# Patient Record
Sex: MALE | Race: WHITE | Employment: OTHER | URBAN - METROPOLITAN AREA
[De-identification: names, ages, dates, MRNs, and addresses within clinical notes are randomized per-mention and may not be internally consistent; named-entity substitution may affect disease eponyms.]

---

## 2017-01-23 ENCOUNTER — OFFICE VISIT (OUTPATIENT)
Dept: FAMILY MEDICINE CLINIC | Age: 47
End: 2017-01-23

## 2017-01-23 ENCOUNTER — HOSPITAL ENCOUNTER (OUTPATIENT)
Dept: LAB | Age: 47
Discharge: HOME | End: 2017-01-23

## 2017-01-23 VITALS
TEMPERATURE: 97.9 F | RESPIRATION RATE: 12 BRPM | HEIGHT: 69 IN | DIASTOLIC BLOOD PRESSURE: 81 MMHG | SYSTOLIC BLOOD PRESSURE: 147 MMHG | OXYGEN SATURATION: 95 % | BODY MASS INDEX: 23.55 KG/M2 | WEIGHT: 159 LBS | HEART RATE: 66 BPM

## 2017-01-23 DIAGNOSIS — Z76.89 ENCOUNTER TO ESTABLISH CARE: Primary | ICD-10-CM

## 2017-01-23 DIAGNOSIS — R06.02 SHORTNESS OF BREATH: ICD-10-CM

## 2017-01-23 DIAGNOSIS — J44.9 CHRONIC OBSTRUCTIVE PULMONARY DISEASE, UNSPECIFIED COPD TYPE (HCC): ICD-10-CM

## 2017-01-23 DIAGNOSIS — Z76.89 ENCOUNTER TO ESTABLISH CARE: ICD-10-CM

## 2017-01-23 DIAGNOSIS — Z71.6 TOBACCO ABUSE COUNSELING: ICD-10-CM

## 2017-01-23 LAB
ALBUMIN SERPL BCP-MCNC: 4 G/DL (ref 3.4–5)
ALBUMIN/GLOB SERPL: 1.5 {RATIO} (ref 0.8–1.7)
ALP SERPL-CCNC: 82 U/L (ref 45–117)
ALT SERPL-CCNC: 17 U/L (ref 16–61)
AMPHET UR QL SCN: NEGATIVE
ANION GAP BLD CALC-SCNC: 7 MMOL/L (ref 3–18)
AST SERPL W P-5'-P-CCNC: 10 U/L (ref 15–37)
BARBITURATES UR QL SCN: NEGATIVE
BASOPHILS # BLD AUTO: 0 K/UL (ref 0–0.1)
BASOPHILS # BLD: 0 % (ref 0–2)
BENZODIAZ UR QL: NEGATIVE
BILIRUB SERPL-MCNC: 0.9 MG/DL (ref 0.2–1)
BUN SERPL-MCNC: 11 MG/DL (ref 7–18)
BUN/CREAT SERPL: 15 (ref 12–20)
CALCIUM SERPL-MCNC: 8.6 MG/DL (ref 8.5–10.1)
CANNABINOIDS UR QL SCN: POSITIVE
CHLORIDE SERPL-SCNC: 108 MMOL/L (ref 100–108)
CHOLEST SERPL-MCNC: 144 MG/DL
CO2 SERPL-SCNC: 24 MMOL/L (ref 21–32)
COCAINE UR QL SCN: NEGATIVE
CREAT SERPL-MCNC: 0.75 MG/DL (ref 0.6–1.3)
DIFFERENTIAL METHOD BLD: ABNORMAL
EOSINOPHIL # BLD: 0.1 K/UL (ref 0–0.4)
EOSINOPHIL NFR BLD: 1 % (ref 0–5)
ERYTHROCYTE [DISTWIDTH] IN BLOOD BY AUTOMATED COUNT: 12.5 % (ref 11.6–14.5)
EST. AVERAGE GLUCOSE BLD GHB EST-MCNC: NORMAL MG/DL
GLOBULIN SER CALC-MCNC: 2.6 G/DL (ref 2–4)
GLUCOSE SERPL-MCNC: 86 MG/DL (ref 74–99)
HBA1C MFR BLD: 4.8 % (ref 4.2–5.6)
HCT VFR BLD AUTO: 44.3 % (ref 36–48)
HDLC SERPL-MCNC: 36 MG/DL (ref 40–60)
HDLC SERPL: 4 {RATIO} (ref 0–5)
HDSCOM,HDSCOM: ABNORMAL
HGB BLD-MCNC: 16.4 G/DL (ref 13–16)
LDLC SERPL CALC-MCNC: 85.6 MG/DL (ref 0–100)
LIPID PROFILE,FLP: ABNORMAL
LYMPHOCYTES # BLD AUTO: 17 % (ref 21–52)
LYMPHOCYTES # BLD: 1.4 K/UL (ref 0.9–3.6)
MCH RBC QN AUTO: 32.9 PG (ref 24–34)
MCHC RBC AUTO-ENTMCNC: 37 G/DL (ref 31–37)
MCV RBC AUTO: 88.8 FL (ref 74–97)
METHADONE UR QL: NEGATIVE
MONOCYTES # BLD: 0.5 K/UL (ref 0.05–1.2)
MONOCYTES NFR BLD AUTO: 6 % (ref 3–10)
NEUTS SEG # BLD: 6.2 K/UL (ref 1.8–8)
NEUTS SEG NFR BLD AUTO: 76 % (ref 40–73)
OPIATES UR QL: POSITIVE
PCP UR QL: NEGATIVE
PLATELET # BLD AUTO: 210 K/UL (ref 135–420)
PMV BLD AUTO: 11.5 FL (ref 9.2–11.8)
POTASSIUM SERPL-SCNC: 3.9 MMOL/L (ref 3.5–5.5)
PROT SERPL-MCNC: 6.6 G/DL (ref 6.4–8.2)
RBC # BLD AUTO: 4.99 M/UL (ref 4.7–5.5)
SODIUM SERPL-SCNC: 139 MMOL/L (ref 136–145)
TRIGL SERPL-MCNC: 112 MG/DL (ref ?–150)
VLDLC SERPL CALC-MCNC: 22.4 MG/DL
WBC # BLD AUTO: 8.2 K/UL (ref 4.6–13.2)

## 2017-01-23 PROCEDURE — 80053 COMPREHEN METABOLIC PANEL: CPT | Performed by: NURSE PRACTITIONER

## 2017-01-23 PROCEDURE — 80074 ACUTE HEPATITIS PANEL: CPT | Performed by: NURSE PRACTITIONER

## 2017-01-23 PROCEDURE — 86703 HIV-1/HIV-2 1 RESULT ANTBDY: CPT | Performed by: NURSE PRACTITIONER

## 2017-01-23 PROCEDURE — 85025 COMPLETE CBC W/AUTO DIFF WBC: CPT | Performed by: NURSE PRACTITIONER

## 2017-01-23 PROCEDURE — 83036 HEMOGLOBIN GLYCOSYLATED A1C: CPT | Performed by: NURSE PRACTITIONER

## 2017-01-23 PROCEDURE — 80307 DRUG TEST PRSMV CHEM ANLYZR: CPT | Performed by: NURSE PRACTITIONER

## 2017-01-23 PROCEDURE — 80061 LIPID PANEL: CPT | Performed by: NURSE PRACTITIONER

## 2017-01-23 RX ORDER — FLUTICASONE PROPIONATE AND SALMETEROL 250; 50 UG/1; UG/1
1 POWDER RESPIRATORY (INHALATION) EVERY 12 HOURS
Qty: 3 INHALER | Refills: 3 | Status: SHIPPED | COMMUNITY
Start: 2017-01-23 | End: 2017-07-24 | Stop reason: DRUGHIGH

## 2017-01-23 RX ORDER — FLUTICASONE PROPIONATE AND SALMETEROL 250; 50 UG/1; UG/1
1 POWDER RESPIRATORY (INHALATION) EVERY 12 HOURS
Qty: 3 INHALER | Refills: 0 | Status: SHIPPED | COMMUNITY
Start: 2017-01-23 | End: 2017-07-24 | Stop reason: DRUGHIGH

## 2017-01-23 RX ORDER — ALBUTEROL SULFATE 90 UG/1
2 AEROSOL, METERED RESPIRATORY (INHALATION)
Qty: 2 INHALER | Refills: 0 | Status: SHIPPED | COMMUNITY
Start: 2017-01-23 | End: 2017-08-28 | Stop reason: SDUPTHER

## 2017-01-23 RX ORDER — ALBUTEROL SULFATE 90 UG/1
2 AEROSOL, METERED RESPIRATORY (INHALATION)
Qty: 6 INHALER | Refills: 3 | Status: ON HOLD | COMMUNITY
Start: 2017-01-23 | End: 2017-12-23

## 2017-01-23 NOTE — PROGRESS NOTES
MARIA ANTONIA QUEZADA [facility]  [facility], [address] [facility]  [phone] office/[phone] fax      1/23/2017    Reason for visit:   Chief Complaint   Patient presents with   [address] COPD     need help with medications out for about 2 months. Decreased smoking from 2 ppd to 1 ppd. Been smoking for 30 years. Patient: Cee Cardenas Sr, 1970, [id number]       Primary MD: Capo Swan NP    Subjective:   Cee Cardenas Sr, a 51 y.o. male, with pmhx of COPD who is left handed presents for Establish Care and COPD (need help with medications out for about 2 months. Decreased smoking from 2 ppd to 1 ppd. Been smoking for 30 years. )    Was referred by  at [facility]. The patient reports that he was on albuterol, spiriva, and Dulera and was being followed by a pulmonologist with [facility] but he wasn't able to afford treatment and medications. HPI    ADV DIR:  None      HM: Last Physical ~2 years, Eye exam > 5 years    Work status: Fulltime,       Past Medical History   Diagnosis Date    Chronic obstructive pulmonary disease ([address]        No past surgical history on file. Social History     Social History    Marital status:      Spouse name: N/A    Number of children: N/A    Years of education: N/A     Occupational History    Not on file.      Social History Main Topics    Smoking status: Current Every Day Smoker     Packs/day: 0.50     Types: Cigarettes    Smokeless tobacco: Not on file    Alcohol use Yes      Comment: occassionally    Drug use: No    Sexual activity: Yes     Partners: Female     Other Topics Concern     Service No    Blood Transfusions No    Caffeine Concern Yes    Occupational Exposure No    Hobby Hazards No    Sleep Concern Yes     occas    Stress Concern No    Weight Concern No    Special Diet No    Back Care Yes    Exercise No    Bike Helmet Yes    Seat Belt No     occas     Social History Narrative       No Known Allergies    No current outpatient prescriptions on file prior to visit. No current facility-administered medications on file prior to visit. Review of Systems   Constitutional: Negative. HENT: Negative. Eyes: Negative. Respiratory: Positive for shortness of breath and wheezing. Negative for cough, hemoptysis and sputum production. History of COPD. Cardiovascular: Negative. Gastrointestinal: Negative. Genitourinary: Negative. Musculoskeletal: Negative. Neurological: Negative. Endo/Heme/Allergies: Negative. Psychiatric/Behavioral: Negative. Objective:   Visit Vitals    /81    Pulse 66    Temp 97.9 °F (36.6 °C)    Resp 12    Ht 5' 9\" (1.753 m)    Wt 159 lb (72.1 kg)    SpO2 95%    BMI 23.48 kg/m2      Wt Readings from Last 3 Encounters:   01/23/17 159 lb (72.1 kg)   11/16/13 150 lb (68 kg)   11/02/13 145 lb (65.8 kg)     Lab Results   Component Value Date/Time    Glucose 95 02/20/2010 09:06 AM    Glucose (POC) 99 06/28/2013 10:07 PM         Physical Exam   Constitutional: He is oriented to person, place, and time. He appears well-developed and well-nourished. Cigarette aroma   HENT:   Head: Normocephalic. Eyes: Pupils are equal, round, and reactive to light. Neck: Normal range of motion. Neck supple. No JVD present. Carotid bruit is not present. No thyromegaly present. Cardiovascular: Normal rate and regular rhythm. No murmur heard. Pulmonary/Chest: Effort normal. No respiratory distress. He has wheezes. Abdominal: Soft. Bowel sounds are normal. He exhibits no distension. There is no tenderness. Musculoskeletal: Normal range of motion. He exhibits no edema or deformity. Neurological: He is alert and oriented to person, place, and time. He has normal reflexes. Skin: Skin is warm and dry. Psychiatric: He has a normal mood and affect.  His behavior is normal. Judgment and thought content normal.   Vitals reviewed. Assessment:    Marilee Blackmon Sr who has risk factors including (see above previous medical hx) and:       ICD-10-CM ICD-9-CM    1. Encounter to establish care Z76.89 V65.8 HEMOGLOBIN A1C WITH EAG      DRUG SCREEN, URINE      LIPID PANEL      HIV 1/2 AB SCREEN W RFLX CONFIRM      HEPATITIS PANEL, ACUTE      CBC WITH AUTOMATED DIFF      METABOLIC PANEL, COMPREHENSIVE   2. Tobacco abuse counseling Z71.6 V65.42      305.1    3. Chronic obstructive pulmonary disease, unspecified COPD type (HCC) J44.9 496 albuterol (PROVENTIL HFA, VENTOLIN HFA, PROAIR HFA) 90 mcg/actuation inhaler      albuterol (PROVENTIL HFA, VENTOLIN HFA, PROAIR HFA) 90 mcg/actuation inhaler      tiotropium (SPIRIVA) 18 mcg inhalation capsule      fluticasone-salmeterol (ADVAIR) 250-50 mcg/dose diskus inhaler      fluticasone-salmeterol (ADVAIR) 250-50 mcg/dose diskus inhaler   4. Shortness of breath R06.02 786.05 tiotropium (SPIRIVA) 18 mcg inhalation capsule      fluticasone-salmeterol (ADVAIR) 250-50 mcg/dose diskus inhaler      fluticasone-salmeterol (ADVAIR) 250-50 mcg/dose diskus inhaler     1. Encounter to establish care  - HEMOGLOBIN A1C WITH EAG; Future  - DRUG SCREEN, URINE; Future  - LIPID PANEL; Future  - HIV 1/2 AB SCREEN W RFLX CONFIRM; Future  - HEPATITIS PANEL, ACUTE; Future  - CBC WITH AUTOMATED DIFF; Future  - METABOLIC PANEL, COMPREHENSIVE; Future    2. Tobacco abuse counseling  -Counseled patient on the dangers of tobacco use, and was advised to quit. Reviewed strategies to maximize success, including the use of Chantix. Discussed the risks of continued tobacco use such as elevated blood pressure, vascular irritation with increased incidence of CVD with stroke or MI and PVD causing claudication, lung damage that could lead to COPD, cancer and death.   Encouraged an approach to find a few healthy habits, write them down then plan to decrease their cigarette use by one each week till they are gone all together and to plan for stress that may cause them to want to restart and how to prevent it by having new coping mechanisms in place. 1-800-QUIT-NOW provided for counseling     3. Chronic obstructive pulmonary disease, unspecified COPD type ([facility] [address]  -Requested records from pulmonologists  -[facility] samples not available today and the patient is out so will trial on Advair.   -Prescribing new medication that is available via [facility]. The patient instructed to see designated pharmacy tech to complete applications and  samples before leaving office today. - albuterol (PROVENTIL HFA, VENTOLIN HFA, PROAIR HFA) 90 mcg/actuation inhaler; Take 2 Puffs by inhalation every four (4) hours as needed for Wheezing. Dispense: 6 Inhaler; Refill: 3  - albuterol (PROVENTIL HFA, VENTOLIN HFA, PROAIR HFA) 90 mcg/actuation inhaler; Take 2 Puffs by inhalation every six (6) hours as needed for Wheezing. Indications: Chronic Obstructive Pulmonary Disease  Dispense: 2 Inhaler; Refill: 0  - tiotropium (SPIRIVA) 18 mcg inhalation capsule; Take 1 Cap by inhalation daily. Indications: BRONCHOSPASM PREVENTION WITH COPD  Dispense: 30 Cap; Refill: 5  - fluticasone-salmeterol (ADVAIR) 250-50 mcg/dose diskus inhaler; Take 1 Puff by inhalation every twelve (12) hours. Dispense: 3 Inhaler; Refill: 0  - fluticasone-salmeterol (ADVAIR) 250-50 mcg/dose diskus inhaler; Take 1 Puff by inhalation every twelve (12) hours. Indications: BRONCHOSPASM PREVENTION WITH COPD  Dispense: 3 Inhaler; Refill: 3    4. Shortness of breath  -Smoking cessation  - albuterol (PROVENTIL HFA, VENTOLIN HFA, PROAIR HFA) 90 mcg/actuation inhaler; Take 2 Puffs by inhalation every four (4) hours as needed for Wheezing. Dispense: 6 Inhaler; Refill: 3  - tiotropium (SPIRIVA) 18 mcg inhalation capsule; Take 1 Cap by inhalation daily. Indications: BRONCHOSPASM PREVENTION WITH COPD  Dispense: 30 Cap; Refill: 5  - fluticasone-salmeterol (ADVAIR) 250-50 mcg/dose diskus inhaler;  Take 1 Puff by inhalation every twelve (12) hours. Dispense: 3 Inhaler; Refill: 0  - fluticasone-salmeterol (ADVAIR) 250-50 mcg/dose diskus inhaler; Take 1 Puff by inhalation every twelve (12) hours. Indications: BRONCHOSPASM PREVENTION WITH COPD  Dispense: 3 Inhaler; Refill: 3    Written instructions followed our verbal discussion of all information discussed above, pending tests ordered and future goals/plans. Patient expressed understanding of current diagnosis, planned testing, follow up and if needed to contact the office for any questions or concerns prior to the next visit. Plan:   Reviewed medication and completed the medication reconciliation with the patient. Reviewed side effects of medications with the patient. Questions were answered and patient verb understanding. Pt is a 52 yo  male. See Med  for details. Pt in the office today to establish care, medication reconciliation. Labs obtained to establish baseline, evaluate metabolic health, nutritional status, vitamin deficiencies and screening for at risk items based on the demographics of the patient, previous medical history and current social practices.  Will contact the patient in when all labs are resulted by phone to review and make lifestyle and medication recommendations. Follow up labs will be completed to monitor improvement prior to their next visit.       Orders Placed This Encounter    HEMOGLOBIN A1C WITH EAG     Standing Status:   Future     Standing Expiration Date:   1/24/2018    DRUG SCREEN, URINE     Standing Status:   Future     Standing Expiration Date:   7/25/2017    LIPID PANEL     Standing Status:   Future     Standing Expiration Date:   1/24/2018    HIV 1/2 AB SCREEN W RFLX CONFIRM     Standing Status:   Future     Standing Expiration Date:   7/23/2017    HEPATITIS PANEL, ACUTE     Standing Status:   Future     Standing Expiration Date:   7/25/2017    CBC WITH AUTOMATED DIFF     Standing Status:   Future     Standing Expiration Date:   0/00/4423    METABOLIC PANEL, COMPREHENSIVE     Standing Status:   Future     Standing Expiration Date:   7/25/2017    albuterol (PROVENTIL HFA, VENTOLIN HFA, PROAIR HFA) 90 mcg/actuation inhaler     Sig: Take 2 Puffs by inhalation every four (4) hours as needed for Wheezing. Dispense:  6 Inhaler     Refill:  3    albuterol (PROVENTIL HFA, VENTOLIN HFA, PROAIR HFA) 90 mcg/actuation inhaler     Sig: Take 2 Puffs by inhalation every six (6) hours as needed for Wheezing. Indications: Chronic Obstructive Pulmonary Disease     Dispense:  2 Inhaler     Refill:  0    tiotropium (SPIRIVA) 18 mcg inhalation capsule     Sig: Take 1 Cap by inhalation daily. Indications: BRONCHOSPASM PREVENTION WITH COPD     Dispense:  30 Cap     Refill:  5    fluticasone-salmeterol (ADVAIR) 250-50 mcg/dose diskus inhaler     Sig: Take 1 Puff by inhalation every twelve (12) hours. Dispense:  3 Inhaler     Refill:  0    fluticasone-salmeterol (ADVAIR) 250-50 mcg/dose diskus inhaler     Sig: Take 1 Puff by inhalation every twelve (12) hours. Indications: BRONCHOSPASM PREVENTION WITH COPD     Dispense:  3 Inhaler     Refill:  3         Follow-up Disposition:  Return in about 6 months (around 7/23/2017), or if symptoms worsen or fail to improve. See [facility] for financial assistance  Labs needed for follow-up appt     \"No Show policy was reviewed with the patient. The services affected are the nurse navigator and the provider. No show appointments include missing labs for a future scheduled appointment, Pap/pelvics, arriving to appointment more than 10 minutes late, and calling to cancel appointment less than 24 hours in advance. After the 6th No Show, the patient will be removed from the [facility] to include medications for 6 months. The patient will be referred to the [place] [id number] for their primary care needs. \"     Claudette Siren, MSN, RN, FNP-C     [facility]    I spent 35 minutes with the patient in face-to-face consultation, of which greater than 50% was spent in counseling and coordination of care as described above.

## 2017-01-23 NOTE — PATIENT INSTRUCTIONS
Learning About Benefits From Quitting Smoking  How does quitting smoking make you healthier? If you're thinking about quitting smoking, you may have a few reasons to be smoke-free. Your health may be one of them. · When you quit smoking, you lower your risks for cancer, lung disease, heart attack, stroke, blood vessel disease, and blindness from macular degeneration. · When you're smoke-free, you get sick less often, and you heal faster. You are less likely to get colds, flu, bronchitis, and pneumonia. · As a nonsmoker, you may find that your mood is better and you are less stressed. When and how will you feel healthier? Quitting has real health benefits that start from day 1 of being smoke-free. And the longer you stay smoke-free, the healthier you get and the better you feel. The first hours  · After just 20 minutes, your blood pressure and heart rate go down. That means there's less stress on your heart and blood vessels. · Within 12 hours, the level of carbon monoxide in your blood drops back to normal. That makes room for more oxygen. With more oxygen in your body, you may notice that you have more energy than when you smoked. After 2 weeks  · Your lungs start to work better. · Your risk of heart attack starts to drop. After 1 month  · When your lungs are clear, you cough less and breathe deeper, so it's easier to be active. · Your sense of taste and smell return. That means you can enjoy food more than you have since you started smoking. Over the years  · After 1 year, your risk of heart disease is half what it would be if you kept smoking. · After 5 years, your risk of stroke starts to shrink. Within a few years after that, it's about the same as if you'd never smoked. · After 10 years, your risk of dying from lung cancer is cut by about half. And your risk for many other types of cancer is lower too. How would quitting help others in your life?   When you quit smoking, you improve the health of everyone who now breathes in your smoke. · Their heart, lung, and cancer risks drop, much like yours. · They are sick less. For babies and small children, living smoke-free means they're less likely to have ear infections, pneumonia, and bronchitis. · If you're a woman who is or will be pregnant someday, quitting smoking means a healthier . · Children who are close to you are less likely to become adult smokers. Where can you learn more? Go to http://costa-jessica.info/. Enter [id number] [id number] in the search box to learn more about \"Learning About Benefits From Quitting Smoking. \"  Current as of: May 26, 2016  Content Version: 11.1  © 0206-1320 [employer]. Care instructions adapted under license by [employer] (which disclaims liability or warranty for this information). If you have questions about a medical condition or this instruction, always ask your healthcare professional. [place] [address] any warranty or liability for your use of this information. Chronic Obstructive Pulmonary Disease (COPD): Care Instructions  Your Care Instructions    Chronic obstructive pulmonary disease (COPD) is a general term for a group of lung diseases, including emphysema and chronic bronchitis. People with COPD have decreased airflow in and out of the lungs, which makes it hard to breathe. The airways also can get clogged with thick mucus. Cigarette smoking is a major cause of COPD. Although there is no cure for COPD, you can slow its progress. Following your treatment plan and taking care of yourself can help you feel better and live longer. Follow-up care is a key part of your treatment and safety. Be sure to make and go to all appointments, and call your doctor if you are having problems. It's also a good idea to know your test results and keep a list of the medicines you take. How can you care for yourself at home? Staying healthy  · Do not smoke. This is the most important step you can take to prevent more damage to your lungs. If you need help quitting, talk to your doctor about stop-smoking programs and medicines. These can increase your chances of quitting for good. · Avoid colds and flu. Get a pneumococcal vaccine shot. If you have had one before, ask your doctor whether you need a second dose. Get the flu vaccine every fall. If you must be around people with colds or the flu, wash your hands often. · Avoid secondhand smoke, air pollution, and high altitudes. Also avoid cold, dry air and hot, humid air. Stay at home with your windows closed when air pollution is bad. Medicines and oxygen therapy  · Take your medicines exactly as prescribed. Call your doctor if you think you are having a problem with your medicine. · You may be taking medicines such as:  ¨ Bronchodilators. These help open your airways and make breathing easier. Bronchodilators are either short-acting (work for 6 to 9 hours) or long-acting (work for 24 hours). You inhale most bronchodilators, so they start to act quickly. Always carry your quick-relief inhaler with you in case you need it while you are away from home. ¨ Corticosteroids (prednisone, budesonide). These reduce airway inflammation. They come in pill or inhaled form. You must take these medicines every day for them to work well. · A spacer may help you get more inhaled medicine to your lungs. Ask your doctor or pharmacist if a spacer is right for you. If it is, ask how to use it properly. · Do not take any vitamins, over-the-counter medicine, or herbal products without talking to your doctor first.  · If your doctor prescribed antibiotics, take them as directed. Do not stop taking them just because you feel better. You need to take the full course of antibiotics. · Oxygen therapy boosts the amount of oxygen in your blood and helps you breathe easier.  Use the flow rate your doctor has recommended, and do not change it without talking to your doctor first.  Activity  · Get regular exercise. Walking is an easy way to get exercise. Start out slowly, and walk a little more each day. · Pay attention to your breathing. You are exercising too hard if you cannot talk while you are exercising. · Take short rest breaks when doing household chores and other activities. · Learn breathing methods--such as breathing through pursed lips--to help you become less short of breath. · If your doctor has not set you up with a pulmonary rehabilitation program, talk to him or her about whether rehab is right for you. Rehab includes exercise programs, education about your disease and how to manage it, help with diet and other changes, and emotional support. Diet  · Eat regular, healthy meals. Use bronchodilators about 1 hour before you eat to make it easier to eat. Eat several small meals instead of three large ones. Drink beverages at the end of the meal. Avoid foods that are hard to chew. · Eat foods that contain protein so that you do not lose muscle mass. Mental health  · Talk to your family, friends, or a therapist about your feelings. It is normal to feel frightened, angry, hopeless, helpless, and even guilty. Talking openly about bad feelings can help you cope. If these feelings last, talk to your doctor. When should you call for help? Call 911 anytime you think you may need emergency care. For example, call if:  · You have severe trouble breathing. Call your doctor now or seek immediate medical care if:  · You have new or worse trouble breathing. · You cough up blood. · You have a fever. Watch closely for changes in your health, and be sure to contact your doctor if:  · You cough more deeply or more often, especially if you notice more mucus or a change in the color of your mucus. · You have new or worse swelling in your legs or belly. · You are not getting better as expected. Where can you learn more?   Go to http://costa-jessica.info/. Addy Gloria in the search box to learn more about \"Chronic Obstructive Pulmonary Disease (COPD): Care Instructions. \"  Current as of: May 23, 2016  Content Version: 11.1  © 3131-9712 [employer]. Care instructions adapted under license by [employer] (which disclaims liability or warranty for this information). If you have questions about a medical condition or this instruction, always ask your healthcare professional. [place] [address] any warranty or liability for your use of this information. The [facility] reminders! Foundation Operating Hours: These may change without notice. Mon- Wed 7am to 5pm. Closed for lunch 12-1pm  Thurs 7am to 12pm  Fridays closed     NO SHOW POLICY ~ If a patient has 3 no shows for an appointment with the Provider, Mental Health Provider, or the Nurse Navigator in 6 months, they will be discharged from the practice for 6 months. Medication ordering will also be suspended. If the patient is discharged from the [facility], they can go to the [place] [address] where they can be seen for the primary needs plus obtain the same types of medications as they receive at the [facility]. To avoid being discharged, the patient must call the office at [phone] 24 hours prior to their appointment if they need to cancel, arrive to their appointments on time and come to all scheduled appointments. If the patient is discharged from the practice, they can apply to be re-established after 12 months. Lab work:  Unless you are instructed differently, please return to the office between the hours of 7 am and 10:30 am Monday through Thursday to have your labs drawn one week before your next scheduled PROVIDER visit. If you do not have an appointment to follow up on these results, please make one or plan to call the office if you do not hear from us to get the results. No news does not mean good news. Medications: If your medications are new or have changed, and you get your medications from the clinic pharmacy ([facility]), you MUST talk to the pharmacy staff to sign the new prescription applications. If you don't sign the applications we cannot get the medications for you. It usually takes 6-8 weeks for your medications to arrive. The Pharmacy staff will call you when your medications are available. You will have 30 days to come in and  your medications. If you don't  your medicines within those 30 days, those medicines will be placed on the self as samples and you will have to start all over again by completing the applications and waiting the 6-8 weeks for your medicines to arrive. This is firm and there will be no exceptions! ! [facility] or [facility] will assist you with your medications if available but not all medications are available so some may be obtained through local pharmacies such as [facility] that has a large $4 list and [facility] that has many drugs for free or also for $4.  We will work hard to get the best medications for you that you can also afford. Feet Care: Local [facility] through [facility]  Every second Tuesday of the month (except for holidays and election days) from 9am to 1 pm. The services provided by these ministry volunteers are free of charge with the option to donate. They will inspect your feet thoroughly, soak them for 10 minutes, cut and file your nails. They care for diabetics as well. Keep in mind this service is free and will be on a first come first serve basis. Bad teeth? Ask about the Dental Bus to get you in front of a local dentist. The bus leaves every other Wednesday for those on the list. (Ask about availability as these appointments are limited)    Eye exams for Diabetics. Please let us know so we can add you to the list to see the eye doctor at [facility]. You will receive a free eye exam and free glasses if needed. Unfortunately, if you are not a diabetic, we do not have a free service for eye exams for you (yet!). We do have information on where to go to get a huge discount on eye exams and glasses. Sick visits: If you are sick and it is not an emergency call the office to see if you can schedule an appointment. Charges and cost items from the clinic:  Most of our orders are covered by [address] but there ARE SOME CHARGES for items such as radiology interpretations and anesthesiology during procedures and surgeries. Please make sure you have contacted the Advanced Patient Advocacy (APA) group to check on your payment options: [email or web address]. or come in and talk to them in person: On  Tuesdays, we have Advanced Patient Advocacy at the Clinic from [address] - 11:30pm and 1pm - 3pm. If you can't make it to the clinic on Tuesdays during their operating hours then APA is available Mon - Fri 8-4pm at [facility] on the first floor by the information desk. Their number is [phone]. It is important that you are screened in order to qualify for assistance and to avoid huge medical charges. The [facility] is not responsible for ANY charges you may accrue regardless of who ordered the medication, procedure, treatment or test. If you go to the Emergency Room, you WILL be charged! Behavior and emotional issues! It is stressful to be sick, have an illness, take medications, not have a job, not have medical insurance, have family issues or just getting older! Schedule an appointment with our mental health provider. She is in the office Mondays and Wednesdays from 8am to [address] [facility] can also contact the following:     The national suicide hotline (2-510-894-MSYR or 3-528.761.8250)    [place] [address] [facility], [address] [place] [address]  [phone]    [facility] [address] [address]  [phone]            Immunizations/Vaccination  Routine Immunizations are provided for infants, children, teens, and adults at the [facility]. Please bring all immunization records with you and present them at the time of registration. Phone [phone] extension [phone]  Hours (Walk in)  Monday, Tuesday, Wednesday and Friday  8:00 AM to 11:00 AM  12:30 PM to 3:00 PM      Drug and Alcohol Addiction Issues! It is hard to stop a poor habit but there is help out there. Please feel free to attend any other the following support groups to help you kick the habit or go to [facility] Department to be evaluated by the psychiatric team. Never give up!! AlAnon meetings: Alisa Siu [place]    [place] MONDAY 7:30 PM JUST FOR TODAY [employer] Al-Anon [facility] [address] [facility] WEDNESDAY 10:30 AM [employer] Al-Anon [place] [facility], ROOM [address] [place] [address] WEDNESDAY 8:00  [place] [address] 8:00 PM KEEP IT SIMPLE [employer] Al-Sonnyn [facility] [address] [place]     [facility] THURSDAY 8:00 PM LET IT BEGIN WITH ME AF[employer] Al-[facility]on [facility] 6;30 PM [place] PARENTS [employer] Parents [place] [address] [facility] [address] [facility]      [place] MONDAY 10:30 AM [employer] Al-Anon [facility] [address] [facility] SATURDAY 8:00 PM [facility] SATURDAY NIGHT AFG Al-Anon [facility] 96 [facility] MONDAY 7:00 PM MONDAY NIGHT AFG Al-Anon [place] [facility] [address]     [place] WEDNESDAY 8:00 PM SERENITY SEEKERS [employer] Al-Anon [facility] [address]  [address]

## 2017-01-23 NOTE — MR AVS SNAPSHOT
Visit Information Date & Time Provider Department Dept. Phone Encounter #  
 1/23/2017  1:30 PM Iza Chavez NP [address] [facility] [address] [id number] Follow-up Instructions Return in about 6 months (around 7/23/2017), or if symptoms worsen or fail to improve. Upcoming Health Maintenance Date Due Pneumococcal 19-64 Medium Risk (1 of 1 - PPSV23) 10/27/1988 DTaP/Tdap/Td series (1 - Tdap) 10/27/1990 INFLUENZA AGE 9 TO ADULT 8/1/2016 Allergies as of 1/23/2017  Review Complete On: 1/23/2017 By: Stacey Ceballos No Known Allergies Current Immunizations  Never Reviewed No immunizations on file. Not reviewed this visit You Were Diagnosed With   
  
 Codes Comments Encounter to establish care    -  Primary ICD-10-CM: Z76.89 
ICD-9-CM: V65.8 Tobacco abuse counseling     ICD-10-CM: Z71.6 ICD-9-CM: V65.42, 305.1 Chronic obstructive pulmonary disease, unspecified COPD type ([facility]     ICD-10-CM: J44.9 ICD-9-CM: 326 Shortness of breath     ICD-10-CM: R06.02 
ICD-9-CM: 786.05 Vitals BP Pulse Temp Resp Height(growth percentile) Weight(growth percentile) 147/81 66 97.9 °F (36.6 °C) 12 5' 9\" (1.753 m) 159 lb (72.1 kg) SpO2 BMI Smoking Status 95% 23.48 kg/m2 Current Every Day Smoker BMI and BSA Data Body Mass Index Body Surface Area  
 23.48 kg/m 2 1.87 m 2 Your Updated Medication List  
  
   
This list is accurate as of: 1/23/17  2:10 PM.  Always use your most recent med list.  
  
  
  
  
 * albuterol 90 mcg/actuation inhaler Commonly known as:  PROVENTIL HFA, VENTOLIN HFA, PROAIR HFA Take 2 Puffs by inhalation every four (4) hours as needed for Wheezing. * albuterol 90 mcg/actuation inhaler Commonly known as:  PROVENTIL HFA, VENTOLIN HFA, PROAIR HFA Take 2 Puffs by inhalation every six (6) hours as needed for Wheezing. Indications: Chronic Obstructive Pulmonary Disease * fluticasone-salmeterol 250-50 mcg/dose diskus inhaler Commonly known as:  ADVAIR Take 1 Puff by inhalation every twelve (12) hours. * fluticasone-salmeterol 250-50 mcg/dose diskus inhaler Commonly known as:  ADVAIR Take 1 Puff by inhalation every twelve (12) hours. Indications: BRONCHOSPASM PREVENTION WITH COPD  
  
 tiotropium 18 mcg inhalation capsule Commonly known as:  Samantha Roll Take 1 Cap by inhalation daily. Indications: BRONCHOSPASM PREVENTION WITH COPD * Notice: This list has 4 medication(s) that are the same as other medications prescribed for you. Read the directions carefully, and ask your doctor or other care provider to review them with you. Prescriptions Printed Refills  
 albuterol (PROVENTIL HFA, VENTOLIN HFA, PROAIR HFA) 90 mcg/actuation inhaler 3 Sig: Take 2 Puffs by inhalation every four (4) hours as needed for Wheezing. Class: Program  
 Route: Inhalation  
 tiotropium (SPIRIVA) 18 mcg inhalation capsule 5 Sig: Take 1 Cap by inhalation daily. Indications: BRONCHOSPASM PREVENTION WITH COPD Class: Program  
 Route: Inhalation  
 fluticasone-salmeterol (ADVAIR) 250-50 mcg/dose diskus inhaler 3 Sig: Take 1 Puff by inhalation every twelve (12) hours. Indications: BRONCHOSPASM PREVENTION WITH COPD Class: Program  
 Route: Inhalation Prescriptions Sent to Mail Order Refills  
 albuterol (PROVENTIL HFA, VENTOLIN HFA, PROAIR HFA) 90 mcg/actuation inhaler 3 Sig: Take 2 Puffs by inhalation every four (4) hours as needed for Wheezing. Class: Program  
 Route: Inhalation  
 tiotropium (SPIRIVA) 18 mcg inhalation capsule 5 Sig: Take 1 Cap by inhalation daily. Indications: BRONCHOSPASM PREVENTION WITH COPD  Class: Program  
 Route: Inhalation  
 fluticasone-salmeterol (ADVAIR) 250-50 mcg/dose diskus inhaler 3  
 Sig: Take 1 Puff by inhalation every twelve (12) hours. Indications: BRONCHOSPASM PREVENTION WITH COPD Class: Program  
 Route: Inhalation Prescriptions Sent to Pharmacy Refills  
 albuterol (PROVENTIL HFA, VENTOLIN HFA, PROAIR HFA) 90 mcg/actuation inhaler 3 Sig: Take 2 Puffs by inhalation every four (4) hours as needed for Wheezing. Class: Program  
 Route: Inhalation  
 tiotropium (SPIRIVA) 18 mcg inhalation capsule 5 Sig: Take 1 Cap by inhalation daily. Indications: BRONCHOSPASM PREVENTION WITH COPD Class: Program  
 Route: Inhalation  
 fluticasone-salmeterol (ADVAIR) 250-50 mcg/dose diskus inhaler 3 Sig: Take 1 Puff by inhalation every twelve (12) hours. Indications: BRONCHOSPASM PREVENTION WITH COPD Class: Program  
 Route: Inhalation Follow-up Instructions Return in about 6 months (around 7/23/2017), or if symptoms worsen or fail to improve. Patient Instructions Learning About Benefits From Quitting Smoking How does quitting smoking make you healthier? If you're thinking about quitting smoking, you may have a few reasons to be smoke-free. Your health may be one of them. · When you quit smoking, you lower your risks for cancer, lung disease, heart attack, stroke, blood vessel disease, and blindness from macular degeneration. · When you're smoke-free, you get sick less often, and you heal faster. You are less likely to get colds, flu, bronchitis, and pneumonia. · As a nonsmoker, you may find that your mood is better and you are less stressed. When and how will you feel healthier? Quitting has real health benefits that start from day 1 of being smoke-free. And the longer you stay smoke-free, the healthier you get and the better you feel. The first hours · After just 20 minutes, your blood pressure and heart rate go down. That means there's less stress on your heart and blood vessels. · Within 12 hours, the level of carbon monoxide in your blood drops back to normal. That makes room for more oxygen. With more oxygen in your body, you may notice that you have more energy than when you smoked. After 2 weeks · Your lungs start to work better. · Your risk of heart attack starts to drop. After 1 month · When your lungs are clear, you cough less and breathe deeper, so it's easier to be active. · Your sense of taste and smell return. That means you can enjoy food more than you have since you started smoking. Over the years · After 1 year, your risk of heart disease is half what it would be if you kept smoking. · After 5 years, your risk of stroke starts to shrink. Within a few years after that, it's about the same as if you'd never smoked. · After 10 years, your risk of dying from lung cancer is cut by about half. And your risk for many other types of cancer is lower too. How would quitting help others in your life? When you quit smoking, you improve the health of everyone who now breathes in your smoke. · Their heart, lung, and cancer risks drop, much like yours. · They are sick less. For babies and small children, living smoke-free means they're less likely to have ear infections, pneumonia, and bronchitis. · If you're a woman who is or will be pregnant someday, quitting smoking means a healthier . · Children who are close to you are less likely to become adult smokers. Where can you learn more? Go to http://costa-jessica.info/. Enter [id number] in the search box to learn more about \"Learning About Benefits From Quitting Smoking. \" Current as of: May 26, 2016 Content Version: 11.1 © 3137-4986 [employer]. Care instructions adapted under license by [employer] (which disclaims liability or warranty for this information).  If you have questions about a medical condition or this instruction, always ask your healthcare professional. [address] any warranty or liability for your use of this information. Chronic Obstructive Pulmonary Disease (COPD): Care Instructions Your Care Instructions Chronic obstructive pulmonary disease (COPD) is a general term for a group of lung diseases, including emphysema and chronic bronchitis. People with COPD have decreased airflow in and out of the lungs, which makes it hard to breathe. The airways also can get clogged with thick mucus. Cigarette smoking is a major cause of COPD. Although there is no cure for COPD, you can slow its progress. Following your treatment plan and taking care of yourself can help you feel better and live longer. Follow-up care is a key part of your treatment and safety. Be sure to make and go to all appointments, and call your doctor if you are having problems. It's also a good idea to know your test results and keep a list of the medicines you take. How can you care for yourself at home? Staying healthy · Do not smoke. This is the most important step you can take to prevent more damage to your lungs. If you need help quitting, talk to your doctor about stop-smoking programs and medicines. These can increase your chances of quitting for good. · Avoid colds and flu. Get a pneumococcal vaccine shot. If you have had one before, ask your doctor whether you need a second dose. Get the flu vaccine every fall. If you must be around people with colds or the flu, wash your hands often. · Avoid secondhand smoke, air pollution, and high altitudes. Also avoid cold, dry air and hot, humid air. Stay at home with your windows closed when air pollution is bad. Medicines and oxygen therapy · Take your medicines exactly as prescribed. Call your doctor if you think you are having a problem with your medicine. · You may be taking medicines such as: ¨ Bronchodilators. These help open your airways and make breathing easier. Bronchodilators are either short-acting (work for 6 to 9 hours) or long-acting (work for 24 hours). You inhale most bronchodilators, so they start to act quickly. Always carry your quick-relief inhaler with you in case you need it while you are away from home. ¨ Corticosteroids (prednisone, budesonide). These reduce airway inflammation. They come in pill or inhaled form. You must take these medicines every day for them to work well. · A spacer may help you get more inhaled medicine to your lungs. Ask your doctor or pharmacist if a spacer is right for you. If it is, ask how to use it properly. · Do not take any vitamins, over-the-counter medicine, or herbal products without talking to your doctor first. 
· If your doctor prescribed antibiotics, take them as directed. Do not stop taking them just because you feel better. You need to take the full course of antibiotics. · Oxygen therapy boosts the amount of oxygen in your blood and helps you breathe easier. Use the flow rate your doctor has recommended, and do not change it without talking to your doctor first. 
Activity · Get regular exercise. Walking is an easy way to get exercise. Start out slowly, and walk a little more each day. · Pay attention to your breathing. You are exercising too hard if you cannot talk while you are exercising. · Take short rest breaks when doing household chores and other activities. · Learn breathing methodssuch as breathing through pursed lipsto help you become less short of breath. · If your doctor has not set you up with a pulmonary rehabilitation program, talk to him or her about whether rehab is right for you. Rehab includes exercise programs, education about your disease and how to manage it, help with diet and other changes, and emotional support. Diet · Eat regular, healthy meals.  Use bronchodilators about 1 hour before you eat to make it easier to eat. Eat several small meals instead of three large ones. Drink beverages at the end of the meal. Avoid foods that are hard to chew. · Eat foods that contain protein so that you do not lose muscle mass. Mental health · Talk to your family, friends, or a therapist about your feelings. It is normal to feel frightened, angry, hopeless, helpless, and even guilty. Talking openly about bad feelings can help you cope. If these feelings last, talk to your doctor. When should you call for help? Call 911 anytime you think you may need emergency care. For example, call if: 
· You have severe trouble breathing. Call your doctor now or seek immediate medical care if: 
· You have new or worse trouble breathing. · You cough up blood. · You have a fever. Watch closely for changes in your health, and be sure to contact your doctor if: 
· You cough more deeply or more often, especially if you notice more mucus or a change in the color of your mucus. · You have new or worse swelling in your legs or belly. · You are not getting better as expected. Where can you learn more? Go to http://costa-jessica.info/. Cristian Gordillo in the search box to learn more about \"Chronic Obstructive Pulmonary Disease (COPD): Care Instructions. \" Current as of: May 23, 2016 Content Version: 11.1 © 9285-8220 [employer]. Care instructions adapted under license by [employer] (which disclaims liability or warranty for this information). If you have questions about a medical condition or this instruction, always ask your healthcare professional. [place] [address] any warranty or liability for your use of this information. [facility] reminders! Foundation Operating Hours: These may change without notice. Mon- Wed 7am to 5pm. Closed for lunch 12-1pm 
Thurs 7am to 12pm 
Fridays closed NO SHOW POLICY ~ If a patient has 3 no shows for an appointment with the Provider, Mental Health Provider, or the Nurse Navigator in 6 months, they will be discharged from the practice for 6 months. Medication ordering will also be suspended. If the patient is discharged from the [facility], they can go to the [place] [address] where they can be seen for the primary needs plus obtain the same types of medications as they receive at the [facility]. To avoid being discharged, the patient must call the office at [phone] 24 hours prior to their appointment if they need to cancel, arrive to their appointments on time and come to all scheduled appointments. If the patient is discharged from the [facility], they can apply to be re-established after 12 months. Lab work:  Unless you are instructed differently, please return to the office between the hours of 7 am and 10:30 am Monday through Thursday to have your labs drawn one week before your next scheduled PROVIDER visit. If you do not have an appointment to follow up on these results, please make one or plan to call the office if you do not hear from us to get the results. No news does not mean good news. Medications: If your medications are new or have changed, and you get your medications from the clinic pharmacy ([facility]), you MUST talk to the pharmacy staff to sign the new prescription applications. If you don't sign the applications we cannot get the medications for you. It usually takes 6-8 weeks for your medications to arrive. The Pharmacy staff will call you when your medications are available. You will have 30 days to come in and  your medications. If you don't  your medicines within those 30 days, those medicines will be placed on the self as samples and you will have to start all over again by completing the applications and waiting the 6-8 weeks for your medicines to arrive.  This is firm and there will be no exceptions!! 
 
 The [facility] or [facility] will assist you with your medications if available but not all medications are available so some may be obtained through local pharmacies such as [facility] that has a large $4 list and [facility] that has many drugs for free or also for $4.  We will work hard to get the best medications for you that you can also afford. Feet Care: Local [facility] through [facility] Every second Tuesday of the month (except for holidays and election days) from 9am to 1 pm. The services provided by these ministry volunteers are free of charge with the option to donate. They will inspect your feet thoroughly, soak them for 10 minutes, cut and file your nails. They care for diabetics as well. Keep in mind this service is free and will be on a first come first serve basis. Bad teeth? Ask about the Dental Bus to get you in front of a local dentist. The bus leaves every other Wednesday for those on the list. (Ask about availability as these appointments are limited) Eye exams for Diabetics. Please let us know so we can add you to the list to see the eye doctor at [facility]. You will receive a free eye exam and free glasses if needed. Unfortunately, if you are not a diabetic, we do not have a free service for eye exams for you (yet!). We do have information on where to go to get a huge discount on eye exams and glasses. Sick visits: If you are sick and it is not an emergency call the office to see if you can schedule an appointment. Charges and cost items from the clinic:  Most of our orders are covered by [address] but there ARE SOME CHARGES for items such as radiology interpretations and anesthesiology during procedures and surgeries. Please make sure you have contacted the Advanced Patient Advocacy (APA) group to check on your payment options: [email or web address]. or come in and talk to them in person:  On 
 Tuesdays, we have Advanced Patient Advocacy at the Clinic from [facility] - 11:30pm and 1pm - 3pm. If you can't make it to the clinic on Tuesdays during their operating hours then APA is available Mon - Fri 8-4pm at [facility] on the first floor by the information desk. Their number is [phone]. It is important that you are screened in order to qualify for assistance and to avoid huge medical charges. The [facility] is not responsible for ANY charges you may accrue regardless of who ordered the medication, procedure, treatment or test. If you go to the Emergency Room, you WILL be charged! Behavior and emotional issues! It is stressful to be sick, have an illness, take medications, not have a job, not have medical insurance, have family issues or just getting older! Schedule an appointment with our mental health provider. She is in the office Mondays and Wednesdays from 8am to [place] [address] can also contact the following: The national suicide hotline (1-139-350-OHGF or 8-310.861.2980) [address], [address] [place] [address] 
[phone] [facility] [address] [place] [address], [facility] Kumar Schultz 
[phone] Immunizations/Vaccination Routine Immunizations are provided for infants, children, teens, and adults at the [facility]. Please bring all immunization records with you and present them at the time of registration. Phone [phone] Hours (Walk in) Monday, Tuesday, Wednesday and Friday 8:00 AM to 11:00 AM 
12:30 PM to 3:00 PM 
 
 
Drug and Alcohol Addiction Issues! It is hard to stop a poor habit but there is help out there. Please feel free to attend any other the following support groups to help you kick the habit or go to [facility] Emergency Department to be evaluated by the psychiatric team. Never give up!! Jonny meetings: Alisa Siu [place] MONDAY 7:30 PM JUST FOR TODAY [employer] [facility] [address] [facility] [place] WEDNESDAY 10:30 AM [employer]GIN[employer] JADEN [employer] OF [facility], ROOM [address] [place] [address]  
 
[place] WEDNESDAY 8:00  Chadsinan Manjarrez [facility] [address] [place] THURSDAY 8:00 PM KEEP IT SIMPLE [employer] [facility] [address] 8:00 PM LET IT BEGIN WITH ME [employer] [facility] [facility] [address] [facility] FRIDAY 6;30 PM [place] PARENTS [employer] [facility] [address] [facility] [address] [place] MONDAY 10:30  [place] [address] [place] SATURDAY 8:00 PM [facility] SATURDAY NIGHT [employer] [place] [address] [place] MONDAY 7:00 PM MONDAY NIGHT [employer] AUSTYN Dawson [facility] [address]  
 
[place] WEDNESDAY 8:00 PM SERENITY SEEKERS [employer] [facility] [address] [facility] & HEALTH SERVICES! Maria Elena Funes introduces extraTKT patient portal. Now you can access parts of your medical record, email your doctor's office, and request medication refills online. 1. In your internet browser, go to [email or web address] [email or web address] 2. Click on the First Time User? Click Here link in the Sign In box. You will see the New Member Sign Up page. 3. Enter your extraTKT Access Code exactly as it appears below. You will not need to use this code after youve completed the sign-up process. If you do not sign up before the expiration date, you must request a new code. · extraTKT Access Code: [email or web address] Expires: 3/18/2017  8:40 AM 
 
4. Enter the last four digits of your Social Security Number (xxxx) and Date of Birth (mm/dd/yyyy) as indicated and click Submit. You will be taken to the next sign-up page. 5. Create a Eyenalyze ID. This will be your Eyenalyze login ID and cannot be changed, so think of one that is secure and easy to remember. 6. Create a Eyenalyze password. You can change your password at any time. 7. Enter your Password Reset Question and Answer. This can be used at a later time if you forget your password. 8. Enter your e-mail address. You will receive e-mail notification when new information is available in [address] 9. Click Sign Up. You can now view and download portions of your medical record. 10. Click the Download Summary menu link to download a portable copy of your medical information. If you have questions, please visit the Frequently Asked Questions section of the Eyenalyze website. Remember, Eyenalyze is NOT to be used for urgent needs. For medical emergencies, dial 911. Now available from your iPhone and Android! Please provide this summary of care documentation to your next provider. Your primary care clinician is listed as Gomez Moses. If you have any questions after today's visit, please call [phone].

## 2017-01-24 LAB
HAV IGM SERPL QL IA: NEGATIVE
HBV CORE IGM SER QL: NEGATIVE
HBV SURFACE AG SER QL: <0.1 INDEX
HBV SURFACE AG SER QL: NEGATIVE
HCV AB SER IA-ACNC: <0.02 INDEX
HCV AB SERPL QL IA: NEGATIVE
HCV COMMENT,HCGAC: NORMAL
SP1: NORMAL
SP2: NORMAL
SP3: NORMAL

## 2017-01-26 NOTE — PROGRESS NOTES
New patient Labs reviewed:  Mayelin Valle,   Please call this patient and review labs and the following    1) Hepatitis negative  2) HIV still pending results  3) UDS positive for marijuana and opiates  4) Cholesterol is good  5) All other labs OK

## 2017-01-27 LAB
HIV 1 & 2 AB SER-IMP: NONREACTIVE
HIV12 RESULT COMMENT, HHIVC: NORMAL

## 2017-01-30 NOTE — PROGRESS NOTES
Called Pt to give normal lab results except positive for opiates and marijuana.   Left phone message to call the office

## 2017-01-31 ENCOUNTER — DOCUMENTATION ONLY (OUTPATIENT)
Dept: FAMILY MEDICINE CLINIC | Age: 47
End: 2017-01-31

## 2017-01-31 ENCOUNTER — TELEPHONE (OUTPATIENT)
Dept: FAMILY MEDICINE CLINIC | Age: 47
End: 2017-01-31
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
## 2017-02-14 ENCOUNTER — TELEPHONE (OUTPATIENT)
Dept: FAMILY MEDICINE CLINIC | Age: 47
End: 2017-02-14

## 2017-02-15 NOTE — TELEPHONE ENCOUNTER
Medication: Proventil HFA, dose: 2 puffs, how often: every 4 hours as needed for wheezing, current number of medication days provided: 90, refill per application. Lot [id number], EXP.03/18. This medication was received and verified for the following 1. Correct Patient, 2. Correct Diagnosis, 3. Correct Drug, 4. Correct route, and no current allergy to medication. Medication: Advair 250-50 mcg, dose: 1 inhalation, how often: twice daily, current number of medication days provided: 90, refill per application. Lot #: [id number], EXP.05/18. This medication was received and verified for the following 1. Correct Patient, 2. Correct Diagnosis, 3. Correct Drug, 4. Correct route, and no current allergy to medication      Please contact patient to come  their medications.      Ace Nichols, MSN, RN, FNP-C     [facility]

## 2017-04-27 ENCOUNTER — TELEPHONE (OUTPATIENT)
Dept: FAMILY MEDICINE CLINIC | Age: 47
End: 2017-04-27

## 2017-04-27 NOTE — TELEPHONE ENCOUNTER
Medication: Proventil HFA, dose: 2 puffs, how often: every 4 hours as needed for wheezing, current number of medication days provided: 90, refill per application. Lot [id number], EXP.05/18. This medication was received and verified for the following 1. Correct Patient, 2. Correct Diagnosis, 3. Correct Drug, 4. Correct route, and no current allergy to medication. Medication: Advair 250-50 mcg, dose: 1 inhalation, how often: twice daily, current number of medication days provided: 90, refill per application. Lot #: [id number], EXP.06/18. This medication was received and verified for the following 1. Correct Patient, 2. Correct Diagnosis, 3. Correct Drug, 4. Correct route, and no current allergy to medication. Please contact patient to come  their medications.      Zenia Black, MSN, RN, [facility]

## 2017-07-17 ENCOUNTER — LAB ONLY (OUTPATIENT)
Dept: FAMILY MEDICINE CLINIC | Age: 47
End: 2017-07-17

## 2017-07-17 ENCOUNTER — HOSPITAL ENCOUNTER (OUTPATIENT)
Dept: LAB | Age: 47
Discharge: HOME | End: 2017-07-17

## 2017-07-17 DIAGNOSIS — Z00.00 ROUTINE HEALTH MAINTENANCE: ICD-10-CM

## 2017-07-17 DIAGNOSIS — Z00.00 ROUTINE HEALTH MAINTENANCE: Primary | ICD-10-CM

## 2017-07-17 LAB
ALBUMIN SERPL BCP-MCNC: 3.8 G/DL (ref 3.4–5)
ALBUMIN/GLOB SERPL: 1.3 {RATIO} (ref 0.8–1.7)
ALP SERPL-CCNC: 88 U/L (ref 45–117)
ALT SERPL-CCNC: 14 U/L (ref 16–61)
ANION GAP BLD CALC-SCNC: 6 MMOL/L (ref 3–18)
AST SERPL W P-5'-P-CCNC: 8 U/L (ref 15–37)
BILIRUB SERPL-MCNC: 0.7 MG/DL (ref 0.2–1)
BUN SERPL-MCNC: 7 MG/DL (ref 7–18)
BUN/CREAT SERPL: 9 (ref 12–20)
CALCIUM SERPL-MCNC: 8.8 MG/DL (ref 8.5–10.1)
CHLORIDE SERPL-SCNC: 107 MMOL/L (ref 100–108)
CO2 SERPL-SCNC: 26 MMOL/L (ref 21–32)
CREAT SERPL-MCNC: 0.81 MG/DL (ref 0.6–1.3)
GLOBULIN SER CALC-MCNC: 3 G/DL (ref 2–4)
GLUCOSE SERPL-MCNC: 113 MG/DL (ref 74–99)
POTASSIUM SERPL-SCNC: 4 MMOL/L (ref 3.5–5.5)
PROT SERPL-MCNC: 6.8 G/DL (ref 6.4–8.2)
SODIUM SERPL-SCNC: 139 MMOL/L (ref 136–145)

## 2017-07-17 PROCEDURE — 80053 COMPREHEN METABOLIC PANEL: CPT | Performed by: NURSE PRACTITIONER

## 2017-07-17 NOTE — PROGRESS NOTES
Venipuncture for labs performed using 23G butterfly Right AC using aseptic technique. Skin intact and dry. No active bleeding or complications noted. Bandaid dressing applied.

## 2017-07-24 ENCOUNTER — OFFICE VISIT (OUTPATIENT)
Dept: FAMILY MEDICINE CLINIC | Age: 47
End: 2017-07-24

## 2017-07-24 VITALS
DIASTOLIC BLOOD PRESSURE: 88 MMHG | WEIGHT: 161 LBS | RESPIRATION RATE: 16 BRPM | OXYGEN SATURATION: 96 % | HEIGHT: 69 IN | SYSTOLIC BLOOD PRESSURE: 140 MMHG | TEMPERATURE: 97.9 F | BODY MASS INDEX: 23.85 KG/M2 | HEART RATE: 74 BPM

## 2017-07-24 DIAGNOSIS — Z72.0 TOBACCO ABUSE: ICD-10-CM

## 2017-07-24 DIAGNOSIS — R40.0 SLEEPINESS: ICD-10-CM

## 2017-07-24 DIAGNOSIS — J44.9 CHRONIC OBSTRUCTIVE PULMONARY DISEASE, UNSPECIFIED COPD TYPE (HCC): Primary | ICD-10-CM

## 2017-07-24 DIAGNOSIS — R53.82 CHRONIC FATIGUE: ICD-10-CM

## 2017-07-24 RX ORDER — FLUTICASONE PROPIONATE AND SALMETEROL 500; 50 UG/1; UG/1
1 POWDER RESPIRATORY (INHALATION) 2 TIMES DAILY
Qty: 3 EACH | Refills: 3 | Status: SHIPPED | COMMUNITY
Start: 2017-07-24 | End: 2017-08-28 | Stop reason: SDUPTHER

## 2017-07-24 RX ORDER — IPRATROPIUM BROMIDE AND ALBUTEROL SULFATE 2.5; .5 MG/3ML; MG/3ML
3 SOLUTION RESPIRATORY (INHALATION)
Qty: 30 NEBULE | Refills: 0 | Status: SHIPPED | OUTPATIENT
Start: 2017-07-24 | End: 2017-10-23 | Stop reason: SDUPTHER

## 2017-07-24 RX ORDER — PREDNISONE 20 MG/1
40 TABLET ORAL DAILY
Qty: 10 TAB | Refills: 0 | Status: SHIPPED | OUTPATIENT
Start: 2017-07-24 | End: 2017-07-29

## 2017-07-24 RX ORDER — FLUTICASONE PROPIONATE AND SALMETEROL 500; 50 UG/1; UG/1
1 POWDER RESPIRATORY (INHALATION) 2 TIMES DAILY
Qty: 3 EACH | Refills: 0 | Status: ON HOLD | COMMUNITY
Start: 2017-07-24 | End: 2017-12-23

## 2017-07-24 RX ORDER — VARENICLINE TARTRATE 25 MG
KIT ORAL
Qty: 1 DOSE PACK | Refills: 0 | Status: SHIPPED | COMMUNITY
Start: 2017-07-24 | End: 2017-07-26 | Stop reason: SDUPTHER

## 2017-07-24 RX ORDER — VARENICLINE TARTRATE 1 MG/1
1 TABLET, FILM COATED ORAL DAILY
Qty: 30 TAB | Refills: 2 | Status: SHIPPED | COMMUNITY
Start: 2017-09-24 | End: 2017-07-26 | Stop reason: SDUPTHER

## 2017-07-24 NOTE — PATIENT INSTRUCTIONS
Varenicline (By mouth)   Varenicline (wli-SJ-v-kleen)  Helps you quit smoking, as part of a support program.   Brand Name(s): Chantix, Chantix Starting Month Pak   There may be other brand names for this medicine. When This Medicine Should Not Be Used: This medicine is not right for everyone. Do not use it if you had an allergic reaction to varenicline. How to Use This Medicine:   Tablet  · Take your medicine as directed. Your dose or schedule may need to be changed to find what works best for you. · Tell your doctor what date you have set to stop smoking. This medicine needs to be started 1 week before that date. · It is best to take this medicine with a full glass of water after you eat. · This medicine should come with a Medication Guide. Ask your pharmacist for a copy if you do not have one. · Missed dose: Take a dose as soon as you remember. If it is almost time for your next dose, wait until then and take a regular dose. Do not take extra medicine to make up for a missed dose. · Store the medicine in a closed container at room temperature, away from heat, moisture, and direct light. Drugs and Foods to Avoid:   Ask your doctor or pharmacist before using any other medicine, including over-the-counter medicines, vitamins, and herbal products. · Some medicines can affect how varenicline works. Tell your doctor if you are using any of the following:  ¨ Insulin  ¨ Theophylline  ¨ Blood thinner (including warfarin)  · This medicine can affect your ability to tolerate alcohol. Limit the amount of alcohol that you drink until you know how this medicine affects you. Warnings While Using This Medicine:   · Tell your doctor if you are pregnant or breastfeeding, or if you have kidney disease, heart or blood vessel problems, angina, or a history of heart attack, stroke, depression or mental health problems, or seizures. · For some people, this medicine may increase mental or emotional problems.  This may lead to thoughts of suicide and violence. Talk with your doctor right away if you have any thought or behavior changes that concern you. Tell your doctor if you or anyone in your family has a history of bipolar disorder or suicide attempts. · This medicine may cause the following problems:  ¨ Increased risk of heart attack or stroke  ¨ Serious skin reactions  ¨ Sleepwalking  · This medicine may cause you to become dizzy or drowsy, or have trouble concentrating. Do not drive or do anything else that could be dangerous until you know this medicine affects you. · Your doctor will check your progress and the effects of this medicine at regular visits. Keep all appointments. · Keep all medicine out of the reach of children. Never share your medicine with anyone. Possible Side Effects While Using This Medicine:   Call your doctor right away if you notice any of these side effects:  · Allergic reaction: Itching or hives, swelling in your face or hands, swelling or tingling in your mouth or throat, chest tightness, trouble breathing  · Blistering, peeling, red skin rash  · Chest pain, fast, pounding, or uneven heartbeat  · Feeling anxious, depressed, restless, or irritable  · Numbness or weakness on one side of your body, sudden or severe headache, problems with vision, speech, or walking  · Seeing or hearing things that are not really there  · Seizures  · Thoughts of hurting yourself or others, unusual moods or behaviors  If you notice these less serious side effects, talk with your doctor:   · Headache  · Nausea, gas  · Trouble sleeping, unusual dreams, sleepwalking  If you notice other side effects that you think are caused by this medicine, tell your doctor. Call your doctor for medical advice about side effects. You may report side effects to FDA at 8-702-FDA-7136  © 2017 [employer] [employer] Information is for End User's use only and may not be sold, redistributed or otherwise used for commercial purposes.   The above information is an  only. It is not intended as medical advice for individual conditions or treatments. Talk to your doctor, nurse or pharmacist before following any medical regimen to see if it is safe and effective for you. Learning About Benefits From Quitting Smoking  How does quitting smoking make you healthier? If you're thinking about quitting smoking, you may have a few reasons to be smoke-free. Your health may be one of them. · When you quit smoking, you lower your risks for cancer, lung disease, heart attack, stroke, blood vessel disease, and blindness from macular degeneration. · When you're smoke-free, you get sick less often, and you heal faster. You are less likely to get colds, flu, bronchitis, and pneumonia. · As a nonsmoker, you may find that your mood is better and you are less stressed. When and how will you feel healthier? Quitting has real health benefits that start from day 1 of being smoke-free. And the longer you stay smoke-free, the healthier you get and the better you feel. The first hours  · After just 20 minutes, your blood pressure and heart rate go down. That means there's less stress on your heart and blood vessels. · Within 12 hours, the level of carbon monoxide in your blood drops back to normal. That makes room for more oxygen. With more oxygen in your body, you may notice that you have more energy than when you smoked. After 2 weeks  · Your lungs start to work better. · Your risk of heart attack starts to drop. After 1 month  · When your lungs are clear, you cough less and breathe deeper, so it's easier to be active. · Your sense of taste and smell return. That means you can enjoy food more than you have since you started smoking. Over the years  · After 1 year, your risk of heart disease is half what it would be if you kept smoking. · After 5 years, your risk of stroke starts to shrink.  Within a few years after that, it's about the same as if you'd never smoked. · After 10 years, your risk of dying from lung cancer is cut by about half. And your risk for many other types of cancer is lower too. How would quitting help others in your life? When you quit smoking, you improve the health of everyone who now breathes in your smoke. · Their heart, lung, and cancer risks drop, much like yours. · They are sick less. For babies and small children, living smoke-free means they're less likely to have ear infections, pneumonia, and bronchitis. · If you're a woman who is or will be pregnant someday, quitting smoking means a healthier . · Children who are close to you are less likely to become adult smokers. Where can you learn more? Go to http://costa-jessica.info/. Enter 052 [id number] 72 11 in the search box to learn more about \"Learning About Benefits From Quitting Smoking. \"  Current as of: 2017  Content Version: 11.3  © 4109-2079 [employer]. Care instructions adapted under license by [employer] (which disclaims liability or warranty for this information). If you have questions about a medical condition or this instruction, always ask your healthcare professional. [place] [address] any warranty or liability for your use of this information. COPD Exacerbation Plan: Care Instructions  Your Care Instructions  If you have chronic obstructive pulmonary disease (COPD), your usual shortness of breath could suddenly get worse. You may start coughing more and have more mucus. This flare-up is called a COPD exacerbation (say \"us-YDU-hz-BAY-shun\"). A lung infection or air pollution could set off an exacerbation. Sometimes it can happen after a quick change in temperature or being around chemicals. Work with your doctor to make a plan for dealing with an exacerbation. You can better manage it if you plan ahead. Follow-up care is a key part of your treatment and safety.  Be sure to make and go to all appointments, and call your doctor if you are having problems. It's also a good idea to know your test results and keep a list of the medicines you take. How can you care for yourself at home? During an exacerbation  · Do not panic if you start to have one. Quick treatment at home may help you prevent serious breathing problems. If you have a COPD exacerbation plan that you developed with your doctor, follow it. · Take your medicines exactly as your doctor tells you. ¨ Use your inhaler as directed by your doctor. If your symptoms do not get better after you use your medicine, have someone take you to the emergency room. Call an ambulance if necessary. ¨ With inhaled medicines, a spacer or a nebulizer may help you get more medicine to your lungs. Ask your doctor or pharmacist how to use them properly. Practice using the spacer in front of a mirror before you have an exacerbation. This may help you get the medicine into your lungs quickly. ¨ If your doctor has given you steroid pills, take them as directed. ¨ Your doctor may have given you a prescription for antibiotics, which you can fill if you need it. ¨ Talk to your doctor if you have any problems with your medicine. And call your doctor if you have to use your antibiotic or steroid pills. Preventing an exacerbation  · Do not smoke. This is the most important step you can take to prevent more damage to your lungs and prevent problems. If you already smoke, it is never too late to stop. If you need help quitting, talk to your doctor about stop-smoking programs and medicines. These can increase your chances of quitting for good. · Take your daily medicines as prescribed. · Avoid colds and flu. ¨ Get a pneumococcal vaccine. ¨ Get a flu vaccine each year, as soon as it is available. Ask those you live or work with to do the same, so they will not get the flu and infect you. ¨ Try to stay away from people with colds or the flu.   ¨ Wash your hands often.  · Avoid secondhand smoke; air pollution; cold, dry air; hot, humid air; and high altitudes. Stay at home with your windows closed when air pollution is bad. · Learn breathing techniques for COPD, such as breathing through pursed lips. These techniques can help you breathe easier during an exacerbation. When should you call for help? Call 911 anytime you think you may need emergency care. For example, call if:  · You have severe trouble breathing. · You have severe chest pain. Call your doctor now or seek immediate medical care if:  · You have new or worse shortness of breath. · You develop new chest pain. · You are coughing more deeply or more often, especially if you notice more mucus or a change in the color of your mucus. · You cough up blood. · You have new or increased swelling in your legs or belly. · You have a fever. Watch closely for changes in your health, and be sure to contact your doctor if:  · You need to use your antibiotic or steroid pills. · Your symptoms are getting worse. Where can you learn more? Go to http://costa-jessica.info/. Enter V574 in the search box to learn more about \"COPD Exacerbation Plan: Care Instructions. \"  Current as of: March 25, 2017  Content Version: 11.3  © 9101-6441 [employer]. Care instructions adapted under license by [employer] (which disclaims liability or warranty for this information). If you have questions about a medical condition or this instruction, always ask your healthcare professional. [place] [address] any warranty or liability for your use of this information.

## 2017-07-24 NOTE — LETTER
July 24, 2017 Mr Jania Crabtree., 
 
Patient scheduled on August 17, 2017 at 315 PM.  
You are asked to arrive at 245 PM to fill out paperwork. [facility] Dr. Clemente Espinoza [address] [facility] [address] [place], [address] [phone]

## 2017-07-24 NOTE — MR AVS SNAPSHOT
Visit Information Date & Time Provider Department Dept. Phone Encounter #  
 7/24/2017  2:00 PM Cynthia Merida NP [address] [facility] [address] [id number] Follow-up Instructions Return in about 3 months (around 10/24/2017), or if symptoms worsen or fail to improve. Upcoming Health Maintenance Date Due Pneumococcal 19-64 Medium Risk (1 of 1 - PPSV23) 10/27/1988 DTaP/Tdap/Td series (1 - Tdap) 10/27/1990 INFLUENZA AGE 9 TO ADULT 8/1/2017 Allergies as of 7/24/2017  Review Complete On: 7/24/2017 By: Cynthia Merida NP No Known Allergies Current Immunizations  Never Reviewed No immunizations on file. Not reviewed this visit You Were Diagnosed With   
  
 Codes Comments Chronic obstructive pulmonary disease, unspecified [facility] type ([facility]    -  Primary ICD-10-CM: J44.9 ICD-9-CM: 803 Tobacco abuse     ICD-10-CM: Z72.0 ICD-9-CM: 305.1 Sleepiness     ICD-10-CM: R40.0 ICD-9-CM: 780.09 Chronic fatigue     ICD-10-CM: R53.82 
ICD-9-CM: 780.79 Vitals BP Pulse Temp Resp Height(growth percentile) Weight(growth percentile) 140/88 (BP 1 Location: Left arm, BP Patient Position: Sitting) 74 97.9 °F (36.6 °C) (Oral) 16 5' 9\" (1.753 m) 161 lb (73 kg) SpO2 BMI Smoking Status 96% 23.78 kg/m2 Current Every Day Smoker BMI and BSA Data Body Mass Index Body Surface Area 23.78 kg/m 2 1.89 m 2 Preferred Pharmacy Pharmacy Name Phone [id number] [address] [phone] Your Updated Medication List  
  
   
This list is accurate as of: 7/24/17  2:23 PM.  Always use your most recent med list.  
  
  
  
  
 * albuterol 90 mcg/actuation inhaler Commonly known as:  PROVENTIL HFA, VENTOLIN HFA, PROAIR HFA Take 2 Puffs by inhalation every four (4) hours as needed for Wheezing. * albuterol 90 mcg/actuation inhaler Commonly known as:  PROVENTIL HFA, VENTOLIN HFA, PROAIR HFA Take 2 Puffs by inhalation every six (6) hours as needed for Wheezing. Indications: Chronic Obstructive Pulmonary Disease * fluticasone-salmeterol 500-50 mcg/dose diskus inhaler Commonly known as:  ADVAIR Take 1 Puff by inhalation two (2) times a day. * fluticasone-salmeterol 500-50 mcg/dose diskus inhaler Commonly known as:  ADVAIR Take 1 Puff by inhalation two (2) times a day. predniSONE 20 mg tablet Commonly known as:  David Ferraris Take 2 Tabs by mouth daily for 5 days. With Breakfast  
  
 tiotropium 18 mcg inhalation capsule Commonly known as:  Chelsea Croft Take 1 Cap by inhalation daily. Indications: BRONCHOSPASM PREVENTION WITH COPD * varenicline 0.5 mg (11)- 1 mg (42) Dspk Commonly known as:  CHANTIX STARTER IAN Start 1 week before target quit date. Take one 0.5 mg tablet daily days 1-3. Take one 0.5 mg tablet twice daily days 4-7. Then start taking one 1 mg tablet daily for up to 11 weeks * varenicline 1 mg tablet Commonly known as:  Sonda Longs Take 1 Tab by mouth daily. Start after completion of starter pack Start taking on:  9/24/2017 * Notice: This list has 6 medication(s) that are the same as other medications prescribed for you. Read the directions carefully, and ask your doctor or other care provider to review them with you. Prescriptions Printed Refills  
 fluticasone-salmeterol (ADVAIR) 500-50 mcg/dose diskus inhaler 3 Sig: Take 1 Puff by inhalation two (2) times a day. Class: Program  
 Route: Inhalation  
 varenicline (CHANTIX STARTER IAN) 0.5 mg (11)- 1 mg (42) DsPk 0 Sig: Start 1 week before target quit date. Take one 0.5 mg tablet daily days 1-3. Take one 0.5 mg tablet twice daily days 4-7. Then start taking one 1 mg tablet daily for up to 11 weeks Class: Program  
 varenicline (CHANTIX) 1 mg tablet 2 Starting on: 9/24/2017 Sig: Take 1 Tab by mouth daily. Start after completion of starter pack Class: Program  
 Route: Oral  
  
Prescriptions Sent to Mail Order Refills  
 fluticasone-salmeterol (ADVAIR) 500-50 mcg/dose diskus inhaler 3 Sig: Take 1 Puff by inhalation two (2) times a day. Class: Program  
 Route: Inhalation  
 varenicline (CHANTIX STARTER IAN) 0.5 mg (11)- 1 mg (42) DsPk 0 Sig: Start 1 week before target quit date. Take one 0.5 mg tablet daily days 1-3. Take one 0.5 mg tablet twice daily days 4-7. Then start taking one 1 mg tablet daily for up to 11 weeks Class: Program  
 varenicline (CHANTIX) 1 mg tablet 2 Starting on: 9/24/2017 Sig: Take 1 Tab by mouth daily. Start after completion of starter pack Class: Program  
 Route: Oral  
  
Prescriptions Sent to Pharmacy Refills  
 fluticasone-salmeterol (ADVAIR) 500-50 mcg/dose diskus inhaler 3 Sig: Take 1 Puff by inhalation two (2) times a day. Class: Program  
 Route: Inhalation  
 varenicline (CHANTIX STARTER IAN) 0.5 mg (11)- 1 mg (42) DsPk 0 Sig: Start 1 week before target quit date. Take one 0.5 mg tablet daily days 1-3. Take one 0.5 mg tablet twice daily days 4-7. Then start taking one 1 mg tablet daily for up to 11 weeks Class: Program  
 varenicline (CHANTIX) 1 mg tablet 2 Starting on: 9/24/2017 Sig: Take 1 Tab by mouth daily. Start after completion of starter pack Class: Program  
 Route: Oral  
 predniSONE (DELTASONE) 20 mg tablet 0 Sig: Take 2 Tabs by mouth daily for 5 days. With Breakfast  
 Class: Normal  
 Pharmacy: [facility] [address]  #: [phone] Route: Oral  
  
We Performed the Following SLEEP MEDICINE REFERRAL [WGJ830 Custom] Comments:  
 Please evaluate patient for EPSS score 22. Follow-up Instructions Return in about 3 months (around 10/24/2017), or if symptoms worsen or fail to improve. To-Do List   
 07/24/2017 PFT: PULMONARY FUNCTION TEST   
  
 08/01/2017 1:00 PM  
  Appointment with [id number] [place] [address] at [address] [place] [address] ([phone]) 1. Do NOT use any inhaled medications 24 hours prior to your breathing test.   2. Do NOT eat a heavy meal or smoke any tobacco products two hours prior to the appointment time. 3. Dress in loose, comfortable clothing. 4. Bring your photo ID, insurance cards and, if provided, the written physician order. 5. Report to the Patient Registration department on the first floor 15-20 minutes prior to your appointment time to check in.   6. If you have questions or need to reschedule, please call [phone]. Referral Information Referral ID Referred By Referred To  
  
 [id number] Anastacia GARCIA Not Available Visits Status Start Date End Date 1 New Request 7/24/17 7/24/18 If your referral has a status of pending review or denied, additional information will be sent to support the outcome of this decision. Patient Instructions Varenicline (By mouth) Varenicline (Vijaya Igo) Helps you quit smoking, as part of a support program.  
Brand Name(s): Chantix, Chantix Starting Month Pak There may be other brand names for this medicine. When This Medicine Should Not Be Used: This medicine is not right for everyone. Do not use it if you had an allergic reaction to varenicline. How to Use This Medicine:  
Tablet · Take your medicine as directed. Your dose or schedule may need to be changed to find what works best for you. · Tell your doctor what date you have set to stop smoking. This medicine needs to be started 1 week before that date. · It is best to take this medicine with a full glass of water after you eat. · This medicine should come with a Medication Guide. Ask your pharmacist for a copy if you do not have one. · Missed dose: Take a dose as soon as you remember.  If it is almost time for your next dose, wait until then and take a regular dose. Do not take extra medicine to make up for a missed dose. · Store the medicine in a closed container at room temperature, away from heat, moisture, and direct light. Drugs and Foods to Avoid: Ask your doctor or pharmacist before using any other medicine, including over-the-counter medicines, vitamins, and herbal products. · Some medicines can affect how varenicline works. Tell your doctor if you are using any of the following: 
¨ Insulin ¨ Theophylline ¨ Blood thinner (including warfarin) · This medicine can affect your ability to tolerate alcohol. Limit the amount of alcohol that you drink until you know how this medicine affects you. Warnings While Using This Medicine: · Tell your doctor if you are pregnant or breastfeeding, or if you have kidney disease, heart or blood vessel problems, angina, or a history of heart attack, stroke, depression or mental health problems, or seizures. · For some people, this medicine may increase mental or emotional problems. This may lead to thoughts of suicide and violence. Talk with your doctor right away if you have any thought or behavior changes that concern you. Tell your doctor if you or anyone in your family has a history of bipolar disorder or suicide attempts. · This medicine may cause the following problems: 
¨ Increased risk of heart attack or stroke ¨ Serious skin reactions ¨ Sleepwalking · This medicine may cause you to become dizzy or drowsy, or have trouble concentrating. Do not drive or do anything else that could be dangerous until you know this medicine affects you. · Your doctor will check your progress and the effects of this medicine at regular visits. Keep all appointments. · Keep all medicine out of the reach of children. Never share your medicine with anyone. Possible Side Effects While Using This Medicine:  
Call your doctor right away if you notice any of these side effects: · Allergic reaction: Itching or hives, swelling in your face or hands, swelling or tingling in your mouth or throat, chest tightness, trouble breathing · Blistering, peeling, red skin rash · Chest pain, fast, pounding, or uneven heartbeat · Feeling anxious, depressed, restless, or irritable · Numbness or weakness on one side of your body, sudden or severe headache, problems with vision, speech, or walking · Seeing or hearing things that are not really there · Seizures · Thoughts of hurting yourself or others, unusual moods or behaviors If you notice these less serious side effects, talk with your doctor:  
· Headache · Nausea, gas · Trouble sleeping, unusual dreams, sleepwalking If you notice other side effects that you think are caused by this medicine, tell your doctor. Call your doctor for medical advice about side effects. You may report side effects to FDA at 4-950-FDA-9635 © 2017 [address]  Information is for End User's use only and may not be sold, redistributed or otherwise used for commercial purposes. The above information is an  only. It is not intended as medical advice for individual conditions or treatments. Talk to your doctor, nurse or pharmacist before following any medical regimen to see if it is safe and effective for you. Learning About Benefits From Quitting Smoking How does quitting smoking make you healthier? If you're thinking about quitting smoking, you may have a few reasons to be smoke-free. Your health may be one of them. · When you quit smoking, you lower your risks for cancer, lung disease, heart attack, stroke, blood vessel disease, and blindness from macular degeneration. · When you're smoke-free, you get sick less often, and you heal faster. You are less likely to get colds, flu, bronchitis, and pneumonia. · As a nonsmoker, you may find that your mood is better and you are less stressed. When and how will you feel healthier? Quitting has real health benefits that start from day 1 of being smoke-free. And the longer you stay smoke-free, the healthier you get and the better you feel. The first hours · After just 20 minutes, your blood pressure and heart rate go down. That means there's less stress on your heart and blood vessels. · Within 12 hours, the level of carbon monoxide in your blood drops back to normal. That makes room for more oxygen. With more oxygen in your body, you may notice that you have more energy than when you smoked. After 2 weeks · Your lungs start to work better. · Your risk of heart attack starts to drop. After 1 month · When your lungs are clear, you cough less and breathe deeper, so it's easier to be active. · Your sense of taste and smell return. That means you can enjoy food more than you have since you started smoking. Over the years · After 1 year, your risk of heart disease is half what it would be if you kept smoking. · After 5 years, your risk of stroke starts to shrink. Within a few years after that, it's about the same as if you'd never smoked. · After 10 years, your risk of dying from lung cancer is cut by about half. And your risk for many other types of cancer is lower too. How would quitting help others in your life? When you quit smoking, you improve the health of everyone who now breathes in your smoke. · Their heart, lung, and cancer risks drop, much like yours. · They are sick less. For babies and small children, living smoke-free means they're less likely to have ear infections, pneumonia, and bronchitis. · If you're a woman who is or will be pregnant someday, quitting smoking means a healthier . · Children who are close to you are less likely to become adult smokers. Where can you learn more? Go to http://costa-jessica.info/. Enter [id number] [id number] 11 in the search box to learn more about \"Learning About Benefits From Quitting Smoking. \" 
 Current as of: March 20, 2017 Content Version: 11.3 © 1460-4399 [employer]. Care instructions adapted under license by [employer] (which disclaims liability or warranty for this information). If you have questions about a medical condition or this instruction, always ask your healthcare professional. [address] any warranty or liability for your use of this information. COPD Exacerbation Plan: Care Instructions Your Care Instructions If you have chronic obstructive pulmonary disease (COPD), your usual shortness of breath could suddenly get worse. You may start coughing more and have more mucus. This flare-up is called a COPD exacerbation (say \"vb-VYG-om-BAY-shun\"). A lung infection or air pollution could set off an exacerbation. Sometimes it can happen after a quick change in temperature or being around chemicals. Work with your doctor to make a plan for dealing with an exacerbation. You can better manage it if you plan ahead. Follow-up care is a key part of your treatment and safety. Be sure to make and go to all appointments, and call your doctor if you are having problems. It's also a good idea to know your test results and keep a list of the medicines you take. How can you care for yourself at home? During an exacerbation · Do not panic if you start to have one. Quick treatment at home may help you prevent serious breathing problems. If you have a COPD exacerbation plan that you developed with your doctor, follow it. · Take your medicines exactly as your doctor tells you. ¨ Use your inhaler as directed by your doctor. If your symptoms do not get better after you use your medicine, have someone take you to the emergency room. Call an ambulance if necessary. ¨ With inhaled medicines, a spacer or a nebulizer may help you get more medicine to your lungs.  Ask your doctor or pharmacist how to use them properly. Practice using the spacer in front of a mirror before you have an exacerbation. This may help you get the medicine into your lungs quickly. ¨ If your doctor has given you steroid pills, take them as directed. ¨ Your doctor may have given you a prescription for antibiotics, which you can fill if you need it. ¨ Talk to your doctor if you have any problems with your medicine. And call your doctor if you have to use your antibiotic or steroid pills. Preventing an exacerbation · Do not smoke. This is the most important step you can take to prevent more damage to your lungs and prevent problems. If you already smoke, it is never too late to stop. If you need help quitting, talk to your doctor about stop-smoking programs and medicines. These can increase your chances of quitting for good. · Take your daily medicines as prescribed. · Avoid colds and flu. ¨ Get a pneumococcal vaccine. ¨ Get a flu vaccine each year, as soon as it is available. Ask those you live or work with to do the same, so they will not get the flu and infect you. ¨ Try to stay away from people with colds or the flu. ¨ Wash your hands often. · Avoid secondhand smoke; air pollution; cold, dry air; hot, humid air; and high altitudes. Stay at home with your windows closed when air pollution is bad. · Learn breathing techniques for COPD, such as breathing through pursed lips. These techniques can help you breathe easier during an exacerbation. When should you call for help? Call 911 anytime you think you may need emergency care. For example, call if: 
· You have severe trouble breathing. · You have severe chest pain. Call your doctor now or seek immediate medical care if: 
· You have new or worse shortness of breath. · You develop new chest pain. · You are coughing more deeply or more often, especially if you notice more mucus or a change in the color of your mucus. · You cough up blood. · You have new or increased swelling in your legs or belly. · You have a fever. Watch closely for changes in your health, and be sure to contact your doctor if: 
· You need to use your antibiotic or steroid pills. · Your symptoms are getting worse. Where can you learn more? Go to http://costa-jessica.info/. Enter N142 in the search box to learn more about \"COPD Exacerbation Plan: Care Instructions. \" Current as of: March 25, 2017 Content Version: 11.3 © 4824-9585 [employer] [place]. Care instructions adapted under license by [employer] (which disclaims liability or warranty for this information). If you have questions about a medical condition or this instruction, always ask your healthcare professional. [address] any warranty or liability for your use of this information. Introducing [facility]! Sha Joaquin introduces [employer] patient portal. Now you can access parts of your medical record, email your doctor's office, and request medication refills online. 1. In your internet browser, go to [email or web address] 2. Click on the First Time User? Click Here link in the Sign In box. You will see the New Member Sign Up page. 3. Enter your B[employer] Access Code exactly as it appears below. You will not need to use this code after youve completed the sign-up process. If you do not sign up before the expiration date, you must request a new code. · [employer] Access Code: [email or web address] Expires: 10/22/2017  1:42 PM 
 
4. Enter the last four digits of your Social Security Number (xxxx) and Date of Birth (mm/dd/yyyy) as indicated and click Submit. You will be taken to the next sign-up page. 5. Create a BizBrag ID. This will be your B[employer]Brag login ID and cannot be changed, so think of one that is secure and easy to remember. 6. Create a [email or web address] password. You can change your password at any time. 7. Enter your Password Reset Question and Answer. This can be used at a later time if you forget your password. 8. Enter your e-mail address. You will receive e-mail notification when new information is available in [address] 9. Click Sign Up. You can now view and download portions of your medical record. 10. Click the Download Summary menu link to download a portable copy of your medical information. If you have questions, please visit the Frequently Asked Questions section of the [employer] website. Remember, [employer] is NOT to be used for urgent needs. For medical emergencies, dial 911. Now available from your iPhone and Android! Please provide this summary of care documentation to your next provider. Your primary care clinician is listed as Emily Khan. If you have any questions after today's visit, please call [phone].

## 2017-07-24 NOTE — PROGRESS NOTES
MARIA ANTONIA GAMBOA [facility]  [address] [facility]  [phone] office/[phone] fax      7/24/2017    Reason for visit:   Chief Complaint   Patient presents with    Results       Patient: Armen Murillo Sr, 1970, [id number]       Primary MD: Ramírez Clayton NP    Subjective:   Armen Murillo Sr, a 51 y.o. male, who presents for Results      HPI Comments: Patient with 30 year history of smoking with h/o COPD, emphysema here with continue complaints of SOB and wheezing. Complains of nighttime wakenings and reports he is using his albuterol inhaler > 3 times daily \"50 times a day\" per patient. He is currently on Advair, Spiriva, and albuterol and is still symptomatic. Currently still smoking, states he has cut back but is having a hard time quitting. Reports he was on Chantix and nicotine patches in the past, but neither were beneficial. He is ready to quit and willing to try chantix again. Reports that he has a nebulizer machine at home and in the past he was given liquid nebs and that helped. No recent PFTs. Denies fevers, sputum production, body aches, chills. Complaints of fatigue and daytime sleepiness. Results   Associated symptoms include shortness of breath. Pertinent negatives include no chest pain, no abdominal pain and no headaches. Sleep Problem   The history is provided by the patient. This is a recurrent problem. The current episode started more than 1 week ago. The problem occurs daily. The problem has not changed since onset. Associated symptoms include shortness of breath. Pertinent negatives include no chest pain, no abdominal pain and no headaches. Wheezing    The history is provided by the patient. This is a recurrent problem. The current episode started more than 1 week ago. The problem occurs constantly. The problem has been gradually worsening. Associated symptoms include cough.  Pertinent negatives include no chest pain, no fever, no abdominal pain, no vomiting, no diarrhea, no dysuria, no coryza, no headaches, no rhinorrhea, no sore throat, no swollen glands, no neck pain, no hemoptysis, no sputum production and no rash. Associated symptoms comments: Bilateral Lower rib cage pain. The problem's precipitants include smoke (Heat). He has tried ipratropium inhalers and beta-agonist inhalers (Reports he is excessively using his albuterol inhaler daily and having night time symptoms) for the symptoms. The treatment provided no relief. He has had no prior hospitalizations. He has had no prior ED visits. He has had no prior ICU admissions. His past medical history is significant for COPD and chronic lung disease. His past medical history does not include asthma, bronchiolitis, heart failure, CAD, pneumonia, PE or DVT. Past Medical History:   Diagnosis Date    Chronic obstructive pulmonary disease ([facility] [address]     Positive urine drug screen 01/2016    opiates and THC       No past surgical history on file. Social History     Social History    Marital status:      Spouse name: N/A    Number of children: N/A    Years of education: N/A     Occupational History    Not on file.      Social History Main Topics    Smoking status: Current Every Day Smoker     Packs/day: 0.50     Types: Cigarettes    Smokeless tobacco: Not on file    Alcohol use Yes      Comment: occassionally    Drug use: No    Sexual activity: Yes     Partners: Female     Other Topics Concern     Service No    Blood Transfusions No    Caffeine Concern Yes    Occupational Exposure No    Hobby Hazards No    Sleep Concern Yes     occas    Stress Concern No    Weight Concern No    Special Diet No    Back Care Yes    Exercise No    Bike Helmet Yes    Seat Belt No     occas     Social History Narrative       No Known Allergies    Current Outpatient Prescriptions on File Prior to Visit   Medication Sig Dispense Refill    albuterol (PROVENTIL HFA, VENTOLIN HFA, PROAIR HFA) 90 mcg/actuation inhaler Take 2 Puffs by inhalation every four (4) hours as needed for Wheezing. 6 Inhaler 3    tiotropium (SPIRIVA) 18 mcg inhalation capsule Take 1 Cap by inhalation daily. Indications: BRONCHOSPASM PREVENTION WITH COPD 30 Cap 5    albuterol (PROVENTIL HFA, VENTOLIN HFA, PROAIR HFA) 90 mcg/actuation inhaler Take 2 Puffs by inhalation every six (6) hours as needed for Wheezing. Indications: Chronic Obstructive Pulmonary Disease 2 Inhaler 0     No current facility-administered medications on file prior to visit. Review of Systems   Constitutional: Negative. Negative for fever. HENT: Negative. Negative for rhinorrhea and sore throat. Eyes: Negative. Respiratory: Positive for cough, shortness of breath and wheezing. Negative for hemoptysis and sputum production. Night time wakenings from SOB    Cardiovascular: Negative. Negative for chest pain. Gastrointestinal: Negative. Negative for abdominal pain, diarrhea and vomiting. Genitourinary: Negative. Negative for dysuria. Musculoskeletal: Negative. Negative for neck pain. Skin: Negative. Negative for rash. Neurological: Negative for dizziness, tingling, tremors, sensory change, speech change, focal weakness, seizures, loss of consciousness and headaches. Reports excessive sleepiness   Endo/Heme/Allergies: Negative. Psychiatric/Behavioral: Negative for depression, hallucinations, memory loss, substance abuse and suicidal ideas. The patient has insomnia. The patient is not nervous/anxious.         Objective:   Visit Vitals    /88 (BP 1 Location: Left arm, BP Patient Position: Sitting)    Pulse 74    Temp 97.9 °F (36.6 °C) (Oral)    Resp 16    Ht 5' 9\" (1.753 m)    Wt 161 lb (73 kg)    SpO2 96%    BMI 23.78 kg/m2      Wt Readings from Last 3 Encounters:   07/24/17 161 lb (73 kg)   01/23/17 159 lb (72.1 kg)   11/16/13 150 lb (68 kg)     Lab Results   Component Value Date/Time    Glucose 113 07/17/2017 07:10 AM    Glucose, POC 99 06/28/2013 10:07 PM         Physical Exam   Constitutional: He is oriented to person, place, and time. He appears well-developed and well-nourished. HENT:   Head: Normocephalic. Eyes: Pupils are equal, round, and reactive to light. Neck: Normal range of motion. Neck supple. No JVD present. Carotid bruit is not present. No thyromegaly present. Cardiovascular: Normal rate and regular rhythm. No murmur heard. Pulmonary/Chest: Effort normal. No respiratory distress. He has wheezes (Throughout all fields. ). He has no rales. He exhibits tenderness. Abdominal: Soft. Bowel sounds are normal. He exhibits no distension. There is no tenderness. Musculoskeletal: Normal range of motion. He exhibits no edema or deformity. Neurological: He is alert and oriented to person, place, and time. He has normal reflexes. Skin: Skin is warm and dry. Psychiatric: He has a normal mood and affect. His behavior is normal. Judgment and thought content normal.   Vitals reviewed. Kathleen Scale 7/24/2017   Sitting and Reading 3   Watching TV 3   Sitting, inactive in a public place (e.g. a movie theater or meeting) 3   As a passenger in a car for an hour, without a break 3   Lying down to rest in the afternoon, when circumstances permit 2   Sitting and talking to someone 3   Sitting quietly after lunch without alcohol 3   In a car, while stopped for a few minutes in traffic 2   Kathleen Sleepiness Score 22     Kathleen Sleepiness Score: 22    Stop Bang 7/24/2017   Does the patient snore loudly (louder than talking or loud enough to be heard through closed doors)? 0   Does the patient often feel tired, fatigued, or sleepy during the daytime, even after a \"good\" night's sleep? 1   Has anyone ever observed the patient stop breathing during their sleep?  0   Does the patient have or are they being treated for high blood pressure?  0   Is the patient's BMI greater than 35? 0   Is your neck circumference greater than 17 inches (Male) or 16 inches (Female)? 0   Is the patient older than 48? 0   Is the patient male? 1   FABIÁN Score 2     Assessment:    Betina Malhotra Sr who has risk factors including (see above previous medical hx) and:       ICD-10-CM ICD-9-CM    1. Chronic obstructive pulmonary disease, unspecified COPD type ([facility]) J44.9 496 PULMONARY FUNCTION TEST      fluticasone-salmeterol (ADVAIR) 500-50 mcg/dose diskus inhaler      fluticasone-salmeterol (ADVAIR) 500-50 mcg/dose diskus inhaler      varenicline (CHANTIX STARTER IAN) 0.5 mg (11)- 1 mg (42) DsPk      varenicline (CHANTIX) 1 mg tablet      predniSONE (DELTASONE) 20 mg tablet      albuterol-ipratropium (DUO-NEB) 2.5 mg-0.5 mg/3 ml nebu   2. Tobacco abuse Z72.0 305.1 varenicline (CHANTIX STARTER IAN) 0.5 mg (11)- 1 mg (42) DsPk      varenicline (CHANTIX) 1 mg tablet   3. Sleepiness R40.0 780.09 SLEEP MEDICINE REFERRAL   4. Chronic fatigue R53.82 780.79 SLEEP MEDICINE REFERRAL     1. Chronic obstructive pulmonary disease, unspecified COPD type ([facility] [address]  -Step up dose of Advair, Add duonebs to use at home with his nebulizer machine as needed  -Discussed initiation of chantix along with counseling via 1-800-quit-now   - PULMONARY FUNCTION TEST; Future  - fluticasone-salmeterol (ADVAIR) 500-50 mcg/dose diskus inhaler; Take 1 Puff by inhalation two (2) times a day. Dispense: 3 Each; Refill: 0  - fluticasone-salmeterol (ADVAIR) 500-50 mcg/dose diskus inhaler; Take 1 Puff by inhalation two (2) times a day. Dispense: 3 Each; Refill: 3  - varenicline (CHANTIX STARTER IAN) 0.5 mg (11)- 1 mg (42) DsPk; Start 1 week before target quit date. Take one 0.5 mg tablet daily days 1-3. Take one 0.5 mg tablet twice daily days 4-7. Then start taking one 1 mg tablet daily for up to 11 weeks  Dispense: 1 Dose Pack; Refill: 0  - varenicline (CHANTIX) 1 mg tablet; Take 1 Tab by mouth daily. Start after completion of starter pack  Dispense: 30 Tab;  Refill: 2  - predniSONE (DELTASONE) 20 mg tablet; Take 2 Tabs by mouth daily for 5 days. With Breakfast  Dispense: 10 Tab; Refill: 0  - albuterol-ipratropium (DUO-NEB) 2.5 mg-0.5 mg/3 ml nebu; 3 mL by Nebulization route every six (6) hours as needed. Dispense: 30 Nebule; Refill: 0    2. Tobacco abuse  -Counseled patient on the dangers of tobacco use, and was advised to quit. Reviewed strategies to maximize success, including the use of Chantix. Discussed the risks of continued tobacco use such as elevated blood pressure, vascular irritation with increased incidence of CVD with stroke or MI and PVD causing claudication, lung damage that could lead to COPD, cancer and death. Encouraged an approach to find a few healthy habits, write them down then plan to decrease their cigarette use by one each week till they are gone all together and to plan for stress that may cause them to want to restart and how to prevent it by having new coping mechanisms in place. 1-800-QUIT-NOW provided for counseling   - varenicline (CHANTIX STARTER IAN) 0.5 mg (11)- 1 mg (42) DsPk; Start 1 week before target quit date. Take one 0.5 mg tablet daily days 1-3. Take one 0.5 mg tablet twice daily days 4-7. Then start taking one 1 mg tablet daily for up to 11 weeks  Dispense: 1 Dose Pack; Refill: 0  - varenicline (CHANTIX) 1 mg tablet; Take 1 Tab by mouth daily. Start after completion of starter pack  Dispense: 30 Tab; Refill: 2    3. Sleepiness  -[place] sleepiness score 22, refer to sleep medicine for further evaluation   - SLEEP MEDICINE REFERRAL    4. Chronic fatigue  -Fatigue of chronic uncontrolled COPD vs sleep condition  - SLEEP MEDICINE REFERRAL      Written instructions followed our verbal discussion of all information discussed above, pending tests ordered and future goals/plans.  Patient expressed understanding of current diagnosis, planned testing, follow up and if needed to contact the office for any questions or concerns prior to the next visit.     Plan:   Med reconciliation completed with patient. Reviewed side effects of medications with the patient. Questions were answered and patient verb understanding. Discussed lab results with patient  Displays most recent values of common labs: H&H, WBC, Platelets, ALT, AST, BUN, Creat, Na, K, TSH, HgbA1c, Lipids, INR and/or PSA. For additional labs, please use Results Review or Flowsheets. Lab Results   Component Value Date/Time    WBC 8.2 01/23/2017 01:11 PM    Hemoglobin, POC 14.3 06/28/2013 10:07 PM    HGB 16.4 01/23/2017 01:11 PM    Hematocrit, POC 42 06/28/2013 10:07 PM    HCT 44.3 01/23/2017 01:11 PM    PLATELET 701 73/90/2278 01:11 PM       Lab Results   Component Value Date/Time    ALT (SGPT) 14 07/17/2017 07:10 AM    AST (SGOT) 8 07/17/2017 07:10 AM    Creatinine, POC 0.9 06/28/2013 10:07 PM    Creatinine 0.81 07/17/2017 07:10 AM    BUN 7 07/17/2017 07:10 AM    BUN, POC 11 06/28/2013 10:07 PM    Sodium,  06/28/2013 10:07 PM    Sodium 139 07/17/2017 07:10 AM    Potassium 4.0 07/17/2017 07:10 AM    Potassium, POC 3.6 06/28/2013 10:07 PM       Lab Results   Component Value Date/Time    Cholesterol, total 144 01/23/2017 01:11 PM    HDL Cholesterol 36 01/23/2017 01:11 PM    LDL, calculated 85.6 01/23/2017 01:11 PM    Triglyceride 112 01/23/2017 01:11 PM    Hemoglobin A1c 4.8 01/23/2017 01:11 PM       No results found for: PSAPOC, PSA3, PSAFLT, PSALT, PSA, PSA2, PSAR1    Orders Placed This Encounter    SLEEP MEDICINE REFERRAL     Referral Priority:   Routine     Referral Type:   Consultation     Referral Reason:   Specialty Services Required     Requested Specialty:   Sleep Medicine    PULMONARY FUNCTION TEST     Standing Status:   Future     Standing Expiration Date:   1/24/2018    fluticasone-salmeterol (ADVAIR) 500-50 mcg/dose diskus inhaler     Sig: Take 1 Puff by inhalation two (2) times a day.      Dispense:  3 Each     Refill:  0    fluticasone-salmeterol (ADVAIR) 500-50 mcg/dose diskus inhaler     Sig: Take 1 Puff by inhalation two (2) times a day. Dispense:  3 Each     Refill:  3    varenicline (CHANTIX STARTER IAN) 0.5 mg (11)- 1 mg (42) DsPk     Sig: Start 1 week before target quit date. Take one 0.5 mg tablet daily days 1-3. Take one 0.5 mg tablet twice daily days 4-7. Then start taking one 1 mg tablet daily for up to 11 weeks     Dispense:  1 Dose Pack     Refill:  0    varenicline (CHANTIX) 1 mg tablet     Sig: Take 1 Tab by mouth daily. Start after completion of starter pack     Dispense:  30 Tab     Refill:  2    predniSONE (DELTASONE) 20 mg tablet     Sig: Take 2 Tabs by mouth daily for 5 days. With Breakfast     Dispense:  10 Tab     Refill:  0    albuterol-ipratropium (DUO-NEB) 2.5 mg-0.5 mg/3 ml nebu     Sig: 3 mL by Nebulization route every six (6) hours as needed. Dispense:  30 Nebule     Refill:  0     Current Outpatient Prescriptions   Medication Sig Dispense Refill    fluticasone-salmeterol (ADVAIR) 500-50 mcg/dose diskus inhaler Take 1 Puff by inhalation two (2) times a day. 3 Each 0    fluticasone-salmeterol (ADVAIR) 500-50 mcg/dose diskus inhaler Take 1 Puff by inhalation two (2) times a day. 3 Each 3    varenicline (CHANTIX STARTER IAN) 0.5 mg (11)- 1 mg (42) DsPk Start 1 week before target quit date. Take one 0.5 mg tablet daily days 1-3. Take one 0.5 mg tablet twice daily days 4-7. Then start taking one 1 mg tablet daily for up to 11 weeks 1 Dose Pack 0    [START ON 9/24/2017] varenicline (CHANTIX) 1 mg tablet Take 1 Tab by mouth daily. Start after completion of starter pack 30 Tab 2    predniSONE (DELTASONE) 20 mg tablet Take 2 Tabs by mouth daily for 5 days. With Breakfast 10 Tab 0    albuterol-ipratropium (DUO-NEB) 2.5 mg-0.5 mg/3 ml nebu 3 mL by Nebulization route every six (6) hours as needed.  30 Nebule 0    albuterol (PROVENTIL HFA, VENTOLIN HFA, PROAIR HFA) 90 mcg/actuation inhaler Take 2 Puffs by inhalation every four (4) hours as needed for Wheezing. 6 Inhaler 3    tiotropium (SPIRIVA) 18 mcg inhalation capsule Take 1 Cap by inhalation daily. Indications: BRONCHOSPASM PREVENTION WITH COPD 30 Cap 5    albuterol (PROVENTIL HFA, VENTOLIN HFA, PROAIR HFA) 90 mcg/actuation inhaler Take 2 Puffs by inhalation every six (6) hours as needed for Wheezing. Indications: Chronic Obstructive Pulmonary Disease 2 Inhaler 0     Medications Discontinued During This Encounter   Medication Reason    fluticasone-salmeterol (ADVAIR) 250-50 mcg/dose diskus inhaler Dose Adjustment    fluticasone-salmeterol (ADVAIR) 250-50 mcg/dose diskus inhaler Dose Adjustment       Follow-up Disposition:  Return in about 3 months (around 10/24/2017), or if symptoms worsen or fail to improve. Labs not needed for follow-up appt      Janeth Means [address], [address] I spent 35 minutes with the patient in face-to-face consultation, of which greater than 50% was spent in counseling and coordination of care as described above.

## 2017-07-25 ENCOUNTER — DOCUMENTATION ONLY (OUTPATIENT)
Dept: FAMILY MEDICINE CLINIC | Age: 47
End: 2017-07-25

## 2017-07-26 DIAGNOSIS — Z72.0 TOBACCO ABUSE: ICD-10-CM

## 2017-07-26 DIAGNOSIS — J44.9 CHRONIC OBSTRUCTIVE PULMONARY DISEASE, UNSPECIFIED COPD TYPE (HCC): ICD-10-CM

## 2017-07-26 RX ORDER — VARENICLINE TARTRATE 25 MG
KIT ORAL
Qty: 1 DOSE PACK | Refills: 0 | Status: SHIPPED | COMMUNITY
Start: 2017-07-26 | End: 2017-10-23 | Stop reason: ALTCHOICE

## 2017-07-26 RX ORDER — VARENICLINE TARTRATE 1 MG/1
1 TABLET, FILM COATED ORAL 2 TIMES DAILY
Qty: 30 TAB | Refills: 2 | Status: ON HOLD | COMMUNITY
Start: 2017-07-26 | End: 2017-12-23

## 2017-08-01 ENCOUNTER — HOSPITAL ENCOUNTER (OUTPATIENT)
Dept: RESPIRATORY THERAPY | Age: 47
Discharge: HOME | End: 2017-08-01
Attending: NURSE PRACTITIONER
Payer: SUBSIDIZED

## 2017-08-01 DIAGNOSIS — J44.9 CHRONIC OBSTRUCTIVE PULMONARY DISEASE, UNSPECIFIED COPD TYPE (HCC): ICD-10-CM

## 2017-08-01 PROCEDURE — 94729 DIFFUSING CAPACITY: CPT

## 2017-08-01 PROCEDURE — 94060 EVALUATION OF WHEEZING: CPT

## 2017-08-01 PROCEDURE — 94727 GAS DIL/WSHOT DETER LNG VOL: CPT

## 2017-08-03 ENCOUNTER — APPOINTMENT (OUTPATIENT)
Dept: GENERAL RADIOLOGY | Age: 47
End: 2017-08-03
Attending: EMERGENCY MEDICINE
Payer: SELF-PAY

## 2017-08-03 ENCOUNTER — HOSPITAL ENCOUNTER (EMERGENCY)
Age: 47
Discharge: HOME | End: 2017-08-03
Attending: EMERGENCY MEDICINE | Admitting: EMERGENCY MEDICINE
Payer: SELF-PAY

## 2017-08-03 VITALS
SYSTOLIC BLOOD PRESSURE: 138 MMHG | DIASTOLIC BLOOD PRESSURE: 81 MMHG | OXYGEN SATURATION: 93 % | HEART RATE: 91 BPM | RESPIRATION RATE: 18 BRPM | TEMPERATURE: 98.9 F

## 2017-08-03 DIAGNOSIS — R07.89 MUSCULOSKELETAL CHEST PAIN: ICD-10-CM

## 2017-08-03 DIAGNOSIS — J44.1 ACUTE EXACERBATION OF CHRONIC OBSTRUCTIVE PULMONARY DISEASE (COPD) (HCC): Primary | ICD-10-CM

## 2017-08-03 DIAGNOSIS — J44.9 COPD, SEVERE (HCC): ICD-10-CM

## 2017-08-03 DIAGNOSIS — J43.9 PULMONARY EMPHYSEMA, UNSPECIFIED EMPHYSEMA TYPE (HCC): Primary | ICD-10-CM

## 2017-08-03 LAB
ANION GAP BLD CALC-SCNC: 8 MMOL/L (ref 3–18)
BUN SERPL-MCNC: 13 MG/DL (ref 7–18)
BUN/CREAT SERPL: 14 (ref 12–20)
CALCIUM SERPL-MCNC: 8.7 MG/DL (ref 8.5–10.1)
CHLORIDE SERPL-SCNC: 105 MMOL/L (ref 100–108)
CK MB CFR SERPL CALC: NORMAL % (ref 0–4)
CK MB SERPL-MCNC: <1 NG/ML (ref 5–25)
CK SERPL-CCNC: 66 U/L (ref 39–308)
CO2 SERPL-SCNC: 26 MMOL/L (ref 21–32)
CREAT SERPL-MCNC: 0.93 MG/DL (ref 0.6–1.3)
ERYTHROCYTE [DISTWIDTH] IN BLOOD BY AUTOMATED COUNT: 12.8 % (ref 11.6–14.5)
GLUCOSE SERPL-MCNC: 100 MG/DL (ref 74–99)
HCT VFR BLD AUTO: 45 % (ref 36–48)
HGB BLD-MCNC: 15.4 G/DL (ref 13–16)
MCH RBC QN AUTO: 33.1 PG (ref 24–34)
MCHC RBC AUTO-ENTMCNC: 37 G/DL (ref 31–37)
MCV RBC AUTO: 89.5 FL (ref 74–97)
PLATELET # BLD AUTO: 153 K/UL (ref 135–420)
PMV BLD AUTO: 11.6 FL (ref 9.2–11.8)
POTASSIUM SERPL-SCNC: 3.6 MMOL/L (ref 3.5–5.5)
RBC # BLD AUTO: 4.65 M/UL (ref 4.7–5.5)
SODIUM SERPL-SCNC: 139 MMOL/L (ref 136–145)
TROPONIN I BLD-MCNC: <0.04 NG/ML (ref 0–0.08)
TROPONIN I SERPL-MCNC: <0.02 NG/ML (ref 0–0.04)
WBC # BLD AUTO: 6.8 K/UL (ref 4.6–13.2)

## 2017-08-03 PROCEDURE — 74011000250 HC RX REV CODE- 250: Performed by: EMERGENCY MEDICINE

## 2017-08-03 PROCEDURE — 84484 ASSAY OF TROPONIN QUANT: CPT | Performed by: EMERGENCY MEDICINE

## 2017-08-03 PROCEDURE — 80048 BASIC METABOLIC PNL TOTAL CA: CPT | Performed by: EMERGENCY MEDICINE

## 2017-08-03 PROCEDURE — 74011250637 HC RX REV CODE- 250/637: Performed by: EMERGENCY MEDICINE

## 2017-08-03 PROCEDURE — 74011000250 HC RX REV CODE- 250

## 2017-08-03 PROCEDURE — 96374 THER/PROPH/DIAG INJ IV PUSH: CPT

## 2017-08-03 PROCEDURE — 71020 XR CHEST PA LAT: CPT

## 2017-08-03 PROCEDURE — 77030029684 HC NEB SM VOL KT MONA -A

## 2017-08-03 PROCEDURE — 74011250636 HC RX REV CODE- 250/636: Performed by: EMERGENCY MEDICINE

## 2017-08-03 PROCEDURE — 94640 AIRWAY INHALATION TREATMENT: CPT

## 2017-08-03 PROCEDURE — 96375 TX/PRO/DX INJ NEW DRUG ADDON: CPT

## 2017-08-03 PROCEDURE — 82550 ASSAY OF CK (CPK): CPT | Performed by: EMERGENCY MEDICINE

## 2017-08-03 PROCEDURE — 99285 EMERGENCY DEPT VISIT HI MDM: CPT

## 2017-08-03 PROCEDURE — 93005 ELECTROCARDIOGRAM TRACING: CPT

## 2017-08-03 PROCEDURE — 85027 COMPLETE CBC AUTOMATED: CPT | Performed by: EMERGENCY MEDICINE

## 2017-08-03 RX ORDER — METHOCARBAMOL 500 MG/1
500 TABLET, FILM COATED ORAL
Qty: 12 TAB | Refills: 0 | Status: SHIPPED | OUTPATIENT
Start: 2017-08-03 | End: 2017-10-23

## 2017-08-03 RX ORDER — IPRATROPIUM BROMIDE AND ALBUTEROL SULFATE 2.5; .5 MG/3ML; MG/3ML
3 SOLUTION RESPIRATORY (INHALATION)
Status: COMPLETED | OUTPATIENT
Start: 2017-08-03 | End: 2017-08-03

## 2017-08-03 RX ORDER — IPRATROPIUM BROMIDE AND ALBUTEROL SULFATE 2.5; .5 MG/3ML; MG/3ML
SOLUTION RESPIRATORY (INHALATION)
Status: COMPLETED
Start: 2017-08-03 | End: 2017-08-03

## 2017-08-03 RX ORDER — ALBUTEROL SULFATE 0.83 MG/ML
SOLUTION RESPIRATORY (INHALATION)
Status: DISCONTINUED
Start: 2017-08-03 | End: 2017-08-04 | Stop reason: HOSPADM

## 2017-08-03 RX ORDER — BENZONATATE 100 MG/1
200 CAPSULE ORAL
Status: COMPLETED | OUTPATIENT
Start: 2017-08-03 | End: 2017-08-03

## 2017-08-03 RX ORDER — PREDNISONE 20 MG/1
40 TABLET ORAL
Qty: 6 TAB | Refills: 0 | Status: SHIPPED | OUTPATIENT
Start: 2017-08-03 | End: 2017-08-06

## 2017-08-03 RX ORDER — KETOROLAC TROMETHAMINE 30 MG/ML
30 INJECTION, SOLUTION INTRAMUSCULAR; INTRAVENOUS
Status: COMPLETED | OUTPATIENT
Start: 2017-08-03 | End: 2017-08-03

## 2017-08-03 RX ORDER — BENZONATATE 100 MG/1
200 CAPSULE ORAL
Qty: 30 CAP | Refills: 0 | Status: SHIPPED | OUTPATIENT
Start: 2017-08-03 | End: 2017-08-10

## 2017-08-03 RX ORDER — AZITHROMYCIN 250 MG/1
TABLET, FILM COATED ORAL
Qty: 6 TAB | Refills: 0 | Status: SHIPPED | OUTPATIENT
Start: 2017-08-03 | End: 2017-08-08

## 2017-08-03 RX ADMIN — METHYLPREDNISOLONE SODIUM SUCCINATE 125 MG: 125 INJECTION, POWDER, FOR SOLUTION INTRAMUSCULAR; INTRAVENOUS at 18:05

## 2017-08-03 RX ADMIN — BENZONATATE 200 MG: 100 CAPSULE ORAL at 20:23

## 2017-08-03 RX ADMIN — IPRATROPIUM BROMIDE AND ALBUTEROL SULFATE 3 ML: .5; 3 SOLUTION RESPIRATORY (INHALATION) at 18:05

## 2017-08-03 RX ADMIN — IPRATROPIUM BROMIDE AND ALBUTEROL SULFATE 3 ML: .5; 3 SOLUTION RESPIRATORY (INHALATION) at 20:28

## 2017-08-03 RX ADMIN — IPRATROPIUM BROMIDE AND ALBUTEROL SULFATE 3 ML: 2.5; .5 SOLUTION RESPIRATORY (INHALATION) at 20:28

## 2017-08-03 RX ADMIN — KETOROLAC TROMETHAMINE 30 MG: 30 INJECTION, SOLUTION INTRAMUSCULAR at 20:24

## 2017-08-03 NOTE — ED TRIAGE NOTES
Patient arrived from home c/o chest pain. Patient states his physician sent him to the ER. Patient rates chest pain 10/10 and describes pain as pressure and sharp pain under right rib area.

## 2017-08-03 NOTE — ED PROVIDER NOTES
HPI Comments: 5:45 PM    Awa Barnes Sr is a 51 y.o. male presents to ED c/o constant CP onset 2 days ago. Pt notes that his CP is located on whole chest. Pt states that on Tuesday, he had a pulmonary breathing test during which he was told to hold his breath, causing him to sweat and have CP ever since. Pt notes that he has been coughing and wheezing for 30 years due to smoking. He states that he has a scheduled appointment with heart and lung doctor on 7/17/17 and 7/28/17 respectively. PMHx include COPD. NKDA. Pt denies swelling, and any other sxs or complaints. The history is provided by the patient. No  was used. Past Medical History:   Diagnosis Date    Chronic obstructive pulmonary disease ([address] 08/03/2017    PFTS 8/3/17    COPD, severe ([address]     [facility]D [facility]     Positive urine drug screen 01/2016    opiates and THC       No past surgical history on file. Family History:   Problem Relation Age of Onset    Hypertension Mother     Heart Disease Mother     Diabetes Mother     Hypertension Father     Heart Disease Father     Cancer Father      lymphnodes    Diabetes Father        Social History     Social History    Marital status:      Spouse name: N/A    Number of children: N/A    Years of education: N/A     Occupational History    Not on file.      Social History Main Topics    Smoking status: Current Every Day Smoker     Packs/day: 0.50     Types: Cigarettes    Smokeless tobacco: Not on file    Alcohol use Yes      Comment: occassionally    Drug use: No    Sexual activity: Yes     Partners: Female     Other Topics Concern     Service No    Blood Transfusions No    Caffeine Concern Yes    Occupational Exposure No    Hobby Hazards No    Sleep Concern Yes     occas    Stress Concern No    Weight Concern No    Special Diet No    Back Care Yes    Exercise No    Bike Helmet Yes    Seat Belt No     occas     Social History Narrative         ALLERGIES: Review of patient's allergies indicates no known allergies. Review of Systems   Constitutional: Negative for fever. HENT: Negative for sore throat. Eyes: Negative for redness and visual disturbance. Respiratory: Negative for shortness of breath and wheezing. Cardiovascular: Positive for chest pain. Negative for leg swelling. Gastrointestinal: Negative for abdominal pain and nausea. Endocrine: Negative for polyuria. Genitourinary: Negative for dysuria. Musculoskeletal: Negative for arthralgias and neck stiffness. Skin: Negative for rash. Neurological: Negative for headaches. All other systems reviewed and are negative. Vitals:    08/03/17 2000 08/03/17 2015 08/03/17 2030 08/03/17 2045   BP: 124/84 (!) 142/97 137/84 138/81   Pulse: 92 94 87 91   Resp: 18      Temp:       SpO2: 92% 95% 98% 93%            Physical Exam   Constitutional: He is oriented to person, place, and time. He appears well-developed and well-nourished. He appears distressed. Mild respiratory distress   HENT:   Head: Normocephalic and atraumatic. Mouth/Throat: Oropharynx is clear and moist.   Eyes: Conjunctivae and EOM are normal. Pupils are equal, round, and reactive to light. No scleral icterus. Neck: Normal range of motion. Neck supple. Cardiovascular: Normal rate and intact distal pulses. Capillary refill < 3 seconds   Pulmonary/Chest: Effort normal and breath sounds normal. No respiratory distress. He has no wheezes. He has no rales. Anterior chest wall tenderness on both pectoralis muscles reproducible. Decreased lung sounds. Active coughing at bedside. Abdominal: Soft. Bowel sounds are normal. He exhibits no distension. There is no tenderness. Musculoskeletal: Normal range of motion. He exhibits no edema. Lymphadenopathy:     He has no cervical adenopathy. Neurological: He is alert and oriented to person, place, and time. No cranial nerve deficit.  He exhibits normal muscle tone. Coordination normal.   Skin: Skin is warm and dry. He is not diaphoretic. No erythema. Psychiatric: His behavior is normal.   Nursing note and vitals reviewed. MDM  Number of Diagnoses or Management Options  Acute exacerbation of chronic obstructive pulmonary disease (COPD) [facility]):   Musculoskeletal chest pain:   Diagnosis management comments: ddx copd, URI, PNA, cardiac, neoplasm, metabolic    Pt has hx severe copd    Labs, duonebs, steroids, tessalon, CxR, ekg    Labs reassuring    I have reassessed the patient. I have discussed the workup, results and plan with the patient and patient is in agreement. Patient is feeling better. Patient will be prescribed zpak, tessalon, prednisone, robaxin. Patient was discharge in stable condition. Patient was given outpatient follow up. Patient is to return to emergency department if any new or worsening condition.  9:53 PM August 03, 2017       Amount and/or Complexity of Data Reviewed  Clinical lab tests: ordered and reviewed  Tests in the radiology section of CPT®: ordered and reviewed  Tests in the medicine section of CPT®: ordered and reviewed  Review and summarize past medical records: yes  Independent visualization of images, tracings, or specimens: yes    Risk of Complications, Morbidity, and/or Mortality  Presenting problems: moderate  Diagnostic procedures: moderate  Management options: moderate    Patient Progress  Patient progress: improved    ED Course       Procedures      Vitals:  Patient Vitals for the past 12 hrs:   Temp Pulse Resp BP SpO2   08/03/17 2045 - 91 - 138/81 93 %   08/03/17 2030 - 87 - 137/84 98 %   08/03/17 2015 - 94 - (!) 142/97 95 %   08/03/17 2000 - 92 18 124/84 92 %   08/03/17 1945 - (!) 112 - 129/85 94 %   08/03/17 1930 - 92 23 121/75 92 %   08/03/17 1915 - 94 23 123/80 (!) 87 %   08/03/17 1815 - 94 27 140/82 92 %   08/03/17 1800 - 87 - - 93 %   08/03/17 1745 - (!) 111 20 (!) 125/97 98 %   08/03/17 1730 - 90 19 (!) 139/91 94 %   08/03/17 1727 - - - - 97 %   08/03/17 1722 98.9 °F (37.2 °C) 97 16 (!) 144/98 97 %   08/03/17 1715 - 93 18 (!) 144/98 97 %       Medications ordered:   Medications   albuterol (PROVENTIL VENTOLIN) 2.5 mg /3 mL (0.083 %) nebulizer solution (not administered)   albuterol-ipratropium (DUO-NEB) 2.5 MG-0.5 MG/3 ML (3 mL Nebulization Given 8/3/17 1805)   methylPREDNISolone (PF) (SOLU-MEDROL) injection 125 mg (125 mg IntraVENous Given 8/3/17 1805)   albuterol-ipratropium (DUO-NEB) 2.5 MG-0.5 MG/3 ML (3 mL Nebulization Given 8/3/17 2028)   ketorolac (TORADOL) injection 30 mg (30 mg IntraVENous Given 8/3/17 2024)   benzonatate (TESSALON) capsule 200 mg (200 mg Oral Given 8/3/17 2023)         Lab findings:  Recent Results (from the past 12 hour(s))   EKG, 12 LEAD, INITIAL    Collection Time: 08/03/17  5:08 PM   Result Value Ref Range    Ventricular Rate 89 BPM    Atrial Rate 89 BPM    P-R Interval 148 ms    QRS Duration 78 ms    Q-T Interval 356 ms    QTC Calculation (Bezet) 433 ms    Calculated P Axis 70 degrees    Calculated R Axis 72 degrees    Calculated T Axis 55 degrees    Diagnosis       Normal sinus rhythm  ST abnormality, possible digitalis effect  Abnormal ECG     CBC W/O DIFF    Collection Time: 08/03/17  5:19 PM   Result Value Ref Range    WBC 6.8 4.6 - 13.2 K/uL    RBC 4.65 (L) 4.70 - 5.50 M/uL    HGB 15.4 13.0 - 16.0 g/dL    HCT 45.0 36.0 - 48.0 %    MCV 89.5 74.0 - 97.0 FL    MCH 33.1 24.0 - 34.0 PG    MCHC 37.0 31.0 - 37.0 g/dL    RDW 12.8 11.6 - 14.5 %    PLATELET 121 910 - 722 K/uL    MPV 11.6 9.2 - 11.8 FL   CARDIAC PANEL,(CK, CKMB & TROPONIN)    Collection Time: 08/03/17  5:19 PM   Result Value Ref Range    CK 66 39 - 308 U/L    CK - MB <1.0 <3.6 ng/ml    CK-MB Index  0.0 - 4.0 %     CALCULATION NOT PERFORMED WHEN RESULT IS BELOW LINEAR LIMIT    Troponin-I, Qt. <0.02 0.0 - 8.991 NG/ML   METABOLIC PANEL, BASIC    Collection Time: 08/03/17  5:19 PM   Result Value Ref Range    Sodium 139 136 - 145 mmol/L    Potassium 3.6 3.5 - 5.5 mmol/L    Chloride 105 100 - 108 mmol/L    CO2 26 21 - 32 mmol/L    Anion gap 8 3.0 - 18 mmol/L    Glucose 100 (H) 74 - 99 mg/dL    BUN 13 7.0 - 18 MG/DL    Creatinine 0.93 0.6 - 1.3 MG/DL    BUN/Creatinine ratio 14 12 - 20      GFR est AA >60 >60 ml/min/1.73m2    GFR est non-AA >60 >60 ml/min/1.73m2    Calcium 8.7 8.5 - 10.1 MG/DL   POC TROPONIN-I    Collection Time: 08/03/17 10:03 PM   Result Value Ref Range    Troponin-I (POC) <0.04 0.00 - 0.08 ng/mL         X-Ray, CT or other radiology findings or impressions:  XR CHEST PA LAT   Final Result      8:12 PM  RADIOLOGY FINDINGS  CHEST PA LAT X-ray shows:  IMPRESSION:  Lucent lungs are suggestive of COPD. No rib abnormality identified. Interpreted by Radiologist.        Disposition:  Diagnosis:   1. Acute exacerbation of chronic obstructive pulmonary disease (COPD) ([address]    2. Musculoskeletal chest pain        Disposition: discharged    Follow-up Information     Follow up With Details Comments [address] [place] [address], NP Call in 2 days  [address] [facility] [address] [facility] [address]      Meng Catalan MD Schedule an appointment as soon as possible for a visit  [facility] [address] [facility] EMERGENCY DEPT  As needed, If symptoms worsen [address] [place] [id number]  [phone]           Patient's Medications   Start Taking    AZITHROMYCIN (ZITHROMAX Z-IAN) 250 MG TABLET    Take as written    BENZONATATE (TESSALON PERLES) 100 MG CAPSULE    Take 2 Caps by mouth three (3) times daily as needed for Cough for up to 7 days. Indications: COUGH    METHOCARBAMOL (ROBAXIN) 500 MG TABLET    Take 1 Tab by mouth three (3) times daily as needed. Indications: pain; muscle spasms    PREDNISONE (DELTASONE) 20 MG TABLET    Take 2 Tabs by mouth daily (with breakfast) for 3 days.  With Breakfast   Continue Taking    ALBUTEROL (PROVENTIL HFA, VENTOLIN HFA, PROAIR HFA) 90 MCG/ACTUATION INHALER    Take 2 Puffs by inhalation every four (4) hours as needed for Wheezing. ALBUTEROL (PROVENTIL HFA, VENTOLIN HFA, PROAIR HFA) 90 MCG/ACTUATION INHALER    Take 2 Puffs by inhalation every six (6) hours as needed for Wheezing. Indications: Chronic Obstructive Pulmonary Disease    ALBUTEROL-IPRATROPIUM (DUO-NEB) 2.5 MG-0.5 MG/3 ML NEBU    3 mL by Nebulization route every six (6) hours as needed. FLUTICASONE-SALMETEROL (ADVAIR) 500-50 MCG/DOSE DISKUS INHALER    Take 1 Puff by inhalation two (2) times a day. FLUTICASONE-SALMETEROL (ADVAIR) 500-50 MCG/DOSE DISKUS INHALER    Take 1 Puff by inhalation two (2) times a day. TIOTROPIUM (SPIRIVA) 18 MCG INHALATION CAPSULE    Take 1 Cap by inhalation daily. Indications: BRONCHOSPASM PREVENTION WITH COPD    VARENICLINE (CHANTIX STARTER IAN) 0.5 MG (11)- 1 MG (42) DSPK    Start 1 week before target quit date. Take one 0.5 mg tablet daily days 1-3. Take one 0.5 mg tablet twice daily days 4-7. Then start taking one 1 mg tablet twice daily for up to 11 weeks    VARENICLINE (CHANTIX) 1 MG TABLET    Take 1 Tab by mouth two (2) times a day. Start after completion of starter pack   These Medications have changed    No medications on file   Stop Taking    No medications on file       Scribe Attestation:   Chantel Chavira, acting as a scribe for and in the presence of Bisi Hernandez DO on August 03, 2017 at 5:45 PM     Signed by: Philippe Kinney, August 03, 2017 at 5:45 PM     Provider Attestation:   I personally performed the services described in the documentation, reviewed the documentation, as recorded by the scribe in my presence, and it accurately and completely records my words and actions.      Reviewed and signed by:  Bisi Hernandez DO

## 2017-08-03 NOTE — PROGRESS NOTES
Called patient and gave him results of PFT showing severe COPD. Informed him of scheduled Pulmonary appt with Dr Brittany Kunz at [facility] Aug 28, 2017 at 1030. He was advised to continue med reegimen and  stop smoking. He states that he is waiting for Chantix from medication program. Informed patient that Pulmonary office will mail forms to complete and take to with him to appt along with photo ID, med list.  Patient states that his wife has completed the financial application. Patient  voiced understanding of all instructions.

## 2017-08-03 NOTE — PROGRESS NOTES
Please review with patient that his PFTs showed Severe COPD, will continue current medication regimen and recommend smoking cessation. The patient is on chantix to help. Also would like to refer patient to Pulmonary for follow up.

## 2017-08-04 LAB
ATRIAL RATE: 89 BPM
CALCULATED P AXIS, ECG09: 70 DEGREES
CALCULATED R AXIS, ECG10: 72 DEGREES
CALCULATED T AXIS, ECG11: 55 DEGREES
DIAGNOSIS, 93000: NORMAL
P-R INTERVAL, ECG05: 148 MS
Q-T INTERVAL, ECG07: 356 MS
QRS DURATION, ECG06: 78 MS
QTC CALCULATION (BEZET), ECG08: 433 MS
VENTRICULAR RATE, ECG03: 89 BPM

## 2017-08-04 NOTE — ED NOTES
Patient continues to complain of having some discomfort to chest, Dr. Roseline Girard aware. Awaiting further orders from provider.

## 2017-08-04 NOTE — ED NOTES
I have reviewed discharge instructions with the patient. The patient verbalized understanding. Patient looks comfortable, left ED in stable condition. Patient provided with several prescriptions. Vital signs stable upon discharge. No acute distress noted. Ambulatory, steady gait noted.

## 2017-08-04 NOTE — ED NOTES
Patient A/O x 4, complaining of chest discomfort. Patient has a nonproductive cough. Will inform Dr. Taylor Adam of patient's pain. Vital signs stable at this time. Denies any new complaints.

## 2017-08-08 ENCOUNTER — HOSPITAL ENCOUNTER (EMERGENCY)
Age: 47
Discharge: HOME | End: 2017-08-08
Attending: EMERGENCY MEDICINE
Payer: SELF-PAY

## 2017-08-08 VITALS
DIASTOLIC BLOOD PRESSURE: 85 MMHG | HEART RATE: 80 BPM | BODY MASS INDEX: 23.7 KG/M2 | SYSTOLIC BLOOD PRESSURE: 155 MMHG | RESPIRATION RATE: 18 BRPM | OXYGEN SATURATION: 97 % | HEIGHT: 69 IN | WEIGHT: 160 LBS | TEMPERATURE: 97 F
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
## 2017-08-08 PROCEDURE — 99283 EMERGENCY DEPT VISIT LOW MDM: CPT

## 2017-08-08 RX ORDER — AMOXICILLIN AND CLAVULANATE POTASSIUM 875; 125 MG/1; MG/1
1 TABLET, FILM COATED ORAL 2 TIMES DAILY
Qty: 20 TAB | Refills: 0 | Status: SHIPPED | OUTPATIENT
Start: 2017-08-08 | End: 2017-08-18

## 2017-08-08 NOTE — ED PROVIDER NOTES
HPI Comments: 7:22 PM Germán Zhong Sr is a 51 y.o. male 2 PPD smoker who presents to the ED c/o a dog bite to the left suprapubic area that occurred just PTA. He reports that he was at a customer's house working on their bathroom when their dog got into the house and bit him. The dog is UTD on it's vaccinations and they called animal control to get the dog. He denies further trauma and any further complaints. The history is provided by the patient. Past Medical History:   Diagnosis Date    Chronic obstructive pulmonary disease ([facility] 08/03/2017    PFTS 8/3/17    COPD, severe ([facility]     Emphysema/COPD ([facility]     Positive urine drug screen 01/2016    opiates and THC       No past surgical history on file. Family History:   Problem Relation Age of Onset    Hypertension Mother     Heart Disease Mother     Diabetes Mother     Hypertension Father     Heart Disease Father     Cancer Father      lymphnodes    Diabetes Father        Social History     Social History    Marital status:      Spouse name: N/A    Number of children: N/A    Years of education: N/A     Occupational History    Not on file. Social History Main Topics    Smoking status: Current Every Day Smoker     Packs/day: 0.50     Types: Cigarettes    Smokeless tobacco: Not on file    Alcohol use Yes      Comment: occassionally    Drug use: No    Sexual activity: Yes     Partners: Female     Other Topics Concern     Service No    Blood Transfusions No    Caffeine Concern Yes    Occupational Exposure No    Hobby Hazards No    Sleep Concern Yes     occas    Stress Concern No    Weight Concern No    Special Diet No    Back Care Yes    Exercise No    Bike Helmet Yes    Seat Belt No     occas     Social History Narrative         ALLERGIES: Review of patient's allergies indicates no known allergies. Review of Systems   Constitutional: Negative for chills and fever.    HENT: Negative for congestion and sore throat. Respiratory: Negative for cough and shortness of breath. Cardiovascular: Negative for chest pain and leg swelling. Gastrointestinal: Negative for abdominal pain and nausea. Genitourinary: Negative for dysuria and hematuria. Musculoskeletal: Negative for back pain. Skin: Positive for wound (Dog bite). Negative for rash. Neurological: Negative for syncope, light-headedness and headaches. Psychiatric/Behavioral: Negative for behavioral problems. The patient is not nervous/anxious. All other systems reviewed and are negative. Vitals:    08/08/17 1852   BP: 155/85   Pulse: 80   Resp: 18   Temp: 97 °F (36.1 °C)   SpO2: 97%   Weight: 72.6 kg (160 lb)   Height: 5' 9\" (1.753 m)            Physical Exam   Constitutional: He is oriented to person, place, and time. He appears well-developed and well-nourished. Non-toxic appearance. He does not have a sickly appearance. He does not appear ill. No distress. HENT:   Head: Normocephalic and atraumatic. Mouth/Throat: Oropharynx is clear and moist. No oropharyngeal exudate. Eyes: Conjunctivae and EOM are normal. Pupils are equal, round, and reactive to light. No scleral icterus. Neck: Normal range of motion. Neck supple. No hepatojugular reflux and no JVD present. No tracheal deviation present. No thyromegaly present. Cardiovascular: Normal rate, regular rhythm, S1 normal, S2 normal, normal heart sounds, intact distal pulses and normal pulses. Exam reveals no gallop, no S3 and no S4. No murmur heard. Pulses:       Radial pulses are 2+ on the right side, and 2+ on the left side. Dorsalis pedis pulses are 2+ on the right side, and 2+ on the left side. Pulmonary/Chest: Effort normal and breath sounds normal. No respiratory distress. He has no decreased breath sounds. He has no wheezes. He has no rhonchi. He has no rales. Abdominal: Soft.  Normal appearance and bowel sounds are normal. He exhibits no distension and no mass. There is no hepatosplenomegaly. There is no tenderness. There is no rigidity, no rebound, no guarding, no CVA tenderness, no tenderness at McBurney's point and negative Pretty's sign. Musculoskeletal: Normal range of motion. He exhibits no edema or tenderness. Strength 5/5 throughout    Lymphadenopathy:        Head (right side): No submental, no submandibular, no preauricular and no occipital adenopathy present. Head (left side): No submental, no submandibular, no preauricular and no occipital adenopathy present. He has no cervical adenopathy. Right: No supraclavicular adenopathy present. Left: No supraclavicular adenopathy present. Neurological: He is alert and oriented to person, place, and time. He has normal strength and normal reflexes. He is not disoriented. No cranial nerve deficit or sensory deficit. Coordination and gait normal. GCS eye subscore is 4. GCS verbal subscore is 5. GCS motor subscore is 6. Grossly intact    Skin: Skin is warm, dry and intact. No rash noted. He is not diaphoretic. Psychiatric: He has a normal mood and affect. His speech is normal and behavior is normal. Judgment and thought content normal. Cognition and memory are normal.   Nursing note and vitals reviewed. MDM  Number of Diagnoses or Management Options  Dog bite, initial encounter:     ED Course       Procedures      Vitals:  Patient Vitals for the past 12 hrs:   Temp Pulse Resp BP SpO2   08/08/17 1852 97 °F (36.1 °C) 80 18 155/85 97 %     Pulse ox reviewed and WNL    Progress notes, Consult notes or additional Procedure notes:    Patient will be prescribed Augmentin. Patient was discharged in stable condition. Patient is to return to emergency department if any new or worsening condition. Disposition:  Diagnosis:   1.  Dog bite, initial encounter        Disposition: Discharge    SCRIBE ATTESTATION STATEMENT  Documented by: [facility] scribing for, and in the presence of,Sg Chaparro DO   7:25 PM     Signed by: Philippe Harman, 08/08/17 7:25 PM    PROVIDER ATTESTATION STATEMENT  I personally performed the services described in the documentation, reviewed the documentation, as recorded by the scribe in my presence, and it accurately and completely records my words and actions.   Cb Reyna DO

## 2017-08-08 NOTE — DISCHARGE INSTRUCTIONS
Animal Bites: Care Instructions  Your Care Instructions  After an animal bite, the biggest concern is infection. The chance of infection depends on the type of animal that bit you, where on your body you were bitten, and your general health. Many animal bites are not closed with stitches, because this can increase the chance of infection. Your bite may take as little as 7 days or as long as several months to heal, depending on how bad it is. Taking good care of your wound at home will help it heal and reduce your chance of infection. The doctor has checked you carefully, but problems can develop later. If you notice any problems or new symptoms, get medical treatment right away. Follow-up care is a key part of your treatment and safety. Be sure to make and go to all appointments, and call your doctor if you are having problems. It's also a good idea to know your test results and keep a list of the medicines you take. How can you care for yourself at home? · If your doctor told you how to care for your wound, follow your doctor's instructions. If you did not get instructions, follow this general advice:  ¨ After 24 to 48 hours, gently wash the wound with clean water 2 times a day. Do not scrub or soak the wound. Don't use hydrogen peroxide or alcohol, which can slow healing. ¨ You may cover the wound with a thin layer of petroleum jelly, such as Vaseline, and a nonstick bandage. ¨ Apply more petroleum jelly and replace the bandage as needed. · After you shower, gently dry the wound with a clean towel. · If your doctor has closed the wound, cover the bandage with a plastic bag before you take a shower. · A small amount of skin redness and swelling around the wound edges and the stitches or staples is normal. Your wound may itch or feel irritated. Do not scratch or rub the wound.   · Ask your doctor if you can take an over-the-counter pain medicine, such as acetaminophen (Tylenol), ibuprofen (Advil, Motrin), or naproxen (Aleve). Read and follow all instructions on the label. · Do not take two or more pain medicines at the same time unless the doctor told you to. Many pain medicines have acetaminophen, which is Tylenol. Too much acetaminophen (Tylenol) can be harmful. · If your bite puts you at risk for rabies, you will get a series of shots over the next few weeks to prevent rabies. Your doctor will tell you when to get the shots. It is very important that you get the full cycle of shots. Follow your doctor's instructions exactly. · You may need a tetanus shot if you have not received one in the last 5 years. · If your doctor prescribed antibiotics, take them as directed. Do not stop taking them just because you feel better. You need to take the full course of antibiotics. When should you call for help? Call your doctor now or seek immediate medical care if:  · The skin near the bite turns cold or pale or it changes color. · You lose feeling in the area near the bite, or it feels numb or tingly. · You have trouble moving a limb near the bite. · You have symptoms of infection, such as:  ¨ Increased pain, swelling, warmth, or redness near the wound. ¨ Red streaks leading from the wound. ¨ Pus draining from the wound. ¨ A fever. · Blood soaks through the bandage. Oozing small amounts of blood is normal.  · Your pain is getting worse. Watch closely for changes in your health, and be sure to contact your doctor if you are not getting better as expected. Where can you learn more? Go to http://costa-jessica.info/. Enter O273 in the search box to learn more about \"Animal Bites: Care Instructions. \"  Current as of: March 20, 2017  Content Version: 11.3  © 5693-4886 [employer]. Care instructions adapted under license by [employer] (which disclaims liability or warranty for this information).  If you have questions about a medical condition or this instruction, always ask your healthcare professional. [place] [address] any warranty or liability for your use of this information.

## 2017-08-17 ENCOUNTER — OFFICE VISIT (OUTPATIENT)
Dept: NEUROLOGY | Age: 47
End: 2017-08-17

## 2017-08-17 VITALS
RESPIRATION RATE: 12 BRPM | TEMPERATURE: 98.2 F | BODY MASS INDEX: 23.99 KG/M2 | HEIGHT: 69 IN | HEART RATE: 75 BPM | DIASTOLIC BLOOD PRESSURE: 88 MMHG | WEIGHT: 162 LBS | SYSTOLIC BLOOD PRESSURE: 142 MMHG | OXYGEN SATURATION: 95 %

## 2017-08-17 DIAGNOSIS — G25.81 RLS (RESTLESS LEGS SYNDROME): ICD-10-CM

## 2017-08-17 DIAGNOSIS — G47.30 HYPERSOMNIA WITH SLEEP APNEA: Primary | ICD-10-CM

## 2017-08-17 DIAGNOSIS — R06.83 SNORING: ICD-10-CM

## 2017-08-17 DIAGNOSIS — J44.9 COPD, SEVERE (HCC): ICD-10-CM

## 2017-08-17 DIAGNOSIS — G47.10 HYPERSOMNIA WITH SLEEP APNEA: Primary | ICD-10-CM

## 2017-08-17 RX ORDER — ZALEPLON 10 MG/1
10 CAPSULE ORAL
Qty: 3 CAP | Refills: 0 | Status: SHIPPED | OUTPATIENT
Start: 2017-08-17 | End: 2017-12-05
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
## 2017-08-17 NOTE — MR AVS SNAPSHOT
Visit Information Date & Time Provider Department Dept. Phone Encounter #  
 8/17/2017  3:15 PM Jose Argueta MD [address] [phone] [id number] Follow-up Instructions Return in about 1 month (around 9/17/2017). Follow-up and Disposition History Your Appointments 8/28/2017 10:45 AM  
XRAY with [facility] [address] ([address]) Appt Note: NP APPT /MARCELLF @11A - DX:COPD  
 235 [facility], [address] [address]  
[phone]  
  
   
 [address] [place] [address], [address] [place] [address],[address] [place] [address]  
  
    
 8/28/2017 11:00 AM  
New Patient with Pamela Quigley MD  
[address] ([address]) Appt Note: DIANE VIDALING-COPD-PFT 13[address] [email or web address]  
 [address] [place] [address], [address] [address]  
[phone]  
  
   
 [address] [place] [address], [address] [place] [address],[address] [place] [address]  
  
    
 10/17/2017  1:00 PM  
Follow Up with Jose Argueta MD  
[address] [place] [address]) Appt Note: 1mon f/u; sleep study & Labs [facility] [address] [facility] [id number] [phone]  
  
   
 [facility] [address]  
  
    
 10/23/2017  1:00 PM  
Follow Up with Cynthia Merida NP [address] [facility] ([facility]) Appt Note: 3 MTH FOLLOW UP/ NO LABS NEEDED  
 [address] [facility] [address]  
[address] [facility] [id number] Upcoming Health Maintenance Date Due Pneumococcal 19-64 Medium Risk (1 of 1 - PPSV23) 10/27/1988 DTaP/Tdap/Td series (1 - Tdap) 10/27/1990 INFLUENZA AGE 9 TO ADULT 8/1/2017 Allergies as of 8/17/2017  Review Complete On: 8/17/2017 By: Jose Argueta MD  
 No Known Allergies Current Immunizations  Never Reviewed No immunizations on file. Not reviewed this visit You Were Diagnosed With   
  
 Codes Comments Hypersomnia with sleep apnea    -  Primary ICD-10-CM: G47.10, G47.30 ICD-9-CM: 780.53   
 COPD, severe ([address]     ICD-10-CM: J44.9 ICD-9-CM: 043 Snoring     ICD-10-CM: R06.83 
ICD-9-CM: 786.09   
 RLS (restless legs syndrome)     ICD-10-CM: G25.81 ICD-9-CM: 333.94 Vitals BP Pulse Temp Resp Height(growth percentile) Weight(growth percentile) 142/88 (BP 1 Location: Left arm, BP Patient Position: Sitting) 75 98.2 °F (36.8 °C) (Oral) 12 5' 9\" (1.753 m) 162 lb (73.5 kg) SpO2 BMI Smoking Status 95% 23.92 kg/m2 Current Every Day Smoker Vitals History BMI and BSA Data Body Mass Index Body Surface Area  
 23.92 kg/m 2 1.89 m 2 Preferred Pharmacy Pharmacy Name Phone [facility] [address] [phone] Your Updated Medication List  
  
   
This list is accurate as of: 8/17/17  4:08 PM.  Always use your most recent med list.  
  
  
  
  
 * albuterol 90 mcg/actuation inhaler Commonly known as:  PROVENTIL HFA, VENTOLIN HFA, PROAIR HFA Take 2 Puffs by inhalation every four (4) hours as needed for Wheezing. * albuterol 90 mcg/actuation inhaler Commonly known as:  PROVENTIL HFA, VENTOLIN HFA, PROAIR HFA Take 2 Puffs by inhalation every six (6) hours as needed for Wheezing. Indications: Chronic Obstructive Pulmonary Disease  
  
 albuterol-ipratropium 2.5 mg-0.5 mg/3 ml Nebu Commonly known as:  DUO-NEB  
3 mL by Nebulization route every six (6) hours as needed. amoxicillin-clavulanate 875-125 mg per tablet Commonly known as:  AUGMENTIN Take 1 Tab by mouth two (2) times a day for 10 days. * fluticasone-salmeterol 500-50 mcg/dose diskus inhaler Commonly known as:  ADVAIR Take 1 Puff by inhalation two (2) times a day. * fluticasone-salmeterol 500-50 mcg/dose diskus inhaler Commonly known as:  ADVAIR  
 Take 1 Puff by inhalation two (2) times a day. methocarbamol 500 mg tablet Commonly known as:  ROBAXIN Take 1 Tab by mouth three (3) times daily as needed. Indications: pain; muscle spasms  
  
 tiotropium 18 mcg inhalation capsule Commonly known as:  Yanelis Doc Take 1 Cap by inhalation daily. Indications: BRONCHOSPASM PREVENTION WITH COPD * varenicline 0.5 mg (11)- 1 mg (42) Dspk Commonly known as:  CHANTIX STARTER IAN Start 1 week before target quit date. Take one 0.5 mg tablet daily days 1-3. Take one 0.5 mg tablet twice daily days 4-7. Then start taking one 1 mg tablet twice daily for up to 11 weeks * varenicline 1 mg tablet Commonly known as:  Soraya Ping Take 1 Tab by mouth two (2) times a day. Start after completion of starter pack  
  
 zaleplon 10 mg capsule Commonly known as:  SONATA Take 1 Cap by mouth nightly. Max Daily Amount: 10 mg.  
  
 * Notice: This list has 6 medication(s) that are the same as other medications prescribed for you. Read the directions carefully, and ask your doctor or other care provider to review them with you. Prescriptions Printed Refills  
 zaleplon (SONATA) 10 mg capsule 0 Sig: Take 1 Cap by mouth nightly. Max Daily Amount: 10 mg.  
 Class: Print Route: Oral  
  
Follow-up Instructions Return in about 1 month (around 9/17/2017). To-Do List   
 08/17/2017 Lab:  BUN   
  
 08/17/2017 Lab:  CREATININE   
  
 08/17/2017 Lab:  FERRITIN   
  
 08/17/2017 Lab:  IRON PROFILE   
  
 08/17/2017 Sleep Center:  SLEEP STUDY FULL   
  
 08/17/2017 Lab:  VITAMIN B12 Patient Instructions No driving drowsy Introducing hospitals & HEALTH SERVICES! Rakel Agudelo introduces [employer] patient portal. Now you can access parts of your medical record, email your doctor's office, and request medication refills online. 1. In your internet browser, go to [email or web address] 2. Click on the First Time User? Click Here link in the Sign In box. You will see the New Member Sign Up page. 3. Enter your [employer] Access Code exactly as it appears below. You will not need to use this code after youve completed the sign-up process. If you do not sign up before the expiration date, you must request a new code. · [employer] Access Code: [email or web address] Expires: 10/22/2017  1:42 PM 
 
4. Enter the last four digits of your Social Security Number (xxxx) and Date of Birth (mm/dd/yyyy) as indicated and click Submit. You will be taken to the next sign-up page. 5. Create a [employer] ID. This will be your [employer] login ID and cannot be changed, so think of one that is secure and easy to remember. 6. Create a [employer] password. You can change your password at any time. 7. Enter your Password Reset Question and Answer. This can be used at a later time if you forget your password. 8. Enter your e-mail address. You will receive e-mail notification when new information is available in [address] 9. Click Sign Up. You can now view and download portions of your medical record. 10. Click the Download Summary menu link to download a portable copy of your medical information. If you have questions, please visit the Frequently Asked Questions section of the [employer] website. Remember, [employer] is NOT to be used for urgent needs. For medical emergencies, dial 911. Now available from your iPhone and Android! Please provide this summary of care documentation to your next provider. Your primary care clinician [facility]. If you have any questions after today's visit, please call [phone].

## 2017-08-28 ENCOUNTER — OFFICE VISIT (OUTPATIENT)
Dept: PULMONOLOGY | Age: 47
End: 2017-08-28

## 2017-08-28 VITALS
DIASTOLIC BLOOD PRESSURE: 76 MMHG | TEMPERATURE: 98.3 F | BODY MASS INDEX: 23.7 KG/M2 | OXYGEN SATURATION: 97 % | SYSTOLIC BLOOD PRESSURE: 132 MMHG | WEIGHT: 160 LBS | RESPIRATION RATE: 20 BRPM | HEIGHT: 69 IN | HEART RATE: 71 BPM

## 2017-08-28 DIAGNOSIS — J44.9 COPD, SEVERE (HCC): Primary | ICD-10-CM

## 2017-08-28 RX ORDER — PREDNISONE 20 MG/1
40 TABLET ORAL
Qty: 14 TAB | Refills: 0 | Status: SHIPPED | OUTPATIENT
Start: 2017-08-28 | End: 2017-10-23 | Stop reason: ALTCHOICE

## 2017-08-28 NOTE — PATIENT INSTRUCTIONS
Do everything you can to stop smoking including substituting hard candy and sugarless gum when you want to smoke put the hard candy and sugarless gum in your pockets ashtrays automobile or any place you might consider smoking and of course give the Chantix a try    Continue Spiriva inhale 1 capsule daily and remember to exhale fully before you inhale    Continue Advair 1 inhalation twice daily and remember to exhale fully before inhaling and also to wash mouth with water and spit it out after inhaling    Albuterol 2 inhalations every 4 hours as needed but if you require albuterol too often to control respiratory symptoms call the office for severe symptoms go to the emergency room    Prednisone 2 tablets every morning for 7 days    Always call for symptoms such as increasing shortness of breath or a cough productive of discolored mucus green or yellow or red

## 2017-08-28 NOTE — PROGRESS NOTES
[facility]  Pulmonary, Critical Care, and Sleep Medicine  Dear MsAbhinav Laquita Ennis,      Chief complaint:  Shortness of breath COPD    HPI:  Sara Decker is 51years old and comes to the office today at your request concerning shortness of breath. He relates he has been short of breath for at least 2-3 years and states that even walking 1 block causes significant dyspnea. Walking up the stairs causes severe shortness of breath. Recently he had an episode of chest pain which was evaluated eventually in the emergency room and he was told he did not have heart disease. He does have a chronic cough productive of dark mucus but no yellow or green and states that on 2 occasions it was reddish but he could not clearly document hemoptysis. He denies leg pain or swelling. He takes albuterol frequently and also takes Spiriva and Advair but his technique was not optimal.  He continues to work regularly. He also says he has difficulty sleeping but this is been for at least 20 years and the problems include going to sleep waking up frequently and getting up. No Known Allergies  Current Outpatient Prescriptions   Medication Sig    methocarbamol (ROBAXIN) 500 mg tablet Take 1 Tab by mouth three (3) times daily as needed. Indications: pain; muscle spasms    fluticasone-salmeterol (ADVAIR) 500-50 mcg/dose diskus inhaler Take 1 Puff by inhalation two (2) times a day.  albuterol-ipratropium (DUO-NEB) 2.5 mg-0.5 mg/3 ml nebu 3 mL by Nebulization route every six (6) hours as needed.  albuterol (PROVENTIL HFA, VENTOLIN HFA, PROAIR HFA) 90 mcg/actuation inhaler Take 2 Puffs by inhalation every four (4) hours as needed for Wheezing.  tiotropium (SPIRIVA) 18 mcg inhalation capsule Take 1 Cap by inhalation daily. Indications: BRONCHOSPASM PREVENTION WITH COPD    zaleplon (SONATA) 10 mg capsule Take 1 Cap by mouth nightly.  Max Daily Amount: 10 mg.    varenicline (CHANTIX STARTER IAN) 0.5 mg (11)- 1 mg (42) DsPk Start 1 week before target quit date. Take one 0.5 mg tablet daily days 1-3. Take one 0.5 mg tablet twice daily days 4-7. Then start taking one 1 mg tablet twice daily for up to 11 weeks    varenicline (CHANTIX) 1 mg tablet Take 1 Tab by mouth two (2) times a day. Start after completion of starter pack     No current facility-administered medications for this visit. Past Medical History:   Diagnosis Date    Chronic obstructive pulmonary disease ([facility] 08/03/2017    PFTS 8/3/17    COPD, severe ([facility]     [facility]     Positive urine drug screen 01/2016    opiates and THC   He denies a history of heart disease hypertension diabetes kidney disease liver disease ulcers tuberculosis or cancer  No past surgical history on file.     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 he is worked on [employer] where he says he has come in contact with asbestos material in the past    Smoking history: Has smoked cigarettes for greater than 30 years smoking at least one pack a day    Family History   Problem Relation Age of Onset    Hypertension Mother     Heart Disease Mother     Diabetes Mother     Hypertension Father     Heart Disease Father     Cancer Father      lymphnodes    Diabetes Father        Review of systems:  He denies syncope focal muscle weakness or numbness trouble hearing trouble seeing except for reading trouble swallowing chronic abdominal pain melena blood in his stools dysuria hematuria rashes arthritis fever chills poor appetite or weight loss     Physical Exam:  Visit Vitals    /76 (BP 1 Location: Left arm, BP Patient Position: Sitting)    Pulse 71    Temp 98.3 °F (36.8 °C)    Resp 20    Ht 5' 9\" (1.753 m)    Wt 72.6 kg (160 lb)    SpO2 97%    BMI 23.63 kg/m2       Well-developed well-nourished  HEENT: WNL  Lymph node exam: Supraclavicular cervical lymph nodes negative  Chest: Equal symmetrical expansion no dullness no rales or rubs but there were scattered expiratory wheezes in both lung fields  Heart: Regular rhythm no gallop no murmur no JVD no peripheral edema no carotid bruits  Abdomen: Soft nontender no organomegaly mass  Skin: No rashes  Musculoskeletal: No acute joint inflammation or muscle wasting  Extremities: No cyanosis clubbing or calf tenderness  Neurological: Alert and oriented    LABS:  Spirometry: FEV1% 50 severe  obstructive lung defect  O2 sat 97% room air at rest  Chest x-ray 8/3/17: Findings for hyperinflation and COPD    Impression:   Severe COPD most likely will continue to worsen if he the patient continues to smoke cigarettes and he is willing to try to stop and will be starting Chantix soon  Patient will also benefit from taking bronchodilators and he was explained the proper technique  Mild exacerbation of COPD with wheezing    Plan:  Short course of prednisone  Continue Spiriva Advair as needed albuterol  The key to the patient's successful treatment and prevention of developing chronic respiratory failure is stopping smoking and we will assess his success on the next visit or he can call if he requires further assistance before then    Thanks for allowing me to share in this patient's evaluation    Sincerely,    Meng Catalan MD , [facility]    CC: Eri Valles NP     [address] [facility]. [address]  [facility], [address]     P: [phone]     F: [phone]

## 2017-08-28 NOTE — MR AVS SNAPSHOT
Visit Information Date & Time Provider Department Dept. Phone Encounter #  
 8/28/2017 11:00 AM Sky Lobo MD [facility] \[facility] [place] [facility]\"" [id number] Follow-up Instructions Return in about 3 months (around 11/28/2017). Follow-up and Disposition History Your Appointments 10/17/2017  1:00 PM  
Follow Up with Yuliet Davies MD  
[address] [address] [place] [address] Road) Appt Note: 1mon f/u; sleep study & Labs [address] [facility] [address]  
[phone]  
  
   
 [place] [address]  
  
    
 10/23/2017  1:00 PM  
Follow Up with Kelly Espitia NP 2698 [facility] ([address]) Appt Note: 3 MTH FOLLOW UP/ NO LABS NEEDED  
 3600 [facility] [address]  
1225 [facility] [address] Upcoming Health Maintenance Date Due Pneumococcal 19-64 Medium Risk (1 of 1 - PPSV23) 10/27/1988 DTaP/Tdap/Td series (1 - Tdap) 10/27/1990 INFLUENZA AGE 9 TO ADULT 8/1/2017 Allergies as of 8/28/2017  Review Complete On: 8/17/2017 By: Yuliet Davies MD  
 No Known Allergies Current Immunizations  Never Reviewed No immunizations on file. Not reviewed this visit You Were Diagnosed With   
  
 Codes Comments COPD, severe ([facility]    -  Primary ICD-10-CM: J44.9 ICD-9-CM: 517 Vitals BP Pulse Temp Resp Height(growth percentile) Weight(growth percentile) 132/76 (BP 1 Location: Left arm, BP Patient Position: Sitting) 71 98.3 °F (36.8 °C) 20 5' 9\" (1.753 m) 160 lb (72.6 kg) SpO2 BMI Smoking Status 97% 23.63 kg/m2 Current Every Day Smoker BMI and BSA Data Body Mass Index Body Surface Area  
 23.63 kg/m 2 1.88 m 2 Preferred Pharmacy Pharmacy Name Phone [facility] - [address] [phone] Your Updated Medication List  
  
   
This list is accurate as of: 8/28/17 12:09 PM.  Always use your most recent med list.  
  
  
  
  
 albuterol 90 mcg/actuation inhaler Commonly known as:  PROVENTIL HFA, VENTOLIN HFA, PROAIR HFA Take 2 Puffs by inhalation every four (4) hours as needed for Wheezing. albuterol-ipratropium 2.5 mg-0.5 mg/3 ml Nebu Commonly known as:  DUO-NEB  
3 mL by Nebulization route every six (6) hours as needed. fluticasone-salmeterol 500-50 mcg/dose diskus inhaler Commonly known as:  ADVAIR Take 1 Puff by inhalation two (2) times a day. methocarbamol 500 mg tablet Commonly known as:  ROBAXIN Take 1 Tab by mouth three (3) times daily as needed. Indications: pain; muscle spasms  
  
 predniSONE 20 mg tablet Commonly known as:  Laurina Sax Take 2 Tabs by mouth daily (with breakfast). tiotropium 18 mcg inhalation capsule Commonly known as:  Becca Herlinda Take 1 Cap by inhalation daily. Indications: BRONCHOSPASM PREVENTION WITH COPD * varenicline 0.5 mg (11)- 1 mg (42) Dspk Commonly known as:  CHANTIX STARTER IAN Start 1 week before target quit date. Take one 0.5 mg tablet daily days 1-3. Take one 0.5 mg tablet twice daily days 4-7. Then start taking one 1 mg tablet twice daily for up to 11 weeks * varenicline 1 mg tablet Commonly known as:  Suzzanna Mane Take 1 Tab by mouth two (2) times a day. Start after completion of starter pack  
  
 zaleplon 10 mg capsule Commonly known as:  SONATA Take 1 Cap by mouth nightly. Max Daily Amount: 10 mg.  
  
 * Notice: This list has 2 medication(s) that are the same as other medications prescribed for you. Read the directions carefully, and ask your doctor or other care provider to review them with you. Prescriptions Sent to Pharmacy Refills  
 predniSONE (DELTASONE) 20 mg tablet 0 Sig: Take 2 Tabs by mouth daily (with breakfast).   
 Class: Normal  
 Pharmacy: [facility] [address]  #: [phone] Route: Oral  
  
Follow-up Instructions Return in about 3 months (around 11/28/2017). Patient Instructions Do everything you can to stop smoking including substituting hard candy and sugarless gum when you want to smoke put the hard candy and sugarless gum in your pockets ashtrays automobile or any place you might consider smoking and of course give the Chantix a try Continue Spiriva inhale 1 capsule daily and remember to exhale fully before you inhale Continue Advair 1 inhalation twice daily and remember to exhale fully before inhaling and also to wash mouth with water and spit it out after inhaling Albuterol 2 inhalations every 4 hours as needed but if you require albuterol too often to control respiratory symptoms call the office for severe symptoms go to the emergency room Prednisone 2 tablets every morning for 7 days Always call for symptoms such as increasing shortness of breath or a cough productive of discolored mucus green or yellow or red Introducing [facility]! Savannah Rhoades introduces [employer] patient portal. Now you can access parts of your medical record, email your doctor's office, and request medication refills online. 1. In your internet browser, go to [email or web address] 2. Click on the First Time User? Click Here link in the Sign In box. You will see the New Member Sign Up page. 3. Enter your [employer] Access Code exactly as it appears below. You will not need to use this code after youve completed the sign-up process. If you do not sign up before the expiration date, you must request a new code. · [employer] Access Code: [email or web address] Expires: 10/22/2017  1:42 PM 
 
4. Enter the last four digits of your Social Security Number (xxxx) and Date of Birth (mm/dd/yyyy) as indicated and click Submit.  You will be taken to the next sign-up page. 5. Create a HouzeMe ID. This will be your [employer] login ID and cannot be changed, so think of one that is secure and easy to remember. 6. Create a H[employer]Me password. You can change your password at any time. 7. Enter your Password Reset Question and Answer. This can be used at a later time if you forget your password. 8. Enter your e-mail address. You will receive e-mail notification when new information is available in [address]. 9. Click Sign Up. You can now view and download portions of your medical record. 10. Click the Download Summary menu link to download a portable copy of your medical information. If you have questions, please visit the Frequently Asked Questions section of the [employer] website. Remember, [employer] is NOT to be used for urgent needs. For medical emergencies, dial 911. Now available from your iPhone and Android! Please provide this summary of care documentation to your next provider. Your primary care clinician is listed as Austin Mathews. If you have any questions after today's visit, please call [phone].

## 2017-08-28 NOTE — PROGRESS NOTES
The pt. Is seen by the [facility] and obtains all medication through them. Pt c/o having chest pain and extreme sweating during the PFT 8/3  The chest pain has persisted since. He also was sent to the ED 8/8 by his PCP secondary to severe wheezing, and SOB. He recently obtained a [place] Holley dog bite in the genital area.

## 2017-08-31 ENCOUNTER — TELEPHONE (OUTPATIENT)
Dept: FAMILY MEDICINE CLINIC | Age: 47
End: 2017-08-31

## 2017-08-31 NOTE — TELEPHONE ENCOUNTER
Medication: Chantix starter month pack 0.5 mg (11) 1 mg (42), dose: 1 tab, how often: bid , current number of medication days provided: 90, refill per application. Lot #: [id number], EXP.06/19. This medication was received and verified for the following 1. Correct Patient, 2. Correct Diagnosis, 3. Correct Drug, 4. Correct route, and no current allergy to medication. Medication: Chantix 1mg , dose: 1 tab, how often: bid , current number of medication days provided: 90, refill per application. Lot #: [id number], EXP.06/19  . This medication was received and verified for the following 1. Correct Patient, 2. Correct Diagnosis, 3. Correct Drug, 4. Correct route, and no current allergy to medication. Please contact patient to come  their medications.      Ani Winter, MSN, RN, P-C     [facility]

## 2017-09-05 ENCOUNTER — TELEPHONE (OUTPATIENT)
Dept: SLEEP MEDICINE | Age: 47
End: 2017-09-05

## 2017-09-05 NOTE — TELEPHONE ENCOUNTER
Medication: Proventil HFA, dose: 2 puffs, how often: every 4 hours as needed for wheezing, current number of medication days provided: 90, refill per application. Lot [id number], EXP.09/18. This medication was received and verified for the following 1. Correct Patient, 2. Correct Diagnosis, 3. Correct Drug, 4. Correct route, and no current allergy to medication. Please contact patient to come  their medications.      Doe Muñiz MSN, RN, FNP-C     [facility]

## 2017-09-27 ENCOUNTER — HOSPITAL ENCOUNTER (OUTPATIENT)
Dept: SLEEP MEDICINE | Age: 47
Discharge: HOME | End: 2017-09-27
Payer: SELF-PAY

## 2017-09-27 DIAGNOSIS — J44.9 COPD, SEVERE (HCC): ICD-10-CM

## 2017-09-27 DIAGNOSIS — G25.81 RLS (RESTLESS LEGS SYNDROME): ICD-10-CM

## 2017-09-27 DIAGNOSIS — G47.10 HYPERSOMNIA WITH SLEEP APNEA: ICD-10-CM

## 2017-09-27 DIAGNOSIS — G47.30 HYPERSOMNIA WITH SLEEP APNEA: ICD-10-CM

## 2017-09-27 DIAGNOSIS — R06.83 SNORING: ICD-10-CM

## 2017-09-27 PROCEDURE — 95810 POLYSOM 6/> YRS 4/> PARAM: CPT

## 2017-10-03 NOTE — PROCEDURES
[facility] -POLYSOMNOGRAM    Name:  Aishwarya Patel  MR#:  [id number]  :  1970  Account #:  [de-identified]  Date of Adm:  2017  Date of Service:  2017      REFERRING PHYSICIAN: Renard Perez NP    READING PHYSICIAN: Filipe Waite. Ever Hernandez MD    This is a 41-year-old who presents for a polysomnographic study due  to suspected sleep apnea. He reports an [place] Sleepiness Score of  18, STOP-bang score was 4 out 8. Blood pressure prior to study was  135/83, post study 152/96. He reports 6 hours of sleep the night prior  to study. He reports 12 ounces of caffeinated material ingestion at 4  p.m. He denied naps or alcohol. MEDICATIONS: Include  1. Deltasone. 2. Soma. 3. Chantix. 4. Advair. 5. DuoNeb. 6. Albuterol. 7. Spiriva. The study was obtained with standard parameter monitoring. No EKG  or EEG abnormalities were noted. Total recording time was 430.5  minutes. Total sleep time was 328.5 minutes for a decreased sleep  efficiency of 76%, in large part due to prolonged wake time after sleep  onset of 102.5. Sleep latency was also prolonged at 33 minutes, REM  latency was shortened at 42 minutes. Arousal index was elevated at  14, primarily due to snoring and then nonspecific arousals. On review  of sleep architecture, he did enter all stages of sleep. He had no  apneas or hypopneas. Mean oxygen saturation in non-REM was 92  and REM 91, minimum was 87 in non-REM, 86 in REM. He spent 4.2  minutes below 88% O2 saturation. Mean heart rate was 71 in non-  REM, 75 in REM. Minimum was 58 in non-REM, 60 in REM. Maximum  was 92 in non-REM and 89 in REM. Periodic movements of sleep were  noted with an index of 8. Periodic movements of sleep put the arousal  index as 4.6.     CONCLUSION: The patient's polysomnographic study is remarkable  for poor sleep efficiency, but no evidence of significant sleep apnea or  periodic movements of sleep accounting for poor sleep efficiency. The  patient should not drive if drowsy.         MD DAX Hogue / Catalino Wagoner  D:  10/03/2017   16:24  T:  10/03/2017   16:38  Job #:  [id number]

## 2017-10-17 ENCOUNTER — OFFICE VISIT (OUTPATIENT)
Dept: NEUROLOGY | Age: 47
End: 2017-10-17

## 2017-10-17 VITALS
HEIGHT: 69 IN | SYSTOLIC BLOOD PRESSURE: 170 MMHG | WEIGHT: 166.4 LBS | BODY MASS INDEX: 24.65 KG/M2 | TEMPERATURE: 98 F | HEART RATE: 87 BPM | OXYGEN SATURATION: 98 % | DIASTOLIC BLOOD PRESSURE: 90 MMHG | RESPIRATION RATE: 20 BRPM

## 2017-10-17 DIAGNOSIS — G47.34 NOCTURNAL HYPOXEMIA: ICD-10-CM

## 2017-10-17 DIAGNOSIS — J44.9 COPD, SEVERE (HCC): Primary | ICD-10-CM

## 2017-10-17 DIAGNOSIS — R06.83 SNORING: ICD-10-CM

## 2017-10-17 NOTE — PROGRESS NOTES
[facility]             [address] [facility], [address]      Jose CHILDERS [place] 89  male who presents in evaluation of sleep disorder. Patient describes mid adult years Onset was gradual over several years. Patient describes difficulty maintaining sleep daytime sleepiness witnessed apnea and loud snoring he also describes restless leg symptoms he drinks 2 coffees and 2 sodas he has work nights typically gets goes to bed at 10 falls asleep within 20 minutes but awakens twice for up to 2 hours at a time awakens once to urinate wakes up at 5 AM but does not feel rested he is unaware of bruxism but his wife has reported leg movements in sleep he also describes restless leg he has nodded off while driving reports sleep hallucinations has heartburn restless sleep he does smoke 2 packs per day+  Patient reports that he for the past several weeks he has had increased chest pain shortness of breath possibly hypertension he has been to the ER several times he has cut back on his smoking. He still complains of leg cramps at night but did not get the lab work. He did undergo sleep study which did not show evidence of severe of significant sleep apnea however he did have oxygen desaturations down to 86 and spent 4.2 minutes below 88% O2 saturation he may be a candidate for nocturnal oxygen when he stopped smoking    Current Outpatient Prescriptions:     varenicline (CHANTIX STARTER IAN) 0.5 mg (11)- 1 mg (42) DsPk, Start 1 week before target quit date. Take one 0.5 mg tablet daily days 1-3. Take one 0.5 mg tablet twice daily days 4-7. Then start taking one 1 mg tablet twice daily for up to 11 weeks, Disp: 1 Dose Pack, Rfl: 0    varenicline (CHANTIX) 1 mg tablet, Take 1 Tab by mouth two (2) times a day.  Start after completion of starter pack, Disp: 30 Tab, Rfl: 2    fluticasone-salmeterol (ADVAIR) 500-50 mcg/dose diskus inhaler, Take 1 Puff by inhalation two (2) times a day., Disp: 3 Each, Rfl: 0    albuterol-ipratropium (DUO-NEB) 2.5 mg-0.5 mg/3 ml nebu, 3 mL by Nebulization route every six (6) hours as needed. , Disp: 30 Nebule, Rfl: 0    albuterol (PROVENTIL HFA, VENTOLIN HFA, PROAIR HFA) 90 mcg/actuation inhaler, Take 2 Puffs by inhalation every four (4) hours as needed for Wheezing., Disp: 6 Inhaler, Rfl: 3    tiotropium (SPIRIVA) 18 mcg inhalation capsule, Take 1 Cap by inhalation daily. Indications: BRONCHOSPASM PREVENTION WITH COPD, Disp: 30 Cap, Rfl: 5    predniSONE (DELTASONE) 20 mg tablet, Take 2 Tabs by mouth daily (with breakfast). , Disp: 14 Tab, Rfl: 0    zaleplon (SONATA) 10 mg capsule, Take 1 Cap by mouth nightly. Max Daily Amount: 10 mg., Disp: 3 Cap, Rfl: 0    methocarbamol (ROBAXIN) 500 mg tablet, Take 1 Tab by mouth three (3) times daily as needed. Indications: pain; muscle spasms, Disp: 12 Tab, Rfl: 0  Past Medical History:   Diagnosis Date    Chronic obstructive pulmonary disease ([facility] 08/03/2017    PFTS 8/3/17    COPD, severe ([facility]     Emphysema/COPD ([facility]     Positive urine drug screen 01/2016    opiates and THC     Social History     Social History    Marital status:      Spouse name: N/A    Number of children: N/A    Years of education: N/A     Occupational History    Not on file.      Social History Main Topics    Smoking status: Current Every Day Smoker     Packs/day: 2.00     Years: 25.00     Types: Cigarettes     Start date: 5/28/1984    Smokeless tobacco: Never Used    Alcohol use Yes      Comment: occassionally    Drug use: No    Sexual activity: Yes     Partners: Female     Other Topics Concern     Service No    Blood Transfusions No    Caffeine Concern Yes    Occupational Exposure No    Hobby Hazards No    Sleep Concern Yes     occas    Stress Concern No    Weight Concern No    Special Diet No    Back Care Yes    Exercise No    Bike Helmet Yes    Seat Belt No     occas Social History Narrative       Review of Systems:   Constitutional:  there was no change in patient's weight. Eyes:  Negative blurred vision  CVS:  Positive chest pain  Pulm:  Positive shortness of breath  GI:  Negative nausea or vomiting  :  Positive nocturia  Musculoskeletal:  Positive significant joint pain at night  Skin:  Negative rashes  Neuro:  Negative dizziness   Psych:  Positive significant mood issues    Sleep Review of Systems: notable for Negative difficulty falling asleep; Positive awakenings at night; Negative percieved regular dreaming; Negative nightmares; Positive early morning headaches; many prolonged afternoon naps per week; Negative memory problems; Positive concentration issues; Positive history of any automobile or occupational accidents due to daytime drowsiness. Physical Exam    Visit Vitals    /90    Pulse 87    Temp 98 °F (36.7 °C) (Oral)    Resp 20    Ht 5' 9\" (1.753 m)    Wt 75.5 kg (166 lb 6.4 oz)    SpO2 98%    BMI 24.57 kg/m2       Neck Circumference: *41 cm      General:  Well defined, nourished, and groomed individual in no acute distress. HEENT: head :at/nc Throat:oropharnynx  Crowding noted  Neck: Supple, nontender, thyroid within normal limits, no JVD, no bruits, no pain   with resistance to active range of motion. Heart: Regular rate and rhythm, no murmurs, rub, or gallop. Normal S1S2. Lungs:  Clear to auscultation   Musculoskeletal:  Extremities revealed no edema, clubbing or cyanosis. Psych:  Good mood and normal affect    Neurologic Exam    Mental Status: The patient is awake, alert, and oriented x 3. Speech is fluent and memory appears to be intact, both long and short term. No aphasias or apraxias. Cranial Nerves: II - Visual fields are full to confrontation. Funduscopic examination reveals flat disks bilaterally. Pupils are both equal and reactive to light and accommodation.    III, IV, VI - Extraocular movements are intact and there is no nystagmus. V - Facial sensation is intact to pinprick and light touch. VII - Face is symmetrical.   VIII - Hearing is present. Motor: Tone is normal and symmetric. There is no pronator drift present. The strength is intact for all muscle groups tested in all four extremities. Coordination:  Gait testing is antalgic    psg reviewed lab not done  Assessment and Plan  Anisa Forte is a 51 y.o.male whose history and physical are consistent with FABIÁN Abe Isabel  who has risk factors including snoring witnessed apnea,morning headaches,rls    Diagnoses and all orders for this visit:    1. COPD, severe ([address]    2. Snoring    3. Nocturnal hypoxemia      Follow-up Disposition:  Return if symptoms worsen or fail to improve. Reviewed work up need for follow for chest pain dyspnea/hypertension,nocturnal oxygen when patient stops smoking  No driving drowsy  I spent 30minutes with the patient in face-to-face consultation, of which greater than 50% was spent in counseling and coordination of care as described above.         Electronically Signed by:  Shyanne Tracy MD

## 2017-10-17 NOTE — MR AVS SNAPSHOT
Visit Information Date & Time Provider Department Dept. Phone Encounter #  
 10/17/2017  1:00 PM Giulia Mojica MD [address] [phone] [id number] Follow-up Instructions Return if symptoms worsen or fail to improve. Follow-up and Disposition History Your Appointments 10/23/2017  1:00 PM  
Follow Up with Carla Mo NP 2698 [facility] ([facility]) Appt Note: 3 MTH FOLLOW UP/ NO LABS NEEDED  
 3600 [facility] [address] 35[address] [facility] [address] Upcoming Health Maintenance Date Due Pneumococcal 19-64 Medium Risk (1 of 1 - PPSV23) 10/27/1988 DTaP/Tdap/Td series (1 - Tdap) 10/27/1990 INFLUENZA AGE 9 TO ADULT 8/1/2017 Allergies as of 10/17/2017  Review Complete On: 10/17/2017 By: Jermaine Wolff LPN No Known Allergies Current Immunizations  Never Reviewed No immunizations on file. Not reviewed this visit You Were Diagnosed With   
  
 Codes Comments COPD, severe ([facility]    -  Primary ICD-10-CM: J44.9 ICD-9-CM: 733 Snoring     ICD-10-CM: R06.83 
ICD-9-CM: 786.09 Nocturnal hypoxemia     ICD-10-CM: G47.34 
ICD-9-CM: 327.24 Vitals BP Pulse Temp Resp Height(growth percentile) Weight(growth percentile) 170/90 87 98 °F (36.7 °C) (Oral) 20 5' 9\" (1.753 m) 166 lb 6.4 oz (75.5 kg) SpO2 BMI Smoking Status 98% 24.57 kg/m2 Current Every Day Smoker BMI and BSA Data Body Mass Index Body Surface Area 24.57 kg/m 2 1.92 m 2 Preferred Pharmacy Pharmacy Name Phone [facility] [address] [phone] Your Updated Medication List  
  
   
This list is accurate as of: 10/17/17  1:55 PM.  Always use your most recent med list.  
  
  
  
  
 albuterol 90 mcg/actuation inhaler Commonly known as:  PROVENTIL HFA, VENTOLIN HFA, PROAIR HFA Take 2 Puffs by inhalation every four (4) hours as needed for Wheezing. albuterol-ipratropium 2.5 mg-0.5 mg/3 ml Nebu Commonly known as:  DUO-NEB  
3 mL by Nebulization route every six (6) hours as needed. fluticasone-salmeterol 500-50 mcg/dose diskus inhaler Commonly known as:  ADVAIR Take 1 Puff by inhalation two (2) times a day. methocarbamol 500 mg tablet Commonly known as:  ROBAXIN Take 1 Tab by mouth three (3) times daily as needed. Indications: pain; muscle spasms  
  
 predniSONE 20 mg tablet Commonly known as:  Mary Pound Take 2 Tabs by mouth daily (with breakfast). tiotropium 18 mcg inhalation capsule Commonly known as:  Marcheta Jennifer Take 1 Cap by inhalation daily. Indications: BRONCHOSPASM PREVENTION WITH COPD * varenicline 0.5 mg (11)- 1 mg (42) Dspk Commonly known as:  CHANTIX STARTER IAN Start 1 week before target quit date. Take one 0.5 mg tablet daily days 1-3. Take one 0.5 mg tablet twice daily days 4-7. Then start taking one 1 mg tablet twice daily for up to 11 weeks * varenicline 1 mg tablet Commonly known as:  Patrice Horde Take 1 Tab by mouth two (2) times a day. Start after completion of starter pack  
  
 zaleplon 10 mg capsule Commonly known as:  SONATA Take 1 Cap by mouth nightly. Max Daily Amount: 10 mg.  
  
 * Notice: This list has 2 medication(s) that are the same as other medications prescribed for you. Read the directions carefully, and ask your doctor or other care provider to review them with you. Follow-up Instructions Return if symptoms worsen or fail to improve. Patient Instructions Patient instructed to go to er for increased dyspnea/chest pain/hypertension Introducing [facility]!    
 [address] [facility] introduces [employer] patient portal. Now you can access parts of your medical record, email your doctor's office, and request medication refills online. 1. In your internet browser, go to [email or web address] 2. Click on the First Time User? Click Here link in the Sign In box. You will see the New Member Sign Up page. 3. Enter your [employer] Access Code exactly as it appears below. You will not need to use this code after youve completed the sign-up process. If you do not sign up before the expiration date, you must request a new code. · [employer] Access Code: [email or web address] Expires: 10/22/2017  1:42 PM 
 
4. Enter the last four digits of your Social Security Number (xxxx) and Date of Birth (mm/dd/yyyy) as indicated and click Submit. You will be taken to the next sign-up page. 5. Create a [employer] ID. This will be your [employer] login ID and cannot be changed, so think of one that is secure and easy to remember. 6. Create a [employer] password. You can change your password at any time. 7. Enter your Password Reset Question and Answer. This can be used at a later time if you forget your password. 8. Enter your e-mail address. You will receive e-mail notification when new information is available in [address] 9. Click Sign Up. You can now view and download portions of your medical record. 10. Click the Download Summary menu link to download a portable copy of your medical information. If you have questions, please visit the Frequently Asked Questions section of the [employer] website. Remember, [employer] is NOT to be used for urgent needs. For medical emergencies, dial 911. Now available from your iPhone and Android! Please provide this summary of care documentation to your next provider. Your primary care clinician is listed as Iza Chavez. If you have any questions after today's visit, please call [phone].

## 2017-10-23 ENCOUNTER — OFFICE VISIT (OUTPATIENT)
Dept: FAMILY MEDICINE CLINIC | Age: 47
End: 2017-10-23

## 2017-10-23 VITALS
HEART RATE: 78 BPM | BODY MASS INDEX: 24.14 KG/M2 | SYSTOLIC BLOOD PRESSURE: 165 MMHG | WEIGHT: 163 LBS | TEMPERATURE: 98.3 F | DIASTOLIC BLOOD PRESSURE: 98 MMHG | RESPIRATION RATE: 16 BRPM | OXYGEN SATURATION: 96 % | HEIGHT: 69 IN

## 2017-10-23 DIAGNOSIS — R03.0 ELEVATED BLOOD PRESSURE READING: ICD-10-CM

## 2017-10-23 DIAGNOSIS — J44.9 COPD, SEVERE (HCC): Primary | ICD-10-CM

## 2017-10-23 DIAGNOSIS — Z23 ENCOUNTER FOR IMMUNIZATION: ICD-10-CM

## 2017-10-23 DIAGNOSIS — J43.9 PULMONARY EMPHYSEMA, UNSPECIFIED EMPHYSEMA TYPE (HCC): ICD-10-CM

## 2017-10-23 DIAGNOSIS — F17.200 CURRENT SMOKER: ICD-10-CM

## 2017-10-23 RX ORDER — IPRATROPIUM BROMIDE AND ALBUTEROL SULFATE 2.5; .5 MG/3ML; MG/3ML
3 SOLUTION RESPIRATORY (INHALATION)
Qty: 30 NEBULE | Refills: 0 | Status: ON HOLD | OUTPATIENT
Start: 2017-10-23 | End: 2017-12-23

## 2017-10-23 RX ORDER — PREDNISONE 20 MG/1
40 TABLET ORAL DAILY
Qty: 10 TAB | Refills: 0 | Status: SHIPPED | OUTPATIENT
Start: 2017-10-23 | End: 2017-10-28

## 2017-10-23 NOTE — MR AVS SNAPSHOT
Visit Information Date & Time Provider Department Dept. Phone Encounter #  
 10/23/2017  1:00 PM Gomez Moses NP [address] [id number] Follow-up Instructions Return in about 1 week (around 10/30/2017), or if symptoms worsen or fail to improve. Your Appointments 10/30/2017  1:00 PM  
CONSULT with [address] [facility] ([address]) Appt Note: bp check [address] [facility] [address]  
[address] [facility] [address]  
  
    
 11/21/2017  3:30 PM  
Follow Up with Brian Howard MD  
[address] ([address]) Appt Note: Fu  from 8/28/17  
 [address] [place] [address], [address] [id number] [phone]  
  
   
 [address] [place] [address], [address] [place] [address]  
  
    
 3/19/2018  1:00 PM  
Follow Up with Gomez Moses NP [address] [facility] ([address]) Appt Note: 5 mth follow up  
 [facility] [address]  
[address] [facility] [address] Upcoming Health Maintenance Date Due Pneumococcal 19-64 Medium Risk (1 of 1 - PPSV23) 10/27/1988 DTaP/Tdap/Td series (1 - Tdap) 10/27/1990 INFLUENZA AGE 9 TO ADULT 8/1/2017 Allergies as of 10/23/2017  Review Complete On: 10/23/2017 By: Gomez Moses NP No Known Allergies Current Immunizations  Never Reviewed Name Date Influenza Vaccine (Quad) PF 10/23/2017  1:04 PM  
  
 Not reviewed this visit You Were Diagnosed With   
  
 Codes Comments COPD, severe ([facility] [address]    -  Primary ICD-10-CM: J44.9 ICD-9-CM: 536 Pulmonary emphysema, unspecified emphysema type ([facility] [address]     ICD-10-CM: J43.9 ICD-9-CM: 492.8 Current smoker     ICD-10-CM: F17.200 ICD-9-CM: 305.1 Encounter for immunization     ICD-10-CM: C78 ICD-9-CM: V03.89 Elevated blood pressure reading     ICD-10-CM: R03.0 ICD-9-CM: 796.2 Vitals BP Pulse Temp Resp Height(growth percentile) Weight(growth percentile) (!) 165/98 (BP 1 Location: Left arm, BP Patient Position: Sitting) 78 98.3 °F (36.8 °C) (Oral) 16 5' 9\" (1.753 m) 163 lb (73.9 kg) SpO2 BMI Smoking Status 96% 24.07 kg/m2 Current Every Day Smoker BMI and BSA Data Body Mass Index Body Surface Area 24.07 kg/m 2 1.9 m 2 Preferred Pharmacy Pharmacy Name Phone [facility] [address] [phone] Your Updated Medication List  
  
   
This list is accurate as of: 10/23/17  1:40 PM.  Always use your most recent med list.  
  
  
  
  
 albuterol 90 mcg/actuation inhaler Commonly known as:  PROVENTIL HFA, VENTOLIN HFA, PROAIR HFA Take 2 Puffs by inhalation every four (4) hours as needed for Wheezing. albuterol-ipratropium 2.5 mg-0.5 mg/3 ml Nebu Commonly known as:  DUO-NEB  
3 mL by Nebulization route every six (6) hours as needed. fluticasone-salmeterol 500-50 mcg/dose diskus inhaler Commonly known as:  ADVAIR Take 1 Puff by inhalation two (2) times a day. predniSONE 20 mg tablet Commonly known as:  Jerri Lucy Take 2 Tabs by mouth daily for 5 days. With Breakfast  
  
 tiotropium 18 mcg inhalation capsule Commonly known as:  Samantha Roll Take 1 Cap by inhalation daily. Indications: BRONCHOSPASM PREVENTION WITH COPD  
  
 varenicline 1 mg tablet Commonly known as:  Theotis Grim Take 1 Tab by mouth two (2) times a day. Start after completion of starter pack  
  
 zaleplon 10 mg capsule Commonly known as:  SONATA Take 1 Cap by mouth nightly. Max Daily Amount: 10 mg.  
  
  
  
  
Prescriptions Sent to Pharmacy  Refills  
 albuterol-ipratropium (DUO-NEB) 2.5 mg-0.5 mg/3 ml nebu 0  
 Sig: 3 mL by Nebulization route every six (6) hours as needed. Class: Normal  
 Pharmacy: [facility] [address] [place] [address], [address] [place] [address] Ph #: [phone] Route: Nebulization  
 predniSONE (DELTASONE) 20 mg tablet 0 Sig: Take 2 Tabs by mouth daily for 5 days. With Breakfast  
 Class: Normal  
 Pharmacy: [address] Ph #: [phone] Route: Oral  
  
We Performed the Following INFLUENZA VIRUS VAC QUAD,SPLIT,PRESV FREE SYRINGE IM [id number] CPT(R)] Follow-up Instructions Return in about 1 week (around 10/30/2017), or if symptoms worsen or fail to improve. Patient Instructions Vaccine Information Statement Influenza (Flu) Vaccine (Inactivated or Recombinant): What you need to know Many Vaccine Information Statements are available in Uzbek and other languages. See www.immunize.org/vis Hojas de Información Sobre Vacunas están disponibles en Español y en muchos otros idiomas. Visite www.immunize.org/vis 1. Why get vaccinated? Influenza (flu) is a contagious disease that spreads around the [place] every year, usually between October and May. Flu is caused by influenza viruses, and is spread mainly by coughing, sneezing, and close contact. Anyone can get flu. Flu strikes suddenly and can last several days. Symptoms vary by age, but can include: 
 fever/chills  sore throat  muscle aches  fatigue  cough  headache  runny or stuffy nose Flu can also lead to pneumonia and blood infections, and cause diarrhea and seizures in children. If you have a medical condition, such as heart or lung disease, flu can make it worse. Flu is more dangerous for some people. Infants and young children, people 72years of age and older, pregnant women, and people with certain health conditions or a weakened immune system are at greatest risk. Each year thousands of people in the [facility] die from flu, and many more are hospitalized. Flu vaccine can: 
 keep you from getting flu, 
 make flu less severe if you do get it, and 
 keep you from spreading flu to your family and other people. 2. Inactivated and recombinant flu vaccines A dose of flu vaccine is recommended every flu season. Children 6 months through 6years of age may need two doses during the same flu season. Everyone else needs only one dose each flu season. Some inactivated flu vaccines contain a very small amount of a mercury-based preservative called thimerosal. Studies have not shown thimerosal in vaccines to be harmful, but flu vaccines that do not contain thimerosal are available. There is no live flu virus in flu shots. They cannot cause the flu. There are many flu viruses, and they are always changing. Each year a new flu vaccine is made to protect against three or four viruses that are likely to cause disease in the upcoming flu season. But even when the vaccine doesnt exactly match these viruses, it may still provide some protection Flu vaccine cannot prevent: 
 flu that is caused by a virus not covered by the vaccine, or 
 illnesses that look like flu but are not. It takes about 2 weeks for protection to develop after vaccination, and protection lasts through the flu season. 3. Some people should not get this vaccine Tell the person who is giving you the vaccine:  If you have any severe, life-threatening allergies. If you ever had a life-threatening allergic reaction after a dose of flu vaccine, or have a severe allergy to any part of this vaccine, you may be advised not to get vaccinated. Most, but not all, types of flu vaccine contain a small amount of egg protein.  If you ever had Guillain-Barré Syndrome (also called GBS). Some people with a history of GBS should not get this vaccine.  This should be discussed with your doctor.  If you are not feeling well. It is usually okay to get flu vaccine when you have a mild illness, but you might be asked to come back when you feel better. 4. Risks of a vaccine reaction With any medicine, including vaccines, there is a chance of reactions. These are usually mild and go away on their own, but serious reactions are also possible. Most people who get a flu shot do not have any problems with it. Minor problems following a flu shot include:  
 soreness, redness, or swelling where the shot was given  hoarseness  sore, red or itchy eyes  cough  fever  aches  headache  itching  fatigue If these problems occur, they usually begin soon after the shot and last 1 or 2 days. More serious problems following a flu shot can include the following:  There may be a small increased risk of Guillain-Barré Syndrome (GBS) after inactivated flu vaccine. This risk has been estimated at 1 or 2 additional cases per million people vaccinated. This is much lower than the risk of severe complications from flu, which can be prevented by flu vaccine.  Young children who get the flu shot along with pneumococcal vaccine (PCV13) and/or DTaP vaccine at the same time might be slightly more likely to have a seizure caused by fever. Ask your doctor for more information. Tell your doctor if a child who is getting flu vaccine has ever had a seizure. Problems that could happen after any injected vaccine:  People sometimes faint after a medical procedure, including vaccination. Sitting or lying down for about 15 minutes can help prevent fainting, and injuries caused by a fall. Tell your doctor if you feel dizzy, or have vision changes or ringing in the ears.  Some people get severe pain in the shoulder and have difficulty moving the arm where a shot was given. This happens very rarely.  Any medication can cause a severe allergic reaction. Such reactions from a vaccine are very rare, estimated at about 1 in a million doses, and would happen within a few minutes to a few hours after the vaccination. As with any medicine, there is a very remote chance of a vaccine causing a serious injury or death. The safety of vaccines is always being monitored. For more information, visit: www.cdc.gov/vaccinesafety/ 
 
5. What if there is a serious reaction? What should I look for?  Look for anything that concerns you, such as signs of a severe allergic reaction, very high fever, or unusual behavior. Signs of a severe allergic reaction can include hives, swelling of the face and throat, difficulty breathing, a fast heartbeat, dizziness, and weakness  usually within a few minutes to a few hours after the vaccination. What should I do?  If you think it is a severe allergic reaction or other emergency that cant wait, call 9-1-1 and get the person to the nearest hospital. Otherwise, call your doctor.  Reactions should be reported to the Vaccine Adverse Event Reporting System (VAERS). Your doctor should file this report, or you can do it yourself through  the VAERS web site at www.vaers. [facility].gov, or by calling 0-425.373.4556. VAERS does not give medical advice. 6. The National Vaccine Injury Compensation Program 
 
The [facility] Vaccine Injury Compensation Program (VICP) is a federal program that was created to compensate people who may have been injured by certain vaccines. Persons who believe they may have been injured by a vaccine can learn about the program and about filing a claim by calling 8-659.891.7871 or visiting the [employer] website at www.[facility].gov/vaccinecompensation. There is a time limit to file a claim for compensation. 7. How can I learn more?  Ask your healthcare provider. He or she can give you the vaccine package insert or suggest other sources of information.  Call your local or state health department.  Contact the Centers for Disease Control and Prevention (CDC): 
- Call 5-699.880.3544 (1-800-CDC-INFO) or 
- Visit CDCs website at www.cdc.gov/flu Vaccine Information Statement Inactivated Influenza Vaccine 8/7/2015 
[address] [id number] Department of [facility] [place] Centers for Disease Control and Prevention Office Use Only Chronic Obstructive Pulmonary Disease (COPD): Care Instructions Your Care Instructions Chronic obstructive pulmonary disease (COPD) is a general term for a group of lung diseases, including emphysema and chronic bronchitis. People with COPD have decreased airflow in and out of the lungs, which makes it hard to breathe. The airways also can get clogged with thick mucus. Cigarette smoking is a major cause of COPD. Although there is no cure for COPD, you can slow its progress. Following your treatment plan and taking care of yourself can help you feel better and live longer. Follow-up care is a key part of your treatment and safety. Be sure to make and go to all appointments, and call your doctor if you are having problems. It's also a good idea to know your test results and keep a list of the medicines you take. How can you care for yourself at home? Staying healthy · Do not smoke. This is the most important step you can take to prevent more damage to your lungs. If you need help quitting, talk to your doctor about stop-smoking programs and medicines. These can increase your chances of quitting for good. · Avoid colds and flu. Get a pneumococcal vaccine shot. If you have had one before, ask your doctor whether you need a second dose. Get the flu vaccine every fall. If you must be around people with colds or the flu, wash your hands often. · Avoid secondhand smoke, air pollution, and high altitudes. Also avoid cold, dry air and hot, humid air. Stay at home with your windows closed when air pollution is bad. Medicines and oxygen therapy · Take your medicines exactly as prescribed. Call your doctor if you think you are having a problem with your medicine. · You may be taking medicines such as: ¨ Bronchodilators. These help open your airways and make breathing easier. Bronchodilators are either short-acting (work for 6 to 9 hours) or long-acting (work for 24 hours). You inhale most bronchodilators, so they start to act quickly. Always carry your quick-relief inhaler with you in case you need it while you are away from home. ¨ Corticosteroids (prednisone, budesonide). These reduce airway inflammation. They come in pill or inhaled form. You must take these medicines every day for them to work well. · A spacer may help you get more inhaled medicine to your lungs. Ask your doctor or pharmacist if a spacer is right for you. If it is, ask how to use it properly. · Do not take any vitamins, over-the-counter medicine, or herbal products without talking to your doctor first. 
· If your doctor prescribed antibiotics, take them as directed. Do not stop taking them just because you feel better. You need to take the full course of antibiotics. · Oxygen therapy boosts the amount of oxygen in your blood and helps you breathe easier. Use the flow rate your doctor has recommended, and do not change it without talking to your doctor first. 
Activity · Get regular exercise. Walking is an easy way to get exercise. Start out slowly, and walk a little more each day. · Pay attention to your breathing. You are exercising too hard if you cannot talk while you are exercising. · Take short rest breaks when doing household chores and other activities. · Learn breathing methodssuch as breathing through pursed lipsto help you become less short of breath. · If your doctor has not set you up with a pulmonary rehabilitation program, talk to him or her about whether rehab is right for you.  Rehab includes exercise programs, education about your disease and how to manage it, help with diet and other changes, and emotional support. Diet · Eat regular, healthy meals. Use bronchodilators about 1 hour before you eat to make it easier to eat. Eat several small meals instead of three large ones. Drink beverages at the end of the meal. Avoid foods that are hard to chew. · Eat foods that contain protein so that you do not lose muscle mass. · Talk with your doctor if you gain too much weight or if you lose weight without trying. Mental health · Talk to your family, friends, or a therapist about your feelings. It is normal to feel frightened, angry, hopeless, helpless, and even guilty. Talking openly about bad feelings can help you cope. If these feelings last, talk to your doctor. When should you call for help? Call 911 anytime you think you may need emergency care. For example, call if: 
· You have severe trouble breathing. Call your doctor now or seek immediate medical care if: 
· You have new or worse trouble breathing. · You cough up blood. · You have a fever. Watch closely for changes in your health, and be sure to contact your doctor if: 
· You cough more deeply or more often, especially if you notice more mucus or a change in the color of your mucus. · You have new or worse swelling in your legs or belly. · You are not getting better as expected. Where can you learn more? Go to http://costa-jessica.info/. Yannick Pak in the search box to learn more about \"Chronic Obstructive Pulmonary Disease (COPD): Care Instructions. \" Current as of: March 25, 2017 Content Version: 11.3 © 7534-5513 [employer]. Care instructions adapted under license by [employer] (which disclaims liability or warranty for this information).  If you have questions about a medical condition or this instruction, always ask your healthcare professional. [employer] disclaims any warranty or liability for your use of this information. Introducing \[facility]\"" & HEALTH SERVICES! [facility] introduces [employer] patient portal. Now you can access parts of your medical record, email your doctor's office, and request medication refills online. 1. In your internet browser, go to [email or web address] 2. Click on the First Time User? Click Here link in the Sign In box. You will see the New Member Sign Up page. 3. Enter your [employer] Access Code exactly as it appears below. You will not need to use this code after youve completed the sign-up process. If you do not sign up before the expiration date, you must request a new code. · Code[employer] Access Code: [email or web address] Expires: 1/21/2018  1:20 PM 
 
4. Enter the last four digits of your Social Security Number (xxxx) and Date of Birth (mm/dd/yyyy) as indicated and click Submit. You will be taken to the next sign-up page. 5. Create a CodeBaby ID. This will be your [employer] login ID and cannot be changed, so think of one that is secure and easy to remember. 6. Create a [employer] password. You can change your password at any time. 7. Enter your Password Reset Question and Answer. This can be used at a later time if you forget your password. 8. Enter your e-mail address. You will receive e-mail notification when new information is available in [address] 9. Click Sign Up. You can now view and download portions of your medical record. 10. Click the Download Summary menu link to download a portable copy of your medical information. If you have questions, please visit the Frequently Asked Questions section of the [employer] website. Remember, [employer] is NOT to be used for urgent needs. For medical emergencies, dial 911. Now available from your iPhone and Android! Please provide this summary of care documentation to your next provider. Your primary care clinician is listed as Baby Bridgette. If you have any questions after today's visit, please call [phone].

## 2017-10-23 NOTE — PATIENT INSTRUCTIONS
Vaccine Information Statement    Influenza (Flu) Vaccine (Inactivated or Recombinant): What you need to know    Many Vaccine Information Statements are available in Latvian and other languages. See www.immunize.org/vis  Hojas de Información Sobre Vacunas están disponibles en Español y en muchos otros idiomas. Visite www.immunize.org/vis    1. Why get vaccinated? Influenza (flu) is a contagious disease that spreads around the [place] every year, usually between October and May. Flu is caused by influenza viruses, and is spread mainly by coughing, sneezing, and close contact. Anyone can get flu. Flu strikes suddenly and can last several days. Symptoms vary by age, but can include:   fever/chills   sore throat   muscle aches   fatigue   cough   headache    runny or stuffy nose    Flu can also lead to pneumonia and blood infections, and cause diarrhea and seizures in children. If you have a medical condition, such as heart or lung disease, flu can make it worse. Flu is more dangerous for some people. Infants and young children, people 72years of age and older, pregnant women, and people with certain health conditions or a weakened immune system are at greatest risk. Each year thousands of people in the [facility] die from flu, and many more are hospitalized. Flu vaccine can:   keep you from getting flu,   make flu less severe if you do get it, and   keep you from spreading flu to your family and other people. 2. Inactivated and recombinant flu vaccines    A dose of flu vaccine is recommended every flu season. Children 6 months through 6years of age may need two doses during the same flu season. Everyone else needs only one dose each flu season.        Some inactivated flu vaccines contain a very small amount of a mercury-based preservative called thimerosal. Studies have not shown thimerosal in vaccines to be harmful, but flu vaccines that do not contain thimerosal are available. There is no live flu virus in flu shots. They cannot cause the flu. There are many flu viruses, and they are always changing. Each year a new flu vaccine is made to protect against three or four viruses that are likely to cause disease in the upcoming flu season. But even when the vaccine doesnt exactly match these viruses, it may still provide some protection    Flu vaccine cannot prevent:   flu that is caused by a virus not covered by the vaccine, or   illnesses that look like flu but are not. It takes about 2 weeks for protection to develop after vaccination, and protection lasts through the flu season. 3. Some people should not get this vaccine    Tell the person who is giving you the vaccine:     If you have any severe, life-threatening allergies. If you ever had a life-threatening allergic reaction after a dose of flu vaccine, or have a severe allergy to any part of this vaccine, you may be advised not to get vaccinated. Most, but not all, types of flu vaccine contain a small amount of egg protein.  If you ever had Guillain-Barré Syndrome (also called GBS). Some people with a history of GBS should not get this vaccine. This should be discussed with your doctor.  If you are not feeling well. It is usually okay to get flu vaccine when you have a mild illness, but you might be asked to come back when you feel better. 4. Risks of a vaccine reaction    With any medicine, including vaccines, there is a chance of reactions. These are usually mild and go away on their own, but serious reactions are also possible. Most people who get a flu shot do not have any problems with it.      Minor problems following a flu shot include:    soreness, redness, or swelling where the shot was given     hoarseness   sore, red or itchy eyes   cough   fever   aches   headache   itching   fatigue  If these problems occur, they usually begin soon after the shot and last 1 or 2 days. More serious problems following a flu shot can include the following:     There may be a small increased risk of Guillain-Barré Syndrome (GBS) after inactivated flu vaccine. This risk has been estimated at 1 or 2 additional cases per million people vaccinated. This is much lower than the risk of severe complications from flu, which can be prevented by flu vaccine.  Young children who get the flu shot along with pneumococcal vaccine (PCV13) and/or DTaP vaccine at the same time might be slightly more likely to have a seizure caused by fever. Ask your doctor for more information. Tell your doctor if a child who is getting flu vaccine has ever had a seizure. Problems that could happen after any injected vaccine:      People sometimes faint after a medical procedure, including vaccination. Sitting or lying down for about 15 minutes can help prevent fainting, and injuries caused by a fall. Tell your doctor if you feel dizzy, or have vision changes or ringing in the ears.  Some people get severe pain in the shoulder and have difficulty moving the arm where a shot was given. This happens very rarely.  Any medication can cause a severe allergic reaction. Such reactions from a vaccine are very rare, estimated at about 1 in a million doses, and would happen within a few minutes to a few hours after the vaccination. As with any medicine, there is a very remote chance of a vaccine causing a serious injury or death. The safety of vaccines is always being monitored. For more information, visit: www.cdc.gov/vaccinesafety/    5. What if there is a serious reaction? What should I look for?  Look for anything that concerns you, such as signs of a severe allergic reaction, very high fever, or unusual behavior.     Signs of a severe allergic reaction can include hives, swelling of the face and throat, difficulty breathing, a fast heartbeat, dizziness, and weakness  usually within a few minutes to a few hours after the vaccination. What should I do?  If you think it is a severe allergic reaction or other emergency that cant wait, call 9-1-1 and get the person to the nearest hospital. Otherwise, call your doctor.  Reactions should be reported to the Vaccine Adverse Event Reporting System (VAERS). Your doctor should file this report, or you can do it yourself through  the VAERS web site at www.vaers. [facility].gov, or by calling 8-447.241.5715. VAERS does not give medical advice. 6. The National Vaccine Injury Compensation Program    The [facility] Vaccine Injury Compensation Program (VICP) is a federal program that was created to compensate people who may have been injured by certain vaccines. Persons who believe they may have been injured by a vaccine can learn about the program and about filing a claim by calling 2-823.479.2110 or visiting the [employer] website at www.[facility].gov/vaccinecompensation. There is a time limit to file a claim for compensation. 7. How can I learn more?  Ask your healthcare provider. He or she can give you the vaccine package insert or suggest other sources of information.  Call your local or state health department.  Contact the Centers for Disease Control and Prevention (CDC):  - Call 8-553.980.1004 (1-800-CDC-INFO) or  - Visit CDCs website at www.cdc.gov/flu    Vaccine Information Statement   Inactivated Influenza Vaccine   8/7/2015  41 UAbhinav [address]ain High [id number]    Department of Health and Human Services  Centers for Disease Control and Prevention    Office Use Only       Chronic Obstructive Pulmonary Disease (COPD): Care Instructions  Your Care Instructions    Chronic obstructive pulmonary disease (COPD) is a general term for a group of lung diseases, including emphysema and chronic bronchitis. People with COPD have decreased airflow in and out of the lungs, which makes it hard to breathe. The airways also can get clogged with thick mucus.  Cigarette smoking is a major cause of COPD. Although there is no cure for COPD, you can slow its progress. Following your treatment plan and taking care of yourself can help you feel better and live longer. Follow-up care is a key part of your treatment and safety. Be sure to make and go to all appointments, and call your doctor if you are having problems. It's also a good idea to know your test results and keep a list of the medicines you take. How can you care for yourself at home? Staying healthy  · Do not smoke. This is the most important step you can take to prevent more damage to your lungs. If you need help quitting, talk to your doctor about stop-smoking programs and medicines. These can increase your chances of quitting for good. · Avoid colds and flu. Get a pneumococcal vaccine shot. If you have had one before, ask your doctor whether you need a second dose. Get the flu vaccine every fall. If you must be around people with colds or the flu, wash your hands often. · Avoid secondhand smoke, air pollution, and high altitudes. Also avoid cold, dry air and hot, humid air. Stay at home with your windows closed when air pollution is bad. Medicines and oxygen therapy  · Take your medicines exactly as prescribed. Call your doctor if you think you are having a problem with your medicine. · You may be taking medicines such as:  ¨ Bronchodilators. These help open your airways and make breathing easier. Bronchodilators are either short-acting (work for 6 to 9 hours) or long-acting (work for 24 hours). You inhale most bronchodilators, so they start to act quickly. Always carry your quick-relief inhaler with you in case you need it while you are away from home. ¨ Corticosteroids (prednisone, budesonide). These reduce airway inflammation. They come in pill or inhaled form. You must take these medicines every day for them to work well. · A spacer may help you get more inhaled medicine to your lungs.  Ask your doctor or pharmacist if a spacer is right for you. If it is, ask how to use it properly. · Do not take any vitamins, over-the-counter medicine, or herbal products without talking to your doctor first.  · If your doctor prescribed antibiotics, take them as directed. Do not stop taking them just because you feel better. You need to take the full course of antibiotics. · Oxygen therapy boosts the amount of oxygen in your blood and helps you breathe easier. Use the flow rate your doctor has recommended, and do not change it without talking to your doctor first.  Activity  · Get regular exercise. Walking is an easy way to get exercise. Start out slowly, and walk a little more each day. · Pay attention to your breathing. You are exercising too hard if you cannot talk while you are exercising. · Take short rest breaks when doing household chores and other activities. · Learn breathing methodssuch as breathing through pursed lipsto help you become less short of breath. · If your doctor has not set you up with a pulmonary rehabilitation program, talk to him or her about whether rehab is right for you. Rehab includes exercise programs, education about your disease and how to manage it, help with diet and other changes, and emotional support. Diet  · Eat regular, healthy meals. Use bronchodilators about 1 hour before you eat to make it easier to eat. Eat several small meals instead of three large ones. Drink beverages at the end of the meal. Avoid foods that are hard to chew. · Eat foods that contain protein so that you do not lose muscle mass. · Talk with your doctor if you gain too much weight or if you lose weight without trying. Mental health  · Talk to your family, friends, or a therapist about your feelings. It is normal to feel frightened, angry, hopeless, helpless, and even guilty. Talking openly about bad feelings can help you cope. If these feelings last, talk to your doctor. When should you call for help?   Call 911 anytime you think you may need emergency care. For example, call if:  · You have severe trouble breathing. Call your doctor now or seek immediate medical care if:  · You have new or worse trouble breathing. · You cough up blood. · You have a fever. Watch closely for changes in your health, and be sure to contact your doctor if:  · You cough more deeply or more often, especially if you notice more mucus or a change in the color of your mucus. · You have new or worse swelling in your legs or belly. · You are not getting better as expected. Where can you learn more? Go to http://costa-jessica.info/. Sara Torres in the search box to learn more about \"Chronic Obstructive Pulmonary Disease (COPD): Care Instructions. \"  Current as of: March 25, 2017  Content Version: 11.3  © 9839-0870 [employer]. Care instructions adapted under license by [employer] (which disclaims liability or warranty for this information). If you have questions about a medical condition or this instruction, always ask your healthcare professional. [address] any warranty or liability for your use of this information.

## 2017-10-23 NOTE — PROGRESS NOTES
Chief Complaint   Patient presents with    Follow Up Chronic Condition    Immunization/Injection     flu shot     HISTORY OF PRESENT ILLNESS  Amandeep Johns is a 51 y.o. male. HPI  COPD Review:  The patient is being seen for follow up of COPD and tobacco abuse. History of 50 pack year smoking cigarettes  Oxygen: He currently is not on home oxygen therapy. However last sleep study showed nocturnal hypoxia, and per neurology the patient would benefit from nocturnal oxygen once he stopped smoking. Symptoms: chronic dyspnea: severity = fairly severe: course of sx: symptoms have progressed to a point and plateaued. .   Patient uses 3 pillows at night. Patient does smoke cigarettes. States that albuterol barely helps his dyspnea. He states duonebs work better   The patient still working full-time in construction as well a smoking. He states that his dyspnea is affecting his work and clients are noticing he has breathing issues. He reports he cannot tolerate a mask at work. The patient is on Chantix and other than some vivid innocent dreams the patient denies SE of HI/SI. He states he's down to 3 cigarettes from 2 ppd. He states stress causes him to smoke sometimes more. Patient Active Problem List    Diagnosis Date Noted    Emphyse[facility]/COPD ([facility]     COPD, severe ([facility] [facility]      Current Outpatient Prescriptions   Medication Sig Dispense Refill    albuterol-ipratropium (DUO-NEB) 2.5 mg-0.5 mg/3 ml nebu 3 mL by Nebulization route every six (6) hours as needed. 30 Nebule 0    predniSONE (DELTASONE) 20 mg tablet Take 2 Tabs by mouth daily for 5 days. With Breakfast 10 Tab 0    varenicline (CHANTIX) 1 mg tablet Take 1 Tab by mouth two (2) times a day. Start after completion of starter pack 30 Tab 2    fluticasone-salmeterol (ADVAIR) 500-50 mcg/dose diskus inhaler Take 1 Puff by inhalation two (2) times a day.  3 Each 0    albuterol (PROVENTIL HFA, VENTOLIN HFA, PROAIR HFA) 90 mcg/actuation inhaler Take 2 Puffs by inhalation every four (4) hours as needed for Wheezing. 6 Inhaler 3    tiotropium (SPIRIVA) 18 mcg inhalation capsule Take 1 Cap by inhalation daily. Indications: BRONCHOSPASM PREVENTION WITH COPD 30 Cap 5    zaleplon (SONATA) 10 mg capsule Take 1 Cap by mouth nightly. Max Daily Amount: 10 mg. 3 Cap 0     No Known Allergies  Past Medical History:   Diagnosis Date    Chronic obstructive pulmonary disease ([facility] 08/03/2017    PFTS 8/3/17    [facility], [facility]     [facility]COPD ([facility]     Positive urine drug screen 01/2016    opiates and THC     No past surgical history on file.   Family History   Problem Relation Age of Onset    Hypertension Mother     Heart Disease Mother     Diabetes Mother     Hypertension Father     Heart Disease Father     Cancer Father      lymphnodes    Diabetes Father      Social History   Substance Use Topics    Smoking status: Current Every Day Smoker     Packs/day: 2.00     Years: 25.00     Types: Cigarettes     Start date: 5/28/1984    Smokeless tobacco: Never Used    Alcohol use Yes      Comment: occassionally      Lab Results  Component Value Date/Time   WBC 6.8 08/03/2017 05:19 PM   HGB 15.4 08/03/2017 05:19 PM   Hemoglobin, POC 14.3 06/28/2013 10:07 PM   HCT 45.0 08/03/2017 05:19 PM   Hematocrit, POC 42 06/28/2013 10:07 PM   PLATELET 800 21/73/6672 05:19 PM   MCV 89.5 08/03/2017 05:19 PM     Lab Results  Component Value Date/Time   Cholesterol, total 144 01/23/2017 01:11 PM   HDL Cholesterol 36 01/23/2017 01:11 PM   LDL, calculated 85.6 01/23/2017 01:11 PM   Triglyceride 112 01/23/2017 01:11 PM   CHOL/HDL Ratio 4.0 01/23/2017 01:11 PM     Lab Results   Component Value Date/Time    Sodium 139 08/03/2017 05:19 PM    Potassium 3.6 08/03/2017 05:19 PM    Chloride 105 08/03/2017 05:19 PM    CO2 26 08/03/2017 05:19 PM    Anion gap 8 08/03/2017 05:19 PM    Glucose 100 08/03/2017 05:19 PM    BUN 13 08/03/2017 05:19 PM    Creatinine 0.93 08/03/2017 05:19 PM BUN/Creatinine ratio 14 08/03/2017 05:19 PM    GFR est AA >60 08/03/2017 05:19 PM    GFR est non-AA >60 08/03/2017 05:19 PM    Calcium 8.7 08/03/2017 05:19 PM    Bilirubin, total 0.7 07/17/2017 07:10 AM    ALT (SGPT) 14 07/17/2017 07:10 AM    AST (SGOT) 8 07/17/2017 07:10 AM    Alk. phosphatase 88 07/17/2017 07:10 AM    Protein, total 6.8 07/17/2017 07:10 AM    Albumin 3.8 07/17/2017 07:10 AM    Globulin 3.0 07/17/2017 07:10 AM    A-G Ratio 1.3 07/17/2017 07:10 AM             Review of Systems   Constitutional: Negative. Negative for chills and fever. HENT: Negative. Eyes: Negative. Respiratory: Positive for cough and shortness of breath. Reports increased SOB    Cardiovascular: Positive for chest pain. Gastrointestinal: Negative. Genitourinary: Negative. Musculoskeletal: Negative. Skin: Negative. Neurological: Negative. Endo/Heme/Allergies: Negative. Psychiatric/Behavioral: Negative. Physical Exam   Constitutional: He is oriented to person, place, and time. He appears well-developed and well-nourished. No distress. Eyes: Pupils are equal, round, and reactive to light. Neck: Normal range of motion. Cardiovascular: Normal rate and regular rhythm. Pulmonary/Chest: Effort normal. No respiratory distress (Distress on exertion ). He has decreased breath sounds. Abdominal: Soft. Bowel sounds are normal.   Musculoskeletal: Normal range of motion. Neurological: He is alert and oriented to person, place, and time. Skin: Skin is warm and dry. Nails show clubbing. Vitals reviewed. ASSESSMENT and PLAN    ICD-10-CM ICD-9-CM    1. COPD, severe ([address] J44.9 496 albuterol-ipratropium (DUO-NEB) 2.5 mg-0.5 mg/3 ml nebu      predniSONE (DELTASONE) 20 mg tablet   2. Pulmonary emphysema, unspecified emphysema type ([address] J43.9 492.8    3. Current smoker F17.200 305.1    4. Encounter for immunization Z23 V03.89 INFLUENZA VIRUS VAC QUAD,SPLIT,PRESV FREE SYRINGE IM   5. Elevated blood pressure reading R03.0 796.2      Diagnoses and all orders for this visit:    1. COPD, severe ([facility] [address] [facility]  -     albuterol-ipratropium (DUO-NEB) 2.5 mg-0.5 mg/3 ml nebu; 3 mL by Nebulization route every six (6) hours as needed. -     predniSONE (DELTASONE) 20 mg tablet; Take 2 Tabs by mouth daily for 5 days. With Breakfast    2. Pulmonary emphysema, unspecified emphysema type ([facility] [address]    3. Current smoker    4. Encounter for immunization  -     Influenza virus vaccine (QUADRIVALENT PRES FREE SYRINGE) IM ([id number])    5. Elevated blood pressure reading  -The patient with elevated blood pressure reading and no hx HTN. The patient in mild/mod distress and pain related to COPD. Ellie Charlton has been given the following recommendations today due to his elevated BP reading: rescreen BP within a minimum of 1 weeks and lifestyle modifications to include: weight reduction and dietary sodium restriction. Pt with severe COPD whom continues to smoke  and is continuously exposed to environmental factors at work as a . The patient states that he cannot tolerate wearing a mask at work to avoid irritants. Discussed with patient the diagnosis of COPD and how continuous exposures to the irritants will only progress disease. Discussed possibility of applying for disability or getting a less labor intensive job     Discussed with patient that if he brought in [facility] paperwork/from for handicap parking, I will complete. Follow-up Disposition:  Return in about 1 week (around 10/30/2017), or if symptoms worsen or fail to improve.   very strongly urged to quit smoking to reduce cardiovascular risk

## 2017-10-23 NOTE — PROGRESS NOTES
Karli Harding is a 51 y.o. male who presents for influenza immunizations. He denies any symptoms , reactions or allergies that would exclude them from being immunized today. Risks and adverse reactions were discussed and the VIS was given to them. All questions were addressed. He was observed for 20 min post injection. There were no reactions observed. Sallyann Apley Jeannette Oas, LPN

## 2017-10-30 ENCOUNTER — HOSPITAL ENCOUNTER (EMERGENCY)
Age: 47
Discharge: HOME | End: 2017-10-30
Attending: EMERGENCY MEDICINE | Admitting: EMERGENCY MEDICINE
Payer: SUBSIDIZED

## 2017-10-30 ENCOUNTER — OFFICE VISIT (OUTPATIENT)
Dept: FAMILY MEDICINE CLINIC | Age: 47
End: 2017-10-30

## 2017-10-30 VITALS
TEMPERATURE: 98.1 F | WEIGHT: 165 LBS | HEART RATE: 71 BPM | BODY MASS INDEX: 24.37 KG/M2 | SYSTOLIC BLOOD PRESSURE: 156 MMHG | RESPIRATION RATE: 18 BRPM | DIASTOLIC BLOOD PRESSURE: 92 MMHG | OXYGEN SATURATION: 94 %

## 2017-10-30 VITALS
HEART RATE: 85 BPM | OXYGEN SATURATION: 97 % | SYSTOLIC BLOOD PRESSURE: 157 MMHG | DIASTOLIC BLOOD PRESSURE: 96 MMHG | RESPIRATION RATE: 24 BRPM | TEMPERATURE: 99 F

## 2017-10-30 DIAGNOSIS — K92.2 UGIB (UPPER GASTROINTESTINAL BLEED): Primary | ICD-10-CM

## 2017-10-30 DIAGNOSIS — R06.02 SOB (SHORTNESS OF BREATH) ON EXERTION: Primary | ICD-10-CM

## 2017-10-30 DIAGNOSIS — K92.1 BLACK TARRY STOOLS: ICD-10-CM

## 2017-10-30 LAB
ABO + RH BLD: NORMAL
ALBUMIN SERPL-MCNC: 3.8 G/DL (ref 3.4–5)
ALBUMIN/GLOB SERPL: 1.1 {RATIO} (ref 0.8–1.7)
ALP SERPL-CCNC: 94 U/L (ref 45–117)
ALT SERPL-CCNC: 41 U/L (ref 16–61)
ANION GAP SERPL CALC-SCNC: 4 MMOL/L (ref 3–18)
AST SERPL-CCNC: 12 U/L (ref 15–37)
ATRIAL RATE: 74 BPM
BASOPHILS # BLD: 0 K/UL (ref 0–0.1)
BASOPHILS NFR BLD: 0 % (ref 0–2)
BILIRUB SERPL-MCNC: 1 MG/DL (ref 0.2–1)
BLOOD GROUP ANTIBODIES SERPL: NORMAL
BUN SERPL-MCNC: 14 MG/DL (ref 7–18)
BUN/CREAT SERPL: 19 (ref 12–20)
CALCIUM SERPL-MCNC: 8.9 MG/DL (ref 8.5–10.1)
CALCULATED P AXIS, ECG09: 20 DEGREES
CALCULATED R AXIS, ECG10: 64 DEGREES
CALCULATED T AXIS, ECG11: 31 DEGREES
CHLORIDE SERPL-SCNC: 104 MMOL/L (ref 100–108)
CO2 SERPL-SCNC: 31 MMOL/L (ref 21–32)
CREAT SERPL-MCNC: 0.74 MG/DL (ref 0.6–1.3)
DIAGNOSIS, 93000: NORMAL
DIFFERENTIAL METHOD BLD: ABNORMAL
EOSINOPHIL # BLD: 0.1 K/UL (ref 0–0.4)
EOSINOPHIL NFR BLD: 2 % (ref 0–5)
ERYTHROCYTE [DISTWIDTH] IN BLOOD BY AUTOMATED COUNT: 12.8 % (ref 11.6–14.5)
GLOBULIN SER CALC-MCNC: 3.6 G/DL (ref 2–4)
GLUCOSE SERPL-MCNC: 73 MG/DL (ref 74–99)
HCT VFR BLD AUTO: 44.6 % (ref 36–48)
HGB BLD-MCNC: 16.6 G/DL (ref 13–16)
INR PPP: 1 (ref 0.8–1.2)
LYMPHOCYTES # BLD: 2.1 K/UL (ref 0.9–3.6)
LYMPHOCYTES NFR BLD: 28 % (ref 21–52)
MCH RBC QN AUTO: 32.8 PG (ref 24–34)
MCHC RBC AUTO-ENTMCNC: 37.2 G/DL (ref 31–37)
MCV RBC AUTO: 88.1 FL (ref 74–97)
MONOCYTES # BLD: 0.5 K/UL (ref 0.05–1.2)
MONOCYTES NFR BLD: 7 % (ref 3–10)
NEUTS SEG # BLD: 4.8 K/UL (ref 1.8–8)
NEUTS SEG NFR BLD: 63 % (ref 40–73)
P-R INTERVAL, ECG05: 142 MS
PLATELET # BLD AUTO: 216 K/UL (ref 135–420)
PMV BLD AUTO: 10.4 FL (ref 9.2–11.8)
POTASSIUM SERPL-SCNC: 3.5 MMOL/L (ref 3.5–5.5)
PROT SERPL-MCNC: 7.4 G/DL (ref 6.4–8.2)
PROTHROMBIN TIME: 12.9 SEC (ref 11.5–15.2)
Q-T INTERVAL, ECG07: 378 MS
QRS DURATION, ECG06: 88 MS
QTC CALCULATION (BEZET), ECG08: 419 MS
RBC # BLD AUTO: 5.06 M/UL (ref 4.7–5.5)
SODIUM SERPL-SCNC: 139 MMOL/L (ref 136–145)
SPECIMEN EXP DATE BLD: NORMAL
VENTRICULAR RATE, ECG03: 74 BPM
WBC # BLD AUTO: 7.4 K/UL (ref 4.6–13.2)

## 2017-10-30 PROCEDURE — 86900 BLOOD TYPING SEROLOGIC ABO: CPT | Performed by: PHYSICIAN ASSISTANT

## 2017-10-30 PROCEDURE — 85025 COMPLETE CBC W/AUTO DIFF WBC: CPT | Performed by: PHYSICIAN ASSISTANT

## 2017-10-30 PROCEDURE — 93005 ELECTROCARDIOGRAM TRACING: CPT

## 2017-10-30 PROCEDURE — 99285 EMERGENCY DEPT VISIT HI MDM: CPT

## 2017-10-30 PROCEDURE — 80053 COMPREHEN METABOLIC PANEL: CPT | Performed by: PHYSICIAN ASSISTANT

## 2017-10-30 PROCEDURE — 85610 PROTHROMBIN TIME: CPT | Performed by: PHYSICIAN ASSISTANT

## 2017-10-30 RX ORDER — SUCRALFATE 1 G/10ML
1 SUSPENSION ORAL 4 TIMES DAILY
Qty: 414 ML | Refills: 0 | Status: SHIPPED | OUTPATIENT
Start: 2017-10-30 | End: 2017-12-05

## 2017-10-30 RX ORDER — PANTOPRAZOLE SODIUM 40 MG/1
40 TABLET, DELAYED RELEASE ORAL DAILY
Qty: 20 TAB | Refills: 0 | Status: SHIPPED | OUTPATIENT
Start: 2017-10-30 | End: 2017-11-19

## 2017-10-30 NOTE — ED PROVIDER NOTES
HPI Comments: 4:57 PM  Collette Phy Sr is a [address] y.o. male with a PMHx of COPD presents to the ED c/o tarry stools x 4 days. Explains his stools were \"watery, then tarry, then watery. \" Also reports severe bilat lower abd pain x 3 days and \"red\" vomit. States he tookTums and [place] lot of\" Motrin, which provided relief. Admits tobacco use, notes he decreased his use from 2 ppd normally to 2 cigarettes today. No other acute symptoms or complaints were noted. PCP: Edin Reveles NP      The history is provided by the patient. Past Medical History:   Diagnosis Date    Chronic obstructive pulmonary disease ([facility] 08/03/2017    PFTS 8/3/17    COPD, severe ([facility] [facility]     Emphysema/COPD ([facility] [address] [facility]     Positive urine drug screen 01/2016    opiates and THC       History reviewed. No pertinent surgical history. Family History:   Problem Relation Age of Onset    Hypertension Mother     Heart Disease Mother     Diabetes Mother     Hypertension Father     Heart Disease Father     Cancer Father      lymphnodes    Diabetes Father        Social History     Social History    Marital status:      Spouse name: N/A    Number of children: N/A    Years of education: N/A     Occupational History    Not on file.      Social History Main Topics    Smoking status: Current Every Day Smoker     Packs/day: 2.00     Years: 25.00     Types: Cigarettes     Start date: 5/28/1984    Smokeless tobacco: Never Used    Alcohol use Yes      Comment: occassionally    Drug use: No    Sexual activity: Yes     Partners: Female     Other Topics Concern     Service No    Blood Transfusions No    Caffeine Concern Yes    Occupational Exposure No    Hobby Hazards No    Sleep Concern Yes     occas    Stress Concern No    Weight Concern No    Special Diet No    Back Care Yes    Exercise No    Bike Helmet Yes    Seat Belt No     occas     Social History Narrative         ALLERGIES: Review of patient's allergies indicates no known allergies. Review of Systems   Constitutional: Negative for fever. Gastrointestinal: Positive for abdominal pain (B/L lower), blood in stool (tarry stool) and vomiting. All other systems reviewed and are negative. Vitals:    10/30/17 1404   BP: (!) 167/112   Pulse: 83   Resp: 20   Temp: 98.1 °F (36.7 °C)   SpO2: 95%   Weight: 74.8 kg (165 lb)            Physical Exam   Constitutional: He is oriented to person, place, and time. He appears well-developed. HENT:   Head: Normocephalic and atraumatic. Eyes: EOM are normal. Pupils are equal, round, and reactive to light. Neck: Normal range of motion. Neck supple. Cardiovascular: Normal rate, regular rhythm and normal heart sounds. Exam reveals no friction rub. No murmur heard. Pulmonary/Chest: Effort normal and breath sounds normal. No respiratory distress. He has no wheezes. Abdominal: Soft. He exhibits no distension. There is no tenderness. There is no rebound and no guarding. Musculoskeletal: Normal range of motion. Neurological: He is alert and oriented to person, place, and time. Skin: Skin is warm and dry. Psychiatric: He has a normal mood and affect. His behavior is normal. Thought content normal.        MDM  Number of Diagnoses or Management Options  UGIB (upper gastrointestinal bleed):   Diagnosis management comments:  EKG shows sinus at 74 with a normal axis normal intervals no ST elevation or depression or hypertrophy. 50-year-old male presents with sounds like hematemesis melena over the past few days. Has heavy use of NSAIDs and has recently been placed on steroids. 2 pack a day smoker down to 2 cigarettes a day    Hemoccult neg; Pt states hematemesis and melena started resterday. G/w GI for follow up. Advised pt to stop taking nsaids. Had some constant cp x months.  No workuop here       ED Course       Procedures    Scribe Attestation:     Hermelinda Dan acting as a scribe for and in the presence of Kathleen Jay MD     October 30, 2017 at 5:19 PM       Provider Attestation:     I personally performed the services described in the documentation, reviewed the documentation, as recorded by the scribe in my presence, and it accurately and completely records my words and actions.  October 30, 2017 at 5:19 PM - Kathleen Jay MD

## 2017-10-30 NOTE — DISCHARGE INSTRUCTIONS
Gastrointestinal Bleeding: Care Instructions  Your Care Instructions    The digestive or gastrointestinal tract goes from the mouth to the anus. It is often called the GI tract. Bleeding can happen anywhere in the GI tract. It may be caused by an ulcer, an infection, or cancer. It may also be caused by medicines such as aspirin or ibuprofen. Light bleeding may not cause any symptoms at first. But if you continue to bleed for a while, you may feel very weak or tired. Sudden, heavy bleeding means you need to see a doctor right away. This kind of bleeding can be very dangerous. But it can usually be cured or controlled. The doctor may do some tests to find the cause of your bleeding. Follow-up care is a key part of your treatment and safety. Be sure to make and go to all appointments, and call your doctor if you are having problems. It's also a good idea to know your test results and keep a list of the medicines you take. How can you care for yourself at home? · Be safe with medicines. Take your medicines exactly as prescribed. Call your doctor if you think you are having a problem with your medicine. You will get more details on the specific medicines your doctor prescribes. · Do not take aspirin or other anti-inflammatory medicines, such as naproxen (Aleve) or ibuprofen (Advil, Motrin), without talking to your doctor first. Ask your doctor if it is okay to use acetaminophen (Tylenol). · Do not drink alcohol. · The bleeding may make you lose iron. So it's important to eat foods that have a lot of iron. These include red meat, shellfish, poultry, and eggs. They also include beans, raisins, whole-grain breads, and leafy green vegetables. If you want help planning meals, you can make an appointment with a dietitian. When should you call for help? Call 911 anytime you think you may need emergency care. For example, call if:  ? · You have sudden, severe belly pain.    ? · You vomit blood or what looks like coffee grounds. ? · You passed out (lost consciousness). ? · Your stools are maroon or very bloody. ?Call your doctor now or seek immediate medical care if:  ? · You are dizzy or lightheaded, or you feel like you may faint. ? · Your stools are black and look like tar, or they have streaks of blood. ? · You have belly pain. ? · You vomit or have nausea. ? · You have trouble swallowing, or it hurts when you swallow. ? Watch closely for changes in your health, and be sure to contact your doctor if:  ? · You do not get better as expected. Where can you learn more? Go to http://costa-jessica.info/. Enter A071 in the search box to learn more about \"Gastrointestinal Bleeding: Care Instructions. \"  Current as of: March 20, 2017  Content Version: 11.4  © 0731-3419 [employer]. Care instructions adapted under license by [employer] (which disclaims liability or warranty for this information). If you have questions about a medical condition or this instruction, always ask your healthcare professional. [place] [address] any warranty or liability for your use of this information. Upper Gastrointestinal Bleeding: Care Instructions  Your Care Instructions    The digestive or gastrointestinal tract goes from the mouth to the anus. It is often called the GI tract. Bleeding in the upper GI tract can happen anywhere from the esophagus to the first part of the small intestine. Sometimes it's caused by an ulcer in your stomach. Or it may be caused by blood vessels in your esophagus. Your esophagus is the tube that carries food from your throat to your stomach. Light bleeding may not cause any symptoms at first. But if you continue to bleed for a while, you may feel very weak or tired. Sudden, heavy bleeding means you need to see a doctor right away. This kind of bleeding can be very dangerous. But it can usually be cured or controlled.  The doctor may do some tests to find the cause of your bleeding. Follow-up care is a key part of your treatment and safety. Be sure to make and go to all appointments, and call your doctor if you are having problems. It's also a good idea to know your test results and keep a list of the medicines you take. How can you care for yourself at home? · Be safe with medicines. Take your medicines exactly as prescribed. Call your doctor if you think you are having a problem with your medicine. You will get more details on the specific medicines your doctor prescribes. · Do not take aspirin or other anti-inflammatory medicines, such as naproxen (Aleve) or ibuprofen (Advil, Motrin), without talking to your doctor first. Ask your doctor if it is okay to use acetaminophen (Tylenol). · Do not drink alcohol. · The bleeding may make you lose iron. So it's important to eat foods that have a lot of iron. These include red meat, shellfish, poultry, and eggs. They also include beans, raisins, whole-grain breads, and leafy green vegetables. If you want help planning meals, you can meet with a dietitian. When should you call for help? Call 911 anytime you think you may need emergency care. For example, call if:  ? · You have sudden, severe belly pain. ? · You vomit blood or what looks like coffee grounds. ? · You passed out (lost consciousness). ? · Your stools are maroon or very bloody. ?Call your doctor now or seek immediate medical care if:  ? · You are dizzy or lightheaded, or you feel like you may faint. ? · Your stools are black and look like tar.   ? · You have belly pain. ? · You vomit or have nausea. ? · You have trouble swallowing, or it hurts when you swallow. ? Watch closely for changes in your health, and be sure to contact your doctor if you do not get better as expected. Where can you learn more? Go to http://costa-jessica.info/.   Enter G077 in the search box to learn more about \"Upper Gastrointestinal Bleeding: Care Instructions. \"  Current as of: March 20, 2017  Content Version: 11.4  © 1237-9701 [employer], [employer]. Care instructions adapted under license by [employer] (which disclaims liability or warranty for this information). If you have questions about a medical condition or this instruction, always ask your healthcare professional. [place] [address] any warranty or liability for your use of this information.

## 2017-10-30 NOTE — PROGRESS NOTES
Patient arrived here today for BP check. Upon walking to room he c/o \"feeling terrible\". States he was here last Monday and given Prednisone for COPD and a flu shot. He states on Friday he became \"real sick with vomiting, heartburn and black stools-real black. Now I am bloated and in terrible pain. \" Patient c/o low right and lower quadrant pain he rates a 9. His last black stool was yesterday as well as vomiting episode. His is KIM on exertion and exp wheezing in all lung fields. He has a neb machine at home but has not had a HHN since last Wednesday due to being \"too sick\". He did not finish his last day of Prednisone. Symptoms reported to Ms. Cruz who requests patient be evaluated in ED. Taken to ED via w/c with report to triage nurse.

## 2017-10-30 NOTE — ED NOTES
I performed a brief evaluation, including history and physical, of the patient here in triage and I have determined that pt will need further treatment and evaluation from the main side ER physician. I have placed initial orders to help in expediting patients care. patient with black tarry stools over the weekend, feels weak and like he is going to pass out. No hx of colonoscopy and past sx like this.   October 30, 2017 at 2:06 PM - BUSTER Zimmerman        Visit Vitals    BP (!) 167/112 (BP 1 Location: Left arm, BP Patient Position: At rest)    Pulse 83    Temp 98.1 °F (36.7 °C)    Resp 20    Wt 74.8 kg (165 lb)    SpO2 95%    BMI 24.37 kg/m2

## 2017-10-30 NOTE — MED STUDENT NOTES
Lino Null  Emergency Medicine  Date of service: 10/30/2017    Cc: abdomen swelling and pain, chest pain    HPI:  This is a 49year old male with past medical history of end stage COPD and tobacco use disorder presents to the ED with abdomen swelling and pain x 4 days. He states that last Monday he was seen by his PCP for a checkup and was subsequently given steroids to assist his breathing and was told to return in a week due to elevated BP. Then last Thursday, he noticed that his abdomen started to become distended and he was having diffuse abdomen pain that was constant and had no temporal relationship with meals. He took ibuprofen and TUMS  And achieved minimal temporary relief. Then over the weekend, pt reports that he started vomiting \"red stuff\" and had black stool. Pt reports that this has since subsided and has not noted any more episodes. Along with this, pt endorses chest pain which has been an ongoing concern for him. He notes that the pain is located under both pectoralis muscle and is non-radiating. ROS: no subjective fever, chills    PMH  Past Medical History:   Diagnosis Date    Chronic obstructive pulmonary disease ([address] 08/03/2017    PFTS 8/3/17    COPD, severe ([address]     Emphysema/COPD ([address]     Positive urine drug screen 01/2016    opiates and THC       PSH  History reviewed. No pertinent surgical history. Medications  Prior to Admission Medications   Prescriptions Last Dose Informant Patient Reported? Taking? albuterol (PROVENTIL HFA, VENTOLIN HFA, PROAIR HFA) 90 mcg/actuation inhaler   No No   Sig: Take 2 Puffs by inhalation every four (4) hours as needed for Wheezing. albuterol-ipratropium (DUO-NEB) 2.5 mg-0.5 mg/3 ml nebu   No No   Sig: 3 mL by Nebulization route every six (6) hours as needed. fluticasone-salmeterol (ADVAIR) 500-50 mcg/dose diskus inhaler   No No   Sig: Take 1 Puff by inhalation two (2) times a day.    tiotropium (SPIRIVA) 18 mcg inhalation capsule   No No   Sig: Take 1 Cap by inhalation daily. Indications: BRONCHOSPASM PREVENTION WITH COPD   varenicline (CHANTIX) 1 mg tablet   No No   Sig: Take 1 Tab by mouth two (2) times a day. Start after completion of starter pack   zaleplon (SONATA) 10 mg capsule   No No   Sig: Take 1 Cap by mouth nightly. Max Daily Amount: 10 mg. Facility-Administered Medications: None     Allergies: NKDA    FH: significant for lung cancer- father. Also hx of DM    SH: Pt works as a  in construction. He has a hx of 2 PPD tobacco use but has been on Chantix. He has dropped to 2 cigarettes/day over the past month. Pt is a former EtOH user but quit 20 years ago. He has remote hx of marijuana use. VS  T 98.1  HR 83  /112  RR  20  SaO2 95%    Objective: Pt appears as stated age, sitting up in bed, in mild distress, disheveled appearance  HEENT: nonicteric sclerae, oral cavity well perfused and hydrated  Heart: S1 and S2 present, no/m/r/c/g  Lungs: Scattered wheeze  Abdomen: Distended, tender to palpation in lower quadrant, BS x 4, no hsm, - Pretty sign, no abdominal bruits  Extremities: no edema in LE, clubbing of digits in hands  Neuro: sensation intact    Assessment  1. abdomen pain- pt has hx of HTN and tobacco use disorder, concerning for aortic aneurysm. However, there were no pulsatile masses or abdominal bruits on physical exam.  Other differential diagnosis to consider are   - Perforated gastric ulcer- pt has been medicating with many doses of ibuprofen and reports blood per os and blood per rectum. - PUD   - gastritis   - portal hypertension   - GERD  2. COPD  3. tobacco use disorder - on chantix    Plan  - will order CBC with diff, CMP, lipase, lactate  - ECG  - KUB  - FOBT  - Type and screen  - GI ppx with pantoprazole      Romayne Dings, OMS4                                    *ATTENTION:  This note has been created by a medical student for educational purposes only.   Please do not refer to the content of this note for clinical decision-making, billing, or other purposes. Please see attending physicians note to obtain clinical information on this patient. *

## 2017-10-31 NOTE — ED NOTES
Report received from [place], [facility] pt alert and oriented, stool guiac negative, pt will be discharged per Dr. Cat Caceres.

## 2017-11-02 ENCOUNTER — OFFICE VISIT (OUTPATIENT)
Dept: FAMILY MEDICINE CLINIC | Age: 47
End: 2017-11-02

## 2017-11-02 ENCOUNTER — HOSPITAL ENCOUNTER (OUTPATIENT)
Dept: PREADMISSION TESTING | Age: 47
Discharge: HOME | End: 2017-11-02
Payer: SUBSIDIZED

## 2017-11-02 VITALS
DIASTOLIC BLOOD PRESSURE: 95 MMHG | HEART RATE: 84 BPM | TEMPERATURE: 98.5 F | BODY MASS INDEX: 24.44 KG/M2 | RESPIRATION RATE: 20 BRPM | OXYGEN SATURATION: 97 % | WEIGHT: 165 LBS | SYSTOLIC BLOOD PRESSURE: 159 MMHG | HEIGHT: 69 IN

## 2017-11-02 DIAGNOSIS — K29.00 ACUTE GASTRITIS, PRESENCE OF BLEEDING UNSPECIFIED, UNSPECIFIED GASTRITIS TYPE: ICD-10-CM

## 2017-11-02 DIAGNOSIS — K92.2 UPPER GI BLEED: ICD-10-CM

## 2017-11-02 DIAGNOSIS — R10.9 ABDOMINAL PAIN, UNSPECIFIED ABDOMINAL LOCATION: ICD-10-CM

## 2017-11-02 DIAGNOSIS — Z09 HOSPITAL DISCHARGE FOLLOW-UP: Primary | ICD-10-CM

## 2017-11-02 LAB
ERYTHROCYTE [DISTWIDTH] IN BLOOD BY AUTOMATED COUNT: 13 % (ref 11.6–14.5)
HCT VFR BLD AUTO: 40.3 % (ref 36–48)
HGB BLD-MCNC: 14.7 G/DL (ref 13–16)
MCH RBC QN AUTO: 32.5 PG (ref 24–34)
MCHC RBC AUTO-ENTMCNC: 36.5 G/DL (ref 31–37)
MCV RBC AUTO: 89 FL (ref 74–97)
PLATELET # BLD AUTO: 204 K/UL (ref 135–420)
PMV BLD AUTO: 10 FL (ref 9.2–11.8)
RBC # BLD AUTO: 4.53 M/UL (ref 4.7–5.5)
WBC # BLD AUTO: 6.8 K/UL (ref 4.6–13.2)

## 2017-11-02 PROCEDURE — 86677 HELICOBACTER PYLORI ANTIBODY: CPT | Performed by: NURSE PRACTITIONER

## 2017-11-02 PROCEDURE — 85027 COMPLETE CBC AUTOMATED: CPT | Performed by: NURSE PRACTITIONER

## 2017-11-02 PROCEDURE — 36415 COLL VENOUS BLD VENIPUNCTURE: CPT | Performed by: NURSE PRACTITIONER

## 2017-11-02 RX ORDER — TRAMADOL HYDROCHLORIDE 50 MG/1
50 TABLET ORAL
Qty: 12 TAB | Refills: 0 | Status: ON HOLD | OUTPATIENT
Start: 2017-11-02 | End: 2017-12-23

## 2017-11-02 RX ORDER — ACETAMINOPHEN 500 MG
500 TABLET ORAL
COMMUNITY
End: 2018-01-02 | Stop reason: SDUPTHER

## 2017-11-02 NOTE — PROGRESS NOTES
MARIA ANTONIA QUEZADA [facility]  [address] [facility], [address]  [phone] office/[phone] fax      11/2/2017    Reason for visit:   Chief Complaint   Patient presents with   [facility] Follow-up     seen in ED on 10/30 with UGIB and rectal bleed. Could not get the carafate and is taking pepto bismol and acetaminophen for generalized pain 10/10       Patient: Corinna Adams, 1970, [id number]       Primary MD: Darren Painter NP    Subjective:   Corinna Adams, a 49 y.o. male, who presents for ED Follow-up. The patient presented to the ED 10/30/17 with c/o loose/watery tarry stools x 4 days. Associated symptoms also included abdominal pain and bloating, and hematemesis. The patient states that he had been taking \"a lot of\" motrin and tums to provide relief. He was negative for occult blood in stool in ED. CBC was stable. No imaging was done. The patient was sent home with protonix and carafate. The patient states the protonix helps some but he was unable to afford carafate so he has not purchased medication. Today he reports continued epigastric and LL abdominal pain and bloating that is disrupting his sleep. He denies any episodes of heatemesis since ED admission. Risk Factors; Pt continues to smoke cigarettes, Denies alcohol use, has ceased use of NSAIDS    He does not have a reported history of GERD, H pylori, gastritis, or ulcers. No history of an EGD or colonoscopy.      The patient reports he has an appt later today with gastroenterologists Dr. Janae Ruiz at 2 pm.       HPI    PHQ Screening  PHQ over the last two weeks 8/28/2017   Little interest or pleasure in doing things Not at all   Feeling down, depressed or hopeless Not at all   Total Score PHQ 2 0       Past Medical History:   Diagnosis Date    Chronic obstructive pulmonary disease ([address] 08/03/2017    PFTS 8/3/17    COPD, severe ([address]     Emphysema/COPD ([address]     Positive urine drug screen 01/2016    opiates and THC       No past surgical history on file. Social History     Social History    Marital status:      Spouse name: N/A    Number of children: N/A    Years of education: N/A     Occupational History    Not on file. Social History Main Topics    Smoking status: Current Every Day Smoker     Packs/day: 2.00     Years: 25.00     Types: Cigarettes     Start date: 5/28/1984    Smokeless tobacco: Never Used    Alcohol use Yes      Comment: occassionally    Drug use: No    Sexual activity: Yes     Partners: Female     Other Topics Concern     Service No    Blood Transfusions No    Caffeine Concern Yes    Occupational Exposure No    Hobby Hazards No    Sleep Concern Yes     occas    Stress Concern No    Weight Concern No    Special Diet No    Back Care Yes    Exercise No    Bike Helmet Yes    Seat Belt No     occas     Social History Narrative       No Known Allergies    Current Outpatient Prescriptions on File Prior to Visit   Medication Sig Dispense Refill    pantoprazole (PROTONIX) 40 mg tablet Take 1 Tab by mouth daily for 20 days. 20 Tab 0    albuterol-ipratropium (DUO-NEB) 2.5 mg-0.5 mg/3 ml nebu 3 mL by Nebulization route every six (6) hours as needed. 30 Nebule 0    varenicline (CHANTIX) 1 mg tablet Take 1 Tab by mouth two (2) times a day. Start after completion of starter pack 30 Tab 2    fluticasone-salmeterol (ADVAIR) 500-50 mcg/dose diskus inhaler Take 1 Puff by inhalation two (2) times a day. 3 Each 0    albuterol (PROVENTIL HFA, VENTOLIN HFA, PROAIR HFA) 90 mcg/actuation inhaler Take 2 Puffs by inhalation every four (4) hours as needed for Wheezing. 6 Inhaler 3    tiotropium (SPIRIVA) 18 mcg inhalation capsule Take 1 Cap by inhalation daily. Indications: BRONCHOSPASM PREVENTION WITH COPD 30 Cap 5    sucralfate (CARAFATE) 100 mg/mL suspension Take 10 mL by mouth four (4) times daily. 414 mL 0    zaleplon (SONATA) 10 mg capsule Take 1 Cap by mouth nightly. Max Daily Amount: 10 mg. 3 Cap 0     No current facility-administered medications on file prior to visit. Review of Systems   Constitutional: Negative. Negative for chills and fever. Respiratory: Positive for shortness of breath (at baseline with End Stage COPD). Cardiovascular: Negative for chest pain. Gastrointestinal: Positive for abdominal pain, heartburn and nausea. Negative for blood in stool and vomiting. Reports reflux symptoms       Objective  Visit Vitals    BP (!) 159/95 (BP 1 Location: Left arm, BP Patient Position: Sitting)    Pulse 84    Temp 98.5 °F (36.9 °C) (Oral)    Resp 20    Ht 5' 9\" (1.753 m)    Wt 165 lb (74.8 kg)    SpO2 97%    BMI 24.37 kg/m2      Wt Readings from Last 3 Encounters:   11/02/17 165 lb (74.8 kg)   10/30/17 165 lb (74.8 kg)   10/23/17 163 lb (73.9 kg)     Pain Scale: 10 - Worst pain ever/10    Physical Exam   Constitutional: He is oriented to person, place, and time. He appears well-developed and well-nourished. He appears distressed. Cardiovascular: Normal rate and regular rhythm. Pulmonary/Chest: He is in respiratory distress (With talking and exertion ). He has decreased breath sounds. Abdominal: He exhibits distension. There is tenderness in the left lower quadrant. Neurological: He is alert and oriented to person, place, and time. He has normal strength. Coordination and gait normal. GCS eye subscore is 4. GCS verbal subscore is 5. GCS motor subscore is 6. Skin: Skin is warm and dry. Labs  Displays most recent values of common labs: H&H, WBC, Platelets, ALT, AST, BUN, Creat, Na, K, TSH, HgbA1c, Lipids, INR and/or PSA. For additional labs, please use Results Review or Flowsheets.     Lab Results   Component Value Date/Time    WBC 7.4 10/30/2017 04:35 PM    Hemoglobin, POC 14.3 06/28/2013 10:07 PM    HGB 16.6 10/30/2017 04:35 PM    Hematocrit, POC 42 06/28/2013 10:07 PM    HCT 44.6 10/30/2017 04:35 PM    PLATELET 744 10/30/2017 04:35 PM       Lab Results   Component Value Date/Time    ALT (SGPT) 41 10/30/2017 04:35 PM    AST (SGOT) 12 10/30/2017 04:35 PM    Creatinine, POC 0.9 06/28/2013 10:07 PM    Creatinine 0.74 10/30/2017 04:35 PM    BUN 14 10/30/2017 04:35 PM    BUN, POC 11 06/28/2013 10:07 PM    Sodium,  06/28/2013 10:07 PM    Sodium 139 10/30/2017 04:35 PM    Potassium 3.5 10/30/2017 04:35 PM    Potassium, POC 3.6 06/28/2013 10:07 PM       Lab Results   Component Value Date/Time    Cholesterol, total 144 01/23/2017 01:11 PM    HDL Cholesterol 36 01/23/2017 01:11 PM    LDL, calculated 85.6 01/23/2017 01:11 PM    Triglyceride 112 01/23/2017 01:11 PM    Hemoglobin A1c 4.8 01/23/2017 01:11 PM    INR 1.0 10/30/2017 04:35 PM       No results found for: PSAPOC, PSA3, PSAFLT, PSALT, PSA, PSA2, PSAR1    Radiology Reports:    Assessment/Plan:    ICD-10-CM ICD-9-CM    1. Hospital discharge follow-up Z09 V67.59 REFERRAL TO GASTROENTEROLOGY   2. Upper GI bleed K92.2 578.9 REFERRAL TO GASTROENTEROLOGY   3. Acute gastritis, presence of bleeding unspecified, unspecified gastritis type K29.00 535.00 REFERRAL TO GASTROENTEROLOGY   4. Abdominal pain, unspecified abdominal location R10.9 789.00 traMADol (ULTRAM) 50 mg tablet     Diagnoses and all orders for this visit:    1. Hospital discharge follow-up  -     REFERRAL TO GASTROENTEROLOGY    2. Upper GI bleed  -     REFERRAL TO GASTROENTEROLOGY    3. Acute gastritis, presence of bleeding unspecified, unspecified gastritis type  -     REFERRAL TO GASTROENTEROLOGY    4. Abdominal pain, unspecified abdominal location  -     traMADol (ULTRAM) 50 mg tablet; Take 1 Tab by mouth every six (6) hours as needed for Pain. Max Daily Amount: 200 mg. Indications: Pain      Pt with continued abdominal pain believed to secondary to gastritis/ulcerative process. He is taking tylenol every 3 hours per pt for minimal relief.  The patient given carafate [facility] RX coupon to help cover costs of medication. Discussed with patient that since he will be following up with GI that he needs to clear RX of tramadol with him at visit for tramadol. If the pain continues or gets worse discussed with patient that he may need to go to the ED again to get Imaging to rule out an acute abdominal process. Continue PPI. Advised the patient to avoid smoking and foods that could potentially aggravate symptoms such as spicy foods and caffeine. Continue to avoid NSAIDS    Follow-up Disposition:  Return if symptoms worsen or fail to improve. There are no discontinued medications.

## 2017-11-02 NOTE — MR AVS SNAPSHOT
Visit Information Date & Time Provider Department Dept. Phone Encounter #  
 11/2/2017 11:00 AM Sonny Matamoros NP [address] [facility] [id number] Your Appointments 11/21/2017  3:30 PM  
Follow Up with Taisha Copeland MD  
[address] ([facility]) Appt Note: Fu  from 8/28/17  
 [address] [place] [address], [address] [place] [address]  
[phone]  
  
   
 [address] [place] [address], [address] [facility] [place] [address]  
  
    
 3/19/2018  1:00 PM  
Follow Up with Sonny Matamoros NP [address] [facility] ([facility]) Appt Note: 5 mth follow up  
 [address] [facility] [address]  
[address] [facility] [address] Upcoming Health Maintenance Date Due Pneumococcal 19-64 Medium Risk (1 of 1 - PPSV23) 10/27/1988 DTaP/Tdap/Td series (1 - Tdap) 10/27/1990 Allergies as of 11/2/2017  Review Complete On: 11/2/2017 By: Amy Maldonado LPN No Known Allergies Current Immunizations  Never Reviewed Name Date Influenza Vaccine (Quad) PF 10/23/2017  1:04 PM  
  
 Not reviewed this visit You Were Diagnosed With   
  
 Codes Comments Hospital discharge follow-up    -  Primary ICD-10-CM: [address] [facility] ICD-9-CM: V67.59 Upper GI bleed     ICD-10-CM: K92.2 ICD-9-CM: 578.9 Acute gastritis, presence of bleeding unspecified, unspecified gastritis type     ICD-10-CM: K29.00 ICD-9-CM: 535.00 Abdominal pain, unspecified abdominal location     ICD-10-CM: R10.9 ICD-9-CM: 789.00 Vitals BP Pulse Temp Resp Height(growth percentile) Weight(growth percentile) (!) 159/95 (BP 1 Location: Left arm, BP Patient Position: Sitting) 84 98.5 °F (36.9 °C) (Oral) 20 5' 9\" (1.753 m) 165 lb (74.8 kg) SpO2 BMI Smoking Status 97% 24.37 kg/m2 Current Every Day Smoker BMI and BSA Data Body Mass Index Body Surface Area  
 24.37 kg/m 2 1.91 m 2 Preferred Pharmacy Pharmacy Name Phone [facility] [address] [phone] Your Updated Medication List  
  
   
This list is accurate as of: 11/2/17 11:24 AM.  Always use your most recent med list.  
  
  
  
  
 acetaminophen 500 mg tablet Commonly known as:  TYLENOL Take  by mouth every six (6) hours as needed for Pain. albuterol 90 mcg/actuation inhaler Commonly known as:  PROVENTIL HFA, VENTOLIN HFA, PROAIR HFA Take 2 Puffs by inhalation every four (4) hours as needed for Wheezing. albuterol-ipratropium 2.5 mg-0.5 mg/3 ml Nebu Commonly known as:  DUO-NEB  
3 mL by Nebulization route every six (6) hours as needed. bismuth subsalicylate 512 UE/29 mL Susp Commonly known as:  PEPTO-BISMOL MAXIMUM STRENGTH Take 30 mL by mouth every six (6) hours as needed. fluticasone-salmeterol 500-50 mcg/dose diskus inhaler Commonly known as:  ADVAIR Take 1 Puff by inhalation two (2) times a day. pantoprazole 40 mg tablet Commonly known as:  PROTONIX Take 1 Tab by mouth daily for 20 days. sucralfate 100 mg/mL suspension Commonly known as:  Anamika Kirks Take 10 mL by mouth four (4) times daily. tiotropium 18 mcg inhalation capsule Commonly known as:  Kathlyne Amanda Take 1 Cap by inhalation daily. Indications: BRONCHOSPASM PREVENTION WITH COPD  
  
 traMADol 50 mg tablet Commonly known as:  ULTRAM  
Take 1 Tab by mouth every six (6) hours as needed for Pain. Max Daily Amount: 200 mg. Indications: Pain  
  
 varenicline 1 mg tablet Commonly known as:  Vikas Pride Take 1 Tab by mouth two (2) times a day. Start after completion of starter pack  
  
 zaleplon 10 mg capsule Commonly known as:  SONATA Take 1 Cap by mouth nightly. Max Daily Amount: 10 mg.  
  
  
  
  
Prescriptions Printed  Refills  
 traMADol (ULTRAM) 50 mg tablet 0  
 Sig: Take 1 Tab by mouth every six (6) hours as needed for Pain. Max Daily Amount: 200 mg. Indications: Pain Class: Print Route: Oral  
  
We Performed the Following REFERRAL TO GASTROENTEROLOGY [CUB38 Custom] Comments: The patient has an appt 11/2/17 at 1 pm  
  
Referral Information Referral ID Referred By Referred To  
  
 [id number] [address], [address] [place] [address] [address] [place] [address] [address] [place], [address] Phone: [phone] Fax: [phone] Visits Status Start Date End Date 1 New Request 11/2/17 11/2/18 If your referral has a status of pending review or denied, additional information will be sent to support the outcome of this decision. Patient Instructions Upper Gastrointestinal Bleeding: Care Instructions Your Care Instructions The digestive or gastrointestinal tract goes from the mouth to the anus. It is often called the GI tract. Bleeding in the upper GI tract can happen anywhere from the esophagus to the first part of the small intestine. Sometimes it's caused by an ulcer in your stomach. Or it may be caused by blood vessels in your esophagus. Your esophagus is the tube that carries food from your throat to your stomach. Light bleeding may not cause any symptoms at first. But if you continue to bleed for a while, you may feel very weak or tired. Sudden, heavy bleeding means you need to see a doctor right away. This kind of bleeding can be very dangerous. But it can usually be cured or controlled. The doctor may do some tests to find the cause of your bleeding. Follow-up care is a key part of your treatment and safety. Be sure to make and go to all appointments, and call your doctor if you are having problems. It's also a good idea to know your test results and keep a list of the medicines you take. How can you care for yourself at home? · Be safe with medicines. Take your medicines exactly as prescribed. Call your doctor if you think you are having a problem with your medicine. You will get more details on the specific medicines your doctor prescribes. · Do not take aspirin or other anti-inflammatory medicines, such as naproxen (Aleve) or ibuprofen (Advil, Motrin), without talking to your doctor first. Ask your doctor if it is okay to use acetaminophen (Tylenol). · Do not drink alcohol. · The bleeding may make you lose iron. So it's important to eat foods that have a lot of iron. These include red meat, shellfish, poultry, and eggs. They also include beans, raisins, whole-grain breads, and leafy green vegetables. If you want help planning meals, you can meet with a dietitian. When should you call for help? Call 911 anytime you think you may need emergency care. For example, call if: 
? · You have sudden, severe belly pain. ? · You vomit blood or what looks like coffee grounds. ? · You passed out (lost consciousness). ? · Your stools are maroon or very bloody. ?Call your doctor now or seek immediate medical care if: 
? · You are dizzy or lightheaded, or you feel like you may faint. ? · Your stools are black and look like tar.  
? · You have belly pain. ? · You vomit or have nausea. ? · You have trouble swallowing, or it hurts when you swallow. ? Watch closely for changes in your health, and be sure to contact your doctor if you do not get better as expected. Where can you learn more? Go to http://costa-jessica.info/. Enter U441 in the search box to learn more about \"Upper Gastrointestinal Bleeding: Care Instructions. \" Current as of: March 20, 2017 Content Version: 11.4 © 6707-9499 [employer]. Care instructions adapted under license by [employer] (which disclaims liability or warranty for this information).  If you have questions about a medical condition or this instruction, always ask your healthcare professional. [facility] any warranty or liability for your use of this information. Introducing [facility]! Evelyn Shelby introduces [facility] patient portal. Now you can access parts of your medical record, email your doctor's office, and request medication refills online. 1. In your internet browser, go to [email or web address] 2. Click on the First Time User? Click Here link in the Sign In box. You will see the New Member Sign Up page. 3. Enter your [facility] [facility] Access Code exactly as it appears below. You will not need to use this code after youve completed the sign-up process. If you do not sign up before the expiration date, you must request a new code. · [facility] [facility] Access Code: [email or web address] Expires: 1/21/2018  1:20 PM 
 
4. Enter the last four digits of your Social Security Number (xxxx) and Date of Birth (mm/dd/yyyy) as indicated and click Submit. You will be taken to the next sign-up page. 5. Create a [facility] [facility] ID. This will be your [facility] [facility] login ID and cannot be changed, so think of one that is secure and easy to remember. 6. Create a [facility] password. You can change your password at any time. 7. Enter your Password Reset Question and Answer. This can be used at a later time if you forget your password. 8. Enter your e-mail address. You will receive e-mail notification when new information is available in [address] 9. Click Sign Up. You can now view and download portions of your medical record. 10. Click the Download Summary menu link to download a portable copy of your medical information. If you have questions, please visit the Frequently Asked Questions section of the [employer] website. Remember, [facility] [facility] is NOT to be used for urgent needs. For medical emergencies, dial 911. Now available from your iPhone and Android! Please provide this summary of care documentation to your next provider. Your primary care clinician is listed as Jose Godfrey. If you have any questions after today's visit, please call [phone].

## 2017-11-02 NOTE — PATIENT INSTRUCTIONS
Upper Gastrointestinal Bleeding: Care Instructions  Your Care Instructions    The digestive or gastrointestinal tract goes from the mouth to the anus. It is often called the GI tract. Bleeding in the upper GI tract can happen anywhere from the esophagus to the first part of the small intestine. Sometimes it's caused by an ulcer in your stomach. Or it may be caused by blood vessels in your esophagus. Your esophagus is the tube that carries food from your throat to your stomach. Light bleeding may not cause any symptoms at first. But if you continue to bleed for a while, you may feel very weak or tired. Sudden, heavy bleeding means you need to see a doctor right away. This kind of bleeding can be very dangerous. But it can usually be cured or controlled. The doctor may do some tests to find the cause of your bleeding. Follow-up care is a key part of your treatment and safety. Be sure to make and go to all appointments, and call your doctor if you are having problems. It's also a good idea to know your test results and keep a list of the medicines you take. How can you care for yourself at home? · Be safe with medicines. Take your medicines exactly as prescribed. Call your doctor if you think you are having a problem with your medicine. You will get more details on the specific medicines your doctor prescribes. · Do not take aspirin or other anti-inflammatory medicines, such as naproxen (Aleve) or ibuprofen (Advil, Motrin), without talking to your doctor first. Ask your doctor if it is okay to use acetaminophen (Tylenol). · Do not drink alcohol. · The bleeding may make you lose iron. So it's important to eat foods that have a lot of iron. These include red meat, shellfish, poultry, and eggs. They also include beans, raisins, whole-grain breads, and leafy green vegetables. If you want help planning meals, you can meet with a dietitian. When should you call for help?   Call 911 anytime you think you may need emergency care. For example, call if:  ? · You have sudden, severe belly pain. ? · You vomit blood or what looks like coffee grounds. ? · You passed out (lost consciousness). ? · Your stools are maroon or very bloody. ?Call your doctor now or seek immediate medical care if:  ? · You are dizzy or lightheaded, or you feel like you may faint. ? · Your stools are black and look like tar.   ? · You have belly pain. ? · You vomit or have nausea. ? · You have trouble swallowing, or it hurts when you swallow. ? Watch closely for changes in your health, and be sure to contact your doctor if you do not get better as expected. Where can you learn more? Go to http://costa-jessica.info/. Enter H752 in the search box to learn more about \"Upper Gastrointestinal Bleeding: Care Instructions. \"  Current as of: March 20, 2017  Content Version: 11.4  © 6273-1898 [employer]. Care instructions adapted under license by [employer] (which disclaims liability or warranty for this information). If you have questions about a medical condition or this instruction, always ask your healthcare professional. [place] [address] any warranty or liability for your use of this information.

## 2017-11-03 LAB
H PYLORI IGG SER IA-ACNC: <0.9 U/ML (ref 0–0.8)
H PYLORI IGM SER-ACNC: <9 UNITS (ref 0–8.9)

## 2017-11-04 ENCOUNTER — APPOINTMENT (OUTPATIENT)
Dept: GENERAL RADIOLOGY | Age: 47
End: 2017-11-04
Attending: EMERGENCY MEDICINE
Payer: SUBSIDIZED

## 2017-11-04 ENCOUNTER — HOSPITAL ENCOUNTER (EMERGENCY)
Age: 47
Discharge: HOME | End: 2017-11-04
Attending: EMERGENCY MEDICINE
Payer: SUBSIDIZED

## 2017-11-04 VITALS
WEIGHT: 165 LBS | BODY MASS INDEX: 24.44 KG/M2 | HEIGHT: 69 IN | TEMPERATURE: 98.4 F | HEART RATE: 94 BPM | RESPIRATION RATE: 17 BRPM | OXYGEN SATURATION: 94 % | DIASTOLIC BLOOD PRESSURE: 91 MMHG | SYSTOLIC BLOOD PRESSURE: 134 MMHG

## 2017-11-04 DIAGNOSIS — R06.02 SOB (SHORTNESS OF BREATH): Primary | ICD-10-CM

## 2017-11-04 PROCEDURE — 71020 XR CHEST PA LAT: CPT

## 2017-11-04 PROCEDURE — 99284 EMERGENCY DEPT VISIT MOD MDM: CPT

## 2017-11-04 PROCEDURE — 93005 ELECTROCARDIOGRAM TRACING: CPT
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
## 2017-11-04 NOTE — DISCHARGE INSTRUCTIONS
Shortness of Breath: Care Instructions  Your Care Instructions  Shortness of breath has many causes. Sometimes conditions such as anxiety can lead to shortness of breath. Some people get mild shortness of breath when they exercise. Trouble breathing also can be a symptom of a serious problem, such as asthma, lung disease, emphysema, heart problems, and pneumonia. If your shortness of breath continues, you may need tests and treatment. Watch for any changes in your breathing and other symptoms. Follow-up care is a key part of your treatment and safety. Be sure to make and go to all appointments, and call your doctor if you are having problems. It's also a good idea to know your test results and keep a list of the medicines you take. How can you care for yourself at home? · Do not smoke or allow others to smoke around you. If you need help quitting, talk to your doctor about stop-smoking programs and medicines. These can increase your chances of quitting for good. · Get plenty of rest and sleep. · Take your medicines exactly as prescribed. Call your doctor if you think you are having a problem with your medicine. · Find healthy ways to deal with stress. ¨ Exercise daily. ¨ Get plenty of sleep. ¨ Eat regularly and well. When should you call for help? Call 911 anytime you think you may need emergency care. For example, call if:  ? · You have severe shortness of breath. ? · You have symptoms of a heart attack. These may include:  ¨ Chest pain or pressure, or a strange feeling in the chest.  ¨ Sweating. ¨ Shortness of breath. ¨ Nausea or vomiting. ¨ Pain, pressure, or a strange feeling in the back, neck, jaw, or upper belly or in one or both shoulders or arms. ¨ Lightheadedness or sudden weakness. ¨ A fast or irregular heartbeat. After you call 911, the  may tell you to chew 1 adult-strength or 2 to 4 low-dose aspirin. Wait for an ambulance. Do not try to drive yourself.    ?Call your doctor now or seek immediate medical care if:  ? · Your shortness of breath gets worse or you start to wheeze. Wheezing is a high-pitched sound when you breathe. ? · You wake up at night out of breath or have to prop your head up on several pillows to breathe. ? · You are short of breath after only light activity or while at rest.   ? Watch closely for changes in your health, and be sure to contact your doctor if:  ? · You do not get better over the next 1 to 2 days. Where can you learn more? Go to http://costa-jessica.info/. Enter S780 in the search box to learn more about \"Shortness of Breath: Care Instructions. \"  Current as of: May 12, 2017  Content Version: 11.4  © 3685-5864 [employer]. Care instructions adapted under license by [employer] (which disclaims liability or warranty for this information). If you have questions about a medical condition or this instruction, always ask your healthcare professional. [address] any warranty or liability for your use of this information.
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
## 2017-11-05 LAB
ATRIAL RATE: 95 BPM
CALCULATED P AXIS, ECG09: 67 DEGREES
CALCULATED R AXIS, ECG10: 61 DEGREES
CALCULATED T AXIS, ECG11: 43 DEGREES
DIAGNOSIS, 93000: NORMAL
P-R INTERVAL, ECG05: 156 MS
Q-T INTERVAL, ECG07: 350 MS
QRS DURATION, ECG06: 88 MS
QTC CALCULATION (BEZET), ECG08: 439 MS
VENTRICULAR RATE, ECG03: 95 BPM

## 2017-11-13 ENCOUNTER — TELEPHONE (OUTPATIENT)
Dept: FAMILY MEDICINE CLINIC | Age: 47
End: 2017-11-13

## 2017-11-13 NOTE — TELEPHONE ENCOUNTER
Medication: Ventolin HFA, dose: 2 puffs, how often: every 4 hours as needed, current number of medication days provided: 90, refill per application. Lot #: [id number], EXP.01/19. This medication was received and verified for the following 1. Correct Patient, 2. Correct Diagnosis, 3. Correct Drug, 4. Correct route, and no current allergy to medication. Medication: Chantix 1mg , dose: 1 tab, how often: bid , current number of medication days provided: 90, refill per application. Lot #: [id number], EXP.02/20  . This medication was received and verified for the following 1. Correct Patient, 2. Correct Diagnosis, 3. Correct Drug, 4. Correct route, and no current allergy to medication. Please contact patient to come  their medications.      Zayda Pate, MSN, RN, [facility]

## 2017-11-21 ENCOUNTER — OFFICE VISIT (OUTPATIENT)
Dept: PULMONOLOGY | Age: 47
End: 2017-11-21

## 2017-11-21 VITALS
WEIGHT: 168 LBS | HEIGHT: 69 IN | SYSTOLIC BLOOD PRESSURE: 170 MMHG | BODY MASS INDEX: 24.88 KG/M2 | DIASTOLIC BLOOD PRESSURE: 100 MMHG | RESPIRATION RATE: 16 BRPM | OXYGEN SATURATION: 95 % | TEMPERATURE: 98.7 F | HEART RATE: 91 BPM

## 2017-11-21 DIAGNOSIS — J44.9 COPD, SEVERE (HCC): Primary | ICD-10-CM

## 2017-11-21 NOTE — MR AVS SNAPSHOT
Visit Information Date & Time Provider Department Dept. Phone Encounter #  
 11/21/2017  3:30 PM Berkley Still MD [facility] [id number] Follow-up Instructions Return in about 8 weeks (around 1/16/2018). Your Appointments 3/19/2018  1:00 PM  
Follow Up with Cale Villafuerte NP [address] ([address]) Appt Note: 5 mth follow up  
 [address] [facility] [id number]  
[address] [facility] [id number] Upcoming Health Maintenance Date Due Pneumococcal 19-64 Medium Risk (1 of 1 - PPSV23) 10/27/1988 DTaP/Tdap/Td series (1 - Tdap) 10/27/1990 Allergies as of 11/21/2017  Review Complete On: 11/21/2017 By: Janette Regalado LPN No Known Allergies Current Immunizations  Never Reviewed Name Date Influenza Vaccine (Quad) PF 10/23/2017  1:04 PM  
  
 Not reviewed this visit You Were Diagnosed With   
  
 Codes Comments COPD, severe ([facility]    -  Primary ICD-10-CM: J44.9 ICD-9-CM: 926 Vitals BP Pulse Temp Resp Height(growth percentile) Weight(growth percentile) (!) 170/100 (BP 1 Location: Left arm, BP Patient Position: Sitting) 91 98.7 °F (37.1 °C) (Oral) 16 5' 9\" (1.753 m) 168 lb (76.2 kg) SpO2 BMI Smoking Status 95% 24.81 kg/m2 Current Every Day Smoker Vitals History BMI and BSA Data Body Mass Index Body Surface Area  
 24.81 kg/m 2 1.93 m 2 Preferred Pharmacy Pharmacy Name Phone [facility] [address] [phone] Your Updated Medication List  
  
   
This list is accurate as of: 11/21/17  4:38 PM.  Always use your most recent med list.  
  
  
  
  
 acetaminophen 500 mg tablet Commonly known as:  TYLENOL Take  by mouth every six (6) hours as needed for Pain. albuterol 90 mcg/actuation inhaler Commonly known as:  PROVENTIL HFA, VENTOLIN HFA, PROAIR HFA Take 2 Puffs by inhalation every four (4) hours as needed for Wheezing. albuterol-ipratropium 2.5 mg-0.5 mg/3 ml Nebu Commonly known as:  DUO-NEB  
3 mL by Nebulization route every six (6) hours as needed. bismuth subsalicylate 130 PV/71 mL Susp Commonly known as:  PEPTO-BISMOL MAXIMUM STRENGTH Take 30 mL by mouth every six (6) hours as needed. fluticasone-salmeterol 500-50 mcg/dose diskus inhaler Commonly known as:  ADVAIR Take 1 Puff by inhalation two (2) times a day. sucralfate 100 mg/mL suspension Commonly known as:  Adah Dory Take 10 mL by mouth four (4) times daily. tiotropium 18 mcg inhalation capsule Commonly known as:  Theotis Muss Take 1 Cap by inhalation daily. Indications: BRONCHOSPASM PREVENTION WITH COPD  
  
 traMADol 50 mg tablet Commonly known as:  ULTRAM  
Take 1 Tab by mouth every six (6) hours as needed for Pain. Max Daily Amount: 200 mg. Indications: Pain  
  
 varenicline 1 mg tablet Commonly known as:  Teretha Bacca Take 1 Tab by mouth two (2) times a day. Start after completion of starter pack  
  
 zaleplon 10 mg capsule Commonly known as:  SONATA Take 1 Cap by mouth nightly. Max Daily Amount: 10 mg. Follow-up Instructions Return in about 8 weeks (around 1/16/2018). Patient Instructions Continue Advair 1 inhalation twice daily and remember to exhale fully before inhaling and also remember to wash mouth with water and spit it out after inhaling Continue Spiriva 1 inhalation daily and remember to exhale fully before inhaling Albuterol 2 inhalations every 4 hours as needed or by nebulizer every 4 hours as needed and if you require albuterol too often to control respiratory symptoms call the office for severe symptoms go to the emergency room Do everything you can to stop smoking including substituting hard candy and sugarless gum when you want to smoke and put the hard candy and sugarless gum in your pockets ashtrays automobile or any place she might consider smoking Introducing [facility] [place] [facility]! Mitesh Rosario introduces [employer] patient portal. Now you can access parts of your medical record, email your doctor's office, and request medication refills online. 1. In your internet browser, go to [email or web address] 2. Click on the First Time User? Click Here link in the Sign In box. You will see the New Member Sign Up page. 3. Enter your [employer] Access Code exactly as it appears below. You will not need to use this code after youve completed the sign-up process. If you do not sign up before the expiration date, you must request a new code. · [employer] Access Code: [email or web address] Expires: 1/21/2018 12:20 PM 
 
4. Enter the last four digits of your Social Security Number (xxxx) and Date of Birth (mm/dd/yyyy) as indicated and click Submit. You will be taken to the next sign-up page. 5. Create a [employer] ID. This will be your [employer] login ID and cannot be changed, so think of one that is secure and easy to remember. 6. Create a [employer] password. You can change your password at any time. 7. Enter your Password Reset Question and Answer. This can be used at a later time if you forget your password. 8. Enter your e-mail address. You will receive e-mail notification when new information is available in [address] 9. Click Sign Up. You can now view and download portions of your medical record. 10. Click the Download Summary menu link to download a portable copy of your medical information. If you have questions, please visit the Frequently Asked Questions section of the [employer] website. Remember, [employer] is NOT to be used for urgent needs. For medical emergencies, dial 911. Now available from your iPhone and Android! Please provide this summary of care documentation to your next provider. Your primary care clinician is listed as Teena Adam. If you have any questions after today's visit, please call [phone].

## 2017-11-21 NOTE — PATIENT INSTRUCTIONS
Continue Advair 1 inhalation twice daily and remember to exhale fully before inhaling and also remember to wash mouth with water and spit it out after inhaling  Continue Spiriva 1 inhalation daily and remember to exhale fully before inhaling  Albuterol 2 inhalations every 4 hours as needed or by nebulizer every 4 hours as needed and if you require albuterol too often to control respiratory symptoms call the office for severe symptoms go to the emergency room    Do everything you can to stop smoking including substituting hard candy and sugarless gum when you want to smoke and put the hard candy and sugarless gum in your pockets ashtrays automobile or any place she might consider smoking

## 2017-11-21 NOTE — PROGRESS NOTES
Chief Complaint   Patient presents with    COPD       Patient here for routine follow up visit, he states he is still having chest pain of 10 on a scale 0-10 and that this has been going on for months with no relief.

## 2017-11-21 NOTE — PROGRESS NOTES
[facility]  Pulmonary, Critical Care, and Sleep Medicine      Chief complaint:  COPD nicotine dependence    HPI:    Golden Shin.    is 49years old returns the office today for follow-up concerning COPD and nicotine dependence and relates he took prednisone as directed but developed a severe GI bleed which was evaluated at the hospital and is scheduled for an endoscopy and colonoscopy in approximately 3 weeks. The patient relates he is no longer bleeding but has abdominal bloating and discomfort and chest discomfort around the anterior parts of his lower ribs. He continues to cough and bringing up mucus brown in color. He denies coughing up blood. He denies leg swelling but has significant shortness of breath and uses his albuterol frequently and continues to take Advair and Spiriva. He continues to smoke but much less cigarettes of before but is still smoking 5 cigarettes a day      No Known Allergies  Current Outpatient Prescriptions   Medication Sig    acetaminophen (TYLENOL) 500 mg tablet Take  by mouth every six (6) hours as needed for Pain.  sucralfate (CARAFATE) 100 mg/mL suspension Take 10 mL by mouth four (4) times daily.  albuterol-ipratropium (DUO-NEB) 2.5 mg-0.5 mg/3 ml nebu 3 mL by Nebulization route every six (6) hours as needed.  varenicline (CHANTIX) 1 mg tablet Take 1 Tab by mouth two (2) times a day. Start after completion of starter pack    fluticasone-salmeterol (ADVAIR) 500-50 mcg/dose diskus inhaler Take 1 Puff by inhalation two (2) times a day.  albuterol (PROVENTIL HFA, VENTOLIN HFA, PROAIR HFA) 90 mcg/actuation inhaler Take 2 Puffs by inhalation every four (4) hours as needed for Wheezing.  tiotropium (SPIRIVA) 18 mcg inhalation capsule Take 1 Cap by inhalation daily. Indications: BRONCHOSPASM PREVENTION WITH COPD    bismuth subsalicylate (PEPTO-BISMOL MAXIMUM STRENGTH) 525 mg/15 mL susp Take 30 mL by mouth every six (6) hours as needed.  traMADol (ULTRAM) 50 mg tablet Take 1 Tab by mouth every six (6) hours as needed for Pain. Max Daily Amount: 200 mg. Indications: Pain    zaleplon (SONATA) 10 mg capsule Take 1 Cap by mouth nightly. Max Daily Amount: 10 mg. No current facility-administered medications for this visit. Past Medical History:   Diagnosis Date    Chronic obstructive pulmonary disease ([facility] 08/03/2017    PFTS 8/3/17    COPD, severe ([facility]     Emphysema/COPD ([facility]     History of stomach ulcers     Positive urine drug screen 01/2016    opiates and THC    Upper GI bleed      History reviewed. No pertinent surgical history. Social History     Social History    Marital status:      Spouse name: N/A    Number of children: N/A    Years of education: N/A     Occupational History    Not on file.      Social History Main Topics    Smoking status: Current Every Day Smoker     Packs/day: 0.50     Years: 25.00     Types: Cigarettes     Start date: 5/28/1984    Smokeless tobacco: Never Used      Comment: PATIENT STATES HE IS DOWN TO 5 CIGS A DAY    Alcohol use No      Comment: occassionally    Drug use: No    Sexual activity: Yes     Partners: Female     Other Topics Concern     Service No    Blood Transfusions No    Caffeine Concern Yes    Occupational Exposure No    Hobby Hazards No    Sleep Concern Yes     occas    Stress Concern No    Weight Concern No    Special Diet No    Back Care Yes    Exercise No    Bike Helmet Yes    Seat Belt No     occas     Social History Narrative     Family History   Problem Relation Age of Onset    Hypertension Mother     Heart Disease Mother     Diabetes Mother     Hypertension Father     Heart Disease Father     Cancer Father      lymphnodes    Diabetes Father        Review of systems:  He denies fever chills and reports poor appetite and weight gain    Physical Exam:  Visit Vitals    BP (!) 170/100 (BP 1 Location: Left arm, BP Patient Position: Sitting)  Pulse 91    Temp 98.7 °F (37.1 °C) (Oral)    Resp 16    Ht 5' 9\" (1.753 m)    Wt 76.2 kg (168 lb)    SpO2 95%  Comment: [email or web address]    BMI 24.81 kg/m2       Well-developed well-nourished  HEENT: WNL  Lymph node exam: Supraclavicular cervical lymph nodes negative  Chest: Equal symmetrical expansion no dullness bilateral expiratory rhonchi and a few scattered wheezes no rales no rubs  Heart: Regular rhythm no gallop no murmur no JVD no peripheral edema  Extremities: No cyanosis clubbing or calf tenderness  Neurological: Alert and oriented    Labs:    O2 sat room air at rest 95%    Impression:     Exacerbation mild for COPD but unable to treat with steroids this time because of history of recent bleeding ulcer  Continued cigarette smoking which is continuing to perpetuate his exacerbation of COPD    Plan:     The patient again was advised on the importance of stopping smoking given a modest strategy and asked to continue his Advair Spiriva and albuterol as needed  Follow-up in 8 weeks or sooner if needed    Jasvir Orozco MD , [facility]    CC: Jose Roberto Krishnamurthy NP     [address] [facility]  [place], [address] [address]     P: [phone]     F: [phone]

## 2017-12-09 ENCOUNTER — ANESTHESIA EVENT (OUTPATIENT)
Dept: ENDOSCOPY | Age: 47
End: 2017-12-09
Payer: SUBSIDIZED

## 2017-12-11 ENCOUNTER — HOSPITAL ENCOUNTER (OUTPATIENT)
Age: 47
Setting detail: OUTPATIENT SURGERY
Discharge: HOME | End: 2017-12-11
Attending: INTERNAL MEDICINE | Admitting: INTERNAL MEDICINE
Payer: SUBSIDIZED

## 2017-12-11 ENCOUNTER — ANESTHESIA (OUTPATIENT)
Dept: ENDOSCOPY | Age: 47
End: 2017-12-11
Payer: SUBSIDIZED

## 2017-12-11 VITALS
SYSTOLIC BLOOD PRESSURE: 147 MMHG | BODY MASS INDEX: 25.33 KG/M2 | DIASTOLIC BLOOD PRESSURE: 91 MMHG | HEART RATE: 74 BPM | TEMPERATURE: 97.7 F | WEIGHT: 171 LBS | RESPIRATION RATE: 17 BRPM | HEIGHT: 69 IN | OXYGEN SATURATION: 99 %

## 2017-12-11 LAB
AMPHET UR QL SCN: NEGATIVE
BARBITURATES UR QL SCN: NEGATIVE
BENZODIAZ UR QL: NEGATIVE
CANNABINOIDS UR QL SCN: NEGATIVE
COCAINE UR QL SCN: NEGATIVE
HDSCOM,HDSCOM: NORMAL
METHADONE UR QL: NEGATIVE
OPIATES UR QL: NEGATIVE
PCP UR QL: NEGATIVE

## 2017-12-11 PROCEDURE — 74011250636 HC RX REV CODE- 250/636: Performed by: ANESTHESIOLOGY

## 2017-12-11 PROCEDURE — 80307 DRUG TEST PRSMV CHEM ANLYZR: CPT | Performed by: INTERNAL MEDICINE

## 2017-12-11 PROCEDURE — 76060000031 HC ANESTHESIA FIRST 0.5 HR: Performed by: INTERNAL MEDICINE

## 2017-12-11 PROCEDURE — 76040000019: Performed by: INTERNAL MEDICINE

## 2017-12-11 PROCEDURE — 74011250636 HC RX REV CODE- 250/636

## 2017-12-11 PROCEDURE — 77030008565 HC TBNG SUC IRR ERBE -B: Performed by: INTERNAL MEDICINE

## 2017-12-11 PROCEDURE — 74011250637 HC RX REV CODE- 250/637: Performed by: INTERNAL MEDICINE

## 2017-12-11 PROCEDURE — 77030018846 HC SOL IRR STRL H20 ICUM -A: Performed by: INTERNAL MEDICINE

## 2017-12-11 PROCEDURE — 74011000250 HC RX REV CODE- 250: Performed by: ANESTHESIOLOGY

## 2017-12-11 RX ORDER — SODIUM CHLORIDE, SODIUM LACTATE, POTASSIUM CHLORIDE, CALCIUM CHLORIDE 600; 310; 30; 20 MG/100ML; MG/100ML; MG/100ML; MG/100ML
75 INJECTION, SOLUTION INTRAVENOUS CONTINUOUS
Status: DISCONTINUED | OUTPATIENT
Start: 2017-12-11 | End: 2017-12-11 | Stop reason: HOSPADM

## 2017-12-11 RX ORDER — INSULIN LISPRO 100 [IU]/ML
INJECTION, SOLUTION INTRAVENOUS; SUBCUTANEOUS ONCE
Status: DISCONTINUED | OUTPATIENT
Start: 2017-12-11 | End: 2017-12-11 | Stop reason: HOSPADM

## 2017-12-11 RX ORDER — SODIUM CHLORIDE, SODIUM LACTATE, POTASSIUM CHLORIDE, CALCIUM CHLORIDE 600; 310; 30; 20 MG/100ML; MG/100ML; MG/100ML; MG/100ML
INJECTION, SOLUTION INTRAVENOUS
Status: DISCONTINUED | OUTPATIENT
Start: 2017-12-11 | End: 2017-12-11 | Stop reason: HOSPADM

## 2017-12-11 RX ORDER — FENTANYL CITRATE 50 UG/ML
INJECTION, SOLUTION INTRAMUSCULAR; INTRAVENOUS AS NEEDED
Status: DISCONTINUED | OUTPATIENT
Start: 2017-12-11 | End: 2017-12-11 | Stop reason: HOSPADM

## 2017-12-11 RX ORDER — FENTANYL CITRATE 50 UG/ML
50 INJECTION, SOLUTION INTRAMUSCULAR; INTRAVENOUS AS NEEDED
Status: DISCONTINUED | OUTPATIENT
Start: 2017-12-11 | End: 2017-12-11 | Stop reason: HOSPADM

## 2017-12-11 RX ORDER — NALBUPHINE HYDROCHLORIDE 10 MG/ML
5 INJECTION, SOLUTION INTRAMUSCULAR; INTRAVENOUS; SUBCUTANEOUS
Status: DISCONTINUED | OUTPATIENT
Start: 2017-12-11 | End: 2017-12-11 | Stop reason: HOSPADM

## 2017-12-11 RX ORDER — DEXTROMETHORPHAN/PSEUDOEPHED 2.5-7.5/.8
DROPS ORAL AS NEEDED
Status: DISCONTINUED | OUTPATIENT
Start: 2017-12-11 | End: 2017-12-11 | Stop reason: HOSPADM

## 2017-12-11 RX ORDER — DEXTROSE 50 % IN WATER (D50W) INTRAVENOUS SYRINGE
25-50 AS NEEDED
Status: DISCONTINUED | OUTPATIENT
Start: 2017-12-11 | End: 2017-12-11 | Stop reason: HOSPADM

## 2017-12-11 RX ORDER — MAGNESIUM SULFATE 100 %
4 CRYSTALS MISCELLANEOUS AS NEEDED
Status: DISCONTINUED | OUTPATIENT
Start: 2017-12-11 | End: 2017-12-11 | Stop reason: HOSPADM

## 2017-12-11 RX ORDER — SODIUM CHLORIDE, SODIUM LACTATE, POTASSIUM CHLORIDE, CALCIUM CHLORIDE 600; 310; 30; 20 MG/100ML; MG/100ML; MG/100ML; MG/100ML
100 INJECTION, SOLUTION INTRAVENOUS CONTINUOUS
Status: DISCONTINUED | OUTPATIENT
Start: 2017-12-11 | End: 2017-12-11 | Stop reason: HOSPADM

## 2017-12-11 RX ORDER — SODIUM CHLORIDE 0.9 % (FLUSH) 0.9 %
5-10 SYRINGE (ML) INJECTION AS NEEDED
Status: DISCONTINUED | OUTPATIENT
Start: 2017-12-11 | End: 2017-12-11 | Stop reason: HOSPADM

## 2017-12-11 RX ADMIN — FENTANYL CITRATE 50 MCG: 50 INJECTION, SOLUTION INTRAMUSCULAR; INTRAVENOUS at 10:15

## 2017-12-11 RX ADMIN — SODIUM CHLORIDE, SODIUM LACTATE, POTASSIUM CHLORIDE, AND CALCIUM CHLORIDE 75 ML/HR: 600; 310; 30; 20 INJECTION, SOLUTION INTRAVENOUS at 09:26

## 2017-12-11 RX ADMIN — SODIUM CHLORIDE, SODIUM LACTATE, POTASSIUM CHLORIDE, CALCIUM CHLORIDE: 600; 310; 30; 20 INJECTION, SOLUTION INTRAVENOUS at 09:53

## 2017-12-11 RX ADMIN — FAMOTIDINE 20 MG: 10 INJECTION, SOLUTION INTRAVENOUS at 09:26

## 2017-12-11 NOTE — IP AVS SNAPSHOT
Marv Ido 
 
 
 [address] [facility] [address] 
[phone] Patient: Leatha Cranker Sr. MRN: [id number] :1970 About your hospitalization You were admitted on:  2017 You last received care in the:  [facility] PACU You were discharged on:  2017 Why you were hospitalized Your primary diagnosis was:  Not on File Things You Need To Do (next 8 weeks) Follow up with Sachin Vera NP Phone:  [phone] Where:  [address] [address] Follow up with Salena Alford MD in 6 month(s) Phone:  [phone] Where:  [address], [address] [facility] [address], [facility] [address] Discharge Orders None A check randy indicates which time of day the medication should be taken. My Medications TAKE these medications as instructed Instructions Each Dose to Equal  
 Morning Noon Evening Bedtime  
 acetaminophen 500 mg tablet Commonly known as:  TYLENOL Your last dose was: Your next dose is: Take  by mouth every six (6) hours as needed for Pain. albuterol 90 mcg/actuation inhaler Commonly known as:  PROVENTIL HFA, VENTOLIN HFA, PROAIR HFA Your last dose was: Your next dose is: Take 2 Puffs by inhalation every four (4) hours as needed for Wheezing. 2 Puff  
    
   
   
   
  
 albuterol-ipratropium 2.5 mg-0.5 mg/3 ml Nebu Commonly known as:  [place] Your last dose was: Your next dose is:    
   
   
 3 mL by Nebulization route every six (6) hours as needed. 3 mL  
    
   
   
   
  
 fluticasone-salmeterol 500-50 mcg/dose diskus inhaler Commonly known as:  ADVAIR Your last dose was: Your next dose is: Take 1 Puff by inhalation two (2) times a day. 1 Puff tiotropium 18 mcg inhalation capsule Commonly known as:  Ruffus Cypher Your last dose was: Your next dose is: Take 1 Cap by inhalation daily. Indications: BRONCHOSPASM PREVENTION WITH COPD  
 1 Cap  
    
   
   
   
  
 traMADol 50 mg tablet Commonly known as:  ULTRAM  
   
Your last dose was: Your next dose is: Take 1 Tab by mouth every six (6) hours as needed for Pain. Max Daily Amount: 200 mg. Indications: Pain 50 mg  
    
   
   
   
  
 varenicline 1 mg tablet Commonly known as:  Lorry Balzarine Your last dose was: Your next dose is: Take 1 Tab by mouth two (2) times a day. Start after completion of starter pack 1 mg Discharge Instructions Upper GI Endoscopy: What to Expect at [facility] Your Recovery After you have an endoscopy, you will stay at the hospital or clinic for 1 to 2 hours. This will allow the medicine to wear off. You will be able to go home after your doctor or nurse checks to make sure you are not having any problems. You may have to stay overnight if you had treatment during the test. You may have a sore throat for a day or two after the test. 
This care sheet gives you a general idea about what to expect after the test. 
How can you care for yourself at home? Activity · Rest as much as you need to after you go home. · You should be able to go back to your usual activities the day after the test. 
Diet · Follow your doctor's directions for eating after the test. 
· Drink plenty of fluids (unless your doctor has told you not to). Medications · If you have a sore throat the day after the test, use an over-the-counter spray to numb your throat. Follow-up care is a key part of your treatment and safety. Be sure to make and go to all appointments, and call your doctor if you are having problems.  It's also a good idea to know your test results and keep a list of the medicines you take. When should you call for help? Call 911 anytime you think you may need emergency care. For example, call if: 
? · You passed out (loses consciousness). ? · You have trouble breathing. ? · You pass maroon or bloody stools. ?Call your doctor now or seek immediate medical care if: 
? · You have pain that does not get better after your take pain medicine. ? · You have new or worse belly pain. ? · You have blood in your stools. ? · You are sick to your stomach and cannot keep fluids down. ? · You have a fever. ? · You cannot pass stools or gas. ? Watch closely for changes in your health, and be sure to contact your doctor if: 
? · Your throat still hurts after a day or two. ? · You do not get better as expected. Where can you learn more? Go to http://costa-jessica.info/. Enter [phone] in the search box to learn more about \"Upper GI Endoscopy: What to Expect at Home. \" Current as of: May 12, 2017 Content Version: 11.4 © 9606-9682 [employer]. Care instructions adapted under license by [employer] (which disclaims liability or warranty for this information). If you have questions about a medical condition or this instruction, always ask your healthcare professional. [address] any warranty or liability for your use of this information. DISCHARGE SUMMARY from Nurse PATIENT INSTRUCTIONS: 
 
 
F-face looks uneven A-arms unable to move or move unevenly S-speech slurred or non-existent T-time-call 911 as soon as signs and symptoms begin-DO NOT go Back to bed or wait to see if you get better-TIME IS BRAIN. Warning Signs of HEART ATTACK Call 911 if you have these symptoms: 
? Chest discomfort.  Most heart attacks involve discomfort in the center of the chest that lasts more than a few minutes, or that goes away and comes back. It can feel like uncomfortable pressure, squeezing, fullness, or pain. ? Discomfort in other areas of the upper body. Symptoms can include pain or discomfort in one or both arms, the back, neck, jaw, or stomach. ? Shortness of breath with or without chest discomfort. ? Other signs may include breaking out in a cold sweat, nausea, or lightheadedness. Don't wait more than five minutes to call [address]! Fast action can save your life. Calling 911 is almost always the fastest way to get lifesaving treatment. Emergency Medical Services staff can begin treatment when they arrive  up to an hour sooner than if someone gets to the hospital by car. The discharge information has been reviewed with the patient and spouse. The patient and spouse verbalized understanding. Discharge medications reviewed with the patient and spouse and appropriate educational materials and side effects teaching were provided. ___________________________________________________________________________________________________________________________________ Introducing [facility]! Ruma Dang introduces [employer] patient portal. Now you can access parts of your medical record, email your doctor's office, and request medication refills online. 1. In your internet browser, go to [email or web address] 2. Click on the First Time User? Click Here link in the Sign In box. You will see the New Member Sign Up page. 3. Enter your [employer] Access Code exactly as it appears below. You will not need to use this code after youve completed the sign-up process. If you do not sign up before the expiration date, you must request a new code. · [employer] Access Code: [email or web address] Expires: 1/21/2018 12:20 PM 
 
4.  Enter the last four digits of your Social Security Number (xxxx) and Date of Birth (mm/dd/yyyy) as indicated and click Submit. You will be taken to the next sign-up page. 5. Create a Ziffi ID. This will be your [employer] login ID and cannot be changed, so think of one that is secure and easy to remember. 6. Create a [employer] password. You can change your password at any time. 7. Enter your Password Reset Question and Answer. This can be used at a later time if you forget your password. 8. Enter your e-mail address. You will receive e-mail notification when new information is available in [address] 9. Click Sign Up. You can now view and download portions of your medical record. 10. Click the Download Summary menu link to download a portable copy of your medical information. If you have questions, please visit the Frequently Asked Questions section of the [employer] website. Remember, [employer] is NOT to be used for urgent needs. For medical emergencies, dial 911. Now available from your iPhone and Android! Providers Seen During Your Hospitalization Provider Specialty Primary office phone Tavo Jimenez MD Gastroenterology [phone] Your Primary Care Physician (PCP) Primary Care Physician Office Phone Office Fax Frida Arroyo [phone] You are allergic to the following No active allergies Recent Documentation Height Weight BMI Smoking Status 1.753 m 77.6 kg 25.25 kg/m2 Current Every Day Smoker Emergency Contacts Name Discharge Info Relation Home Work Mobile Lazara Wakefield DISCHARGE CAREGIVER [3] Spouse [3] [phone] Patient Belongings The following personal items are in your possession at time of discharge: 
  Dental Appliances: None  Visual Aid: None Please provide this summary of care documentation to your next provider.  
  
  
 
  
Signatures-by signing, you are acknowledging that this After Visit Summary has been reviewed with you and you have received a copy. Patient Signature:  ____________________________________________________________ Date:  ____________________________________________________________  
  
Suzon Mems Provider Signature:  ____________________________________________________________ Date:  ____________________________________________________________

## 2017-12-11 NOTE — H&P
Chief Complaint: Hematemesis. History of present illness: Heartburn epigastric pain bloating. On PPI now. PMH:   Past Medical History:   Diagnosis Date    Chronic obstructive pulmonary disease ([facility] 08/03/2017    PFTS 8/3/17    COPD, severe ([facility]     Emphysema/COPD ([facility]     History of stomach ulcers     Positive urine drug screen 01/2016    opiates and THC    Upper GI bleed      Allergies: No Known Allergies  Medications:   Current Facility-Administered Medications:     lactated Ringers infusion, 75 mL/hr, IntraVENous, CONTINUOUS, Andriy Freedman MD, Last Rate: 75 mL/hr at 12/11/17 0926, 75 mL/hr at 12/11/17 1493  FH:   Family History   Problem Relation Age of Onset    Hypertension Mother     Heart Disease Mother     Diabetes Mother     Hypertension Father     Heart Disease Father     Cancer Father      lymphnodes    Diabetes Father      Social:   Social History     Social History    Marital status:      Spouse name: N/A    Number of children: N/A    Years of education: N/A     Social History Main Topics    Smoking status: Current Every Day Smoker     Packs/day: 0.50     Years: 25.00     Types: Cigarettes     Start date: 5/28/1984    Smokeless tobacco: Never Used    Alcohol use No    Drug use: Yes     Special: Marijuana      Comment: Hx of Marijuana use    Sexual activity: Yes     Partners: Female     Other Topics Concern     Service No    Blood Transfusions No    Caffeine Concern Yes    Occupational Exposure No    Hobby Hazards No    Sleep Concern Yes     occas    Stress Concern No    Weight Concern No    Special Diet No    Back Care Yes    Exercise No    Bike Helmet Yes    Seat Belt No     occas     Social History Narrative     Surgical H: History reviewed. No pertinent surgical history.     ROS: positive for reflux symptoms    Physical Exam:   Visit Vitals    BP (!) 150/91    Pulse 70    Temp 98.1 °F (36.7 °C)    Resp 18    Ht 5' 9\" (1.753 m)    Wt 77.6 kg (171 lb)    SpO2 96%    BMI 25.25 kg/m2     General appearance: alert, no distress  Eyes: pupils equal and reactive, extraocular eye movements intact  Nodes: No gross adenopathy in neck. Skin: no spider angiomata, jaundice, palmar erythema   Respiratory: clear to auscultation bilaterally  Cardiovascular: regular heart rate, no murmurs, no JVD, normal rate and regular rhythm  Abdomen: soft, non-tender, liver not enlarged, spleen not palpable, no obvious ascites  Extremities: no muscle wasting, no gross arthritic changes  Neurologic: alert and oriented, cranial nerves grossly intact, no asterixis    Labs:   Recent Results (from the past 24 hour(s))   DRUG SCREEN, URINE    Collection Time: 12/11/17  9:11 AM   Result Value Ref Range    BENZODIAZEPINES NEGATIVE  NEG      BARBITURATES NEGATIVE  NEG      THC (TH-CANNABINOL) NEGATIVE  NEG      OPIATES NEGATIVE  NEG      PCP(PHENCYCLIDINE) NEGATIVE  NEG      COCAINE NEGATIVE  NEG      AMPHETAMINES NEGATIVE  NEG      METHADONE NEGATIVE  NEG      HDSCOM (NOTE)          Imp/ Plan: Will proceed with egd as planned. Risk benefits alternative including but not limited to infection, bleeding, perforation of viscous, allergic reaction and resultant morbidity and mortality was discussed. Chance of missing a significant lesion due to various reasons were discussed.       Maria Luz Bower MD  [facility], [facility]

## 2017-12-11 NOTE — ANESTHESIA POSTPROCEDURE EVALUATION
Post-Anesthesia Evaluation and Assessment    Patient: Mainor Covington Sr. MRN: [id number]  SSN: [id number]    YOB: 1970  Age: 49 y.o. Sex: male      Data from PACU flowsheet    Cardiovascular Function/Vital Signs  Visit Vitals    BP (!) 148/97    Pulse 74    Temp 36.7 °C (98 °F)    Resp 20    Ht 5' 9\" (1.753 m)    Wt 77.6 kg (171 lb)    SpO2 99%    BMI 25.25 kg/m2       Patient is status post MAC anesthesia for Procedure(s):  ENDOSCOPY. Nausea/Vomiting: controlled    Postoperative hydration reviewed and adequate. Pain:  Pain Scale 1: Numeric (0 - 10) (12/11/17 1059)  Pain Intensity 1: 0 (12/11/17 1059)   Managed      Mental Status and Level of Consciousness: Alert and oriented     Pulmonary Status:   O2 Device: Room air (12/11/17 1049)   Adequate oxygenation and airway patent    Complications related to anesthesia: None    Post-anesthesia assessment completed.  No concerns    Signed By: Gavin Miller MD     December 11, 2017

## 2017-12-11 NOTE — PROCEDURES
Emigdio  [facility], [address]      Brief Procedure Note    Loulou Rodriguez Sr.  1970  [id number]    Date of Procedure: 12/11/2017    Preoperative diagnosis: Abdominal discomfort, epigastric [R10.13]  Abdominal bloating [R14.0]  Brash [R12]  Abdominal pain with vomiting [R10.9, R11.10]  Abnormal feces [R19.5]    Postoperative diagnosis:  Grade 1 espohagitis    Type of Anesthesia: MAC (monitered anesthesia care)    Description of Findings: same as post op dx    Procedure: Procedure(s):  ENDOSCOPY    :  Dr. George Schwab MD    Assistant(s): [unfilled]    Type of Anesthesia:MAC     EBL:None    Specimens: * No specimens in log *    Findings: See printed and scanned procedure note    Complications: None    Dr. George Schwab MD  12/11/2017  10:54 AM

## 2017-12-11 NOTE — DISCHARGE INSTRUCTIONS
Upper GI Endoscopy: What to Expect at [address] [place] [address]  After you have an endoscopy, you will stay at the hospital or clinic for 1 to 2 hours. This will allow the medicine to wear off. You will be able to go home after your doctor or nurse checks to make sure you are not having any problems. You may have to stay overnight if you had treatment during the test. You may have a sore throat for a day or two after the test.  This care sheet gives you a general idea about what to expect after the test.  How can you care for yourself at home? Activity  · Rest as much as you need to after you go home. · You should be able to go back to your usual activities the day after the test.  Diet  · Follow your doctor's directions for eating after the test.  · Drink plenty of fluids (unless your doctor has told you not to). Medications  · If you have a sore throat the day after the test, use an over-the-counter spray to numb your throat. Follow-up care is a key part of your treatment and safety. Be sure to make and go to all appointments, and call your doctor if you are having problems. It's also a good idea to know your test results and keep a list of the medicines you take. When should you call for help? Call 911 anytime you think you may need emergency care. For example, call if:  ? · You passed out (loses consciousness). ? · You have trouble breathing. ? · You pass maroon or bloody stools. ?Call your doctor now or seek immediate medical care if:  ? · You have pain that does not get better after your take pain medicine. ? · You have new or worse belly pain. ? · You have blood in your stools. ? · You are sick to your stomach and cannot keep fluids down. ? · You have a fever. ? · You cannot pass stools or gas. ? Watch closely for changes in your health, and be sure to contact your doctor if:  ? · Your throat still hurts after a day or two. ? · You do not get better as expected.    Where can you learn more?  Go to http://costa-jessica.info/. Enter [phone] in the search box to learn more about \"Upper GI Endoscopy: What to Expect at Home. \"  Current as of: May 12, 2017  Content Version: 11.4  © 7574-1472 [employer]. Care instructions adapted under license by [employer] (which disclaims liability or warranty for this information). If you have questions about a medical condition or this instruction, always ask your healthcare professional. [place] [address] any warranty or liability for your use of this information. DISCHARGE SUMMARY from Nurse    PATIENT INSTRUCTIONS:    After general anesthesia or intravenous sedation, for 24 hours or while taking prescription Narcotics:  · Limit your activities  · Do not drive and operate hazardous machinery  · Do not make important personal or business decisions  · Do  not drink alcoholic beverages  · If you have not urinated within 8 hours after discharge, please contact your surgeon on call. Report the following to your surgeon:  · Excessive pain, swelling, redness or odor of or around the surgical area  · Temperature over 100.5  · Nausea and vomiting lasting longer than 4 hours or if unable to take medications  · Any signs of decreased circulation or nerve impairment to extremity: change in color, persistent  numbness, tingling, coldness or increase pain  · Any questions    What to do at Home:  Recommended activity: Activity as tolerated and no driving for today. *  Please give a list of your current medications to your Primary Care Provider. *  Please update this list whenever your medications are discontinued, doses are      changed, or new medications (including over-the-counter products) are added. *  Please carry medication information at all times in case of emergency situations.     These are general instructions for a healthy lifestyle:    No smoking/ No tobacco products/ Avoid exposure to second hand smoke  Surgeon General's Warning:  Quitting smoking now greatly reduces serious risk to your health. Obesity, smoking, and sedentary lifestyle greatly increases your risk for illness    A healthy diet, regular physical exercise & weight monitoring are important for maintaining a healthy lifestyle    You may be retaining fluid if you have a history of heart failure or if you experience any of the following symptoms:  Weight gain of 3 pounds or more overnight or 5 pounds in a week, increased swelling in our hands or feet or shortness of breath while lying flat in bed. Please call your doctor as soon as you notice any of these symptoms; do not wait until your next office visit. Recognize signs and symptoms of STROKE:    F-face looks uneven    A-arms unable to move or move unevenly    S-speech slurred or non-existent    T-time-call 911 as soon as signs and symptoms begin-DO NOT go       Back to bed or wait to see if you get better-TIME IS BRAIN. Warning Signs of HEART ATTACK     Call 911 if you have these symptoms:   Chest discomfort. Most heart attacks involve discomfort in the center of the chest that lasts more than a few minutes, or that goes away and comes back. It can feel like uncomfortable pressure, squeezing, fullness, or pain.  Discomfort in other areas of the upper body. Symptoms can include pain or discomfort in one or both arms, the back, neck, jaw, or stomach.  Shortness of breath with or without chest discomfort.  Other signs may include breaking out in a cold sweat, nausea, or lightheadedness. Don't wait more than five minutes to call 911 - MINUTES MATTER! Fast action can save your life. Calling 911 is almost always the fastest way to get lifesaving treatment. Emergency Medical Services staff can begin treatment when they arrive -- up to an hour sooner than if someone gets to the hospital by car. The discharge information has been reviewed with the patient and spouse.   The patient and spouse verbalized understanding. Discharge medications reviewed with the patient and spouse and appropriate educational materials and side effects teaching were provided.   ___________________________________________________________________________________________________________________________________

## 2017-12-17 ENCOUNTER — APPOINTMENT (OUTPATIENT)
Dept: GENERAL RADIOLOGY | Age: 47
End: 2017-12-17
Attending: EMERGENCY MEDICINE
Payer: SUBSIDIZED

## 2017-12-17 ENCOUNTER — HOSPITAL ENCOUNTER (EMERGENCY)
Age: 47
Discharge: HOME | End: 2017-12-18
Attending: EMERGENCY MEDICINE | Admitting: EMERGENCY MEDICINE
Payer: SUBSIDIZED

## 2017-12-17 VITALS
HEART RATE: 87 BPM | OXYGEN SATURATION: 94 % | TEMPERATURE: 98 F | HEIGHT: 69 IN | WEIGHT: 170 LBS | BODY MASS INDEX: 25.18 KG/M2 | RESPIRATION RATE: 22 BRPM | DIASTOLIC BLOOD PRESSURE: 87 MMHG | SYSTOLIC BLOOD PRESSURE: 133 MMHG

## 2017-12-17 DIAGNOSIS — G89.29 CHRONIC CHEST PAIN: ICD-10-CM

## 2017-12-17 DIAGNOSIS — J44.1 ACUTE EXACERBATION OF CHRONIC OBSTRUCTIVE PULMONARY DISEASE (COPD) (HCC): Primary | ICD-10-CM

## 2017-12-17 DIAGNOSIS — R25.1 TREMULOUSNESS: ICD-10-CM

## 2017-12-17 DIAGNOSIS — T50.905A MEDICATION SIDE EFFECT, INITIAL ENCOUNTER: ICD-10-CM

## 2017-12-17 DIAGNOSIS — R07.9 CHRONIC CHEST PAIN: ICD-10-CM

## 2017-12-17 LAB
ALBUMIN SERPL-MCNC: 3.5 G/DL (ref 3.4–5)
ALBUMIN/GLOB SERPL: 1.1 {RATIO} (ref 0.8–1.7)
ALP SERPL-CCNC: 70 U/L (ref 45–117)
ALT SERPL-CCNC: 28 U/L (ref 16–61)
ANION GAP SERPL CALC-SCNC: 5 MMOL/L (ref 3–18)
AST SERPL-CCNC: 19 U/L (ref 15–37)
BASOPHILS # BLD: 0 K/UL (ref 0–0.1)
BASOPHILS NFR BLD: 0 % (ref 0–2)
BILIRUB SERPL-MCNC: 0.5 MG/DL (ref 0.2–1)
BNP SERPL-MCNC: 96 PG/ML (ref 0–450)
BUN SERPL-MCNC: 10 MG/DL (ref 7–18)
BUN/CREAT SERPL: 14 (ref 12–20)
CALCIUM SERPL-MCNC: 8.2 MG/DL (ref 8.5–10.1)
CHLORIDE SERPL-SCNC: 108 MMOL/L (ref 100–108)
CK MB CFR SERPL CALC: 1.5 % (ref 0–4)
CK MB SERPL-MCNC: 1.4 NG/ML (ref 5–25)
CK SERPL-CCNC: 95 U/L (ref 39–308)
CO2 SERPL-SCNC: 27 MMOL/L (ref 21–32)
CREAT SERPL-MCNC: 0.74 MG/DL (ref 0.6–1.3)
DIFFERENTIAL METHOD BLD: ABNORMAL
EOSINOPHIL # BLD: 0.1 K/UL (ref 0–0.4)
EOSINOPHIL NFR BLD: 1 % (ref 0–5)
ERYTHROCYTE [DISTWIDTH] IN BLOOD BY AUTOMATED COUNT: 12.9 % (ref 11.6–14.5)
FLUAV AG NPH QL IA: NEGATIVE
FLUBV AG NOSE QL IA: NEGATIVE
GLOBULIN SER CALC-MCNC: 3.1 G/DL (ref 2–4)
GLUCOSE SERPL-MCNC: 100 MG/DL (ref 74–99)
HCT VFR BLD AUTO: 44.1 % (ref 36–48)
HGB BLD-MCNC: 16.1 G/DL (ref 13–16)
LYMPHOCYTES # BLD: 1.7 K/UL (ref 0.9–3.6)
LYMPHOCYTES NFR BLD: 27 % (ref 21–52)
MAGNESIUM SERPL-MCNC: 2.1 MG/DL (ref 1.6–2.6)
MCH RBC QN AUTO: 32.9 PG (ref 24–34)
MCHC RBC AUTO-ENTMCNC: 36.5 G/DL (ref 31–37)
MCV RBC AUTO: 90 FL (ref 74–97)
MONOCYTES # BLD: 0.4 K/UL (ref 0.05–1.2)
MONOCYTES NFR BLD: 6 % (ref 3–10)
NEUTS SEG # BLD: 4.1 K/UL (ref 1.8–8)
NEUTS SEG NFR BLD: 66 % (ref 40–73)
PLATELET # BLD AUTO: 194 K/UL (ref 135–420)
PMV BLD AUTO: 10.8 FL (ref 9.2–11.8)
POTASSIUM SERPL-SCNC: 3.7 MMOL/L (ref 3.5–5.5)
PROT SERPL-MCNC: 6.6 G/DL (ref 6.4–8.2)
RBC # BLD AUTO: 4.9 M/UL (ref 4.7–5.5)
SODIUM SERPL-SCNC: 140 MMOL/L (ref 136–145)
TROPONIN I SERPL-MCNC: <0.02 NG/ML (ref 0–0.04)
TSH SERPL DL<=0.05 MIU/L-ACNC: 2.21 UIU/ML (ref 0.36–3.74)
WBC # BLD AUTO: 6.3 K/UL (ref 4.6–13.2)

## 2017-12-17 PROCEDURE — 74011250636 HC RX REV CODE- 250/636: Performed by: EMERGENCY MEDICINE

## 2017-12-17 PROCEDURE — 83880 ASSAY OF NATRIURETIC PEPTIDE: CPT | Performed by: EMERGENCY MEDICINE

## 2017-12-17 PROCEDURE — 83735 ASSAY OF MAGNESIUM: CPT | Performed by: EMERGENCY MEDICINE

## 2017-12-17 PROCEDURE — 84443 ASSAY THYROID STIM HORMONE: CPT | Performed by: EMERGENCY MEDICINE

## 2017-12-17 PROCEDURE — 99284 EMERGENCY DEPT VISIT MOD MDM: CPT

## 2017-12-17 PROCEDURE — 94640 AIRWAY INHALATION TREATMENT: CPT

## 2017-12-17 PROCEDURE — 85025 COMPLETE CBC W/AUTO DIFF WBC: CPT | Performed by: EMERGENCY MEDICINE

## 2017-12-17 PROCEDURE — 87804 INFLUENZA ASSAY W/OPTIC: CPT | Performed by: EMERGENCY MEDICINE

## 2017-12-17 PROCEDURE — 71010 XR CHEST PORT: CPT

## 2017-12-17 PROCEDURE — 80053 COMPREHEN METABOLIC PANEL: CPT | Performed by: EMERGENCY MEDICINE

## 2017-12-17 PROCEDURE — 96361 HYDRATE IV INFUSION ADD-ON: CPT

## 2017-12-17 PROCEDURE — 96365 THER/PROPH/DIAG IV INF INIT: CPT

## 2017-12-17 PROCEDURE — 77030029684 HC NEB SM VOL KT MONA -A

## 2017-12-17 PROCEDURE — 74011000250 HC RX REV CODE- 250: Performed by: EMERGENCY MEDICINE

## 2017-12-17 PROCEDURE — 82550 ASSAY OF CK (CPK): CPT | Performed by: EMERGENCY MEDICINE

## 2017-12-17 PROCEDURE — 93005 ELECTROCARDIOGRAM TRACING: CPT

## 2017-12-17 PROCEDURE — 96375 TX/PRO/DX INJ NEW DRUG ADDON: CPT

## 2017-12-17 RX ORDER — IPRATROPIUM BROMIDE AND ALBUTEROL SULFATE 2.5; .5 MG/3ML; MG/3ML
3 SOLUTION RESPIRATORY (INHALATION)
Status: COMPLETED | OUTPATIENT
Start: 2017-12-17 | End: 2017-12-17

## 2017-12-17 RX ORDER — MAGNESIUM SULFATE HEPTAHYDRATE 40 MG/ML
2 INJECTION, SOLUTION INTRAVENOUS
Status: COMPLETED | OUTPATIENT
Start: 2017-12-17 | End: 2017-12-17

## 2017-12-17 RX ORDER — LORAZEPAM 2 MG/ML
1 INJECTION INTRAMUSCULAR
Status: COMPLETED | OUTPATIENT
Start: 2017-12-17 | End: 2017-12-17

## 2017-12-17 RX ADMIN — IPRATROPIUM BROMIDE AND ALBUTEROL SULFATE 3 ML: .5; 3 SOLUTION RESPIRATORY (INHALATION) at 22:17

## 2017-12-17 RX ADMIN — SODIUM CHLORIDE 1000 ML: 900 INJECTION, SOLUTION INTRAVENOUS at 22:08

## 2017-12-17 RX ADMIN — MAGNESIUM SULFATE IN WATER 2 G: 40 INJECTION, SOLUTION INTRAVENOUS at 22:08

## 2017-12-17 RX ADMIN — LORAZEPAM 1 MG: 2 INJECTION, SOLUTION INTRAMUSCULAR; INTRAVENOUS at 22:17

## 2017-12-18 ENCOUNTER — HOSPITAL ENCOUNTER (INPATIENT)
Age: 47
LOS: 5 days | Discharge: HOME HEALTH | DRG: 189 | End: 2017-12-23
Attending: EMERGENCY MEDICINE | Admitting: INTERNAL MEDICINE
Payer: SUBSIDIZED

## 2017-12-18 ENCOUNTER — TELEPHONE (OUTPATIENT)
Dept: FAMILY MEDICINE CLINIC | Age: 47
End: 2017-12-18

## 2017-12-18 ENCOUNTER — APPOINTMENT (OUTPATIENT)
Dept: GENERAL RADIOLOGY | Age: 47
DRG: 189 | End: 2017-12-18
Attending: EMERGENCY MEDICINE
Payer: SUBSIDIZED

## 2017-12-18 ENCOUNTER — DOCUMENTATION ONLY (OUTPATIENT)
Dept: FAMILY MEDICINE CLINIC | Age: 47
End: 2017-12-18

## 2017-12-18 DIAGNOSIS — R06.03 ACUTE RESPIRATORY DISTRESS: ICD-10-CM

## 2017-12-18 DIAGNOSIS — R06.02 SHORTNESS OF BREATH: ICD-10-CM

## 2017-12-18 DIAGNOSIS — Z72.0 TOBACCO ABUSE: ICD-10-CM

## 2017-12-18 DIAGNOSIS — J44.9 COPD, SEVERE (HCC): ICD-10-CM

## 2017-12-18 DIAGNOSIS — R10.9 ABDOMINAL PAIN, UNSPECIFIED ABDOMINAL LOCATION: ICD-10-CM

## 2017-12-18 DIAGNOSIS — J44.9 CHRONIC OBSTRUCTIVE PULMONARY DISEASE, UNSPECIFIED COPD TYPE (HCC): ICD-10-CM

## 2017-12-18 DIAGNOSIS — F41.9 ANXIETY: ICD-10-CM

## 2017-12-18 DIAGNOSIS — J44.1 COPD WITH ACUTE EXACERBATION (HCC): Primary | ICD-10-CM

## 2017-12-18 PROBLEM — A41.9 SEPSIS (HCC): Status: ACTIVE | Noted: 2017-12-18

## 2017-12-18 LAB
ALBUMIN SERPL-MCNC: 4.1 G/DL (ref 3.4–5)
ALBUMIN/GLOB SERPL: 1.1 {RATIO} (ref 0.8–1.7)
ALP SERPL-CCNC: 78 U/L (ref 45–117)
ALT SERPL-CCNC: 30 U/L (ref 16–61)
ANION GAP SERPL CALC-SCNC: 9 MMOL/L (ref 3–18)
AST SERPL-CCNC: 14 U/L (ref 15–37)
ATRIAL RATE: 120 BPM
ATRIAL RATE: 82 BPM
BASOPHILS # BLD: 0 K/UL (ref 0–0.06)
BASOPHILS NFR BLD: 0 % (ref 0–3)
BILIRUB SERPL-MCNC: 0.8 MG/DL (ref 0.2–1)
BNP SERPL-MCNC: 119 PG/ML (ref 0–450)
BUN SERPL-MCNC: 11 MG/DL (ref 7–18)
BUN/CREAT SERPL: 11 (ref 12–20)
CALCIUM SERPL-MCNC: 8.9 MG/DL (ref 8.5–10.1)
CALCULATED P AXIS, ECG09: 56 DEGREES
CALCULATED P AXIS, ECG09: 63 DEGREES
CALCULATED R AXIS, ECG10: 52 DEGREES
CALCULATED R AXIS, ECG10: 67 DEGREES
CALCULATED T AXIS, ECG11: 31 DEGREES
CALCULATED T AXIS, ECG11: 41 DEGREES
CHLORIDE SERPL-SCNC: 105 MMOL/L (ref 100–108)
CO2 SERPL-SCNC: 25 MMOL/L (ref 21–32)
CREAT SERPL-MCNC: 1.02 MG/DL (ref 0.6–1.3)
D DIMER PPP FEU-MCNC: <0.27 UG/ML(FEU)
DIAGNOSIS, 93000: NORMAL
DIAGNOSIS, 93000: NORMAL
DIFFERENTIAL METHOD BLD: ABNORMAL
EOSINOPHIL # BLD: 0 K/UL (ref 0–0.4)
EOSINOPHIL NFR BLD: 0 % (ref 0–5)
ERYTHROCYTE [DISTWIDTH] IN BLOOD BY AUTOMATED COUNT: 13.3 % (ref 11.6–14.5)
GLOBULIN SER CALC-MCNC: 3.6 G/DL (ref 2–4)
GLUCOSE SERPL-MCNC: 153 MG/DL (ref 74–99)
HCT VFR BLD AUTO: 46.9 % (ref 36–48)
HGB BLD-MCNC: 16.8 G/DL (ref 13–16)
LACTATE BLD-SCNC: 4 MMOL/L (ref 0.4–2)
LYMPHOCYTES # BLD: 0.5 K/UL (ref 0.8–3.5)
LYMPHOCYTES NFR BLD: 4 % (ref 20–51)
MCH RBC QN AUTO: 32.9 PG (ref 24–34)
MCHC RBC AUTO-ENTMCNC: 35.8 G/DL (ref 31–37)
MCV RBC AUTO: 91.8 FL (ref 74–97)
MONOCYTES # BLD: 0 K/UL (ref 0–1)
MONOCYTES NFR BLD: 0 % (ref 2–9)
NEUTS SEG # BLD: 12.5 K/UL (ref 1.8–8)
NEUTS SEG NFR BLD: 96 % (ref 42–75)
P-R INTERVAL, ECG05: 144 MS
P-R INTERVAL, ECG05: 148 MS
PLATELET # BLD AUTO: 212 K/UL (ref 135–420)
PLATELET COMMENTS,PCOM: ABNORMAL
PMV BLD AUTO: 11.1 FL (ref 9.2–11.8)
POTASSIUM SERPL-SCNC: 3.9 MMOL/L (ref 3.5–5.5)
PROT SERPL-MCNC: 7.7 G/DL (ref 6.4–8.2)
Q-T INTERVAL, ECG07: 302 MS
Q-T INTERVAL, ECG07: 378 MS
QRS DURATION, ECG06: 68 MS
QRS DURATION, ECG06: 72 MS
QTC CALCULATION (BEZET), ECG08: 426 MS
QTC CALCULATION (BEZET), ECG08: 441 MS
RBC # BLD AUTO: 5.11 M/UL (ref 4.7–5.5)
RBC MORPH BLD: ABNORMAL
SODIUM SERPL-SCNC: 139 MMOL/L (ref 136–145)
TROPONIN I SERPL-MCNC: <0.02 NG/ML (ref 0–0.04)
VENTRICULAR RATE, ECG03: 120 BPM
VENTRICULAR RATE, ECG03: 82 BPM
WBC # BLD AUTO: 13 K/UL (ref 4.6–13.2)

## 2017-12-18 PROCEDURE — 93005 ELECTROCARDIOGRAM TRACING: CPT

## 2017-12-18 PROCEDURE — 77030029684 HC NEB SM VOL KT MONA -A

## 2017-12-18 PROCEDURE — 82803 BLOOD GASES ANY COMBINATION: CPT

## 2017-12-18 PROCEDURE — 74011000250 HC RX REV CODE- 250: Performed by: EMERGENCY MEDICINE

## 2017-12-18 PROCEDURE — 80053 COMPREHEN METABOLIC PANEL: CPT | Performed by: EMERGENCY MEDICINE

## 2017-12-18 PROCEDURE — 74011250636 HC RX REV CODE- 250/636: Performed by: EMERGENCY MEDICINE

## 2017-12-18 PROCEDURE — 83880 ASSAY OF NATRIURETIC PEPTIDE: CPT | Performed by: EMERGENCY MEDICINE

## 2017-12-18 PROCEDURE — 84484 ASSAY OF TROPONIN QUANT: CPT | Performed by: EMERGENCY MEDICINE

## 2017-12-18 PROCEDURE — 74011000250 HC RX REV CODE- 250: Performed by: INTERNAL MEDICINE

## 2017-12-18 PROCEDURE — 74011250637 HC RX REV CODE- 250/637: Performed by: HOSPITALIST

## 2017-12-18 PROCEDURE — 74011250636 HC RX REV CODE- 250/636: Performed by: INTERNAL MEDICINE

## 2017-12-18 PROCEDURE — 87040 BLOOD CULTURE FOR BACTERIA: CPT | Performed by: EMERGENCY MEDICINE

## 2017-12-18 PROCEDURE — 83605 ASSAY OF LACTIC ACID: CPT

## 2017-12-18 PROCEDURE — 96361 HYDRATE IV INFUSION ADD-ON: CPT

## 2017-12-18 PROCEDURE — 94640 AIRWAY INHALATION TREATMENT: CPT

## 2017-12-18 PROCEDURE — 74011250636 HC RX REV CODE- 250/636: Performed by: HOSPITALIST

## 2017-12-18 PROCEDURE — 96365 THER/PROPH/DIAG IV INF INIT: CPT

## 2017-12-18 PROCEDURE — 85379 FIBRIN DEGRADATION QUANT: CPT | Performed by: EMERGENCY MEDICINE

## 2017-12-18 PROCEDURE — 65270000029 HC RM PRIVATE

## 2017-12-18 PROCEDURE — 85025 COMPLETE CBC W/AUTO DIFF WBC: CPT | Performed by: EMERGENCY MEDICINE

## 2017-12-18 PROCEDURE — 99284 EMERGENCY DEPT VISIT MOD MDM: CPT

## 2017-12-18 PROCEDURE — 71010 XR CHEST SNGL V: CPT

## 2017-12-18 PROCEDURE — 36600 WITHDRAWAL OF ARTERIAL BLOOD: CPT

## 2017-12-18 PROCEDURE — 74011250637 HC RX REV CODE- 250/637: Performed by: INTERNAL MEDICINE

## 2017-12-18 RX ORDER — LEVOFLOXACIN 5 MG/ML
750 INJECTION, SOLUTION INTRAVENOUS EVERY 24 HOURS
Status: DISCONTINUED | OUTPATIENT
Start: 2017-12-18 | End: 2017-12-18 | Stop reason: SDUPTHER

## 2017-12-18 RX ORDER — IPRATROPIUM BROMIDE AND ALBUTEROL SULFATE 2.5; .5 MG/3ML; MG/3ML
3 SOLUTION RESPIRATORY (INHALATION)
Status: DISPENSED | OUTPATIENT
Start: 2017-12-18 | End: 2017-12-18

## 2017-12-18 RX ORDER — IBUPROFEN 200 MG
1 TABLET ORAL DAILY
Status: DISCONTINUED | OUTPATIENT
Start: 2017-12-19 | End: 2017-12-18

## 2017-12-18 RX ORDER — IPRATROPIUM BROMIDE AND ALBUTEROL SULFATE 2.5; .5 MG/3ML; MG/3ML
3 SOLUTION RESPIRATORY (INHALATION)
Status: DISCONTINUED | OUTPATIENT
Start: 2017-12-18 | End: 2017-12-19

## 2017-12-18 RX ORDER — SODIUM CHLORIDE 0.9 % (FLUSH) 0.9 %
5-10 SYRINGE (ML) INJECTION AS NEEDED
Status: DISCONTINUED | OUTPATIENT
Start: 2017-12-18 | End: 2017-12-23 | Stop reason: HOSPADM

## 2017-12-18 RX ORDER — HYDRALAZINE HYDROCHLORIDE 20 MG/ML
10 INJECTION INTRAMUSCULAR; INTRAVENOUS
Status: DISCONTINUED | OUTPATIENT
Start: 2017-12-18 | End: 2017-12-19

## 2017-12-18 RX ORDER — METRONIDAZOLE 500 MG/100ML
500 INJECTION, SOLUTION INTRAVENOUS EVERY 6 HOURS
Status: DISCONTINUED | OUTPATIENT
Start: 2017-12-18 | End: 2017-12-19

## 2017-12-18 RX ORDER — LORAZEPAM 2 MG/ML
1 INJECTION INTRAMUSCULAR ONCE
Status: COMPLETED | OUTPATIENT
Start: 2017-12-19 | End: 2017-12-18

## 2017-12-18 RX ORDER — LORAZEPAM 0.5 MG/1
0.5 TABLET ORAL
Status: DISCONTINUED | OUTPATIENT
Start: 2017-12-18 | End: 2017-12-23 | Stop reason: HOSPADM

## 2017-12-18 RX ORDER — SODIUM CHLORIDE 9 MG/ML
30 INJECTION, SOLUTION INTRAVENOUS CONTINUOUS
Status: DISCONTINUED | OUTPATIENT
Start: 2017-12-18 | End: 2017-12-19

## 2017-12-18 RX ORDER — MAGNESIUM SULFATE HEPTAHYDRATE 40 MG/ML
2 INJECTION, SOLUTION INTRAVENOUS ONCE
Status: COMPLETED | OUTPATIENT
Start: 2017-12-18 | End: 2017-12-18

## 2017-12-18 RX ORDER — ACETAMINOPHEN 325 MG/1
650 TABLET ORAL
Status: DISCONTINUED | OUTPATIENT
Start: 2017-12-18 | End: 2017-12-19

## 2017-12-18 RX ORDER — IBUPROFEN 200 MG
1 TABLET ORAL DAILY
Status: DISCONTINUED | OUTPATIENT
Start: 2017-12-19 | End: 2017-12-19

## 2017-12-18 RX ORDER — PREDNISONE 50 MG/1
50 TABLET ORAL DAILY
Qty: 5 TAB | Refills: 0 | Status: ON HOLD | OUTPATIENT
Start: 2017-12-18 | End: 2017-12-23

## 2017-12-18 RX ORDER — LEVOFLOXACIN 5 MG/ML
750 INJECTION, SOLUTION INTRAVENOUS EVERY 24 HOURS
Status: DISCONTINUED | OUTPATIENT
Start: 2017-12-18 | End: 2017-12-21 | Stop reason: CLARIF

## 2017-12-18 RX ADMIN — LEVOFLOXACIN 750 MG: 5 INJECTION, SOLUTION INTRAVENOUS at 19:16

## 2017-12-18 RX ADMIN — LORAZEPAM 0.5 MG: 0.5 TABLET ORAL at 22:37

## 2017-12-18 RX ADMIN — METHYLPREDNISOLONE SODIUM SUCCINATE 40 MG: 40 INJECTION, POWDER, FOR SOLUTION INTRAMUSCULAR; INTRAVENOUS at 23:02

## 2017-12-18 RX ADMIN — IPRATROPIUM BROMIDE AND ALBUTEROL SULFATE 3 ML: 2.5; .5 SOLUTION RESPIRATORY (INHALATION) at 22:59

## 2017-12-18 RX ADMIN — IPRATROPIUM BROMIDE AND ALBUTEROL SULFATE 3 ML: .5; 3 SOLUTION RESPIRATORY (INHALATION) at 18:01

## 2017-12-18 RX ADMIN — LORAZEPAM 1 MG: 2 INJECTION, SOLUTION INTRAMUSCULAR; INTRAVENOUS at 23:17

## 2017-12-18 RX ADMIN — SODIUM CHLORIDE 2313 ML: 900 INJECTION, SOLUTION INTRAVENOUS at 20:52

## 2017-12-18 RX ADMIN — MAGNESIUM SULFATE IN WATER 2 G: 40 INJECTION, SOLUTION INTRAVENOUS at 18:01

## 2017-12-18 RX ADMIN — WATER 2 G: 1 INJECTION INTRAMUSCULAR; INTRAVENOUS; SUBCUTANEOUS at 22:38

## 2017-12-18 NOTE — ED TRIAGE NOTES
Seen here last night for COPD exacerbation and [facility] today.  Was discharged home both visits, but became SOB when arrived home

## 2017-12-18 NOTE — IP AVS SNAPSHOT
[address] Patient: Ida Arana Sr. MRN: [id number] :1970 My Medications TAKE these medications as instructed Instructions Each Dose to Equal  
 Morning Noon Evening Bedtime  
 acetaminophen 500 mg tablet Commonly known as:  TYLENOL Your last dose was: Your next dose is: Take  by mouth every six (6) hours as needed for Pain. albuterol 90 mcg/actuation inhaler Commonly known as:  PROVENTIL HFA, VENTOLIN HFA, PROAIR HFA Your last dose was: Your next dose is: Take 2 Puffs by inhalation every four (4) hours as needed for Wheezing. 2 Puff  
    
   
   
   
  
 albuterol-ipratropium 2.5 mg-0.5 mg/3 ml Nebu Commonly known as:  Evelyne Millss Your last dose was: Your next dose is:    
   
   
 3 mL by Nebulization route every six (6) hours as needed. 3 mL  
    
   
   
   
  
 amLODIPine 2.5 mg tablet Commonly known as:  Dari Schmidt Start taking on:  2017 Your last dose was: Your next dose is: Take 1 Tab by mouth daily. 2.5 mg  
    
   
   
   
  
 fluticasone-salmeterol 500-50 mcg/dose diskus inhaler Commonly known as:  ADVAIR Your last dose was: Your next dose is: Take 1 Puff by inhalation two (2) times a day. 1 Puff  
    
   
   
   
  
 guaiFENesin-dextromethorphan -30 mg per tablet Commonly known as:  HUMIBID DM Your last dose was: Your next dose is: Take 1 Tab by mouth every twelve (12) hours for 5 days. 1 Tab LORazepam 0.5 mg tablet Commonly known as:  ATIVAN Your last dose was: Your next dose is: Take 1 Tab by mouth every six (6) hours as needed for Anxiety. Max Daily Amount: 2 mg. Indications: anxiety, anxiety  0.5 mg  
    
   
   
   
  
 mirtazapine 7.5 mg tablet Commonly known as:  Abbott Feeling Your last dose was: Your next dose is: Take 1 Tab by mouth nightly. 7.5 mg  
    
   
   
   
  
 pantoprazole 40 mg tablet Commonly known as:  PROTONIX Start taking on:  12/24/2017 Your last dose was: Your next dose is: Take 1 Tab by mouth Daily (before breakfast). 40 mg  
    
   
   
   
  
 predniSONE 10 mg tablet Commonly known as:  Arcelia Dumas Your last dose was: Your next dose is:    
   
   
 40mg X 7 days, 20 mg x 7 days, 10mg x 7 days, 5 mg x 7 days then stop  
     
   
   
   
  
 roflumilast Tab tablet Commonly known as:  Rakesh Braxtonalexandra Start taking on:  12/24/2017 Your last dose was: Your next dose is: Take 1 Tab by mouth daily. Indications: PREVENTION OF BRONCHOSPASM WITH CHRONIC BRONCHITIS  
 500 mcg  
    
   
   
   
  
 tiotropium 18 mcg inhalation capsule Commonly known as:  Awilda Perlita Your last dose was: Your next dose is: Take 1 Cap by inhalation daily. Indications: BRONCHOSPASM PREVENTION WITH COPD  
 1 Cap  
    
   
   
   
  
 traMADol 50 mg tablet Commonly known as:  ULTRAM  
   
Your last dose was: Your next dose is: Take 1 Tab by mouth every eight (8) hours as needed for Pain. Max Daily Amount: 150 mg. Indications: Pain 50 mg  
    
   
   
   
  
 varenicline 1 mg tablet Commonly known as:  Lm Parks Your last dose was: Your next dose is: Take 1 Tab by mouth daily. Start after completion of starter pack 1 mg Where to Get Your Medications Information on where to get these meds will be given to you by the nurse or doctor. ! Ask your nurse or doctor about these medications  
  albuterol 90 mcg/actuation inhaler  
 albuterol-ipratropium 2.5 mg-0.5 mg/3 ml Nebu  
 amLODIPine 2.5 mg tablet fluticasone-salmeterol 500-50 mcg/dose diskus inhaler  
 guaiFENesin-dextromethorphan -30 mg per tablet LORazepam 0.5 mg tablet  
 mirtazapine 7.5 mg tablet  
 pantoprazole 40 mg tablet  
 predniSONE 10 mg tablet  
 roflumilast Tab tablet  
 tiotropium 18 mcg inhalation capsule  
 traMADol 50 mg tablet  
 varenicline 1 mg tablet

## 2017-12-18 NOTE — IP AVS SNAPSHOT
Summary of Care Report The Summary of Care report has been created to help improve care coordination. Users with access to [employer] or [address] (Web-based application) may access additional patient information including the Discharge Summary. If you are not currently a [address] user and need more information, please call the number listed below in the [place] [address] section and ask to be connected with Medical Records. Facility Information Name Address Phone [address] [address] [phone] Patient Information Patient Name Sex DEB Shields ([id number]) Male 1970 Discharge Information Admitting Provider Service Area Unit Los Galindo MD / [address] / [phone] Discharge Provider Discharge Date/Time Discharge Disposition Destination (none) 2017 (Pending) [facility] (none) Patient Language Language ENGLISH [13] Hospital Problems as of 2017  Reviewed: 2017  9:11 PM by Los Galindo MD  
  
  
  
 Class Noted - Resolved Last Modified POA Active Problems COPD, severe ([address]  Unknown - Present 2017 by Los Galindo MD Yes Entered by Sarah Wong NP  
  * (Principal)COPD with acute exacerbation ([address]  2017 - Present 2017 by Los Galindo MD Yes Entered by Arnie Moya MD  
  Sepsis [facility])  2017 - Present 2017 by Los Galindo MD Yes Entered by Los Galindo MD  
  
Non-Hospital Problems as of 2017  Reviewed: 2017  9:11 PM by Los Galindo MD  
  
  
  
 Class Noted - Resolved Last Modified Active Problems   Emphysema/COPD [facility])  Unknown - Present 8/3/2017 by Sarah Wong NP  
 Entered by Stephanie Carrillo NP You are allergic to the following No active allergies Current Discharge Medication List  
  
START taking these medications Dose & Instructions Dispensing Information Comments  
 amLODIPine 2.5 mg tablet Commonly known as:  Marlin Fair Start taking on:  12/24/2017 Dose:  2.5 mg Take 1 Tab by mouth daily. Quantity:  30 Tab Refills:  2  
   
 guaiFENesin-dextromethorphan -30 mg per tablet Commonly known as:  HUMIBID DM Dose:  1 Tab Take 1 Tab by mouth every twelve (12) hours for 5 days. Quantity:  10 Tab Refills:  0 LORazepam 0.5 mg tablet Commonly known as:  ATIVAN Dose:  0.5 mg Take 1 Tab by mouth every six (6) hours as needed for Anxiety. Max Daily Amount: 2 mg. Indications: anxiety, anxiety Quantity:  20 Tab Refills:  0  
   
 mirtazapine 7.5 mg tablet Commonly known as:  Reid Anatoly Dose:  7.5 mg Take 1 Tab by mouth nightly. Quantity:  30 Tab Refills:  0  
   
 pantoprazole 40 mg tablet Commonly known as:  PROTONIX Start taking on:  12/24/2017 Dose:  40 mg Take 1 Tab by mouth Daily (before breakfast). Quantity:  30 Tab Refills:  0  
   
 predniSONE 10 mg tablet Commonly known as:  DELTASONE  
 40mg X 7 days, 20 mg x 7 days, 10mg x 7 days, 5 mg x 7 days then stop Quantity:  57 Tab Refills:  0  
   
 roflumilast Tab tablet Commonly known as:  Veronica Blaze Start taking on:  12/24/2017 Dose:  500 mcg Take 1 Tab by mouth daily. Indications: PREVENTION OF BRONCHOSPASM WITH CHRONIC BRONCHITIS Quantity:  30 Tab Refills:  2 CONTINUE these medications which have CHANGED Dose & Instructions Dispensing Information Comments  
 traMADol 50 mg tablet Commonly known as:  ULTRAM  
What changed:  when to take this Dose:  50 mg Take 1 Tab by mouth every eight (8) hours as needed for Pain. Max Daily Amount: 150 mg. Indications: Pain Quantity:  20 Tab Refills:  0  
   
 varenicline 1 mg tablet Commonly known as:  Randy Jefferson What changed:  when to take this Dose:  1 mg Take 1 Tab by mouth daily. Start after completion of starter pack Quantity:  30 Tab Refills:  0 CONTINUE these medications which have NOT CHANGED Dose & Instructions Dispensing Information Comments  
 acetaminophen 500 mg tablet Commonly known as:  TYLENOL Take  by mouth every six (6) hours as needed for Pain. Refills:  0  
   
 albuterol 90 mcg/actuation inhaler Commonly known as:  PROVENTIL HFA, VENTOLIN HFA, PROAIR HFA Dose:  2 Puff Take 2 Puffs by inhalation every four (4) hours as needed for Wheezing. Quantity:  1 Inhaler Refills:  3  
   
 albuterol-ipratropium 2.5 mg-0.5 mg/3 ml Nebu Commonly known as:  Sade Lucian Dose:  3 mL  
3 mL by Nebulization route every six (6) hours as needed. Quantity:  75 Nebule Refills:  5  
   
 fluticasone-salmeterol 500-50 mcg/dose diskus inhaler Commonly known as:  ADVAIR Dose:  1 Puff Take 1 Puff by inhalation two (2) times a day. Quantity:  1 Each Refills:  3  
   
 tiotropium 18 mcg inhalation capsule Commonly known as:  Missy Caruso Dose:  1 Cap Take 1 Cap by inhalation daily. Indications: BRONCHOSPASM PREVENTION WITH COPD Quantity:  30 Cap Refills:  5 Current Immunizations Name Date Influenza Vaccine (Quad) PF 10/23/2017 Follow-up Information Follow up With Details Comments Contact Info Geno Cintron NP   [address] [facility] [phone] Discharge Instructions None Chart Review Routing History No Routing History on File

## 2017-12-18 NOTE — ED PROVIDER NOTES
EMERGENCY DEPARTMENT HISTORY AND PHYSICAL EXAM    5:29 PM      Date: 12/18/2017  Patient Name: Levon Lorenzo.    History of Presenting Illness     Chief Complaint   Patient presents with    Respiratory Distress         History Provided By: Patient    Chief Complaint: SOB  Duration: 1 Hour  Timing:  Constant  Location:   Quality:   Severity: 8 out of 10  Modifying Factors: breathing treatments  Associated Symptoms: abdominal edema, cough with sputum, fever      Additional History (Context): Chao Martinez Sr. is a 49 y.o. male with COPD who presents to ED via EMT with c/o constant 8/10 SOB onset 1 hour ago after being discharged home from [facility]. Pt was seen for COPD exacerbation in ED last night and was discharged and seen at [facility]. When he was released from [facility] he became SOB when home. Associated sx of abdominal edema, cough with sputum, and fever. Pt reports having 2 breathing treatments at [facility], 1 treatment at home PTA, and 1 treatment en route with EMT. Pt reports receiving flu shot. 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     PCP: Temitope Pedro NP    Current Facility-Administered Medications   Medication Dose Route Frequency Provider Last Rate Last Dose    0.9% sodium chloride infusion  125 mL/hr IntraVENous CONTINUOUS Claudeen Six,  mL/hr at 12/19/17 2055 125 mL/hr at 12/19/17 2055    acetaminophen (TYLENOL) tablet 500 mg  500 mg Oral Q6H PRN Claudeen Six, MD        hydrALAZINE (APRESOLINE) tablet 25 mg  25 mg Oral Q8H PRN Claudeen Six, MD        ondansetron [facility]) injection 4 mg  4 mg IntraVENous Q8H PRN Claudeen Six, MD        guaiFENesin-dextromethorphan SR (HUMIBID DM) 600-30 mg tablet 1 Tab  1 Tab Oral Q12H Claudeen Six, MD   1 Tab at 12/19/17 2050    varenicline (CHANTIX) tablet 1 mg  1 mg Oral DAILY Claudeen Six, MD   1 mg at 12/19/17 1418    pantoprazole (PROTONIX) tablet 40 mg  40 mg Oral ACB Claudeen Six, MD   40 mg at 12/19/17 1420    ipratropium (ATROVENT) 0.02 % nebulizer solution 0.5 mg  0.5 mg Nebulization Q6H RT January-Jill [facility] YANCY, PA-C   0.5 mg at 12/19/17 2004    albuterol (ACCUNEB) nebulizer solution 1.25 mg  1.25 mg Nebulization Q6H RT January-Jill oy V, PA-C   1.25 mg at 12/19/17 2004    budesonide (PULMICORT) 500 mcg/2 ml nebulizer suspension  500 mcg Nebulization BID RT January-Jill [facility] YANCY, PA-C   500 mcg at 12/19/17 2004    arformoterol (BROVANA) neb solution 15 mcg  15 mcg Nebulization BID RT January-Jill oy V, PA-C   15 mcg at 12/19/17 2004    enoxaparin (LOVENOX) injection 40 mg  40 mg SubCUTAneous Q24H January-Jill oy MARINA HAINESC   40 mg at 12/19/17 1800    sodium chloride (NS) flush 5-10 mL  5-10 mL IntraVENous PRN Misa Gomez MD        levoFLOXacin (LEVAQUIN) 750 mg in D5W IVPB  750 mg IntraVENous Q24H Misa Gomez  mL/hr at 12/19/17 2050 750 mg at 12/19/17 2050    LORazepam (ATIVAN) tablet 0.5 mg  0.5 mg Oral Q4H PRN Ro Birmingham MD   0.5 mg at 12/19/17 1834    methylPREDNISolone (PF) (SOLU-MEDROL) injection 40 mg  40 mg IntraVENous Q6H Ro Birmingham MD   40 mg at 12/19/17 1722    cefepime (MAXIPIME) 2 g in sterile water (preservative free) 10 mL IV syringe  2 g IntraVENous Q8H Ro Birmingham MD   2 g at 12/19/17 1418       Past History     Past Medical History:  Past Medical History:   Diagnosis Date    Chronic obstructive pulmonary disease ([facility] 08/03/2017    PFTS 8/3/17    COPD, severe ([facility]     Emphysema/COPD ([facility]     Esophagitis 12/11/2017    Endoscopy    History of stomach ulcers     Positive urine drug screen 01/2016    opiates and THC    Upper GI bleed        Past Surgical History:  Past Surgical History:   Procedure Laterality Date    HX ENDOSCOPY  12/11/2017       Family History:  Family History   Problem Relation Age of Onset    Hypertension Mother     Heart Disease Mother     Diabetes Mother     Hypertension Father     Heart Disease Father     Cancer Father      lymphnodes    Diabetes Father        Social History:  Social History   Substance Use Topics    Smoking status: Current Every Day Smoker     Packs/day: 2.00     Years: 25.00     Types: Cigarettes     Start date: 5/28/1984    Smokeless tobacco: Never Used    Alcohol use No       Allergies:  No Known Allergies      Review of Systems       Review of Systems   Constitutional: Positive for fever. Respiratory: Positive for cough (productive with sputum) and shortness of breath (8/10). Gastrointestinal: Positive for abdominal distention. All other systems reviewed and are negative. Physical Exam     Visit Vitals    /77    Pulse (!) 117    Temp 98 °F (36.7 °C)    Resp 24    Wt 77.1 kg (170 lb)    SpO2 92%    BMI 25.1 kg/m2         Physical Exam   Constitutional: He is oriented to person, place, and time. He appears well-developed and well-nourished. No distress. HENT:   Head: Normocephalic and atraumatic. Right Ear: External ear normal.   Left Ear: External ear normal.   Nose: Nose normal.   Mouth/Throat: Oropharynx is clear and moist.   Eyes: Conjunctivae and EOM are normal. Pupils are equal, round, and reactive to light. No scleral icterus. Neck: Normal range of motion. Neck supple. No JVD present. No tracheal deviation present. No thyromegaly present. Cardiovascular: Regular rhythm, normal heart sounds and intact distal pulses. Exam reveals no gallop and no friction rub. No murmur heard. Tachy    Pulmonary/Chest: Effort normal. He has wheezes. He exhibits no tenderness. Abdominal: Soft. Bowel sounds are normal. He exhibits no distension. There is no tenderness. There is no rebound and no guarding. Musculoskeletal: Normal range of motion. He exhibits no edema or tenderness. No calf tenderness    Lymphadenopathy:     He has no cervical adenopathy. Neurological: He is alert and oriented to person, place, and time. No cranial nerve deficit. Coordination normal.   No sensory loss, Gait normal, Motor 5/5   Skin: Skin is warm and dry. Psychiatric: He has a normal mood and affect. His behavior is normal. Judgment and thought content normal.   Nursing note and vitals reviewed. Diagnostic Study Results     Labs -  No results found for this or any previous visit (from the past 12 hour(s)). Radiologic Studies -   XR CHEST SNGL V   Final Result   IMPRESSION:     Currently minimal to mild pulmonary vascular congestion. Minimal interstitial  pulmonary edema is not excluded at this time.     Mild pulmonary hypoventilation.     Minimal streaky left basilar atelectasis. Medical Decision Making   I am the first provider for this patient. I reviewed the vital signs, available nursing notes, past medical history, past surgical history, family history and social history. Vital Signs-Reviewed the patient's vital signs. Pulse Oximetry Analysis -  92% on room air (Interpretation) Normal    Records Reviewed: Nursing Notes and Old Medical Records (Time of Review: 5:35 PM)    ED Course: Progress Notes, Reevaluation, and Consults:    7:00 PM Consult: I discussed care with Dr. Rodger Newton (Hospitalist). It was a standard discussion including patient history, chief complaint, available diagnostic results, and predicted treatment course. Will admit pt. Provider Notes (Medical Decision Making): Pt is a 45yo male with a hx of asthma, smoking without a hx of admission or intubation returns with increasing dyspnea. Pt was seen yesterday and discharged home with steroids last night had acute dyspnea then went to [facility] with evaluation then discharge with doxy, steroids. Pt was home for a short period of time became acutely short of breath then called EMS. Pt is wheezing and has conversational dyspnea. Will start nebs, mag, repeat CXR, and plan for admission. Will also add a d-dimer for evaluation of PE.   Jessica Wills, DO 5:49 PM    For Hospitalized Patients:    1. Hospitalization Decision Time:  The decision to hospitalize the patient was made by Dr. Ammy Rosenthal at 7:00 PM on 12/18/2017        Diagnosis     Clinical Impression:   1. COPD with acute exacerbation ([facility]    2. Acute respiratory distress        Disposition: Admit    Follow-up Information     Follow up With Details Comments [address] [place] [address], [place] [address]  [phone]             Current Discharge Medication List      CONTINUE these medications which have NOT CHANGED    Details   predniSONE (DELTASONE) 50 mg tablet Take 1 Tab by mouth daily for 5 days. Qty: 5 Tab, Refills: 0      acetaminophen (TYLENOL) 500 mg tablet Take  by mouth every six (6) hours as needed for Pain.      traMADol (ULTRAM) 50 mg tablet Take 1 Tab by mouth every six (6) hours as needed for Pain. Max Daily Amount: 200 mg. Indications: Pain  Qty: 12 Tab, Refills: 0    Associated Diagnoses: Abdominal pain, unspecified abdominal location      albuterol-ipratropium (DUO-NEB) 2.5 mg-0.5 mg/3 ml nebu 3 mL by Nebulization route every six (6) hours as needed. Qty: 30 Nebule, Refills: 0    Associated Diagnoses: COPD, severe ([facility]      varenicline (CHANTIX) 1 mg tablet Take 1 Tab by mouth two (2) times a day. Start after completion of starter pack  Qty: 30 Tab, Refills: 2    Associated Diagnoses: Chronic obstructive pulmonary disease, unspecified COPD type ([facility] Tobacco abuse      fluticasone-salmeterol (ADVAIR) 500-50 mcg/dose diskus inhaler Take 1 Puff by inhalation two (2) times a day. Qty: 3 Each, Refills: 0    Associated Diagnoses: Chronic obstructive pulmonary disease, unspecified COPD type ([facility]      albuterol (PROVENTIL HFA, VENTOLIN HFA, PROAIR HFA) 90 mcg/actuation inhaler Take 2 Puffs by inhalation every four (4) hours as needed for Wheezing.   Qty: 6 Inhaler, Refills: 3    Associated Diagnoses: Chronic obstructive pulmonary disease, unspecified COPD type ([facility] Shortness of breath      tiotropium (SPIRIVA) 18 mcg inhalation capsule Take 1 Cap by inhalation daily. Indications: BRONCHOSPASM PREVENTION WITH COPD  Qty: 30 Cap, Refills: 5    Associated Diagnoses: Chronic obstructive pulmonary disease, unspecified COPD type ([address] Shortness of breath           _______________________________    Attestations:  Scribe Attestation     Deidra Parry acting as a scribe for and in the presence of Geetha Mirza MD      December 18, 2017 at 5:35 PM       Provider Attestation:      I personally performed the services described in the documentation, reviewed the documentation, as recorded by the scribe in my presence, and it accurately and completely records my words and actions.  December 18, 2017 at 5:35 PM - Geetha Mirza MD    _______________________________

## 2017-12-18 NOTE — IP AVS SNAPSHOT
[address] Patient: Gera Sanchez Sr. MRN: [id number] :1970 About your hospitalization You were admitted on:  2017 You last received care in the:  [facility] [id number] [place] [address] You were discharged on:  2017 Why you were hospitalized Your primary diagnosis was:  Copd With Acute Exacerbation (Hcc) Your diagnoses also included:  Sepsis (Hcc), Copd, Severe (Hcc) Things You Need To Do (next 8 weeks) Follow up with Reena Bustamante NP Phone:  [phone] Where:  [address] [address] [address] Dec 24, 2017 START OF CARE with Radha Vaca RN at 12:00 AM  
Where:  [facility] SCHEDULING/INTAKE ([facility]) Discharge Orders None A check randy indicates which time of day the medication should be taken. My Medications TAKE these medications as instructed Instructions Each Dose to Equal  
 Morning Noon Evening Bedtime  
 acetaminophen 500 mg tablet Commonly known as:  TYLENOL Your last dose was: Your next dose is: Take  by mouth every six (6) hours as needed for Pain. albuterol 90 mcg/actuation inhaler Commonly known as:  PROVENTIL HFA, VENTOLIN HFA, PROAIR HFA Your last dose was: Your next dose is: Take 2 Puffs by inhalation every four (4) hours as needed for Wheezing. 2 Puff  
    
   
   
   
  
 albuterol-ipratropium 2.5 mg-0.5 mg/3 ml Nebu Commonly known as:  Londell Sang Your last dose was: Your next dose is:    
   
   
 3 mL by Nebulization route every six (6) hours as needed. 3 mL  
    
   
   
   
  
 amLODIPine 2.5 mg tablet Commonly known as:  Barney Jo Start taking on:  2017 Your last dose was: Your next dose is: Take 1 Tab by mouth daily. 2.5 mg  
    
   
   
   
  
 fluticasone-salmeterol 500-50 mcg/dose diskus inhaler Commonly known as:  ADVAIR Your last dose was: Your next dose is: Take 1 Puff by inhalation two (2) times a day. 1 Puff  
    
   
   
   
  
 guaiFENesin-dextromethorphan -30 mg per tablet Commonly known as:  HUMIBID DM Your last dose was: Your next dose is: Take 1 Tab by mouth every twelve (12) hours for 5 days. 1 Tab LORazepam 0.5 mg tablet Commonly known as:  ATIVAN Your last dose was: Your next dose is: Take 1 Tab by mouth every six (6) hours as needed for Anxiety. Max Daily Amount: 2 mg. Indications: anxiety, anxiety 0.5 mg  
    
   
   
   
  
 mirtazapine 7.5 mg tablet Commonly known as:  Kane Gonzales Your last dose was: Your next dose is: Take 1 Tab by mouth nightly. 7.5 mg  
    
   
   
   
  
 pantoprazole 40 mg tablet Commonly known as:  PROTONIX Start taking on:  12/24/2017 Your last dose was: Your next dose is: Take 1 Tab by mouth Daily (before breakfast). 40 mg  
    
   
   
   
  
 predniSONE 10 mg tablet Commonly known as:  Yo Turner Your last dose was: Your next dose is:    
   
   
 40mg X 7 days, 20 mg x 7 days, 10mg x 7 days, 5 mg x 7 days then stop  
     
   
   
   
  
 roflumilast Tab tablet Commonly known as:  Alanda Landy Start taking on:  12/24/2017 Your last dose was: Your next dose is: Take 1 Tab by mouth daily. Indications: PREVENTION OF BRONCHOSPASM WITH CHRONIC BRONCHITIS  
 500 mcg  
    
   
   
   
  
 tiotropium 18 mcg inhalation capsule Commonly known as:  Isabelle Gem Your last dose was: Your next dose is: Take 1 Cap by inhalation daily. Indications: BRONCHOSPASM PREVENTION WITH COPD  
 1 Cap traMADol 50 mg tablet Commonly known as:  ULTRAM  
   
Your last dose was: Your next dose is: Take 1 Tab by mouth every eight (8) hours as needed for Pain. Max Daily Amount: 150 mg. Indications: Pain 50 mg  
    
   
   
   
  
 varenicline 1 mg tablet Commonly known as:  Tere Tay Your last dose was: Your next dose is: Take 1 Tab by mouth daily. Start after completion of starter pack 1 mg Where to Get Your Medications Information on where to get these meds will be given to you by the nurse or doctor. ! Ask your nurse or doctor about these medications  
  albuterol 90 mcg/actuation inhaler  
 albuterol-ipratropium 2.5 mg-0.5 mg/3 ml Nebu  
 amLODIPine 2.5 mg tablet  
 fluticasone-salmeterol 500-50 mcg/dose diskus inhaler  
 guaiFENesin-dextromethorphan -30 mg per tablet LORazepam 0.5 mg tablet  
 mirtazapine 7.5 mg tablet  
 pantoprazole 40 mg tablet  
 predniSONE 10 mg tablet  
 roflumilast Tab tablet  
 tiotropium 18 mcg inhalation capsule  
 traMADol 50 mg tablet  
 varenicline 1 mg tablet Discharge Instructions None [employer] Announcement We are excited to announce that we are making your provider's discharge notes available to you in [employer]. You will see these notes when they are completed and signed by the physician that discharged you from your recent hospital stay. If you have any questions or concerns about any information you see in [employer], please call the Health Information Department where you were seen or reach out to your Primary Care Provider for more information about your plan of care. Introducing [facility]! [facility] introduces [employer] patient portal. Now you can access parts of your medical record, email your doctor's office, and request medication refills online. 1. In your internet browser, go to [email or web address] 2. Click on the First Time User? Click Here link in the Sign In box. You will see the New Member Sign Up page. 3. Enter your [employer] Access Code exactly as it appears below. You will not need to use this code after youve completed the sign-up process. If you do not sign up before the expiration date, you must request a new code. · [employer] Access Code: [email or web address] Expires: 1/21/2018 12:20 PM 
 
4. Enter the last four digits of your Social Security Number (xxxx) and Date of Birth (mm/dd/yyyy) as indicated and click Submit. You will be taken to the next sign-up page. 5. Create a [employer] ID. This will be your [employer] login ID and cannot be changed, so think of one that is secure and easy to remember. 6. Create a [employer] password. You can change your password at any time. 7. Enter your Password Reset Question and Answer. This can be used at a later time if you forget your password. 8. Enter your e-mail address. You will receive e-mail notification when new information is available in [address] 9. Click Sign Up. You can now view and download portions of your medical record. 10. Click the Download Summary menu link to download a portable copy of your medical information. If you have questions, please visit the Frequently Asked Questions section of the [employer] website. Remember, [employer] is NOT to be used for urgent needs. For medical emergencies, dial 911. Now available from your iPhone and Android! Unresulted Labs-Please follow up with your PCP about these lab tests Order Current Status CULTURE, BLOOD Preliminary result CULTURE, BLOOD Preliminary result CULTURE, RESPIRATORY/SPUTUM/BRONCH W GRAM STAIN Preliminary result Providers Seen During Your Hospitalization Provider Specialty Primary office phone Vignesh Mistry MD Emergency Medicine [phone] Braydon Ochoa MD Internal Medicine [phone] Your Primary Care Physician (PCP) Primary Care Physician Office Phone Office Fax Marlin Landaverde [phone] You are allergic to the following No active allergies Recent Documentation Weight BMI Smoking Status 79.2 kg 25.77 kg/m2 Current Every Day Smoker Emergency Contacts Name Discharge Info Relation Home Work Mobile Lazara [place]field DISCHARGE CAREGIVER [3] Spouse [3] [phone] Patient Belongings The following personal items are in your possession at time of discharge: 
  Dental Appliances: None  Visual Aid: None      Home Medications: None   Jewelry: None  Clothing: Pants, Shirt    Other Valuables: Cell Phone, [employer] (2) Please provide this summary of care documentation to your next provider. Signatures-by signing, you are acknowledging that this After Visit Summary has been reviewed with you and you have received a copy. Patient Signature:  ____________________________________________________________ Date:  ____________________________________________________________  
  
[facility] Provider Signature:  ____________________________________________________________ Date:  ____________________________________________________________

## 2017-12-18 NOTE — PROGRESS NOTES
Patient wife calling stating that patient was admitted to hospital the night before, but he was d/c. The doctors at the hospital told pt he will need to start on oxygen, pt was calling to see if we are able to assist with oxygen and to see if he needs to follow up with his provider. I informed Mrs. Sejal Degroot that we are not able to assist with providing pt with oxygen and also informed her that pt provider would like for him to follow up with Dr Gabby Jefferson for any pulmonary issues.

## 2017-12-18 NOTE — ED TRIAGE NOTES
Pt brought in by EMS with c/o feeling \"jittery\" and SOB. Pt states that he has used his inhalers frequently at home. Pt with hx of COPD. EMS reports that pt has bilateral scattered wheezing. Pt received solumedrol 125 mg en route.

## 2017-12-18 NOTE — ED PROVIDER NOTES
EMERGENCY DEPARTMENT HISTORY AND PHYSICAL EXAM    9:21 PM      Date: 12/17/2017  Patient Name: Colin Stacy    History of Presenting Illness     Chief Complaint   Patient presents with    Shaking    Shortness of Breath         History Provided By: Patient    Chief Complaint: SOB  Duration: Couple Hours  Timing:  Constant and Worsening  Location: Chest   Quality: N/A  Severity: Mild  Modifying Factors: Some relief with the use of breathing treatments   Associated Symptoms: generalized body shakes, chest pain and wheezing       Additional History (Context): Tamara Anderson Sr. is a 49 y.o. male with hx of COPD, emphysema and esophagitis presenting to the ED via EMS with c/o constant SOB. Pt reports he woke up this morning feeling short of breath. States he used multiple breathing treatments throughout the day with some relief. Notes he began to feel \"jittery like if he were having a panic attack\" around 7 pm today after using two nebulizer treatments back to back. States his breathing is much better now after receiving O2 and breathing treatments. Pt denies cough, fever, chills, leg swelling, nausea, vomiting or diarrhea. Associated sx include tremors and wheezing. Severity is mild. Pt is also c/o constant, sharp CP that have began \"a couple months ago\", pt believes it could be due to \"overworking his lungs\". Pt denies any hx of CHF. Reports that he has had no change in quality or character of his CP and that he has been previously worked up multiple times. States he will be placed on the lung transplant list, however admits to smoking. Pt is followed by Dr. Dylon Baker, pulmonologist. No other sx or complaints given at this time. PCP: Scot Orozco NP    Current Outpatient Prescriptions   Medication Sig Dispense Refill    acetaminophen (TYLENOL) 500 mg tablet Take  by mouth every six (6) hours as needed for Pain.       traMADol (ULTRAM) 50 mg tablet Take 1 Tab by mouth every six (6) hours as needed for Pain. Max Daily Amount: 200 mg. Indications: Pain 12 Tab 0    albuterol-ipratropium (DUO-NEB) 2.5 mg-0.5 mg/3 ml nebu 3 mL by Nebulization route every six (6) hours as needed. 30 Nebule 0    varenicline (CHANTIX) 1 mg tablet Take 1 Tab by mouth two (2) times a day. Start after completion of starter pack 30 Tab 2    fluticasone-salmeterol (ADVAIR) 500-50 mcg/dose diskus inhaler Take 1 Puff by inhalation two (2) times a day. 3 Each 0    albuterol (PROVENTIL HFA, VENTOLIN HFA, PROAIR HFA) 90 mcg/actuation inhaler Take 2 Puffs by inhalation every four (4) hours as needed for Wheezing. 6 Inhaler 3    tiotropium (SPIRIVA) 18 mcg inhalation capsule Take 1 Cap by inhalation daily. Indications: BRONCHOSPASM PREVENTION WITH COPD 30 Cap 5       Past History     Past Medical History:  Past Medical History:   Diagnosis Date    Chronic obstructive pulmonary disease ([address] 08/03/2017    PFTS 8/3/17    COPD, severe ([address]     [facility]     Esophagitis 12/11/2017    Endoscopy    History of stomach ulcers     Positive urine drug screen 01/2016    opiates and THC    Upper GI bleed        Past Surgical History:  Past Surgical History:   Procedure Laterality Date    HX ENDOSCOPY  12/11/2017       Family History:  Family History   Problem Relation Age of Onset    Hypertension Mother     Heart Disease Mother     Diabetes Mother     Hypertension Father     Heart Disease Father     Cancer Father      lymphnodes    Diabetes Father        Social History:  Social History   Substance Use Topics    Smoking status: Current Every Day Smoker     Packs/day: 0.50     Years: 25.00     Types: Cigarettes     Start date: 5/28/1984    Smokeless tobacco: Never Used    Alcohol use No       Allergies:  No Known Allergies      Review of Systems       Review of Systems   Constitutional: Negative. Negative for chills and fever. Generalized body shakes      HENT: Negative. Negative for congestion. Eyes: Negative. Negative for visual disturbance. Respiratory: Positive for shortness of breath and wheezing. Cardiovascular: Positive for chest pain. Negative for leg swelling. Gastrointestinal: Negative. Negative for abdominal pain, diarrhea and vomiting. Genitourinary: Negative. Negative for dysuria. Musculoskeletal: Negative. Negative for back pain and myalgias. Skin: Negative. Negative for rash and wound. Neurological: Positive for tremors. Negative for dizziness, weakness and light-headedness. Psychiatric/Behavioral: Negative for self-injury. The patient is nervous/anxious. All other systems reviewed and are negative. Physical Exam     Visit Vitals    /87    Pulse 87    Temp 98 °F (36.7 °C)    Resp 22    Ht 5' 9\" (1.753 m)    Wt 77.1 kg (170 lb)    SpO2 94%    BMI 25.1 kg/m2         Physical Exam   Constitutional: He is oriented to person, place, and time. He appears well-developed and well-nourished. No distress. Appears anxious    HENT:   Head: Normocephalic and atraumatic. Mouth/Throat: Mucous membranes are dry. Eyes: Conjunctivae and EOM are normal. Pupils are equal, round, and reactive to light. No scleral icterus. Neck: Normal range of motion. Neck supple. No JVD present. No thyromegaly present. Cardiovascular: Normal rate, regular rhythm, S1 normal and S2 normal.  Exam reveals no gallop and no friction rub. No murmur heard. Pulmonary/Chest: Effort normal. No accessory muscle usage. No respiratory distress. End expiratory wheezing in all lung fields with mildly prolonged expiratory phase   No retractions or respiratory distress    Abdominal: Soft. Normal appearance. He exhibits no distension. There is no tenderness. There is no rigidity, no rebound and no guarding. Musculoskeletal: Normal range of motion. He exhibits no edema or tenderness. Neurological: He is alert and oriented to person, place, and time. He has normal strength.  No cranial nerve deficit or sensory deficit. Coordination normal.   Minimally tremulous    Skin: Skin is warm and intact. No rash noted. Psychiatric: His speech is normal and behavior is normal. His mood appears anxious. Vitals reviewed. Diagnostic Study Results     Labs -  Recent Results (from the past 12 hour(s))   CBC WITH AUTOMATED DIFF    Collection Time: 12/17/17 10:00 PM   Result Value Ref Range    WBC 6.3 4.6 - 13.2 K/uL    RBC 4.90 4.70 - 5.50 M/uL    HGB 16.1 (H) 13.0 - 16.0 g/dL    HCT 44.1 36.0 - 48.0 %    MCV 90.0 74.0 - 97.0 FL    MCH 32.9 24.0 - 34.0 PG    MCHC 36.5 31.0 - 37.0 g/dL    RDW 12.9 11.6 - 14.5 %    PLATELET 840 281 - 007 K/uL    MPV 10.8 9.2 - 11.8 FL    NEUTROPHILS 66 40 - 73 %    LYMPHOCYTES 27 21 - 52 %    MONOCYTES 6 3 - 10 %    EOSINOPHILS 1 0 - 5 %    BASOPHILS 0 0 - 2 %    ABS. NEUTROPHILS 4.1 1.8 - 8.0 K/UL    ABS. LYMPHOCYTES 1.7 0.9 - 3.6 K/UL    ABS. MONOCYTES 0.4 0.05 - 1.2 K/UL    ABS. EOSINOPHILS 0.1 0.0 - 0.4 K/UL    ABS. BASOPHILS 0.0 0.0 - 0.1 K/UL    DF AUTOMATED     METABOLIC PANEL, COMPREHENSIVE    Collection Time: 12/17/17 10:00 PM   Result Value Ref Range    Sodium 140 136 - 145 mmol/L    Potassium 3.7 3.5 - 5.5 mmol/L    Chloride 108 100 - 108 mmol/L    CO2 27 21 - 32 mmol/L    Anion gap 5 3.0 - 18 mmol/L    Glucose 100 (H) 74 - 99 mg/dL    BUN 10 7.0 - 18 MG/DL    Creatinine 0.74 0.6 - 1.3 MG/DL    BUN/Creatinine ratio 14 12 - 20      GFR est AA >60 >60 ml/min/1.73m2    GFR est non-AA >60 >60 ml/min/1.73m2    Calcium 8.2 (L) 8.5 - 10.1 MG/DL    Bilirubin, total 0.5 0.2 - 1.0 MG/DL    ALT (SGPT) 28 16 - 61 U/L    AST (SGOT) 19 15 - 37 U/L    Alk.  phosphatase 70 45 - 117 U/L    Protein, total 6.6 6.4 - 8.2 g/dL    Albumin 3.5 3.4 - 5.0 g/dL    Globulin 3.1 2.0 - 4.0 g/dL    A-G Ratio 1.1 0.8 - 1.7     MAGNESIUM    Collection Time: 12/17/17 10:00 PM   Result Value Ref Range    Magnesium 2.1 1.6 - 2.6 mg/dL   CARDIAC PANEL,(CK, CKMB & TROPONIN)    Collection Time: 12/17/17 10:00 PM Result Value Ref Range    CK 95 39 - 308 U/L    CK - MB 1.4 <3.6 ng/ml    CK-MB Index 1.5 0.0 - 4.0 %    Troponin-I, Qt. <0.02 0.0 - 0.045 NG/ML   NT-PRO BNP    Collection Time: 12/17/17 10:00 PM   Result Value Ref Range    NT pro-BNP 96 0 - 450 PG/ML   TSH 3RD GENERATION    Collection Time: 12/17/17 10:00 PM   Result Value Ref Range    TSH 2.21 0.36 - 3.74 uIU/mL   INFLUENZA A & B AG (RAPID TEST)    Collection Time: 12/17/17 10:40 PM   Result Value Ref Range    Influenza A Antigen NEGATIVE  NEG      Influenza B Antigen NEGATIVE  NEG         Radiologic Studies -   XR CHEST PORT    Interpretation by ED physician: Hyperinflation, no large infiltrate. Medical Decision Making   I am the first provider for this patient. I reviewed the vital signs, available nursing notes, past medical history, past surgical history, family history and social history. Vital Signs-Reviewed the patient's vital signs. Pulse Oximetry Analysis -  94% on room air (Interpretation) Normal     Cardiac Monitor:  Rate: 87 bpm   Rhythm:  Normal Sinus Rhythm     EKG: Interpreted by the EP. Rate: 82 bpm    Rhythm: Sinus Rhythm    Interpretation: Flattened T waves in inferior and lateral leads. No ischemic changes. Records Reviewed: Nursing Notes (Time of Review: 9:21 PM)    ED Course: Progress Notes, Reevaluation, and Consults:    12:13 AM: Pt is resting comfortably. Pt denies any current SOB or changes in chronic CP. Discussed result with pt and he is to f/u with his pulmonologist. Renato [place] pt strict return precaution if sx worsen. Pt understands and is in agreement. Provider Notes (Medical Decision Making): Velvet Franklin is a 49 y.o. male coming in SOB and wheezing followed by tremulousness and anxiety. Patient SOB now improved, only mild wheezing noted. Also reports chronic CP, but trop and BNP neg, no concern for ACS. Normal HR and RR, no evidence of PE.  Patient tremulousness likely due to albuterol stimulant effect. Will d/c home with pulm follow up and strict return precautions. Diagnosis     Clinical Impression:   1. Acute exacerbation of chronic obstructive pulmonary disease (COPD) ([address]    2. Chronic chest pain    3. Tremulousness    4. Medication side effect, initial encounter        Disposition: Discharge     Follow-up Information     Follow up With Details Comments [facility] MD Call in 2 days  [facility] [address] [facility] EMERGENCY DEPT  As needed, If symptoms worsen [address] [place] [id number]  [phone]           Patient's Medications   Start Taking    No medications on file   Continue Taking    ACETAMINOPHEN (TYLENOL) 500 MG TABLET    Take  by mouth every six (6) hours as needed for Pain. ALBUTEROL (PROVENTIL HFA, VENTOLIN HFA, PROAIR HFA) 90 MCG/ACTUATION INHALER    Take 2 Puffs by inhalation every four (4) hours as needed for Wheezing. ALBUTEROL-IPRATROPIUM (DUO-NEB) 2.5 MG-0.5 MG/3 ML NEBU    3 mL by Nebulization route every six (6) hours as needed. FLUTICASONE-SALMETEROL (ADVAIR) 500-50 MCG/DOSE DISKUS INHALER    Take 1 Puff by inhalation two (2) times a day. TIOTROPIUM (SPIRIVA) 18 MCG INHALATION CAPSULE    Take 1 Cap by inhalation daily. Indications: BRONCHOSPASM PREVENTION WITH COPD    TRAMADOL (ULTRAM) 50 MG TABLET    Take 1 Tab by mouth every six (6) hours as needed for Pain. Max Daily Amount: 200 mg. Indications: Pain    VARENICLINE (CHANTIX) 1 MG TABLET    Take 1 Tab by mouth two (2) times a day.  Start after completion of starter pack   These Medications have changed    No medications on file   Stop Taking    No medications on file     _______________________________    Attestations:  Jac Carmona acting as a scribe for and in the presence of Wilson Box MD      December 17, 2017 at 9:21 PM       Provider Attestation:      I personally performed the services described in the documentation, reviewed the documentation, as recorded by the scribe in my presence, and it accurately and completely records my words and actions.  December 17, 2017 at 9:21 PM - Mane Boyle MD    _______________________________

## 2017-12-19 ENCOUNTER — APPOINTMENT (OUTPATIENT)
Dept: CT IMAGING | Age: 47
DRG: 189 | End: 2017-12-19
Attending: INTERNAL MEDICINE
Payer: SUBSIDIZED

## 2017-12-19 LAB
ALBUMIN SERPL-MCNC: 3.5 G/DL (ref 3.4–5)
ALBUMIN/GLOB SERPL: 1.3 {RATIO} (ref 0.8–1.7)
ALP SERPL-CCNC: 63 U/L (ref 45–117)
ALT SERPL-CCNC: 25 U/L (ref 16–61)
AMPHET UR QL SCN: NEGATIVE
ANION GAP SERPL CALC-SCNC: 7 MMOL/L (ref 3–18)
ARTERIAL PATENCY WRIST A: YES
AST SERPL-CCNC: 9 U/L (ref 15–37)
BARBITURATES UR QL SCN: NEGATIVE
BASE DEFICIT BLD-SCNC: 3 MMOL/L
BASOPHILS # BLD: 0 K/UL (ref 0–0.1)
BASOPHILS NFR BLD: 0 % (ref 0–2)
BDY SITE: ABNORMAL
BENZODIAZ UR QL: NEGATIVE
BILIRUB SERPL-MCNC: 0.6 MG/DL (ref 0.2–1)
BUN SERPL-MCNC: 11 MG/DL (ref 7–18)
BUN/CREAT SERPL: 14 (ref 12–20)
CALCIUM SERPL-MCNC: 8.5 MG/DL (ref 8.5–10.1)
CANNABINOIDS UR QL SCN: NEGATIVE
CHLORIDE SERPL-SCNC: 111 MMOL/L (ref 100–108)
CO2 SERPL-SCNC: 23 MMOL/L (ref 21–32)
COCAINE UR QL SCN: NEGATIVE
CREAT SERPL-MCNC: 0.81 MG/DL (ref 0.6–1.3)
DIFFERENTIAL METHOD BLD: ABNORMAL
EOSINOPHIL # BLD: 0 K/UL (ref 0–0.4)
EOSINOPHIL NFR BLD: 0 % (ref 0–5)
ERYTHROCYTE [DISTWIDTH] IN BLOOD BY AUTOMATED COUNT: 13.4 % (ref 11.6–14.5)
GAS FLOW.O2 O2 DELIVERY SYS: ABNORMAL L/MIN
GAS FLOW.O2 SETTING OXYMISER: 4 L/M
GLOBULIN SER CALC-MCNC: 2.8 G/DL (ref 2–4)
GLUCOSE SERPL-MCNC: 138 MG/DL (ref 74–99)
HCO3 BLD-SCNC: 21.6 MMOL/L (ref 22–26)
HCT VFR BLD AUTO: 42.3 % (ref 36–48)
HDSCOM,HDSCOM: ABNORMAL
HGB BLD-MCNC: 14.6 G/DL (ref 13–16)
LACTATE SERPL-SCNC: 2.9 MMOL/L (ref 0.4–2)
LYMPHOCYTES # BLD: 0.5 K/UL (ref 0.9–3.6)
LYMPHOCYTES NFR BLD: 4 % (ref 21–52)
MCH RBC QN AUTO: 31.9 PG (ref 24–34)
MCHC RBC AUTO-ENTMCNC: 34.5 G/DL (ref 31–37)
MCV RBC AUTO: 92.4 FL (ref 74–97)
METHADONE UR QL: NEGATIVE
MONOCYTES # BLD: 0.4 K/UL (ref 0.05–1.2)
MONOCYTES NFR BLD: 3 % (ref 3–10)
NEUTS SEG # BLD: 12.6 K/UL (ref 1.8–8)
NEUTS SEG NFR BLD: 93 % (ref 40–73)
OPIATES UR QL: POSITIVE
PCO2 BLD: 34.4 MMHG (ref 35–45)
PCP UR QL: NEGATIVE
PH BLD: 7.41 [PH] (ref 7.35–7.45)
PLATELET # BLD AUTO: 211 K/UL (ref 135–420)
PMV BLD AUTO: 10.8 FL (ref 9.2–11.8)
PO2 BLD: 63 MMHG (ref 80–100)
POTASSIUM SERPL-SCNC: 4.5 MMOL/L (ref 3.5–5.5)
PROT SERPL-MCNC: 6.3 G/DL (ref 6.4–8.2)
RBC # BLD AUTO: 4.58 M/UL (ref 4.7–5.5)
SAO2 % BLD: 92 % (ref 92–97)
SERVICE CMNT-IMP: ABNORMAL
SODIUM SERPL-SCNC: 141 MMOL/L (ref 136–145)
SPECIMEN TYPE: ABNORMAL
TOTAL RESP. RATE, ITRR: 20
WBC # BLD AUTO: 13.5 K/UL (ref 4.6–13.2)

## 2017-12-19 PROCEDURE — 80307 DRUG TEST PRSMV CHEM ANLYZR: CPT | Performed by: INTERNAL MEDICINE

## 2017-12-19 PROCEDURE — 74011000250 HC RX REV CODE- 250: Performed by: PHYSICIAN ASSISTANT

## 2017-12-19 PROCEDURE — 83605 ASSAY OF LACTIC ACID: CPT | Performed by: INTERNAL MEDICINE

## 2017-12-19 PROCEDURE — 74011636320 HC RX REV CODE- 636/320: Performed by: INTERNAL MEDICINE

## 2017-12-19 PROCEDURE — 36415 COLL VENOUS BLD VENIPUNCTURE: CPT | Performed by: INTERNAL MEDICINE

## 2017-12-19 PROCEDURE — 74011250637 HC RX REV CODE- 250/637: Performed by: HOSPITALIST

## 2017-12-19 PROCEDURE — 77010033678 HC OXYGEN DAILY

## 2017-12-19 PROCEDURE — 74011250636 HC RX REV CODE- 250/636: Performed by: EMERGENCY MEDICINE

## 2017-12-19 PROCEDURE — 74011250636 HC RX REV CODE- 250/636: Performed by: PHYSICIAN ASSISTANT

## 2017-12-19 PROCEDURE — 36600 WITHDRAWAL OF ARTERIAL BLOOD: CPT

## 2017-12-19 PROCEDURE — 85025 COMPLETE CBC W/AUTO DIFF WBC: CPT | Performed by: INTERNAL MEDICINE

## 2017-12-19 PROCEDURE — 94640 AIRWAY INHALATION TREATMENT: CPT

## 2017-12-19 PROCEDURE — 77030027138 HC INCENT SPIROMETER -A

## 2017-12-19 PROCEDURE — 74011250636 HC RX REV CODE- 250/636: Performed by: HOSPITALIST

## 2017-12-19 PROCEDURE — 74011000250 HC RX REV CODE- 250: Performed by: INTERNAL MEDICINE

## 2017-12-19 PROCEDURE — 65270000029 HC RM PRIVATE

## 2017-12-19 PROCEDURE — 71260 CT THORAX DX C+: CPT

## 2017-12-19 PROCEDURE — 80053 COMPREHEN METABOLIC PANEL: CPT | Performed by: INTERNAL MEDICINE

## 2017-12-19 PROCEDURE — 74011250636 HC RX REV CODE- 250/636: Performed by: INTERNAL MEDICINE

## 2017-12-19 PROCEDURE — 82803 BLOOD GASES ANY COMBINATION: CPT

## 2017-12-19 PROCEDURE — 74011250637 HC RX REV CODE- 250/637: Performed by: INTERNAL MEDICINE

## 2017-12-19 RX ORDER — BUDESONIDE 0.5 MG/2ML
500 INHALANT ORAL
Status: DISCONTINUED | OUTPATIENT
Start: 2017-12-19 | End: 2017-12-22

## 2017-12-19 RX ORDER — HYDRALAZINE HYDROCHLORIDE 25 MG/1
25 TABLET, FILM COATED ORAL
Status: DISCONTINUED | OUTPATIENT
Start: 2017-12-19 | End: 2017-12-21

## 2017-12-19 RX ORDER — VARENICLINE TARTRATE 1 MG/1
1 TABLET, FILM COATED ORAL DAILY
Status: DISCONTINUED | OUTPATIENT
Start: 2017-12-19 | End: 2017-12-23 | Stop reason: HOSPADM

## 2017-12-19 RX ORDER — IPRATROPIUM BROMIDE 0.5 MG/2.5ML
0.5 SOLUTION RESPIRATORY (INHALATION)
Status: DISCONTINUED | OUTPATIENT
Start: 2017-12-19 | End: 2017-12-22

## 2017-12-19 RX ORDER — ARFORMOTEROL TARTRATE 15 UG/2ML
15 SOLUTION RESPIRATORY (INHALATION)
Status: DISCONTINUED | OUTPATIENT
Start: 2017-12-19 | End: 2017-12-22

## 2017-12-19 RX ORDER — ENOXAPARIN SODIUM 100 MG/ML
40 INJECTION SUBCUTANEOUS EVERY 24 HOURS
Status: DISCONTINUED | OUTPATIENT
Start: 2017-12-19 | End: 2017-12-23 | Stop reason: HOSPADM

## 2017-12-19 RX ORDER — ALBUTEROL SULFATE 1.25 MG/3ML
1.25 SOLUTION RESPIRATORY (INHALATION)
Status: DISCONTINUED | OUTPATIENT
Start: 2017-12-19 | End: 2017-12-23 | Stop reason: HOSPADM

## 2017-12-19 RX ORDER — PANTOPRAZOLE SODIUM 40 MG/1
40 TABLET, DELAYED RELEASE ORAL
Status: DISCONTINUED | OUTPATIENT
Start: 2017-12-19 | End: 2017-12-23 | Stop reason: HOSPADM

## 2017-12-19 RX ORDER — SODIUM CHLORIDE 9 MG/ML
125 INJECTION, SOLUTION INTRAVENOUS CONTINUOUS
Status: DISCONTINUED | OUTPATIENT
Start: 2017-12-19 | End: 2017-12-21

## 2017-12-19 RX ORDER — ONDANSETRON 2 MG/ML
4 INJECTION INTRAMUSCULAR; INTRAVENOUS
Status: DISCONTINUED | OUTPATIENT
Start: 2017-12-19 | End: 2017-12-23 | Stop reason: HOSPADM

## 2017-12-19 RX ORDER — ACETAMINOPHEN 500 MG
500 TABLET ORAL
Status: DISCONTINUED | OUTPATIENT
Start: 2017-12-19 | End: 2017-12-23 | Stop reason: HOSPADM

## 2017-12-19 RX ADMIN — WATER 2 G: 1 INJECTION INTRAMUSCULAR; INTRAVENOUS; SUBCUTANEOUS at 06:11

## 2017-12-19 RX ADMIN — LEVOFLOXACIN 750 MG: 5 INJECTION, SOLUTION INTRAVENOUS at 20:50

## 2017-12-19 RX ADMIN — METRONIDAZOLE 500 MG: 500 INJECTION, SOLUTION INTRAVENOUS at 00:08

## 2017-12-19 RX ADMIN — ENOXAPARIN SODIUM 40 MG: 40 INJECTION, SOLUTION INTRAVENOUS; SUBCUTANEOUS at 18:00

## 2017-12-19 RX ADMIN — IOPAMIDOL 100 ML: 612 INJECTION, SOLUTION INTRAVENOUS at 14:46

## 2017-12-19 RX ADMIN — SODIUM CHLORIDE 125 ML/HR: 900 INJECTION, SOLUTION INTRAVENOUS at 12:06

## 2017-12-19 RX ADMIN — IPRATROPIUM BROMIDE AND ALBUTEROL SULFATE 3 ML: 2.5; .5 SOLUTION RESPIRATORY (INHALATION) at 03:23

## 2017-12-19 RX ADMIN — GUAIFENESIN AND DEXTROMETHORPHAN HYDROBROMIDE 1 TABLET: 600; 30 TABLET, EXTENDED RELEASE ORAL at 14:18

## 2017-12-19 RX ADMIN — IPRATROPIUM BROMIDE AND ALBUTEROL SULFATE 3 ML: 2.5; .5 SOLUTION RESPIRATORY (INHALATION) at 12:04

## 2017-12-19 RX ADMIN — ACETAMINOPHEN 650 MG: 325 TABLET ORAL at 08:15

## 2017-12-19 RX ADMIN — BUDESONIDE 500 MCG: 0.5 INHALANT RESPIRATORY (INHALATION) at 20:04

## 2017-12-19 RX ADMIN — VARENICLINE TARTRATE 1 MG: 1 TABLET, FILM COATED ORAL at 14:18

## 2017-12-19 RX ADMIN — METRONIDAZOLE 500 MG: 500 INJECTION, SOLUTION INTRAVENOUS at 06:10

## 2017-12-19 RX ADMIN — SODIUM CHLORIDE 125 ML/HR: 900 INJECTION, SOLUTION INTRAVENOUS at 20:55

## 2017-12-19 RX ADMIN — LORAZEPAM 0.5 MG: 0.5 TABLET ORAL at 18:34

## 2017-12-19 RX ADMIN — IPRATROPIUM BROMIDE 0.5 MG: 0.5 SOLUTION RESPIRATORY (INHALATION) at 20:04

## 2017-12-19 RX ADMIN — METHYLPREDNISOLONE SODIUM SUCCINATE 40 MG: 40 INJECTION, POWDER, FOR SOLUTION INTRAMUSCULAR; INTRAVENOUS at 06:11

## 2017-12-19 RX ADMIN — METRONIDAZOLE 500 MG: 500 INJECTION, SOLUTION INTRAVENOUS at 10:42

## 2017-12-19 RX ADMIN — WATER 2 G: 1 INJECTION INTRAMUSCULAR; INTRAVENOUS; SUBCUTANEOUS at 22:47

## 2017-12-19 RX ADMIN — ALBUTEROL SULFATE 1.25 MG: 1.25 SOLUTION RESPIRATORY (INHALATION) at 20:04

## 2017-12-19 RX ADMIN — ACETAMINOPHEN 650 MG: 325 TABLET ORAL at 00:11

## 2017-12-19 RX ADMIN — GUAIFENESIN AND DEXTROMETHORPHAN HYDROBROMIDE 1 TABLET: 600; 30 TABLET, EXTENDED RELEASE ORAL at 20:50

## 2017-12-19 RX ADMIN — IPRATROPIUM BROMIDE AND ALBUTEROL SULFATE 3 ML: 2.5; .5 SOLUTION RESPIRATORY (INHALATION) at 09:59

## 2017-12-19 RX ADMIN — METHYLPREDNISOLONE SODIUM SUCCINATE 40 MG: 40 INJECTION, POWDER, FOR SOLUTION INTRAMUSCULAR; INTRAVENOUS at 17:22

## 2017-12-19 RX ADMIN — METHYLPREDNISOLONE SODIUM SUCCINATE 40 MG: 40 INJECTION, POWDER, FOR SOLUTION INTRAMUSCULAR; INTRAVENOUS at 12:06

## 2017-12-19 RX ADMIN — ARFORMOTEROL TARTRATE 15 MCG: 15 SOLUTION RESPIRATORY (INHALATION) at 20:04

## 2017-12-19 RX ADMIN — LORAZEPAM 0.5 MG: 0.5 TABLET ORAL at 09:57

## 2017-12-19 RX ADMIN — WATER 2 G: 1 INJECTION INTRAMUSCULAR; INTRAVENOUS; SUBCUTANEOUS at 14:18

## 2017-12-19 RX ADMIN — PANTOPRAZOLE SODIUM 40 MG: 40 TABLET, DELAYED RELEASE ORAL at 14:20

## 2017-12-19 NOTE — PROGRESS NOTES
TRANSFER - IN REPORT:    Verbal report received from MARGARITA Bolton(name) on [facility].  being received from ER(unit) for routine progression of care      Report consisted of patients Situation, Background, Assessment and   Recommendations(SBAR). Information from the following report(s) SBAR and Kardex was reviewed with the receiving nurse. Opportunity for questions and clarification was provided. Assessment completed upon patients arrival to unit and care assumed.

## 2017-12-19 NOTE — PROGRESS NOTES
Respiratory Care Assessment for Bronchial hygiene or Lung Expansion Therapy  Patient  Candido Lundborg Sr.     49 y.o.   male     12/19/2017  1:27 PM  Patient Active Problem List   Diagnosis Code    Emphysema/COPD ([facility] [address] J43.9    COPD, severe ([facility] [address] J44.9    COPD with acute exacerbation ([facility] [address] J44.1    [facility] A41.9       ABG:  Date:12/19/2017  Lab Results   Component Value Date/Time    PHI 7.405 12/19/2017 03:25 AM    PCO2I 34.4 (L) 12/19/2017 03:25 AM    PO2I 63 (L) 12/19/2017 03:25 AM    HCO3I 21.6 (L) 12/19/2017 03:25 AM       Chest X-ray:  Date:12/19/2017    Results from [place] encounter on 12/18/17   XR CHEST SNGL V   Narrative Portable chest x-ray, semierect AP view, time 1802 hours on 12/18/2017:        INDICATION:    Shortness of breath. Expiration of COPD. COMPARISON STUDY: Chest x-ray on 12/17/2017, 11/04/2017. FINDINGS:    Lungs are mildly hypoventilated. Cardiac CABG to be within normal limits is mildly more prominent as compared to  previous study. As compared to previous study there is no minimal to mild pulmonary vascular  congestion noted. Interstitial markings are mildly hazy. Right lung is clear. At  left lung base there are minimal streaky atelectatic changes noted. CP angles  are sharp bilaterally. No evidence of pneumothorax. Impression IMPRESSION:    Currently minimal to mild pulmonary vascular congestion. Minimal interstitial  pulmonary edema is not excluded at this time. Mild pulmonary hypoventilation. Minimal streaky left basilar atelectasis.             Lab Test:  Date:12/19/2017  WBC:   Lab Results   Component Value Date/Time    WBC 13.5 12/19/2017 04:09 AM   HGB: Lab Results   Component Value Date/Time    HGB 14.6 12/19/2017 04:09 AM    PLTS: Lab Results   Component Value Date/Time    PLATELET 743 49/85/1165 04:09 AM       SaO2%/flow:   SpO2 Readings from Last 1 Encounters:   12/19/17 96%       Vital Signs:   Patient Vitals for the past 8 hrs:   Temp Pulse Resp BP SpO2   12/19/17 1204 - - - - 96 %   12/19/17 1125 97 °F (36.1 °C) 89 19 148/85 95 %   12/19/17 0959 - (!) 103 - 140/88 95 %   12/19/17 0759 97.3 °F (36.3 °C) (!) 103 18 136/83 95 %         RA/O2 flow/device: NC        First Inital Assesment:     [x]Yes []No   Reevaluation/Reassessment:    []Yes [x]No     CHART REVIEW   Points 0 X 1 X 2 X 3 X 4 X Points   Pulmonary History Smoking History (1) none  Recent Smoking History <1 PPD  Recent Smoking History >1 PPD  Pulmonary Disease or Impairment  Severe Pulmonary Disease  4   Surgical History No Surgery  General Surgery  Lower Abdominal  Thoracic or Upper Abdominal  Thoracic & Pulmonary Disease  0   CXR Clear or not indicated  Chronic changes or CXR Pending  Infiltrate, atelectasis or pleural effusions  Infiltrates in more than 1lobe  Infiltrates +atelectasis  +/or pleural effusions  2     PATIENT ASSESSMENT    0 X 1 X 2 X 3 X 4 X Points   Respiratory Pattern Regular pattern RR 12-20  Increased RR 21-27   Mild Dyspnea at rest, irregular pattern RR 28-32  Moderate Dyspnea at rest, Use of accessory muscles, RR 33-36  Severe Dyspnea, Use of accessory muscles RR >36  1   Mental Status Alert Oriented cooperative  Confused, Follows commands  Lethargic, Does not follow commands  Obtunded  Unresponsive  0   Breath Sounds Clear  Decreased Unilaterally  Decreased Bilaterally  Mild Scattered wheezing or Crackles in bases  Severe Wheezing or rhonchi  2   Cough Strong dry NPC  Strong Productive  Weak NPC  Weak productive or weak with rhonchi  No cough or may require suctioning  1   Level of Activity Ambulatory  Ambulatory with assistance  Temporarily Non-ambulatory  Non-Ambulatory, able to position self  Unable to position self, confined to bed  1   Total Points/Score:   11     Specific Intervention Chart(IS, PEP)    Bronchial Hygiene/Secretion Clearance:    []EZPAP []Rotation bed with vibration    []CPT with percussor []CPT via vest   [x]Oscillastiang positive pressure expiratory device      Lung Expansion:    []Incentive Spirometer w/RT visits [x]Incentive Spirometer w/nursing    []EZPAP      *Suctioning:    []Nasal Tracheal []Tracheal     *suctioning will be ordered and done PRN with an associated frequency such as QID/PRN based on score       Other:    Care Plan   Level # Score Modality Frequency Comment   Level 1 >17 - -    Level 2 14-17 - -    Level 3 10-13 PEP, IS Q4 WA    Level 4 1-9 - -      BRONCHIAL HYGIENE SCORING AND FREQUENCY GUIDELINES   Frequency Indications/Findings Level #   Q4 ATC Copious secretions, SOB, unable to sleep 1   QID & PRN at night Moderate amounts of secretions 2   TID or Q6 wa Small amounts of secretions and poor cough: recent history of secretions 3   BID or Q8 wa Unable to deep breathe and cough effectively 4        Comments:  -    Respiratory Therapist: Juan Radford, RT

## 2017-12-19 NOTE — PROGRESS NOTES
Problem: Falls - Risk of  Goal: *Absence of Falls  Document Dejon Fall Risk and appropriate interventions in the flowsheet. Outcome: Progressing Towards Goal  Fall Risk Interventions:            Medication Interventions: Patient to call before getting OOB                  Problem: Falls - Risk of  Goal: *Absence of Falls  Document Dejon Fall Risk and appropriate interventions in the flowsheet.   Outcome: Progressing Towards Goal  Fall Risk Interventions:            Medication Interventions: Patient to call before getting OOB

## 2017-12-19 NOTE — TELEPHONE ENCOUNTER
Medication: Advair 500/50, dose: 1 inhalation, how often: twice daily, current number of medication days provided: 90, refill per application. Lot #: [id number], EXP.03/19. This medication was received and verified for the following 1. Correct Patient, 2. Correct Diagnosis, 3. Correct Drug, 4. Correct route, and no current allergy to medication        Please contact patient to come  their medications.      Jerry Ramirez, MSN, RN, FNP-C     [facility]

## 2017-12-19 NOTE — ROUTINE PROCESS
Bedside and Verbal shift change report given to Arias Padilla RN (oncoming nurse) by Anne-Marie Cheney RN (offgoing nurse). Report included the following information SBAR, Kardex, MAR and Recent Results. SITUATION:  Code Status: No Order  Reason for Admission: COPD with acute exacerbation [facility])  Hospital day: 1  Problem List:       Hospital Problems  Date Reviewed: 12/18/2017          Codes Class Noted POA    * (Principal)COPD with acute exacerbation ([facility] [address] ICD-10-CM: J44.1  ICD-9-CM: 491.21  12/18/2017 Yes        Sepsis ([facility] [address] ICD-10-CM: A41.9  ICD-9-CM: 038.9, 995.91  12/18/2017 Yes        COPD, severe ([facility] [address] ICD-10-CM: J44.9  ICD-9-CM: 80  Unknown Yes              BACKGROUND:   Past Medical History:   Past Medical History:   Diagnosis Date    Chronic obstructive pulmonary disease ([facility] [address] 08/03/2017    PFTS 8/3/17    COPD, severe ([facility] [address]     Emphysema/COPD ([facility] [address]     Esophagitis 12/11/2017    Endoscopy    History of stomach ulcers     Positive urine drug screen 01/2016    opiates and THC    Upper GI bleed       Patient taking anticoagulants no    Patient has a defibrillator: no    History of shots NO for example, flu, pneumonia, tetanus   Isolation History NO for example, MRSA, CDiff    ASSESSMENT:  Changes in Assessment Throughout Shift: NONE  Significant Changes in 24 hours (for example, RR/code, fall)  Patient has Central Line: no Reasons if yes:   Patient has Santos Cath: no Reasons if yes:     Mobility Issues  PT  IV Patency  OR Checklist  Pending Tests    Last Vitals:  Vitals w/ MEWS Score (last day)     Date/Time MEWS Score Pulse Resp Temp BP Level of Consciousness SpO2    12/19/17 0759 2 (!)  103 18 97.3 °F (36.3 °C) 136/83 Alert 95 %    12/19/17 0400 3 (!)  111 18 98.7 °F (37.1 °C) 125/74 Alert 94 %    12/19/17 0323 -- -- -- -- -- -- 94 %    12/19/17 0000 3 (!)  118 20 97.9 °F (36.6 °C) 141/80 Alert 93 %    12/18/17 2200 3 (!)  123 20 97.5 °F (36.4 °C) 145/87 Alert 94 %    12/18/17 2100 -- (!)  120 -- -- 137/80 -- 92 %    12/18/17 2045 -- (!)  119 -- -- -- -- 90 %    12/18/17 2030 -- (!)  118 -- -- 140/75 -- 92 %    12/18/17 2015 -- (!)  122 -- -- -- -- 92 %    12/18/17 2000 -- (!)  122 -- -- 151/84 -- 93 %    12/18/17 19:47:22 -- -- 22 -- -- -- --    12/18/17 1945 -- (!)  124 -- -- -- -- 93 %    12/18/17 1930 -- (!)  119 -- -- 135/81 -- 91 %    12/18/17 1915 -- (!)  119 -- -- -- -- 90 %    12/18/17 1900 -- (!)  124 -- -- 141/72 -- 90 %    12/18/17 1800 -- (!)  117 -- -- 137/77 -- 92 %    12/18/17 1727 4 (!)  121 24 98 °F (36.7 °C) 157/89 Alert 93 %            PAIN    Pain Assessment    Pain Intensity 1: 10 (12/19/17 0816)    Pain Location 1: Head    Pain Intervention(s) 1: Medication (see MAR)    Patient Stated Pain Goal: 0  Intervention effective: yes, Tylenol  Time of last intervention: 12/19/17 Reassessment Completed: yes   Other actions taken for pain: DISTRACTION    Last 3 Weights:  Last 3 Recorded Weights in this Encounter    12/18/17 1727   Weight: 77.1 kg (170 lb)   Weight change:     INTAKE/OUPUT    Current Shift: 12/19 0701 - 12/19 1900  In: 240 [P.O.:240]  Out: 400 [Urine:400]    Last three shifts:      RECOMMENDATIONS AND DISCHARGE PLANNING  Patient needs and requests: none    Pending tests/procedures: labs     Discharge plan for patient: TBD    Discharge planning Needs or Barriers: None    Estimated Discharge Date: TBD Posted on Whiteboard in Patients Room: yes       \"HEALS\" SAFETY CHECK  A safety check occurred in the patient's room between off going nurse and oncoming nurse listed above. The safety check included the below items:    H  High Alert Medications Verify all high alert medication drips (heparin, PCA, etc.)  E  Equipment Suction is set up for ALL patients (with miguel aker)  Red plugs utilized for all equipment (IV pumps, etc.)  WOWs wiped down at end of shift.   Room stocked with oxygen, suction, and other unit-specific supplies  A  Alarms Bed alarm is set for fall risk patients  Ensure chair alarm is in place and activated if patient is up in a chair  L  Lines Check IV for any infiltration  Santos bag is empty if patient has a Santos   Tubing and IV bags are labeled  S  Safety  Room is clean, patient is clean, and equipment is clean. Hallways are clear from equipment besides carts. Fall bracelet on for fall risk patients  Ensure room is clear and free of clutter  Suction is set up for ALL patients (with aida)  Hallways are clear from equipment besides carts.    Isolation precautions followed, supplies available outside room, sign posted    Collette Lin, RN

## 2017-12-19 NOTE — ED NOTES
Bedside and Verbal shift change report given to Queen Rachel RN (oncoming nurse) by Fabien Rosenthal RN (offgoing nurse). Report included the following information SBAR, ED Summary, MAR, Recent Results and Cardiac Rhythm ST       Pt resting in bed with family at bedside. Pt complaining of \"anxious\" feeling. Explained it could be caused by the breathing tx, since it is a steroid. Will continue to monitor.

## 2017-12-19 NOTE — H&P
History & Physical    Patient: Yadi Munguia Sr. MRN: [id number]  CSN: [id number]    YOB: 1970  Age: 49 y.o. Sex: male      DOA: 12/18/2017    Chief Complaint:   Chief Complaint   Patient presents with    Respiratory Distress          HPI:     Yadi Munguia Sr. is a 49 y.o.  male who has STAge 4 copd   Presents to ER for 3rd time in 24 hours with worsening dyspnea cough and difficulty catching breath;   Has had increase cough and congestion as well as shorntness of breath last 3 days   Went to ER given steroids and duonebs with minimal response  Sees pulmonary was working on home oxygen   Room air pulse ox 85-86 with each excerbation prior to treatment  Currently anxious and irratable wife and inlaw at bedside   Has cut back from 2 packs a day to half a pack a day     Past Medical History:   Diagnosis Date    Chronic obstructive pulmonary disease ([address] 08/03/2017    PFTS 8/3/17    COPD, severe ([address]     Emphysema/COPD ([facility] [address]     Esophagitis 12/11/2017    Endoscopy    History of stomach ulcers     Positive urine drug screen 01/2016    opiates and THC    Upper GI bleed        Past Surgical History:   Procedure Laterality Date    HX ENDOSCOPY  12/11/2017       Family History   Problem Relation Age of Onset    Hypertension Mother     Heart Disease Mother     Diabetes Mother     Hypertension Father     Heart Disease Father     Cancer Father      lymphnodes    Diabetes Father        Social History     Social History    Marital status:      Spouse name: N/A    Number of children: N/A    Years of education: N/A     Social History Main Topics    Smoking status: Current Every Day Smoker     Packs/day: 0.50     Years: 25.00     Types: Cigarettes     Start date: 5/28/1984    Smokeless tobacco: Never Used    Alcohol use No    Drug use: Yes     Special: Marijuana      Comment: Hx of Marijuana use    Sexual activity: Yes     Partners: Female     Other Topics Concern   Service No    Blood Transfusions No    Caffeine Concern Yes    Occupational Exposure No    Hobby Hazards No    Sleep Concern Yes     occas    Stress Concern No    Weight Concern No    Special Diet No    Back Care Yes    Exercise No    Bike Helmet Yes    Seat Belt No     occas     Social History Narrative       Prior to Admission medications    Medication Sig Start Date End Date Taking? Authorizing Provider   predniSONE (DELTASONE) 50 mg tablet Take 1 Tab by mouth daily for 5 days. 12/18/17 12/23/17  Jd Biswas MD   acetaminophen (TYLENOL) 500 mg tablet Take  by mouth every six (6) hours as needed for Pain. Historical Provider   traMADol (ULTRAM) 50 mg tablet Take 1 Tab by mouth every six (6) hours as needed for Pain. Max Daily Amount: 200 mg. Indications: Pain 11/2/17   Ghada Junior NP   albuterol-ipratropium (DUO-NEB) 2.5 mg-0.5 mg/3 ml nebu 3 mL by Nebulization route every six (6) hours as needed. 10/23/17   Ghada Junior NP   varenicline (CHANTIX) 1 mg tablet Take 1 Tab by mouth two (2) times a day. Start after completion of starter pack 7/26/17   Ghada Junior NP   fluticasone-salmeterol (ADVAIR) 500-50 mcg/dose diskus inhaler Take 1 Puff by inhalation two (2) times a day. 7/24/17   Ghada Junior NP   albuterol (PROVENTIL HFA, VENTOLIN HFA, PROAIR HFA) 90 mcg/actuation inhaler Take 2 Puffs by inhalation every four (4) hours as needed for Wheezing. 1/23/17   Ghada Junior NP   tiotropium (SPIRIVA) 18 mcg inhalation capsule Take 1 Cap by inhalation daily. Indications: BRONCHOSPASM PREVENTION WITH COPD 1/23/17   Ghada Junior NP       No Known Allergies      Review of Systems  GENERAL: Patient alert, awake and oriented times 3, able to communicate full sentences and not in distress. HEENT: No change in vision, no earache, tinnitus, sore throat or sinus congestion. NECK: No pain or stiffness.    PULMONARY:has  shortness of breath, +cough + wheeze. Cardiovascular: no pnd or orthopnea, no CP  GASTROINTESTINAL: No abdominal pain, nausea, vomiting or diarrhea, melena or bright red blood per rectum. GENITOURINARY: No urinary frequency, urgency, hesitancy or dysuria. MUSCULOSKELETAL: No joint or muscle pain, no back pain, no recent trauma. DERMATOLOGIC: No rash, no itching, no lesions. ENDOCRINE: No polyuria, polydipsia, no heat or cold intolerance. No recent change in weight. HEMATOLOGICAL: No anemia or easy bruising or bleeding. NEUROLOGIC: No headache, seizures, numbness, tingling or weakness. Physical Exam:     Physical Exam:  Visit Vitals    /80    Pulse (!) 120    Temp 98 °F (36.7 °C)    Resp 22    Wt 77.1 kg (170 lb)    SpO2 92%    BMI 25.1 kg/m2      O2 Device: Nasal cannula    Temp (24hrs), Av °F (36.7 °C), Min:98 °F (36.7 °C), Max:98 °F (36.7 °C)             General:  Alert, cooperative, no distress, appears stated age. Head: Normocephalic, without obvious abnormality, atraumatic. Eyes:  Conjunctivae/corneas clear. PERRL, EOMs intact. Nose: Nares normal. No drainage or sinus tenderness. Neck: Supple, symmetrical, trachea midline, no adenopathy, thyroid: no enlargement, no carotid bruit and no JVD. Lungs:   Clear to auscultation bilaterally. diminished lungs sounds through out    Heart:  Regular rate and rhythm, S1, S2 normal.     Abdomen: Soft, non-tender. Bowel sounds normal.    Extremities: Extremities normal, atraumatic, no cyanosis or edema. Pulses: 2+ and symmetric all extremities. Skin:  No rashes or lesions   Neurologic: AAOx3, No focal motor or sensory deficit. Labs Reviewed: All lab results for the last 24 hours reviewed.    and EKG  Recent Results (from the past 12 hour(s))   CBC WITH AUTOMATED DIFF    Collection Time: 17  5:33 PM   Result Value Ref Range    WBC 13.0 4.6 - 13.2 K/uL    RBC 5.11 4.70 - 5.50 M/uL    HGB 16.8 (H) 13.0 - 16.0 g/dL    HCT 46.9 36.0 - 48.0 %    MCV 91.8 74.0 - 97.0 FL    MCH 32.9 24.0 - 34.0 PG    MCHC 35.8 31.0 - 37.0 g/dL    RDW 13.3 11.6 - 14.5 %    PLATELET 748 656 - 866 K/uL    MPV 11.1 9.2 - 11.8 FL    NEUTROPHILS 96 (H) 42 - 75 %    LYMPHOCYTES 4 (L) 20 - 51 %    MONOCYTES 0 (L) 2 - 9 %    EOSINOPHILS 0 0 - 5 %    BASOPHILS 0 0 - 3 %    ABS. NEUTROPHILS 12.5 (H) 1.8 - 8.0 K/UL    ABS. LYMPHOCYTES 0.5 (L) 0.8 - 3.5 K/UL    ABS. MONOCYTES 0.0 0 - 1.0 K/UL    ABS. EOSINOPHILS 0.0 0.0 - 0.4 K/UL    ABS. BASOPHILS 0.0 0.0 - 0.06 K/UL    DF MANUAL      PLATELET COMMENTS ADEQUATE PLATELETS      RBC COMMENTS NORMOCYTIC, NORMOCHROMIC     METABOLIC PANEL, COMPREHENSIVE    Collection Time: 12/18/17  5:33 PM   Result Value Ref Range    Sodium 139 136 - 145 mmol/L    Potassium 3.9 3.5 - 5.5 mmol/L    Chloride 105 100 - 108 mmol/L    CO2 25 21 - 32 mmol/L    Anion gap 9 3.0 - 18 mmol/L    Glucose 153 (H) 74 - 99 mg/dL    BUN 11 7.0 - 18 MG/DL    Creatinine 1.02 0.6 - 1.3 MG/DL    BUN/Creatinine ratio 11 (L) 12 - 20      GFR est AA >60 >60 ml/min/1.73m2    GFR est non-AA >60 >60 ml/min/1.73m2    Calcium 8.9 8.5 - 10.1 MG/DL    Bilirubin, total 0.8 0.2 - 1.0 MG/DL    ALT (SGPT) 30 16 - 61 U/L    AST (SGOT) 14 (L) 15 - 37 U/L    Alk.  phosphatase 78 45 - 117 U/L    Protein, total 7.7 6.4 - 8.2 g/dL    Albumin 4.1 3.4 - 5.0 g/dL    Globulin 3.6 2.0 - 4.0 g/dL    A-G Ratio 1.1 0.8 - 1.7     TROPONIN I    Collection Time: 12/18/17  5:33 PM   Result Value Ref Range    Troponin-I, Qt. <0.02 0.0 - 0.045 NG/ML   NT-PRO BNP    Collection Time: 12/18/17  5:33 PM   Result Value Ref Range    NT pro- 0 - 450 PG/ML   D DIMER    Collection Time: 12/18/17  5:33 PM   Result Value Ref Range    D DIMER <0.27 <0.46 ug/ml(FEU)   POC G3    Collection Time: 12/18/17  6:14 PM   Result Value Ref Range    Device: NASAL CANNULA      Flow rate (POC) 2 L/M    pH (POC) 7.338 (L) 7.35 - 7.45      pCO2 (POC) 40.7 35.0 - 45.0 MMHG    pO2 (POC) 34 (LL) 80 - 100 MMHG    HCO3 (POC) 21.8 (L) 22 - 26 MMOL/L    sO2 (POC) 61 (L) 92 - 97 %    Base deficit (POC) 4 mmol/L    Allens test (POC) YES      Total resp. rate 30      Site LEFT RADIAL      Specimen type (POC) ARTERIAL      Performed by David France    POC LACTIC ACID    Collection Time: 12/18/17  7:28 PM   Result Value Ref Range    Lactic Acid (POC) 4.0 (HH) 0.4 - 2.0 mmol/L   EKG, 12 LEAD, INITIAL    Collection Time: 12/18/17  7:58 PM   Result Value Ref Range    Ventricular Rate 120 BPM    Atrial Rate 120 BPM    P-R Interval 148 ms    QRS Duration 72 ms    Q-T Interval 302 ms    QTC Calculation (Bezet) 426 ms    Calculated P Axis 63 degrees    Calculated R Axis 67 degrees    Calculated T Axis 41 degrees    Diagnosis       Sinus tachycardia  Nonspecific T wave abnormality  Abnormal ECG  When compared with ECG of 17-DEC-2017 22:07,  No significant change was found  Confirmed by Jourdan Santoyo ([id number]) on 12/18/2017 8:29:54 PM         Procedures/imaging: see electronic medical records for all procedures/Xrays and details which were not copied into this note but were reviewed prior to creation of Plan      Assessment/Plan     Active Problems:    COPD, severe ([facility] [address] ()  Start IV solumedrol  Duo neb  Nicotine patch smoking cessation advised         Sepsis ([facility] [address] (12/18/2017)  Fluids 30cc/kg  Started bundle in ER     Cover for Atypical Pneumonia    Levaquin/Zosyn  Check CT of Chest     Anxiety  lorazepam prn   Check baseline UDS     HTN     Hydralazine prn   DVT/GI Prophylaxis: Hep SQ    Discussed with patient at bedside about hospital admission and my plan care, who understood and agree with my plan care.     Samuel Key MD  12/18/2017 9:59 PM

## 2017-12-19 NOTE — PROGRESS NOTES
Hospitalist Progress Note    Patient: Liliana Savage Sr. MRN: [id number]  CSN: [id number]    YOB: 1970  Age: 49 y.o. Sex: male    DOA: 12/18/2017 LOS:  LOS: 1 day          He denies any recent healthcare exposure. States he is finally starting to break up and expectorate sputum, which is brown. nicoderm is no help, he requests chantix which he has been taking at home. Other family members smoke, but not in the house. his wife does not smoke. Patient requests tx for anxiety, he's not sure which comes first - shortness of breath or anxiety. Feels only minimal improvement in his breathing since admission. He is not on home oxygen. He follows w/ Dr. Alirio Littlejohn in the outpatient setting. Assessment/Plan     1. COPD, severe - solumedrol, duoneb, tobacco cessation, abx. Pulmonary to consult   2. Sinus tachycardia poa - related to infection. 3. Acute bronchitis - Levaquin/Zosyn - CT chest no infiltrate. 4. Elevated bp - not on meds at home - monitor on steroids. 5. Lactic acidosis - recheck in am.   6. uds + opiates. 7. Tobacco abuse - chantix. Smoking cessation education  8. normal event monitor 2015  9. Recent GI bleed in the setting of steroid use, egd 12/11/17 revealed Grade 1 espohagitis  10. Mild LAD coronary arteriosclerosis seen on CT chest.   12. Anxiety - prn ativan  13. dvt prophylaxis  14. Full code. Wife at bedside, all questions answered to the best of my ability. Additional Notes:      Case discussed with:  [x]Patient  [x]Family  [x]Nursing  []Case Management  DVT Prophylaxis:  []Lovenox  [x]Hep SQ  []SCDs  []Coumadin   []On Heparin gtt    Vital signs/Intake and Output:  Visit Vitals    /88    Pulse (!) 103    Temp 97.3 °F (36.3 °C)    Resp 18    Wt 77.1 kg (170 lb)    SpO2 95%    BMI 25.1 kg/m2     Current Shift:  12/19 0701 - 12/19 1900  In: 660 [P.O.:660]  Out: 700 [Urine:700]  Last three shifts:       Awake alert and oriented.  Cough productive of thick tan sputum  Ncat. Perrl. Oropharynx clear   RRR  Attenuated breath sounds and coarse rhonchi b.l  Soft nt nd nabs  No edema. dp 2+ b.l  No focal deficit  No rash    Medications Reviewed      Labs: Results:       Chemistry Recent Labs      12/19/17 0409 12/18/17 1733 12/17/17 2200   GLU  138*  153*  100*   NA  141  139  140   K  4.5  3.9  3.7   CL  111*  105  108   CO2  23  25  27   BUN  11  11  10   CREA  0.81  1.02  0.74   CA  8.5  8.9  8.2*   AGAP  7  9  5   BUCR  14  11*  14   AP  63  78  70   TP  6.3*  7.7  6.6   ALB  3.5  4.1  3.5   GLOB  2.8  3.6  3.1   AGRAT  1.3  1.1  1.1      CBC w/Diff Recent Labs      12/19/17 0409 12/18/17 1733 12/17/17 2200   WBC  13.5*  13.0  6.3   RBC  4.58*  5.11  4.90   HGB  14.6  16.8*  16.1*   HCT  42.3  46.9  44.1   PLT  211  212  194   GRANS  93*  96*  66   LYMPH  4*  4*  27   EOS  0  0  1      Cardiac Enzymes Recent Labs      12/17/17   2200   CPK  95   CKND1  1.5      Coagulation No results for input(s): PTP, INR, APTT in the last 72 hours. No lab exists for component: INREXT    Lipid Panel Lab Results   Component Value Date/Time    Cholesterol, total 144 01/23/2017 01:11 PM    HDL Cholesterol 36 01/23/2017 01:11 PM    LDL, calculated 85.6 01/23/2017 01:11 PM    VLDL, calculated 22.4 01/23/2017 01:11 PM    Triglyceride 112 01/23/2017 01:11 PM    CHOL/HDL Ratio 4.0 01/23/2017 01:11 PM      BNP No results for input(s): BNPP in the last 72 hours. Liver Enzymes Recent Labs      12/19/17   0409   TP  6.3*   ALB  3.5   AP  63   SGOT  9*      Thyroid Studies Lab Results   Component Value Date/Time    TSH 2.21 12/17/2017 10:00 PM        Procedures/imaging: see electronic medical records for all procedures/Xrays and details which were not copied into this note but were reviewed prior to creation of Plan.

## 2017-12-19 NOTE — CONSULTS
[facility]  Pulmonary, Critical Care, and Sleep Medicine    Name: Chao Martinez Sr. MRN: [id number]   : 1970 Hospital: [address] [place] [address]   Date: 2017        Pulmonary Initial In-Patient Consult                                              Consult requesting physician: Dr. Haider Whitfield  Reason for Consult: COPD exacerbation    IMPRESSION:   · COPD exacerbation with presentation of worsening shortness of breath  · Severe COPD, FEV1/FVC ratio < 61%  · Gr 1 esophagitis by EGD (17) secondary to NSAIDs and steroids        RECOMMENDATIONS:   · May continue with oxygen supplementation, titrate for SpO2 88-92%. Pt may need home oxygen evaluation prior to discharge. Alpha-1 antitrypsin testing as outpatient in [place] [address] clinic. · Trial of bipap at night, same SpO2 goal as NC  · Continue IV steroids, wean as appropriate  · Start pulmicort and brovana today. Decrease dose of albuterol to 1.25 mg and deliver alongside ipratropium 0.5 mg instead of duoneb to decrease risk for tachycardia. · Continue antibiotics and de-escalate soon. Follow blood c/s; obtain respiratory c/s if not done yet  · Agree with chantix  · Smoking cessation education  · Rest of concerns per primary. Thank you for the consult. Subjective/History:     Chao Martinez Sr. is a 49 y.o. male with PMHx significant for severe COPD and Gr 1 esophagitis, presented to the ED for worsening shortness of breath. Pt's symptoms started on  upon waking up with sob; not relieved by multiple albuterol inhaler treatments throughout the day. Pt was brought by ambulance to [facility] ED and was given solumedrol en route. Pt received nebulized treatment and discharged with prescription for prednisone which he was not able to fill immediately. A few hours after discharge and while driving on , pt started to have recurrence of sob unrelieved by inhaler treatments.  Pt was brought to [facility] ED where he was given nebulized treatments and discharged on doxycycline and prednisone PO. Upon arriving home, pt again had recurrence of shortness of breath and was brought to back to [facility] ED. Pt's shortness of breath has become more frequent in the last year and states that he also becomes short of breath with exertion (climbing up the stairs; carrying equipment at work). Pt was last seen by Dr. Elma Flores in Nov for follow-up where he was diagnosed for mild exacerbation of COPD however was unable to get treated with steroids as he had some reported GIB. Pt then  he underwent EGD last 12/11 following episodes of dark tarry loose stools while taking NSAIDs and steroids. EGD showed Gr 1 esophagitis. Pt also endorses intermittent low grade fever since completing the EGD with highest temp around 100.5. Pt states he used to cough in the past however not recently in the last few months until this past couple of days when he started expectorating brownish-yellow sputum alternating with whitish sputum. Pt endorses being jittery while he has been taking albuterol rescue inhaler very frequently. Pt continues to smoke, however has significantly cut down since about 2 months ago. He used to smoke 2 packs/day however has been able to wean himself down to half pack/day. Pt denies recreational drug use; has occasional alcohol drinking. Pt works as a .        Review of Systems:   HEENT: No epistaxis, no nasal drainage, no difficulty in swallowing, no redness in eyes  Respiratory: as above  Cardiovascular: no chest pain, no palpitations, no chronic leg edema, no syncope  Gastrointestinal: no abd pain, no vomiting, no diarrhea, no bleeding symptoms recently after EGD  Genitourinary: No urinary symptoms or hematuria  Integument/breast: No ulcers or rashes  Musculoskeletal:Neg  Neurological: No focal weakness, no seizures, no headaches  Behvioral/Psych: no depression  Constitutional: No fever, no chills, no weight loss, no night sweats       Immunization status:   Immunization History   Administered Date(s) Administered    Influenza Vaccine (Quad) PF 10/23/2017        No Known Allergies     Past Medical History:   Diagnosis Date    Chronic obstructive pulmonary disease ([facility] 08/03/2017    PFTS 8/3/17    COPD, severe ([facility]     Emphysema/COPD ([facility]     Esophagitis 12/11/2017    Endoscopy    History of stomach ulcers     Positive urine drug screen 01/2016    opiates and THC    Upper GI bleed         Past Surgical History:   Procedure Laterality Date    HX ENDOSCOPY  12/11/2017        Family History   Problem Relation Age of Onset    Hypertension Mother     Heart Disease Mother     Diabetes Mother     Hypertension Father     Heart Disease Father     Cancer Father      lymphnodes    Diabetes Father         Social History   Substance Use Topics    Smoking status: Current Every Day Smoker     Packs/day: 2.00     Years: 25.00     Types: Cigarettes     Start date: 5/28/1984    Smokeless tobacco: Never Used    Alcohol use No        Prior to Admission medications    Medication Sig Start Date End Date Taking? Authorizing Provider   predniSONE (DELTASONE) 50 mg tablet Take 1 Tab by mouth daily for 5 days. 12/18/17 12/23/17  Merline River, MD   acetaminophen (TYLENOL) 500 mg tablet Take  by mouth every six (6) hours as needed for Pain. Historical Provider   traMADol (ULTRAM) 50 mg tablet Take 1 Tab by mouth every six (6) hours as needed for Pain. Max Daily Amount: 200 mg. Indications: Pain 11/2/17   Galo Eng NP   albuterol-ipratropium (DUO-NEB) 2.5 mg-0.5 mg/3 ml nebu 3 mL by Nebulization route every six (6) hours as needed. 10/23/17   Galo Eng NP   varenicline (CHANTIX) 1 mg tablet Take 1 Tab by mouth two (2) times a day. Start after completion of starter pack 7/26/17   Galo Eng NP   fluticasone-salmeterol (ADVAIR) 500-50 mcg/dose diskus inhaler Take 1 Puff by inhalation two (2) times a day. 17   Noelle Chapa NP   albuterol (PROVENTIL HFA, VENTOLIN HFA, PROAIR HFA) 90 mcg/actuation inhaler Take 2 Puffs by inhalation every four (4) hours as needed for Wheezing. 17   Noelle Chapa NP   tiotropium (SPIRIVA) 18 mcg inhalation capsule Take 1 Cap by inhalation daily.  Indications: BRONCHOSPASM PREVENTION WITH COPD 17   Noelle Chapa NP       Current Facility-Administered Medications   Medication Dose Route Frequency    0.9% sodium chloride infusion  125 mL/hr IntraVENous CONTINUOUS    acetaminophen (TYLENOL) tablet 500 mg  500 mg Oral Q6H PRN    hydrALAZINE (APRESOLINE) tablet 25 mg  25 mg Oral Q8H PRN    ondansetron (ZOFRAN) injection 4 mg  4 mg IntraVENous Q8H PRN    guaiFENesin-dextromethorphan SR (HUMIBID DM) 600-30 mg tablet 1 Tab  1 Tab Oral Q12H    varenicline (CHANTIX) tablet 1 mg  1 mg Oral DAILY    pantoprazole (PROTONIX) tablet 40 mg  40 mg Oral ACB    tiotropium (SPIRIVA) inhalation capsule 18 mcg  1 Cap Inhalation DAILY    sodium chloride (NS) flush 5-10 mL  5-10 mL IntraVENous PRN    levoFLOXacin (LEVAQUIN) 750 mg in D5W IVPB  750 mg IntraVENous Q24H    LORazepam (ATIVAN) tablet 0.5 mg  0.5 mg Oral Q4H PRN    methylPREDNISolone (PF) (SOLU-MEDROL) injection 40 mg  40 mg IntraVENous Q6H    albuterol-ipratropium (DUO-NEB) 2.5 MG-0.5 MG/3 ML  3 mL Nebulization Q4H RT    cefepime (MAXIPIME) 2 g in sterile water (preservative free) 10 mL IV syringe  2 g IntraVENous Q8H         Objective:   Vital Signs:    Visit Vitals    /85 (BP 1 Location: Right arm, BP Patient Position: At rest)    Pulse 89    Temp 97 °F (36.1 °C)    Resp 19    Wt 77.1 kg (170 lb)    SpO2 96%    BMI 25.1 kg/m2       O2 Device: Nasal cannula   O2 Flow Rate (L/min): 4 l/min   Temp (24hrs), Av.7 °F (36.5 °C), Min:97 °F (36.1 °C), Max:98.7 °F (37.1 °C)       Intake/Output:   Last shift:      701 -  1900  In: 1440 [P.O.:1440]  Out: 700 [Urine:700]  Last 3 shifts: Intake/Output Summary (Last 24 hours) at 12/19/17 1521  Last data filed at 12/19/17 1412   Gross per 24 hour   Intake             1440 ml   Output              700 ml   Net              740 ml       Physical Exam:     General/Neurology: Alert, Awake; has conversational dyspnea  Head:   Normocephalic, without obvious abnormality, atraumatic. Eye:   PERRL, EOM intact, no scleral icterus, no pallor, no cyanosis  Nose:   No sinus tenderness, no erythema, no induration, no discharge  Throat:  Lips, mucosa, and tongue normal. Teeth and gums normal. No tonsillar enlargement,   no erythema, no exudates. No oral thrush  Neck:   Supple, symmetric. Thyroid: no enlargement/tenderness/nodule. No carotid bruit. No   lymphadenopathy. Trachea midline  Back & spine: Symmetric, no curvature. Chest wall: No tenderness or deformity. No rash  Lung: Moderately decreased air entry b/l. Symmetrical chest expansion. + expiratory   wheezing more pronounced on left side. Heart:   Mildly tachycardic rate but regular rhythm   Abdomen:  Soft. NT. ND. +BS. Extremities:  No pedal edema. No cyanosis. No clubbing  Pulses: 2+ and symmetric in DP  Lymphatic:  No cervical, supraclavicular palpable lymphadenopathy. Musculoskeletal: No joint swelling. No tenderness. Skin:   Color, texture, turgor normal. No rashes or lesions      Data:       Recent Results (from the past 24 hour(s))   CBC WITH AUTOMATED DIFF    Collection Time: 12/18/17  5:33 PM   Result Value Ref Range    WBC 13.0 4.6 - 13.2 K/uL    RBC 5.11 4.70 - 5.50 M/uL    HGB 16.8 (H) 13.0 - 16.0 g/dL    HCT 46.9 36.0 - 48.0 %    MCV 91.8 74.0 - 97.0 FL    MCH 32.9 24.0 - 34.0 PG    MCHC 35.8 31.0 - 37.0 g/dL    RDW 13.3 11.6 - 14.5 %    PLATELET 390 648 - 158 K/uL    MPV 11.1 9.2 - 11.8 FL    NEUTROPHILS 96 (H) 42 - 75 %    LYMPHOCYTES 4 (L) 20 - 51 %    MONOCYTES 0 (L) 2 - 9 %    EOSINOPHILS 0 0 - 5 %    BASOPHILS 0 0 - 3 %    ABS. NEUTROPHILS 12.5 (H) 1.8 - 8.0 K/UL    ABS. LYMPHOCYTES 0.5 (L) 0.8 - 3.5 K/UL    ABS. MONOCYTES 0.0 0 - 1.0 K/UL    ABS. EOSINOPHILS 0.0 0.0 - 0.4 K/UL    ABS. BASOPHILS 0.0 0.0 - 0.06 K/UL    DF MANUAL      PLATELET COMMENTS ADEQUATE PLATELETS      RBC COMMENTS NORMOCYTIC, NORMOCHROMIC     METABOLIC PANEL, COMPREHENSIVE    Collection Time: 12/18/17  5:33 PM   Result Value Ref Range    Sodium 139 136 - 145 mmol/L    Potassium 3.9 3.5 - 5.5 mmol/L    Chloride 105 100 - 108 mmol/L    CO2 25 21 - 32 mmol/L    Anion gap 9 3.0 - 18 mmol/L    Glucose 153 (H) 74 - 99 mg/dL    BUN 11 7.0 - 18 MG/DL    Creatinine 1.02 0.6 - 1.3 MG/DL    BUN/Creatinine ratio 11 (L) 12 - 20      GFR est AA >60 >60 ml/min/1.73m2    GFR est non-AA >60 >60 ml/min/1.73m2    Calcium 8.9 8.5 - 10.1 MG/DL    Bilirubin, total 0.8 0.2 - 1.0 MG/DL    ALT (SGPT) 30 16 - 61 U/L    AST (SGOT) 14 (L) 15 - 37 U/L    Alk. phosphatase 78 45 - 117 U/L    Protein, total 7.7 6.4 - 8.2 g/dL    Albumin 4.1 3.4 - 5.0 g/dL    Globulin 3.6 2.0 - 4.0 g/dL    A-G Ratio 1.1 0.8 - 1.7     TROPONIN I    Collection Time: 12/18/17  5:33 PM   Result Value Ref Range    Troponin-I, Qt. <0.02 0.0 - 0.045 NG/ML   NT-PRO BNP    Collection Time: 12/18/17  5:33 PM   Result Value Ref Range    NT pro- 0 - 450 PG/ML   D DIMER    Collection Time: 12/18/17  5:33 PM   Result Value Ref Range    D DIMER <0.27 <0.46 ug/ml(FEU)   POC G3    Collection Time: 12/18/17  6:14 PM   Result Value Ref Range    Device: NASAL CANNULA      Flow rate (POC) 2 L/M    pH (POC) 7.338 (L) 7.35 - 7.45      pCO2 (POC) 40.7 35.0 - 45.0 MMHG    pO2 (POC) 34 (LL) 80 - 100 MMHG    HCO3 (POC) 21.8 (L) 22 - 26 MMOL/L    sO2 (POC) 61 (L) 92 - 97 %    Base deficit (POC) 4 mmol/L    Allens test (POC) YES      Total resp.  rate 30      Site LEFT RADIAL      Specimen type (POC) ARTERIAL      Performed by [address] [place] [address], BLOOD    Collection Time: 12/18/17  7:00 PM   Result Value Ref Range    Special Requests: NO SPECIAL REQUESTS      Culture result: NO GROWTH AFTER 10 HOURS     CULTURE, BLOOD    Collection Time: 12/18/17  7:15 PM   Result Value Ref Range    Special Requests: NO SPECIAL REQUESTS      Culture result: NO GROWTH AFTER 10 HOURS     POC LACTIC ACID    Collection Time: 12/18/17  7:28 PM   Result Value Ref Range    Lactic Acid (POC) 4.0 (HH) 0.4 - 2.0 mmol/L   EKG, 12 LEAD, INITIAL    Collection Time: 12/18/17  7:58 PM   Result Value Ref Range    Ventricular Rate 120 BPM    Atrial Rate 120 BPM    P-R Interval 148 ms    QRS Duration 72 ms    Q-T Interval 302 ms    QTC Calculation (Bezet) 426 ms    Calculated P Axis 63 degrees    Calculated R Axis 67 degrees    Calculated T Axis 41 degrees    Diagnosis       Sinus tachycardia  Nonspecific T wave abnormality  Abnormal ECG  When compared with ECG of 17-DEC-2017 22:07,  No significant change was found  Confirmed by Grant Jones ([id number]) on 12/18/2017 8:29:54 PM     POC G3    Collection Time: 12/19/17  3:25 AM   Result Value Ref Range    Device: NASAL CANNULA      Flow rate (POC) 4.0 L/M    pH (POC) 7.405 7.35 - 7.45      pCO2 (POC) 34.4 (L) 35.0 - 45.0 MMHG    pO2 (POC) 63 (L) 80 - 100 MMHG    HCO3 (POC) 21.6 (L) 22 - 26 MMOL/L    sO2 (POC) 92 92 - 97 %    Base deficit (POC) 3 mmol/L    Allens test (POC) YES      Total resp. rate 20      Site RIGHT RADIAL      Specimen type (POC) ARTERIAL      Performed by Soraya Stacy    CBC WITH AUTOMATED DIFF    Collection Time: 12/19/17  4:09 AM   Result Value Ref Range    WBC 13.5 (H) 4.6 - 13.2 K/uL    RBC 4.58 (L) 4.70 - 5.50 M/uL    HGB 14.6 13.0 - 16.0 g/dL    HCT 42.3 36.0 - 48.0 %    MCV 92.4 74.0 - 97.0 FL    MCH 31.9 24.0 - 34.0 PG    MCHC 34.5 31.0 - 37.0 g/dL    RDW 13.4 11.6 - 14.5 %    PLATELET 702 887 - 571 K/uL    MPV 10.8 9.2 - 11.8 FL    NEUTROPHILS 93 (H) 40 - 73 %    LYMPHOCYTES 4 (L) 21 - 52 %    MONOCYTES 3 3 - 10 %    EOSINOPHILS 0 0 - 5 %    BASOPHILS 0 0 - 2 %    ABS. NEUTROPHILS 12.6 (H) 1.8 - 8.0 K/UL    ABS.  LYMPHOCYTES 0.5 (L) 0.9 - 3.6 K/UL ABS. MONOCYTES 0.4 0.05 - 1.2 K/UL    ABS. EOSINOPHILS 0.0 0.0 - 0.4 K/UL    ABS. BASOPHILS 0.0 0.0 - 0.1 K/UL    DF AUTOMATED     LACTIC ACID    Collection Time: 12/19/17  4:09 AM   Result Value Ref Range    Lactic acid 2.9 (HH) 0.4 - 2.0 MMOL/L   METABOLIC PANEL, COMPREHENSIVE    Collection Time: 12/19/17  4:09 AM   Result Value Ref Range    Sodium 141 136 - 145 mmol/L    Potassium 4.5 3.5 - 5.5 mmol/L    Chloride 111 (H) 100 - 108 mmol/L    CO2 23 21 - 32 mmol/L    Anion gap 7 3.0 - 18 mmol/L    Glucose 138 (H) 74 - 99 mg/dL    BUN 11 7.0 - 18 MG/DL    Creatinine 0.81 0.6 - 1.3 MG/DL    BUN/Creatinine ratio 14 12 - 20      GFR est AA >60 >60 ml/min/1.73m2    GFR est non-AA >60 >60 ml/min/1.73m2    Calcium 8.5 8.5 - 10.1 MG/DL    Bilirubin, total 0.6 0.2 - 1.0 MG/DL    ALT (SGPT) 25 16 - 61 U/L    AST (SGOT) 9 (L) 15 - 37 U/L    Alk. phosphatase 63 45 - 117 U/L    Protein, total 6.3 (L) 6.4 - 8.2 g/dL    Albumin 3.5 3.4 - 5.0 g/dL    Globulin 2.8 2.0 - 4.0 g/dL    A-G Ratio 1.3 0.8 - 1.7     DRUG SCREEN, URINE    Collection Time: 12/19/17  6:30 AM   Result Value Ref Range    BENZODIAZEPINES NEGATIVE  NEG      BARBITURATES NEGATIVE  NEG      THC (TH-CANNABINOL) NEGATIVE  NEG      OPIATES POSITIVE (A) NEG      PCP(PHENCYCLIDINE) NEGATIVE  NEG      COCAINE NEGATIVE  NEG      AMPHETAMINES NEGATIVE  NEG      METHADONE NEGATIVE  NEG      HDSCOM (NOTE)          Chemistry Recent Labs      12/19/17   0409  12/18/17   1733  12/17/17   2200   GLU  138*  153*  100*   NA  141  139  140   K  4.5  3.9  3.7   CL  111*  105  108   CO2  23  25  27   BUN  11  11  10   CREA  0.81  1.02  0.74   CA  8.5  8.9  8.2*   MG   --    --   2.1   AGAP  7  9  5   BUCR  14  11*  14   AP  63  78  70   TP  6.3*  7.7  6.6   ALB  3.5  4.1  3.5   GLOB  2.8  3.6  3.1   AGRAT  1.3  1.1  1.1        Lactic Acid Lactic acid   Date Value Ref Range Status   12/19/2017 2.9 (HH) 0.4 - 2.0 MMOL/L Final     Comment:     Critical value verified.  Called to and read back by:  [facility] RN(4N) TO [address] [place] [address] 12/19/2017. Recent Labs      12/19/17   0409   LAC  2.9*        Liver Enzymes Protein, total   Date Value Ref Range Status   12/19/2017 6.3 (L) 6.4 - 8.2 g/dL Final     Albumin   Date Value Ref Range Status   12/19/2017 3.5 3.4 - 5.0 g/dL Final     Globulin   Date Value Ref Range Status   12/19/2017 2.8 2.0 - 4.0 g/dL Final     A-G Ratio   Date Value Ref Range Status   12/19/2017 1.3 0.8 - 1.7   Final     AST (SGOT)   Date Value Ref Range Status   12/19/2017 9 (L) 15 - 37 U/L Final     Alk. phosphatase   Date Value Ref Range Status   12/19/2017 63 45 - 117 U/L Final     Recent Labs      12/19/17   0409  12/18/17   1733  12/17/17   2200   TP  6.3*  7.7  6.6   ALB  3.5  4.1  3.5   GLOB  2.8  3.6  3.1   AGRAT  1.3  1.1  1.1   SGOT  9*  14*  19   AP  63  78  70        CBC w/Diff Recent Labs      12/19/17   0409  12/18/17   1733  12/17/17   2200   WBC  13.5*  13.0  6.3   RBC  4.58*  5.11  4.90   HGB  14.6  16.8*  16.1*   HCT  42.3  46.9  44.1   PLT  211  212  194   GRANS  93*  96*  66   LYMPH  4*  4*  27   EOS  0  0  1        Cardiac Enzymes Lab Results   Component Value Date/Time    TROIQ <0.02 12/18/2017 05:33 PM        BNP No results found for: BNP, BNPP, XBNPT     Coagulation No results for input(s): PTP, INR, APTT in the last 72 hours.     No lab exists for component: INREXT      Thyroid  Lab Results   Component Value Date/Time    TSH 2.21 12/17/2017 10:00 PM       No results found for: T4     Lipid Panel Lab Results   Component Value Date/Time    Cholesterol, total 144 01/23/2017 01:11 PM    HDL Cholesterol 36 01/23/2017 01:11 PM    LDL, calculated 85.6 01/23/2017 01:11 PM    VLDL, calculated 22.4 01/23/2017 01:11 PM    Triglyceride 112 01/23/2017 01:11 PM    CHOL/HDL Ratio 4.0 01/23/2017 01:11 PM        ABG Recent Labs      12/19/17   0325  12/18/17   1814   PHI  7.405  7.338*   PCO2I  34.4*  40.7   PO2I  63*  34*   HCO3I  21.6*  21.8*        Urinalysis Lab Results   Component Value Date/Time    Color YELLOW 06/28/2013 12:14 AM    Appearance CLEAR 06/28/2013 12:14 AM    Specific gravity 1.015 06/28/2013 12:14 AM    pH (UA) 6.5 06/28/2013 12:14 AM    Protein NEGATIVE  06/28/2013 12:14 AM    Glucose NEGATIVE  06/28/2013 12:14 AM    Ketone TRACE 06/28/2013 12:14 AM    Bilirubin SMALL 06/28/2013 12:14 AM    Urobilinogen 1.0 06/28/2013 12:14 AM    Nitrites NEGATIVE  06/28/2013 12:14 AM    Leukocyte Esterase NEGATIVE  06/28/2013 12:14 AM        Micro  Recent Labs      12/18/17   1915  12/18/17   1900   CULT  NO GROWTH AFTER 10 HOURS  NO GROWTH AFTER 10 HOURS     Recent Labs      12/18/17   1915  12/18/17   1900   CULT  NO GROWTH AFTER 10 HOURS  NO GROWTH AFTER 10 HOURS        EKG No results found for this or any previous visit. PFT No flowsheet data found. Other ASA reactivity:   Pre-albumin:   Ionized Calcium:   NH4:   T3, FT4:  Cortisol:  Urine Osm:  Urine Lytes:   HbA1c:      Ultrasound      LE Doppler      ECHO No results found for this or any previous visit. CT (Most Recent)   Results from Hospital Encounter encounter on 06/28/13   CT CHEST ABD PELV W CONT   Narrative History: Trauma    The patient examined with IV contrast. 100 cc of Optiray-320 injected. CT of the chest: The lungs are free of an acute finding. There is significant  COPD with numerous very small blebs throughout the mid and upper lung fields. There is no pleural fluid. No pneumothorax. No hilar or mediastinal adenopathy. Aorta is normal.    CT of the abdomen: No focal finding in the liver or the spleen. Gallbladder  present. Normal pancreas. Normal kidneys. No bowel dilatation. No free fluid. No hematomas. CT of the pelvis: No free fluid or inflammatory process. There are few  diverticula. No visualized acute bony findings. Impression Impression: No acute abnormalities. XR (Most Recent).  CXR reviewed by me and compared with previous CXR   Results from Hospital Encounter encounter on 12/18/17   XR CHEST SNGL V   Narrative Portable chest x-ray, semierect AP view, time 1802 hours on 12/18/2017:        INDICATION:    Shortness of breath. Expiration of COPD. COMPARISON STUDY: Chest x-ray on 12/17/2017, 11/04/2017. FINDINGS:    Lungs are mildly hypoventilated. Cardiac CABG to be within normal limits is mildly more prominent as compared to  previous study. As compared to previous study there is no minimal to mild pulmonary vascular  congestion noted. Interstitial markings are mildly hazy. Right lung is clear. At  left lung base there are minimal streaky atelectatic changes noted. CP angles  are sharp bilaterally. No evidence of pneumothorax. Impression IMPRESSION:    Currently minimal to mild pulmonary vascular congestion. Minimal interstitial  pulmonary edema is not excluded at this time. Mild pulmonary hypoventilation. Minimal streaky left basilar atelectasis. Evelin Mayberry  12/19/2017       [facility] Staff Addendum     I have independently evaluated the patient and reviewed the patient's chart. I have discussed the findings and care plan with JOHN    I agree with the above evaluation, assessment and recommendations along with the following comments and observations. The patient is an active smoker with COPD exacerbation. Diffuse wheezing worse with exhalation. Poor to moderate air movement    -Pets: None  -Lives with wife and young daughter  -Home: No known  mold- last weekend tore down dry wall in the attic without mask   -Recent Sleep study without FABIÁN  -Took Tylenol #3 for back pain which accounts for + drug screen for opiates- denies any illicit drug use  -Patient not able to take deep enough breath for inhalers at this time- switching patient to NEB therapy and BIPAP QHS and PRN   -Patient may require home oxygen and possible NIV pending clinical course.  May also need OP nebs instead of hand held inhaler- Currently gets medications from [facility].    -Recommend - patient would benefit from [address] and time spent coordinating care, minus procedure time: 45 min    Young Parks DO, [facility]  Pulmonary, Sleep and Critical Care Medicine  5:08 PM

## 2017-12-19 NOTE — ED NOTES
TRANSFER - OUT REPORT:    Verbal report given to Timbo RN(name) on Daniel Dianans Sr.  being transferred to (unit) for routine progression of care       Report consisted of patients Situation, Background, Assessment and   Recommendations(SBAR). Information from the following report(s) SBAR, ED Summary, MAR, Recent Results and Cardiac Rhythm ST was reviewed with the receiving nurse. Lines:   Peripheral IV 12/18/17 Left Antecubital (Active)   Site Assessment Clean, dry, & intact 12/18/2017  5:39 PM   Phlebitis Assessment 0 12/18/2017  5:39 PM   Infiltration Assessment 0 12/18/2017  5:39 PM   Dressing Status Clean, dry, & intact 12/18/2017  5:39 PM   Dressing Type Transparent 12/18/2017  5:39 PM   Hub Color/Line Status Pink 12/18/2017  5:39 PM        Opportunity for questions and clarification was provided.       Patient transported with:   Transport  O2 @ 4L nasal

## 2017-12-19 NOTE — INTERDISCIPLINARY ROUNDS
Interdisciplinary Round Note   Patient Information:   James Angelo Sr.   [birth year]   Reason for Admission: COPD with acute exacerbation [facility])   Attending Provider:   Shad Melendrez MD  Primary Care Physician:       Kimber Kent NP       [phone]   Estimated discharge date:  12/24/2017   Hospital day: 1  [unfilled]  - - -  RRAT Score: Low Risk            8       Total Score        3 Has Seen PCP in Last 6 Months (Yes=3, No=0)    4 Pt. Coverage (Medicare=5 , Medicaid, or Self-Pay=4)    1 Charlson Comorbidity Score (Age + Comorbid Conditions)        Criteria that do not apply:    . Living with Significant Other. Assisted Living. LTAC. SNF. or   Rehab    Patient Length of Stay (>5 days = 3)    IP Visits Last 12 Months (1-3=4, 4=9, >4=11)            No         Not needed      Lines, Drains, & Airways  None       IV Antibiotics:    Current Antimicrobial Therapy (168h ago through future)    Ordered     Start Stop    12/18/17 2221  cefepime (MAXIPIME) 2 g in sterile water (preservative free) 10 mL IV syringe  2 g,   IntraVENous,   EVERY 8 HOURS      12/18/17 2300 --    12/18/17 2223  metroNIDAZOLE (FLAGYL) IVPB premix 500 mg  500 mg,   IntraVENous,   EVERY 6 HOURS      12/18/17 2300 --    12/18/17 1900  levoFLOXacin (LEVAQUIN) 750 mg in D5W IVPB  750 mg,   IntraVENous,   EVERY 24 HOURS      12/18/17 1901 --        GI Prophylaxis: GI Prophylaxis: no   Type:       Recent Glucose Results:   Lab Results   Component Value Date/Time     (H) 12/19/2017 04:09 AM     (H) 12/18/2017 05:33 PM      Activity Level:   Activity Level: Up ad patricio    Needs assistance with ADLs: no       Goals for Today: improved   Recommendations:   Discharge Disposition: Home Independent  P.T, O.T. and CM  Care Management involvement for home health follow up for:  mobility and assistance with ADL's, Notify Specialist Physician of admission and Nurse Navigator indicated    Needs for Discharge:    IDR Team: Recommendations from IDR team:     Other Notes:

## 2017-12-20 LAB — LACTATE SERPL-SCNC: 1.5 MMOL/L (ref 0.4–2)

## 2017-12-20 PROCEDURE — 65270000029 HC RM PRIVATE

## 2017-12-20 PROCEDURE — 74011250636 HC RX REV CODE- 250/636: Performed by: PHYSICIAN ASSISTANT

## 2017-12-20 PROCEDURE — 36415 COLL VENOUS BLD VENIPUNCTURE: CPT | Performed by: HOSPITALIST

## 2017-12-20 PROCEDURE — 74011250636 HC RX REV CODE- 250/636: Performed by: INTERNAL MEDICINE

## 2017-12-20 PROCEDURE — 74011250637 HC RX REV CODE- 250/637: Performed by: INTERNAL MEDICINE

## 2017-12-20 PROCEDURE — 74011250636 HC RX REV CODE- 250/636: Performed by: EMERGENCY MEDICINE

## 2017-12-20 PROCEDURE — 94640 AIRWAY INHALATION TREATMENT: CPT

## 2017-12-20 PROCEDURE — 74011000250 HC RX REV CODE- 250: Performed by: PHYSICIAN ASSISTANT

## 2017-12-20 PROCEDURE — 74011000250 HC RX REV CODE- 250: Performed by: INTERNAL MEDICINE

## 2017-12-20 PROCEDURE — 77010033678 HC OXYGEN DAILY: Performed by: INTERNAL MEDICINE

## 2017-12-20 PROCEDURE — 74011250636 HC RX REV CODE- 250/636: Performed by: HOSPITALIST

## 2017-12-20 PROCEDURE — 74011250637 HC RX REV CODE- 250/637: Performed by: HOSPITALIST

## 2017-12-20 PROCEDURE — 83605 ASSAY OF LACTIC ACID: CPT | Performed by: HOSPITALIST

## 2017-12-20 RX ORDER — MIRTAZAPINE 15 MG/1
7.5 TABLET, FILM COATED ORAL
Status: DISCONTINUED | OUTPATIENT
Start: 2017-12-20 | End: 2017-12-23 | Stop reason: HOSPADM

## 2017-12-20 RX ADMIN — BUDESONIDE 500 MCG: 0.5 INHALANT RESPIRATORY (INHALATION) at 20:00

## 2017-12-20 RX ADMIN — VARENICLINE TARTRATE 1 MG: 1 TABLET, FILM COATED ORAL at 09:39

## 2017-12-20 RX ADMIN — GUAIFENESIN AND DEXTROMETHORPHAN HYDROBROMIDE 1 TABLET: 600; 30 TABLET, EXTENDED RELEASE ORAL at 09:39

## 2017-12-20 RX ADMIN — LORAZEPAM 0.5 MG: 0.5 TABLET ORAL at 09:39

## 2017-12-20 RX ADMIN — WATER 2 G: 1 INJECTION INTRAMUSCULAR; INTRAVENOUS; SUBCUTANEOUS at 15:00

## 2017-12-20 RX ADMIN — ACETAMINOPHEN 500 MG: 500 TABLET ORAL at 15:30

## 2017-12-20 RX ADMIN — METHYLPREDNISOLONE SODIUM SUCCINATE 40 MG: 40 INJECTION, POWDER, FOR SOLUTION INTRAMUSCULAR; INTRAVENOUS at 12:30

## 2017-12-20 RX ADMIN — LORAZEPAM 0.5 MG: 0.5 TABLET ORAL at 03:10

## 2017-12-20 RX ADMIN — GUAIFENESIN AND DEXTROMETHORPHAN HYDROBROMIDE 1 TABLET: 600; 30 TABLET, EXTENDED RELEASE ORAL at 20:39

## 2017-12-20 RX ADMIN — LORAZEPAM 0.5 MG: 0.5 TABLET ORAL at 20:39

## 2017-12-20 RX ADMIN — IPRATROPIUM BROMIDE 0.5 MG: 0.5 SOLUTION RESPIRATORY (INHALATION) at 15:14

## 2017-12-20 RX ADMIN — SODIUM CHLORIDE 125 ML/HR: 900 INJECTION, SOLUTION INTRAVENOUS at 06:22

## 2017-12-20 RX ADMIN — METHYLPREDNISOLONE SODIUM SUCCINATE 40 MG: 40 INJECTION, POWDER, FOR SOLUTION INTRAMUSCULAR; INTRAVENOUS at 17:53

## 2017-12-20 RX ADMIN — IPRATROPIUM BROMIDE 0.5 MG: 0.5 SOLUTION RESPIRATORY (INHALATION) at 09:53

## 2017-12-20 RX ADMIN — WATER 2 G: 1 INJECTION INTRAMUSCULAR; INTRAVENOUS; SUBCUTANEOUS at 06:51

## 2017-12-20 RX ADMIN — METHYLPREDNISOLONE SODIUM SUCCINATE 40 MG: 40 INJECTION, POWDER, FOR SOLUTION INTRAMUSCULAR; INTRAVENOUS at 06:07

## 2017-12-20 RX ADMIN — ALBUTEROL SULFATE 1.25 MG: 1.25 SOLUTION RESPIRATORY (INHALATION) at 15:14

## 2017-12-20 RX ADMIN — ARFORMOTEROL TARTRATE 15 MCG: 15 SOLUTION RESPIRATORY (INHALATION) at 09:51

## 2017-12-20 RX ADMIN — WATER 2 G: 1 INJECTION INTRAMUSCULAR; INTRAVENOUS; SUBCUTANEOUS at 23:41

## 2017-12-20 RX ADMIN — ALBUTEROL SULFATE 1.25 MG: 1.25 SOLUTION RESPIRATORY (INHALATION) at 19:58

## 2017-12-20 RX ADMIN — ENOXAPARIN SODIUM 40 MG: 40 INJECTION, SOLUTION INTRAVENOUS; SUBCUTANEOUS at 18:00

## 2017-12-20 RX ADMIN — LEVOFLOXACIN 750 MG: 5 INJECTION, SOLUTION INTRAVENOUS at 20:39

## 2017-12-20 RX ADMIN — MIRTAZAPINE 7.5 MG: 15 TABLET, FILM COATED ORAL at 21:17

## 2017-12-20 RX ADMIN — ALBUTEROL SULFATE 1.25 MG: 1.25 SOLUTION RESPIRATORY (INHALATION) at 09:52

## 2017-12-20 RX ADMIN — IPRATROPIUM BROMIDE 0.5 MG: 0.5 SOLUTION RESPIRATORY (INHALATION) at 20:01

## 2017-12-20 RX ADMIN — ARFORMOTEROL TARTRATE 15 MCG: 15 SOLUTION RESPIRATORY (INHALATION) at 19:58

## 2017-12-20 RX ADMIN — METHYLPREDNISOLONE SODIUM SUCCINATE 40 MG: 40 INJECTION, POWDER, FOR SOLUTION INTRAMUSCULAR; INTRAVENOUS at 00:42

## 2017-12-20 RX ADMIN — ACETAMINOPHEN 500 MG: 500 TABLET ORAL at 10:01

## 2017-12-20 RX ADMIN — LORAZEPAM 0.5 MG: 0.5 TABLET ORAL at 15:30

## 2017-12-20 RX ADMIN — PANTOPRAZOLE SODIUM 40 MG: 40 TABLET, DELAYED RELEASE ORAL at 09:39

## 2017-12-20 RX ADMIN — BUDESONIDE 500 MCG: 0.5 INHALANT RESPIRATORY (INHALATION) at 09:51

## 2017-12-20 NOTE — PROGRESS NOTES
I spoke with Mr. Jefry Aldana and his family about his condition and his hospital stay. Mr. Jefry Aldana seemed grieved and tearful, but he was reluctant to share more. I assured him that I or another  would be available if he did decide to talk more.      [address], M.Div, CPE Resident   Pager: [phone]  Phone: [phone]

## 2017-12-20 NOTE — PROGRESS NOTES
Care Management Interventions  PCP Verified by CM: Yes Cruzito Kingsley [phone])  Mode of Transport at Discharge: [address] (Wife Jacinto Marie [phone])  Transition of Care Consult (CM Consult): Other (Home with family)  Discharge Durable Medical Equipment: Yes (nebulizer, oxygen, IS)  Current Support Network: Lives with Spouse, Own Home, Family Lives Nearby (Wife Jacinto Marie [phone])  Confirm Follow Up Transport: Family  Plan discussed with Pt/Family/Caregiver: Yes    Patient wants to go home with DME needed. Wife denies ever having home health or DME previously. Patient has 2 steps to the front door, and one into the house. He lives with his wife on the first floor. Patient stated he was told he might be to be on home oxygen and nebulizer. He stated a walker with a seat may help him, because he gets out of breath after walking a short distance. They use the [facility] for medications, and some time to [facility]. Pt is self pay.

## 2017-12-20 NOTE — INTERDISCIPLINARY ROUNDS
Interdisciplinary Round Note   Patient Information:   Jeanne Dean Sr.   [birth year]   Reason for Admission: COPD with acute exacerbation [facility])   Attending Provider:   Kaylene Zaidi MD  Primary Care Physician:       Jero Barclay NP       [phone]   Estimated discharge date:  12/22/17   Hospital day: 2  [unfilled]  4d 21h  RRAT Score: Low Risk            8       Total Score        3 Has Seen PCP in Last 6 Months (Yes=3, No=0)    4 Pt. Coverage (Medicare=5 , Medicaid, or Self-Pay=4)    1 Charlson Comorbidity Score (Age + Comorbid Conditions)        Criteria that do not apply:    . Living with Significant Other. Assisted Living. LTAC. SNF. or   Rehab    Patient Length of Stay (>5 days = 3)    IP Visits Last 12 Months (1-3=4, 4=9, >4=11)       none     No  None  None     Not needed      Lines, Drains, & Airways  None       IV Antibiotics:    Current Antimicrobial Therapy (168h ago through future)    Ordered     Start Stop    12/18/17 2221  cefepime (MAXIPIME) 2 g in sterile water (preservative free) 10 mL IV syringe  2 g,   IntraVENous,   EVERY 8 HOURS      12/18/17 2300 --    12/18/17 1900  levoFLOXacin (LEVAQUIN) 750 mg in D5W IVPB  750 mg,   IntraVENous,   EVERY 24 HOURS      12/18/17 1901 --        GI Prophylaxis: GI Prophylaxis: no   Type: Protonix      Recent Glucose Results: No results found for: GLU, GLUPOC, GLUCPOC   Activity Level:   Activity Level: Up ad patricio    Needs assistance with ADLs: no       Goals for Today: Continue Antibiotic, steroids, decreased shortness of breath, Decreased anxiety   Recommendations:   Discharge Disposition: Home Independent  P.T and O.T.  Care Management involvement for home health follow up for:  Home oxygen     Needs for Discharge: Oxygen IDR Team: MD, Nurse, CM, Head nurse, Chief officer  Recommendations from [facility] team: PT/OT, chest PT, IS, acupella     Other Notes: none

## 2017-12-20 NOTE — CONSULTS
[facility]  Pulmonary, Critical Care, and Sleep Medicine    Name: Jonel Jordan Sr. MRN: [id number]   : 1970 Hospital: [facility]   Date: 2017        Pulmonary Initial In-Patient Consult                                              Consult requesting physician: Dr. Teri Kennedy  Reason for Consult: COPD exacerbation    IMPRESSION:   · Acute on chronic hypoxia COPD exacerbation with presentation of worsening shortness of breath  · Severe COPD, FEV1/FVC ratio < 61%  · Active smoking  · Gr 1 esophagitis by EGD (17) secondary to NSAIDs and steroids  · Anxiety with claustrophobia - Not tolerating PAP therapy  · Followed through [facility]  · Lactic acidosis possibly type A and/or B- resolved         RECOMMENDATIONS:   · May continue with oxygen supplementation, titrate for SpO2 88-92%. Pt may need home oxygen evaluation prior to discharge. Alpha-1 antitrypsin testing as outpatient in [place] [id number] clinic. · Start patient on antianxiety medication such as Effexor or Wellbutrin. Would avoid benzodiazepine unless concerned for any withdrawal physiology. · Trial of bipap if tolerated  · Continue IV steroids, wean as appropriate  · Continued scheduled pulmicort and Brovana  · Continue scheduled atrovent/accunebs  · Add Mucinex  · Continue antibiotics and de-escalate soon. Follow blood c/s; obtain respiratory c/s if not done yet  · Agree with chantix  · Smoking cessation education  · Rest of concerns per primary. · Will continue to follow       Subjective/History:     17   Patient sitting up in bed- Did not tolerate BIPAP due to anxiety. Tolerating PO   Decreased work of breathing. Improved air movement  Afebrile  Expectorating clear- tan thick secretions. I had a very naila discussion with patient this morning. He has very severe COPD. I have told the patient that he can't continue to smoke. He does have anxiety which has probably contributed to his anxiety. He is willing to try an antianxiety medication. Denies alcohol intake to suggest possible withdrawal.     Initial History:  Manuel Juan. is a 49 y.o. male with PMHx significant for severe COPD and Gr 1 esophagitis, presented to the ED for worsening shortness of breath. Pt's symptoms started on 12/17 upon waking up with sob; not relieved by multiple albuterol inhaler treatments throughout the day. Pt was brought by ambulance to [facility] ED and was given solumedrol en route. Pt received nebulized treatment and discharged with prescription for prednisone which he was not able to fill immediately. A few hours after discharge and while driving on 24/26, pt started to have recurrence of sob unrelieved by inhaler treatments. Pt was brought to [facility] ED where he was given nebulized treatments and discharged on doxycycline and prednisone PO. Upon arriving home, pt again had recurrence of shortness of breath and was brought to back to [facility] ED. Pt's shortness of breath has become more frequent in the last year and states that he also becomes short of breath with exertion (climbing up the stairs; carrying equipment at work). Pt was last seen by Dr. Magdy Trejo in Nov for follow-up where he was diagnosed for mild exacerbation of COPD however was unable to get treated with steroids as he had some reported GIB. Pt then  he underwent EGD last 12/11 following episodes of dark tarry loose stools while taking NSAIDs and steroids. EGD showed Gr 1 esophagitis. Pt also endorses intermittent low grade fever since completing the EGD with highest temp around 100.5. Pt states he used to cough in the past however not recently in the last few months until this past couple of days when he started expectorating brownish-yellow sputum alternating with whitish sputum. Pt endorses being jittery while he has been taking albuterol rescue inhaler very frequently.      Pt continues to smoke, however has significantly cut down since about 2 months ago. He used to smoke 2 packs/day however has been able to wean himself down to half pack/day. Pt denies recreational drug use; has occasional alcohol drinking. Pt works as a . Review of Systems:   HEENT: No epistaxis, no nasal drainage, no difficulty in swallowing, no redness in eyes  Respiratory: as above  Cardiovascular: no chest pain, no palpitations, no chronic leg edema, no syncope  Gastrointestinal: no abd pain, no vomiting, no diarrhea, no bleeding symptoms recently after EGD  Genitourinary: No urinary symptoms or hematuria  Integument/breast: No ulcers or rashes  Musculoskeletal:Neg  Neurological: No focal weakness, no seizures, no headaches  Behvioral/Psych: no depression + anxiety  Constitutional: No fever, no chills, no weight loss, + night sweats       Immunization status:   Immunization History   Administered Date(s) Administered    Influenza Vaccine (Quad) PF 10/23/2017        No Known Allergies     Past Medical History:   Diagnosis Date    [facility] 08/03/2017    PFTS 8/3/17    COPD, severe (HCC)     [facility]D [facility]     Esophagitis 12/11/2017    Endoscopy    History of stomach ulcers     Positive urine drug screen 01/2016    opiates and THC    Upper GI bleed         Past Surgical History:   Procedure Laterality Date    HX ENDOSCOPY  12/11/2017        Family History   Problem Relation Age of Onset    Hypertension Mother     Heart Disease Mother     Diabetes Mother     Hypertension Father     Heart Disease Father     Cancer Father      lymphnodes    Diabetes Father         Social History   Substance Use Topics    Smoking status: Current Every Day Smoker     Packs/day: 2.00     Years: 25.00     Types: Cigarettes     Start date: 5/28/1984    Smokeless tobacco: Never Used    Alcohol use No        Prior to Admission medications    Medication Sig Start Date End Date Taking?  Authorizing Provider   predniSONE (DELTASONE) 50 mg tablet Take 1 Tab by mouth daily for 5 days. 12/18/17 12/23/17  Melody Sharma MD   acetaminophen (TYLENOL) 500 mg tablet Take  by mouth every six (6) hours as needed for Pain. Historical Provider   traMADol (ULTRAM) 50 mg tablet Take 1 Tab by mouth every six (6) hours as needed for Pain. Max Daily Amount: 200 mg. Indications: Pain 11/2/17   Nani Crandall NP   albuterol-ipratropium (DUO-NEB) 2.5 mg-0.5 mg/3 ml nebu 3 mL by Nebulization route every six (6) hours as needed. 10/23/17   Nani Crandall NP   varenicline (CHANTIX) 1 mg tablet Take 1 Tab by mouth two (2) times a day. Start after completion of starter pack 7/26/17   Nani Crandall NP   fluticasone-salmeterol (ADVAIR) 500-50 mcg/dose diskus inhaler Take 1 Puff by inhalation two (2) times a day. 7/24/17   Nani Crandall NP   albuterol (PROVENTIL HFA, VENTOLIN HFA, PROAIR HFA) 90 mcg/actuation inhaler Take 2 Puffs by inhalation every four (4) hours as needed for Wheezing. 1/23/17   Nani Crandall NP   tiotropium (SPIRIVA) 18 mcg inhalation capsule Take 1 Cap by inhalation daily.  Indications: BRONCHOSPASM PREVENTION WITH COPD 1/23/17   Nani Crandall NP       Current Facility-Administered Medications   Medication Dose Route Frequency    mirtazapine (REMERON) tablet 7.5 mg  7.5 mg Oral QHS    0.9% sodium chloride infusion  125 mL/hr IntraVENous CONTINUOUS    acetaminophen (TYLENOL) tablet 500 mg  500 mg Oral Q6H PRN    hydrALAZINE (APRESOLINE) tablet 25 mg  25 mg Oral Q8H PRN    ondansetron (ZOFRAN) injection 4 mg  4 mg IntraVENous Q8H PRN    guaiFENesin-dextromethorphan SR (HUMIBID DM) 600-30 mg tablet 1 Tab  1 Tab Oral Q12H    varenicline (CHANTIX) tablet 1 mg  1 mg Oral DAILY    pantoprazole (PROTONIX) tablet 40 mg  40 mg Oral ACB    ipratropium (ATROVENT) 0.02 % nebulizer solution 0.5 mg  0.5 mg Nebulization Q6H RT    albuterol (ACCUNEB) nebulizer solution 1.25 mg  1.25 mg Nebulization Q6H RT    budesonide (PULMICORT) 500 mcg/2 ml nebulizer suspension  500 mcg Nebulization BID RT    arformoterol (BROVANA) neb solution 15 mcg  15 mcg Nebulization BID RT    enoxaparin (LOVENOX) injection 40 mg  40 mg SubCUTAneous Q24H    sodium chloride (NS) flush 5-10 mL  5-10 mL IntraVENous PRN    levoFLOXacin (LEVAQUIN) 750 mg in D5W IVPB  750 mg IntraVENous Q24H    LORazepam (ATIVAN) tablet 0.5 mg  0.5 mg Oral Q4H PRN    methylPREDNISolone (PF) (SOLU-MEDROL) injection 40 mg  40 mg IntraVENous Q6H    cefepime (MAXIPIME) 2 g in sterile water (preservative free) 10 mL IV syringe  2 g IntraVENous Q8H         Objective:   Vital Signs:    Visit Vitals    /90 (BP 1 Location: Right arm, BP Patient Position: At rest)    Pulse 82    Temp 97.3 °F (36.3 °C)    Resp 18    Wt 77.1 kg (170 lb)    SpO2 92%    BMI 25.1 kg/m2       O2 Device: Nasal cannula   O2 Flow Rate (L/min): 4 l/min   Temp (24hrs), Av.8 °F (36.6 °C), Min:97 °F (36.1 °C), Max:98.5 °F (36.9 °C)       Intake/Output:   Last shift:       07 - 1900  In: -   Out: 600 [Urine:600]  Last 3 shifts:  190 -  0700  In: 1680 [P.O.:1680]  Out: 1100 [Urine:1100]    Intake/Output Summary (Last 24 hours) at 17 1431  Last data filed at 17 0859   Gross per 24 hour   Intake              240 ml   Output             1000 ml   Net             -760 ml       Physical Exam:     General/Neurology: Alert, Awake; has conversational dyspnea  Head:   Normocephalic, without obvious abnormality, atraumatic. Eye:   PERRL, EOM intact, no scleral icterus, no pallor, no cyanosis  Nose:   No sinus tenderness, no erythema, no induration, no discharge  Throat:  Lips, mucosa, and tongue normal.  No tonsillar enlargement, no erythema, no exudates. No oral thrush  Neck:   Supple, symmetric. Thyroid: no enlargement/tenderness/nodule. No carotid bruit. Nolymphadenopathy. Trachea midline  Back & spine: Symmetric, no curvature.    Chest wall: No tenderness or deformity. No rash  Lung: Moderately decreased air entry b/l. Symmetrical chest expansion. + expiratory wheezing more pronounced on left side. - Improved air movement this morning with increased wheezing., mild accessory muscle use. Heart:   Mildly tachycardic rate but regular rhythm   Abdomen:  Soft. NT. ND. +BS. Extremities:  No pedal edema. No cyanosis. Pulses: 2+ and symmetric in DP  Lymphatic:  No cervical, supraclavicular palpable lymphadenopathy. Musculoskeletal: No joint swelling. No tenderness. Skin:   Color, texture, turgor normal. No rashes or lesions      Data:       Recent Results (from the past 24 hour(s))   LACTIC ACID    Collection Time: 12/20/17  4:15 AM   Result Value Ref Range    Lactic acid 1.5 0.4 - 2.0 MMOL/L         Chemistry Recent Labs      12/19/17   0409  12/18/17   1733  12/17/17   2200   GLU  138*  153*  100*   NA  141  139  140   K  4.5  3.9  3.7   CL  111*  105  108   CO2  23  25  27   BUN  11  11  10   CREA  0.81  1.02  0.74   CA  8.5  8.9  8.2*   MG   --    --   2.1   AGAP  7  9  5   BUCR  14  11*  14   AP  63  78  70   TP  6.3*  7.7  6.6   ALB  3.5  4.1  3.5   GLOB  2.8  3.6  3.1   AGRAT  1.3  1.1  1.1        Lactic Acid Lactic acid   Date Value Ref Range Status   12/20/2017 1.5 0.4 - 2.0 MMOL/L Final     Recent Labs      12/20/17   0415  12/19/17   0409   LAC  1.5  2.9*        Liver Enzymes Protein, total   Date Value Ref Range Status   12/19/2017 6.3 (L) 6.4 - 8.2 g/dL Final     Albumin   Date Value Ref Range Status   12/19/2017 3.5 3.4 - 5.0 g/dL Final     Globulin   Date Value Ref Range Status   12/19/2017 2.8 2.0 - 4.0 g/dL Final     A-G Ratio   Date Value Ref Range Status   12/19/2017 1.3 0.8 - 1.7   Final     AST (SGOT)   Date Value Ref Range Status   12/19/2017 9 (L) 15 - 37 U/L Final     Alk.  phosphatase   Date Value Ref Range Status   12/19/2017 63 45 - 117 U/L Final     Recent Labs      12/19/17   0409  12/18/17   1733  12/17/17   2200   TP  6.3*  7.7  6.6 ALB  3.5  4.1  3.5   GLOB  2.8  3.6  3.1   AGRAT  1.3  1.1  1.1   SGOT  9*  14*  19   AP  63  78  70        CBC w/Diff Recent Labs      12/19/17   0409  12/18/17   1733  12/17/17   2200   WBC  13.5*  13.0  6.3   RBC  4.58*  5.11  4.90   HGB  14.6  16.8*  16.1*   HCT  42.3  46.9  44.1   PLT  211  212  194   GRANS  93*  96*  66   LYMPH  4*  4*  27   EOS  0  0  1        Cardiac Enzymes No results found for: CPK, CK, CKMMB, CKMB, RCK3, CKMBT, CKNDX, CKND1, JIMMY, TROPT, TROIQ, LINH, TROPT, TNIPOC, BNP, BNPP     BNP No results found for: BNP, BNPP, XBNPT     Coagulation No results for input(s): PTP, INR, APTT in the last 72 hours.     No lab exists for component: INREXT, INREXT      Thyroid  Lab Results   Component Value Date/Time    TSH 2.21 12/17/2017 10:00 PM       No results found for: T4     Lipid Panel Lab Results   Component Value Date/Time    Cholesterol, total 144 01/23/2017 01:11 PM    HDL Cholesterol 36 01/23/2017 01:11 PM    LDL, calculated 85.6 01/23/2017 01:11 PM    VLDL, calculated 22.4 01/23/2017 01:11 PM    Triglyceride 112 01/23/2017 01:11 PM    CHOL/HDL Ratio 4.0 01/23/2017 01:11 PM        ABG Recent Labs      12/19/17   0325  12/18/17   1814   PHI  7.405  7.338*   PCO2I  34.4*  40.7   PO2I  63*  34*   HCO3I  21.6*  21.8*        Urinalysis Lab Results   Component Value Date/Time    Color YELLOW 06/28/2013 12:14 AM    Appearance CLEAR 06/28/2013 12:14 AM    Specific gravity 1.015 06/28/2013 12:14 AM    pH (UA) 6.5 06/28/2013 12:14 AM    Protein NEGATIVE  06/28/2013 12:14 AM    Glucose NEGATIVE  06/28/2013 12:14 AM    Ketone TRACE 06/28/2013 12:14 AM    Bilirubin SMALL 06/28/2013 12:14 AM    Urobilinogen 1.0 06/28/2013 12:14 AM    Nitrites NEGATIVE  06/28/2013 12:14 AM    Leukocyte Esterase NEGATIVE  06/28/2013 12:14 AM        Micro  Recent Labs      12/18/17 1915  12/18/17   1900   CULT  NO GROWTH 2 DAYS  NO GROWTH 2 DAYS     Recent Labs      12/18/17 1915 12/18/17 1900   CULT  NO GROWTH 2 DAYS NO GROWTH 2 DAYS        EKG No results found for this or any previous visit. PFT No flowsheet data found. Other ASA reactivity:   Pre-albumin:   Ionized Calcium:   NH4:   T3, FT4:  Cortisol:  Urine Osm:  Urine Lytes:   HbA1c:      Ultrasound      LE Doppler      ECHO No results found for this or any previous visit. CT (Most Recent)   Results from Hospital Encounter encounter on 12/18/17   CT CHEST W CONT   Narrative CT CHEST WITH ENHANCEMENT    INDICATION: COPD with worsening dyspnea, cough, chest congestion, hypoxia,  anxious. TECHNIQUE: Axial images obtained from the thoracic inlet to the level of the  diaphragm following uneventful administration of 100 cc Isovue-300 nonionic  intravenous contrast. Coronal and sagittal reformatted images were obtained. All CT scans at this facility are performed using dose optimization technique as  appropriate to a performed exam, to include automated exposure control,  adjustment of the mA and/or kV according to patient size (including appropriate  matching first site-specific examinations), or use of iterative reconstruction  technique. COMPARISON: CT chest 6/28/2013. CHEST FINDINGS:     Thyroid: Unremarkable in its visualized aspects. Pericardium/ Heart: Heart size is normal. No pericardial effusion. Mild coronary  arteriosclerosis in the LAD. Aorta/ Vessels: No aneurysm or dissection. Lymph Nodes: Unremarkable. .    Lungs: There is layering mucus in the trachea. Central bronchial tree is patent. Subsegmental atelectasis or scarring in the bilateral lower lobes. Moderate to  severe emphysematous changes. Pleura: No effusion. No pneumothorax. Upper Abdomen: Similar subcentimeter hypodensity in the left medial hepatic  lobe, too small to characterize but stability since 2013 is reassuring. Bones/soft tissues: Unremarkable. Impression IMPRESSION:    Moderate to severe emphysema. Mild LAD coronary arteriosclerosis.              XR (Most Recent). CXR reviewed by me and compared with previous CXR   Results from [place] encounter on 12/18/17   XR CHEST SNGL V   Narrative Portable chest x-ray, semierect AP view, time 1802 hours on 12/18/2017:        INDICATION:    Shortness of breath. Expiration of COPD. COMPARISON STUDY: Chest x-ray on 12/17/2017, 11/04/2017. FINDINGS:    Lungs are mildly hypoventilated. Cardiac CABG to be within normal limits is mildly more prominent as compared to  previous study. As compared to previous study there is no minimal to mild pulmonary vascular  congestion noted. Interstitial markings are mildly hazy. Right lung is clear. At  left lung base there are minimal streaky atelectatic changes noted. CP angles  are sharp bilaterally. No evidence of pneumothorax. Impression IMPRESSION:    Currently minimal to mild pulmonary vascular congestion. Minimal interstitial  pulmonary edema is not excluded at this time. Mild pulmonary hypoventilation. Minimal streaky left basilar atelectasis.                  Les Solorzano DO  12/20/2017       Clinical Care time , chart review and time spent coordinating care: 35min

## 2017-12-20 NOTE — PROGRESS NOTES
12/19/17 7556   CPAP/BIPAP   CPAP/BIPAP Start/Stop Off     Attempted to place pt on BIPAP. He stated he won't be able to sleep with the tight mask on due to his anxiety. It makes him feel like he can't breathe.  Placed back on 3L NC.

## 2017-12-20 NOTE — PROGRESS NOTES
Discussed self pay and going home. Patient wants to apply for medicaid and disability. Patient states he has a nebulizer at home that he uses eft over from his daughter. I will make referral to [facility].

## 2017-12-20 NOTE — PROGRESS NOTES
Hospitalist Progress Note    Patient: Bryce Rosado Sr. MRN: [id number]  CSN: [id number]    YOB: 1970  Age: 49 y.o. Sex: male    DOA: 12/18/2017 LOS:  LOS: 2 days          He reports poor sleep and ongoing anxiety. He did not tolerate bipap at night as mask made him anxious. States he was awakened multiple x throughout the night. He reports brooding over his LT prognosis. Declines  visit. States he's never tried alternate methods to manage anxiety as this is a new problem. Accepts trial remeron. Cough continues, productive of copious amounts of tan sputum, lightening some in color. Still dyspneic on minimal exertion. Wife at bedside, all questions answered to the best of my ability. Assessment/Plan     1. COPD, severe - solumedrol, duoneb, tobacco cessation, abx. Pulmonary input appreciated. Alpha-1 antitrypsin testing as outpatient in [place] [id number] clinic  2. Sinus tachycardia poa - related to infection. 3. Acute bronchitis - Levaquin/Zosyn - CT chest no infiltrate. 4. Elevated bp - not on meds at home - monitor on steroids. 5. Lactic acidosis - resolving  6. uds + opiates. 7. Tobacco abuse - chantix. Smoking cessation education  8. normal event monitor 2015  9. Recent GI bleed in the setting of steroid use, egd 12/11/17 revealed Grade 1 espohagitis  10. Mild LAD coronary arteriosclerosis seen on CT chest.   12. Anxiety - prn ativan. Remeron. 13. dvt prophylaxis  14. Full code. has a nebulizer at home that he uses left over from his daughter. Referred to [facility] per CM. Pt and ot. Needs home O2 eval prior to discharge.      Wife Jacinto Marie [phone]    Additional Notes:      Case discussed with:  [x]Patient  [x]Family  [x]Nursing  [x]Case Management  DVT Prophylaxis:  []Lovenox  [x]Hep SQ  []SCDs  []Coumadin   []On Heparin gtt    Vital signs/Intake and Output:  Visit Vitals    BP (!) 147/92    Pulse 86    Temp 98 °F (36.7 °C)    Resp 20    Wt 77.1 kg (170 lb)    SpO2 95%    BMI 25.1 kg/m2     Current Shift:  12/20 0701 - 12/20 1900  In: -   Out: 600 [Urine:600]  Last three shifts:  12/18 1901 - 12/20 0700  In: 1680 [P.O.:1680]  Out: 1100 [Urine:1100]    Awake alert and oriented. Cough productive of thick tan sputum  Ncat. Perrl. Oropharynx clear   RRR  Improved air entry. Scattered end expiratory wheeze. Soft nt nd nabs  No edema. dp 2+ b.l  No focal deficit  No rash    Medications Reviewed      Labs: Results:       Chemistry Recent Labs      12/19/17   0409  12/18/17   1733  12/17/17   2200   GLU  138*  153*  100*   NA  141  139  140   K  4.5  3.9  3.7   CL  111*  105  108   CO2  23  25  27   BUN  11  11  10   CREA  0.81  1.02  0.74   CA  8.5  8.9  8.2*   AGAP  7  9  5   BUCR  14  11*  14   AP  63  78  70   TP  6.3*  7.7  6.6   ALB  3.5  4.1  3.5   GLOB  2.8  3.6  3.1   AGRAT  1.3  1.1  1.1      CBC w/Diff Recent Labs      12/19/17   0409  12/18/17   1733  12/17/17   2200   WBC  13.5*  13.0  6.3   RBC  4.58*  5.11  4.90   HGB  14.6  16.8*  16.1*   HCT  42.3  46.9  44.1   PLT  211  212  194   GRANS  93*  96*  66   LYMPH  4*  4*  27   EOS  0  0  1      Cardiac Enzymes Recent Labs      12/17/17   2200   CPK  95   CKND1  1.5      Coagulation No results for input(s): PTP, INR, APTT in the last 72 hours. No lab exists for component: INREXT, INREXT    Lipid Panel Lab Results   Component Value Date/Time    Cholesterol, total 144 01/23/2017 01:11 PM    HDL Cholesterol 36 01/23/2017 01:11 PM    LDL, calculated 85.6 01/23/2017 01:11 PM    VLDL, calculated 22.4 01/23/2017 01:11 PM    Triglyceride 112 01/23/2017 01:11 PM    CHOL/HDL Ratio 4.0 01/23/2017 01:11 PM      BNP No results for input(s): BNPP in the last 72 hours.    Liver Enzymes Recent Labs      12/19/17   0409   TP  6.3*   ALB  3.5   AP  63   SGOT  9*      Thyroid Studies Lab Results   Component Value Date/Time    TSH 2.21 12/17/2017 10:00 PM        Procedures/imaging: see electronic medical records for all procedures/Xrays and details which were not copied into this note but were reviewed prior to creation of Plan.

## 2017-12-20 NOTE — TELEPHONE ENCOUNTER
Spoke to the spouse about picking up medication, and to notify spouse that he needs to sign an application.

## 2017-12-20 NOTE — ROUTINE PROCESS
Bedside and Verbal shift change report given to Dwight Mccauley RN (oncoming nurse) by Thomas Petit RN (offgoing nurse). Report included the following information SBAR, Kardex, MAR and Recent Results.     SITUATION:  Code Status: Full Code  Reason for Admission: COPD with acute exacerbation [facility])  Hospital day: 2  Problem List:       Hospital Problems  Date Reviewed: 12/18/2017          Codes Class Noted POA    * (Principal)COPD with acute exacerbation ([address] ICD-10-CM: J44.1  ICD-9-CM: 491.21  12/18/2017 Yes        Sepsis ([address] ICD-10-CM: A41.9  ICD-9-CM: 038.9, 995.91  12/18/2017 Yes        COPD, severe ([address] ICD-10-CM: J44.9  ICD-9-CM: 351  Unknown Yes              BACKGROUND:   Past Medical History:   Past Medical History:   Diagnosis Date    Chronic obstructive pulmonary disease ([address] 08/03/2017    PFTS 8/3/17    COPD, severe ([address]     Emphysema/COPD ([address]     Esophagitis 12/11/2017    Endoscopy    History of stomach ulcers     Positive urine drug screen 01/2016    opiates and THC    Upper GI bleed       Patient taking anticoagulants no    Patient has a defibrillator: no   History of shots NO for example, flu, pneumonia, tetanus   Isolation History NO for example, MRSA, CDiff    ASSESSMENT:  Changes in Assessment Throughout Shift: None  Significant Changes in 24 hours (for example, RR/code, fall)  Patient has Central Line: no Reasons if yes: N/A  Patient has Santos Cath: no Reasons if yes: N/A   Mobility Issues  PT  IV Patency  OR Checklist  Pending Tests    Last Vitals:  Vitals w/ MEWS Score (last day)     Date/Time MEWS Score Pulse Resp Temp BP Level of Consciousness SpO2    12/20/17 0400 1 90 18 98.1 °F (36.7 °C) 156/89 Alert 96 %    12/20/17 0000 1 95 20 98.5 °F (36.9 °C) 155/90 Alert 95 %    12/19/17 2010 -- -- -- -- -- -- 99 %    12/19/17 2005 -- -- -- -- -- -- 98 %    12/19/17 2000 1 95 18 97.9 °F (36.6 °C) (!)  158/98 Alert 94 %    12/19/17 1539 2 (!)  107 18 97 °F (36.1 °C) (!)  147/96 Alert 94 % 12/19/17 1204 -- -- -- -- -- -- 96 %    12/19/17 1125 1 89 19 97 °F (36.1 °C) 148/85 Alert 95 %    12/19/17 0959 -- (!)  103 -- -- 140/88 -- 95 %    12/19/17 0759 2 (!)  103 18 97.3 °F (36.3 °C) 136/83 Alert 95 %    12/19/17 0400 3 (!)  111 18 98.7 °F (37.1 °C) 125/74 Alert 94 %    12/19/17 0323 -- -- -- -- -- -- 94 %    12/19/17 0000 3 (!)  118 20 97.9 °F (36.6 °C) 141/80 Alert 93 %            PAIN    Pain Assessment    Pain Intensity 1: 0 (12/20/17 0400)    Pain Location 1: Head    Pain Intervention(s) 1: Medication (see MAR)    Patient Stated Pain Goal: 0  Intervention effective: N/A  Time of last intervention: N/A Reassessment Completed: N/A   Other actions taken for pain: N/A    Last 3 Weights:  Last 3 Recorded Weights in this Encounter    12/18/17 1727   Weight: 77.1 kg (170 lb)   Weight change:     INTAKE/OUPUT    Current Shift:      Last three shifts: 12/18 0701 - 12/19 1900  In: 1680 [P.O.:1680]  Out: 1100 [Urine:1100]    RECOMMENDATIONS AND DISCHARGE PLANNING  Patient needs and requests: None    Pending tests/procedures: None     Discharge plan for patient: Home with home health    Discharge planning Needs or Barriers: TBD    Estimated Discharge Date: TBD Posted on Whiteboard in Patients Room: no       \"HEALS\" SAFETY CHECK  A safety check occurred in the patient's room between off going nurse and oncoming nurse listed above. The safety check included the below items:    H  High Alert Medications Verify all high alert medication drips (heparin, PCA, etc.)  E  Equipment Suction is set up for ALL patients (with yanker)  Red plugs utilized for all equipment (IV pumps, etc.)  WOWs wiped down at end of shift.   Room stocked with oxygen, suction, and other unit-specific supplies  A  Alarms Bed alarm is set for fall risk patients  Ensure chair alarm is in place and activated if patient is up in a chair  L  Lines Check IV for any infiltration  Santos bag is empty if patient has a Santos   Tubing and IV bags are labeled  S  Safety  Room is clean, patient is clean, and equipment is clean. Hallways are clear from equipment besides carts. Fall bracelet on for fall risk patients  Ensure room is clear and free of clutter  Suction is set up for ALL patients (with aida)  Hallways are clear from equipment besides carts.    Isolation precautions followed, supplies available outside room, sign posted    Aba Sanchez RN

## 2017-12-21 LAB
ANION GAP SERPL CALC-SCNC: 7 MMOL/L (ref 3–18)
BASOPHILS # BLD: 0 K/UL (ref 0–0.06)
BASOPHILS NFR BLD: 0 % (ref 0–2)
BUN SERPL-MCNC: 14 MG/DL (ref 7–18)
BUN/CREAT SERPL: 19 (ref 12–20)
CALCIUM SERPL-MCNC: 8.5 MG/DL (ref 8.5–10.1)
CHLORIDE SERPL-SCNC: 107 MMOL/L (ref 100–108)
CO2 SERPL-SCNC: 26 MMOL/L (ref 21–32)
CREAT SERPL-MCNC: 0.73 MG/DL (ref 0.6–1.3)
DIFFERENTIAL METHOD BLD: ABNORMAL
EOSINOPHIL # BLD: 0 K/UL (ref 0–0.4)
EOSINOPHIL NFR BLD: 0 % (ref 0–5)
ERYTHROCYTE [DISTWIDTH] IN BLOOD BY AUTOMATED COUNT: 12.8 % (ref 11.6–14.5)
GLUCOSE SERPL-MCNC: 131 MG/DL (ref 74–99)
HCT VFR BLD AUTO: 42.6 % (ref 36–48)
HGB BLD-MCNC: 14.8 G/DL (ref 13–16)
LYMPHOCYTES # BLD: 0.5 K/UL (ref 0.9–3.6)
LYMPHOCYTES NFR BLD: 6 % (ref 21–52)
MAGNESIUM SERPL-MCNC: 2.3 MG/DL (ref 1.6–2.6)
MCH RBC QN AUTO: 32.4 PG (ref 24–34)
MCHC RBC AUTO-ENTMCNC: 34.7 G/DL (ref 31–37)
MCV RBC AUTO: 93.2 FL (ref 74–97)
MONOCYTES # BLD: 0.4 K/UL (ref 0.05–1.2)
MONOCYTES NFR BLD: 5 % (ref 3–10)
NEUTS SEG # BLD: 7.7 K/UL (ref 1.8–8)
NEUTS SEG NFR BLD: 89 % (ref 40–73)
PLATELET # BLD AUTO: 162 K/UL (ref 135–420)
PMV BLD AUTO: 10.6 FL (ref 9.2–11.8)
POTASSIUM SERPL-SCNC: 4.1 MMOL/L (ref 3.5–5.5)
RBC # BLD AUTO: 4.57 M/UL (ref 4.7–5.5)
SODIUM SERPL-SCNC: 140 MMOL/L (ref 136–145)
WBC # BLD AUTO: 8.6 K/UL (ref 4.6–13.2)

## 2017-12-21 PROCEDURE — 74011250636 HC RX REV CODE- 250/636: Performed by: PHYSICIAN ASSISTANT

## 2017-12-21 PROCEDURE — 77010033711 HC HIGH FLOW OXYGEN

## 2017-12-21 PROCEDURE — 83735 ASSAY OF MAGNESIUM: CPT | Performed by: HOSPITALIST

## 2017-12-21 PROCEDURE — 74011250636 HC RX REV CODE- 250/636: Performed by: HOSPITALIST

## 2017-12-21 PROCEDURE — 74011000250 HC RX REV CODE- 250: Performed by: PHYSICIAN ASSISTANT

## 2017-12-21 PROCEDURE — 74011000250 HC RX REV CODE- 250: Performed by: INTERNAL MEDICINE

## 2017-12-21 PROCEDURE — 77010033678 HC OXYGEN DAILY

## 2017-12-21 PROCEDURE — 74011250637 HC RX REV CODE- 250/637: Performed by: HOSPITALIST

## 2017-12-21 PROCEDURE — 74011250637 HC RX REV CODE- 250/637: Performed by: INTERNAL MEDICINE

## 2017-12-21 PROCEDURE — 74011250636 HC RX REV CODE- 250/636: Performed by: INTERNAL MEDICINE

## 2017-12-21 PROCEDURE — 77030011251 HC DRSG HYDRCOIL S&N -A

## 2017-12-21 PROCEDURE — 97161 PT EVAL LOW COMPLEX 20 MIN: CPT

## 2017-12-21 PROCEDURE — 36415 COLL VENOUS BLD VENIPUNCTURE: CPT | Performed by: HOSPITALIST

## 2017-12-21 PROCEDURE — 85025 COMPLETE CBC W/AUTO DIFF WBC: CPT | Performed by: HOSPITALIST

## 2017-12-21 PROCEDURE — 65270000029 HC RM PRIVATE

## 2017-12-21 PROCEDURE — 94640 AIRWAY INHALATION TREATMENT: CPT

## 2017-12-21 PROCEDURE — 80048 BASIC METABOLIC PNL TOTAL CA: CPT | Performed by: HOSPITALIST

## 2017-12-21 RX ORDER — AMOXICILLIN 250 MG
2 CAPSULE ORAL
Status: DISCONTINUED | OUTPATIENT
Start: 2017-12-21 | End: 2017-12-23 | Stop reason: HOSPADM

## 2017-12-21 RX ORDER — LEVOFLOXACIN 750 MG/1
750 TABLET ORAL EVERY 24 HOURS
Status: DISCONTINUED | OUTPATIENT
Start: 2017-12-21 | End: 2017-12-23 | Stop reason: HOSPADM

## 2017-12-21 RX ORDER — HYDRALAZINE HYDROCHLORIDE 25 MG/1
25 TABLET, FILM COATED ORAL
Status: DISCONTINUED | OUTPATIENT
Start: 2017-12-21 | End: 2017-12-23 | Stop reason: HOSPADM

## 2017-12-21 RX ORDER — OXYCODONE AND ACETAMINOPHEN 5; 325 MG/1; MG/1
1 TABLET ORAL
Status: DISCONTINUED | OUTPATIENT
Start: 2017-12-21 | End: 2017-12-23 | Stop reason: HOSPADM

## 2017-12-21 RX ORDER — POLYETHYLENE GLYCOL 3350 17 G/17G
17 POWDER, FOR SOLUTION ORAL DAILY
Status: DISCONTINUED | OUTPATIENT
Start: 2017-12-22 | End: 2017-12-23 | Stop reason: HOSPADM

## 2017-12-21 RX ADMIN — ALBUTEROL SULFATE 1.25 MG: 1.25 SOLUTION RESPIRATORY (INHALATION) at 21:40

## 2017-12-21 RX ADMIN — ARFORMOTEROL TARTRATE 15 MCG: 15 SOLUTION RESPIRATORY (INHALATION) at 11:01

## 2017-12-21 RX ADMIN — IPRATROPIUM BROMIDE 0.5 MG: 0.5 SOLUTION RESPIRATORY (INHALATION) at 21:40

## 2017-12-21 RX ADMIN — ALBUTEROL SULFATE 1.25 MG: 1.25 SOLUTION RESPIRATORY (INHALATION) at 11:01

## 2017-12-21 RX ADMIN — METHYLPREDNISOLONE SODIUM SUCCINATE 40 MG: 40 INJECTION, POWDER, FOR SOLUTION INTRAMUSCULAR; INTRAVENOUS at 00:07

## 2017-12-21 RX ADMIN — ALBUTEROL SULFATE 1.25 MG: 1.25 SOLUTION RESPIRATORY (INHALATION) at 02:04

## 2017-12-21 RX ADMIN — STANDARDIZED SENNA CONCENTRATE AND DOCUSATE SODIUM 2 TABLET: 8.6; 5 TABLET, FILM COATED ORAL at 22:41

## 2017-12-21 RX ADMIN — WATER 2 G: 1 INJECTION INTRAMUSCULAR; INTRAVENOUS; SUBCUTANEOUS at 22:41

## 2017-12-21 RX ADMIN — SODIUM CHLORIDE 125 ML/HR: 900 INJECTION, SOLUTION INTRAVENOUS at 05:55

## 2017-12-21 RX ADMIN — OXYCODONE HYDROCHLORIDE AND ACETAMINOPHEN 1 TABLET: 5; 325 TABLET ORAL at 16:05

## 2017-12-21 RX ADMIN — MIRTAZAPINE 7.5 MG: 15 TABLET, FILM COATED ORAL at 22:41

## 2017-12-21 RX ADMIN — Medication 10 ML: at 05:57

## 2017-12-21 RX ADMIN — LEVOFLOXACIN 750 MG: 750 TABLET, FILM COATED ORAL at 20:34

## 2017-12-21 RX ADMIN — ENOXAPARIN SODIUM 40 MG: 40 INJECTION, SOLUTION INTRAVENOUS; SUBCUTANEOUS at 17:51

## 2017-12-21 RX ADMIN — LORAZEPAM 0.5 MG: 0.5 TABLET ORAL at 13:24

## 2017-12-21 RX ADMIN — PANTOPRAZOLE SODIUM 40 MG: 40 TABLET, DELAYED RELEASE ORAL at 08:23

## 2017-12-21 RX ADMIN — ONDANSETRON 4 MG: 2 INJECTION INTRAMUSCULAR; INTRAVENOUS at 13:15

## 2017-12-21 RX ADMIN — METHYLPREDNISOLONE SODIUM SUCCINATE 40 MG: 40 INJECTION, POWDER, FOR SOLUTION INTRAMUSCULAR; INTRAVENOUS at 20:35

## 2017-12-21 RX ADMIN — LORAZEPAM 0.5 MG: 0.5 TABLET ORAL at 08:23

## 2017-12-21 RX ADMIN — METHYLPREDNISOLONE SODIUM SUCCINATE 40 MG: 40 INJECTION, POWDER, FOR SOLUTION INTRAMUSCULAR; INTRAVENOUS at 05:55

## 2017-12-21 RX ADMIN — IPRATROPIUM BROMIDE 0.5 MG: 0.5 SOLUTION RESPIRATORY (INHALATION) at 16:16

## 2017-12-21 RX ADMIN — IPRATROPIUM BROMIDE 0.5 MG: 0.5 SOLUTION RESPIRATORY (INHALATION) at 11:01

## 2017-12-21 RX ADMIN — ROFLUMILAST 500 MCG: 500 TABLET ORAL at 15:34

## 2017-12-21 RX ADMIN — HYDRALAZINE HYDROCHLORIDE 25 MG: 25 TABLET, FILM COATED ORAL at 08:23

## 2017-12-21 RX ADMIN — BUDESONIDE 500 MCG: 0.5 INHALANT RESPIRATORY (INHALATION) at 21:40

## 2017-12-21 RX ADMIN — ALBUTEROL SULFATE 1.25 MG: 1.25 SOLUTION RESPIRATORY (INHALATION) at 16:16

## 2017-12-21 RX ADMIN — OXYCODONE HYDROCHLORIDE AND ACETAMINOPHEN 1 TABLET: 5; 325 TABLET ORAL at 20:33

## 2017-12-21 RX ADMIN — BUDESONIDE 500 MCG: 0.5 INHALANT RESPIRATORY (INHALATION) at 11:01

## 2017-12-21 RX ADMIN — ACETAMINOPHEN 500 MG: 500 TABLET ORAL at 08:23

## 2017-12-21 RX ADMIN — WATER 2 G: 1 INJECTION INTRAMUSCULAR; INTRAVENOUS; SUBCUTANEOUS at 08:24

## 2017-12-21 RX ADMIN — VARENICLINE TARTRATE 1 MG: 1 TABLET, FILM COATED ORAL at 08:23

## 2017-12-21 RX ADMIN — ARFORMOTEROL TARTRATE 15 MCG: 15 SOLUTION RESPIRATORY (INHALATION) at 21:40

## 2017-12-21 RX ADMIN — WATER 2 G: 1 INJECTION INTRAMUSCULAR; INTRAVENOUS; SUBCUTANEOUS at 15:34

## 2017-12-21 RX ADMIN — GUAIFENESIN AND DEXTROMETHORPHAN HYDROBROMIDE 1 TABLET: 600; 30 TABLET, EXTENDED RELEASE ORAL at 20:34

## 2017-12-21 RX ADMIN — IPRATROPIUM BROMIDE 0.5 MG: 0.5 SOLUTION RESPIRATORY (INHALATION) at 02:22

## 2017-12-21 RX ADMIN — GUAIFENESIN AND DEXTROMETHORPHAN HYDROBROMIDE 1 TABLET: 600; 30 TABLET, EXTENDED RELEASE ORAL at 08:23

## 2017-12-21 NOTE — CONSULTS
[facility]  Pulmonary, Critical Care, and Sleep Medicine    Name: Ida Arana Sr. MRN: [id number]   : 1970 Hospital: [address] [place] [address] Dr   Date: 2017        Pulmonary Initial In-Patient Consult                                              Consult requesting physician: Dr. Hair Robertson  Reason for Consult: COPD exacerbation    IMPRESSION:   · Acute on chronic hypoxia COPD exacerbation with presentation of worsening shortness of breath  · Severe COPD, FEV1/FVC ratio < 61%  · Active smoking  · Gr 1 esophagitis by EGD (17) secondary to NSAIDs and steroids  · Depression/Anxiety with claustrophobia - Not tolerating PAP therapy- started on Rameron  · [facility]  · Lactic acidosis possibly type A and/or B- resolved         RECOMMENDATIONS:   · May continue with oxygen supplementation, titrate for SpO2 88-92%. Pt may need home oxygen evaluation prior to discharge. Alpha-1 antitrypsin testing as outpatient in [address]. · Trial of bipap if tolerated  · If still doing well tomorrow d/c Maxipime 17  · Continue Levaquin- plan for 5 day course  · Start Daliresp today PO (Patient educated on this med - can cause diarrhea)  · Continue IV steroids, wean as appropriate- decreased today for HTN  · Continued scheduled pulmicort and Brovana  · Continue scheduled atrovent/accunebs  · Oral care discussed with patient: rinse mouth and brush tongue. · Add Mucinex  · Continue antibiotics and de-escalate soon. Follow blood c/s; obtain respiratory c/s if not done yet  · Agree with chantix  · Smoking cessation education given   · PT/OT- assess oxygen requirement with ambulation. · Anticipate that patient will need home oxygen. · Rest of concerns per primary. · Will continue to follow  · Follow up with OP Pulmonologist- Dr. Jennifer Pabon after discharge       Subjective/History:     17   Patient sitting upright in bed. Wife and young daughter also present. Patient reports that he has feels that he is breathing better. Still coughing up clear-tan sputum. + wheezing but again able to take deeper breath- using IS at bedside  Tolerating PO. No nausea or emesis  Afebrile  Hypertension noted in chart  Has not been ambulating much. Patient states he will not restart smoking          Initial History:  Mehrdad Eldridge is a 49 y.o. male with PMHx significant for severe COPD and Gr 1 esophagitis, presented to the ED for worsening shortness of breath. Pt's symptoms started on 12/17 upon waking up with sob; not relieved by multiple albuterol inhaler treatments throughout the day. Pt was brought by ambulance to [facility] ED and was given solumedrol en route. Pt received nebulized treatment and discharged with prescription for prednisone which he was not able to fill immediately. A few hours after discharge and while [place] [address], pt started to have recurrence of sob unrelieved by inhaler treatments. Pt was brought to [facility] ED where he was given nebulized treatments and discharged on doxycycline and prednisone PO. Upon arriving home, pt again had recurrence of shortness of breath and was brought to back to [facility] ED. Pt's shortness of breath has become more frequent in the last year and states that he also becomes short of breath with exertion (climbing up the stairs; carrying equipment at work). Pt was last seen by Dr. Inga Bobby in Nov for follow-up where he was diagnosed for mild exacerbation of COPD however was unable to get treated with steroids as he had some reported GIB. Pt then  he underwent EGD last 12/11 following episodes of dark tarry loose stools while taking NSAIDs and steroids. EGD showed Gr 1 esophagitis. Pt also endorses intermittent low grade fever since completing the EGD with highest temp around 100.5.  Pt states he used to cough in the past however not recently in the last few months until this past couple of days when he started expectorating brownish-yellow sputum alternating with whitish sputum. Pt endorses being jittery while he has been taking albuterol rescue inhaler very frequently. Pt continues to smoke, however has significantly cut down since about 2 months ago. He used to smoke 2 packs/day however has been able to wean himself down to half pack/day. Pt denies recreational drug use; has occasional alcohol drinking. Pt works as a .        Review of Systems:   HEENT: No epistaxis, no nasal drainage, no difficulty in swallowing, no redness in eyes  Respiratory: as above  Cardiovascular: no chest pain, no palpitations, no chronic leg edema, no syncope  Gastrointestinal: no abd pain, no vomiting, no diarrhea, no bleeding symptoms recently after EGD  Genitourinary: No urinary symptoms or hematuria  Musculoskeletal: + back pain  Neurological: No focal weakness, no seizures, no headaches  Behvioral/Psych:  + anxiety/depression- stable  Constitutional: No fever, no chills, no weight loss, + night sweats   Otherwise per HPI      Immunization status:   Immunization History   Administered Date(s) Administered    Influenza Vaccine (Quad) PF 10/23/2017        No Known Allergies     Past Medical History:   Diagnosis Date    Chronic obstructive pulmonary disease ([address] 08/03/2017    PFTS 8/3/17    COPD, severe ([address]     Emphysema/COPD ([address]     Esophagitis 12/11/2017    Endoscopy    History of stomach ulcers     Positive urine drug screen 01/2016    opiates and THC    Upper GI bleed         Past Surgical History:   Procedure Laterality Date    HX ENDOSCOPY  12/11/2017        Family History   Problem Relation Age of Onset    Hypertension Mother     Heart Disease Mother     Diabetes Mother     Hypertension Father     Heart Disease Father     Cancer Father      lymphnodes    Diabetes Father         Social History   Substance Use Topics    Smoking status: Current Every Day Smoker     Packs/day: 2.00     Years: 25.00 Types: Cigarettes     Start date: 5/28/1984    Smokeless tobacco: Never Used    Alcohol use No        Prior to Admission medications    Medication Sig Start Date End Date Taking? Authorizing Provider   predniSONE (DELTASONE) 50 mg tablet Take 1 Tab by mouth daily for 5 days. 12/18/17 12/23/17  Jeremiah Mederos MD   acetaminophen (TYLENOL) 500 mg tablet Take  by mouth every six (6) hours as needed for Pain. Historical Provider   traMADol (ULTRAM) 50 mg tablet Take 1 Tab by mouth every six (6) hours as needed for Pain. Max Daily Amount: 200 mg. Indications: Pain 11/2/17   Maura Alford NP   albuterol-ipratropium (DUO-NEB) 2.5 mg-0.5 mg/3 ml nebu 3 mL by Nebulization route every six (6) hours as needed. 10/23/17   Maura Alford NP   varenicline (CHANTIX) 1 mg tablet Take 1 Tab by mouth two (2) times a day. Start after completion of starter pack 7/26/17   Maura Alford NP   fluticasone-salmeterol (ADVAIR) 500-50 mcg/dose diskus inhaler Take 1 Puff by inhalation two (2) times a day. 7/24/17   Maura Alford NP   albuterol (PROVENTIL HFA, VENTOLIN HFA, PROAIR HFA) 90 mcg/actuation inhaler Take 2 Puffs by inhalation every four (4) hours as needed for Wheezing. 1/23/17   Maura Alford NP   tiotropium (SPIRIVA) 18 mcg inhalation capsule Take 1 Cap by inhalation daily.  Indications: BRONCHOSPASM PREVENTION WITH COPD 1/23/17   Maura Alford NP       Current Facility-Administered Medications   Medication Dose Route Frequency    methylPREDNISolone (PF) (SOLU-MEDROL) injection 40 mg  40 mg IntraVENous Q12H    hydrALAZINE (APRESOLINE) tablet 25 mg  25 mg Oral Q8H PRN    roflumilast (DALIRESP) tablet 500 mcg  500 mcg Oral DAILY    mirtazapine (REMERON) tablet 7.5 mg  7.5 mg Oral QHS    acetaminophen (TYLENOL) tablet 500 mg  500 mg Oral Q6H PRN    ondansetron (ZOFRAN) injection 4 mg  4 mg IntraVENous Q8H PRN    guaiFENesin-dextromethorphan SR (HUMIBID DM) 600-30 mg tablet 1 Tab  1 Tab Oral Q12H    varenicline (CHANTIX) tablet 1 mg  1 mg Oral DAILY    pantoprazole (PROTONIX) tablet 40 mg  40 mg Oral ACB    ipratropium (ATROVENT) 0.02 % nebulizer solution 0.5 mg  0.5 mg Nebulization Q6H RT    albuterol (ACCUNEB) nebulizer solution 1.25 mg  1.25 mg Nebulization Q6H RT    budesonide (PULMICORT) 500 mcg/2 ml nebulizer suspension  500 mcg Nebulization BID RT    arformoterol (BROVANA) neb solution 15 mcg  15 mcg Nebulization BID RT    enoxaparin (LOVENOX) injection 40 mg  40 mg SubCUTAneous Q24H    sodium chloride (NS) flush 5-10 mL  5-10 mL IntraVENous PRN    levoFLOXacin (LEVAQUIN) 750 mg in D5W IVPB  750 mg IntraVENous Q24H    LORazepam (ATIVAN) tablet 0.5 mg  0.5 mg Oral Q4H PRN    cefepime (MAXIPIME) 2 g in sterile water (preservative free) 10 mL IV syringe  2 g IntraVENous Q8H         Objective:   Vital Signs:    Visit Vitals    BP (!) 169/105 (BP 1 Location: Right arm, BP Patient Position: At rest)    Pulse 73    Temp 97.1 °F (36.2 °C)    Resp 18    Wt 79.2 kg (174 lb 8 oz)    SpO2 95%    BMI 25.77 kg/m2       O2 Device: Nasal cannula   O2 Flow Rate (L/min): 4 l/min   Temp (24hrs), Av °F (36.7 °C), Min:97.1 °F (36.2 °C), Max:99.1 °F (37.3 °C)       Intake/Output:   Last shift:       07 - 1900  In: 218.8 [I.V.:218.8]  Out: -   Last 3 shifts: 1901 -  0700  In: 500 [I.V.:500]  Out: 2100 [Urine:2100]    Intake/Output Summary (Last 24 hours) at 17 1100  Last data filed at 17 0845   Gross per 24 hour   Intake           718.75 ml   Output             1500 ml   Net          -781.25 ml       Physical Exam:     General/Neurology: Alert, Awake; has conversational dyspnea  Head:   Normocephalic, without obvious abnormality, atraumatic.   Eye:   PERRL, EOM intact, no scleral icterus, no pallor, no cyanosis  Nose:   No sinus tenderness, no erythema, no induration, no discharge  Throat:  Lips, mucosa, and tongue normal.  No tonsillar enlargement, no erythema, no exudates. No oral thrush  Neck:   Supple, symmetric. Thyroid: no enlargement/tenderness/nodule. No carotid bruit. Nolymphadenopathy. Trachea midline  Back & spine: Symmetric, no curvature. No gross deformities  Chest wall: No tenderness or deformity. No rash  Lung: Moderately decreased air entry b/l. Symmetrical chest expansion. + expiratory wheezing more pronounced on left side. - Improved air movement this morning with increased wheezing., mild accessory muscle use. - improved from yesterday  Heart:   Mildly tachycardic rate but regular rhythm   Abdomen:  Soft. NT. ND. +BS. Extremities:  No pedal edema. No cyanosis. Pulses: 2+ and symmetric in DP  Lymphatic:  No cervical, supraclavicular palpable lymphadenopathy. Musculoskeletal: No joint swelling. No tenderness. Skin:   Color, texture, turgor normal. No rashes or lesions      Data:       Recent Results (from the past 24 hour(s))   CBC WITH AUTOMATED DIFF    Collection Time: 12/21/17  3:00 AM   Result Value Ref Range    WBC 8.6 4.6 - 13.2 K/uL    RBC 4.57 (L) 4.70 - 5.50 M/uL    HGB 14.8 13.0 - 16.0 g/dL    HCT 42.6 36.0 - 48.0 %    MCV 93.2 74.0 - 97.0 FL    MCH 32.4 24.0 - 34.0 PG    MCHC 34.7 31.0 - 37.0 g/dL    RDW 12.8 11.6 - 14.5 %    PLATELET 221 872 - 540 K/uL    MPV 10.6 9.2 - 11.8 FL    NEUTROPHILS 89 (H) 40 - 73 %    LYMPHOCYTES 6 (L) 21 - 52 %    MONOCYTES 5 3 - 10 %    EOSINOPHILS 0 0 - 5 %    BASOPHILS 0 0 - 2 %    ABS. NEUTROPHILS 7.7 1.8 - 8.0 K/UL    ABS. LYMPHOCYTES 0.5 (L) 0.9 - 3.6 K/UL    ABS. MONOCYTES 0.4 0.05 - 1.2 K/UL    ABS. EOSINOPHILS 0.0 0.0 - 0.4 K/UL    ABS.  BASOPHILS 0.0 0.0 - 0.06 K/UL    DF AUTOMATED     MAGNESIUM    Collection Time: 12/21/17  3:00 AM   Result Value Ref Range    Magnesium 2.3 1.6 - 2.6 mg/dL   METABOLIC PANEL, BASIC    Collection Time: 12/21/17  3:00 AM   Result Value Ref Range    Sodium 140 136 - 145 mmol/L    Potassium 4.1 3.5 - 5.5 mmol/L    Chloride 107 100 - 108 mmol/L    CO2 26 21 - 32 mmol/L    Anion gap 7 3.0 - 18 mmol/L    Glucose 131 (H) 74 - 99 mg/dL    BUN 14 7.0 - 18 MG/DL    Creatinine 0.73 0.6 - 1.3 MG/DL    BUN/Creatinine ratio 19 12 - 20      GFR est AA >60 >60 ml/min/1.73m2    GFR est non-AA >60 >60 ml/min/1.73m2    Calcium 8.5 8.5 - 10.1 MG/DL         Chemistry Recent Labs      12/21/17   0300  12/19/17   0409  12/18/17   1733   GLU  131*  138*  153*   NA  140  141  139   K  4.1  4.5  3.9   CL  107  111*  105   CO2  26  23  25   BUN  14  11  11   CREA  0.73  0.81  1.02   CA  8.5  8.5  8.9   MG  2.3   --    --    AGAP  7  7  9   BUCR  19  14  11*   AP   --   63  78   TP   --   6.3*  7.7   ALB   --   3.5  4.1   GLOB   --   2.8  3.6   AGRAT   --   1.3  1.1        Lactic Acid Lactic acid   Date Value Ref Range Status   12/20/2017 1.5 0.4 - 2.0 MMOL/L Final     Recent Labs      12/20/17   0415  12/19/17   0409   LAC  1.5  2.9*        Liver Enzymes Protein, total   Date Value Ref Range Status   12/19/2017 6.3 (L) 6.4 - 8.2 g/dL Final     Albumin   Date Value Ref Range Status   12/19/2017 3.5 3.4 - 5.0 g/dL Final     Globulin   Date Value Ref Range Status   12/19/2017 2.8 2.0 - 4.0 g/dL Final     A-G Ratio   Date Value Ref Range Status   12/19/2017 1.3 0.8 - 1.7   Final     AST (SGOT)   Date Value Ref Range Status   12/19/2017 9 (L) 15 - 37 U/L Final     Alk.  phosphatase   Date Value Ref Range Status   12/19/2017 63 45 - 117 U/L Final     Recent Labs      12/19/17   0409  12/18/17   1733   TP  6.3*  7.7   ALB  3.5  4.1   GLOB  2.8  3.6   AGRAT  1.3  1.1   SGOT  9*  14*   AP  63  78        CBC w/Diff Recent Labs      12/21/17   0300  12/19/17   0409  12/18/17   1733   WBC  8.6  13.5*  13.0   RBC  4.57*  4.58*  5.11   HGB  14.8  14.6  16.8*   HCT  42.6  42.3  46.9   PLT  162  211  212   GRANS  89*  93*  96*   LYMPH  6*  4*  4*   EOS  0  0  0        Cardiac Enzymes No results found for: CPK, CK, CKMMB, CKMB, RCK3, CKMBT, CKNDX, CKND1, JIMMY, TROPT, TROIQ, LINH, TROPT, TNIPOC, BNP, BNPP     BNP No results found for: BNP, BNPP, XBNPT     Coagulation No results for input(s): PTP, INR, APTT in the last 72 hours. No lab exists for component: INREXT, INREXT      Thyroid  Lab Results   Component Value Date/Time    TSH 2.21 12/17/2017 10:00 PM       No results found for: T4     Lipid Panel Lab Results   Component Value Date/Time    Cholesterol, total 144 01/23/2017 01:11 PM    HDL Cholesterol 36 01/23/2017 01:11 PM    LDL, calculated 85.6 01/23/2017 01:11 PM    VLDL, calculated 22.4 01/23/2017 01:11 PM    Triglyceride 112 01/23/2017 01:11 PM    CHOL/HDL Ratio 4.0 01/23/2017 01:11 PM        ABG Recent Labs      12/19/17   0325  12/18/17   1814   PHI  7.405  7.338*   PCO2I  34.4*  40.7   PO2I  63*  34*   HCO3I  21.6*  21.8*        Urinalysis Lab Results   Component Value Date/Time    Color YELLOW 06/28/2013 12:14 AM    Appearance CLEAR 06/28/2013 12:14 AM    Specific gravity 1.015 06/28/2013 12:14 AM    pH (UA) 6.5 06/28/2013 12:14 AM    Protein NEGATIVE  06/28/2013 12:14 AM    Glucose NEGATIVE  06/28/2013 12:14 AM    Ketone TRACE 06/28/2013 12:14 AM    Bilirubin SMALL 06/28/2013 12:14 AM    Urobilinogen 1.0 06/28/2013 12:14 AM    Nitrites NEGATIVE  06/28/2013 12:14 AM    Leukocyte Esterase NEGATIVE  06/28/2013 12:14 AM        Micro  Recent Labs      12/18/17   1915  12/18/17   1900   CULT  NO GROWTH 3 DAYS  NO GROWTH 3 DAYS     Recent Labs      12/18/17   1915  12/18/17   1900   CULT  NO GROWTH 3 DAYS  NO GROWTH 3 DAYS        EKG No results found for this or any previous visit. PFT No flowsheet data found. Other ASA reactivity:   Pre-albumin:   Ionized Calcium:   NH4:   T3, FT4:  Cortisol:  Urine Osm:  Urine Lytes:   HbA1c:      Ultrasound      LE Doppler      ECHO No results found for this or any previous visit.      CT (Most Recent)   Results from Hospital Encounter encounter on 12/18/17   CT CHEST W CONT   Narrative CT CHEST WITH ENHANCEMENT    INDICATION: COPD with worsening dyspnea, cough, chest congestion, hypoxia,  anxious. TECHNIQUE: Axial images obtained from the thoracic inlet to the level of the  diaphragm following uneventful administration of 100 cc Isovue-300 nonionic  intravenous contrast. Coronal and sagittal reformatted images were obtained. All CT scans at this facility are performed using dose optimization technique as  appropriate to a performed exam, to include automated exposure control,  adjustment of the mA and/or kV according to patient size (including appropriate  matching first site-specific examinations), or use of iterative reconstruction  technique. COMPARISON: CT chest 6/28/2013. CHEST FINDINGS:     Thyroid: Unremarkable in its visualized aspects. Pericardium/ Heart: Heart size is normal. No pericardial effusion. Mild coronary  arteriosclerosis in the LAD. Aorta/ Vessels: No aneurysm or dissection. Lymph Nodes: Unremarkable. .    Lungs: There is layering mucus in the trachea. Central bronchial tree is patent. Subsegmental atelectasis or scarring in the bilateral lower lobes. Moderate to  severe emphysematous changes. Pleura: No effusion. No pneumothorax. Upper Abdomen: Similar subcentimeter hypodensity in the left medial hepatic  lobe, too small to characterize but stability since 2013 is reassuring. Bones/soft tissues: Unremarkable. Impression IMPRESSION:    Moderate to severe emphysema. Mild LAD coronary arteriosclerosis. XR (Most Recent). CXR reviewed by me and compared with previous CXR   Results from [place] encounter on 12/18/17   XR CHEST SNGL V   Narrative Portable chest x-ray, semierect AP view, time 1802 hours on 12/18/2017:        INDICATION:    Shortness of breath. Expiration of COPD. COMPARISON STUDY: Chest x-ray on 12/17/2017, 11/04/2017. FINDINGS:    Lungs are mildly hypoventilated. Cardiac CABG to be within normal limits is mildly more prominent as compared to  previous study.   As compared to previous study there is no minimal to mild pulmonary vascular  congestion noted. Interstitial markings are mildly hazy. Right lung is clear. At  left lung base there are minimal streaky atelectatic changes noted. CP angles  are sharp bilaterally. No evidence of pneumothorax. Impression IMPRESSION:    Currently minimal to mild pulmonary vascular congestion. Minimal interstitial  pulmonary edema is not excluded at this time. Mild pulmonary hypoventilation. Minimal streaky left basilar atelectasis.                  Odilia Low DO  12/21/2017       Clinical Care time , chart review and time spent coordinating care: 30min  Discussed with patient and hospitalist

## 2017-12-21 NOTE — PROGRESS NOTES
Hospitalist Progress Note    Patient: Daniel Askew . MRN: [id number]  CSN: [id number]    YOB: 1970  Age: 49 y.o. Sex: male    DOA: 12/18/2017 LOS:  LOS: 3 days          bp noted high this am.     Started on daliresp per pccm. He still is not tolerating bipap. Currently on high flow. He reports pain in back of neck, and chest from coughing, as well as HA. Cough is better. He is constipated. Slept better last night, mood improving. Assessment/Plan     1. COPD, severe - solumedrol, duoneb, tobacco cessation, abx. Pulmonary input appreciated. Alpha-1 antitrypsin testing as outpatient in [facility] clinic  2. Sinus tachycardia poa - related to infection. 3. Acute bronchitis - Levaquin/cefepime - CT chest no infiltrate. 4. Elevated bp - not on meds at home - decrease steroids. Stop fluids. Treat pain. Relieve constipation. Hydralazine prn.   5. Lactic acidosis - resolving  6. uds + opiates. 7. Tobacco abuse - chantix. Smoking cessation education  8. normal event monitor 2015  9. Recent GI bleed in the setting of steroid use, egd 12/11/17 revealed Grade 1 espohagitis - on ppi. 10. Mild LAD coronary arteriosclerosis seen on CT chest.   12. Anxiety - prn ativan. Remeron. 13. Constipation - bowel regimen. 14. dvt prophylaxis  15. Full code. has a nebulizer at home that he uses left over from his daughter. Referred to [facility] per CM. Pt and ot. Needs home O2 eval prior to discharge.      Wife Aron [place] [phone]    Additional Notes:      Case discussed with:  [x]Patient  [x]Family  [x]Nursing  [x]Case Management  DVT Prophylaxis:  []Lovenox  [x]Hep SQ  []SCDs  []Coumadin   []On Heparin gtt    Vital signs/Intake and Output:  Visit Vitals    BP (!) 169/105 (BP 1 Location: Right arm, BP Patient Position: At rest)    Pulse 73    Temp 97.1 °F (36.2 °C)    Resp 18    Wt 79.2 kg (174 lb 8 oz)    SpO2 95%    BMI 25.77 kg/m2     Current Shift:     Last three shifts:  12/19 1901 - 12/21 0700  In: 500 [I.V.:500]  Out: 2100 [Urine:2100]    Awake alert and oriented. On high flow. Ncat. Perrl. Oropharynx clear   RRR  Improved air entry. Scattered end expiratory wheeze. Soft nt nd nabs  No edema. dp 2+ b.l  No focal deficit  No rash    Medications Reviewed      Labs: Results:       Chemistry Recent Labs      12/21/17   0300  12/19/17   0409 12/18/17   1733   GLU  131*  138*  153*   NA  140  141  139   K  4.1  4.5  3.9   CL  107  111*  105   CO2  26  23  25   BUN  14  11  11   CREA  0.73  0.81  1.02   CA  8.5  8.5  8.9   AGAP  7  7  9   BUCR  19  14  11*   AP   --   63  78   TP   --   6.3*  7.7   ALB   --   3.5  4.1   GLOB   --   2.8  3.6   AGRAT   --   1.3  1.1      CBC w/Diff Recent Labs      12/21/17 0300 12/19/17 0409 12/18/17   1733   WBC  8.6  13.5*  13.0   RBC  4.57*  4.58*  5.11   HGB  14.8  14.6  16.8*   HCT  42.6  42.3  46.9   PLT  162  211  212   GRANS  89*  93*  96*   LYMPH  6*  4*  4*   EOS  0  0  0      Cardiac Enzymes No results for input(s): CPK, CKND1, JIMMY in the last 72 hours. No lab exists for component: CKRMB, TROIP   Coagulation No results for input(s): PTP, INR, APTT in the last 72 hours. No lab exists for component: INREXT, INREXT    Lipid Panel Lab Results   Component Value Date/Time    Cholesterol, total 144 01/23/2017 01:11 PM    HDL Cholesterol 36 01/23/2017 01:11 PM    LDL, calculated 85.6 01/23/2017 01:11 PM    VLDL, calculated 22.4 01/23/2017 01:11 PM    Triglyceride 112 01/23/2017 01:11 PM    CHOL/HDL Ratio 4.0 01/23/2017 01:11 PM      BNP No results for input(s): BNPP in the last 72 hours. Liver Enzymes Recent Labs      12/19/17 0409   TP  6.3*   ALB  3.5   AP  63   SGOT  9*      Thyroid Studies Lab Results   Component Value Date/Time    TSH 2.21 12/17/2017 10:00 PM        Procedures/imaging: see electronic medical records for all procedures/Xrays and details which were not copied into this note but were reviewed prior to creation of Plan.

## 2017-12-21 NOTE — PROGRESS NOTES
Saw pt earlier and he had not been seen by the [facility] referral.  Wife in room now and patient on BIPAP. Informed patient and wife that I contacted ([facility])  the person who will come to talk to them about medicaid and disability. Also told them this referral will be in today, per conversation I had with Jossie Kocher.

## 2017-12-21 NOTE — ROUTINE PROCESS
Bedside and Verbal shift change report given to MARGARITA Mcelroy (oncoming nurse) by Reuben Vigil RN (offgoing nurse). Report included the following information SBAR, Kardex, ED Summary, Procedure Summary, Intake/Output, MAR and Recent Results.

## 2017-12-21 NOTE — ROUTINE PROCESS
2300 Bedside and Verbal shift change  Received from Delicia Orozco RN (outgoing nurse), to BRITTANEY Mejias (oncoming)  Pt. Is AOX 4. IV Patent and infusing  well, Pt. denies  pain at this time. Report included the following information SBAR, Kardex, Procedure Summary, Intake/Output, MAR, Recent Lab Results. Will resume care and monitor Pt. Condition.      Pt. Educated on call bell when in need of help and assistance. Pt. verbalized understanding.      Pt. Head to toe Assessment Done and documented.      0000 Pt. Resting in bed comfortably, no sign of distress. 0200  Pt. Made no complaints. 0400  Pt. Resting in bed with eyes closed, easily awaken. 0545  Pt. Able to rest well throughout the shift.

## 2017-12-21 NOTE — ROUTINE PROCESS
Verbal and bedside Shift changed report given to Tom Byrd RN (oncoming RN) on Pt. Condition. Report consisted of patients Situation, History, Activities, intake/output,  Background, Assessment and Recommendations(SBAR). Information from the following report(s) Kardex, order Summary, Lab results and MAR was reviewed with the receiving nurse. Opportunity for questions and clarification was provided.

## 2017-12-21 NOTE — PROGRESS NOTES
Problem: Mobility Impaired (Adult and Pediatric)  Goal: *Acute Goals and Plan of Care (Insert Text)  Physical Therapy Goals  Initiated 12/21/2017 and to be accomplished within 7 day(s)  1. Patient will move from supine to sit and sit to supine , scoot up and down and roll side to side in bed with modified independence. 2.  Patient will transfer from bed to chair and chair to bed with supervision/set-up using the least restrictive device. 3.  Patient will perform sit to stand with supervision/set-up. 4.  Patient will ambulate with supervision/set-up for >100 feet with the least restrictive device. 5.  Patient will ascend/descend 2 stairs without handrail(s) with supervision/set-up. Outcome: Progressing Towards Goal  physical Therapy EVALUATION    Patient: Ida Arana Sr. (45 y.o. male)  Date: 12/21/2017  Primary Diagnosis: COPD with acute exacerbation [facility])  Precautions: Fall    ASSESSMENT :  Based on the objective data described below, the patient presents with impaired functional mobility including transfers, ambulation, and general activity tolerance following admission for COPD exacerbation. Patient presents today supine in bed with HiFlow O2 in place, alert and agreeable to PT evaluation. Evaluation completed at bed level today per nurse's request d/t oxygenation impairments. He transferred to sitting EOB with Gilma, O2 sats 93% with no c/o KIM. Patient left seated EOB per his request, call bell in reach, and family in room, nurse notified. Patient will likely only require 1-2 additional sessions to ensure safety and endurance with standing mobility. Patient will benefit from skilled intervention to address the above impairments.   Patients rehabilitation potential is considered to be Good  Factors which may influence rehabilitation potential include:   []         None noted  []         Mental ability/status  []         Medical condition  []         Home/family situation and support systems  [] Safety awareness  []         Pain tolerance/management  []         Other:      PLAN :  Recommendations and Planned Interventions:  [x]           Bed Mobility Training             []    Neuromuscular Re-Education  [x]           Transfer Training                   []    Orthotic/Prosthetic Training  [x]           Gait Training                          []    Modalities  [x]           Therapeutic Exercises          []    Edema Management/Control  [x]           Therapeutic Activities            [x]    Patient and Family Training/Education  []           Other (comment):    Frequency/Duration: Patient will be followed by physical therapy 1-2 times per day to address goals. Discharge Recommendations: Home Health  Further Equipment Recommendations for Discharge: TBD     SUBJECTIVE:   Patient stated I'm doing alright right now.     OBJECTIVE DATA SUMMARY:     Past Medical History:   Diagnosis Date    Chronic obstructive pulmonary disease ([facility] [address] 08/03/2017    PFTS 8/3/17    COPD, severe ([facility] [address]     Emphysema/COPD ([facility] [place] [address]     Esophagitis 12/11/2017    Endoscopy    History of stomach ulcers     Positive urine drug screen 01/2016    opiates and THC    Upper GI bleed      Past Surgical History:   Procedure Laterality Date    HX ENDOSCOPY  12/11/2017     Barriers to Learning/Limitations: None  Compensate with: N/A  Prior Level of Function/Home Situation: Patient resides on first floor of 2 story home with his wife and 6year old daughter. He was independent with all functional mobility PTA, did not use O2 at home.   Home Situation  Home Environment: Private residence  # Steps to Enter: 2  Rails to Enter: No  One/Two Story Residence: Two story, live on 1st floor  Living Alone: No  Support Systems: Spouse/Significant Other/Partner, Child(randy), Family member(s)  Patient Expects to be Discharged to[de-identified] Private residence  Current DME Used/Available at Home: Nebulizer  Critical Behavior:  Neurologic State: Alert  Psychosocial  Patient Behaviors: Calm; Cooperative  Purposeful Interaction: Yes  Pt Identified Daily Priority: Clinical issues (comment)  Caritas Process: Nurture loving kindness  Caring Interventions: Therapeutic modalities  Reassure: Caring rounds  Therapeutic Modalities: Intentional therapeutic touch  Strength:    Strength: Within functional limits (BLE)  Tone & Sensation:   Tone: Normal  Sensation: Intact (BLE)   Range Of Motion:  AROM: Within functional limits (BLE)  Functional Mobility:  Bed Mobility:  Rolling: Modified independent  Supine to Sit: Modified independent  Scooting: Modified independent  Transfers:  Sit to Stand:  (N/A)  Balance:   Sitting: Intact  Standing:  (N/A)  Ambulation/Gait Training:   N/A held per nurse's request  Pain:  Pain Scale 1: Numeric (0 - 10)  Pain Intensity 1: 9  Pain Location 1: Head;Neck;Back  Pain Orientation 1: Posterior  Pain Description 1: Aching  Pain Intervention(s) 1: MD notified (comment) (Dr Arnie Gallagher notified.)  Activity Tolerance:   Fair/good  Please refer to the flowsheet for vital signs taken during this treatment. After treatment:   [] Patient left in no apparent distress sitting up in chair  [x] Patient left sitting on EOB  [] Patient left in no apparent distress in bed  [] Patient declined to be OOB at this time due to  [x] Call bell left within reach  [x] Nursing notified(Lilibeth)  [x] Caregiver present  [] Bed alarm activated    COMMUNICATION/EDUCATION:   [x]         Fall prevention education was provided and the patient/caregiver indicated understanding. [x]         Patient/family have participated as able in goal setting and plan of care. [x]         Patient/family agree to work toward stated goals and plan of care. []         Patient understands intent and goals of therapy, but is neutral about his/her participation. []         Patient is unable to participate in goal setting and plan of care.     Thank you for this referral.  Mireya Joiner Calculation: 8 mins      Mobility  Current  CJ= 20-39%   Goal  CI= 1-19%. The severity rating is based on the Level of Assistance required for Functional Mobility and ADLs.     Eval Complexity: History: MEDIUM  Complexity : 1-2 comorbidities / personal factors will impact the outcome/ POC Exam:LOW Complexity : 1-2 Standardized tests and measures addressing body structure, function, activity limitation and / or participation in recreation  Presentation: MEDIUM Complexity : Evolving with changing characteristics  Overall Complexity:MEDIUM

## 2017-12-21 NOTE — PROGRESS NOTES
Denisa Barbosa in [facility] concerning message left yesterday. She said they will follow up with him today.

## 2017-12-22 LAB
ARTERIAL PATENCY WRIST A: YES
BASE DEFICIT BLD-SCNC: 4 MMOL/L
BDY SITE: ABNORMAL
GAS FLOW.O2 O2 DELIVERY SYS: ABNORMAL L/MIN
GAS FLOW.O2 SETTING OXYMISER: 2 L/M
HCO3 BLD-SCNC: 21.8 MMOL/L (ref 22–26)
PCO2 BLD: 40.7 MMHG (ref 35–45)
PH BLD: 7.34 [PH] (ref 7.35–7.45)
PO2 BLD: 34 MMHG (ref 80–100)
SAO2 % BLD: 61 % (ref 92–97)
SERVICE CMNT-IMP: ABNORMAL
SPECIMEN TYPE: ABNORMAL
TOTAL RESP. RATE, ITRR: 30

## 2017-12-22 PROCEDURE — 74011250637 HC RX REV CODE- 250/637: Performed by: INTERNAL MEDICINE

## 2017-12-22 PROCEDURE — 74011250636 HC RX REV CODE- 250/636: Performed by: INTERNAL MEDICINE

## 2017-12-22 PROCEDURE — 65270000029 HC RM PRIVATE

## 2017-12-22 PROCEDURE — 74011250637 HC RX REV CODE- 250/637: Performed by: HOSPITALIST

## 2017-12-22 PROCEDURE — 74011250636 HC RX REV CODE- 250/636: Performed by: PHYSICIAN ASSISTANT

## 2017-12-22 PROCEDURE — 74011000250 HC RX REV CODE- 250: Performed by: INTERNAL MEDICINE

## 2017-12-22 PROCEDURE — 74011250636 HC RX REV CODE- 250/636: Performed by: HOSPITALIST

## 2017-12-22 PROCEDURE — 74011000250 HC RX REV CODE- 250: Performed by: PHYSICIAN ASSISTANT

## 2017-12-22 PROCEDURE — 94640 AIRWAY INHALATION TREATMENT: CPT

## 2017-12-22 PROCEDURE — 94660 CPAP INITIATION&MGMT: CPT

## 2017-12-22 PROCEDURE — 97116 GAIT TRAINING THERAPY: CPT

## 2017-12-22 PROCEDURE — 87070 CULTURE OTHR SPECIMN AEROBIC: CPT | Performed by: HOSPITALIST

## 2017-12-22 PROCEDURE — 97165 OT EVAL LOW COMPLEX 30 MIN: CPT

## 2017-12-22 RX ORDER — AMLODIPINE BESYLATE 2.5 MG/1
2.5 TABLET ORAL DAILY
Status: DISCONTINUED | OUTPATIENT
Start: 2017-12-23 | End: 2017-12-23 | Stop reason: HOSPADM

## 2017-12-22 RX ORDER — ALBUTEROL SULFATE 1.25 MG/3ML
1.25 SOLUTION RESPIRATORY (INHALATION)
Status: DISCONTINUED | OUTPATIENT
Start: 2017-12-22 | End: 2017-12-23 | Stop reason: HOSPADM

## 2017-12-22 RX ORDER — BUDESONIDE AND FORMOTEROL FUMARATE DIHYDRATE 160; 4.5 UG/1; UG/1
2 AEROSOL RESPIRATORY (INHALATION)
Status: DISCONTINUED | OUTPATIENT
Start: 2017-12-22 | End: 2017-12-23 | Stop reason: HOSPADM

## 2017-12-22 RX ORDER — IPRATROPIUM BROMIDE 0.5 MG/2.5ML
0.5 SOLUTION RESPIRATORY (INHALATION)
Status: DISCONTINUED | OUTPATIENT
Start: 2017-12-22 | End: 2017-12-22

## 2017-12-22 RX ADMIN — BUDESONIDE AND FORMOTEROL FUMARATE DIHYDRATE 2 PUFF: 160; 4.5 AEROSOL RESPIRATORY (INHALATION) at 21:08

## 2017-12-22 RX ADMIN — ARFORMOTEROL TARTRATE 15 MCG: 15 SOLUTION RESPIRATORY (INHALATION) at 11:00

## 2017-12-22 RX ADMIN — GUAIFENESIN AND DEXTROMETHORPHAN HYDROBROMIDE 1 TABLET: 600; 30 TABLET, EXTENDED RELEASE ORAL at 09:16

## 2017-12-22 RX ADMIN — LORAZEPAM 0.5 MG: 0.5 TABLET ORAL at 09:17

## 2017-12-22 RX ADMIN — IPRATROPIUM BROMIDE 0.5 MG: 0.5 SOLUTION RESPIRATORY (INHALATION) at 11:01

## 2017-12-22 RX ADMIN — OXYCODONE HYDROCHLORIDE AND ACETAMINOPHEN 1 TABLET: 5; 325 TABLET ORAL at 22:03

## 2017-12-22 RX ADMIN — GUAIFENESIN AND DEXTROMETHORPHAN HYDROBROMIDE 1 TABLET: 600; 30 TABLET, EXTENDED RELEASE ORAL at 20:50

## 2017-12-22 RX ADMIN — ENOXAPARIN SODIUM 40 MG: 40 INJECTION, SOLUTION INTRAVENOUS; SUBCUTANEOUS at 17:54

## 2017-12-22 RX ADMIN — VARENICLINE TARTRATE 1 MG: 1 TABLET, FILM COATED ORAL at 09:16

## 2017-12-22 RX ADMIN — ALBUTEROL SULFATE 1.25 MG: 1.25 SOLUTION RESPIRATORY (INHALATION) at 11:00

## 2017-12-22 RX ADMIN — OXYCODONE HYDROCHLORIDE AND ACETAMINOPHEN 1 TABLET: 5; 325 TABLET ORAL at 09:17

## 2017-12-22 RX ADMIN — IPRATROPIUM BROMIDE 0.5 MG: 0.5 SOLUTION RESPIRATORY (INHALATION) at 02:30

## 2017-12-22 RX ADMIN — ALBUTEROL SULFATE 1.25 MG: 1.25 SOLUTION RESPIRATORY (INHALATION) at 02:30

## 2017-12-22 RX ADMIN — ROFLUMILAST 500 MCG: 500 TABLET ORAL at 09:16

## 2017-12-22 RX ADMIN — BUDESONIDE 500 MCG: 0.5 INHALANT RESPIRATORY (INHALATION) at 11:01

## 2017-12-22 RX ADMIN — OXYCODONE HYDROCHLORIDE AND ACETAMINOPHEN 1 TABLET: 5; 325 TABLET ORAL at 13:27

## 2017-12-22 RX ADMIN — OXYCODONE HYDROCHLORIDE AND ACETAMINOPHEN 1 TABLET: 5; 325 TABLET ORAL at 17:54

## 2017-12-22 RX ADMIN — ALBUTEROL SULFATE 1.25 MG: 1.25 SOLUTION RESPIRATORY (INHALATION) at 21:08

## 2017-12-22 RX ADMIN — WATER 2 G: 1 INJECTION INTRAMUSCULAR; INTRAVENOUS; SUBCUTANEOUS at 06:27

## 2017-12-22 RX ADMIN — MIRTAZAPINE 7.5 MG: 15 TABLET, FILM COATED ORAL at 22:03

## 2017-12-22 RX ADMIN — OXYCODONE HYDROCHLORIDE AND ACETAMINOPHEN 1 TABLET: 5; 325 TABLET ORAL at 00:39

## 2017-12-22 RX ADMIN — METHYLPREDNISOLONE SODIUM SUCCINATE 40 MG: 40 INJECTION, POWDER, FOR SOLUTION INTRAMUSCULAR; INTRAVENOUS at 09:16

## 2017-12-22 RX ADMIN — PANTOPRAZOLE SODIUM 40 MG: 40 TABLET, DELAYED RELEASE ORAL at 09:16

## 2017-12-22 RX ADMIN — LEVOFLOXACIN 750 MG: 750 TABLET, FILM COATED ORAL at 20:50

## 2017-12-22 RX ADMIN — STANDARDIZED SENNA CONCENTRATE AND DOCUSATE SODIUM 2 TABLET: 8.6; 5 TABLET, FILM COATED ORAL at 22:03

## 2017-12-22 RX ADMIN — LORAZEPAM 0.5 MG: 0.5 TABLET ORAL at 22:04

## 2017-12-22 RX ADMIN — METHYLPREDNISOLONE SODIUM SUCCINATE 40 MG: 40 INJECTION, POWDER, FOR SOLUTION INTRAMUSCULAR; INTRAVENOUS at 20:50

## 2017-12-22 RX ADMIN — LORAZEPAM 0.5 MG: 0.5 TABLET ORAL at 13:27

## 2017-12-22 RX ADMIN — POLYETHYLENE GLYCOL 3350 17 G: 17 POWDER, FOR SOLUTION ORAL at 09:17

## 2017-12-22 NOTE — PROGRESS NOTES
Problem: Falls - Risk of  Goal: *Absence of Falls  Document Dejon Fall Risk and appropriate interventions in the flowsheet. Outcome: Progressing Towards Goal  Fall Risk Interventions:            Medication Interventions: Patient to call before getting OOB, Teach patient to arise slowly                  Problem: Falls - Risk of  Goal: *Absence of Falls  Document Dejon Fall Risk and appropriate interventions in the flowsheet.    Outcome: Progressing Towards Goal  Fall Risk Interventions:            Medication Interventions: Patient to call before getting OOB, Teach patient to arise slowly

## 2017-12-22 NOTE — ROUTINE PROCESS
Bedside and Verbal shift change report given to Alexandria Nichols RN (oncoming nurse) by Jason Alfredo RN (offgoing nurse). Report included the following information SBAR, Kardex, MAR and Recent Results.     SITUATION:  Code Status: Full Code  Reason for Admission: COPD with acute exacerbation [facility])  Hospital day: 4  Problem List:       Hospital Problems  Date Reviewed: 12/18/2017          Codes Class Noted POA    * (Principal)COPD with acute exacerbation ([facility] [address] ICD-10-CM: J44.1  ICD-9-CM: 491.21  12/18/2017 Yes        Sepsis ([facility] [address] ICD-10-CM: A41.9  ICD-9-CM: 038.9, 995.91  12/18/2017 Yes        COPD, severe ([facility] [address] ICD-10-CM: J44.9  ICD-9-CM: 525  Unknown Yes              BACKGROUND:   Past Medical History:   Past Medical History:   Diagnosis Date    Chronic obstructive pulmonary disease ([facility] [address] 08/03/2017    PFTS 8/3/17    COPD, severe ([address]     Emphysema/COPD ([facility] [address]     Esophagitis 12/11/2017    Endoscopy    History of stomach ulcers     Positive urine drug screen 01/2016    opiates and THC    Upper GI bleed       Patient taking anticoagulants no    Patient has a defibrillator: no   History of shots NO for example, flu, pneumonia, tetanus   Isolation History NO for example, MRSA, CDiff    ASSESSMENT:  Changes in Assessment Throughout Shift: None  Significant Changes in 24 hours (for example, RR/code, fall)  Patient has Central Line: no Reasons if yes: N/A  Patient has Santos Cath: no Reasons if yes: N/A   Mobility Issues  PT  IV Patency  OR Checklist  Pending Tests    Last Vitals:  Vitals w/ MEWS Score (last day)     Date/Time MEWS Score Pulse Resp Temp BP Level of Consciousness SpO2    12/22/17 0416 1 73 18 97.3 °F (36.3 °C) 140/90 Alert 95 %    12/22/17 0230 -- -- -- -- -- -- 96 %    12/22/17 0012 1 63 18 98 °F (36.7 °C) 146/88 Alert 95 %    12/21/17 2140 -- -- -- -- -- -- 95 %    12/21/17 2044 2 (!)  104 18 98.2 °F (36.8 °C) 175/76 Alert 93 %    12/21/17 1616 -- -- -- -- -- -- 98 %    12/21/17 1512 1 93 19 98.2 °F (36.8 °C) (!)  141/92 Alert 91 %    12/21/17 1252 3 (!)  105 22 99 °F (37.2 °C) (!)  159/98 Alert 92 %    12/21/17 1103 -- -- -- -- -- -- 98 %    12/21/17 0800 1 73 18 97.1 °F (36.2 °C) (!)  169/105 Alert --    12/21/17 0400 1 74 18 98.8 °F (37.1 °C) (!)  160/101 Alert 95 %    12/21/17 0222 -- -- -- -- -- -- 94 %    12/21/17 0031 1 80 18 98 °F (36.7 °C) (!)  158/92 Alert 93 %            PAIN    Pain Assessment    Pain Intensity 1: 0 (12/22/17 0416)    Pain Location 1: Head, Neck    Pain Intervention(s) 1: Medication (see MAR)    Patient Stated Pain Goal: 0  Intervention effective: N/A  Time of last intervention: 0039 Reassessment Completed: N/A   Other actions taken for pain: N/A    Last 3 Weights:  Last 3 Recorded Weights in this Encounter    12/18/17 1727 12/20/17 1117 12/21/17 0400   Weight: 77.1 kg (170 lb) 77.1 kg (170 lb) 79.2 kg (174 lb 8 oz)   Weight change:     INTAKE/OUPUT    Current Shift:      Last three shifts: 12/20 1901 - 12/22 0700  In: 1918.8 [P.O.:1200; I.V.:718.8]  Out: 2250 [Urine:2250]    RECOMMENDATIONS AND DISCHARGE PLANNING  Patient needs and requests: None    Pending tests/procedures: None     Discharge plan for patient: Home with home health    Discharge planning Needs or Barriers: TBD    Estimated Discharge Date: TBD Posted on Whiteboard in Patients Room: no       \"HEALS\" SAFETY CHECK  A safety check occurred in the patient's room between off going nurse and oncoming nurse listed above. The safety check included the below items:    H  High Alert Medications Verify all high alert medication drips (heparin, PCA, etc.)  E  Equipment Suction is set up for ALL patients (with miguel aker)  Red plugs utilized for all equipment (IV pumps, etc.)  WOWs wiped down at end of shift.   Room stocked with oxygen, suction, and other unit-specific supplies  A  Alarms Bed alarm is set for fall risk patients  Ensure chair alarm is in place and activated if patient is up in a chair  L  Lines Check IV for any infiltration  Santos bag is empty if patient has a Santos   Tubing and IV bags are labeled  S  Safety  Room is clean, patient is clean, and equipment is clean. Hallways are clear from equipment besides carts. Fall bracelet on for fall risk patients  Ensure room is clear and free of clutter  Suction is set up for ALL patients (with miguel aker)  Hallways are clear from equipment besides carts.    Isolation precautions followed, supplies available outside room, sign posted    Henrry Song RN

## 2017-12-22 NOTE — INTERDISCIPLINARY ROUNDS
Interdisciplinary Round Note   Patient Information:   Sushant Treadwell Sr.   [birth year]   Reason for Admission: COPD with acute exacerbation [facility])   Attending Provider:   Marta Borden MD  Primary Care Physician:       Woodrow Pérez NP       [phone]   Estimated discharge date:  12/22/17   Hospital day: 4  [unfilled]  4d 21h  RRAT Score: Low Risk            10       Total Score        3 Has Seen PCP in Last 6 Months (Yes=3, No=0)    2 . Living with Significant Other. Assisted Living. LTAC. SNF. or   Rehab    4 Pt. Coverage (Medicare=5 , Medicaid, or Self-Pay=4)    1 Charlson Comorbidity Score (Age + Comorbid Conditions)        Criteria that do not apply:    Patient Length of Stay (>5 days = 3)    IP Visits Last 12 Months (1-3=4, 4=9, >4=11)       none     No  None  None     Not needed      Lines, Drains, & Airways  None       IV Antibiotics:    Current Antimicrobial Therapy (168h ago through future)    Ordered     Start Stop    12/18/17 2221  cefepime (MAXIPIME) 2 g in sterile water (preservative free) 10 mL IV syringe  2 g,   IntraVENous,   EVERY 8 HOURS      12/18/17 2300 --    12/18/17 1900  levoFLOXacin (LEVAQUIN) 750 mg in D5W IVPB  750 mg,   IntraVENous,   EVERY 24 HOURS      12/18/17 1901 --        GI Prophylaxis: GI Prophylaxis: no   Type: Protonix      Recent Glucose Results: No results found for: GLU, GLUPOC, GLUCPOC   Activity Level: Activity Level:  Up with Assistance    Needs assistance with ADLs: no       Goals for Today: Continue Antibiotic, steroids, decreased shortness of breath, Decreased anxiety   Recommendations:   Discharge Disposition: Home Independent  P.T and O.T.  Care Management involvement for home health follow up for:  Home oxygen     Needs for Discharge: Oxygen IDR Team: MD, Nurse, CM, Head nurse, Chief officer  Recommendations from [address] [place] [address] team: PT/OT, chest PT, IS, acupella     Other Notes: none

## 2017-12-22 NOTE — PROGRESS NOTES
Problem: Self Care Deficits Care Plan (Adult)  Goal: *Acute Goals and Plan of Care (Insert Text)  Occupational Therapy Goals  Initiated 12/22/2017 within 7 day(s). 1.  Patient will perform lower body dressing with modified independence, AE prn.   2.  Patient will perform toilet transfers with modified independence. 3.  Patient will participate in upper extremity therapeutic exercise/activities with supervision/set-up for 8 minutes to increase strength/endurance for ADLs. Outcome: Progressing Towards Goal  Occupational Therapy EVALUATION    Patient: Kali eMdina Sr. (45 y.o. male)  Date: 12/22/2017  Primary Diagnosis: COPD with acute exacerbation [facility])        Precautions:   Fall    ASSESSMENT :  Based on the objective data described below, the patient presents with decreased ADLs, decreased functional mobility and poor endurance. Patient unable to reach his feet for LB self care and will benefit from AE training to increase his independence. He has a supportive wife to assist him prn at home. Discussed use of a seat in the shower and where to purchase. Patient/wife to purchase upon discharge. Educated patient on energy conservation techniques and he verbalized understanding. Patient will benefit from skilled intervention to address the above impairments.   Patients rehabilitation potential is considered to be Good  Factors which may influence rehabilitation potential include:   []             None noted  []             Mental ability/status  [x]             Medical condition  []             Home/family situation and support systems  []             Safety awareness  []             Pain tolerance/management  []             Other:      PLAN :  Recommendations and Planned Interventions:  [x]               Self Care Training                  [x]        Therapeutic Activities  [x]               Functional Mobility Training    []        Cognitive Retraining  [x]               Therapeutic Exercises [x]        Endurance Activities  [x]               Balance Training                   []        Neuromuscular Re-Education  []               Visual/Perceptual Training     [x]   Home Safety Training  [x]               Patient Education                 [x]        Family Training/Education  []               Other (comment):    Frequency/Duration: Patient will be followed by occupational therapy 1-2 times per day/4-7 days per week to address goals. Discharge Recommendations: None  Further Equipment Recommendations for Discharge: N/A     Barriers to Learning/Limitations: None  Compensate with: visual, verbal, tactile, kinesthetic cues/model     PATIENT COMPLEXITY      Eval Complexity: History: LOW Complexity : Brief history review ; Examination: LOW Complexity : 1-3 performance deficits relating to physical, cognitive , or psychosocial skils that result in activity limitations and / or participation restrictions ; Decision Making:LOW Complexity : No comorbidities that affect functional and no verbal or physical assistance needed to complete eval tasks  Assessment: Low Complexity     G-CODES:     Self Care  Current  CJ= 20-39%   Goal  CI= 1-19%. The severity rating is based on the Level of Assistance required for Functional Mobility and ADLs. SUBJECTIVE:   Patient stated I can't get down to my feet because it cuts my breath off.     OBJECTIVE DATA SUMMARY:     Past Medical History:   Diagnosis Date    Chronic obstructive pulmonary disease ([address] 08/03/2017    PFTS 8/3/17    COPD, severe ([address]     Emphysema/COPD ([address]     Esophagitis 12/11/2017    Endoscopy    History of stomach ulcers     Positive urine drug screen 01/2016    opiates and THC    Upper GI bleed      Past Surgical History:   Procedure Laterality Date    HX ENDOSCOPY  12/11/2017     Prior Level of Function/Home Situation: Pt was independent with basic self care tasks and functional mobility PTA.   Home Situation  Home Environment: Private residence  # Steps to Enter: 2  Rails to Enter: No  One/Two Story Residence: Two story, live on 1st floor  Living Alone: No  Support Systems: Spouse/Significant Other/Partner, Child(radny), Family member(s)  Patient Expects to be Discharged to[de-identified] Private residence  Current DME Used/Available at Home: Nebulizer  Tub or Shower Type: Tub/Shower combination  [x]  Right hand dominant   []  Left hand dominant  Cognitive/Behavioral Status:  Neurologic State: Alert  Orientation Level: Oriented X4  Cognition: Appropriate decision making; Follows commands  Safety/Judgement: Awareness of environment; Fall prevention    Skin: Intact on UEs    Edema: None noted in UEs    Vision/Perceptual:    Acuity: Within Defined Limits      Coordination:  Fine Motor Skills-Upper: Left Intact; Right Intact    Gross Motor Skills-Upper: Left Intact; Right Intact    Balance:  Sitting: Intact  Standing: Intact    Strength:  Strength: Within functional limits (UEs)    Tone & Sensation:  Tone: Normal (UEs)  Sensation: Intact (UEs)    Range of Motion:  AROM: Within functional limits (UEs)    Functional Mobility and Transfers for ADLs:  Bed Mobility:  Rolling: Modified independent  Supine to Sit: Modified independent  Scooting: Modified independent  Transfers:  Sit to Stand: Modified independent   Toilet Transfer : Modified independent    ADL Assessment:  Feeding: Independent    Oral Facial Hygiene/Grooming: Independent    Bathing: Supervision    Upper Body Dressing: Independent    Lower Body Dressing: Minimum assistance    Toileting: Supervision    Pain:  Pt reports 8/10 pain or discomfort prior to treatment, in neck/back. Pain meds given prior to session.    Pt reports 8/10 pain or discomfort post treatment, in neck/back. Patient resting in bed at end of session. Activity Tolerance:   Good    Please refer to the flowsheet for vital signs taken during this treatment.   After treatment:   [] Patient left in no apparent distress sitting up in chair  [x] Patient left in no apparent distress in bed  [x] Call bell left within reach  [] Nursing notified  [] Caregiver present  [] Bed alarm activated    COMMUNICATION/EDUCATION:   [x] Home safety education was provided and the patient/caregiver indicated understanding. [x] Patient/family have participated as able in goal setting and plan of care. [x] Patient/family agree to work toward stated goals and plan of care. [] Patient understands intent and goals of therapy, but is neutral about his/her participation. [] Patient is unable to participate in goal setting and plan of care.     Thank you for this referral.  Tristan García MS OTR/L  Time Calculation: 15 mins

## 2017-12-22 NOTE — PROGRESS NOTES
NUTRITION    Nutrition Screen      RECOMMENDATIONS / PLAN:     - No nutrition intervention indicated at this time. Will re-screen as appropriate. NUTRITION INTERVENTIONS & DIAGNOSIS:     Nutrition Diagnosis:  No nutrition diagnosis at this time    ASSESSMENT:     Pt reported good appetite and meal intake PTA and since admission. Denied having any concerns at time of visit    Average po intake adequate to meet patients estimated nutritional needs:   [x] Yes     [] No   [] Unable to determine at this time    Diet: DIET CARDIAC Regular      Food Allergies:  None known   Current Appetite:   [x] Good     [] Fair     [] Poor     [] Other:  Appetite/meal intake prior to admission:   [x] Good     [] Fair     [] Poor     [] Other:  Feeding Limitations:  [] Swallowing difficulty    [] Chewing difficulty    [] Other:  Current Meal Intake: Patient Vitals for the past 100 hrs:   % Diet Eaten   12/21/17 1833 80 %   12/21/17 1400 50 %   12/19/17 1412 85 %   12/19/17 1017 100 %       BM:  12/22  Skin Integrity: no pressure injury or wound noted  Edema: none   Pertinent Medications: Reviewed    Recent Labs      12/21/17   0300   NA  140   K  4.1   CL  107   CO2  26   GLU  131*   BUN  14   CREA  0.73   CA  8.5   MG  2.3       Intake/Output Summary (Last 24 hours) at 12/22/17 1649  Last data filed at 12/22/17 1327   Gross per 24 hour   Intake              660 ml   Output             1851 ml   Net            -1191 ml       Anthropometrics:  Ht Readings from Last 1 Encounters:   12/17/17 5' 9\" (1.753 m)     Last 3 Recorded Weights in this Encounter    12/18/17 1727 12/20/17 1117 12/21/17 0400   Weight: 77.1 kg (170 lb) 77.1 kg (170 lb) 79.2 kg (174 lb 8 oz)     Body mass index is 25.77 kg/(m^2). Weight History:  Pt denied experiencing any recent changes in weight PTA.  Noted wt gain in past 1 month PTA per chart hx    Weight Metrics 12/21/2017 12/18/2017 12/17/2017 12/11/2017 11/21/2017 11/4/2017 11/2/2017   Weight 174 lb 8 oz - 170 lb 171 lb 168 lb 165 lb 165 lb   BMI - 25.77 kg/m2 25.1 kg/m2 25.25 kg/m2 24.81 kg/m2 24.37 kg/m2 24.37 kg/m2        Admitting Diagnosis: COPD with acute exacerbation (HCC)  Pertinent PMHx: COPD, esophagitis, upper GI bleed, hx of stomach ulcers    Education Needs:        [x] None identified  [] Identified - Not appropriate at this time  []  Identified and addressed - refer to education log  Learning Limitations:   [x] None identified  [] Identified    Cultural, Sikhism & ethnic food preferences:  [x] None identified    [] Identified and addressed     ESTIMATED NUTRITION NEEDS:     Calories: 4545-1195 kcal (25-30 kcal/kg) based on  [x] Actual BW: 79 kg      [] IBW   Protein:  gm (20% kcal) based on  [x] Actual BW      [] IBW   Fluid: 1 mL/kcal     MONITORING & EVALUATION:     Nutrition Goal(s):   1. Po intake of meals will meet >75% of patient estimated nutritional needs within the next 7 days.   Outcome:  [] Met/Ongoing    []  Not Met    [x] New/Initial Goal     Monitoring:   [x] Food and beverage intake   [x] Diet order   [] Nutrition-focused physical findings   [x] Treatment/therapy   [] Weight   [] Enteral nutrition intake        Previous Recommendations (for follow-up assessments only):     []   Implemented       []   Not Implemented (RD to address)      [] No Longer Appropriate     [] No Recommendation Made     Discharge Planning:  Regular diet  [x] Participated in care planning, discharge planning, & interdisciplinary rounds as appropriate      Kiki Saldaña, [address] [place] [address]   Pager: [phone]

## 2017-12-22 NOTE — CONSULTS
[facility]  Pulmonary, Critical Care, and Sleep Medicine    Name: Chao Martinez Sr. MRN: [id number]   : 1970 Hospital: [facility]   Date: 2017        Pulmonary Initial In-Patient Consult                                              Consult requesting physician: Dr. Haider Whitfield  Reason for Consult: COPD exacerbation    IMPRESSION:   · Acute on chronic hypoxia COPD exacerbation with presentation of worsening shortness of breath  · Severe COPD, FEV1/FVC ratio < 61%  · Active smoking  · Gr 1 esophagitis by EGD (17) secondary to NSAIDs and steroids  · Depression/Anxiety with claustrophobia - Not tolerating PAP therapy- started on Rameron  · Hypertension  · Hyponatremia - NA+ 131  · Followed through [facility]  · Lactic acidosis possibly type A and/or B- resolved         RECOMMENDATIONS:   · SpO2  Goal 88-92%. Pt may need home oxygen evaluation prior to discharge. · Alpha-1 antitrypsin testing as outpatient in [place] [address] clinic. · Maxipime stopped today,  17  · Continue Levaquin- plan for 5 day course  · Continue Daliresp today PO (Patient educated on this med - can cause diarrhea)- this is a new medication for the patient. Plan to have patient take long term  · Continue Prednisone 40mg Q day with plan for prolonged taper: 40mg X 7 days, 20 mg x 7 days, 10mg x 7 days, 5 mg x 7 days then stop  · Brovana and Pulmicort discontinued to day  · Start Symbicort 160/4.5 2 puffs BID- witch spacer  · Start daily Spiriva  · Oral care discussed with patient: rinse mouth and brush tongue. · Agree with chantix  · Smoking cessation education given   · Oxygen safety- patient to avoid open flame and not to smoke near oxygen  · PT/OT- assess oxygen requirement with ambulation. · Anticipate that patient will need home oxygen. · Rest of concerns per primary.   · Patient to avoid NSAIDS while on steroids   · Follow up with OP Pulmonologist and [facility]- Dr. Michelle Tse after discharge  · Will continue to follow        Subjective/History:     12/22/17   Patient sitting upright in bed. Reports feeling much better today. Much better air movement. Able to us IS up to 750 mls. Has walked a little. Still with some clear sputum production, No hemoptysis, chest pain, Not as fatigue. Tolerating PO. No nausea or emesis has not had BM yet. - Afebrile. Hypertension noted on chart. Patient states he will not restart smoking    - With improved air movement with switch patient back to inhalers today and see how patient does. - He states he has a nebulizer at home  - He does not have oxygen at home  - Plan for prolonged steroid taper      Initial History:  Lea Vu. is a 49 y.o. male with PMHx significant for severe COPD and Gr 1 esophagitis, presented to the ED for worsening shortness of breath. Pt's symptoms started on 12/17 upon waking up with sob; not relieved by multiple albuterol inhaler treatments throughout the day. Pt was brought by ambulance to [facility] ED and was given solumedrol en route. Pt received nebulized treatment and discharged with prescription for prednisone which he was not able to fill immediately. A few hours after discharge and while driving on 23/94, pt started to have recurrence of sob unrelieved by inhaler treatments. Pt was brought to [facility] ED where he was given nebulized treatments and discharged on doxycycline and prednisone PO. Upon arriving home, pt again had recurrence of shortness of breath and was brought to back to [facility] ED. Pt's shortness of breath has become more frequent in the last year and states that he also becomes short of breath with exertion (climbing up the stairs; carrying equipment at work). Pt was last seen by Dr. Yves Winters in Nov for follow-up where he was diagnosed for mild exacerbation of COPD however was unable to get treated with steroids as he had some reported GIB.  Pt then  he underwent EGD last 12/11 following episodes of dark tarry loose stools while taking NSAIDs and steroids. EGD showed Gr 1 esophagitis. Pt also endorses intermittent low grade fever since completing the EGD with highest temp around 100.5. Pt states he used to cough in the past however not recently in the last few months until this past couple of days when he started expectorating brownish-yellow sputum alternating with whitish sputum. Pt endorses being jittery while he has been taking albuterol rescue inhaler very frequently. Pt continues to smoke, however has significantly cut down since about 2 months ago. He used to smoke 2 packs/day however has been able to wean himself down to half pack/day. Pt denies recreational drug use; has occasional alcohol drinking. Pt works as a .        Review of Systems:   HEENT: No epistaxis, no nasal drainage, no difficulty in swallowing, no redness in eyes  Respiratory: as above  Cardiovascular: no chest pain, no palpitations, no chronic leg edema, no syncope  Gastrointestinal: no abd pain, no vomiting, no diarrhea, no bleeding symptoms recently after EGD  Genitourinary: No urinary symptoms or hematuria  Musculoskeletal: + back pain  Neurological: No focal weakness, no seizures, no headaches  Behvioral/Psych:  + anxiety/depression- stable  Constitutional: No fever, no chills, no weight loss, + night sweats - improved  Otherwise per HPI      Immunization status:   Immunization History   Administered Date(s) Administered    Influenza Vaccine (Quad) PF 10/23/2017        No Known Allergies     Past Medical History:   Diagnosis Date    Chronic obstructive pulmonary disease ([address] 08/03/2017    PFTS 8/3/17    COPD, severe ([address]     Emphysema/COPD ([address]     Esophagitis 12/11/2017    Endoscopy    History of stomach ulcers     Positive urine drug screen 01/2016    opiates and THC    Upper GI bleed         Past Surgical History:   Procedure Laterality Date    HX ENDOSCOPY  12/11/2017        Family History   Problem Relation Age of Onset    Hypertension Mother     Heart Disease Mother     Diabetes Mother     Hypertension Father     Heart Disease Father     Cancer Father      lymphnodes    Diabetes Father         Social History   Substance Use Topics    Smoking status: Current Every Day Smoker     Packs/day: 2.00     Years: 25.00     Types: Cigarettes     Start date: 5/28/1984    Smokeless tobacco: Never Used    Alcohol use No        Prior to Admission medications    Medication Sig Start Date End Date Taking? Authorizing Provider   predniSONE (DELTASONE) 50 mg tablet Take 1 Tab by mouth daily for 5 days. 12/18/17 12/23/17  Merline River, MD   acetaminophen (TYLENOL) 500 mg tablet Take  by mouth every six (6) hours as needed for Pain. Historical Provider   traMADol (ULTRAM) 50 mg tablet Take 1 Tab by mouth every six (6) hours as needed for Pain. Max Daily Amount: 200 mg. Indications: Pain 11/2/17   Galo Eng NP   albuterol-ipratropium (DUO-NEB) 2.5 mg-0.5 mg/3 ml nebu 3 mL by Nebulization route every six (6) hours as needed. 10/23/17   Galo Eng NP   varenicline (CHANTIX) 1 mg tablet Take 1 Tab by mouth two (2) times a day. Start after completion of starter pack 7/26/17   Galo Eng NP   fluticasone-salmeterol (ADVAIR) 500-50 mcg/dose diskus inhaler Take 1 Puff by inhalation two (2) times a day. 7/24/17   Galo Eng NP   albuterol (PROVENTIL HFA, VENTOLIN HFA, PROAIR HFA) 90 mcg/actuation inhaler Take 2 Puffs by inhalation every four (4) hours as needed for Wheezing. 1/23/17   Galo Eng NP   tiotropium (SPIRIVA) 18 mcg inhalation capsule Take 1 Cap by inhalation daily.  Indications: BRONCHOSPASM PREVENTION WITH COPD 1/23/17   Galo Eng NP       Current Facility-Administered Medications   Medication Dose Route Frequency    budesonide-formoterol (SYMBICORT) 160-4.5 mcg/actuation HFA inhaler 2 Puff  2 Puff Inhalation BID    [START ON 2017] tiotropium (SPIRIVA) inhalation capsule 18 mcg  1 Cap Inhalation DAILY    albuterol (ACCUNEB) nebulizer solution 1.25 mg  1.25 mg Nebulization Q6H PRN    methylPREDNISolone (PF) (SOLU-MEDROL) injection 40 mg  40 mg IntraVENous Q12H    hydrALAZINE (APRESOLINE) tablet 25 mg  25 mg Oral Q8H PRN    roflumilast (DALIRESP) tablet 500 mcg  500 mcg Oral DAILY    levoFLOXacin (LEVAQUIN) tablet 750 mg  750 mg Oral Q24H    oxyCODONE-acetaminophen (PERCOCET) 5-325 mg per tablet 1 Tab  1 Tab Oral Q4H PRN    senna-docusate (PERICOLACE) 8.6-50 mg per tablet 2 Tab  2 Tab Oral QHS    polyethylene glycol (MIRALAX) packet 17 g  17 g Oral DAILY    mirtazapine (REMERON) tablet 7.5 mg  7.5 mg Oral QHS    acetaminophen (TYLENOL) tablet 500 mg  500 mg Oral Q6H PRN    ondansetron (ZOFRAN) injection 4 mg  4 mg IntraVENous Q8H PRN    guaiFENesin-dextromethorphan SR (HUMIBID DM) 600-30 mg tablet 1 Tab  1 Tab Oral Q12H    varenicline (CHANTIX) tablet 1 mg  1 mg Oral DAILY    pantoprazole (PROTONIX) tablet 40 mg  40 mg Oral ACB    albuterol (ACCUNEB) nebulizer solution 1.25 mg  1.25 mg Nebulization Q6H RT    enoxaparin (LOVENOX) injection 40 mg  40 mg SubCUTAneous Q24H    sodium chloride (NS) flush 5-10 mL  5-10 mL IntraVENous PRN    LORazepam (ATIVAN) tablet 0.5 mg  0.5 mg Oral Q4H PRN         Objective:   Vital Signs:    Visit Vitals    BP (!) 148/93 (BP 1 Location: Right arm, BP Patient Position: Head of bed elevated (Comment degrees))    Pulse 96    Temp 97.2 °F (36.2 °C)    Resp 18    Wt 79.2 kg (174 lb 8 oz)    SpO2 96%    BMI 25.77 kg/m2       O2 Device: Hi flow nasal cannula   O2 Flow Rate (L/min): 30 l/min   Temp (24hrs), Av.8 °F (36.6 °C), Min:97.2 °F (36.2 °C), Max:98.2 °F (36.8 °C)       Intake/Output:   Last shift:         Last 3 shifts:  1901 -  0700  In: 1918.8 [P.O.:1200;  I.V.:718.8]  Out: 3750 [Urine:3750]    Intake/Output Summary (Last 24 hours) at 17 1311  Last data filed at 12/22/17 0416   Gross per 24 hour   Intake              900 ml   Output             1850 ml   Net             -950 ml       Physical Exam:     General/Neurology: Alert, Awake; sitting up right. No accessory muscle use. Mild conversational dyspnea but improved from yesterday. Head:   Normocephalic, without obvious abnormality, atraumatic. Eye:   PERRL, EOM intact, no scleral icterus, no pallor, no cyanosis  Nose:   No sinus tenderness, no erythema, no induration, no discharge  Throat:  Lips, mucosa, and tongue normal.  No tonsillar enlargement, no erythema, no exudates. No oral thrush  Neck:   Supple, symmetric. Thyroid: no enlargement/tenderness/nodule. No carotid bruit. Nolymphadenopathy. Trachea midline  Back & spine: Symmetric, no curvature. No gross deformities  Chest wall: No tenderness or deformity. No rash  Lung:   Improved air movement. No wheezing, crackles or rhonchi  at time of my exam. Able to inhaler 750 mls with IS  Heart:   Regular rate and rythmn  Abdomen:  Soft- protruberant, non-tender, + bowel sounds  Extremities:  No pedal edema. No cyanosis. Pulses: 2+ and symmetric in DP  Lymphatic:  No cervical, supraclavicular palpable lymphadenopathy. Musculoskeletal: No joint swelling. No tenderness. Skin:   Color, texture, turgor normal. No rashes or lesions      Data:       No results found for this or any previous visit (from the past 24 hour(s)).       Chemistry Recent Labs      12/21/17   0300   GLU  131*   NA  140   K  4.1   CL  107   CO2  26   BUN  14   CREA  0.73   CA  8.5   MG  2.3   AGAP  7   BUCR  19        Lactic Acid Lactic acid   Date Value Ref Range Status   12/20/2017 1.5 0.4 - 2.0 MMOL/L Final     Recent Labs      12/20/17   0415   LAC  1.5        Liver Enzymes Protein, total   Date Value Ref Range Status   12/19/2017 6.3 (L) 6.4 - 8.2 g/dL Final     Albumin   Date Value Ref Range Status   12/19/2017 3.5 3.4 - 5.0 g/dL Final     Globulin   Date Value Ref Range Status 12/19/2017 2.8 2.0 - 4.0 g/dL Final     A-G Ratio   Date Value Ref Range Status   12/19/2017 1.3 0.8 - 1.7   Final     AST (SGOT)   Date Value Ref Range Status   12/19/2017 9 (L) 15 - 37 U/L Final     Alk. phosphatase   Date Value Ref Range Status   12/19/2017 63 45 - 117 U/L Final     No results for input(s): TP, ALB, GLOB, AGRAT, SGOT, GPT, AP, TBIL in the last 72 hours. No lab exists for component: DBIL     CBC w/Diff Recent Labs      12/21/17   0300   WBC  8.6   RBC  4.57*   HGB  14.8   HCT  42.6   PLT  162   GRANS  89*   LYMPH  6*   EOS  0        Cardiac Enzymes No results found for: CPK, CK, CKMMB, CKMB, RCK3, CKMBT, CKNDX, CKND1, JIMMY, TROPT, TROIQ, LINH, TROPT, TNIPOC, BNP, BNPP     BNP No results found for: BNP, BNPP, XBNPT     Coagulation No results for input(s): PTP, INR, APTT in the last 72 hours. No lab exists for component: INREXT, INREXT      Thyroid  Lab Results   Component Value Date/Time    TSH 2.21 12/17/2017 10:00 PM       No results found for: T4     Lipid Panel Lab Results   Component Value Date/Time    Cholesterol, total 144 01/23/2017 01:11 PM    HDL Cholesterol 36 01/23/2017 01:11 PM    LDL, calculated 85.6 01/23/2017 01:11 PM    VLDL, calculated 22.4 01/23/2017 01:11 PM    Triglyceride 112 01/23/2017 01:11 PM    CHOL/HDL Ratio 4.0 01/23/2017 01:11 PM        ABG No results for input(s): PHI, PHI, POC2, PCO2I, PO2, PO2I, HCO3, HCO3I, FIO2, FIO2I in the last 72 hours.      Urinalysis Lab Results   Component Value Date/Time    Color YELLOW 06/28/2013 12:14 AM    Appearance CLEAR 06/28/2013 12:14 AM    Specific gravity 1.015 06/28/2013 12:14 AM    pH (UA) 6.5 06/28/2013 12:14 AM    Protein NEGATIVE  06/28/2013 12:14 AM    Glucose NEGATIVE  06/28/2013 12:14 AM    Ketone TRACE 06/28/2013 12:14 AM    Bilirubin SMALL 06/28/2013 12:14 AM    Urobilinogen 1.0 06/28/2013 12:14 AM    Nitrites NEGATIVE  06/28/2013 12:14 AM    Leukocyte Esterase NEGATIVE  06/28/2013 12:14 AM        Micro  No results for input(s): SDES, CULT in the last 72 hours. No results for input(s): CULT in the last 72 hours. EKG No results found for this or any previous visit. PFT No flowsheet data found. Other ASA reactivity:   Pre-albumin:   Ionized Calcium:   NH4:   T3, FT4:  Cortisol:  Urine Osm:  Urine Lytes:   HbA1c:      Ultrasound      LE Doppler      ECHO No results found for this or any previous visit. CT (Most Recent)   Results from Hospital Encounter encounter on 12/18/17   CT CHEST W CONT   Narrative CT CHEST WITH ENHANCEMENT    INDICATION: COPD with worsening dyspnea, cough, chest congestion, hypoxia,  anxious. TECHNIQUE: Axial images obtained from the thoracic inlet to the level of the  diaphragm following uneventful administration of 100 cc Isovue-300 nonionic  intravenous contrast. Coronal and sagittal reformatted images were obtained. All CT scans at this facility are performed using dose optimization technique as  appropriate to a performed exam, to include automated exposure control,  adjustment of the mA and/or kV according to patient size (including appropriate  matching first site-specific examinations), or use of iterative reconstruction  technique. COMPARISON: CT chest 6/28/2013. CHEST FINDINGS:     Thyroid: Unremarkable in its visualized aspects. Pericardium/ Heart: Heart size is normal. No pericardial effusion. Mild coronary  arteriosclerosis in the LAD. Aorta/ Vessels: No aneurysm or dissection. Lymph Nodes: Unremarkable. .    Lungs: There is layering mucus in the trachea. Central bronchial tree is patent. Subsegmental atelectasis or scarring in the bilateral lower lobes. Moderate to  severe emphysematous changes. Pleura: No effusion. No pneumothorax. Upper Abdomen: Similar subcentimeter hypodensity in the left medial hepatic  lobe, too small to characterize but stability since 2013 is reassuring. Bones/soft tissues: Unremarkable.          Impression IMPRESSION:    Moderate to severe emphysema. Mild LAD coronary arteriosclerosis. XR (Most Recent). CXR reviewed by me and compared with previous CXR   Results from [place] encounter on 12/18/17   XR CHEST SNGL V   Narrative Portable chest x-ray, semierect AP view, time 1802 hours on 12/18/2017:        INDICATION:    Shortness of breath. Expiration of COPD. COMPARISON STUDY: Chest x-ray on 12/17/2017, 11/04/2017. FINDINGS:    Lungs are mildly hypoventilated. Cardiac CABG to be within normal limits is mildly more prominent as compared to  previous study. As compared to previous study there is no minimal to mild pulmonary vascular  congestion noted. Interstitial markings are mildly hazy. Right lung is clear. At  left lung base there are minimal streaky atelectatic changes noted. CP angles  are sharp bilaterally. No evidence of pneumothorax. Impression IMPRESSION:    Currently minimal to mild pulmonary vascular congestion. Minimal interstitial  pulmonary edema is not excluded at this time. Mild pulmonary hypoventilation. Minimal streaky left basilar atelectasis.                  Zoe Pedroza DO  12/22/2017       Clinical Care time , chart review and time spent coordinating care: 30min  Discussed with patient and hospitalist

## 2017-12-22 NOTE — PROGRESS NOTES
Problem: Mobility Impaired (Adult and Pediatric)  Goal: *Acute Goals and Plan of Care (Insert Text)  Physical Therapy Goals  Initiated 12/21/2017 and to be accomplished within 7 day(s)  1. Patient will move from supine to sit and sit to supine , scoot up and down and roll side to side in bed with modified independence. 2.  Patient will transfer from bed to chair and chair to bed with supervision/set-up using the least restrictive device. 3.  Patient will perform sit to stand with supervision/set-up. 4.  Patient will ambulate with supervision/set-up for >100 feet with the least restrictive device. 5.  Patient will ascend/descend 2 stairs without handrail(s) with supervision/set-up. Outcome: Progressing Towards Goal  physical Therapy TREATMENT    Patient: Daniel Askew  (45 y.o. male)  Date: 12/22/2017  Diagnosis: COPD with acute exacerbation ([address] COPD with acute exacerbation ([address]       Precautions: Fall   Chart, physical therapy assessment, plan of care and goals were reviewed. ASSESSMENT:  Pt is very mobile, but has minor path deviations and instability at start of gait training. Pt has SOB throughout session, but maintains SPO2 range of 91-16% on 3L NC. Will need to address stair amb before PT clearance. Progression toward goals:  []      Improving appropriately and progressing toward goals  [x]      Improving slowly and progressing toward goals  []      Not making progress toward goals and plan of care will be adjusted     PLAN:  Patient continues to benefit from skilled intervention to address the above impairments. Continue treatment per established plan of care. Discharge Recommendations:  Home Health  Further Equipment Recommendations for Discharge:  portable oxygen     SUBJECTIVE:   Patient stated I feel so bloated. They are trying to give me stuff to make me go.     OBJECTIVE DATA SUMMARY:   Critical Behavior:  Neurologic State: Alert, Eyes open spontaneously  Orientation Level: Oriented X4  Cognition: Appropriate decision making, Appropriate safety awareness, Follows commands  Safety/Judgement: Awareness of environment, Fall prevention  Functional Mobility Training:  Bed Mobility:  Rolling: Modified independent  Supine to Sit: Modified independent  Scooting: Modified independent  Transfers:  Sit to Stand: Modified independent  Stand to Sit: Modified independent  Balance:  Sitting: Intact  Standing: Intact  Ambulation/Gait Training:  Distance (ft): 300 Feet (ft)  Assistive Device: Gait belt  Ambulation - Level of Assistance: Supervision;Stand-by asssistance  Gait Abnormalities: Path deviations  Base of Support: Widened  Speed/Luci: Fluctuations  Step Length: Right shortened;Left shortened  Pain:  Pain Scale 1: Numeric (0 - 10)  Pain Intensity 1: 4  Pain Location 1: Neck; Shoulder;Head  Pain Orientation 1: Right;Left  Pain Description 1: Constant; Aching  Pain Intervention(s) 1: Declines  Activity Tolerance:   Good  Please refer to the flowsheet for vital signs taken during this treatment.   After treatment:   [] Patient left in no apparent distress sitting up in chair  [x] Patient left in no apparent distress in bed  [x] Call bell left within reach  [x] Nursing notified  [] Caregiver present  [] Bed alarm activated      Sigrid Bloch, PTA   Time Calculation: 21 mins

## 2017-12-22 NOTE — PROGRESS NOTES
Hospitalist Progress Note    Patient: Alina Davison Sr. MRN: [id number]  CSN: [id number]    YOB: 1970  Age: 49 y.o. Sex: male    DOA: 12/18/2017 LOS:  LOS: 4 days          Continues to cough and expectorate large amounts of sputum. Feels better, remains on high flow and iv steroids. He remains constipated. Assessment/Plan     1. COPD, severe - solumedrol, duoneb, tobacco cessation, abx. Pulmonary input appreciated. Alpha-1 antitrypsin testing as outpatient in [place] [id number] clinic  2. Sinus tachycardia poa - related to infection. 3. Acute bronchitis - Levaquin/cefepime - CT chest no infiltrate. 4. Elevated bp - not on meds at home - decrease steroids. Stop fluids. Treat pain. Relieve constipation. Hydralazine prn. Low dose norvasc  5. Lactic acidosis - resolving  6. uds + opiates. 7. Tobacco abuse - chantix. Smoking cessation education  8. normal event monitor 2015  9. Recent GI bleed in the setting of steroid use, egd 12/11/17 revealed Grade 1 espohagitis - on ppi. 10. Mild LAD coronary arteriosclerosis seen on CT chest.   12. Anxiety - prn ativan. Remeron. 13. Constipation - bowel regimen. 14. dvt prophylaxis  15. Full code. has a nebulizer at home that he uses left over from his daughter. Referred to [facility] per CM. Pt recs HH. Needs home O2 eval prior to discharge. Wife Lali Ness [phone]    Additional Notes:      Case discussed with:  [x]Patient  [x]Family  [x]Nursing  [x]Case Management  DVT Prophylaxis:  []Lovenox  [x]Hep SQ  []SCDs  []Coumadin   []On Heparin gtt    Vital signs/Intake and Output:  Visit Vitals    BP (!) 151/97 (BP 1 Location: Right arm, BP Patient Position: At rest)    Pulse 72    Temp 97.8 °F (36.6 °C)    Resp 20    Wt 79.2 kg (174 lb 8 oz)    SpO2 96%    BMI 25.77 kg/m2     Current Shift:     Last three shifts:  12/20 1901 - 12/22 0700  In: 1918.8 [P.O.:1200; I.V.:718.8]  Out: 3750 [Urine:3750]    Awake alert and oriented. On high flow. Ncat. Perrl. Oropharynx clear   RRR  Improved air entry. Scattered end expiratory wheeze. Soft nt nd nabs  No edema. dp 2+ b.l  No focal deficit  No rash    Medications Reviewed      Labs: Results:       Chemistry Recent Labs      12/21/17   0300   GLU  131*   NA  140   K  4.1   CL  107   CO2  26   BUN  14   CREA  0.73   CA  8.5   AGAP  7   BUCR  19      CBC w/Diff Recent Labs      12/21/17   0300   WBC  8.6   RBC  4.57*   HGB  14.8   HCT  42.6   PLT  162   GRANS  89*   LYMPH  6*   EOS  0      Cardiac Enzymes No results for input(s): CPK, CKND1, JIMMY in the last 72 hours. No lab exists for component: CKRMB, TROIP   Coagulation No results for input(s): PTP, INR, APTT in the last 72 hours. No lab exists for component: INREXT, INREXT    Lipid Panel Lab Results   Component Value Date/Time    Cholesterol, total 144 01/23/2017 01:11 PM    HDL Cholesterol 36 01/23/2017 01:11 PM    LDL, calculated 85.6 01/23/2017 01:11 PM    VLDL, calculated 22.4 01/23/2017 01:11 PM    Triglyceride 112 01/23/2017 01:11 PM    CHOL/HDL Ratio 4.0 01/23/2017 01:11 PM      BNP No results for input(s): BNPP in the last 72 hours. Liver Enzymes No results for input(s): TP, ALB, TBIL, AP, SGOT, GPT in the last 72 hours. No lab exists for component: DBIL   Thyroid Studies Lab Results   Component Value Date/Time    TSH 2.21 12/17/2017 10:00 PM        Procedures/imaging: see electronic medical records for all procedures/Xrays and details which were not copied into this note but were reviewed prior to creation of Plan.

## 2017-12-22 NOTE — PROGRESS NOTES
Assumed care; Pt resting in bed; no distress noted; Pt complains of pain(9/10), Dr Teresita Robison notified;Dr Teresita Robison to assess patient;  bed in low position; Side rails up x 3; Call light and personal belonging within reach. Will continue to monitor. 1310: Ivone Jarrell RN called to room. Pt dry heaving. Anxious stating he cannot breath. See Mar for intervention. 96% ON 3LNC. Respiratory notified. 1330: Pt placed on High Flow by RT. Will continue to monitor.

## 2017-12-23 ENCOUNTER — HOME HEALTH ADMISSION (OUTPATIENT)
Dept: HOME HEALTH SERVICES | Facility: HOME HEALTH | Age: 47
End: 2017-12-23
Payer: MEDICAID

## 2017-12-23 VITALS
SYSTOLIC BLOOD PRESSURE: 156 MMHG | OXYGEN SATURATION: 90 % | BODY MASS INDEX: 25.77 KG/M2 | RESPIRATION RATE: 19 BRPM | DIASTOLIC BLOOD PRESSURE: 97 MMHG | HEART RATE: 81 BPM | TEMPERATURE: 98 F | WEIGHT: 174.5 LBS

## 2017-12-23 LAB
ANION GAP SERPL CALC-SCNC: 6 MMOL/L (ref 3–18)
BASOPHILS # BLD: 0 K/UL (ref 0–0.06)
BASOPHILS NFR BLD: 0 % (ref 0–2)
BUN SERPL-MCNC: 20 MG/DL (ref 7–18)
BUN/CREAT SERPL: 24 (ref 12–20)
CALCIUM SERPL-MCNC: 8.4 MG/DL (ref 8.5–10.1)
CHLORIDE SERPL-SCNC: 105 MMOL/L (ref 100–108)
CO2 SERPL-SCNC: 28 MMOL/L (ref 21–32)
CREAT SERPL-MCNC: 0.82 MG/DL (ref 0.6–1.3)
DIFFERENTIAL METHOD BLD: ABNORMAL
EOSINOPHIL # BLD: 0 K/UL (ref 0–0.4)
EOSINOPHIL NFR BLD: 0 % (ref 0–5)
ERYTHROCYTE [DISTWIDTH] IN BLOOD BY AUTOMATED COUNT: 12.6 % (ref 11.6–14.5)
GLUCOSE SERPL-MCNC: 133 MG/DL (ref 74–99)
HCT VFR BLD AUTO: 42.7 % (ref 36–48)
HGB BLD-MCNC: 14.9 G/DL (ref 13–16)
LYMPHOCYTES # BLD: 0.6 K/UL (ref 0.9–3.6)
LYMPHOCYTES NFR BLD: 7 % (ref 21–52)
MAGNESIUM SERPL-MCNC: 2.3 MG/DL (ref 1.6–2.6)
MCH RBC QN AUTO: 32.3 PG (ref 24–34)
MCHC RBC AUTO-ENTMCNC: 34.9 G/DL (ref 31–37)
MCV RBC AUTO: 92.4 FL (ref 74–97)
MONOCYTES # BLD: 0.4 K/UL (ref 0.05–1.2)
MONOCYTES NFR BLD: 5 % (ref 3–10)
NEUTS SEG # BLD: 7.8 K/UL (ref 1.8–8)
NEUTS SEG NFR BLD: 88 % (ref 40–73)
PLATELET # BLD AUTO: 180 K/UL (ref 135–420)
PMV BLD AUTO: 10.3 FL (ref 9.2–11.8)
POTASSIUM SERPL-SCNC: 4.4 MMOL/L (ref 3.5–5.5)
RBC # BLD AUTO: 4.62 M/UL (ref 4.7–5.5)
SODIUM SERPL-SCNC: 139 MMOL/L (ref 136–145)
WBC # BLD AUTO: 8.8 K/UL (ref 4.6–13.2)

## 2017-12-23 PROCEDURE — 74011250637 HC RX REV CODE- 250/637: Performed by: INTERNAL MEDICINE

## 2017-12-23 PROCEDURE — 85025 COMPLETE CBC W/AUTO DIFF WBC: CPT | Performed by: HOSPITALIST

## 2017-12-23 PROCEDURE — 80048 BASIC METABOLIC PNL TOTAL CA: CPT | Performed by: HOSPITALIST

## 2017-12-23 PROCEDURE — 74011000250 HC RX REV CODE- 250: Performed by: PHYSICIAN ASSISTANT

## 2017-12-23 PROCEDURE — 74011250636 HC RX REV CODE- 250/636: Performed by: PHYSICIAN ASSISTANT

## 2017-12-23 PROCEDURE — 97110 THERAPEUTIC EXERCISES: CPT

## 2017-12-23 PROCEDURE — 74011250636 HC RX REV CODE- 250/636: Performed by: HOSPITALIST

## 2017-12-23 PROCEDURE — 94640 AIRWAY INHALATION TREATMENT: CPT

## 2017-12-23 PROCEDURE — 83735 ASSAY OF MAGNESIUM: CPT | Performed by: HOSPITALIST

## 2017-12-23 PROCEDURE — 74011250637 HC RX REV CODE- 250/637: Performed by: HOSPITALIST

## 2017-12-23 PROCEDURE — 77010033678 HC OXYGEN DAILY

## 2017-12-23 PROCEDURE — 97116 GAIT TRAINING THERAPY: CPT

## 2017-12-23 PROCEDURE — 36415 COLL VENOUS BLD VENIPUNCTURE: CPT | Performed by: HOSPITALIST

## 2017-12-23 PROCEDURE — 97535 SELF CARE MNGMENT TRAINING: CPT

## 2017-12-23 PROCEDURE — 94660 CPAP INITIATION&MGMT: CPT

## 2017-12-23 RX ORDER — IPRATROPIUM BROMIDE AND ALBUTEROL SULFATE 2.5; .5 MG/3ML; MG/3ML
3 SOLUTION RESPIRATORY (INHALATION)
Qty: 75 NEBULE | Refills: 5 | Status: ON HOLD | OUTPATIENT
Start: 2017-12-23 | End: 2018-01-12

## 2017-12-23 RX ORDER — PREDNISONE 20 MG/1
40 TABLET ORAL
Status: DISCONTINUED | OUTPATIENT
Start: 2017-12-24 | End: 2017-12-23 | Stop reason: HOSPADM

## 2017-12-23 RX ORDER — PREDNISONE 10 MG/1
TABLET ORAL
Qty: 57 TAB | Refills: 0 | Status: SHIPPED | OUTPATIENT
Start: 2017-12-23 | End: 2018-02-21

## 2017-12-23 RX ORDER — LORAZEPAM 0.5 MG/1
0.5 TABLET ORAL
Qty: 20 TAB | Refills: 0 | Status: SHIPPED | OUTPATIENT
Start: 2017-12-23 | End: 2017-12-31

## 2017-12-23 RX ORDER — TRAMADOL HYDROCHLORIDE 50 MG/1
50 TABLET ORAL
Qty: 20 TAB | Refills: 0 | Status: SHIPPED | OUTPATIENT
Start: 2017-12-23 | End: 2018-01-02

## 2017-12-23 RX ORDER — PANTOPRAZOLE SODIUM 40 MG/1
40 TABLET, DELAYED RELEASE ORAL
Qty: 30 TAB | Refills: 0 | Status: ON HOLD | OUTPATIENT
Start: 2017-12-24 | End: 2018-01-12

## 2017-12-23 RX ORDER — VARENICLINE TARTRATE 1 MG/1
1 TABLET, FILM COATED ORAL DAILY
Qty: 30 TAB | Refills: 0 | Status: SHIPPED | OUTPATIENT
Start: 2017-12-23 | End: 2018-01-03 | Stop reason: SDUPTHER

## 2017-12-23 RX ORDER — AMLODIPINE BESYLATE 2.5 MG/1
2.5 TABLET ORAL DAILY
Qty: 30 TAB | Refills: 2 | Status: SHIPPED | OUTPATIENT
Start: 2017-12-24 | End: 2018-01-09 | Stop reason: DRUGHIGH

## 2017-12-23 RX ORDER — FLUTICASONE PROPIONATE AND SALMETEROL 500; 50 UG/1; UG/1
1 POWDER RESPIRATORY (INHALATION) 2 TIMES DAILY
Qty: 1 EACH | Refills: 3 | Status: SHIPPED | OUTPATIENT
Start: 2017-12-23 | End: 2018-01-03 | Stop reason: SDUPTHER

## 2017-12-23 RX ORDER — MIRTAZAPINE 7.5 MG/1
7.5 TABLET, FILM COATED ORAL
Qty: 30 TAB | Refills: 0 | Status: SHIPPED | OUTPATIENT
Start: 2017-12-23 | End: 2018-01-03

## 2017-12-23 RX ORDER — ALBUTEROL SULFATE 90 UG/1
2 AEROSOL, METERED RESPIRATORY (INHALATION)
Qty: 1 INHALER | Refills: 3 | Status: SHIPPED | OUTPATIENT
Start: 2017-12-23 | End: 2018-01-03 | Stop reason: SDUPTHER

## 2017-12-23 RX ADMIN — LORAZEPAM 0.5 MG: 0.5 TABLET ORAL at 11:51

## 2017-12-23 RX ADMIN — AMLODIPINE BESYLATE 2.5 MG: 2.5 TABLET ORAL at 11:43

## 2017-12-23 RX ADMIN — OXYCODONE HYDROCHLORIDE AND ACETAMINOPHEN 1 TABLET: 5; 325 TABLET ORAL at 04:49

## 2017-12-23 RX ADMIN — OXYCODONE HYDROCHLORIDE AND ACETAMINOPHEN 1 TABLET: 5; 325 TABLET ORAL at 18:10

## 2017-12-23 RX ADMIN — ENOXAPARIN SODIUM 40 MG: 40 INJECTION, SOLUTION INTRAVENOUS; SUBCUTANEOUS at 18:10

## 2017-12-23 RX ADMIN — VARENICLINE TARTRATE 1 MG: 1 TABLET, FILM COATED ORAL at 11:43

## 2017-12-23 RX ADMIN — PANTOPRAZOLE SODIUM 40 MG: 40 TABLET, DELAYED RELEASE ORAL at 11:44

## 2017-12-23 RX ADMIN — METHYLPREDNISOLONE SODIUM SUCCINATE 40 MG: 40 INJECTION, POWDER, FOR SOLUTION INTRAMUSCULAR; INTRAVENOUS at 11:43

## 2017-12-23 RX ADMIN — OXYCODONE HYDROCHLORIDE AND ACETAMINOPHEN 1 TABLET: 5; 325 TABLET ORAL at 11:51

## 2017-12-23 RX ADMIN — ALBUTEROL SULFATE 1.25 MG: 1.25 SOLUTION RESPIRATORY (INHALATION) at 01:27

## 2017-12-23 RX ADMIN — ROFLUMILAST 500 MCG: 500 TABLET ORAL at 11:46

## 2017-12-23 RX ADMIN — LORAZEPAM 0.5 MG: 0.5 TABLET ORAL at 18:10

## 2017-12-23 RX ADMIN — GUAIFENESIN AND DEXTROMETHORPHAN HYDROBROMIDE 1 TABLET: 600; 30 TABLET, EXTENDED RELEASE ORAL at 11:43

## 2017-12-23 RX ADMIN — TIOTROPIUM BROMIDE 18 MCG: 18 CAPSULE ORAL; RESPIRATORY (INHALATION) at 08:41

## 2017-12-23 RX ADMIN — BUDESONIDE AND FORMOTEROL FUMARATE DIHYDRATE 2 PUFF: 160; 4.5 AEROSOL RESPIRATORY (INHALATION) at 08:41

## 2017-12-23 RX ADMIN — ALBUTEROL SULFATE 1.25 MG: 1.25 SOLUTION RESPIRATORY (INHALATION) at 13:27

## 2017-12-23 RX ADMIN — ALBUTEROL SULFATE 1.25 MG: 1.25 SOLUTION RESPIRATORY (INHALATION) at 08:40

## 2017-12-23 NOTE — DISCHARGE SUMMARY
Discharge Summary    Patient: Roosevelt Copeland Sr. MRN: [id number]  CSN: [id number]    YOB: 1970  Age: 49 y.o. Sex: male    DOA: 12/18/2017 LOS:  LOS: 5 days   Discharge Date:      Admission Diagnoses: COPD with acute exacerbation [facility])    Discharge Diagnoses:    Problem List as of 12/23/2017  Date Reviewed: 12/18/2017          Codes Class Noted - Resolved    * (Principal)COPD with acute exacerbation ([facility] ICD-10-CM: J44.1  ICD-9-CM: 491.21  12/18/2017 - Present        Sepsis ([facility] ICD-10-CM: A41.9  ICD-9-CM: 038.9, 995.91  12/18/2017 - Present        Emphysema/COPD ([facility] ICD-10-CM: J43.9  ICD-9-CM: 492.8  Unknown - Present        COPD, severe ([facility] ICD-10-CM: J44.9  ICD-9-CM: 137  Unknown - Present            Reason for Admission  49 y.o.  male who has Stage 4 COPD   presented to ER for 3rd time in 24 hours with worsening dyspnea, cough and difficulty catching his breath. He reported increased cough and congestion as well as shortness of breath for 3 days. Went to ER and was given steroids and duonebs with minimal response. Sees pulmonary in the outpatient setting, and was working on getting home oxygen. Room air pulse ox 85-86% with each exacerbation prior to treatment. Has cut back from 2 packs a day to half a pack a day. Discharge Condition: Good    PHYSICAL EXAM at discharge. Visit Vitals    BP (!) 156/97 (BP 1 Location: Right arm, BP Patient Position: At rest)    Pulse 81    Temp 98 °F (36.7 °C)    Resp 19    Wt 79.2 kg (174 lb 8 oz)    SpO2 90%    BMI 25.77 kg/m2     Awake alert and oriented. Ncat. Perrl. Oropharynx clear   RRR  Improved air entry. cta b.l. No wheeze. No rhonchi, no rales. Soft nt nd nabs  No edema. dp 2+ b.l  No focal deficit  No rash    Hospital Course:   1. COPD, severe, with acute exacerbation - resolving s/p solumedrol, duoneb, tobacco cessation, abx. Pulmonary followed in consult. Alpha-1 antitrypsin testing as outpatient in [facility]  clinic  2. Sinus tachycardia poa - related to infection and resolved  3. Acute bronchitis resolving - Levaquin/cefepime - CT chest no infiltrate. 4. Elevated bp - not on meds at home - decreased steroids. Stopped fluids. Treat pain. Relieved constipation. Low dose norvasc started. 5. Lactic acidosis - resolved  6. uds + opiates. 7. Tobacco abuse - chantix. Smoking cessation education  8. normal event monitor 2015  9. Recent GI bleed in the setting of steroid use, egd 12/11/17 revealed Grade 1 espohagitis - on ppi. 10. Mild LAD coronary arteriosclerosis seen on CT chest.   12. Anxiety - prn ativan. Remeron started in hospital with marked improvement in sleep and mood. 13. Constipation relieved - continue bowel regimen. 14. dvt prophylaxis was given in the form of lovenox subcut daily  15. Chronic hypoxic respiratory failure - he qualified for home oxygen at discharge   16. Full code. has a nebulizer at home that he uses left over from his daughter. Referred to [facility] per CM. Discharge to home with hh. Patient and family agree; all questions answered to the best of my ability     Wife Cassie Halsted [phone]    Consults: pccm Dr. Ceferino Bustos    Significant Diagnostic Studies: labs:   Recent Results (from the past 24 hour(s))   CBC WITH AUTOMATED DIFF    Collection Time: 12/23/17  1:24 AM   Result Value Ref Range    WBC 8.8 4.6 - 13.2 K/uL    RBC 4.62 (L) 4.70 - 5.50 M/uL    HGB 14.9 13.0 - 16.0 g/dL    HCT 42.7 36.0 - 48.0 %    MCV 92.4 74.0 - 97.0 FL    MCH 32.3 24.0 - 34.0 PG    MCHC 34.9 31.0 - 37.0 g/dL    RDW 12.6 11.6 - 14.5 %    PLATELET 636 525 - 774 K/uL    MPV 10.3 9.2 - 11.8 FL    NEUTROPHILS 88 (H) 40 - 73 %    LYMPHOCYTES 7 (L) 21 - 52 %    MONOCYTES 5 3 - 10 %    EOSINOPHILS 0 0 - 5 %    BASOPHILS 0 0 - 2 %    ABS. NEUTROPHILS 7.8 1.8 - 8.0 K/UL    ABS. LYMPHOCYTES 0.6 (L) 0.9 - 3.6 K/UL    ABS. MONOCYTES 0.4 0.05 - 1.2 K/UL    ABS. EOSINOPHILS 0.0 0.0 - 0.4 K/UL    ABS.  BASOPHILS 0.0 0.0 - 0.06 K/UL    DF AUTOMATED     MAGNESIUM    Collection Time: 12/23/17  1:24 AM   Result Value Ref Range    Magnesium 2.3 1.6 - 2.6 mg/dL   METABOLIC PANEL, BASIC    Collection Time: 12/23/17  1:24 AM   Result Value Ref Range    Sodium 139 136 - 145 mmol/L    Potassium 4.4 3.5 - 5.5 mmol/L    Chloride 105 100 - 108 mmol/L    CO2 28 21 - 32 mmol/L    Anion gap 6 3.0 - 18 mmol/L    Glucose 133 (H) 74 - 99 mg/dL    BUN 20 (H) 7.0 - 18 MG/DL    Creatinine 0.82 0.6 - 1.3 MG/DL    BUN/Creatinine ratio 24 (H) 12 - 20      GFR est AA >60 >60 ml/min/1.73m2    GFR est non-AA >60 >60 ml/min/1.73m2    Calcium 8.4 (L) 8.5 - 10.1 MG/DL     All Micro Results     Procedure Component Value Units Date/Time    CULTURE, RESPIRATORY/SPUTUM/BRONCH Karine Sicilian STAIN [[id number]]  (Abnormal) Collected:  12/22/17 0640    Order Status:  Completed Specimen:  Sputum from Sputum Updated:  12/23/17 1043     Special Requests: NO SPECIAL REQUESTS        GRAM STAIN >25 WBC/lpf         >25 EPI/lpf         MUCUS PRESENT                 RARE YEAST WITH PSEUDOHYPHAE     Culture result: FEW CANDIDA ALBICANS (A)       CULTURE, BLOOD [[id number]] Collected:  12/18/17 1900    Order Status:  Completed Specimen:  Blood from Blood Updated:  12/23/17 0648     Special Requests: NO SPECIAL REQUESTS        Culture result: NO GROWTH 5 DAYS       CULTURE, BLOOD [[id number]] Collected:  12/18/17 1915    Order Status:  Completed Specimen:  Blood from Blood Updated:  12/23/17 0648     Special Requests: NO SPECIAL REQUESTS        Culture result: NO GROWTH 5 DAYS       CULTURE, RESPIRATORY/SPUTUM/BRONCH Jyoti Re [[id number]] Collected:  12/19/17 1130    Order Status:  Canceled Specimen:  Sputum from Sputum         IMAGING  CT Results (most recent):    Results from Hospital Encounter encounter on 12/18/17   CT CHEST W CONT   Narrative CT CHEST WITH ENHANCEMENT    INDICATION: COPD with worsening dyspnea, cough, chest congestion, hypoxia,  anxious.     TECHNIQUE: Axial images obtained from the thoracic inlet to the level of the  diaphragm following uneventful administration of 100 cc Isovue-300 nonionic  intravenous contrast. Coronal and sagittal reformatted images were obtained. All CT scans at this facility are performed using dose optimization technique as  appropriate to a performed exam, to include automated exposure control,  adjustment of the mA and/or kV according to patient size (including appropriate  matching first site-specific examinations), or use of iterative reconstruction  technique. COMPARISON: CT chest 6/28/2013. CHEST FINDINGS:     Thyroid: Unremarkable in its visualized aspects. Pericardium/ Heart: Heart size is normal. No pericardial effusion. Mild coronary  arteriosclerosis in the LAD. Aorta/ Vessels: No aneurysm or dissection. Lymph Nodes: Unremarkable. .    Lungs: There is layering mucus in the trachea. Central bronchial tree is patent. Subsegmental atelectasis or scarring in the bilateral lower lobes. Moderate to  severe emphysematous changes. Pleura: No effusion. No pneumothorax. Upper Abdomen: Similar subcentimeter hypodensity in the left medial hepatic  lobe, too small to characterize but stability since 2013 is reassuring. Bones/soft tissues: Unremarkable. Impression IMPRESSION:    Moderate to severe emphysema. Mild LAD coronary arteriosclerosis. XR Results (most recent):    Results from Hospital Encounter encounter on 12/18/17   XR CHEST SNGL V   Narrative Portable chest x-ray, semierect AP view, time 1802 hours on 12/18/2017:        INDICATION:    Shortness of breath. Expiration of COPD. COMPARISON STUDY: Chest x-ray on 12/17/2017, 11/04/2017. FINDINGS:    Lungs are mildly hypoventilated. Cardiac CABG to be within normal limits is mildly more prominent as compared to  previous study. As compared to previous study there is no minimal to mild pulmonary vascular  congestion noted. Interstitial markings are mildly hazy.  Right lung is clear. At  left lung base there are minimal streaky atelectatic changes noted. CP angles  are sharp bilaterally. No evidence of pneumothorax. Impression IMPRESSION:    Currently minimal to mild pulmonary vascular congestion. Minimal interstitial  pulmonary edema is not excluded at this time. Mild pulmonary hypoventilation. Minimal streaky left basilar atelectasis. Discharge Medications:     Current Discharge Medication List      START taking these medications    Details   amLODIPine (NORVASC) 2.5 mg tablet Take 1 Tab by mouth daily. Qty: 30 Tab, Refills: 2      guaiFENesin-dextromethorphan SR (HUMIBID DM) 600-30 mg per tablet Take 1 Tab by mouth every twelve (12) hours for 5 days. Qty: 10 Tab, Refills: 0      pantoprazole (PROTONIX) 40 mg tablet Take 1 Tab by mouth Daily (before breakfast). Qty: 30 Tab, Refills: 0      mirtazapine (REMERON) 7.5 mg tablet Take 1 Tab by mouth nightly. Qty: 30 Tab, Refills: 0      LORazepam (ATIVAN) 0.5 mg tablet Take 1 Tab by mouth every six (6) hours as needed for Anxiety. Max Daily Amount: 2 mg. Indications: anxiety, anxiety  Qty: 20 Tab, Refills: 0    Associated Diagnoses: Anxiety      roflumilast (DALIRESP) tab tablet Take 1 Tab by mouth daily. Indications: PREVENTION OF BRONCHOSPASM WITH CHRONIC BRONCHITIS  Qty: 30 Tab, Refills: 2      predniSONE (DELTASONE) 10 mg tablet 40mg X 7 days, 20 mg x 7 days, 10mg x 7 days, 5 mg x 7 days then stop  Qty: 57 Tab, Refills: 0         CONTINUE these medications which have CHANGED    Details   varenicline (CHANTIX) 1 mg tablet Take 1 Tab by mouth daily. Start after completion of starter pack  Qty: 30 Tab, Refills: 0    Associated Diagnoses: Chronic obstructive pulmonary disease, unspecified COPD type ([address] Tobacco abuse      albuterol (PROVENTIL HFA, VENTOLIN HFA, PROAIR HFA) 90 mcg/actuation inhaler Take 2 Puffs by inhalation every four (4) hours as needed for Wheezing.   Qty: 1 Inhaler, Refills: 3 Associated Diagnoses: Chronic obstructive pulmonary disease, unspecified COPD type ([facility] Shortness of breath      albuterol-ipratropium (DUO-NEB) 2.5 mg-0.5 mg/3 ml nebu 3 mL by Nebulization route every six (6) hours as needed. Qty: 75 Nebule, Refills: 5    Associated Diagnoses: COPD, severe (HCC)      fluticasone-salmeterol (ADVAIR) 500-50 mcg/dose diskus inhaler Take 1 Puff by inhalation two (2) times a day. Qty: 1 Each, Refills: 3    Associated Diagnoses: Chronic obstructive pulmonary disease, unspecified COPD type ([facility]      tiotropium (SPIRIVA) 18 mcg inhalation capsule Take 1 Cap by inhalation daily. Indications: BRONCHOSPASM PREVENTION WITH COPD  Qty: 30 Cap, Refills: 5    Associated Diagnoses: Chronic obstructive pulmonary disease, unspecified COPD type ([facility] Shortness of breath      traMADol (ULTRAM) 50 mg tablet Take 1 Tab by mouth every eight (8) hours as needed for Pain. Max Daily Amount: 150 mg. Indications: Pain  Qty: 20 Tab, Refills: 0    Associated Diagnoses: Abdominal pain, unspecified abdominal location         CONTINUE these medications which have NOT CHANGED    Details   acetaminophen (TYLENOL) 500 mg tablet Take  by mouth every six (6) hours as needed for Pain. Activity: PT/OT per Home Health    Diet: cardiac    Wound Care: none needed. Follow-up:   1. NP Mayes 7 -10 days. 2. Dr Waddell Feeling 4 weeks.     Minutes spent on discharge: greater than 30

## 2017-12-23 NOTE — PROGRESS NOTES
Care Management Interventions  PCP Verified by CM: Yes Dereck Mark [phone])  Mode of Transport at Discharge: ALS (Wife Rachel Goodwin [phone])  Transition of Care Consult (CM Consult): [address]: Yes  Discharge Durable Medical Equipment: Yes (nebulizer, oxygen, IS)  Current Support Network: Lives with Spouse, Own Home, Family Lives [place] (Wife Rachel Goodwin [phone])  Confirm Follow Up Transport: Family  Plan discussed with Pt/Family/Caregiver: Yes  Freedom of Choice Offered: Yes  Discharge Location  Discharge Placement: Home with home health    Pt is sitting up in bed on room air with multiple family members at bedside. Pt gave permission to discuss care in front of family members. Pt may need home O2. Indigent medications form filled out for pt. Pt informed to take it to the [facility] on [address]. Pt verbalized understanding. FOC signed for [facility]. Referral sent. Spoke to Bhumika with [facility]. Pt reports that he will get a shower chair from a friend or the pharmacy. 1600 - Order for home O2 faxed to [facility]. Called [facility] and spoke to answering service. She states that she will send it to the  - [address] [place] [address] with [facility] states that he will have the portable oxygen on the way.

## 2017-12-23 NOTE — PROGRESS NOTES
Problem: Falls - Risk of  Goal: *Absence of Falls  Document Dejon Fall Risk and appropriate interventions in the flowsheet. Outcome: Progressing Towards Goal  Fall Risk Interventions:            Medication Interventions: Bed/chair exit alarm, Teach patient to arise slowly                  Problem: Falls - Risk of  Goal: *Absence of Falls  Document Dejon Fall Risk and appropriate interventions in the flowsheet.    Outcome: Progressing Towards Goal  Fall Risk Interventions:            Medication Interventions: Bed/chair exit alarm, Teach patient to arise slowly

## 2017-12-23 NOTE — PROGRESS NOTES
Problem: Self Care Deficits Care Plan (Adult)  Goal: *Acute Goals and Plan of Care (Insert Text)  Occupational Therapy Goals  Initiated 12/22/2017 within 7 day(s). 1.  Patient will perform lower body dressing with modified independence, AE prn.   2.  Patient will perform toilet transfers with modified independence. 3.  Patient will participate in upper extremity therapeutic exercise/activities with supervision/set-up for 8 minutes to increase strength/endurance for ADLs. Outcome: Resolved/Met Date Met: 12/23/17  Occupational Therapy TREATMENT/discharge    Patient: Sushant Treadwell Sr. (45 y.o. male)  Date: 12/23/2017  Diagnosis: COPD with acute exacerbation ([facility] COPD with acute exacerbation ([facility]       Precautions: Fall  Chart, occupational therapy assessment, plan of care, and goals were reviewed. ASSESSMENT:  Pt is in bed upon entry, agreeable to maneuver to EOB for LB dressing training w/AE. Pt reports difficulty bending fwd to reach his feet 2/2 SOB. Pt was educated on AE for LB dressing (issued reacher, sock aid), following education pt was able to don/doff socks w/Mod Ind, and simulated donning of underwear w/Mod Ind. Pt was able to maneuver to the BR w/o AD and w/SBA, performed toilet txfr w/Mod Ind. Pt reports feeling SOB when he took a shower yesterday. Pt returned to EOB, was educated on EC techniques, and on BUE TherEx, including scapular strengthening TherEx to improve overall activity tolerance and endurance for carryover w/ADLs and functional mobility. Pt performed BUE TherEx x10 in mult planes w/RBs between sets. Pt has met all his acute OT goals, we will DC from OT at this time.   Progression toward goals:  [x]          Improving appropriately and progressing toward goals  []          Improving slowly and progressing toward goals  []          Not making progress toward goals and plan of care will be adjusted     PLAN:  Patient will be discharged from occupational therapy at this time.  Rationale for discharge:  [x] Goals Achieved  [] Plateau Reached  [] Patient not participating in therapy  [] Other:  Discharge Recommendations:  None  Further Equipment Recommendations for Discharge:  shower chair        G-CODES:     Self Care  Current  CI= 1-19%   Goal  CI= 1-19%   D/C  CI= 1-19%. The severity rating is based on the Other Levels of Assistance for ADLs and functional mobility    SUBJECTIVE:   Patient stated I am doing ok, just get tired very quickly.     OBJECTIVE DATA SUMMARY:   Cognitive/Behavioral Status:  Neurologic State: Alert  Orientation Level: Oriented X4  Cognition: Follows commands  Safety/Judgement: Awareness of environment, Fall prevention  Functional Mobility and Transfers for ADLs:   Bed Mobility:  Rolling: Modified independent    Transfers:  Sit to Stand: Modified independent    Toilet Transfer : Modified independent (w/o AD)    Balance:  Sitting: Intact  Standing: Intact  ADL Intervention:   Lower Body Dressing Assistance  Underpants: Modified independent  Socks: Modified independent  Adaptive Equipment Used: Reacher;Sock aid  Therapeutic Exercises:   Pt educated on BUE TherEx, including scapular strengthening TherEx to improve overall activity tolerance and endurance for carryover w/ADLs and functional mobility. Pt performed BUE TherEx x10 in mult planes w/RBs between sets. Pain:  Pt reports 0/10 pain or discomfort prior to treatment.    Pt reports 0/10 pain or discomfort post treatment. Activity Tolerance:    Fair+  Please refer to the flowsheet for vital signs taken during this treatment.   After treatment:   []  Patient left in no apparent distress sitting up in chair  [x]  Patient left in no apparent distress in bed  [x]  Call bell left within reach  [x]  Nursing notified  []  Caregiver present  []  Bed alarm activated    SIMONE Ramirez  Time Calculation: 24 mins

## 2017-12-23 NOTE — CONSULTS
[facility]  Pulmonary, Critical Care, and Sleep Medicine    Name: Horacio Scherer Sr. MRN: [id number]   : 1970 Hospital: [address] [place] [address]    Date: 2017        Pulmonary Initial In-Patient Consult                                              Consult requesting physician: Dr. Jory Foster  Reason for Consult: COPD exacerbation    IMPRESSION:   · Acute on chronic hypoxia COPD exacerbation with presentation of worsening shortness of breath  · Severe COPD, FEV1/FVC ratio < 61%  · Active smoking  · Gr 1 esophagitis by EGD (17) secondary to NSAIDs and steroids  · Depression/Anxiety with claustrophobia - Not tolerating PAP therapy- started on Rameron  · Hypertension  · Hyponatremia - NA+ 131  · Followed through [facility]  · Lactic acidosis possibly type A and/or B- resolved         RECOMMENDATIONS:   · SpO2  Goal 88-92%. Pt may need home oxygen evaluation prior to discharge. · Alpha-1 antitrypsin testing as outpatient in [address]. · Maxipime stopped today,  17  · Continue Levaquin- plan for 5 day course  · Continue Daliresp today PO (Patient educated on this med - can cause diarrhea)- this is a new medication for the patient. Plan to have patient take long term  · Continue Prednisone 40mg Q day with plan for prolonged taper: 40mg X 7 days, 20 mg x 7 days, 10mg x 7 days, 5 mg x 7 days then stop  · Brovana and Pulmicort discontinued 17  · Continue Symbicort 160/4.5 2 puffs BID- witch spacer  · Continue Spiriva daily  · Oral care discussed with patient: rinse mouth and brush tongue. · Agree with chantix  · Smoking cessation education given   · Oxygen safety- patient to avoid open flame and not to smoke near oxygen  · PT/OT- assess oxygen requirement with ambulation. · Anticipate that patient will need home oxygen. · Rest of concerns per primary.   · Patient to avoid NSAIDS while on steroids   ·   · Follow up with OP Pulmonologist and [facility]- Dr. Elma Flores after discharge  · Disposition: From Pulmonary Standpoint, the patient can be discharged to home later today or tomorrow provided that he has all his medications - including new medication Daliresp and that he has oxygen if he needs. Please send patient home with Coronet and IS device for pulmonary hygiene at home. Subjective/History:     12/23/17   Patient sitting upright in bed. Reports feeling good today. He was able to use inhalers without difficulty. He states that he feels he is now able to take a deeper breath and that he is able to no actually inhale medications. Has been ambulating and tolerating PO. He did expectorate a large mucus plug with morning while using his coronet. - He states he has a nebulizer at home  - He does not have oxygen at home  - Plan for prolonged steroid taper  - He continues to endorse that he will not restart smoking. We discussed trigger avoidance when he returns home  - Continues with hypertension      Initial History:  Odalis Bloom. is a 49 y.o. male with PMHx significant for severe COPD and Gr 1 esophagitis, presented to the ED for worsening shortness of breath. Pt's symptoms started on 12/17 upon waking up with sob; not relieved by multiple albuterol inhaler treatments throughout the day. Pt was brought by ambulance to [facility] ED and was given solumedrol en route. Pt received nebulized treatment and discharged with prescription for prednisone which he was not able to fill immediately. A few hours after discharge and while driving on [address], pt started to have recurrence of sob unrelieved by inhaler treatments. Pt was brought to [facility] ED where he was given nebulized treatments and discharged on doxycycline and prednisone PO. Upon arriving home, pt again had recurrence of shortness of breath and was brought to back to [facility] ED.  Pt's shortness of breath has become more frequent in the last year and states that he also becomes short of breath with exertion (climbing up the stairs; carrying equipment at work). Pt was last seen by Dr. Panchito Perez in Nov for follow-up where he was diagnosed for mild exacerbation of COPD however was unable to get treated with steroids as he had some reported GIB. Pt then  he underwent EGD last 12/11 following episodes of dark tarry loose stools while taking NSAIDs and steroids. EGD showed Gr 1 esophagitis. Pt also endorses intermittent low grade fever since completing the EGD with highest temp around 100.5. Pt states he used to cough in the past however not recently in the last few months until this past couple of days when he started expectorating brownish-yellow sputum alternating with whitish sputum. Pt endorses being jittery while he has been taking albuterol rescue inhaler very frequently. Pt continues to smoke, however has significantly cut down since about 2 months ago. He used to smoke 2 packs/day however has been able to wean himself down to half pack/day. Pt denies recreational drug use; has occasional alcohol drinking. Pt works as a .        Review of Systems:   HEENT: No epistaxis, no nasal drainage, no difficulty in swallowing, no redness in eyes  Respiratory: as above  Cardiovascular: no chest pain, no palpitations, no chronic leg edema, no syncope  Gastrointestinal: no abd pain, no vomiting, no diarrhea, no bleeding symptoms recently after EGD  Genitourinary: No urinary symptoms or hematuria  Musculoskeletal: + back pain  Neurological: No focal weakness, no seizures, no headaches  Behvioral/Psych:  + anxiety/depression- stable  Constitutional: No fever, no chills, no weight loss, + night sweats - improved  Otherwise per HPI      Immunization status:   Immunization History   Administered Date(s) Administered    Influenza Vaccine (Quad) PF 10/23/2017        No Known Allergies     Past Medical History:   Diagnosis Date    Chronic obstructive pulmonary disease ([facility] [address] 08/03/2017    PFTS 8/3/17    COPD, severe (HCC)     Emphysema/COPD ([facility] [address]     Esophagitis 12/11/2017    Endoscopy    History of stomach ulcers     Positive urine drug screen 01/2016    opiates and THC    Upper GI bleed         Past Surgical History:   Procedure Laterality Date    HX ENDOSCOPY  12/11/2017        Family History   Problem Relation Age of Onset    Hypertension Mother     Heart Disease Mother     Diabetes Mother     Hypertension Father     Heart Disease Father     Cancer Father      lymphnodes    Diabetes Father         Social History   Substance Use Topics    Smoking status: Current Every Day Smoker     Packs/day: 2.00     Years: 25.00     Types: Cigarettes     Start date: 5/28/1984    Smokeless tobacco: Never Used    Alcohol use No        Prior to Admission medications    Medication Sig Start Date End Date Taking? Authorizing Provider   predniSONE (DELTASONE) 50 mg tablet Take 1 Tab by mouth daily for 5 days. 12/18/17 12/23/17  Shabbir Espinoza MD   acetaminophen (TYLENOL) 500 mg tablet Take  by mouth every six (6) hours as needed for Pain. Historical Provider   traMADol (ULTRAM) 50 mg tablet Take 1 Tab by mouth every six (6) hours as needed for Pain. Max Daily Amount: 200 mg. Indications: Pain 11/2/17   Norma Mccoy, NP   albuterol-ipratropium (DUO-NEB) 2.5 mg-0.5 mg/3 ml nebu 3 mL by Nebulization route every six (6) hours as needed. 10/23/17   Norma Mccoy, NP   varenicline (CHANTIX) 1 mg tablet Take 1 Tab by mouth two (2) times a day. Start after completion of starter pack 7/26/17   Norma Mccoy NP   fluticasone-salmeterol (ADVAIR) 500-50 mcg/dose diskus inhaler Take 1 Puff by inhalation two (2) times a day.  7/24/17   Norma Mccoy, NP   albuterol (PROVENTIL HFA, VENTOLIN HFA, PROAIR HFA) 90 mcg/actuation inhaler Take 2 Puffs by inhalation every four (4) hours as needed for Wheezing. 1/23/17   Norma Mccoy, NP   tiotropium (SPIRIVA) 18 mcg inhalation capsule Take 1 Cap by inhalation daily.  Indications: BRONCHOSPASM PREVENTION WITH COPD 1/23/17   Sloo Patel NP       Current Facility-Administered Medications   Medication Dose Route Frequency    budesonide-formoterol (SYMBICORT) 160-4.5 mcg/actuation HFA inhaler 2 Puff  2 Puff Inhalation BID RT    tiotropium (SPIRIVA) inhalation capsule 18 mcg  1 Cap Inhalation QAM RT    albuterol (ACCUNEB) nebulizer solution 1.25 mg  1.25 mg Nebulization Q6H PRN    amLODIPine (NORVASC) tablet 2.5 mg  2.5 mg Oral DAILY    methylPREDNISolone (PF) (SOLU-MEDROL) injection 40 mg  40 mg IntraVENous Q12H    hydrALAZINE (APRESOLINE) tablet 25 mg  25 mg Oral Q8H PRN    roflumilast (DALIRESP) tablet 500 mcg  500 mcg Oral DAILY    levoFLOXacin (LEVAQUIN) tablet 750 mg  750 mg Oral Q24H    oxyCODONE-acetaminophen (PERCOCET) 5-325 mg per tablet 1 Tab  1 Tab Oral Q4H PRN    senna-docusate (PERICOLACE) 8.6-50 mg per tablet 2 Tab  2 Tab Oral QHS    polyethylene glycol (MIRALAX) packet 17 g  17 g Oral DAILY    mirtazapine (REMERON) tablet 7.5 mg  7.5 mg Oral QHS    acetaminophen (TYLENOL) tablet 500 mg  500 mg Oral Q6H PRN    ondansetron (ZOFRAN) injection 4 mg  4 mg IntraVENous Q8H PRN    guaiFENesin-dextromethorphan SR (HUMIBID DM) 600-30 mg tablet 1 Tab  1 Tab Oral Q12H    varenicline (CHANTIX) tablet 1 mg  1 mg Oral DAILY    pantoprazole (PROTONIX) tablet 40 mg  40 mg Oral ACB    albuterol (ACCUNEB) nebulizer solution 1.25 mg  1.25 mg Nebulization Q6H RT    enoxaparin (LOVENOX) injection 40 mg  40 mg SubCUTAneous Q24H    sodium chloride (NS) flush 5-10 mL  5-10 mL IntraVENous PRN    LORazepam (ATIVAN) tablet 0.5 mg  0.5 mg Oral Q4H PRN         Objective:   Vital Signs:    Visit Vitals    BP (!) 157/97 (BP 1 Location: Right arm, BP Patient Position: At rest)  Comment: nurse notified    Pulse 75    Temp 97.4 °F (36.3 °C)    Resp 19    Wt 79.2 kg (174 lb 8 oz)    SpO2 96%    BMI 25.77 kg/m2       O2 Device: Nasal cannula, Humidifier   O2 Flow Rate (L/min): 3 l/min (Weaned from 4)   Temp (24hrs), Av.4 °F (36.3 °C), Min:96.5 °F (35.8 °C), Max:98.3 °F (36.8 °C)       Intake/Output:   Last shift:         Last 3 shifts:  1901 -  0700  In: 960 [P.O.:960]  Out: 2601 [Urine:2600]    Intake/Output Summary (Last 24 hours) at 17 1202  Last data filed at 17 0504   Gross per 24 hour   Intake              720 ml   Output             1101 ml   Net             -381 ml       Physical Exam:     General/Neurology: Alert, Awake; sitting up right. No accessory muscle use. Mild conversational dyspnea but improved from yesterday. Head:   Normocephalic, without obvious abnormality, atraumatic. Eye:   PERRL, EOM intact, no scleral icterus, no pallor, no cyanosis  Nose:   No sinus tenderness, no erythema, no induration, no discharge  Throat:  Lips, mucosa, and tongue normal.  No tonsillar enlargement, no erythema, no exudates. No oral thrush  Neck:   Supple, symmetric. Thyroid: no enlargement/tenderness/nodule. No carotid bruit. Nolymphadenopathy. Trachea midline  Back & spine: Symmetric, no curvature. No gross deformities  Chest wall: No tenderness or deformity. No rash  Lung:   Improved air movement. No wheezing, crackles or rhonchi  at time of my exam. Able to inhaler 750 mls with IS  Heart:   Regular rate and rythmn  Abdomen:  Soft- protruberant, non-tender, + bowel sounds  Extremities:  No pedal edema. No cyanosis. Pulses: 2+ and symmetric in DP  Lymphatic:  No cervical, supraclavicular palpable lymphadenopathy. Musculoskeletal: No joint swelling. No tenderness.   Skin:   Color, texture, turgor normal. No rashes or lesions      Data:       Recent Results (from the past 24 hour(s))   CBC WITH AUTOMATED DIFF    Collection Time: 17  1:24 AM   Result Value Ref Range    WBC 8.8 4.6 - 13.2 K/uL    RBC 4.62 (L) 4.70 - 5.50 M/uL    HGB 14.9 13.0 - 16.0 g/dL    HCT 42.7 36.0 - 48.0 %    MCV 92.4 74.0 - 97.0 FL    MCH 32.3 24.0 - 34.0 PG    MCHC 34.9 31.0 - 37.0 g/dL    RDW 12.6 11.6 - 14.5 %    PLATELET 770 966 - 771 K/uL    MPV 10.3 9.2 - 11.8 FL    NEUTROPHILS 88 (H) 40 - 73 %    LYMPHOCYTES 7 (L) 21 - 52 %    MONOCYTES 5 3 - 10 %    EOSINOPHILS 0 0 - 5 %    BASOPHILS 0 0 - 2 %    ABS. NEUTROPHILS 7.8 1.8 - 8.0 K/UL    ABS. LYMPHOCYTES 0.6 (L) 0.9 - 3.6 K/UL    ABS. MONOCYTES 0.4 0.05 - 1.2 K/UL    ABS. EOSINOPHILS 0.0 0.0 - 0.4 K/UL    ABS. BASOPHILS 0.0 0.0 - 0.06 K/UL    DF AUTOMATED     MAGNESIUM    Collection Time: 12/23/17  1:24 AM   Result Value Ref Range    Magnesium 2.3 1.6 - 2.6 mg/dL   METABOLIC PANEL, BASIC    Collection Time: 12/23/17  1:24 AM   Result Value Ref Range    Sodium 139 136 - 145 mmol/L    Potassium 4.4 3.5 - 5.5 mmol/L    Chloride 105 100 - 108 mmol/L    CO2 28 21 - 32 mmol/L    Anion gap 6 3.0 - 18 mmol/L    Glucose 133 (H) 74 - 99 mg/dL    BUN 20 (H) 7.0 - 18 MG/DL    Creatinine 0.82 0.6 - 1.3 MG/DL    BUN/Creatinine ratio 24 (H) 12 - 20      GFR est AA >60 >60 ml/min/1.73m2    GFR est non-AA >60 >60 ml/min/1.73m2    Calcium 8.4 (L) 8.5 - 10.1 MG/DL         Chemistry Recent Labs      12/23/17   0124  12/21/17   0300   GLU  133*  131*   NA  139  140   K  4.4  4.1   CL  105  107   CO2  28  26   BUN  20*  14   CREA  0.82  0.73   CA  8.4*  8.5   MG  2.3  2.3   AGAP  6  7   BUCR  24*  19        Lactic Acid Lactic acid   Date Value Ref Range Status   12/20/2017 1.5 0.4 - 2.0 MMOL/L Final     No results for input(s): LAC in the last 72 hours. Liver Enzymes Protein, total   Date Value Ref Range Status   12/19/2017 6.3 (L) 6.4 - 8.2 g/dL Final     Albumin   Date Value Ref Range Status   12/19/2017 3.5 3.4 - 5.0 g/dL Final     Globulin   Date Value Ref Range Status   12/19/2017 2.8 2.0 - 4.0 g/dL Final     A-G Ratio   Date Value Ref Range Status   12/19/2017 1.3 0.8 - 1.7   Final     AST (SGOT)   Date Value Ref Range Status   12/19/2017 9 (L) 15 - 37 U/L Final     Alk.  phosphatase   Date Value Ref Range Status 12/19/2017 63 45 - 117 U/L Final     No results for input(s): TP, ALB, GLOB, AGRAT, SGOT, GPT, AP, TBIL in the last 72 hours. No lab exists for component: DBIL     CBC w/Diff Recent Labs      12/23/17   0124  12/21/17   0300   WBC  8.8  8.6   RBC  4.62*  4.57*   HGB  14.9  14.8   HCT  42.7  42.6   PLT  180  162   GRANS  88*  89*   LYMPH  7*  6*   EOS  0  0        Cardiac Enzymes No results found for: CPK, CK, CKMMB, CKMB, RCK3, CKMBT, CKNDX, CKND1, JIMMY, TROPT, TROIQ, LINH, TROPT, TNIPOC, BNP, BNPP     BNP No results found for: BNP, BNPP, XBNPT     Coagulation No results for input(s): PTP, INR, APTT in the last 72 hours. No lab exists for component: INREXT, INREXT      Thyroid  Lab Results   Component Value Date/Time    TSH 2.21 12/17/2017 10:00 PM       No results found for: T4     Lipid Panel Lab Results   Component Value Date/Time    Cholesterol, total 144 01/23/2017 01:11 PM    HDL Cholesterol 36 01/23/2017 01:11 PM    LDL, calculated 85.6 01/23/2017 01:11 PM    VLDL, calculated 22.4 01/23/2017 01:11 PM    Triglyceride 112 01/23/2017 01:11 PM    CHOL/HDL Ratio 4.0 01/23/2017 01:11 PM        ABG No results for input(s): PHI, PHI, POC2, PCO2I, PO2, PO2I, HCO3, HCO3I, FIO2, FIO2I in the last 72 hours. Urinalysis Lab Results   Component Value Date/Time    Color YELLOW 06/28/2013 12:14 AM    Appearance CLEAR 06/28/2013 12:14 AM    Specific gravity 1.015 06/28/2013 12:14 AM    pH (UA) 6.5 06/28/2013 12:14 AM    Protein NEGATIVE  06/28/2013 12:14 AM    Glucose NEGATIVE  06/28/2013 12:14 AM    Ketone TRACE 06/28/2013 12:14 AM    Bilirubin SMALL 06/28/2013 12:14 AM    Urobilinogen 1.0 06/28/2013 12:14 AM    Nitrites NEGATIVE  06/28/2013 12:14 AM    Leukocyte Esterase NEGATIVE  06/28/2013 12:14 AM        Micro  Recent Labs      12/22/17   0640   CULT  FEW CANDIDA ALBICANS*     Recent Labs      12/22/17   0640   CULT  FEW CANDIDA ALBICANS*        EKG No results found for this or any previous visit.      PFT No flowsheet data found. Other ASA reactivity:   Pre-albumin:   Ionized Calcium:   NH4:   T3, FT4:  Cortisol:  Urine Osm:  Urine Lytes:   HbA1c:      Ultrasound      LE Doppler      ECHO No results found for this or any previous visit. CT (Most Recent)   Results from Hospital Encounter encounter on 12/18/17   CT CHEST W CONT   Narrative CT CHEST WITH ENHANCEMENT    INDICATION: COPD with worsening dyspnea, cough, chest congestion, hypoxia,  anxious. TECHNIQUE: Axial images obtained from the thoracic inlet to the level of the  diaphragm following uneventful administration of 100 cc Isovue-300 nonionic  intravenous contrast. Coronal and sagittal reformatted images were obtained. All CT scans at this facility are performed using dose optimization technique as  appropriate to a performed exam, to include automated exposure control,  adjustment of the mA and/or kV according to patient size (including appropriate  matching first site-specific examinations), or use of iterative reconstruction  technique. COMPARISON: CT chest 6/28/2013. CHEST FINDINGS:     Thyroid: Unremarkable in its visualized aspects. Pericardium/ Heart: Heart size is normal. No pericardial effusion. Mild coronary  arteriosclerosis in the LAD. Aorta/ Vessels: No aneurysm or dissection. Lymph Nodes: Unremarkable. .    Lungs: There is layering mucus in the trachea. Central bronchial tree is patent. Subsegmental atelectasis or scarring in the bilateral lower lobes. Moderate to  severe emphysematous changes. Pleura: No effusion. No pneumothorax. Upper Abdomen: Similar subcentimeter hypodensity in the left medial hepatic  lobe, too small to characterize but stability since 2013 is reassuring. Bones/soft tissues: Unremarkable. Impression IMPRESSION:    Moderate to severe emphysema. Mild LAD coronary arteriosclerosis. XR (Most Recent).  CXR reviewed by me and compared with previous CXR   Results from [facility] Encounter encounter on 12/18/17   XR CHEST SNGL V   Narrative Portable chest x-ray, semierect AP view, time 1802 hours on 12/18/2017:        INDICATION:    Shortness of breath. Expiration of COPD. COMPARISON STUDY: Chest x-ray on 12/17/2017, 11/04/2017. FINDINGS:    Lungs are mildly hypoventilated. Cardiac CABG to be within normal limits is mildly more prominent as compared to  previous study. As compared to previous study there is no minimal to mild pulmonary vascular  congestion noted. Interstitial markings are mildly hazy. Right lung is clear. At  left lung base there are minimal streaky atelectatic changes noted. CP angles  are sharp bilaterally. No evidence of pneumothorax. Impression IMPRESSION:    Currently minimal to mild pulmonary vascular congestion. Minimal interstitial  pulmonary edema is not excluded at this time. Mild pulmonary hypoventilation. Minimal streaky left basilar atelectasis.                Clinical care, chart review and time spent coordinating care:  25 min    Lisha Miller DO, [facility]  Pulmonary, Sleep, Critical Care Medicine

## 2017-12-23 NOTE — ROUTINE PROCESS
Bedside and Verbal shift change report given to Que Del Rio (oncoming nurse) by Lluu Malave RN (offgoing nurse). Report included the following information SBAR, Kardex, MAR and Recent Results.     SITUATION:  Code Status: Full Code  Reason for Admission: COPD with acute exacerbation [facility])  Hospital day: 4  Problem List:       Hospital Problems  Date Reviewed: 12/18/2017          Codes Class Noted POA    * (Principal)COPD with acute exacerbation [facility]) ICD-10-CM: J44.1  ICD-9-CM: 491.21  12/18/2017 Yes        Sepsis ([facility] [address] ICD-10-CM: A41.9  ICD-9-CM: 038.9, 995.91  12/18/2017 Yes        COPD, severe ([facility] [address] ICD-10-CM: J44.9  ICD-9-CM: 712  Unknown Yes              BACKGROUND:   Past Medical History:   Past Medical History:   Diagnosis Date    Chronic obstructive pulmonary disease ([facility] [address] 08/03/2017    PFTS 8/3/17    COPD, severe ([facility] [address]     Emphysema/COPD ([facility] [address]     Esophagitis 12/11/2017    Endoscopy    History of stomach ulcers     Positive urine drug screen 01/2016    opiates and THC    Upper GI bleed       Patient taking anticoagulants no    Patient has a defibrillator: no   History of shots NO for example, flu, pneumonia, tetanus   Isolation History NO for example, MRSA, CDiff    ASSESSMENT:  Changes in Assessment Throughout Shift: None  Significant Changes in 24 hours (for example, RR/code, fall)  Patient has Central Line: no Reasons if yes: N/A  Patient has Santos Cath: no Reasons if yes: N/A   Mobility Issues  PT  IV Patency  OR Checklist  Pending Tests    Last Vitals:  Vitals w/ MEWS Score (last day)     Date/Time MEWS Score Pulse Resp Temp BP Level of Consciousness SpO2    12/22/17 1600 1 99 18 97.7 °F (36.5 °C) (!)  147/93 Alert 98 %    12/22/17 1228 1 96 18 97.2 °F (36.2 °C) (!)  148/93 Alert 96 %    12/22/17 1101 -- -- -- -- -- -- 96 %    12/22/17 0744 1 72 20 97.8 °F (36.6 °C) (!)  151/97 Alert 95 %    12/22/17 0416 1 73 18 97.3 °F (36.3 °C) 140/90 Alert 95 %    12/22/17 0230 -- -- -- -- -- -- 96 % 12/22/17 0012 1 63 18 98 °F (36.7 °C) 146/88 Alert 95 %    12/21/17 2140 -- -- -- -- -- -- 95 %    12/21/17 2044 2 (!)  104 18 98.2 °F (36.8 °C) 175/76 Alert 93 %    12/21/17 1616 -- -- -- -- -- -- 98 %    12/21/17 1512 1 93 19 98.2 °F (36.8 °C) (!)  141/92 Alert 91 %    12/21/17 1252 3 (!)  105 22 99 °F (37.2 °C) (!)  159/98 Alert 92 %    12/21/17 1103 -- -- -- -- -- -- 98 %    12/21/17 0800 1 73 18 97.1 °F (36.2 °C) (!)  169/105 Alert --    12/21/17 0400 1 74 18 98.8 °F (37.1 °C) (!)  160/101 Alert 95 %    12/21/17 0222 -- -- -- -- -- -- 94 %    12/21/17 0031 1 80 18 98 °F (36.7 °C) (!)  158/92 Alert 93 %            PAIN    Pain Assessment    Pain Intensity 1: 6 (12/22/17 1849)    Pain Location 1: Neck, Shoulder    Pain Intervention(s) 1: Medication (see MAR)    Patient Stated Pain Goal: 0  Intervention effective: No  Time of last intervention: 1754   Reassessment Completed: yes  Other actions taken for pain: N/A    Last 3 Weights:  Last 3 Recorded Weights in this Encounter    12/18/17 1727 12/20/17 1117 12/21/17 0400   Weight: 77.1 kg (170 lb) 77.1 kg (170 lb) 79.2 kg (174 lb 8 oz)   Weight change:     INTAKE/OUPUT    Current Shift:      Last three shifts: 12/21 0701 - 12/22 1900  In: 1418.8 [P.O.:1200; I.V.:218.8]  Out: 2251 [Urine:2250]    RECOMMENDATIONS AND DISCHARGE PLANNING  Patient needs and requests: None    Pending tests/procedures: None     Discharge plan for patient: Home with home health    Discharge planning Needs or Barriers: TBD    Estimated Discharge Date: TBD Posted on Whiteboard in Patients Room: no       \"HEALS\" SAFETY CHECK  A safety check occurred in the patient's room between off going nurse and oncoming nurse listed above.     The safety check included the below items:    H  High Alert Medications Verify all high alert medication drips (heparin, PCA, etc.)  E  Equipment Suction is set up for ALL patients (with yanker)  Red plugs utilized for all equipment (IV pumps, etc.)  WOWs wiped down at end of shift. Room stocked with oxygen, suction, and other unit-specific supplies  A  Alarms Bed alarm is set for fall risk patients  Ensure chair alarm is in place and activated if patient is up in a chair  L  Lines Check IV for any infiltration  Santos bag is empty if patient has a Santos   Tubing and IV bags are labeled  S  Safety  Room is clean, patient is clean, and equipment is clean. Hallways are clear from equipment besides carts. Fall bracelet on for fall risk patients  Ensure room is clear and free of clutter  Suction is set up for ALL patients (with aida)  Hallways are clear from equipment besides carts.    Isolation precautions followed, supplies available outside room, sign posted    Crystal Knott RN

## 2017-12-23 NOTE — PROGRESS NOTES
Problem: Mobility Impaired (Adult and Pediatric)  Goal: *Acute Goals and Plan of Care (Insert Text)  Physical Therapy Goals  Initiated 12/21/2017 and to be accomplished within 7 day(s)  1. Patient will move from supine to sit and sit to supine , scoot up and down and roll side to side in bed with modified independence. 2.  Patient will transfer from bed to chair and chair to bed with supervision/set-up using the least restrictive device. 3.  Patient will perform sit to stand with supervision/set-up. 4.  Patient will ambulate with supervision/set-up for >100 feet with the least restrictive device. 5.  Patient will ascend/descend 2 stairs without handrail(s) with supervision/set-up. physical Therapy TREATMENT    Patient: Panchito Hernandez Sr. (45 y.o. male)  Date: 12/23/2017  Diagnosis: COPD with acute exacerbation ([address] COPD with acute exacerbation ([address]       Precautions: Fall  Chart, physical therapy assessment, plan of care and goals were reviewed. ASSESSMENT:  Pt sitting EOB finishing respiratory treatment. Agrees to ambulation. Reports he just finished going around the day multiple times earlier today. Decided to decrease distance of ambulation and go up/down steps today. SpO2 levels @ 93% during trip to stairs. Pt up/down 12 stairs w/ (U) handrail, SBA. SpO2 decreases to 88%. PLB during rest break restores SpO2 to 95%. No further fluctuation during gait. Pt returns to EOB. Progression toward goals:  [x]      Improving appropriately and progressing toward goals  []      Improving slowly and progressing toward goals  []      Not making progress toward goals and plan of care will be adjusted     PLAN:  Patient continues to benefit from skilled intervention to address the above impairments. Continue treatment per established plan of care.   Discharge Recommendations:  Home Health  Further Equipment Recommendations for Discharge:  TBD     SUBJECTIVE:   Patient stated I'm breathing a little better.   Mobility [id number] Current  CI= 1-19%   Goal  CI= 1-19%. The severity rating is based on the Other level of assistance required for functional mobility and ADLs. OBJECTIVE DATA SUMMARY:   Critical Behavior:  Neurologic State: Alert  Orientation Level: Oriented X4  Cognition: Follows commands  Safety/Judgement: Awareness of environment, Fall prevention  Functional Mobility Training:  Bed Mobility:  Rolling: Modified independent                  Transfers:  Sit to Stand: Modified independent           Balance:  Sitting: Intact  Standing: Intact  Ambulation/Gait Training:  Distance (ft): 200 Feet (ft)  Assistive Device: Gait belt           Gait Abnormalities: Path deviations        Base of Support: Widened     Speed/Luci: Fluctuations  Step Length: Right shortened;Left shortened       Stairs:  Number of Stairs Trained: 12  Stairs - Level of Assistance: Stand-by asssistance  Rail Use: Left     Pain:  Pt pain was reported as  0 pre-treatment. Pt pain was reported as 0 post-treatment. Activity Tolerance:   Good  Please refer to the flowsheet for vital signs taken during this treatment.   After treatment:   [] Patient left in no apparent distress sitting up in chair  [x] Patient left in no apparent distress in bed  [x] Call bell left within reach  [x] Nursing notified  [] Caregiver present  [] Bed alarm activated      Jimbo Gross PTA   Time Calculation: 8 mins

## 2017-12-23 NOTE — ROUTINE PROCESS
Bedside and Verbal shift change report given to MARGARITA Koo (oncoming nurse) by Seun Cruz RN (offgoing nurse). Report included the following information SBAR, Kardex, MAR and Recent Results.     SITUATION:  Code Status: Full Code  Reason for Admission: COPD with acute exacerbation [facility])  Hospital day: 5  Problem List:       Hospital Problems  Date Reviewed: 12/18/2017          Codes Class Noted POA    * (Principal)COPD with acute exacerbation ([facility] [address] ICD-10-CM: J44.1  ICD-9-CM: 491.21  12/18/2017 Yes        Sepsis ([facility] [address] ICD-10-CM: A41.9  ICD-9-CM: 038.9, 995.91  12/18/2017 Yes        COPD, severe ([facility] [address] ICD-10-CM: J44.9  ICD-9-CM: 281  Unknown Yes              BACKGROUND:   Past Medical History:   Past Medical History:   Diagnosis Date    Chronic obstructive pulmonary disease ([facility] [address] 08/03/2017    PFTS 8/3/17    COPD, severe ([address]     Emphysema/COPD ([facility] [address]     Esophagitis 12/11/2017    Endoscopy    History of stomach ulcers     Positive urine drug screen 01/2016    opiates and THC    Upper GI bleed       Patient taking anticoagulants no    Patient has a defibrillator: no   History of shots NO for example, flu, pneumonia, tetanus   Isolation History NO for example, MRSA, CDiff    ASSESSMENT:  Changes in Assessment Throughout Shift: None  Significant Changes in 24 hours (for example, RR/code, fall)  Patient has Central Line: no Reasons if yes: N/A  Patient has Santos Cath: no Reasons if yes: N/A   Mobility Issues  PT  IV Patency  OR Checklist  Pending Tests    Last Vitals:  Vitals w/ MEWS Score (last day)     Date/Time MEWS Score Pulse Resp Temp BP Level of Consciousness SpO2    12/23/17 0400 1 75 18 96.5 °F (35.8 °C) 153/87 Alert 97 %    12/23/17 0000 1 77 18 97.1 °F (36.2 °C) (!)  156/98 Alert 96 %    12/22/17 2128 -- -- -- -- -- -- 99 %    12/22/17 2115 -- -- -- -- -- -- 98 %    12/22/17 2052 1 84 18 98.3 °F (36.8 °C) 152/90 Alert 99 %    12/22/17 1600 1 99 18 97.7 °F (36.5 °C) (!)  147/93 Alert 98 %    12/22/17 1228 1 96 18 97.2 °F (36.2 °C) (!)  148/93 Alert 96 %    12/22/17 1101 -- -- -- -- -- -- 96 %    12/22/17 0744 1 72 20 97.8 °F (36.6 °C) (!)  151/97 Alert 95 %    12/22/17 0416 1 73 18 97.3 °F (36.3 °C) 140/90 Alert 95 %    12/22/17 0230 -- -- -- -- -- -- 96 %    12/22/17 0012 1 63 18 98 °F (36.7 °C) 146/88 Alert 95 %            PAIN    Pain Assessment    Pain Intensity 1: 0 (12/23/17 0545)    Pain Location 1: Neck, Shoulder    Pain Intervention(s) 1: Medication (see MAR)    Patient Stated Pain Goal: 0  Intervention effective: N/A  Time of last intervention:0449 Reassessment Completed: N/A   Other actions taken for pain: N/A    Last 3 Weights:  Last 3 Recorded Weights in this Encounter    12/18/17 1727 12/20/17 1117 12/21/17 0400   Weight: 77.1 kg (170 lb) 77.1 kg (170 lb) 79.2 kg (174 lb 8 oz)   Weight change:     INTAKE/OUPUT    Current Shift: 12/22 1901 - 12/23 0700  In: 720 [P.O.:720]  Out: 1100 [Urine:1100]    Last three shifts: 12/21 0701 - 12/22 1900  In: 1418.8 [P.O.:1200; I.V.:218.8]  Out: 2251 [Urine:2250]    RECOMMENDATIONS AND DISCHARGE PLANNING  Patient needs and requests: None    Pending tests/procedures: None     Discharge plan for patient: Home with home health    Discharge planning Needs or Barriers: TBD    Estimated Discharge Date: TBD Posted on Whiteboard in Patients Room: no       \"HEALS\" SAFETY CHECK  A safety check occurred in the patient's room between off going nurse and oncoming nurse listed above. The safety check included the below items:    H  High Alert Medications Verify all high alert medication drips (heparin, PCA, etc.)  E  Equipment Suction is set up for ALL patients (with miguel aker)  Red plugs utilized for all equipment (IV pumps, etc.)  WOWs wiped down at end of shift.   Room stocked with oxygen, suction, and other unit-specific supplies  A  Alarms Bed alarm is set for fall risk patients  Ensure chair alarm is in place and activated if patient is up in a chair  L  Lines Check IV for any infiltration  Santos bag is empty if patient has a Santos   Tubing and IV bags are labeled  S  Safety  Room is clean, patient is clean, and equipment is clean. Hallways are clear from equipment besides carts. Fall bracelet on for fall risk patients  Ensure room is clear and free of clutter  Suction is set up for ALL patients (with aida)  Hallways are clear from equipment besides carts.    Isolation precautions followed, supplies available outside room, sign posted    Errol Glover RN

## 2017-12-24 ENCOUNTER — HOME CARE VISIT (OUTPATIENT)
Dept: SCHEDULING | Facility: HOME HEALTH | Age: 47
End: 2017-12-24
Payer: MEDICAID

## 2017-12-24 LAB
BACTERIA SPEC CULT: ABNORMAL
BACTERIA SPEC CULT: ABNORMAL
BACTERIA SPEC CULT: NORMAL
BACTERIA SPEC CULT: NORMAL
GRAM STN SPEC: ABNORMAL
SERVICE CMNT-IMP: ABNORMAL
SERVICE CMNT-IMP: NORMAL
SERVICE CMNT-IMP: NORMAL

## 2017-12-24 PROCEDURE — G0299 HHS/HOSPICE OF RN EA 15 MIN: HCPCS

## 2017-12-24 PROCEDURE — 400013 HH SOC

## 2017-12-26 ENCOUNTER — TELEPHONE (OUTPATIENT)
Dept: PULMONOLOGY | Age: 47
End: 2017-12-26

## 2017-12-26 ENCOUNTER — TELEPHONE (OUTPATIENT)
Dept: OTHER | Age: 47
End: 2017-12-26

## 2017-12-26 NOTE — TELEPHONE ENCOUNTER
Pt was admitted to [facility] 12/18-/12/23, Saw Dr. Emily Rocha. He had appt to kassidy dean/Laury 1/8/18. Pt stated that he was supposed to be put on symbicort but was not given any samples or an rx. Pt uses [facility] on [address]. He does not have insurance and uses the [facility] card so he asked if we had samples to give him.  Please call [phone]

## 2017-12-26 NOTE — TELEPHONE ENCOUNTER
I reviewed his discharge paperwork and he is to go back on the Advair he was on before the hospital stay instead of the Symbicort he had in the hospital. He already has the Advair at home and has been taking since he returned home.  Pt understands and has started the new pulmonary medication Daliresp aslo

## 2017-12-26 NOTE — TELEPHONE ENCOUNTER
Called pt to follow up on recent hospitalization here at [facility]. Patient was discharged home on 12/23 and states he is doing ok. Patient was given several Rx on discharge and states he got them filled and is taking as directed. Pt has called his doctors (pcp and pulm) and made f/u appt's himself.     Jose M Brizuela RN

## 2017-12-27 VITALS
OXYGEN SATURATION: 95 % | DIASTOLIC BLOOD PRESSURE: 72 MMHG | SYSTOLIC BLOOD PRESSURE: 124 MMHG | BODY MASS INDEX: 23.04 KG/M2 | RESPIRATION RATE: 18 BRPM | HEIGHT: 63 IN | WEIGHT: 130 LBS | HEART RATE: 78 BPM | TEMPERATURE: 98.2 F

## 2017-12-28 ENCOUNTER — HOME CARE VISIT (OUTPATIENT)
Dept: SCHEDULING | Facility: HOME HEALTH | Age: 47
End: 2017-12-28
Payer: MEDICAID

## 2017-12-28 ENCOUNTER — HOME CARE VISIT (OUTPATIENT)
Dept: HOME HEALTH SERVICES | Facility: HOME HEALTH | Age: 47
End: 2017-12-28
Payer: MEDICAID

## 2017-12-28 PROCEDURE — G0299 HHS/HOSPICE OF RN EA 15 MIN: HCPCS

## 2017-12-29 ENCOUNTER — HOME CARE VISIT (OUTPATIENT)
Dept: SCHEDULING | Facility: HOME HEALTH | Age: 47
End: 2017-12-29
Payer: MEDICAID

## 2017-12-29 VITALS
DIASTOLIC BLOOD PRESSURE: 80 MMHG | HEART RATE: 77 BPM | TEMPERATURE: 98 F | RESPIRATION RATE: 17 BRPM | SYSTOLIC BLOOD PRESSURE: 120 MMHG

## 2017-12-29 PROCEDURE — G0151 HHCP-SERV OF PT,EA 15 MIN: HCPCS

## 2017-12-31 ENCOUNTER — HOSPITAL ENCOUNTER (EMERGENCY)
Age: 47
Discharge: HOME | End: 2017-12-31
Attending: EMERGENCY MEDICINE
Payer: SUBSIDIZED

## 2017-12-31 VITALS
WEIGHT: 130 LBS | TEMPERATURE: 98.7 F | HEART RATE: 84 BPM | HEIGHT: 63 IN | SYSTOLIC BLOOD PRESSURE: 138 MMHG | RESPIRATION RATE: 18 BRPM | DIASTOLIC BLOOD PRESSURE: 83 MMHG | BODY MASS INDEX: 23.04 KG/M2 | OXYGEN SATURATION: 97 %

## 2017-12-31 VITALS
DIASTOLIC BLOOD PRESSURE: 88 MMHG | OXYGEN SATURATION: 98 % | HEART RATE: 68 BPM | SYSTOLIC BLOOD PRESSURE: 160 MMHG | TEMPERATURE: 97 F | RESPIRATION RATE: 20 BRPM

## 2017-12-31 DIAGNOSIS — J43.9 PULMONARY EMPHYSEMA, UNSPECIFIED EMPHYSEMA TYPE (HCC): ICD-10-CM

## 2017-12-31 DIAGNOSIS — F41.0 PANIC ATTACK: Primary | ICD-10-CM

## 2017-12-31 LAB
ATRIAL RATE: 79 BPM
CALCULATED R AXIS, ECG10: 55 DEGREES
CALCULATED T AXIS, ECG11: 12 DEGREES
DIAGNOSIS, 93000: NORMAL
P-R INTERVAL, ECG05: 140 MS
Q-T INTERVAL, ECG07: 360 MS
QRS DURATION, ECG06: 86 MS
QTC CALCULATION (BEZET), ECG08: 412 MS
VENTRICULAR RATE, ECG03: 79 BPM

## 2017-12-31 PROCEDURE — 99285 EMERGENCY DEPT VISIT HI MDM: CPT

## 2017-12-31 PROCEDURE — 93005 ELECTROCARDIOGRAM TRACING: CPT

## 2017-12-31 RX ORDER — LORAZEPAM 0.5 MG/1
0.5 TABLET ORAL
Qty: 20 TAB | Refills: 0 | Status: SHIPPED | OUTPATIENT
Start: 2017-12-31 | End: 2018-01-03 | Stop reason: SDUPTHER

## 2017-12-31 RX ORDER — OXYCODONE AND ACETAMINOPHEN 5; 325 MG/1; MG/1
1 TABLET ORAL
Status: DISCONTINUED | OUTPATIENT
Start: 2017-12-31 | End: 2017-12-31 | Stop reason: HOSPADM

## 2017-12-31 RX ORDER — ACETAMINOPHEN AND CODEINE PHOSPHATE 300; 30 MG/1; MG/1
2 TABLET ORAL
Qty: 15 TAB | Refills: 0 | Status: SHIPPED | OUTPATIENT
Start: 2017-12-31 | End: 2018-01-02 | Stop reason: SDUPTHER

## 2017-12-31 RX ORDER — LORAZEPAM 1 MG/1
1 TABLET ORAL
Status: DISCONTINUED | OUTPATIENT
Start: 2017-12-31 | End: 2017-12-31 | Stop reason: HOSPADM

## 2017-12-31 NOTE — ED PROVIDER NOTES
EMERGENCY DEPARTMENT HISTORY AND PHYSICAL EXAM    2:17 PM      Date: 12/31/2017  Patient Name: Josh Catalan.    History of Presenting Illness     Chief Complaint   Patient presents with    Anxiety         History Provided By: Patient    Chief Complaint: panic attack  Duration: 2 Hours  Timing:  Acute  Location: n/a  Quality: n/a  Severity: Severe  Modifying Factors: ran out of Ativan on 29 December 2017  Associated Symptoms: body aches      Additional History (Context): Ilsa Metcalf Sr. is a 49 y.o. male with COPD who presents after an acute onset of a severe panic attack 2 hours ago. Pt states he ran out of his Ativan on 29 December 2017. Pt states this was the worse panic attack he has ever had and felt like he was going to pass out. He is back to baseline. Pt is also having generalized body aches. Pt was recently hospitalized and diagnosed with severe COPD and requiring a bilateral lung transplant. Pt recently quit smoking. Pt denies ETOH use. No other concerns or symptoms at this time. PCP: Noelle Chapa NP    Current Facility-Administered Medications   Medication Dose Route Frequency Provider Last Rate Last Dose    LORazepam (ATIVAN) tablet 1 mg  1 mg Oral NOW Romy Dc MD        oxyCODONE-acetaminophen (PERCOCET) 5-325 mg per tablet 1 Tab  1 Tab Oral NOW Romy Dc MD         Current Outpatient Prescriptions   Medication Sig Dispense Refill    acetaminophen-codeine (TYLENOL-CODEINE #3) 300-30 mg per tablet Take 2 Tabs by mouth every four (4) hours as needed for Pain for up to 15 doses. Max Daily Amount: 12 Tabs. 15 Tab 0    LORazepam (ATIVAN) 0.5 mg tablet Take 1 Tab by mouth every eight (8) hours as needed for Anxiety. Max Daily Amount: 1.5 mg. 20 Tab 0    OXYGEN-AIR DELIVERY SYSTEMS (WALKABOUT 1 OXYGEN SYSTEM) 2 L/min by Nasal route continuous.       guaiFENesin-codeine (ROBITUSSIN AC) 100-10 mg/5 mL solution Take 5 mL by mouth three (3) times daily as needed for Cough or Congestion.  amLODIPine (NORVASC) 2.5 mg tablet Take 1 Tab by mouth daily. 30 Tab 2    pantoprazole (PROTONIX) 40 mg tablet Take 1 Tab by mouth Daily (before breakfast). 30 Tab 0    varenicline (CHANTIX) 1 mg tablet Take 1 Tab by mouth daily. Start after completion of starter pack 30 Tab 0    mirtazapine (REMERON) 7.5 mg tablet Take 1 Tab by mouth nightly. 30 Tab 0    roflumilast (DALIRESP) tab tablet Take 1 Tab by mouth daily. Indications: PREVENTION OF BRONCHOSPASM WITH CHRONIC BRONCHITIS 30 Tab 2    predniSONE (DELTASONE) 10 mg tablet 40mg X 7 days, 20 mg x 7 days, 10mg x 7 days, 5 mg x 7 days then stop 57 Tab 0    albuterol (PROVENTIL HFA, VENTOLIN HFA, PROAIR HFA) 90 mcg/actuation inhaler Take 2 Puffs by inhalation every four (4) hours as needed for Wheezing. 1 Inhaler 3    albuterol-ipratropium (DUO-NEB) 2.5 mg-0.5 mg/3 ml nebu 3 mL by Nebulization route every six (6) hours as needed. 75 Nebule 5    fluticasone-salmeterol (ADVAIR) 500-50 mcg/dose diskus inhaler Take 1 Puff by inhalation two (2) times a day. 1 Each 3    tiotropium (SPIRIVA) 18 mcg inhalation capsule Take 1 Cap by inhalation daily. Indications: BRONCHOSPASM PREVENTION WITH COPD 30 Cap 5    traMADol (ULTRAM) 50 mg tablet Take 1 Tab by mouth every eight (8) hours as needed for Pain. Max Daily Amount: 150 mg. Indications: Pain 20 Tab 0    acetaminophen (TYLENOL) 500 mg tablet Take  by mouth every six (6) hours as needed for Pain.          Past History     Past Medical History:  Past Medical History:   Diagnosis Date    Chronic obstructive pulmonary disease ([address] 08/03/2017    PFTS 8/3/17    COPD, severe ([address]     [facility] [address]     Esophagitis 12/11/2017    Endoscopy    History of stomach ulcers     Positive urine drug screen 01/2016    opiates and THC    Upper GI bleed        Past Surgical History:  Past Surgical History:   Procedure Laterality Date    HX ENDOSCOPY  12/11/2017       Family History:  Family History   Problem Relation Age of Onset    Hypertension Mother     Heart Disease Mother     Diabetes Mother     Hypertension Father     Heart Disease Father     Cancer Father      lymphnodes    Diabetes Father        Social History:  Social History   Substance Use Topics    Smoking status: Current Every Day Smoker     Packs/day: 2.00     Years: 25.00     Types: Cigarettes     Start date: 5/28/1984    Smokeless tobacco: Never Used    Alcohol use No       Allergies:  No Known Allergies      Review of Systems       Review of Systems   Constitutional: Negative for chills and fever. Respiratory: Negative for shortness of breath. Cardiovascular: Negative for chest pain. Gastrointestinal: Negative for diarrhea, nausea and vomiting. Musculoskeletal:        Positive for body aches   Neurological: Negative for headaches. All other systems reviewed and are negative. Physical Exam     Visit Vitals    /83    Pulse 84    Temp 98.7 °F (37.1 °C)    Resp 18    Ht 5' 3\" (1.6 m)    Wt 59 kg (130 lb)    SpO2 97%    BMI 23.03 kg/m2         Physical Exam   Constitutional: He is oriented to person, place, and time. He appears well-developed and well-nourished. No distress. HENT:   Head: Normocephalic and atraumatic. Eyes: Conjunctivae and EOM are normal. Right eye exhibits no discharge. Left eye exhibits no discharge. No scleral icterus. Neck: Normal range of motion. Neck supple. No tracheal deviation present. Cardiovascular: Normal rate, regular rhythm and normal heart sounds. No murmur heard. Pulmonary/Chest: Effort normal and breath sounds normal. No respiratory distress. He has no wheezes. He has no rales. Abdominal: Soft. He exhibits no distension. There is no tenderness. There is no rebound and no guarding. Musculoskeletal: Normal range of motion. He exhibits no edema or deformity. Neurological: He is alert and oriented to person, place, and time. No cranial nerve deficit. Mildly tremulous    Skin: Skin is warm and dry. He is not diaphoretic. Psychiatric: He has a normal mood and affect. His behavior is normal. Judgment and thought content normal.         Diagnostic Study Results     Labs -  Recent Results (from the past 12 hour(s))   EKG, 12 LEAD, INITIAL    Collection Time: 12/31/17  2:25 PM   Result Value Ref Range    Ventricular Rate 79 BPM    Atrial Rate 79 BPM    P-R Interval 140 ms    QRS Duration 86 ms    Q-T Interval 360 ms    QTC Calculation (Bezet) 412 ms    Calculated R Axis 55 degrees    Calculated T Axis 12 degrees    Diagnosis       Normal sinus rhythm  Normal ECG  When compared with ECG of 18-DEC-2017 19:58,  Vent. rate has decreased BY  41 BPM  Confirmed by Tenzin Parker MD, Tucker Barrios ([id number]) on 12/31/2017 3:13:26 PM         Radiologic Studies -   No orders to display         Medical Decision Making   I am the first provider for this patient. I reviewed the vital signs, available nursing notes, past medical history, past surgical history, family history and social history. Vital Signs-Reviewed the patient's vital signs. EKG: Interpreted by the EP. Time Interpreted: 1430   Rate: 79   Rhythm: Normal Sinus Rhythm, no acute ST or T-wave changes suggestive of acute ischemia. Records Reviewed: Nursing Notes and Old Medical Records (Time of Review: 2:17 PM)      Provider Notes (Medical Decision Making): Pt with anxiety and SOB from COPD exacerbation, had 3 nebs prior to arrival and is feeling better now. Diagnosis     Clinical Impression:   1. Panic attack    2.  Pulmonary emphysema, unspecified emphysema type ([address]        Disposition: home    Follow-up Information     Follow up With Details Comments [address] [place] [address],    [address] [facility] [address] [phone]      [facility] EMERGENCY DEPT   [address] [facility] [address] [facility]           Discharge Medication List as of 12/31/2017  3:05 PM      START taking these medications    Details   acetaminophen-codeine (TYLENOL-CODEINE #3) 300-30 mg per tablet Take 2 Tabs by mouth every four (4) hours as needed for Pain for up to 15 doses. Max Daily Amount: 12 Tabs., Print, Disp-15 Tab, R-0         CONTINUE these medications which have CHANGED    Details   LORazepam (ATIVAN) 0.5 mg tablet Take 1 Tab by mouth every eight (8) hours as needed for Anxiety. Max Daily Amount: 1.5 mg., Print, Disp-20 Tab, R-0         CONTINUE these medications which have NOT CHANGED    Details   OXYGEN-AIR DELIVERY SYSTEMS (WALKABOUT 1 OXYGEN SYSTEM) 2 L/min by Nasal route continuous. , Historical Med      guaiFENesin-codeine (ROBITUSSIN AC) 100-10 mg/5 mL solution Take 5 mL by mouth three (3) times daily as needed for Cough or Congestion. , Historical Med      amLODIPine (NORVASC) 2.5 mg tablet Take 1 Tab by mouth daily. , Print, Disp-30 Tab, R-2      pantoprazole (PROTONIX) 40 mg tablet Take 1 Tab by mouth Daily (before breakfast). , Print, Disp-30 Tab, R-0      varenicline (CHANTIX) 1 mg tablet Take 1 Tab by mouth daily. Start after completion of starter pack, Print, Disp-30 Tab, R-0      mirtazapine (REMERON) 7.5 mg tablet Take 1 Tab by mouth nightly. , Print, Disp-30 Tab, R-0      roflumilast (DALIRESP) tab tablet Take 1 Tab by mouth daily. Indications: PREVENTION OF BRONCHOSPASM WITH CHRONIC BRONCHITIS, Print, Disp-30 Tab, R-2      predniSONE (DELTASONE) 10 mg tablet 40mg X 7 days, 20 mg x 7 days, 10mg x 7 days, 5 mg x 7 days then stop, Print, Disp-57 Tab, R-0      albuterol (PROVENTIL HFA, VENTOLIN HFA, PROAIR HFA) 90 mcg/actuation inhaler Take 2 Puffs by inhalation every four (4) hours as needed for Wheezing., Print, Disp-1 Inhaler, R-3      albuterol-ipratropium (DUO-NEB) 2.5 mg-0.5 mg/3 ml nebu 3 mL by Nebulization route every six (6) hours as needed. , Print, Disp-75 Nebule, R-5      fluticasone-salmeterol (ADVAIR) 500-50 mcg/dose diskus inhaler Take 1 Puff by inhalation two (2) times a day., Print, Disp-1 Each, R-3      tiotropium (SPIRIVA) 18 mcg inhalation capsule Take 1 Cap by inhalation daily. Indications: BRONCHOSPASM PREVENTION WITH COPD, Print, Disp-30 Cap, R-5      traMADol (ULTRAM) 50 mg tablet Take 1 Tab by mouth every eight (8) hours as needed for Pain. Max Daily Amount: 150 mg. Indications: Pain, Print, Disp-20 Tab, R-0      acetaminophen (TYLENOL) 500 mg tablet Take  by mouth every six (6) hours as needed for Pain., Historical Med           _______________________________    Attestations:  Scribe [address] [place] [address] acting as a scribe for and in the presence of Lala Malone MD      December 31, 2017 at 2:17 PM       Provider Attestation:      I personally performed the services described in the documentation, reviewed the documentation, as recorded by the scribe in my presence, and it accurately and completely records my words and actions.  December 31, 2017 at 2:17 PM - Lala Malone MD    _______________________________

## 2017-12-31 NOTE — ED TRIAGE NOTES
The patient presents with complaint of anxiety. States, \"I ran out of my Ativan and Tramadol last night and I can't see my doctor until Tuesday. \"

## 2017-12-31 NOTE — DISCHARGE INSTRUCTIONS
Panic Attacks: Care Instructions  Your Care Instructions    During a panic attack, you may have a feeling of intense fear or terror, trouble breathing, chest pain or tightness, heartbeat changes, dizziness, sweating, and shaking. A panic attack starts suddenly and usually lasts from 5 to 20 minutes but may last even longer. You have the most anxiety about 10 minutes after the attack starts. An attack can begin with a stressful event, or it can happen without a cause. Although panic attacks can cause scary symptoms, you can learn to manage them with self-care, counseling, and medicine. Follow-up care is a key part of your treatment and safety. Be sure to make and go to all appointments, and call your doctor if you are having problems. It's also a good idea to know your test results and keep a list of the medicines you take. How can you care for yourself at home? · Take your medicine exactly as directed. Call your doctor if you think you are having a problem with your medicine. · Go to your counseling sessions and follow-up appointments. · Recognize and accept your anxiety. Then, when you are in a situation that makes you anxious, say to yourself, \"This is not an emergency. I feel uncomfortable, but I am not in danger. I can keep going even if I feel anxious. \"  · Be kind to your body:  ¨ Relieve tension with exercise or a massage. ¨ Get enough rest.  ¨ Avoid alcohol, caffeine, nicotine, and illegal drugs. They can increase your anxiety level, cause sleep problems, or trigger a panic attack. ¨ Learn and do relaxation techniques. See below for more about these techniques. · Engage your mind. Get out and do something you enjoy. Go to a funny movie, or take a walk or hike. Plan your day. Having too much or too little to do can make you anxious. · Keep a record of your symptoms. Discuss your fears with a good friend or family member, or join a support group for people with similar problems.  Talking to others sometimes relieves stress. · Get involved in social groups, or volunteer to help others. Being alone sometimes makes things seem worse than they are. · Get at least 30 minutes of exercise on most days of the week to relieve stress. Walking is a good choice. You also may want to do other activities, such as running, swimming, cycling, or playing tennis or team sports. Relaxation techniques  Do relaxation exercises for 10 to 20 minutes a day. You can play soothing, relaxing music while you do them, if you wish. · Tell others in your house that you are going to do your relaxation exercises. Ask them not to disturb you. · Find a comfortable place, away from all distractions and noise. · Lie down on your back, or sit with your back straight. · Focus on your breathing. Make it slow and steady. · Breathe in through your nose. Breathe out through either your nose or mouth. · Breathe deeply, filling up the area between your navel and your rib cage. Breathe so that your belly goes up and down. · Do not hold your breath. · Breathe like this for 5 to 10 minutes. Notice the feeling of calmness throughout your whole body. As you continue to breathe slowly and deeply, relax by doing the following for another 5 to 10 minutes:  · Tighten and relax each muscle group in your body. You can begin at your toes and work your way up to your head. · Imagine your muscle groups relaxing and becoming heavy. · Empty your mind of all thoughts. · Let yourself relax more and more deeply. · Become aware of the state of calmness that surrounds you. · When your relaxation time is over, you can bring yourself back to alertness by moving your fingers and toes and then your hands and feet and then stretching and moving your entire body. Sometimes people fall asleep during relaxation, but they usually wake up shortly afterward.   · Always give yourself time to return to full alertness before you drive a car or do anything that might cause an accident if you are not fully alert. Never play a relaxation tape while driving a car. When should you call for help? Call 911 anytime you think you may need emergency care. For example, call if:  ? · You feel you cannot stop from hurting yourself or someone else. ? Watch closely for changes in your health, and be sure to contact your doctor if:  ? · Your panic attacks get worse. ? · You have new or different anxiety. ? · You are not getting better as expected. Where can you learn more? Go to http://costa-jessica.info/. Enter H601 in the search box to learn more about \"Panic Attacks: Care Instructions. \"  Current as of: May 12, 2017  Content Version: 11.4  © 7301-9612 [employer], Inc[employer]. Care instructions adapted under license by [employer] (which disclaims liability or warranty for this information). If you have questions about a medical condition or this instruction, always ask your healthcare professional. [place] [address] any warranty or liability for your use of this information.

## 2018-01-02 ENCOUNTER — OFFICE VISIT (OUTPATIENT)
Dept: PULMONOLOGY | Age: 48
End: 2018-01-02

## 2018-01-02 ENCOUNTER — HOME CARE VISIT (OUTPATIENT)
Dept: HOME HEALTH SERVICES | Facility: HOME HEALTH | Age: 48
End: 2018-01-02
Payer: MEDICAID

## 2018-01-02 ENCOUNTER — HOME CARE VISIT (OUTPATIENT)
Dept: SCHEDULING | Facility: HOME HEALTH | Age: 48
End: 2018-01-02
Payer: MEDICAID

## 2018-01-02 ENCOUNTER — TELEPHONE (OUTPATIENT)
Dept: FAMILY MEDICINE CLINIC | Age: 48
End: 2018-01-02

## 2018-01-02 VITALS
HEIGHT: 63 IN | SYSTOLIC BLOOD PRESSURE: 148 MMHG | HEART RATE: 98 BPM | WEIGHT: 179 LBS | OXYGEN SATURATION: 98 % | RESPIRATION RATE: 20 BRPM | TEMPERATURE: 97.1 F | DIASTOLIC BLOOD PRESSURE: 100 MMHG | BODY MASS INDEX: 31.71 KG/M2

## 2018-01-02 VITALS
HEART RATE: 93 BPM | OXYGEN SATURATION: 98 % | DIASTOLIC BLOOD PRESSURE: 100 MMHG | SYSTOLIC BLOOD PRESSURE: 166 MMHG | TEMPERATURE: 98.2 F

## 2018-01-02 DIAGNOSIS — R52 PAIN: Primary | ICD-10-CM

## 2018-01-02 DIAGNOSIS — J96.90 RESPIRATORY FAILURE, UNSPECIFIED CHRONICITY, UNSPECIFIED WHETHER WITH HYPOXIA OR HYPERCAPNIA (HCC): ICD-10-CM

## 2018-01-02 DIAGNOSIS — J43.9 PULMONARY EMPHYSEMA, UNSPECIFIED EMPHYSEMA TYPE (HCC): ICD-10-CM

## 2018-01-02 DIAGNOSIS — J44.9 COPD, SEVERE (HCC): Primary | ICD-10-CM

## 2018-01-02 PROCEDURE — G0157 HHC PT ASSISTANT EA 15: HCPCS

## 2018-01-02 RX ORDER — ACETAMINOPHEN AND CODEINE PHOSPHATE 300; 30 MG/1; MG/1
2 TABLET ORAL
Qty: 60 TAB | Refills: 0 | Status: SHIPPED | OUTPATIENT
Start: 2018-01-02 | End: 2018-01-12

## 2018-01-02 RX ORDER — DOCUSATE SODIUM 100 MG/1
100 CAPSULE, LIQUID FILLED ORAL 2 TIMES DAILY
Qty: 60 CAP | Refills: 0 | Status: SHIPPED | OUTPATIENT
Start: 2018-01-02 | End: 2018-02-01

## 2018-01-02 NOTE — TELEPHONE ENCOUNTER
Received telephone call from Martin Kohler Physical therapist with [facility]. Reports that the patient is having chest/diahramatic pain 9/10. HGis Bp is 166/100. And abdmen is distended. The patient was recently discharged from [facility], has not had F/u with PCP due to office closures. Scheduled to see PCP Thursday 1/4/17 for Hospital F/u. Pt reports he has taken all of his medications. He is currently out of Tylenol # 3 as this helped with his pain. previously he had been on tramadol and patient reports it as clinically ineffective. 1. Pulmonary emphysema, unspecified emphysema type ([address]  - acetaminophen-codeine (TYLENOL-CODEINE #3) 300-30 mg per tablet; Take 2 Tabs by mouth every six (6) hours as needed for Pain for up to 7 days. Max Daily Amount: 8 Tabs. Dispense: 60 Tab; Refill: 0  - docusate sodium (COLACE) 100 mg capsule; Take 1 Cap by mouth two (2) times a day for 30 days. Prevent constipation  Dispense: 60 Cap; Refill: 0    2. Pain  - acetaminophen-codeine (TYLENOL-CODEINE #3) 300-30 mg per tablet; Take 2 Tabs by mouth every six (6) hours as needed for Pain for up to 7 days. Max Daily Amount: 8 Tabs. Dispense: 60 Tab; Refill: 0  - docusate sodium (COLACE) 100 mg capsule; Take 1 Cap by mouth two (2) times a day for 30 days. Prevent constipation  Dispense: 60 Cap; Refill: 0    Assessment/Plan  1. Pain Will refill Tramadol #3 for pain to be given along with bowel regimen. The patient is to  script from office during office hours. 2. HTN, most likely pain and acute illness contributing to elevation today. Will recheck at follow up visit. 3. Pulmonary Emphysema, followed By Dr. Alirio Littlejohn with pulmonology. The patient reports he will be submitting sputums specimen to Pulmonologist tomorrow. Scheduled f/u 1/23/18. 4. Pt will f/u Thursday 1/4/18 with PCP for hospital follow up and any concerns may be addressed at that time.

## 2018-01-02 NOTE — PATIENT INSTRUCTIONS
Continue Advair 1 inhalation twice daily and remember to exhale fully before and to wash mouth with water and spit it out after inhaling  Continue Spiriva 1 inhalation daily and remember to exhale fully before inhaling  Albuterol by hand-held inhaler 2 puffs every 4 hours as needed or by nebulizer every 4 hours as needed and if you require the albuterol too often to control respiratory symptoms call the office for severe symptoms go to the emergency room    Prednisone 2 tablets every morning with breakfast for 7 days then discontinue.   Make sure you monitor your stools for blood or black stools because of your history of gastrointestinal bleeding in the past    Obtain sputum culture    Call for symptoms such as worsening shortness of breath

## 2018-01-02 NOTE — MR AVS SNAPSHOT
Visit Information Date & Time Provider Department Dept. Phone Encounter #  
 1/2/2018 10:15 AM MD Blanca Nicolas [facility] [place] [id number] Follow-up Instructions Return in about 3 weeks (around 1/23/2018). Follow-up and Disposition History Your Appointments 3/19/2018  1:00 PM  
Follow Up with Temitope Pedro NP [address] [facility] ([facility]) Appt Note: 5 mth follow up  
 [address] [facility] [address]  
[address] [facility] [address] Upcoming Health Maintenance Date Due Pneumococcal 19-64 Medium Risk (1 of 1 - PPSV23) 10/27/1988 DTaP/Tdap/Td series (1 - Tdap) 10/27/1990 Allergies as of 1/2/2018  Review Complete On: 1/2/2018 By: Elyse Burr RN No Known Allergies Current Immunizations  Never Reviewed Name Date Influenza Vaccine (Quad) PF 10/23/2017  1:04 PM  
  
 Not reviewed this visit You Were Diagnosed With   
  
 Codes Comments COPD, severe ([facility]    -  Primary ICD-10-CM: J44.9 ICD-9-CM: 627 Respiratory failure, unspecified chronicity, unspecified whether with hypoxia or hypercapnia ([facility]     ICD-10-CM: J96.90 ICD-9-CM: 518.81 Vitals BP Pulse Temp Resp Height(growth percentile) Weight(growth percentile) (!) 148/100 (BP 1 Location: Right arm, BP Patient Position: Sitting) 98 97.1 °F (36.2 °C) (Oral) 20 5' 3\" (1.6 m) 179 lb (81.2 kg) SpO2 BMI Smoking Status 98% 31.71 kg/m2 Former Smoker Vitals History BMI and BSA Data Body Mass Index Body Surface Area 31.71 kg/m 2 1.9 m 2 Preferred Pharmacy Pharmacy Name Phone [id number] [address] [phone] Your Updated Medication List  
  
   
This list is accurate as of: 1/2/18 10:59 AM.  Always use your most recent med list.  
  
  
  
  
 acetaminophen 500 mg tablet Commonly known as:  TYLENOL Take  by mouth every six (6) hours as needed for Pain. acetaminophen-codeine 300-30 mg per tablet Commonly known as:  TYLENOL-CODEINE #3 Take 2 Tabs by mouth every four (4) hours as needed for Pain for up to 15 doses. Max Daily Amount: 12 Tabs. albuterol 90 mcg/actuation inhaler Commonly known as:  PROVENTIL HFA, VENTOLIN HFA, PROAIR HFA Take 2 Puffs by inhalation every four (4) hours as needed for Wheezing. albuterol-ipratropium 2.5 mg-0.5 mg/3 ml Nebu Commonly known as:  DUO-NEB  
3 mL by Nebulization route every six (6) hours as needed. amLODIPine 2.5 mg tablet Commonly known as:  Bosie Shield Take 1 Tab by mouth daily. fluticasone-salmeterol 500-50 mcg/dose diskus inhaler Commonly known as:  ADVAIR Take 1 Puff by inhalation two (2) times a day. guaiFENesin-codeine 100-10 mg/5 mL solution Commonly known as:  ROBITUSSIN AC Take 5 mL by mouth three (3) times daily as needed for Cough or Congestion. LORazepam 0.5 mg tablet Commonly known as:  ATIVAN Take 1 Tab by mouth every eight (8) hours as needed for Anxiety. Max Daily Amount: 1.5 mg.  
  
 mirtazapine 7.5 mg tablet Commonly known as:  Francisco J Rumps Take 1 Tab by mouth nightly. pantoprazole 40 mg tablet Commonly known as:  PROTONIX Take 1 Tab by mouth Daily (before breakfast). predniSONE 10 mg tablet Commonly known as:  DELTASONE  
40mg X 7 days, 20 mg x 7 days, 10mg x 7 days, 5 mg x 7 days then stop  
  
 roflumilast Tab tablet Commonly known as:  Lamona Pinch Take 1 Tab by mouth daily. Indications: PREVENTION OF BRONCHOSPASM WITH CHRONIC BRONCHITIS  
  
 tiotropium 18 mcg inhalation capsule Commonly known as:  Samantha Anon Take 1 Cap by inhalation daily. Indications: BRONCHOSPASM PREVENTION WITH COPD  
  
 traMADol 50 mg tablet Commonly known as:  Valiant Sol  
 Take 1 Tab by mouth every eight (8) hours as needed for Pain. Max Daily Amount: 150 mg. Indications: Pain  
  
 varenicline 1 mg tablet Commonly known as:  Eugene Butt Take 1 Tab by mouth daily. Start after completion of starter pack WALKABOUT 1 OXYGEN SYSTEM  
2 L/min by Nasal route continuous. Follow-up Instructions Return in about 3 weeks (around 1/23/2018). To-Do List   
 01/02/2018 Microbiology:  CULTURE, RESPIRATORY/SPUTUM/BRONCH [place] November STAIN   
  
 01/02/2018 3:00 PM  
  Appointment with Werner Xiong PTA at [address] [place] [address] MED CTR  
  
 01/04/2018 To Be Determined Appointment with Werner Xiong PTA at [address] [place] [address] MED CTR  
  
 01/05/2018 To Be Determined Appointment with Wilton Carr RN at [address] [place] [address] MED CTR  
  
 01/05/2018 To Be Determined Appointment with Tova Dennis at [address] [place] [address] REG MED CTR  
  
 01/09/2018 To Be Determined Appointment with Wilton Carr RN at [address] [place] [address] REG MED CTR  
  
 01/09/2018 To Be Determined Appointment with Werner Xiong PTA at [address] [place] [address] REG MED [facility]  
  
 01/11/2018 To Be Determined Appointment with Werner Xiong PTA at [address] [place] [address] MED CTR  
  
 01/12/2018 To Be Determined Appointment with Wilton Carr RN at [address] [place] [address] [facility] MED [facility]  
  
 01/16/2018 To Be Determined Appointment with Werner Xiong PTA at [address] [place] [address] MED CTR  
  
 01/18/2018 To Be Determined Appointment with Yoel Boyle PT at [address] Patient Instructions Continue Advair 1 inhalation twice daily and remember to exhale fully before and to wash mouth with water and spit it out after inhaling Continue Spiriva 1 inhalation daily and remember to exhale fully before inhaling Albuterol by hand-held inhaler 2 puffs every 4 hours as needed or by nebulizer every 4 hours as needed and if you require the albuterol too often to control respiratory symptoms call the office for severe symptoms go to the emergency room Prednisone 2 tablets every morning with breakfast for 7 days then discontinue. Make sure you monitor your stools for blood or black stools because of your history of gastrointestinal bleeding in the past 
 
Obtain sputum culture Call for symptoms such as worsening shortness of breath Patient Instructions History Introducing [facility]! Mojgan Duncan introduces [employer] patient portal. Now you can access parts of your medical record, email your doctor's office, and request medication refills online. 1. In your internet browser, go to [email or web address] 2. Click on the First Time User? Click Here link in the Sign In box. You will see the New Member Sign Up page. 3. Enter your [employer] Access Code exactly as it appears below. You will not need to use this code after youve completed the sign-up process. If you do not sign up before the expiration date, you must request a new code. · [employer] Access Code: [email or web address] Expires: 1/21/2018 12:20 PM 
 
4. Enter the last four digits of your Social Security Number (xxxx) and Date of Birth (mm/dd/yyyy) as indicated and click Submit. You will be taken to the next sign-up page. 5. Create a [employer]t ID. This will be your [employer] login ID and cannot be changed, so think of one that is secure and easy to remember. 6. Create a [employer] password. You can change your password at any time. 7. Enter your Password Reset Question and Answer. This can be used at a later time if you forget your password. 8. Enter your e-mail address. You will receive e-mail notification when new information is available in [address] 9. Click Sign Up. You can now view and download portions of your medical record. 10. Click the Download Summary menu link to download a portable copy of your medical information. If you have questions, please visit the Frequently Asked Questions section of the [employer] website. Remember, [employer] is NOT to be used for urgent needs. For medical emergencies, dial 911. Now available from your iPhone and Android! Please provide this summary of care documentation to your next provider. Your primary care clinician is listed as Jero Ning. If you have any questions after today's visit, please call [phone].

## 2018-01-02 NOTE — PROGRESS NOTES
1. Have you been to the ER, urgent care clinic since your last visit? Hospitalized since your last visit? Yes When: Dec 18 2017, Panic attack. SOB,  Sepsis? Released home Dec 23.  2 times at  one time at [facility]. Last admission to . ER again at  on 12/31/17    2. Have you seen or consulted any other health care providers outside of the [address] [place] [address] since your last visit? Include any pap smears or colon screening. No      Patient became very SOB walking to exam room from reception.   Sat is 98% with Heart rate of 115

## 2018-01-02 NOTE — PROGRESS NOTES
[facility]  Pulmonary, Critical Care, and Sleep Medicine      Chief complaint:  Shortness of breath COPD chronic respiratory failure nicotine dependence    HPI:    Sweta Kang    is 49years old and returns the office today for follow-up concerning COPD and acute exacerbation which required hospitalization after this the patient was placed on prednisone and Advair and Spiriva and is taking these faithfully as well as supplemental oxygen. He relates that he has chest pain across his chest which is much worse when he cough and he denies leg pain or swelling but his main complaint is profound shortness of breath with exertion. He continues to cough up discolored mucus which he describes as burning      No Known Allergies  Current Outpatient Prescriptions   Medication Sig    LORazepam (ATIVAN) 0.5 mg tablet Take 1 Tab by mouth every eight (8) hours as needed for Anxiety. Max Daily Amount: 1.5 mg.    OXYGEN-AIR DELIVERY SYSTEMS (WALKABOUT 1 OXYGEN SYSTEM) 2 L/min by Nasal route continuous.  amLODIPine (NORVASC) 2.5 mg tablet Take 1 Tab by mouth daily.  pantoprazole (PROTONIX) 40 mg tablet Take 1 Tab by mouth Daily (before breakfast).  varenicline (CHANTIX) 1 mg tablet Take 1 Tab by mouth daily. Start after completion of starter pack    mirtazapine (REMERON) 7.5 mg tablet Take 1 Tab by mouth nightly.  roflumilast (DALIRESP) tab tablet Take 1 Tab by mouth daily. Indications: PREVENTION OF BRONCHOSPASM WITH CHRONIC BRONCHITIS    predniSONE (DELTASONE) 10 mg tablet 40mg X 7 days, 20 mg x 7 days, 10mg x 7 days, 5 mg x 7 days then stop    albuterol (PROVENTIL HFA, VENTOLIN HFA, PROAIR HFA) 90 mcg/actuation inhaler Take 2 Puffs by inhalation every four (4) hours as needed for Wheezing.  albuterol-ipratropium (DUO-NEB) 2.5 mg-0.5 mg/3 ml nebu 3 mL by Nebulization route every six (6) hours as needed.     fluticasone-salmeterol (ADVAIR) 500-50 mcg/dose diskus inhaler Take 1 Puff by inhalation two (2) times a day.  tiotropium (SPIRIVA) 18 mcg inhalation capsule Take 1 Cap by inhalation daily. Indications: BRONCHOSPASM PREVENTION WITH COPD    acetaminophen-codeine (TYLENOL-CODEINE #3) 300-30 mg per tablet Take 2 Tabs by mouth every four (4) hours as needed for Pain for up to 15 doses. Max Daily Amount: 12 Tabs.  guaiFENesin-codeine (ROBITUSSIN AC) 100-10 mg/5 mL solution Take 5 mL by mouth three (3) times daily as needed for Cough or Congestion.  traMADol (ULTRAM) 50 mg tablet Take 1 Tab by mouth every eight (8) hours as needed for Pain. Max Daily Amount: 150 mg. Indications: Pain    acetaminophen (TYLENOL) 500 mg tablet Take  by mouth every six (6) hours as needed for Pain. No current facility-administered medications for this visit. Past Medical History:   Diagnosis Date    Chronic obstructive pulmonary disease ([address] 08/03/2017    PFTS 8/3/17    COPD, severe ([address]     Emphysema/COPD ([address]     Esophagitis 12/11/2017    Endoscopy    History of stomach ulcers     Positive urine drug screen 01/2016    opiates and THC    Upper GI bleed      Past Surgical History:   Procedure Laterality Date    HX ENDOSCOPY  12/11/2017     Social History     Social History    Marital status:      Spouse name: N/A    Number of children: N/A    Years of education: N/A     Occupational History    Not on file.      Social History Main Topics    Smoking status: Former Smoker     Packs/day: 2.00     Years: 25.00     Types: Cigarettes     Start date: 5/28/1984     Quit date: 12/18/2017    Smokeless tobacco: Never Used    Alcohol use No    Drug use: Yes     Special: Marijuana      Comment: Hx of Marijuana use    Sexual activity: Yes     Partners: Female     Other Topics Concern     Service No    Blood Transfusions No    Caffeine Concern Yes    Occupational Exposure No    Hobby Hazards No    Sleep Concern Yes     occas    Stress Concern No    Weight Concern No    Special Diet No    Back Care Yes    Exercise No    Bike Helmet Yes    Seat Belt No     occas     Social History Narrative         Family History   Problem Relation Age of Onset    Hypertension Mother     Heart Disease Mother     Diabetes Mother     Hypertension Father     Heart Disease Father     Cancer Father      lymphnodes    Diabetes Father        Review of systems:  He denies fever chills poor appetite weight loss abdominal pain melena but reports a rash on his back    Physical Exam:  Visit Vitals    BP (!) 148/100 (BP 1 Location: Right arm, BP Patient Position: Sitting)    Pulse 98    Temp 97.1 °F (36.2 °C) (Oral)    Resp 20    Ht 5' 3\" (1.6 m)    Wt 81.2 kg (179 lb)    SpO2 98%    BMI 31.71 kg/m2       Well-developed well-nourished  HEENT: WNL  Lymph node exam: Supraclavicular cervical lymph nodes negative  Chest: Equal symmetrical expansion no dullness no wheezes rales rubs  Heart: Regular rhythm no gallop no murmur no JVD no peripheral edema  Extremities: No cyanosis  or calf tenderness positive for clubbing  Musculoskeletal: No acute joint inflammation or muscle length wasting  Skin: Red slightly raised rash over back  Neurological: Alert oriented ambulates normally  Psychological: The patient appears calm but says that he feels very anxious at times especially when he feels short of breath  Labs:    O2 sat 98% room air at rest and after walking over 100 feet O2 sat 98%  Review of CT of the chest personally 12/19/17: No infiltrates or heart failure  Review of EKG 12/31/17: Normal sinus rhythm no ST or Q-wave abnormalities  Impression:     Severe exacerbation of COPD with slow improvement in a history of continuing to cough up discolored mucus even though sputum culture reveals normal ana and was treated with antibiotics    Plan:   Sputum culture and call after results are available  Repeat 7 days of prednisone 40 mg a day and the patient will continue to take proton inhibitor and monitor his stools closely for bleeding because of his history of a GI bleed  Continue Advair Spiriva as needed albuterol  Follow-up in 2-3 weeks  Erik Ernandez MD , [facility]    CC: Sue Vela NP     [address] [facility]. [facility], [address] [address]     P: [phone]     F: [phone]

## 2018-01-03 ENCOUNTER — OFFICE VISIT (OUTPATIENT)
Dept: FAMILY MEDICINE CLINIC | Age: 48
End: 2018-01-03

## 2018-01-03 ENCOUNTER — HOSPITAL ENCOUNTER (OUTPATIENT)
Dept: LAB | Age: 48
Discharge: HOME | End: 2018-01-03
Payer: MEDICAID

## 2018-01-03 VITALS
WEIGHT: 177.4 LBS | TEMPERATURE: 97.7 F | HEART RATE: 108 BPM | OXYGEN SATURATION: 95 % | SYSTOLIC BLOOD PRESSURE: 148 MMHG | BODY MASS INDEX: 31.43 KG/M2 | DIASTOLIC BLOOD PRESSURE: 85 MMHG | RESPIRATION RATE: 20 BRPM | HEIGHT: 63 IN

## 2018-01-03 DIAGNOSIS — J44.9 COPD, SEVERE (HCC): Primary | ICD-10-CM

## 2018-01-03 DIAGNOSIS — R06.02 SHORTNESS OF BREATH: ICD-10-CM

## 2018-01-03 DIAGNOSIS — Z72.0 TOBACCO ABUSE: ICD-10-CM

## 2018-01-03 DIAGNOSIS — R60.9 SWELLING: ICD-10-CM

## 2018-01-03 DIAGNOSIS — R63.5 WEIGHT GAIN: ICD-10-CM

## 2018-01-03 DIAGNOSIS — R14.0 DISTENDED ABDOMEN: ICD-10-CM

## 2018-01-03 DIAGNOSIS — F41.9 ANXIETY: ICD-10-CM

## 2018-01-03 DIAGNOSIS — J44.9 COPD, SEVERE (HCC): ICD-10-CM

## 2018-01-03 DIAGNOSIS — G47.00 INSOMNIA, UNSPECIFIED TYPE: ICD-10-CM

## 2018-01-03 PROCEDURE — 87186 SC STD MICRODIL/AGAR DIL: CPT | Performed by: INTERNAL MEDICINE

## 2018-01-03 PROCEDURE — 87070 CULTURE OTHR SPECIMN AEROBIC: CPT | Performed by: INTERNAL MEDICINE

## 2018-01-03 RX ORDER — VARENICLINE TARTRATE 1 MG/1
1 TABLET, FILM COATED ORAL DAILY
Qty: 30 TAB | Refills: 0 | Status: SHIPPED | COMMUNITY
Start: 2018-01-03 | End: 2018-01-12

## 2018-01-03 RX ORDER — TRAZODONE HYDROCHLORIDE 50 MG/1
50 TABLET ORAL
Qty: 30 TAB | Refills: 0 | Status: SHIPPED | OUTPATIENT
Start: 2018-01-03 | End: 2018-01-12

## 2018-01-03 RX ORDER — FLUTICASONE PROPIONATE AND SALMETEROL 500; 50 UG/1; UG/1
1 POWDER RESPIRATORY (INHALATION) 2 TIMES DAILY
Qty: 1 EACH | Refills: 3 | Status: SHIPPED | COMMUNITY
Start: 2018-01-03 | End: 2018-05-29 | Stop reason: RX

## 2018-01-03 RX ORDER — LORAZEPAM 1 MG/1
0.5 TABLET ORAL
Qty: 30 TAB | Refills: 0 | Status: SHIPPED | OUTPATIENT
Start: 2018-01-03 | End: 2018-01-24 | Stop reason: SDUPTHER

## 2018-01-03 RX ORDER — ALBUTEROL SULFATE 90 UG/1
2 AEROSOL, METERED RESPIRATORY (INHALATION)
Qty: 1 INHALER | Refills: 3 | Status: ON HOLD | COMMUNITY
Start: 2018-01-03 | End: 2018-01-12

## 2018-01-03 RX ORDER — FUROSEMIDE 20 MG/1
20 TABLET ORAL DAILY
Qty: 5 TAB | Refills: 0 | Status: SHIPPED | OUTPATIENT
Start: 2018-01-03 | End: 2018-01-09 | Stop reason: SDUPTHER

## 2018-01-03 NOTE — PROGRESS NOTES
01/03/18    PCP: Angie Liu NP    Chief Complaint   Patient presents with   [facility] Follow Up     admitted on 12/18/17 to 12/23/17 at [facility] for panic attach, shortness of breath, emphysema and is now on O2 at 2.5 LPM.  Was also at [facility] on 12/18/17 in a.m. with shortness of breath    Shoulder Pain     right shoulder pain and chest pain level 9/10 \"all the time\". He just took 2 tylenol with codeine 1 hour before coming in and helped some.  Other     Patient reports home care visits 3 times/week x next 3 weeks and may need order for extension of services beyond the 3 weeks    Form Completion     Completion of form for insurance         HISTORY OF PRESENT ILLNESS  Ashu Vasquez With End Stage COPD is a 49 y.o. male whom presents for Hospital Follow Up  accompanied in the office by his spouse and child. He was admitted 12/18/17 - 12/23/17 for COPD with acute exacerbation. Presents today with continued dyspnea on 2.5 LPM Nasal canula. Associated symptoms include Continued chest pain/diaphramatic discomfort, dyspnea, unable to tolerate laying flat, exertion at rest, and LE swelling. Pt reports 40 Lb weight gain. Per last record in office 11/2/17 the patient has gained 12 Lbs. He is currently in Pulmonary rehab and is currently being followed by Pulmonology Dr. Zay Moncada with f/u appt scheduled for 1/23/17. The patient admits compliance to medication regimen and states he has been cigarette free since hospital admission 12/18/17. He reports that he is currently in the process of getting disability and medicare and his insurance should be effective in 45 days. Pt needing refill of ativan. States remeron is not effective for sleep. Hospital Course:   1. COPD, severe - solumedrol, duoneb, tobacco cessation, abx. Pulmonary input appreciated. [facility]ry[facility]  2. Sinus tachycardia poa - related to infection.    3. Acute bronchitis - Levaquin/cefepime - CT chest no infiltrate. 4. Elevated bp - not on meds at home - decrease steroids. Stop fluids. Treat pain. Relieve constipation. Hydralazine prn. Low dose norvasc  5. Lactic acidosis - resolving  6. uds + opiates. 7. Tobacco abuse - chantix. Smoking cessation education  8. normal event monitor 2015  9. Recent GI bleed in the setting of steroid use, egd 12/11/17 revealed Grade 1 espohagitis - on ppi. 10. Mild LAD coronary arteriosclerosis seen on CT chest.   12. Anxiety - prn ativan. Remeron. 13. Constipation - bowel regimen. 14. dvt prophylaxis  15. Full code. has a nebulizer at home that he uses left over from his daughter. Referred to [facility] per CM. Pt recs HH. Needs home O2 eval prior to discharge. OT recs shower chair. HPI    Current Outpatient Prescriptions   Medication Sig Dispense Refill    fluticasone-salmeterol (ADVAIR) 500-50 mcg/dose diskus inhaler Take 1 Puff by inhalation two (2) times a day. 1 Each 3    tiotropium (SPIRIVA) 18 mcg inhalation capsule Take 1 Cap by inhalation daily. Indications: BRONCHOSPASM PREVENTION WITH COPD 30 Cap 5    albuterol (PROVENTIL HFA, VENTOLIN HFA, PROAIR HFA) 90 mcg/actuation inhaler Take 2 Puffs by inhalation every four (4) hours as needed for Wheezing. 1 Inhaler 3    varenicline (CHANTIX) 1 mg tablet Take 1 Tab by mouth daily. Start after completion of starter pack 30 Tab 0    roflumilast (DALIRESP) tab tablet Take 1 Tab by mouth daily. Indications: PREVENTION OF BRONCHOSPASM WITH CHRONIC BRONCHITIS 30 Tab 2    furosemide (LASIX) 20 mg tablet Take 1 Tab by mouth daily for 5 days. 5 Tab 0    LORazepam (ATIVAN) 1 mg tablet Take 0.5 Tabs by mouth every eight (8) hours as needed for Anxiety. Max Daily Amount: 1.5 mg. 30 Tab 0    traZODone (DESYREL) 50 mg tablet Take 1 Tab by mouth nightly.  30 Tab 0    acetaminophen-codeine (TYLENOL-CODEINE #3) 300-30 mg per tablet Take 2 Tabs by mouth every six (6) hours as needed for Pain for up to 7 days. Max Daily Amount: 8 Tabs. 60 Tab 0    docusate sodium (COLACE) 100 mg capsule Take 1 Cap by mouth two (2) times a day for 30 days. Prevent constipation 60 Cap 0    OXYGEN-AIR DELIVERY SYSTEMS (WALKABOUT 1 OXYGEN SYSTEM) 2 L/min by Nasal route continuous.  amLODIPine (NORVASC) 2.5 mg tablet Take 1 Tab by mouth daily. 30 Tab 2    pantoprazole (PROTONIX) 40 mg tablet Take 1 Tab by mouth Daily (before breakfast). 30 Tab 0    predniSONE (DELTASONE) 10 mg tablet 40mg X 7 days, 20 mg x 7 days, 10mg x 7 days, 5 mg x 7 days then stop 57 Tab 0    albuterol-ipratropium (DUO-NEB) 2.5 mg-0.5 mg/3 ml nebu 3 mL by Nebulization route every six (6) hours as needed.  76 Nebule 5     No Known Allergies  Past Medical History:   Diagnosis Date    Chronic obstructive pulmonary disease ([facility] [address] 08/03/2017    PFTS 8/3/17    COPD, severe (HCC)     Emphysema/COPD ([facility] [address]     Esophagitis 12/11/2017    Endoscopy    History of stomach ulcers     Positive urine drug screen 01/2016    opiates and THC    Upper GI bleed      Past Surgical History:   Procedure Laterality Date    HX ENDOSCOPY  12/11/2017     Family History   Problem Relation Age of Onset    Hypertension Mother     Heart Disease Mother     Diabetes Mother     Hypertension Father     Heart Disease Father     Cancer Father      lymphnodes    Diabetes Father      Social History   Substance Use Topics    Smoking status: Former Smoker     Packs/day: 2.00     Years: 25.00     Types: Cigarettes     Start date: 5/28/1984     Quit date: 12/18/2017    Smokeless tobacco: Never Used    Alcohol use No      Lab Results   Component Value Date/Time    WBC 8.8 12/23/2017 01:24 AM    HGB 14.9 12/23/2017 01:24 AM    Hemoglobin, POC 14.3 06/28/2013 10:07 PM    HCT 42.7 12/23/2017 01:24 AM    Hematocrit, POC 42 06/28/2013 10:07 PM    PLATELET 728 97/82/5680 01:24 AM    MCV 92.4 12/23/2017 01:24 AM     Lab Results   Component Value Date/Time    Hemoglobin A1c 4.8 01/23/2017 01:11 PM    Glucose 133 12/23/2017 01:24 AM    Glucose, POC 99 06/28/2013 10:07 PM    LDL, calculated 85.6 01/23/2017 01:11 PM    Creatinine, POC 0.9 06/28/2013 10:07 PM    Creatinine 0.82 12/23/2017 01:24 AM      Lab Results   Component Value Date/Time    Cholesterol, total 144 01/23/2017 01:11 PM    HDL Cholesterol 36 01/23/2017 01:11 PM    LDL, calculated 85.6 01/23/2017 01:11 PM    Triglyceride 112 01/23/2017 01:11 PM    CHOL/HDL Ratio 4.0 01/23/2017 01:11 PM     Lab Results   Component Value Date/Time    Sodium 139 12/23/2017 01:24 AM    Potassium 4.4 12/23/2017 01:24 AM    Chloride 105 12/23/2017 01:24 AM    CO2 28 12/23/2017 01:24 AM    Anion gap 6 12/23/2017 01:24 AM    Glucose 133 12/23/2017 01:24 AM    BUN 20 12/23/2017 01:24 AM    Creatinine 0.82 12/23/2017 01:24 AM    BUN/Creatinine ratio 24 12/23/2017 01:24 AM    GFR est AA >60 12/23/2017 01:24 AM    GFR est non-AA >60 12/23/2017 01:24 AM    Calcium 8.4 12/23/2017 01:24 AM    Bilirubin, total 0.6 12/19/2017 04:09 AM    ALT (SGPT) 25 12/19/2017 04:09 AM    AST (SGOT) 9 12/19/2017 04:09 AM    Alk. phosphatase 63 12/19/2017 04:09 AM    Protein, total 6.3 12/19/2017 04:09 AM    Albumin 3.5 12/19/2017 04:09 AM    Globulin 2.8 12/19/2017 04:09 AM    A-G Ratio 1.3 12/19/2017 04:09 AM         Visit Vitals    /85 (BP 1 Location: Left arm, BP Patient Position: Sitting)    Pulse (!) 108    Temp 97.7 °F (36.5 °C) (Oral)    Resp 20    Ht 5' 3\" (1.6 m)    Wt 177 lb 6.4 oz (80.5 kg)    SpO2 95%    BMI 31.42 kg/m2       Pain Scale: 9/10    Pain Location: Shoulder (Right shoulder and chest)    Review of Systems   Constitutional: Negative. HENT: Negative. Eyes: Negative. Respiratory: Positive for shortness of breath. Cardiovascular: Positive for chest pain, orthopnea and leg swelling. Negative for palpitations. Gastrointestinal: Negative. Negative for constipation (Patient reports 3 BMs today ). Genitourinary: Negative. Skin: Negative. Psychiatric/Behavioral: Negative for hallucinations, substance abuse and suicidal ideas. The patient is nervous/anxious and has insomnia. Physical Exam   Constitutional: He is oriented to person, place, and time. Vital signs are normal. He appears distressed. He is not intubated. Pulmonary/Chest: Accessory muscle usage present. Tachypnea noted. No apnea and no bradypnea. He is not intubated. He is in respiratory distress. He has decreased breath sounds. He has no wheezes. He has no rhonchi. He has no rales. Abdominal: He exhibits distension. There is no rigidity, no guarding, no CVA tenderness, no tenderness at McBurney's point and negative Pretty's sign. Distant bowel sounds   Musculoskeletal:        Right lower leg: Right lower leg edema: +1. Left lower leg: Left lower leg edema: +1.   Neurological: He is alert and oriented to person, place, and time. Radiology reports:   CT CHEST WITH ENHANCEMENT     INDICATION: COPD with worsening dyspnea, cough, chest congestion, hypoxia,  anxious.     TECHNIQUE: Axial images obtained from the thoracic inlet to the level of the  diaphragm following uneventful administration of 100 cc Isovue-300 nonionic  intravenous contrast. Coronal and sagittal reformatted images were obtained.     All CT scans at this facility are performed using dose optimization technique as  appropriate to a performed exam, to include automated exposure control,  adjustment of the mA and/or kV according to patient size (including appropriate  matching first site-specific examinations), or use of iterative reconstruction  technique.     COMPARISON: CT chest 6/28/2013.     CHEST FINDINGS:      Thyroid: Unremarkable in its visualized aspects. Pericardium/ Heart: Heart size is normal. No pericardial effusion. Mild coronary  arteriosclerosis in the LAD. Aorta/ Vessels: No aneurysm or dissection. Lymph Nodes: Unremarkable. .     Lungs: There is layering mucus in the trachea. Central bronchial tree is patent. Subsegmental atelectasis or scarring in the bilateral lower lobes. Moderate to  severe emphysematous changes. Pleura: No effusion. No pneumothorax.     Upper Abdomen: Similar subcentimeter hypodensity in the left medial hepatic  lobe, too small to characterize but stability since 2013 is reassuring.     Bones/soft tissues: Unremarkable.     IMPRESSION  IMPRESSION:     Moderate to severe emphysema. Mild LAD coronary arteriosclerosis. COMPARISON STUDY: Chest x-ray on 12/17/2017, 11/04/2017.           FINDINGS:     Lungs are mildly hypoventilated.     Cardiac CABG to be within normal limits is mildly more prominent as compared to  previous study. As compared to previous study there is no minimal to mild pulmonary vascular  congestion noted. Interstitial markings are mildly hazy. Right lung is clear. At  left lung base there are minimal streaky atelectatic changes noted. CP angles  are sharp bilaterally.     No evidence of pneumothorax.     IMPRESSION  IMPRESSION:     Currently minimal to mild pulmonary vascular congestion. Minimal interstitial  pulmonary edema is not excluded at this time.     Mild pulmonary hypoventilation.     Minimal streaky left basilar atelectasis.       ASSESSMENT and PLAN    ICD-10-CM ICD-9-CM    1. COPD, severe ([facility] [address] J44.9 496 fluticasone-salmeterol (ADVAIR) 500-50 mcg/dose diskus inhaler      tiotropium (SPIRIVA) 18 mcg inhalation capsule      albuterol (PROVENTIL HFA, VENTOLIN HFA, PROAIR HFA) 90 mcg/actuation inhaler      varenicline (CHANTIX) 1 mg tablet      2D ECHO COMPLETE ADULT (TTE) W OR WO CONTR   2. Shortness of breath R06.02 786.05 tiotropium (SPIRIVA) 18 mcg inhalation capsule      albuterol (PROVENTIL HFA, VENTOLIN HFA, PROAIR HFA) 90 mcg/actuation inhaler      furosemide (LASIX) 20 mg tablet      2D ECHO COMPLETE ADULT (TTE) W OR WO CONTR   3. Tobacco abuse Z72.0 305.1 varenicline (CHANTIX) 1 mg tablet   4.  Weight gain R63.5 783.1 furosemide (LASIX) 20 mg tablet      2D ECHO COMPLETE ADULT (TTE) W OR WO CONTR   5. Swelling R60.9 782.3 furosemide (LASIX) 20 mg tablet      2D ECHO COMPLETE ADULT (TTE) W OR WO CONTR   6. Insomnia, unspecified type G47.00 780.52 traZODone (DESYREL) 50 mg tablet   7. Anxiety F41.9 300.00 LORazepam (ATIVAN) 1 mg tablet   8. Distended abdomen R14.0 787.3          Diagnoses and all orders for this visit:    1. COPD, severe ([address]  -     fluticasone-salmeterol (ADVAIR) 500-50 mcg/dose diskus inhaler; Take 1 Puff by inhalation two (2) times a day. -     tiotropium (SPIRIVA) 18 mcg inhalation capsule; Take 1 Cap by inhalation daily. Indications: BRONCHOSPASM PREVENTION WITH COPD  -     albuterol (PROVENTIL HFA, VENTOLIN HFA, PROAIR HFA) 90 mcg/actuation inhaler; Take 2 Puffs by inhalation every four (4) hours as needed for Wheezing.  -     varenicline (CHANTIX) 1 mg tablet; Take 1 Tab by mouth daily. Start after completion of starter pack  -     2D ECHO COMPLETE ADULT (TTE) W OR WO CONTR; Future  - Given sever COPD, LE swelling, chest pain, LE swelling will  Get Echo to rule out heart failure. Previous Xray did show some pulmonary congestion   -Keep f/u appt with Dr. Lorrie Templeton. 2. Shortness of breath  -     tiotropium (SPIRIVA) 18 mcg inhalation capsule; Take 1 Cap by inhalation daily. Indications: BRONCHOSPASM PREVENTION WITH COPD  -     albuterol (PROVENTIL HFA, VENTOLIN HFA, PROAIR HFA) 90 mcg/actuation inhaler; Take 2 Puffs by inhalation every four (4) hours as needed for Wheezing.  -     furosemide (LASIX) 20 mg tablet; Take 1 Tab by mouth daily for 5 days. -     2D ECHO COMPLETE ADULT (TTE) W OR WO CONTR; Future    3. Tobacco abuse  -     varenicline (CHANTIX) 1 mg tablet; Take 1 Tab by mouth daily. Start after completion of starter pack    4. Weight gain  -     furosemide (LASIX) 20 mg tablet; Take 1 Tab by mouth daily for 5 days. -     2D ECHO COMPLETE ADULT (TTE) W OR WO CONTR; Future    5.  Swelling  - furosemide (LASIX) 20 mg tablet; Take 1 Tab by mouth daily for 5 days. -     2D ECHO COMPLETE ADULT (TTE) W OR WO CONTR; Future    6. Insomnia, unspecified type  -     traZODone (DESYREL) 50 mg tablet; Take 1 Tab by mouth nightly. 7. Anxiety  -     LORazepam (ATIVAN) 1 mg tablet; Take 0.5 Tabs by mouth every eight (8) hours as needed for Anxiety. Max Daily Amount: 1.5 mg.    8. Distended abdomen  - The patient on Narcotics, to continue bowel regimen. If not improved at next visit or if worsen, consider further imaging      Other orders  -     roflumilast (DALIRESP) tab tablet; Take 1 Tab by mouth daily. Indications: PREVENTION OF BRONCHOSPASM WITH CHRONIC BRONCHITIS      Follow-up Disposition:  Return in about 1 month (around 2/3/2018). Medications Discontinued During This Encounter   Medication Reason    fluticasone-salmeterol (ADVAIR) 500-50 mcg/dose diskus inhaler Reorder    tiotropium (SPIRIVA) 18 mcg inhalation capsule Reorder    albuterol (PROVENTIL HFA, VENTOLIN HFA, PROAIR HFA) 90 mcg/actuation inhaler Reorder    varenicline (CHANTIX) 1 mg tablet Reorder    roflumilast (DALIRESP) tab tablet Reorder    mirtazapine (REMERON) 7.5 mg tablet Cost of Medication    LORazepam (ATIVAN) 0.5 mg tablet Reorder    guaiFENesin-codeine (ROBITUSSIN AC) 100-10 mg/5 mL solution Not A Current Medication       Written instructions followed our verbal discussion of all information discussed above, pending tests ordered and future goals/plans. Patient expressed understanding of current diagnosis, planned testing, follow up and if needed to contact the office for any questions or concerns prior to the next visit.     Call [facility] for Financial Assistance

## 2018-01-04 ENCOUNTER — HOME CARE VISIT (OUTPATIENT)
Dept: HOME HEALTH SERVICES | Facility: HOME HEALTH | Age: 48
End: 2018-01-04
Payer: MEDICAID

## 2018-01-05 ENCOUNTER — HOME CARE VISIT (OUTPATIENT)
Dept: HOME HEALTH SERVICES | Facility: HOME HEALTH | Age: 48
End: 2018-01-05
Payer: MEDICAID

## 2018-01-06 ENCOUNTER — HOME CARE VISIT (OUTPATIENT)
Dept: SCHEDULING | Facility: HOME HEALTH | Age: 48
End: 2018-01-06
Payer: MEDICAID

## 2018-01-06 VITALS — SYSTOLIC BLOOD PRESSURE: 150 MMHG | DIASTOLIC BLOOD PRESSURE: 98 MMHG | HEART RATE: 98 BPM | OXYGEN SATURATION: 97 %

## 2018-01-06 LAB
BACTERIA SPEC CULT: ABNORMAL
GRAM STN SPEC: ABNORMAL
SERVICE CMNT-IMP: ABNORMAL

## 2018-01-06 PROCEDURE — G0157 HHC PT ASSISTANT EA 15: HCPCS

## 2018-01-08 ENCOUNTER — TELEPHONE (OUTPATIENT)
Dept: PULMONOLOGY | Age: 48
End: 2018-01-08

## 2018-01-08 ENCOUNTER — TELEPHONE (OUTPATIENT)
Dept: FAMILY MEDICINE CLINIC | Age: 48
End: 2018-01-08

## 2018-01-08 ENCOUNTER — HOME CARE VISIT (OUTPATIENT)
Dept: SCHEDULING | Facility: HOME HEALTH | Age: 48
End: 2018-01-08
Payer: MEDICAID

## 2018-01-08 DIAGNOSIS — B35.6 FUNGAL INFECTION OF THE GROIN: Primary | ICD-10-CM

## 2018-01-08 DIAGNOSIS — K12.0 ORAL APHTHOUS ULCER: ICD-10-CM

## 2018-01-08 PROCEDURE — G0299 HHS/HOSPICE OF RN EA 15 MIN: HCPCS

## 2018-01-08 RX ORDER — NYSTATIN 100000 U/G
CREAM TOPICAL 2 TIMES DAILY
Qty: 30 G | Refills: 0 | Status: SHIPPED | OUTPATIENT
Start: 2018-01-08 | End: 2018-02-22

## 2018-01-08 RX ORDER — SULFAMETHOXAZOLE AND TRIMETHOPRIM 800; 160 MG/1; MG/1
1 TABLET ORAL 2 TIMES DAILY
Qty: 20 TAB | Refills: 0 | Status: SHIPPED | OUTPATIENT
Start: 2018-01-08 | End: 2018-01-12

## 2018-01-08 RX ORDER — LIDOCAINE HYDROCHLORIDE 20 MG/ML
15 SOLUTION OROPHARYNGEAL AS NEEDED
Qty: 1 BOTTLE | Refills: 0 | Status: SHIPPED | OUTPATIENT
Start: 2018-01-08 | End: 2018-02-21

## 2018-01-08 NOTE — TELEPHONE ENCOUNTER
Spoke with Ivelisse Underwood the nurse from home health and the patient has a fungal infection to the groin and mouth irritation from mouth sores that are painful. Will call scripts to [facility] on [address]. ICD-10-CM ICD-9-CM    1. Fungal infection of the groin B35.6 110.3 nystatin (MYCOSTATIN) topical cream   2.  Oral aphthous ulcer K12.0 528.2 lidocaine (XYLOCAINE) 2 % solution

## 2018-01-08 NOTE — TELEPHONE ENCOUNTER
Pt would like results from sputum sample that was taken to [facility] on 1/3/18.  Please call [phone]

## 2018-01-09 ENCOUNTER — APPOINTMENT (OUTPATIENT)
Dept: CT IMAGING | Age: 48
DRG: 140 | End: 2018-01-09
Attending: EMERGENCY MEDICINE
Payer: MEDICAID

## 2018-01-09 ENCOUNTER — TELEPHONE (OUTPATIENT)
Dept: FAMILY MEDICINE CLINIC | Age: 48
End: 2018-01-09

## 2018-01-09 ENCOUNTER — HOME CARE VISIT (OUTPATIENT)
Dept: SCHEDULING | Facility: HOME HEALTH | Age: 48
End: 2018-01-09
Payer: MEDICAID

## 2018-01-09 ENCOUNTER — APPOINTMENT (OUTPATIENT)
Dept: GENERAL RADIOLOGY | Age: 48
DRG: 140 | End: 2018-01-09
Attending: EMERGENCY MEDICINE
Payer: MEDICAID

## 2018-01-09 ENCOUNTER — HOME CARE VISIT (OUTPATIENT)
Dept: HOME HEALTH SERVICES | Facility: HOME HEALTH | Age: 48
End: 2018-01-09
Payer: MEDICAID

## 2018-01-09 ENCOUNTER — HOSPITAL ENCOUNTER (INPATIENT)
Age: 48
LOS: 3 days | Discharge: HOME HEALTH | DRG: 140 | End: 2018-01-12
Attending: EMERGENCY MEDICINE | Admitting: INTERNAL MEDICINE
Payer: MEDICAID

## 2018-01-09 VITALS — HEART RATE: 84 BPM | SYSTOLIC BLOOD PRESSURE: 138 MMHG | OXYGEN SATURATION: 97 % | DIASTOLIC BLOOD PRESSURE: 80 MMHG

## 2018-01-09 VITALS
SYSTOLIC BLOOD PRESSURE: 146 MMHG | RESPIRATION RATE: 24 BRPM | OXYGEN SATURATION: 95 % | HEART RATE: 86 BPM | DIASTOLIC BLOOD PRESSURE: 98 MMHG | TEMPERATURE: 97 F

## 2018-01-09 VITALS — DIASTOLIC BLOOD PRESSURE: 102 MMHG | SYSTOLIC BLOOD PRESSURE: 142 MMHG | OXYGEN SATURATION: 98 % | HEART RATE: 91 BPM

## 2018-01-09 DIAGNOSIS — J44.9 COPD, SEVERE (HCC): ICD-10-CM

## 2018-01-09 DIAGNOSIS — I50.811 ACUTE RIGHT-SIDED HEART FAILURE (HCC): ICD-10-CM

## 2018-01-09 DIAGNOSIS — I10 HYPERTENSION, UNSPECIFIED TYPE: ICD-10-CM

## 2018-01-09 DIAGNOSIS — R60.9 SWELLING: ICD-10-CM

## 2018-01-09 DIAGNOSIS — J18.9 HCAP (HEALTHCARE-ASSOCIATED PNEUMONIA): ICD-10-CM

## 2018-01-09 DIAGNOSIS — R53.1 GENERALIZED WEAKNESS: ICD-10-CM

## 2018-01-09 DIAGNOSIS — R51.9 ACUTE NONINTRACTABLE HEADACHE, UNSPECIFIED HEADACHE TYPE: ICD-10-CM

## 2018-01-09 DIAGNOSIS — J44.9 COPD, SEVERE (HCC): Primary | ICD-10-CM

## 2018-01-09 DIAGNOSIS — R06.02 SHORTNESS OF BREATH: ICD-10-CM

## 2018-01-09 DIAGNOSIS — J44.1 COPD WITH ACUTE EXACERBATION (HCC): Primary | ICD-10-CM

## 2018-01-09 LAB
ANION GAP SERPL CALC-SCNC: 11 MMOL/L (ref 3–18)
BASOPHILS # BLD: 0 K/UL (ref 0–0.1)
BASOPHILS NFR BLD: 0 % (ref 0–2)
BNP SERPL-MCNC: 21 PG/ML (ref 0–450)
BUN SERPL-MCNC: 16 MG/DL (ref 7–18)
BUN/CREAT SERPL: 21 (ref 12–20)
CALCIUM SERPL-MCNC: 9.2 MG/DL (ref 8.5–10.1)
CHLORIDE SERPL-SCNC: 100 MMOL/L (ref 100–108)
CO2 SERPL-SCNC: 27 MMOL/L (ref 21–32)
CREAT SERPL-MCNC: 0.78 MG/DL (ref 0.6–1.3)
DIFFERENTIAL METHOD BLD: ABNORMAL
EOSINOPHIL # BLD: 0.1 K/UL (ref 0–0.4)
EOSINOPHIL NFR BLD: 1 % (ref 0–5)
ERYTHROCYTE [DISTWIDTH] IN BLOOD BY AUTOMATED COUNT: 13.1 % (ref 11.6–14.5)
GLUCOSE SERPL-MCNC: 114 MG/DL (ref 74–99)
HCT VFR BLD AUTO: 45.4 % (ref 36–48)
HGB BLD-MCNC: 16.3 G/DL (ref 13–16)
LYMPHOCYTES # BLD: 1.8 K/UL (ref 0.9–3.6)
LYMPHOCYTES NFR BLD: 20 % (ref 21–52)
MAGNESIUM SERPL-MCNC: 2.5 MG/DL (ref 1.6–2.6)
MCH RBC QN AUTO: 32.9 PG (ref 24–34)
MCHC RBC AUTO-ENTMCNC: 35.9 G/DL (ref 31–37)
MCV RBC AUTO: 91.5 FL (ref 74–97)
MONOCYTES # BLD: 0.8 K/UL (ref 0.05–1.2)
MONOCYTES NFR BLD: 9 % (ref 3–10)
NEUTS SEG # BLD: 6.4 K/UL (ref 1.8–8)
NEUTS SEG NFR BLD: 70 % (ref 40–73)
PLATELET # BLD AUTO: 179 K/UL (ref 135–420)
PMV BLD AUTO: 10.4 FL (ref 9.2–11.8)
POTASSIUM SERPL-SCNC: 4.3 MMOL/L (ref 3.5–5.5)
RBC # BLD AUTO: 4.96 M/UL (ref 4.7–5.5)
SODIUM SERPL-SCNC: 138 MMOL/L (ref 136–145)
TROPONIN I SERPL-MCNC: <0.02 NG/ML (ref 0–0.04)
WBC # BLD AUTO: 9 K/UL (ref 4.6–13.2)

## 2018-01-09 PROCEDURE — 83735 ASSAY OF MAGNESIUM: CPT | Performed by: EMERGENCY MEDICINE

## 2018-01-09 PROCEDURE — G0155 HHCP-SVS OF CSW,EA 15 MIN: HCPCS

## 2018-01-09 PROCEDURE — 74011250636 HC RX REV CODE- 250/636: Performed by: EMERGENCY MEDICINE

## 2018-01-09 PROCEDURE — 96375 TX/PRO/DX INJ NEW DRUG ADDON: CPT

## 2018-01-09 PROCEDURE — 65660000000 HC RM CCU STEPDOWN

## 2018-01-09 PROCEDURE — G0157 HHC PT ASSISTANT EA 15: HCPCS

## 2018-01-09 PROCEDURE — 83880 ASSAY OF NATRIURETIC PEPTIDE: CPT | Performed by: EMERGENCY MEDICINE

## 2018-01-09 PROCEDURE — 80048 BASIC METABOLIC PNL TOTAL CA: CPT | Performed by: EMERGENCY MEDICINE

## 2018-01-09 PROCEDURE — 84484 ASSAY OF TROPONIN QUANT: CPT | Performed by: EMERGENCY MEDICINE

## 2018-01-09 PROCEDURE — 96365 THER/PROPH/DIAG IV INF INIT: CPT

## 2018-01-09 PROCEDURE — 70450 CT HEAD/BRAIN W/O DYE: CPT

## 2018-01-09 PROCEDURE — 74011000250 HC RX REV CODE- 250: Performed by: EMERGENCY MEDICINE

## 2018-01-09 PROCEDURE — 99285 EMERGENCY DEPT VISIT HI MDM: CPT

## 2018-01-09 PROCEDURE — 85025 COMPLETE CBC W/AUTO DIFF WBC: CPT | Performed by: EMERGENCY MEDICINE

## 2018-01-09 PROCEDURE — G0152 HHCP-SERV OF OT,EA 15 MIN: HCPCS

## 2018-01-09 PROCEDURE — 71046 X-RAY EXAM CHEST 2 VIEWS: CPT

## 2018-01-09 PROCEDURE — 93005 ELECTROCARDIOGRAM TRACING: CPT

## 2018-01-09 RX ORDER — IPRATROPIUM BROMIDE AND ALBUTEROL SULFATE 2.5; .5 MG/3ML; MG/3ML
3 SOLUTION RESPIRATORY (INHALATION)
Status: COMPLETED | OUTPATIENT
Start: 2018-01-09 | End: 2018-01-09

## 2018-01-09 RX ORDER — KETOROLAC TROMETHAMINE 30 MG/ML
15 INJECTION, SOLUTION INTRAMUSCULAR; INTRAVENOUS
Status: DISCONTINUED | OUTPATIENT
Start: 2018-01-09 | End: 2018-01-10

## 2018-01-09 RX ORDER — MAGNESIUM SULFATE HEPTAHYDRATE 40 MG/ML
2 INJECTION, SOLUTION INTRAVENOUS
Status: COMPLETED | OUTPATIENT
Start: 2018-01-09 | End: 2018-01-10

## 2018-01-09 RX ORDER — FUROSEMIDE 20 MG/1
20 TABLET ORAL DAILY
Qty: 5 TAB | Refills: 0 | Status: ON HOLD | OUTPATIENT
Start: 2018-01-09 | End: 2018-01-12

## 2018-01-09 RX ORDER — LORAZEPAM 1 MG/1
1 TABLET ORAL
Status: DISCONTINUED | OUTPATIENT
Start: 2018-01-09 | End: 2018-01-10

## 2018-01-09 RX ORDER — AMLODIPINE BESYLATE 5 MG/1
5 TABLET ORAL DAILY
Qty: 30 TAB | Refills: 0 | Status: ON HOLD | OUTPATIENT
Start: 2018-01-09 | End: 2018-01-12

## 2018-01-09 RX ORDER — PROCHLORPERAZINE EDISYLATE 5 MG/ML
10 INJECTION INTRAMUSCULAR; INTRAVENOUS
Status: DISCONTINUED | OUTPATIENT
Start: 2018-01-09 | End: 2018-01-10

## 2018-01-09 RX ADMIN — CEFEPIME HYDROCHLORIDE 1 G: 1 INJECTION, POWDER, FOR SOLUTION INTRAMUSCULAR; INTRAVENOUS at 23:10

## 2018-01-09 RX ADMIN — MAGNESIUM SULFATE IN WATER 2 G: 40 INJECTION, SOLUTION INTRAVENOUS at 23:11

## 2018-01-09 RX ADMIN — IPRATROPIUM BROMIDE AND ALBUTEROL SULFATE 3 ML: .5; 3 SOLUTION RESPIRATORY (INHALATION) at 23:11

## 2018-01-09 RX ADMIN — METHYLPREDNISOLONE SODIUM SUCCINATE 125 MG: 125 INJECTION, POWDER, FOR SOLUTION INTRAMUSCULAR; INTRAVENOUS at 23:11

## 2018-01-09 RX ADMIN — SODIUM CHLORIDE 500 ML: 900 INJECTION, SOLUTION INTRAVENOUS at 23:11

## 2018-01-09 NOTE — IP AVS SNAPSHOT
[id number] [address] Claude Fleming Patient: Castro Ernandez Sr. MRN: [id number] :[id number] About your hospitalization You were admitted on:  2018 You last received care in the:  [facility] [id number] [place] [address]. You were discharged on:  2018 Why you were hospitalized Your primary diagnosis was:  Not on File Your diagnoses also included:  Copd With Acute Exacerbation (Hcc), Acute Right-Sided Heart Failure, Ha (Headache) Follow-up Information Follow up With Details Comments Contact Info Austen Jennings NP  Office closed on friday US will schedule follow up 1/15/2018. 718 [facility] [address] [facility] Wayne Leung MD Go on 2018 follow up @10:45am 235 [facility] Jorge N [address] [address] 
[phone] Your Scheduled Appointments 2018 10:45 AM EST Follow Up with Wayne Leung MD  
[address] ([facility] [address] [place] [address], [address] [address]  
[phone] 2018 10:00 AM EST Follow Up with Austen Jennings NP 2698 [facility] ([facility]) [address] [place] [id number] [phone] Discharge Orders None A check randy indicates which time of day the medication should be taken. My Medications START taking these medications Instructions Each Dose to Equal  
 Morning Noon Evening Bedtime  
 mirtazapine 7.5 mg tablet Commonly known as:  Vestanton Kitchen Your last dose was: Your next dose is: Take 1 Tab by mouth nightly. 7.5 mg  
    
   
   
   
  
 trimethoprim-sulfamethoxazole  mg per tablet Commonly known as:  Mehreen Sebastian Replaces:  trimethoprim-sulfamethoxazole 160-800 mg per tablet Your last dose was: Your next dose is: Take 1 Tab by mouth every twelve (12) hours for 12 days. 1 Tab CHANGE how you take these medications Instructions Each Dose to Equal  
 Morning Noon Evening Bedtime  
 albuterol 90 mcg/actuation inhaler Commonly known as:  PROVENTIL HFA, VENTOLIN HFA, PROAIR HFA What changed:  reasons to take this Your last dose was: Your next dose is: Take 2 Puffs by inhalation every four (4) hours as needed for Wheezing or Shortness of Breath. 2 Puff  
    
   
   
   
  
 amLODIPine 10 mg tablet Commonly known as:  Marlin Fair What changed:   
- medication strength 
- how much to take Your last dose was: Your next dose is: Take 1 Tab by mouth daily. 10 mg CONTINUE taking these medications Instructions Each Dose to Equal  
 Morning Noon Evening Bedtime  
 albuterol-ipratropium 2.5 mg-0.5 mg/3 ml Nebu Commonly known as:  Jung Alas Your last dose was: Your next dose is:    
   
   
 3 mL by Nebulization route every six (6) hours as needed. 3 mL  
    
   
   
   
  
 docusate sodium 100 mg capsule Commonly known as:  Josse Thomas Your last dose was: Your next dose is: Take 1 Cap by mouth two (2) times a day for 30 days. Prevent constipation 100 mg  
    
   
   
   
  
 fluticasone-salmeterol 500-50 mcg/dose diskus inhaler Commonly known as:  ADVAIR Your last dose was: Your next dose is: Take 1 Puff by inhalation two (2) times a day. 1 Puff  
    
   
   
   
  
 furosemide 20 mg tablet Commonly known as:  LASIX Your last dose was: Your next dose is: Take 1 Tab by mouth daily for 5 days. 20 mg  
    
   
   
   
  
 lidocaine 2 % solution Commonly known as:  XYLOCAINE Your last dose was: Your next dose is: Take 15 mL by mouth as needed for Pain. Indications: MOUTH IRRITATION 15 mL LORazepam 1 mg tablet Commonly known as:  ATIVAN Your last dose was: Your next dose is: Take 0.5 Tabs by mouth every eight (8) hours as needed for Anxiety. Max Daily Amount: 1.5 mg.  
 0.5 mg  
    
   
   
   
  
 nystatin topical cream  
Commonly known as:  MYCOSTATIN Your last dose was: Your next dose is:    
   
   
 Apply  to affected area two (2) times a day. pantoprazole 40 mg tablet Commonly known as:  PROTONIX Your last dose was: Your next dose is: Take 1 Tab by mouth Daily (before breakfast). 40 mg  
    
   
   
   
  
 predniSONE 10 mg tablet Commonly known as:  Verterry Jesus Your last dose was: Your next dose is:    
   
   
 40mg X 7 days, 20 mg x 7 days, 10mg x 7 days, 5 mg x 7 days then stop  
     
   
   
   
  
 roflumilast Tab tablet Commonly known as:  Haresh All Your last dose was: Your next dose is: Take 1 Tab by mouth daily. Indications: PREVENTION OF BRONCHOSPASM WITH CHRONIC BRONCHITIS  
 500 mcg  
    
   
   
   
  
 tiotropium 18 mcg inhalation capsule Commonly known as:  Anna Sarmiento Your last dose was: Your next dose is: Take 1 Cap by inhalation daily. Indications: BRONCHOSPASM PREVENTION WITH COPD  
 1 Cap WALKABOUT 1 OXYGEN SYSTEM Your last dose was: Your next dose is:    
   
   
 2 L/min by Nasal route continuous. 2 L/min STOP taking these medications   
 acetaminophen-codeine 300-30 mg per tablet Commonly known as:  TYLENOL-CODEINE #3  
   
  
 traZODone 50 mg tablet Commonly known as:  DESYREL  
   
  
 trimethoprim-sulfamethoxazole 160-800 mg per tablet Commonly known as:  BACTRIM DS, SEPTRA DS  
 Replaced by:  trimethoprim-sulfamethoxazole  mg per tablet  
   
  
 varenicline 1 mg tablet Commonly known as:  Rishi Louieicks Where to Get Your Medications Information on where to get these meds will be given to you by the nurse or doctor. ! Ask your nurse or doctor about these medications  
  albuterol 90 mcg/actuation inhaler  
 albuterol-ipratropium 2.5 mg-0.5 mg/3 ml Nebu  
 amLODIPine 10 mg tablet  
 furosemide 20 mg tablet  
 mirtazapine 7.5 mg tablet  
 pantoprazole 40 mg tablet  
 roflumilast Tab tablet  
 tiotropium 18 mcg inhalation capsule  
 trimethoprim-sulfamethoxazole  mg per tablet Discharge Instructions MRSA: Care Instructions Your Care Instructions MRSA stands for methicillin-resistant Staphylococcus aureus. It is a type of bacteria that can cause a staph infection. But it cannot be killed by the antibiotic methicillin and some other antibiotics. This sometimes makes it harder to treat. The bacteria are widespread on skin and in the nose. MRSA can cause infections of the skin, heart, blood, and bones. The bacteria can spread quickly in the body and cause serious problems. MRSA can also be spread from person to person. Depending on how serious your infection is, the doctor may drain your wound and you may get antibiotics through a small tube placed in a vein (IV). Your doctor may also give you an antibiotic ointment to use on sores or in your nose. Follow-up care is a key part of your treatment and safety. Be sure to make and go to all appointments, and call your doctor if you are having problems. It's also a good idea to know your test results and keep a list of the medicines you take. How can you care for yourself at home? · Take your antibiotics as directed. Do not stop taking them just because you feel better. You need to take the full course of antibiotics.  
· Keep any cuts or other wounds covered while they heal. 
 · Wash your hands often, especially after you touch elastic bandages or other dressings over a wound. This can keep the bacteria from spreading. Wrap bandages in a plastic bag before you throw them away. · Do not share towels, washcloths, razors, clothing, or other items that touched your wound or bandage. Wash your sheets, towels, and clothes with warm water and detergent. Dry them in a hot dryer, if possible. · Keep shared areas clean by wiping down surfaces (such as countertops, doorknobs, and light switches) with a disinfectant. When should you call for help? Call your doctor now or seek immediate medical care if: 
? · You have worse symptoms of infection, such as: 
¨ Increased pain, swelling, warmth, or redness. ¨ Red streaks leading from the area. ¨ Pus draining from the area. ¨ A fever. ? Watch closely for changes in your health, and be sure to contact your doctor if: 
? · You do not get better as expected. Where can you learn more? Go to http://costa-jessica.info/. Enter T919 in the search box to learn more about \"MRSA: Care Instructions. \" Current as of: March 3, 2017 Content Version: 11.4 © 3632-4394 [employer]. Care instructions adapted under license by [employer] (which disclaims liability or warranty for this information). If you have questions about a medical condition or this instruction, always ask your healthcare professional. [address] [address] any warranty or liability for your use of this information. Headache: Care Instructions Your Care Instructions Headaches have many possible causes. Most headaches aren't a sign of a more serious problem, and they will get better on their own. Home treatment may help you feel better faster. The doctor has checked you carefully, but problems can develop later. If you notice any problems or new symptoms, get medical treatment right away. Follow-up care is a key part of your treatment and safety. Be sure to make and go to all appointments, and call your doctor if you are having problems. It's also a good idea to know your test results and keep a list of the medicines you take. How can you care for yourself at home? · Do not drive if you have taken a prescription pain medicine. · Rest in a quiet, dark room until your headache is gone. Close your eyes and try to relax or go to sleep. Don't watch TV or read. · Put a cold, moist cloth or cold pack on the painful area for 10 to 20 minutes at a time. Put a thin cloth between the cold pack and your skin. · Use a warm, moist towel or a heating pad set on low to relax tight shoulder and neck muscles. · Have someone gently massage your neck and shoulders. · Take pain medicines exactly as directed. ¨ If the doctor gave you a prescription medicine for pain, take it as prescribed. ¨ If you are not taking a prescription pain medicine, ask your doctor if you can take an over-the-counter medicine. · Be careful not to take pain medicine more often than the instructions allow, because you may get worse or more frequent headaches when the medicine wears off. · Do not ignore new symptoms that occur with a headache, such as a fever, weakness or numbness, vision changes, or confusion. These may be signs of a more serious problem. To prevent headaches · Keep a headache diary so you can figure out what triggers your headaches. Avoiding triggers may help you prevent headaches. Record when each headache began, how long it lasted, and what the pain was like (throbbing, aching, stabbing, or dull). Write down any other symptoms you had with the headache, such as nausea, flashing lights or dark spots, or sensitivity to bright light or loud noise. Note if the headache occurred near your period.  List anything that might have triggered the headache, such as certain foods (chocolate, cheese, wine) or odors, smoke, bright light, stress, or lack of sleep. · Find healthy ways to deal with stress. Headaches are most common during or right after stressful times. Take time to relax before and after you do something that has caused a headache in the past. 
· Try to keep your muscles relaxed by keeping good posture. Check your jaw, face, neck, and shoulder muscles for tension, and try relaxing them. When sitting at a desk, change positions often, and stretch for 30 seconds each hour. · Get plenty of sleep and exercise. · Eat regularly and well. Long periods without food can trigger a headache. · Treat yourself to a massage. Some people find that regular massages are very helpful in relieving tension. · Limit caffeine by not drinking too much coffee, tea, or soda. But don't quit caffeine suddenly, because that can also give you headaches. · Reduce eyestrain from computers by blinking frequently and looking away from the computer screen every so often. Make sure you have proper eyewear and that your monitor is set up properly, about an arm's length away. · Seek help if you have depression or anxiety. Your headaches may be linked to these conditions. Treatment can both prevent headaches and help with symptoms of anxiety or depression. When should you call for help? Call 911 anytime you think you may need emergency care. For example, call if: 
? · You have signs of a stroke. These may include: 
¨ Sudden numbness, paralysis, or weakness in your face, arm, or leg, especially on only one side of your body. ¨ Sudden vision changes. ¨ Sudden trouble speaking. ¨ Sudden confusion or trouble understanding simple statements. ¨ Sudden problems with walking or balance. ¨ A sudden, severe headache that is different from past headaches. ?Call your doctor now or seek immediate medical care if: 
? · You have a new or worse headache. ? · Your headache gets much worse. Where can you learn more? Go to http://costa-jessica.info/. Enter M271 in the search box to learn more about \"Headache: Care Instructions. \" Current as of: October 14, 2016 Content Version: 11.4 © 4892-9611 [employer]. Care instructions adapted under license by [employer] (which disclaims liability or warranty for this information). If you have questions about a medical condition or this instruction, always ask your healthcare professional. [address] any warranty or liability for your use of this information. Chronic Obstructive Pulmonary Disease (COPD): Care Instructions Your Care Instructions Chronic obstructive pulmonary disease (COPD) is a general term for a group of lung diseases, including emphysema and chronic bronchitis. People with COPD have decreased airflow in and out of the lungs, which makes it hard to breathe. The airways also can get clogged with thick mucus. Cigarette smoking is a major cause of COPD. Although there is no cure for COPD, you can slow its progress. Following your treatment plan and taking care of yourself can help you feel better and live longer. Follow-up care is a key part of your treatment and safety. Be sure to make and go to all appointments, and call your doctor if you are having problems. It's also a good idea to know your test results and keep a list of the medicines you take. How can you care for yourself at home? ?Staying healthy ? · Do not smoke. This is the most important step you can take to prevent more damage to your lungs. If you need help quitting, talk to your doctor about stop-smoking programs and medicines. These can increase your chances of quitting for good. ? · Avoid colds and flu. Get a pneumococcal vaccine shot. If you have had one before, ask your doctor whether you need a second dose. Get the flu vaccine every fall. If you must be around people with colds or the flu, wash your hands often. ? · Avoid secondhand smoke, air pollution, and high altitudes. Also avoid cold, dry air and hot, humid air. Stay at home with your windows closed when air pollution is bad. ?Medicines and oxygen therapy ? · Take your medicines exactly as prescribed. Call your doctor if you think you are having a problem with your medicine. ? · You may be taking medicines such as: ¨ Bronchodilators. These help open your airways and make breathing easier. Bronchodilators are either short-acting (work for 6 to 9 hours) or long-acting (work for 24 hours). You inhale most bronchodilators, so they start to act quickly. Always carry your quick-relief inhaler with you in case you need it while you are away from home. ¨ Corticosteroids (prednisone, budesonide). These reduce airway inflammation. They come in pill or inhaled form. You must take these medicines every day for them to work well. ? · A spacer may help you get more inhaled medicine to your lungs. Ask your doctor or pharmacist if a spacer is right for you. If it is, ask how to use it properly. ? · Do not take any vitamins, over-the-counter medicine, or herbal products without talking to your doctor first.  
? · If your doctor prescribed antibiotics, take them as directed. Do not stop taking them just because you feel better. You need to take the full course of antibiotics. ? · Oxygen therapy boosts the amount of oxygen in your blood and helps you breathe easier. Use the flow rate your doctor has recommended, and do not change it without talking to your doctor first.  
Activity ? · Get regular exercise. Walking is an easy way to get exercise. Start out slowly, and walk a little more each day. ? · Pay attention to your breathing. You are exercising too hard if you cannot talk while you are exercising. ? · Take short rest breaks when doing household chores and other activities.   
? · Learn breathing methods-such as breathing through pursed lips-to help you become less short of breath. ? · If your doctor has not set you up with a pulmonary rehabilitation program, talk to him or her about whether rehab is right for you. Rehab includes exercise programs, education about your disease and how to manage it, help with diet and other changes, and emotional support. Diet ? · Eat regular, healthy meals. Use bronchodilators about 1 hour before you eat to make it easier to eat. Eat several small meals instead of three large ones. Drink beverages at the end of the meal. Avoid foods that are hard to chew. ? · Eat foods that contain protein so that you do not lose muscle mass. ? · Talk with your doctor if you gain too much weight or if you lose weight without trying. ?Mental health ? · Talk to your family, friends, or a therapist about your feelings. It is normal to feel frightened, angry, hopeless, helpless, and even guilty. Talking openly about bad feelings can help you cope. If these feelings last, talk to your doctor. When should you call for help? Call 911 anytime you think you may need emergency care. For example, call if: 
? · You have severe trouble breathing. ?Call your doctor now or seek immediate medical care if: 
? · You have new or worse trouble breathing. ? · You cough up blood. ? · You have a fever. ? Watch closely for changes in your health, and be sure to contact your doctor if: 
? · You cough more deeply or more often, especially if you notice more mucus or a change in the color of your mucus. ? · You have new or worse swelling in your legs or belly. ? · You are not getting better as expected. Where can you learn more? Go to http://costa-jessica.info/. Falguni Camacho in the search box to learn more about \"Chronic Obstructive Pulmonary Disease (COPD): Care Instructions. \" Current as of: May 12, 2017 Content Version: 11.4 © 2200-3171 Healthwise, Incorporated.  Care instructions adapted under license by [address] (which disclaims liability or warranty for this information). If you have questions about a medical condition or this instruction, always ask your healthcare professional. [address] any warranty or liability for your use of this information. COPD Exacerbation Plan: Care Instructions Your Care Instructions If you have chronic obstructive pulmonary disease (COPD), your usual shortness of breath could suddenly get worse. You may start coughing more and have more mucus. This flare-up is called a COPD exacerbation (say \"mz-[id number]-[id number]-gabriel\"). A lung infection or air pollution could set off an exacerbation. Sometimes it can happen after a quick change in temperature or being around chemicals. Work with your doctor to make a plan for dealing with an exacerbation. You can better manage it if you plan ahead. Follow-up care is a key part of your treatment and safety. Be sure to make and go to all appointments, and call your doctor if you are having problems. It's also a good idea to know your test results and keep a list of the medicines you take. How can you care for yourself at home? During an exacerbation · Do not panic if you start to have one. Quick treatment at home may help you prevent serious breathing problems. If you have a COPD exacerbation plan that you developed with your doctor, follow it. · Take your medicines exactly as your doctor tells you. ¨ Use your inhaler as directed by your doctor. If your symptoms do not get better after you use your medicine, have someone take you to the emergency room. Call an ambulance if necessary. ¨ With inhaled medicines, a spacer or a nebulizer may help you get more medicine to your lungs. Ask your doctor or pharmacist how to use them properly. Practice using the spacer in front of a mirror before you have an exacerbation. This may help you get the medicine into your lungs quickly. ¨ If your doctor has given you steroid pills, take them as directed. ¨ Your doctor may have given you a prescription for antibiotics, which you can fill if you need it. ¨ Talk to your doctor if you have any problems with your medicine. And call your doctor if you have to use your antibiotic or steroid pills. Preventing an exacerbation · Do not smoke. This is the most important step you can take to prevent more damage to your lungs and prevent problems. If you already smoke, it is never too late to stop. If you need help quitting, talk to your doctor about stop-smoking programs and medicines. These can increase your chances of quitting for good. · Take your daily medicines as prescribed. · Avoid colds and flu. ¨ Get a pneumococcal vaccine. ¨ Get a flu vaccine each year, as soon as it is available. Ask those you live or work with to do the same, so they will not get the flu and infect you. ¨ Try to stay away from people with colds or the flu. ¨ Wash your hands often. · Avoid secondhand smoke; air pollution; cold, dry air; hot, humid air; and high altitudes. Stay at home with your windows closed when air pollution is bad. · Learn breathing techniques for COPD, such as breathing through pursed lips. These techniques can help you breathe easier during an exacerbation. When should you call for help? Call 911 anytime you think you may need emergency care. For example, call if: 
? · You have severe trouble breathing. ? · You have severe chest pain. ?Call your doctor now or seek immediate medical care if: 
? · You have new or worse shortness of breath. ? · You develop new chest pain. ? · You are coughing more deeply or more often, especially if you notice more mucus or a change in the color of your mucus. ? · You cough up blood. ? · You have new or increased swelling in your legs or belly. ? · You have a fever. ? Watch closely for changes in your health, and be sure to contact your doctor if: ? · You need to use your antibiotic or steroid pills. ? · Your symptoms are getting worse. Where can you learn more? Go to http://costa-jessica.info/. Enter E891 in the search box to learn more about \"COPD Exacerbation Plan: Care Instructions. \" Current as of: May 12, 2017 Content Version: 11.4 © 1164-3115 [employer]. Care instructions adapted under license by [employer] (which disclaims liability or warranty for this information). If you have questions about a medical condition or this instruction, always ask your healthcare professional. [place] [address] any warranty or liability for your use of this information. Learning About COPD Triggers What are triggers? When you have COPD (chronic obstructive pulmonary disease), certain things can make your symptoms worse. These are called triggers. They include: · Cigarette smoke or air pollution. · Illnesses like colds, flu, or pneumonia. · Cleaning supplies or other chemicals. · Gases, particles, or fumes from wood or kerosene home heaters. Not all people have the same triggers. What may cause symptoms in one person may not be a problem for another person. How do triggers affect COPD? Triggers can make it harder for your lungs to work as they should and can lead to sudden difficulty breathing and other symptoms. When you are around a trigger, a COPD flare-up is more likely. If your symptoms are severe, you may need emergency treatment or have to go to the hospital for treatment. If you know what your triggers are and can avoid them, you can reduce how often you have flare-ups and how much COPD affects your life. It's also important to be active and to take your daily medicines as prescribed. This helps prevent flare-ups too. What can you do to avoid triggers? The first thing is to know your triggers.  
When you are having symptoms, note the things around you that might be causing them. Then look for patterns in what may be triggering your symptoms. When you have your list of possible triggers, work with your doctor to find ways to avoid them. Here are some ways to avoid a few common triggers. · Do not smoke or allow others to smoke around you. If you need help quitting, talk to your doctor about stop-smoking programs and medicines. These can increase your chances of quitting for good. · If there is a lot of pollution, pollen, or dust outside, stay at home and keep your windows closed. Use an air conditioner or air filter in your home. Check your local weather report or newspaper for air quality and pollen reports. · Get a flu vaccine every year. Talk to your doctor about getting a pneumococcal shot. Wash your hands often to prevent infections. How can you manage a flare-up? Do not panic if you start to have a COPD flare-up. · If you have a COPD action plan, follow the plan. In general: ¨ Use your quick-relief inhaler as directed by your doctor. If your symptoms do not get better after you use your medicine, have someone take you to the emergency room. Call an ambulance if needed. ¨ Use a spacer with your metered-dose inhaler (MDI). If you have a nebulizer for inhaled medicine, use it. A spacer or nebulizer may help get more medicine to your lungs. ¨ If your doctor has given you other inhaled medicines or steroid pills, take them as directed. ¨ If your doctor has given you a prescription for an antibiotic, fill it if you need to. ¨ Call your doctor if you have to use your antibiotic or steroid pills. Where can you learn more? Go to http://costa-jessica.info/. Enter G858 in the search box to learn more about \"Learning About COPD Triggers. \" Current as of: May 12, 2017 Content Version: 11.4 © 3355-6816 Healthwise, [employer].  Care instructions adapted under license by [employer] (which disclaims liability or warranty for this information). If you have questions about a medical condition or this instruction, always ask your healthcare professional. [address] any warranty or liability for your use of this information. Learning About COPD and How to Prevent Lung Infections How do lung infections affect COPD? Lung infections like pneumonia and acute bronchitis are common causes of COPD flare-ups. And people who have COPD are more likely to get these lung infections, especially if they smoke. When you have COPD, it is important to know the symptoms of pneumonia and acute bronchitis and call your doctor if you have them. Symptoms include: · A cough that brings up more mucus than usual. 
· Fever. · Shortness of breath. What can you do to prevent these infections? Stay healthy · Get a flu shot every year. · Get a pneumococcal vaccine shot. If you have had one before, ask your doctor whether you need another dose. Two different types of pneumococcal vaccines are recommended for people ages 72 and older. · If you must be around people with colds or the flu, wash your hands often. · Do not smoke. This is the most important step you can take to prevent more damage to your lungs. If you need help quitting, talk to your doctor about stop-smoking programs and medicines. These can increase your chances of quitting for good. · Avoid secondhand smoke, air pollution, and high altitudes. Also avoid cold, dry air and hot, humid air. Stay at home with your windows closed when air pollution is bad. Exercise and eat well · If your doctor recommends it, get more exercise. Walking is a good choice. Bit by bit, increase the amount you walk every day. Try for at least 30 minutes on most days of the week. · Eat regular, well-balanced meals. Eating right keeps your energy levels up and helps your body fight infection. · Get plenty of rest and sleep. Follow-up care is a key part of your treatment and safety. Be sure to make and go to all appointments, and call your doctor if you are having problems. It's also a good idea to know your test results and keep a list of the medicines you take. Where can you learn more? Go to http://costa-jessica.info/. Enter D213 in the search box to learn more about \"Learning About COPD and How to Prevent Lung Infections. \" Current as of: May 12, 2017 Content Version: 11.4 © 6947-6977 [employer]. Care instructions adapted under license by [employer] (which disclaims liability or warranty for this information). If you have questions about a medical condition or this instruction, always ask your healthcare professional. [address] any warranty or liability for your use of this information. Patient armband removed and shredded DISCHARGE SUMMARY from Nurse PATIENT INSTRUCTIONS: 
 
 
F-face looks uneven A-arms unable to move or move unevenly S-speech slurred or non-existent T-time-call 911 as soon as signs and symptoms begin-DO NOT go Back to bed or wait to see if you get better-TIME IS BRAIN. Warning Signs of HEART ATTACK Call 911 if you have these symptoms: 
? Chest discomfort. Most heart attacks involve discomfort in the center of the chest that lasts more than a few minutes, or that goes away and comes back. It can feel like uncomfortable pressure, squeezing, fullness, or pain. ? Discomfort in other areas of the upper body. Symptoms can include pain or discomfort in one or both arms, the back, neck, jaw, or stomach. ? Shortness of breath with or without chest discomfort. ? Other signs may include breaking out in a cold sweat, nausea, or lightheadedness. Don't wait more than five minutes to call [address]! Fast action can save your life. Calling 911 is almost always the fastest way to get lifesaving treatment. Emergency Medical Services staff can begin treatment when they arrive  up to an hour sooner than if someone gets to the hospital by car. The discharge information has been reviewed with the patient. The patient verbalized understanding. Discharge medications reviewed with the patient and appropriate educational materials and side effects teaching were provided. ___________________________________________________________________________________________________________________________________ MyChart Announcement We are excited to announce that we are making your provider's discharge notes available to you in Geeklisthart. You will see these notes when they are completed and signed by the physician that discharged you from your recent hospital stay. If you have any questions or concerns about any information you see in Geeklisthart, please call the Health Information Department where you were seen or reach out to your Primary Care Provider for more information about your plan of care. Providers Seen During Your Hospitalization Provider Specialty Primary office phone Mart Augustine MD Emergency Medicine [phone] Maximo Hwang MD Internal Medicine [phone] Tri Angel MD Internal Medicine [phone] Your Primary Care Physician (PCP) Primary Care Physician Office Phone Office Fax Mir Ravindra [phone] You are allergic to the following No active allergies Recent Documentation Height Weight BMI Smoking Status 1.753 m 81.9 kg 26.67 kg/m2 Former Smoker Emergency Contacts Name Discharge Info Relation Home Work Mobile Lazara [place] DISCHARGE CAREGIVER [3] Spouse [3] [phone] Patient Belongings The following personal items are in your possession at time of discharge: 
  Dental Appliances: None  Visual Aid: Glasses      Home Medications: None   Jewelry: None  Clothing: At bedside    Other Valuables: Cell Phone, Conne[employer]ity, Other (comment) (phone )  Personal Items Sent to Safe: none Discharge Instructions Attachments/References ALBUTEROL (BY BREATHING) (ENGLISH) IPRATROPIUM/ALBUTEROL (BY BREATHING) (ENGLISH) AMLODIPINE (BY MOUTH) (ENGLISH) FUROSEMIDE (BY MOUTH) (ENGLISH) MIRTAZAPINE (BY MOUTH) (ENGLISH) PANTOPRAZOLE (BY MOUTH) (ENGLISH) ROFLUMILAST (BY MOUTH) (ENGLISH) TIOTROPIUM (BY BREATHING) (ENGLISH) SULFAMETHOXAZOLE/TRIMETHOPRIM (BY MOUTH) (ENGLISH) Patient Handouts Albuterol (By breathing) Albuterol (al-BUE-ter-ol) Treats or prevents bronchospasm. Brand Name(s): AccuNeb, ProAir HFA, ProAir RespiClick, Proventil HFA, Ventolin HFA There may be other brand names for this medicine. When This Medicine Should Not Be Used: This medicine is not right for everyone. Do not use it if you had an allergic reaction to albuterol or milk proteins. How to Use This Medicine:  
Aerosol, Powder Under Pressure, Suspension, Solution, Powder · Your doctor will tell you how much medicine to use. Do not use more than directed. Ask your doctor or pharmacist if you have questions about how to use your inhaler, nebulizer, or medicine. · Solution:  
¨ You will use this medicine with an inhaler device called a nebulizer. The nebulizer turns the medicine into a fine mist that you breathe in through your mouth and to your lungs. Your caregiver will show you how to use your nebulizer. ¨ Store unopened vials of this medicine at room temperature, away from heat and direct light. Do not freeze.  An open vial of medicine must be used right away. · Aerosol inhaler: ¨ Shake the inhaler well just before each use. Avoid spraying this medicine into your eyes. ¨ Test spray in the air before using for the first time or if the inhaler has not been used for a while. ¨ If you are supposed to use more than one puff, wait 1 to 2 minutes before inhaling the second puff. Repeat these steps for the next puff, starting with shaking the inhaler. ¨ Clean the inhaler mouthpiece at least once a week with warm running water for 30 seconds. Let it air dry completely. ¨ Store the canister at room temperature, away from heat and direct light. Do not freeze. Do not keep this medicine inside a car where it could be exposed to extreme heat or cold. Do not poke holes in the canister or throw it into a fire, even if the canister is empty. · ProAir® Respiclick® inhaler: ¨ Make sure the cap is closed. Do not open the cap unless you are going to use the medicine. ¨ Hold the inhaler upright. Open the cap fully until you hear a click. Your inhaler is now ready to use. ¨ Close the cap firmly over the mouthpiece after each use. ¨ Keep the inhaler clean and dry at all times. Do not wash or put any part of the inhaler in water. ¨ To clean the mouthpiece, wipe it gently with a dry cloth or tissue. ¨ Keep the medicine in the foil pouch until you are ready to use it. Store at room temperature, away from heat and direct light. Do not freeze. · Read and follow the patient instructions that come with this medicine. Talk to your doctor or pharmacist if you have any questions. · Missed dose: Take a dose as soon as you remember. If it is almost time for your next dose, wait until then and take a regular dose. Do not take extra medicine to make up for a missed dose. Drugs and Foods to Avoid: Ask your doctor or pharmacist before using any other medicine, including over-the-counter medicines, vitamins, and herbal products. · Some foods and medicines can affect how albuterol works. Tell your doctor if you are using any of the following: ¨ Digoxin ¨ Beta-blocker medicine ¨ Diuretic (water pill) ¨ Medicine for depression or an MAO inhibitor within the past 2 weeks Warnings While Using This Medicine: · Tell your doctor if you are pregnant or breastfeeding, or if you have kidney disease, diabetes, heart disease, heart rhythm problems, high blood pressure, overactive thyroid, or a history of seizures. · This medicine may cause the following problems:  
¨ Paradoxical bronchospasm (increased trouble breathing right after use) ¨ Low potassium levels in the blood · If you use a corticosteroid medicine to control your asthma, keep using it as instructed by your doctor. · Call your doctor if your symptoms do not improve or if they get worse. · If any of your asthma medicines do not seem to be working as well as usual, call your doctor right away. Do not change your doses or stop using your medicines without asking your doctor. · Your doctor will check your progress and the effects of this medicine at regular visits. Keep all appointments. · Keep all medicine out of the reach of children. Never share your medicine with anyone. Possible Side Effects While Using This Medicine:  
Call your doctor right away if you notice any of these side effects: · Allergic reaction: Itching or hives, swelling in your face or hands, swelling or tingling in your mouth or throat, chest tightness, trouble breathing · Dry mouth, increased thirst, muscle cramps, nausea, vomiting · Fast, pounding, or uneven heartbeat, chest pain · Lightheadedness, dizziness, or fainting · Trouble breathing, increased wheezing, cough, chest tightness If you notice these less serious side effects, talk with your doctor: · Cough, sore throat, runny or stuffy nose · Headache · Tremors, nervousness If you notice other side effects that you think are caused by this medicine, tell your doctor. Call your doctor for medical advice about side effects. You may report side effects to FDA at 2-975-ZLH-4190 © 2017 [facility] Information is for End User's use only and may not be sold, redistributed or otherwise used for commercial purposes. The above information is an  only. It is not intended as medical advice for individual conditions or treatments. Talk to your doctor, nurse or pharmacist before following any medical regimen to see if it is safe and effective for you. Ipratropium/Albuterol (By breathing) Albuterol Sulfate (al-BUE-ter-ol SUL-fate), Ipratropium Bromide (sc-bm-PUOU-pee-um BROE-mide) Treats chronic obstructive pulmonary disease (COPD). Brand Name(s): Combivent, Combivent Respimat, Duoneb There may be other brand names for this medicine. When This Medicine Should Not Be Used: This medicine is not right for everyone. Do not use it if you had an allergic reaction to albuterol, ipratropium, levalbuterol, or atropine. Do not use Combivent® if you are allergic to soya lecithin, soybeans, or peanuts. How to Use This Medicine:  
Powder Under Pressure, Solution, Spray · Your doctor will tell you how much medicine to use. Do not use more than directed. This medicine comes in more than one form. Make sure you understand how to use your medicine. Ask your doctor or pharmacist if you have questions. · Read and follow the patient instructions that come with this medicine. Talk to your doctor or pharmacist if you have any questions. · Aerosol inhaler: ¨ You will use this medicine with a device called a metered-dose inhaler. The inhaler fits on the medicine canister and turns the medicine into a fine spray that you breathe in through your mouth and to your lungs. You may be told to use a spacer, which is a tube that is placed between the inhaler and your mouth.  Your caregiver will show you how to use your inhaler and the spacer (if needed). ¨ The first time you use a canister, shake it well for 10 seconds. Then spray the medicine 3 times away from your face. The medicine is now ready to use. ¨ Shake the canister well for 10 seconds each time before you use a dose. If you have not used the medicine in 24 hours, repeat the steps for the first use. ¨ Clean the inhaler mouthpiece daily with warm water. · Spray inhaler: ¨ You need to prime the spray inhaler the first time you use it and if you have not used it for more than 3 days in a row. ¨ To prime the spray inhaler, point the inhaler away from your face and press the button until some medicine sprays out. Then spray the medicine 3 more times. The medicine is now ready to use. ¨ Follow the instructions that came with the medicine if it has been more than 3 days since you used the medicine. ¨ Clean the mouthpiece with a damp cloth or a tissue. Do this at least once a week. · Missed dose: Take a dose as soon as you remember. If it is almost time for your next dose, wait until then and take a regular dose. Do not take extra medicine to make up for a missed dose. · Keep the medicine in the foil pouch until you are ready to use it. Store at room temperature, away from heat and direct light. Do not freeze. · Store the canister at room temperature, away from heat and direct light. Do not freeze. Do not keep this medicine inside a car where it could be exposed to extreme heat or cold. Do not poke holes in the canister or throw it into a fire, even if the canister is empty. Drugs and Foods to Avoid: Ask your doctor or pharmacist before using any other medicine, including over-the-counter medicines, vitamins, and herbal products. · Some medicines can affect how ipratropium/albuterol works. Tell your doctor if you are using any of the following: 
¨ Other inhaled medicines ¨ Beta-blocker medicine ¨ Diuretic (water pills) ¨ Medicine to treat depression or an MAO inhibitor within the past 2 weeks Warnings While Using This Medicine: · Tell your doctor if you are pregnant or breastfeeding, or if you have kidney disease, liver disease, diabetes, blood vessel problems, glaucoma, heart disease, heart rhythm problems, high blood pressure, an overactive thyroid, prostate problems, trouble urinating, or a history of seizures. · This medicine may cause the following problems: 
¨ Paradoxical bronchospasm (trouble breathing), which can be life-threatening ¨ Heart rhythm changes · Do not use this medicine for a sudden COPD attack. Make sure you always have your rescue medicine with you to treat sudden symptoms. · If any of your COPD medicines do not seem to be working as well as usual, call your doctor right away. Take all of your COPD medicines as instructed. Do not use more medicine or change your dose without asking your doctor. · This medicine may cause dizziness, drowsiness, or trouble seeing. Do not drive or do anything else that could be dangerous until you know how this medicine affects you. · Keep all medicine out of the reach of children. Never share your medicine with anyone. Possible Side Effects While Using This Medicine:  
Call your doctor right away if you notice any of these side effects: · Allergic reaction: Itching or hives, swelling in your face or hands, swelling or tingling in your mouth or throat, chest tightness, trouble breathing · Blurred vision, eye pain, seeing halos around objects · Chest pain, fast, pounding, or uneven heartbeat · Decrease in how much or how often you urinate, painful or difficult urination · Dry mouth, increased thirst, muscle cramps, nausea, vomiting · Increased trouble breathing If you notice these less serious side effects, talk with your doctor: · Nervousness, shaking If you notice other side effects that you think are caused by this medicine, tell your doctor. Call your doctor for medical advice about side effects. You may report side effects to FDA at 4-804-PYW-0141 © 2017 [facility] Information is for End User's use only and may not be sold, redistributed or otherwise used for commercial purposes. The above information is an  only. It is not intended as medical advice for individual conditions or treatments. Talk to your doctor, nurse or pharmacist before following any medical regimen to see if it is safe and effective for you. Amlodipine (By mouth) Amlodipine (ag-YNG-um-peen) Treats high blood pressure and angina (chest pain). This medicine is a calcium channel blocker. Brand Name(s): Norvasc There may be other brand names for this medicine. When This Medicine Should Not Be Used: This medicine is not right for everyone. Do not use it if you had an allergic reaction to amlodipine. How to Use This Medicine:  
Tablet, Dissolving Tablet · Take your medicine as directed. Your dose may need to be changed several times to find what works best for you. Take this medicine at the same time each day. · Read and follow the patient instructions that come with this medicine. Talk to your doctor or pharmacist if you have any questions. · Missed dose: Take a dose as soon as you remember. If it has been more than 12 hours since you were supposed to take your dose, skip the missed dose and take your next regular dose at the regular time. · Store the medicine in a closed container at room temperature, away from heat, moisture, and direct light. Drugs and Foods to Avoid: Ask your doctor or pharmacist before using any other medicine, including over-the-counter medicines, vitamins, and herbal products. · Some medicines can affect how amlodipine works. Tell your doctor if you are also using any of the following: ¨ Clarithromycin, cyclosporine, diltiazem, itraconazole, ritonavir, sildenafil, simvastatin, tacrolimus Warnings While Using This Medicine: · Tell your doctor if you are pregnant or breastfeeding, or if you have liver disease, heart disease, coronary artery disease, or aortic stenosis. · This medicine could lower your blood pressure too much, especially when you first use it or if you are dehydrated. Stand or sit up slowly if you feel lightheaded or dizzy. · Your doctor will check your progress and the effects of this medicine at regular visits. Keep all appointments. · Do not stop using this medicine without asking your doctor, even if you feel well. This medicine will not cure high blood pressure, but it will help keep it in normal range. You may have to take blood pressure medicine for the rest of your life. · Keep all medicine out of the reach of children. Never share your medicine with anyone. Possible Side Effects While Using This Medicine:  
Call your doctor right away if you notice any of these side effects: · Allergic reaction: Itching or hives, swelling in your face or hands, swelling or tingling in your mouth or throat, chest tightness, trouble breathing · Lightheadedness, dizziness · New or worsening chest pain · Swelling in your hands, ankles, or legs · Trouble breathing, nausea, unusual sweating, fainting If you notice other side effects that you think are caused by this medicine, tell your doctor. Call your doctor for medical advice about side effects. You may report side effects to FDA at 9-383-FDA-6392 © 2017 [facility] Information is for End User's use only and may not be sold, redistributed or otherwise used for commercial purposes. The above information is an  only. It is not intended as medical advice for individual conditions or treatments. Talk to your doctor, nurse or pharmacist before following any medical regimen to see if it is safe and effective for you. Furosemide (By mouth) Furosemide (jvnz-YQ-vk-mide) Treats fluid retention (edema) and high blood pressure. This medicine is a diuretic (water pill). Brand Name(s): Active-Medicated Specimen Collection Kit, Diuscreen Multi-Drug Medicated Test Kit, Lasix, RX-Specimen Collection Kit, Specimen Collection Kit There may be other brand names for this medicine. When This Medicine Should Not Be Used: This medicine is not right for everyone. Do not use it if you had an allergic reaction to furosemide. How to Use This Medicine:  
Liquid, Tablet · Take your medicine as directed. Your dose may need to be changed several times to find what works best for you. · You may take this medicine with food if it upsets your stomach. · Oral liquid: Measure the oral liquid medicine with a marked measuring spoon, oral syringe, or medicine cup. · Tablet: Swallow the tablet whole. Do not crush, break, or chew it. · Missed dose: Take a dose as soon as you remember. If it is almost time for your next dose, wait until then and take a regular dose. Do not take extra medicine to make up for a missed dose. · Store the medicine in a closed container at room temperature, away from heat, moisture, and direct light. Drugs and Foods to Avoid: Ask your doctor or pharmacist before using any other medicine, including over-the-counter medicines, vitamins, and herbal products. · Some medicines can affect how furosemide works. Tell your doctor if you are also using any of the following: ¨ Cisplatin, cyclosporine, digoxin, ethacrynic acid, licorice, lithium, methotrexate, or phenytoin ¨ Adrenocorticotropic hormone (ACTH) ¨ Laxative ¨ Medicine to treat an infection ¨ NSAID pain or arthritis medicine (including aspirin, diclofenac, ibuprofen, indomethacin, naproxen) ¨ Other blood pressure medicines ¨ Steroid medicine (including dexamethasone, hydrocortisone, methylprednisolone, prednisolone, prednisone) ¨ Thyroid medicine · If you also take sucralfate, allow at least 2 hours between the time you take furosemide and the time you take sucralfate. · Alcohol, narcotic pain medicine, or sleeping pills may cause you to feel more lightheaded, dizzy, or faint when used with this medicine. Warnings While Using This Medicine: · Tell your doctor if you are pregnant or breastfeeding, or if you have kidney disease, liver disease (including cirrhosis), diabetes, gout, low blood pressure, lupus, an enlarged prostate, trouble urinating, or an allergy to sulfa drugs. Tell your doctor if you are on a low-salt diet. · This medicine may cause the following problems:  
¨ Low levels of minerals in your blood, such as potassium and sodium ¨ Blood sugar level changes ¨ Hearing problems · Make sure any doctor or dentist who treats you knows that you are using this medicine. · This medicine could lower your blood pressure too much, especially when you first use it or if you are dehydrated. Stand or sit up slowly if you feel lightheaded or dizzy. · This medicine may make your skin more sensitive to sunlight. Wear sunscreen. Do not use sunlamps or tanning beds. · Your doctor will do lab tests at regular visits to check on the effects of this medicine. Keep all appointments. · Keep all medicine out of the reach of children. Never share your medicine with anyone. Possible Side Effects While Using This Medicine:  
Call your doctor right away if you notice any of these side effects: · Allergic reaction: Itching or hives, swelling in your face or hands, swelling or tingling in your mouth or throat, chest tightness, trouble breathing · Blistering, peeling, red skin rash · Confusion, weakness, muscle twitching · Dry mouth, increased thirst, muscle cramps, uneven heartbeat · Sudden and severe stomach pain, nausea, vomiting, fever, lightheadedness · Hearing loss, ringing in the ears · Lightheadedness, dizziness, fainting · Severe diarrhea · Unusual bleeding or bruising · Yellow skin or eyes If you notice these less serious side effects, talk with your doctor: · Loss of appetite, stomach cramps If you notice other side effects that you think are caused by this medicine, tell your doctor. Call your doctor for medical advice about side effects. You may report side effects to FDA at 1-617-FDA-9386 © 2017 [address] Information is for End User's use only and may not be sold, redistributed or otherwise used for commercial purposes. The above information is an  only. It is not intended as medical advice for individual conditions or treatments. Talk to your doctor, nurse or pharmacist before following any medical regimen to see if it is safe and effective for you. Mirtazapine (By mouth) Mirtazapine (hhl-VNH-f-peen) Treats depression. Brand Name(s): Remeron, Remeron Soltab There may be other brand names for this medicine. When This Medicine Should Not Be Used: This medicine is not right for everyone. Do not use it if you had an allergic reaction to mirtazapine. How to Use This Medicine:  
Tablet, Dissolving Tablet · Take your medicine as directed. Your dose may need to be changed several times to find what works best for you. Your doctor may tell you to take this medicine at bedtime, because it can make you sleepy. · You may need to take this medicine for several weeks before you begin to feel better. · Make sure your hands are dry before you handle the disintegrating tablet. Peel back the foil from the blister pack, then remove the tablet. Do not push the tablet through the foil. Place the tablet in your mouth. After it has melted, swallow or take a drink of water. Do not crush, split, or break the tablet. · This medicine should come with a Medication Guide. Ask your pharmacist for a copy if you do not have one. · Missed dose: Take a dose as soon as you remember.  If it is almost time for your next dose, wait until then and take a regular dose. Do not take extra medicine to make up for a missed dose. · Store the medicine in a closed container at room temperature, away from heat, moisture, and direct light. Keep the orally disintegrating tablet in the original package until you are ready to take it. Drugs and Foods to Avoid: Ask your doctor or pharmacist before using any other medicine, including over-the-counter medicines, vitamins, and herbal products. · Do not use this medicine and an MAO inhibitor within 14 days of each other. · Some medicines can affect how mirtazapine works. Tell your doctor if you are using any of the following: 
¨ Buspirone, carbamazepine, cimetidine, diazepam, fentanyl, lithium, nefazodone, phenytoin, rifampicin, Pramod's wort, tramadol, or tryptophan ¨ Other medicine to treat depression, a triptan medicine to treat migraine headaches, medicine to treat an infection, medicine to treat HIV, or a blood thinner (such as warfarin) · Do not drink alcohol while you are using this medicine. Warnings While Using This Medicine: · Tell your doctor if you are pregnant or breastfeeding, or if you have kidney disease, liver disease, glaucoma, high cholesterol, heart or blood vessel disease, or a history of seizures, heart attack, or stroke. Tell your doctor if you have phenylketonuria. · For some children, teenagers, and young adults, this medicine may increase mental or emotional problems. This may lead to thoughts of suicide and violence. Talk with your doctor right away if you have any thoughts or behavior changes that concern you. Tell your doctor if you or anyone in your family has a history of bipolar disorder or suicide attempts. · This medicine may cause the following problems: 
¨ Serotonin syndrome (may be life-threatening) ¨ Decreased white blood cells, which can affect your body's ability to fight an infection ¨ Low sodium levels in the blood · Do not stop using this medicine suddenly. Your doctor will need to slowly decrease your dose before you stop it completely. · This medicine may make you dizzy or drowsy. Do not drive or do anything else that could be dangerous until you know how this medicine affects you. Stand or sit up slowly if you feel lightheaded or dizzy. · Your doctor will do lab tests at regular visits to check on the effects of this medicine. Keep all appointments. · Keep all medicine out of the reach of children. Never share your medicine with anyone. Possible Side Effects While Using This Medicine:  
Call your doctor right away if you notice any of these side effects: · Allergic reaction: Itching or hives, swelling in your face or hands, swelling or tingling in your mouth or throat, chest tightness, trouble breathing · Anxiety, restlessness, fast heartbeat, fever, sweating, muscle spasms, nausea, vomiting, diarrhea, seeing or hearing things that are not there · Blistering, peeling, red skin rash · Eye pain, vision changes, seeing halos around lights · Confusion, weakness, muscle twitching · Feeling more excited or energetic than usual 
· Fever, chills, cough, sore throat, body aches · Thoughts of hurting yourself or others, worsening depression, unusual behaviors If you notice these less serious side effects, talk with your doctor: · Dry mouth, constipation · Increased appetite or weight gain · Sleepiness, tiredness If you notice other side effects that you think are caused by this medicine, tell your doctor. Call your doctor for medical advice about side effects. You may report side effects to FDA at 1-913-FDA-0448 © 2017 [address] Information is for End User's use only and may not be sold, redistributed or otherwise used for commercial purposes. The above information is an  only. It is not intended as medical advice for individual conditions or treatments.  Talk to your doctor, nurse or pharmacist before following any medical regimen to see if it is safe and effective for you. Pantoprazole (By mouth) Pantoprazole (pan-TOE-pra-zole) Treats gastroesophageal reflux disease (GERD), a damaged esophagus, and high levels of stomach acid. This medicine is a proton pump inhibitor (PPI). Brand Name(s): Protonix There may be other brand names for this medicine. When This Medicine Should Not Be Used: This medicine is not right for everyone. Do not use it if you had an allergic reaction to pantoprazole or similar medicines. How to Use This Medicine:  
Packet, Tablet, Delayed Release Tablet, Long Acting Tablet · Your doctor will tell you how much medicine to use. Do not use more than directed. Take the medicine at least 30 minutes before a meal. 
· Delayed-release tablet: Swallow the tablet whole. Do not crush, break, or chew it. · Delayed-release packet: ¨ To prepare with applesauce: § Mix the packet contents with 1 teaspoon of applesauce. Do not mix with water, or other liquids or food. Do not divide the packet contents to make smaller doses. § Swallow the mixture within 10 minutes after you mix it. Do not chew or crush the granules. § Sip some water after you take the mixture to make sure you swallow all of the medicine. ¨ To prepare with apple juice: § Mix the packet contents with 1 teaspoon of apple juice in a small cup. Do not divide the packet contents to make smaller doses. § Stir for 5 seconds and drink the mixture immediately. Do not chew or crush the granules. § To make sure you get all of the medicine, add more apple juice to the cup. Drink it immediately. ¨ To prepare for a feeding tube: § Pour the packet contents in a 2-ounce (60 milliliter [mL]) catheter-tip syringe. § Add 10 mL of apple juice to the syringe. Add the mixture to the tube. Gently tap or shake the barrel of the syringe to help empty it. § Add 10 mL of apple juice to the syringe and put it in the tube. Do this at least 2 times. There should be no granules left in the syringe. · This medicine should come with a Medication Guide. Ask your pharmacist for a copy if you do not have one. · Missed dose: Take a dose as soon as you remember. If it is almost time for your next dose, wait until then and take a regular dose. Do not take extra medicine to make up for a missed dose. · Store the medicine in a closed container at room temperature, away from heat, moisture, and direct light. Drugs and Foods to Avoid: Ask your doctor or pharmacist before using any other medicine, including over-the-counter medicines, vitamins, and herbal products. · Some foods and medicines can affect how pantoprazole works. Tell your doctor if you are using any of the following: ¨ Ampicillin, atazanavir, digoxin, erlotinib, ketoconazole, methotrexate, mycophenolate mofetil, nelfinavir ¨ Blood thinner (including warfarin) ¨ Diuretic (water pill) ¨ Iron supplements Warnings While Using This Medicine: · Tell your doctor if you are pregnant or breastfeeding, or if you have liver disease, lupus, or osteoporosis. · This medicine may cause the following problems: ¨ Kidney problems ¨ Low vitamin B12 or magnesium levels ¨ Increased risk of broken bones in the hip, wrist, or spine · This medicine can cause diarrhea. Call your doctor if the diarrhea becomes severe, does not stop, or is bloody. Do not take any medicine to stop diarrhea until you have talked to your doctor. Diarrhea can occur 2 months or more after you stop taking this medicine. · Tell any doctor or dentist who treats you that you are using this medicine. This medicine may affect certain medical test results. · Your doctor will check your progress and the effects of this medicine at regular visits. Keep all appointments. · Keep all medicine out of the reach of children.  Never share your medicine with anyone. Possible Side Effects While Using This Medicine:  
Call your doctor right away if you notice any of these side effects: · Allergic reaction: Itching or hives, swelling in your face or hands, swelling or tingling in your mouth or throat, chest tightness, trouble breathing · Blistering, peeling, red skin rash · Fever, joint pain, swelling in your body, unusual weight gain, change in how much or how often you urinate · Joint pain, rash on your cheeks or arms that gets worse in the sun · Seizures, dizziness, uneven heartbeat, muscle cramps or twitching · Severe diarrhea, stomach cramps, fever · Stomach pain, nausea, vomiting, weight loss If you notice other side effects that you think are caused by this medicine, tell your doctor. Call your doctor for medical advice about side effects. You may report side effects to FDA at 2-898-FDA-9067 © 2017 [address] Information is for End User's use only and may not be sold, redistributed or otherwise used for commercial purposes. The above information is an  only. It is not intended as medical advice for individual conditions or treatments. Talk to your doctor, nurse or pharmacist before following any medical regimen to see if it is safe and effective for you. Roflumilast (By mouth) Roflumilast (zqz-HZOF-od-last) Prevents exacerbations (flare-ups) of chronic obstructive pulmonary disease (COPD). Brand Name(s): Saleem There may be other brand names for this medicine. When This Medicine Should Not Be Used: This medicine is not right for everyone. Do not use it if you had an allergic reaction to roflumilast. 
How to Use This Medicine:  
Tablet · Your doctor will tell you how much medicine to use. Do not use more than directed. · This medicine should come with a Medication Guide. Ask your pharmacist for a copy if you do not have one. · Missed dose: Take a dose as soon as you remember. If it is almost time for your next dose, wait until then and take a regular dose. Do not take extra medicine to make up for a missed dose. · Store the medicine in a closed container at room temperature, away from heat, moisture, and direct light. Drugs and Foods to Avoid: Ask your doctor or pharmacist before using any other medicine, including over-the-counter medicines, vitamins, and herbal products. · Some medicines can affect how roflumilast works. Tell your doctor if you are using any of the following: ¨ Carbamazepine, cimetidine, enoxacin, erythromycin, fluvoxamine, ketoconazole, phenobarbital, phenytoin, rifampicin ¨ Birth control pills that contain gestodene and ethinyl estradiol Warnings While Using This Medicine: · Tell your doctor if you are pregnant or breastfeeding, or if you have liver disease. · This medicine may cause depression or thoughts of suicide. Tell your doctor if you have a history of depression or mental health problems. · This medicine is used to prevent the acute flare-ups of COPD. It is not used to stop a flare-up that has already started. Your doctor may give you a different medicine for sudden breathing problems. · Your doctor will check your progress and the effects of this medicine at regular visits. Keep all appointments. · Keep all medicine out of the reach of children. Never share your medicine with anyone. Possible Side Effects While Using This Medicine:  
Call your doctor right away if you notice any of these side effects: · Allergic reaction: Itching or hives, swelling in your face or hands, swelling or tingling in your mouth or throat, chest tightness, trouble breathing · Anxiety, nervousness, restlessness, trouble sleeping · Unexplained weight loss · Unusual changes in moods or behaviors, depression, thoughts of hurting yourself If you notice these less serious side effects, talk with your doctor: · Diarrhea If you notice other side effects that you think are caused by this medicine, tell your doctor. Call your doctor for medical advice about side effects. You may report side effects to FDA at 0-964-FDA-4822 © 2017 [address] Information is for End User's use only and may not be sold, redistributed or otherwise used for commercial purposes. The above information is an  only. It is not intended as medical advice for individual conditions or treatments. Talk to your doctor, nurse or pharmacist before following any medical regimen to see if it is safe and effective for you. Tiotropium (By breathing) Tiotropium ([id number]-tk-QDOR-pee-um) Treats asthma and chronic obstructive pulmonary disease (COPD). Brand Name(s): Spiriva, Spiriva Respimat There may be other brand names for this medicine. When This Medicine Should Not Be Used: This medicine is not right for everyone. Do not use it if you had an allergic reaction to tiotropium, ipratropium, or atropine. How to Use This Medicine:  
Capsule, Spray · Your doctor will tell you how much medicine to use. Do not use more than directed. Use this medicine at the same time each day. · Read and follow the patient instructions that come with this medicine. Talk to your doctor or pharmacist if you have any questions. · Spiriva® HandiHaler®:  
¨ Spiriva® capsules should be used only with the HandiHaler® device. Do not swallow the capsule. Do not use the device with any other medicine. ¨ Do not remove the capsule from the blister pack until you are ready to use it. Use the capsule right away once you have opened a blister pack. ¨ Do not let the powder from the capsule get into your eyes. ¨ After you have used a dose, remove the used capsule and throw it away. · Spiriva® Respimat® inhaler: ¨ Do not use the inhaler with any other medicine. ¨ Do not turn the clear base before you insert the cartridge. ¨ Do not remove the cartridge once it has been inserted in the inhaler. ¨ When you use the inhaler for the first time, you will need to prime it to make sure it delivers the correct amount of medicine. To prime the inhaler, point it away from your face and press the dose release button until you see a spray of medicine. Then press the button 3 more times. The inhaler is now ready to use. ¨ If you have not used your inhaler for more than 3 days, spray 1 dose of medicine away from your face to prime the inhaler. If you have not used your inhaler for more than 21 days, repeat the instructions for priming the inhaler for the first time. · Missed dose: Take a dose as soon as you remember. If it is almost time for your next dose, wait until then and take a regular dose. Do not take extra medicine to make up for a missed dose. Do not use this medicine more than once every 24 hours. · Store the medicine in a closed container at room temperature, away from heat, moisture, and direct light. ¨ Spiriva® HandiHaler®: Leave the capsules in the blister pack until you are ready to use the medicine. ¨ Spiriva® Respimat® inhaler: Throw away the inhaler 3 months after its first use. Drugs and Foods to Avoid: Ask your doctor or pharmacist before using any other medicine, including over-the-counter medicines, vitamins, and herbal products. · Some medicines can affect how tiotropium works. Tell your doctor if you are using ipratropium or other inhaled medicines. Warnings While Using This Medicine: · Tell your doctor if you are pregnant or breastfeeding, or if you have kidney disease, glaucoma, prostate problems, or problems urinating. Tell your doctor if you are allergic to milk proteins. · This medicine may cause the following problems: 
¨ Paradoxical bronchospasm (increased trouble breathing), which can be life-threatening ¨ New or worsening narrow-angle glaucoma ¨ New or worsening trouble urinating · This medicine may cause dizziness or blurred vision. Do not drive or do anything else that could be dangerous until you know how this medicine affects you. · Tell your doctor right away if you use other medicines for your lung condition and they do not seem to be working as well as usual. Do not change your doses or stop using your medicines before you talk to your doctor. · Your doctor will check your progress and the effects of this medicine at regular visits. Keep all appointments. · Keep all medicine out of the reach of children. Never share your medicine with anyone. Possible Side Effects While Using This Medicine:  
Call your doctor right away if you notice any of these side effects: · Allergic reaction: Itching or hives, swelling in your face or hands, swelling or tingling in your mouth or throat, chest tightness, trouble breathing · Change in how much or how often you urinate, painful or difficult urination · Eye pain or redness, vision problems, seeing halos around lights · Increased trouble breathing If you notice these less serious side effects, talk with your doctor: · Dry mouth · Cough, fever, runny nose, or sore throat If you notice other side effects that you think are caused by this medicine, tell your doctor. Call your doctor for medical advice about side effects. You may report side effects to FDA at 6-400-FDA-6574 © 2017 [facility] Information is for End User's use only and may not be sold, redistributed or otherwise used for commercial purposes. The above information is an  only. It is not intended as medical advice for individual conditions or treatments. Talk to your doctor, nurse or pharmacist before following any medical regimen to see if it is safe and effective for you. Sulfamethoxazole/Trimethoprim (By mouth) Sulfamethoxazole (sul-fa-meth-OX-a-zole), Trimethoprim (trye-METH-oh-prim) Treats or prevents infections. Brand Name(s): Bactrim, Bactrim DS, SMZ-TMP Pediatric, Sulfatrim, Sulfatrim Pediatric There may be other brand names for this medicine. When This Medicine Should Not Be Used: This medicine is not right for everyone. Do not use it if you had an allergic reaction to trimethoprim, sulfamethoxazole, or any sulfa drug. Do not use this medicine if you are pregnant, if you have anemia caused by low levels of folic acid, or if you have a history of drug-induced thrombocytopenia. How to Use This Medicine:  
Liquid, Tablet · Your doctor will tell you how much medicine to use. Do not use more than directed. · Measure the oral liquid medicine with a marked measuring spoon, oral syringe, or medicine cup. · Drink extra fluids so you will urinate more often and help prevent kidney problems. · Take all of the medicine in your prescription to clear up your infection, even if you feel better after the first few doses. · Missed dose: Take a dose as soon as you remember. If it is almost time for your next dose, wait until then and take a regular dose. Do not take extra medicine to make up for a missed dose. · Store the medicine in a closed container at room temperature, away from heat, moisture, and direct light. Do not freeze the oral liquid. Drugs and Foods to Avoid: Ask your doctor or pharmacist before using any other medicine, including over-the-counter medicines, vitamins, and herbal products. · Some medicines can affect how this medicine works. Tell your doctor if you also use the following:  
¨ amantadine, cyclosporine, digoxin, indomethacin, memantine, methotrexate, phenytoin, pyrimethamine, or warfarin ¨ an ACE inhibitor, diabetes medicine (glipizide, glyburide, metformin, pioglitazone, repaglinide, rosiglitazone), a diuretic (water pill, such as hydrochlorothiazide), or a tricyclic antidepressant Warnings While Using This Medicine: · It is not safe to take this medicine during pregnancy.  It could harm an unborn baby. Tell your doctor right away if you become pregnant. · Tell your doctor if you are breastfeeding, or if you have kidney disease, liver disease, diabetes, malabsorption or malnutrition, folate deficiency, porphyria, thyroid problems, or a history of alcoholism. Tell your doctor if you have asthma or severe allergies, especially if you are allergic to any medicines. It is important for your doctor to know if you have HIV or AIDS, because this medicine might work differently for you. · This medicine may cause a severe allergic reaction. · This medicine may lower the number of platelets in your body, which are necessary for proper blood clotting. This may cause you to bleed or get infections more easily. Talk with your doctor if you have concerns about this. · This medicine can cause diarrhea. Call your doctor if the diarrhea becomes severe, does not stop, or is bloody. Do not take any medicine to stop diarrhea until you have talked to your doctor. Diarrhea can occur 2 months or more after you stop taking this medicine. · Tell any doctor or dentist who treats you that you are using this medicine. This medicine may affect certain medical test results. · Your doctor will do lab tests at regular visits to check on the effects of this medicine. Keep all appointments. · Keep all medicine out of the reach of children. Never share your medicine with anyone. Possible Side Effects While Using This Medicine:  
Call your doctor right away if you notice any of these side effects: · Allergic reaction: Itching or hives, swelling in your face or hands, swelling or tingling in your mouth or throat, chest tightness, trouble breathing · Blistering, peeling, or red skin rash · Dark urine or pale stools, nausea, vomiting, loss of appetite, stomach pain, yellow skin or eyes · Chest pain, cough, or trouble breathing · Confusion, weakness · Muscle twitching · Severe diarrhea, stomach pain, cramps, bloating · Skin rash, purple spots on your skin, or very pale or yellow skin · Sore throat, fever, muscle pain · Uneven heartbeat, numbness or tingling in your hands, feet, or lips · Unusual bleeding, bruising, or weakness If you notice these less serious side effects, talk with your doctor: · Mild nausea, vomiting, or loss of appetite If you notice other side effects that you think are caused by this medicine, tell your doctor. Call your doctor for medical advice about side effects. You may report side effects to FDA at 1-590-FDA-3758 © 2017 [facility] Information is for End User's use only and may not be sold, redistributed or otherwise used for commercial purposes. The above information is an  only. It is not intended as medical advice for individual conditions or treatments. Talk to your doctor, nurse or pharmacist before following any medical regimen to see if it is safe and effective for you. Please provide this summary of care documentation to your next provider. Signatures-by signing, you are acknowledging that this After Visit Summary has been reviewed with you and you have received a copy. Patient Signature:  ____________________________________________________________ Date:  ____________________________________________________________  
  
Irene Palafox Provider Signature:  ____________________________________________________________ Date:  ____________________________________________________________

## 2018-01-09 NOTE — ED TRIAGE NOTES
Pt. Sophie Bath in by medics, per medics pt. C/o  Chest pain x 3 months; c/o headache today along with SOB.

## 2018-01-09 NOTE — TELEPHONE ENCOUNTER
Home Care Nurse Kenia Salvador called regarding Mr Linda Sultana stating that patient Blood Pressure is 142/102 today, and that patient abdomen and feet are swollen. Patient will be out of lasix after today and nurse is concerned. Please call.

## 2018-01-09 NOTE — IP AVS SNAPSHOT
[address] Patient: Kali Medina Sr. MRN: [id number] :1970 A check randy indicates which time of day the medication should be taken. My Medications START taking these medications Instructions Each Dose to Equal  
 Morning Noon Evening Bedtime  
 mirtazapine 7.5 mg tablet Commonly known as:  Stevo Garcia Your last dose was: Your next dose is: Take 1 Tab by mouth nightly. 7.5 mg  
    
   
   
   
  
 trimethoprim-sulfamethoxazole  mg per tablet Commonly known as:  Isamar Moore Replaces:  trimethoprim-sulfamethoxazole 160-800 mg per tablet Your last dose was: Your next dose is: Take 1 Tab by mouth every twelve (12) hours for 12 days. 1 Tab CHANGE how you take these medications Instructions Each Dose to Equal  
 Morning Noon Evening Bedtime  
 albuterol 90 mcg/actuation inhaler Commonly known as:  PROVENTIL HFA, VENTOLIN HFA, PROAIR HFA What changed:  reasons to take this Your last dose was: Your next dose is: Take 2 Puffs by inhalation every four (4) hours as needed for Wheezing or Shortness of Breath. 2 Puff  
    
   
   
   
  
 amLODIPine 10 mg tablet Commonly known as:  Leonie Parikh What changed:   
- medication strength 
- how much to take Your last dose was: Your next dose is: Take 1 Tab by mouth daily. 10 mg CONTINUE taking these medications Instructions Each Dose to Equal  
 Morning Noon Evening Bedtime  
 albuterol-ipratropium 2.5 mg-0.5 mg/3 ml Nebu Commonly known as:  Kenneth Nichols Your last dose was: Your next dose is:    
   
   
 3 mL by Nebulization route every six (6) hours as needed. 3 mL  
    
   
   
   
  
 docusate sodium 100 mg capsule Commonly known as:  Estrada Diane Your last dose was: Your next dose is: Take 1 Cap by mouth two (2) times a day for 30 days. Prevent constipation 100 mg  
    
   
   
   
  
 fluticasone-salmeterol 500-50 mcg/dose diskus inhaler Commonly known as:  ADVAIR Your last dose was: Your next dose is: Take 1 Puff by inhalation two (2) times a day. 1 Puff  
    
   
   
   
  
 furosemide 20 mg tablet Commonly known as:  LASIX Your last dose was: Your next dose is: Take 1 Tab by mouth daily for 5 days. 20 mg  
    
   
   
   
  
 lidocaine 2 % solution Commonly known as:  XYLOCAINE Your last dose was: Your next dose is: Take 15 mL by mouth as needed for Pain. Indications: MOUTH IRRITATION 15 mL LORazepam 1 mg tablet Commonly known as:  ATIVAN Your last dose was: Your next dose is: Take 0.5 Tabs by mouth every eight (8) hours as needed for Anxiety. Max Daily Amount: 1.5 mg.  
 0.5 mg  
    
   
   
   
  
 nystatin topical cream  
Commonly known as:  MYCOSTATIN Your last dose was: Your next dose is:    
   
   
 Apply  to affected area two (2) times a day. pantoprazole 40 mg tablet Commonly known as:  PROTONIX Your last dose was: Your next dose is: Take 1 Tab by mouth Daily (before breakfast). 40 mg  
    
   
   
   
  
 predniSONE 10 mg tablet Commonly known as:  Xiang Cohn Your last dose was: Your next dose is:    
   
   
 40mg X 7 days, 20 mg x 7 days, 10mg x 7 days, 5 mg x 7 days then stop  
     
   
   
   
  
 roflumilast Tab tablet Commonly known as:  Naeem Aldridge Your last dose was: Your next dose is: Take 1 Tab by mouth daily. Indications: PREVENTION OF BRONCHOSPASM WITH CHRONIC BRONCHITIS  
 500 mcg tiotropium 18 mcg inhalation capsule Commonly known as:  Jenaro Gonzales Your last dose was: Your next dose is: Take 1 Cap by inhalation daily. Indications: BRONCHOSPASM PREVENTION WITH COPD  
 1 Cap WALKABOUT 1 OXYGEN SYSTEM Your last dose was: Your next dose is:    
   
   
 2 L/min by Nasal route continuous. 2 L/min STOP taking these medications   
 acetaminophen-codeine 300-30 mg per tablet Commonly known as:  TYLENOL-CODEINE #3  
   
  
 traZODone 50 mg tablet Commonly known as:  DESYREL  
   
  
 trimethoprim-sulfamethoxazole 160-800 mg per tablet Commonly known as:  BACTRIM DS, SEPTRA DS Replaced by:  trimethoprim-sulfamethoxazole  mg per tablet  
   
  
 varenicline 1 mg tablet Commonly known as:  Lupillo Burleson Where to Get Your Medications Information on where to get these meds will be given to you by the nurse or doctor. ! Ask your nurse or doctor about these medications  
  albuterol 90 mcg/actuation inhaler  
 albuterol-ipratropium 2.5 mg-0.5 mg/3 ml Nebu  
 amLODIPine 10 mg tablet  
 furosemide 20 mg tablet  
 mirtazapine 7.5 mg tablet  
 pantoprazole 40 mg tablet  
 roflumilast Tab tablet  
 tiotropium 18 mcg inhalation capsule  
 trimethoprim-sulfamethoxazole  mg per tablet

## 2018-01-09 NOTE — TELEPHONE ENCOUNTER
Received call from [facility] Ms. Stef Haynes stating that patient Blood Pressure is 142/102 today, and that patient abdomen and feet are still swollen though some improved from last visit since starting lasix. The patient also reporting headaches, continued SOB, and Dyspnea. He was restarted on antibiotics by pulmonologist as he still has a respiratory infection. He is still pending ECHO of heart for suspected cor pulmonale. Assessment/Plan  1. COPD, severe ([address]  -Followed by Dr. Michael Pastor, currently on Abx for respiratory infection.   -Continue inhalers, prednisone, oxygen support  - REFERRAL TO CARDIOLOGY    2. Swelling  - Continued swelling and edema with concern of cor pulmonale. Will reorder lasix. Continue with echocardiogram, and refer to cardiology for further evaluation  - furosemide (LASIX) 20 mg tablet; Take 1 Tab by mouth daily for 5 days. Dispense: 5 Tab; Refill: 0  - REFERRAL TO CARDIOLOGY    3. Hypertension, unspecified type  -Increase Norvasc to 5 mg   -pending ECHO   - amLODIPine (NORVASC) 5 mg tablet; Take 1 Tab by mouth daily. Dispense: 30 Tab; Refill: 0  - furosemide (LASIX) 20 mg tablet; Take 1 Tab by mouth daily for 5 days. Dispense: 5 Tab; Refill: 0  - REFERRAL TO CARDIOLOGY      Discussed with patient via telephone that given his fragile and severe state of health that if his symptoms continue or worsen he will need to go to the ED for evaluation and treatment as they are better equipped and have on hand resources to assists in his care. Pt  Verbalized understanding of plan and is agreeable that he will go to ED if he feels worse.

## 2018-01-10 ENCOUNTER — APPOINTMENT (OUTPATIENT)
Dept: ULTRASOUND IMAGING | Age: 48
DRG: 140 | End: 2018-01-10
Attending: HOSPITALIST
Payer: MEDICAID

## 2018-01-10 ENCOUNTER — HOME CARE VISIT (OUTPATIENT)
Dept: SCHEDULING | Facility: HOME HEALTH | Age: 48
End: 2018-01-10
Payer: MEDICAID

## 2018-01-10 LAB
ANION GAP SERPL CALC-SCNC: 8 MMOL/L (ref 3–18)
ATRIAL RATE: 93 BPM
BASOPHILS # BLD: 0 K/UL (ref 0–0.1)
BASOPHILS NFR BLD: 0 % (ref 0–2)
BUN SERPL-MCNC: 17 MG/DL (ref 7–18)
BUN/CREAT SERPL: 22 (ref 12–20)
CALCIUM SERPL-MCNC: 8.6 MG/DL (ref 8.5–10.1)
CALCULATED P AXIS, ECG09: 61 DEGREES
CALCULATED R AXIS, ECG10: 69 DEGREES
CALCULATED T AXIS, ECG11: 51 DEGREES
CHLORIDE SERPL-SCNC: 104 MMOL/L (ref 100–108)
CO2 SERPL-SCNC: 25 MMOL/L (ref 21–32)
CREAT SERPL-MCNC: 0.76 MG/DL (ref 0.6–1.3)
DIAGNOSIS, 93000: NORMAL
DIFFERENTIAL METHOD BLD: ABNORMAL
EOSINOPHIL # BLD: 0 K/UL (ref 0–0.4)
EOSINOPHIL NFR BLD: 0 % (ref 0–5)
ERYTHROCYTE [DISTWIDTH] IN BLOOD BY AUTOMATED COUNT: 12.9 % (ref 11.6–14.5)
GLUCOSE BLD STRIP.AUTO-MCNC: 135 MG/DL (ref 70–110)
GLUCOSE BLD STRIP.AUTO-MCNC: 144 MG/DL (ref 70–110)
GLUCOSE BLD STRIP.AUTO-MCNC: 195 MG/DL (ref 70–110)
GLUCOSE SERPL-MCNC: 176 MG/DL (ref 74–99)
HCT VFR BLD AUTO: 41.8 % (ref 36–48)
HGB BLD-MCNC: 14.6 G/DL (ref 13–16)
LYMPHOCYTES # BLD: 0.6 K/UL (ref 0.9–3.6)
LYMPHOCYTES NFR BLD: 7 % (ref 21–52)
MCH RBC QN AUTO: 32.1 PG (ref 24–34)
MCHC RBC AUTO-ENTMCNC: 34.9 G/DL (ref 31–37)
MCV RBC AUTO: 91.9 FL (ref 74–97)
MONOCYTES # BLD: 0.1 K/UL (ref 0.05–1.2)
MONOCYTES NFR BLD: 2 % (ref 3–10)
NEUTS SEG # BLD: 7.8 K/UL (ref 1.8–8)
NEUTS SEG NFR BLD: 91 % (ref 40–73)
P-R INTERVAL, ECG05: 134 MS
PLATELET # BLD AUTO: 163 K/UL (ref 135–420)
PMV BLD AUTO: 10.6 FL (ref 9.2–11.8)
POTASSIUM SERPL-SCNC: 4.4 MMOL/L (ref 3.5–5.5)
Q-T INTERVAL, ECG07: 332 MS
QRS DURATION, ECG06: 64 MS
QTC CALCULATION (BEZET), ECG08: 412 MS
RBC # BLD AUTO: 4.55 M/UL (ref 4.7–5.5)
SODIUM SERPL-SCNC: 137 MMOL/L (ref 136–145)
VENTRICULAR RATE, ECG03: 93 BPM
WBC # BLD AUTO: 8.6 K/UL (ref 4.6–13.2)

## 2018-01-10 PROCEDURE — 94640 AIRWAY INHALATION TREATMENT: CPT

## 2018-01-10 PROCEDURE — 93306 TTE W/DOPPLER COMPLETE: CPT

## 2018-01-10 PROCEDURE — 74011250636 HC RX REV CODE- 250/636: Performed by: INTERNAL MEDICINE

## 2018-01-10 PROCEDURE — 82962 GLUCOSE BLOOD TEST: CPT

## 2018-01-10 PROCEDURE — 74011250637 HC RX REV CODE- 250/637: Performed by: INTERNAL MEDICINE

## 2018-01-10 PROCEDURE — 74011250637 HC RX REV CODE- 250/637: Performed by: EMERGENCY MEDICINE

## 2018-01-10 PROCEDURE — 74011250637 HC RX REV CODE- 250/637: Performed by: HOSPITALIST

## 2018-01-10 PROCEDURE — 80048 BASIC METABOLIC PNL TOTAL CA: CPT | Performed by: INTERNAL MEDICINE

## 2018-01-10 PROCEDURE — 36415 COLL VENOUS BLD VENIPUNCTURE: CPT | Performed by: INTERNAL MEDICINE

## 2018-01-10 PROCEDURE — 65660000000 HC RM CCU STEPDOWN

## 2018-01-10 PROCEDURE — 74011250636 HC RX REV CODE- 250/636: Performed by: EMERGENCY MEDICINE

## 2018-01-10 PROCEDURE — 84145 PROCALCITONIN (PCT): CPT | Performed by: INTERNAL MEDICINE

## 2018-01-10 PROCEDURE — 76705 ECHO EXAM OF ABDOMEN: CPT

## 2018-01-10 PROCEDURE — 74011636637 HC RX REV CODE- 636/637: Performed by: HOSPITALIST

## 2018-01-10 PROCEDURE — 85025 COMPLETE CBC W/AUTO DIFF WBC: CPT | Performed by: INTERNAL MEDICINE

## 2018-01-10 RX ORDER — FUROSEMIDE 20 MG/1
20 TABLET ORAL DAILY
Status: DISCONTINUED | OUTPATIENT
Start: 2018-01-10 | End: 2018-01-12 | Stop reason: HOSPADM

## 2018-01-10 RX ORDER — AMLODIPINE BESYLATE 5 MG/1
5 TABLET ORAL DAILY
Status: DISCONTINUED | OUTPATIENT
Start: 2018-01-10 | End: 2018-01-10

## 2018-01-10 RX ORDER — DOCUSATE SODIUM 100 MG/1
100 CAPSULE, LIQUID FILLED ORAL 2 TIMES DAILY
Status: DISCONTINUED | OUTPATIENT
Start: 2018-01-10 | End: 2018-01-12 | Stop reason: HOSPADM

## 2018-01-10 RX ORDER — ONDANSETRON 2 MG/ML
4 INJECTION INTRAMUSCULAR; INTRAVENOUS
Status: DISCONTINUED | OUTPATIENT
Start: 2018-01-10 | End: 2018-01-10

## 2018-01-10 RX ORDER — BUDESONIDE AND FORMOTEROL FUMARATE DIHYDRATE 80; 4.5 UG/1; UG/1
2 AEROSOL RESPIRATORY (INHALATION)
Status: DISCONTINUED | OUTPATIENT
Start: 2018-01-10 | End: 2018-01-12 | Stop reason: HOSPADM

## 2018-01-10 RX ORDER — IPRATROPIUM BROMIDE AND ALBUTEROL SULFATE 2.5; .5 MG/3ML; MG/3ML
3 SOLUTION RESPIRATORY (INHALATION)
Status: DISCONTINUED | OUTPATIENT
Start: 2018-01-10 | End: 2018-01-12 | Stop reason: HOSPADM

## 2018-01-10 RX ORDER — TRAZODONE HYDROCHLORIDE 50 MG/1
50 TABLET ORAL
Status: DISCONTINUED | OUTPATIENT
Start: 2018-01-10 | End: 2018-01-10

## 2018-01-10 RX ORDER — SULFAMETHOXAZOLE AND TRIMETHOPRIM 400; 80 MG/1; MG/1
1 TABLET ORAL EVERY 12 HOURS
Status: DISCONTINUED | OUTPATIENT
Start: 2018-01-10 | End: 2018-01-12 | Stop reason: HOSPADM

## 2018-01-10 RX ORDER — INSULIN LISPRO 100 [IU]/ML
INJECTION, SOLUTION INTRAVENOUS; SUBCUTANEOUS
Status: DISCONTINUED | OUTPATIENT
Start: 2018-01-10 | End: 2018-01-12 | Stop reason: HOSPADM

## 2018-01-10 RX ORDER — HYDRALAZINE HYDROCHLORIDE 25 MG/1
25 TABLET, FILM COATED ORAL
Status: DISCONTINUED | OUTPATIENT
Start: 2018-01-10 | End: 2018-01-12 | Stop reason: HOSPADM

## 2018-01-10 RX ORDER — ACETAMINOPHEN AND CODEINE PHOSPHATE 300; 30 MG/1; MG/1
2 TABLET ORAL
Status: DISCONTINUED | OUTPATIENT
Start: 2018-01-10 | End: 2018-01-12 | Stop reason: HOSPADM

## 2018-01-10 RX ORDER — PANTOPRAZOLE SODIUM 40 MG/1
40 TABLET, DELAYED RELEASE ORAL
Status: DISCONTINUED | OUTPATIENT
Start: 2018-01-10 | End: 2018-01-12 | Stop reason: HOSPADM

## 2018-01-10 RX ORDER — ENOXAPARIN SODIUM 100 MG/ML
40 INJECTION SUBCUTANEOUS EVERY 24 HOURS
Status: DISCONTINUED | OUTPATIENT
Start: 2018-01-10 | End: 2018-01-12 | Stop reason: HOSPADM

## 2018-01-10 RX ORDER — LORAZEPAM 0.5 MG/1
0.5 TABLET ORAL
Status: DISCONTINUED | OUTPATIENT
Start: 2018-01-10 | End: 2018-01-12 | Stop reason: HOSPADM

## 2018-01-10 RX ORDER — NYSTATIN 100000 U/G
OINTMENT TOPICAL 2 TIMES DAILY
Status: DISCONTINUED | OUTPATIENT
Start: 2018-01-10 | End: 2018-01-12 | Stop reason: HOSPADM

## 2018-01-10 RX ORDER — ONDANSETRON 2 MG/ML
4 INJECTION INTRAMUSCULAR; INTRAVENOUS
Status: DISCONTINUED | OUTPATIENT
Start: 2018-01-10 | End: 2018-01-12 | Stop reason: HOSPADM

## 2018-01-10 RX ORDER — DEXTROSE 50 % IN WATER (D50W) INTRAVENOUS SYRINGE
25-50 AS NEEDED
Status: DISCONTINUED | OUTPATIENT
Start: 2018-01-10 | End: 2018-01-12 | Stop reason: HOSPADM

## 2018-01-10 RX ORDER — MIRTAZAPINE 15 MG/1
7.5 TABLET, FILM COATED ORAL
Status: DISCONTINUED | OUTPATIENT
Start: 2018-01-10 | End: 2018-01-12 | Stop reason: HOSPADM

## 2018-01-10 RX ORDER — AMLODIPINE BESYLATE 10 MG/1
10 TABLET ORAL DAILY
Status: DISCONTINUED | OUTPATIENT
Start: 2018-01-11 | End: 2018-01-12 | Stop reason: HOSPADM

## 2018-01-10 RX ORDER — MAGNESIUM SULFATE 100 %
16 CRYSTALS MISCELLANEOUS AS NEEDED
Status: DISCONTINUED | OUTPATIENT
Start: 2018-01-10 | End: 2018-01-12 | Stop reason: HOSPADM

## 2018-01-10 RX ORDER — ACETAMINOPHEN 500 MG
1000 TABLET ORAL
Status: COMPLETED | OUTPATIENT
Start: 2018-01-10 | End: 2018-01-10

## 2018-01-10 RX ADMIN — SULFAMETHOXAZOLE AND TRIMETHOPRIM 1 TABLET: 400; 80 TABLET ORAL at 12:52

## 2018-01-10 RX ADMIN — LORAZEPAM 0.5 MG: 1 TABLET ORAL at 09:35

## 2018-01-10 RX ADMIN — MIRTAZAPINE 7.5 MG: 15 TABLET, FILM COATED ORAL at 23:20

## 2018-01-10 RX ADMIN — SODIUM CHLORIDE 2000 MG: 900 INJECTION, SOLUTION INTRAVENOUS at 00:34

## 2018-01-10 RX ADMIN — ENOXAPARIN SODIUM 40 MG: 40 INJECTION SUBCUTANEOUS at 03:09

## 2018-01-10 RX ADMIN — SULFAMETHOXAZOLE AND TRIMETHOPRIM 1 TABLET: 400; 80 TABLET ORAL at 23:20

## 2018-01-10 RX ADMIN — TRAZODONE HYDROCHLORIDE 50 MG: 50 TABLET ORAL at 02:23

## 2018-01-10 RX ADMIN — DOCUSATE SODIUM 100 MG: 100 CAPSULE, LIQUID FILLED ORAL at 18:13

## 2018-01-10 RX ADMIN — NYSTATIN: 100000 OINTMENT TOPICAL at 13:33

## 2018-01-10 RX ADMIN — SODIUM CHLORIDE 1000 MG: 900 INJECTION, SOLUTION INTRAVENOUS at 06:49

## 2018-01-10 RX ADMIN — LORAZEPAM 0.5 MG: 1 TABLET ORAL at 00:33

## 2018-01-10 RX ADMIN — ACETAMINOPHEN AND CODEINE PHOSPHATE 2 TABLET: 300; 30 TABLET ORAL at 09:34

## 2018-01-10 RX ADMIN — ACETAMINOPHEN AND CODEINE PHOSPHATE 2 TABLET: 300; 30 TABLET ORAL at 18:23

## 2018-01-10 RX ADMIN — INSULIN LISPRO 2 UNITS: 100 INJECTION, SOLUTION INTRAVENOUS; SUBCUTANEOUS at 12:49

## 2018-01-10 RX ADMIN — DOCUSATE SODIUM 100 MG: 100 CAPSULE, LIQUID FILLED ORAL at 09:35

## 2018-01-10 RX ADMIN — TIOTROPIUM BROMIDE 18 MCG: 18 CAPSULE ORAL; RESPIRATORY (INHALATION) at 11:00

## 2018-01-10 RX ADMIN — FUROSEMIDE 20 MG: 20 TABLET ORAL at 09:35

## 2018-01-10 RX ADMIN — BUDESONIDE AND FORMOTEROL FUMARATE DIHYDRATE 2 PUFF: 80; 4.5 AEROSOL RESPIRATORY (INHALATION) at 11:00

## 2018-01-10 RX ADMIN — ROFLUMILAST 500 MCG: 500 TABLET ORAL at 09:35

## 2018-01-10 RX ADMIN — ACETAMINOPHEN 1000 MG: 500 TABLET ORAL at 02:22

## 2018-01-10 RX ADMIN — LORAZEPAM 0.5 MG: 1 TABLET ORAL at 18:23

## 2018-01-10 RX ADMIN — PANTOPRAZOLE SODIUM 40 MG: 40 TABLET, DELAYED RELEASE ORAL at 06:52

## 2018-01-10 RX ADMIN — NYSTATIN: 100000 OINTMENT TOPICAL at 18:16

## 2018-01-10 RX ADMIN — AMLODIPINE BESYLATE 5 MG: 5 TABLET ORAL at 09:35

## 2018-01-10 NOTE — ED NOTES
Pt in ED in gown on stretcher on monitor, with c/o headache, chest pain, and back pain. Pt has taken tylenol with codeine for headache that started at 3am pt felt no relief.

## 2018-01-10 NOTE — ROUTINE PROCESS
Bedside and Verbal shift change report given to Rubi Collazo RN (oncoming nurse) by Eva Myers RN (offgoing nurse). Report included the following information SBAR, Kardex, ED Summary, Procedure Summary, Intake/Output, MAR and Recent Results.      Patient Vitals for the past 12 hrs:   Temp Pulse Resp BP SpO2   01/10/18 0300 97.8 °F (36.6 °C) 97 20 136/89 94 %   01/10/18 0145 - 90 22 127/73 95 %   01/10/18 0130 - 91 19 123/75 95 %   01/10/18 0100 - 83 21 135/73 95 %   01/10/18 0000 - 85 23 139/79 97 %   01/09/18 2315 - 84 21 (!) 131/94 97 %   01/09/18 2215 - - 24 139/89 97 %   01/09/18 2130 - - 22 (!) 154/100 97 %   01/09/18 2045 - 89 18 134/87 97 %   01/09/18 2000 - 86 22 (!) 148/104 97 %

## 2018-01-10 NOTE — PROGRESS NOTES
Problem: Falls - Risk of  Goal: *Absence of Falls  Document Dejon Fall Risk and appropriate interventions in the flowsheet.    Outcome: Progressing Towards Goal  Fall Risk Interventions:            Medication Interventions: Teach patient to arise slowly    Elimination Interventions: Urinal in reach

## 2018-01-10 NOTE — ED PROVIDER NOTES
HPI Comments: 10:45 PM Yajaira Ball Sr. is a 49 y.o. male with h/o severe COPD on home O2 who presents to ED complaining of SOB that became exacerbated and left frontal HA that woke him up out of his sleep 0300 am this morning. The pt says this morning he took 2 tylenol #3 for pain but had no relief. The pt reports his first home nurse came and checked his /110; pt says due he has home nurses due to recently being admitted for COPD exacerbation and sepsis due to aspiration pneumonia 12/18/2017. The pt states he is on oxygen at home at all times; pt is on 2.5 liters but has to turn up oxygen when having to walk and has been much more SOB than baseline. The pt reports he has had a productive cough. The pt states he recently did a sputum test, results were delayed due to the inclement weather, found out still had infection from pneumonia this morning and was started on Abx he also took his first dose this morning. The pt reports he feels like he has been holding more fluid; says his PCP had him on lasix for 5 days but has had no change so she plans to put him on lasix for 5 more days. The pt states he has had constant CP on right and left sides for months and he was told this could be from excessive lung muscle usage. The pt also says he has been more SOB than usual; pt says he gets sob when trying to walk even a few feet. The pt reports he also was told he also the right side of his heart could be failing; says he is scheduled to see a cardiologist at [facility] on Friday. The pt states he has gain 45 pounds in weight in 7days due to fluid build up in his stomach and legs. The pt states his abdomen feels sore and full. Per the wife the pt could need lung transplant by the end of the year. The pt states he stopped smoking 12/18/2017. The pt denies nausea, vomiting, fever, chills, diarrhea, light headedness, dizziness, visual disturbances, weakness, and numbness.  The pt had no other complaints or concerns in the ED. PCP: Woodrow Pérez NP      The history is provided by the patient. No  was used. Past Medical History:   Diagnosis Date    Chronic obstructive pulmonary disease ([address] 08/03/2017    PFTS 8/3/17    COPD, severe ([address]     [facility] [address] [facility]     Esophagitis 12/11/2017    Endoscopy    History of stomach ulcers     Positive urine drug screen 01/2016    opiates and THC    Upper GI bleed        Past Surgical History:   Procedure Laterality Date    HX ENDOSCOPY  12/11/2017         Family History:   Problem Relation Age of Onset    Hypertension Mother     Heart Disease Mother     Diabetes Mother     Hypertension Father     Heart Disease Father     Cancer Father      lymphnodes    Diabetes Father        Social History     Social History    Marital status:      Spouse name: N/A    Number of children: N/A    Years of education: N/A     Occupational History    Not on file. Social History Main Topics    Smoking status: Former Smoker     Packs/day: 2.00     Years: 25.00     Types: Cigarettes     Start date: 5/28/1984     Quit date: 12/18/2017    Smokeless tobacco: Never Used    Alcohol use No    Drug use: Yes     Special: Marijuana      Comment: Hx of Marijuana use    Sexual activity: Yes     Partners: Female     Other Topics Concern     Service No    Blood Transfusions No    Caffeine Concern Yes    Occupational Exposure No    Hobby Hazards No    Sleep Concern Yes     occas    Stress Concern No    Weight Concern No    Special Diet No    Back Care Yes    Exercise No    Bike Helmet Yes    Seat Belt No     occas     Social History Narrative             ALLERGIES: Review of patient's allergies indicates no known allergies. Review of Systems   Constitutional: Positive for diaphoresis. Negative for chills and fever. HENT: Negative. Negative for congestion. Eyes: Negative.   Negative for visual disturbance. Respiratory: Positive for cough and shortness of breath. Cardiovascular: Positive for chest pain and leg swelling. Gastrointestinal: Positive for abdominal distention and abdominal pain. Negative for diarrhea and vomiting. Genitourinary: Negative. Negative for dysuria. Musculoskeletal: Positive for back pain and neck pain. Negative for myalgias. Skin: Negative. Negative for rash and wound. Neurological: Negative. Negative for dizziness, weakness and light-headedness. Psychiatric/Behavioral: Negative. Negative for self-injury. All other systems reviewed and are negative. Vitals:    01/09/18 1815   BP: (!) 139/91   Pulse: 96   Resp: 18   Temp: 97.9 °F (36.6 °C)   SpO2: 95%   Weight: 79.4 kg (175 lb)   Height: 5' 9\" (1.753 m)            Physical Exam   Constitutional: He is oriented to person, place, and time. He appears well-developed and well-nourished. No distress. HENT:   Head: Normocephalic and atraumatic. Eyes: Conjunctivae and EOM are normal. Pupils are equal, round, and reactive to light. No scleral icterus. Neck: Normal range of motion. Neck supple. No JVD present. No thyromegaly present. No meningeal signs. Cardiovascular: Normal rate, regular rhythm, S1 normal and S2 normal.  Exam reveals no gallop and no friction rub. No murmur heard. Pulmonary/Chest: Accessory muscle usage present. Tachypnea noted. No respiratory distress. He has wheezes. Appears dyspneic. Scattered expiratory wheezing. Abdominal: Soft. Normal appearance. He exhibits distension. There is no tenderness. There is no rigidity, no rebound and no guarding. Musculoskeletal: Normal range of motion. He exhibits no edema or tenderness. +1 pitting edema in lower extremities bilaterally. Neurological: He is alert and oriented to person, place, and time. He has normal strength. No cranial nerve deficit or sensory deficit. Coordination normal.   Skin: Skin is warm and intact.  No rash noted.   Psychiatric: He has a normal mood and affect. His speech is normal and behavior is normal.   Vitals reviewed. MDM  Number of Diagnoses or Management Options  Acute nonintractable headache, unspecified headache type:   Acute right-sided heart failure:   COPD with acute exacerbation ([facility] [address] [facility]):   Generalized weakness:   HCAP (healthcare-associated pneumonia):   Diagnosis management comments: Otto Kaba Sr. is a 49 y.o. Male coming in with SOB and abd and leg swelling. Concern for Cor pulmonale and right hear failure. Sputum cult + for MRSA. Not in severe distress and does not need Bipap or intubation at this time. Noted C/o of HA, but normal neuro exam and head CT. No concern for SAH, meningitis, or CVA. Will treat for COPD and discussed with pulm and will start broad spectrum abx to cover MRSA given + cultures. No evidence of sepsis or lobar PNA on CXR.      ED Course       Procedures    Vitals:  Patient Vitals for the past 12 hrs:   Temp Pulse Resp BP SpO2   01/09/18 1815 97.9 °F (36.6 °C) 96 18 (!) 139/91 95 %       Medications Ordered:  Medications   cefepime (MAXIPIME) 1 g in sterile water (preservative free) 10 mL IV syringe (1 g IntraVENous Given 1/9/18 2310)   magnesium sulfate 2 g/50 ml IVPB (premix or compounded) (2 g IntraVENous New Bag 1/9/18 2311)   vancomycin (VANCOCIN) 2,000 mg in 0.9% sodium chloride 500 mL IVPB (not administered)   vancomycin (VANCOCIN) 1,000 mg in 0.9% sodium chloride (MBP/ADV) 250 mL adv (not administered)   LORazepam (ATIVAN) tablet 1 mg (not administered)   prochlorperazine (COMPAZINE) injection 10 mg (not administered)   ketorolac (TORADOL) injection 15 mg (not administered)   sodium chloride 0.9 % bolus infusion 500 mL (500 mL IntraVENous New Bag 1/9/18 2311)   albuterol-ipratropium (DUO-NEB) 2.5 MG-0.5 MG/3 ML (3 mL Nebulization Given 1/9/18 2311)   methylPREDNISolone (PF) (SOLU-MEDROL) injection 125 mg (125 mg IntraVENous Given 1/9/18 2311)       Lab Findings:  Recent Results (from the past 12 hour(s))   EKG, 12 LEAD, INITIAL    Collection Time: 01/09/18  5:39 PM   Result Value Ref Range    Ventricular Rate 93 BPM    Atrial Rate 93 BPM    P-R Interval 134 ms    QRS Duration 64 ms    Q-T Interval 332 ms    QTC Calculation (Bezet) 412 ms    Calculated P Axis 61 degrees    Calculated R Axis 69 degrees    Calculated T Axis 51 degrees    Diagnosis       Normal sinus rhythm  Normal ECG  When compared with ECG of 31-DEC-2017 14:25,  No significant change was found     CBC WITH AUTOMATED DIFF    Collection Time: 01/09/18  6:33 PM   Result Value Ref Range    WBC 9.0 4.6 - 13.2 K/uL    RBC 4.96 4.70 - 5.50 M/uL    HGB 16.3 (H) 13.0 - 16.0 g/dL    HCT 45.4 36.0 - 48.0 %    MCV 91.5 74.0 - 97.0 FL    MCH 32.9 24.0 - 34.0 PG    MCHC 35.9 31.0 - 37.0 g/dL    RDW 13.1 11.6 - 14.5 %    PLATELET 838 138 - 685 K/uL    MPV 10.4 9.2 - 11.8 FL    NEUTROPHILS 70 40 - 73 %    LYMPHOCYTES 20 (L) 21 - 52 %    MONOCYTES 9 3 - 10 %    EOSINOPHILS 1 0 - 5 %    BASOPHILS 0 0 - 2 %    ABS. NEUTROPHILS 6.4 1.8 - 8.0 K/UL    ABS. LYMPHOCYTES 1.8 0.9 - 3.6 K/UL    ABS. MONOCYTES 0.8 0.05 - 1.2 K/UL    ABS. EOSINOPHILS 0.1 0.0 - 0.4 K/UL    ABS.  BASOPHILS 0.0 0.0 - 0.1 K/UL    DF AUTOMATED     METABOLIC PANEL, BASIC    Collection Time: 01/09/18  6:33 PM   Result Value Ref Range    Sodium 138 136 - 145 mmol/L    Potassium 4.3 3.5 - 5.5 mmol/L    Chloride 100 100 - 108 mmol/L    CO2 27 21 - 32 mmol/L    Anion gap 11 3.0 - 18 mmol/L    Glucose 114 (H) 74 - 99 mg/dL    BUN 16 7.0 - 18 MG/DL    Creatinine 0.78 0.6 - 1.3 MG/DL    BUN/Creatinine ratio 21 (H) 12 - 20      GFR est AA >60 >60 ml/min/1.73m2    GFR est non-AA >60 >60 ml/min/1.73m2    Calcium 9.2 8.5 - 10.1 MG/DL   MAGNESIUM    Collection Time: 01/09/18  6:33 PM   Result Value Ref Range    Magnesium 2.5 1.6 - 2.6 mg/dL   NT-PRO BNP    Collection Time: 01/09/18  6:33 PM   Result Value Ref Range    NT pro-BNP 21 0 - 450 PG/ML   TROPONIN I Collection Time: 01/09/18  6:33 PM   Result Value Ref Range    Troponin-I, Qt. <0.02 0.0 - 0.045 NG/ML       EKG Interpretation by ED physician:  Rhythm: NSR  Rate: 93 bpm  Interpretation: no acute ischemic changes   EKG interpret by Kirby Santos MD 11:16 PM    X-ray, CT or radiology findings or impressions:  XR CHEST PA LAT   Final Result      CT HEAD WO CONT    (Results Pending)       Progress notes, consult notes, or additional procedure notes:  10:32 PM Discussed the pt symptoms and sputum culture with Dr. Ludy Alejandre in pulmonology. He recommends that we start the pt on cefepime, magnesium, and steroids. Says he will consult on the pt.    10:41 PM Dr. Chino Davis hospitalist agrees to admission to telemetry for continued Abx and respiratory care. Reevaluation of the patient:   11:17 PM Discussed all results and plan for Abx and admission. The family and pt are in agreement; the remains in no distress. Diagnosis:   1. COPD with acute exacerbation ([facility] [address]    2. Acute right-sided heart failure    3. HCAP (healthcare-associated pneumonia)    4. Acute nonintractable headache, unspecified headache type    5. Generalized weakness        Disposition: Admit to telemetry    Follow-up Information     None           Patient's Medications   Start Taking    No medications on file   Continue Taking    ACETAMINOPHEN-CODEINE (TYLENOL-CODEINE #3) 300-30 MG PER TABLET    Take 2 Tabs by mouth every six (6) hours as needed for Pain for up to 7 days. Max Daily Amount: 8 Tabs. ALBUTEROL (PROVENTIL HFA, VENTOLIN HFA, PROAIR HFA) 90 MCG/ACTUATION INHALER    Take 2 Puffs by inhalation every four (4) hours as needed for Wheezing. ALBUTEROL-IPRATROPIUM (DUO-NEB) 2.5 MG-0.5 MG/3 ML NEBU    3 mL by Nebulization route every six (6) hours as needed. AMLODIPINE (NORVASC) 5 MG TABLET    Take 1 Tab by mouth daily. DOCUSATE SODIUM (COLACE) 100 MG CAPSULE    Take 1 Cap by mouth two (2) times a day for 30 days.  Prevent constipation FLUTICASONE-SALMETEROL (ADVAIR) 500-50 MCG/DOSE DISKUS INHALER    Take 1 Puff by inhalation two (2) times a day. FUROSEMIDE (LASIX) 20 MG TABLET    Take 1 Tab by mouth daily for 5 days. LIDOCAINE (XYLOCAINE) 2 % SOLUTION    Take 15 mL by mouth as needed for Pain. Indications: MOUTH IRRITATION    LORAZEPAM (ATIVAN) 1 MG TABLET    Take 0.5 Tabs by mouth every eight (8) hours as needed for Anxiety. Max Daily Amount: 1.5 mg. NYSTATIN (MYCOSTATIN) TOPICAL CREAM    Apply  to affected area two (2) times a day. OXYGEN-AIR DELIVERY SYSTEMS (WALKABOUT 1 OXYGEN SYSTEM)    2 L/min by Nasal route continuous. PANTOPRAZOLE (PROTONIX) 40 MG TABLET    Take 1 Tab by mouth Daily (before breakfast). PREDNISONE (DELTASONE) 10 MG TABLET    40mg X 7 days, 20 mg x 7 days, 10mg x 7 days, 5 mg x 7 days then stop    ROFLUMILAST (DALIRESP) TAB TABLET    Take 1 Tab by mouth daily. Indications: PREVENTION OF BRONCHOSPASM WITH CHRONIC BRONCHITIS    TIOTROPIUM (SPIRIVA) 18 MCG INHALATION CAPSULE    Take 1 Cap by inhalation daily. Indications: BRONCHOSPASM PREVENTION WITH COPD    TRAZODONE (DESYREL) 50 MG TABLET    Take 1 Tab by mouth nightly. TRIMETHOPRIM-SULFAMETHOXAZOLE (BACTRIM DS, SEPTRA DS) 160-800 MG PER TABLET    Take 1 Tab by mouth two (2) times a day for 10 days. VARENICLINE (CHANTIX) 1 MG TABLET    Take 1 Tab by mouth daily. Start after completion of starter pack   These Medications have changed    No medications on file   Stop Taking    No medications on file       Scrlindsay Lopez 128 acting as a scribe for and in the presence of Rebeka Garcia MD      January 09, 2018 at 10:29 PM       Provider Attestation:      I personally performed the services described in the documentation, reviewed the documentation, as recorded by the scribe in my presence, and it accurately and completely records my words and actions.  January 09, 2018 at 10:29 PM - Reebka Garcia MD

## 2018-01-10 NOTE — ROUTINE PROCESS
TRANSFER - IN REPORT:    Verbal report received from Bert Loaiza RN (name) on Darius Collazo Sr. being received from Emergency Department (unit) for routine progression of care      Report consisted of patients Situation, Background, Assessment and Recommendations(SBAR). Information from the following report(s) SBAR, Kardex, ED Summary, Intake/Output, MAR and Recent Results was reviewed with the receiving nurse. Opportunity for questions and clarification was provided. Assessment completed upon patients arrival to unit and care assumed.

## 2018-01-10 NOTE — ROUTINE PROCESS
TRANSFER - OUT REPORT:    Verbal report given to Titi Singletary RN(name) on Madhu Mccoy Sr.  being transferred to [address] [place] [address](unit) for routine progression of care       Report consisted of patients Situation, Background, Assessment and   Recommendations(SBAR). Information from the following report(s) SBAR, ED Summary, Procedure Summary, Intake/Output and MAR was reviewed with the receiving nurse. Lines:   Peripheral IV 01/09/18 Left Hand (Active)   Site Assessment Clean, dry, & intact 1/9/2018  6:33 PM   Phlebitis Assessment 0 1/9/2018  6:33 PM   Infiltration Assessment 0 1/9/2018  6:33 PM   Dressing Status Clean, dry, & intact 1/9/2018  6:33 PM   Dressing Type 4 X 4;Tape;Transparent 1/9/2018  6:33 PM   Hub Color/Line Status Pink;Flushed;Patent 1/9/2018  6:33 PM   Action Taken Blood drawn 1/9/2018  6:33 PM        Opportunity for questions and clarification was provided.       Patient transported with:   Monitor  O2 @ 3 liters  Registered Nurse

## 2018-01-10 NOTE — CONSULTS
[facility]. Pulmonary, Critical Care, and Sleep Medicine    Initial Patient Consult    Name: Florence Decker Sr. MRN: [id number]   : 1970 Hospital: [facility]   Date: 1/10/2018        IMPRESSION/ Recs:    · 1. Chronic Worsening Dyspnea: I think he has poor lung reserve at baseline due to severe emphysema and not is acute presentation is likely from abdominal distention and not COPD/Emphysema exacerbation (No cough, no wheeze, no sputum). · 2. Tense, Distended abdomen with pedal edema on physical exam: ? Ascites ? Right heart failure from Pulm HTN from Emphysema vs Liver cirrhosis (? Alpha 1 antitrypsin ? Portal vein thrombosis). Start with Abd USG. Get Complete Echo with Bubble study    · 3. Purulent nasal discharge with Left frontal headache: Could be sinusitis. His recent outpatient Resp Cx showed bactrim sensitive MRSA. I do not think he has active PNA (no cough/fever/sputum/ infiltrate on imaging). I will give him Bactrim for 7-10 days which should cover both sinusitis and MRSA positive resp Cx. Check Procalcitonin    · 4. 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 Need to find out results. · 5. DVT/ GI PPx    · 6. Duonebs ATC and PRN    · 7. LAMA/LABA. · 8. No need for systemic steroids    · 9. Supplemental O2 to keep sats > 90%. · 10. PRN bipap for increased WOB. WILL FOLLOW      HPI:  Subjective: This patient has been seen and evaluated at the request of Dr. Anirudh Novak for Dyspnea. Patient is a 49 y.o. male who was here late last month for dyspnea. Patient was treated for COPD exacerbation and discharged home on O2, tapering steroids, other COPD inhaler meds, Daliresp. He was at home for less than 2 weeks and comes back for unresolving and infact slightly worse dyspnea. His dyspnea in grade 3 MMRC.  He usually sees Dr. Ollie Sinclair for his diagnosis of severe Pan-Acinar Emphysema with FEV1 of 39% in last 6 months. Dr. Adeline Galloway ordered resp cx recently after last discharge which revealed Bactrim sensitive MRSA. He was prescribed Bactrim but he could only  his meds recently due to inclement weather and took 1st dose y'day at home. He clearly mentions that this time his symptoms are different. He is more SOB. He is not bringing up any phlegm and is not coughing unlike last admission in December 2017. This time he had started noticing b/l pedal edema at home for which his PCP gave him lasix over last 10 days, which he took for 5 days and swelling in feet got better. He was told he may have \"something wrong with his right heart\" and was scheduled to see cardiologist tomorrow as outpatient but he ended up in the hospital. With b/l leg swelling he has also been noticing significant abdominal swelling for last 2 months. He has normal bowel movements (2-3/day). On ROS: He has left frontal headache for last few weeks. Sometimes snorts out yellow mucus out of nostrils. Repeatedly mentions that it is not his cough. Denies any fever/ CP. Complains of chest pressure for 2 months which is not exertional. Denies any night sweats or appetite loss, or calf tenderness or redness or any skin lesions or ulcers. On through History taking: He was given diagnosis of COPD at age of 38. He started smoking at age of 12 and smoked 2 PPD since, quit last month (about 70 pack years). Satrted seeing Dr. Adeline Galloway 6 months ago. His latest PFTs are as below: Moderately severe obstruction with hyperinflation with reduced DLCO. No recent CT / USG abd in system. Denies ETOH use.  LFTs have been normal recently    PFTs 08/03/17  Patient effort:   Good  Meets ATS criteria for interpretation    Flows:  FVC is reduced to 61% predicted  Maximal Mid Expiratory Flow rate is reduced to 18% predicted  Forced Expiratory Volume in one second is reduced to 39% predicted  FEV 1% is reduced    Volumes:  Vital Capacity is reduced, Residual Volume is elevated and Total Lung Capacity is normal  Residual Volume is relatively increased with respect to Total Lung Capacity    Bronchodilator:  No significant improvement with bronchodilator therapy    Diffusion:  Abnormal Diffusion Capacity reduced to 25 % predicted    Impression:  Severe obstructive defect, Air trapping, Reduced diffusion capacity indicating a decrease in alveolar surface area for gas exchange    Past Medical History:   Diagnosis Date    Chronic obstructive pulmonary disease ([facility] 08/03/2017    PFTS 8/3/17    COPD, severe ([facility] [address] [facility]     Emphysema/COPD ([facility] [address]     Esophagitis 12/11/2017    Endoscopy    History of stomach ulcers     Positive urine drug screen 01/2016    opiates and THC    Upper GI bleed       Past Surgical History:   Procedure Laterality Date    HX ENDOSCOPY  12/11/2017      Prior to Admission medications    Medication Sig Start Date End Date Taking? Authorizing Provider   amLODIPine (NORVASC) 5 mg tablet Take 1 Tab by mouth daily. 1/9/18   Sudeep Mckeon NP   furosemide (LASIX) 20 mg tablet Take 1 Tab by mouth daily for 5 days. 1/9/18 1/14/18  Sudeep Mckeon NP   trimethoprim-sulfamethoxazole (BACTRIM DS, SEPTRA DS) 160-800 mg per tablet Take 1 Tab by mouth two (2) times a day for 10 days. 1/8/18 1/18/18  Alton Root MD   lidocaine (XYLOCAINE) 2 % solution Take 15 mL by mouth as needed for Pain. Indications: MOUTH IRRITATION 1/8/18   Sudeep Mckeon NP   nystatin (MYCOSTATIN) topical cream Apply  to affected area two (2) times a day. 1/8/18   Sudeep Mckeon NP   fluticasone-salmeterol (ADVAIR) 500-50 mcg/dose diskus inhaler Take 1 Puff by inhalation two (2) times a day. 1/3/18   Sudeep Mckeon NP   tiotropium (SPIRIVA) 18 mcg inhalation capsule Take 1 Cap by inhalation daily.  Indications: BRONCHOSPASM PREVENTION WITH COPD 1/3/18   Sudeep Mckeon NP   albuterol (PROVENTIL HFA, VENTOLIN HFA, PROAIR HFA) 90 mcg/actuation inhaler Take 2 Puffs by inhalation every four (4) hours as needed for Wheezing. 1/3/18   Reena Bustamante NP   varenicline (CHANTIX) 1 mg tablet Take 1 Tab by mouth daily. Start after completion of starter pack 1/3/18   Reena Bustamante NP   roflumilast (DALIRESP) tab tablet Take 1 Tab by mouth daily. Indications: PREVENTION OF BRONCHOSPASM WITH CHRONIC BRONCHITIS 1/3/18   Reena Bustamante NP   LORazepam (ATIVAN) 1 mg tablet Take 0.5 Tabs by mouth every eight (8) hours as needed for Anxiety. Max Daily Amount: 1.5 mg. 1/3/18   Reena Bustamante NP   traZODone (DESYREL) 50 mg tablet Take 1 Tab by mouth nightly. 1/3/18   Reena Bustamante NP   acetaminophen-codeine (TYLENOL-CODEINE #3) 300-30 mg per tablet Take 2 Tabs by mouth every six (6) hours as needed for Pain for up to 7 days. Max Daily Amount: 8 Tabs. 1/2/18 1/9/18  Reena Bustamante NP   docusate sodium (COLACE) 100 mg capsule Take 1 Cap by mouth two (2) times a day for 30 days. Prevent constipation 1/2/18 2/1/18  Reena Bustamante NP   OXYGEN-AIR DELIVERY SYSTEMS (WALKABOUT 1 OXYGEN SYSTEM) 2 L/min by Nasal route continuous. Historical Provider   pantoprazole (PROTONIX) 40 mg tablet Take 1 Tab by mouth Daily (before breakfast). 12/24/17   Chay Munoz MD   predniSONE (DELTASONE) 10 mg tablet 40mg X 7 days, 20 mg x 7 days, 10mg x 7 days, 5 mg x 7 days then stop 12/23/17   Chay Munoz MD   albuterol-ipratropium (DUO-NEB) 2.5 mg-0.5 mg/3 ml nebu 3 mL by Nebulization route every six (6) hours as needed. 12/23/17   Chay Munoz MD     No Known Allergies   Social History   Substance Use Topics    Smoking status: Former Smoker     Packs/day: 2.00     Years: 25.00     Types: Cigarettes     Start date: 5/28/1984     Quit date: 12/18/2017    Smokeless tobacco: Never Used    Alcohol use No      Review of Systems:  Pertinent items are noted in HPI.   ROS    Objective:   Vital Signs:    Visit Vitals    /74 (BP 1 Location: Right arm)    Pulse (!) 103  Comment: will notify nurse    Temp 97.7 °F (36.5 °C)    Resp 16    Ht 5' 9\" (1.753 m)    Wt 81.5 kg (179 lb 9.6 oz)    SpO2 94%    BMI 26.52 kg/m2       O2 Device: Nasal cannula, Humidifier   O2 Flow Rate (L/min): 3 l/min   Temp (24hrs), Av.7 °F (36.5 °C), Min:97.3 °F (36.3 °C), Max:97.9 °F (36.6 °C)       Intake/Output:   Last shift:      01/10 0701 - 01/10 1900  In: -   Out: 1150 [Urine:1150]  Last 3 shifts: 1901 - 01/10 07  In: 510 [I.V.:510]  Out: 500 [Urine:500]    Intake/Output Summary (Last 24 hours) at 01/10/18 1251  Last data filed at 01/10/18 1120   Gross per 24 hour   Intake              510 ml   Output             1650 ml   Net            -1140 ml      Physical Exam:   General:  NAD, sitting comfortably, but get SOB on laying flat, no use of accessory muscles   Head:  Normocephalic, without obvious abnormality, atraumatic. Eyes:  Conjunctivae/corneas clear. ANicteric     Nose: Nares normal. Mucosa normal. No drainage or sinus tenderness. Throat: Lips, mucosa dry. NO thrush;    Neck: Supple, symmetrical, trachea midline, no adenopathy, thyroid: no enlargment/tenderness/nodules, no carotid bruit and no JVD. No crepitus   Back:   Symmetric, no curvature, no spine tenderness or flank pain     Lungs:   Bilateral auscultation CTA, no wheezing       Heart:  Regular rate and rhythm, S1, S2 normal, Grade 2 ESM in mitral area, NO PARASTERNAL HEAVE     Abdomen:   Distended, Tense, Non tender, FLUID THRILL and SHIFTING DULNNESS PRESENT. COULD NOT APPRECIATE ORGANOMEGALY DUE TO DISTENTION. .Bowel sounds normal.     Extremities: normal, atraumatic, no cyanosis. Has grade 3 b/l clubbing, +1 b/l pedal edema, no calf tenderness, no erythema     Pulses: 1-2+ and symmetric all extremities.      Skin: Skin color, texture, turgor normal. No rashes or lesions   Lymph nodes: Cervical, supraclavicular, and axillary nodes normal.   Neurologic: Grossly nonfocal     Imaging:  I have personally reviewed the patients radiographs and have reviewed the reports:    CXR: hyperinflation, no obvious inflittrate or effusions    CT chest (dec 2017): Pan acinar emphysema, no pericardial effusion, no cardiomegaly, no enlarged pulmonary vasculature     High complexity decision making was performed during the evaluation of this patient at high risk for decompensation with multiple organ involvement     Above mentioned total time spent on reviewing the case/medical record/data/notes/EMR/patient examination/documentation/coordinating care with nurse/consultants, exclusive of procedures with complex decision making performed and > 50% time spent in face to face evaluation.      Arnie Quiroz MD

## 2018-01-10 NOTE — H&P
History and Physical      NAME:  Sarah Tam.   :   1970   MRN:   [id number]     Date/Time:  2018     CHIEF COMPLAINT: SOB     HISTORY OF PRESENT ILLNESS:     Mr. Edita Collado is a 49 y.o.   male with a PMH of COPD, chronic hypoxic respiratory failure, GI bleeding who presents with c/c of shortness of breathing. He has productive cough. He denies fever or chills. No orthopnea or PND. Patient was recenly admitted here 17-17. During this hospitalization he was found to have COPD exacerbation, acute bronchitis, treated with Levaquin/cefepime and recent GI bleed in the setting of steroid use, egd 17 revealed Grade 1 espohagitis. He had sputum culture by his PCP on 1/3/18 that is positive for MRSA. He was called by his PCP and instructed to go to ED. Here in ED he is started on IV vancomycin. SOB is better after breathing treatment. Past Medical History:   Diagnosis Date    Chronic obstructive pulmonary disease ([address] 2017    PFTS 8/3/17    COPD, severe ([address]     Emphysema/COPD ([address]     Esophagitis 2017    Endoscopy    History of stomach ulcers     Positive urine drug screen 2016    opiates and THC    Upper GI bleed         Past Surgical History:   Procedure Laterality Date    HX ENDOSCOPY  2017       Social History   Substance Use Topics    Smoking status: Former Smoker     Packs/day: 2.00     Years: 25.00     Types: Cigarettes     Start date: 1984     Quit date: 2017    Smokeless tobacco: Never Used    Alcohol use No        Family History   Problem Relation Age of Onset    Hypertension Mother     Heart Disease Mother     Diabetes Mother     Hypertension Father     Heart Disease Father     Cancer Father      lymphnodes    Diabetes Father         No Known Allergies     Prior to Admission medications    Medication Sig Start Date End Date Taking?  Authorizing Provider   amLODIPine (NORVASC) 5 mg tablet Take 1 Tab by mouth daily. 1/9/18   Kate Gallardo NP   furosemide (LASIX) 20 mg tablet Take 1 Tab by mouth daily for 5 days. 1/9/18 1/14/18  Kate Gallardo NP   trimethoprim-sulfamethoxazole (BACTRIM DS, SEPTRA DS) 160-800 mg per tablet Take 1 Tab by mouth two (2) times a day for 10 days. 1/8/18 1/18/18  Loura Hammans, MD   lidocaine (XYLOCAINE) 2 % solution Take 15 mL by mouth as needed for Pain. Indications: MOUTH IRRITATION 1/8/18   Kate Gallardo NP   nystatin (MYCOSTATIN) topical cream Apply  to affected area two (2) times a day. 1/8/18   Kate Gallardo NP   fluticasone-salmeterol (ADVAIR) 500-50 mcg/dose diskus inhaler Take 1 Puff by inhalation two (2) times a day. 1/3/18   Kate Gallardo NP   tiotropium (SPIRIVA) 18 mcg inhalation capsule Take 1 Cap by inhalation daily. Indications: BRONCHOSPASM PREVENTION WITH COPD 1/3/18   Kate Gallardo NP   albuterol (PROVENTIL HFA, VENTOLIN HFA, PROAIR HFA) 90 mcg/actuation inhaler Take 2 Puffs by inhalation every four (4) hours as needed for Wheezing. 1/3/18   Kate Gallardo NP   varenicline (CHANTIX) 1 mg tablet Take 1 Tab by mouth daily. Start after completion of starter pack 1/3/18   Kate Gallardo NP   roflumilast (DALIRESP) tab tablet Take 1 Tab by mouth daily. Indications: PREVENTION OF BRONCHOSPASM WITH CHRONIC BRONCHITIS 1/3/18   Kate Gallardo NP   LORazepam (ATIVAN) 1 mg tablet Take 0.5 Tabs by mouth every eight (8) hours as needed for Anxiety. Max Daily Amount: 1.5 mg. 1/3/18   Kate Gallardo NP   traZODone (DESYREL) 50 mg tablet Take 1 Tab by mouth nightly. 1/3/18   Kate Gallardo NP   acetaminophen-codeine (TYLENOL-CODEINE #3) 300-30 mg per tablet Take 2 Tabs by mouth every six (6) hours as needed for Pain for up to 7 days. Max Daily Amount: 8 Tabs. 1/2/18 1/9/18  Kate Gallardo NP   docusate sodium (COLACE) 100 mg capsule Take 1 Cap by mouth two (2) times a day for 30 days. Prevent constipation 1/2/18 2/1/18  Shaina Hooper, DIANE   OXYGEN-AIR DELIVERY SYSTEMS (WALKABOUT 1 OXYGEN SYSTEM) 2 L/min by Nasal route continuous. Historical Provider   pantoprazole (PROTONIX) 40 mg tablet Take 1 Tab by mouth Daily (before breakfast). 12/24/17   Danish Perera MD   predniSONE (DELTASONE) 10 mg tablet 40mg X 7 days, 20 mg x 7 days, 10mg x 7 days, 5 mg x 7 days then stop 12/23/17   Danish Perera MD   albuterol-ipratropium (DUO-NEB) 2.5 mg-0.5 mg/3 ml nebu 3 mL by Nebulization route every six (6) hours as needed.  12/23/17   Danish Perera MD       REVIEW OF SYSTEMS:     CONSTITUTIONAL: No Fever, No chills, No weight loss, No Night sweats  HEENT:  No epistaxis, No diff in swallowing  CVS: No chest pain, No palpitations, No syncope, No peripheral edema, No PND, No orthopnea  RS: No hemoptysis, No pleuritic chest pain   GI: No abd pain, No vomitting, No diarrhea, No hematemesis, No rectal bleeding, No acid reflux or heartburn  NEURO: No focal weakness, No headaches, No seizures  PSYCH: No anxiety, No depression  MUSCULOSKLETAL: No joint pain or swelling  : No hematuria or dysuria  SKIN: No rash      Physical Exam:    VITALS:    Vital signs reviewed; most recent are:    Visit Vitals    BP (!) 139/91 (BP 1 Location: Left arm, BP Patient Position: Sitting)    Pulse 96    Temp 97.9 °F (36.6 °C)    Resp 18    Ht 5' 9\" (1.753 m)    Wt 79.4 kg (175 lb)    SpO2 95%    BMI 25.84 kg/m2     SpO2 Readings from Last 6 Encounters:   01/09/18 95%   01/09/18 98%   01/09/18 97%   01/08/18 95%   01/06/18 97%   01/03/18 95%    O2 Flow Rate (L/min): 2 l/min   No intake or output data in the 24 hours ending 01/09/18 3135       GENERAL: Not in acute distress  HEENT: pink conjunctiva, un icteric sclera,   NECK: No lymphadenopthy or thyroid swelling, JVD not seen  LYMPH: No supraclavicular or cervical or axillary nodes on both sides  CVS: S1S2, No murmurs, No gallop or rub  RS: CTA, No wheezing or crackles  Abd: Soft, non tender, not distended, No guarding, No rigidity  NEURO:  No focal neurologic deficits   Extrm: no leg edema or swelling   Skin: No rash      Labs:  Recent Results (from the past 24 hour(s))   EKG, 12 LEAD, INITIAL    Collection Time: 01/09/18  5:39 PM   Result Value Ref Range    Ventricular Rate 93 BPM    Atrial Rate 93 BPM    P-R Interval 134 ms    QRS Duration 64 ms    Q-T Interval 332 ms    QTC Calculation (Bezet) 412 ms    Calculated P Axis 61 degrees    Calculated R Axis 69 degrees    Calculated T Axis 51 degrees    Diagnosis       Normal sinus rhythm  Normal ECG  When compared with ECG of 31-DEC-2017 14:25,  No significant change was found     CBC WITH AUTOMATED DIFF    Collection Time: 01/09/18  6:33 PM   Result Value Ref Range    WBC 9.0 4.6 - 13.2 K/uL    RBC 4.96 4.70 - 5.50 M/uL    HGB 16.3 (H) 13.0 - 16.0 g/dL    HCT 45.4 36.0 - 48.0 %    MCV 91.5 74.0 - 97.0 FL    MCH 32.9 24.0 - 34.0 PG    MCHC 35.9 31.0 - 37.0 g/dL    RDW 13.1 11.6 - 14.5 %    PLATELET 596 556 - 983 K/uL    MPV 10.4 9.2 - 11.8 FL    NEUTROPHILS 70 40 - 73 %    LYMPHOCYTES 20 (L) 21 - 52 %    MONOCYTES 9 3 - 10 %    EOSINOPHILS 1 0 - 5 %    BASOPHILS 0 0 - 2 %    ABS. NEUTROPHILS 6.4 1.8 - 8.0 K/UL    ABS. LYMPHOCYTES 1.8 0.9 - 3.6 K/UL    ABS. MONOCYTES 0.8 0.05 - 1.2 K/UL    ABS. EOSINOPHILS 0.1 0.0 - 0.4 K/UL    ABS.  BASOPHILS 0.0 0.0 - 0.1 K/UL    DF AUTOMATED     METABOLIC PANEL, BASIC    Collection Time: 01/09/18  6:33 PM   Result Value Ref Range    Sodium 138 136 - 145 mmol/L    Potassium 4.3 3.5 - 5.5 mmol/L    Chloride 100 100 - 108 mmol/L    CO2 27 21 - 32 mmol/L    Anion gap 11 3.0 - 18 mmol/L    Glucose 114 (H) 74 - 99 mg/dL    BUN 16 7.0 - 18 MG/DL    Creatinine 0.78 0.6 - 1.3 MG/DL    BUN/Creatinine ratio 21 (H) 12 - 20      GFR est AA >60 >60 ml/min/1.73m2    GFR est non-AA >60 >60 ml/min/1.73m2    Calcium 9.2 8.5 - 10.1 MG/DL   MAGNESIUM    Collection Time: 01/09/18  6:33 PM   Result Value Ref Range    Magnesium 2.5 1.6 - 2.6 mg/dL   NT-PRO BNP    Collection Time: 01/09/18  6:33 PM   Result Value Ref Range    NT pro-BNP 21 0 - 450 PG/ML   TROPONIN I    Collection Time: 01/09/18  6:33 PM   Result Value Ref Range    Troponin-I, Qt. <0.02 0.0 - 0.045 NG/ML         Active Problems:    COPD with acute exacerbation ([address] (12/18/2017)      Acute right-sided heart failure (1/9/2018)      HA (headache) (1/9/2018)      HCAP (healthcare-associated pneumonia) (1/9/2018)        Assessment:       1. HCAP  2. Acute COPD exacerbation  3. Chronic hypoxic respiratory failure   4. Recent GI bleeding   S/p EGD-->esophagitis  5. Chronic pain    Plan:       · Admit to telemetry floor  · Continue IV antibiotics with vancomycin/cefepim  · Continue bronchodilators  · Pulmonary consulted per ED, Dr Emiliano Phoenix will see him  · Continue supplemental O2  · Echo to assess LV function  · Full code  · DVT prophylaxsis    Total time:  68 minutes             _______________________________________________________________________        Attending Physician:  Mulugeta De Jesus MD

## 2018-01-10 NOTE — PROGRESS NOTES
Hospitalist Progress Note    Patient: Michelle Hwang Sr. MRN: [id number]  CSN: [id number]    YOB: 1970  Age: 49 y.o. Sex: male    DOA: 1/9/2018 LOS:  LOS: 1 day          Minimal cough. Has been struggling with breathing for since he was discharged. He was prescribed unknown med in the outpatient setting for MRSA in sputum. Erythematous rash to groin for which he was prescribed nystatin cream.     He has abdominal swelling and bp has been elevated. He did receive po steroid in the outpatient setting. He denies constipation. Denies hx of liver issues. No abdominal imaging in our system or care everywhere. He declines patch and states he's quit smoking for good. Wife and child at bedside instructed to wear yellow gowns, gloves and to perform hand washing. Assessment/Plan     1. Intertrigo in the setting of steroids - nystatin ointment  2. COPD, severe - pulmonary input appreciated. 3. MRSA in sputum 1/3/18 on contact. Suspect colonizer, no evidence of LRTI or bronchitis. 4. Hypertension - increase norvasc. Hydralazine prn. Diet clarified. 5. Abdominal swelling - check LFTs and US. Consider CT. Monitor for constipation. Follow echo. 6. Chronic pain on opioids. Continue bowel regimen. 7. Tobacco abuse - states he quit - took chantix in the past. Smoking cessation education  8. normal event monitor 2015  9. Recent GI bleed in the setting of steroid use, egd 12/11/17 revealed Grade 1 espohagitis - on ppi. 10. Mild LAD coronary arteriosclerosis seen on CT chest.   12. Anxiety - prn ativan. Remeron. Avoid trazodone. 13. ?right heart failure - on lasix. Follow echo. 14. Chronic hypoxic respiratory failure on 2L oxygen via NC around the clock  15. Elevated blood sugar - ssi / ADA diet / check A1c  16. dvt prophylaxis  17. Full code. Pt and ot. I discussed the case with Dr. Marietta Hutchison.      Additional Notes:      Case discussed with:  [x]Patient  [x]Family  [x]Nursing  []Case Management  DVT Prophylaxis:  [x]Lovenox  []Hep SQ  []SCDs  []Coumadin   []On Heparin gtt    Vital signs/Intake and Output:  Visit Vitals    /89 (BP 1 Location: Left arm, BP Patient Position: At rest)    Pulse 97    Temp 97.8 °F (36.6 °C)    Resp 20    Ht 5' 9\" (1.753 m)    Wt 81.5 kg (179 lb 9.6 oz)    SpO2 94%    BMI 26.52 kg/m2     Current Shift:  01/10 0701 - 01/10 1900  In: -   Out: 650 [Urine:650]  Last three shifts:  01/08 1901 - 01/10 0700  In: 510 [I.V.:510]  Out: 500 [Urine:500]    Awake alert and oriented. Wife and daughter at bedside. Ncat. Perrl. RRR  Slightly attenuated bs. No crackles, no rales, no wheeze  Abdomen distended. Soft nt nabs. No edema. dp 2+ b.l  No focal deficit  No rash    Medications Reviewed      Labs: Results:       Chemistry Recent Labs      01/10/18   0501  01/09/18   1833   GLU  176*  114*   NA  137  138   K  4.4  4.3   CL  104  100   CO2  25  27   BUN  17  16   CREA  0.76  0.78   CA  8.6  9.2   AGAP  8  11   BUCR  22*  21*      CBC w/Diff Recent Labs      01/10/18   0501  01/09/18   1833   WBC  8.6  9.0   RBC  4.55*  4.96   HGB  14.6  16.3*   HCT  41.8  45.4   PLT  163  179   GRANS  91*  70   LYMPH  7*  20*   EOS  0  1      Cardiac Enzymes No results for input(s): CPK, CKND1, JIMMY in the last 72 hours. No lab exists for component: CKRMB, TROIP   Coagulation No results for input(s): PTP, INR, APTT in the last 72 hours. No lab exists for component: INREXT    Lipid Panel Lab Results   Component Value Date/Time    Cholesterol, total 144 01/23/2017 01:11 PM    HDL Cholesterol 36 01/23/2017 01:11 PM    LDL, calculated 85.6 01/23/2017 01:11 PM    VLDL, calculated 22.4 01/23/2017 01:11 PM    Triglyceride 112 01/23/2017 01:11 PM    CHOL/HDL Ratio 4.0 01/23/2017 01:11 PM      BNP No results for input(s): BNPP in the last 72 hours. Liver Enzymes No results for input(s): TP, ALB, TBIL, AP, SGOT, GPT in the last 72 hours.     No lab exists for component: DBIL   Thyroid Studies Lab Results   Component Value Date/Time    TSH 2.21 12/17/2017 10:00 PM        Procedures/imaging: see electronic medical records for all procedures/Xrays and details which were not copied into this note but were reviewed prior to creation of Plan.

## 2018-01-11 ENCOUNTER — HOME CARE VISIT (OUTPATIENT)
Dept: HOME HEALTH SERVICES | Facility: HOME HEALTH | Age: 48
End: 2018-01-11
Payer: MEDICAID

## 2018-01-11 ENCOUNTER — APPOINTMENT (OUTPATIENT)
Dept: CT IMAGING | Age: 48
DRG: 140 | End: 2018-01-11
Attending: INTERNAL MEDICINE
Payer: MEDICAID

## 2018-01-11 LAB
ANION GAP SERPL CALC-SCNC: 8 MMOL/L (ref 3–18)
BUN SERPL-MCNC: 20 MG/DL (ref 7–18)
BUN/CREAT SERPL: 28 (ref 12–20)
CALCIUM SERPL-MCNC: 8.6 MG/DL (ref 8.5–10.1)
CHLORIDE SERPL-SCNC: 105 MMOL/L (ref 100–108)
CO2 SERPL-SCNC: 26 MMOL/L (ref 21–32)
CREAT SERPL-MCNC: 0.71 MG/DL (ref 0.6–1.3)
EST. AVERAGE GLUCOSE BLD GHB EST-MCNC: 111 MG/DL
GLUCOSE BLD STRIP.AUTO-MCNC: 101 MG/DL (ref 70–110)
GLUCOSE BLD STRIP.AUTO-MCNC: 111 MG/DL (ref 70–110)
GLUCOSE BLD STRIP.AUTO-MCNC: 113 MG/DL (ref 70–110)
GLUCOSE BLD STRIP.AUTO-MCNC: 87 MG/DL (ref 70–110)
GLUCOSE SERPL-MCNC: 88 MG/DL (ref 74–99)
HBA1C MFR BLD: 5.5 % (ref 4.2–5.6)
POTASSIUM SERPL-SCNC: 4.1 MMOL/L (ref 3.5–5.5)
PROCALCITONIN SERPL-MCNC: 0.1 NG/ML (ref 0–0.08)
SODIUM SERPL-SCNC: 139 MMOL/L (ref 136–145)

## 2018-01-11 PROCEDURE — 74011250637 HC RX REV CODE- 250/637: Performed by: HOSPITALIST

## 2018-01-11 PROCEDURE — 94640 AIRWAY INHALATION TREATMENT: CPT

## 2018-01-11 PROCEDURE — 74011250636 HC RX REV CODE- 250/636: Performed by: INTERNAL MEDICINE

## 2018-01-11 PROCEDURE — 82962 GLUCOSE BLOOD TEST: CPT

## 2018-01-11 PROCEDURE — 83036 HEMOGLOBIN GLYCOSYLATED A1C: CPT | Performed by: HOSPITALIST

## 2018-01-11 PROCEDURE — 77010033678 HC OXYGEN DAILY: Performed by: INTERNAL MEDICINE

## 2018-01-11 PROCEDURE — 74011636320 HC RX REV CODE- 636/320: Performed by: HOSPITALIST

## 2018-01-11 PROCEDURE — 74011250637 HC RX REV CODE- 250/637: Performed by: INTERNAL MEDICINE

## 2018-01-11 PROCEDURE — 80048 BASIC METABOLIC PNL TOTAL CA: CPT | Performed by: EMERGENCY MEDICINE

## 2018-01-11 PROCEDURE — 74177 CT ABD & PELVIS W/CONTRAST: CPT

## 2018-01-11 PROCEDURE — 65660000000 HC RM CCU STEPDOWN

## 2018-01-11 PROCEDURE — 36415 COLL VENOUS BLD VENIPUNCTURE: CPT | Performed by: HOSPITALIST

## 2018-01-11 RX ADMIN — NYSTATIN: 100000 OINTMENT TOPICAL at 14:48

## 2018-01-11 RX ADMIN — MIRTAZAPINE 7.5 MG: 15 TABLET, FILM COATED ORAL at 21:04

## 2018-01-11 RX ADMIN — FUROSEMIDE 20 MG: 20 TABLET ORAL at 13:50

## 2018-01-11 RX ADMIN — IOPAMIDOL 100 ML: 612 INJECTION, SOLUTION INTRAVENOUS at 13:23

## 2018-01-11 RX ADMIN — AMLODIPINE BESYLATE 10 MG: 10 TABLET ORAL at 13:50

## 2018-01-11 RX ADMIN — ACETAMINOPHEN AND CODEINE PHOSPHATE 2 TABLET: 300; 30 TABLET ORAL at 21:04

## 2018-01-11 RX ADMIN — ACETAMINOPHEN AND CODEINE PHOSPHATE 2 TABLET: 300; 30 TABLET ORAL at 13:49

## 2018-01-11 RX ADMIN — NYSTATIN: 100000 OINTMENT TOPICAL at 17:33

## 2018-01-11 RX ADMIN — TIOTROPIUM BROMIDE 18 MCG: 18 CAPSULE ORAL; RESPIRATORY (INHALATION) at 10:13

## 2018-01-11 RX ADMIN — SULFAMETHOXAZOLE AND TRIMETHOPRIM 1 TABLET: 400; 80 TABLET ORAL at 13:50

## 2018-01-11 RX ADMIN — PANTOPRAZOLE SODIUM 40 MG: 40 TABLET, DELAYED RELEASE ORAL at 07:09

## 2018-01-11 RX ADMIN — BUDESONIDE AND FORMOTEROL FUMARATE DIHYDRATE 2 PUFF: 80; 4.5 AEROSOL RESPIRATORY (INHALATION) at 10:13

## 2018-01-11 RX ADMIN — ROFLUMILAST 500 MCG: 500 TABLET ORAL at 13:50

## 2018-01-11 RX ADMIN — DIATRIZOATE MEGLUMINE AND DIATRIZOATE SODIUM 30 ML: 660; 100 LIQUID ORAL; RECTAL at 09:55

## 2018-01-11 RX ADMIN — ENOXAPARIN SODIUM 40 MG: 40 INJECTION SUBCUTANEOUS at 07:09

## 2018-01-11 RX ADMIN — SULFAMETHOXAZOLE AND TRIMETHOPRIM 1 TABLET: 400; 80 TABLET ORAL at 21:04

## 2018-01-11 RX ADMIN — DOCUSATE SODIUM 100 MG: 100 CAPSULE, LIQUID FILLED ORAL at 13:50

## 2018-01-11 NOTE — PROGRESS NOTES
Pt given Gastroview mixed in apple juice, instructed pt & wife to notify nursing when completed. Both verbalize understanding.  Call bell in reach

## 2018-01-11 NOTE — PROGRESS NOTES
Pt continue to drink contrast, approximately half way completed. Encouraged pt to drink more & to notify nursing. Pt continue to dangle on bedside.  Family remains at bedside

## 2018-01-11 NOTE — PROGRESS NOTES
[address] 33.      1/11/2018      RE: Jeanne Dean Sr. To Whom it May Concern: This is to certify that José Miguel Russ. is currently hospitalized at [facility]. Please feel free to contact my office if you have any questions or concerns. Thank you for your assistance in this matter.     Sincerely,      Godfrey Teixeira MD

## 2018-01-11 NOTE — PROGRESS NOTES
[facility]  Pulmonary, Critical Care, and Sleep Medicine    Name: Otto Kaba Sr. MRN: [id number]   : 1970 Hospital: [address] [place] [address]   Date: 2018        IMPRESSION/ Recs:    · 1. Chronic Worsening Dyspnea: I think he has poor lung reserve at baseline due to severe DIFFUSE (Non UL predominant) emphysema with poor baseline functional status due to COPD/Emphysema ? ? Exacerbation.     ·            2. Tense, Distended abdomen with pedal edema on physical exam: Minimal Ascites on CT/ USG, portal vein is patent, mild steatosis and splenomegaly. Echo suggests normal Right heart function. Likely gas and obesity. He feels better today     · 3. Purulent nasal discharge with Left frontal headache: Could be sinusitis. His recent outpatient Resp Cx showed bactrim sensitive MRSA. I did not see any infiltrates suggesting PNA (no cough/fever/sputum/ infiltrate on imaging). I will give him Bactrim for 14 days which should cover both sinusitis and presumed PNA with MRSA positive resp Cx. Procalcitonin is high     · 4. 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 Need to find out results.   -Severe DIFFUSE (Non UL predominant) emphysema with poor baseline functional status. His Emphysema is diffuse, so I do not think he will be a candidate for LUNG VOLUME REDUCTION SURGERY (LVRS). - As an outpatient he will benefit from LAMA/LABA, Annual influenza vaccinations, Home O2 Tx, Pneumonia vaccinations, Regular f/u with pulmonary and staying away from tobacco.  - He will also benefit from Pulmonary rehab referral as an outpatient. - He may be referred for lung transplant evaluation as an outpatient. - He needs to f/u with Dr. Robert Varela in Pulmonary clinic in 2 weeks of discharge.     · 5. DVT/ GI PPx     · 6. Duonebs ATC and PRN     · 7. LAMA/LABA.     · 8. No need for systemic steroids. No wheezing.      · 9.  Supplemental O2 to keep sats > 90%.     · 10. PRN bipap for increased WOB. · 11. PT/OT    WILL FOLLOW         INTERVAL:    Patient subjectively feels better. No cough. Walked in his room a little and did well. Is laying more flat than y'day. CT abd and USG Abd did not show tense ascites. Portal vein seems patent. ECHO suggests normal right heart and normal LV    HPI:  Subjective:      This patient has been seen and evaluated at the request of Dr. Mono Spence for Dyspnea. Patient is a 49 y.o. male who was here late last month for dyspnea. Patient was treated for COPD exacerbation and discharged home on O2, tapering steroids, other COPD inhaler meds, Daliresp. He was at home for less than 2 weeks and comes back for unresolving and infact slightly worse dyspnea. His dyspnea in grade 3 MMRC. He usually sees Dr. Ciro Morgan for his diagnosis of severe Pan-Acinar Emphysema with FEV1 of 39% in last 6 months. Dr. Ciro Morgan ordered resp cx recently after last discharge which revealed Bactrim sensitive MRSA. He was prescribed Bactrim but he could only  his meds recently due to inclement weather and took 1st dose y'day at home.     He clearly mentions that this time his symptoms are different. He is more SOB. He is not bringing up any phlegm and is not coughing unlike last admission in December 2017. This time he had started noticing b/l pedal edema at home for which his PCP gave him lasix over last 10 days, which he took for 5 days and swelling in feet got better. He was told he may have \"something wrong with his right heart\" and was scheduled to see cardiologist tomorrow as outpatient but he ended up in the hospital. With b/l leg swelling he has also been noticing significant abdominal swelling for last 2 months. He has normal bowel movements (2-3/day).      On ROS: He has left frontal headache for last few weeks. Sometimes snorts out yellow mucus out of nostrils. Repeatedly mentions that it is not his cough. Denies any fever/ CP.  Complains of chest pressure for 2 months which is not exertional. Denies any night sweats or appetite loss, or calf tenderness or redness or any skin lesions or ulcers.     On through History taking: He was given diagnosis of COPD at age of 38. He started smoking at age of 12 and smoked 2 PPD since, quit last month (about 70 pack years). Satrted seeing Dr. Robert Varela 6 months ago. His latest PFTs are as below: Moderately severe obstruction with hyperinflation with reduced DLCO.     No recent CT / USG abd in system. Denies ETOH use.  LFTs have been normal recently     PFTs 17  Patient effort:   Good  Meets ATS criteria for interpretation    Flows:  FVC is reduced to 61% predicted  Maximal Mid Expiratory Flow rate is reduced to 18% predicted  Forced Expiratory Volume in one second is reduced to 39% predicted  FEV 1% is reduced    Volumes:  Vital Capacity is reduced, Residual Volume is elevated and Total Lung Capacity is normal  Residual Volume is relatively increased with respect to Total Lung Capacity    Bronchodilator:  No significant improvement with bronchodilator therapy    Diffusion:  Abnormal Diffusion Capacity reduced to 25 % predicted    Impression:  Severe obstructive defect, Air trapping, Reduced diffusion capacity indicating a decrease in alveolar surface area for gas exchange  Objective:   Vital Signs:    Visit Vitals    /87 (BP 1 Location: Left arm, BP Patient Position: At rest)    Pulse 81    Temp 98.5 °F (36.9 °C)    Resp 18    Ht 5' 9\" (1.753 m)    Wt 81.9 kg (180 lb 9.8 oz)    SpO2 96%    BMI 26.67 kg/m2       O2 Device: Room air   O2 Flow Rate (L/min): 2 l/min   Temp (24hrs), Av.2 °F (36.8 °C), Min:97.9 °F (36.6 °C), Max:98.5 °F (36.9 °C)       Intake/Output:   Last shift:         Last 3 shifts:  1901 -  0700  In: 850 [P.O.:340; I.V.:510]  Out: 1650 [Urine:1650]    Intake/Output Summary (Last 24 hours) at 18 1600  Last data filed at 18 0726   Gross per 24 hour   Intake 240 ml   Output                0 ml   Net              240 ml    Physical Exam:   General:  NAD, laying at 30 degrees, appears comfortable, no use of accessory muscles   Head:  Normocephalic, without obvious abnormality, atraumatic. Eyes:  Conjunctivae/corneas clear. ANicteric   Nose: Nares normal. Mucosa normal. No drainage or sinus tenderness. Throat: Lips, mucosa dry. NO thrush;    Neck: Supple, symmetrical, trachea midline, no adenopathy, thyroid: no enlargment/tenderness/nodules, no carotid bruit and no JVD. No crepitus   Back:   Symmetric, no curvature, no spine tenderness or flank pain   Lungs:   Bilateral auscultation CTA, no wheezing         Heart:  Regular rate and rhythm, S1, S2 normal, Grade 2 ESM in mitral area, NO PARASTERNAL HEAVE      Abdomen:   Distended, Tense, Non tender, FLUID THRILL and SHIFTING DULNNESS PRESENT. COULD NOT APPRECIATE ORGANOMEGALY DUE TO DISTENTION. .Bowel sounds normal.   Extremities: normal, atraumatic, no cyanosis. Has grade 3 b/l clubbing, +1 b/l pedal edema, no calf tenderness, no erythema   Pulses: 1-2+ and symmetric all extremities.    Skin: Skin color, texture, turgor normal. No rashes or lesions   Lymph nodes: Cervical, supraclavicular, and axillary nodes normal.   Neurologic: Grossly nonfocal         DATA:  Labs:  Recent Labs      01/10/18   0501  01/09/18   1833   WBC  8.6  9.0   HGB  14.6  16.3*   HCT  41.8  45.4   PLT  163  179     Recent Labs      01/11/18   0620  01/10/18   0501  01/09/18   1833   NA  139  137  138   K  4.1  4.4  4.3   CL  105  104  100   CO2  26  25  27   GLU  88  176*  114*   BUN  20*  17  16   CREA  0.71  0.76  0.78   CA  8.6  8.6  9.2   MG   --    --   2.5     Imaging:  []I have personally reviewed the patients radiographs  []Radiographs reviewed with radiologist   []No change from prior, tubes and lines in adequate position  []Improved   []Worsening    High complexity decision making was performed during the evaluation of this patient at high risk for decompensation with multiple organ involvement     Above mentioned total time spent on reviewing the case/medical record/data/notes/EMR/patient examination/documentation/coordinating care with nurse/consultants, exclusive of procedures with complex decision making performed and > 50% time spent in face to face evaluation.     Sherry Carvalho MD

## 2018-01-11 NOTE — PROGRESS NOTES
Pt requesting a letter stating that he is here in the hospital. Dr Arnoldo Oneill paged and notified. Pt provided with copy of letter.

## 2018-01-11 NOTE — PROGRESS NOTES
Mr. Jas Munroe was in the middle of an important phone call when I arrived, but I spoke with his wife. I gave her the [id number] and greeting card. I will check back in on them later today.      [address] [facility] [place], CPE Resident   Pager: [phone]  Phone: [phone]

## 2018-01-11 NOTE — PROGRESS NOTES
Care Management Interventions  PCP Verified by CM: Yes  Mode of Transport at Discharge:  (family)  Transition of Care Consult (CM Consult): [address]: Yes  Current Support Network: Lives with Spouse  Confirm Follow Up Transport: Family  Plan discussed with Pt/Family/Caregiver: Yes  Freedom of Choice Offered: Yes (home health)  Discharge Location  Discharge Placement: Home with home health    Pt is a 49year old admitted for COPD with acute exacerbation. Acute right sided heart failure. HCAP, HA. Pt reports that he lives with his wife Liban Room [phone] and that prior to admission he was independent in his ADLs and that he has Oxygen at home from [facility]. He reports that he is active with [facility] and that he plans to continue with them. FOC signed. Spoke to Laurie Fox with [facility]. Pt reports that he has transportation home with family on discharge and that his PCP is with the [facility]

## 2018-01-11 NOTE — PROGRESS NOTES
Hospitalist Progress Note    Patient: Riki Peña Sr. MRN: [id number]  CSN: [id number]    YOB: 1970  Age: 49 y.o. Sex: male    DOA: 1/9/2018 LOS:  LOS: 2 days          Feels better. Headed down for CT abd / pelvis. Wife at bedside states she needs note for medicaid that he is currently hospitalized. CM consulted. Assessment/Plan     1. Intertrigo in the setting of steroids - nystatin ointment  2. COPD, severe - pulmonary input appreciated. 3. MRSA in sputum 1/3/18 on contact. Suspect colonizer, no evidence of LRTI or bronchitis. 4. Hypertension - increase norvasc. Hydralazine prn. Diet clarified. 5. Abdominal swelling - echo and US noted. Follow CT abd / pelvis. 6. Chronic pain on opioids. Continue bowel regimen. 7. Tobacco abuse - states he quit - took chantix in the past. Smoking cessation education  8. normal event monitor 2015  9. Recent GI bleed in the setting of steroid use, egd 12/11/17 revealed Grade 1 espohagitis - on ppi. 10. Mild LAD coronary arteriosclerosis seen on CT chest.   12. Anxiety - prn ativan. Remeron. Avoid trazodone. 13. ?right heart failure - on lasix. Echo noted. 14. Chronic hypoxic respiratory failure on 2L oxygen via NC around the clock  15. Steroid induced hyperglycemia - ssi / ADA diet / A1c 5.5  16. dvt prophylaxis  17. Full code. Pt and ot. I discussed the case with Dr. Jase Verduzco.      Additional Notes:      Case discussed with:  [x]Patient  [x]Family  [x]Nursing  [x]Case Management  DVT Prophylaxis:  [x]Lovenox  []Hep SQ  []SCDs  []Coumadin   []On Heparin gtt    Vital signs/Intake and Output:  Visit Vitals    /87 (BP 1 Location: Left arm, BP Patient Position: At rest)    Pulse 81    Temp 98.5 °F (36.9 °C)    Resp 18    Ht 5' 9\" (1.753 m)    Wt 81.9 kg (180 lb 9.8 oz)    SpO2 96%    BMI 26.67 kg/m2     Current Shift:     Last three shifts:  01/09 1901 - 01/11 0700  In: 850 [P.O.:340; I.V.:510]  Out: 5852 [Urine:1650]    Awake alert and oriented. Wife and other family members at bedside. Ncat. Perrl. RRR  Improved air entry. No crackles, no rales, no wheeze  Abdomen distended. Soft nt nabs. No edema. dp 2+ b.l  No focal deficit  No rash    Medications Reviewed      Labs: Results:       Chemistry Recent Labs      01/11/18   0620  01/10/18   0501  01/09/18   1833   GLU  88  176*  114*   NA  139  137  138   K  4.1  4.4  4.3   CL  105  104  100   CO2  26  25  27   BUN  20*  17  16   CREA  0.71  0.76  0.78   CA  8.6  8.6  9.2   AGAP  8  8  11   BUCR  28*  22*  21*      CBC w/Diff Recent Labs      01/10/18   0501  01/09/18   1833   WBC  8.6  9.0   RBC  4.55*  4.96   HGB  14.6  16.3*   HCT  41.8  45.4   PLT  163  179   GRANS  91*  70   LYMPH  7*  20*   EOS  0  1      Cardiac Enzymes No results for input(s): CPK, CKND1, JIMMY in the last 72 hours. No lab exists for component: CKRMB, TROIP   Coagulation No results for input(s): PTP, INR, APTT in the last 72 hours. No lab exists for component: INREXT, INREXT    Lipid Panel Lab Results   Component Value Date/Time    Cholesterol, total 144 01/23/2017 01:11 PM    HDL Cholesterol 36 01/23/2017 01:11 PM    LDL, calculated 85.6 01/23/2017 01:11 PM    VLDL, calculated 22.4 01/23/2017 01:11 PM    Triglyceride 112 01/23/2017 01:11 PM    CHOL/HDL Ratio 4.0 01/23/2017 01:11 PM      BNP No results for input(s): BNPP in the last 72 hours. Liver Enzymes No results for input(s): TP, ALB, TBIL, AP, SGOT, GPT in the last 72 hours. No lab exists for component: DBIL   Thyroid Studies Lab Results   Component Value Date/Time    TSH 2.21 12/17/2017 10:00 PM        Procedures/imaging: see electronic medical records for all procedures/Xrays and details which were not copied into this note but were reviewed prior to creation of Plan.

## 2018-01-12 VITALS
SYSTOLIC BLOOD PRESSURE: 132 MMHG | HEART RATE: 80 BPM | BODY MASS INDEX: 26.75 KG/M2 | TEMPERATURE: 98.5 F | RESPIRATION RATE: 18 BRPM | DIASTOLIC BLOOD PRESSURE: 87 MMHG | OXYGEN SATURATION: 95 % | HEIGHT: 69 IN | WEIGHT: 180.63 LBS

## 2018-01-12 PROBLEM — J44.1 COPD WITH ACUTE EXACERBATION (HCC): Status: RESOLVED | Noted: 2017-12-18 | Resolved: 2018-01-12

## 2018-01-12 PROBLEM — R51.9 HA (HEADACHE): Status: RESOLVED | Noted: 2018-01-09 | Resolved: 2018-01-12

## 2018-01-12 PROBLEM — I50.811 ACUTE RIGHT-SIDED HEART FAILURE (HCC): Status: RESOLVED | Noted: 2018-01-09 | Resolved: 2018-01-12

## 2018-01-12 LAB
ANION GAP SERPL CALC-SCNC: 4 MMOL/L (ref 3–18)
BASOPHILS # BLD: 0 K/UL (ref 0–0.1)
BASOPHILS NFR BLD: 0 % (ref 0–2)
BUN SERPL-MCNC: 22 MG/DL (ref 7–18)
BUN/CREAT SERPL: 22 (ref 12–20)
CALCIUM SERPL-MCNC: 8.7 MG/DL (ref 8.5–10.1)
CHLORIDE SERPL-SCNC: 105 MMOL/L (ref 100–108)
CO2 SERPL-SCNC: 30 MMOL/L (ref 21–32)
CREAT SERPL-MCNC: 1.02 MG/DL (ref 0.6–1.3)
DIFFERENTIAL METHOD BLD: ABNORMAL
EOSINOPHIL # BLD: 0.1 K/UL (ref 0–0.4)
EOSINOPHIL NFR BLD: 1 % (ref 0–5)
ERYTHROCYTE [DISTWIDTH] IN BLOOD BY AUTOMATED COUNT: 13.2 % (ref 11.6–14.5)
GLUCOSE BLD STRIP.AUTO-MCNC: 101 MG/DL (ref 70–110)
GLUCOSE BLD STRIP.AUTO-MCNC: 90 MG/DL (ref 70–110)
GLUCOSE SERPL-MCNC: 80 MG/DL (ref 74–99)
HCT VFR BLD AUTO: 42.6 % (ref 36–48)
HGB BLD-MCNC: 14.6 G/DL (ref 13–16)
LYMPHOCYTES # BLD: 2.2 K/UL (ref 0.9–3.6)
LYMPHOCYTES NFR BLD: 29 % (ref 21–52)
MAGNESIUM SERPL-MCNC: 2.2 MG/DL (ref 1.6–2.6)
MCH RBC QN AUTO: 31.9 PG (ref 24–34)
MCHC RBC AUTO-ENTMCNC: 34.3 G/DL (ref 31–37)
MCV RBC AUTO: 93 FL (ref 74–97)
MONOCYTES # BLD: 0.7 K/UL (ref 0.05–1.2)
MONOCYTES NFR BLD: 10 % (ref 3–10)
NEUTS SEG # BLD: 4.4 K/UL (ref 1.8–8)
NEUTS SEG NFR BLD: 60 % (ref 40–73)
PLATELET # BLD AUTO: 169 K/UL (ref 135–420)
PMV BLD AUTO: 10.2 FL (ref 9.2–11.8)
POTASSIUM SERPL-SCNC: 4.3 MMOL/L (ref 3.5–5.5)
RBC # BLD AUTO: 4.58 M/UL (ref 4.7–5.5)
SODIUM SERPL-SCNC: 139 MMOL/L (ref 136–145)
WBC # BLD AUTO: 7.4 K/UL (ref 4.6–13.2)

## 2018-01-12 PROCEDURE — 82962 GLUCOSE BLOOD TEST: CPT

## 2018-01-12 PROCEDURE — 74011250637 HC RX REV CODE- 250/637: Performed by: INTERNAL MEDICINE

## 2018-01-12 PROCEDURE — 74011250636 HC RX REV CODE- 250/636: Performed by: INTERNAL MEDICINE

## 2018-01-12 PROCEDURE — 80048 BASIC METABOLIC PNL TOTAL CA: CPT | Performed by: HOSPITALIST

## 2018-01-12 PROCEDURE — 36415 COLL VENOUS BLD VENIPUNCTURE: CPT | Performed by: HOSPITALIST

## 2018-01-12 PROCEDURE — 83735 ASSAY OF MAGNESIUM: CPT | Performed by: HOSPITALIST

## 2018-01-12 PROCEDURE — 85025 COMPLETE CBC W/AUTO DIFF WBC: CPT | Performed by: HOSPITALIST

## 2018-01-12 PROCEDURE — 74011250637 HC RX REV CODE- 250/637: Performed by: HOSPITALIST

## 2018-01-12 RX ORDER — SULFAMETHOXAZOLE AND TRIMETHOPRIM 400; 80 MG/1; MG/1
1 TABLET ORAL EVERY 12 HOURS
Qty: 24 TAB | Refills: 0 | Status: SHIPPED | OUTPATIENT
Start: 2018-01-12 | End: 2018-01-24

## 2018-01-12 RX ORDER — FUROSEMIDE 20 MG/1
20 TABLET ORAL DAILY
Qty: 30 TAB | Refills: 0 | Status: SHIPPED | OUTPATIENT
Start: 2018-01-12 | End: 2018-01-17

## 2018-01-12 RX ORDER — PANTOPRAZOLE SODIUM 40 MG/1
40 TABLET, DELAYED RELEASE ORAL
Qty: 30 TAB | Refills: 0 | Status: SHIPPED | OUTPATIENT
Start: 2018-01-12

## 2018-01-12 RX ORDER — AMLODIPINE BESYLATE 10 MG/1
10 TABLET ORAL DAILY
Qty: 30 TAB | Refills: 1 | Status: SHIPPED | OUTPATIENT
Start: 2018-01-12 | End: 2018-01-24 | Stop reason: SDUPTHER

## 2018-01-12 RX ORDER — ALBUTEROL SULFATE 90 UG/1
2 AEROSOL, METERED RESPIRATORY (INHALATION)
Qty: 1 INHALER | Refills: 3 | Status: SHIPPED | OUTPATIENT
Start: 2018-01-12 | End: 2018-05-29 | Stop reason: RX

## 2018-01-12 RX ORDER — MIRTAZAPINE 7.5 MG/1
7.5 TABLET, FILM COATED ORAL
Qty: 30 TAB | Refills: 0 | Status: SHIPPED | OUTPATIENT
Start: 2018-01-12 | End: 2018-02-19 | Stop reason: ALTCHOICE

## 2018-01-12 RX ORDER — HYDROMORPHONE HCL IN 0.9% NACL 50 MG/50ML
1 PLASTIC BAG, INJECTION (ML) INJECTION
Status: DISCONTINUED | OUTPATIENT
Start: 2018-01-12 | End: 2018-01-12 | Stop reason: HOSPADM

## 2018-01-12 RX ORDER — IPRATROPIUM BROMIDE AND ALBUTEROL SULFATE 2.5; .5 MG/3ML; MG/3ML
3 SOLUTION RESPIRATORY (INHALATION)
Qty: 75 NEBULE | Refills: 0 | Status: SHIPPED | OUTPATIENT
Start: 2018-01-12 | End: 2018-02-27 | Stop reason: ALTCHOICE

## 2018-01-12 RX ADMIN — ROFLUMILAST 500 MCG: 500 TABLET ORAL at 09:17

## 2018-01-12 RX ADMIN — AMLODIPINE BESYLATE 10 MG: 10 TABLET ORAL at 09:16

## 2018-01-12 RX ADMIN — DOCUSATE SODIUM 100 MG: 100 CAPSULE, LIQUID FILLED ORAL at 09:17

## 2018-01-12 RX ADMIN — LORAZEPAM 0.5 MG: 1 TABLET ORAL at 09:17

## 2018-01-12 RX ADMIN — Medication 1 MG: at 12:35

## 2018-01-12 RX ADMIN — NYSTATIN: 100000 OINTMENT TOPICAL at 09:18

## 2018-01-12 RX ADMIN — ENOXAPARIN SODIUM 40 MG: 40 INJECTION SUBCUTANEOUS at 02:25

## 2018-01-12 RX ADMIN — FUROSEMIDE 20 MG: 20 TABLET ORAL at 09:17

## 2018-01-12 RX ADMIN — ACETAMINOPHEN AND CODEINE PHOSPHATE 2 TABLET: 300; 30 TABLET ORAL at 09:16

## 2018-01-12 RX ADMIN — SULFAMETHOXAZOLE AND TRIMETHOPRIM 1 TABLET: 400; 80 TABLET ORAL at 09:17

## 2018-01-12 RX ADMIN — PANTOPRAZOLE SODIUM 40 MG: 40 TABLET, DELAYED RELEASE ORAL at 09:16

## 2018-01-12 RX ADMIN — BUDESONIDE AND FORMOTEROL FUMARATE DIHYDRATE 2 PUFF: 80; 4.5 AEROSOL RESPIRATORY (INHALATION) at 09:19

## 2018-01-12 RX ADMIN — TIOTROPIUM BROMIDE 18 MCG: 18 CAPSULE ORAL; RESPIRATORY (INHALATION) at 09:19

## 2018-01-12 NOTE — ROUTINE PROCESS
Pt complains of an increase in pain in upper left quad. This has been an increase than previous assessment. Hypoactive bowel sounds. Will notify physician. Pt states \" I just dont know what is going on. Md stated [facility] Will await order.

## 2018-01-12 NOTE — ROUTINE PROCESS
Bedside and Verbal shift change report given to boo hendricks  (oncoming nurse) by Paco Mandujano RN  (offgoing nurse). Report included the following information SBAR, MAR and Recent Results.

## 2018-01-12 NOTE — PROGRESS NOTES
Bedside shift change report given to George Jackson (oncoming nurse) by Jazmine Pierre RN (offgoing nurse). Report included the following information Kardex, Procedure Summary, Intake/Output and MAR.

## 2018-01-12 NOTE — DISCHARGE INSTRUCTIONS
MRSA: Care Instructions  Your Care Instructions    MRSA stands for methicillin-resistant Staphylococcus aureus. It is a type of bacteria that can cause a staph infection. But it cannot be killed by the antibiotic methicillin and some other antibiotics. This sometimes makes it harder to treat. The bacteria are widespread on skin and in the nose. MRSA can cause infections of the skin, heart, blood, and bones. The bacteria can spread quickly in the body and cause serious problems. MRSA can also be spread from person to person. Depending on how serious your infection is, the doctor may drain your wound and you may get antibiotics through a small tube placed in a vein (IV). Your doctor may also give you an antibiotic ointment to use on sores or in your nose. Follow-up care is a key part of your treatment and safety. Be sure to make and go to all appointments, and call your doctor if you are having problems. It's also a good idea to know your test results and keep a list of the medicines you take. How can you care for yourself at home? · Take your antibiotics as directed. Do not stop taking them just because you feel better. You need to take the full course of antibiotics. · Keep any cuts or other wounds covered while they heal.  · Wash your hands often, especially after you touch elastic bandages or other dressings over a wound. This can keep the bacteria from spreading. Wrap bandages in a plastic bag before you throw them away. · Do not share towels, washcloths, razors, clothing, or other items that touched your wound or bandage. Wash your sheets, towels, and clothes with warm water and detergent. Dry them in a hot dryer, if possible. · Keep shared areas clean by wiping down surfaces (such as countertops, doorknobs, and light switches) with a disinfectant. When should you call for help?   Call your doctor now or seek immediate medical care if:  ? · You have worse symptoms of infection, such as:  ¨ Increased pain, swelling, warmth, or redness. ¨ Red streaks leading from the area. ¨ Pus draining from the area. ¨ A fever. ? Watch closely for changes in your health, and be sure to contact your doctor if:  ? · You do not get better as expected. Where can you learn more? Go to http://costa-jessica.info/. Enter Q138 in the search box to learn more about \"MRSA: Care Instructions. \"  Current as of: March 3, 2017  Content Version: 11.4  © 8753-8202 [employer]. Care instructions adapted under license by [employer] (which disclaims liability or warranty for this information). If you have questions about a medical condition or this instruction, always ask your healthcare professional. [address] any warranty or liability for your use of this information. Headache: Care Instructions  Your Care Instructions    Headaches have many possible causes. Most headaches aren't a sign of a more serious problem, and they will get better on their own. Home treatment may help you feel better faster. The doctor has checked you carefully, but problems can develop later. If you notice any problems or new symptoms, get medical treatment right away. Follow-up care is a key part of your treatment and safety. Be sure to make and go to all appointments, and call your doctor if you are having problems. It's also a good idea to know your test results and keep a list of the medicines you take. How can you care for yourself at home? · Do not drive if you have taken a prescription pain medicine. · Rest in a quiet, dark room until your headache is gone. Close your eyes and try to relax or go to sleep. Don't watch TV or read. · Put a cold, moist cloth or cold pack on the painful area for 10 to 20 minutes at a time. Put a thin cloth between the cold pack and your skin. · Use a warm, moist towel or a heating pad set on low to relax tight shoulder and neck muscles.   · Have someone gently massage your neck and shoulders. · Take pain medicines exactly as directed. ¨ If the doctor gave you a prescription medicine for pain, take it as prescribed. ¨ If you are not taking a prescription pain medicine, ask your doctor if you can take an over-the-counter medicine. · Be careful not to take pain medicine more often than the instructions allow, because you may get worse or more frequent headaches when the medicine wears off. · Do not ignore new symptoms that occur with a headache, such as a fever, weakness or numbness, vision changes, or confusion. These may be signs of a more serious problem. To prevent headaches  · Keep a headache diary so you can figure out what triggers your headaches. Avoiding triggers may help you prevent headaches. Record when each headache began, how long it lasted, and what the pain was like (throbbing, aching, stabbing, or dull). Write down any other symptoms you had with the headache, such as nausea, flashing lights or dark spots, or sensitivity to bright light or loud noise. Note if the headache occurred near your period. List anything that might have triggered the headache, such as certain foods (chocolate, cheese, wine) or odors, smoke, bright light, stress, or lack of sleep. · Find healthy ways to deal with stress. Headaches are most common during or right after stressful times. Take time to relax before and after you do something that has caused a headache in the past.  · Try to keep your muscles relaxed by keeping good posture. Check your jaw, face, neck, and shoulder muscles for tension, and try relaxing them. When sitting at a desk, change positions often, and stretch for 30 seconds each hour. · Get plenty of sleep and exercise. · Eat regularly and well. Long periods without food can trigger a headache. · Treat yourself to a massage. Some people find that regular massages are very helpful in relieving tension.   · Limit caffeine by not drinking too much coffee, tea, or soda. But don't quit caffeine suddenly, because that can also give you headaches. · Reduce eyestrain from computers by blinking frequently and looking away from the computer screen every so often. Make sure you have proper eyewear and that your monitor is set up properly, about an arm's length away. · Seek help if you have depression or anxiety. Your headaches may be linked to these conditions. Treatment can both prevent headaches and help with symptoms of anxiety or depression. When should you call for help? Call 911 anytime you think you may need emergency care. For example, call if:  ? · You have signs of a stroke. These may include:  ¨ Sudden numbness, paralysis, or weakness in your face, arm, or leg, especially on only one side of your body. ¨ Sudden vision changes. ¨ Sudden trouble speaking. ¨ Sudden confusion or trouble understanding simple statements. ¨ Sudden problems with walking or balance. ¨ A sudden, severe headache that is different from past headaches. ?Call your doctor now or seek immediate medical care if:  ? · You have a new or worse headache. ? · Your headache gets much worse. Where can you learn more? Go to http://costa-jessica.info/. Enter M271 in the search box to learn more about \"Headache: Care Instructions. \"  Current as of: October 14, 2016  Content Version: 11.4  © 5339-2704 [employer]. Care instructions adapted under license by [employer] (which disclaims liability or warranty for this information). If you have questions about a medical condition or this instruction, always ask your healthcare professional. [place] [address] any warranty or liability for your use of this information.     Chronic Obstructive Pulmonary Disease (COPD): Care Instructions  Your Care Instructions    Chronic obstructive pulmonary disease (COPD) is a general term for a group of lung diseases, including emphysema and chronic bronchitis. People with COPD have decreased airflow in and out of the lungs, which makes it hard to breathe. The airways also can get clogged with thick mucus. Cigarette smoking is a major cause of COPD. Although there is no cure for COPD, you can slow its progress. Following your treatment plan and taking care of yourself can help you feel better and live longer. Follow-up care is a key part of your treatment and safety. Be sure to make and go to all appointments, and call your doctor if you are having problems. It's also a good idea to know your test results and keep a list of the medicines you take. How can you care for yourself at home? ?Staying healthy  ? · Do not smoke. This is the most important step you can take to prevent more damage to your lungs. If you need help quitting, talk to your doctor about stop-smoking programs and medicines. These can increase your chances of quitting for good. ? · Avoid colds and flu. Get a pneumococcal vaccine shot. If you have had one before, ask your doctor whether you need a second dose. Get the flu vaccine every fall. If you must be around people with colds or the flu, wash your hands often. ? · Avoid secondhand smoke, air pollution, and high altitudes. Also avoid cold, dry air and hot, humid air. Stay at home with your windows closed when air pollution is bad. ?Medicines and oxygen therapy  ? · Take your medicines exactly as prescribed. Call your doctor if you think you are having a problem with your medicine. ? · You may be taking medicines such as:  ¨ Bronchodilators. These help open your airways and make breathing easier. Bronchodilators are either short-acting (work for 6 to 9 hours) or long-acting (work for 24 hours). You inhale most bronchodilators, so they start to act quickly. Always carry your quick-relief inhaler with you in case you need it while you are away from home. ¨ Corticosteroids (prednisone, budesonide).  These reduce airway inflammation. They come in pill or inhaled form. You must take these medicines every day for them to work well. ? · A spacer may help you get more inhaled medicine to your lungs. Ask your doctor or pharmacist if a spacer is right for you. If it is, ask how to use it properly. ? · Do not take any vitamins, over-the-counter medicine, or herbal products without talking to your doctor first.   ? · If your doctor prescribed antibiotics, take them as directed. Do not stop taking them just because you feel better. You need to take the full course of antibiotics. ? · Oxygen therapy boosts the amount of oxygen in your blood and helps you breathe easier. Use the flow rate your doctor has recommended, and do not change it without talking to your doctor first.   Activity  ? · Get regular exercise. Walking is an easy way to get exercise. Start out slowly, and walk a little more each day. ? · Pay attention to your breathing. You are exercising too hard if you cannot talk while you are exercising. ? · Take short rest breaks when doing household chores and other activities. ? · Learn breathing methods-such as breathing through pursed lips-to help you become less short of breath. ? · If your doctor has not set you up with a pulmonary rehabilitation program, talk to him or her about whether rehab is right for you. Rehab includes exercise programs, education about your disease and how to manage it, help with diet and other changes, and emotional support. Diet  ? · Eat regular, healthy meals. Use bronchodilators about 1 hour before you eat to make it easier to eat. Eat several small meals instead of three large ones. Drink beverages at the end of the meal. Avoid foods that are hard to chew. ? · Eat foods that contain protein so that you do not lose muscle mass. ? · Talk with your doctor if you gain too much weight or if you lose weight without trying. ?Mental health  ?  · Talk to your family, friends, or a therapist about your feelings. It is normal to feel frightened, angry, hopeless, helpless, and even guilty. Talking openly about bad feelings can help you cope. If these feelings last, talk to your doctor. When should you call for help? Call 911 anytime you think you may need emergency care. For example, call if:  ? · You have severe trouble breathing. ?Call your doctor now or seek immediate medical care if:  ? · You have new or worse trouble breathing. ? · You cough up blood. ? · You have a fever. ? Watch closely for changes in your health, and be sure to contact your doctor if:  ? · You cough more deeply or more often, especially if you notice more mucus or a change in the color of your mucus. ? · You have new or worse swelling in your legs or belly. ? · You are not getting better as expected. Where can you learn more? Go to http://costaKlosetshopjessica.info/. Anjel Marie in the search box to learn more about \"Chronic Obstructive Pulmonary Disease (COPD): Care Instructions. \"  Current as of: May 12, 2017  Content Version: 11.4  © 3576-7189 [employer]. Care instructions adapted under license by [employer] (which disclaims liability or warranty for this information). If you have questions about a medical condition or this instruction, always ask your healthcare professional. [address] any warranty or liability for your use of this information. COPD Exacerbation Plan: Care Instructions  Your Care Instructions    If you have chronic obstructive pulmonary disease (COPD), your usual shortness of breath could suddenly get worse. You may start coughing more and have more mucus. This flare-up is called a COPD exacerbation (say \"[id number]-[id number]-[id number]\"). A lung infection or air pollution could set off an exacerbation. Sometimes it can happen after a quick change in temperature or being around chemicals.   Work with your doctor to make a plan for dealing with an exacerbation. You can better manage it if you plan ahead. Follow-up care is a key part of your treatment and safety. Be sure to make and go to all appointments, and call your doctor if you are having problems. It's also a good idea to know your test results and keep a list of the medicines you take. How can you care for yourself at home? During an exacerbation  · Do not panic if you start to have one. Quick treatment at home may help you prevent serious breathing problems. If you have a COPD exacerbation plan that you developed with your doctor, follow it. · Take your medicines exactly as your doctor tells you. ¨ Use your inhaler as directed by your doctor. If your symptoms do not get better after you use your medicine, have someone take you to the emergency room. Call an ambulance if necessary. ¨ With inhaled medicines, a spacer or a nebulizer may help you get more medicine to your lungs. Ask your doctor or pharmacist how to use them properly. Practice using the spacer in front of a mirror before you have an exacerbation. This may help you get the medicine into your lungs quickly. ¨ If your doctor has given you steroid pills, take them as directed. ¨ Your doctor may have given you a prescription for antibiotics, which you can fill if you need it. ¨ Talk to your doctor if you have any problems with your medicine. And call your doctor if you have to use your antibiotic or steroid pills. Preventing an exacerbation  · Do not smoke. This is the most important step you can take to prevent more damage to your lungs and prevent problems. If you already smoke, it is never too late to stop. If you need help quitting, talk to your doctor about stop-smoking programs and medicines. These can increase your chances of quitting for good. · Take your daily medicines as prescribed. · Avoid colds and flu. ¨ Get a pneumococcal vaccine. ¨ Get a flu vaccine each year, as soon as it is available.  Ask those you live or work with to do the same, so they will not get the flu and infect you. ¨ Try to stay away from people with colds or the flu. ¨ Wash your hands often. · Avoid secondhand smoke; air pollution; cold, dry air; hot, humid air; and high altitudes. Stay at home with your windows closed when air pollution is bad. · Learn breathing techniques for COPD, such as breathing through pursed lips. These techniques can help you breathe easier during an exacerbation. When should you call for help? Call 911 anytime you think you may need emergency care. For example, call if:  ? · You have severe trouble breathing. ? · You have severe chest pain. ?Call your doctor now or seek immediate medical care if:  ? · You have new or worse shortness of breath. ? · You develop new chest pain. ? · You are coughing more deeply or more often, especially if you notice more mucus or a change in the color of your mucus. ? · You cough up blood. ? · You have new or increased swelling in your legs or belly. ? · You have a fever. ? Watch closely for changes in your health, and be sure to contact your doctor if:  ? · You need to use your antibiotic or steroid pills. ? · Your symptoms are getting worse. Where can you learn more? Go to http://costa-jessica.info/. Enter P994 in the search box to learn more about \"COPD Exacerbation Plan: Care Instructions. \"  Current as of: May 12, 2017  Content Version: 11.4  © 3070-9533 [employer]. Care instructions adapted under license by [employer] (which disclaims liability or warranty for this information). If you have questions about a medical condition or this instruction, always ask your healthcare professional. [place] [address] any warranty or liability for your use of this information. Learning About COPD Triggers  What are triggers?     When you have COPD (chronic obstructive pulmonary disease), certain things can make your symptoms worse. These are called triggers. They include:  · Cigarette smoke or air pollution. · Illnesses like colds, flu, or pneumonia. · Cleaning supplies or other chemicals. · Gases, particles, or fumes from wood or kerosene home heaters. Not all people have the same triggers. What may cause symptoms in one person may not be a problem for another person. How do triggers affect COPD? Triggers can make it harder for your lungs to work as they should and can lead to sudden difficulty breathing and other symptoms. When you are around a trigger, a COPD flare-up is more likely. If your symptoms are severe, you may need emergency treatment or have to go to the hospital for treatment. If you know what your triggers are and can avoid them, you can reduce how often you have flare-ups and how much COPD affects your life. It's also important to be active and to take your daily medicines as prescribed. This helps prevent flare-ups too. What can you do to avoid triggers? The first thing is to know your triggers. When you are having symptoms, note the things around you that might be causing them. Then look for patterns in what may be triggering your symptoms. When you have your list of possible triggers, work with your doctor to find ways to avoid them. Here are some ways to avoid a few common triggers. · Do not smoke or allow others to smoke around you. If you need help quitting, talk to your doctor about stop-smoking programs and medicines. These can increase your chances of quitting for good. · If there is a lot of pollution, pollen, or dust outside, stay at home and keep your windows closed. Use an air conditioner or air filter in your home. Check your local weather report or newspaper for air quality and pollen reports. · Get a flu vaccine every year. Talk to your doctor about getting a pneumococcal shot. Wash your hands often to prevent infections. How can you manage a flare-up?   Do not panic if you start to have a COPD flare-up. · If you have a COPD action plan, follow the plan. In general:  ¨ Use your quick-relief inhaler as directed by your doctor. If your symptoms do not get better after you use your medicine, have someone take you to the emergency room. Call an ambulance if needed. ¨ Use a spacer with your metered-dose inhaler (MDI). If you have a nebulizer for inhaled medicine, use it. A spacer or nebulizer may help get more medicine to your lungs. ¨ If your doctor has given you other inhaled medicines or steroid pills, take them as directed. ¨ If your doctor has given you a prescription for an antibiotic, fill it if you need to. ¨ Call your doctor if you have to use your antibiotic or steroid pills. Where can you learn more? Go to http://costa-jessica.info/. Enter B671 in the search box to learn more about \"Learning About COPD Triggers. \"  Current as of: May 12, 2017  Content Version: 11.4  © 2028-6388 [employer]. Care instructions adapted under license by [employer] (which disclaims liability or warranty for this information). If you have questions about a medical condition or this instruction, always ask your healthcare professional. [address] any warranty or liability for your use of this information. Learning About COPD and How to Prevent Lung Infections  How do lung infections affect COPD? Lung infections like pneumonia and acute bronchitis are common causes of COPD flare-ups. And people who have COPD are more likely to get these lung infections, especially if they smoke. When you have COPD, it is important to know the symptoms of pneumonia and acute bronchitis and call your doctor if you have them. Symptoms include:  · A cough that brings up more mucus than usual.  · Fever. · Shortness of breath. What can you do to prevent these infections? Stay healthy  · Get a flu shot every year.   · Get a pneumococcal vaccine shot. If you have had one before, ask your doctor whether you need another dose. Two different types of pneumococcal vaccines are recommended for people ages 72 and older. · If you must be around people with colds or the flu, wash your hands often. · Do not smoke. This is the most important step you can take to prevent more damage to your lungs. If you need help quitting, talk to your doctor about stop-smoking programs and medicines. These can increase your chances of quitting for good. · Avoid secondhand smoke, air pollution, and high altitudes. Also avoid cold, dry air and hot, humid air. Stay at home with your windows closed when air pollution is bad. Exercise and eat well  · If your doctor recommends it, get more exercise. Walking is a good choice. Bit by bit, increase the amount you walk every day. Try for at least 30 minutes on most days of the week. · Eat regular, well-balanced meals. Eating right keeps your energy levels up and helps your body fight infection. · Get plenty of rest and sleep. Follow-up care is a key part of your treatment and safety. Be sure to make and go to all appointments, and call your doctor if you are having problems. It's also a good idea to know your test results and keep a list of the medicines you take. Where can you learn more? Go to http://costa-jessica.info/. Enter W220 in the search box to learn more about \"Learning About COPD and How to Prevent Lung Infections. \"  Current as of: May 12, 2017  Content Version: 11.4  © 9889-1205 [employer]. Care instructions adapted under license by [employer] (which disclaims liability or warranty for this information). If you have questions about a medical condition or this instruction, always ask your healthcare professional. [place] [address] any warranty or liability for your use of this information.         Patient armband removed and shredded      DISCHARGE SUMMARY from Nurse    PATIENT INSTRUCTIONS:    After general anesthesia or intravenous sedation, for 24 hours or while taking prescription Narcotics:  · Limit your activities  · Do not drive and operate hazardous machinery  · Do not make important personal or business decisions  · Do  not drink alcoholic beverages  · If you have not urinated within 8 hours after discharge, please contact your surgeon on call. Report the following to your surgeon:  · Excessive pain, swelling, redness or odor of or around the surgical area  · Temperature over 100.5  · Nausea and vomiting lasting longer than 4 hours or if unable to take medications  · Any signs of decreased circulation or nerve impairment to extremity: change in color, persistent  numbness, tingling, coldness or increase pain  · Any questions    What to do at Home:  Recommended activity: Activity as tolerated    If you experience any of the following symptoms Nausea, vomiting, diarrhea, fever greater than 100.5, dizziness, severe headache, shortness of breath, chest pain, increased pain, please follow up with PCP. *  Please give a list of your current medications to your Primary Care Provider. *  Please update this list whenever your medications are discontinued, doses are      changed, or new medications (including over-the-counter products) are added. *  Please carry medication information at all times in case of emergency situations. These are general instructions for a healthy lifestyle:    No smoking/ No tobacco products/ Avoid exposure to second hand smoke  Surgeon General's Warning:  Quitting smoking now greatly reduces serious risk to your health.     Obesity, smoking, and sedentary lifestyle greatly increases your risk for illness    A healthy diet, regular physical exercise & weight monitoring are important for maintaining a healthy lifestyle    You may be retaining fluid if you have a history of heart failure or if you experience any of the following symptoms:  Weight gain of 3 pounds or more overnight or 5 pounds in a week, increased swelling in our hands or feet or shortness of breath while lying flat in bed. Please call your doctor as soon as you notice any of these symptoms; do not wait until your next office visit. Recognize signs and symptoms of STROKE:    F-face looks uneven    A-arms unable to move or move unevenly    S-speech slurred or non-existent    T-time-call 911 as soon as signs and symptoms begin-DO NOT go       Back to bed or wait to see if you get better-TIME IS BRAIN. Warning Signs of HEART ATTACK     Call 911 if you have these symptoms:   Chest discomfort. Most heart attacks involve discomfort in the center of the chest that lasts more than a few minutes, or that goes away and comes back. It can feel like uncomfortable pressure, squeezing, fullness, or pain.  Discomfort in other areas of the upper body. Symptoms can include pain or discomfort in one or both arms, the back, neck, jaw, or stomach.  Shortness of breath with or without chest discomfort.  Other signs may include breaking out in a cold sweat, nausea, or lightheadedness. Don't wait more than five minutes to call 911 - MINUTES MATTER! Fast action can save your life. Calling 911 is almost always the fastest way to get lifesaving treatment. Emergency Medical Services staff can begin treatment when they arrive -- up to an hour sooner than if someone gets to the hospital by car. The discharge information has been reviewed with the patient. The patient verbalized understanding. Discharge medications reviewed with the patient and appropriate educational materials and side effects teaching were provided.   ___________________________________________________________________________________________________________________________________
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
## 2018-01-12 NOTE — HOME CARE
Pt is active to [facility] - will resume care of SN/PT/OT/MSW _ telehealh added [facility] will resume care - family to transport pt home - RADHA Lora RN

## 2018-01-12 NOTE — DISCHARGE SUMMARY
Discharge Summary    Patient: Ramakrishna Estrella Sr. MRN: [id number]  CSN: [id number]    YOB: 1970  Age: 49 y.o. Sex: male    DOA: 1/9/2018 LOS:  LOS: 3 days   Discharge Date:      Admission Diagnoses: COPD with acute exacerbation ([facility]  Acute right-sided heart failure  HCAP (healthcare-associated pneumonia)  HA (headache)    Discharge Diagnoses:    Problem List as of 1/12/2018  Date Reviewed: 1/3/2018          Codes Class Noted - Resolved    Sepsis ([facility] ICD-10-CM: A41.9  ICD-9-CM: 038.9, 995.91  12/18/2017 - Present        Emphysema/COPD ([facility] ICD-10-CM: J43.9  ICD-9-CM: 492.8  Unknown - Present        COPD, severe ([facility] ICD-10-CM: J44.9  ICD-9-CM: 824  Unknown - Present        RESOLVED: Acute right-sided heart failure ICD-10-CM: I50.811  ICD-9-CM: 428.0  1/9/2018 - 1/12/2018        RESOLVED: HA (headache) ICD-10-CM: R51  ICD-9-CM: 784.0  1/9/2018 - 1/12/2018        RESOLVED: COPD with acute exacerbation ([facility] ICD-10-CM: J44.1  ICD-9-CM: 491.21  12/18/2017 - 1/12/2018            Reason for Admission  49 y.o  male with a PMH of COPD, chronic hypoxic respiratory failure, GI bleeding who presented to the ED with c/c of shortness of breathing. He had associated productive cough, but denied fever or chills. No orthopnea or PND. Patient was recenly admitted here 12/18/17-12/23/17, during which he was found to have COPD exacerbation, acute bronchitis, treated with Levaquin/cefepime. He also has hx GI bleed in the setting of steroid use, egd 12/11/17 revealed Grade 1 espohagitis. He had sputum culture by his PCP on 1/3/18 positive for MRSA. He was called by his PCP and instructed to go to ED. Here in ED he was started on IV vancomycin. Discharge Condition: good    PHYSICAL EXAM at discharge.    Visit Vitals    /87 (BP 1 Location: Left arm, BP Patient Position: At rest)    Pulse 80    Temp 98.5 °F (36.9 °C)    Resp 18    Ht 5' 9\" (1.753 m)    Wt 81.9 kg (180 lb 10 oz)    SpO2 95%    BMI 26.67 kg/m2     Awake alert and oriented x4. Wife and other family members at bedside. Ncat. Perrl. RRR  Improved air entry. No crackles, no rales, no wheeze  Abdomen distended. Soft nt nabs. No edema. dp 2+ b.l  No focal deficit. Ambulating on oxygen, otherwise unassisted and w/o difficulty  No rash    Hospital Course:   1. Intertrigo in the setting of steroids - nystatin ointment  2. COPD, severe - with acute mild exacerbation - improving. Close f/u with pulmonary Dr. Elma Flores  3. MRSA in sputum 1/3/18 on contact. Suspect colonizer, no evidence of LRTI or bronchitis. 4. Hypertension - increased norvasc. 5. Tense, Distended abdomen with pedal edema on physical exam - Minimal Ascites on CT/ USG, portal vein is patent, mild steatosis and splenomegaly. Echo suggests normal Right heart function. Likely gas and obesity. He feels better over hospital course. 6. Chronic pain on opioids. Continue bowel regimen. 7. Tobacco abuse - states he quit - took chantix in the past. Smoking cessation education  8. normal event monitor 2015  9. Recent GI bleed in the setting of steroid use, egd 12/11/17 revealed Grade 1 espohagitis - on ppi. 10. Mild LAD coronary arteriosclerosis seen on CT chest.   12. Anxiety - prn ativan. Remeron. Avoid trazodone. 13. ?right heart failure - given lasix. Echo noted. 14. Chronic hypoxic respiratory failure on 2L oxygen via NC around the clock  15. Steroid induced hyperglycemia - ssi / ADA diet / A1c 5.5  16. dvt prophylaxis was given in the form of lovenox subcut daily  17. Full code. Discharge home with hh. Patient and family agree, all questions answered to the best of my ability.     Consults: pccm Dr. De La Torre Fears    Significant Diagnostic Studies: labs:   Recent Results (from the past 24 hour(s))   GLUCOSE, POC    Collection Time: 01/11/18 12:14 PM   Result Value Ref Range    Glucose (POC) 101 70 - 110 mg/dL   GLUCOSE, POC    Collection Time: 01/11/18  5:05 PM   Result Value Ref Range Glucose (POC) 113 (H) 70 - 110 mg/dL   GLUCOSE, POC    Collection Time: 01/11/18  9:45 PM   Result Value Ref Range    Glucose (POC) 111 (H) 70 - 110 mg/dL   MAGNESIUM    Collection Time: 01/12/18  3:41 AM   Result Value Ref Range    Magnesium 2.2 1.6 - 2.6 mg/dL   METABOLIC PANEL, BASIC    Collection Time: 01/12/18  3:41 AM   Result Value Ref Range    Sodium 139 136 - 145 mmol/L    Potassium 4.3 3.5 - 5.5 mmol/L    Chloride 105 100 - 108 mmol/L    CO2 30 21 - 32 mmol/L    Anion gap 4 3.0 - 18 mmol/L    Glucose 80 74 - 99 mg/dL    BUN 22 (H) 7.0 - 18 MG/DL    Creatinine 1.02 0.6 - 1.3 MG/DL    BUN/Creatinine ratio 22 (H) 12 - 20      GFR est AA >60 >60 ml/min/1.73m2    GFR est non-AA >60 >60 ml/min/1.73m2    Calcium 8.7 8.5 - 10.1 MG/DL   CBC WITH AUTOMATED DIFF    Collection Time: 01/12/18  3:41 AM   Result Value Ref Range    WBC 7.4 4.6 - 13.2 K/uL    RBC 4.58 (L) 4.70 - 5.50 M/uL    HGB 14.6 13.0 - 16.0 g/dL    HCT 42.6 36.0 - 48.0 %    MCV 93.0 74.0 - 97.0 FL    MCH 31.9 24.0 - 34.0 PG    MCHC 34.3 31.0 - 37.0 g/dL    RDW 13.2 11.6 - 14.5 %    PLATELET 579 764 - 937 K/uL    MPV 10.2 9.2 - 11.8 FL    NEUTROPHILS 60 40 - 73 %    LYMPHOCYTES 29 21 - 52 %    MONOCYTES 10 3 - 10 %    EOSINOPHILS 1 0 - 5 %    BASOPHILS 0 0 - 2 %    ABS. NEUTROPHILS 4.4 1.8 - 8.0 K/UL    ABS. LYMPHOCYTES 2.2 0.9 - 3.6 K/UL    ABS. MONOCYTES 0.7 0.05 - 1.2 K/UL    ABS. EOSINOPHILS 0.1 0.0 - 0.4 K/UL    ABS.  BASOPHILS 0.0 0.0 - 0.1 K/UL    DF AUTOMATED     GLUCOSE, POC    Collection Time: 01/12/18  6:18 AM   Result Value Ref Range    Glucose (POC) 90 70 - 110 mg/dL   GLUCOSE, POC    Collection Time: 01/12/18 11:22 AM   Result Value Ref Range    Glucose (POC) 101 70 - 110 mg/dL     All Micro Results     None        IMAGING  CT Results (most recent):    Results from [place] encounter on 01/09/18   CT ABD PELV W CONT   Narrative CT ABDOMEN AND PELVIS WITH ENHANCEMENT    INDICATION: Admitted with chronic worsening dyspnea, emphysema, bilateral pedal  edema, significant abdominal swelling over 2 months, epigastric pain. TECHNIQUE: Axial images obtained of the abdomen and pelvis following the  uneventful administration of  100 cc's Isovue-300 nonionic intravenous contrast.  Oral contrast was given. Coronal and sagittal reformatted images of the abdomen  and pelvis were obtained. All CT scans at this facility are performed using dose optimization technique as  appropriate to a performed exam, to include automated exposure control,  adjustment of the mA and/or kV according to patient size (including appropriate  matching first site-specific examinations), or use of iterative reconstruction  technique. COMPARISON: Limited abdominal ultrasound 1/10/2018; CT chest 12/19/2017; CT  abdomen pelvis 6/28/2013    ABDOMEN FINDINGS:     Liver: Liver is upper limits of normal at 15.9 cm. There is mild hepatic  steatosis. Punctate hypodensity in the left medial hepatic lobe is too small to  characterize but statistically is most likely benign. Spleen: Spleen is increased in size 13.4 x 10.7 x 4.4 cm (volume 328 mL), mildly  enlarged, previously 12.3 x 9.4 x 4.6 cm. No focal splenic lesion. Pancreas: Unremarkable. Biliary: Unremarkable. Bowel: Unremarkable. Peritoneum/ Retroperitoneum: Unremarkable. Lymph Nodes: Unremarkable. Adrenal Glands: Unremarkable. Kidneys: Unremarkable. Vessels: Aorta is normal in caliber with mild atherosclerosis. No recanalized  paraumbilical vein, significant collaterals, or paraesophageal varices. Portal  vein is mildly enlarged at 15 mm. PELVIS FINDINGS:     Bladder/ Pelvic Organs: Prostate is not enlarged, though there is mild median  lobe hypertrophy. Bladder is otherwise unremarkable. No pelvic ascites or  lymphadenopathy. Incompletely visualized possible right hydrocele. Lung Base: Similar subsegmental atelectasis or scarring in the dependent right  lower lobe.  Emphysematous changes. Bones: Unchanged sclerosis left pelvis is most likely a bone island. Impression IMPRESSION:    1. Improved hepatic steatosis since 2013 with upper limits of normal liver  size. 2.  New splenomegaly, possible portal hypertension given mild portal vein  enlargement. No other sequela of portal hypertension (no ascites). 3.  Median lobe hypertrophy prostate. 4.  Emphysema. US Results (most recent):    Results from [place] encounter on 01/09/18   US ABD LTD   Narrative ULTRASOUND ABDOMEN LIMITED RIGHT UPPER QUADRANT    CPT CODE: 55722    INDICATION: Abdominal distention. TECHNIQUE: Selected images from limited abdominal ultrasound provided for  interpretation:      COMPARISON: None. FINDINGS:     The pancreas is not well visualized due to bowel gas. The liver is of increased echotexture in a homogeneous pattern. Hepatomegaly is  apparent with a length of almost 20 cm. .  The portal vein is of normal size at  1.2 cm with antegrade flow. The biliary tree is not dilated with the common duct  measuring 3 mm. The gallbladder is contracted. Definite calculi are not  apparent. Patient states he lunch at 1:00 PM, prior to this 2:25 PM study    The right kidney measures 11.5 x 5.2 x 4.2 cm and is sonographically  unremarkable. .     Only the proximal abdominal aorta can be visualized and is of normal caliber. The IVC is poorly demonstrated. .     Study is limited by a combination of bowel gas and patient habitus. .         Impression IMPRESSION:    Fatty infiltration of the liver is the most likely cause for the hepatomegaly  and increased echotexture; cirrhosis and other storage diseases are less likely  differential considerations    Contracted gallbladder; possibly physiologic due to recent meal.         XR Results (most recent):    Results from Hospital Encounter encounter on 01/09/18   XR CHEST PA LAT   Narrative Chest, PA and lateral    HISTORY: Chest pain x3 months.  Headache today with shortness of breath    COMPARISON: 12/18/2017    FINDINGS: 2 views performed. The lungs are hyperinflated,, with slight  prominence of interstitial lung markings. Heart size is normal.    No effusion. Bones are demineralized. Impression IMPRESSION: Hyperinflation with mild prominence of interstitial lung markings  without focal consolidation or failure. Transthoracic Echocardiogram 10-Joey-2018  Left ventricle: Systolic function was normal. Ejection fraction was estimated in the range of 55 % to 60 %. There were no regional wall motion abnormalities. Wall thickness was mildly increased. Atrial septum: Color Doppler evaluation was performed. There was no   right-to-left shunt. Discharge Medications:     Current Discharge Medication List      START taking these medications    Details   mirtazapine (REMERON) 7.5 mg tablet Take 1 Tab by mouth nightly. Qty: 30 Tab, Refills: 0      trimethoprim-sulfamethoxazole (BACTRIM, SEPTRA)  mg per tablet Take 1 Tab by mouth every twelve (12) hours for 12 days. Qty: 24 Tab, Refills: 0         CONTINUE these medications which have CHANGED    Details   amLODIPine (NORVASC) 10 mg tablet Take 1 Tab by mouth daily. Qty: 30 Tab, Refills: 1    Comments: 5 mg tablets to replace 2.5 mg tablets. Dose increased  Associated Diagnoses: Hypertension, unspecified type         CONTINUE these medications which have NOT CHANGED    Details   furosemide (LASIX) 20 mg tablet Take 1 Tab by mouth daily for 5 days. Qty: 5 Tab, Refills: 0    Associated Diagnoses: Swelling; Hypertension, unspecified type      lidocaine (XYLOCAINE) 2 % solution Take 15 mL by mouth as needed for Pain. Indications: MOUTH IRRITATION  Qty: 1 Bottle, Refills: 0    Associated Diagnoses: Oral aphthous ulcer      nystatin (MYCOSTATIN) topical cream Apply  to affected area two (2) times a day.   Qty: 30 g, Refills: 0    Associated Diagnoses: Fungal infection of the groin      fluticasone-salmeterol (ADVAIR) 500-50 mcg/dose diskus inhaler Take 1 Puff by inhalation two (2) times a day. Qty: 1 Each, Refills: 3    Associated Diagnoses: COPD, severe (HCC)      tiotropium (SPIRIVA) 18 mcg inhalation capsule Take 1 Cap by inhalation daily. Indications: BRONCHOSPASM PREVENTION WITH COPD  Qty: 30 Cap, Refills: 5    Associated Diagnoses: Shortness of breath; COPD, severe (HCC)      albuterol (PROVENTIL HFA, VENTOLIN HFA, PROAIR HFA) 90 mcg/actuation inhaler Take 2 Puffs by inhalation every four (4) hours as needed for Wheezing. Qty: 1 Inhaler, Refills: 3    Associated Diagnoses: Shortness of breath; COPD, severe (HCC)      roflumilast (DALIRESP) tab tablet Take 1 Tab by mouth daily. Indications: PREVENTION OF BRONCHOSPASM WITH CHRONIC BRONCHITIS  Qty: 30 Tab, Refills: 2      LORazepam (ATIVAN) 1 mg tablet Take 0.5 Tabs by mouth every eight (8) hours as needed for Anxiety. Max Daily Amount: 1.5 mg.  Qty: 30 Tab, Refills: 0    Associated Diagnoses: Anxiety      docusate sodium (COLACE) 100 mg capsule Take 1 Cap by mouth two (2) times a day for 30 days. Prevent constipation  Qty: 60 Cap, Refills: 0    Associated Diagnoses: Pulmonary emphysema, unspecified emphysema type ([address] Pain      OXYGEN-AIR DELIVERY SYSTEMS (WALKABOUT 1 OXYGEN SYSTEM) 2 L/min by Nasal route continuous. pantoprazole (PROTONIX) 40 mg tablet Take 1 Tab by mouth Daily (before breakfast). Qty: 30 Tab, Refills: 0      predniSONE (DELTASONE) 10 mg tablet 40mg X 7 days, 20 mg x 7 days, 10mg x 7 days, 5 mg x 7 days then stop  Qty: 57 Tab, Refills: 0      albuterol-ipratropium (DUO-NEB) 2.5 mg-0.5 mg/3 ml nebu 3 mL by Nebulization route every six (6) hours as needed.   Qty: 75 Nebule, Refills: 5    Associated Diagnoses: COPD, severe ([address]         STOP taking these medications       trimethoprim-sulfamethoxazole (BACTRIM DS, SEPTRA DS) 160-800 mg per tablet Comments:   Reason for Stopping:         varenicline (CHANTIX) 1 mg tablet Comments:   Reason for Stopping:         traZODone (DESYREL) 50 mg tablet Comments:   Reason for Stopping:         acetaminophen-codeine (TYLENOL-CODEINE #3) 300-30 mg per tablet Comments:   Reason for Stopping:               Activity: hh for pt and ot ordered    Diet: cardiac    Wound Care: none needed    Follow-up:   1. NP [place] 7 - 10 days.   2. Dr. Lizandro Glass 2 - 3 weeks    Minutes spent on discharge: greater than 30

## 2018-01-14 ENCOUNTER — HOME CARE VISIT (OUTPATIENT)
Dept: SCHEDULING | Facility: HOME HEALTH | Age: 48
End: 2018-01-14
Payer: MEDICAID

## 2018-01-14 PROCEDURE — G0299 HHS/HOSPICE OF RN EA 15 MIN: HCPCS

## 2018-01-15 ENCOUNTER — TELEPHONE (OUTPATIENT)
Dept: FAMILY MEDICINE CLINIC | Age: 48
End: 2018-01-15

## 2018-01-15 ENCOUNTER — TELEPHONE (OUTPATIENT)
Dept: CASE MANAGEMENT | Age: 48
End: 2018-01-15

## 2018-01-15 ENCOUNTER — HOME CARE VISIT (OUTPATIENT)
Dept: SCHEDULING | Facility: HOME HEALTH | Age: 48
End: 2018-01-15
Payer: MEDICAID

## 2018-01-15 PROCEDURE — G0151 HHCP-SERV OF PT,EA 15 MIN: HCPCS

## 2018-01-15 NOTE — TELEPHONE ENCOUNTER
Home Health nurse Princess Jimenez is calling suma pt having chest tightness in morning when he is taking medicine. Nurse is concerned and would like to speak with you. Please call.

## 2018-01-15 NOTE — ROUTINE PROCESS
Called pt today, s/w Levy @ 1:35 pm. Told him that he has an appointment with Dr. Jeannette Bernard on 1/24/2018 @ 10:00 am.

## 2018-01-15 NOTE — TELEPHONE ENCOUNTER
Spoke with Mr. Edita Collado, states he is doing a little better. He had his prescription filled at the hospital, and is aware of upcoming doctor appointment. He has no further questions for me at this time.

## 2018-01-16 ENCOUNTER — HOME CARE VISIT (OUTPATIENT)
Dept: HOME HEALTH SERVICES | Facility: HOME HEALTH | Age: 48
End: 2018-01-16
Payer: MEDICAID

## 2018-01-16 VITALS
DIASTOLIC BLOOD PRESSURE: 90 MMHG | HEART RATE: 80 BPM | SYSTOLIC BLOOD PRESSURE: 140 MMHG | OXYGEN SATURATION: 98 % | TEMPERATURE: 98.4 F

## 2018-01-17 ENCOUNTER — TELEPHONE (OUTPATIENT)
Dept: FAMILY MEDICINE CLINIC | Age: 48
End: 2018-01-17

## 2018-01-17 ENCOUNTER — HOME CARE VISIT (OUTPATIENT)
Dept: SCHEDULING | Facility: HOME HEALTH | Age: 48
End: 2018-01-17
Payer: MEDICAID

## 2018-01-17 PROCEDURE — G0157 HHC PT ASSISTANT EA 15: HCPCS

## 2018-01-17 NOTE — TELEPHONE ENCOUNTER
Medication: Daliresp 500 , dose: 1 tab, how often: QD , current number of medication days provided: 90, refill per application. Lot #: [id number], EXP.08/19. This medication was received and verified for the following 1. Correct Patient, 2. Correct Diagnosis, 3. Correct Drug, 4. Correct route, and no current allergy to medication.

## 2018-01-18 ENCOUNTER — HOME CARE VISIT (OUTPATIENT)
Dept: HOME HEALTH SERVICES | Facility: HOME HEALTH | Age: 48
End: 2018-01-18
Payer: MEDICAID

## 2018-01-18 VITALS
HEART RATE: 89 BPM | DIASTOLIC BLOOD PRESSURE: 88 MMHG | SYSTOLIC BLOOD PRESSURE: 132 MMHG | TEMPERATURE: 97.7 F | OXYGEN SATURATION: 97 %

## 2018-01-19 ENCOUNTER — HOME CARE VISIT (OUTPATIENT)
Dept: HOME HEALTH SERVICES | Facility: HOME HEALTH | Age: 48
End: 2018-01-19
Payer: MEDICAID

## 2018-01-19 ENCOUNTER — HOME CARE VISIT (OUTPATIENT)
Dept: SCHEDULING | Facility: HOME HEALTH | Age: 48
End: 2018-01-19
Payer: MEDICAID

## 2018-01-19 VITALS
DIASTOLIC BLOOD PRESSURE: 100 MMHG | HEART RATE: 80 BPM | SYSTOLIC BLOOD PRESSURE: 138 MMHG | OXYGEN SATURATION: 97 % | TEMPERATURE: 97.7 F

## 2018-01-19 PROCEDURE — G0157 HHC PT ASSISTANT EA 15: HCPCS

## 2018-01-20 ENCOUNTER — HOME CARE VISIT (OUTPATIENT)
Dept: SCHEDULING | Facility: HOME HEALTH | Age: 48
End: 2018-01-20
Payer: MEDICAID

## 2018-01-20 PROCEDURE — G0299 HHS/HOSPICE OF RN EA 15 MIN: HCPCS

## 2018-01-21 VITALS
OXYGEN SATURATION: 95 % | SYSTOLIC BLOOD PRESSURE: 154 MMHG | TEMPERATURE: 98.7 F | DIASTOLIC BLOOD PRESSURE: 82 MMHG | RESPIRATION RATE: 20 BRPM | HEART RATE: 104 BPM

## 2018-01-22 ENCOUNTER — HOME CARE VISIT (OUTPATIENT)
Dept: SCHEDULING | Facility: HOME HEALTH | Age: 48
End: 2018-01-22
Payer: MEDICAID

## 2018-01-22 PROCEDURE — G0157 HHC PT ASSISTANT EA 15: HCPCS

## 2018-01-23 ENCOUNTER — OFFICE VISIT (OUTPATIENT)
Dept: PULMONOLOGY | Age: 48
End: 2018-01-23

## 2018-01-23 VITALS
BODY MASS INDEX: 27.11 KG/M2 | SYSTOLIC BLOOD PRESSURE: 124 MMHG | HEIGHT: 69 IN | DIASTOLIC BLOOD PRESSURE: 80 MMHG | TEMPERATURE: 98.2 F | WEIGHT: 183 LBS | OXYGEN SATURATION: 95 % | HEART RATE: 93 BPM | RESPIRATION RATE: 22 BRPM

## 2018-01-23 VITALS
OXYGEN SATURATION: 96 % | HEART RATE: 90 BPM | DIASTOLIC BLOOD PRESSURE: 85 MMHG | SYSTOLIC BLOOD PRESSURE: 150 MMHG | TEMPERATURE: 97.7 F

## 2018-01-23 DIAGNOSIS — J96.90 RESPIRATORY FAILURE, UNSPECIFIED CHRONICITY, UNSPECIFIED WHETHER WITH HYPOXIA OR HYPERCAPNIA (HCC): ICD-10-CM

## 2018-01-23 DIAGNOSIS — J44.9 COPD, SEVERE (HCC): Primary | ICD-10-CM

## 2018-01-23 NOTE — PATIENT INSTRUCTIONS
Continue Spiriva 1 inhalation daily and remember to exhale fully before inhaling  Continue Advair 1 inhalation twice daily and remember to exhale fully before inhaling and to wash mouth with water and spit it out after inhaling  Daliresp 1 tablet daily  Albuterol 2 inhalations every 4 hours as needed but if you require albuterol too often to control respiratory symptoms call the office for severe symptoms go to the emergency room  Continue supplemental oxygen  Call for symptoms such as increasing shortness of breath or cough productive of discolored mucus    Start walking about 1 minute without stopping with her oxygen on and gradually increase the time you walk

## 2018-01-23 NOTE — PROGRESS NOTES
[facility]  Pulmonary, Critical Care, and Sleep Medicine      Chief complaint:  COPD and respiratory failure    HPI:    Roosevelt Washburn    is 49years old and returns the office today for follow-up concerning COPD and respiratory failure the patient relates that he is not coughing as much and is finishing his Bactrim for MRSA bronchitis. He also relates he has finished his prednisone and continues to take Advair and Spiriva and albuterol and notes significant shortness of breath with exertion and chest discomfort on the sides of his chest anteriorly and his upper abdomen especially when he coughs. He denies leg pain and says his leg swelling is minimal      No Known Allergies  Current Outpatient Prescriptions   Medication Sig    amLODIPine (NORVASC) 10 mg tablet Take 1 Tab by mouth daily.  mirtazapine (REMERON) 7.5 mg tablet Take 1 Tab by mouth nightly.  albuterol (PROVENTIL HFA, VENTOLIN HFA, PROAIR HFA) 90 mcg/actuation inhaler Take 2 Puffs by inhalation every four (4) hours as needed for Wheezing or Shortness of Breath.  albuterol-ipratropium (DUO-NEB) 2.5 mg-0.5 mg/3 ml nebu 3 mL by Nebulization route every six (6) hours as needed.  pantoprazole (PROTONIX) 40 mg tablet Take 1 Tab by mouth Daily (before breakfast).  roflumilast (DALIRESP) tab tablet Take 1 Tab by mouth daily. Indications: PREVENTION OF BRONCHOSPASM WITH CHRONIC BRONCHITIS    tiotropium (SPIRIVA) 18 mcg inhalation capsule Take 1 Cap by inhalation daily. Indications: BRONCHOSPASM PREVENTION WITH COPD    nystatin (MYCOSTATIN) topical cream Apply  to affected area two (2) times a day.  fluticasone-salmeterol (ADVAIR) 500-50 mcg/dose diskus inhaler Take 1 Puff by inhalation two (2) times a day.  LORazepam (ATIVAN) 1 mg tablet Take 0.5 Tabs by mouth every eight (8) hours as needed for Anxiety.  Max Daily Amount: 1.5 mg.    docusate sodium (COLACE) 100 mg capsule Take 1 Cap by mouth two (2) times a day for 30 days. Prevent constipation    OXYGEN-AIR DELIVERY SYSTEMS (WALKABOUT 1 OXYGEN SYSTEM) 2 L/min by Nasal route continuous.  trimethoprim-sulfamethoxazole (BACTRIM, SEPTRA)  mg per tablet Take 1 Tab by mouth every twelve (12) hours for 12 days.  lidocaine (XYLOCAINE) 2 % solution Take 15 mL by mouth as needed for Pain. Indications: MOUTH IRRITATION    predniSONE (DELTASONE) 10 mg tablet 40mg X 7 days, 20 mg x 7 days, 10mg x 7 days, 5 mg x 7 days then stop (Patient not taking: Reported on 1/16/2018)     No current facility-administered medications for this visit. Past Medical History:   Diagnosis Date    Chronic obstructive pulmonary disease ([facility] [address] 08/03/2017    PFTS 8/3/17    COPD, severe ([facility]     [facility]     Esophagitis 12/11/2017    Endoscopy    History of stomach ulcers     Positive urine drug screen 01/2016    opiates and THC    Upper GI bleed      Past Surgical History:   Procedure Laterality Date    HX ENDOSCOPY  12/11/2017     Social History     Social History    Marital status:      Spouse name: N/A    Number of children: N/A    Years of education: N/A     Occupational History    Not on file.      Social History Main Topics    Smoking status: Former Smoker     Packs/day: 2.00     Years: 25.00     Types: Cigarettes     Start date: 5/28/1984     Quit date: 12/18/2017    Smokeless tobacco: Never Used    Alcohol use No    Drug use: Yes     Special: Marijuana      Comment: Hx of Marijuana use    Sexual activity: Yes     Partners: Female     Other Topics Concern     Service No    Blood Transfusions No    Caffeine Concern Yes    Occupational Exposure No    Hobby Hazards No    Sleep Concern Yes     occas    Stress Concern No    Weight Concern No    Special Diet No    Back Care Yes    Exercise No    Bike Helmet Yes    Seat Belt No     occas     Social History Narrative         Family History   Problem Relation Age of Onset    Hypertension Mother     Heart Disease Mother     Diabetes Mother     Hypertension Father     Heart Disease Father     Cancer Father      lymphnodes    Diabetes Father        Review of systems:  He denies fever chills poor appetite weight loss gastroesophageal symptoms melena or blood in his stool    Physical Exam:  Visit Vitals    /80 (BP 1 Location: Left arm, BP Patient Position: Sitting)    Pulse 93    Temp 98.2 °F (36.8 °C)    Resp 22    Ht 5' 9\" (1.753 m)    Wt 83 kg (183 lb)    SpO2 95%  Comment: O2 @ 2.5 liters    BMI 27.02 kg/m2       Well-developed well-nourished  HEENT: WNL  Lymph node exam: Supraclavicular cervical lymph nodes negative  Chest: Equal symmetrical expansion no dullness and absence of wheezes rales or rubs attenuated breath sounds  Heart: Regular rhythm no gallop no murmur no JVD no peripheral edema  Extremities: No cyanosis clubbing or calf tenderness  Neurological: Alert and oriented    Labs:    O2 satt 95% on 2-1/2 L of oxygen at rest and with walking O2 sat remained at 91% after walking 250 feet    Impression:     Exacerbation of COPD which appears to be improving  Chronic respiratory failure which appears to be stable    Plan:   Modest exercise  Continue Advair Spiriva as needed albuterol and supplemental oxygen follow-up in 6-7 weeks and if the patient continues to do well will refer for pulmonary rehab    Balbir Enrique MD , [facility]    CC: Odalys Abad MD     [address] [facility]  [place], [address] [address]     P: [phone]     F: [phone]

## 2018-01-23 NOTE — MR AVS SNAPSHOT
[address] [facility], [address] [address] 
[phone] Patient: Michelle Hwang Sr. MRN: [id number] :[id number] Visit Information Date & Time Provider Department Dept. Phone Encounter #  
 2018 10:45 AM Tha Benjamin MD [facility] [place] [phone] [id number] Follow-up Instructions Return in about 6 weeks (around 3/6/2018). Follow-up and Disposition History Your Appointments 2018 10:00 AM  
Follow [place] with [place] [address] [place] [address] ([address]) Appt Note: 3 week follow up/ No labs needed [facility] [place] García [id number]  
[address] [place] [phone]  
  
    
 3/19/2018  1:00 PM  
Follow Up with Shon Taveras NP [address] [place] [address] ([address]) Appt Note: 5 mth follow up  
 [facility] [place] García [id number] [phone] Upcoming Health Maintenance Date Due Pneumococcal 19-64 Medium Risk (1 of 1 - PPSV23) 10/27/1988 DTaP/Tdap/Td series (1 - Tdap) 10/27/1990 Allergies as of 2018  Review Complete On: 2018 By: Edyta Montero LPN No Known Allergies Current Immunizations  Never Reviewed Name Date Influenza Vaccine (Quad) PF 10/23/2017  1:04 PM  
  
 Not reviewed this visit You Were Diagnosed With   
  
 Codes Comments COPD, severe ([facility]    -  Primary ICD-10-CM: J44.9 ICD-9-CM: 333 Respiratory failure, unspecified chronicity, unspecified whether with hypoxia or hypercapnia ([facility] [address]     ICD-10-CM: J96.90 ICD-9-CM: 518.81 Vitals BP Pulse Temp Resp Height(growth percentile) Weight(growth percentile) 124/80 (BP 1 Location: Left arm, BP Patient Position: Sitting) 93 98.2 °F (36.8 °C) 22 5' 9\" (1.753 m) 183 lb (83 kg) SpO2 BMI Smoking Status 95% 27.02 kg/m2 Former Smoker BMI and BSA Data Body Mass Index Body Surface Area  
 27.02 kg/m 2 2.01 m 2 Preferred Pharmacy Pharmacy Name Phone Brianna Patino [address] [facility]. [phone] Your Updated Medication List  
  
   
This list is accurate as of: 1/23/18 11:34 AM.  Always use your most recent med list.  
  
  
  
  
 albuterol 90 mcg/actuation inhaler Commonly known as:  PROVENTIL HFA, VENTOLIN HFA, PROAIR HFA Take 2 Puffs by inhalation every four (4) hours as needed for Wheezing or Shortness of Breath. albuterol-ipratropium 2.5 mg-0.5 mg/3 ml Nebu Commonly known as:  DUO-NEB  
3 mL by Nebulization route every six (6) hours as needed. amLODIPine 10 mg tablet Commonly known as:  Cookie Boozer Take 1 Tab by mouth daily. docusate sodium 100 mg capsule Commonly known as:  Alfonse Kras Take 1 Cap by mouth two (2) times a day for 30 days. Prevent constipation  
  
 fluticasone-salmeterol 500-50 mcg/dose diskus inhaler Commonly known as:  ADVAIR Take 1 Puff by inhalation two (2) times a day. lidocaine 2 % solution Commonly known as:  XYLOCAINE Take 15 mL by mouth as needed for Pain. Indications: MOUTH IRRITATION  
  
 LORazepam 1 mg tablet Commonly known as:  ATIVAN Take 0.5 Tabs by mouth every eight (8) hours as needed for Anxiety. Max Daily Amount: 1.5 mg.  
  
 mirtazapine 7.5 mg tablet Commonly known as:  Jobie Antonio Take 1 Tab by mouth nightly. nystatin topical cream  
Commonly known as:  MYCOSTATIN Apply  to affected area two (2) times a day. pantoprazole 40 mg tablet Commonly known as:  PROTONIX Take 1 Tab by mouth Daily (before breakfast). predniSONE 10 mg tablet Commonly known as:  DELTASONE  
40mg X 7 days, 20 mg x 7 days, 10mg x 7 days, 5 mg x 7 days then stop  
  
 roflumilast Tab tablet Commonly known as:  Марина Blanco  
 Take 1 Tab by mouth daily. Indications: PREVENTION OF BRONCHOSPASM WITH CHRONIC BRONCHITIS  
  
 tiotropium 18 mcg inhalation capsule Commonly known as:  Verito Ear Take 1 Cap by inhalation daily. Indications: BRONCHOSPASM PREVENTION WITH COPD  
  
 trimethoprim-sulfamethoxazole  mg per tablet Commonly known as:  Abbott Royal Take 1 Tab by mouth every twelve (12) hours for 12 days. WALKABOUT 1 OXYGEN SYSTEM  
2 L/min by Nasal route continuous. Follow-up Instructions Return in about 6 weeks (around 3/6/2018). To-Do List   
 01/24/2018 To Be Determined Appointment with Garcia Corcoran RN at [address] [place] [address] [facility]  
  
 01/26/2018 To Be Determined Appointment with Humberto Cai RN at [address] [place] [address] SCHEDULING/INTAKE  
  
 01/26/2018 1:00 PM  
  Appointment with Luiz Ty PTA at [address] [place] [address] MED CTR  
  
 01/30/2018 To Be Determined Appointment with Humberto Cai RN at [address] [place] [address] MED [facility]  
  
 02/02/2018 To Be Determined Appointment with Humberto Cai RN at [address] [place] [address] Patient Instructions Continue Spiriva 1 inhalation daily and remember to exhale fully before inhaling Continue Advair 1 inhalation twice daily and remember to exhale fully before inhaling and to wash mouth with water and spit it out after inhaling Daliresp 1 tablet daily Albuterol 2 inhalations every 4 hours as needed but if you require albuterol too often to control respiratory symptoms call the office for severe symptoms go to the emergency room Continue supplemental oxygen Call for symptoms such as increasing shortness of breath or cough productive of discolored mucus Start walking about 1 minute without stopping with her oxygen on and gradually increase the time you walk Patient Instructions History Please provide this summary of care documentation to your next provider. Your primary care clinician is listed as Sukhwinder Chance. If you have any questions after today's visit, please call [phone].

## 2018-01-23 NOTE — PROGRESS NOTES
Pt is complaining of chest and abd. Pain. It is a sharp pain and he indicates lower lung to top of abdominal area. He has been unable to get a refill on Tylenol #3 which was ordered at the [facility]. The Pt. was in [facility] 1/9/2018 due to MRSA and heart failure. Sputum 1/6/18, Echo 1/10/18 and labs 1/12/18 were done. No appt's. until today since discharge.

## 2018-01-24 ENCOUNTER — TELEPHONE (OUTPATIENT)
Dept: FAMILY MEDICINE CLINIC | Age: 48
End: 2018-01-24

## 2018-01-24 ENCOUNTER — OFFICE VISIT (OUTPATIENT)
Dept: FAMILY MEDICINE CLINIC | Age: 48
End: 2018-01-24

## 2018-01-24 ENCOUNTER — HOME CARE VISIT (OUTPATIENT)
Dept: SCHEDULING | Facility: HOME HEALTH | Age: 48
End: 2018-01-24
Payer: MEDICAID

## 2018-01-24 VITALS
SYSTOLIC BLOOD PRESSURE: 137 MMHG | TEMPERATURE: 97.6 F | HEART RATE: 88 BPM | RESPIRATION RATE: 20 BRPM | OXYGEN SATURATION: 97 % | DIASTOLIC BLOOD PRESSURE: 92 MMHG | HEIGHT: 69 IN | WEIGHT: 179.6 LBS | BODY MASS INDEX: 26.6 KG/M2

## 2018-01-24 DIAGNOSIS — J15.212 PNEUMONIA DUE TO METHICILLIN RESISTANT STAPHYLOCOCCUS AUREUS, UNSPECIFIED LATERALITY, UNSPECIFIED PART OF LUNG: ICD-10-CM

## 2018-01-24 DIAGNOSIS — Z09 HOSPITAL DISCHARGE FOLLOW-UP: Primary | ICD-10-CM

## 2018-01-24 DIAGNOSIS — J43.9 PULMONARY EMPHYSEMA, UNSPECIFIED EMPHYSEMA TYPE (HCC): ICD-10-CM

## 2018-01-24 DIAGNOSIS — R52 PAIN: ICD-10-CM

## 2018-01-24 DIAGNOSIS — I10 HYPERTENSION, UNSPECIFIED TYPE: ICD-10-CM

## 2018-01-24 DIAGNOSIS — J44.9 COPD, SEVERE (HCC): ICD-10-CM

## 2018-01-24 DIAGNOSIS — F41.9 ANXIETY: ICD-10-CM

## 2018-01-24 DIAGNOSIS — Z23 ENCOUNTER FOR IMMUNIZATION: ICD-10-CM

## 2018-01-24 RX ORDER — LORAZEPAM 1 MG/1
0.5 TABLET ORAL
Qty: 30 TAB | Refills: 0 | Status: SHIPPED | OUTPATIENT
Start: 2018-01-24 | End: 2018-02-28

## 2018-01-24 RX ORDER — ACETAMINOPHEN AND CODEINE PHOSPHATE 300; 30 MG/1; MG/1
1 TABLET ORAL
Qty: 21 TAB | Refills: 0 | Status: SHIPPED | OUTPATIENT
Start: 2018-01-24 | End: 2018-01-31

## 2018-01-24 RX ORDER — AMLODIPINE BESYLATE 10 MG/1
10 TABLET ORAL DAILY
Qty: 30 TAB | Refills: 1 | Status: SHIPPED | OUTPATIENT
Start: 2018-01-24 | End: 2018-04-12 | Stop reason: SDUPTHER

## 2018-01-24 NOTE — MR AVS SNAPSHOT
[address] [facility] [address] [phone] Patient: Ramakrishna Estrella Sr. MRN: [id number] :1970 Visit Information Date & Time Provider Department Dept. Phone Encounter #  
 2018 10:00 AM Rachel Gross NP  [facility] [id number] Follow-up Instructions Return in about 3 months (around 2018), or if symptoms worsen or fail to improve. Your Appointments 3/19/2018  1:00 PM  
Follow Up with Rachel Gross NP [address] [facility] ([address]) Appt Note: 5 mth follow up  
 [address] [facility]3] [id number] [address] [facility] [address] Upcoming Health Maintenance Date Due DTaP/Tdap/Td series (1 - Tdap) 10/27/1990 Allergies as of 2018  Review Complete On: 2018 By: Izabel Knutson. Km Martin LPN No Known Allergies Current Immunizations  Never Reviewed Name Date Influenza Vaccine 10/10/2011 12:00 AM  
 Influenza Vaccine (Quad) PF 10/23/2017  1:04 PM  
 Pneumococcal Polysaccharide (PPSV-23) 2018 10:10 AM, 10/10/2011 12:00 AM  
  
 Not reviewed this visit You Were Diagnosed With   
  
 Codes Comments Hospital discharge follow-up    -  Primary ICD-10-CM: [facility] ICD-9-CM: V67.59 Encounter for immunization     ICD-10-CM: P04 ICD-9-CM: V03.89   
 COPD, severe ([facility]     ICD-10-CM: J44.9 ICD-9-CM: 681 Pulmonary emphysema, unspecified emphysema type ([facility]     ICD-10-CM: J43.9 ICD-9-CM: 492.8 Pneumonia due to methicillin resistant Staphylococcus aureus, unspecified laterality, unspecified part of lung ([email or web address]     ICD-10-CM: U31.996 ICD-9-CM: 482.42 Hypertension, unspecified type     ICD-10-CM: I10 
ICD-9-CM: 401.9 Anxiety     ICD-10-CM: F41.9 ICD-9-CM: 300.00 Pain     ICD-10-CM: R52 ICD-9-CM: 780.96   
 Vitals BP Pulse Temp Resp Height(growth percentile) Weight(growth percentile) (!) 137/92 (BP 1 Location: Left arm, BP Patient Position: Sitting) 88 97.6 °F (36.4 °C) (Oral) 20 5' 9\" (1.753 m) 179 lb 9.6 oz (81.5 kg) SpO2 BMI Smoking Status 97% 26.52 kg/m2 Former Smoker BMI and BSA Data Body Mass Index Body Surface Area  
 26.52 kg/m 2 1.99 m 2 Preferred Pharmacy Pharmacy Name Phone [address] [place] [address]. [phone] Your Updated Medication List  
  
   
This list is accurate as of: 1/24/18 10:24 AM.  Always use your most recent med list.  
  
  
  
  
 acetaminophen-codeine 300-30 mg per tablet Commonly known as:  TYLENOL-CODEINE #3 Take 1 Tab by mouth every six (6) hours as needed for Pain for up to 7 days. Max Daily Amount: 4 Tabs. Indications: Pain  
  
 albuterol 90 mcg/actuation inhaler Commonly known as:  PROVENTIL HFA, VENTOLIN HFA, PROAIR HFA Take 2 Puffs by inhalation every four (4) hours as needed for Wheezing or Shortness of Breath. albuterol-ipratropium 2.5 mg-0.5 mg/3 ml Nebu Commonly known as:  DUO-NEB  
3 mL by Nebulization route every six (6) hours as needed. amLODIPine 10 mg tablet Commonly known as:  Arva Brazen Take 1 Tab by mouth daily. Indications: hypertension  
  
 docusate sodium 100 mg capsule Commonly known as:  Ronda Palin Take 1 Cap by mouth two (2) times a day for 30 days. Prevent constipation  
  
 fluticasone-salmeterol 500-50 mcg/dose diskus inhaler Commonly known as:  ADVAIR Take 1 Puff by inhalation two (2) times a day. lidocaine 2 % solution Commonly known as:  XYLOCAINE Take 15 mL by mouth as needed for Pain. Indications: MOUTH IRRITATION  
  
 LORazepam 1 mg tablet Commonly known as:  ATIVAN Take 0.5 Tabs by mouth every eight (8) hours as needed for Anxiety. Max Daily Amount: 1.5 mg.  
  
 mirtazapine 7.5 mg tablet Commonly known as:  Ajith Candy Take 1 Tab by mouth nightly. nystatin topical cream  
Commonly known as:  MYCOSTATIN Apply  to affected area two (2) times a day. pantoprazole 40 mg tablet Commonly known as:  PROTONIX Take 1 Tab by mouth Daily (before breakfast). pneumococcal 23-valent 25 mcg/0.5 mL injection Commonly known as:  PNEUMOVAX 23  
0.5 mL by IntraMUSCular route once for 1 dose. predniSONE 10 mg tablet Commonly known as:  DELTASONE  
40mg X 7 days, 20 mg x 7 days, 10mg x 7 days, 5 mg x 7 days then stop  
  
 roflumilast Tab tablet Commonly known as:  Diana Reeves Take 1 Tab by mouth daily. Indications: PREVENTION OF BRONCHOSPASM WITH CHRONIC BRONCHITIS  
  
 tiotropium 18 mcg inhalation capsule Commonly known as:  Marian Monreal Take 1 Cap by inhalation daily. Indications: BRONCHOSPASM PREVENTION WITH COPD  
  
 trimethoprim-sulfamethoxazole  mg per tablet Commonly known as:  Marthamilliedallas Mario Alberto Take 1 Tab by mouth every twelve (12) hours for 12 days. WALKABOUT 1 OXYGEN SYSTEM  
2 L/min by Nasal route continuous. Prescriptions Printed Refills  
 pneumococcal 23-valent (PNEUMOVAX 23) 25 mcg/0.5 mL injection 0 Si.5 mL by IntraMUSCular route once for 1 dose. Class: Program  
 Route: IntraMUSCular  
 amLODIPine (NORVASC) 10 mg tablet 1 Sig: Take 1 Tab by mouth daily. Indications: hypertension Class: Print Route: Oral  
 LORazepam (ATIVAN) 1 mg tablet 0 Sig: Take 0.5 Tabs by mouth every eight (8) hours as needed for Anxiety. Max Daily Amount: 1.5 mg.  
 Class: Print Route: Oral  
 acetaminophen-codeine (TYLENOL-CODEINE #3) 300-30 mg per tablet 0 Sig: Take 1 Tab by mouth every six (6) hours as needed for Pain for up to 7 days. Max Daily Amount: 4 Tabs. Indications: Pain Class: Print Route: Oral  
  
Prescriptions Sent to Mail Order  Refills  
 pneumococcal 23-valent (PNEUMOVAX 23) 25 mcg/0.5 mL injection 0  
 Si.5 mL by IntraMUSCular route once for 1 dose. Class: Program  
 Pharmacy: [facility] [address] KUSH LEDEZMA [address] [facility], [address] [facility]. Ph #: [phone] Route: IntraMUSCular Prescriptions Sent to Pharmacy Refills  
 pneumococcal 23-valent (PNEUMOVAX 23) 25 mcg/0.5 mL injection 0 Si.5 mL by IntraMUSCular route once for 1 dose. Class: Program  
 Pharmacy: [facility] [address] KUSH LEDEZMA [address], [address] [facility]. Ph #: [phone] Route: IntraMUSCular We Performed the Following PNEUMOCOCCAL POLYSACCHARIDE VACCINE, 23-VALENT, ADULT OR IMMUNOSUPPRESSED PT DOSE, [02559 CPT(R)] Follow-up Instructions Return in about 3 months (around 2018), or if symptoms worsen or fail to improve. To-Do List   
 2018 To Be Determined Appointment with Sloane Estrella RN at [address] [place] [address] CTR  
  
 2018 10:00 AM  
  Appointment with Aleksandra Marie PTA at [address] [place] [address] CTR  
  
 2018 To Be Determined Appointment with Sloane Estrella RN at [address] [place] [address] CTR  
  
 2018 To Be Determined Appointment with Sloane Estrella RN at [address] [place] [address] Patient Instructions Vaccine Information Statement Pneumococcal Polysaccharide Vaccine: What You Need to Know Many Vaccine Information Statements are available in [place]ic and other languages. See www.immunize.org/vis. Hojas de información Sobre Vacunas están disponibles en español y en muchos otros idiomas. Visite Worth[employer]si. 1. Why get vaccinated? Vaccination can protect older adults (and some children and younger adults) from pneumococcal disease. Pneumococcal disease is caused by bacteria that can spread from person to person through close contact.   It can cause ear infections, and it can also lead to more serious infections of the: 
 Lungs (pneumonia),  Blood (bacteremia), and 
 Covering of the brain and spinal cord (meningitis). Meningitis can cause deafness and brain damage, and it can be fatal.   
 
Anyone can get pneumococcal disease, but children under 3years of age, people with certain medical conditions, adults over 72years of age, and cigarette smokers are at the highest risk. About 18,000 older adults die each year from pneumococcal disease in the [place]. Treatment of pneumococcal infections with penicillin and other drugs used to be more effective. But some strains of the disease have become resistant to these drugs. This makes prevention of the disease, through vaccination, even more important. 2. Pneumococcal polysaccharide vaccine (PPSV23) Pneumococcal polysaccharide vaccine (PPSV23) protects against 23 types of pneumococcal bacteria. It will not prevent all pneumococcal disease. PPSV23 is recommended for:  All adults 72years of age and older,  Anyone 2 through 59years of age with certain long-term health problems, 
 Anyone 2 through 59years of age with a weakened immune system, 
 Adults 23 through 59years of age who smoke cigarettes or have asthma. Most people need only one dose of PPSV. A second dose is recommended for certain high-risk groups. People 72 and older should get a dose even if they have gotten one or more doses of the vaccine before they turned 65. Your healthcare provider can give you more information about these recommendations. Most healthy adults develop protection within 2 to 3 weeks of getting the shot. 3. Some people should not get this vaccine  Anyone who has had a life-threatening allergic reaction to PPSV should not get another dose.  Anyone who has a severe allergy to any component of PPSV should not receive it. Tell your provider if you have any severe allergies.  Anyone who is moderately or severely ill when the shot is scheduled may be asked to wait until they recover before getting the vaccine. Someone with a mild illness can usually be vaccinated.  Children less than 3years of age should not receive this vaccine.  There is no evidence that PPSV is harmful to either a pregnant woman or to her fetus. However, as a precaution, women who need the vaccine should be vaccinated before becoming pregnant, if possible. 4. Risks of a vaccine reaction With any medicine, including vaccines, there is a chance of side effects. These are usually mild and go away on their own, but serious reactions are also possible. About half of people who get PPSV have mild side effects, such as redness or pain where the shot is given, which go away within about two days. Less than 1 out of 100 people develop a fever, muscle aches, or more severe local reactions. Problems that could happen after any vaccine:  People sometimes faint after a medical procedure, including vaccination. Sitting or lying down for about 15 minutes can help prevent fainting, and injuries caused by a fall. Tell your doctor if you feel dizzy, or have vision changes or ringing in the ears.  Some people get severe pain in the shoulder and have difficulty moving the arm where a shot was given. This happens very rarely.  Any medication can cause a severe allergic reaction. Such reactions from a vaccine are very rare, estimated at about 1 in a million doses, and would happen within a few minutes to a few hours after the vaccination. As with any medicine, there is a very remote chance of a vaccine causing a serious injury or death. The safety of vaccines is always being monitored. For more information, visit: www.cdc.gov/vaccinesafety/  
 
5. What if there is a serious reaction? What should I look for?  
 
Look for anything that concerns you, such as signs of a severe allergic reaction, very high fever, or unusual behavior. Signs of a severe allergic reaction can include hives, swelling of the face and throat, difficulty breathing, a fast heartbeat, dizziness, and weakness. These would usually start a few minutes to a few hours after the vaccination. What should I do? If you think it is a severe allergic reaction or other emergency that cant wait, call 9-1-1 or get to the nearest hospital. Otherwise, call your doctor. Afterward, the reaction should be reported to the Vaccine Adverse Event Reporting System (VAERS). Your doctor might file this report, or you can do it yourself through the VAERS web site at www.vaers. [facility].gov, or by calling 1-465.499.6315. [employer] does not give medical advice. 6. How can I learn more?  Ask your doctor. He or she can give you the vaccine package insert or suggest other sources of information.  Call your local or state health department.  Contact the Centers for Disease Control and Prevention (CDC): 
- Call 9-642.660.4949 (1-800-CDC-INFO) or 
- Visit CDCs website at www.cdc.gov/vaccines Vaccine Information Statement PPSV  
04/24/2015 Department of [facility] and [employer] Centers for Disease Control and Prevention Office Use Only Please provide this summary of care documentation to your next provider. Your primary care clinician is listed as Sarwat Watters. If you have any questions after today's visit, please call [phone].

## 2018-01-24 NOTE — PROGRESS NOTES
01/24/18    PCP: Yamila Boykin MD    Chief Complaint   Patient presents with    Follow Up Chronic Condition    Immunization/Injection     pneumovax        HISTORY OF PRESENT ILLNESS  Michelle Hwang Sr.  is a 49 y.o. male whom presents for Follow Up Chronic Condition and Immunization/Injection (pneumovax)        HPI  Pt here for hospital follow up. Pt was admitted for 3 days 1/9/18 for Acute COPD exacerbation with respiratory failure. He ws found to have MRSA PNA. He continues to be smoke free. He presents today with his wife. He has completed his prednisone and antibiotics. He continue to be on 2.5 Liters of Oxygen and on Advair, Spiriva, Dileresp, and Albuterol for treatment. He reports improvement in symptoms of SOB and Coughing. He still complains of generalized pain all over that prevents him from sleeping and states the Remeron is only somewhat effective. He is out of Tylenol 3 and Ativan, requesting more. His LE swelling has improved. He still has a distended abdomen but denies Abdominal pain, constipation, N/V. Pt was previously suspected of right sided heart failure but His Echocardiogram was negative. Other: Pt [employer] form completed    Current Outpatient Prescriptions   Medication Sig Dispense Refill    pneumococcal 23-valent (PNEUMOVAX 23) 25 mcg/0.5 mL injection 0.5 mL by IntraMUSCular route once for 1 dose. 1 Vial 0    amLODIPine (NORVASC) 10 mg tablet Take 1 Tab by mouth daily. Indications: hypertension 30 Tab 1    LORazepam (ATIVAN) 1 mg tablet Take 0.5 Tabs by mouth every eight (8) hours as needed for Anxiety. Max Daily Amount: 1.5 mg. 30 Tab 0    acetaminophen-codeine (TYLENOL-CODEINE #3) 300-30 mg per tablet Take 1 Tab by mouth every six (6) hours as needed for Pain for up to 7 days. Max Daily Amount: 4 Tabs. Indications: Pain 21 Tab 0    mirtazapine (REMERON) 7.5 mg tablet Take 1 Tab by mouth nightly.  30 Tab 0    albuterol (PROVENTIL HFA, VENTOLIN HFA, PROAIR HFA) 90 mcg/actuation inhaler Take 2 Puffs by inhalation every four (4) hours as needed for Wheezing or Shortness of Breath. 1 Inhaler 3    albuterol-ipratropium (DUO-NEB) 2.5 mg-0.5 mg/3 ml nebu 3 mL by Nebulization route every six (6) hours as needed. 75 Nebule 0    pantoprazole (PROTONIX) 40 mg tablet Take 1 Tab by mouth Daily (before breakfast). 30 Tab 0    tiotropium (SPIRIVA) 18 mcg inhalation capsule Take 1 Cap by inhalation daily. Indications: BRONCHOSPASM PREVENTION WITH COPD 30 Cap 0    nystatin (MYCOSTATIN) topical cream Apply  to affected area two (2) times a day. 30 g 0    fluticasone-salmeterol (ADVAIR) 500-50 mcg/dose diskus inhaler Take 1 Puff by inhalation two (2) times a day. 1 Each 3    docusate sodium (COLACE) 100 mg capsule Take 1 Cap by mouth two (2) times a day for 30 days. Prevent constipation 60 Cap 0    OXYGEN-AIR DELIVERY SYSTEMS (WALKABOUT 1 OXYGEN SYSTEM) 2 L/min by Nasal route continuous.  trimethoprim-sulfamethoxazole (BACTRIM, SEPTRA)  mg per tablet Take 1 Tab by mouth every twelve (12) hours for 12 days. 24 Tab 0    roflumilast (DALIRESP) tab tablet Take 1 Tab by mouth daily. Indications: PREVENTION OF BRONCHOSPASM WITH CHRONIC BRONCHITIS 30 Tab 0    lidocaine (XYLOCAINE) 2 % solution Take 15 mL by mouth as needed for Pain.  Indications: MOUTH IRRITATION 1 Bottle 0    predniSONE (DELTASONE) 10 mg tablet 40mg X 7 days, 20 mg x 7 days, 10mg x 7 days, 5 mg x 7 days then stop (Patient not taking: Reported on 1/16/2018) 62 Tab 0     No Known Allergies  Past Medical History:   Diagnosis Date    Chronic obstructive pulmonary disease ([address] 08/03/2017    PFTS 8/3/17    COPD, severe (HCC)     [facility]D [facility]     Esophagitis 12/11/2017    Endoscopy    History of stomach ulcers     Positive urine drug screen 01/2016    opiates and THC    Upper GI bleed      Past Surgical History:   Procedure Laterality Date    HX ENDOSCOPY  12/11/2017     Family History   Problem Relation Age of Onset    Hypertension Mother     Heart Disease Mother     Diabetes Mother     Hypertension Father     Heart Disease Father     Cancer Father      lymphnodes    Diabetes Father      Social History   Substance Use Topics    Smoking status: Former Smoker     Packs/day: 2.00     Years: 25.00     Types: Cigarettes     Start date: 5/28/1984     Quit date: 12/18/2017    Smokeless tobacco: Never Used    Alcohol use No      Lab Results  Component Value Date/Time   WBC 7.4 01/12/2018 03:41 AM   HGB 14.6 01/12/2018 03:41 AM   Hemoglobin, POC 14.3 06/28/2013 10:07 PM   HCT 42.6 01/12/2018 03:41 AM   Hematocrit, POC 42 06/28/2013 10:07 PM   PLATELET 143 76/17/5935 03:41 AM   MCV 93.0 01/12/2018 03:41 AM     Lab Results  Component Value Date/Time   Hemoglobin A1c 5.5 01/11/2018 03:26 AM   Hemoglobin A1c 4.8 01/23/2017 01:11 PM   Glucose 80 01/12/2018 03:41 AM   Glucose (POC) 101 01/12/2018 11:22 AM   Glucose, POC 99 06/28/2013 10:07 PM   LDL, calculated 85.6 01/23/2017 01:11 PM   Creatinine, POC 0.9 06/28/2013 10:07 PM   Creatinine 1.02 01/12/2018 03:41 AM      Lab Results  Component Value Date/Time   Cholesterol, total 144 01/23/2017 01:11 PM   HDL Cholesterol 36 01/23/2017 01:11 PM   LDL, calculated 85.6 01/23/2017 01:11 PM   Triglyceride 112 01/23/2017 01:11 PM   CHOL/HDL Ratio 4.0 01/23/2017 01:11 PM     Lab Results   Component Value Date/Time    Sodium 139 01/12/2018 03:41 AM    Potassium 4.3 01/12/2018 03:41 AM    Chloride 105 01/12/2018 03:41 AM    CO2 30 01/12/2018 03:41 AM    Anion gap 4 01/12/2018 03:41 AM    Glucose 80 01/12/2018 03:41 AM    BUN 22 01/12/2018 03:41 AM    Creatinine 1.02 01/12/2018 03:41 AM    BUN/Creatinine ratio 22 01/12/2018 03:41 AM    GFR est AA >60 01/12/2018 03:41 AM    GFR est non-AA >60 01/12/2018 03:41 AM    Calcium 8.7 01/12/2018 03:41 AM    Bilirubin, total 0.6 12/19/2017 04:09 AM    ALT (SGPT) 25 12/19/2017 04:09 AM    AST (SGOT) 9 12/19/2017 04:09 AM    Alk. phosphatase 63 12/19/2017 04:09 AM    Protein, total 6.3 12/19/2017 04:09 AM    Albumin 3.5 12/19/2017 04:09 AM    Globulin 2.8 12/19/2017 04:09 AM    A-G Ratio 1.3 12/19/2017 04:09 AM         Visit Vitals    BP (!) 137/92 (BP 1 Location: Left arm, BP Patient Position: Sitting)    Pulse 88    Temp 97.6 °F (36.4 °C) (Oral)    Resp 20    Ht 5' 9\" (1.753 m)    Wt 179 lb 9.6 oz (81.5 kg)    SpO2 97%    BMI 26.52 kg/m2       Pain Scale: 0 - No pain/10    Pain Location:     Review of Systems   Constitutional: Negative. Negative for fever. HENT: Negative. Eyes: Negative. Respiratory: Positive for cough and shortness of breath (Improved, ). Negative for hemoptysis, sputum production and wheezing. Cardiovascular: Positive for chest pain. Negative for palpitations, claudication, leg swelling and PND. Gastrointestinal: Negative for abdominal pain, constipation, diarrhea, nausea and vomiting. Genitourinary: Negative. Musculoskeletal: Positive for myalgias. Generalized pain   Skin: Negative. Psychiatric/Behavioral: Negative for depression, hallucinations, substance abuse and suicidal ideas. The patient is nervous/anxious (Anxiety intermittently related to breathing) and has insomnia. Physical Exam   Constitutional: He is oriented to person, place, and time and well-developed, well-nourished, and in no distress. HENT:   Head: Normocephalic. Eyes: Pupils are equal, round, and reactive to light. Neck: Normal range of motion. Neck supple. Cardiovascular: Normal rate, regular rhythm and normal heart sounds. Pulmonary/Chest: Effort normal. No respiratory distress. He has decreased breath sounds. On 2.5 L Nasal canula    Abdominal: Bowel sounds are normal. He exhibits distension. There is no tenderness. Musculoskeletal: Normal range of motion. He exhibits no edema. Neurological: He is alert and oriented to person, place, and time. Skin: Skin is warm and dry. Psychiatric: Mood and affect normal.   Vitals reviewed. ASSESSMENT and PLAN    ICD-10-CM ICD-9-CM    1. Hospital discharge follow-up Z09 V67.59    2. Encounter for immunization Z23 V03.89 PNEUMOCOCCAL POLYSACCHARIDE VACCINE, 23-VALENT, ADULT OR IMMUNOSUPPRESSED PT DOSE,      pneumococcal 23-valent (PNEUMOVAX 23) 25 mcg/0.5 mL injection   3. COPD, severe ([facility] J44.9 496 pneumococcal 23-valent (PNEUMOVAX 23) 25 mcg/0.5 mL injection   4. Pulmonary emphysema, unspecified emphysema type ([facility]) J43.9 492.8 pneumococcal 23-valent (PNEUMOVAX 23) 25 mcg/0.5 mL injection   5. Pneumonia due to methicillin resistant Staphylococcus aureus, unspecified laterality, unspecified part of lung ([facility] J15.212 482.42    6. Hypertension, unspecified type I10 401.9 amLODIPine (NORVASC) 10 mg tablet   7. Anxiety F41.9 300.00 LORazepam (ATIVAN) 1 mg tablet   8. Pain R52 780.96 acetaminophen-codeine (TYLENOL-CODEINE #3) 300-30 mg per tablet     Diagnoses and all orders for this visit:    1. Hospital discharge follow-up    2. Encounter for immunization  -     PNEUMOCOCCAL POLYSACCHARIDE VACCINE, 23-VALENT, ADULT OR IMMUNOSUPPRESSED PT DOSE,  -     pneumococcal 23-valent (PNEUMOVAX 23) 25 mcg/0.5 mL injection; 0.5 mL by IntraMUSCular route once for 1 dose. 3. COPD, severe ([facility]  -     pneumococcal 23-valent (PNEUMOVAX 23) 25 mcg/0.5 mL injection; 0.5 mL by IntraMUSCular route once for 1 dose. - Followed by Pulmonology, Dr. Tricia Lujan    4. Pulmonary emphysema, unspecified emphysema type ([facility]  -     pneumococcal 23-valent (PNEUMOVAX 23) 25 mcg/0.5 mL injection; 0.5 mL by IntraMUSCular route once for 1 dose. 5. Pneumonia due to methicillin resistant Staphylococcus aureus, unspecified laterality, unspecified part of lung ([facility]  -Antibiotics Completed     6. Hypertension, unspecified type  -     amLODIPine (NORVASC) 10 mg tablet; Take 1 Tab by mouth daily. Indications: hypertension    7.  Anxiety  -     LORazepam (ATIVAN) 1 mg tablet; Take 0.5 Tabs by mouth every eight (8) hours as needed for Anxiety. Max Daily Amount: 1.5 mg.    8. Pain  -     acetaminophen-codeine (TYLENOL-CODEINE #3) 300-30 mg per tablet; Take 1 Tab by mouth every six (6) hours as needed for Pain for up to 7 days. Max Daily Amount: 4 Tabs. Indications: Pain  - Discussed with patient that the pain medication is only temporarily and we will begin transitioning to OTC pain meds after this prescription. He plans to start Pulmonary rehab soon and he is getting PT at home now. Follow-up Disposition:  Return in about 3 months (around 4/24/2018), or if symptoms worsen or fail to improve. Medications Discontinued During This Encounter   Medication Reason    amLODIPine (NORVASC) 10 mg tablet Reorder    LORazepam (ATIVAN) 1 mg tablet Reorder       Written instructions followed our verbal discussion of all information discussed above, pending tests ordered and future goals/plans. Patient expressed understanding of current diagnosis, planned testing, follow up and if needed to contact the office for any questions or concerns prior to the next visit.     Call [facility] for Financial Assistance

## 2018-01-24 NOTE — PATIENT INSTRUCTIONS
Vaccine Information Statement    Pneumococcal Polysaccharide Vaccine: What You Need to Know    Many Vaccine Information Statements are available in Vietnamese and other languages. See www.immunize.org/vis. Hojas de información Sobre Vacunas están disponibles en español y en muchos otros idiomas. Visite Alden.si. 1. Why get vaccinated? Vaccination can protect older adults (and some children and younger adults) from pneumococcal disease. Pneumococcal disease is caused by bacteria that can spread from person to person through close contact. It can cause ear infections, and it can also lead to more serious infections of the:   Lungs (pneumonia),   Blood (bacteremia), and   Covering of the brain and spinal cord (meningitis). Meningitis can cause deafness and brain damage, and it can be fatal.      Anyone can get pneumococcal disease, but children under 3years of age, people with certain medical conditions, adults over 72years of age, and cigarette smokers are at the highest risk. About 18,000 older adults die each year from pneumococcal disease in the [place]. Treatment of pneumococcal infections with penicillin and other drugs used to be more effective. But some strains of the disease have become resistant to these drugs. This makes prevention of the disease, through vaccination, even more important. 2. Pneumococcal polysaccharide vaccine (PPSV23)    Pneumococcal polysaccharide vaccine (PPSV23) protects against 23 types of pneumococcal bacteria. It will not prevent all pneumococcal disease. PPSV23 is recommended for:   All adults 72years of age and older,   Anyone 2 through 59years of age with certain long-term health problems,   Anyone 2 through 59years of age with a weakened immune system,   Adults 23 through 59years of age who smoke cigarettes or have asthma. Most people need only one dose of PPSV.   A second dose is recommended for certain high-risk groups. People 72 and older should get a dose even if they have gotten one or more doses of the vaccine before they turned 65. Your healthcare provider can give you more information about these recommendations. Most healthy adults develop protection within 2 to 3 weeks of getting the shot. 3. Some people should not get this vaccine     Anyone who has had a life-threatening allergic reaction to PPSV should not get another dose.  Anyone who has a severe allergy to any component of PPSV should not receive it. Tell your provider if you have any severe allergies.  Anyone who is moderately or severely ill when the shot is scheduled may be asked to wait until they recover before getting the vaccine. Someone with a mild illness can usually be vaccinated.  Children less than 3years of age should not receive this vaccine.  There is no evidence that PPSV is harmful to either a pregnant woman or to her fetus. However, as a precaution, women who need the vaccine should be vaccinated before becoming pregnant, if possible. 4. Risks of a vaccine reaction    With any medicine, including vaccines, there is a chance of side effects. These are usually mild and go away on their own, but serious reactions are also possible. About half of people who get PPSV have mild side effects, such as redness or pain where the shot is given, which go away within about two days. Less than 1 out of 100 people develop a fever, muscle aches, or more severe local reactions. Problems that could happen after any vaccine:     People sometimes faint after a medical procedure, including vaccination. Sitting or lying down for about 15 minutes can help prevent fainting, and injuries caused by a fall. Tell your doctor if you feel dizzy, or have vision changes or ringing in the ears.  Some people get severe pain in the shoulder and have difficulty moving the arm where a shot was given.  This happens very rarely.  Any medication can cause a severe allergic reaction. Such reactions from a vaccine are very rare, estimated at about 1 in a million doses, and would happen within a few minutes to a few hours after the vaccination. As with any medicine, there is a very remote chance of a vaccine causing a serious injury or death. The safety of vaccines is always being monitored. For more information, visit: www.cdc.gov/vaccinesafety/     5. What if there is a serious reaction? What should I look for? Look for anything that concerns you, such as signs of a severe allergic reaction, very high fever, or unusual behavior. Signs of a severe allergic reaction can include hives, swelling of the face and throat, difficulty breathing, a fast heartbeat, dizziness, and weakness. These would usually start a few minutes to a few hours after the vaccination. What should I do? If you think it is a severe allergic reaction or other emergency that cant wait, call 9-1-1 or get to the nearest hospital. Otherwise, call your doctor. Afterward, the reaction should be reported to the Vaccine Adverse Event Reporting System (VAERS). Your doctor might file this report, or you can do it yourself through the VAERS web site at www.vaers. [facility].gov, or by calling 9-423.988.7187. VAERS does not give medical advice. 6. How can I learn more?  Ask your doctor. He or she can give you the vaccine package insert or suggest other sources of information.  Call your local or state health department.    Contact the Centers for Disease Control and Prevention (CDC):  - Call 7-716.421.4422 (1-800-CDC-INFO) or  - Visit CDCs website at WOWIO 18 04/24/2015    [facility] and [facility] for Disease Control and Prevention    Office Use Only

## 2018-01-24 NOTE — LETTER
1/24/2018 [address] [facility] [address]era Medina Sr., 1970, is picking up the following medications ordered from the [facility]. 
 
DALIRESP 500 MCG QUANTITY: [address] [place] Patient's Signature:_________________________________________________ Today's Date: 1/24/2018

## 2018-01-24 NOTE — PROGRESS NOTES
Eduardo Peñaloza is a 49 y.o. male who presents for pneumovax immunizations. He denies any symptoms , reactions or allergies that would exclude them from being immunized today. Risks and adverse reactions were discussed and the VIS was given to them. All questions were addressed. He was observed for 15 min post injection. There were no reactions observed. Anat Chang LPN

## 2018-01-25 ENCOUNTER — HOME CARE VISIT (OUTPATIENT)
Dept: HOME HEALTH SERVICES | Facility: HOME HEALTH | Age: 48
End: 2018-01-25
Payer: MEDICAID

## 2018-01-26 ENCOUNTER — HOME CARE VISIT (OUTPATIENT)
Dept: SCHEDULING | Facility: HOME HEALTH | Age: 48
End: 2018-01-26
Payer: MEDICAID

## 2018-01-26 VITALS
HEART RATE: 78 BPM | TEMPERATURE: 97.9 F | SYSTOLIC BLOOD PRESSURE: 140 MMHG | OXYGEN SATURATION: 97 % | DIASTOLIC BLOOD PRESSURE: 94 MMHG

## 2018-01-26 PROCEDURE — G0157 HHC PT ASSISTANT EA 15: HCPCS

## 2018-01-27 ENCOUNTER — HOME CARE VISIT (OUTPATIENT)
Dept: HOME HEALTH SERVICES | Facility: HOME HEALTH | Age: 48
End: 2018-01-27
Payer: MEDICAID

## 2018-01-29 ENCOUNTER — OFFICE VISIT (OUTPATIENT)
Dept: FAMILY MEDICINE CLINIC | Age: 48
End: 2018-01-29

## 2018-01-29 VITALS
HEIGHT: 69 IN | SYSTOLIC BLOOD PRESSURE: 138 MMHG | DIASTOLIC BLOOD PRESSURE: 84 MMHG | OXYGEN SATURATION: 97 % | HEART RATE: 89 BPM | TEMPERATURE: 97.7 F | RESPIRATION RATE: 16 BRPM | BODY MASS INDEX: 27.31 KG/M2 | WEIGHT: 184.4 LBS

## 2018-01-29 DIAGNOSIS — H10.33 ACUTE CONJUNCTIVITIS OF BOTH EYES, UNSPECIFIED ACUTE CONJUNCTIVITIS TYPE: Primary | ICD-10-CM

## 2018-01-29 RX ORDER — POLYMYXIN B SULFATE AND TRIMETHOPRIM 1; 10000 MG/ML; [USP'U]/ML
1 SOLUTION OPHTHALMIC EVERY 4 HOURS
Qty: 1 BOTTLE | Refills: 0 | Status: SHIPPED | OUTPATIENT
Start: 2018-01-29 | End: 2018-02-08

## 2018-01-29 NOTE — MR AVS SNAPSHOT
[address] [facility] [address] 
[phone] Patient: Megan Hou Sr. MRN: [id number] :1970 Visit Information Date & Time Provider Department Dept. Phone Encounter #  
 2018 11:00 AM Nani Crandall NP [address] [facility] [id number] Your Appointments 3/19/2018  1:00 PM  
Follow Up with Nani Crandall NP [address] [facility] ([facility]) Appt Note: 5 mth follow up  
 [address] [facility] [id number]  
[address] [facility] [address] Upcoming Health Maintenance Date Due DTaP/Tdap/Td series (1 - Tdap) 10/27/1990 Allergies as of 2018  Review Complete On: 2018 By: Nani Crandall NP No Known Allergies Current Immunizations  Never Reviewed Name Date Influenza Vaccine 10/10/2011 12:00 AM  
 Influenza Vaccine (Quad) PF 10/23/2017  1:04 PM  
 Pneumococcal Polysaccharide (PPSV-23) 2018 10:10 AM, 10/10/2011 12:00 AM  
  
 Not reviewed this visit You Were Diagnosed With   
  
 Codes Comments Acute conjunctivitis of both eyes, unspecified acute conjunctivitis type    -  Primary ICD-10-CM: H10.33 ICD-9-CM: 372.00 Vitals BP Pulse Temp Resp Height(growth percentile) Weight(growth percentile) 138/84 (BP 1 Location: Left arm, BP Patient Position: Sitting) 89 97.7 °F (36.5 °C) (Oral) 16 5' 9\" (1.753 m) 184 lb 6.4 oz (83.6 kg) SpO2 BMI Smoking Status 97% 27.23 kg/m2 Former Smoker Vitals History BMI and BSA Data Body Mass Index Body Surface Area  
 27.23 kg/m 2 2.02 m 2 Preferred Pharmacy Pharmacy Name Phone [address] [email or web address] [address] [place] [address]. [phone] Your Updated Medication List  
  
   
 This list is accurate as of: 1/29/18 11:15 AM.  Always use your most recent med list.  
  
  
  
  
 acetaminophen-codeine 300-30 mg per tablet Commonly known as:  TYLENOL-CODEINE #3 Take 1 Tab by mouth every six (6) hours as needed for Pain for up to 7 days. Max Daily Amount: 4 Tabs. Indications: Pain  
  
 albuterol 90 mcg/actuation inhaler Commonly known as:  PROVENTIL HFA, VENTOLIN HFA, PROAIR HFA Take 2 Puffs by inhalation every four (4) hours as needed for Wheezing or Shortness of Breath. albuterol-ipratropium 2.5 mg-0.5 mg/3 ml Nebu Commonly known as:  DUO-NEB  
3 mL by Nebulization route every six (6) hours as needed. amLODIPine 10 mg tablet Commonly known as:  Dayan Middleton Take 1 Tab by mouth daily. Indications: hypertension  
  
 docusate sodium 100 mg capsule Commonly known as:  Nolvia Peeling Take 1 Cap by mouth two (2) times a day for 30 days. Prevent constipation  
  
 fluticasone-salmeterol 500-50 mcg/dose diskus inhaler Commonly known as:  ADVAIR Take 1 Puff by inhalation two (2) times a day. lidocaine 2 % solution Commonly known as:  XYLOCAINE Take 15 mL by mouth as needed for Pain. Indications: MOUTH IRRITATION  
  
 LORazepam 1 mg tablet Commonly known as:  ATIVAN Take 0.5 Tabs by mouth every eight (8) hours as needed for Anxiety. Max Daily Amount: 1.5 mg.  
  
 mirtazapine 7.5 mg tablet Commonly known as:  Pushpa Mile Take 1 Tab by mouth nightly. nystatin topical cream  
Commonly known as:  MYCOSTATIN Apply  to affected area two (2) times a day. pantoprazole 40 mg tablet Commonly known as:  PROTONIX Take 1 Tab by mouth Daily (before breakfast). predniSONE 10 mg tablet Commonly known as:  DELTASONE  
40mg X 7 days, 20 mg x 7 days, 10mg x 7 days, 5 mg x 7 days then stop  
  
 roflumilast Tab tablet Commonly known as:  Claybon Dunks Take 1 Tab by mouth daily. Indications: PREVENTION OF BRONCHOSPASM WITH CHRONIC BRONCHITIS tiotropium 18 mcg inhalation capsule Commonly known as:  Missy Caruso Take 1 Cap by inhalation daily. Indications: BRONCHOSPASM PREVENTION WITH COPD  
  
 trimethoprim-polymyxin b ophthalmic solution Commonly known as:  POLYTRIM Administer 1 Drop to both eyes every four (4) hours for 10 days. Indications: BACTERIAL CONJUNCTIVITIS  
  
 WALKABOUT 1 OXYGEN SYSTEM  
2 L/min by Nasal route continuous. Prescriptions Sent to Pharmacy Refills  
 trimethoprim-polymyxin b (POLYTRIM) ophthalmic solution 0 Sig: Administer 1 Drop to both eyes every four (4) hours for 10 days. Indications: BACTERIAL CONJUNCTIVITIS Class: Normal  
 Pharmacy: [facility] [address] LEDEZMA [address]  #: [phone] Route: Both Eyes To-Do List   
 01/30/2018 To Be Determined Appointment with Aline Banks RN at [address] [facility]  
  
 02/02/2018 To Be Determined Appointment with Aline Banks RN at [address] [facility] Patient Instructions Pinkeye: Care Instructions Your Care Instructions Pinkeye is redness and swelling of the eye surface and the conjunctiva (the lining of the eyelid and the covering of the white part of the eye). Pinkeye is also called conjunctivitis. Pinkeye is often caused by infection with bacteria or a virus. Dry air, allergies, smoke, and chemicals are other common causes. Pinkeye often clears on its own in 7 to 10 days. Antibiotics only help if the pinkeye is caused by bacteria. Pinkeye caused by infection spreads easily. If an allergy or chemical is causing pinkeye, it will not go away unless you can avoid whatever is causing it. Follow-up care is a key part of your treatment and safety. Be sure to make and go to all appointments, and call your doctor if you are having problems.  It's also a good idea to know your test results and keep a list of the medicines you take. How can you care for yourself at home? · Wash your hands often. Always wash them before and after you treat pinkeye or touch your eyes or face. · Use moist cotton or a clean, wet cloth to remove crust. Wipe from the inside corner of the eye to the outside. Use a clean part of the cloth for each wipe. · Put cold or warm wet cloths on your eye a few times a day if the eye hurts. · Do not wear contact lenses or eye makeup until the pinkeye is gone. Throw away any eye makeup you were using when you got pinkeye. Clean your contacts and storage case. If you wear disposable contacts, use a new pair when your eye has cleared and it is safe to wear contacts again. · If the doctor gave you antibiotic ointment or eyedrops, use them as directed. Use the medicine for as long as instructed, even if your eye starts looking better soon. Keep the bottle tip clean, and do not let it touch the eye area. · To put in eyedrops or ointment: ¨ Tilt your head back, and pull your lower eyelid down with one finger. ¨ Drop or squirt the medicine inside the lower lid. ¨ Close your eye for 30 to 60 seconds to let the drops or ointment move around. ¨ Do not touch the ointment or dropper tip to your eyelashes or any other surface. · Do not share towels, pillows, or washcloths while you have pinkeye. When should you call for help? Call your doctor now or seek immediate medical care if: 
? · You have pain in your eye, not just irritation on the surface. ? · You have a change in vision or loss of vision. ? · You have an increase in discharge from the eye.  
? · Your eye has not started to improve or begins to get worse within 48 hours after you start using antibiotics. ? · Pinkeye lasts longer than 7 days. ? Watch closely for changes in your health, and be sure to contact your doctor if you have any problems. Where can you learn more? Go to http://elsy.info/. Enter Y392 in the search box to learn more about \"Amina: Care Instructions. \" Current as of: March 20, 2017 Content Version: 11.4 © 3974-0867 Healthwise, [employer]. Care instructions adapted under license by [employer] (which disclaims liability or warranty for this information). If you have questions about a medical condition or this instruction, always ask your healthcare professional. [address] any warranty or liability for your use of this information. Please provide this summary of care documentation to your next provider. Your primary care clinician is listed as Naldo Sullivan. If you have any questions after today's visit, please call [phone].

## 2018-01-29 NOTE — PATIENT INSTRUCTIONS
Pinkeye: Care Instructions  Your Care Instructions    Pinkeye is redness and swelling of the eye surface and the conjunctiva (the lining of the eyelid and the covering of the white part of the eye). Pinkeye is also called conjunctivitis. Pinkeye is often caused by infection with bacteria or a virus. Dry air, allergies, smoke, and chemicals are other common causes. Pinkeye often clears on its own in 7 to 10 days. Antibiotics only help if the pinkeye is caused by bacteria. Pinkeye caused by infection spreads easily. If an allergy or chemical is causing pinkeye, it will not go away unless you can avoid whatever is causing it. Follow-up care is a key part of your treatment and safety. Be sure to make and go to all appointments, and call your doctor if you are having problems. It's also a good idea to know your test results and keep a list of the medicines you take. How can you care for yourself at home? · Wash your hands often. Always wash them before and after you treat pinkeye or touch your eyes or face. · Use moist cotton or a clean, wet cloth to remove crust. Wipe from the inside corner of the eye to the outside. Use a clean part of the cloth for each wipe. · Put cold or warm wet cloths on your eye a few times a day if the eye hurts. · Do not wear contact lenses or eye makeup until the pinkeye is gone. Throw away any eye makeup you were using when you got pinkeye. Clean your contacts and storage case. If you wear disposable contacts, use a new pair when your eye has cleared and it is safe to wear contacts again. · If the doctor gave you antibiotic ointment or eyedrops, use them as directed. Use the medicine for as long as instructed, even if your eye starts looking better soon. Keep the bottle tip clean, and do not let it touch the eye area. · To put in eyedrops or ointment:  ¨ Tilt your head back, and pull your lower eyelid down with one finger.   ¨ Drop or squirt the medicine inside the lower lid.  ¨ Close your eye for 30 to 60 seconds to let the drops or ointment move around. ¨ Do not touch the ointment or dropper tip to your eyelashes or any other surface. · Do not share towels, pillows, or washcloths while you have pinkeye. When should you call for help? Call your doctor now or seek immediate medical care if:  ? · You have pain in your eye, not just irritation on the surface. ? · You have a change in vision or loss of vision. ? · You have an increase in discharge from the eye.   ? · Your eye has not started to improve or begins to get worse within 48 hours after you start using antibiotics. ? · Pinkeye lasts longer than 7 days. ? Watch closely for changes in your health, and be sure to contact your doctor if you have any problems. Where can you learn more? Go to http://costa-jessica.info/. Enter Y392 in the search box to learn more about \"Pinkeye: Care Instructions. \"  Current as of: March 20, 2017  Content Version: 11.4  © 7363-0336 Healthwise, Incorporated. Care instructions adapted under license by [employer] (which disclaims liability or warranty for this information). If you have questions about a medical condition or this instruction, always ask your healthcare professional. [address] any warranty or liability for your use of this information.

## 2018-01-29 NOTE — PROGRESS NOTES
Subjective:   Liliana Savage Sr. is a 49 y.o. male who presents for evaluation of redness. He has noticed the above symptoms in the bilateral eye for 3 days. Onset was acute. Symptoms have included discharge, itching, Purulent crusting in the AN. Patient denies blurred vision, visual field deficit, photophobia. There is a history of recent bacterial Pneumonia    Review of Systems  Negative except for what is noted in HPI    Objective:     Visit Vitals    /84 (BP 1 Location: Left arm, BP Patient Position: Sitting)    Pulse 89    Temp 97.7 °F (36.5 °C) (Oral)    Resp 16    Ht 5' 9\" (1.753 m)    Wt 184 lb 6.4 oz (83.6 kg)    SpO2 97%    BMI 27.23 kg/m2                 General: alert, cooperative, no distress, appears stated age   Eyes:  positive findings: eyelids/periorbital: Normal or conjunctivae: Bilaterally pink   Vision: Uncorrected:            L  20/20            R 20/20   Fluorescein:  not done     Assessment/Plan:     acute conjunctivitis    1. Discussed the diagnosis and proper care of conjunctivitis. Stressed household hygiene. 2. Antibiotics per orders. 3. FU here in 3 days or PRN. 4. Patient instructions: contagiousness precautions  5. Risks and side effects of medications was discussed. 6. Pt advised to read written information provided by the pharmacist: yes  7. Followup as needed. ICD-10-CM ICD-9-CM    1. Acute conjunctivitis of both eyes, unspecified acute conjunctivitis type H10.33 372.00 trimethoprim-polymyxin b (POLYTRIM) ophthalmic solution     Diagnoses and all orders for this visit:    1. Acute conjunctivitis of both eyes, unspecified acute conjunctivitis type  -     trimethoprim-polymyxin b (POLYTRIM) ophthalmic solution; Administer 1 Drop to both eyes every four (4) hours for 10 days. Indications: BACTERIAL CONJUNCTIVITIS      Follow-up Disposition:  Return in about 3 days (around 2/1/2018), or if symptoms worsen or fail to improve. Elly Gilman

## 2018-01-30 ENCOUNTER — HOME CARE VISIT (OUTPATIENT)
Dept: SCHEDULING | Facility: HOME HEALTH | Age: 48
End: 2018-01-30
Payer: MEDICAID

## 2018-01-30 VITALS
SYSTOLIC BLOOD PRESSURE: 128 MMHG | TEMPERATURE: 98.2 F | OXYGEN SATURATION: 98 % | DIASTOLIC BLOOD PRESSURE: 80 MMHG | HEART RATE: 74 BPM

## 2018-01-30 PROCEDURE — G0151 HHCP-SERV OF PT,EA 15 MIN: HCPCS

## 2018-02-01 ENCOUNTER — HOME CARE VISIT (OUTPATIENT)
Dept: SCHEDULING | Facility: HOME HEALTH | Age: 48
End: 2018-02-01
Payer: MEDICAID

## 2018-02-01 PROCEDURE — G0299 HHS/HOSPICE OF RN EA 15 MIN: HCPCS

## 2018-02-02 VITALS
OXYGEN SATURATION: 98 % | SYSTOLIC BLOOD PRESSURE: 140 MMHG | RESPIRATION RATE: 18 BRPM | DIASTOLIC BLOOD PRESSURE: 80 MMHG | HEART RATE: 66 BPM | TEMPERATURE: 97.4 F

## 2018-02-03 ENCOUNTER — HOME CARE VISIT (OUTPATIENT)
Dept: HOME HEALTH SERVICES | Facility: HOME HEALTH | Age: 48
End: 2018-02-03
Payer: MEDICAID

## 2018-02-05 VITALS
SYSTOLIC BLOOD PRESSURE: 120 MMHG | DIASTOLIC BLOOD PRESSURE: 60 MMHG | HEART RATE: 72 BPM | OXYGEN SATURATION: 95 % | HEIGHT: 63 IN | TEMPERATURE: 98.2 F | RESPIRATION RATE: 20 BRPM | WEIGHT: 130 LBS | BODY MASS INDEX: 23.04 KG/M2

## 2018-02-09 ENCOUNTER — HOSPITAL ENCOUNTER (EMERGENCY)
Age: 48
Discharge: HOME | End: 2018-02-09
Attending: EMERGENCY MEDICINE
Payer: MEDICAID

## 2018-02-09 ENCOUNTER — APPOINTMENT (OUTPATIENT)
Dept: GENERAL RADIOLOGY | Age: 48
End: 2018-02-09
Attending: EMERGENCY MEDICINE
Payer: MEDICAID

## 2018-02-09 VITALS
HEART RATE: 92 BPM | RESPIRATION RATE: 18 BRPM | OXYGEN SATURATION: 98 % | SYSTOLIC BLOOD PRESSURE: 131 MMHG | DIASTOLIC BLOOD PRESSURE: 88 MMHG | TEMPERATURE: 97.5 F

## 2018-02-09 DIAGNOSIS — R07.89 CHEST WALL PAIN: Primary | ICD-10-CM

## 2018-02-09 PROCEDURE — 74011250637 HC RX REV CODE- 250/637: Performed by: PHYSICIAN ASSISTANT

## 2018-02-09 PROCEDURE — 99283 EMERGENCY DEPT VISIT LOW MDM: CPT

## 2018-02-09 PROCEDURE — 71046 X-RAY EXAM CHEST 2 VIEWS: CPT

## 2018-02-09 RX ORDER — HYDROCODONE BITARTRATE AND ACETAMINOPHEN 5; 325 MG/1; MG/1
1 TABLET ORAL
Qty: 8 TAB | Refills: 0 | Status: SHIPPED | OUTPATIENT
Start: 2018-02-09 | End: 2018-02-21

## 2018-02-09 RX ORDER — HYDROCODONE BITARTRATE AND ACETAMINOPHEN 5; 325 MG/1; MG/1
1 TABLET ORAL
Status: COMPLETED | OUTPATIENT
Start: 2018-02-09 | End: 2018-02-09

## 2018-02-09 RX ORDER — NAPROXEN 500 MG/1
500 TABLET ORAL 2 TIMES DAILY WITH MEALS
Qty: 20 TAB | Refills: 0 | Status: SHIPPED | OUTPATIENT
Start: 2018-02-09 | End: 2018-02-28 | Stop reason: ALTCHOICE

## 2018-02-09 RX ORDER — CYCLOBENZAPRINE HCL 5 MG
5 TABLET ORAL
Qty: 7 TAB | Refills: 0 | Status: SHIPPED | OUTPATIENT
Start: 2018-02-09 | End: 2018-02-21

## 2018-02-09 RX ADMIN — HYDROCODONE BITARTRATE AND ACETAMINOPHEN 1 TABLET: 5; 325 TABLET ORAL at 10:06

## 2018-02-09 NOTE — ED PROVIDER NOTES
EMERGENCY DEPARTMENT HISTORY AND PHYSICAL EXAM    Date: 2/9/2018  Patient Name: Jeanne Dean Sr.    History of Presenting Illness     Chief Complaint   Patient presents with    Rib Pain         History Provided By: Patient    Chief Complaint: chest wall pain  Duration: 2 Hours  Timing:  Constant  Location: left lower rib  Quality: Sharp  Severity: 10 out of 10  Modifying Factors: none  Associated Symptoms: denies any other associated signs or symptoms      Additional History (Context): Jeanne Dean Sr. is a 49 y.o. male with hx of COPD,  who presents the ED with a complaint of rib pain x2 hours. Pt states that he was standing to get up from the table when he turned coughed and sneezed at the same time causing intense pain to left side. Pt rates his pain as 10/10 and statest it is worse with movement and dep inhalation. He has not taken any medications for his pain at this time and is concerned for rib fracture. Pt admits to advanced COPD and need for lung transplant. HE states he is at baseline for SOB. Denies Other sx at this time      PCP: Jero Ning, NP    Current Outpatient Prescriptions   Medication Sig Dispense Refill    HYDROcodone-acetaminophen (NORCO) 5-325 mg per tablet Take 1 Tab by mouth every four (4) hours as needed for Pain. Max Daily Amount: 6 Tabs. 8 Tab 0    cyclobenzaprine (FLEXERIL) 5 mg tablet Take 1 Tab by mouth nightly. 7 Tab 0    naproxen (NAPROSYN) 500 mg tablet Take 1 Tab by mouth two (2) times daily (with meals). 20 Tab 0    OXYGEN-AIR DELIVERY SYSTEMS (WALKABOUT 1 OXYGEN SYSTEM) 2.5 L/min by Nasal route continuous.  amLODIPine (NORVASC) 10 mg tablet Take 1 Tab by mouth daily. Indications: hypertension 30 Tab 1    LORazepam (ATIVAN) 1 mg tablet Take 0.5 Tabs by mouth every eight (8) hours as needed for Anxiety. Max Daily Amount: 1.5 mg. 30 Tab 0    mirtazapine (REMERON) 7.5 mg tablet Take 1 Tab by mouth nightly.  30 Tab 0    albuterol (PROVENTIL HFA, VENTOLIN HFA, PROAIR HFA) 90 mcg/actuation inhaler Take 2 Puffs by inhalation every four (4) hours as needed for Wheezing or Shortness of Breath. 1 Inhaler 3    albuterol-ipratropium (DUO-NEB) 2.5 mg-0.5 mg/3 ml nebu 3 mL by Nebulization route every six (6) hours as needed. 75 Nebule 0    pantoprazole (PROTONIX) 40 mg tablet Take 1 Tab by mouth Daily (before breakfast). 30 Tab 0    roflumilast (DALIRESP) tab tablet Take 1 Tab by mouth daily. Indications: PREVENTION OF BRONCHOSPASM WITH CHRONIC BRONCHITIS 30 Tab 0    tiotropium (SPIRIVA) 18 mcg inhalation capsule Take 1 Cap by inhalation daily. Indications: BRONCHOSPASM PREVENTION WITH COPD 30 Cap 0    lidocaine (XYLOCAINE) 2 % solution Take 15 mL by mouth as needed for Pain. Indications: MOUTH IRRITATION 1 Bottle 0    nystatin (MYCOSTATIN) topical cream Apply  to affected area two (2) times a day. 30 g 0    fluticasone-salmeterol (ADVAIR) 500-50 mcg/dose diskus inhaler Take 1 Puff by inhalation two (2) times a day.  1 Each 3    predniSONE (DELTASONE) 10 mg tablet 40mg X 7 days, 20 mg x 7 days, 10mg x 7 days, 5 mg x 7 days then stop (Patient not taking: Reported on 1/16/2018) 62 Tab 0       Past History     Past Medical History:  Past Medical History:   Diagnosis Date    Chronic obstructive pulmonary disease ([facility] [address] 08/03/2017    PFTS 8/3/17    COPD, severe (HCC)     Emphysema/COPD ([address]     Esophagitis 12/11/2017    Endoscopy    History of stomach ulcers     Positive urine drug screen 01/2016    opiates and THC    Upper GI bleed        Past Surgical History:  Past Surgical History:   Procedure Laterality Date    HX ENDOSCOPY  12/11/2017       Family History:  Family History   Problem Relation Age of Onset    Hypertension Mother     Heart Disease Mother     Diabetes Mother     Hypertension Father     Heart Disease Father     Cancer Father      lymphnodes    Diabetes Father        Social History:  Social History   Substance Use Topics    Smoking status: Former Smoker     Packs/day: 2.00     Years: 25.00     Types: Cigarettes     Start date: 5/28/1984     Quit date: 12/18/2017    Smokeless tobacco: Never Used    Alcohol use No       Allergies:  No Known Allergies      Review of Systems   Review of Systems   Constitutional: Negative for fatigue and fever. HENT: Negative for congestion. Respiratory: Negative for cough and choking. Gastrointestinal: Negative for abdominal pain, diarrhea, nausea and vomiting. Genitourinary: Negative for difficulty urinating and flank pain. Musculoskeletal: Positive for myalgias. Negative for back pain. Skin: Negative for color change and wound. Neurological: Negative for dizziness and headaches. All other systems reviewed and are negative. All Other Systems Negative  Physical Exam     Vitals:    02/09/18 0944   BP: 131/88   Pulse: 92   Resp: 18   Temp: 97.5 °F (36.4 °C)   SpO2: 98%     Physical Exam   Constitutional: He is oriented to person, place, and time. He appears well-developed and well-nourished. He appears distressed. Pt arriven on oxygen through nasal cannula   HENT:   Head: Normocephalic and atraumatic. Eyes: Conjunctivae are normal. Pupils are equal, round, and reactive to light. Neck: Normal range of motion. Cardiovascular: Normal rate, regular rhythm and normal heart sounds. Pulmonary/Chest: Effort normal and breath sounds normal. No respiratory distress. He has no wheezes. Chest wall is not dull to percussion. He exhibits tenderness and bony tenderness. He exhibits no mass, no laceration, no crepitus, no edema, no deformity, no swelling and no retraction. Neurological: He is alert and oriented to person, place, and time. Skin: Skin is warm and dry. Psychiatric: He has a normal mood and affect. His behavior is normal.   Vitals reviewed. Diagnostic Study Results     Labs -   No results found for this or any previous visit (from the past 12 hour(s)).     Radiologic Studies -   XR CHEST PA LAT   Final Result        CT Results  (Last 48 hours)    None        CXR Results  (Last 48 hours)               02/09/18 1018  XR CHEST PA LAT Final result    Impression:  IMPRESSION:       1. COPD. 2.  New right costophrenic angle blunting, suggestive of developing small   pleural effusion. The source for the fusion is unclear. 3.  No convincing evidence for acute rib fracture. Narrative:  PROCEDURE:  Chest Frontal and Lateral.       CPT code 63948. INDICATION:  Rib pain. COMPARISON:  1/9/18. FINDINGS:  Both lungs are hyperinflated with flattening of the diaphragms and   patchy areas of increased lung lucency. The right costophrenic angle is   slightly blunted. No pneumothorax is detected. No lung contusion. Cardiomediastinal silhouette appears unremarkable. No airspace opacities are   noted in both lungs. No convincing evidence of acute rib fracture is detected   to the extent visualized on current study. Medical Decision Making   I am the first provider for this patient. I reviewed the vital signs, available nursing notes, past medical history, past surgical history, family history and social history. Vital Signs-Reviewed the patient's vital signs. Pulse Oximetry Analysis - 98% on 2.5L        Records Reviewed: Old Medical Records    Procedures:  Procedures    Provider Notes (Medical Decision Making):   XR Results (most recent):    Results from Hospital Encounter encounter on 02/09/18   XR CHEST PA LAT   Narrative PROCEDURE:  Chest Frontal and Lateral.    CPT code 27215. INDICATION:  Rib pain. COMPARISON:  1/9/18. FINDINGS:  Both lungs are hyperinflated with flattening of the diaphragms and  patchy areas of increased lung lucency. The right costophrenic angle is  slightly blunted. No pneumothorax is detected. No lung contusion. Cardiomediastinal silhouette appears unremarkable.   No airspace opacities are  noted in both lungs. No convincing evidence of acute rib fracture is detected  to the extent visualized on current study. Impression IMPRESSION:    1. COPD. 2.  New right costophrenic angle blunting, suggestive of developing small  pleural effusion. The source for the fusion is unclear. 3.  No convincing evidence for acute rib fracture. Discussed use of home incentive spirometry while dealing with rib pain to help avoid pneumonia. Discussed follow up with Both PCP and pulmonology. Pt verbalized understanding of treatment plan and has no questions at this time    MED RECONCILIATION:  No current facility-administered medications for this encounter. Current Outpatient Prescriptions   Medication Sig    HYDROcodone-acetaminophen (NORCO) 5-325 mg per tablet Take 1 Tab by mouth every four (4) hours as needed for Pain. Max Daily Amount: 6 Tabs.  cyclobenzaprine (FLEXERIL) 5 mg tablet Take 1 Tab by mouth nightly.  naproxen (NAPROSYN) 500 mg tablet Take 1 Tab by mouth two (2) times daily (with meals).  OXYGEN-AIR DELIVERY SYSTEMS (WALKABOUT 1 OXYGEN SYSTEM) 2.5 L/min by Nasal route continuous.  amLODIPine (NORVASC) 10 mg tablet Take 1 Tab by mouth daily. Indications: hypertension    LORazepam (ATIVAN) 1 mg tablet Take 0.5 Tabs by mouth every eight (8) hours as needed for Anxiety. Max Daily Amount: 1.5 mg.    mirtazapine (REMERON) 7.5 mg tablet Take 1 Tab by mouth nightly.  albuterol (PROVENTIL HFA, VENTOLIN HFA, PROAIR HFA) 90 mcg/actuation inhaler Take 2 Puffs by inhalation every four (4) hours as needed for Wheezing or Shortness of Breath.  albuterol-ipratropium (DUO-NEB) 2.5 mg-0.5 mg/3 ml nebu 3 mL by Nebulization route every six (6) hours as needed.  pantoprazole (PROTONIX) 40 mg tablet Take 1 Tab by mouth Daily (before breakfast).  roflumilast (DALIRESP) tab tablet Take 1 Tab by mouth daily.  Indications: PREVENTION OF BRONCHOSPASM WITH CHRONIC BRONCHITIS    tiotropium (SPIRIVA) 18 mcg inhalation capsule Take 1 Cap by inhalation daily. Indications: BRONCHOSPASM PREVENTION WITH COPD    lidocaine (XYLOCAINE) 2 % solution Take 15 mL by mouth as needed for Pain. Indications: MOUTH IRRITATION    nystatin (MYCOSTATIN) topical cream Apply  to affected area two (2) times a day.  fluticasone-salmeterol (ADVAIR) 500-50 mcg/dose diskus inhaler Take 1 Puff by inhalation two (2) times a day.  predniSONE (DELTASONE) 10 mg tablet 40mg X 7 days, 20 mg x 7 days, 10mg x 7 days, 5 mg x 7 days then stop (Patient not taking: Reported on 1/16/2018)       Disposition:  discharge    DISCHARGE NOTE:     Pt has been reexamined. Patient has no new complaints, changes, or physical findings. Care plan outlined and precautions discussed. Results of CXR were reviewed with the patient. All medications were reviewed with the patient; will d/c home with muscle relaxants, pain medication and antiinflammatory medications. All of pt's questions and concerns were addressed. Patient was instructed use home spirometry for the next few days and he agrees to follow up with PCP and Pulmonology, as well as to return to the ED upon further deterioration. Patient is ready to go home. Follow-up Information     Follow up With Details Comments [address] Zeeshan Cruz NP Call As needed, follow up [address]  [phone]      [facility] EMERGENCY DEPT  If symptoms worsen [address] [facility] [address]  [phone]          Current Discharge Medication List      START taking these medications    Details   HYDROcodone-acetaminophen (NORCO) 5-325 mg per tablet Take 1 Tab by mouth every four (4) hours as needed for Pain. Max Daily Amount: 6 Tabs. Qty: 8 Tab, Refills: 0    Associated Diagnoses: Chest wall pain      cyclobenzaprine (FLEXERIL) 5 mg tablet Take 1 Tab by mouth nightly.   Qty: 7 Tab, Refills: 0      naproxen (NAPROSYN) 500 mg tablet Take 1 Tab by mouth two (2) times daily (with meals). Qty: 20 Tab, Refills: 0                   Diagnosis     Clinical Impression:   1.  Chest wall pain

## 2018-02-09 NOTE — DISCHARGE INSTRUCTIONS

## 2018-02-12 ENCOUNTER — DOCUMENTATION ONLY (OUTPATIENT)
Dept: FAMILY MEDICINE CLINIC | Age: 48
End: 2018-02-12

## 2018-02-12 NOTE — PROGRESS NOTES
Patient now has [employer], gave patient number to Dr Chantel Lawrence to make follow up appointments.

## 2018-02-15 ENCOUNTER — TELEPHONE (OUTPATIENT)
Dept: PULMONOLOGY | Age: 48
End: 2018-02-15

## 2018-02-15 NOTE — TELEPHONE ENCOUNTER
[facility] calling. Pt now has Medicaid as of Feb 1. They need last office note faxed. Since he did not have a 6 minute walk or walk test to quailify ox they will have th pt call for appt for 6 min walk. Walk test done the day of office visit was only with oxygen on.

## 2018-02-19 ENCOUNTER — TELEPHONE (OUTPATIENT)
Dept: PULMONOLOGY | Age: 48
End: 2018-02-19

## 2018-02-19 ENCOUNTER — TELEPHONE (OUTPATIENT)
Dept: FAMILY MEDICINE CLINIC | Age: 48
End: 2018-02-19

## 2018-02-19 RX ORDER — TRAZODONE HYDROCHLORIDE 50 MG/1
50 TABLET ORAL
Qty: 30 TAB | Refills: 0 | Status: SHIPPED | OUTPATIENT
Start: 2018-02-19 | End: 2018-02-21

## 2018-02-19 NOTE — TELEPHONE ENCOUNTER
Spoke with José Verde with the [facility]. She states SS has been approved for 4hrs caregiver (Wife). The [facility] is being asked to increase the hours to 20 hrs for the wife to be paid by the pt's . They do not feel they can add more hrs of caregiver time and are asking if Dr. Keven Pierce has reason to increase the time?

## 2018-02-19 NOTE — TELEPHONE ENCOUNTER
Pulmonary nurse, Kelly Melgar called and stated 20 hours of care not justifiable. Form re-faxed to [phone] for their review. Informed provider [place] of Pulmonary response. Called patient and spoke to wife, [place]. Informed her that caregiver form to remain unchanged at 4 hours daily care needed. She requested form to be faxed to [address] [place] [address]. Mrs Cynthia De La Vega had informed that patient is having lower extremity edema. Per provider, informed patient to:   -Take Furosemide 20 mg by mouth daily x 3 days. The patient has medication from last  prescription.     -Decrease salt intake- no salt diet. - Wear compression hose during the day and elevate LE's. Informed wife that I will save a pair of compression hose available here for patient. Mrs Cynthia De La Vega states that she is not able to come today, but will  hose in morning. Caregiver form faxed to  at [phone]/ Ms JENIFFER Jack with confirmation. Original form will be picked up by patient/wife in a.m. Verified understanding of all information by wife/patient. Instructions verbally repeated confirming understanding. Called Dr Hasmukh Bennett and spoke to Kelly Brownlee. Unable to move patient's New Patient appointment for transfer of care sooner than March 5th. Mrs Cynthia De La Vega voiced understanding to call here for follow-up visit, if no improvement with furosemide, decreased salt and compression stockings.

## 2018-02-19 NOTE — TELEPHONE ENCOUNTER
[email or web address], they have a statement for presence of caregiver from [address]. They gave pt 4hrs, but they didn't know if Dr. Rogel would be able to do form, if he felt the need for more hrs. Pt and caregiver are saying he needs 20hrs. Please call Dima Nettles at [phone] or [phone].

## 2018-02-19 NOTE — TELEPHONE ENCOUNTER
Called pulmonary and left message for Dr Oneil Metz Nurse concerning Statement of Required Presence of Caregiver for [employer]. The patient's wife [place] brought in form and it was completed by ROBINSON Cruz NP with hours needed per day at 4. Mrs Jaspreet Partida would like consideration for 20 hours. Informed her that could not justify that amount of care as patient is ambulatory. Called pulmonary to check with pulmonary specialist, Dr Latrice Hester.

## 2018-02-20 NOTE — TELEPHONE ENCOUNTER
Called and spoke to patient and advised him, per provider, that he can go to [facility] with his insurance should he have increasing edema or problems. Patient states that he is feeling well. Reminded him to come in and  compression hose today from this clinic. Patient voiced understanding of all information.

## 2018-02-21 ENCOUNTER — TELEPHONE (OUTPATIENT)
Dept: PULMONOLOGY | Age: 48
End: 2018-02-21

## 2018-02-21 ENCOUNTER — OFFICE VISIT (OUTPATIENT)
Dept: INTERNAL MEDICINE CLINIC | Age: 48
End: 2018-02-21

## 2018-02-21 VITALS
TEMPERATURE: 97.7 F | HEIGHT: 63 IN | BODY MASS INDEX: 33.31 KG/M2 | SYSTOLIC BLOOD PRESSURE: 138 MMHG | HEART RATE: 100 BPM | DIASTOLIC BLOOD PRESSURE: 80 MMHG | RESPIRATION RATE: 16 BRPM | WEIGHT: 188 LBS | OXYGEN SATURATION: 97 %

## 2018-02-21 DIAGNOSIS — J44.9 COPD, SEVERE (HCC): Primary | ICD-10-CM

## 2018-02-21 DIAGNOSIS — J96.10 CHRONIC RESPIRATORY FAILURE, UNSPECIFIED WHETHER WITH HYPOXIA OR HYPERCAPNIA (HCC): ICD-10-CM

## 2018-02-21 DIAGNOSIS — I10 ESSENTIAL HYPERTENSION, BENIGN: ICD-10-CM

## 2018-02-21 DIAGNOSIS — K20.90 ESOPHAGITIS: ICD-10-CM

## 2018-02-21 RX ORDER — MIRTAZAPINE 15 MG/1
7.5 TABLET, FILM COATED ORAL
COMMUNITY
End: 2018-02-28 | Stop reason: ALTCHOICE

## 2018-02-21 NOTE — PROGRESS NOTES
HPI:   NP evaluation  PMED Hx is notable for severe O2 dependent COPD with progressive dyspnea   Hospitalized 1/2018 and in addition to resp failure, noted to have RV failure as well (ascities/pedal edema)  Despite bronchodilators/O2, respiratory status is progressively worsening  Stopped tob 12/2017    ROS is otherwise negative. Past Medical History:   Diagnosis Date    Chronic obstructive pulmonary disease ([address] 08/03/2017    PFTS 8/3/17    COPD, severe ([address]     Emphysema/COPD ([address]     Esophagitis 12/11/2017    Endoscopy    Essential hypertension, benign     History of stomach ulcers     Positive urine drug screen 01/2016    opiates and THC    Upper GI bleed        Past Surgical History:   Procedure Laterality Date    HX ENDOSCOPY  12/11/2017       Social History     Social History    Marital status:      Spouse name: N/A    Number of children: N/A    Years of education: N/A     Occupational History    Not on file.      Social History Main Topics    Smoking status: Former Smoker     Packs/day: 2.00     Years: 25.00     Types: Cigarettes     Start date: 5/28/1984     Quit date: 12/18/2017    Smokeless tobacco: Never Used    Alcohol use No    Drug use: Yes     Special: Marijuana      Comment: Hx of Marijuana use    Sexual activity: Yes     Partners: Female     Other Topics Concern     Service No    Blood Transfusions No    Caffeine Concern Yes    Occupational Exposure No    Hobby Hazards No    Sleep Concern Yes     occas    Stress Concern No    Weight Concern No    Special Diet No    Back Care Yes    Exercise No    Bike Helmet Yes    Seat Belt No     occas     Social History Narrative           No Known Allergies    Family History   Problem Relation Age of Onset    Hypertension Mother     Heart Disease Mother     Diabetes Mother     Hypertension Father     Heart Disease Father     Cancer Father      lymphnodes    Diabetes Father Current Outpatient Prescriptions   Medication Sig Dispense Refill    mirtazapine (REMERON) 15 mg tablet Take  by mouth nightly.  naproxen (NAPROSYN) 500 mg tablet Take 1 Tab by mouth two (2) times daily (with meals). 20 Tab 0    OXYGEN-AIR DELIVERY SYSTEMS (WALKABOUT 1 OXYGEN SYSTEM) 2.5 L/min by Nasal route continuous.  amLODIPine (NORVASC) 10 mg tablet Take 1 Tab by mouth daily. Indications: hypertension 30 Tab 1    LORazepam (ATIVAN) 1 mg tablet Take 0.5 Tabs by mouth every eight (8) hours as needed for Anxiety. Max Daily Amount: 1.5 mg. 30 Tab 0    albuterol (PROVENTIL HFA, VENTOLIN HFA, PROAIR HFA) 90 mcg/actuation inhaler Take 2 Puffs by inhalation every four (4) hours as needed for Wheezing or Shortness of Breath. 1 Inhaler 3    albuterol-ipratropium (DUO-NEB) 2.5 mg-0.5 mg/3 ml nebu 3 mL by Nebulization route every six (6) hours as needed. 75 Nebule 0    pantoprazole (PROTONIX) 40 mg tablet Take 1 Tab by mouth Daily (before breakfast). 30 Tab 0    roflumilast (DALIRESP) tab tablet Take 1 Tab by mouth daily. Indications: PREVENTION OF BRONCHOSPASM WITH CHRONIC BRONCHITIS 30 Tab 0    tiotropium (SPIRIVA) 18 mcg inhalation capsule Take 1 Cap by inhalation daily. Indications: BRONCHOSPASM PREVENTION WITH COPD 30 Cap 0    nystatin (MYCOSTATIN) topical cream Apply  to affected area two (2) times a day. 30 g 0    fluticasone-salmeterol (ADVAIR) 500-50 mcg/dose diskus inhaler Take 1 Puff by inhalation two (2) times a day. 1 Each 3           Visit Vitals    /80 (BP 1 Location: Right arm, BP Patient Position: Sitting)    Pulse 100    Temp 97.7 °F (36.5 °C) (Tympanic)    Resp 16    Ht 5' 3\" (1.6 m)    Wt 188 lb (85.3 kg)    SpO2 97%  Comment: 2.5 l    BMI 33.3 kg/m2       PE  Heavy in moderate respiratory distress  HEENT:  OP: clear. Neck: supple w/o mass or bruits.   Chest: decreased (B) BSs  CV: RRR w/o m,r,g; pulses NP distal legs  Abd: NT; tense; +fluid wave  Ext: tr edema. Neuro: NF. Assessment and Plan    Encounter Diagnoses   Name Primary?     COPD, severe (HCC) Yes    Chronic respiratory failure, unspecified whether with hypoxia or hypercapnia (HCC)     Essential hypertension, benign     Esophagitis    COPD with chronic respiratory failure - +ascites/pedal edema indicating RV failure  Close f/u with pulmonary - status is guarded  HTN - controlled  Hx of esophagitis - avoid NSAIDs (stop naprosyn)  Pt requested ativan - did not fill based on tenuous respiratory status  OV 3 mos or prn  I have explained plan to patient and the patient verbalizes understanding

## 2018-02-21 NOTE — PATIENT INSTRUCTIONS
Chronic Obstructive Pulmonary Disease (COPD): Care Instructions  Your Care Instructions    Chronic obstructive pulmonary disease (COPD) is a general term for a group of lung diseases, including emphysema and chronic bronchitis. People with COPD have decreased airflow in and out of the lungs, which makes it hard to breathe. The airways also can get clogged with thick mucus. Cigarette smoking is a major cause of COPD. Although there is no cure for COPD, you can slow its progress. Following your treatment plan and taking care of yourself can help you feel better and live longer. Follow-up care is a key part of your treatment and safety. Be sure to make and go to all appointments, and call your doctor if you are having problems. It's also a good idea to know your test results and keep a list of the medicines you take. How can you care for yourself at home? ?Staying healthy  ? · Do not smoke. This is the most important step you can take to prevent more damage to your lungs. If you need help quitting, talk to your doctor about stop-smoking programs and medicines. These can increase your chances of quitting for good. ? · Avoid colds and flu. Get a pneumococcal vaccine shot. If you have had one before, ask your doctor whether you need a second dose. Get the flu vaccine every fall. If you must be around people with colds or the flu, wash your hands often. ? · Avoid secondhand smoke, air pollution, and high altitudes. Also avoid cold, dry air and hot, humid air. Stay at home with your windows closed when air pollution is bad. ?Medicines and oxygen therapy  ? · Take your medicines exactly as prescribed. Call your doctor if you think you are having a problem with your medicine. ? · You may be taking medicines such as:  ¨ Bronchodilators. These help open your airways and make breathing easier. Bronchodilators are either short-acting (work for 6 to 9 hours) or long-acting (work for 24 hours).  You inhale most bronchodilators, so they start to act quickly. Always carry your quick-relief inhaler with you in case you need it while you are away from home. ¨ Corticosteroids (prednisone, budesonide). These reduce airway inflammation. They come in pill or inhaled form. You must take these medicines every day for them to work well. ? · A spacer may help you get more inhaled medicine to your lungs. Ask your doctor or pharmacist if a spacer is right for you. If it is, ask how to use it properly. ? · Do not take any vitamins, over-the-counter medicine, or herbal products without talking to your doctor first.   ? · If your doctor prescribed antibiotics, take them as directed. Do not stop taking them just because you feel better. You need to take the full course of antibiotics. ? · Oxygen therapy boosts the amount of oxygen in your blood and helps you breathe easier. Use the flow rate your doctor has recommended, and do not change it without talking to your doctor first.   Activity  ? · Get regular exercise. Walking is an easy way to get exercise. Start out slowly, and walk a little more each day. ? · Pay attention to your breathing. You are exercising too hard if you cannot talk while you are exercising. ? · Take short rest breaks when doing household chores and other activities. ? · Learn breathing methods-such as breathing through pursed lips-to help you become less short of breath. ? · If your doctor has not set you up with a pulmonary rehabilitation program, talk to him or her about whether rehab is right for you. Rehab includes exercise programs, education about your disease and how to manage it, help with diet and other changes, and emotional support. Diet  ? · Eat regular, healthy meals. Use bronchodilators about 1 hour before you eat to make it easier to eat. Eat several small meals instead of three large ones. Drink beverages at the end of the meal. Avoid foods that are hard to chew.    ? · Eat foods that contain protein so that you do not lose muscle mass. ? · Talk with your doctor if you gain too much weight or if you lose weight without trying. ?Mental health  ? · Talk to your family, friends, or a therapist about your feelings. It is normal to feel frightened, angry, hopeless, helpless, and even guilty. Talking openly about bad feelings can help you cope. If these feelings last, talk to your doctor. When should you call for help? Call 911 anytime you think you may need emergency care. For example, call if:  ? · You have severe trouble breathing. ?Call your doctor now or seek immediate medical care if:  ? · You have new or worse trouble breathing. ? · You cough up blood. ? · You have a fever. ? Watch closely for changes in your health, and be sure to contact your doctor if:  ? · You cough more deeply or more often, especially if you notice more mucus or a change in the color of your mucus. ? · You have new or worse swelling in your legs or belly. ? · You are not getting better as expected. Where can you learn more? Go to http://costa-jessica.info/. Hayley Bolaños in the search box to learn more about \"Chronic Obstructive Pulmonary Disease (COPD): Care Instructions. \"  Current as of: May 12, 2017  Content Version: 11.4  © 4148-5419 [email or web address]. Care instructions adapted under license by [employer] (which disclaims liability or warranty for this information). If you have questions about a medical condition or this instruction, always ask your healthcare professional. [place] [address] any warranty or liability for your use of this information.

## 2018-02-21 NOTE — TELEPHONE ENCOUNTER
wife calling re: pt's med Lorazepam he was getting from the [facility] for panic attacks. He now has Medicaid and the [facility] got him an appt with outside doctor to take over his care. He has appt 3/8/18 with new doctor. Asked wife about sxs of congestion and cough. He and his wife state he is the same as normal. Pt using Nebulizer and inhalers as prescribed. Advised wife we can not refill his Lorazepam. He got last Rx #30 on 1/24/18. Advised he can call the [facility] to see if they can refill to hold over until his new doctor appt.  Pt's wife understands

## 2018-02-21 NOTE — TELEPHONE ENCOUNTER
Pt has been coughing up yellow and black mucus. Pt also feels like he is having a panic attack. He is asking for a refill on his lorazepam however, he has not gotten this medication from us before [facility] but he has [facility] now so they wont be seeing him and he does not have an appt with a new pcp until march 5th. Pt uses [facility] on [address].  Please call [phone]

## 2018-02-22 ENCOUNTER — CLINICAL SUPPORT (OUTPATIENT)
Dept: PULMONOLOGY | Age: 48
End: 2018-02-22

## 2018-02-22 ENCOUNTER — HOSPITAL ENCOUNTER (EMERGENCY)
Age: 48
Discharge: HOME | End: 2018-02-22
Attending: EMERGENCY MEDICINE | Admitting: EMERGENCY MEDICINE
Payer: MEDICAID

## 2018-02-22 ENCOUNTER — APPOINTMENT (OUTPATIENT)
Dept: CT IMAGING | Age: 48
End: 2018-02-22
Attending: PHYSICIAN ASSISTANT
Payer: MEDICAID

## 2018-02-22 VITALS
TEMPERATURE: 97.9 F | SYSTOLIC BLOOD PRESSURE: 136 MMHG | HEART RATE: 91 BPM | OXYGEN SATURATION: 97 % | DIASTOLIC BLOOD PRESSURE: 94 MMHG | RESPIRATION RATE: 18 BRPM

## 2018-02-22 VITALS
SYSTOLIC BLOOD PRESSURE: 140 MMHG | OXYGEN SATURATION: 94 % | HEART RATE: 90 BPM | HEIGHT: 63 IN | RESPIRATION RATE: 23 BRPM | DIASTOLIC BLOOD PRESSURE: 90 MMHG | TEMPERATURE: 98 F | BODY MASS INDEX: 32.6 KG/M2 | WEIGHT: 184 LBS

## 2018-02-22 DIAGNOSIS — R10.13 ABDOMINAL PAIN, EPIGASTRIC: Primary | ICD-10-CM

## 2018-02-22 DIAGNOSIS — R17 ELEVATED BILIRUBIN: ICD-10-CM

## 2018-02-22 DIAGNOSIS — J44.9 CHRONIC OBSTRUCTIVE PULMONARY DISEASE, UNSPECIFIED COPD TYPE (HCC): Primary | ICD-10-CM

## 2018-02-22 LAB
ALBUMIN SERPL-MCNC: 4.6 G/DL (ref 3.4–5)
ALBUMIN/GLOB SERPL: 1.4 {RATIO} (ref 0.8–1.7)
ALP SERPL-CCNC: 94 U/L (ref 45–117)
ALT SERPL-CCNC: 44 U/L (ref 16–61)
ANION GAP SERPL CALC-SCNC: 8 MMOL/L (ref 3–18)
AST SERPL-CCNC: 18 U/L (ref 15–37)
BASOPHILS # BLD: 0 K/UL (ref 0–0.06)
BASOPHILS NFR BLD: 0 % (ref 0–2)
BILIRUB DIRECT SERPL-MCNC: 0.4 MG/DL (ref 0–0.2)
BILIRUB SERPL-MCNC: 2.5 MG/DL (ref 0.2–1)
BUN SERPL-MCNC: 11 MG/DL (ref 7–18)
BUN/CREAT SERPL: 14 (ref 12–20)
CALCIUM SERPL-MCNC: 9.8 MG/DL (ref 8.5–10.1)
CHLORIDE SERPL-SCNC: 103 MMOL/L (ref 100–108)
CO2 SERPL-SCNC: 25 MMOL/L (ref 21–32)
CREAT SERPL-MCNC: 0.79 MG/DL (ref 0.6–1.3)
DIFFERENTIAL METHOD BLD: ABNORMAL
EOSINOPHIL # BLD: 0.1 K/UL (ref 0–0.4)
EOSINOPHIL NFR BLD: 1 % (ref 0–5)
ERYTHROCYTE [DISTWIDTH] IN BLOOD BY AUTOMATED COUNT: 13.8 % (ref 11.6–14.5)
GLOBULIN SER CALC-MCNC: 3.3 G/DL (ref 2–4)
GLUCOSE SERPL-MCNC: 102 MG/DL (ref 74–99)
HCT VFR BLD AUTO: 41.6 % (ref 36–48)
HGB BLD-MCNC: 15.3 G/DL (ref 13–16)
LIPASE SERPL-CCNC: 92 U/L (ref 73–393)
LYMPHOCYTES # BLD: 1.6 K/UL (ref 0.9–3.6)
LYMPHOCYTES NFR BLD: 22 % (ref 21–52)
MAGNESIUM SERPL-MCNC: 2.5 MG/DL (ref 1.6–2.6)
MCH RBC QN AUTO: 32.1 PG (ref 24–34)
MCHC RBC AUTO-ENTMCNC: 37.5 G/DL (ref 31–37)
MCV RBC AUTO: 86.7 FL (ref 74–97)
MONOCYTES # BLD: 0.7 K/UL (ref 0.05–1.2)
MONOCYTES NFR BLD: 9 % (ref 3–10)
NEUTS SEG # BLD: 4.9 K/UL (ref 1.8–8)
NEUTS SEG NFR BLD: 68 % (ref 40–73)
PLATELET # BLD AUTO: 229 K/UL (ref 135–420)
PMV BLD AUTO: 9.8 FL (ref 9.2–11.8)
POTASSIUM SERPL-SCNC: 4 MMOL/L (ref 3.5–5.5)
PROT SERPL-MCNC: 7.9 G/DL (ref 6.4–8.2)
RBC # BLD AUTO: 4.8 M/UL (ref 4.7–5.5)
SODIUM SERPL-SCNC: 136 MMOL/L (ref 136–145)
WBC # BLD AUTO: 7.2 K/UL (ref 4.6–13.2)

## 2018-02-22 PROCEDURE — 83690 ASSAY OF LIPASE: CPT | Performed by: PHYSICIAN ASSISTANT

## 2018-02-22 PROCEDURE — 82248 BILIRUBIN DIRECT: CPT

## 2018-02-22 PROCEDURE — 94640 AIRWAY INHALATION TREATMENT: CPT

## 2018-02-22 PROCEDURE — 74177 CT ABD & PELVIS W/CONTRAST: CPT

## 2018-02-22 PROCEDURE — 80053 COMPREHEN METABOLIC PANEL: CPT | Performed by: PHYSICIAN ASSISTANT

## 2018-02-22 PROCEDURE — 99283 EMERGENCY DEPT VISIT LOW MDM: CPT

## 2018-02-22 PROCEDURE — 83735 ASSAY OF MAGNESIUM: CPT | Performed by: PHYSICIAN ASSISTANT

## 2018-02-22 PROCEDURE — 74011636320 HC RX REV CODE- 636/320: Performed by: EMERGENCY MEDICINE

## 2018-02-22 PROCEDURE — 74011000250 HC RX REV CODE- 250: Performed by: PHYSICIAN ASSISTANT

## 2018-02-22 PROCEDURE — 85025 COMPLETE CBC W/AUTO DIFF WBC: CPT | Performed by: PHYSICIAN ASSISTANT

## 2018-02-22 PROCEDURE — 77030029684 HC NEB SM VOL KT MONA -A

## 2018-02-22 PROCEDURE — 74011636637 HC RX REV CODE- 636/637: Performed by: PHYSICIAN ASSISTANT

## 2018-02-22 RX ORDER — IPRATROPIUM BROMIDE AND ALBUTEROL SULFATE 2.5; .5 MG/3ML; MG/3ML
3 SOLUTION RESPIRATORY (INHALATION) ONCE
Status: COMPLETED | OUTPATIENT
Start: 2018-02-22 | End: 2018-02-22

## 2018-02-22 RX ADMIN — IPRATROPIUM BROMIDE AND ALBUTEROL SULFATE 3 ML: 2.5; .5 SOLUTION RESPIRATORY (INHALATION) at 16:36

## 2018-02-22 RX ADMIN — PREDNISONE 50 MG: 20 TABLET ORAL at 16:36

## 2018-02-22 RX ADMIN — IOPAMIDOL 100 ML: 612 INJECTION, SOLUTION INTRAVENOUS at 18:25

## 2018-02-22 NOTE — ED TRIAGE NOTES
Pt. States \"I've been having belly pain for 2 days; I can't eat or drink anything and I've ran out of my lorazepam\". Pt. Currently has O2 at 2lpm via nc from home.

## 2018-02-22 NOTE — ED PROVIDER NOTES
EMERGENCY DEPARTMENT HISTORY AND PHYSICAL EXAM    3:38 PM      Date: 2/22/2018  Patient Name: Virgilio Shaffer    History of Presenting Illness     Chief Complaint   Patient presents with    Abdominal Pain    Decreased Appetite         History Provided By: Patient    Chief Complaint: cough productive for the past 2 days but no fever, chill, n/v or diarrhea. Has also had abd pain intermittent diffused currently dull aching. \" I hav had these for the past several months\". Denies of noticing any blood in stool. Duration:  as noted above   Timing:  Gradual  Location: as noted above  Quality: Aching  Severity: 2 out of 10  Modifying Factors: intermittent  Associated Symptoms: denies any other associated signs or symptoms      Additional History (Context): Jonathan Handley Sr. is a 49 y.o. male with COPD, hypertension who presents with sx as noted above. PCP: Misha Francis NP    Current Outpatient Prescriptions   Medication Sig Dispense Refill    mirtazapine (REMERON) 15 mg tablet Take 7.5 mg by mouth nightly.  naproxen (NAPROSYN) 500 mg tablet Take 1 Tab by mouth two (2) times daily (with meals). 20 Tab 0    OXYGEN-AIR DELIVERY SYSTEMS (WALKABOUT 1 OXYGEN SYSTEM) 2.5 L/min by Nasal route continuous.  amLODIPine (NORVASC) 10 mg tablet Take 1 Tab by mouth daily. Indications: hypertension 30 Tab 1    LORazepam (ATIVAN) 1 mg tablet Take 0.5 Tabs by mouth every eight (8) hours as needed for Anxiety. Max Daily Amount: 1.5 mg. 30 Tab 0    albuterol (PROVENTIL HFA, VENTOLIN HFA, PROAIR HFA) 90 mcg/actuation inhaler Take 2 Puffs by inhalation every four (4) hours as needed for Wheezing or Shortness of Breath. 1 Inhaler 3    albuterol-ipratropium (DUO-NEB) 2.5 mg-0.5 mg/3 ml nebu 3 mL by Nebulization route every six (6) hours as needed. 75 Nebule 0    pantoprazole (PROTONIX) 40 mg tablet Take 1 Tab by mouth Daily (before breakfast).  30 Tab 0    roflumilast (DALIRESP) tab tablet Take 1 Tab by mouth daily. Indications: PREVENTION OF BRONCHOSPASM WITH CHRONIC BRONCHITIS 30 Tab 0    tiotropium (SPIRIVA) 18 mcg inhalation capsule Take 1 Cap by inhalation daily. Indications: BRONCHOSPASM PREVENTION WITH COPD 30 Cap 0    fluticasone-salmeterol (ADVAIR) 500-50 mcg/dose diskus inhaler Take 1 Puff by inhalation two (2) times a day. 1 Each 3       Past History     Past Medical History:  Past Medical History:   Diagnosis Date    Chronic lung disease     Chronic obstructive pulmonary disease ([address] 08/03/2017    PFTS 8/3/17    COPD, severe ([address]     [facility]     Esophagitis 12/11/2017    Endoscopy    Essential hypertension, benign     History of stomach ulcers     Positive urine drug screen 01/2016    opiates and THC    Upper GI bleed        Past Surgical History:  Past Surgical History:   Procedure Laterality Date    HX ENDOSCOPY  12/11/2017       Family History:  Family History   Problem Relation Age of Onset    Hypertension Mother     Heart Disease Mother     Diabetes Mother     Hypertension Father     Heart Disease Father     Cancer Father      lymphnodes    Diabetes Father        Social History:  Social History   Substance Use Topics    Smoking status: Former Smoker     Packs/day: 2.00     Years: 25.00     Types: Cigarettes     Start date: 5/28/1984     Quit date: 12/18/2017    Smokeless tobacco: Never Used    Alcohol use No       Allergies:  No Known Allergies      Review of Systems       Review of Systems   Constitutional: Negative for fever. Respiratory: Negative for cough, chest tightness and wheezing. Gastrointestinal: Positive for abdominal pain. Negative for diarrhea, nausea and vomiting. All other systems reviewed and are negative.         Physical Exam     Visit Vitals    BP (!) 136/94 (BP 1 Location: Left arm, BP Patient Position: Sitting)    Pulse 91    Temp 97.9 °F (36.6 °C)    Resp 18    SpO2 97%         Physical Exam   Constitutional: He appears well-developed and well-nourished. Sitting up in bed on Home O2 4 Liter   HENT:   Head: Normocephalic and atraumatic. Eyes: Conjunctivae are normal. Pupils are equal, round, and reactive to light. Neck: Normal range of motion. Cardiovascular: Normal rate, regular rhythm and normal heart sounds. Pulmonary/Chest: Effort normal and breath sounds normal. No respiratory distress. He has no wheezes. He has no rales. He exhibits no tenderness. Abdominal: Soft. Bowel sounds are normal. He exhibits no distension. There is no rebound and no guarding. No yanes sign , N[facility] [place] . OR epigastric region. Diagnostic Study Results     Labs -  Recent Results (from the past 12 hour(s))   BILIRUBIN, DIRECT    Collection Time: 02/22/18  2:15 PM   Result Value Ref Range    Bilirubin, direct 0.4 (H) 0.0 - 0.2 MG/DL   CBC WITH AUTOMATED DIFF    Collection Time: 02/22/18  3:55 PM   Result Value Ref Range    WBC 7.2 4.6 - 13.2 K/uL    RBC 4.80 4.70 - 5.50 M/uL    HGB 15.3 13.0 - 16.0 g/dL    HCT 41.6 36.0 - 48.0 %    MCV 86.7 74.0 - 97.0 FL    MCH 32.1 24.0 - 34.0 PG    MCHC 37.5 (H) 31.0 - 37.0 g/dL    RDW 13.8 11.6 - 14.5 %    PLATELET 054 536 - 146 K/uL    MPV 9.8 9.2 - 11.8 FL    NEUTROPHILS 68 40 - 73 %    LYMPHOCYTES 22 21 - 52 %    MONOCYTES 9 3 - 10 %    EOSINOPHILS 1 0 - 5 %    BASOPHILS 0 0 - 2 %    ABS. NEUTROPHILS 4.9 1.8 - 8.0 K/UL    ABS. LYMPHOCYTES 1.6 0.9 - 3.6 K/UL    ABS. MONOCYTES 0.7 0.05 - 1.2 K/UL    ABS. EOSINOPHILS 0.1 0.0 - 0.4 K/UL    ABS.  BASOPHILS 0.0 0.0 - 0.06 K/UL    DF AUTOMATED     METABOLIC PANEL, COMPREHENSIVE    Collection Time: 02/22/18  3:55 PM   Result Value Ref Range    Sodium 136 136 - 145 mmol/L    Potassium 4.0 3.5 - 5.5 mmol/L    Chloride 103 100 - 108 mmol/L    CO2 25 21 - 32 mmol/L    Anion gap 8 3.0 - 18 mmol/L    Glucose 102 (H) 74 - 99 mg/dL    BUN 11 7.0 - 18 MG/DL    Creatinine 0.79 0.6 - 1.3 MG/DL    BUN/Creatinine ratio 14 12 - 20      GFR est AA >60 >60 ml/min/1.73m2    GFR est non-AA >60 >60 ml/min/1.73m2    Calcium 9.8 8.5 - 10.1 MG/DL    Bilirubin, total 2.5 (H) 0.2 - 1.0 MG/DL    ALT (SGPT) 44 16 - 61 U/L    AST (SGOT) 18 15 - 37 U/L    Alk. phosphatase 94 45 - 117 U/L    Protein, total 7.9 6.4 - 8.2 g/dL    Albumin 4.6 3.4 - 5.0 g/dL    Globulin 3.3 2.0 - 4.0 g/dL    A-G Ratio 1.4 0.8 - 1.7     LIPASE    Collection Time: 02/22/18  3:55 PM   Result Value Ref Range    Lipase 92 73 - 393 U/L   MAGNESIUM    Collection Time: 02/22/18  3:55 PM   Result Value Ref Range    Magnesium 2.5 1.6 - 2.6 mg/dL     Radiologic Studies -   CT ABD PELV W CONT   Final Result        There is no biliary dilatation. Diverticulosis coli without evidence of diverticulitis. Mild stable circumferential thickening of the wall of the distal esophagus is  nonspecific and may be due to a small hiatal hernia or under luminal distention  or true wall thickening of esophagitis. Follow-up barium swallow may be of  benefit as clinically indicated. Medical Decision Making   I am the first provider for this patient. I reviewed the vital signs, available nursing notes, past medical history, past surgical history, family history and social history. Vital Signs-Reviewed the patient's vital signs. Provider Notes (Medical Decision Making):  Even though the abdomen was benign on exam will get direct bili levels and CT abd and pelvic due to bili total of 2.5        Diagnosis     Clinical Impression:   1. Abdominal pain, epigastric    2.  Elevated bilirubin        Disposition:     Follow-up Information     Follow up With Details Comments Contact Info    Cynthia Choe NP In 1 day  [address] [facility] EMERGENCY DEPT  As needed, If symptoms worsen [address] [address]  [phone]           Patient's Medications   Start Taking    No medications on file   Continue Taking    ALBUTEROL (PROVENTIL HFA, VENTOLIN HFA, PROAIR HFA) 90 MCG/ACTUATION INHALER    Take 2 Puffs by inhalation every four (4) hours as needed for Wheezing or Shortness of Breath. ALBUTEROL-IPRATROPIUM (DUO-NEB) 2.5 MG-0.5 MG/3 ML NEBU    3 mL by Nebulization route every six (6) hours as needed. AMLODIPINE (NORVASC) 10 MG TABLET    Take 1 Tab by mouth daily. Indications: hypertension    FLUTICASONE-SALMETEROL (ADVAIR) 500-50 MCG/DOSE DISKUS INHALER    Take 1 Puff by inhalation two (2) times a day. LORAZEPAM (ATIVAN) 1 MG TABLET    Take 0.5 Tabs by mouth every eight (8) hours as needed for Anxiety. Max Daily Amount: 1.5 mg. MIRTAZAPINE (REMERON) 15 MG TABLET    Take 7.5 mg by mouth nightly. NAPROXEN (NAPROSYN) 500 MG TABLET    Take 1 Tab by mouth two (2) times daily (with meals). OXYGEN-AIR DELIVERY SYSTEMS (WALKABOUT 1 OXYGEN SYSTEM)    2.5 L/min by Nasal route continuous. PANTOPRAZOLE (PROTONIX) 40 MG TABLET    Take 1 Tab by mouth Daily (before breakfast). ROFLUMILAST (DALIRESP) TAB TABLET    Take 1 Tab by mouth daily. Indications: PREVENTION OF BRONCHOSPASM WITH CHRONIC BRONCHITIS    TIOTROPIUM (SPIRIVA) 18 MCG INHALATION CAPSULE    Take 1 Cap by inhalation daily.  Indications: BRONCHOSPASM PREVENTION WITH COPD   These Medications have changed    No medications on file   Stop Taking    No medications on file

## 2018-02-22 NOTE — PROGRESS NOTES
[address] [place] [address], [address]      SIMPLE PULMONARY STRESS TEST - 6 MINUTE WALK    PATIENT NAME: Martina Nuñez. DATE: 2/22/2018     YOB: 1970     AGE: 49 y.o. DIAGNOSIS:   Encounter Diagnosis   Name Primary?  Chronic obstructive pulmonary disease, unspecified COPD type ([facility] [address] [facility] Yes         TECHNICIAN: Fredi Thompson, RT         PHYSICIAN: Dr Ralf Heaton MD      Visit Vitals    /90 (BP 1 Location: Right arm, BP Patient Position: At rest)    Pulse 90    Temp 98 °F (36.7 °C) (Oral)    Resp 23    Ht 5' 3\" (1.6 m)    Wt 184 lb (83.5 kg)    SpO2 94%    BMI 32.59 kg/m2        RESTING DATA:  Dyspnea Scale (1-10):    6 SOB    EXERCISE DATA:   6 MINUTE WALK - HALLWAY (34 METERS)      1   RA    91 % SAT   113 HR   6 SOB    1 Laps x 34m = 34m  2   RA    90 % SAT   120 HR   6 SOB    1 Laps x 34m = 34m  3   RA    91 % SAT   123 HR   6 SOB    1 Laps x 34m = 34m  4   RA    91 % SAT   123 HR   6 SOB    1 Laps x 34m = 34m  5   RA    90 % SAT   126 HR   6 SOB    1 Laps x 34m = 34m  6   RA    88 % SAT   123 HR   7 SOB    1 Laps x 34m = 34m    TOTAL DISTANCE:   204 M    RECOVERY DATA:      1   RA    96 % SAT   94 HR   25 RR   140/90 BP   6 SOB  2   RA    96 % SAT   89 HR   23 RR   140/90 BP   5 SOB      TECHNICIAN COMMENTS: Patient tolerated 6 minute fairly well. On room air patient's 02 saturation level decreased to a low of 88% but quickly increased at rest. Patient's heart rate increased to a high of 126 BPM while walking but decreased at reast to a low of 89 BPM. Patient complained of severe back pain while walking that affects his work of breathing.

## 2018-02-23 ENCOUNTER — HOSPITAL ENCOUNTER (OUTPATIENT)
Dept: LAB | Age: 48
Discharge: HOME | End: 2018-02-23
Payer: MEDICAID

## 2018-02-23 LAB
HBV SURFACE AB SER QL IA: NEGATIVE
HBV SURFACE AB SERPL IA-ACNC: 5.29 MIU/ML
HBV SURFACE AG SER QL: <0.1 INDEX
HBV SURFACE AG SER QL: NEGATIVE
HCV AB SER IA-ACNC: 0.08 INDEX
HCV AB SERPL QL IA: NEGATIVE
HCV COMMENT,HCGAC: NORMAL
HEP BS AB COMMENT,HBSAC: ABNORMAL
INR PPP: 1 (ref 0.8–1.2)
IRON SATN MFR SERPL: 35 %
IRON SERPL-MCNC: 110 UG/DL (ref 50–175)
PROTHROMBIN TIME: 12.5 SEC (ref 11.5–15.2)
TIBC SERPL-MCNC: 316 UG/DL (ref 250–450)

## 2018-02-23 PROCEDURE — 83516 IMMUNOASSAY NONANTIBODY: CPT | Performed by: NURSE PRACTITIONER

## 2018-02-23 PROCEDURE — 82977 ASSAY OF GGT: CPT | Performed by: NURSE PRACTITIONER

## 2018-02-23 PROCEDURE — 87340 HEPATITIS B SURFACE AG IA: CPT | Performed by: NURSE PRACTITIONER

## 2018-02-23 PROCEDURE — 86038 ANTINUCLEAR ANTIBODIES: CPT | Performed by: NURSE PRACTITIONER

## 2018-02-23 PROCEDURE — 86706 HEP B SURFACE ANTIBODY: CPT | Performed by: NURSE PRACTITIONER

## 2018-02-23 PROCEDURE — 85610 PROTHROMBIN TIME: CPT | Performed by: NURSE PRACTITIONER

## 2018-02-23 PROCEDURE — 36415 COLL VENOUS BLD VENIPUNCTURE: CPT | Performed by: NURSE PRACTITIONER

## 2018-02-23 PROCEDURE — 82657 ENZYME CELL ACTIVITY: CPT | Performed by: NURSE PRACTITIONER

## 2018-02-23 PROCEDURE — 86803 HEPATITIS C AB TEST: CPT | Performed by: NURSE PRACTITIONER

## 2018-02-23 PROCEDURE — 82390 ASSAY OF CERULOPLASMIN: CPT | Performed by: NURSE PRACTITIONER

## 2018-02-23 PROCEDURE — 86708 HEPATITIS A ANTIBODY: CPT | Performed by: NURSE PRACTITIONER

## 2018-02-23 PROCEDURE — 86704 HEP B CORE ANTIBODY TOTAL: CPT | Performed by: NURSE PRACTITIONER

## 2018-02-23 PROCEDURE — 82784 ASSAY IGA/IGD/IGG/IGM EACH: CPT | Performed by: NURSE PRACTITIONER

## 2018-02-23 PROCEDURE — 83540 ASSAY OF IRON: CPT | Performed by: NURSE PRACTITIONER

## 2018-02-23 PROCEDURE — 81332 SERPINA1 GENE: CPT | Performed by: NURSE PRACTITIONER

## 2018-02-24 LAB
ANA SER QL: NEGATIVE
CERULOPLASMIN SERPL-MCNC: 24.5 MG/DL (ref 16–31)
GGT SERPL-CCNC: 27 IU/L (ref 0–65)
HAV AB SER QL IA: NEGATIVE
HBV CORE AB SERPL QL IA: NEGATIVE
IGA SERPL-MCNC: 209 MG/DL (ref 90–386)
IGG SERPL-MCNC: 827 MG/DL (ref 700–1600)
IGM SERPL-MCNC: 123 MG/DL (ref 20–172)

## 2018-02-26 LAB
ACTIN IGG SERPL-ACNC: 13 UNITS (ref 0–19)
MITOCHONDRIA M2 IGG SER-ACNC: 14.8 UNITS (ref 0–20)

## 2018-02-27 ENCOUNTER — OFFICE VISIT (OUTPATIENT)
Dept: PULMONOLOGY | Age: 48
End: 2018-02-27

## 2018-02-27 VITALS
DIASTOLIC BLOOD PRESSURE: 74 MMHG | HEART RATE: 91 BPM | BODY MASS INDEX: 32.69 KG/M2 | OXYGEN SATURATION: 97 % | SYSTOLIC BLOOD PRESSURE: 124 MMHG | WEIGHT: 184.5 LBS | HEIGHT: 63 IN | RESPIRATION RATE: 18 BRPM | TEMPERATURE: 98.6 F

## 2018-02-27 DIAGNOSIS — J44.9 CHRONIC OBSTRUCTIVE PULMONARY DISEASE, UNSPECIFIED COPD TYPE (HCC): Primary | ICD-10-CM

## 2018-02-27 DIAGNOSIS — J96.90 RESPIRATORY FAILURE, UNSPECIFIED CHRONICITY, UNSPECIFIED WHETHER WITH HYPOXIA OR HYPERCAPNIA (HCC): ICD-10-CM

## 2018-02-27 LAB
A2 MACROGLOB SERPL-MCNC: 208 MG/DL (ref 110–276)
ALT (SGPT) P5P, 001547: 32 IU/L (ref 0–55)
APO A-I SERPL-MCNC: 147 MG/DL (ref 101–178)
AST SERPL W P-5'-P-CCNC: 17 IU/L (ref 0–40)
BILIRUB SERPL-MCNC: 1.6 MG/DL (ref 0–1.2)
CHOLEST SERPL-MCNC: 199 MG/DL (ref 100–199)
COMMENT:: ABNORMAL
FIBROSIS SCORING:, 550107: ABNORMAL
FIBROSIS STAGE SERPL QL: ABNORMAL
GGT SERPL-CCNC: 27 IU/L (ref 0–65)
GLUCOSE SERPL-MCNC: 100 MG/DL (ref 65–99)
HAPTOGLOB SERPL-MCNC: 83 MG/DL (ref 34–200)
INTERPRETATIONS:, 550143: ABNORMAL
LIVER FIBR SCORE SERPL CALC.FIBROSURE: 0.47 (ref 0–0.21)
NASH SCORING, 550144: ABNORMAL
NECROINFLAMMATORY ACT GRADE SERPL QL: ABNORMAL
NECROINFLAMMATORY ACT SCORE SERPL: 0.25
SERVICE CMNT-IMP: ABNORMAL
STEATOSIS GRADE, 550153: ABNORMAL
STEATOSIS GRADING, 550189: ABNORMAL
STEATOSIS SCORE, 550149: 0.35 (ref 0–0.3)
TRIGL SERPL-MCNC: 132 MG/DL (ref 0–149)

## 2018-02-27 RX ORDER — ALBUTEROL SULFATE 0.83 MG/ML
SOLUTION RESPIRATORY (INHALATION)
Qty: 60 EACH | Refills: 3 | Status: SHIPPED | OUTPATIENT
Start: 2018-02-27 | End: 2018-06-19 | Stop reason: SDUPTHER

## 2018-02-27 NOTE — PATIENT INSTRUCTIONS
Continue Spiriva 1 inhalation daily and remember to exhale fully before inhaling   continue Advair 1 inhalation twice daily and remember to exhale fully before inhaling and to wash mouth with water and spit it out after inhaling  Continue albuterol 2 puffs every 4 hours as needed or albuterol by nebulizer every 4 hours as needed and if you require albuterol too often to control respiratory symptoms call the office for severe symptoms go to the emergency room  Continue Daliresp 1 daily    Discontinue duo nebs    Always call for symptoms such as increasing shortness of breath or cough productive of discolored mucus    Use oxygen always with sleep and significant activity with very sedentary activity or short walks you do not need to use supplemental oxygen    Exercise at least 5 days a week like walking without stopping starting with 1 or 2 minutes and gradually increasing the time to 10 minutes without stopping

## 2018-02-27 NOTE — MR AVS SNAPSHOT
[address] [facility], [address] [place] [address] 
[phone] Patient: Candido Lundborg Sr. MRN: [id number] :1970 Visit Information Date & Time Provider Department Dept. Phone Encounter #  
 2018 10:30 AM [facility] Kiran Rivera [id number] Follow-up Instructions Return in about 3 months (around 2018). Follow-up and Disposition History Your Appointments 2018  1:15 PM  
New Patient with Ryne Demarco NP [facility] Main Office ([facility]) Appt Note: NP needs to est care. 14 [facility] 1 [facility] [address]  
[phone]  
  
   
 14 [address] Upcoming Health Maintenance Date Due DTaP/Tdap/Td series (1 - Tdap) 10/27/1990 Allergies as of 2018  Review Complete On: 2018 By: Milly Jones LPN No Known Allergies Current Immunizations  Never Reviewed Name Date Influenza Vaccine 10/10/2011 12:00 AM  
 Influenza Vaccine (Quad) PF 10/23/2017  1:04 PM  
 Pneumococcal Polysaccharide (PPSV-23) 2018 10:10 AM, 10/10/2011 12:00 AM  
  
 Not reviewed this visit You Were Diagnosed With   
  
 Codes Comments Chronic obstructive pulmonary disease, unspecified COPD type ([facility]    -  Primary ICD-10-CM: J44.9 ICD-9-CM: 155 Respiratory failure, unspecified chronicity, unspecified whether with hypoxia or hypercapnia ([facility]     ICD-10-CM: J96.90 ICD-9-CM: 518.81 Vitals BP Pulse Temp Resp Height(growth percentile) Weight(growth percentile) 124/74 (BP 1 Location: Left arm, BP Patient Position: Sitting) 91 98.6 °F (37 °C) (Oral) 18 5' 3\" (1.6 m) 184 lb 8 oz (83.7 kg) SpO2 BMI Smoking Status 97% 32.68 kg/m2 Former Smoker BMI and BSA Data  Body Mass Index Body Surface Area  
 32.68 kg/m 2 1.93 m 2  
  
  
 Preferred Pharmacy Pharmacy Name Phone [address] [place] [address]. [phone] Your Updated Medication List  
  
   
This list is accurate as of 2/27/18 11:33 AM.  Always use your most recent med list.  
  
  
  
  
 * albuterol 90 mcg/actuation inhaler Commonly known as:  PROVENTIL HFA, VENTOLIN HFA, PROAIR HFA Take 2 Puffs by inhalation every four (4) hours as needed for Wheezing or Shortness of Breath. * albuterol 2.5 mg /3 mL (0.083 %) nebulizer solution Commonly known as:  PROVENTIL VENTOLIN Nebulized 1 vial for inhalation every 4 hours as needed  
  
 amLODIPine 10 mg tablet Commonly known as:  Deborha Cords Take 1 Tab by mouth daily. Indications: hypertension  
  
 fluticasone-salmeterol 500-50 mcg/dose diskus inhaler Commonly known as:  ADVAIR Take 1 Puff by inhalation two (2) times a day. LORazepam 1 mg tablet Commonly known as:  ATIVAN Take 0.5 Tabs by mouth every eight (8) hours as needed for Anxiety. Max Daily Amount: 1.5 mg.  
  
 naproxen 500 mg tablet Commonly known as:  NAPROSYN Take 1 Tab by mouth two (2) times daily (with meals). pantoprazole 40 mg tablet Commonly known as:  PROTONIX Take 1 Tab by mouth Daily (before breakfast). REMERON 15 mg tablet Generic drug:  mirtazapine Take 7.5 mg by mouth nightly. roflumilast Tab tablet Commonly known as:  Illona Plants Take 1 Tab by mouth daily. Indications: PREVENTION OF BRONCHOSPASM WITH CHRONIC BRONCHITIS  
  
 tiotropium 18 mcg inhalation capsule Commonly known as:  Levell Marino Take 1 Cap by inhalation daily. Indications: BRONCHOSPASM PREVENTION WITH COPD  
  
 WALKABOUT 1 OXYGEN SYSTEM  
2.5 L/min by Nasal route continuous. * Notice: This list has 2 medication(s) that are the same as other medications prescribed for you. Read the directions carefully, and ask your doctor or other care provider to review them with you. Prescriptions Sent to Pharmacy Refills  
 albuterol (PROVENTIL VENTOLIN) 2.5 mg /3 mL (0.083 %) nebulizer solution 3 Sig: Nebulized 1 vial for inhalation every 4 hours as needed Class: Normal  
 Pharmacy: [address].  #: [phone] Follow-up Instructions Return in about 3 months (around 5/27/2018). To-Do List   
 03/07/2018 10:00 AM  
  Appointment with [facility] 1 at [facility] ([phone]) DAY PRIOR TO EXAM: 1. You can eat anything that you like the day prior to the exam as long as it does not give you gas. Please refrain from carbonated beverages after noon the day before the exam.  DAY OF THE EXAM: 1. DO NOT EAT OR DRINK ANYTHING!! This includes medications. Please bring your medications with you and you can take them after the examination. If you are diabetic, then bring a breakfast meal.  You will be able to eat it following the examination. Patient may hand-carry an order or the physician can fax a written prescription to Central Scheduling @ [phone]. Please report to the main location @ [facility] at least 15 minutes prior to your appointment time. The  is located on the RIGHT side of the street, immediately adjacent to the Emergency Room. Patient Instructions Continue Spiriva 1 inhalation daily and remember to exhale fully before inhaling  
continue Advair 1 inhalation twice daily and remember to exhale fully before inhaling and to wash mouth with water and spit it out after inhaling Continue albuterol 2 puffs every 4 hours as needed or albuterol by nebulizer every 4 hours as needed and if you require albuterol too often to control respiratory symptoms call the office for severe symptoms go to the emergency room Continue Daliresp 1 daily Discontinue duo nebs Always call for symptoms such as increasing shortness of breath or cough productive of discolored mucus Use oxygen always with sleep and significant activity with very sedentary activity or short walks you do not need to use supplemental oxygen Exercise at least 5 days a week like walking without stopping starting with 1 or 2 minutes and gradually increasing the time to 10 minutes without stopping Patient Instructions History Please provide this summary of care documentation to your next provider. Your primary care clinician is listed as Donzetta Landau. If you have any questions after today's visit, please call [phone].

## 2018-02-27 NOTE — PROGRESS NOTES
[facility]  Pulmonary, Critical Care, and Sleep Medicine      Chief complaint:  COPD and chronic respiratory failure    HPI:    Joie English    is 49years old and relates she is doing better now uses supplemental oxygen takes Daliresp regularly as well as Advair and Spiriva he does use duo nebs and albuterol as well as rescue inhaler and relates his shortness of breath is significantly improved he is not coughing and he has improved leg and has no significant leg edema. The patient relates he no longer smokes cigarettes      No Known Allergies  Current Outpatient Prescriptions   Medication Sig    OXYGEN-AIR DELIVERY SYSTEMS (WALKABOUT 1 OXYGEN SYSTEM) 2.5 L/min by Nasal route continuous.  amLODIPine (NORVASC) 10 mg tablet Take 1 Tab by mouth daily. Indications: hypertension    albuterol (PROVENTIL HFA, VENTOLIN HFA, PROAIR HFA) 90 mcg/actuation inhaler Take 2 Puffs by inhalation every four (4) hours as needed for Wheezing or Shortness of Breath.  albuterol-ipratropium (DUO-NEB) 2.5 mg-0.5 mg/3 ml nebu 3 mL by Nebulization route every six (6) hours as needed.  pantoprazole (PROTONIX) 40 mg tablet Take 1 Tab by mouth Daily (before breakfast).  roflumilast (DALIRESP) tab tablet Take 1 Tab by mouth daily. Indications: PREVENTION OF BRONCHOSPASM WITH CHRONIC BRONCHITIS    tiotropium (SPIRIVA) 18 mcg inhalation capsule Take 1 Cap by inhalation daily. Indications: BRONCHOSPASM PREVENTION WITH COPD    fluticasone-salmeterol (ADVAIR) 500-50 mcg/dose diskus inhaler Take 1 Puff by inhalation two (2) times a day.  mirtazapine (REMERON) 15 mg tablet Take 7.5 mg by mouth nightly.  naproxen (NAPROSYN) 500 mg tablet Take 1 Tab by mouth two (2) times daily (with meals).  LORazepam (ATIVAN) 1 mg tablet Take 0.5 Tabs by mouth every eight (8) hours as needed for Anxiety. Max Daily Amount: 1.5 mg. No current facility-administered medications for this visit.       Past Medical History:   Diagnosis Date    Chronic lung disease     Chronic obstructive pulmonary disease ([facility] 08/03/2017    PFTS 8/3/17    COPD, severe ([facility]     [facility]     Esophagitis 12/11/2017    Endoscopy    Essential hypertension, benign     History of stomach ulcers     Positive urine drug screen 01/2016    opiates and THC    Upper GI bleed      Past Surgical History:   Procedure Laterality Date    HX ENDOSCOPY  12/11/2017     Social History     Social History    Marital status:      Spouse name: N/A    Number of children: N/A    Years of education: N/A     Occupational History    Not on file.      Social History Main Topics    Smoking status: Former Smoker     Packs/day: 2.00     Years: 25.00     Types: Cigarettes     Start date: 5/28/1984     Quit date: 12/18/2017    Smokeless tobacco: Never Used    Alcohol use No    Drug use: Yes     Special: Marijuana      Comment: Hx of Marijuana use    Sexual activity: Yes     Partners: Female     Other Topics Concern     Service No    Blood Transfusions No    Caffeine Concern Yes    Occupational Exposure No    Hobby Hazards No    Sleep Concern Yes     occas    Stress Concern No    Weight Concern No    Special Diet No    Back Care Yes    Exercise No    Bike Helmet Yes    Seat Belt No     occas     Social History Narrative         Family History   Problem Relation Age of Onset    Hypertension Mother     Heart Disease Mother     Diabetes Mother     Hypertension Father     Heart Disease Father     Cancer Father      lymphnodes    Diabetes Father        Review of systems:  He denies fever chills poor appetite or weight loss    Physical Exam:  Visit Vitals    /74 (BP 1 Location: Left arm, BP Patient Position: Sitting)    Pulse 91    Temp 98.6 °F (37 °C) (Oral)    Resp 18    Ht 5' 3\" (1.6 m)    Wt 83.7 kg (184 lb 8 oz)    SpO2 97%  Comment: 2.5 LPM    BMI 32.68 kg/m2 Well-developed well-nourished  HEENT: WNL  Node exam: Supraclavicular cervical lymph nodes negative  Chest: Equal symmetrical expansion no dullness no wheezes rales rubs  Heart: Regular rhythm no gallop no murmur no JVD no peripheral edema  Extremities: No cyanosis  or calf tenderness but clubbing noted  Neurological: Alert and oriented    Labs:    O2 sat on 2-1/2 L of oxygen at rest 95% and room air 93% and with walking 250 feet O2 sat remained at 90%    Impression:     COPD with chronic respiratory failure improved    Plan:     Continue Daliresp Spiriva Advair as needed albuterol and supplemental oxygen with sleep and significant activity discontinue duo nebs  Follow-up in 3-4 months    Maria Luisa Quevedo MD , [facility]    CC: Jonathan Foote NP     [address] [place] [address]. [address]  [place], [address] [address]     P: [phone]     F: [phone]

## 2018-02-27 NOTE — PROGRESS NOTES
Mr. Kelly Romero has a reminder for a \"due or due soon\" health maintenance. I have asked that he contact his primary care provider for follow-up on this health maintenance. Chief Complaint   Patient presents with    Cough with sputum     1. Have you been to the ER, urgent care clinic since your last visit? Hospitalized since your last visit? No    2. Have you seen or consulted any other health care providers outside of the [address] [place] [address] since your last visit? Include any pap smears or colon screening.  No

## 2018-02-28 ENCOUNTER — OFFICE VISIT (OUTPATIENT)
Dept: FAMILY MEDICINE CLINIC | Facility: CLINIC | Age: 48
End: 2018-02-28

## 2018-02-28 VITALS
WEIGHT: 180 LBS | BODY MASS INDEX: 28.25 KG/M2 | TEMPERATURE: 98.2 F | HEART RATE: 83 BPM | SYSTOLIC BLOOD PRESSURE: 119 MMHG | RESPIRATION RATE: 12 BRPM | DIASTOLIC BLOOD PRESSURE: 84 MMHG | OXYGEN SATURATION: 99 % | HEIGHT: 67 IN

## 2018-02-28 DIAGNOSIS — F41.1 GAD (GENERALIZED ANXIETY DISORDER): Primary | ICD-10-CM

## 2018-02-28 DIAGNOSIS — F41.9 INSOMNIA SECONDARY TO ANXIETY: ICD-10-CM

## 2018-02-28 DIAGNOSIS — I50.9 CONGESTIVE HEART FAILURE, UNSPECIFIED CONGESTIVE HEART FAILURE CHRONICITY, UNSPECIFIED CONGESTIVE HEART FAILURE TYPE: ICD-10-CM

## 2018-02-28 DIAGNOSIS — J44.9 COPD, SEVERE (HCC): ICD-10-CM

## 2018-02-28 DIAGNOSIS — F51.05 INSOMNIA SECONDARY TO ANXIETY: ICD-10-CM

## 2018-02-28 DIAGNOSIS — Z76.89 ENCOUNTER TO ESTABLISH CARE: ICD-10-CM

## 2018-02-28 RX ORDER — NEBULIZER AND COMPRESSOR
1 EACH MISCELLANEOUS
Qty: 1 EACH | Refills: 0 | Status: SHIPPED | OUTPATIENT
Start: 2018-02-28

## 2018-02-28 RX ORDER — LORAZEPAM 0.5 MG/1
0.5 TABLET ORAL
Qty: 30 TAB | Refills: 0 | Status: SHIPPED | OUTPATIENT
Start: 2018-02-28 | End: 2018-03-28 | Stop reason: ALTCHOICE

## 2018-02-28 RX ORDER — MIRTAZAPINE 7.5 MG/1
7.5 TABLET, FILM COATED ORAL
Qty: 90 TAB | Refills: 1 | Status: SHIPPED | OUTPATIENT
Start: 2018-02-28 | End: 2018-10-24

## 2018-02-28 RX ORDER — CITALOPRAM 20 MG/1
20 TABLET, FILM COATED ORAL DAILY
Qty: 90 TAB | Refills: 1 | Status: SHIPPED | OUTPATIENT
Start: 2018-02-28 | End: 2018-03-28 | Stop reason: ALTCHOICE

## 2018-02-28 NOTE — PROGRESS NOTES
HISTORY OF PRESENT ILLNESS  Sarah Tam. is a 49 y.o. male. HPI Comments: New pt to practice. Pt with h/o COPD and emphysema followed by Dr Wang Flores. He is on continuous oxygen at 2.5L. Denies any shortness of breath. He reports sleeping with 4 pillows. Reports compliance with daliresp, advair and spiriva. He does use albuterol as needed. Reports he has stopped smoking. New diagnosis of right heart failure with EF at 55%-60%. Denies any leg swelling or palpitations. C/o anxiety with panic attacks occurring almost daily. He is currently taking Lorazepam as needed and he reports taking this almost everyday. Reports insomnia d/t anxiety. Requesting a refill on Remeron today. Establish Care   The history is provided by the patient. This is a new problem. COPD   The history is provided by the patient. This is a chronic problem. The current episode started more than 1 week ago. The problem occurs daily. The problem has not changed since onset. Panic Attack   The history is provided by the patient and spouse. This is a new problem. The current episode started more than 1 week ago. The problem occurs every several days. The problem has not changed since onset. Treatments tried: ativan. The treatment provided mild relief. Review of Systems   Constitutional: Negative. HENT: Negative. Eyes: Negative. Respiratory: Positive for cough and sputum production. Cardiovascular: Negative. Genitourinary: Negative. Musculoskeletal: Negative. Neurological: Negative.       Past Medical History:   Diagnosis Date    Chronic lung disease     Chronic obstructive pulmonary disease ([address] 08/03/2017    PFTS 8/3/17    COPD, severe ([address]     Emphysema/COPD ([address]     Esophagitis 12/11/2017    Endoscopy    Essential hypertension, benign     History of stomach ulcers     Positive urine drug screen 01/2016    opiates and THC    Upper GI bleed      Past Surgical History:   Procedure Laterality Date    HX ENDOSCOPY  12/11/2017     Current Outpatient Prescriptions on File Prior to Visit   Medication Sig Dispense Refill    albuterol (PROVENTIL VENTOLIN) 2.5 mg /3 mL (0.083 %) nebulizer solution Nebulized 1 vial for inhalation every 4 hours as needed 60 Each 3    OXYGEN-AIR DELIVERY SYSTEMS (WALKABOUT 1 OXYGEN SYSTEM) 2.5 L/min by Nasal route continuous.  amLODIPine (NORVASC) 10 mg tablet Take 1 Tab by mouth daily. Indications: hypertension 30 Tab 1    albuterol (PROVENTIL HFA, VENTOLIN HFA, PROAIR HFA) 90 mcg/actuation inhaler Take 2 Puffs by inhalation every four (4) hours as needed for Wheezing or Shortness of Breath. 1 Inhaler 3    pantoprazole (PROTONIX) 40 mg tablet Take 1 Tab by mouth Daily (before breakfast). 30 Tab 0    roflumilast (DALIRESP) tab tablet Take 1 Tab by mouth daily. Indications: PREVENTION OF BRONCHOSPASM WITH CHRONIC BRONCHITIS 30 Tab 0    tiotropium (SPIRIVA) 18 mcg inhalation capsule Take 1 Cap by inhalation daily. Indications: BRONCHOSPASM PREVENTION WITH COPD 30 Cap 0    fluticasone-salmeterol (ADVAIR) 500-50 mcg/dose diskus inhaler Take 1 Puff by inhalation two (2) times a day. 1 Each 3     No current facility-administered medications on file prior to visit. Allergies and Intolerances:   No Known Allergies    Family History:   Family History   Problem Relation Age of Onset    Hypertension Mother     Heart Disease Mother     Diabetes Mother     Hypertension Father     Heart Disease Father     Cancer Father      lymphnodes    Diabetes Father        Social History:   He  reports that he quit smoking about 2 months ago. His smoking use included Cigarettes. He started smoking about 33 years ago. He has a 50.00 pack-year smoking history. He has never used smokeless tobacco. He  reports that he does not drink alcohol.   Vitals:   Visit Vitals    /84    Pulse 83    Temp 98.2 °F (36.8 °C)    Resp 12    Ht 5' 6.5\" (1.689 m)    Wt 180 lb (81.6 kg)    SpO2 99%    BMI 28.62 kg/m2     Body surface area is 1.96 meters squared. Physical Exam   Constitutional: He is oriented to person, place, and time. He appears well-developed and well-nourished. HENT:   Head: Atraumatic. Cardiovascular: Normal rate. Pulmonary/Chest: Effort normal. He has decreased breath sounds. Neurological: He is alert and oriented to person, place, and time. Skin: Skin is warm. Psychiatric: He has a normal mood and affect. His behavior is normal.   Nursing note and vitals reviewed. ASSESSMENT and PLAN    ICD-10-CM ICD-9-CM    1. MYLES (generalized anxiety disorder) F41.1 300.02 mirtazapine (REMERON) 7.5 mg tablet      LORazepam (ATIVAN) 0.5 mg tablet   2. COPD, severe ([facility] J44.9 496 Nebulizer & Compressor machine   3. Congestive heart failure, unspecified congestive heart failure chronicity, unspecified congestive heart failure type (HCC) I50.9 428.0 REFERRAL TO CARDIOLOGY   4. 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 Encounter to establish care Z76.89 V65.8      Follow-up Disposition:  Return in about 4 weeks (around 3/28/2018), or if symptoms worsen or fail to improve, for HTN, COPD.  reviewed medications and side effects in detail    - Alarm signals discussed. ER precautions  - Plan of care reviewed with patient. Understanding verbalized and they are in agreement with plan of care.

## 2018-02-28 NOTE — PATIENT INSTRUCTIONS
Chronic Obstructive Pulmonary Disease (COPD): Care Instructions  Your Care Instructions    Chronic obstructive pulmonary disease (COPD) is a general term for a group of lung diseases, including emphysema and chronic bronchitis. People with COPD have decreased airflow in and out of the lungs, which makes it hard to breathe. The airways also can get clogged with thick mucus. Cigarette smoking is a major cause of COPD. Although there is no cure for COPD, you can slow its progress. Following your treatment plan and taking care of yourself can help you feel better and live longer. Follow-up care is a key part of your treatment and safety. Be sure to make and go to all appointments, and call your doctor if you are having problems. It's also a good idea to know your test results and keep a list of the medicines you take. How can you care for yourself at home? ?Staying healthy  ? · Do not smoke. This is the most important step you can take to prevent more damage to your lungs. If you need help quitting, talk to your doctor about stop-smoking programs and medicines. These can increase your chances of quitting for good. ? · Avoid colds and flu. Get a pneumococcal vaccine shot. If you have had one before, ask your doctor whether you need a second dose. Get the flu vaccine every fall. If you must be around people with colds or the flu, wash your hands often. ? · Avoid secondhand smoke, air pollution, and high altitudes. Also avoid cold, dry air and hot, humid air. Stay at home with your windows closed when air pollution is bad. ?Medicines and oxygen therapy  ? · Take your medicines exactly as prescribed. Call your doctor if you think you are having a problem with your medicine. ? · You may be taking medicines such as:  ¨ Bronchodilators. These help open your airways and make breathing easier. Bronchodilators are either short-acting (work for 6 to 9 hours) or long-acting (work for 24 hours).  You inhale most bronchodilators, so they start to act quickly. Always carry your quick-relief inhaler with you in case you need it while you are away from home. ¨ Corticosteroids (prednisone, budesonide). These reduce airway inflammation. They come in pill or inhaled form. You must take these medicines every day for them to work well. ? · A spacer may help you get more inhaled medicine to your lungs. Ask your doctor or pharmacist if a spacer is right for you. If it is, ask how to use it properly. ? · Do not take any vitamins, over-the-counter medicine, or herbal products without talking to your doctor first.   ? · If your doctor prescribed antibiotics, take them as directed. Do not stop taking them just because you feel better. You need to take the full course of antibiotics. ? · Oxygen therapy boosts the amount of oxygen in your blood and helps you breathe easier. Use the flow rate your doctor has recommended, and do not change it without talking to your doctor first.   Activity  ? · Get regular exercise. Walking is an easy way to get exercise. Start out slowly, and walk a little more each day. ? · Pay attention to your breathing. You are exercising too hard if you cannot talk while you are exercising. ? · Take short rest breaks when doing household chores and other activities. ? · Learn breathing methods-such as breathing through pursed lips-to help you become less short of breath. ? · If your doctor has not set you up with a pulmonary rehabilitation program, talk to him or her about whether rehab is right for you. Rehab includes exercise programs, education about your disease and how to manage it, help with diet and other changes, and emotional support. Diet  ? · Eat regular, healthy meals. Use bronchodilators about 1 hour before you eat to make it easier to eat. Eat several small meals instead of three large ones. Drink beverages at the end of the meal. Avoid foods that are hard to chew.    ? · Eat foods that contain protein so that you do not lose muscle mass. ? · Talk with your doctor if you gain too much weight or if you lose weight without trying. ?Mental health  ? · Talk to your family, friends, or a therapist about your feelings. It is normal to feel frightened, angry, hopeless, helpless, and even guilty. Talking openly about bad feelings can help you cope. If these feelings last, talk to your doctor. When should you call for help? Call 911 anytime you think you may need emergency care. For example, call if:  ? · You have severe trouble breathing. ?Call your doctor now or seek immediate medical care if:  ? · You have new or worse trouble breathing. ? · You cough up blood. ? · You have a fever. ? Watch closely for changes in your health, and be sure to contact your doctor if:  ? · You cough more deeply or more often, especially if you notice more mucus or a change in the color of your mucus. ? · You have new or worse swelling in your legs or belly. ? · You are not getting better as expected. Where can you learn more? Go to http://costa-jessica.info/. Savanna Garcia in the search box to learn more about \"Chronic Obstructive Pulmonary Disease (COPD): Care Instructions. \"  Current as of: May 12, 2017  Content Version: 11.4  © 7093-8970 [employer]. Care instructions adapted under license by [employer] (which disclaims liability or warranty for this information). If you have questions about a medical condition or this instruction, always ask your healthcare professional. [place] [address] any warranty or liability for your use of this information. DASH Diet: Care Instructions  Your Care Instructions    The DASH diet is an eating plan that can help lower your blood pressure. DASH stands for Dietary Approaches to Stop Hypertension. Hypertension is high blood pressure.   The DASH diet focuses on eating foods that are high in calcium, potassium, and magnesium. These nutrients can lower blood pressure. The foods that are highest in these nutrients are fruits, vegetables, low-fat dairy products, nuts, seeds, and legumes. But taking calcium, potassium, and magnesium supplements instead of eating foods that are high in those nutrients does not have the same effect. The DASH diet also includes whole grains, fish, and poultry. The DASH diet is one of several lifestyle changes your doctor may recommend to lower your high blood pressure. Your doctor may also want you to decrease the amount of sodium in your diet. Lowering sodium while following the DASH diet can lower blood pressure even further than just the DASH diet alone. Follow-up care is a key part of your treatment and safety. Be sure to make and go to all appointments, and call your doctor if you are having problems. It's also a good idea to know your test results and keep a list of the medicines you take. How can you care for yourself at home? Following the DASH diet  · Eat 4 to 5 servings of fruit each day. A serving is 1 medium-sized piece of fruit, ½ cup chopped or canned fruit, 1/4 cup dried fruit, or 4 ounces (½ cup) of fruit juice. Choose fruit more often than fruit juice. · Eat 4 to 5 servings of vegetables each day. A serving is 1 cup of lettuce or raw leafy vegetables, ½ cup of chopped or cooked vegetables, or 4 ounces (½ cup) of vegetable juice. Choose vegetables more often than vegetable juice. · Get 2 to 3 servings of low-fat and fat-free dairy each day. A serving is 8 ounces of milk, 1 cup of yogurt, or 1 ½ ounces of cheese. · Eat 6 to 8 servings of grains each day. A serving is 1 slice of bread, 1 ounce of dry cereal, or ½ cup of cooked rice, pasta, or cooked cereal. Try to choose whole-grain products as much as possible. · Limit lean meat, poultry, and fish to 2 servings each day. A serving is 3 ounces, about the size of a deck of cards.   · Eat 4 to 5 servings of nuts, seeds, and legumes (cooked dried beans, lentils, and split peas) each week. A serving is 1/3 cup of nuts, 2 tablespoons of seeds, or ½ cup of cooked beans or peas. · Limit fats and oils to 2 to 3 servings each day. A serving is 1 teaspoon of vegetable oil or 2 tablespoons of salad dressing. · Limit sweets and added sugars to 5 servings or less a week. A serving is 1 tablespoon jelly or jam, ½ cup sorbet, or 1 cup of lemonade. · Eat less than 2,300 milligrams (mg) of sodium a day. If you limit your sodium to 1,500 mg a day, you can lower your blood pressure even more. Tips for success  · Start small. Do not try to make dramatic changes to your diet all at once. You might feel that you are missing out on your favorite foods and then be more likely to not follow the plan. Make small changes, and stick with them. Once those changes become habit, add a few more changes. · Try some of the following:  ¨ Make it a goal to eat a fruit or vegetable at every meal and at snacks. This will make it easy to get the recommended amount of fruits and vegetables each day. ¨ Try yogurt topped with fruit and nuts for a snack or healthy dessert. ¨ Add lettuce, tomato, cucumber, and onion to sandwiches. ¨ Combine a ready-made pizza crust with low-fat mozzarella cheese and lots of vegetable toppings. Try using tomatoes, squash, spinach, broccoli, carrots, cauliflower, and onions. ¨ Have a variety of cut-up vegetables with a low-fat dip as an appetizer instead of chips and dip. ¨ Sprinkle sunflower seeds or chopped almonds over salads. Or try adding chopped walnuts or almonds to cooked vegetables. ¨ Try some vegetarian meals using beans and peas. Add garbanzo or kidney beans to salads. Make burritos and tacos with mashed friend beans or black beans. Where can you learn more? Go to http://costa-jessica.info/. Enter E359 in the search box to learn more about \"DASH Diet: Care Instructions. \"  Current as of: September 21, 2016  Content Version: 11.4  © 2514-8874 [employer]. Care instructions adapted under license by [employer] (which disclaims liability or warranty for this information). If you have questions about a medical condition or this instruction, always ask your healthcare professional. [place] [address] any warranty or liability for your use of this information.

## 2018-02-28 NOTE — MR AVS SNAPSHOT
Elva De La Garza 
 
 
 [address] [facility] [address] [facility] [address] 
[phone] Patient: Madhu Mccoy Sr. MRN: [id number] :1970 Visit Information Date & Time Provider Department Dept. Phone Encounter #  
 2018  1:15 PM Ramiro Barber NP [employer] [id number] Follow-up Instructions Return in about 4 weeks (around 3/28/2018), or if symptoms worsen or fail to improve, for HTN, COPD. Upcoming Health Maintenance Date Due DTaP/Tdap/Td series (1 - Tdap) 10/27/1990 Allergies as of 2018  Review Complete On: 2018 By: Zuleyka Fabian No Known Allergies Current Immunizations  Never Reviewed Name Date Influenza Vaccine 10/10/2011 12:00 AM  
 Influenza Vaccine (Quad) PF 10/23/2017  1:04 PM  
 Pneumococcal Polysaccharide (PPSV-23) 2018 10:10 AM, 10/10/2011 12:00 AM  
  
 Not reviewed this visit You Were Diagnosed With   
  
 Codes Comments MYLES (generalized anxiety disorder)    -  Primary ICD-10-CM: F41.1 ICD-9-CM: 300.02 Pulmonary emphysema, unspecified emphysema type ([facility] [address]     ICD-10-CM: J43.9 ICD-9-CM: 492.8 COPD, severe ([address]     ICD-10-CM: J44.9 ICD-9-CM: 368 Congestive heart failure, unspecified congestive heart failure chronicity, unspecified congestive heart failure type ([facility] [address]     ICD-10-CM: I50.9 ICD-9-CM: 428.0 Insomnia secondary to anxiety     ICD-10-CM: F41.9, F51.05 
ICD-9-CM: 300.00, 327.02 Vitals BP Pulse Temp Resp Height(growth percentile) Weight(growth percentile) 119/84 83 98.2 °F (36.8 °C) 12 5' 6.5\" (1.689 m) 180 lb (81.6 kg) SpO2 BMI Smoking Status 99% 28.62 kg/m2 Former Smoker Vitals History BMI and BSA Data Body Mass Index Body Surface Area  
 28.62 kg/m 2 1.96 m 2 Preferred Pharmacy Pharmacy Name Phone [address] [phone] Your Updated Medication List  
  
   
This list is accurate as of 2/28/18  2:07 PM.  Always use your most recent med list.  
  
  
  
  
 * albuterol 90 mcg/actuation inhaler Commonly known as:  PROVENTIL HFA, VENTOLIN HFA, PROAIR HFA Take 2 Puffs by inhalation every four (4) hours as needed for Wheezing or Shortness of Breath. * albuterol 2.5 mg /3 mL (0.083 %) nebulizer solution Commonly known as:  PROVENTIL VENTOLIN Nebulized 1 vial for inhalation every 4 hours as needed  
  
 amLODIPine 10 mg tablet Commonly known as:  Alysa Prow Take 1 Tab by mouth daily. Indications: hypertension  
  
 citalopram 20 mg tablet Commonly known as:  Howells Lota Take 1 Tab by mouth daily. fluticasone-salmeterol 500-50 mcg/dose diskus inhaler Commonly known as:  ADVAIR Take 1 Puff by inhalation two (2) times a day. LORazepam 0.5 mg tablet Commonly known as:  ATIVAN Take 1 Tab by mouth every eight (8) hours as needed for Anxiety. Max Daily Amount: 1.5 mg.  
  
 mirtazapine 7.5 mg tablet Commonly known as:  Abbott Feeling Take 1 Tab by mouth nightly. pantoprazole 40 mg tablet Commonly known as:  PROTONIX Take 1 Tab by mouth Daily (before breakfast). roflumilast Tab tablet Commonly known as:  Rakesh Knoll Take 1 Tab by mouth daily. Indications: PREVENTION OF BRONCHOSPASM WITH CHRONIC BRONCHITIS  
  
 tiotropium 18 mcg inhalation capsule Commonly known as:  Devota Perlita Take 1 Cap by inhalation daily. Indications: BRONCHOSPASM PREVENTION WITH COPD  
  
 WALKABOUT 1 OXYGEN SYSTEM  
2.5 L/min by Nasal route continuous. * Notice: This list has 2 medication(s) that are the same as other medications prescribed for you. Read the directions carefully, and ask your doctor or other care provider to review them with you. Prescriptions Printed Refills  LORazepam (ATIVAN) 0.5 mg tablet 0  
 Sig: Take 1 Tab by mouth every eight (8) hours as needed for Anxiety. Max Daily Amount: 1.5 mg.  
 Class: Print Route: Oral  
  
Prescriptions Sent to Pharmacy Refills  
 mirtazapine (REMERON) 7.5 mg tablet 1 Sig: Take 1 Tab by mouth nightly. Class: Normal  
 [facility] [address] [facility] Ph #: [phone] Route: Oral  
 citalopram (CELEXA) 20 mg tablet 1 Sig: Take 1 Tab by mouth daily. Class: Normal  
 Pharm[facility] [address] [facility] Ph #: [phone] Route: Oral  
  
Follow-up Instructions Return in about 4 weeks (around 3/28/2018), or if symptoms worsen or fail to improve, for HTN, COPD. To-Do List   
 03/07/2018 10:00 AM  
  Appointment with [facility] at [facility] ([phone]) DAY PRIOR TO EXAM: 1. You can eat anything that you like the day prior to the exam as long as it does not give you gas. Please refrain from carbonated beverages after noon the day before the exam.  DAY OF THE EXAM: 1. DO NOT EAT OR DRINK ANYTHING!! This includes medications. Please bring your medications with you and you can take them after the examination. If you are diabetic, then bring a breakfast meal.  You will be able to eat it following the examination. Patient may hand-carry an order or the physician can fax a written prescription to [facility] @ [phone]. Please report to the main location @ [place] [address] [address] at least 15 minutes prior to your appointment time. The  is located on the RIGHT side of the street, immediately adjacent to the Emergency Room. Patient Instructions Chronic Obstructive Pulmonary Disease (COPD): Care Instructions Your Care Instructions Chronic obstructive pulmonary disease (COPD) is a general term for a group of lung diseases, including emphysema and chronic bronchitis.  People with COPD have decreased airflow in and out of the lungs, which makes it hard to breathe. The airways also can get clogged with thick mucus. Cigarette smoking is a major cause of COPD. Although there is no cure for COPD, you can slow its progress. Following your treatment plan and taking care of yourself can help you feel better and live longer. Follow-up care is a key part of your treatment and safety. Be sure to make and go to all appointments, and call your doctor if you are having problems. It's also a good idea to know your test results and keep a list of the medicines you take. How can you care for yourself at home? ?Staying healthy ? · Do not smoke. This is the most important step you can take to prevent more damage to your lungs. If you need help quitting, talk to your doctor about stop-smoking programs and medicines. These can increase your chances of quitting for good. ? · Avoid colds and flu. Get a pneumococcal vaccine shot. If you have had one before, ask your doctor whether you need a second dose. Get the flu vaccine every fall. If you must be around people with colds or the flu, wash your hands often. ? · Avoid secondhand smoke, air pollution, and high altitudes. Also avoid cold, dry air and hot, humid air. Stay at home with your windows closed when air pollution is bad. ?Medicines and oxygen therapy ? · Take your medicines exactly as prescribed. Call your doctor if you think you are having a problem with your medicine. ? · You may be taking medicines such as: ¨ Bronchodilators. These help open your airways and make breathing easier. Bronchodilators are either short-acting (work for 6 to 9 hours) or long-acting (work for 24 hours). You inhale most bronchodilators, so they start to act quickly. Always carry your quick-relief inhaler with you in case you need it while you are away from home. ¨ Corticosteroids (prednisone, budesonide).  These reduce airway inflammation. They come in pill or inhaled form. You must take these medicines every day for them to work well. ? · A spacer may help you get more inhaled medicine to your lungs. Ask your doctor or pharmacist if a spacer is right for you. If it is, ask how to use it properly. ? · Do not take any vitamins, over-the-counter medicine, or herbal products without talking to your doctor first.  
? · If your doctor prescribed antibiotics, take them as directed. Do not stop taking them just because you feel better. You need to take the full course of antibiotics. ? · Oxygen therapy boosts the amount of oxygen in your blood and helps you breathe easier. Use the flow rate your doctor has recommended, and do not change it without talking to your doctor first.  
Activity ? · Get regular exercise. Walking is an easy way to get exercise. Start out slowly, and walk a little more each day. ? · Pay attention to your breathing. You are exercising too hard if you cannot talk while you are exercising. ? · Take short rest breaks when doing household chores and other activities. ? · Learn breathing methods-such as breathing through pursed lips-to help you become less short of breath. ? · If your doctor has not set you up with a pulmonary rehabilitation program, talk to him or her about whether rehab is right for you. Rehab includes exercise programs, education about your disease and how to manage it, help with diet and other changes, and emotional support. Diet ? · Eat regular, healthy meals. Use bronchodilators about 1 hour before you eat to make it easier to eat. Eat several small meals instead of three large ones. Drink beverages at the end of the meal. Avoid foods that are hard to chew. ? · Eat foods that contain protein so that you do not lose muscle mass. ? · Talk with your doctor if you gain too much weight or if you lose weight without trying. ?Mental health ? · Talk to your family, friends, or a therapist about your feelings. It is normal to feel frightened, angry, hopeless, helpless, and even guilty. Talking openly about bad feelings can help you cope. If these feelings last, talk to your doctor. When should you call for help? Call 911 anytime you think you may need emergency care. For example, call if: 
? · You have severe trouble breathing. ?Call your doctor now or seek immediate medical care if: 
? · You have new or worse trouble breathing. ? · You cough up blood. ? · You have a fever. ? Watch closely for changes in your health, and be sure to contact your doctor if: 
? · You cough more deeply or more often, especially if you notice more mucus or a change in the color of your mucus. ? · You have new or worse swelling in your legs or belly. ? · You are not getting better as expected. Where can you learn more? Go to http://costa-jessica.info/. Tomasa Wilson in the search box to learn more about \"Chronic Obstructive Pulmonary Disease (COPD): Care Instructions. \" Current as of: May 12, 2017 Content Version: 11.4 © 2750-8287 [employer]. Care instructions adapted under license by [employer] (which disclaims liability or warranty for this information). If you have questions about a medical condition or this instruction, always ask your healthcare professional. [place] [address] any warranty or liability for your use of this information. DASH Diet: Care Instructions Your Care Instructions The DASH diet is an eating plan that can help lower your blood pressure. DASH stands for Dietary Approaches to Stop Hypertension. Hypertension is high blood pressure. The DASH diet focuses on eating foods that are high in calcium, potassium, and magnesium. These nutrients can lower blood pressure.  The foods that are highest in these nutrients are fruits, vegetables, low-fat dairy products, nuts, seeds, and legumes. But taking calcium, potassium, and magnesium supplements instead of eating foods that are high in those nutrients does not have the same effect. The DASH diet also includes whole grains, fish, and poultry. The DASH diet is one of several lifestyle changes your doctor may recommend to lower your high blood pressure. Your doctor may also want you to decrease the amount of sodium in your diet. Lowering sodium while following the DASH diet can lower blood pressure even further than just the DASH diet alone. Follow-up care is a key part of your treatment and safety. Be sure to make and go to all appointments, and call your doctor if you are having problems. It's also a good idea to know your test results and keep a list of the medicines you take. How can you care for yourself at home? Following the DASH diet · Eat 4 to 5 servings of fruit each day. A serving is 1 medium-sized piece of fruit, ½ cup chopped or canned fruit, 1/4 cup dried fruit, or 4 ounces (½ cup) of fruit juice. Choose fruit more often than fruit juice. · Eat 4 to 5 servings of vegetables each day. A serving is 1 cup of lettuce or raw leafy vegetables, ½ cup of chopped or cooked vegetables, or 4 ounces (½ cup) of vegetable juice. Choose vegetables more often than vegetable juice. · Get 2 to 3 servings of low-fat and fat-free dairy each day. A serving is 8 ounces of milk, 1 cup of yogurt, or 1 ½ ounces of cheese. · Eat 6 to 8 servings of grains each day. A serving is 1 slice of bread, 1 ounce of dry cereal, or ½ cup of cooked rice, pasta, or cooked cereal. Try to choose whole-grain products as much as possible. · Limit lean meat, poultry, and fish to 2 servings each day. A serving is 3 ounces, about the size of a deck of cards. · Eat 4 to 5 servings of nuts, seeds, and legumes (cooked dried beans, lentils, and split peas) each week.  A serving is 1/3 cup of nuts, 2 tablespoons of seeds, or ½ cup of cooked beans or peas. · Limit fats and oils to 2 to 3 servings each day. A serving is 1 teaspoon of vegetable oil or 2 tablespoons of salad dressing. · Limit sweets and added sugars to 5 servings or less a week. A serving is 1 tablespoon jelly or jam, ½ cup sorbet, or 1 cup of lemonade. · Eat less than 2,300 milligrams (mg) of sodium a day. If you limit your sodium to 1,500 mg a day, you can lower your blood pressure even more. Tips for success · Start small. Do not try to make dramatic changes to your diet all at once. You might feel that you are missing out on your favorite foods and then be more likely to not follow the plan. Make small changes, and stick with them. Once those changes become habit, add a few more changes. · Try some of the following: ¨ Make it a goal to eat a fruit or vegetable at every meal and at snacks. This will make it easy to get the recommended amount of fruits and vegetables each day. ¨ Try yogurt topped with fruit and nuts for a snack or healthy dessert. ¨ Add lettuce, tomato, cucumber, and onion to sandwiches. ¨ Combine a ready-made pizza crust with low-fat mozzarella cheese and lots of vegetable toppings. Try using tomatoes, squash, spinach, broccoli, carrots, cauliflower, and onions. ¨ Have a variety of cut-up vegetables with a low-fat dip as an appetizer instead of chips and dip. ¨ Sprinkle sunflower seeds or chopped almonds over salads. Or try adding chopped walnuts or almonds to cooked vegetables. ¨ Try some vegetarian meals using beans and peas. Add garbanzo or kidney beans to salads. Make burritos and tacos with mashed friend beans or black beans. Where can you learn more? Go to http://costa-jessica.info/. Enter F533 in the search box to learn more about \"DASH Diet: Care Instructions. \" Current as of: September 21, 2016 Content Version: 11.4 © 7361-1533 [employer].  Care instructions adapted under license by [address] (which disclaims liability or warranty for this information). If you have questions about a medical condition or this instruction, always ask your healthcare professional. [address] any warranty or liability for your use of this information. Please provide this summary of care documentation to your next provider. Your primary care clinician is listed as Arpit Dominguez. If you have any questions after today's visit, please call [phone].

## 2018-03-01 LAB
LAB DIRECTOR NAME PROVIDER: NORMAL
SERPINA1 GENE MUT ANL BLD/T: NORMAL
SERPINA1 GENE MUT TESTED BLD/T: NORMAL

## 2018-03-02 ENCOUNTER — APPOINTMENT (OUTPATIENT)
Dept: GENERAL RADIOLOGY | Age: 48
End: 2018-03-02
Attending: EMERGENCY MEDICINE
Payer: MEDICAID

## 2018-03-02 ENCOUNTER — HOSPITAL ENCOUNTER (EMERGENCY)
Age: 48
Discharge: HOME | End: 2018-03-02
Attending: EMERGENCY MEDICINE
Payer: MEDICAID

## 2018-03-02 VITALS
HEART RATE: 97 BPM | WEIGHT: 180 LBS | RESPIRATION RATE: 16 BRPM | SYSTOLIC BLOOD PRESSURE: 139 MMHG | DIASTOLIC BLOOD PRESSURE: 102 MMHG | BODY MASS INDEX: 26.66 KG/M2 | TEMPERATURE: 97.9 F | OXYGEN SATURATION: 95 % | HEIGHT: 69 IN

## 2018-03-02 DIAGNOSIS — R06.02 SOB (SHORTNESS OF BREATH): Primary | ICD-10-CM

## 2018-03-02 LAB
ALBUMIN SERPL-MCNC: 4 G/DL (ref 3.4–5)
ALBUMIN/GLOB SERPL: 1.3 {RATIO} (ref 0.8–1.7)
ALP SERPL-CCNC: 86 U/L (ref 45–117)
ALT SERPL-CCNC: 26 U/L (ref 16–61)
ANION GAP SERPL CALC-SCNC: 6 MMOL/L (ref 3–18)
AST SERPL-CCNC: 9 U/L (ref 15–37)
ATRIAL RATE: 82 BPM
BASOPHILS # BLD: 0 K/UL (ref 0–0.1)
BASOPHILS NFR BLD: 0 % (ref 0–2)
BILIRUB SERPL-MCNC: 1.6 MG/DL (ref 0.2–1)
BNP SERPL-MCNC: 23 PG/ML (ref 0–450)
BUN SERPL-MCNC: 10 MG/DL (ref 7–18)
BUN/CREAT SERPL: 13 (ref 12–20)
CALCIUM SERPL-MCNC: 8.6 MG/DL (ref 8.5–10.1)
CALCULATED P AXIS, ECG09: 38 DEGREES
CALCULATED R AXIS, ECG10: 44 DEGREES
CALCULATED T AXIS, ECG11: 36 DEGREES
CHLORIDE SERPL-SCNC: 105 MMOL/L (ref 100–108)
CK MB CFR SERPL CALC: NORMAL % (ref 0–4)
CK MB CFR SERPL CALC: NORMAL % (ref 0–4)
CK MB SERPL-MCNC: <1 NG/ML (ref 5–25)
CK MB SERPL-MCNC: <1 NG/ML (ref 5–25)
CK SERPL-CCNC: 47 U/L (ref 39–308)
CK SERPL-CCNC: 48 U/L (ref 39–308)
CO2 SERPL-SCNC: 29 MMOL/L (ref 21–32)
CREAT SERPL-MCNC: 0.78 MG/DL (ref 0.6–1.3)
D DIMER PPP FEU-MCNC: <0.27 UG/ML(FEU)
DIAGNOSIS, 93000: NORMAL
DIFFERENTIAL METHOD BLD: ABNORMAL
EOSINOPHIL # BLD: 0.1 K/UL (ref 0–0.4)
EOSINOPHIL NFR BLD: 1 % (ref 0–5)
ERYTHROCYTE [DISTWIDTH] IN BLOOD BY AUTOMATED COUNT: 13.5 % (ref 11.6–14.5)
GLOBULIN SER CALC-MCNC: 3.2 G/DL (ref 2–4)
GLUCOSE SERPL-MCNC: 102 MG/DL (ref 74–99)
HCT VFR BLD AUTO: 39.9 % (ref 36–48)
HGB BLD-MCNC: 14.6 G/DL (ref 13–16)
LYMPHOCYTES # BLD: 2 K/UL (ref 0.9–3.6)
LYMPHOCYTES NFR BLD: 30 % (ref 21–52)
MCH RBC QN AUTO: 32.2 PG (ref 24–34)
MCHC RBC AUTO-ENTMCNC: 36.6 G/DL (ref 31–37)
MCV RBC AUTO: 87.9 FL (ref 74–97)
MONOCYTES # BLD: 0.5 K/UL (ref 0.05–1.2)
MONOCYTES NFR BLD: 7 % (ref 3–10)
NEUTS SEG # BLD: 4.1 K/UL (ref 1.8–8)
NEUTS SEG NFR BLD: 62 % (ref 40–73)
P-R INTERVAL, ECG05: 152 MS
PLATELET # BLD AUTO: 216 K/UL (ref 135–420)
PLATELET COMMENTS,PCOM: ABNORMAL
PMV BLD AUTO: 10.2 FL (ref 9.2–11.8)
POTASSIUM SERPL-SCNC: 3.2 MMOL/L (ref 3.5–5.5)
PROT SERPL-MCNC: 7.2 G/DL (ref 6.4–8.2)
Q-T INTERVAL, ECG07: 370 MS
QRS DURATION, ECG06: 84 MS
QTC CALCULATION (BEZET), ECG08: 432 MS
RBC # BLD AUTO: 4.54 M/UL (ref 4.7–5.5)
RBC MORPH BLD: ABNORMAL
RBC MORPH BLD: ABNORMAL
SODIUM SERPL-SCNC: 140 MMOL/L (ref 136–145)
TROPONIN I SERPL-MCNC: <0.02 NG/ML (ref 0–0.04)
TROPONIN I SERPL-MCNC: <0.02 NG/ML (ref 0–0.04)
VENTRICULAR RATE, ECG03: 82 BPM
WBC # BLD AUTO: 6.7 K/UL (ref 4.6–13.2)

## 2018-03-02 PROCEDURE — 71045 X-RAY EXAM CHEST 1 VIEW: CPT

## 2018-03-02 PROCEDURE — 74011250637 HC RX REV CODE- 250/637: Performed by: EMERGENCY MEDICINE

## 2018-03-02 PROCEDURE — 85025 COMPLETE CBC W/AUTO DIFF WBC: CPT | Performed by: EMERGENCY MEDICINE

## 2018-03-02 PROCEDURE — 82550 ASSAY OF CK (CPK): CPT | Performed by: EMERGENCY MEDICINE

## 2018-03-02 PROCEDURE — 93005 ELECTROCARDIOGRAM TRACING: CPT

## 2018-03-02 PROCEDURE — 83880 ASSAY OF NATRIURETIC PEPTIDE: CPT | Performed by: EMERGENCY MEDICINE

## 2018-03-02 PROCEDURE — 85379 FIBRIN DEGRADATION QUANT: CPT | Performed by: EMERGENCY MEDICINE

## 2018-03-02 PROCEDURE — 80053 COMPREHEN METABOLIC PANEL: CPT | Performed by: EMERGENCY MEDICINE

## 2018-03-02 PROCEDURE — 99285 EMERGENCY DEPT VISIT HI MDM: CPT

## 2018-03-02 RX ORDER — LORAZEPAM 0.5 MG/1
0.5 TABLET ORAL
Status: COMPLETED | OUTPATIENT
Start: 2018-03-02 | End: 2018-03-02

## 2018-03-02 RX ADMIN — LORAZEPAM 0.5 MG: 0.5 TABLET ORAL at 18:53

## 2018-03-02 NOTE — ED NOTES
Assumed care of patient. Pt comes by medic from home after walking a short distance and having sudden onset of SOB. Pt also states he began having a panic attack when he felt like he couldn't breathe. Pt speaks in complete sentences. Skin is warm and dry. Pt is anxious appearing.

## 2018-03-02 NOTE — ED PROVIDER NOTES
EMERGENCY DEPARTMENT HISTORY AND PHYSICAL EXAM    3:55 PM      Date: 3/2/2018  Patient Name: Sushant Treadwell Sr.    History of Presenting Illness     Chief Complaint   Patient presents with    Shortness of Breath         History Provided By: Patient    Additional History (Context): Sushant Treadwell Sr. is a 49 y.o. male with hypertension and COPD who presents with 30 minutes of acute onset shortness of breath along with a panic attack that was helped some by Ativan and Lorazepam and 10/10 aching back pain. Pt states he was feeling today until he tried to move from the kitchen table to the living room couch, when he became short of breath and started having a panic attack. Pt states he has started having panic attacks after he was hospitalized in December for respiratory failure. PCP: Richard Mensah NP    Chief Complaint: shortness of breath, back pain, panic attack  Duration:30  Minutes  Timing:  Acute  Location: back  Quality: Aching  Severity: 10 out of 10  Modifying Factors: help some by Ativan and Lorazepam   Associated Symptoms: denies any other associated signs or symptoms      Current Outpatient Prescriptions   Medication Sig Dispense Refill    mirtazapine (REMERON) 7.5 mg tablet Take 1 Tab by mouth nightly. 90 Tab 1    citalopram (CELEXA) 20 mg tablet Take 1 Tab by mouth daily. 90 Tab 1    LORazepam (ATIVAN) 0.5 mg tablet Take 1 Tab by mouth every eight (8) hours as needed for Anxiety. Max Daily Amount: 1.5 mg. 30 Tab 0    Nebulizer & Compressor machine 1 Each by Does Not Apply route every four (4) hours as needed. 1 Each 0    albuterol (PROVENTIL VENTOLIN) 2.5 mg /3 mL (0.083 %) nebulizer solution Nebulized 1 vial for inhalation every 4 hours as needed 60 Each 3    OXYGEN-AIR DELIVERY SYSTEMS (WALKABOUT 1 OXYGEN SYSTEM) 2.5 L/min by Nasal route continuous.  amLODIPine (NORVASC) 10 mg tablet Take 1 Tab by mouth daily.  Indications: hypertension 30 Tab 1    albuterol (PROVENTIL HFA, VENTOLIN HFA, PROAIR HFA) 90 mcg/actuation inhaler Take 2 Puffs by inhalation every four (4) hours as needed for Wheezing or Shortness of Breath. 1 Inhaler 3    pantoprazole (PROTONIX) 40 mg tablet Take 1 Tab by mouth Daily (before breakfast). 30 Tab 0    roflumilast (DALIRESP) tab tablet Take 1 Tab by mouth daily. Indications: PREVENTION OF BRONCHOSPASM WITH CHRONIC BRONCHITIS 30 Tab 0    tiotropium (SPIRIVA) 18 mcg inhalation capsule Take 1 Cap by inhalation daily. Indications: BRONCHOSPASM PREVENTION WITH COPD 30 Cap 0    fluticasone-salmeterol (ADVAIR) 500-50 mcg/dose diskus inhaler Take 1 Puff by inhalation two (2) times a day. 1 Each 3       Past History     Past Medical History:  Past Medical History:   Diagnosis Date    Chronic lung disease     Chronic obstructive pulmonary disease ([address] 08/03/2017    PFTS 8/3/17    COPD, severe ([address]     Emphysema/COPD ([address]     Esophagitis 12/11/2017    Endoscopy    Essential hypertension, benign     History of stomach ulcers     Positive urine drug screen 01/2016    opiates and THC    Upper GI bleed        Past Surgical History:  Past Surgical History:   Procedure Laterality Date    HX ENDOSCOPY  12/11/2017       Family History:  Family History   Problem Relation Age of Onset    Hypertension Mother     Heart Disease Mother     Diabetes Mother     Hypertension Father     Heart Disease Father     Cancer Father      lymphnodes    Diabetes Father        Social History:  Social History   Substance Use Topics    Smoking status: Former Smoker     Packs/day: 2.00     Years: 25.00     Types: Cigarettes     Start date: 5/28/1984     Quit date: 12/18/2017    Smokeless tobacco: Never Used    Alcohol use No       Allergies:  No Known Allergies      Review of Systems     Review of Systems   Respiratory: Positive for shortness of breath. Musculoskeletal: Positive for gait problem. All other systems reviewed and are negative.         Physical Exam   There were no vitals taken for this visit. Physical Exam   Constitutional: He is oriented to person, place, and time. He appears well-developed. HENT:   Head: Normocephalic and atraumatic. Eyes: EOM are normal. Pupils are equal, round, and reactive to light. Neck: Normal range of motion. Neck supple. Cardiovascular: Normal rate, regular rhythm and normal heart sounds. Exam reveals no friction rub. No murmur heard. Pulmonary/Chest: Effort normal and breath sounds normal. No respiratory distress. He has no wheezes. Abdominal: Soft. He exhibits no distension. There is no tenderness. There is no rebound and no guarding. Musculoskeletal: Normal range of motion. Neurological: He is alert and oriented to person, place, and time. Skin: Skin is warm and dry. Psychiatric: He has a normal mood and affect. His behavior is normal. Thought content normal.         Diagnostic Study Results         Medical Decision Making     Ekg; nr at 82; nl axis. Nl int. No shiva/d. No hpyertrophy    1 shortness of breath 19-year-old male history of COPD on 2 L of oxygen via shortness of breath. No real wheezing on exam.  Patient's sats are good. He is very anxious and has a history of anxiety. He states no one has evaluated him. We will check. Dimer checked in dec. Will not repeat. Trop neg x2 will d/c     No wheeze on exam; sats baseline. Diagnosis     No diagnosis found. _______________________________    Attestations:  Scribe [address] [place] [address] acting as a scribe for and in the presence of Vita Frost MD      March 02, 2018 at 3:55 PM       Provider Attestation:      I personally performed the services described in the documentation, reviewed the documentation, as recorded by the scribe in my presence, and it accurately and completely records my words and actions.  March 02, 2018 at 3:55 PM - Vita Frost MD    _______________________________

## 2018-03-03 NOTE — DISCHARGE INSTRUCTIONS
Shortness of Breath: Care Instructions  Your Care Instructions  Shortness of breath has many causes. Sometimes conditions such as anxiety can lead to shortness of breath. Some people get mild shortness of breath when they exercise. Trouble breathing also can be a symptom of a serious problem, such as asthma, lung disease, emphysema, heart problems, and pneumonia. If your shortness of breath continues, you may need tests and treatment. Watch for any changes in your breathing and other symptoms. Follow-up care is a key part of your treatment and safety. Be sure to make and go to all appointments, and call your doctor if you are having problems. It's also a good idea to know your test results and keep a list of the medicines you take. How can you care for yourself at home? · Do not smoke or allow others to smoke around you. If you need help quitting, talk to your doctor about stop-smoking programs and medicines. These can increase your chances of quitting for good. · Get plenty of rest and sleep. · Take your medicines exactly as prescribed. Call your doctor if you think you are having a problem with your medicine. · Find healthy ways to deal with stress. ¨ Exercise daily. ¨ Get plenty of sleep. ¨ Eat regularly and well. When should you call for help? Call 911 anytime you think you may need emergency care. For example, call if:  ? · You have severe shortness of breath. ? · You have symptoms of a heart attack. These may include:  ¨ Chest pain or pressure, or a strange feeling in the chest.  ¨ Sweating. ¨ Shortness of breath. ¨ Nausea or vomiting. ¨ Pain, pressure, or a strange feeling in the back, neck, jaw, or upper belly or in one or both shoulders or arms. ¨ Lightheadedness or sudden weakness. ¨ A fast or irregular heartbeat. After you call 911, the  may tell you to chew 1 adult-strength or 2 to 4 low-dose aspirin. Wait for an ambulance. Do not try to drive yourself.    ?Call your doctor now or seek immediate medical care if:  ? · Your shortness of breath gets worse or you start to wheeze. Wheezing is a high-pitched sound when you breathe. ? · You wake up at night out of breath or have to prop your head up on several pillows to breathe. ? · You are short of breath after only light activity or while at rest.   ? Watch closely for changes in your health, and be sure to contact your doctor if:  ? · You do not get better over the next 1 to 2 days. Where can you learn more? Go to http://costa-jessica.info/. Enter S780 in the search box to learn more about \"Shortness of Breath: Care Instructions. \"  Current as of: May 12, 2017  Content Version: 11.4  © 2815-4172 [employer]. Care instructions adapted under license by [employer] (which disclaims liability or warranty for this information). If you have questions about a medical condition or this instruction, always ask your healthcare professional. [address] any warranty or liability for your use of this information.

## 2018-03-05 ENCOUNTER — OFFICE VISIT (OUTPATIENT)
Dept: CARDIOLOGY CLINIC | Age: 48
End: 2018-03-05

## 2018-03-05 VITALS
OXYGEN SATURATION: 97 % | DIASTOLIC BLOOD PRESSURE: 86 MMHG | BODY MASS INDEX: 27.55 KG/M2 | WEIGHT: 186 LBS | HEART RATE: 97 BPM | HEIGHT: 69 IN | SYSTOLIC BLOOD PRESSURE: 130 MMHG

## 2018-03-05 DIAGNOSIS — I50.32 DIASTOLIC CHF, CHRONIC (HCC): Primary | ICD-10-CM

## 2018-03-05 DIAGNOSIS — Z99.81 OXYGEN DEPENDENT: ICD-10-CM

## 2018-03-05 DIAGNOSIS — I50.9 CHF WITH UNKNOWN LVEF (HCC): ICD-10-CM

## 2018-03-05 DIAGNOSIS — R06.00 DYSPNEA, UNSPECIFIED TYPE: ICD-10-CM

## 2018-03-05 DIAGNOSIS — R00.2 PALPITATIONS: ICD-10-CM

## 2018-03-05 DIAGNOSIS — J44.9 COPD, SEVERE (HCC): ICD-10-CM

## 2018-03-05 RX ORDER — FUROSEMIDE 20 MG/1
20 TABLET ORAL EVERY OTHER DAY
Qty: 15 TAB | Refills: 3 | Status: SHIPPED | OUTPATIENT
Start: 2018-03-05 | End: 2018-06-25 | Stop reason: SDUPTHER

## 2018-03-05 NOTE — MR AVS SNAPSHOT
[address] [address] [address] [address] [phone] Patient: Tiffanie Ruelas Sr. MRN: [id number] :1970 Visit Information Date & Time Provider Department Dept. Phone Encounter #  
 3/5/2018  2:20 PM Jacque Albert MD [facility] [place] [id number] [phone] Follow-up Instructions Follow-up and Disposition History Your Appointments 3/28/2018  9:45 AM  
ROUTINE CARE with Gideon Proctor NP [facility] ([address] [place] [address]) Appt Note: 4 week f/u appt. 14 [facility] 1 [facility] [address]  
[phone]  
  
   
 [address] Upcoming Health Maintenance Date Due DTaP/Tdap/Td series (1 - Tdap) 10/27/1990 Allergies as of 3/5/2018  Review Complete On: 3/5/2018 By: Jacque Albert MD  
 No Known Allergies Current Immunizations  Never Reviewed Name Date Influenza Vaccine 10/10/2011 12:00 AM  
 Influenza Vaccine (Quad) PF 10/23/2017  1:04 PM  
 Pneumococcal Polysaccharide (PPSV-23) 2018 10:10 AM, 10/10/2011 12:00 AM  
  
 Not reviewed this visit You Were Diagnosed With   
  
 Codes Comments Diastolic CHF, chronic (HCC)    -  Primary ICD-10-CM: I50.32 
ICD-9-CM: 428.32, 428.0 CHF with unknown LVEF ([facility]     ICD-10-CM: I50.9 ICD-9-CM: 428.0   
 COPD, severe ([facility]     ICD-10-CM: J44.9 ICD-9-CM: 047 Oxygen dependent     ICD-10-CM: Z99.81 ICD-9-CM: V46.2 Dyspnea, unspecified type     ICD-10-CM: R06.00 
ICD-9-CM: 786.09 Palpitations     ICD-10-CM: R00.2 ICD-9-CM: 785.1 Vitals BP Pulse Height(growth percentile) Weight(growth percentile) SpO2 BMI  
 130/86 97 5' 9\" (1.753 m) 186 lb (84.4 kg) 97% 27.47 kg/m2 Smoking Status Former Smoker Vitals History BMI and BSA Data Body Mass Index Body Surface Area 27.47 kg/m 2 2.03 m 2 Preferred Pharmacy Pharmacy Name Phone [address] [place] [address]. [phone] Your Updated Medication List  
  
   
This list is accurate as of 3/5/18  2:51 PM.  Always use your most recent med list.  
  
  
  
  
 * albuterol 90 mcg/actuation inhaler Commonly known as:  PROVENTIL HFA, VENTOLIN HFA, PROAIR HFA Take 2 Puffs by inhalation every four (4) hours as needed for Wheezing or Shortness of Breath. * albuterol 2.5 mg /3 mL (0.083 %) nebulizer solution Commonly known as:  PROVENTIL VENTOLIN Nebulized 1 vial for inhalation every 4 hours as needed  
  
 amLODIPine 10 mg tablet Commonly known as:  Arva Brazen Take 1 Tab by mouth daily. Indications: hypertension  
  
 citalopram 20 mg tablet Commonly known as:  Scot Barer Take 1 Tab by mouth daily. fluticasone-salmeterol 500-50 mcg/dose diskus inhaler Commonly known as:  ADVAIR Take 1 Puff by inhalation two (2) times a day. furosemide 20 mg tablet Commonly known as:  LASIX Take 1 Tab by mouth every other day. LORazepam 0.5 mg tablet Commonly known as:  ATIVAN Take 1 Tab by mouth every eight (8) hours as needed for Anxiety. Max Daily Amount: 1.5 mg.  
  
 mirtazapine 7.5 mg tablet Commonly known as:  Stew Greek Take 1 Tab by mouth nightly. Nebulizer & Compressor machine 1 Each by Does Not Apply route every four (4) hours as needed. pantoprazole 40 mg tablet Commonly known as:  PROTONIX Take 1 Tab by mouth Daily (before breakfast). roflumilast Tab tablet Commonly known as:  Marleni Uatsdin Take 1 Tab by mouth daily. Indications: PREVENTION OF BRONCHOSPASM WITH CHRONIC BRONCHITIS  
  
 tiotropium 18 mcg inhalation capsule Commonly known as:  Kavita Roin Take 1 Cap by inhalation daily. Indications: BRONCHOSPASM PREVENTION WITH COPD  
  
 WALKABOUT 1 OXYGEN SYSTEM  
2.5 L/min by Nasal route continuous. * Notice: This list has 2 medication(s) that are the same as other medications prescribed for you. Read the directions carefully, and ask your doctor or other care provider to review them with you. Prescriptions Sent to Pharmacy Refills  
 furosemide (LASIX) 20 mg tablet 3 Sig: Take 1 Tab by mouth every other day. Class: Normal  
 Pharmacy: [facility] [address] LEDEZMA [id number] .  #: [phone] Route: Oral  
  
We Performed the Following AMB POC EKG ROUTINE W/ 12 LEADS, INTER & REP [23991 CPT(R)] ECG EVENT RECORD MONITORING SET-UP [id number] CPT(R)] Comments:  
 3 weeks To-Do List   
 03/07/2018 10:00 AM  
  Appointment with [facility] 1 at [facility] ([phone]) DAY PRIOR TO EXAM: 1. You can eat anything that you like the day prior to the exam as long as it does not give you gas. Please refrain from carbonated beverages after noon the day before the exam.  DAY OF THE EXAM: 1. DO NOT EAT OR DRINK ANYTHING!! This includes medications. Please bring your medications with you and you can take them after the examination. If you are diabetic, then bring a breakfast meal.  You will be able to eat it following the examination. Patient may hand-carry an order or the physician can fax a written prescription to Central Sched[facility] @ [phone]. Please report to the main location @ [place] [phone] at least 15 minutes prior to your appointment time. The  is located on the RIGHT side of the street, immediately adjacent to the Emergency Room. 03/19/2018 Lab:  METABOLIC PANEL, BASIC Please provide this summary of care documentation to your next provider. Your primary care clinician is listed as Luis Ibarra. If you have any questions after today's visit, please call [phone].

## 2018-03-05 NOTE — PROGRESS NOTES
History of Present Illness:  A 49 y.o. man referred for dyspnea and concern for congestive heart failure. He was in the hospital in December as well as again in January. He has had pneumonia as well as diagnosis of COPD. He tells me about 10 years ago he was diagnosed with COPD that was mild, but continued to smoke and now is quite severe. He is followed by Dr. Jericho Reyna and on 2 liters nasal canula. He is taking his inhalers. He is accompanied by his wife. He has chronic atypical chest pain, nonexertional in nature. There was concern for an element of diastolic heart failure in the hospital in January and he did take a short course of diuretics. He has some occasional edema as well as abdominal bloating. No syncope. He also is prone to panic attacks. He notes palpitations with heart rate increasing to 120-130 beats per minute. Impression/Plan:   Chronic atypical chest pain. Severe COPD now on 2 liters nasal canula. Recent pneumonia, status post antibiotics and on inhalers. Hypertension. Previous tobacco use now discontinued. Palpitations of unclear etiology. Panic attacks. Chronic diastolic heart failure by echocardiogram January 2018 with normal function but he did have mild LVH. At this point, I am going to start him on Lasix 20 mg every other day. I have asked him to monitor his salt intake and restrict to 2 grams a day. His blood pressure is reasonable but still elevated and Lasix should help hopefully. I will also obtain an event monitor for a few weeks to make sure he is not having any atrial arrhythmias especially given his underlying lung disease for which he is at risk for. He continues to control his panic attacks. I will check a BMP in one month as well and see him back after initiation of diuretics. He will continue follow up with pulmonary. All questions answered.       Past Medical History:   Diagnosis Date    Chronic lung disease     Chronic obstructive pulmonary disease ([facility] 08/03/2017    PFTS 8/3/17    COPD, severe (HCC)     Emphysema/COPD (HCC)     Esophagitis 12/11/2017    Endoscopy    Essential hypertension, benign     History of stomach ulcers     Positive urine drug screen 01/2016    opiates and THC    Upper GI bleed        Current Outpatient Prescriptions   Medication Sig Dispense Refill    mirtazapine (REMERON) 7.5 mg tablet Take 1 Tab by mouth nightly. 90 Tab 1    citalopram (CELEXA) 20 mg tablet Take 1 Tab by mouth daily. 90 Tab 1    LORazepam (ATIVAN) 0.5 mg tablet Take 1 Tab by mouth every eight (8) hours as needed for Anxiety. Max Daily Amount: 1.5 mg. 30 Tab 0    Nebulizer & Compressor machine 1 Each by Does Not Apply route every four (4) hours as needed. 1 Each 0    albuterol (PROVENTIL VENTOLIN) 2.5 mg /3 mL (0.083 %) nebulizer solution Nebulized 1 vial for inhalation every 4 hours as needed 60 Each 3    OXYGEN-AIR DELIVERY SYSTEMS (WALKABOUT 1 OXYGEN SYSTEM) 2.5 L/min by Nasal route continuous.  amLODIPine (NORVASC) 10 mg tablet Take 1 Tab by mouth daily. Indications: hypertension 30 Tab 1    albuterol (PROVENTIL HFA, VENTOLIN HFA, PROAIR HFA) 90 mcg/actuation inhaler Take 2 Puffs by inhalation every four (4) hours as needed for Wheezing or Shortness of Breath. 1 Inhaler 3    pantoprazole (PROTONIX) 40 mg tablet Take 1 Tab by mouth Daily (before breakfast). 30 Tab 0    roflumilast (DALIRESP) tab tablet Take 1 Tab by mouth daily. Indications: PREVENTION OF BRONCHOSPASM WITH CHRONIC BRONCHITIS 30 Tab 0    tiotropium (SPIRIVA) 18 mcg inhalation capsule Take 1 Cap by inhalation daily. Indications: BRONCHOSPASM PREVENTION WITH COPD 30 Cap 0    fluticasone-salmeterol (ADVAIR) 500-50 mcg/dose diskus inhaler Take 1 Puff by inhalation two (2) times a day. 1 Each 3       Social History   reports that he quit smoking about 2 months ago. His smoking use included Cigarettes. He started smoking about 33 years ago. He has a 50.00 pack-year smoking history.  He has never used smokeless tobacco.   reports that he does not drink alcohol. Family History  family history includes Cancer in his father; Diabetes in his father and mother; Heart Disease in his father and mother; Hypertension in his father and mother. Review of Systems  Except as stated above include:  Constitutional: Negative for fever, chills and malaise/fatigue. HEENT: No congestion or recent URI. Gastrointestinal: No nausea, vomiting, abdominal pain, bloody stools. Pulmonary:  Negative except as stated above. Cardiac:  Negative except as stated above. Musculoskeletal: Negative except as stated above. Neurological:  No localized symptoms. Skin:  Negative except as stated above. Psych:  Negative except as stated above. Endocrine:  Negative except as stated above. PHYSICAL EXAM  BP Readings from Last 3 Encounters:   03/05/18 130/86   03/02/18 (!) 139/102   02/28/18 119/84     Pulse Readings from Last 3 Encounters:   03/05/18 97   03/02/18 97   02/28/18 83     Wt Readings from Last 3 Encounters:   03/05/18 84.4 kg (186 lb)   03/02/18 81.6 kg (180 lb)   02/28/18 81.6 kg (180 lb)     General:   Well developed, well groomed. On oxygen. Head/Neck:   No jugular venous distention     No carotid bruits. No evidence of xanthelasma. Lungs:   No respiratory distress. Scattered wheeze  Heart:    Regular rate and rhythm. Normal S1/S2. Palpation of heart with normal point of maximum impulse. No significant murmurs, rubs or gallops. Abdomen:   Soft and nontender. No palpable abdominal mass or bruits. Extremities:   Intact peripheral pulses. No significant edema. Neurological:   Alert and oriented to person, place, time. No focal neurological deficit visually. Skin:   No obvious rash    Blood Pressure Metric:  Kailey Hong has been given the following recommendations today due to his elevated BP reading: anti-hypertensive pharmacologic therapy ordered.

## 2018-03-05 NOTE — PROGRESS NOTES
1. Have you been to the ER, urgent care clinic since your last visit? Hospitalized since your last visit? No     2. Have you seen or consulted any other health care providers outside of the [address] [place] [address] since your last visit? Include any pap smears or colon screening.  No

## 2018-03-07 ENCOUNTER — HOSPITAL ENCOUNTER (OUTPATIENT)
Dept: VASCULAR SURGERY | Age: 48
Discharge: HOME | End: 2018-03-07
Attending: NURSE PRACTITIONER
Payer: MEDICAID

## 2018-03-07 DIAGNOSIS — R16.0 ENLARGED LIVER: ICD-10-CM

## 2018-03-07 DIAGNOSIS — R93.89 ABNORMAL CT SCAN: ICD-10-CM

## 2018-03-07 DIAGNOSIS — K76.0 FATTY LIVER: ICD-10-CM

## 2018-03-07 PROCEDURE — 93975 VASCULAR STUDY: CPT

## 2018-03-07 NOTE — PROCEDURES
[facility]  *** FINAL REPORT ***    Name: Nazario Castro  MRN: [id number]    Outpatient  : 27 Oct 1970  HIS Order #: [id number]  [facility] Visit #: [id number]  Date: 07 Mar 2018    TYPE OF TEST: Visceral Arterial Duplex    REASON FOR TEST    Aortic PSV:  61.0 cm/s  Diameter AP: 1.5 cm   TV: 1.5 cm    Mesenteric:-                  Prox   Mid   Dist Ratio Stenosis          Aneurysm                  ----- ----- ----- ----- ----------------- ------------  SMA:            151.0 128.0  80.0  2.5 Normal  Celiac:         175.0               2.9 Normal  Hepatic:  Splenic:  ANCELMO:  :    INTERPRETATION/FINDINGS  Duplex images were obtained using 2-D gray scale, color flow, and  spectral Doppler analysis. MESENTERIC:  1. No significant stenosis identified in the superior mesenteric  artery. 2. No significant stenosis identified in the celiac artery. 3. No evidence of aneurysm noted in the abdominal aorta. ADDITIONAL COMMENTS  Technically difficult study due to patient body habiatus. I have personally reviewed the data relevant to the interpretation of  this  study. TECHNOLOGIST: Emmanuel Goodwin, [facility], RVT/  Signed: 2018 10:35 AM    PHYSICIAN: Jason Yuan.  Nikolai Ugarte MD  Signed: 2018 02:19 PM

## 2018-03-08 LAB
CHOLEST EST DBS-CCNC: 0.11
LAB DIRECTOR NAME PROVIDER: NORMAL
MEDICAL DIRECTOR REVIEW: NORMAL
REF LAB TEST METHOD: NORMAL
SPECIMEN SOURCE: NORMAL

## 2018-03-09 ENCOUNTER — TELEPHONE (OUTPATIENT)
Dept: FAMILY MEDICINE CLINIC | Facility: CLINIC | Age: 48
End: 2018-03-09

## 2018-03-09 NOTE — TELEPHONE ENCOUNTER
----- Message from Allen Carlos MD sent at 3/9/2018 10:04 AM CST -----  Please advise patient of normal results     [facility] advised unable to fill form out for additional hrs

## 2018-03-09 NOTE — TELEPHONE ENCOUNTER
Patient spouse, Parisa Ferro called and stated that the patient had taken one Ativan 0.5 mg @ [phone], but an hour later her is still experiencing anxiety. Spouse want to to if it okay to given him another Ativan. Spouse was informed per patient pcp that it was okay to given patient another Ativan, but to make sure patient is compliant with taking his Celexa 20 mg daily.

## 2018-03-19 ENCOUNTER — APPOINTMENT (OUTPATIENT)
Dept: GENERAL RADIOLOGY | Age: 48
End: 2018-03-19
Attending: EMERGENCY MEDICINE
Payer: MEDICAID

## 2018-03-19 ENCOUNTER — HOSPITAL ENCOUNTER (EMERGENCY)
Age: 48
Discharge: HOME | End: 2018-03-19
Attending: EMERGENCY MEDICINE | Admitting: EMERGENCY MEDICINE
Payer: MEDICAID

## 2018-03-19 VITALS
RESPIRATION RATE: 18 BRPM | HEART RATE: 71 BPM | SYSTOLIC BLOOD PRESSURE: 127 MMHG | DIASTOLIC BLOOD PRESSURE: 78 MMHG | OXYGEN SATURATION: 94 % | TEMPERATURE: 98.5 F

## 2018-03-19 DIAGNOSIS — R06.02 SOB (SHORTNESS OF BREATH): Primary | ICD-10-CM

## 2018-03-19 DIAGNOSIS — F41.1 ANXIETY STATE: ICD-10-CM

## 2018-03-19 LAB
ALBUMIN SERPL-MCNC: 4 G/DL (ref 3.4–5)
ALBUMIN/GLOB SERPL: 1.2 {RATIO} (ref 0.8–1.7)
ALP SERPL-CCNC: 104 U/L (ref 45–117)
ALT SERPL-CCNC: 34 U/L (ref 16–61)
ANION GAP SERPL CALC-SCNC: 8 MMOL/L (ref 3–18)
AST SERPL-CCNC: 16 U/L (ref 15–37)
ATRIAL RATE: 71 BPM
BASOPHILS # BLD: 0 K/UL (ref 0–0.1)
BASOPHILS NFR BLD: 0 % (ref 0–2)
BILIRUB SERPL-MCNC: 2.2 MG/DL (ref 0.2–1)
BUN SERPL-MCNC: 11 MG/DL (ref 7–18)
BUN/CREAT SERPL: 14 (ref 12–20)
CALCIUM SERPL-MCNC: 9.2 MG/DL (ref 8.5–10.1)
CALCULATED P AXIS, ECG09: 49 DEGREES
CALCULATED R AXIS, ECG10: 50 DEGREES
CALCULATED T AXIS, ECG11: 52 DEGREES
CHLORIDE SERPL-SCNC: 103 MMOL/L (ref 100–108)
CK MB CFR SERPL CALC: NORMAL % (ref 0–4)
CK MB SERPL-MCNC: <1 NG/ML (ref 5–25)
CK SERPL-CCNC: 47 U/L (ref 39–308)
CO2 SERPL-SCNC: 27 MMOL/L (ref 21–32)
CREAT SERPL-MCNC: 0.79 MG/DL (ref 0.6–1.3)
DIAGNOSIS, 93000: NORMAL
DIFFERENTIAL METHOD BLD: ABNORMAL
EOSINOPHIL # BLD: 0.1 K/UL (ref 0–0.4)
EOSINOPHIL NFR BLD: 1 % (ref 0–5)
ERYTHROCYTE [DISTWIDTH] IN BLOOD BY AUTOMATED COUNT: 13.2 % (ref 11.6–14.5)
GLOBULIN SER CALC-MCNC: 3.3 G/DL (ref 2–4)
GLUCOSE SERPL-MCNC: 90 MG/DL (ref 74–99)
HCT VFR BLD AUTO: 41.6 % (ref 36–48)
HGB BLD-MCNC: 15.5 G/DL (ref 13–16)
LYMPHOCYTES # BLD: 1.4 K/UL (ref 0.9–3.6)
LYMPHOCYTES NFR BLD: 17 % (ref 21–52)
MAGNESIUM SERPL-MCNC: 2.3 MG/DL (ref 1.6–2.6)
MCH RBC QN AUTO: 32.6 PG (ref 24–34)
MCHC RBC AUTO-ENTMCNC: 37.3 G/DL (ref 31–37)
MCV RBC AUTO: 87.6 FL (ref 74–97)
MONOCYTES # BLD: 0.6 K/UL (ref 0.05–1.2)
MONOCYTES NFR BLD: 7 % (ref 3–10)
NEUTS SEG # BLD: 6.2 K/UL (ref 1.8–8)
NEUTS SEG NFR BLD: 75 % (ref 40–73)
P-R INTERVAL, ECG05: 158 MS
PLATELET # BLD AUTO: 210 K/UL (ref 135–420)
PMV BLD AUTO: 10 FL (ref 9.2–11.8)
POTASSIUM SERPL-SCNC: 3.8 MMOL/L (ref 3.5–5.5)
PROT SERPL-MCNC: 7.3 G/DL (ref 6.4–8.2)
Q-T INTERVAL, ECG07: 404 MS
QRS DURATION, ECG06: 76 MS
QTC CALCULATION (BEZET), ECG08: 439 MS
RBC # BLD AUTO: 4.75 M/UL (ref 4.7–5.5)
SODIUM SERPL-SCNC: 138 MMOL/L (ref 136–145)
TROPONIN I SERPL-MCNC: <0.02 NG/ML (ref 0–0.04)
VENTRICULAR RATE, ECG03: 71 BPM
WBC # BLD AUTO: 8.3 K/UL (ref 4.6–13.2)

## 2018-03-19 PROCEDURE — 83735 ASSAY OF MAGNESIUM: CPT | Performed by: EMERGENCY MEDICINE

## 2018-03-19 PROCEDURE — 71045 X-RAY EXAM CHEST 1 VIEW: CPT

## 2018-03-19 PROCEDURE — 85025 COMPLETE CBC W/AUTO DIFF WBC: CPT | Performed by: EMERGENCY MEDICINE

## 2018-03-19 PROCEDURE — 99284 EMERGENCY DEPT VISIT MOD MDM: CPT

## 2018-03-19 PROCEDURE — 80053 COMPREHEN METABOLIC PANEL: CPT | Performed by: EMERGENCY MEDICINE

## 2018-03-19 PROCEDURE — 82550 ASSAY OF CK (CPK): CPT | Performed by: EMERGENCY MEDICINE

## 2018-03-19 PROCEDURE — 93005 ELECTROCARDIOGRAM TRACING: CPT

## 2018-03-19 NOTE — ED TRIAGE NOTES
Patient arrived as code green. Patient states he was going to the laboratory per primary physician for labs to be completed when he felt short of breath.  Patient wearing holter monitor

## 2018-03-19 NOTE — ED PROVIDER NOTES
EMERGENCY DEPARTMENT HISTORY AND PHYSICAL EXAM    9:42 AM      Date: 3/19/2018  Patient Name: José Miguel Russ.    History of Presenting Illness     Chief Complaint   Patient presents with    Shortness of Breath         History Provided By: Patient    Chief Complaint: Difficulty breathing  Duration: This morning  Timing:  Acute  Location: Respiratory  Quality: N/A  Severity: N/A  Modifying Factors: Ativan, without improvement  Associated Symptoms: Chest tightness. Additional History (Context): José Miguel Doran is a 49 y.o. male with PMHx of COPD and HTN who presents to the ED as a code green with acute episode of difficulty breathing while waiting at outpatient lab office this morning. Patient believes he was having a \"panic attack\" and took 0.5 mg Ativan without improvement of sx. Admits to similar sx while watching TV yesterday, for which he took an Ativan and was sx free after an hour. Associated symptoms include chest tightness and an unchanged productive cough, secondary to being a chronic smoker. Admits he woke up at baseline this morning. Denies fever. Denies hx of MI or cardiac stent placement. He is followed by Dr. Valerie De La Garza cardiologist who placed him on a heart monitor last week. Patient is on 2.5 L of home O2, 24/7. Claims he has been sleeping with his legs elevated. Reports he had an US last week which demonstrated \"enlarged fatty liver, enlarged spleen. They're checking to see if there is a blockage. \" No other symptoms or concerns were expressed. PCP: Alonso Goetz NP    Current Outpatient Prescriptions   Medication Sig Dispense Refill    furosemide (LASIX) 20 mg tablet Take 1 Tab by mouth every other day. 15 Tab 3    mirtazapine (REMERON) 7.5 mg tablet Take 1 Tab by mouth nightly. 90 Tab 1    citalopram (CELEXA) 20 mg tablet Take 1 Tab by mouth daily. 90 Tab 1    LORazepam (ATIVAN) 0.5 mg tablet Take 1 Tab by mouth every eight (8) hours as needed for Anxiety.  Max Daily Amount: 1.5 mg. 30 Tab 0    Nebulizer & Compressor machine 1 Each by Does Not Apply route every four (4) hours as needed. 1 Each 0    albuterol (PROVENTIL VENTOLIN) 2.5 mg /3 mL (0.083 %) nebulizer solution Nebulized 1 vial for inhalation every 4 hours as needed 60 Each 3    OXYGEN-AIR DELIVERY SYSTEMS (WALKABOUT 1 OXYGEN SYSTEM) 2.5 L/min by Nasal route continuous.  amLODIPine (NORVASC) 10 mg tablet Take 1 Tab by mouth daily. Indications: hypertension 30 Tab 1    albuterol (PROVENTIL HFA, VENTOLIN HFA, PROAIR HFA) 90 mcg/actuation inhaler Take 2 Puffs by inhalation every four (4) hours as needed for Wheezing or Shortness of Breath. 1 Inhaler 3    pantoprazole (PROTONIX) 40 mg tablet Take 1 Tab by mouth Daily (before breakfast). 30 Tab 0    roflumilast (DALIRESP) tab tablet Take 1 Tab by mouth daily. Indications: PREVENTION OF BRONCHOSPASM WITH CHRONIC BRONCHITIS 30 Tab 0    tiotropium (SPIRIVA) 18 mcg inhalation capsule Take 1 Cap by inhalation daily. Indications: BRONCHOSPASM PREVENTION WITH COPD 30 Cap 0    fluticasone-salmeterol (ADVAIR) 500-50 mcg/dose diskus inhaler Take 1 Puff by inhalation two (2) times a day.  1 Each 3       Past History     Past Medical History:  Past Medical History:   Diagnosis Date    Chronic lung disease     Chronic obstructive pulmonary disease ([address] 08/03/2017    PFTS 8/3/17    COPD, severe ([address]     Emphysema/COPD ([address]     Esophagitis 12/11/2017    Endoscopy    Essential hypertension, benign     History of stomach ulcers     Positive urine drug screen 01/2016    opiates and THC    Upper GI bleed        Past Surgical History:  Past Surgical History:   Procedure Laterality Date    HX ENDOSCOPY  12/11/2017       Family History:  Family History   Problem Relation Age of Onset    Hypertension Mother     Heart Disease Mother     Diabetes Mother     Hypertension Father     Heart Disease Father     Cancer Father      lymphnodes    Diabetes Father        Social History:  Social History   Substance Use Topics    Smoking status: Former Smoker     Packs/day: 2.00     Years: 25.00     Types: Cigarettes     Start date: 5/28/1984     Quit date: 12/18/2017    Smokeless tobacco: Never Used    Alcohol use No       Allergies:  No Known Allergies      Review of Systems       Review of Systems   Constitutional: Negative for fever. Respiratory: Positive for chest tightness and shortness of breath. All other systems reviewed and are negative. Physical Exam     Visit Vitals    /78    Pulse 71    Temp 98.5 °F (36.9 °C)    Resp 18    SpO2 94%         Physical Exam   Constitutional: He is oriented to person, place, and time. He appears well-developed and well-nourished. HENT:   Head: Normocephalic and atraumatic. Neck: Neck supple. No JVD present. Cardiovascular: Normal rate and regular rhythm. Pulmonary/Chest: Effort normal and breath sounds normal. No respiratory distress. He has no wheezes. External cardiac monitor in place on central chest   Abdominal: Soft. He exhibits no distension. There is no tenderness. There is no rebound and no guarding. Musculoskeletal: He exhibits edema (1+ LE). Neurological: He is alert and oriented to person, place, and time. Skin: Skin is warm and dry. No erythema.    Psychiatric: Judgment normal.         Diagnostic Study Results     Labs -  Recent Results (from the past 12 hour(s))   EKG, 12 LEAD, INITIAL    Collection Time: 03/19/18  9:50 AM   Result Value Ref Range    Ventricular Rate 71 BPM    Atrial Rate 71 BPM    P-R Interval 158 ms    QRS Duration 76 ms    Q-T Interval 404 ms    QTC Calculation (Bezet) 439 ms    Calculated P Axis 49 degrees    Calculated R Axis 50 degrees    Calculated T Axis 52 degrees    Diagnosis       Normal sinus rhythm  Normal ECG  When compared with ECG of 02-MAR-2018 15:50,  No significant change was found     CBC WITH AUTOMATED DIFF    Collection Time: 03/19/18 10:28 AM   Result Value Ref Range    WBC 8.3 4.6 - 13.2 K/uL    RBC 4.75 4.70 - 5.50 M/uL    HGB 15.5 13.0 - 16.0 g/dL    HCT 41.6 36.0 - 48.0 %    MCV 87.6 74.0 - 97.0 FL    MCH 32.6 24.0 - 34.0 PG    MCHC 37.3 (H) 31.0 - 37.0 g/dL    RDW 13.2 11.6 - 14.5 %    PLATELET 770 530 - 668 K/uL    MPV 10.0 9.2 - 11.8 FL    NEUTROPHILS 75 (H) 40 - 73 %    LYMPHOCYTES 17 (L) 21 - 52 %    MONOCYTES 7 3 - 10 %    EOSINOPHILS 1 0 - 5 %    BASOPHILS 0 0 - 2 %    ABS. NEUTROPHILS 6.2 1.8 - 8.0 K/UL    ABS. LYMPHOCYTES 1.4 0.9 - 3.6 K/UL    ABS. MONOCYTES 0.6 0.05 - 1.2 K/UL    ABS. EOSINOPHILS 0.1 0.0 - 0.4 K/UL    ABS. BASOPHILS 0.0 0.0 - 0.1 K/UL    DF AUTOMATED     METABOLIC PANEL, COMPREHENSIVE    Collection Time: 03/19/18 10:28 AM   Result Value Ref Range    Sodium 138 136 - 145 mmol/L    Potassium 3.8 3.5 - 5.5 mmol/L    Chloride 103 100 - 108 mmol/L    CO2 27 21 - 32 mmol/L    Anion gap 8 3.0 - 18 mmol/L    Glucose 90 74 - 99 mg/dL    BUN 11 7.0 - 18 MG/DL    Creatinine 0.79 0.6 - 1.3 MG/DL    BUN/Creatinine ratio 14 12 - 20      GFR est AA >60 >60 ml/min/1.73m2    GFR est non-AA >60 >60 ml/min/1.73m2    Calcium 9.2 8.5 - 10.1 MG/DL    Bilirubin, total 2.2 (H) 0.2 - 1.0 MG/DL    ALT (SGPT) 34 16 - 61 U/L    AST (SGOT) 16 15 - 37 U/L    Alk. phosphatase 104 45 - 117 U/L    Protein, total 7.3 6.4 - 8.2 g/dL    Albumin 4.0 3.4 - 5.0 g/dL    Globulin 3.3 2.0 - 4.0 g/dL    A-G Ratio 1.2 0.8 - 1.7     CARDIAC PANEL,(CK, CKMB & TROPONIN)    Collection Time: 03/19/18 10:28 AM   Result Value Ref Range    CK 47 39 - 308 U/L    CK - MB <1.0 <3.6 ng/ml    CK-MB Index  0.0 - 4.0 %     CALCULATION NOT PERFORMED WHEN RESULT IS BELOW LINEAR LIMIT    Troponin-I, Qt. <0.02 0.0 - 0.045 NG/ML   MAGNESIUM    Collection Time: 03/19/18 10:28 AM   Result Value Ref Range    Magnesium 2.3 1.6 - 2.6 mg/dL       Radiologic Studies -   XR CHEST PORT   Final Result      CXR (Interpretation by ED Physician):  No acute process.     Medical Decision Making   I am the first provider for this patient. I reviewed the vital signs, available nursing notes, past medical history, past surgical history, family history and social history. Vital Signs-Reviewed the patient's vital signs. Pulse Oximetry Analysis -  95% on 2.5L of O2 via NC, stable    Cardiac Monitor:  Rate: 72  Rhythm:  Normal Sinus Rhythm     EKG: Interpreted by the EP. Time Interpreted: 9:50 AM   Rate: 71   Rhythm: Normal Sinus Rhythm    Interpretation: Normal axis. Normal interval. No ST changes. Records Reviewed: Nursing Notes and Old Medical Records (Time of Review: 9:42 AM)    ED Course: Progress Notes, Reevaluation, and Consults:  Consult:  Discussed care with Martha Conroy cardiology PA. Standard discussion; including history of patients chief complaint, available diagnostic results, and treatment course. Will contact her office to see if they can interrogate patient's event recorder. 10:24 AM, 3/19/2018     Consult:  Discussed care with BUSTER Boles. Standard discussion; including history of patients chief complaint, available diagnostic results, and treatment course. Reports patient has had no cardiac arrhythmia noted on device. 10:27 AM, 3/19/2018     11:33 AM  Discussed results with pt stable for dc home. Advised pcp follow up. Discussed return precautions. Provider Notes (Medical Decision Making): 48 y/o male presents with sob. Hx of copd, on o2, no new o2 requirement, ? panic attack, check basic labs, doubt pe, no prior hx, no risk factors, no sxs of DVT. Diagnosis     Clinical Impression:   1. SOB (shortness of breath)    2. Anxiety state        Disposition: Discharge.     Follow-up Information     Follow up With Details Comments Contact Info    Nallely Burr NP In 2 days  [address] [facility]  As needed, If symptoms worsen [address] [facility] [address]  [phone]           Patient's Medications   Start Taking No medications on file   Continue Taking    ALBUTEROL (PROVENTIL HFA, VENTOLIN HFA, PROAIR HFA) 90 MCG/ACTUATION INHALER    Take 2 Puffs by inhalation every four (4) hours as needed for Wheezing or Shortness of Breath. ALBUTEROL (PROVENTIL VENTOLIN) 2.5 MG /3 ML (0.083 %) NEBULIZER SOLUTION    Nebulized 1 vial for inhalation every 4 hours as needed    AMLODIPINE (NORVASC) 10 MG TABLET    Take 1 Tab by mouth daily. Indications: hypertension    CITALOPRAM (CELEXA) 20 MG TABLET    Take 1 Tab by mouth daily. FLUTICASONE-SALMETEROL (ADVAIR) 500-50 MCG/DOSE DISKUS INHALER    Take 1 Puff by inhalation two (2) times a day. FUROSEMIDE (LASIX) 20 MG TABLET    Take 1 Tab by mouth every other day. LORAZEPAM (ATIVAN) 0.5 MG TABLET    Take 1 Tab by mouth every eight (8) hours as needed for Anxiety. Max Daily Amount: 1.5 mg. MIRTAZAPINE (REMERON) 7.5 MG TABLET    Take 1 Tab by mouth nightly. NEBULIZER & COMPRESSOR MACHINE    1 Each by Does Not Apply route every four (4) hours as needed. OXYGEN-AIR DELIVERY SYSTEMS (WALKABOUT 1 OXYGEN SYSTEM)    2.5 L/min by Nasal route continuous. PANTOPRAZOLE (PROTONIX) 40 MG TABLET    Take 1 Tab by mouth Daily (before breakfast). ROFLUMILAST (DALIRESP) TAB TABLET    Take 1 Tab by mouth daily. Indications: PREVENTION OF BRONCHOSPASM WITH CHRONIC BRONCHITIS    TIOTROPIUM (SPIRIVA) 18 MCG INHALATION CAPSULE    Take 1 Cap by inhalation daily.  Indications: BRONCHOSPASM PREVENTION WITH COPD   These Medications have changed    No medications on file   Stop Taking    No medications on file     _______________________________    Attestations:  Scribe Attestation     Kris acting as a scribe for and in the presence of Rbo Cool, DO      March 19, 2018 at 9:42 AM       Provider Attestation:      I personally performed the services described in the documentation, reviewed the documentation, as recorded by the scribe in my presence, and it accurately and completely records my words and actions.  March 19, 2018 at 9:42 CODY Fabian,     _______________________________

## 2018-03-19 NOTE — DISCHARGE INSTRUCTIONS
Shortness of Breath: Care Instructions  Your Care Instructions  Shortness of breath has many causes. Sometimes conditions such as anxiety can lead to shortness of breath. Some people get mild shortness of breath when they exercise. Trouble breathing also can be a symptom of a serious problem, such as asthma, lung disease, emphysema, heart problems, and pneumonia. If your shortness of breath continues, you may need tests and treatment. Watch for any changes in your breathing and other symptoms. Follow-up care is a key part of your treatment and safety. Be sure to make and go to all appointments, and call your doctor if you are having problems. It's also a good idea to know your test results and keep a list of the medicines you take. How can you care for yourself at home? · Do not smoke or allow others to smoke around you. If you need help quitting, talk to your doctor about stop-smoking programs and medicines. These can increase your chances of quitting for good. · Get plenty of rest and sleep. · Take your medicines exactly as prescribed. Call your doctor if you think you are having a problem with your medicine. · Find healthy ways to deal with stress. ¨ Exercise daily. ¨ Get plenty of sleep. ¨ Eat regularly and well. When should you call for help? Call 911 anytime you think you may need emergency care. For example, call if:  ? · You have severe shortness of breath. ? · You have symptoms of a heart attack. These may include:  ¨ Chest pain or pressure, or a strange feeling in the chest.  ¨ Sweating. ¨ Shortness of breath. ¨ Nausea or vomiting. ¨ Pain, pressure, or a strange feeling in the back, neck, jaw, or upper belly or in one or both shoulders or arms. ¨ Lightheadedness or sudden weakness. ¨ A fast or irregular heartbeat. After you call 911, the  may tell you to chew 1 adult-strength or 2 to 4 low-dose aspirin. Wait for an ambulance. Do not try to drive yourself.    ?Call your doctor now or seek immediate medical care if:  ? · Your shortness of breath gets worse or you start to wheeze. Wheezing is a high-pitched sound when you breathe. ? · You wake up at night out of breath or have to prop your head up on several pillows to breathe. ? · You are short of breath after only light activity or while at rest.   ? Watch closely for changes in your health, and be sure to contact your doctor if:  ? · You do not get better over the next 1 to 2 days. Where can you learn more? Go to http://costa-jessica.info/. Enter S780 in the search box to learn more about \"Shortness of Breath: Care Instructions. \"  Current as of: May 12, 2017  Content Version: 11.4  © 6103-2398 [employer]. Care instructions adapted under license by [employer] (which disclaims liability or warranty for this information). If you have questions about a medical condition or this instruction, always ask your healthcare professional. [place] [address] any warranty or liability for your use of this information. Anxiety Disorder: Care Instructions  Your Care Instructions    Anxiety is a normal reaction to stress. Difficult situations can cause you to have symptoms such as sweaty palms and a nervous feeling. In an anxiety disorder, the symptoms are far more severe. Constant worry, muscle tension, trouble sleeping, nausea and diarrhea, and other symptoms can make normal daily activities difficult or impossible. These symptoms may occur for no reason, and they can affect your work, school, or social life. Medicines, counseling, and self-care can all help. Follow-up care is a key part of your treatment and safety. Be sure to make and go to all appointments, and call your doctor if you are having problems. It's also a good idea to know your test results and keep a list of the medicines you take. How can you care for yourself at home?   · Take medicines exactly as directed. Call your doctor if you think you are having a problem with your medicine. · Go to your counseling sessions and follow-up appointments. · Recognize and accept your anxiety. Then, when you are in a situation that makes you anxious, say to yourself, \"This is not an emergency. I feel uncomfortable, but I am not in danger. I can keep going even if I feel anxious. \"  · Be kind to your body:  ¨ Relieve tension with exercise or a massage. ¨ Get enough rest.  ¨ Avoid alcohol, caffeine, nicotine, and illegal drugs. They can increase your anxiety level and cause sleep problems. ¨ Learn and do relaxation techniques. See below for more about these techniques. · Engage your mind. Get out and do something you enjoy. Go to a funny movie, or take a walk or hike. Plan your day. Having too much or too little to do can make you anxious. · Keep a record of your symptoms. Discuss your fears with a good friend or family member, or join a support group for people with similar problems. Talking to others sometimes relieves stress. · Get involved in social groups, or volunteer to help others. Being alone sometimes makes things seem worse than they are. · Get at least 30 minutes of exercise on most days of the week to relieve stress. Walking is a good choice. You also may want to do other activities, such as running, swimming, cycling, or playing tennis or team sports. Relaxation techniques  Do relaxation exercises 10 to 20 minutes a day. You can play soothing, relaxing music while you do them, if you wish. · Tell others in your house that you are going to do your relaxation exercises. Ask them not to disturb you. · Find a comfortable place, away from all distractions and noise. · Lie down on your back, or sit with your back straight. · Focus on your breathing. Make it slow and steady. · Breathe in through your nose. Breathe out through either your nose or mouth.   · Breathe deeply, filling up the area between your navel and your rib cage. Breathe so that your belly goes up and down. · Do not hold your breath. · Breathe like this for 5 to 10 minutes. Notice the feeling of calmness throughout your whole body. As you continue to breathe slowly and deeply, relax by doing the following for another 5 to 10 minutes:  · Tighten and relax each muscle group in your body. You can begin at your toes and work your way up to your head. · Imagine your muscle groups relaxing and becoming heavy. · Empty your mind of all thoughts. · Let yourself relax more and more deeply. · Become aware of the state of calmness that surrounds you. · When your relaxation time is over, you can bring yourself back to alertness by moving your fingers and toes and then your hands and feet and then stretching and moving your entire body. Sometimes people fall asleep during relaxation, but they usually wake up shortly afterward. · Always give yourself time to return to full alertness before you drive a car or do anything that might cause an accident if you are not fully alert. Never play a relaxation tape while you drive a car. When should you call for help? Call 911 anytime you think you may need emergency care. For example, call if:  ? · You feel you cannot stop from hurting yourself or someone else. ? Keep the numbers for these national suicide hotlines: 3-055-169-TALK (2-247-059-458-339-5727) and 7-133-XZUBDHG (7-679-125-794.495.8591). If you or someone you know talks about suicide or feeling hopeless, get help right away. ? Watch closely for changes in your health, and be sure to contact your doctor if:  ? · You have anxiety or fear that affects your life. ? · You have symptoms of anxiety that are new or different from those you had before. Where can you learn more? Go to http://costa-jessica.info/. Enter P754 in the search box to learn more about \"Anxiety Disorder: Care Instructions. \"  Current as of:  May 12, 2017  Content Version: 11.4  © 7874-1148 [employer]. Care instructions adapted under license by [employer] (which disclaims liability or warranty for this information). If you have questions about a medical condition or this instruction, always ask your healthcare professional. [place] [address] any warranty or liability for your use of this information.

## 2018-03-28 ENCOUNTER — OFFICE VISIT (OUTPATIENT)
Dept: FAMILY MEDICINE CLINIC | Facility: CLINIC | Age: 48
End: 2018-03-28

## 2018-03-28 VITALS
TEMPERATURE: 97.3 F | OXYGEN SATURATION: 96 % | HEIGHT: 69 IN | RESPIRATION RATE: 12 BRPM | SYSTOLIC BLOOD PRESSURE: 119 MMHG | BODY MASS INDEX: 28.14 KG/M2 | DIASTOLIC BLOOD PRESSURE: 77 MMHG | HEART RATE: 92 BPM | WEIGHT: 190 LBS

## 2018-03-28 DIAGNOSIS — F41.1 GAD (GENERALIZED ANXIETY DISORDER): ICD-10-CM

## 2018-03-28 DIAGNOSIS — I10 ESSENTIAL HYPERTENSION, BENIGN: ICD-10-CM

## 2018-03-28 DIAGNOSIS — J44.9 COPD, SEVERE (HCC): Primary | ICD-10-CM

## 2018-03-28 RX ORDER — CITALOPRAM 40 MG/1
40 TABLET, FILM COATED ORAL DAILY
Qty: 30 TAB | Refills: 4 | Status: SHIPPED | OUTPATIENT
Start: 2018-03-28 | End: 2018-08-24 | Stop reason: SDUPTHER

## 2018-03-28 RX ORDER — LORAZEPAM 1 MG/1
1 TABLET ORAL
Qty: 90 TAB | Refills: 0 | Status: SHIPPED | OUTPATIENT
Start: 2018-03-28 | End: 2018-04-25 | Stop reason: SDUPTHER

## 2018-03-28 NOTE — PATIENT INSTRUCTIONS
Anxiety Disorder: Care Instructions  Your Care Instructions    Anxiety is a normal reaction to stress. Difficult situations can cause you to have symptoms such as sweaty palms and a nervous feeling. In an anxiety disorder, the symptoms are far more severe. Constant worry, muscle tension, trouble sleeping, nausea and diarrhea, and other symptoms can make normal daily activities difficult or impossible. These symptoms may occur for no reason, and they can affect your work, school, or social life. Medicines, counseling, and self-care can all help. Follow-up care is a key part of your treatment and safety. Be sure to make and go to all appointments, and call your doctor if you are having problems. It's also a good idea to know your test results and keep a list of the medicines you take. How can you care for yourself at home? · Take medicines exactly as directed. Call your doctor if you think you are having a problem with your medicine. · Go to your counseling sessions and follow-up appointments. · Recognize and accept your anxiety. Then, when you are in a situation that makes you anxious, say to yourself, \"This is not an emergency. I feel uncomfortable, but I am not in danger. I can keep going even if I feel anxious. \"  · Be kind to your body:  ¨ Relieve tension with exercise or a massage. ¨ Get enough rest.  ¨ Avoid alcohol, caffeine, nicotine, and illegal drugs. They can increase your anxiety level and cause sleep problems. ¨ Learn and do relaxation techniques. See below for more about these techniques. · Engage your mind. Get out and do something you enjoy. Go to a funny movie, or take a walk or hike. Plan your day. Having too much or too little to do can make you anxious. · Keep a record of your symptoms. Discuss your fears with a good friend or family member, or join a support group for people with similar problems. Talking to others sometimes relieves stress.   · Get involved in social groups, or volunteer to help others. Being alone sometimes makes things seem worse than they are. · Get at least 30 minutes of exercise on most days of the week to relieve stress. Walking is a good choice. You also may want to do other activities, such as running, swimming, cycling, or playing tennis or team sports. Relaxation techniques  Do relaxation exercises 10 to 20 minutes a day. You can play soothing, relaxing music while you do them, if you wish. · Tell others in your house that you are going to do your relaxation exercises. Ask them not to disturb you. · Find a comfortable place, away from all distractions and noise. · Lie down on your back, or sit with your back straight. · Focus on your breathing. Make it slow and steady. · Breathe in through your nose. Breathe out through either your nose or mouth. · Breathe deeply, filling up the area between your navel and your rib cage. Breathe so that your belly goes up and down. · Do not hold your breath. · Breathe like this for 5 to 10 minutes. Notice the feeling of calmness throughout your whole body. As you continue to breathe slowly and deeply, relax by doing the following for another 5 to 10 minutes:  · Tighten and relax each muscle group in your body. You can begin at your toes and work your way up to your head. · Imagine your muscle groups relaxing and becoming heavy. · Empty your mind of all thoughts. · Let yourself relax more and more deeply. · Become aware of the state of calmness that surrounds you. · When your relaxation time is over, you can bring yourself back to alertness by moving your fingers and toes and then your hands and feet and then stretching and moving your entire body. Sometimes people fall asleep during relaxation, but they usually wake up shortly afterward. · Always give yourself time to return to full alertness before you drive a car or do anything that might cause an accident if you are not fully alert.  Never play a relaxation tape while you drive a car. When should you call for help? Call 911 anytime you think you may need emergency care. For example, call if:  ? · You feel you cannot stop from hurting yourself or someone else. ? Keep the numbers for these national suicide hotlines: 9-366-843-TALK (4-413.508.2075) and 4-068-NVLIGQB (4-742.608.6910). If you or someone you know talks about suicide or feeling hopeless, get help right away. ? Watch closely for changes in your health, and be sure to contact your doctor if:  ? · You have anxiety or fear that affects your life. ? · You have symptoms of anxiety that are new or different from those you had before. Where can you learn more? Go to http://costa-jessica.info/. Enter P754 in the search box to learn more about \"Anxiety Disorder: Care Instructions. \"  Current as of: May 12, 2017  Content Version: 11.4  © 9771-3573 Healthwise, Incorporated. Care instructions adapted under license by [employer] (which disclaims liability or warranty for this information). If you have questions about a medical condition or this instruction, always ask your healthcare professional. [address] any warranty or liability for your use of this information.

## 2018-03-28 NOTE — PROGRESS NOTES
1. Have you been to the ER, urgent care clinic since your last visit? Hospitalized since your last visit? Yes When: 03/18/18 [facility], SOB, Anxiety    2. Have you seen or consulted any other health care providers outside of the [address] [place] [address] since your last visit? Include any pap smears or colon screening.  Yes When: 03/18,cardiologist, GI

## 2018-03-28 NOTE — PROGRESS NOTES
HISTORY OF PRESENT ILLNESS  Reynaldo Langston is a 49 y.o. male. HPI Comments: Pt with h/o COPD and emphysema followed by Dr Nadira Hester. He is on continuous oxygen at 2.5L. c/o shortness of breath which worsens with anxiety. He reports sleeping with 4 pillows. Reports compliance with daliresp, advair and spiriva. He does use albuterol as needed. Reports he has stopped smoking. New diagnosis of right heart failure with EF at 55%-60%. Denies any leg swelling or palpitations. C/o anxiety with panic attacks occurring almost daily. He is currently taking Lorazepam as needed and has been taking celexa daily. Reports insomnia d/t anxiety. He reports he has an upcoming appt with psych on April 5th, 2018. COPD   The history is provided by the patient and spouse. This is a chronic problem. The current episode started more than 1 week ago. The problem occurs constantly. The problem has not changed since onset. Associated symptoms include shortness of breath. Panic Attack   The history is provided by the patient and spouse. This is a recurrent problem. The current episode started more than 1 week ago. The problem occurs daily. The problem has not changed since onset. Associated symptoms include shortness of breath. Treatments tried: celexa, ativan. The treatment provided mild relief. Review of Systems   Constitutional: Negative. HENT: Negative. Respiratory: Positive for shortness of breath. Cardiovascular: Negative. Gastrointestinal: Negative. Genitourinary: Negative. Musculoskeletal: Negative. Skin: Negative. Psychiatric/Behavioral: The patient is nervous/anxious.       Past Medical History:   Diagnosis Date    Chronic lung disease     Chronic obstructive pulmonary disease ([address] 08/03/2017    PFTS 8/3/17    COPD, severe ([address]     [facility]D [facility]     Esophagitis 12/11/2017    Endoscopy    Essential hypertension, benign     History of stomach ulcers     Positive urine drug screen 01/2016    opiates and THC    Upper GI bleed      Past Surgical History:   Procedure Laterality Date    HX ENDOSCOPY  12/11/2017     Current Outpatient Prescriptions on File Prior to Visit   Medication Sig Dispense Refill    furosemide (LASIX) 20 mg tablet Take 1 Tab by mouth every other day. 15 Tab 3    mirtazapine (REMERON) 7.5 mg tablet Take 1 Tab by mouth nightly. 90 Tab 1    Nebulizer & Compressor machine 1 Each by Does Not Apply route every four (4) hours as needed. 1 Each 0    albuterol (PROVENTIL VENTOLIN) 2.5 mg /3 mL (0.083 %) nebulizer solution Nebulized 1 vial for inhalation every 4 hours as needed 60 Each 3    OXYGEN-AIR DELIVERY SYSTEMS (WALKABOUT 1 OXYGEN SYSTEM) 2.5 L/min by Nasal route continuous.  amLODIPine (NORVASC) 10 mg tablet Take 1 Tab by mouth daily. Indications: hypertension 30 Tab 1    albuterol (PROVENTIL HFA, VENTOLIN HFA, PROAIR HFA) 90 mcg/actuation inhaler Take 2 Puffs by inhalation every four (4) hours as needed for Wheezing or Shortness of Breath. 1 Inhaler 3    pantoprazole (PROTONIX) 40 mg tablet Take 1 Tab by mouth Daily (before breakfast). 30 Tab 0    roflumilast (DALIRESP) tab tablet Take 1 Tab by mouth daily. Indications: PREVENTION OF BRONCHOSPASM WITH CHRONIC BRONCHITIS 30 Tab 0    tiotropium (SPIRIVA) 18 mcg inhalation capsule Take 1 Cap by inhalation daily. Indications: BRONCHOSPASM PREVENTION WITH COPD 30 Cap 0    fluticasone-salmeterol (ADVAIR) 500-50 mcg/dose diskus inhaler Take 1 Puff by inhalation two (2) times a day. 1 Each 3     No current facility-administered medications on file prior to visit.         Allergies and Intolerances:   No Known Allergies    Family History:   Family History   Problem Relation Age of Onset    Hypertension Mother     Heart Disease Mother     Diabetes Mother     Hypertension Father     Heart Disease Father     Cancer Father      lymphnodes    Diabetes Father        Social History:   He  reports that he quit smoking about 3 months ago. His smoking use included Cigarettes. He started smoking about 33 years ago. He has a 50.00 pack-year smoking history. He has never used smokeless tobacco. He  reports that he does not drink alcohol. Vitals:   Visit Vitals    /77    Pulse 92    Temp 97.3 °F (36.3 °C)    Resp 12    Ht 5' 9\" (1.753 m)    Wt 190 lb (86.2 kg)    SpO2 96%    BMI 28.06 kg/m2     Body surface area is 2.05 meters squared. Physical Exam   Constitutional: He is oriented to person, place, and time. He appears well-developed and well-nourished. HENT:   Head: Atraumatic. Cardiovascular: Normal rate. Pulmonary/Chest: Effort normal. He has no wheezes. Neurological: He is alert and oriented to person, place, and time. Skin: Skin is warm. Psychiatric: He has a normal mood and affect. His behavior is normal.   Nursing note and vitals reviewed. ASSESSMENT and PLAN    ICD-10-CM ICD-9-CM    1. COPD, severe ([facility] J44.9 496    2. MYLES (generalized anxiety disorder) F41.1 300.02 citalopram (CELEXA) 40 mg tablet      LORazepam (ATIVAN) 1 mg tablet   3. Essential hypertension, benign I10 401.1      Follow-up Disposition:  Return in about 6 weeks (around 5/9/2018), or if symptoms worsen or fail to improve, for anxiety. the following changes in treatment are made: increase celexa to 40 mg daily and ativan to 1 mg as needed every 8 hrs. - Alarm signals discussed. ER precautions  - Plan of care reviewed with patient. Understanding verbalized and they are in agreement with plan of care.       reviewed medications and side effects in detail

## 2018-03-28 NOTE — MR AVS SNAPSHOT
Familia Edwards 
 
 
 [address] [facility] [address] [facility] [address] 
[phone] Patient: Michelle Hwang Sr. MRN: [id number] :1970 Visit Information Date & Time Provider Department Dept. Phone Encounter #  
 3/28/2018  9:45 AM Doris Whitney NP [facility] [phone] [id number] Follow-up Instructions Return in about 6 weeks (around 2018), or if symptoms worsen or fail to improve, for anxiety. Your Appointments 2018 10:00 AM  
Follow Up with Denice Hamman, NP [facility] ([place] [facility]) Appt Note: 1 month f/up [facility] [address] [address] [id number] [phone] [address] [facility] [address] 2018 10:00 AM  
Follow Up with Marielena Jay MD  
[facility] ([place] [facility]) Appt Note: 6 month f/up [facility] [address] [address] [phone] [address] [facility] [address] Upcoming Health Maintenance Date Due DTaP/Tdap/Td series (1 - Tdap) 10/27/1990 Allergies as of 3/28/2018  Review Complete On: 3/28/2018 By: Martina Muñoz No Known Allergies Current Immunizations  Never Reviewed Name Date Influenza Vaccine 10/10/2011 12:00 AM  
 Influenza Vaccine (Quad) PF 10/23/2017  1:04 PM  
 Pneumococcal Polysaccharide (PPSV-23) 2018 10:10 AM, 10/10/2011 12:00 AM  
  
 Not reviewed this visit You Were Diagnosed With   
  
 Codes Comments COPD, severe ([facility] [address]    -  Primary ICD-10-CM: J44.9 ICD-9-CM: 202 MYLES (generalized anxiety disorder)     ICD-10-CM: F41.1 ICD-9-CM: 300.02 Essential hypertension, benign     ICD-10-CM: I10 
ICD-9-CM: 401.1 Vitals BP Pulse Temp Resp Height(growth percentile) Weight(growth percentile) 119/77 92 97.3 °F (36.3 °C) 12 5' 9\" (1.753 m) 190 lb (86.2 kg) SpO2 BMI Smoking Status 96% 28.06 kg/m2 Former Smoker Vitals History BMI and BSA Data Body Mass Index Body Surface Area 28.06 kg/m 2 2.05 m 2 Preferred Pharmacy Pharmacy Name Phone [facility]up[facility] Kahn [address] [place] [address]. [phone] Your Updated Medication List  
  
   
This list is accurate as of 3/28/18 10:22 AM.  Always use your most recent med list.  
  
  
  
  
 * albuterol 90 mcg/actuation inhaler Commonly known as:  PROVENTIL HFA, VENTOLIN HFA, PROAIR HFA Take 2 Puffs by inhalation every four (4) hours as needed for Wheezing or Shortness of Breath. * albuterol 2.5 mg /3 mL (0.083 %) nebulizer solution Commonly known as:  PROVENTIL VENTOLIN Nebulized 1 vial for inhalation every 4 hours as needed  
  
 amLODIPine 10 mg tablet Commonly known as:  Lynnell Manual Take 1 Tab by mouth daily. Indications: hypertension  
  
 citalopram 40 mg tablet Commonly known as:  Earna Pretty Take 1 Tab by mouth daily. fluticasone-salmeterol 500-50 mcg/dose diskus inhaler Commonly known as:  ADVAIR Take 1 Puff by inhalation two (2) times a day. furosemide 20 mg tablet Commonly known as:  LASIX Take 1 Tab by mouth every other day. LORazepam 1 mg tablet Commonly known as:  ATIVAN Take 1 Tab by mouth every eight (8) hours as needed for Anxiety. Max Daily Amount: 3 mg.  
  
 mirtazapine 7.5 mg tablet Commonly known as:  Doc Eric Take 1 Tab by mouth nightly. Nebulizer & Compressor machine 1 Each by Does Not Apply route every four (4) hours as needed. pantoprazole 40 mg tablet Commonly known as:  PROTONIX Take 1 Tab by mouth Daily (before breakfast). roflumilast Tab tablet Commonly known as:  Audery Prospect Take 1 Tab by mouth daily. Indications: PREVENTION OF BRONCHOSPASM WITH CHRONIC BRONCHITIS tiotropium 18 mcg inhalation capsule Commonly known as:  Hazeline Catena Take 1 Cap by inhalation daily. Indications: BRONCHOSPASM PREVENTION WITH COPD  
  
 WALKABOUT 1 OXYGEN SYSTEM  
2.5 L/min by Nasal route continuous. * Notice: This list has 2 medication(s) that are the same as other medications prescribed for you. Read the directions carefully, and ask your doctor or other care provider to review them with you. Prescriptions Printed Refills LORazepam (ATIVAN) 1 mg tablet 0 Sig: Take 1 Tab by mouth every eight (8) hours as needed for Anxiety. Max Daily Amount: 3 mg. Class: Print Route: Oral  
  
Prescriptions Sent to Pharmacy Refills  
 citalopram (CELEXA) 40 mg tablet 4 Sig: Take 1 Tab by mouth daily. Class: Normal  
 Pharmacy: [address]  #: [phone] Route: Oral  
  
Follow-up Instructions Return in about 6 weeks (around 5/9/2018), or if symptoms worsen or fail to improve, for anxiety. Patient Instructions Anxiety Disorder: Care Instructions Your Care Instructions Anxiety is a normal reaction to stress. Difficult situations can cause you to have symptoms such as sweaty palms and a nervous feeling. In an anxiety disorder, the symptoms are far more severe. Constant worry, muscle tension, trouble sleeping, nausea and diarrhea, and other symptoms can make normal daily activities difficult or impossible. These symptoms may occur for no reason, and they can affect your work, school, or social life. Medicines, counseling, and self-care can all help. Follow-up care is a key part of your treatment and safety. Be sure to make and go to all appointments, and call your doctor if you are having problems. It's also a good idea to know your test results and keep a list of the medicines you take. How can you care for yourself at home? · Take medicines exactly as directed. Call your doctor if you think you are having a problem with your medicine. · Go to your counseling sessions and follow-up appointments. · Recognize and accept your anxiety. Then, when you are in a situation that makes you anxious, say to yourself, \"This is not an emergency. I feel uncomfortable, but I am not in danger. I can keep going even if I feel anxious. \" · Be kind to your body: ¨ Relieve tension with exercise or a massage. ¨ Get enough rest. 
¨ Avoid alcohol, caffeine, nicotine, and illegal drugs. They can increase your anxiety level and cause sleep problems. ¨ Learn and do relaxation techniques. See below for more about these techniques. · Engage your mind. Get out and do something you enjoy. Go to a funny movie, or take a walk or hike. Plan your day. Having too much or too little to do can make you anxious. · Keep a record of your symptoms. Discuss your fears with a good friend or family member, or join a support group for people with similar problems. Talking to others sometimes relieves stress. · Get involved in social groups, or volunteer to help others. Being alone sometimes makes things seem worse than they are. · Get at least 30 minutes of exercise on most days of the week to relieve stress. Walking is a good choice. You also may want to do other activities, such as running, swimming, cycling, or playing tennis or team sports. Relaxation techniques Do relaxation exercises 10 to 20 minutes a day. You can play soothing, relaxing music while you do them, if you wish. · Tell others in your house that you are going to do your relaxation exercises. Ask them not to disturb you. · Find a comfortable place, away from all distractions and noise. · Lie down on your back, or sit with your back straight. · Focus on your breathing. Make it slow and steady. · Breathe in through your nose. Breathe out through either your nose or mouth. · Breathe deeply, filling up the area between your navel and your rib cage. Breathe so that your belly goes up and down. · Do not hold your breath. · Breathe like this for 5 to 10 minutes. Notice the feeling of calmness throughout your whole body. As you continue to breathe slowly and deeply, relax by doing the following for another 5 to 10 minutes: · Tighten and relax each muscle group in your body. You can begin at your toes and work your way up to your head. · Imagine your muscle groups relaxing and becoming heavy. · Empty your mind of all thoughts. · Let yourself relax more and more deeply. · Become aware of the state of calmness that surrounds you. · When your relaxation time is over, you can bring yourself back to alertness by moving your fingers and toes and then your hands and feet and then stretching and moving your entire body. Sometimes people fall asleep during relaxation, but they usually wake up shortly afterward. · Always give yourself time to return to full alertness before you drive a car or do anything that might cause an accident if you are not fully alert. Never play a relaxation tape while you drive a car. When should you call for help? Call 911 anytime you think you may need emergency care. For example, call if: 
? · You feel you cannot stop from hurting yourself or someone else. ? Keep the numbers for these national suicide hotlines: 1-926-376-TALK (1-576.932.2003) and 0-346-VQJNPJU (3-662.135.5228). If you or someone you know talks about suicide or feeling hopeless, get help right away. ? Watch closely for changes in your health, and be sure to contact your doctor if: 
? · You have anxiety or fear that affects your life. ? · You have symptoms of anxiety that are new or different from those you had before. Where can you learn more? Go to http://costa-jessica.info/. Enter P754 in the search box to learn more about \"Anxiety Disorder: Care Instructions. \" 
 Current as of: May 12, 2017 Content Version: 11.4 © 3770-4842 Healthwise, [employer]. Care instructions adapted under license by [employer] (which disclaims liability or warranty for this information). If you have questions about a medical condition or this instruction, always ask your healthcare professional. [address] any warranty or liability for your use of this information. Please provide this summary of care documentation to your next provider. Your primary care clinician is listed as Kim Collins. If you have any questions after today's visit, please call [phone].

## 2018-04-04 ENCOUNTER — APPOINTMENT (OUTPATIENT)
Dept: GENERAL RADIOLOGY | Age: 48
End: 2018-04-04
Attending: EMERGENCY MEDICINE
Payer: MEDICAID

## 2018-04-04 ENCOUNTER — HOSPITAL ENCOUNTER (EMERGENCY)
Age: 48
Discharge: HOME | End: 2018-04-04
Attending: EMERGENCY MEDICINE
Payer: MEDICAID

## 2018-04-04 VITALS
SYSTOLIC BLOOD PRESSURE: 137 MMHG | OXYGEN SATURATION: 99 % | RESPIRATION RATE: 18 BRPM | TEMPERATURE: 96.8 F | HEART RATE: 96 BPM | DIASTOLIC BLOOD PRESSURE: 84 MMHG

## 2018-04-04 DIAGNOSIS — J43.9 PULMONARY EMPHYSEMA, UNSPECIFIED EMPHYSEMA TYPE (HCC): ICD-10-CM

## 2018-04-04 DIAGNOSIS — R06.02 SOB (SHORTNESS OF BREATH): Primary | ICD-10-CM

## 2018-04-04 DIAGNOSIS — F41.9 ANXIETY: ICD-10-CM

## 2018-04-04 LAB
ANION GAP SERPL CALC-SCNC: 8 MMOL/L (ref 3–18)
BASOPHILS # BLD: 0 K/UL (ref 0–0.06)
BASOPHILS NFR BLD: 0 % (ref 0–2)
BUN SERPL-MCNC: 11 MG/DL (ref 7–18)
BUN/CREAT SERPL: 13 (ref 12–20)
CALCIUM SERPL-MCNC: 8.9 MG/DL (ref 8.5–10.1)
CHLORIDE SERPL-SCNC: 104 MMOL/L (ref 100–108)
CK MB CFR SERPL CALC: 1.2 % (ref 0–4)
CK MB SERPL-MCNC: 1.1 NG/ML (ref 5–25)
CK SERPL-CCNC: 90 U/L (ref 39–308)
CO2 SERPL-SCNC: 26 MMOL/L (ref 21–32)
CREAT SERPL-MCNC: 0.82 MG/DL (ref 0.6–1.3)
DIFFERENTIAL METHOD BLD: ABNORMAL
EOSINOPHIL # BLD: 0.2 K/UL (ref 0–0.4)
EOSINOPHIL NFR BLD: 2 % (ref 0–5)
ERYTHROCYTE [DISTWIDTH] IN BLOOD BY AUTOMATED COUNT: 12.9 % (ref 11.6–14.5)
GLUCOSE SERPL-MCNC: 100 MG/DL (ref 74–99)
HCT VFR BLD AUTO: 37.1 % (ref 36–48)
HGB BLD-MCNC: 13.8 G/DL (ref 13–16)
LYMPHOCYTES # BLD: 1 K/UL (ref 0.9–3.6)
LYMPHOCYTES NFR BLD: 14 % (ref 21–52)
MAGNESIUM SERPL-MCNC: 2.3 MG/DL (ref 1.6–2.6)
MCH RBC QN AUTO: 31.5 PG (ref 24–34)
MCHC RBC AUTO-ENTMCNC: 36.4 G/DL (ref 31–37)
MCV RBC AUTO: 84.9 FL (ref 74–97)
MONOCYTES # BLD: 0.5 K/UL (ref 0.05–1.2)
MONOCYTES NFR BLD: 7 % (ref 3–10)
NEUTS SEG # BLD: 5.4 K/UL (ref 1.8–8)
NEUTS SEG NFR BLD: 77 % (ref 40–73)
PLATELET # BLD AUTO: 239 K/UL (ref 135–420)
PMV BLD AUTO: 9.8 FL (ref 9.2–11.8)
POTASSIUM SERPL-SCNC: 3.7 MMOL/L (ref 3.5–5.5)
RBC # BLD AUTO: 4.37 M/UL (ref 4.7–5.5)
SODIUM SERPL-SCNC: 138 MMOL/L (ref 136–145)
TROPONIN I SERPL-MCNC: <0.02 NG/ML (ref 0–0.04)
WBC # BLD AUTO: 7 K/UL (ref 4.6–13.2)

## 2018-04-04 PROCEDURE — 82550 ASSAY OF CK (CPK): CPT | Performed by: EMERGENCY MEDICINE

## 2018-04-04 PROCEDURE — 96365 THER/PROPH/DIAG IV INF INIT: CPT

## 2018-04-04 PROCEDURE — 74011000250 HC RX REV CODE- 250: Performed by: EMERGENCY MEDICINE

## 2018-04-04 PROCEDURE — 85025 COMPLETE CBC W/AUTO DIFF WBC: CPT | Performed by: EMERGENCY MEDICINE

## 2018-04-04 PROCEDURE — 74011250636 HC RX REV CODE- 250/636: Performed by: EMERGENCY MEDICINE

## 2018-04-04 PROCEDURE — 99285 EMERGENCY DEPT VISIT HI MDM: CPT

## 2018-04-04 PROCEDURE — 94762 N-INVAS EAR/PLS OXIMTRY CONT: CPT

## 2018-04-04 PROCEDURE — 93005 ELECTROCARDIOGRAM TRACING: CPT

## 2018-04-04 PROCEDURE — 96375 TX/PRO/DX INJ NEW DRUG ADDON: CPT

## 2018-04-04 PROCEDURE — 74011250637 HC RX REV CODE- 250/637: Performed by: EMERGENCY MEDICINE

## 2018-04-04 PROCEDURE — 71046 X-RAY EXAM CHEST 2 VIEWS: CPT

## 2018-04-04 PROCEDURE — 80048 BASIC METABOLIC PNL TOTAL CA: CPT | Performed by: EMERGENCY MEDICINE

## 2018-04-04 PROCEDURE — 83735 ASSAY OF MAGNESIUM: CPT | Performed by: EMERGENCY MEDICINE

## 2018-04-04 PROCEDURE — 77030029684 HC NEB SM VOL KT MONA -A

## 2018-04-04 PROCEDURE — 94640 AIRWAY INHALATION TREATMENT: CPT

## 2018-04-04 RX ORDER — IPRATROPIUM BROMIDE AND ALBUTEROL SULFATE 2.5; .5 MG/3ML; MG/3ML
3 SOLUTION RESPIRATORY (INHALATION)
Status: COMPLETED | OUTPATIENT
Start: 2018-04-04 | End: 2018-04-04

## 2018-04-04 RX ORDER — ALBUTEROL SULFATE 0.83 MG/ML
5 SOLUTION RESPIRATORY (INHALATION)
Status: COMPLETED | OUTPATIENT
Start: 2018-04-04 | End: 2018-04-04

## 2018-04-04 RX ORDER — LORAZEPAM 1 MG/1
1 TABLET ORAL
Status: COMPLETED | OUTPATIENT
Start: 2018-04-04 | End: 2018-04-04

## 2018-04-04 RX ORDER — MAGNESIUM SULFATE HEPTAHYDRATE 40 MG/ML
2 INJECTION, SOLUTION INTRAVENOUS ONCE
Status: COMPLETED | OUTPATIENT
Start: 2018-04-04 | End: 2018-04-04

## 2018-04-04 RX ADMIN — LORAZEPAM 1 MG: 1 TABLET ORAL at 19:46

## 2018-04-04 RX ADMIN — IPRATROPIUM BROMIDE AND ALBUTEROL SULFATE 3 ML: .5; 3 SOLUTION RESPIRATORY (INHALATION) at 19:46

## 2018-04-04 RX ADMIN — METHYLPREDNISOLONE SODIUM SUCCINATE 125 MG: 125 INJECTION, POWDER, FOR SOLUTION INTRAMUSCULAR; INTRAVENOUS at 19:46

## 2018-04-04 RX ADMIN — MAGNESIUM SULFATE HEPTAHYDRATE 2 G: 40 INJECTION, SOLUTION INTRAVENOUS at 19:47

## 2018-04-04 RX ADMIN — ALBUTEROL SULFATE 5 MG: 2.5 SOLUTION RESPIRATORY (INHALATION) at 19:46

## 2018-04-04 NOTE — ED TRIAGE NOTES
Pt. Joseph Giron in my medics, per medics pt. Was at [facility] and had a panic attack and became short of breath and tacypneic. Pt. Has a history of COPD and uses home O2 at 2.5 lpm. Pt. Also took 1mg of Ativan with \"very little relief\".

## 2018-04-04 NOTE — ED PROVIDER NOTES
HPI Comments: The patient is a 49 y.o. Male presents to the ED with complaints of SOB today while walking into a grocery store. He states that he got winded walking in, and then started to panic and couldn't catch his breath. The patient is currently improved. He states there were no drivable carts available for him to use at the store. The patient denies any chest pain during the episode or currently. Patient states that he has been feeling really well prior to the episode. No increasing cough, SOB prior to this or other URI sx or fever. The patient states that he had taken ativan, and his anxiety had improved. The patient takes 1mg 3 times a day as needed. The patient recently had an increase in his celexa dose from 20mg a day to 40mg. The patient states that he had a nebulizer treatment today and had used his inhaler 4-5 times prior to EMS arriving. The patient is always on 2.5L of oxygen at home and did have hs portable O2 with him during the episode. The patient quit smoking 12/18/18. The patient has no further complaints. The history is provided by the patient.         Past Medical History:   Diagnosis Date    Chronic lung disease     Chronic obstructive pulmonary disease ([address] 08/03/2017    PFTS 8/3/17    COPD, severe ([address]     Emphysema/COPD ([address]     Esophagitis 12/11/2017    Endoscopy    Essential hypertension, benign     History of stomach ulcers     Positive urine drug screen 01/2016    opiates and THC    Upper GI bleed        Past Surgical History:   Procedure Laterality Date    HX ENDOSCOPY  12/11/2017         Family History:   Problem Relation Age of Onset    Hypertension Mother     Heart Disease Mother     Diabetes Mother     Hypertension Father     Heart Disease Father     Cancer Father      lymphnodes    Diabetes Father        Social History     Social History    Marital status:      Spouse name: N/A    Number of children: N/A    Years of education: N/A Occupational History    Not on file. Social History Main Topics    Smoking status: Former Smoker     Packs/day: 2.00     Years: 25.00     Types: Cigarettes     Start date: 5/28/1984     Quit date: 12/18/2017    Smokeless tobacco: Never Used    Alcohol use No    Drug use: Yes     Special: Marijuana      Comment: Hx of Marijuana use    Sexual activity: Yes     Partners: Female     Other Topics Concern     Service No    Blood Transfusions No    Caffeine Concern Yes    Occupational Exposure No    Hobby Hazards No    Sleep Concern Yes     occas    Stress Concern No    Weight Concern No    Special Diet No    Back Care Yes    Exercise No    Bike Helmet Yes    Seat Belt No     occas     Social History Narrative             ALLERGIES: Review of patient's allergies indicates no known allergies. Review of Systems   Constitutional: Negative. Negative for chills and fever. HENT: Negative. Negative for congestion. Eyes: Negative. Negative for visual disturbance. Respiratory: Positive for shortness of breath. Cardiovascular: Negative. Negative for chest pain and leg swelling. Gastrointestinal: Negative. Negative for abdominal pain, diarrhea and vomiting. Genitourinary: Negative. Negative for dysuria. Musculoskeletal: Negative. Negative for back pain and myalgias. Skin: Negative. Negative for rash and wound. Neurological: Negative. Negative for dizziness, weakness and light-headedness. Psychiatric/Behavioral: Negative. Negative for self-injury. All other systems reviewed and are negative. Vitals:    04/04/18 1801   BP: 141/83   Pulse: 87   Resp: 18   Temp: 98 °F (36.7 °C)   SpO2: 95%            Physical Exam   Constitutional: He is oriented to person, place, and time. He appears well-developed and well-nourished. No distress. Patient breathing comfortably. Speaking full sentences with nasal canula in place.    HENT:   Head: Normocephalic and atraumatic. Eyes: Conjunctivae and EOM are normal. Pupils are equal, round, and reactive to light. No scleral icterus. Neck: Normal range of motion. Neck supple. No JVD present. No thyromegaly present. Cardiovascular: Normal rate, regular rhythm, S1 normal and S2 normal.  Exam reveals no gallop and no friction rub. No murmur heard. Pulmonary/Chest: Effort normal and breath sounds normal. No accessory muscle usage. No respiratory distress. Abdominal: Soft. Normal appearance. He exhibits no distension. There is no tenderness. There is no rigidity, no rebound and no guarding. Musculoskeletal: Normal range of motion. He exhibits no edema or tenderness. Neurological: He is alert and oriented to person, place, and time. He has normal strength. No cranial nerve deficit or sensory deficit. Coordination normal.   Skin: Skin is warm and intact. No rash noted. Psychiatric: His speech is normal.   Mildly anxious. Vitals reviewed. MDM  Number of Diagnoses or Management Options  Anxiety:   Pulmonary emphysema, unspecified emphysema type ([address]):   SOB (shortness of breath):   Diagnosis management comments: [facility]  is a 49 y.o. Male coming in with an episode of SOB that patient feels was exacerbated by anxiety. No CP during the episode and very low suspicion of ACS. Trop neg >4 hr after the episode and HR, RR, and O2 sats WNL without evidence of acute PE. Patient feeling much better and has remained hemodynamically stable with RR of 16 and HR in the 80s throughout his stay. Ambulated without difficulty. Will d/c with PCP and cardiology follow up.         ED Course     Vitals:  Patient Vitals for the past 12 hrs:   Temp Pulse Resp BP SpO2   04/04/18 1801 98 °F (36.7 °C) 87 18 141/83 95 %       Medications Ordered:  Medications   albuterol (PROVENTIL VENTOLIN) nebulizer solution 5 mg (5 mg Nebulization Given 4/4/18 1946)   albuterol-ipratropium (DUO-NEB) 2.5 MG-0.5 MG/3 ML (3 mL Nebulization Given 4/4/18 1946)   methylPREDNISolone (PF) (SOLU-MEDROL) injection 125 mg (125 mg IntraVENous Given 4/4/18 1946)   magnesium sulfate 2 g/50 ml IVPB (premix or compounded) (0 g IntraVENous IV Completed 4/4/18 2047)   LORazepam (ATIVAN) tablet 1 mg (1 mg Oral Given 4/4/18 1946)       Lab Findings:  Recent Results (from the past 12 hour(s))   EKG, 12 LEAD, INITIAL    Collection Time: 04/04/18  6:12 PM   Result Value Ref Range    Ventricular Rate 86 BPM    Atrial Rate 86 BPM    P-R Interval 152 ms    QRS Duration 76 ms    Q-T Interval 356 ms    QTC Calculation (Bezet) 426 ms    Calculated P Axis 56 degrees    Calculated R Axis 59 degrees    Calculated T Axis -2 degrees    Diagnosis       Normal sinus rhythm  Nonspecific T wave abnormality  Abnormal ECG  When compared with ECG of 19-MAR-2018 09:50,  Nonspecific T wave abnormality, worse in Inferior leads     CBC WITH AUTOMATED DIFF    Collection Time: 04/04/18  8:05 PM   Result Value Ref Range    WBC 7.0 4.6 - 13.2 K/uL    RBC 4.37 (L) 4.70 - 5.50 M/uL    HGB 13.8 13.0 - 16.0 g/dL    HCT 37.1 36.0 - 48.0 %    MCV 84.9 74.0 - 97.0 FL    MCH 31.5 24.0 - 34.0 PG    MCHC 36.4 31.0 - 37.0 g/dL    RDW 12.9 11.6 - 14.5 %    PLATELET 434 155 - 020 K/uL    MPV 9.8 9.2 - 11.8 FL    NEUTROPHILS 77 (H) 40 - 73 %    LYMPHOCYTES 14 (L) 21 - 52 %    MONOCYTES 7 3 - 10 %    EOSINOPHILS 2 0 - 5 %    BASOPHILS 0 0 - 2 %    ABS. NEUTROPHILS 5.4 1.8 - 8.0 K/UL    ABS. LYMPHOCYTES 1.0 0.9 - 3.6 K/UL    ABS. MONOCYTES 0.5 0.05 - 1.2 K/UL    ABS. EOSINOPHILS 0.2 0.0 - 0.4 K/UL    ABS.  BASOPHILS 0.0 0.0 - 0.06 K/UL    DF AUTOMATED     METABOLIC PANEL, BASIC    Collection Time: 04/04/18  8:05 PM   Result Value Ref Range    Sodium 138 136 - 145 mmol/L    Potassium 3.7 3.5 - 5.5 mmol/L    Chloride 104 100 - 108 mmol/L    CO2 26 21 - 32 mmol/L    Anion gap 8 3.0 - 18 mmol/L    Glucose 100 (H) 74 - 99 mg/dL    BUN 11 7.0 - 18 MG/DL    Creatinine 0.82 0.6 - 1.3 MG/DL    BUN/Creatinine ratio 13 12 - 20      GFR est AA >60 >60 ml/min/1.73m2    GFR est non-AA >60 >60 ml/min/1.73m2    Calcium 8.9 8.5 - 10.1 MG/DL   MAGNESIUM    Collection Time: 04/04/18  8:05 PM   Result Value Ref Range    Magnesium 2.3 1.6 - 2.6 mg/dL   CARDIAC PANEL,(CK, CKMB & TROPONIN)    Collection Time: 04/04/18  8:05 PM   Result Value Ref Range    CK 90 39 - 308 U/L    CK - MB 1.1 <3.6 ng/ml    CK-MB Index 1.2 0.0 - 4.0 %    Troponin-I, Qt. <0.02 0.0 - 0.045 NG/ML       EKG Interpretation by ED physician:  Sinus rhythm, rate of 86. Nonspecific T wave changes in the inferior leads. X-ray, CT or radiology findings or impressions:  XR CHEST PA LAT   Final Result          Reevaluation of the patient:   9:00 PM Checked on patient. Patient feels improved. Denies any current SOB. Anxiety much improved. Patient has appointment with his psychiatrist tomorrow and with Dr. Ruma Riddle (Cardiology) in 2 days. Advised patient to keep appointments. Patient told to return if worsens and given precautions for worsening symptoms. Diagnosis:   1. SOB (shortness of breath)    2. Anxiety    3. Pulmonary emphysema, unspecified emphysema type ([address]        Disposition: Discharged    Follow-up Information     Follow up With Details Comments Contact Info    Carmel Ferreira NP Go to Follow up with. [address] [place] [address] [facility] EM[facility]ENC[facility]PT Go to If symptoms worsen [address] [place] [id number]  [phone]           Patient's Medications   Start Taking    No medications on file   Continue Taking    ALBUTEROL (PROVENTIL HFA, VENTOLIN HFA, PROAIR HFA) 90 MCG/ACTUATION INHALER    Take 2 Puffs by inhalation every four (4) hours as needed for Wheezing or Shortness of Breath. ALBUTEROL (PROVENTIL VENTOLIN) 2.5 MG /3 ML (0.083 %) NEBULIZER SOLUTION    Nebulized 1 vial for inhalation every 4 hours as needed    AMLODIPINE (NORVASC) 10 MG TABLET    Take 1 Tab by mouth daily.  Indications: hypertension    CITALOPRAM (CELEXA) 40 MG TABLET    Take 1 Tab by mouth daily. FLUTICASONE-SALMETEROL (ADVAIR) 500-50 MCG/DOSE DISKUS INHALER    Take 1 Puff by inhalation two (2) times a day. FUROSEMIDE (LASIX) 20 MG TABLET    Take 1 Tab by mouth every other day. LORAZEPAM (ATIVAN) 1 MG TABLET    Take 1 Tab by mouth every eight (8) hours as needed for Anxiety. Max Daily Amount: 3 mg. MIRTAZAPINE (REMERON) 7.5 MG TABLET    Take 1 Tab by mouth nightly. NEBULIZER & COMPRESSOR MACHINE    1 Each by Does Not Apply route every four (4) hours as needed. OXYGEN-AIR DELIVERY SYSTEMS (WALKABOUT 1 OXYGEN SYSTEM)    2.5 L/min by Nasal route continuous. PANTOPRAZOLE (PROTONIX) 40 MG TABLET    Take 1 Tab by mouth Daily (before breakfast). ROFLUMILAST (DALIRESP) TAB TABLET    Take 1 Tab by mouth daily. Indications: PREVENTION OF BRONCHOSPASM WITH CHRONIC BRONCHITIS    TIOTROPIUM (SPIRIVA) 18 MCG INHALATION CAPSULE    Take 1 Cap by inhalation daily. Indications: BRONCHOSPASM PREVENTION WITH COPD   These Medications have changed    No medications on file   Stop Taking    No medications on file       [address] acting as a scribe for and in the presence of Andie Lerner MD      April 04, 2018 at 9:27 PM       Provider Attestation:      I personally performed the services described in the documentation, reviewed the documentation, as recorded by the scribe in my presence, and it accurately and completely records my words and actions.  April 04, 2018 at 9:27 PM - Andie Lerner MD        Procedures

## 2018-04-04 NOTE — ED NOTES
I performed a brief evaluation, including history and physical, of the patient here in triage and I have determined that pt will need further treatment and evaluation from the main side ER physician. I have placed initial orders to help in expediting patients care.      April 04, 2018 at 6:10 PM - BUSTER Barnett        Visit Vitals    /83    Pulse 87    Temp 98 °F (36.7 °C)    Resp 18    SpO2 95%

## 2018-04-05 LAB
ATRIAL RATE: 86 BPM
CALCULATED P AXIS, ECG09: 56 DEGREES
CALCULATED R AXIS, ECG10: 59 DEGREES
CALCULATED T AXIS, ECG11: -2 DEGREES
DIAGNOSIS, 93000: NORMAL
P-R INTERVAL, ECG05: 152 MS
Q-T INTERVAL, ECG07: 356 MS
QRS DURATION, ECG06: 76 MS
QTC CALCULATION (BEZET), ECG08: 426 MS
VENTRICULAR RATE, ECG03: 86 BPM

## 2018-04-05 NOTE — DISCHARGE INSTRUCTIONS
Learning About Anxiety Disorders  What are anxiety disorders? Anxiety disorders are a type of medical problem. They cause severe anxiety. When you feel anxious, you feel that something bad is about to happen. This feeling interferes with your life. These disorders include:  · Generalized anxiety disorder. You feel worried and stressed about many everyday events and activities. This goes on for several months and disrupts your life on most days. · Panic disorder. You have repeated panic attacks. A panic attack is a sudden, intense fear or anxiety. It may make you feel short of breath. Your heart may pound. · Social anxiety disorder. You feel very anxious about what you will say or do in front of people. For example, you may be scared to talk or eat in public. This problem affects your daily life. · Phobias. You are very scared of a specific object, situation, or activity. For example, you may fear spiders, high places, or small spaces. What are the symptoms? Generalized anxiety disorder  Symptoms may include:  · Feeling worried and stressed about many things almost every day. · Feeling tired or irritable. You may have a hard time concentrating. · Having headaches or muscle aches. · Having a hard time getting to sleep or staying asleep. Panic disorder  You may have repeated panic attacks when there is no reason for feeling afraid. You may change your daily activities because you worry that you will have another attack. Symptoms may include:  · Intense fear, terror, or anxiety. · Trouble breathing or very fast breathing. · Chest pain or tightness. · A heartbeat that races or is not regular. Social anxiety disorder  Symptoms may include:  · Fear about a social situation, such as eating in front of others or speaking in public. You may worry a lot. Or you may be afraid that something bad will happen. · Anxiety that can cause you to blush, sweat, and feel shaky.   · A heartbeat that is faster than normal.  · A hard time focusing. Phobias  Symptoms may include:  · More fear than most people of being around an object, being in a situation, or doing an activity. You might also be stressed about the chance of being around the thing you fear. · Worry about losing control, panicking, fainting, or having physical symptoms like a faster heartbeat when you are around the situation or object. How are these disorders treated? Anxiety disorders can be treated with medicines or counseling. A combination of both may be used. Medicines may include:  · Antidepressants. These may help your symptoms by keeping chemicals in your brain in balance. · Benzodiazepines. These may give you short-term relief of your symptoms. Some people use cognitive-behavioral therapy. A therapist helps you learn to change stressful or bad thoughts into helpful thoughts. Lead a healthy lifestyle  A healthy lifestyle may help you feel better. · Get at least 30 minutes of exercise on most days of the week. Walking is a good choice. · Eat a healthy diet. Include fruits, vegetables, lean proteins, and whole grains in your diet each day. · Try to go to bed at the same time every night. Try for 8 hours of sleep a night. · Find ways to manage stress. Try relaxation exercises. · Avoid alcohol and illegal drugs. Follow-up care is a key part of your treatment and safety. Be sure to make and go to all appointments, and call your doctor if you are having problems. It's also a good idea to know your test results and keep a list of the medicines you take. Where can you learn more? Go to http://costa-jessica.info/. Enter J428 in the search box to learn more about \"Learning About Anxiety Disorders. \"  Current as of: May 12, 2017  Content Version: 11.4  © 5988-0940 [employer]. Care instructions adapted under license by [employer] (which disclaims liability or warranty for this information).  If you have questions about a medical condition or this instruction, always ask your healthcare professional. [address] any warranty or liability for your use of this information. Chronic Obstructive Pulmonary Disease (COPD): Care Instructions  Your Care Instructions    Chronic obstructive pulmonary disease (COPD) is a general term for a group of lung diseases, including emphysema and chronic bronchitis. People with COPD have decreased airflow in and out of the lungs, which makes it hard to breathe. The airways also can get clogged with thick mucus. Cigarette smoking is a major cause of COPD. Although there is no cure for COPD, you can slow its progress. Following your treatment plan and taking care of yourself can help you feel better and live longer. Follow-up care is a key part of your treatment and safety. Be sure to make and go to all appointments, and call your doctor if you are having problems. It's also a good idea to know your test results and keep a list of the medicines you take. How can you care for yourself at home? ?Staying healthy  ? · Do not smoke. This is the most important step you can take to prevent more damage to your lungs. If you need help quitting, talk to your doctor about stop-smoking programs and medicines. These can increase your chances of quitting for good. ? · Avoid colds and flu. Get a pneumococcal vaccine shot. If you have had one before, ask your doctor whether you need a second dose. Get the flu vaccine every fall. If you must be around people with colds or the flu, wash your hands often. ? · Avoid secondhand smoke, air pollution, and high altitudes. Also avoid cold, dry air and hot, humid air. Stay at home with your windows closed when air pollution is bad. ?Medicines and oxygen therapy  ? · Take your medicines exactly as prescribed. Call your doctor if you think you are having a problem with your medicine.    ? · You may be taking medicines such as:  ¨ Bronchodilators. These help open your airways and make breathing easier. Bronchodilators are either short-acting (work for 6 to 9 hours) or long-acting (work for 24 hours). You inhale most bronchodilators, so they start to act quickly. Always carry your quick-relief inhaler with you in case you need it while you are away from home. ¨ Corticosteroids (prednisone, budesonide). These reduce airway inflammation. They come in pill or inhaled form. You must take these medicines every day for them to work well. ? · A spacer may help you get more inhaled medicine to your lungs. Ask your doctor or pharmacist if a spacer is right for you. If it is, ask how to use it properly. ? · Do not take any vitamins, over-the-counter medicine, or herbal products without talking to your doctor first.   ? · If your doctor prescribed antibiotics, take them as directed. Do not stop taking them just because you feel better. You need to take the full course of antibiotics. ? · Oxygen therapy boosts the amount of oxygen in your blood and helps you breathe easier. Use the flow rate your doctor has recommended, and do not change it without talking to your doctor first.   Activity  ? · Get regular exercise. Walking is an easy way to get exercise. Start out slowly, and walk a little more each day. ? · Pay attention to your breathing. You are exercising too hard if you cannot talk while you are exercising. ? · Take short rest breaks when doing household chores and other activities. ? · Learn breathing methods-such as breathing through pursed lips-to help you become less short of breath. ? · If your doctor has not set you up with a pulmonary rehabilitation program, talk to him or her about whether rehab is right for you. Rehab includes exercise programs, education about your disease and how to manage it, help with diet and other changes, and emotional support. Diet  ? · Eat regular, healthy meals.  Use bronchodilators about 1 hour before you eat to make it easier to eat. Eat several small meals instead of three large ones. Drink beverages at the end of the meal. Avoid foods that are hard to chew. ? · Eat foods that contain protein so that you do not lose muscle mass. ? · Talk with your doctor if you gain too much weight or if you lose weight without trying. ?Mental health  ? · Talk to your family, friends, or a therapist about your feelings. It is normal to feel frightened, angry, hopeless, helpless, and even guilty. Talking openly about bad feelings can help you cope. If these feelings last, talk to your doctor. When should you call for help? Call 911 anytime you think you may need emergency care. For example, call if:  ? · You have severe trouble breathing. ?Call your doctor now or seek immediate medical care if:  ? · You have new or worse trouble breathing. ? · You cough up blood. ? · You have a fever. ? Watch closely for changes in your health, and be sure to contact your doctor if:  ? · You cough more deeply or more often, especially if you notice more mucus or a change in the color of your mucus. ? · You have new or worse swelling in your legs or belly. ? · You are not getting better as expected. Where can you learn more? Go to http://costa-jessica.info/. Fitz Gone in the search box to learn more about \"Chronic Obstructive Pulmonary Disease (COPD): Care Instructions. \"  Current as of: May 12, 2017  Content Version: 11.4  © 0664-1580 [employer]. Care instructions adapted under license by [employer] (which disclaims liability or warranty for this information). If you have questions about a medical condition or this instruction, always ask your healthcare professional. [address] any warranty or liability for your use of this information. Shortness of Breath: Care Instructions  Your Care Instructions  Shortness of breath has many causes. Sometimes conditions such as anxiety can lead to shortness of breath. Some people get mild shortness of breath when they exercise. Trouble breathing also can be a symptom of a serious problem, such as asthma, lung disease, emphysema, heart problems, and pneumonia. If your shortness of breath continues, you may need tests and treatment. Watch for any changes in your breathing and other symptoms. Follow-up care is a key part of your treatment and safety. Be sure to make and go to all appointments, and call your doctor if you are having problems. It's also a good idea to know your test results and keep a list of the medicines you take. How can you care for yourself at home? · Do not smoke or allow others to smoke around you. If you need help quitting, talk to your doctor about stop-smoking programs and medicines. These can increase your chances of quitting for good. · Get plenty of rest and sleep. · Take your medicines exactly as prescribed. Call your doctor if you think you are having a problem with your medicine. · Find healthy ways to deal with stress. ¨ Exercise daily. ¨ Get plenty of sleep. ¨ Eat regularly and well. When should you call for help? Call 911 anytime you think you may need emergency care. For example, call if:  ? · You have severe shortness of breath. ? · You have symptoms of a heart attack. These may include:  ¨ Chest pain or pressure, or a strange feeling in the chest.  ¨ Sweating. ¨ Shortness of breath. ¨ Nausea or vomiting. ¨ Pain, pressure, or a strange feeling in the back, neck, jaw, or upper belly or in one or both shoulders or arms. ¨ Lightheadedness or sudden weakness. ¨ A fast or irregular heartbeat. After you call 911, the  may tell you to chew 1 adult-strength or 2 to 4 low-dose aspirin. Wait for an ambulance. Do not try to drive yourself. ?Call your doctor now or seek immediate medical care if:  ? · Your shortness of breath gets worse or you start to wheeze. Wheezing is a high-pitched sound when you breathe. ? · You wake up at night out of breath or have to prop your head up on several pillows to breathe. ? · You are short of breath after only light activity or while at rest.   ? Watch closely for changes in your health, and be sure to contact your doctor if:  ? · You do not get better over the next 1 to 2 days. Where can you learn more? Go to http://costa-jessica.info/. Enter S780 in the search box to learn more about \"Shortness of Breath: Care Instructions. \"  Current as of: May 12, 2017  Content Version: 11.4  © 0309-7093 [employer]. Care instructions adapted under license by [employer] (which disclaims liability or warranty for this information). If you have questions about a medical condition or this instruction, always ask your healthcare professional. [address] any warranty or liability for your use of this information.

## 2018-04-05 NOTE — ED NOTES
Pt resting on stretcher with no acute distress noted and wife at bedside. Vital signs stable. Will continue to monitor pt and await further orders.

## 2018-04-05 NOTE — ED NOTES
I have reviewed discharge instructions with patient. The patient verbalized understanding. Patient armband removed and shredded. No acute distress noted at this time, pt alert and oriented. Stable at time of discharge. Vital signs stable. Pt wheeled to lobby for wife to drive home.

## 2018-04-05 NOTE — ED NOTES
I have reviewed discharge instructions with the patient. The patient verbalized understanding. Patient looks comfortable, left ED in stable condition. Vital signs stable upon discharge, no acute distress noted. No new complaints noted at this time.

## 2018-04-11 DIAGNOSIS — I10 HYPERTENSION, UNSPECIFIED TYPE: ICD-10-CM

## 2018-04-11 RX ORDER — AMLODIPINE BESYLATE 10 MG/1
TABLET ORAL
Qty: 30 TAB | Refills: 1 | OUTPATIENT
Start: 2018-04-11

## 2018-04-12 ENCOUNTER — OFFICE VISIT (OUTPATIENT)
Dept: CARDIOLOGY CLINIC | Age: 48
End: 2018-04-12

## 2018-04-12 VITALS
DIASTOLIC BLOOD PRESSURE: 84 MMHG | BODY MASS INDEX: 28.58 KG/M2 | HEART RATE: 88 BPM | SYSTOLIC BLOOD PRESSURE: 126 MMHG | OXYGEN SATURATION: 97 % | HEIGHT: 69 IN | WEIGHT: 193 LBS

## 2018-04-12 DIAGNOSIS — I50.32 CHRONIC DIASTOLIC HEART FAILURE (HCC): ICD-10-CM

## 2018-04-12 DIAGNOSIS — I10 ESSENTIAL HYPERTENSION, BENIGN: Primary | ICD-10-CM

## 2018-04-12 RX ORDER — LISINOPRIL 5 MG/1
5 TABLET ORAL DAILY
Qty: 30 TAB | Refills: 6 | Status: SHIPPED | OUTPATIENT
Start: 2018-04-12 | End: 2018-10-31 | Stop reason: SDUPTHER

## 2018-04-12 RX ORDER — AMLODIPINE BESYLATE 10 MG/1
10 TABLET ORAL DAILY
Qty: 90 TAB | Refills: 1 | Status: SHIPPED | OUTPATIENT
Start: 2018-04-12 | End: 2018-04-12 | Stop reason: ALTCHOICE

## 2018-04-12 NOTE — PROGRESS NOTES
[facility]    Inda Homans Sr. is a 49 y.o. male with Hx HTN, COPD on O2 2LNC, esophagitis, recent concern for diastolic heart failure who presents to the office today in cardiac evaluation. Pt recently established care with Dr. Adam Nguyen due to dyspnea and concern for diastolic heart failure. He had echocardiogram in 01/2018 which revealed normal EF with mild LVH. He has had multiple hospital admissions since endoscopy in 12/2018. He has been experiencing some ankle swelling since January. He also reports that he has been having some abdominal bloating as well and is currently under care of GI. He states that his shortness of breath has been improving and notes that he is able to take off his oxygen while sitting down/watching TV, as well as when he is taking a shower. He has had a couple ER visits within the past month due to shortness of breath associated with panic attacks and reports that he has been evaluated by psychiatrist, who increased his Ativan from PRN dosing to BID dosing, with improvement. He has been taking Lasix as previously prescribed but has not noticed any change in leg swelling or urine output. He has chronic chest pressure associated with his COPD, which he reports is unchanged. Denies any nausea, vomiting, fever, chills, sputum production.  No hematuria or other bleeding complaints    Past Medical History:   Diagnosis Date    Chronic lung disease     Chronic obstructive pulmonary disease ([address] 08/03/2017    PFTS 8/3/17    COPD, severe ([address]     Emphysema/COPD ([address]     Esophagitis 12/11/2017    Endoscopy    Essential hypertension, benign     History of stomach ulcers     Positive urine drug screen 01/2016    opiates and THC    Upper GI bleed          Past Surgical History:   Procedure Laterality Date    HX ENDOSCOPY  12/11/2017       Current Outpatient Prescriptions   Medication Sig    citalopram (CELEXA) 40 mg tablet Take 1 Tab by mouth daily.  LORazepam (ATIVAN) 1 mg tablet Take 1 Tab by mouth every eight (8) hours as needed for Anxiety. Max Daily Amount: 3 mg.  furosemide (LASIX) 20 mg tablet Take 1 Tab by mouth every other day.  mirtazapine (REMERON) 7.5 mg tablet Take 1 Tab by mouth nightly.  Nebulizer & Compressor machine 1 Each by Does Not Apply route every four (4) hours as needed.  albuterol (PROVENTIL VENTOLIN) 2.5 mg /3 mL (0.083 %) nebulizer solution Nebulized 1 vial for inhalation every 4 hours as needed    OXYGEN-AIR DELIVERY SYSTEMS (WALKABOUT 1 OXYGEN SYSTEM) 2.5 L/min by Nasal route continuous.  albuterol (PROVENTIL HFA, VENTOLIN HFA, PROAIR HFA) 90 mcg/actuation inhaler Take 2 Puffs by inhalation every four (4) hours as needed for Wheezing or Shortness of Breath.  pantoprazole (PROTONIX) 40 mg tablet Take 1 Tab by mouth Daily (before breakfast).  roflumilast (DALIRESP) tab tablet Take 1 Tab by mouth daily. Indications: PREVENTION OF BRONCHOSPASM WITH CHRONIC BRONCHITIS    tiotropium (SPIRIVA) 18 mcg inhalation capsule Take 1 Cap by inhalation daily. Indications: BRONCHOSPASM PREVENTION WITH COPD    fluticasone-salmeterol (ADVAIR) 500-50 mcg/dose diskus inhaler Take 1 Puff by inhalation two (2) times a day. No current facility-administered medications for this visit.         Allergies and Sensitivities:  No Known Allergies    Family History:  Family History   Problem Relation Age of Onset    Hypertension Mother     Heart Disease Mother     Diabetes Mother     Hypertension Father     Heart Disease Father     Cancer Father      lymphnodes    Diabetes Father        Social History:  Social History   Substance Use Topics    Smoking status: Former Smoker     Packs/day: 2.00     Years: 25.00     Types: Cigarettes     Start date: 5/28/1984     Quit date: 12/18/2017    Smokeless tobacco: Never Used    Alcohol use No     He  reports that he quit smoking about 3 months ago. His smoking use included Cigarettes. He started smoking about 33 years ago. He has a 50.00 pack-year smoking history. He has never used smokeless tobacco.  He  reports that he does not drink alcohol. Review of Systems:  Cardiac symptoms as noted above in HPI. + abdominal bloating, leg swelling, shortness of breath. All others negative. Denies fatigue, malaise, skin rash, joint pain, blurring vision, photophobia, neck pain, hemoptysis, chronic cough, nausea, vomiting, hematuria, burning micturition, BRBPR, chronic headaches. Physical Exam:  BP Readings from Last 3 Encounters:   04/12/18 126/84   04/04/18 137/84   03/28/18 119/77         Pulse Readings from Last 3 Encounters:   04/12/18 88   04/04/18 96   03/28/18 92          Wt Readings from Last 3 Encounters:   04/12/18 193 lb (87.5 kg)   03/28/18 190 lb (86.2 kg)   03/05/18 186 lb (84.4 kg)       Constitutional: Oriented to person, place, and time. HENT: Head: Normocephalic and atraumatic. ENT: No ear discharge noted. Eyes: Conjunctivae and extraocular motions are normal.   Neck: No JVD present. Carotid bruit is not appreciated. Cardiovascular: Regular rate and rhythm. No murmur, gallop or rubs appreciated. Lung: Breath sounds normal. No respiratory distress. No rhonchi or rales appreciated  Abdominal: No tenderness. No rebound and no guarding. Musculoskeletal: There is no appreciable pitting lower extremity edema. No cyanosis. Neurological: No gross motor deficit noted. Skin: No visible skin rash noted. Psychiatric: Normal mood and affect.          Review of Data  LABS:   Lab Results   Component Value Date/Time    Sodium 138 04/04/2018 08:05 PM    Potassium 3.7 04/04/2018 08:05 PM    Chloride 104 04/04/2018 08:05 PM    CO2 26 04/04/2018 08:05 PM    Glucose 100 (H) 04/04/2018 08:05 PM    BUN 11 04/04/2018 08:05 PM    Creatinine 0.82 04/04/2018 08:05 PM     Lipids Latest Ref Rng & Units 2/23/2018 1/23/2017   Chol, Total 100 - 199 mg/dL 199 144   HDL 40 - 60 MG/DL - 36(L)   LDL 0 - 100 MG/DL - 85.6   Trig 0 - 149 mg/dL 132 112   Chol/HDL Ratio 0 - 5.0   - 4.0   Some recent data might be hidden     Lab Results   Component Value Date/Time    ALT (SGPT) 34 03/19/2018 10:28 AM     Lab Results   Component Value Date/Time    Hemoglobin A1c 5.5 01/11/2018 03:26 AM       EKG    ECHO (01/2018)  LEFT VENTRICLE: Size was normal. Systolic function was normal. Ejection fraction was estimated in the range of 55 % to 60 %. There were no regional wall motion abnormalities. Wall thickness was mildly increased. DOPPLER: The study was not technically sufficient to allow evaluation of LV diastolic function due to tachycardia. RIGHT VENTRICLE: The size was normal. Systolic function was normal. DOPPLER: The tricuspid jet envelope definition was inadequate for estimation of RV systolic pressure. LEFT ATRIUM: Size was normal. LA volume index was 20 ml/m-sq. ATRIAL SEPTUM: Color Doppler evaluation was performed. There was no right-to-left shunt. RIGHT ATRIUM: Size was normal.    MITRAL VALVE: Normal valve structure. There was normal leaflet separation. DOPPLER: The transmitral velocity was within  the normal range. There was no evidence for stenosis. There was trivial regurgitation. AORTIC VALVE: The valve was trileaflet. Leaflets exhibited normal thickness and normal cuspal separation. DOPPLER: Transaortic velocity was within the normal range. There was no stenosis. There was no regurgitation. TRICUSPID VALVE: Normal valve structure. There was normal leaflet separation.  DOPPLER: The transtricuspid velocity was within the normal range. There was no evidence for tricuspid stenosis. There   was no significant regurgitation. Insufficient tricuspid regurgitation to estimate pulmonary artery pressure. PULMONIC VALVE: Leaflets exhibited normal thickness, no calcification, and normal cuspal separation.  DOPPLER: The transpulmonic velocity was within the normal range. There was no regurgitation. AORTA: The root exhibited normal size. SYSTEMIC VEINS: IVC: The inferior vena cava was normal in size and course. Respirophasic changes were normal.    PERICARDIUM: There was no pericardial effusion. The pericardium was normal in appearance. IMPRESSION & PLAN:  Stevo Walker is a 49 y.o. male with HTN, COPD, anxiety, chronic diastolic heart failure. Diastolic heart failure: Breathing is improving, leg swelling is unchanged. Continue 20 mg Lasix every other day. He has noted some peripheral edema since approx. January, noted to have started Amlodipine in December. Will discontinue Amlodipine and start Lisinopril 5 mg daily. Follow-up for re-evaluation and BMP in 2 weeks. Hypertension: Currently only on Amlodipine, started in 12/2017. Has had dose increased 2.5 mg --> 5 mg --> 10 mg daily. Ran out after yesterday's dose. Will discontinue as noted above and start on low-dose Lisinopril. COPD: On O2 2LNC. Followed by Dr. Ortega Maya, defer management to pulmonology. Anxiety: Recently switched from Ativan PRN to Ativan BID with improvement. Defer management to psychiatry. Importance of diet and exercise was discussed with patient. This plan was discussed with patient who is in agreement. Thank you for allowing me to participate in patient care. Please feel free to call me if you have any question or concern. Please note: This document has been produced using voice recognition software. Unrecognized errors in transcription may be present.

## 2018-04-12 NOTE — PROGRESS NOTES
1. Have you been to the ER, urgent care clinic since your last visit? Hospitalized since your last visit? Within last 2 weeks, panic attacks [facility]    2. Have you seen or consulted any other health care providers outside of the [address] [place] [address] since your last visit? Include any pap smears or colon screening.  No     Patient states swelling in ankles

## 2018-04-16 ENCOUNTER — TELEPHONE (OUTPATIENT)
Dept: PULMONOLOGY | Age: 48
End: 2018-04-16

## 2018-04-16 NOTE — TELEPHONE ENCOUNTER
Np Ludy Martell ([phone]) from [facility] would like to speak with Dr. Aby Arambula about this pt.

## 2018-04-23 ENCOUNTER — HOSPITAL ENCOUNTER (OUTPATIENT)
Dept: LAB | Age: 48
Discharge: HOME | End: 2018-04-23

## 2018-04-23 LAB — SENTARA SPECIMEN COL,SENBCF: NORMAL

## 2018-04-23 PROCEDURE — 99001 SPECIMEN HANDLING PT-LAB: CPT | Performed by: PHYSICIAN ASSISTANT

## 2018-04-25 ENCOUNTER — OFFICE VISIT (OUTPATIENT)
Dept: FAMILY MEDICINE CLINIC | Facility: CLINIC | Age: 48
End: 2018-04-25

## 2018-04-25 VITALS
DIASTOLIC BLOOD PRESSURE: 90 MMHG | SYSTOLIC BLOOD PRESSURE: 130 MMHG | HEIGHT: 69 IN | WEIGHT: 195 LBS | RESPIRATION RATE: 16 BRPM | BODY MASS INDEX: 28.88 KG/M2 | HEART RATE: 79 BPM | OXYGEN SATURATION: 95 % | TEMPERATURE: 96.4 F

## 2018-04-25 DIAGNOSIS — F41.1 GAD (GENERALIZED ANXIETY DISORDER): ICD-10-CM

## 2018-04-25 DIAGNOSIS — F41.1 GENERALIZED ANXIETY DISORDER: ICD-10-CM

## 2018-04-25 DIAGNOSIS — M54.2 NECK PAIN: ICD-10-CM

## 2018-04-25 DIAGNOSIS — G47.30 SLEEP APNEA, UNSPECIFIED TYPE: ICD-10-CM

## 2018-04-25 DIAGNOSIS — J44.9 COPD, SEVERE (HCC): Primary | ICD-10-CM

## 2018-04-25 DIAGNOSIS — I10 HTN, GOAL BELOW 130/80: ICD-10-CM

## 2018-04-25 RX ORDER — LORAZEPAM 1 MG/1
1 TABLET ORAL
Qty: 90 TAB | Refills: 0 | Status: SHIPPED | OUTPATIENT
Start: 2018-04-25 | End: 2018-08-24 | Stop reason: SDUPTHER

## 2018-04-25 RX ORDER — METHOCARBAMOL 500 MG/1
500 TABLET, FILM COATED ORAL 3 TIMES DAILY
Qty: 90 TAB | Refills: 1 | Status: SHIPPED | OUTPATIENT
Start: 2018-04-25 | End: 2018-05-29 | Stop reason: SDUPTHER

## 2018-04-25 NOTE — PROGRESS NOTES
HISTORY OF PRESENT ILLNESS  Clara Nolan is a 49 y.o. male. HPI Comments: F/u for anxiety with panic attacks occurring almost daily. He is currently taking increased dose of lorazepam and celexa. He has noticed some improvement with the increased dose. Reports insomnia d/t anxiety. He is followed by psych Saul Cagle) at [facility]. Pt with h/o COPD and emphysema followed by Dr Stephane Cobos. He is on continuous oxygen at 2.5L. c/o shortness of breath which worsens with anxiety. has recently seen improvement with his breathing. He no longer showers with his oxygen and went walking recently without his oxygen. Reports RA sats at 92% at home. Reports compliance with daliresp, advair and spiriva. He does use albuterol as needed. Reports he has stopped smoking. He will like to have a repeat sleep study. Most recent study was September 2017. New c/o neck pain and upper back pain for more than 1 week. Noticed increased pain with walking. Has been taking tylenol since his pulmonologist does not want him taking \"anything stronger\". Has not seen any improvement with tylenol. Took tylenol #3 in the past which helped. COPD   The history is provided by the patient. This is a chronic problem. The current episode started more than 1 week ago. The problem occurs daily. The problem has been gradually improving. Hypertension    The history is provided by the patient. This is a chronic problem. The current episode started more than 1 week ago. The problem has been gradually improving. Associated symptoms include neck pain. Risk factors include male gender. Back Pain    The history is provided by the patient. This is a new problem. The current episode started more than 1 week ago. The problem has not changed since onset. The problem occurs daily. Patient reports not work related injury. The pain is associated with no known injury. The pain is present in the upper back. The pain does not radiate.  The pain is at a severity of 6/10. The pain is the same all the time. He has tried analgesics for the symptoms. Neck Pain   The history is provided by the patient. This is a new problem. The current episode started more than 1 week ago. The problem occurs daily. The problem has not changed since onset. He has tried acetaminophen for the symptoms. The treatment provided no relief. Review of Systems   Constitutional: Negative. HENT: Negative. Respiratory: Positive for sputum production. Negative for cough. Gastrointestinal: Negative. Musculoskeletal: Positive for back pain and neck pain. Psychiatric/Behavioral: The patient is nervous/anxious. Past Medical History:   Diagnosis Date    Chronic lung disease     Chronic obstructive pulmonary disease ([address] 08/03/2017    PFTS 8/3/17    COPD, severe ([address]     Emphysema/COPD ([address]     Esophagitis 12/11/2017    Endoscopy    Essential hypertension, benign     History of stomach ulcers     Positive urine drug screen 01/2016    opiates and THC    Upper GI bleed      Past Surgical History:   Procedure Laterality Date    HX ENDOSCOPY  12/11/2017     Current Outpatient Prescriptions on File Prior to Visit   Medication Sig Dispense Refill    lisinopril (PRINIVIL, ZESTRIL) 5 mg tablet Take 1 Tab by mouth daily. 30 Tab 6    citalopram (CELEXA) 40 mg tablet Take 1 Tab by mouth daily. 30 Tab 4    furosemide (LASIX) 20 mg tablet Take 1 Tab by mouth every other day. 15 Tab 3    mirtazapine (REMERON) 7.5 mg tablet Take 1 Tab by mouth nightly. 90 Tab 1    Nebulizer & Compressor machine 1 Each by Does Not Apply route every four (4) hours as needed. 1 Each 0    albuterol (PROVENTIL VENTOLIN) 2.5 mg /3 mL (0.083 %) nebulizer solution Nebulized 1 vial for inhalation every 4 hours as needed 60 Each 3    OXYGEN-AIR DELIVERY SYSTEMS (WALKABOUT 1 OXYGEN SYSTEM) 2.5 L/min by Nasal route continuous.       albuterol (PROVENTIL HFA, VENTOLIN HFA, PROAIR HFA) 90 mcg/actuation inhaler Take 2 Puffs by inhalation every four (4) hours as needed for Wheezing or Shortness of Breath. 1 Inhaler 3    pantoprazole (PROTONIX) 40 mg tablet Take 1 Tab by mouth Daily (before breakfast). 30 Tab 0    roflumilast (DALIRESP) tab tablet Take 1 Tab by mouth daily. Indications: PREVENTION OF BRONCHOSPASM WITH CHRONIC BRONCHITIS 30 Tab 0    tiotropium (SPIRIVA) 18 mcg inhalation capsule Take 1 Cap by inhalation daily. Indications: BRONCHOSPASM PREVENTION WITH COPD 30 Cap 0    fluticasone-salmeterol (ADVAIR) 500-50 mcg/dose diskus inhaler Take 1 Puff by inhalation two (2) times a day. 1 Each 3     No current facility-administered medications on file prior to visit. Allergies and Intolerances:   No Known Allergies    Family History:   Family History   Problem Relation Age of Onset    Hypertension Mother     Heart Disease Mother     Diabetes Mother     Hypertension Father     Heart Disease Father     Cancer Father      lymphnodes    Diabetes Father        Social History:   He  reports that he quit smoking about 4 months ago. His smoking use included Cigarettes. He started smoking about 33 years ago. He has a 50.00 pack-year smoking history. He has never used smokeless tobacco. He  reports that he does not drink alcohol. Vitals:   Visit Vitals    /90    Pulse 79    Temp 96.4 °F (35.8 °C)    Resp 16    Ht 5' 9\" (1.753 m)    Wt 195 lb (88.5 kg)    SpO2 95%    BMI 28.8 kg/m2     Body surface area is 2.08 meters squared. Physical Exam   Constitutional: He is oriented to person, place, and time. He appears well-developed and well-nourished. Neck: Normal range of motion and full passive range of motion without pain. No muscular tenderness present. Cardiovascular: Normal rate. Pulmonary/Chest: Effort normal.   Neurological: He is alert and oriented to person, place, and time. Skin: Skin is warm. Psychiatric: He has a normal mood and affect.  His behavior is normal.   Nursing note and vitals reviewed. ASSESSMENT and PLAN    ICD-10-CM ICD-9-CM    1. COPD, severe ([facility] J44.9 496    2. Generalized anxiety disorder F41.1 300.02    3. HTN, goal below 130/80 I10 401.9    4. Neck pain M54.2 723.1 methocarbamol (ROBAXIN) 500 mg tablet   5. MYLES (generalized anxiety disorder) F41.1 300.02 LORazepam (ATIVAN) 1 mg tablet   6. Sleep apnea, unspecified type G47.30 780.57 REFERRAL TO PULMONARY DISEASE     Follow-up Disposition:  Return in about 4 months (around 8/25/2018), or if symptoms worsen or fail to improve, for HTN, anxiety, COPD.  cardiovascular risk and specific lipid/LDL goals reviewed  reviewed medications and side effects in detail    - Alarm signals discussed. ER precautions  - Plan of care reviewed with patient. Understanding verbalized and they are in agreement with plan of care.

## 2018-04-25 NOTE — MR AVS SNAPSHOT
[address] [place] [address] 
[phone] Patient: Kim Espinoza Sr. MRN: [id number] :1970 Visit Information Date & Time Provider Department Dept. Phone Encounter #  
 2018 10:15 AM Irma Mcnamara NP [facility] [phone] [id number] Follow-up Instructions Return in about 4 months (around 2018), or if symptoms worsen or fail to improve, for HTN, anxiety, COPD. Your Appointments 2018 10:00 AM  
Follow Up with Kandi Byrd NP [facility] ([address] [place] [address]) Appt Note: 2 wk f/u prev. saw Rubi [facility] [place] [address] [address] [address] [phone] [place]  [phone]  
  
    
 2018 10:15 AM  
Follow Up with Johnson Peabody, MD  
[address] ([address] [place] [address] Road) Appt Note: 3MFU FROM 18  
 [address] [place] [address], [address] [facility] [address] [place] [address], [address] [phone]  
  
    
 2018 10:00 AM  
Follow Up with Norm Larios MD  
[facility] ([address] [place] [address]) Appt Note: 6 month f/up [place] [address] [address] [id number] [phone] [address] [place] [address] [address] Upcoming Health Maintenance Date Due DTaP/Tdap/Td series (1 - Tdap) 10/27/1990 Allergies as of 2018  Review Complete On: 2018 By: Corie Cochran No Known Allergies Current Immunizations  Never Reviewed Name Date Influenza Vaccine 10/10/2011 12:00 AM  
 Influenza Vaccine (Quad) PF 10/23/2017  1:04 PM  
 Pneumococcal Polysaccharide (PPSV-23) 2018 10:10 AM, 10/10/2011 12:00 AM  
  
 Not reviewed this visit You Were Diagnosed With   
  
 Codes Comments COPD, severe ([facility]    -  Primary ICD-10-CM: J44.9 ICD-9-CM: 598 Generalized anxiety disorder     ICD-10-CM: F41.1 ICD-9-CM: 300.02   
 HTN, goal below 130/80     ICD-10-CM: I10 
ICD-9-CM: 401.9 Neck pain     ICD-10-CM: M54.2 ICD-9-CM: 723.1 Vitals BP Pulse Temp Resp Height(growth percentile) Weight(growth percentile) 130/90 79 96.4 °F (35.8 °C) 16 5' 9\" (1.753 m) 195 lb (88.5 kg) SpO2 BMI Smoking Status 95% 28.8 kg/m2 Former Smoker Vitals History BMI and BSA Data Body Mass Index Body Surface Area  
 28.8 kg/m 2 2.08 m 2 Preferred Pharmacy Pharmacy Name Phone Isabel Carbajal [address] [place] [address]. [phone] Your Updated Medication List  
  
   
This list is accurate as of 4/25/18 10:32 AM.  Always use your most recent med list.  
  
  
  
  
 * albuterol 90 mcg/actuation inhaler Commonly known as:  PROVENTIL HFA, VENTOLIN HFA, PROAIR HFA Take 2 Puffs by inhalation every four (4) hours as needed for Wheezing or Shortness of Breath. * albuterol 2.5 mg /3 mL (0.083 %) nebulizer solution Commonly known as:  PROVENTIL VENTOLIN Nebulized 1 vial for inhalation every 4 hours as needed  
  
 citalopram 40 mg tablet Commonly known as:  Collie Peaks Take 1 Tab by mouth daily. fluticasone-salmeterol 500-50 mcg/dose diskus inhaler Commonly known as:  ADVAIR Take 1 Puff by inhalation two (2) times a day. furosemide 20 mg tablet Commonly known as:  LASIX Take 1 Tab by mouth every other day. lisinopril 5 mg tablet Commonly known as:  Karn Desanctis Take 1 Tab by mouth daily. LORazepam 1 mg tablet Commonly known as:  ATIVAN Take 1 Tab by mouth every eight (8) hours as needed for Anxiety. Max Daily Amount: 3 mg.  
  
 methocarbamol 500 mg tablet Commonly known as:  ROBAXIN Take 1 Tab by mouth three (3) times daily. mirtazapine 7.5 mg tablet Commonly known as:  Kezia Captain Take 1 Tab by mouth nightly. Nebulizer & Compressor machine 1 Each by Does Not Apply route every four (4) hours as needed. pantoprazole 40 mg tablet Commonly known as:  PROTONIX Take 1 Tab by mouth Daily (before breakfast). roflumilast Tab tablet Commonly known as:  Andreina Jeannine Take 1 Tab by mouth daily. Indications: PREVENTION OF BRONCHOSPASM WITH CHRONIC BRONCHITIS  
  
 tiotropium 18 mcg inhalation capsule Commonly known as:  [place] Take 1 Cap by inhalation daily. Indications: BRONCHOSPASM PREVENTION WITH COPD  
  
 WALKABOUT 1 OXYGEN SYSTEM  
2.5 L/min by Nasal route continuous. * Notice: This list has 2 medication(s) that are the same as other medications prescribed for you. Read the directions carefully, and ask your doctor or other care provider to review them with you. Prescriptions Sent to Pharmacy Refills  
 methocarbamol (ROBAXIN) 500 mg tablet 1 Sig: Take 1 Tab by mouth three (3) times daily. Class: Normal  
 Pharmacy: [facility] [address] LEDEZMA [address]  #: [phone] Route: Oral  
  
Follow-up Instructions Return in about 4 months (around 8/25/2018), or if symptoms worsen or fail to improve, for HTN, anxiety, COPD. To-Do List   
 05/11/2018 9:00 AM  
  Appointment with [facility] CATH HOLDING; [facility] ANGIO RADIOLOGIST; [facility] IR RM 1 at [facility] RAD [address] IR ([phone]) DIET INSTRUCTIONS - NPO - Nothing to eat or drink after midnight before the day of your exam.  You may take small sips of water with your medications unless instructed otherwise. GENERAL INSTRUCTIONS - You must have someone to drive you home after the procedure, unless instructed otherwise. MEDICATIONS - Bring a complete list of all your medications including prescriptions, over-the-counter meds, herbals, vitamins & dietary supplements.   QUESTIONS Notify the Angio department in which your are scheduled. -[facility] [address] [place] [address] Jose Pickett [phone] Patient Instructions Neck: Exercises Your Care Instructions Here are some examples of typical rehabilitation exercises for your condition. Start each exercise slowly. Ease off the exercise if you start to have pain. Your doctor or physical therapist will tell you when you can start these exercises and which ones will work best for you. How to do the exercises Neck stretch 1. This stretch works best if you keep your shoulder down as you lean away from it. To help you remember to do this, start by relaxing your shoulders and lightly holding on to your thighs or your chair. 2. Tilt your head toward your shoulder and hold for 15 to 30 seconds. Let the weight of your head stretch your muscles. 3. If you would like a little added stretch, use your hand to gently and steadily pull your head toward your shoulder. For example, keeping your right shoulder down, lean your head to the left. 4. Repeat 2 to 4 times toward each shoulder. Diagonal neck stretch 1. Turn your head slightly toward the direction you will be stretching, and tilt your head diagonally toward your chest and hold for 15 to 30 seconds. 2. If you would like a little added stretch, use your hand to gently and steadily pull your head forward on the diagonal. 
3. Repeat 2 to 4 times toward each side. Dorsal glide stretch The dorsal glide stretches the back of the neck. If you feel pain, do not glide so far back. Some people find this exercise easier to do while lying on their backs with an ice pack on the neck. 1. Sit or stand tall and look straight ahead. 2. Slowly tuck your chin as you glide your head backward over your body 3. Hold for a count of 6, and then relax for up to 10 seconds. 4. Repeat 8 to 12 times. Chest and shoulder stretch 1. Sit or stand tall and glide your head backward as in the dorsal glide stretch. 2. Raise both arms so that your hands are next to your ears. 3. Take a deep breath, and as you breathe out, lower your elbows down and behind your back. You will feel your shoulder blades slide down and together, and at the same time you will feel a stretch across your chest and the front of your shoulders. 4. Hold for about 6 seconds, and then relax for up to 10 seconds. 5. Repeat 8 to 12 times. Strengthening: Hands on head 1. Move your head backward, forward, and side to side against gentle pressure from your hands, holding each position for about 6 seconds. 2. Repeat 8 to 12 times. Follow-up care is a key part of your treatment and safety. Be sure to make and go to all appointments, and call your doctor if you are having problems. It's also a good idea to know your test results and keep a list of the medicines you take. Where can you learn more? Go to http://costa-jessica.info/. Enter P975 in the search box to learn more about \"Neck: Exercises. \" Current as of: March 21, 2017 Content Version: 11.4 © 5363-9504 Healthwise, Incorporated. Care instructions adapted under license by [employer] (which disclaims liability or warranty for this information). If you have questions about a medical condition or this instruction, always ask your healthcare professional. [place] [address] any warranty or liability for your use of this information. Please provide this summary of care documentation to your next provider. Your primary care clinician is listed as Shadi Schrader. If you have any questions after today's visit, please call [phone].

## 2018-04-25 NOTE — PATIENT INSTRUCTIONS
Neck: Exercises  Your Care Instructions  Here are some examples of typical rehabilitation exercises for your condition. Start each exercise slowly. Ease off the exercise if you start to have pain. Your doctor or physical therapist will tell you when you can start these exercises and which ones will work best for you. How to do the exercises  Neck stretch    1. This stretch works best if you keep your shoulder down as you lean away from it. To help you remember to do this, start by relaxing your shoulders and lightly holding on to your thighs or your chair. 2. Tilt your head toward your shoulder and hold for 15 to 30 seconds. Let the weight of your head stretch your muscles. 3. If you would like a little added stretch, use your hand to gently and steadily pull your head toward your shoulder. For example, keeping your right shoulder down, lean your head to the left. 4. Repeat 2 to 4 times toward each shoulder. Diagonal neck stretch    1. Turn your head slightly toward the direction you will be stretching, and tilt your head diagonally toward your chest and hold for 15 to 30 seconds. 2. If you would like a little added stretch, use your hand to gently and steadily pull your head forward on the diagonal.  3. Repeat 2 to 4 times toward each side. Dorsal glide stretch    The dorsal glide stretches the back of the neck. If you feel pain, do not glide so far back. Some people find this exercise easier to do while lying on their backs with an ice pack on the neck. 1. Sit or stand tall and look straight ahead. 2. Slowly tuck your chin as you glide your head backward over your body  3. Hold for a count of 6, and then relax for up to 10 seconds. 4. Repeat 8 to 12 times. Chest and shoulder stretch    1. Sit or stand tall and glide your head backward as in the dorsal glide stretch. 2. Raise both arms so that your hands are next to your ears.   3. Take a deep breath, and as you breathe out, lower your elbows down and behind your back. You will feel your shoulder blades slide down and together, and at the same time you will feel a stretch across your chest and the front of your shoulders. 4. Hold for about 6 seconds, and then relax for up to 10 seconds. 5. Repeat 8 to 12 times. Strengthening: Hands on head    1. Move your head backward, forward, and side to side against gentle pressure from your hands, holding each position for about 6 seconds. 2. Repeat 8 to 12 times. Follow-up care is a key part of your treatment and safety. Be sure to make and go to all appointments, and call your doctor if you are having problems. It's also a good idea to know your test results and keep a list of the medicines you take. Where can you learn more? Go to http://costa-jessica.info/. Enter P975 in the search box to learn more about \"Neck: Exercises. \"  Current as of: March 21, 2017  Content Version: 11.4  © 5673-4515 Healthwise, Incorporated. Care instructions adapted under license by [employer] (which disclaims liability or warranty for this information). If you have questions about a medical condition or this instruction, always ask your healthcare professional. [address] any warranty or liability for your use of this information.

## 2018-04-26 ENCOUNTER — OFFICE VISIT (OUTPATIENT)
Dept: CARDIOLOGY CLINIC | Age: 48
End: 2018-04-26

## 2018-04-26 VITALS
WEIGHT: 194 LBS | SYSTOLIC BLOOD PRESSURE: 120 MMHG | HEART RATE: 71 BPM | HEIGHT: 69 IN | BODY MASS INDEX: 28.73 KG/M2 | OXYGEN SATURATION: 96 % | DIASTOLIC BLOOD PRESSURE: 84 MMHG

## 2018-04-26 DIAGNOSIS — I10 ESSENTIAL HYPERTENSION, BENIGN: Primary | ICD-10-CM

## 2018-04-26 DIAGNOSIS — I10 ESSENTIAL HYPERTENSION, BENIGN: ICD-10-CM

## 2018-04-26 DIAGNOSIS — J44.9 COPD, SEVERE (HCC): ICD-10-CM

## 2018-04-26 DIAGNOSIS — I50.32 CHRONIC DIASTOLIC HEART FAILURE (HCC): ICD-10-CM

## 2018-04-26 NOTE — PROGRESS NOTES
Dal Duverney today for a two-week follow-up. He was last seen by Lena Ford, our physician assistant on April 12, 2018. During that visit, he continued to complain of lower extremity edema for which was instructed to continue taking his Lasix 20 mg every other day. Amlodipine was discontinued as it was felt that it may be contributing to his lower extremity edema. He was then started on lisinopril 5 mg daily for blood pressure control. He is a 54-year-old  male with history of severe COPD, essential hypertension, and diastolic heart failure. He is being followed by gastroenterology for complaints of abdominal bloating and he states that he is going to have a biopsy done in the near future. He also has history of anxiety and panic attacks which is followed by psychiatry. His last echocardiogram was done in January 2018 and it showed ejection fraction 55-60%, no wall motion abnormalities, and no valvular pathology. Since his last visit, he states that he is feeling somewhat better. He has noticed only slight improvement in his lower extremity edema. He states that the dyspnea on exertion has improved. He states that he has been able to walk several blocks without his oxygen and when he returns home, his oxygen saturation is still around 93%. He remains on oxygen at 2 L/min via nasal cannula. Denies chest pain, tightness, heaviness, and palpitations. He states that he sleeps with his head elevated for comfort. Denies shortness of breath at rest, admits to mild, occasional dyspnea on exertion, orthopnea and denies PND. Admits to abdominal bloating. Denies lightheadedness, dizziness, and syncope. Denies lower extremity edema and claudication. Denies nausea, vomiting, diarrhea, melena, hematochezia. Denies hematuria, urgency, frequency. Denies fever, chills.         PMH:  Past Medical History:   Diagnosis Date    Chronic lung disease     Chronic obstructive pulmonary disease ([facility] 08/03/2017    PFTS 8/3/17    COPD, severe (HCC)     Emphysema/COPD ([facility]     Esophagitis 12/11/2017    Endoscopy    Essential hypertension, benign     History of stomach ulcers     Positive urine drug screen 01/2016    opiates and THC    Upper GI bleed        PSH:  Past Surgical History:   Procedure Laterality Date    HX ENDOSCOPY  12/11/2017       MEDS:  Current Outpatient Prescriptions   Medication Sig    methocarbamol (ROBAXIN) 500 mg tablet Take 1 Tab by mouth three (3) times daily.  LORazepam (ATIVAN) 1 mg tablet Take 1 Tab by mouth every eight (8) hours as needed for Anxiety. Max Daily Amount: 3 mg.  lisinopril (PRINIVIL, ZESTRIL) 5 mg tablet Take 1 Tab by mouth daily.  citalopram (CELEXA) 40 mg tablet Take 1 Tab by mouth daily.  furosemide (LASIX) 20 mg tablet Take 1 Tab by mouth every other day.  mirtazapine (REMERON) 7.5 mg tablet Take 1 Tab by mouth nightly.  Nebulizer & Compressor machine 1 Each by Does Not Apply route every four (4) hours as needed.  albuterol (PROVENTIL VENTOLIN) 2.5 mg /3 mL (0.083 %) nebulizer solution Nebulized 1 vial for inhalation every 4 hours as needed    OXYGEN-AIR DELIVERY SYSTEMS (WALKABOUT 1 OXYGEN SYSTEM) 2.5 L/min by Nasal route continuous.  albuterol (PROVENTIL HFA, VENTOLIN HFA, PROAIR HFA) 90 mcg/actuation inhaler Take 2 Puffs by inhalation every four (4) hours as needed for Wheezing or Shortness of Breath.  pantoprazole (PROTONIX) 40 mg tablet Take 1 Tab by mouth Daily (before breakfast).  roflumilast (DALIRESP) tab tablet Take 1 Tab by mouth daily. Indications: PREVENTION OF BRONCHOSPASM WITH CHRONIC BRONCHITIS    tiotropium (SPIRIVA) 18 mcg inhalation capsule Take 1 Cap by inhalation daily. Indications: BRONCHOSPASM PREVENTION WITH COPD    fluticasone-salmeterol (ADVAIR) 500-50 mcg/dose diskus inhaler Take 1 Puff by inhalation two (2) times a day. No current facility-administered medications for this visit. Allergies and Sensitivities:  No Known Allergies    Family History:  Family History   Problem Relation Age of Onset    Hypertension Mother     Heart Disease Mother     Diabetes Mother     Hypertension Father     Heart Disease Father     Cancer Father      lymphnodes    Diabetes Father        Social History:  He  reports that he quit smoking about 4 months ago. His smoking use included Cigarettes. He started smoking about 33 years ago. He has a 50.00 pack-year smoking history. He has never used smokeless tobacco.  He  reports that he does not drink alcohol. Physical:  Visit Vitals    /84    Pulse 71    Ht 5' 9\" (1.753 m)    Wt 88 kg (194 lb)    SpO2 96%    BMI 28.65 kg/m2         Exam:  Neck:  Supple, no JVD, no carotid bruits  CV:  Normal S1 and  S2, no murmurs, rubs, or gallops noted  Lungs:  Clear to ausculation throughout, no wheezes or rales  Abd:  Soft, non-tender, non-distended with good bowel sounds. No hepatosplenomegaly  Extremities: 1+ slightly pitting lower extremity edema      Data:  EKG:      LABS:  Lab Results   Component Value Date/Time    Sodium 138 04/04/2018 08:05 PM    Potassium 3.7 04/04/2018 08:05 PM    Chloride 104 04/04/2018 08:05 PM    CO2 26 04/04/2018 08:05 PM    Glucose 100 (H) 04/04/2018 08:05 PM    BUN 11 04/04/2018 08:05 PM    Creatinine 0.82 04/04/2018 08:05 PM     Lab Results   Component Value Date/Time    Cholesterol, total 199 02/23/2018 12:04 PM    HDL Cholesterol 36 (L) 01/23/2017 01:11 PM    LDL, calculated 85.6 01/23/2017 01:11 PM    Triglyceride 132 02/23/2018 12:04 PM    CHOL/HDL Ratio 4.0 01/23/2017 01:11 PM     Lab Results   Component Value Date/Time    ALT (SGPT) 34 03/19/2018 10:28 AM         Impression/Plan:  1. Severe COPD, on oxygen at 2 to 2.5L/min  2. Diastolic heart failure, appears compensated  3. Lower extremity edema, improving  4.   Essential hypertension, blood pressure stable     Mr. Lim Joaquina seen today for follow-up of his blood pressure after his amlodipine was discontinued due to concerns regarding lower extremity edema. He was started on lisinopril 5 mg daily when seen by Mat Costello, our physician assistant, on April 12, 2018. He was asked to return today for follow-up. Since that visit, he states that he is feeling somewhat better. He has not noticed a marked improvement in his lower extremity edema. His blood pressure is fairly well-controlled at 134/82. His breath sounds are clear but slightly diminished. He is wearing his oxygen and his O2 saturation was 96%. He has noticed improvement in his activity tolerance. He states that he has been able to walk several blocks without his oxygen without becoming extremely short of breath. When he returns home and he checks his oxygen level, his oxygen saturation has been around 93%. He is trying to remain physically active and trying to improve his diet. Because he continues to have some mild lower extremity edema, he was instructed to take Lasix 20 mg daily for 4 days and then back to 20 mg every other day. He is to continue his other medications as prescribed. We had a long discussion today regarding the importance of maintaining a low-sodium diet, 2000 mg per day. He was asked to weigh daily and to record his weights. He was advised to call the office and let us know if he notices progressive weight gain over a 2-3 day period of time. Congestive heart failure teaching reinforced today. Advised to limit sodium intake to no more than 2000mg per day and to also limit fluid intake to 48 ounces per day (unless otherwise specified). Advised to weigh daily every morning and record weights. Instructed to call our office if progressive weight gain is noted over a 2 to 3 day period of time, shortness of breath increases, or if abdominal bloating, nausea, fatigue, or increased lower extremity edema is noted.   Greater than 50% of a 40 minute visit was spent in discussion, counseling, and answering questions. He will follow-up with Dr. Domi Beltran as scheduled and as needed. Randy Quevedo MSN, FNP-BC    Please note:  Portions of this chart were created with Dragon medical speech to text program.  Unrecognized errors may be present.

## 2018-04-26 NOTE — PATIENT INSTRUCTIONS
Take Lasix 20mg daily for 4 days and then resume 20mg every other day  Continue present medication regimen  Follow-up with Dr. Karri Wilson as scheduled and as needed  Low sodium diet, 2000mg per day      Low Sodium Diet (2,000 Milligram): Care Instructions  Your Care Instructions    Too much sodium causes your body to hold on to extra water. This can raise your blood pressure and force your heart and kidneys to work harder. In very serious cases, this could cause you to be put in the hospital. It might even be life-threatening. By limiting sodium, you will feel better and lower your risk of serious problems. The most common source of sodium is salt. People get most of the salt in their diet from canned, prepared, and packaged foods. Fast food and restaurant meals also are very high in sodium. Your doctor will probably limit your sodium to less than 2,000 milligrams (mg) a day. This limit counts all the sodium in prepared and packaged foods and any salt you add to your food. Follow-up care is a key part of your treatment and safety. Be sure to make and go to all appointments, and call your doctor if you are having problems. It's also a good idea to know your test results and keep a list of the medicines you take. How can you care for yourself at home? Read food labels  · Read labels on cans and food packages. The labels tell you how much sodium is in each serving. Make sure that you look at the serving size. If you eat more than the serving size, you have eaten more sodium. · Food labels also tell you the Percent Daily Value for sodium. Choose products with low Percent Daily Values for sodium. · Be aware that sodium can come in forms other than salt, including monosodium glutamate (MSG), sodium citrate, and sodium bicarbonate (baking soda). MSG is often added to Asian food. When you eat out, you can sometimes ask for food without MSG or added salt.   Buy low-sodium foods  · Buy foods that are labeled \"unsalted\" (no salt added), \"sodium-free\" (less than 5 mg of sodium per serving), or \"low-sodium\" (less than 140 mg of sodium per serving). Foods labeled \"reduced-sodium\" and \"light sodium\" may still have too much sodium. Be sure to read the label to see how much sodium you are getting. · Buy fresh vegetables, or frozen vegetables without added sauces. Buy low-sodium versions of canned vegetables, soups, and other canned goods. Prepare low-sodium meals  · Cut back on the amount of salt you use in cooking. This will help you adjust to the taste. Do not add salt after cooking. One teaspoon of salt has about 2,300 mg of sodium. · Take the salt shaker off the table. · Flavor your food with garlic, lemon juice, onion, vinegar, herbs, and spices. Do not use soy sauce, lite soy sauce, steak sauce, onion salt, garlic salt, celery salt, mustard, or ketchup on your food. · Use low-sodium salad dressings, sauces, and ketchup. Or make your own salad dressings and sauces without adding salt. · Use less salt (or none) when recipes call for it. You can often use half the salt a recipe calls for without losing flavor. Other foods such as rice, pasta, and grains do not need added salt. · Rinse canned vegetables, and cook them in fresh water. This removes some-but not all-of the salt. · Avoid water that is naturally high in sodium or that has been treated with water softeners, which add sodium. Call your local water company to find out the sodium content of your water supply. If you buy bottled water, read the label and choose a sodium-free brand. Avoid high-sodium foods  · Avoid eating:  ¨ Smoked, cured, salted, and canned meat, fish, and poultry. ¨ Ham, andrew, hot dogs, and luncheon meats. ¨ Regular, hard, and processed cheese and regular peanut butter. ¨ Crackers with salted tops, and other salted snack foods such as pretzels, chips, and salted popcorn. ¨ Frozen prepared meals, unless labeled low-sodium.   ¨ Canned and dried soups, broths, and bouillon, unless labeled sodium-free or low-sodium. ¨ Canned vegetables, unless labeled sodium-free or low-sodium. ¨ [employer] fries, pizza, tacos, and other fast foods. ¨ Pickles, olives, ketchup, and other condiments, especially soy sauce, unless labeled sodium-free or low-sodium. Where can you learn more? Go to http://costa-jessica.info/. Enter Q361 in the search box to learn more about \"Low Sodium Diet (2,000 Milligram): Care Instructions. \"  Current as of: May 12, 2017  Content Version: 11.4  © 9397-8168 [employer]. Care instructions adapted under license by [employer] (which disclaims liability or warranty for this information). If you have questions about a medical condition or this instruction, always ask your healthcare professional. [place] [address] any warranty or liability for your use of this information. How to Read a Food Label to Limit Sodium: Care Instructions  Your Care Instructions  Sodium causes your body to hold on to extra water. This can raise your blood pressure and force your heart and kidneys to work harder. In very serious cases, this could cause you to be put in the hospital. It might even be life-threatening. By limiting sodium, you will feel better and lower your risk of serious problems. Processed foods, fast food, and restaurant foods are the major sources of dietary sodium. The most common name for sodium is salt. Try to limit how much sodium you eat to less than 2,300 milligrams (mg) a day. If you limit your sodium to 1,500 mg a day, you can lower your blood pressure even more. This limit counts all the salt that you eat in foods you cook or in packaged foods. Keep a list of everything you eat and drink. Follow-up care is a key part of your treatment and safety. Be sure to make and go to all appointments, and call your doctor if you are having problems.  It's also a good idea to know your test results and keep a list of the medicines you take. How can you care for yourself at home? Read ingredient lists on food labels  · Read the list of ingredients on food labels to help you find how much sodium is in a food. The label lists the ingredients in a food in descending order (from the most to the least). If salt or sodium is high on the list, there may be a lot of sodium in the food. · Know that sodium has different names. Sodium is also called monosodium glutamate (MSG, common in [place] food), sodium citrate, sodium alginate, sodium hydroxide, and sodium phosphate. Read Nutrition Facts labels  · On most foods, there is a Nutrition Facts label. This will tell you how much sodium is in one serving of food. Look at both the serving size and the sodium amount. The serving size is located at the top of the label, usually right under the \"Nutrition Facts\" title. The amount of sodium is given in the list under the title. It is given in milligrams (mg). ¨ Check the serving size carefully. A single serving is often very small, and you may eat more than one serving. If this is the case, you will eat more sodium than listed on the label. For example, if the serving size for a canned soup is 1 cup and the sodium amount is 470 mg, if you have 2 cups you will eat 940 mg of sodium. · The nutrition facts for fresh fruits and vegetables are not listed on the food. They may be listed somewhere in the store. These foods usually have no sodium or low sodium. · The Nutrition Facts label also gives you the Percent Daily Value for sodium. This is how much of the recommended amount of sodium a serving contains. The daily value for sodium is less than 2,300 mg. So if the Percent Daily Value says 50%, this means one serving is giving you half of this, or 1,150 mg. Buy low-sodium foods  · Look for foods that are made with less sodium. Watch for the following words on the label.   ¨ \"Unsalted\" means there is no sodium added to the food. But there may be sodium already in the food naturally. ¨ \"Sodium-free\" means a serving has less than 5 mg of sodium. ¨ \"Very low sodium\" means a serving has 35 mg or less of sodium. ¨ \"Low-sodium\" means a serving has 140 mg or less of sodium. · \"Reduced-sodium\" means that there is 25% less sodium than what the food normally has. This is still usually too much sodium. Try not to buy foods with this on the label. · Buy fresh vegetables, or frozen vegetables without added sauces. Buy low-sodium versions of canned vegetables, soups, and other canned goods. Where can you learn more? Go to http://costa-jessica.info/. Enter 26 [id number] in the search box to learn more about \"How to Read a Food Label to Limit Sodium: Care Instructions. \"  Current as of: May 12, 2017  Content Version: 11.4  © 8241-5649 [employer]. Care instructions adapted under license by [employer] (which disclaims liability or warranty for this information). If you have questions about a medical condition or this instruction, always ask your healthcare professional. [address] any warranty or liability for your use of this information.

## 2018-04-26 NOTE — MR AVS SNAPSHOT
[address] [address] [address] [address] 
[phone] Patient: Ivis Peterson Sr. MRN: [id number] :1970 Visit Information Date & Time Provider Department Dept. Phone Encounter #  
 2018 10:00 AM Vahid Fall NP [facility] [address] [id number] Your Appointments 2018 10:15 AM  
Follow Up with Manju Evangelista MD  
[address] ([facility]) Appt Note: 3MFU FROM 18  
 [address] [place] [address], [address] [address]  
[phone]  
  
   
 [address] [place] [address], 1106 [facility] [address] [place] [address]  
  
    
 2018  9:00 AM  
ROUTINE CARE with Corinna Mccoy NP [facility] Main Office ([facility]) Appt Note: 4 month f/u  appt for HTN, anxiety, COPD.  
 14 [facility] 1 [facility] [address]  
[phone]  
  
   
 [address] 2018 10:00 AM  
Follow Up with Sindi Moran MD  
[facility] ([facility]) Appt Note: 6 month f/up [place] [address] [address] [id number] [phone] [address] [place] [address] Upcoming Health Maintenance Date Due DTaP/Tdap/Td series (1 - Tdap) 10/27/1990 Allergies as of 2018  Review Complete On: 2018 By: Corinna Mccoy NP No Known Allergies Current Immunizations  Never Reviewed Name Date Influenza Vaccine 10/10/2011 12:00 AM  
 Influenza Vaccine (Quad) PF 10/23/2017  1:04 PM  
 Pneumococcal Polysaccharide (PPSV-23) 2018 10:10 AM, 10/10/2011 12:00 AM  
  
 Not reviewed this visit Vitals BP Pulse Height(growth percentile) Weight(growth percentile) SpO2 BMI  
 120/84 71 5' 9\" (1.753 m) 194 lb (88 kg) 96% 28.65 kg/m2 Smoking Status Former Smoker Vitals History BMI and BSA Data Body Mass Index Body Surface Area  
 28.65 kg/m 2 2.07 m 2 Preferred Pharmacy Pharmacy Name Phone [facility] [address] [place] [address]. [phone] Your Updated Medication List  
  
   
This list is accurate as of 4/26/18 10:28 AM.  Always use your most recent med list.  
  
  
  
  
 * albuterol 90 mcg/actuation inhaler Commonly known as:  PROVENTIL HFA, VENTOLIN HFA, PROAIR HFA Take 2 Puffs by inhalation every four (4) hours as needed for Wheezing or Shortness of Breath. * albuterol 2.5 mg /3 mL (0.083 %) nebulizer solution Commonly known as:  PROVENTIL VENTOLIN Nebulized 1 vial for inhalation every 4 hours as needed  
  
 citalopram 40 mg tablet Commonly known as:  Charlie Copa Take 1 Tab by mouth daily. fluticasone-salmeterol 500-50 mcg/dose diskus inhaler Commonly known as:  ADVAIR Take 1 Puff by inhalation two (2) times a day. furosemide 20 mg tablet Commonly known as:  LASIX Take 1 Tab by mouth every other day. lisinopril 5 mg tablet Commonly known as:  Enmanuel Human Take 1 Tab by mouth daily. LORazepam 1 mg tablet Commonly known as:  ATIVAN Take 1 Tab by mouth every eight (8) hours as needed for Anxiety. Max Daily Amount: 3 mg.  
  
 methocarbamol 500 mg tablet Commonly known as:  ROBAXIN Take 1 Tab by mouth three (3) times daily. mirtazapine 7.5 mg tablet Commonly known as:  Phyllistine Bright Take 1 Tab by mouth nightly. Nebulizer & Compressor machine 1 Each by Does Not Apply route every four (4) hours as needed. pantoprazole 40 mg tablet Commonly known as:  PROTONIX Take 1 Tab by mouth Daily (before breakfast). roflumilast Tab tablet Commonly known as:  Dewayne Fluke Take 1 Tab by mouth daily. Indications: PREVENTION OF BRONCHOSPASM WITH CHRONIC BRONCHITIS  
  
 tiotropium 18 mcg inhalation capsule Commonly known as:  Chelsea Denson Take 1 Cap by inhalation daily. Indications: BRONCHOSPASM PREVENTION WITH COPD  
  
 WALKABOUT 1 OXYGEN SYSTEM  
2.5 L/min by Nasal route continuous. * Notice: This list has 2 medication(s) that are the same as other medications prescribed for you. Read the directions carefully, and ask your doctor or other care provider to review them with you. To-Do List   
 05/11/2018 9:00 AM  
  Appointment with [facility] CATH HOLDING; [facility] ANGIO RADIOLOGIST; [facility] IR RM 1 at [facility] RAD [address] IR ([phone]) DIET INSTRUCTIONS - NPO - Nothing to eat or drink after midnight before the day of your exam.  You may take small sips of water with your medications unless instructed otherwise. GENERAL INSTRUCTIONS - You must have someone to drive you home after the procedure, unless instructed otherwise. MEDICATIONS - Bring a complete list of all your medications including prescriptions, over-the-counter meds, herbals, vitamins & dietary supplements. QUESTIONS Notify the Angio department in which your are scheduled. -David Capellan. [facility] [address] [place] [phone] Patient Instructions Continue present medication regimen Follow-up with Dr. Imtiaz Hernandez as scheduled and as needed Low sodium diet, 2000mg per day Low Sodium Diet (2,000 Milligram): Care Instructions Your Care Instructions Too much sodium causes your body to hold on to extra water. This can raise your blood pressure and force your heart and kidneys to work harder. In very serious cases, this could cause you to be put in the hospital. It might even be life-threatening. By limiting sodium, you will feel better and lower your risk of serious problems. The most common source of sodium is salt. People get most of the salt in their diet from canned, prepared, and packaged foods. Fast food and restaurant meals also are very high in sodium.  Your doctor will probably limit your sodium to less than 2,000 milligrams (mg) a day. This limit counts all the sodium in prepared and packaged foods and any salt you add to your food. Follow-up care is a key part of your treatment and safety. Be sure to make and go to all appointments, and call your doctor if you are having problems. It's also a good idea to know your test results and keep a list of the medicines you take. How can you care for yourself at home? Read food labels · Read labels on cans and food packages. The labels tell you how much sodium is in each serving. Make sure that you look at the serving size. If you eat more than the serving size, you have eaten more sodium. · Food labels also tell you the Percent Daily Value for sodium. Choose products with low Percent Daily Values for sodium. · Be aware that sodium can come in forms other than salt, including monosodium glutamate (MSG), sodium citrate, and sodium bicarbonate (baking soda). MSG is often added to Asian food. When you eat out, you can sometimes ask for food without MSG or added salt. Buy low-sodium foods · Buy foods that are labeled \"unsalted\" (no salt added), \"sodium-free\" (less than 5 mg of sodium per serving), or \"low-sodium\" (less than 140 mg of sodium per serving). Foods labeled \"reduced-sodium\" and \"light sodium\" may still have too much sodium. Be sure to read the label to see how much sodium you are getting. · Buy fresh vegetables, or frozen vegetables without added sauces. Buy low-sodium versions of canned vegetables, soups, and other canned goods. Prepare low-sodium meals · Cut back on the amount of salt you use in cooking. This will help you adjust to the taste. Do not add salt after cooking. One teaspoon of salt has about 2,300 mg of sodium. · Take the salt shaker off the table. · Flavor your food with garlic, lemon juice, onion, vinegar, herbs, and spices.  Do not use soy sauce, lite soy sauce, steak sauce, onion salt, garlic salt, celery salt, mustard, or ketchup on your food. · Use low-sodium salad dressings, sauces, and ketchup. Or make your own salad dressings and sauces without adding salt. · Use less salt (or none) when recipes call for it. You can often use half the salt a recipe calls for without losing flavor. Other foods such as rice, pasta, and grains do not need added salt. · Rinse canned vegetables, and cook them in fresh water. This removes some-but not all-of the salt. · Avoid water that is naturally high in sodium or that has been treated with water softeners, which add sodium. Call your local water company to find out the sodium content of your water supply. If you buy bottled water, read the label and choose a sodium-free brand. Avoid high-sodium foods · Avoid eating: ¨ Smoked, cured, salted, and canned meat, fish, and poultry. ¨ Ham, andrew, hot dogs, and luncheon meats. ¨ Regular, hard, and processed cheese and regular peanut butter. ¨ Crackers with salted tops, and other salted snack foods such as pretzels, chips, and salted popcorn. ¨ Frozen prepared meals, unless labeled low-sodium. ¨ Canned and dried soups, broths, and bouillon, unless labeled sodium-free or low-sodium. ¨ Canned vegetables, unless labeled sodium-free or low-sodium. ¨ Western Fidelia fries, pizza, tacos, and other fast foods. ¨ Pickles, olives, ketchup, and other condiments, especially soy sauce, unless labeled sodium-free or low-sodium. Where can you learn more? Go to http://costa-jessica.info/. Enter S491 in the search box to learn more about \"Low Sodium Diet (2,000 Milligram): Care Instructions. \" Current as of: May 12, 2017 Content Version: 11.4 © 5267-2991 [employer]. Care instructions adapted under license by [employer] (which disclaims liability or warranty for this information).  If you have questions about a medical condition or this instruction, always ask your healthcare professional. [place] [address] any warranty or liability for your use of this information. How to Read a Food Label to Limit Sodium: Care Instructions Your Care Instructions Sodium causes your body to hold on to extra water. This can raise your blood pressure and force your heart and kidneys to work harder. In very serious cases, this could cause you to be put in the hospital. It might even be life-threatening. By limiting sodium, you will feel better and lower your risk of serious problems. Processed foods, fast food, and restaurant foods are the major sources of dietary sodium. The most common name for sodium is salt. Try to limit how much sodium you eat to less than 2,300 milligrams (mg) a day. If you limit your sodium to 1,500 mg a day, you can lower your blood pressure even more. This limit counts all the salt that you eat in foods you cook or in packaged foods. Keep a list of everything you eat and drink. Follow-up care is a key part of your treatment and safety. Be sure to make and go to all appointments, and call your doctor if you are having problems. It's also a good idea to know your test results and keep a list of the medicines you take. How can you care for yourself at home? Read ingredient lists on food labels · Read the list of ingredients on food labels to help you find how much sodium is in a food. The label lists the ingredients in a food in descending order (from the most to the least). If salt or sodium is high on the list, there may be a lot of sodium in the food. · Know that sodium has different names. Sodium is also called monosodium glutamate (MSG, common in [facility] food), sodium citrate, sodium alginate, sodium hydroxide, and sodium phosphate. Read Nutrition Facts labels · On most foods, there is a Nutrition Facts label.  This will tell you how much sodium is in one serving of food. Look at both the serving size and the sodium amount. The serving size is located at the top of the label, usually right under the \"Nutrition Facts\" title. The amount of sodium is given in the list under the title. It is given in milligrams (mg). ¨ Check the serving size carefully. A single serving is often very small, and you may eat more than one serving. If this is the case, you will eat more sodium than listed on the label. For example, if the serving size for a canned soup is 1 cup and the sodium amount is 470 mg, if you have 2 cups you will eat 940 mg of sodium. · The nutrition facts for fresh fruits and vegetables are not listed on the food. They may be listed somewhere in the store. These foods usually have no sodium or low sodium. · The Nutrition Facts label also gives you the Percent Daily Value for sodium. This is how much of the recommended amount of sodium a serving contains. The daily value for sodium is less than 2,300 mg. So if the Percent Daily Value says 50%, this means one serving is giving you half of this, or 1,150 mg. Buy low-sodium foods · Look for foods that are made with less sodium. Watch for the following words on the label. ¨ \"Unsalted\" means there is no sodium added to the food. But there may be sodium already in the food naturally. ¨ \"Sodium-free\" means a serving has less than 5 mg of sodium. ¨ \"Very low sodium\" means a serving has 35 mg or less of sodium. ¨ \"Low-sodium\" means a serving has 140 mg or less of sodium. · \"Reduced-sodium\" means that there is 25% less sodium than what the food normally has. This is still usually too much sodium. Try not to buy foods with this on the label. · Buy fresh vegetables, or frozen vegetables without added sauces. Buy low-sodium versions of canned vegetables, soups, and other canned goods. Where can you learn more? Go to http://elsy.info/. Enter 26 076490 in the search box to learn more about \"How to Read a Food Label to Limit Sodium: Care Instructions. \" Current as of: May 12, 2017 Content Version: 11.4 © 3563-6178 [employer]. Care instructions adapted under license by [employer] (which disclaims liability or warranty for this information). If you have questions about a medical condition or this instruction, always ask your healthcare professional. [place] [address] any warranty or liability for your use of this information. Please provide this summary of care documentation to your next provider. Your primary care clinician is listed as Bi Juan. If you have any questions after today's visit, please call [phone].

## 2018-04-30 NOTE — TELEPHONE ENCOUNTER
Per pt, [facility] says they need new prescription for his Daliresp. Please send to [facility] on [address]. Please call pt once done.

## 2018-05-01 NOTE — TELEPHONE ENCOUNTER
PT CALLED([phone]). DALIRESP WAS SENT TO [facility] AND [facility] STATES THAT [facility] NEEDS TO SENT AUTHORIZATION OR A DIFFERENT SCRIPT. PLEASE CHECK AND CALL HIM BACK.

## 2018-05-01 NOTE — TELEPHONE ENCOUNTER
[facility] DR KUSH LEDEZMA states they have sent over prior auth to be completed for the Daliresp

## 2018-05-02 ENCOUNTER — OFFICE VISIT (OUTPATIENT)
Dept: PULMONOLOGY | Age: 48
End: 2018-05-02

## 2018-05-02 VITALS
SYSTOLIC BLOOD PRESSURE: 118 MMHG | RESPIRATION RATE: 18 BRPM | TEMPERATURE: 97.7 F | WEIGHT: 191 LBS | HEIGHT: 69 IN | DIASTOLIC BLOOD PRESSURE: 70 MMHG | OXYGEN SATURATION: 97 % | BODY MASS INDEX: 28.29 KG/M2 | HEART RATE: 86 BPM

## 2018-05-02 DIAGNOSIS — R06.01 ORTHOPNEA: ICD-10-CM

## 2018-05-02 DIAGNOSIS — G47.19 EXCESSIVE DAYTIME SLEEPINESS: ICD-10-CM

## 2018-05-02 DIAGNOSIS — I50.32 CHRONIC DIASTOLIC HEART FAILURE (HCC): ICD-10-CM

## 2018-05-02 DIAGNOSIS — R06.83 SNORING: ICD-10-CM

## 2018-05-02 DIAGNOSIS — J44.9 COPD, SEVERE (HCC): Primary | ICD-10-CM

## 2018-05-02 NOTE — PROGRESS NOTES
Mr. Aly Henriquez has a reminder for a \"due or due soon\" health maintenance. I have asked that he contact his primary care provider for follow-up on this health maintenance. Chief Complaint   Patient presents with    Sleep Problem     1. Have you been to the ER, urgent care clinic since your last visit? Hospitalized since your last visit? Yes When: 4/2018 Where: ED @ [facility] Reason for visit: SOB

## 2018-05-02 NOTE — PROGRESS NOTES
Ting Burnett Pulmonary Specialist  Pulmonary, Critical Care, and Sleep Medicine     Office Progress Note- Initial Evaluation      Primary Care Physician: Jennifer Sahni NP     Reason for Visit:  Evaluation for Possible sleep disorder    Assessment:  1. Excessive Daytime Sleepiness (EDS)- most likely multifactorial in etiology  2. COPD: FeV1: 8/1/17: 1.55L (40%): GOLD III- Severe  3. Snoring  4. Hepatomegaly- under evaluation by GI  5. Diastolic heart failure    Discussion:  Mr. Cinthia Herrera Sr. is a 49 y.o. male who has ongoing EDS with report of increased snoring and worsening Orthopnea. His Lasix dose was recently increased without notable improvement in respiratory status. He does have increased abdominal distension that may be further impeding his respiratory mechanics. He had a sleep study less than one year ago which did not note any FABIÁN physiology. However he has gained approximately 50 pounds since that time and his wife has noticed some increase in his snoring. I have offered to repeat sleep testing but the patient would like to hold off until after his liver biopsy. Plan:  · Follow up with Dr. Author Morales as indicated  · Patient given Daliresp samples today which our clinic works on obtaining additional authorization/ assistance with medication  · Potential consequences of untreated sleep apnea, and/or excessive daytime sleepiness were discussed with the patient. · Hold on repeat sleep testing at this time. If we do proceed with future testing, patient may need Sonata again as a sleep aid. · Patient to try and sleep with a wedge pillow at night  · Healthy lifestyle changes to include weight loss and exercise discussed. · Healthy sleep habits were reviewed and encouraged. ·  and workplace safety reviewed and discussed as appropriate. Drowsy and/or inattentive driving should be avoided. · Follow-up PRN, sooner should new symptoms or problems arise.   · Follow up with cardiologist as directed. · Follow up with Primary Care Provider (PCP) as directed and for routine health care maintenance. History of Present Illness: Mr. Allegra North is a 49 y.o. male patient who presents for re-evaluation for a possible sleep breathing disorder. The history was provided by the patient and spouse. The patient is follow in our clinic by Dr. Monse King for severe COPD requiring LTOT at night 2.5 LPM. He was started on Daliresp but has been having difficulty renewing this medication and only has one week supply left. He was hospitalized in [facility] 2017/JAN 2018 for COPD exacerbation and right sided heart failure. He was noted to have enlarged liver and spleen on prior CT imaging and is pending liver biopsy in the near future. In September 2017, the patient was evaluated by Dr. Belgica Beasley for a possible sleep breathing disorder. Subsequent sleep testing was unremarkable for either a PLMD or FABIÁN. He took 3 tablets of Sonata for that test. The patient reports that he slept better than he normally does at home for that study. Since his last sleep study and hospitalization, he has gained nearly 50lbs, and he has gone from sleeping with 2 pillows to 4 pillows. He sleeps with his oxygen at night but he often awakens at night with the NC off his face. He is on Lasix therapy every other day. He was recently told to increase his dose but has not seen any improvement in his breathing. Occupation:    Not working- Prior   - Has applied for dissability                  Driving: Yes  Drowsy Driving: is not reported. Motor vehicle accident(s) associated with drowsy driving:is denied by the patient     Snoring: This is a Chronic problem which has been ongoing for several years. Snoring has increased since discharge from hospital in early 2018. .     Fatigue: This is a Chronic problem which has been ongoing for.  [place] today is 18/24, was 22/24 in July 2017    Dental: Teeth clenching or grindingis reported. Naps: are reported and spontaneous    Leg Symptoms/Pain: He does have unpleasant or crawling sensation in legs or strong urge to move when inactive. GERD: is reported and controlled with Protonix. Mood: Does see a psychiatrist. He takes Rameron for depression but he reports that he does not feel depressed. Sleep-Wake History:     Estimates sleeping approximately 5-6  hours per night/day. He gets into bed at approximately 2100. Once in bed, he watches TV . It usually takes up to 45  minutes to fall asleep after going to bed. Reports that he awakens almost every hour for unknown reasons. Arousals have increased since discharge from hospital. He gets up to urinate 1-2 times nightly. Prior to hospitalization he would sleep on 2 pillows. Now he has to sleep on 4 pillows. Reports waking up with a headache every morning. Denies awakening with a dry mouthmouth. Denies symptoms suggestive of cataplexy, sleep paralysis, hypnagogic and/or hypnopompic hallucinations. Denies sleep talking/ walking, or other parasomnia behaviors    Family Sleep History:        Stop Krunal Penta 12/5/2017 9/27/2017 7/24/2017   Does the patient snore loudly (louder than talking or loud enough to be heard through closed doors)? 0 1 0   Does the patient often feel tired, fatigued, or sleepy during the daytime, even after a \"good\" night's sleep? 0 1 1   Has anyone ever observed the patient stop breathing during their sleep?  0 1 0   Does the patient have or are they being treated for high blood pressure? 0 0 0   Is the patient's BMI greater than 35? 0 0 0   Is your neck circumference greater than 17 inches (Male) or 16 inches (Female)? 0 0 0   Is the patient older than 48? 0 0 0   Is the patient male?  1 1 1   FABIÁN Score 1 4 2       PHQ over the last two weeks 5/2/2018   PHQ Not Done -   Little interest or pleasure in doing things Not at all   Feeling down, depressed or hopeless Not at all   Total Score PHQ 2 0       Leicester Scale 5/2/2018 9/27/2017 7/24/2017   Sitting and Reading 3 3 3   Watching TV 3 3 3   Sitting, inactive in a public place (e.g. a movie theater or meeting) 2 3 3   As a passenger in a car for an hour, without a break 3 3 3   Lying down to rest in the afternoon, when circumstances permit 3 3 2   Sitting and talking to someone 0 0 3   Sitting quietly after lunch without alcohol 3 2 3   In a car, while stopped for a few minutes in traffic 1 1 2   Leicester Sleepiness Score 18 18 22        Neck circ.  in \"inches\": 17.5     Past Medical History:  Past Medical History:   Diagnosis Date    Chronic lung disease     Chronic obstructive pulmonary disease ([facility] [address] 08/03/2017    PFTS 8/3/17    COPD, severe ([facility] [address] [facility]     Emphysema/COPD ([facility] [address] [facility]     Esophagitis 12/11/2017    Endoscopy    Essential hypertension, benign     History of stomach ulcers     Positive urine drug screen 01/2016    opiates and THC    Upper GI bleed        Past Surgical History:  Past Surgical History:   Procedure Laterality Date    HX ENDOSCOPY  12/11/2017       Family History:  Family History   Problem Relation Age of Onset    Hypertension Mother     Heart Disease Mother     Diabetes Mother     Hypertension Father     Heart Disease Father     Cancer Father      lymphnodes    Diabetes Father        Social History:  Social History   Substance Use Topics    Smoking status: Former Smoker     Packs/day: 2.00     Years: 25.00     Types: Cigarettes     Start date: 5/28/1984     Quit date: 12/18/2017    Smokeless tobacco: Never Used    Alcohol use No        Caffeine Amount Time of last Intake Comments   Coffee None Use to drink 2 pots Quit in Jan 2018   Soda None  Quit    Tea 1 glass/day  Decafinated   Energy Drinks None     Over- the - counter stimulant pills None     Other Substances      Alcohol None     Tobacco Quit Dec 2017     Drugs None recent  + THC in 2016       Medications:  Current Outpatient Prescriptions on File Prior to Visit   Medication Sig Dispense Refill    roflumilast (DALIRESP) tab tablet Take 1 Tab by mouth daily. Indications: PREVENTION OF BRONCHOSPASM WITH CHRONIC BRONCHITIS 30 Tab 5    methocarbamol (ROBAXIN) 500 mg tablet Take 1 Tab by mouth three (3) times daily. 90 Tab 1    LORazepam (ATIVAN) 1 mg tablet Take 1 Tab by mouth every eight (8) hours as needed for Anxiety. Max Daily Amount: 3 mg. 90 Tab 0    lisinopril (PRINIVIL, ZESTRIL) 5 mg tablet Take 1 Tab by mouth daily. 30 Tab 6    citalopram (CELEXA) 40 mg tablet Take 1 Tab by mouth daily. 30 Tab 4    furosemide (LASIX) 20 mg tablet Take 1 Tab by mouth every other day. 15 Tab 3    mirtazapine (REMERON) 7.5 mg tablet Take 1 Tab by mouth nightly. 90 Tab 1    Nebulizer & Compressor machine 1 Each by Does Not Apply route every four (4) hours as needed. 1 Each 0    albuterol (PROVENTIL VENTOLIN) 2.5 mg /3 mL (0.083 %) nebulizer solution Nebulized 1 vial for inhalation every 4 hours as needed 60 Each 3    OXYGEN-AIR DELIVERY SYSTEMS (WALKABOUT 1 OXYGEN SYSTEM) 2.5 L/min by Nasal route continuous.  albuterol (PROVENTIL HFA, VENTOLIN HFA, PROAIR HFA) 90 mcg/actuation inhaler Take 2 Puffs by inhalation every four (4) hours as needed for Wheezing or Shortness of Breath. 1 Inhaler 3    pantoprazole (PROTONIX) 40 mg tablet Take 1 Tab by mouth Daily (before breakfast). 30 Tab 0    tiotropium (SPIRIVA) 18 mcg inhalation capsule Take 1 Cap by inhalation daily. Indications: BRONCHOSPASM PREVENTION WITH COPD 30 Cap 0    fluticasone-salmeterol (ADVAIR) 500-50 mcg/dose diskus inhaler Take 1 Puff by inhalation two (2) times a day. 1 Each 3     No current facility-administered medications on file prior to visit.          Allergy:  No Known Allergies    Review of Systems  General ROS: positive for  - fatigue, sleep disturbance and weight gain  negative for - chills, fever, hot flashes, malaise, night sweats or weight loss  ENT ROS: negative for - epistaxis, hearing change, nasal congestion, nasal discharge, oral lesions, sinus pain or sneezing  Hematological and Lymphatic ROS: negative for - bleeding problems, blood clots, bruising, jaundice, pallor or swollen lymph nodes  Endocrine ROS: negative for - polydipsia/polyuria, skin changes, temperature intolerance or unexpected weight changes  Respiratory ROS: positive for - orthopnea and shortness of breath  negative for - hemoptysis, pleuritic pain, sputum changes, stridor, tachypnea or wheezing  Cardiovascular ROS: positive for - dyspnea on exertion and shortness of breath  negative for - chest pain, edema, irregular heartbeat, loss of consciousness or palpitations  Gastrointestinal ROS: no abdominal pain, change in bowel habits, or black or bloody stools  Genito-Urinary ROS: no dysuria, trouble voiding, or hematuria  Musculoskeletal ROS: positive for - gait disturbance and joint pain  Neurological ROS: no TIA or stroke symptoms  Dermatological ROS: negative for - pruritus, rash or skin lesion changes   Psychological ROS: As above   Otherwise negative. 1/14/2018 1/23/2018 1/24/2018 1/29/2018 2/21/2018 2/22/2018   WEIGHT 130 lb 183 lb 179 lb 9.6 oz 184 lb 6.4 oz 188 lb 184 lb      2/27/2018 2/28/2018 3/2/2018 3/5/2018 3/28/2018 4/12/2018 4/25/2018   WEIGHT 184 lb 8 oz 180 lb 180 lb 186 lb 190 lb 193 lb 195 lb      4/26/2018 5/2/2018   WEIGHT 194 lb 191 lb       Physical Exam:  Blood pressure 118/70, pulse 86, temperature 97.7 °F (36.5 °C), temperature source Oral, resp. rate 18, height 5' 9\" (1.753 m), weight 86.6 kg (191 lb), SpO2 97 %. on RA, Body mass index is 28.21 kg/(m^2).        General: in no respiratory distress, acyanotic, appears stated age, cooperative, pleasant  HEENT: PERRL, EOMI, throat without erythema or exudate, Tongue: normal with mild dental indention on tongue, Mallampati's score 3+, Uvula- midline and resting on posterior tongue  Neck: Supple,  no abnormally enlarged lymph nodes, thyroid is not enlarged, non-tender, No JVD  Chest: normal  Lungs: moderate air entry, clear to auscultation bilaterally,   Heart: Regular rate and rhythm, S1S2 present, without murmur  Abdomen: Distended and firm, bowel sounds normoactive, without  tenderness, or guarding  Extremity: negative for edema, cyanosis or clubbing  Skin: Skin color, texture, turgor normal. No rashes or lesions    Data Reviewed:  CBC:   Lab Results   Component Value Date/Time    WBC 7.0 04/04/2018 08:05 PM    Hemoglobin, POC 14.3 06/28/2013 10:07 PM    HGB 13.8 04/04/2018 08:05 PM    Hematocrit, POC 42 06/28/2013 10:07 PM    HCT 37.1 04/04/2018 08:05 PM    PLATELET 390 01/65/5741 08:05 PM    MCV 84.9 04/04/2018 08:05 PM       BMP:   Lab Results   Component Value Date/Time    Sodium 138 04/04/2018 08:05 PM    Potassium 3.7 04/04/2018 08:05 PM    Chloride 104 04/04/2018 08:05 PM    CO2 26 04/04/2018 08:05 PM    Anion gap 8 04/04/2018 08:05 PM    Glucose 100 (H) 04/04/2018 08:05 PM    BUN 11 04/04/2018 08:05 PM    Creatinine 0.82 04/04/2018 08:05 PM    BUN/Creatinine ratio 13 04/04/2018 08:05 PM    GFR est AA >60 04/04/2018 08:05 PM    GFR est non-AA >60 04/04/2018 08:05 PM    Calcium 8.9 04/04/2018 08:05 PM        TSH:  Lab Results   Component Value Date/Time    TSH 2.21 12/17/2017 10:00 PM     Lab Results   Component Value Date/Time    NT pro-BNP 23 03/02/2018 03:39 PM    NT pro-BNP 21 01/09/2018 06:33 PM    NT pro- 12/18/2017 05:33 PM    NT pro-BNP 96 12/17/2017 10:00 PM       Imaging:  [x]I have personally reviewed the patients radiographs section     Results from Hospital Encounter encounter on 04/04/18   XR CHEST PA LAT   Narrative EXAMINATION: Chest 2 views    INDICATION: Shortness of breath    COMPARISON: 3/19/2018    FINDINGS: Frontal and lateral views of the chest obtained. No consolidation. Mediastinal silhouette and pulmonary vasculature unremarkable. Slightly  flattened diaphragm. Basilar streaky densities. No evidence of pneumothorax. No  acute osseous findings. Impression IMPRESSION:    1. No clearly acute findings. Diaphragmatic flattening suggestive of COPD. Nonspecific basilar streaky densities, likely atelectasis or scarring rather  then infiltrate. Results from [place] encounter on 02/22/18   CT ABD PELV W CONT   Narrative CT ABDOMEN AND PELVIS WITH CONTRAST    CPT CODE: 39283    INDICATION: , Abdominal pain x2 days, decreased appetite, productive cough, pain  described is diffuse with dull aching; elevated bilirubin    COMPARISON: CT abdomen and pelvis January 11, 2018. TECHNIQUE: Following the uneventful intravenous administration of 100 cc of  nonionic contrast, dynamic enhanced scanning of the abdomen and pelvis was  performed using standard 5 mm axial images. Coronal and sagittal reformations  obtained. All CT scans at this facility are performed using dose optimization technique as  appropriate to a performed exam, to include automated exposure control,  adjustment of the mA and/or kV according to patient's size (including  appropriate matching for site-specific examinations), or use of iterative  reconstruction technique. CT ABDOMEN FINDINGS:     7 mm pulmonary nodule in the left lower lobe unchanged. The included portion of the chest wall and the abdominal wall  is unremarkable. The liver and spleen are normal in size and density. The biliary tree is not  dilated. No gallbladder abnormality. There is bilateral renal function without obstruction, without cyst, stone or  mass. Adrenals and Pancreas  are of normal CT appearance. There is no free fluid or free air. Mild circumferential thickening of the wall of the distal esophagus nonspecific  and unchanged from the previous study. Scattered diverticula of the colon. Normal appendix. Small umbilical hernia contains only fat. There is no significant adenopathy. CT PELVIS FINDINGS:     There is no free pelvic fluid.   The pelvic viscera are unremarkable. The bladder is incompletely distended but unremarkable. There is no significant adenopathy. Review of osseous structures throughout on bone window settings shows no  significant osseous pathology. Impression IMPRESSION:     There is no biliary dilatation. Diverticulosis coli without evidence of diverticulitis. Mild stable circumferential thickening of the wall of the distal esophagus is  nonspecific and may be due to a small hiatal hernia or under luminal distention  or true wall thickening of esophagitis. Follow-up barium swallow may be of  benefit as clinically indicated. Report provided to the emergency department at 1843 hrs. Cardiac Echo:     Results for orders placed or performed during the hospital encounter of 18   2D ECHO COMPLETE ADULT (TTE) W OR WO CONTR     Status: None    [address]  [place], [phone]    Transthoracic Echocardiogram    Patient: Rika Dove  MRN: [id number]  [address] #: [de-identified]  : 1970  Age: 49 years  Gender: Male  Height: 69 in  Weight: 178.6 lb  BSA: 1.97 m-sq  BP: 108 / 61 mmHg  Study date: 10-Joey-2018  Status: Routine  Location: [facility] DMS [address] #: [id number]    Allergies: NO KNOWN ALLERGIES    Referring_Ordering Physician:  Danette Bhatia Hospitalist  Interpreting Group:  [facility]  Interpreting Physician:  Joe Mejias MD  Technologist:  Norris Goyal. Sylvia Antonio [facility]    SUMMARY:  Left ventricle: Systolic function was normal. Ejection fraction was estimated   in the range of 55 % to 60 %. There were  no regional wall motion abnormalities. Wall thickness was mildly increased. Atrial septum: Color Doppler evaluation was performed. There was no   right-to-left shunt. INDICATIONS: Right sided heart failure. HISTORY: Prior history: COPD, sepsis, hypoxic, former smoker    PROCEDURE: This was a routine study.  The study included complete 2D imaging, M-mode, complete spectral Doppler, and  color Doppler. The heart rate was 99 bpm, at the start of the study. Systolic   blood pressure was 108 mmHg, at the start  of the study. Diastolic blood pressure was 61 mmHg, at the start of the   study. Image quality was fair. LEFT VENTRICLE: Size was normal. Systolic function was normal. Ejection   fraction was estimated in the range of 55 % to  60 %. There were no regional wall motion abnormalities. Wall thickness was   mildly increased. DOPPLER: The study was not  technically sufficient to allow evaluation of LV diastolic function due to   tachycardia. RIGHT VENTRICLE: The size was normal. Systolic function was normal. DOPPLER:   The tricuspid jet envelope definition was  inadequate for estimation of RV systolic pressure. LEFT ATRIUM: Size was normal. LA volume index was 20 ml/m-sq. ATRIAL SEPTUM: Color Doppler evaluation was performed. There was no   right-to-left shunt. RIGHT ATRIUM: Size was normal.    MITRAL VALVE: Normal valve structure. There was normal leaflet separation. DOPPLER: The transmitral velocity was within  the normal range. There was no evidence for stenosis. There was trivial   regurgitation. AORTIC VALVE: The valve was trileaflet. Leaflets exhibited normal thickness   and normal cuspal separation. DOPPLER:  Transaortic velocity was within the normal range. There was no stenosis. There was no regurgitation. TRICUSPID VALVE: Normal valve structure. There was normal leaflet separation. DOPPLER: The transtricuspid velocity was  within the normal range. There was no evidence for tricuspid stenosis. There   was no significant regurgitation. Insufficient tricuspid regurgitation to estimate pulmonary artery pressure. PULMONIC VALVE: Leaflets exhibited normal thickness, no calcification, and   normal cuspal separation. DOPPLER: The  transpulmonic velocity was within the normal range. There was no   regurgitation.     AORTA: The root exhibited normal size. SYSTEMIC VEINS: IVC: The inferior vena cava was normal in size and course. Respirophasic changes were normal.    PERICARDIUM: There was no pericardial effusion. The pericardium was normal in   appearance. SYSTEM MEASUREMENT TABLES    2D  Ao Diam: 3.5 cm  IVSd: 1.17 cm  LVIDd: 4.4 cm  LVIDs: 3.29 cm  LVPWd: 1.21 cm  SV(Teich): 43.79 ml  LAAs A2C: 13.83 cm2  LAAs A4C: 14.59 cm2  LAESV A-L A2C: 37.06 ml  LAESV A-L A4C: 40.84 ml  LAESV Index (A-L): 19.86 ml/m2  LAESV MOD A2C: 34.88 ml  LAESV MOD A4C: 38.18 ml  LAESV(A-L): 39.12 ml  LALs A2C: 4.38 cm  LALs A4C: 4.43 cm  LVOT Diam: 1.96 cm  RA Area: 12.31 cm2    CW  AV Vmax: 2.7 m/s  AV maxP.11 mmHG  AV Env. Ti: 294.99 ms  AV VTI: 47.53 cm  AV Vmean: 1.61 m/s  AV meanP.15 mmHG    MM  Tapse: 2.17 cm    PW  LVOT VTI: 19.69 cm  LVOT Vmax: 1.19 m/s  LVOT Vmean: 0.74 m/s  JOSE ELIAS (VTI): 1.25 cm2  JOSE ELIAS Vmax: 1.34 cm2  LVOT Env. Ti: 267.45 ms  LVOT maxP.69 mmHG  LVOT meanP.71 mmHG  LVSI Dopp: 30.21 ml/m2  LVSV Dopp: 59.51 ml  P Vein A: 0.4 m/s  P Vein A Dur: 145.53 ms    Prepared and E-signed by    Raghu Nicholas MD  Signed 2018 07:24:43            Historical Sleep Testing Data:  [facility]: 17  MEDICATIONS: Include  1. Deltasone. 2. Soma. 3. Chantix. 4. Advair. 5. DuoNeb. 6. Albuterol. 7. Spiriva.     The study was obtained with standard parameter monitoring. No EKG  or EEG abnormalities were noted. Total recording time was 430.5  minutes. Total sleep time was 328.5 minutes for a decreased sleep  efficiency of 76%, in large part due to prolonged wake time after sleep  onset of 102.5. Sleep latency was also prolonged at 33 minutes, REM  latency was shortened at 42 minutes. Arousal index was elevated at  14, primarily due to snoring and then nonspecific arousals. On review  of sleep architecture, he did enter all stages of sleep. He had no  apneas or hypopneas.  Mean oxygen saturation in non-REM was 92  and REM 91, minimum was 87 in non-REM, 86 in REM. He spent 4.2  minutes below 88% O2 saturation. Mean heart rate was 71 in non-  REM, 75 in REM. Minimum was 58 in non-REM, 60 in REM. Maximum  was 92 in non-REM and 89 in REM. Periodic movements of sleep were  noted with an index of 8. Periodic movements of sleep put the arousal  index as 4.6.     CONCLUSION: The patient's polysomnographic study is remarkable  for poor sleep efficiency, but no evidence of significant sleep apnea or  periodic movements of sleep accounting for poor sleep efficiency. The  patient should not drive if drowsy.     Juan Burnett, DO, [facility]  Pulmonary, Sleep and Critical Care Medicine

## 2018-05-11 ENCOUNTER — HOSPITAL ENCOUNTER (OUTPATIENT)
Dept: INTERVENTIONAL RADIOLOGY/VASCULAR | Age: 48
Discharge: HOME | End: 2018-05-11
Attending: INTERNAL MEDICINE | Admitting: RADIOLOGY
Payer: MEDICAID

## 2018-05-11 VITALS
WEIGHT: 195 LBS | DIASTOLIC BLOOD PRESSURE: 67 MMHG | BODY MASS INDEX: 28.88 KG/M2 | SYSTOLIC BLOOD PRESSURE: 122 MMHG | HEIGHT: 69 IN | RESPIRATION RATE: 24 BRPM | OXYGEN SATURATION: 97 % | HEART RATE: 78 BPM

## 2018-05-11 DIAGNOSIS — R16.0 HEPATOMEGALY: ICD-10-CM

## 2018-05-11 DIAGNOSIS — R16.1 SPLENOMEGALY: ICD-10-CM

## 2018-05-11 LAB
ALBUMIN SERPL-MCNC: 4.1 G/DL (ref 3.4–5)
ALBUMIN/GLOB SERPL: 1.1 {RATIO} (ref 0.8–1.7)
ALP SERPL-CCNC: 107 U/L (ref 45–117)
ALT SERPL-CCNC: 36 U/L (ref 16–61)
ANION GAP SERPL CALC-SCNC: 4 MMOL/L (ref 3–18)
APTT PPP: 25.8 SEC (ref 23–36.4)
AST SERPL-CCNC: 16 U/L (ref 15–37)
BILIRUB DIRECT SERPL-MCNC: 0.2 MG/DL (ref 0–0.2)
BILIRUB SERPL-MCNC: 1.1 MG/DL (ref 0.2–1)
BUN SERPL-MCNC: 10 MG/DL (ref 7–18)
BUN/CREAT SERPL: 11 (ref 12–20)
CALCIUM SERPL-MCNC: 9.2 MG/DL (ref 8.5–10.1)
CHLORIDE SERPL-SCNC: 106 MMOL/L (ref 100–108)
CO2 SERPL-SCNC: 30 MMOL/L (ref 21–32)
CREAT SERPL-MCNC: 0.88 MG/DL (ref 0.6–1.3)
ERYTHROCYTE [DISTWIDTH] IN BLOOD BY AUTOMATED COUNT: 13.5 % (ref 11.6–14.5)
GLOBULIN SER CALC-MCNC: 3.7 G/DL (ref 2–4)
GLUCOSE SERPL-MCNC: 92 MG/DL (ref 74–99)
HCT VFR BLD AUTO: 42.5 % (ref 36–48)
HGB BLD-MCNC: 15.6 G/DL (ref 13–16)
INR PPP: 1 (ref 0.8–1.2)
MCH RBC QN AUTO: 31.2 PG (ref 24–34)
MCHC RBC AUTO-ENTMCNC: 36.7 G/DL (ref 31–37)
MCV RBC AUTO: 85 FL (ref 74–97)
PLATELET # BLD AUTO: 210 K/UL (ref 135–420)
PMV BLD AUTO: 10.3 FL (ref 9.2–11.8)
POTASSIUM SERPL-SCNC: 4.2 MMOL/L (ref 3.5–5.5)
PROT SERPL-MCNC: 7.8 G/DL (ref 6.4–8.2)
PROTHROMBIN TIME: 12.2 SEC (ref 11.5–15.2)
RBC # BLD AUTO: 5 M/UL (ref 4.7–5.5)
SODIUM SERPL-SCNC: 140 MMOL/L (ref 136–145)
WBC # BLD AUTO: 5.9 K/UL (ref 4.6–13.2)

## 2018-05-11 PROCEDURE — 74011000250 HC RX REV CODE- 250: Performed by: RADIOLOGY

## 2018-05-11 PROCEDURE — 77030008584 HC TOOL GDWRE DEV TERU -A

## 2018-05-11 PROCEDURE — C1769 GUIDE WIRE: HCPCS

## 2018-05-11 PROCEDURE — C2628 CATHETER, OCCLUSION: HCPCS

## 2018-05-11 PROCEDURE — 74011636320 HC RX REV CODE- 636/320: Performed by: RADIOLOGY

## 2018-05-11 PROCEDURE — 36011 PLACE CATHETER IN VEIN: CPT

## 2018-05-11 PROCEDURE — 88313 SPECIAL STAINS GROUP 2: CPT | Performed by: RADIOLOGY

## 2018-05-11 PROCEDURE — 85610 PROTHROMBIN TIME: CPT | Performed by: INTERNAL MEDICINE

## 2018-05-11 PROCEDURE — 85730 THROMBOPLASTIN TIME PARTIAL: CPT | Performed by: INTERNAL MEDICINE

## 2018-05-11 PROCEDURE — C1894 INTRO/SHEATH, NON-LASER: HCPCS

## 2018-05-11 PROCEDURE — 74011250636 HC RX REV CODE- 250/636

## 2018-05-11 PROCEDURE — 77030011229 HC DIL VESL COON COOK -A

## 2018-05-11 PROCEDURE — 80076 HEPATIC FUNCTION PANEL: CPT | Performed by: INTERNAL MEDICINE

## 2018-05-11 PROCEDURE — C1893 INTRO/SHEATH, FIXED,NON-PEEL: HCPCS

## 2018-05-11 PROCEDURE — 80048 BASIC METABOLIC PNL TOTAL CA: CPT | Performed by: INTERNAL MEDICINE

## 2018-05-11 PROCEDURE — 74011250636 HC RX REV CODE- 250/636: Performed by: RADIOLOGY

## 2018-05-11 PROCEDURE — 88307 TISSUE EXAM BY PATHOLOGIST: CPT | Performed by: RADIOLOGY

## 2018-05-11 PROCEDURE — 85027 COMPLETE CBC AUTOMATED: CPT | Performed by: INTERNAL MEDICINE

## 2018-05-11 PROCEDURE — 37200 TRANSCATHETER BIOPSY: CPT

## 2018-05-11 RX ORDER — CIPROFLOXACIN 2 MG/ML
400 INJECTION, SOLUTION INTRAVENOUS ONCE
Status: COMPLETED | OUTPATIENT
Start: 2018-05-11 | End: 2018-05-11

## 2018-05-11 RX ORDER — ONDANSETRON 2 MG/ML
INJECTION INTRAMUSCULAR; INTRAVENOUS
Status: COMPLETED
Start: 2018-05-11 | End: 2018-05-11

## 2018-05-11 RX ORDER — MIDAZOLAM HYDROCHLORIDE 1 MG/ML
1 INJECTION, SOLUTION INTRAMUSCULAR; INTRAVENOUS
Status: DISCONTINUED | OUTPATIENT
Start: 2018-05-11 | End: 2018-05-11 | Stop reason: HOSPADM

## 2018-05-11 RX ORDER — LIDOCAINE HYDROCHLORIDE 10 MG/ML
30 INJECTION, SOLUTION EPIDURAL; INFILTRATION; INTRACAUDAL; PERINEURAL ONCE
Status: COMPLETED | OUTPATIENT
Start: 2018-05-11 | End: 2018-05-11

## 2018-05-11 RX ORDER — SODIUM CHLORIDE 9 MG/ML
20 INJECTION, SOLUTION INTRAVENOUS CONTINUOUS
Status: DISCONTINUED | OUTPATIENT
Start: 2018-05-11 | End: 2018-05-11 | Stop reason: HOSPADM

## 2018-05-11 RX ORDER — FENTANYL CITRATE 50 UG/ML
12.5-5 INJECTION, SOLUTION INTRAMUSCULAR; INTRAVENOUS
Status: DISCONTINUED | OUTPATIENT
Start: 2018-05-11 | End: 2018-05-11 | Stop reason: HOSPADM

## 2018-05-11 RX ORDER — ONDANSETRON 2 MG/ML
4 INJECTION INTRAMUSCULAR; INTRAVENOUS AS NEEDED
Status: DISCONTINUED | OUTPATIENT
Start: 2018-05-11 | End: 2018-05-11 | Stop reason: HOSPADM

## 2018-05-11 RX ORDER — IODIXANOL 320 MG/ML
200 INJECTION, SOLUTION INTRAVASCULAR
Status: COMPLETED | OUTPATIENT
Start: 2018-05-11 | End: 2018-05-11

## 2018-05-11 RX ADMIN — FENTANYL CITRATE 25 MCG: 50 INJECTION INTRAMUSCULAR; INTRAVENOUS at 10:05

## 2018-05-11 RX ADMIN — MIDAZOLAM HYDROCHLORIDE 0.5 MG: 1 INJECTION, SOLUTION INTRAMUSCULAR; INTRAVENOUS at 09:45

## 2018-05-11 RX ADMIN — ONDANSETRON 4 MG: 2 INJECTION INTRAMUSCULAR; INTRAVENOUS at 10:29

## 2018-05-11 RX ADMIN — MIDAZOLAM HYDROCHLORIDE 0.5 MG: 1 INJECTION, SOLUTION INTRAMUSCULAR; INTRAVENOUS at 10:05

## 2018-05-11 RX ADMIN — LIDOCAINE HYDROCHLORIDE 30 ML: 10 INJECTION, SOLUTION EPIDURAL; INFILTRATION; INTRACAUDAL; PERINEURAL at 09:35

## 2018-05-11 RX ADMIN — MIDAZOLAM HYDROCHLORIDE 0.5 MG: 1 INJECTION, SOLUTION INTRAMUSCULAR; INTRAVENOUS at 10:15

## 2018-05-11 RX ADMIN — MIDAZOLAM HYDROCHLORIDE 1 MG: 1 INJECTION, SOLUTION INTRAMUSCULAR; INTRAVENOUS at 09:35

## 2018-05-11 RX ADMIN — FENTANYL CITRATE 25 MCG: 50 INJECTION INTRAMUSCULAR; INTRAVENOUS at 09:55

## 2018-05-11 RX ADMIN — CIPROFLOXACIN 400 MG: 2 INJECTION, SOLUTION INTRAVENOUS at 09:30

## 2018-05-11 RX ADMIN — IODIXANOL 200 ML: 320 INJECTION, SOLUTION INTRAVASCULAR at 09:58

## 2018-05-11 RX ADMIN — MIDAZOLAM HYDROCHLORIDE 1 MG: 1 INJECTION, SOLUTION INTRAMUSCULAR; INTRAVENOUS at 09:30

## 2018-05-11 RX ADMIN — FENTANYL CITRATE 50 MCG: 50 INJECTION INTRAMUSCULAR; INTRAVENOUS at 09:30

## 2018-05-11 RX ADMIN — MIDAZOLAM HYDROCHLORIDE 0.5 MG: 1 INJECTION, SOLUTION INTRAMUSCULAR; INTRAVENOUS at 09:55

## 2018-05-11 RX ADMIN — FENTANYL CITRATE 50 MCG: 50 INJECTION INTRAMUSCULAR; INTRAVENOUS at 09:35

## 2018-05-11 RX ADMIN — FENTANYL CITRATE 25 MCG: 50 INJECTION INTRAMUSCULAR; INTRAVENOUS at 09:45

## 2018-05-11 RX ADMIN — FENTANYL CITRATE 25 MCG: 50 INJECTION INTRAMUSCULAR; INTRAVENOUS at 10:15

## 2018-05-11 NOTE — PROGRESS NOTES
05/11/18 1055   Vital Signs   Pulse (Heart Rate) 82   Cardiac Rhythm NSR   Resp Rate 18   O2 Sat (%) 93 %   Level of Consciousness Alert   BP (!) 134/95   MAP (Monitor) 108   Oxygen Therapy   O2 Device Nasal cannula   O2 Flow Rate (L/min) 3 l/min   Modified Antonia Score   Activity 2   Respiration 1   Circulation 2   Consciousness 2   O2 Saturation 1   Antonia Score 8   Neuro   Neuro (WDL) WDL   Post-Procedure Site Assessment (1)   Wound Type Puncture   Location Neck   Orientation  Right   Hemostasis  Dressing applied during procedure   Site Assessment No bleeding; No hematoma;Dry/intact   Peripheral Vascular   Peripheral Vascular (WDL) WDL   Activity   Activity Level Bed Rest   Head of Bed Elevated HOB 30   received patient from IR. Pt stable.   Right neck site CDI

## 2018-05-11 NOTE — H&P
OUTPATIENT HISTORY AND PHYSICAL      Today 5/11/2018     Indication/Symptoms:   Chichi Shay Sr. is a 49 y.o. male with a history of splenomegaly and hepatomegaly who presents for an image-guided transjugular liver biopsy with moderate sedation. Patient has been NPO since midnight and takes no blood thinning medications . Current Meds:    Prior to Admission medications    Medication Sig Start Date End Date Taking? Authorizing Provider   roflumilast (DALIRESP) tab tablet Take 1 Tab by mouth daily. Indications: PREVENTION OF BRONCHOSPASM WITH CHRONIC BRONCHITIS 4/30/18  Yes Amanda Britton NP   methocarbamol (ROBAXIN) 500 mg tablet Take 1 Tab by mouth three (3) times daily. 4/25/18  Yes Aba Barker NP   LORazepam (ATIVAN) 1 mg tablet Take 1 Tab by mouth every eight (8) hours as needed for Anxiety. Max Daily Amount: 3 mg. 4/25/18  Yes Aba Barker NP   lisinopril (PRINIVIL, ZESTRIL) 5 mg tablet Take 1 Tab by mouth daily. 4/12/18  Yes Via Yana Jerez PA-C   citalopram (CELEXA) 40 mg tablet Take 1 Tab by mouth daily. 3/28/18  Yes Aba Barker NP   mirtazapine (REMERON) 7.5 mg tablet Take 1 Tab by mouth nightly. 2/28/18  Yes Aba Barker NP   pantoprazole (PROTONIX) 40 mg tablet Take 1 Tab by mouth Daily (before breakfast). 1/12/18  Yes Deion Quintero MD   tiotropium (SPIRIVA) 18 mcg inhalation capsule Take 1 Cap by inhalation daily. Indications: BRONCHOSPASM PREVENTION WITH COPD 1/12/18  Yes Deion Quintero MD   fluticasone-salmeterol (ADVAIR) 500-50 mcg/dose diskus inhaler Take 1 Puff by inhalation two (2) times a day. 1/3/18  Yes Johnny Gusman NP   furosemide (LASIX) 20 mg tablet Take 1 Tab by mouth every other day. 3/5/18   Mahesh Ocampo MD   Nebulizer & Compressor machine 1 Each by Does Not Apply route every four (4) hours as needed.  2/28/18   Aba Barker NP   albuterol (PROVENTIL VENTOLIN) 2.5 mg /3 mL (0.083 %) nebulizer solution Nebulized 1 vial for inhalation every 4 hours as needed 2/27/18   Sky Lobo MD   OXYGEN-AIR DELIVERY SYSTEMS [facility] 1 OXYGEN SYSTEM) 2.5 L/min by Nasal route continuous. Historical Provider   albuterol (PROVENTIL HFA, VENTOLIN HFA, PROAIR HFA) 90 mcg/actuation inhaler Take 2 Puffs by inhalation every four (4) hours as needed for Wheezing or Shortness of Breath. 1/12/18   Chuck Gooden MD       Allergies:    No Known Allergies    Comorbid Conditions:    Past Medical History:   Diagnosis Date    Chronic lung disease     Chronic obstructive pulmonary disease ([facility] [address] 08/03/2017    PFTS 8/3/17    COPD, severe ([facility]     Emphysema/COPD ([facility]     Esophagitis 12/11/2017    Endoscopy    Essential hypertension, benign     History of stomach ulcers     Positive urine drug screen 01/2016    opiates and THC    Upper GI bleed           Past Surgical History:   Procedure Laterality Date    HX ENDOSCOPY  12/11/2017     Data:    Visit Vitals    /82    Pulse (!) 106    Ht 5' 9\" (1.753 m)    Wt 88.5 kg (195 lb)    SpO2 97%    BMI 28.8 kg/m2   :  Recent Labs      05/11/18   0830   PLT  210     Recent Labs      05/11/18   0830   INR  1.0   APTT  25.8       The H & P and/or progress notes and any available imaging were reviewed. The risks, indications and possible alternatives to the procedure, including doing nothing, were discussed and informed consent was obtained. Physical Exam:      Mental status:   Alert and oriented. Examination specific to the procedure proposed to be performed and any co morbid conditions:   Mallampati classification 3 ,  ASA 3   Heart:   Tachycardic   Lungs:   No respiratory distress. Nasal cannula in place. The patient is an appropriate candidate to undergo the planned procedure and sedation.     [place], [place]

## 2018-05-11 NOTE — PROCEDURES
RADIOLOGY POST PROCEDURE NOTE     May 11, 2018       2:40 PM     Preoperative Diagnosis:  Hepato/splenomegaly. Postoperative Diagnosis:  Same. :  Dr. Fela Quiroga    Assistant:  None. Type of Anesthesia: 1% plain lidocaine and IV moderate sedation with Versed and Fentanyl    Procedure/Description:  Image guided transjugular liver biopsy with portosystemic gradient measurements. Findings:   No bleeding. Estimated blood Loss:  Minimal    Specimen Removed:   yes    Blood transfusions:  None. Implants:  None.     Complications: None    Condition: Stable    Discharge Plan:  discharge home     Helen Rivera MD

## 2018-05-11 NOTE — IP AVS SNAPSHOT
Karyle Olp 
 
 
 [address] Patient: Travis Hodge Sr. MRN: [id number] :1970 About your hospitalization You were admitted on:  May 11, 2018 You last received care in the:  [facility] You were discharged on:  May 11, 2018 Why you were hospitalized Your primary diagnosis was:  Not on File Follow-up Information Follow up With Details Comments Contact Info Jennifer Araujo NP Schedule an appointment as soon as possible for a visit in 2 weeks for follow up after biospy [facility] 
[phone] Your Scheduled Appointments Tuesday May 29, 2018 10:15 AM EDT Follow Up with Tao Nagel MD  
[address] ([facility]) [address] [place] [address], [address] [address]  
[phone] Discharge Orders None A check randy indicates which time of day the medication should be taken. My Medications ASK your doctor about these medications Instructions Each Dose to Equal  
 Morning Noon Evening Bedtime * albuterol 90 mcg/actuation inhaler Commonly known as:  PROVENTIL HFA, VENTOLIN HFA, PROAIR HFA Your last dose was: Your next dose is: Take 2 Puffs by inhalation every four (4) hours as needed for Wheezing or Shortness of Breath. 2 Puff * albuterol 2.5 mg /3 mL (0.083 %) nebulizer solution Commonly known as:  PROVENTIL VENTOLIN Your last dose was: Your next dose is:    
   
   
 Nebulized 1 vial for inhalation every 4 hours as needed  
     
   
   
   
  
 citalopram 40 mg tablet Commonly known as:  Johanna Mojica Your last dose was: Your next dose is: Take 1 Tab by mouth daily. 40 mg  
    
   
   
   
  
 fluticasone-salmeterol 500-50 mcg/dose diskus inhaler Commonly known as:  ADVAIR  
   
 Your last dose was: Your next dose is: Take 1 Puff by inhalation two (2) times a day. 1 Puff  
    
   
   
   
  
 furosemide 20 mg tablet Commonly known as:  LASIX Your last dose was: Your next dose is: Take 1 Tab by mouth every other day. 20 mg  
    
   
   
   
  
 lisinopril 5 mg tablet Commonly known as:  Anatoly Littler Your last dose was: Your next dose is: Take 1 Tab by mouth daily. 5 mg LORazepam 1 mg tablet Commonly known as:  ATIVAN Your last dose was: Your next dose is: Take 1 Tab by mouth every eight (8) hours as needed for Anxiety. Max Daily Amount: 3 mg.  
 1 mg  
    
   
   
   
  
 methocarbamol 500 mg tablet Commonly known as:  ROBAXIN Your last dose was: Your next dose is: Take 1 Tab by mouth three (3) times daily. 500 mg  
    
   
   
   
  
 mirtazapine 7.5 mg tablet Commonly known as:  Boneta Bach Your last dose was: Your next dose is: Take 1 Tab by mouth nightly. 7.5 mg  
    
   
   
   
  
 Nebulizer & Compressor machine Your last dose was: Your next dose is:    
   
   
 1 Each by Does Not Apply route every four (4) hours as needed. 1 Each  
    
   
   
   
  
 pantoprazole 40 mg tablet Commonly known as:  PROTONIX Your last dose was: Your next dose is: Take 1 Tab by mouth Daily (before breakfast). 40 mg  
    
   
   
   
  
 roflumilast Tab tablet Commonly known as:  Regi Burks Your last dose was: Your next dose is: Take 1 Tab by mouth daily. Indications: PREVENTION OF BRONCHOSPASM WITH CHRONIC BRONCHITIS  
 500 mcg  
    
   
   
   
  
 tiotropium 18 mcg inhalation capsule Commonly known as:  Pepper Giron Your last dose was: Your next dose is: Take 1 Cap by inhalation daily. Indications: BRONCHOSPASM PREVENTION WITH COPD  
 1 Cap WALKABOUT 1 OXYGEN SYSTEM Your last dose was: Your next dose is:    
   
   
 2.5 L/min by Nasal route continuous. 2.5 L/min * Notice: This list has 2 medication(s) that are the same as other medications prescribed for you. Read the directions carefully, and ask your doctor or other care provider to review them with you. Discharge Instructions TRANSJUGULAR BIOPSY DISCHARGE INSTRUCTIONS General Information: The transjugular liver biopsy is a procedure that is done to obtain a liver biopsy through the portal vein. A biopsy is the removal of a small piece of tissue for microscopic examination or testing. Healthy tissue can be obtained for the purpose of tissue-type matching for transplants. Unhealthy tissues are more commonly biopsied to diagnose disease. The liver biopsy helps detect cancer, infections, and the cause of an enlargement of the liver or elevated liver enzymes. It also helps to diagnose a number of liver diseases. Home Care Instructions: You can resume your regular diet and medication regimen. Do not drink alcohol, drive, or make any important legal decisions in the next 24 hours. Do not lift anything heavier than a gallon of milk, or do anything strenuous for the next 24 hours. You will notice a dressing on your neck. This was the site of the insertion for the procedure. This dressing may be removed in 24 hours. You may take a shower after the bandage comes off. Do not take a bath, swim or immerse yourself in water until the wound on your neck has completely healed. Call If: 
 You should call your Physician and/or Radiology Nurse if you have any bleeding other than a small spot on your bandage. Call if you have any signs of infection, fever, or increased pain at the site.  Call if you have any questions of how to take care of your wound. Call if you notice your abdomen swelling. You may still have to come in for the paracentesis, but you should not have to come in as often. Follow-Up Instructions: Please see your ordering doctor as he/she has requested. Introducing Dax Francis As a [facility] patient, I wanted to make you aware of our electronic visit tool called Dax Francis. [employer] allows you to connect within minutes with a medical provider 24 hours a day, seven days a week via a mobile device or tablet or logging into a secure website from your computer. You can access Dax Francis from anywhere in the [place]. A virtual visit might be right for you when you have a simple condition and feel like you just dont want to get out of bed, or cant get away from work for an appointment, when your regular [facility] provider is not available (evenings, weekends or holidays), or when youre out of town and need minor care. Electronic visits cost only $49 and if the [employer] provider determines a prescription is needed to treat your condition, one can be electronically transmitted to a nearby pharmacy*. Please take a moment to enroll today if you have not already done so. The enrollment process is free and takes just a few minutes. To enroll, please download the [employer] kerri to your tablet or phone, or visit [email or web address] to enroll on your computer. And, as an [address] [place] [address] patient with a [employer] account, the results of your visits will be scanned into your electronic medical record and your primary care provider will be able to view the scanned results. We urge you to continue to see your regular [facility] provider for your ongoing medical care.   And while your primary care provider may not be the one available when you seek a Dax Francis virtual visit, the peace of mind you get from getting a real diagnosis real time can be priceless. For more information on Dax Francis, view our Frequently Asked Questions (FAQs) at [email or web address]. Sincerely, 
 
Mikey Krishnan MD 
Chief Medical Officer [employer] *:  certain medications cannot be prescribed via Dax Francis Unresulted Labs-Please follow up with your PCP about these lab tests Order Current Status IR BX TRANSVASCULAR In process Providers Seen During Your Hospitalization Provider Specialty Primary office phone Rodrigo Guerra MD Gastroenterology [phone] Your Primary Care Physician (PCP) Primary Care Physician Office Phone Office Fax Linda Zee [phone] You are allergic to the following No active allergies Recent Documentation Height Weight BMI Smoking Status 1.753 m 88.5 kg 28.8 kg/m2 Former Smoker Emergency Contacts Name Discharge Info Relation Home Work Mobile Lupe [place] DISCHARGE CAREGIVER [3] Spouse [3] [phone] Patient Belongings The following personal items are in your possession at time of discharge: 
     Visual Aid: None Please provide this summary of care documentation to your next provider. Signatures-by signing, you are acknowledging that this After Visit Summary has been reviewed with you and you have received a copy. Patient Signature:  ____________________________________________________________ Date:  ____________________________________________________________  
  
Wallace Ruth Provider Signature:  ____________________________________________________________ Date:  ____________________________________________________________

## 2018-05-29 ENCOUNTER — OFFICE VISIT (OUTPATIENT)
Dept: PULMONOLOGY | Age: 48
End: 2018-05-29

## 2018-05-29 VITALS
HEIGHT: 69 IN | HEART RATE: 105 BPM | BODY MASS INDEX: 28.58 KG/M2 | RESPIRATION RATE: 20 BRPM | SYSTOLIC BLOOD PRESSURE: 120 MMHG | WEIGHT: 193 LBS | OXYGEN SATURATION: 95 % | TEMPERATURE: 97.5 F | DIASTOLIC BLOOD PRESSURE: 80 MMHG

## 2018-05-29 DIAGNOSIS — J44.9 COPD, SEVERE (HCC): Primary | ICD-10-CM

## 2018-05-29 DIAGNOSIS — G47.9 SLEEP DISORDER: ICD-10-CM

## 2018-05-29 DIAGNOSIS — M54.2 NECK PAIN: ICD-10-CM

## 2018-05-29 RX ORDER — ALBUTEROL SULFATE 90 UG/1
2 AEROSOL, METERED RESPIRATORY (INHALATION)
Qty: 1 INHALER | Refills: 2 | Status: SHIPPED | OUTPATIENT
Start: 2018-05-29 | End: 2018-11-19 | Stop reason: SDUPTHER

## 2018-05-29 RX ORDER — FLUTICASONE PROPIONATE AND SALMETEROL 500; 50 UG/1; UG/1
1 POWDER RESPIRATORY (INHALATION) 2 TIMES DAILY
Qty: 1 INHALER | Refills: 5 | Status: SHIPPED | OUTPATIENT
Start: 2018-05-29 | End: 2018-11-19 | Stop reason: SDUPTHER

## 2018-05-29 RX ORDER — METHOCARBAMOL 500 MG/1
500 TABLET, FILM COATED ORAL 3 TIMES DAILY
Qty: 90 TAB | Refills: 1 | Status: SHIPPED | OUTPATIENT
Start: 2018-05-29 | End: 2018-06-11 | Stop reason: ALTCHOICE

## 2018-05-29 NOTE — PATIENT INSTRUCTIONS
Continue Spiriva 1 inhalation daily and remember to exhale fully before inhaling    Continue Advair 1 inhalation twice daily and remember to exhale fully before inhaling also remember to wash mouth with water and spit it out after inhaling    Albuterol 2 inhalations every 4 hours as needed if you require albuterol too often to control respiratory symptoms call the office for severe symptoms go to the emergency room    Daliresp 1 tablet daily    Always call for symptoms such as increasing shortness of breath or a cough productive of discolored mucus    Continue your exercise try to walk at least a mile to a mile and a half without stopping 4-5 times a week

## 2018-05-29 NOTE — MR AVS SNAPSHOT
[address] [facility], [address] [address] 
[phone] Patient: Joseph Sutton Sr. MRN: [id number] :1970 Visit Information Date & Time Provider Department Dept. Phone Encounter #  
 2018 10:15 AM MD Naty Nu[facility]ez [facility] Kiran Rivera [id number] Follow-up Instructions Return in about 4 months (around 2018). Your Appointments 2018  9:00 AM  
ROUTINE CARE with Cate Geiger NP [facility] Main Office ([address] [place] [address]) Appt Note: 4 month f/u  appt for HTN, anxiety, COPD.  
 14 [facility] [address]  
[phone]  
  
   
 [address] 2018 10:00 AM  
Follow Up with David Bishop MD  
[facility] ([address] [place] [address]) Appt Note: 6 month f/up [place] [address] [address] [address] [phone] [address] [facility] [address] 2018  9:15 AM  
Follow Up with Enmanuel Ha MD  
[facility] [address] ([facility]) Appt Note: FROM 18  
 [address] [place] [address], [address] [address]  
[phone]  
  
   
 [address] [place] [address], 1106 [facility] [address] [place] [address] Upcoming Health Maintenance Date Due DTaP/Tdap/Td series (1 - Tdap) 10/27/1990 Influenza Age 5 to Adult 2018 Allergies as of 2018  Review Complete On: 2018 By: Neelima Mistry LPN No Known Allergies Current Immunizations  Never Reviewed Name Date Influenza Vaccine 10/10/2011 12:00 AM  
 Influenza Vaccine (Quad) PF 10/23/2017  1:04 PM  
 Pneumococcal Polysaccharide (PPSV-23) 2018 10:10 AM, 10/10/2011 12:00 AM  
  
 Not reviewed this visit You Were Diagnosed With   
  
 Codes Comments COPD, severe ([facility]    -  Primary ICD-10-CM: J44.9 ICD-9-CM: 922 Sleep disorder     ICD-10-CM: G47.9 ICD-9-CM: 780.50 Vitals BP Pulse Temp Resp Height(growth percentile) Weight(growth percentile) 120/80 (BP 1 Location: Left arm, BP Patient Position: Sitting) (!) 105 97.5 °F (36.4 °C) 20 5' 9\" (1.753 m) 193 lb (87.5 kg) SpO2 BMI Smoking Status 95% 28.5 kg/m2 Former Smoker BMI and BSA Data Body Mass Index Body Surface Area 28.5 kg/m 2 2.06 m 2 Preferred Pharmacy Pharmacy Name Phone [address] [place] [address]. [phone] Your Updated Medication List  
  
   
This list is accurate as of 5/29/18 10:37 AM.  Always use your most recent med list.  
  
  
  
  
 * albuterol 2.5 mg /3 mL (0.083 %) nebulizer solution Commonly known as:  PROVENTIL VENTOLIN Nebulized 1 vial for inhalation every 4 hours as needed * albuterol 90 mcg/actuation inhaler Commonly known as:  PROVENTIL HFA, VENTOLIN HFA, PROAIR HFA Take 2 Puffs by inhalation every four (4) hours as needed for Wheezing or Shortness of Breath. citalopram 40 mg tablet Commonly known as:  Donice Apt Take 1 Tab by mouth daily. fluticasone-salmeterol 500-50 mcg/dose diskus inhaler Commonly known as:  ADVAIR DISKUS Take 1 Puff by inhalation two (2) times a day. furosemide 20 mg tablet Commonly known as:  LASIX Take 1 Tab by mouth every other day. lisinopril 5 mg tablet Commonly known as:  Alisa [place] Take 1 Tab by mouth daily. LORazepam 1 mg tablet Commonly known as:  ATIVAN Take 1 Tab by mouth every eight (8) hours as needed for Anxiety. Max Daily Amount: 3 mg.  
  
 methocarbamol 500 mg tablet Commonly known as:  ROBAXIN Take 1 Tab by mouth three (3) times daily. mirtazapine 7.5 mg tablet Commonly known as:  Benetta Caballo Take 1 Tab by mouth nightly. Nebulizer & Compressor machine 1 Each by Does Not Apply route every four (4) hours as needed. pantoprazole 40 mg tablet Commonly known as:  PROTONIX Take 1 Tab by mouth Daily (before breakfast). roflumilast Tab tablet Commonly known as:  Ferol Ally Take 1 Tab by mouth daily. Indications: PREVENTION OF BRONCHOSPASM WITH CHRONIC BRONCHITIS  
  
 tiotropium 18 mcg inhalation capsule Commonly known as:  Juliette Arlin Take 1 Cap by inhalation daily. Indications: BRONCHOSPASM PREVENTION WITH COPD  
  
 WALKABOUT 1 OXYGEN SYSTEM  
2.5 L/min by Nasal route continuous. * Notice: This list has 2 medication(s) that are the same as other medications prescribed for you. Read the directions carefully, and ask your doctor or other care provider to review them with you. Prescriptions Sent to Pharmacy Refills  
 fluticasone-salmeterol (ADVAIR DISKUS) 500-50 mcg/dose diskus inhaler 5 Sig: Take 1 Puff by inhalation two (2) times a day. Class: Normal  
 Pharmacy: [facility] [address] . Ph #: [phone] Route: Inhalation  
 albuterol (PROVENTIL HFA, VENTOLIN HFA, PROAIR HFA) 90 mcg/actuation inhaler 2 Sig: Take 2 Puffs by inhalation every four (4) hours as needed for Wheezing or Shortness of Breath. Class: Normal  
 Pharmacy: [facility] [address] . Ph #: [phone] Route: Inhalation Follow-up Instructions Return in about 4 months (around 9/29/2018). To-Do List   
 Around 05/29/2018 Sleep Center:  SPLIT CPAP/PSG Patient Instructions Continue Spiriva 1 inhalation daily and remember to exhale fully before inhaling Continue Advair 1 inhalation twice daily and remember to exhale fully before inhaling also remember to wash mouth with water and spit it out after inhaling Albuterol 2 inhalations every 4 hours as needed if you require albuterol too often to control respiratory symptoms call the office for severe symptoms go to the emergency room Daliresp 1 tablet daily Always call for symptoms such as increasing shortness of breath or a cough productive of discolored mucus Continue your exercise try to walk at least a mile to a mile and a half without stopping 4-5 times a week Please provide this summary of care documentation to your next provider. Your primary care clinician is listed as Latricia Harper. If you have any questions after today's visit, please call [phone].

## 2018-05-29 NOTE — TELEPHONE ENCOUNTER
Patient's last office visit on 4/25/2018  Medication(s) last filled on 4/25/2018  Next Appointment: 8/24/2018  Rx Class Normal

## 2018-05-29 NOTE — PROGRESS NOTES
[facility]  Pulmonary, Critical Care, and Sleep Medicine      Chief complaint:  COPD    HPI:    Jerry Velasco  is 49years old and returns the office today for follow-up concerning COPD and relates that his cough is improved his exercise tolerance is improving and he is walking up to a mile and a half without stopping regularly he denies chest pain leg swelling. He continues to take Advair Spiriva and as needed albuterol and relates he checks his oxygen levels and they have much improved with exercise. He also has symptoms of daytime sleepiness and witnessed sleep apnea      No Known Allergies  Current Outpatient Prescriptions   Medication Sig    methocarbamol (ROBAXIN) 500 mg tablet Take 1 Tab by mouth three (3) times daily.  roflumilast (DALIRESP) tab tablet Take 1 Tab by mouth daily. Indications: PREVENTION OF BRONCHOSPASM WITH CHRONIC BRONCHITIS    LORazepam (ATIVAN) 1 mg tablet Take 1 Tab by mouth every eight (8) hours as needed for Anxiety. Max Daily Amount: 3 mg.  lisinopril (PRINIVIL, ZESTRIL) 5 mg tablet Take 1 Tab by mouth daily.  citalopram (CELEXA) 40 mg tablet Take 1 Tab by mouth daily.  furosemide (LASIX) 20 mg tablet Take 1 Tab by mouth every other day.  mirtazapine (REMERON) 7.5 mg tablet Take 1 Tab by mouth nightly.  Nebulizer & Compressor machine 1 Each by Does Not Apply route every four (4) hours as needed.  albuterol (PROVENTIL VENTOLIN) 2.5 mg /3 mL (0.083 %) nebulizer solution Nebulized 1 vial for inhalation every 4 hours as needed    OXYGEN-AIR DELIVERY SYSTEMS (WALKABOUT 1 OXYGEN SYSTEM) 2.5 L/min by Nasal route continuous.  albuterol (PROVENTIL HFA, VENTOLIN HFA, PROAIR HFA) 90 mcg/actuation inhaler Take 2 Puffs by inhalation every four (4) hours as needed for Wheezing or Shortness of Breath.  pantoprazole (PROTONIX) 40 mg tablet Take 1 Tab by mouth Daily (before breakfast).     tiotropium (SPIRIVA) 18 mcg inhalation capsule Take 1 Cap by inhalation daily. Indications: BRONCHOSPASM PREVENTION WITH COPD    fluticasone-salmeterol (ADVAIR) 500-50 mcg/dose diskus inhaler Take 1 Puff by inhalation two (2) times a day. No current facility-administered medications for this visit. Past Medical History:   Diagnosis Date    Chronic lung disease     Chronic obstructive pulmonary disease ([facility] 08/03/2017    PFTS 8/3/17    COPD, severe ([facility]     Emphysema/COPD ([facility]     Esophagitis 12/11/2017    Endoscopy    Essential hypertension, benign     History of stomach ulcers     Positive urine drug screen 01/2016    opiates and THC    Upper GI bleed      Past Surgical History:   Procedure Laterality Date    HX ENDOSCOPY  12/11/2017     Social History     Social History    Marital status:      Spouse name: N/A    Number of children: N/A    Years of education: N/A     Occupational History    Not on file.      Social History Main Topics    Smoking status: Former Smoker     Packs/day: 2.00     Years: 25.00     Types: Cigarettes     Start date: 5/28/1984     Quit date: 12/18/2017    Smokeless tobacco: Never Used    Alcohol use No    Drug use: Yes     Special: Marijuana      Comment: Hx of Marijuana use    Sexual activity: Yes     Partners: Female     Other Topics Concern     Service No    Blood Transfusions No    Caffeine Concern Yes    Occupational Exposure No    Hobby Hazards No    Sleep Concern Yes     occas    Stress Concern No    Weight Concern No    Special Diet No    Back Care Yes    Exercise No    Bike Helmet Yes    Seat Belt No     occas     Social History Narrative         Family History   Problem Relation Age of Onset    Hypertension Mother     Heart Disease Mother     Diabetes Mother     Hypertension Father     Heart Disease Father     Cancer Father      lymphnodes    Diabetes Father        Review of systems:  He denies fever chills poor appetite and says he is gaining weight    Physical Exam:  Visit Vitals    /80 (BP 1 Location: Left arm, BP Patient Position: Sitting)    Pulse (!) 105    Temp 97.5 °F (36.4 °C)    Resp 20    Ht 5' 9\" (1.753 m)    Wt 87.5 kg (193 lb)    SpO2 95%    BMI 28.5 kg/m2       Well-developed well-nourished  HEENT: WNL  Lymph node exam: Supraclavicular cervical lymph nodes negative  Chest: Equal symmetrical expansion no dullness no wheezes rales rubs attenuated breath sounds throughout   Heart: Regular rhythm no gallop no murmur no JVD no peripheral edema  Extremities: No cyanosis clubbing or calf tenderness  Neurological: Alert and oriented    Labs:    02 sat room air at rest 95%    Impression:     Significant improvement in COPD including exercise tolerance  Possible FABIÁN    Plan:     Follow-up with Dr. Sophie Nathan for FABIÁN eval  Continue Spiriva Advair Daliresp as needed albuterol  Continue exercise program  Follow-up in 3-4 months or sooner if needed    Misael Frost MD , [facility]    CC: Alexander Lerma NP     [address] [facility]  [place], [address] [address]     P: [phone]     F: [phone]

## 2018-05-29 NOTE — PROGRESS NOTES
The pt. has increased activity walking 1.5 miles/daily. He found less of a need for O2 with increased stamina. .    He saw Dr. Ludmila Murguia 5/2/18. \"She is awaiting Dr. Panda Nolan input re: Sleep Study\"    He had a Liver Biopsy 5/11/18.       No recent ED nor Urgent Care visits

## 2018-06-20 DIAGNOSIS — J44.9 COPD, SEVERE (HCC): ICD-10-CM

## 2018-06-20 DIAGNOSIS — R06.02 SHORTNESS OF BREATH: ICD-10-CM

## 2018-06-20 RX ORDER — ALBUTEROL SULFATE 0.83 MG/ML
SOLUTION RESPIRATORY (INHALATION)
Qty: 60 EACH | Refills: 3 | Status: SHIPPED | OUTPATIENT
Start: 2018-06-20 | End: 2019-01-07 | Stop reason: SDUPTHER

## 2018-06-25 ENCOUNTER — TELEPHONE (OUTPATIENT)
Dept: FAMILY MEDICINE CLINIC | Facility: CLINIC | Age: 48
End: 2018-06-25

## 2018-06-25 RX ORDER — FUROSEMIDE 20 MG/1
20 TABLET ORAL EVERY OTHER DAY
Qty: 15 TAB | Refills: 6 | OUTPATIENT
Start: 2018-06-25 | End: 2018-09-12 | Stop reason: SDUPTHER

## 2018-08-08 ENCOUNTER — HOSPITAL ENCOUNTER (OUTPATIENT)
Dept: SLEEP MEDICINE | Age: 48
Discharge: HOME | End: 2018-08-08
Payer: MEDICAID

## 2018-08-08 PROCEDURE — 95810 POLYSOM 6/> YRS 4/> PARAM: CPT

## 2018-08-24 DIAGNOSIS — F41.1 GAD (GENERALIZED ANXIETY DISORDER): ICD-10-CM

## 2018-08-27 RX ORDER — LORAZEPAM 1 MG/1
1 TABLET ORAL
Qty: 90 TAB | Refills: 0 | Status: SHIPPED | OUTPATIENT
Start: 2018-08-27

## 2018-08-27 RX ORDER — CYCLOBENZAPRINE HCL 5 MG
5 TABLET ORAL
Qty: 30 TAB | Refills: 1 | Status: SHIPPED | OUTPATIENT
Start: 2018-08-27 | End: 2018-09-25 | Stop reason: SDUPTHER

## 2018-08-27 RX ORDER — CITALOPRAM 40 MG/1
40 TABLET, FILM COATED ORAL DAILY
Qty: 30 TAB | Refills: 1 | Status: SHIPPED | OUTPATIENT
Start: 2018-08-27 | End: 2018-10-24

## 2018-08-27 NOTE — TELEPHONE ENCOUNTER
Chart and  reviewed. Will write for medications until his planned visit - I will not write for Tylenol #3 without seeing him.

## 2018-08-28 ENCOUNTER — DOCUMENTATION ONLY (OUTPATIENT)
Dept: FAMILY MEDICINE CLINIC | Facility: CLINIC | Age: 48
End: 2018-08-28

## 2018-08-28 NOTE — PROGRESS NOTES
Verbal order was called into pt pharmacy [facility] for Lorazepam 1 mg tablet pt take 1 tab by mouth every eight hours as needed for anxiety. Max daily amount 3 mg Dispense 90 tabs with 0 refills.

## 2018-09-12 ENCOUNTER — OFFICE VISIT (OUTPATIENT)
Dept: CARDIOLOGY CLINIC | Age: 48
End: 2018-09-12

## 2018-09-12 VITALS
SYSTOLIC BLOOD PRESSURE: 120 MMHG | WEIGHT: 198 LBS | OXYGEN SATURATION: 96 % | BODY MASS INDEX: 29.33 KG/M2 | HEIGHT: 69 IN | HEART RATE: 87 BPM | DIASTOLIC BLOOD PRESSURE: 86 MMHG

## 2018-09-12 DIAGNOSIS — I50.32 DIASTOLIC CHF, CHRONIC (HCC): ICD-10-CM

## 2018-09-12 DIAGNOSIS — Z99.81 OXYGEN DEPENDENT: ICD-10-CM

## 2018-09-12 DIAGNOSIS — I10 ESSENTIAL HYPERTENSION, BENIGN: ICD-10-CM

## 2018-09-12 DIAGNOSIS — R06.00 DYSPNEA, UNSPECIFIED TYPE: ICD-10-CM

## 2018-09-12 DIAGNOSIS — J44.9 COPD, SEVERE (HCC): ICD-10-CM

## 2018-09-12 DIAGNOSIS — R00.2 PALPITATIONS: ICD-10-CM

## 2018-09-12 DIAGNOSIS — R06.02 SOB (SHORTNESS OF BREATH): Primary | ICD-10-CM

## 2018-09-12 RX ORDER — FUROSEMIDE 20 MG/1
20 TABLET ORAL EVERY OTHER DAY
Qty: 15 TAB | Refills: 6 | Status: SHIPPED | OUTPATIENT
Start: 2018-09-12 | End: 2019-07-03 | Stop reason: SDUPTHER

## 2018-09-12 NOTE — MR AVS SNAPSHOT
[address] [place] [address] 
[phone] Patient: Vahid Wynn Sr. MRN: [id number] :1970 Visit Information Date & Time Provider Department Dept. Phone Encounter #  
 2018 10:00 AM Danna Bae MD [facility] [address] [id number] Your Appointments 2018  9:15 AM  
Follow Up with Shweta Negrete MD  
[address] [place] [address] (Sherita Calero) Appt Note: FROM 18  
 [address] [place] [address], [address] [place] [address]  
[phone]  
  
   
 [address] [place] [address], [address] [facility] [address] [place] [address]  
  
    
 2018  2:00 PM  
ROUTINE CARE with Zenon Gonzalez MD  
[facility] (Sherita Calero) Appt Note: 3 month routine f/u and med refill; 3 month routine f/u and med refill 14 [facility] [address] [facility] [address]  
[phone]  
  
   
 [address] Upcoming Health Maintenance Date Due DTaP/Tdap/Td series (1 - Tdap) 10/27/1990 Influenza Age 5 to Adult 2018 Allergies as of 2018  Review Complete On: 2018 By: Danna Bae MD  
 No Known Allergies Current Immunizations  Never Reviewed Name Date Influenza Vaccine 10/10/2011 12:00 AM  
 Influenza Vaccine (Quad) PF 10/23/2017  1:04 PM  
 Pneumococcal Polysaccharide (PPSV-23) 2018 10:10 AM, 10/10/2011 12:00 AM  
  
 Not reviewed this visit You Were Diagnosed With   
  
 Codes Comments SOB (shortness of breath)    -  Primary ICD-10-CM: R06.02 
ICD-9-CM: 786.05 Essential hypertension, benign     ICD-10-CM: I10 
ICD-9-CM: 401.1 Oxygen dependent     ICD-10-CM: Z99.81 ICD-9-CM: V46.2 Diastolic CHF, chronic (HCC)     ICD-10-CM: I50.32 
ICD-9-CM: 428.32, 428.0  Dyspnea, unspecified type     ICD-10-CM: R06.00 
ICD-9-CM: 786.09   
 Palpitations     ICD-10-CM: R00.2 ICD-9-CM: 785.1 COPD, severe ([facility]     ICD-10-CM: J44.9 ICD-9-CM: 803 Vitals BP Pulse Height(growth percentile) Weight(growth percentile) SpO2 BMI  
 120/86 87 5' 9\" (1.753 m) 198 lb (89.8 kg) 96% 29.24 kg/m2 Smoking Status Former Smoker Vitals History BMI and BSA Data Body Mass Index Body Surface Area  
 29.24 kg/m 2 2.09 m 2 Preferred Pharmacy Pharmacy Name Phone [address] [place] [address]. [phone] Your Updated Medication List  
  
   
This list is accurate as of 9/12/18 10:24 AM.  Always use your most recent med list.  
  
  
  
  
 * albuterol 90 mcg/actuation inhaler Commonly known as:  PROVENTIL HFA, VENTOLIN HFA, PROAIR HFA Take 2 Puffs by inhalation every four (4) hours as needed for Wheezing or Shortness of Breath. * albuterol 2.5 mg /3 mL (0.083 %) nebulizer solution Commonly known as:  PROVENTIL VENTOLIN Nebulized 1 vial for inhalation every 4 hours as needed  
  
 citalopram 40 mg tablet Commonly known as:  Taniya Letters Take 1 Tab by mouth daily. cyclobenzaprine 5 mg tablet Commonly known as:  FLEXERIL Take 1 Tab by mouth nightly. Indications: Muscle Spasm  
  
 fluticasone-salmeterol 500-50 mcg/dose diskus inhaler Commonly known as:  ADVAIR DISKUS Take 1 Puff by inhalation two (2) times a day. furosemide 20 mg tablet Commonly known as:  LASIX Take 1 Tab by mouth every other day. lisinopril 5 mg tablet Commonly known as:  Arnold Files Take 1 Tab by mouth daily. LORazepam 1 mg tablet Commonly known as:  ATIVAN Take 1 Tab by mouth every eight (8) hours as needed for Anxiety. Max Daily Amount: 3 mg.  
  
 mirtazapine 7.5 mg tablet Commonly known as:  Maikel Sitter Take 1 Tab by mouth nightly. Nebulizer & Compressor machine 1 Each by Does Not Apply route every four (4) hours as needed. pantoprazole 40 mg tablet Commonly known as:  PROTONIX Take 1 Tab by mouth Daily (before breakfast). roflumilast Tab tablet Commonly known as:  Hetal Melida Take 1 Tab by mouth daily. Indications: PREVENTION OF BRONCHOSPASM WITH CHRONIC BRONCHITIS  
  
 tiotropium 18 mcg inhalation capsule Commonly known as:  Graydon Hoguet Take 1 Cap by inhalation daily. Indications: BRONCHOSPASM PREVENTION WITH COPD  
  
 WALKABOUT 1 OXYGEN SYSTEM  
2.5 L/min by Nasal route continuous. * Notice: This list has 2 medication(s) that are the same as other medications prescribed for you. Read the directions carefully, and ask your doctor or other care provider to review them with you. We Performed the Following AMB POC EKG ROUTINE W/ 12 LEADS, INTER & REP [78527 CPT(R)] Please provide this summary of care documentation to your next provider. Your primary care clinician is listed as Emili Calvo. If you have any questions after today's visit, please call [phone].

## 2018-09-12 NOTE — PROGRESS NOTES
History of Present Illness: A 49 y.o. male here for follow up. Overall, doing relatively well the past few months. He is walking a few miles a day. He gets quite fatigued after but is able to pull through. He has some atypical chest pain around both of his ribs at times. He is scheduled to see Dr. Ja Olivares tomorrow. He is using oxygen only at night. He is taking his inhalers. He is taking Lasix 20 mg every other day. Impression/Plan: 
Chronic atypical chest pain, not related to angina. Severe chronic obstructive pulmonary disease using oxygen now at night followed by Dr. Ja Olivares. Recent pneumonia earlier this year with antibiotics and inhalers. Chronic diastolic heart failure on echocardiogram January 2018 with mild LVH and taking Lasix 20 mg every other day. No evidence of decompensated heart failure today. Hypertension, controlled. Previous tobacco use, now discontinued. Palpitations, improved. History of panic attacks. At this point he remains on Lasix 20 mg every other day. Heart failure appears compensated. There are no crackles or edema on exam. His blood pressure is controlled. He stopped smoking and will see Dr. Ja Olivares, his pulmonologist, tomorrow. All questions answered. I will see him back in six months and if doing well likely move to an annual basis. Past Medical History:  
Diagnosis Date  Chronic lung disease  Chronic obstructive pulmonary disease ([address] 08/03/2017 PFTS 8/3/17  COPD, severe ([address]  Emphysema/COPD ([address]  Esophagitis 12/11/2017 Endoscopy  Essential hypertension, benign  History of stomach ulcers  Positive urine drug screen 01/2016  
 opiates and THC  Upper GI bleed Current Outpatient Prescriptions Medication Sig Dispense Refill  cyclobenzaprine (FLEXERIL) 5 mg tablet Take 1 Tab by mouth nightly. Indications: Muscle Spasm 30 Tab 1  citalopram (CELEXA) 40 mg tablet Take 1 Tab by mouth daily.  30 Tab 1  
  LORazepam (ATIVAN) 1 mg tablet Take 1 Tab by mouth every eight (8) hours as needed for Anxiety. Max Daily Amount: 3 mg. 90 Tab 0  
 furosemide (LASIX) 20 mg tablet Take 1 Tab by mouth every other day. 15 Tab 6  
 albuterol (PROVENTIL VENTOLIN) 2.5 mg /3 mL (0.083 %) nebulizer solution Nebulized 1 vial for inhalation every 4 hours as needed 60 Each 3  
 tiotropium (SPIRIVA) 18 mcg inhalation capsule Take 1 Cap by inhalation daily. Indications: BRONCHOSPASM PREVENTION WITH COPD 30 Cap 4  
 fluticasone-salmeterol (ADVAIR DISKUS) 500-50 mcg/dose diskus inhaler Take 1 Puff by inhalation two (2) times a day. 1 Inhaler 5  
 albuterol (PROVENTIL HFA, VENTOLIN HFA, PROAIR HFA) 90 mcg/actuation inhaler Take 2 Puffs by inhalation every four (4) hours as needed for Wheezing or Shortness of Breath. 1 Inhaler 2  
 roflumilast (DALIRESP) tab tablet Take 1 Tab by mouth daily. Indications: PREVENTION OF BRONCHOSPASM WITH CHRONIC BRONCHITIS 30 Tab 5  
 lisinopril (PRINIVIL, ZESTRIL) 5 mg tablet Take 1 Tab by mouth daily. 30 Tab 6  
 mirtazapine (REMERON) 7.5 mg tablet Take 1 Tab by mouth nightly. 90 Tab 1  Nebulizer & Compressor machine 1 Each by Does Not Apply route every four (4) hours as needed. 1 Each 0  
 OXYGEN-AIR DELIVERY SYSTEMS (WALKABOUT 1 OXYGEN SYSTEM) 2.5 L/min by Nasal route continuous.  pantoprazole (PROTONIX) 40 mg tablet Take 1 Tab by mouth Daily (before breakfast). 30 Tab 0 Social History 
 reports that he quit smoking about 8 months ago. His smoking use included Cigarettes. He started smoking about 34 years ago. He has a 50.00 pack-year smoking history. He has never used smokeless tobacco. 
 reports that he does not drink alcohol. Family History 
family history includes Cancer in his father; Diabetes in his father and mother; Heart Disease in his father and mother; Hypertension in his father and mother. Review of Systems Except as stated above include: Constitutional: Negative for fever, chills and malaise/fatigue. HEENT: No congestion or recent URI. Gastrointestinal: No nausea, vomiting, abdominal pain, bloody stools. Pulmonary:  Negative except as stated above. Cardiac:  Negative except as stated above. Musculoskeletal: Negative except as stated above. Neurological:  No localized symptoms. Skin:  Negative except as stated above. Psych:  Negative except as stated above. Endocrine:  Negative except as stated above. PHYSICAL EXAM 
BP Readings from Last 3 Encounters:  
09/12/18 120/86  
05/29/18 120/80  
05/11/18 122/67 Pulse Readings from Last 3 Encounters:  
09/12/18 87  
05/29/18 (!) 105  
05/11/18 78 Wt Readings from Last 3 Encounters:  
09/12/18 89.8 kg (198 lb) 05/29/18 87.5 kg (193 lb) 05/11/18 88.5 kg (195 lb) General:   Well developed, well groomed. Head/Neck:   No jugular venous distention No carotid bruits. No evidence of xanthelasma. Lungs:   No respiratory distress. Decreased. Heart:    Regular rate and rhythm. Normal S1/S2. Palpation of heart with normal point of maximum impulse. No significant murmurs, rubs or gallops. Abdomen:   Soft and nontender. No palpable abdominal mass or bruits. Extremities:   Intact peripheral pulses. No significant edema. Neurological:   Alert and oriented to person, place, time. No focal neurological deficit visually. Skin:   No obvious rash Blood Pressure Metric: 
stable

## 2018-09-17 ENCOUNTER — OFFICE VISIT (OUTPATIENT)
Dept: PULMONOLOGY | Age: 48
End: 2018-09-17

## 2018-09-17 VITALS
RESPIRATION RATE: 20 BRPM | HEIGHT: 69 IN | WEIGHT: 196 LBS | DIASTOLIC BLOOD PRESSURE: 80 MMHG | OXYGEN SATURATION: 97 % | BODY MASS INDEX: 29.03 KG/M2 | TEMPERATURE: 97.7 F | SYSTOLIC BLOOD PRESSURE: 120 MMHG | HEART RATE: 104 BPM

## 2018-09-17 DIAGNOSIS — J44.9 COPD, SEVERE (HCC): Primary | ICD-10-CM

## 2018-09-17 DIAGNOSIS — J96.90 RESPIRATORY FAILURE, UNSPECIFIED CHRONICITY, UNSPECIFIED WHETHER WITH HYPOXIA OR HYPERCAPNIA (HCC): ICD-10-CM

## 2018-09-17 NOTE — PROGRESS NOTES
[facility] Pulmonary, Critical Care, and Sleep Medicine Chief complaint: COPD 
 
HPI: 
 
Annah Harada.    is 49years old and returns the office today for follow-up concerning COPD and relates she continues to use Spiriva Advair and as needed albuterol and that he has mild  cough with occasional clear mucus and denies chest pain except for the chest pain on both sides of his ribs which occurs with exercise and sometimes goes down into his abdomen. He also reports that he is exercising regularly walking up to 2 miles a day Allergies Allergen Reactions  Remeron [Mirtazapine] Other (comments) Mood swings  Seroquel [Quetiapine] Other (comments) Mood swings Current Outpatient Prescriptions Medication Sig  furosemide (LASIX) 20 mg tablet Take 1 Tab by mouth every other day.  LORazepam (ATIVAN) 1 mg tablet Take 1 Tab by mouth every eight (8) hours as needed for Anxiety. Max Daily Amount: 3 mg.  albuterol (PROVENTIL VENTOLIN) 2.5 mg /3 mL (0.083 %) nebulizer solution Nebulized 1 vial for inhalation every 4 hours as needed  tiotropium (SPIRIVA) 18 mcg inhalation capsule Take 1 Cap by inhalation daily. Indications: BRONCHOSPASM PREVENTION WITH COPD  fluticasone-salmeterol (ADVAIR DISKUS) 500-50 mcg/dose diskus inhaler Take 1 Puff by inhalation two (2) times a day.  albuterol (PROVENTIL HFA, VENTOLIN HFA, PROAIR HFA) 90 mcg/actuation inhaler Take 2 Puffs by inhalation every four (4) hours as needed for Wheezing or Shortness of Breath.  roflumilast (DALIRESP) tab tablet Take 1 Tab by mouth daily. Indications: PREVENTION OF BRONCHOSPASM WITH CHRONIC BRONCHITIS  lisinopril (PRINIVIL, ZESTRIL) 5 mg tablet Take 1 Tab by mouth daily.  Nebulizer & Compressor machine 1 Each by Does Not Apply route every four (4) hours as needed.  OXYGEN-AIR DELIVERY SYSTEMS (WALKABOUT 1 OXYGEN SYSTEM) 2.5 L/min by Nasal route continuous.  pantoprazole (PROTONIX) 40 mg tablet Take 1 Tab by mouth Daily (before breakfast).  cyclobenzaprine (FLEXERIL) 5 mg tablet Take 1 Tab by mouth nightly. Indications: Muscle Spasm  citalopram (CELEXA) 40 mg tablet Take 1 Tab by mouth daily.  mirtazapine (REMERON) 7.5 mg tablet Take 1 Tab by mouth nightly. No current facility-administered medications for this visit. Past Medical History:  
Diagnosis Date  Chronic lung disease  Chronic obstructive pulmonary disease ([facility] 08/03/2017 PFTS 8/3/17  COPD, severe ([facility]  Emphysema/COPD ([facility]  Esophagitis 12/11/2017 Endoscopy  Essential hypertension, benign  History of stomach ulcers  Positive urine drug screen 01/2016  
 opiates and THC  Upper GI bleed Past Surgical History:  
Procedure Laterality Date  HX ENDOSCOPY  12/11/2017 Social History Social History  Marital status:  Spouse name: N/A  
 Number of children: N/A  
 Years of education: N/A Occupational History  Not on file. Social History Main Topics  Smoking status: Former Smoker Packs/day: 2.00 Years: 25.00 Types: Cigarettes Start date: 5/28/1984 Quit date: 12/18/2017  Smokeless tobacco: Never Used  Alcohol use No  
 Drug use: Yes Special: Marijuana Comment: Hx of Marijuana use  Sexual activity: Yes  
  Partners: Female Other Topics Concern   Service No  
 Blood Transfusions No  
 Caffeine Concern Yes  Occupational Exposure No  
 Hobby Hazards No  
 Sleep Concern Yes  
  occas  Stress Concern No  
 Weight Concern No  
 Special Diet No  
 Back Care Yes  Exercise No  
 Bike Helmet Yes  Seat Belt No  
  occas Social History Narrative  Family History Problem Relation Age of Onset  Hypertension Mother  Heart Disease Mother  Diabetes Mother  Hypertension Father  Heart Disease Father  Cancer Father   
  lymphnodes  Diabetes Father Review of systems: 
He denies fever chills poor appetite or weight loss Physical Exam: 
Visit Vitals  /80 (BP 1 Location: Left arm, BP Patient Position: Sitting)  Pulse (!) 104  Temp 97.7 °F (36.5 °C)  Resp 20  
 Ht 5' 9\" (1.753 m)  Wt 88.9 kg (196 lb)  SpO2 97%  BMI 28.94 kg/m2 Well-developed well-nourished HEENT: WNL Lymph node exam: Supraclavicular cervical lymph nodes negative Chest: Equal symmetrical expansion no dullness no wheezes rales rubs Heart: Regular rhythm no gallop no murmur no JVD no peripheral edema Extremities: No cyanosis clubbing Neurological: Alert and oriented Labs: 
 
O2 sat 97% room air at rest 
 
 
Impression: COPD which appears stable or actually improving with improving exercise tolerance Plan:  
 
Continue Spiriva and Advair as needed albuterol and exercise program 
Follow-up in 6 months or sooner if needed Milind Elias MD , [facility] 
 
CC: Juanis Narvaez MD  
 
[address] [facility], [address] [address]     P: [phone]     F: [phone]

## 2018-09-17 NOTE — MR AVS SNAPSHOT
[address] [facility], [address] [place] [address] 
[phone] Patient: Noelle Ramirez Sr. MRN: [id number] :[id number] Visit Information Date & Time Provider Department Dept. Phone Encounter #  
 2018  9:15 AM Penelope Mcdowell MD [facility] [place] [phone] Follow-up Instructions Return in about 6 months (around 3/17/2019). Follow-up and Disposition History Your Appointments 2018  2:00 PM  
ROUTINE CARE with Maura Luciano MD  
[facility] Appt Note: 3 month routine f/u and med refill; 3 month routine f/u and med refill 14 [facility] 1 [facility] [address]  
[phone]  
  
   
 14 [address] 3/14/2019  8:40 AM  
Follow Up with Darin Solo MD  
[facility] ([facility]) Appt Note: 6 month follow up [place] [address] [address] [address] [phone] [address] [place] [address] [address] Upcoming Health Maintenance Date Due DTaP/Tdap/Td series (1 - Tdap) 10/27/1990 Influenza Age 5 to Adult 2018 Allergies as of 2018  Review Complete On: 2018 By: John Valera LPN Severity Noted Reaction Type Reactions Remeron [Mirtazapine]  2018   Systemic Other (comments) Mood swings Seroquel [Quetiapine] Low 2018   Systemic Other (comments) Mood swings Current Immunizations  Never Reviewed Name Date Influenza Vaccine 10/10/2011 12:00 AM  
 Influenza Vaccine (Quad) PF 10/23/2017  1:04 PM  
 Pneumococcal Polysaccharide (PPSV-23) 2018 10:10 AM, 10/10/2011 12:00 AM  
  
 Not reviewed this visit You Were Diagnosed With   
  
 Codes Comments COPD, severe ([facility]    -  Primary ICD-10-CM: J44.9 ICD-9-CM: 014 Respiratory failure, unspecified chronicity, unspecified whether with hypoxia or hypercapnia ([facility]     ICD-10-CM: J96.90 ICD-9-CM: 518.81 Vitals BP Pulse Temp Resp Height(growth percentile) Weight(growth percentile) 120/80 (BP 1 Location: Left arm, BP Patient Position: Sitting) (!) 104 97.7 °F (36.5 °C) 20 5' 9\" (1.753 m) 196 lb (88.9 kg) SpO2 BMI Smoking Status 97% 28.94 kg/m2 Former Smoker BMI and BSA Data Body Mass Index Body Surface Area  
 28.94 kg/m 2 2.08 m 2 Preferred Pharmacy Pharmacy Name Phone [address]. [phone] Your Updated Medication List  
  
   
This list is accurate as of 9/17/18  9:42 AM.  Always use your most recent med list.  
  
  
  
  
 * albuterol 90 mcg/actuation inhaler Commonly known as:  PROVENTIL HFA, VENTOLIN HFA, PROAIR HFA Take 2 Puffs by inhalation every four (4) hours as needed for Wheezing or Shortness of Breath. * albuterol 2.5 mg /3 mL (0.083 %) nebulizer solution Commonly known as:  PROVENTIL VENTOLIN Nebulized 1 vial for inhalation every 4 hours as needed  
  
 citalopram 40 mg tablet Commonly known as:  Law Bio Take 1 Tab by mouth daily. cyclobenzaprine 5 mg tablet Commonly known as:  FLEXERIL Take 1 Tab by mouth nightly. Indications: Muscle Spasm  
  
 fluticasone-salmeterol 500-50 mcg/dose diskus inhaler Commonly known as:  ADVAIR DISKUS Take 1 Puff by inhalation two (2) times a day. furosemide 20 mg tablet Commonly known as:  LASIX Take 1 Tab by mouth every other day. lisinopril 5 mg tablet Commonly known as:  Mollie Ripa Take 1 Tab by mouth daily. LORazepam 1 mg tablet Commonly known as:  ATIVAN Take 1 Tab by mouth every eight (8) hours as needed for Anxiety. Max Daily Amount: 3 mg.  
  
 mirtazapine 7.5 mg tablet Commonly known as:  Montserrat Junito Take 1 Tab by mouth nightly. Nebulizer & Compressor machine 1 Each by Does Not Apply route every four (4) hours as needed. pantoprazole 40 mg tablet Commonly known as:  PROTONIX Take 1 Tab by mouth Daily (before breakfast). roflumilast Tab tablet Commonly known as:  Jolena Speak Take 1 Tab by mouth daily. Indications: PREVENTION OF BRONCHOSPASM WITH CHRONIC BRONCHITIS  
  
 tiotropium 18 mcg inhalation capsule Commonly known as:  Samantha Roll Take 1 Cap by inhalation daily. Indications: BRONCHOSPASM PREVENTION WITH COPD  
  
 WALKABOUT 1 OXYGEN SYSTEM  
2.5 L/min by Nasal route continuous. * Notice: This list has 2 medication(s) that are the same as other medications prescribed for you. Read the directions carefully, and ask your doctor or other care provider to review them with you. Follow-up Instructions Return in about 6 months (around 3/17/2019). Patient Instructions Continue Spiriva 1 inhalation daily and remember to exhale fully before inhaling Continue Advair 1 inhalation daily and remember to exhale fully before inhaling also remember to wash mouth with water and spit it out after inhaling Albuterol 2 inhalations every 4 hours as needed if you require albuterol too often to control respiratory symptoms call the office for severe symptoms emergency room Continue excellent efforts in cigarette smoking cessation and improving exercise tolerance Always call for symptoms such as increasing shortness of breath or cough productive of discolored mucus Please provide this summary of care documentation to your next provider. Your primary care clinician is listed as Nelson Mckenna. If you have any questions after today's visit, please call [phone].

## 2018-09-17 NOTE — PATIENT INSTRUCTIONS
Continue Spiriva 1 inhalation daily and remember to exhale fully before inhaling Continue Advair 1 inhalation daily and remember to exhale fully before inhaling also remember to wash mouth with water and spit it out after inhaling Albuterol 2 inhalations every 4 hours as needed if you require albuterol too often to control respiratory symptoms call the office for severe symptoms emergency room Continue excellent efforts in cigarette smoking cessation and improving exercise tolerance Always call for symptoms such as increasing shortness of breath or cough productive of discolored mucus

## 2018-09-17 NOTE — PROGRESS NOTES
The pt. Has SOB scale 2/10 with ambulation from the waiting room. A short wheeze x 1 is noted. He saw Dr. Pennie Funes 7/1. Had a Sleep Study but, hasn't heard back from it. No recent ED nor Urgent Care visits.

## 2018-09-25 RX ORDER — CYCLOBENZAPRINE HCL 5 MG
5 TABLET ORAL
Qty: 30 TAB | Refills: 1 | Status: SHIPPED | OUTPATIENT
Start: 2018-09-25 | End: 2019-01-08 | Stop reason: SDUPTHER

## 2018-09-25 NOTE — TELEPHONE ENCOUNTER
Last seen  04/25/18  Next appt    10/08/18  Dr Aminta Osuna  Last filled    08/27/18    Requested Prescriptions     Pending Prescriptions Disp Refills    cyclobenzaprine (FLEXERIL) 5 mg tablet 30 Tab 1     Sig: Take 1 Tab by mouth nightly.  Indications: Muscle Spasm

## 2018-10-08 ENCOUNTER — OFFICE VISIT (OUTPATIENT)
Dept: FAMILY MEDICINE CLINIC | Age: 48
End: 2018-10-08

## 2018-10-08 VITALS
TEMPERATURE: 97.7 F | RESPIRATION RATE: 18 BRPM | BODY MASS INDEX: 29.27 KG/M2 | DIASTOLIC BLOOD PRESSURE: 86 MMHG | SYSTOLIC BLOOD PRESSURE: 125 MMHG | OXYGEN SATURATION: 96 % | HEART RATE: 94 BPM | WEIGHT: 197.6 LBS | HEIGHT: 69 IN

## 2018-10-08 DIAGNOSIS — E55.9 HYPOVITAMINOSIS D: ICD-10-CM

## 2018-10-08 DIAGNOSIS — Z23 NEED FOR INFLUENZA VACCINATION: ICD-10-CM

## 2018-10-08 DIAGNOSIS — Z23 NEED FOR DIPHTHERIA-TETANUS-PERTUSSIS (TDAP) VACCINE: ICD-10-CM

## 2018-10-08 DIAGNOSIS — E66.3 OVERWEIGHT (BMI 25.0-29.9): ICD-10-CM

## 2018-10-08 DIAGNOSIS — G47.00 INSOMNIA, UNSPECIFIED TYPE: ICD-10-CM

## 2018-10-08 DIAGNOSIS — J44.9 COPD, SEVERE (HCC): ICD-10-CM

## 2018-10-08 DIAGNOSIS — Z23 ENCOUNTER FOR IMMUNIZATION: ICD-10-CM

## 2018-10-08 DIAGNOSIS — I10 ESSENTIAL HYPERTENSION, BENIGN: Primary | ICD-10-CM

## 2018-10-08 DIAGNOSIS — F41.0 PANIC ATTACK: ICD-10-CM

## 2018-10-08 NOTE — MR AVS SNAPSHOT
[facility] 
 
 
 [address] [place] [address] 
[phone] Patient: Ronn Bashir Sr. MRN: [id number] :1970 Visit Information Date & Time Provider Department Dept. Phone Encounter #  
 10/8/2018  7:45 AM Aj Do, Zachary Miller. [phone] [id number] Follow-up Instructions Return in about 3 months (around 2019) for Labs 1 week before. Your Appointments 3/14/2019  8:40 AM  
Follow Up with Jacquie Sylvester MD  
[facility] ([address]) Appt Note: 6 month follow up [facility] [address] [address] [address] [phone] [facility] [address] Upcoming Health Maintenance Date Due DTaP/Tdap/Td series (1 - Tdap) 10/27/1990 Influenza Age 5 to Adult 2018 Allergies as of 10/8/2018  Review Complete On: 10/8/2018 By: Yamilka Gomez Severity Noted Reaction Type Reactions Remeron [Mirtazapine]  2018   Systemic Other (comments) Mood swings Seroquel [Quetiapine] Low 2018   Systemic Other (comments) Mood swings Current Immunizations  Never Reviewed Name Date Influenza Vaccine 10/10/2011 12:00 AM  
 Influenza Vaccine (Quad) PF 10/23/2017  1:04 PM  
 Pneumococcal Polysaccharide (PPSV-23) 2018 10:10 AM, 10/10/2011 12:00 AM  
  
 Not reviewed this visit You Were Diagnosed With   
  
 Codes Comments Essential hypertension, benign    -  Primary ICD-10-CM: I10 
ICD-9-CM: 401.1 COPD, severe ([address]     ICD-10-CM: J44.9 ICD-9-CM: 141 Panic attack     ICD-10-CM: F41.0 ICD-9-CM: 300.01 Insomnia, unspecified type     ICD-10-CM: G47.00 ICD-9-CM: 780.52 Need for influenza vaccination     ICD-10-CM: S31 ICD-9-CM: V04.81 Need for diphtheria-tetanus-pertussis (Tdap) vaccine     ICD-10-CM: D58 ICD-9-CM: V06.1 Hypovitaminosis D     ICD-10-CM: E55.9 ICD-9-CM: 268.9 Overweight (BMI 25.0-29. 9)     ICD-10-CM: A84.9 ICD-9-CM: 278.02 Vitals BP Pulse Temp Resp Height(growth percentile) Weight(growth percentile) 125/86 94 97.7 °F (36.5 °C) (Oral) 18 5' 9\" (1.753 m) 197 lb 9.6 oz (89.6 kg) SpO2 BMI Smoking Status 96% 29.18 kg/m2 Former Smoker BMI and BSA Data Body Mass Index Body Surface Area  
 29.18 kg/m 2 2.09 m 2 Preferred Pharmacy Pharmacy Name Phone [address] [phone] Your Updated Medication List  
  
   
This list is accurate as of 10/8/18  8:15 AM.  Always use your most recent med list.  
  
  
  
  
 * albuterol 90 mcg/actuation inhaler Commonly known as:  PROVENTIL HFA, VENTOLIN HFA, PROAIR HFA Take 2 Puffs by inhalation every four (4) hours as needed for Wheezing or Shortness of Breath. * albuterol 2.5 mg /3 mL (0.083 %) nebulizer solution Commonly known as:  PROVENTIL VENTOLIN Nebulized 1 vial for inhalation every 4 hours as needed  
  
 citalopram 40 mg tablet Commonly known as:  Buel Sole Take 1 Tab by mouth daily. cyclobenzaprine 5 mg tablet Commonly known as:  FLEXERIL Take 1 Tab by mouth nightly. Indications: Muscle Spasm  
  
 fluticasone-salmeterol 500-50 mcg/dose diskus inhaler Commonly known as:  ADVAIR DISKUS Take 1 Puff by inhalation two (2) times a day. furosemide 20 mg tablet Commonly known as:  LASIX Take 1 Tab by mouth every other day. lisinopril 5 mg tablet Commonly known as:  Nallely Clare Take 1 Tab by mouth daily. LORazepam 1 mg tablet Commonly known as:  ATIVAN Take 1 Tab by mouth every eight (8) hours as needed for Anxiety. Max Daily Amount: 3 mg.  
  
 mirtazapine 7.5 mg tablet Commonly known as:  Liban [place] Take 1 Tab by mouth nightly. Nebulizer & Compressor machine 1 Each by Does Not Apply route every four (4) hours as needed. pantoprazole 40 mg tablet Commonly known as:  PROTONIX Take 1 Tab by mouth Daily (before breakfast). roflumilast Tab tablet Commonly known as:  Darlina Scale Take 1 Tab by mouth daily. Indications: PREVENTION OF BRONCHOSPASM WITH CHRONIC BRONCHITIS  
  
 suvorexant 10 mg tablet Commonly known as:  Michael Garcia Take 1 Tab by mouth nightly as needed for Insomnia. Max Daily Amount: 10 mg.  
  
 tiotropium 18 mcg inhalation capsule Commonly known as:  Erin Bogaert Take 1 Cap by inhalation daily. Indications: BRONCHOSPASM PREVENTION WITH COPD  
  
 WALKABOUT 1 OXYGEN SYSTEM  
2.5 L/min by Nasal route continuous. * Notice: This list has 2 medication(s) that are the same as other medications prescribed for you. Read the directions carefully, and ask your doctor or other care provider to review them with you. Prescriptions Printed Refills  
 suvorexant (BELSOMRA) 10 mg tablet 0 Sig: Take 1 Tab by mouth nightly as needed for Insomnia. Max Daily Amount: 10 mg.  
 Class: Print Route: Oral  
  
Follow-up Instructions Return in about 3 months (around 1/8/2019) for Labs 1 week before. To-Do List   
 01/06/2019 Lab:  HEMOGLOBIN A1C W/O EAG   
  
 01/06/2019 Lab:  TSH 3RD GENERATION   
  
 01/06/2019 Lab:  VITAMIN D, 25 HYDROXY Patient Instructions Chronic Obstructive Pulmonary Disease (COPD): Care Instructions Your Care Instructions Chronic obstructive pulmonary disease (COPD) is a general term for a group of lung diseases, including emphysema and chronic bronchitis. People with COPD have decreased airflow in and out of the lungs, which makes it hard to breathe. The airways also can get clogged with thick mucus. Cigarette smoking is a major cause of COPD. Although there is no cure for COPD, you can slow its progress.  Following your treatment plan and taking care of yourself can help you feel better and live longer. Follow-up care is a key part of your treatment and safety. Be sure to make and go to all appointments, and call your doctor if you are having problems. It's also a good idea to know your test results and keep a list of the medicines you take. How can you care for yourself at home? 
 Staying healthy 
  · Do not smoke. This is the most important step you can take to prevent more damage to your lungs. If you need help quitting, talk to your doctor about stop-smoking programs and medicines. These can increase your chances of quitting for good.  
  · Avoid colds and flu. Get a pneumococcal vaccine shot. If you have had one before, ask your doctor whether you need a second dose. Get the flu vaccine every fall. If you must be around people with colds or the flu, wash your hands often.  
  · Avoid secondhand smoke, air pollution, and high altitudes. Also avoid cold, dry air and hot, humid air. Stay at home with your windows closed when air pollution is bad.  
 Medicines and oxygen therapy 
  · Take your medicines exactly as prescribed. Call your doctor if you think you are having a problem with your medicine.  
  · You may be taking medicines such as: ¨ Bronchodilators. These help open your airways and make breathing easier. Bronchodilators are either short-acting (work for 6 to 9 hours) or long-acting (work for 24 hours). You inhale most bronchodilators, so they start to act quickly. Always carry your quick-relief inhaler with you in case you need it while you are away from home. ¨ Corticosteroids (prednisone, budesonide). These reduce airway inflammation. They come in pill or inhaled form. You must take these medicines every day for them to work well.  
  · A spacer may help you get more inhaled medicine to your lungs. Ask your doctor or pharmacist if a spacer is right for you. If it is, ask how to use it properly.   · Do not take any vitamins, over-the-counter medicine, or herbal products without talking to your doctor first.  
  · If your doctor prescribed antibiotics, take them as directed. Do not stop taking them just because you feel better. You need to take the full course of antibiotics.  
  · Oxygen therapy boosts the amount of oxygen in your blood and helps you breathe easier. Use the flow rate your doctor has recommended, and do not change it without talking to your doctor first.  
Activity 
  · Get regular exercise. Walking is an easy way to get exercise. Start out slowly, and walk a little more each day.  
  · Pay attention to your breathing. You are exercising too hard if you cannot talk while you are exercising.  
  · Take short rest breaks when doing household chores and other activities.  
  · Learn breathing methodssuch as breathing through pursed lipsto help you become less short of breath.  
  · If your doctor has not set you up with a pulmonary rehabilitation program, talk to him or her about whether rehab is right for you. Rehab includes exercise programs, education about your disease and how to manage it, help with diet and other changes, and emotional support. Diet 
  · Eat regular, healthy meals. Use bronchodilators about 1 hour before you eat to make it easier to eat. Eat several small meals instead of three large ones. Drink beverages at the end of the meal. Avoid foods that are hard to chew.  
  · Eat foods that contain protein so that you do not lose muscle mass.  
  · Talk with your doctor if you gain too much weight or if you lose weight without trying.  
 Mental health 
  · Talk to your family, friends, or a therapist about your feelings. It is normal to feel frightened, angry, hopeless, helpless, and even guilty. Talking openly about bad feelings can help you cope. If these feelings last, talk to your doctor. When should you call for help? Call 911 anytime you think you may need emergency care. For example, call if: 
  · You have severe trouble breathing.  
 Call your doctor now or seek immediate medical care if: 
  · You have new or worse trouble breathing.  
  · You cough up blood.  
  · You have a fever.  
 Watch closely for changes in your health, and be sure to contact your doctor if: 
  · You cough more deeply or more often, especially if you notice more mucus or a change in the color of your mucus.  
  · You have new or worse swelling in your legs or belly.  
  · You are not getting better as expected. Where can you learn more? Go to http://costa-jessica.info/. Lucy Hancock in the search box to learn more about \"Chronic Obstructive Pulmonary Disease (COPD): Care Instructions. \" Current as of: December 6, 2017 Content Version: 11.8 © 9286-7911 [employer]. Care instructions adapted under license by [employer] (which disclaims liability or warranty for this information). If you have questions about a medical condition or this instruction, always ask your healthcare professional. [address] any warranty or liability for your use of this information. DASH Diet: Care Instructions Your Care Instructions The DASH diet is an eating plan that can help lower your blood pressure. DASH stands for Dietary Approaches to Stop Hypertension. Hypertension is high blood pressure. The DASH diet focuses on eating foods that are high in calcium, potassium, and magnesium. These nutrients can lower blood pressure. The foods that are highest in these nutrients are fruits, vegetables, low-fat dairy products, nuts, seeds, and legumes. But taking calcium, potassium, and magnesium supplements instead of eating foods that are high in those nutrients does not have the same effect. The DASH diet also includes whole grains, fish, and poultry. The DASH diet is one of several lifestyle changes your doctor may recommend to lower your high blood pressure. Your doctor may also want you to decrease the amount of sodium in your diet. Lowering sodium while following the DASH diet can lower blood pressure even further than just the DASH diet alone. Follow-up care is a key part of your treatment and safety. Be sure to make and go to all appointments, and call your doctor if you are having problems. It's also a good idea to know your test results and keep a list of the medicines you take. How can you care for yourself at home? Following the DASH diet · Eat 4 to 5 servings of fruit each day. A serving is 1 medium-sized piece of fruit, ½ cup chopped or canned fruit, 1/4 cup dried fruit, or 4 ounces (½ cup) of fruit juice. Choose fruit more often than fruit juice. · Eat 4 to 5 servings of vegetables each day. A serving is 1 cup of lettuce or raw leafy vegetables, ½ cup of chopped or cooked vegetables, or 4 ounces (½ cup) of vegetable juice. Choose vegetables more often than vegetable juice. · Get 2 to 3 servings of low-fat and fat-free dairy each day. A serving is 8 ounces of milk, 1 cup of yogurt, or 1 ½ ounces of cheese. · Eat 6 to 8 servings of grains each day. A serving is 1 slice of bread, 1 ounce of dry cereal, or ½ cup of cooked rice, pasta, or cooked cereal. Try to choose whole-grain products as much as possible. · Limit lean meat, poultry, and fish to 2 servings each day. A serving is 3 ounces, about the size of a deck of cards. · Eat 4 to 5 servings of nuts, seeds, and legumes (cooked dried beans, lentils, and split peas) each week. A serving is 1/3 cup of nuts, 2 tablespoons of seeds, or ½ cup of cooked beans or peas. · Limit fats and oils to 2 to 3 servings each day. A serving is 1 teaspoon of vegetable oil or 2 tablespoons of salad dressing. · Limit sweets and added sugars to 5 servings or less a week.  A serving is 1 tablespoon jelly or jam, ½ cup sorbet, or 1 cup of lemonade. · Eat less than 2,300 milligrams (mg) of sodium a day. If you limit your sodium to 1,500 mg a day, you can lower your blood pressure even more. Tips for success · Start small. Do not try to make dramatic changes to your diet all at once. You might feel that you are missing out on your favorite foods and then be more likely to not follow the plan. Make small changes, and stick with them. Once those changes become habit, add a few more changes. · Try some of the following: ¨ Make it a goal to eat a fruit or vegetable at every meal and at snacks. This will make it easy to get the recommended amount of fruits and vegetables each day. ¨ Try yogurt topped with fruit and nuts for a snack or healthy dessert. ¨ Add lettuce, tomato, cucumber, and onion to sandwiches. ¨ Combine a ready-made pizza crust with low-fat mozzarella cheese and lots of vegetable toppings. Try using tomatoes, squash, spinach, broccoli, carrots, cauliflower, and onions. ¨ Have a variety of cut-up vegetables with a low-fat dip as an appetizer instead of chips and dip. ¨ Sprinkle sunflower seeds or chopped almonds over salads. Or try adding chopped walnuts or almonds to cooked vegetables. ¨ Try some vegetarian meals using beans and peas. Add garbanzo or kidney beans to salads. Make burritos and tacos with mashed friend beans or black beans. Where can you learn more? Go to http://costa-jessica.info/. Enter T073 in the search box to learn more about \"DASH Diet: Care Instructions. \" Current as of: December 6, 2017 Content Version: 11.8 © 8149-4151 [employer]. Care instructions adapted under license by [employer] (which disclaims liability or warranty for this information).  If you have questions about a medical condition or this instruction, always ask your healthcare professional. [employer] disclaims any warranty or liability for your use of this information. Learning About the [address] [facility] Diet What is the Mediterranean diet? The Mediterranean diet is a style of eating rather than a diet plan. It features foods eaten in [place], [place], [place] and [place], and other countries along the [facility]. It emphasizes eating foods like fish, fruits, vegetables, beans, high-fiber breads and whole grains, nuts, and olive oil. This style of eating includes limited red meat, cheese, and sweets. Why choose the Mediterranean diet? A Mediterranean-style diet may improve heart health. It contains more fat than other heart-healthy diets. But the fats are mainly from nuts, unsaturated oils (such as fish oils and olive oil), and certain nut or seed oils (such as canola, soybean, or flaxseed oil). These fats may help protect the heart and blood vessels. How can you get started on the Mediterranean diet? Here are some things you can do to switch to a more Mediterranean way of eating. What to eat · Eat a variety of fruits and vegetables each day, such as grapes, blueberries, tomatoes, broccoli, peppers, figs, olives, spinach, eggplant, beans, lentils, and chickpeas. · Eat a variety of whole-grain foods each day, such as oats, brown rice, and whole wheat bread, pasta, and couscous. · Eat fish at least 2 times a week. Try tuna, salmon, mackerel, lake trout, herring, or sardines. · Eat moderate amounts of low-fat dairy products, such as milk, cheese, or yogurt. · Eat moderate amounts of poultry and eggs. · Choose healthy (unsaturated) fats, such as nuts, olive oil, and certain nut or seed oils like canola, soybean, and flaxseed. · Limit unhealthy (saturated) fats, such as butter, palm oil, and coconut oil. And limit fats found in animal products, such as meat and dairy products made with whole milk. Try to eat red meat only a few times a month in very small amounts. · Limit sweets and desserts to only a few times a week. This includes sugar-sweetened drinks like soda. The Mediterranean diet may also include red wine with your meal1 glass each day for women and up to 2 glasses a day for men. Tips for eating at home · Use herbs, spices, garlic, lemon zest, and citrus juice instead of salt to add flavor to foods. · Add avocado slices to your sandwich instead of andrew. · Have fish for lunch or dinner instead of red meat. Brush the fish with olive oil, and broil or grill it. · Sprinkle your salad with seeds or nuts instead of cheese. · Cook with olive or canola oil instead of butter or oils that are high in saturated fat. · Switch from 2% milk or whole milk to 1% or fat-free milk. · Dip raw vegetables in a vinaigrette dressing or hummus instead of dips made from mayonnaise or sour cream. 
· Have a piece of fruit for dessert instead of a piece of cake. Try baked apples, or have some dried fruit. Tips for eating out · Try broiled, grilled, baked, or poached fish instead of having it fried or breaded. · Ask your  to have your meals prepared with olive oil instead of butter. · Order dishes made with marinara sauce or sauces made from olive oil. Avoid sauces made from cream or mayonnaise. · Choose whole-grain breads, whole wheat pasta and pizza crust, brown rice, beans, and lentils. · Cut back on butter or margarine on bread. Instead, you can dip your bread in a small amount of olive oil. · Ask for a side salad or grilled vegetables instead of french fries or chips. Where can you learn more? Go to http://costa-jessica.info/. Enter 487-855-4253 in the search box to learn more about \"Learning About the Mediterranean Diet. \" Current as of: March 29, 2018 Content Version: 11.8 © 9252-9547 [employer], [employer].  Care instructions adapted under license by [employer] (which disclaims liability or warranty for this information). If you have questions about a medical condition or this instruction, always ask your healthcare professional. [address] any warranty or liability for your use of this information. Please provide this summary of care documentation to your next provider. Your primary care clinician is listed as Yash Corrales. If you have any questions after today's visit, please call [phone].

## 2018-10-08 NOTE — PROGRESS NOTES
Chief Complaint   Patient presents with    \[facility] [place] [facility]\"" Care     NP       HPI:  Buzz Flores. is a 49 y.o. male who presents today as a new patient to [facility]. Medical history includes hypertension, COPD, etc. He has insomnia as he has not been sleeping well at night and has previously tried Remeron and Trazodone and is now taking Seroquel. He also has panic attack and is followed by Loree Ty with [address] [place] [address] Psychiatry. He feels well otherwise and voices no other complaints today. He is compliant with his medications with no adverse effects reported. Past Medical History:   Diagnosis Date    Chronic diastolic heart failure secondary to coronary artery disease (HCC)     Chronic lung disease     Chronic obstructive pulmonary disease ([facility] [address] 08/03/2017    PFTS 8/3/17    COPD, severe (HCC)     [facility]     Esophagitis 12/11/2017    Endoscopy    Essential hypertension, benign     Hepatomegaly     History of stomach ulcers     Positive urine drug screen 01/2016    opiates and THC    Splenomegaly     Upper GI bleed      Allergies   Allergen Reactions    Remeron [Mirtazapine] Other (comments)     Mood swings    Seroquel [Quetiapine] Other (comments)     Mood swings     Current Outpatient Medications   Medication Sig Dispense Refill    suvorexant (BELSOMRA) 10 mg tablet Take 1 Tab by mouth nightly as needed for Insomnia. Max Daily Amount: 10 mg. 30 Tab 0    cyclobenzaprine (FLEXERIL) 5 mg tablet Take 1 Tab by mouth nightly. Indications: Muscle Spasm (Patient taking differently: Take 5 mg by mouth three (3) times daily as needed.) 30 Tab 1    furosemide (LASIX) 20 mg tablet Take 1 Tab by mouth every other day. 15 Tab 6    LORazepam (ATIVAN) 1 mg tablet Take 1 Tab by mouth every eight (8) hours as needed for Anxiety.  Max Daily Amount: 3 mg. 90 Tab 0    albuterol (PROVENTIL VENTOLIN) 2.5 mg /3 mL (0.083 %) nebulizer solution Nebulized 1 vial for inhalation every 4 hours as needed 60 Each 3    roflumilast (DALIRESP) tab tablet Take 1 Tab by mouth daily. Indications: PREVENTION OF BRONCHOSPASM WITH CHRONIC BRONCHITIS 30 Tab 5    Nebulizer & Compressor machine 1 Each by Does Not Apply route every four (4) hours as needed. 1 Each 0    OXYGEN-AIR DELIVERY SYSTEMS (WALKABOUT 1 OXYGEN SYSTEM) 2.5 L/min by Nasal route continuous.  pantoprazole (PROTONIX) 40 mg tablet Take 1 Tab by mouth Daily (before breakfast). 30 Tab 0    tiotropium (SPIRIVA) 18 mcg inhalation capsule Take 1 Cap by inhalation daily. 30 Cap 5    fluticasone-salmeterol (ADVAIR DISKUS) 500-50 mcg/dose diskus inhaler Take 1 Puff by inhalation two (2) times a day. 1 Inhaler 5    albuterol (PROVENTIL HFA, VENTOLIN HFA, PROAIR HFA) 90 mcg/actuation inhaler Take 2 Puffs by inhalation every four (4) hours as needed for Wheezing or Shortness of Breath. 1 Inhaler 2    lisinopril (PRINIVIL, ZESTRIL) 5 mg tablet Take 1 Tab by mouth daily.  80 Tab 1     Past Surgical History:   Procedure Laterality Date    COLONOSCOPY N/A 2018    COLONOSCOPY with polypectomies performed by Corina Gaona MD at [facility] ENDOSCOPY    HX ENDOSCOPY  2017     Family History   Problem Relation Age of Onset    Hypertension Mother     Heart Disease Mother     Diabetes Mother     Hypertension Father     Heart Disease Father     Cancer Father         lymphnodes    Diabetes Father      Social History     Socioeconomic History    Marital status:      Spouse name: Not on file    Number of children: Not on file    Years of education: Not on file    Highest education level: Not on file   Tobacco Use    Smoking status: Former Smoker     Packs/day: 2.00     Years: 25.00     Pack years: 50.00     Types: Cigarettes     Start date: 1984     Last attempt to quit: 2017     Years since quittin.9    Smokeless tobacco: Never Used   Substance and Sexual Activity    Alcohol use: No    Drug use: Yes     Types: Marijuana Comment: Hx of Marijuana use    Sexual activity: Yes     Partners: Female   Other Topics Concern     Service No    Blood Transfusions No    Caffeine Concern Yes    Occupational Exposure No    Hobby Hazards No    Sleep Concern Yes     Comment: occas    Stress Concern No    Weight Concern No    Special Diet No    Back Care Yes    Exercise No    Bike Helmet Yes    Seat Belt No     Comment: occas   Social History Narrative             ROS:  Constitutional: Negative for fever, fatigue, or chills  HEENT: Negative for headache, dizziness, visual disturbance, nasal congestion, ear pain, sore throat  Skin: Negative for rash, bruises, lesions  Lungs:  Negative for SOB, cough, mucus production  Heart: Negative for chest pain, chest discomfort  Abdomen: Negative for abdominal pain, N/V, diarrhea, constipation  Genitourinary: Negative for pain on urination, blood in urine, penile discharge, genital lesions or sores  Neurological: Negative for numbness in extremities, tremors, confusion, speech disturbance, gait imbalance, weakness  Psychological:Negative for depression, mood changes, anxiety, sleep disturbance  Heme: Negative for easy bruisability or bleeding  Endo: Negative for heat or cold intolerance, frequency with urination, or change in appetite  Musculoskeletal; Negative for muscle or joint aches or pain.       Physical Exam:  Visit Vitals    /86    Pulse 94    Temp 97.7 °F (36.5 °C) (Oral)    Resp 18    Ht 5' 9\" (1.753 m)    Wt 197 lb 9.6 oz (89.6 kg)    SpO2 96%    BMI 29.18 kg/m2       BP Readings from Last 3 Encounters:   10/08/18 125/86   09/17/18 120/80   09/12/18 120/86       General: a & o x 3, afebrile, comfortable, well-nourished, interacting appropriately, in no acute distress  HEENT: head normocephalic and atraumatic, conjuctiva clear, PERRLA, EOMI, nose clear, turbinates with no erythema or swelling, ear canals clear, no erythema or swelling, pharynx and tonsils with no erythema or exudates  Mouth: oral mucosa moist, normal appearing dentition, no dental caries, no gum swelling, no oral lesions  Skin: color race -appropriate, warm and dry, no rashes , no bruises  Neck: supple, symmetrical, no thyromegaly  Respiratory: Moderate air entry bilaterally. Cardiovascular: normal S1S2, regular rate and rhythm, no murmurs, capillary refills < 2 sec, pulses palpable, no edema, no carotid or abdominal bruits, no JVD  Abdomen: non-distended, normoactive bowel sounds x 4 quadrants, soft, non-tender to palpation, no palpable mass, no hepatomegaly or splenomegaly, no guarding or rigidity, no CVA tenderness  Musculoskeletal: normal ROM on all joints, no swelling or deformity, no perilumbar tenderness, no multiple trigger points  Neurological: DTRs intact symmetrically and bilaterally  Psychological: Appropriate mood and affect, no thought disorder      Lab Results   Component Value Date/Time    WBC 5.9 05/11/2018 08:30 AM    Hemoglobin, POC 14.3 06/28/2013 10:07 PM    HGB 15.6 05/11/2018 08:30 AM    Hematocrit, POC 42 06/28/2013 10:07 PM    HCT 42.5 05/11/2018 08:30 AM    PLATELET 942 11/49/6673 08:30 AM    MCV 85.0 05/11/2018 08:30 AM       Lab Results   Component Value Date/Time    Sodium 140 05/11/2018 08:30 AM    Potassium 4.2 05/11/2018 08:30 AM    Chloride 106 05/11/2018 08:30 AM    CO2 30 05/11/2018 08:30 AM    Anion gap 4 05/11/2018 08:30 AM    Glucose 92 05/11/2018 08:30 AM    BUN 10 05/11/2018 08:30 AM    Creatinine 0.88 05/11/2018 08:30 AM    BUN/Creatinine ratio 11 (L) 05/11/2018 08:30 AM    GFR est AA >60 05/11/2018 08:30 AM    GFR est non-AA >60 05/11/2018 08:30 AM    Calcium 9.2 05/11/2018 08:30 AM    Bilirubin, total 1.1 (H) 05/11/2018 08:30 AM    AST (SGOT) 16 05/11/2018 08:30 AM    Alk.  phosphatase 107 05/11/2018 08:30 AM    Protein, total 7.8 05/11/2018 08:30 AM    Albumin 4.1 05/11/2018 08:30 AM    Globulin 3.7 05/11/2018 08:30 AM    A-G Ratio 1.1 05/11/2018 08:30 AM    ALT (SGPT) 36 05/11/2018 08:30 AM       Lab Results   Component Value Date/Time    Cholesterol, total 199 02/23/2018 12:04 PM    HDL Cholesterol 36 (L) 01/23/2017 01:11 PM    LDL, calculated 85.6 01/23/2017 01:11 PM    VLDL, calculated 22.4 01/23/2017 01:11 PM    Triglyceride 132 02/23/2018 12:04 PM    CHOL/HDL Ratio 4.0 01/23/2017 01:11 PM       No results found for: [id number]     Lab Results   Component Value Date/Time    Hemoglobin A1c 5.5 01/11/2018 03:26 AM       Lab Results   Component Value Date/Time    TSH 2.21 12/17/2017 10:00 PM       Assessment/Plan:    ICD-10-CM ICD-9-CM    1. Essential hypertension, benign I10 401.1 Controlled. Continue current medications and he was counseled on low-salt diet. TSH 3RD GENERATION   2. COPD, severe ([facility] [address] J44.9 496 continue current COPD medications. 3. Panic attack F41.0 300.01 Followed by psychiatry    4. Insomnia, unspecified type G47.00 780.52 suvorexant (BELSOMRA) 10 mg tablet started today   5. Need for influenza vaccination Z23 V04.81 Flu shot given today. 6. Need for diphtheria-tetanus-pertussis (Tdap) vaccine Z23 V06.1 T dap given today. 7. Hypovitaminosis D E55.9 268.9 VITAMIN D, 25 HYDROXY   8. Overweight (BMI 25.0-29. 9) E66.3 278.02 HEMOGLOBIN A1C W/O EAG   9.  Encounter for immunization Z23 V03.89 INFLUENZA VIRUS VAC QUAD,SPLIT,PRESV FREE SYRINGE IM      TETANUS, DIPHTHERIA TOXOIDS AND ACELLULAR PERTUSSIS VACCINE (TDAP), IN INDIVIDS. >=7, IM         Orders Placed This Encounter    Influenza virus vaccine (QUADRIVALENT PRES FREE SYRINGE) IM (05882)    Tetanus, diphtheria toxoids and acellular pertussis (TDAP) vaccine, in individuals >=7 years, IM    HEMOGLOBIN A1C W/O EAG     Standing Status:   Future     Standing Expiration Date:   10/9/2019    TSH 3RD GENERATION     Standing Status:   Future     Standing Expiration Date:   2/5/2019    VITAMIN D, 25 HYDROXY     Standing Status:   Future     Standing Expiration Date: 4/6/2019    suvorexant (BELSOMRA) 10 mg tablet     Sig: Take 1 Tab by mouth nightly as needed for Insomnia. Max Daily Amount: 10 mg. Dispense:  30 Tab     Refill:  0           Additional Notes: Discussed related screening tests, preventive exams, importance of therapeutic lifestyle  changes ( diet/exercise/weight management) . Weight Management: Counseled  After Visit Summary: Provided and discussed printed patient instructions. Questions answered accordingly. Follow-up Disposition: In 1 week to review labs        Gaye Martínez DO, MPH  Internal medicine          Total time spent for today's visit was 40 minutes with greater than 50% of time spent involved in counseling and coordination of care as outlined above.

## 2018-10-08 NOTE — PROGRESS NOTES
Darryn Morris is a  49 y.o. male presents today for office visit for established care. 1. Have you been to the ER, urgent care clinic or hospitalized since your last visit? NO     2. Have you seen or consulted any other health care providers outside of the [address] [place] [address] since your last visit (Include any pap smears or colon screening)? NO         VORB: Administer FLU./Irving Mathis DO  /Mikhail Hatch , CCT  Patient received FLU vaccine 0.5ml in Right deltoid and TDAP in Left deltoid. Tolerated well. No signs or symptoms of distress noted. Most current VIS given and consent signed. he was observed for 10 min post injection. There were no reactions observed.

## 2018-10-08 NOTE — PATIENT INSTRUCTIONS
Chronic Obstructive Pulmonary Disease (COPD): Care Instructions  Your Care Instructions    Chronic obstructive pulmonary disease (COPD) is a general term for a group of lung diseases, including emphysema and chronic bronchitis. People with COPD have decreased airflow in and out of the lungs, which makes it hard to breathe. The airways also can get clogged with thick mucus. Cigarette smoking is a major cause of COPD. Although there is no cure for COPD, you can slow its progress. Following your treatment plan and taking care of yourself can help you feel better and live longer. Follow-up care is a key part of your treatment and safety. Be sure to make and go to all appointments, and call your doctor if you are having problems. It's also a good idea to know your test results and keep a list of the medicines you take. How can you care for yourself at home?   Staying healthy    · Do not smoke. This is the most important step you can take to prevent more damage to your lungs. If you need help quitting, talk to your doctor about stop-smoking programs and medicines. These can increase your chances of quitting for good.     · Avoid colds and flu. Get a pneumococcal vaccine shot. If you have had one before, ask your doctor whether you need a second dose. Get the flu vaccine every fall. If you must be around people with colds or the flu, wash your hands often.     · Avoid secondhand smoke, air pollution, and high altitudes. Also avoid cold, dry air and hot, humid air. Stay at home with your windows closed when air pollution is bad.    Medicines and oxygen therapy    · Take your medicines exactly as prescribed. Call your doctor if you think you are having a problem with your medicine.     · You may be taking medicines such as:  ¨ Bronchodilators. These help open your airways and make breathing easier. Bronchodilators are either short-acting (work for 6 to 9 hours) or long-acting (work for 24 hours).  You inhale most bronchodilators, so they start to act quickly. Always carry your quick-relief inhaler with you in case you need it while you are away from home. ¨ Corticosteroids (prednisone, budesonide). These reduce airway inflammation. They come in pill or inhaled form. You must take these medicines every day for them to work well.     · A spacer may help you get more inhaled medicine to your lungs. Ask your doctor or pharmacist if a spacer is right for you. If it is, ask how to use it properly.     · Do not take any vitamins, over-the-counter medicine, or herbal products without talking to your doctor first.     · If your doctor prescribed antibiotics, take them as directed. Do not stop taking them just because you feel better. You need to take the full course of antibiotics.     · Oxygen therapy boosts the amount of oxygen in your blood and helps you breathe easier. Use the flow rate your doctor has recommended, and do not change it without talking to your doctor first.   Activity    · Get regular exercise. Walking is an easy way to get exercise. Start out slowly, and walk a little more each day.     · Pay attention to your breathing. You are exercising too hard if you cannot talk while you are exercising.     · Take short rest breaks when doing household chores and other activities.     · Learn breathing methods--such as breathing through pursed lips--to help you become less short of breath.     · If your doctor has not set you up with a pulmonary rehabilitation program, talk to him or her about whether rehab is right for you. Rehab includes exercise programs, education about your disease and how to manage it, help with diet and other changes, and emotional support. Diet    · Eat regular, healthy meals. Use bronchodilators about 1 hour before you eat to make it easier to eat. Eat several small meals instead of three large ones.  Drink beverages at the end of the meal. Avoid foods that are hard to chew.     · Eat foods that contain protein so that you do not lose muscle mass.     · Talk with your doctor if you gain too much weight or if you lose weight without trying.    Mental health    · Talk to your family, friends, or a therapist about your feelings. It is normal to feel frightened, angry, hopeless, helpless, and even guilty. Talking openly about bad feelings can help you cope. If these feelings last, talk to your doctor. When should you call for help? Call 911 anytime you think you may need emergency care. For example, call if:    · You have severe trouble breathing.    Call your doctor now or seek immediate medical care if:    · You have new or worse trouble breathing.     · You cough up blood.     · You have a fever.    Watch closely for changes in your health, and be sure to contact your doctor if:    · You cough more deeply or more often, especially if you notice more mucus or a change in the color of your mucus.     · You have new or worse swelling in your legs or belly.     · You are not getting better as expected. Where can you learn more? Go to http://costa-jessica.info/. Titi Regalado in the search box to learn more about \"Chronic Obstructive Pulmonary Disease (COPD): Care Instructions. \"  Current as of: December 6, 2017  Content Version: 11.8  © 7234-9333 [employer] Care instructions adapted under license by [employer] (which disclaims liability or warranty for this information). If you have questions about a medical condition or this instruction, always ask your healthcare professional. [place] [address] any warranty or liability for your use of this information. DASH Diet: Care Instructions  Your Care Instructions    The DASH diet is an eating plan that can help lower your blood pressure. DASH stands for Dietary Approaches to Stop Hypertension. Hypertension is high blood pressure.   The DASH diet focuses on eating foods that are high in calcium, potassium, and magnesium. These nutrients can lower blood pressure. The foods that are highest in these nutrients are fruits, vegetables, low-fat dairy products, nuts, seeds, and legumes. But taking calcium, potassium, and magnesium supplements instead of eating foods that are high in those nutrients does not have the same effect. The DASH diet also includes whole grains, fish, and poultry. The DASH diet is one of several lifestyle changes your doctor may recommend to lower your high blood pressure. Your doctor may also want you to decrease the amount of sodium in your diet. Lowering sodium while following the DASH diet can lower blood pressure even further than just the DASH diet alone. Follow-up care is a key part of your treatment and safety. Be sure to make and go to all appointments, and call your doctor if you are having problems. It's also a good idea to know your test results and keep a list of the medicines you take. How can you care for yourself at home? Following the DASH diet  · Eat 4 to 5 servings of fruit each day. A serving is 1 medium-sized piece of fruit, ½ cup chopped or canned fruit, 1/4 cup dried fruit, or 4 ounces (½ cup) of fruit juice. Choose fruit more often than fruit juice. · Eat 4 to 5 servings of vegetables each day. A serving is 1 cup of lettuce or raw leafy vegetables, ½ cup of chopped or cooked vegetables, or 4 ounces (½ cup) of vegetable juice. Choose vegetables more often than vegetable juice. · Get 2 to 3 servings of low-fat and fat-free dairy each day. A serving is 8 ounces of milk, 1 cup of yogurt, or 1 ½ ounces of cheese. · Eat 6 to 8 servings of grains each day. A serving is 1 slice of bread, 1 ounce of dry cereal, or ½ cup of cooked rice, pasta, or cooked cereal. Try to choose whole-grain products as much as possible. · Limit lean meat, poultry, and fish to 2 servings each day. A serving is 3 ounces, about the size of a deck of cards.   · Eat 4 to 5 servings of nuts, seeds, and legumes (cooked dried beans, lentils, and split peas) each week. A serving is 1/3 cup of nuts, 2 tablespoons of seeds, or ½ cup of cooked beans or peas. · Limit fats and oils to 2 to 3 servings each day. A serving is 1 teaspoon of vegetable oil or 2 tablespoons of salad dressing. · Limit sweets and added sugars to 5 servings or less a week. A serving is 1 tablespoon jelly or jam, ½ cup sorbet, or 1 cup of lemonade. · Eat less than 2,300 milligrams (mg) of sodium a day. If you limit your sodium to 1,500 mg a day, you can lower your blood pressure even more. Tips for success  · Start small. Do not try to make dramatic changes to your diet all at once. You might feel that you are missing out on your favorite foods and then be more likely to not follow the plan. Make small changes, and stick with them. Once those changes become habit, add a few more changes. · Try some of the following:  ¨ Make it a goal to eat a fruit or vegetable at every meal and at snacks. This will make it easy to get the recommended amount of fruits and vegetables each day. ¨ Try yogurt topped with fruit and nuts for a snack or healthy dessert. ¨ Add lettuce, tomato, cucumber, and onion to sandwiches. ¨ Combine a ready-made pizza crust with low-fat mozzarella cheese and lots of vegetable toppings. Try using tomatoes, squash, spinach, broccoli, carrots, cauliflower, and onions. ¨ Have a variety of cut-up vegetables with a low-fat dip as an appetizer instead of chips and dip. ¨ Sprinkle sunflower seeds or chopped almonds over salads. Or try adding chopped walnuts or almonds to cooked vegetables. ¨ Try some vegetarian meals using beans and peas. Add garbanzo or kidney beans to salads. Make burritos and tacos with mashed friend beans or black beans. Where can you learn more? Go to http://costa-jessica.info/. Enter I849 in the search box to learn more about \"DASH Diet: Care Instructions. \"  Current as of: December 6, 2017  Content Version: 11.8  © 8001-0469 [employer]. Care instructions adapted under license by [employer] (which disclaims liability or warranty for this information). If you have questions about a medical condition or this instruction, always ask your healthcare professional. [address] any warranty or liability for your use of this information. Learning About the [address] Diet  What is the Mediterranean diet? The Mediterranean diet is a style of eating rather than a diet plan. It features foods eaten in [place], [place], [place] and [place], and other countries along the [facility]. It emphasizes eating foods like fish, fruits, vegetables, beans, high-fiber breads and whole grains, nuts, and olive oil. This style of eating includes limited red meat, cheese, and sweets. Why choose the Mediterranean diet? A Mediterranean-style diet may improve heart health. It contains more fat than other heart-healthy diets. But the fats are mainly from nuts, unsaturated oils (such as fish oils and olive oil), and certain nut or seed oils (such as canola, soybean, or flaxseed oil). These fats may help protect the heart and blood vessels. How can you get started on the Mediterranean diet? Here are some things you can do to switch to a more Mediterranean way of eating. What to eat  · Eat a variety of fruits and vegetables each day, such as grapes, blueberries, tomatoes, broccoli, peppers, figs, olives, spinach, eggplant, beans, lentils, and chickpeas. · Eat a variety of whole-grain foods each day, such as oats, brown rice, and whole wheat bread, pasta, and couscous. · Eat fish at least 2 times a week. Try tuna, salmon, mackerel, lake trout, herring, or sardines. · Eat moderate amounts of low-fat dairy products, such as milk, cheese, or yogurt. · Eat moderate amounts of poultry and eggs.   · Choose healthy (unsaturated) fats, such as nuts, olive oil, and certain nut or seed oils like canola, soybean, and flaxseed. · Limit unhealthy (saturated) fats, such as butter, palm oil, and coconut oil. And limit fats found in animal products, such as meat and dairy products made with whole milk. Try to eat red meat only a few times a month in very small amounts. · Limit sweets and desserts to only a few times a week. This includes sugar-sweetened drinks like soda. The Mediterranean diet may also include red wine with your meal--1 glass each day for women and up to 2 glasses a day for men. Tips for eating at home  · Use herbs, spices, garlic, lemon zest, and citrus juice instead of salt to add flavor to foods. · Add avocado slices to your sandwich instead of andrew. · Have fish for lunch or dinner instead of red meat. Brush the fish with olive oil, and broil or grill it. · Sprinkle your salad with seeds or nuts instead of cheese. · Cook with olive or canola oil instead of butter or oils that are high in saturated fat. · Switch from 2% milk or whole milk to 1% or fat-free milk. · Dip raw vegetables in a vinaigrette dressing or hummus instead of dips made from mayonnaise or sour cream.  · Have a piece of fruit for dessert instead of a piece of cake. Try baked apples, or have some dried fruit. Tips for eating out  · Try broiled, grilled, baked, or poached fish instead of having it fried or breaded. · Ask your  to have your meals prepared with olive oil instead of butter. · Order dishes made with marinara sauce or sauces made from olive oil. Avoid sauces made from cream or mayonnaise. · Choose whole-grain breads, whole wheat pasta and pizza crust, brown rice, beans, and lentils. · Cut back on butter or margarine on bread. Instead, you can dip your bread in a small amount of olive oil. · Ask for a side salad or grilled vegetables instead of french fries or chips. Where can you learn more?   Go to http://costa-jessica.info/. Enter [phone] in the search box to learn more about \"Learning About the Mediterranean Diet. \"  Current as of: March 29, 2018  Content Version: 11.8  © 0849-6523 [employer]. Care instructions adapted under license by [employer] (which disclaims liability or warranty for this information). If you have questions about a medical condition or this instruction, always ask your healthcare professional. [address] any warranty or liability for your use of this information.

## 2018-10-10 ENCOUNTER — TELEPHONE (OUTPATIENT)
Dept: FAMILY MEDICINE CLINIC | Age: 48
End: 2018-10-10

## 2018-10-29 ENCOUNTER — TELEPHONE (OUTPATIENT)
Dept: FAMILY MEDICINE CLINIC | Age: 48
End: 2018-10-29

## 2018-10-31 RX ORDER — LISINOPRIL 5 MG/1
5 TABLET ORAL DAILY
Qty: 90 TAB | Refills: 1 | Status: SHIPPED | OUTPATIENT
Start: 2018-10-31 | End: 2019-04-09 | Stop reason: DRUGHIGH

## 2018-11-04 ENCOUNTER — ANESTHESIA EVENT (OUTPATIENT)
Dept: ENDOSCOPY | Age: 48
End: 2018-11-04
Payer: MEDICAID

## 2018-11-05 ENCOUNTER — HOSPITAL ENCOUNTER (OUTPATIENT)
Age: 48
Setting detail: OUTPATIENT SURGERY
Discharge: HOME | End: 2018-11-05
Attending: INTERNAL MEDICINE | Admitting: INTERNAL MEDICINE
Payer: MEDICAID

## 2018-11-05 ENCOUNTER — ANESTHESIA (OUTPATIENT)
Dept: ENDOSCOPY | Age: 48
End: 2018-11-05
Payer: MEDICAID

## 2018-11-05 VITALS
BODY MASS INDEX: 29.04 KG/M2 | WEIGHT: 191.6 LBS | DIASTOLIC BLOOD PRESSURE: 91 MMHG | TEMPERATURE: 97 F | HEIGHT: 68 IN | RESPIRATION RATE: 20 BRPM | HEART RATE: 84 BPM | OXYGEN SATURATION: 95 % | SYSTOLIC BLOOD PRESSURE: 140 MMHG

## 2018-11-05 PROCEDURE — 77030008565 HC TBNG SUC IRR ERBE -B: Performed by: INTERNAL MEDICINE

## 2018-11-05 PROCEDURE — 76060000031 HC ANESTHESIA FIRST 0.5 HR: Performed by: INTERNAL MEDICINE

## 2018-11-05 PROCEDURE — 76040000019: Performed by: INTERNAL MEDICINE

## 2018-11-05 PROCEDURE — 77030018846 HC SOL IRR STRL H20 ICUM -A: Performed by: INTERNAL MEDICINE

## 2018-11-05 PROCEDURE — 74011000250 HC RX REV CODE- 250: Performed by: NURSE ANESTHETIST, CERTIFIED REGISTERED

## 2018-11-05 PROCEDURE — 77030013992 HC SNR POLYP ENDOSC BSC -B: Performed by: INTERNAL MEDICINE

## 2018-11-05 PROCEDURE — 74011250636 HC RX REV CODE- 250/636

## 2018-11-05 PROCEDURE — 74011000258 HC RX REV CODE- 258

## 2018-11-05 PROCEDURE — 77030009426 HC FCPS BIOP ENDOSC BSC -B: Performed by: INTERNAL MEDICINE

## 2018-11-05 PROCEDURE — 74011250636 HC RX REV CODE- 250/636: Performed by: NURSE ANESTHETIST, CERTIFIED REGISTERED

## 2018-11-05 PROCEDURE — 88305 TISSUE EXAM BY PATHOLOGIST: CPT | Performed by: INTERNAL MEDICINE

## 2018-11-05 RX ORDER — PROPOFOL 10 MG/ML
INJECTION, EMULSION INTRAVENOUS AS NEEDED
Status: DISCONTINUED | OUTPATIENT
Start: 2018-11-05 | End: 2018-11-05 | Stop reason: HOSPADM

## 2018-11-05 RX ORDER — SODIUM CHLORIDE 0.9 % (FLUSH) 0.9 %
5-10 SYRINGE (ML) INJECTION AS NEEDED
Status: DISCONTINUED | OUTPATIENT
Start: 2018-11-05 | End: 2018-11-05 | Stop reason: HOSPADM

## 2018-11-05 RX ORDER — LIDOCAINE HYDROCHLORIDE 20 MG/ML
INJECTION, SOLUTION EPIDURAL; INFILTRATION; INTRACAUDAL; PERINEURAL AS NEEDED
Status: DISCONTINUED | OUTPATIENT
Start: 2018-11-05 | End: 2018-11-05 | Stop reason: HOSPADM

## 2018-11-05 RX ORDER — MAGNESIUM SULFATE 100 %
4 CRYSTALS MISCELLANEOUS AS NEEDED
Status: DISCONTINUED | OUTPATIENT
Start: 2018-11-05 | End: 2018-11-05 | Stop reason: HOSPADM

## 2018-11-05 RX ORDER — SODIUM CHLORIDE 0.9 % (FLUSH) 0.9 %
5-10 SYRINGE (ML) INJECTION EVERY 8 HOURS
Status: DISCONTINUED | OUTPATIENT
Start: 2018-11-05 | End: 2018-11-05 | Stop reason: HOSPADM

## 2018-11-05 RX ORDER — ONDANSETRON 2 MG/ML
4 INJECTION INTRAMUSCULAR; INTRAVENOUS ONCE
Status: DISCONTINUED | OUTPATIENT
Start: 2018-11-05 | End: 2018-11-05 | Stop reason: HOSPADM

## 2018-11-05 RX ORDER — SODIUM CHLORIDE, SODIUM LACTATE, POTASSIUM CHLORIDE, CALCIUM CHLORIDE 600; 310; 30; 20 MG/100ML; MG/100ML; MG/100ML; MG/100ML
75 INJECTION, SOLUTION INTRAVENOUS CONTINUOUS
Status: DISCONTINUED | OUTPATIENT
Start: 2018-11-05 | End: 2018-11-05 | Stop reason: HOSPADM

## 2018-11-05 RX ORDER — FENTANYL CITRATE 50 UG/ML
50 INJECTION, SOLUTION INTRAMUSCULAR; INTRAVENOUS AS NEEDED
Status: DISCONTINUED | OUTPATIENT
Start: 2018-11-05 | End: 2018-11-05 | Stop reason: HOSPADM

## 2018-11-05 RX ORDER — SODIUM CHLORIDE 9 MG/ML
75 INJECTION, SOLUTION INTRAVENOUS CONTINUOUS
Status: DISCONTINUED | OUTPATIENT
Start: 2018-11-05 | End: 2018-11-05 | Stop reason: HOSPADM

## 2018-11-05 RX ORDER — DEXTROSE 50 % IN WATER (D50W) INTRAVENOUS SYRINGE
25-50 AS NEEDED
Status: DISCONTINUED | OUTPATIENT
Start: 2018-11-05 | End: 2018-11-05 | Stop reason: HOSPADM

## 2018-11-05 RX ADMIN — PROPOFOL 60 MG: 10 INJECTION, EMULSION INTRAVENOUS at 09:14

## 2018-11-05 RX ADMIN — LIDOCAINE HYDROCHLORIDE 60 MG: 20 INJECTION, SOLUTION EPIDURAL; INFILTRATION; INTRACAUDAL; PERINEURAL at 09:06

## 2018-11-05 RX ADMIN — SODIUM CHLORIDE 75 ML/HR: 900 INJECTION, SOLUTION INTRAVENOUS at 08:44

## 2018-11-05 RX ADMIN — LIDOCAINE HYDROCHLORIDE 40 MG: 20 INJECTION, SOLUTION EPIDURAL; INFILTRATION; INTRACAUDAL; PERINEURAL at 09:00

## 2018-11-05 RX ADMIN — PROPOFOL 40 MG: 10 INJECTION, EMULSION INTRAVENOUS at 09:09

## 2018-11-05 RX ADMIN — FAMOTIDINE 20 MG: 10 INJECTION, SOLUTION INTRAVENOUS at 08:44

## 2018-11-05 RX ADMIN — PROPOFOL 70 MG: 10 INJECTION, EMULSION INTRAVENOUS at 09:01

## 2018-11-05 RX ADMIN — PROPOFOL 40 MG: 10 INJECTION, EMULSION INTRAVENOUS at 09:05

## 2018-11-05 NOTE — DISCHARGE INSTRUCTIONS
Colonoscopy: What to Expect at [address] [place] [address]  After you have a colonoscopy, you will stay at the clinic for 1 to 2 hours until the medicines wear off. Then you can go home. But you will need to arrange for a ride. Your doctor will tell you when you can eat and do your other usual activities. Your doctor will talk to you about when you will need your next colonoscopy. Your doctor can help you decide how often you need to be checked. This will depend on the results of your test and your risk for colorectal cancer. After the test, you may be bloated or have gas pains. You may need to pass gas. If a biopsy was done or a polyp was removed, you may have streaks of blood in your stool (feces) for a few days. Problems such as heavy rectal bleeding may not occur until several weeks after the test. This isn't common. But it can happen after polyps are removed. This care sheet gives you a general idea about how long it will take for you to recover. But each person recovers at a different pace. Follow the steps below to get better as quickly as possible. How can you care for yourself at home? Activity    · Rest when you feel tired.     · You can do your normal activities when it feels okay to do so. Diet    · Follow your doctor's directions for eating.     · Unless your doctor has told you not to, drink plenty of fluids. This helps to replace the fluids that were lost during the colon prep.     · Do not drink alcohol. Medicines    · Your doctor will tell you if and when you can restart your medicines. He or she will also give you instructions about taking any new medicines.     · If you take blood thinners, such as warfarin (Coumadin), clopidogrel (Plavix), or aspirin, be sure to talk to your doctor. He or she will tell you if and when to start taking those medicines again.  Make sure that you understand exactly what your doctor wants you to do.     · If polyps were removed or a biopsy was done during the test, your doctor may tell you not to take aspirin or other anti-inflammatory medicines for a few days. These include ibuprofen (Advil, Motrin) and naproxen (Aleve). Other instructions    · For your safety, do not drive or operate machinery until the medicine wears off and you can think clearly. Your doctor may tell you not to drive or operate machinery until the day after your test.     · Do not sign legal documents or make major decisions until the medicine wears off and you can think clearly. The anesthesia can make it hard for you to fully understand what you are agreeing to. Follow-up care is a key part of your treatment and safety. Be sure to make and go to all appointments, and call your doctor if you are having problems. It's also a good idea to know your test results and keep a list of the medicines you take. When should you call for help? Call 911 anytime you think you may need emergency care. For example, call if:    · You passed out (lost consciousness).     · You pass maroon or bloody stools.     · You have trouble breathing.    Call your doctor now or seek immediate medical care if:    · You have pain that does not get better after you take pain medicine.     · You are sick to your stomach or cannot drink fluids.     · You have new or worse belly pain.     · You have blood in your stools.     · You have a fever.     · You cannot pass stools or gas.    Watch closely for changes in your health, and be sure to contact your doctor if you have any problems. Where can you learn more? Go to http://costa-jessica.info/. Enter E264 in the search box to learn more about \"Colonoscopy: What to Expect at Home. \"  Current as of: March 28, 2018  Content Version: 11.8  © 5969-3706 [employer]. Care instructions adapted under license by [employer] (which disclaims liability or warranty for this information).  If you have questions about a medical condition or this instruction, always ask your healthcare professional. [place] [address] any warranty or liability for your use of this information. Colon Polyps: Care Instructions  Your Care Instructions    Colon polyps are growths in the colon or the rectum. The cause of most colon polyps is not known, and most people who get them do not have any problems. But a certain kind can turn into cancer. For this reason, regular testing for colon polyps is important for people age 48 and older and anyone who has an increased risk for colon cancer. Polyps are usually found through routine colon cancer screening tests. Although most colon polyps are not cancerous, they are usually removed and then tested for cancer. Screening for colon cancer saves lives because the cancer can usually be cured if it is caught early. If you have a polyp that is the type that can turn into cancer, you may need more tests to examine your entire colon. The doctor will remove any other polyps that he or she finds, and you will be tested more often. Follow-up care is a key part of your treatment and safety. Be sure to make and go to all appointments, and call your doctor if you are having problems. It's also a good idea to know your test results and keep a list of the medicines you take. How can you care for yourself at home? Regular exams to look for colon polyps are the best way to prevent polyps from turning into colon cancer. These can include stool tests, sigmoidoscopy, colonoscopy, and CT colonography. Talk with your doctor about a testing schedule that is right for you. To prevent polyps  There is no home treatment that can prevent colon polyps. But these steps may help lower your risk for cancer. · Stay active. Being active can help you get to and stay at a healthy weight. Try to exercise on most days of the week. Walking is a good choice. · Eat well.  Choose a variety of vegetables, fruits, legumes (such as peas and beans), fish, poultry, and whole grains. · Do not smoke. If you need help quitting, talk to your doctor about stop-smoking programs and medicines. These can increase your chances of quitting for good. · If you drink alcohol, limit how much you drink. Limit alcohol to 2 drinks a day for men and 1 drink a day for women. When should you call for help? Call your doctor now or seek immediate medical care if:    · You have severe belly pain.     · Your stools are maroon or very bloody.    Watch closely for changes in your health, and be sure to contact your doctor if:    · You have a fever.     · You have nausea or vomiting.     · You have a change in bowel habits (new constipation or diarrhea).     · Your symptoms get worse or are not improving as expected. Where can you learn more? Go to http://costa-jessica.info/. Enter 95 [id number] in the search box to learn more about \"Colon Polyps: Care Instructions. \"  Current as of: March 28, 2018  Content Version: 11.8  © 3428-7153 [employer]. Care instructions adapted under license by [employer] (which disclaims liability or warranty for this information). If you have questions about a medical condition or this instruction, always ask your healthcare professional. [place] [address] any warranty or liability for your use of this information. Diverticulosis: Care Instructions  Your Care Instructions  In diverticulosis, pouches called diverticula form in the wall of the large intestine (colon). The pouches do not cause any pain or other symptoms. Most people who have diverticulosis do not know they have it. But the pouches sometimes bleed, and if they become infected, they can cause pain and other symptoms. When this happens, it is called diverticulitis. Diverticula form when pressure pushes the wall of the colon outward at certain weak points. A diet that is too low in fiber can cause diverticula.   Follow-up care is a key part of your treatment and safety. Be sure to make and go to all appointments, and call your doctor if you are having problems. It's also a good idea to know your test results and keep a list of the medicines you take. How can you care for yourself at home? · Include fruits, leafy green vegetables, beans, and whole grains in your diet each day. These foods are high in fiber. · Take a fiber supplement, such as Citrucel or Metamucil, every day if needed. Read and follow all instructions on the label. · Drink plenty of fluids, enough so that your urine is light yellow or clear like water. If you have kidney, heart, or liver disease and have to limit fluids, talk with your doctor before you increase the amount of fluids you drink. · Get at least 30 minutes of exercise on most days of the week. Walking is a good choice. You also may want to do other activities, such as running, swimming, cycling, or playing tennis or team sports. · Cut out foods that cause gas, pain, or other symptoms. When should you call for help? Call your doctor now or seek immediate medical care if:    · You have belly pain.     · You pass maroon or very bloody stools.     · You have a fever.     · You have nausea and vomiting.     · You have unusual changes in your bowel movements or abdominal swelling.     · You have burning pain when you urinate.     · You have abnormal vaginal discharge.     · You have shoulder pain.     · You have cramping pain that does not get better when you have a bowel movement or pass gas.     · You pass gas or stool from your urethra while urinating.    Watch closely for changes in your health, and be sure to contact your doctor if you have any problems. Where can you learn more? Go to http://costa-jessica.info/. Enter U588 in the search box to learn more about \"Diverticulosis: Care Instructions. \"  Current as of: March 28, 2018  Content Version: 11.8  © 3406-3630 [employer].  Care instructions adapted under license by [employer] (which disclaims liability or warranty for this information). If you have questions about a medical condition or this instruction, always ask your healthcare professional. [address] any warranty or liability for your use of this information. DISCHARGE SUMMARY from Nurse    PATIENT INSTRUCTIONS:    After general anesthesia or intravenous sedation, for 24 hours or while taking prescription Narcotics:  · Limit your activities  · Do not drive and operate hazardous machinery  · Do not make important personal or business decisions  · Do  not drink alcoholic beverages  · If you have not urinated within 8 hours after discharge, please contact your surgeon on call. Report the following to your surgeon:  · Excessive pain, swelling, redness or odor of or around the surgical area  · Temperature over 100.5  · Nausea and vomiting lasting longer than 4 hours or if unable to take medications  · Any signs of decreased circulation or nerve impairment to extremity: change in color, persistent  numbness, tingling, coldness or increase pain  · Any questions  *  Please give a list of your current medications to your Primary Care Provider. *  Please update this list whenever your medications are discontinued, doses are      changed, or new medications (including over-the-counter products) are added. *  Please carry medication information at all times in case of emergency situations. These are general instructions for a healthy lifestyle:    No smoking/ No tobacco products/ Avoid exposure to second hand smoke  Surgeon General's Warning:  Quitting smoking now greatly reduces serious risk to your health.     Obesity, smoking, and sedentary lifestyle greatly increases your risk for illness    A healthy diet, regular physical exercise & weight monitoring are important for maintaining a healthy lifestyle    You may be retaining fluid if you have a history of heart failure or if you experience any of the following symptoms:  Weight gain of 3 pounds or more overnight or 5 pounds in a week, increased swelling in our hands or feet or shortness of breath while lying flat in bed. Please call your doctor as soon as you notice any of these symptoms; do not wait until your next office visit. Recognize signs and symptoms of STROKE:    F-face looks uneven    A-arms unable to move or move unevenly    S-speech slurred or non-existent    T-time-call 911 as soon as signs and symptoms begin-DO NOT go       Back to bed or wait to see if you get better-TIME IS BRAIN. Warning Signs of HEART ATTACK     Call 911 if you have these symptoms:   Chest discomfort. Most heart attacks involve discomfort in the center of the chest that lasts more than a few minutes, or that goes away and comes back. It can feel like uncomfortable pressure, squeezing, fullness, or pain.  Discomfort in other areas of the upper body. Symptoms can include pain or discomfort in one or both arms, the back, neck, jaw, or stomach.  Shortness of breath with or without chest discomfort.  Other signs may include breaking out in a cold sweat, nausea, or lightheadedness. Don't wait more than five minutes to call 911 - MINUTES MATTER! Fast action can save your life. Calling 911 is almost always the fastest way to get lifesaving treatment. Emergency Medical Services staff can begin treatment when they arrive -- up to an hour sooner than if someone gets to the hospital by car. The discharge information has been reviewed with the patient and spouse. The patient and spouse verbalized understanding. Discharge medications reviewed with the patient and spouse and appropriate educational materials and side effects teaching were provided.   ___________________________________________________________________________________________________________________________________

## 2018-11-05 NOTE — ANESTHESIA PREPROCEDURE EVALUATION
Anesthetic History No history of anesthetic complications Review of Systems / Medical History Patient summary reviewed and pertinent labs reviewed Pulmonary COPD Comments: Uses oxygen at home 
sats around 90% Had aspiration - leading to MRSA pnuemonia after endoscopy last year. Neuro/Psych Within defined limits Cardiovascular Hypertension CAD Exercise tolerance: <4 METS 
  
GI/Hepatic/Renal 
  
GERD Endo/Other Pertinent negatives: No morbid obesity Other Findings Physical Exam 
 
Airway Mallampati: II 
TM Distance: 4 - 6 cm Neck ROM: normal range of motion Mouth opening: Normal 
 
 Cardiovascular Regular rate and rhythm,  S1 and S2 normal,  no murmur, click, rub, or gallop Dental 
 
Dentition: Poor dentition Pulmonary Breath sounds clear to auscultation Abdominal 
GI exam deferred Other Findings Anesthetic Plan ASA: 4 Anesthesia type: MAC Induction: Intravenous Anesthetic plan and risks discussed with: Patient

## 2018-11-05 NOTE — H&P
[facility] [email or web address] Impression: 1.crcs Plan: 1. Lindsay mac all risks benefits and alt discussed Chief Complaint: crcs HPI: 
Tova Morales is a 51 y.o. male who is being seen on consult for crcs. PMH:  
Past Medical History:  
Diagnosis Date  Chronic diastolic heart failure secondary to coronary artery disease [facility] [address] [facility]  Chronic lung disease  Chronic obstructive pulmonary disease ([facility] [address] [facility] 2017 PFTS 8/3/17  COPD, severe ([address]  Emphysema/COPD ([facility] [address] [facility]  Esophagitis 2017 Endoscopy  Essential hypertension, benign  Hepatomegaly  History of stomach ulcers  Positive urine drug screen 2016  
 opiates and THC  Splenomegaly  Upper GI bleed PSH:  
Past Surgical History:  
Procedure Laterality Date  HX ENDOSCOPY  2017 Social HX:  
Social History Socioeconomic History  Marital status:  Spouse name: Not on file  Number of children: Not on file  Years of education: Not on file  Highest education level: Not on file Social Needs  Financial resource strain: Not on file  Food insecurity - worry: Not on file  Food insecurity - inability: Not on file  Transportation needs - medical: Not on file  Transportation needs - non-medical: Not on file Occupational History  Not on file Tobacco Use  Smoking status: Former Smoker Packs/day: 2.00 Years: 25.00 Pack years: 50.00 Types: Cigarettes Start date: 1984 Last attempt to quit: 2017 Years since quittin.8  Smokeless tobacco: Never Used Substance and Sexual Activity  Alcohol use: No  
 Drug use: Yes Types: Marijuana Comment: Hx of Marijuana use  Sexual activity: Yes  
  Partners: Female Other Topics Concern   Service No  
 Blood Transfusions No  
 Caffeine Concern Yes  Occupational Exposure No  
 Hobby Hazards No  
 Sleep Concern Yes Comment: occas  Stress Concern No  
 Weight Concern No  
 Special Diet No  
 Back Care Yes  Exercise No  
 Bike Helmet Yes  Seat Belt No  
  Comment: occas  Self-Exams Not Asked Social History Narrative  FHX:  
Family History Problem Relation Age of Onset  Hypertension Mother  Heart Disease Mother  Diabetes Mother  Hypertension Father  Heart Disease Father  Cancer Father   
     lymphnodes  Diabetes Father Allergy: Allergies Allergen Reactions  Remeron [Mirtazapine] Other (comments) Mood swings  Seroquel [Quetiapine] Other (comments) Mood swings Home Medications:  
 
Medications Prior to Admission Medication Sig  
 lisinopril (PRINIVIL, ZESTRIL) 5 mg tablet Take 1 Tab by mouth daily.  cyclobenzaprine (FLEXERIL) 5 mg tablet Take 1 Tab by mouth nightly. Indications: Muscle Spasm (Patient taking differently: Take 5 mg by mouth three (3) times daily as needed.)  furosemide (LASIX) 20 mg tablet Take 1 Tab by mouth every other day.  LORazepam (ATIVAN) 1 mg tablet Take 1 Tab by mouth every eight (8) hours as needed for Anxiety. Max Daily Amount: 3 mg.  albuterol (PROVENTIL VENTOLIN) 2.5 mg /3 mL (0.083 %) nebulizer solution Nebulized 1 vial for inhalation every 4 hours as needed  tiotropium (SPIRIVA) 18 mcg inhalation capsule Take 1 Cap by inhalation daily. Indications: BRONCHOSPASM PREVENTION WITH COPD  fluticasone-salmeterol (ADVAIR DISKUS) 500-50 mcg/dose diskus inhaler Take 1 Puff by inhalation two (2) times a day.  albuterol (PROVENTIL HFA, VENTOLIN HFA, PROAIR HFA) 90 mcg/actuation inhaler Take 2 Puffs by inhalation every four (4) hours as needed for Wheezing or Shortness of Breath.  roflumilast (DALIRESP) tab tablet Take 1 Tab by mouth daily. Indications: PREVENTION OF BRONCHOSPASM WITH CHRONIC BRONCHITIS  
 OXYGEN-AIR DELIVERY SYSTEMS (WALKABOUT 1 OXYGEN SYSTEM) 2.5 L/min by Nasal route continuous.  pantoprazole (PROTONIX) 40 mg tablet Take 1 Tab by mouth Daily (before breakfast).  suvorexant (BELSOMRA) 10 mg tablet Take 1 Tab by mouth nightly as needed for Insomnia. Max Daily Amount: 10 mg.  
 Nebulizer & Compressor machine 1 Each by Does Not Apply route every four (4) hours as needed. Review of Systems:  
 
Constitutional: No fevers, chills, weight loss, fatigue. Skin: No rashes, pruritis, jaundice, ulcerations, erythema. HENT: No headaches, nosebleeds, sinus pressure, rhinorrhea, sore throat. Eyes: No visual changes, blurred vision, eye pain, photophobia, jaundice. Cardiovascular: No chest pain, heart palpitations. Respiratory: No cough, SOB, wheezing, chest discomfort, orthopnea. Gastrointestinal: Neg   
Genitourinary: No dysuria, bleeding, discharge, pyuria. Musculoskeletal: No weakness, arthralgias, wasting. Endo: No sweats. Heme: No bruising, easy bleeding. Allergies: As noted. Neurological: Cranial nerves intact. Alert and oriented. Gait not assessed. Psychiatric:  No anxiety, depression, hallucinations. Visit Vitals BP (!) 130/92 Pulse 93 Temp 98 °F (36.7 °C) Resp 20 Ht 5' 8\" (1.727 m) Wt 86.9 kg (191 lb 9.6 oz) SpO2 95% BMI 29.13 kg/m² Physical Assessment:  
 
constitutional: appearance: well developed, well nourished, normal habitus, no deformities, in no acute distress. skin: inspection: no rashes, ulcers, icterus or other lesions; no clubbing or telangiectasias. palpation: no induration or subcutaneos nodules. eyes: inspection: normal conjunctivae and lids; no jaundice pupils: symmetrical, normoreactive to light, normal accommodation and size. ENMT: mouth: normal oral mucosa,lips and gums; good dentition.  oropharynx: normal tongue, hard and soft palate; posterior pharynx without erythema, exudate or lesions. neck: no masses organomegaly or tenderness. respiratory: effort: normal chest excursion; no intercostal retraction or accessory muscle use. cardiovascular: abdominal aorta: normal size and position; no bruits. palpation: PMI of normal size and position; normal rhythm; no thrill or murmurs. abdominal: abdomen: normal consistency; no tenderness or masses. hernias: no hernias appreciated. liver: normal size and consistency. spleen: not palpable. rectal: hemoccult/guaiac: not performed. musculoskeletal: no deformities or muscle wasting  
lymphatic: axilae: not palpable. groin: not palpable. neck: within normal limits. other: not palpable. neurologic: cranial nerves: II-XII normal.  
psychiatric: judgement/insight: within normal limits. memory: within normal limits for recent and remote events. mood and affect: no evidence of depression, anxiety or agitation. orientation: oriented to time, space and person. Basic Metabolic Profile No results for input(s): NA, K, CL, CO2, BUN, GLU, CA, MG, PHOS in the last 72 hours. No lab exists for component: CREAT  
  
CBC w/Diff No results for input(s): WBC, RBC, HGB, HCT, MCV, MCH, MCHC, RDW, PLT, HGBEXT, HCTEXT, PLTEXT in the last 72 hours. No lab exists for component: MPV No results for input(s): GRANS, LYMPH, EOS, PRO, MYELO, METAS, BLAST in the last 72 hours. No lab exists for component: MONO, BASO Hepatic Function No results for input(s): ALB, TP, TBILI, GPT, SGOT, AP, AML, LPSE in the last 72 hours. No lab exists for component: Jody Miller MD, M.D. [facility], [address] 
[email or web address]

## 2018-11-05 NOTE — ANESTHESIA POSTPROCEDURE EVALUATION
Procedure(s): 
COLONOSCOPY with polypectomies. Anesthesia Post Evaluation Multimodal analgesia: multimodal analgesia used between 6 hours prior to anesthesia start to PACU discharge Patient location during evaluation: bedside Patient participation: complete - patient participated Level of consciousness: awake Pain management: adequate Airway patency: patent Anesthetic complications: no 
Cardiovascular status: acceptable Respiratory status: acceptable Hydration status: acceptable Visit Vitals BP (!) 140/91 (BP 1 Location: Right arm, BP Patient Position: At rest) Pulse 84 Temp 36.1 °C (97 °F) Resp 20 Ht 5' 8\" (1.727 m) Wt 86.9 kg (191 lb 9.6 oz) SpO2 95% BMI 29.13 kg/m²

## 2018-11-19 DIAGNOSIS — R06.02 SHORTNESS OF BREATH: ICD-10-CM

## 2018-11-19 DIAGNOSIS — J44.9 COPD, SEVERE (HCC): ICD-10-CM

## 2018-11-19 NOTE — TELEPHONE ENCOUNTER
Per pt's wife, pt needs refills on Advair, Spiriva, and rescue inhaler sent to [facility] on [address]. He also has 1 left on [facility], and she knows it requires prior auth, not sure if that can be done yet or not.

## 2018-11-20 RX ORDER — ALBUTEROL SULFATE 90 UG/1
2 AEROSOL, METERED RESPIRATORY (INHALATION)
Qty: 1 INHALER | Refills: 2 | Status: SHIPPED | OUTPATIENT
Start: 2018-11-20 | End: 2019-01-07 | Stop reason: SDUPTHER

## 2018-11-20 RX ORDER — FLUTICASONE PROPIONATE AND SALMETEROL 500; 50 UG/1; UG/1
1 POWDER RESPIRATORY (INHALATION) 2 TIMES DAILY
Qty: 1 INHALER | Refills: 5 | Status: SHIPPED | OUTPATIENT
Start: 2018-11-20 | End: 2019-05-27 | Stop reason: SDUPTHER

## 2018-11-30 ENCOUNTER — OFFICE VISIT (OUTPATIENT)
Dept: FAMILY MEDICINE CLINIC | Age: 48
End: 2018-11-30

## 2018-11-30 VITALS
DIASTOLIC BLOOD PRESSURE: 92 MMHG | HEART RATE: 60 BPM | TEMPERATURE: 97.5 F | OXYGEN SATURATION: 90 % | HEIGHT: 68 IN | SYSTOLIC BLOOD PRESSURE: 139 MMHG | BODY MASS INDEX: 30.01 KG/M2 | WEIGHT: 198 LBS

## 2018-11-30 DIAGNOSIS — J01.00 ACUTE MAXILLARY SINUSITIS, RECURRENCE NOT SPECIFIED: Primary | ICD-10-CM

## 2018-11-30 DIAGNOSIS — R09.89 CHEST CONGESTION: ICD-10-CM

## 2018-11-30 RX ORDER — CYCLOBENZAPRINE HCL 5 MG
5 TABLET ORAL
Qty: 30 TAB | Refills: 1 | Status: CANCELLED | OUTPATIENT
Start: 2018-11-30

## 2018-11-30 RX ORDER — PREDNISONE 10 MG/1
TABLET ORAL
Qty: 30 TAB | Refills: 0 | Status: SHIPPED | OUTPATIENT
Start: 2018-11-30 | End: 2019-01-09 | Stop reason: ALTCHOICE

## 2018-11-30 RX ORDER — AMOXICILLIN AND CLAVULANATE POTASSIUM 875; 125 MG/1; MG/1
1 TABLET, FILM COATED ORAL 2 TIMES DAILY
Qty: 20 TAB | Refills: 0 | Status: SHIPPED | OUTPATIENT
Start: 2018-11-30 | End: 2018-12-10

## 2018-11-30 NOTE — PROGRESS NOTES
Chief Complaint   Patient presents with    Breathing Problem     all the time. Pt is on Oxygen at home    Cold Symptoms      green mucous    Back Pain     ongoing    Headache      x 1 week      1. Have you been to the ER, urgent care clinic since your last visit? Hospitalized since your last visit? No    2. Have you seen or consulted any other health care providers outside of the [address] [place] [address] since your last visit? Include any pap smears or colon screening.  No

## 2018-11-30 NOTE — PROGRESS NOTES
RAMSES Lerma Sr. is a 51 y.o. male who presents today for cold symptoms. who complains of congestion, post nasal drip, productive cough, cough described as productive, productive of yellow sputum, myalgias, headache, bilateral sinus pain, bilateral ear fullness and green nasal discharge for 7 days. Pt denies a history of chest pain, chills, dizziness, fevers, nausea and vomiting and has a history of COPD, and is on continuous oxygen (not) present with him today. Patient denies smoke cigarettes. Current Outpatient Medications   Medication Sig Dispense Refill    tiotropium (SPIRIVA) 18 mcg inhalation capsule Take 1 Cap by inhalation daily. 30 Cap 5    fluticasone-salmeterol (ADVAIR DISKUS) 500-50 mcg/dose diskus inhaler Take 1 Puff by inhalation two (2) times a day. 1 Inhaler 5    albuterol (PROVENTIL HFA, VENTOLIN HFA, PROAIR HFA) 90 mcg/actuation inhaler Take 2 Puffs by inhalation every four (4) hours as needed for Wheezing or Shortness of Breath. 1 Inhaler 2    lisinopril (PRINIVIL, ZESTRIL) 5 mg tablet Take 1 Tab by mouth daily. 90 Tab 1    suvorexant (BELSOMRA) 10 mg tablet Take 1 Tab by mouth nightly as needed for Insomnia. Max Daily Amount: 10 mg. 30 Tab 0    cyclobenzaprine (FLEXERIL) 5 mg tablet Take 1 Tab by mouth nightly. Indications: Muscle Spasm (Patient taking differently: Take 5 mg by mouth three (3) times daily as needed.) 30 Tab 1    furosemide (LASIX) 20 mg tablet Take 1 Tab by mouth every other day. 15 Tab 6    LORazepam (ATIVAN) 1 mg tablet Take 1 Tab by mouth every eight (8) hours as needed for Anxiety. Max Daily Amount: 3 mg. 90 Tab 0    albuterol (PROVENTIL VENTOLIN) 2.5 mg /3 mL (0.083 %) nebulizer solution Nebulized 1 vial for inhalation every 4 hours as needed 60 Each 3    roflumilast (DALIRESP) tab tablet Take 1 Tab by mouth daily.  Indications: PREVENTION OF BRONCHOSPASM WITH CHRONIC BRONCHITIS 30 Tab 5    Nebulizer & Compressor machine 1 Each by Does Not Apply route every four (4) hours as needed. 1 Each 0    OXYGEN-AIR DELIVERY SYSTEMS (WALKABOUT 1 OXYGEN SYSTEM) 2.5 L/min by Nasal route continuous.  pantoprazole (PROTONIX) 40 mg tablet Take 1 Tab by mouth Daily (before breakfast). 30 Tab 0      Allergies   Allergen Reactions    Remeron [Mirtazapine] Other (comments)     Mood swings    Seroquel [Quetiapine] Other (comments)     Mood swings     SUBJECTIVE:  Review of Systems   Constitutional: Positive for malaise/fatigue. Negative for chills and fever. HENT: Positive for congestion, ear pain (bilateral ear fullness), sinus pain and sore throat. Eyes: Negative for blurred vision. Respiratory: Positive for cough, sputum production and shortness of breath. Cardiovascular: Negative for chest pain, palpitations and leg swelling. Gastrointestinal: Negative for abdominal pain, diarrhea, nausea and vomiting. Musculoskeletal: Negative for myalgias. Skin: Negative for rash. Neurological: Negative for dizziness, tingling, tremors and headaches. OBJECTIVE:  Visit Vitals  BP (!) 139/92 (BP 1 Location: Left arm, BP Patient Position: Sitting)   Pulse 60   Temp 97.5 °F (36.4 °C) (Oral)   Ht 5' 8\" (1.727 m)   Wt 198 lb (89.8 kg)   SpO2 90%   BMI 30.11 kg/m²     Physical Exam   Constitutional: He is oriented to person, place, and time and well-developed, well-nourished, and in no distress. HENT:   Head: Normocephalic. Right Ear: Hearing, tympanic membrane, external ear and ear canal normal.   Left Ear: Hearing, tympanic membrane, external ear and ear canal normal.   Nose: No mucosal edema. Right sinus exhibits maxillary sinus tenderness and frontal sinus tenderness. Left sinus exhibits maxillary sinus tenderness and frontal sinus tenderness. Mouth/Throat: Uvula is midline. No posterior oropharyngeal edema or posterior oropharyngeal erythema. Eyes: Conjunctivae and EOM are normal. Pupils are equal, round, and reactive to light.    Neck: Normal range of motion. Neck supple. No thyromegaly present. Cardiovascular: Normal rate, regular rhythm and normal heart sounds. Pulmonary/Chest: Effort normal and breath sounds normal. He has no wheezes. He has no rales. He exhibits no tenderness. Musculoskeletal: He exhibits no edema. Lymphadenopathy:     He has no cervical adenopathy. Neurological: He is alert and oriented to person, place, and time. Gait normal.   Skin: Skin is warm and dry. Psychiatric: Mood and affect normal.   Nursing note and vitals reviewed. ASSESSMENT:  Diagnoses and all orders for this visit:    1. Acute maxillary sinusitis, recurrence not specified  -     amoxicillin-clavulanate (AUGMENTIN) 875-125 mg per tablet; Take 1 Tab by mouth two (2) times a day for 10 days. 2. Chest congestion  -     predniSONE (DELTASONE) 10 mg tablet; Take 5 tabs x2days, 4 tabs x2days, 3 tabs x2days, 2 tabs x2days, 1 tab x2days      PLAN:  Start Augmentin 875/125 mg BID for 10 days  Start Prednisone 10 mg taper for 12 days for chest congestion/SOB  Advise patient to adhere to continuous oxygen  Symptomatic therapy suggested: push fluids, rest, use vaporizer or mist prn and return office visit prn if symptoms persist or worsen. I have discussed the diagnosis with the patient and the intended plan as seen in the above orders. The patient has received an after-visit summary and questions were answered concerning future plans. I have discussed medication side effects and warnings with the patient as well. Patient will call for further questions. Follow-up Disposition:  Return in about 2 weeks (around 12/14/2018), or if symptoms worsen or fail to improve.     June Kunz NP

## 2018-11-30 NOTE — PATIENT INSTRUCTIONS
Sinusitis: Care Instructions  Your Care Instructions    Sinusitis is an infection of the lining of the sinus cavities in your head. Sinusitis often follows a cold. It causes pain and pressure in your head and face. In most cases, sinusitis gets better on its own in 1 to 2 weeks. But some mild symptoms may last for several weeks. Sometimes antibiotics are needed. Follow-up care is a key part of your treatment and safety. Be sure to make and go to all appointments, and call your doctor if you are having problems. It's also a good idea to know your test results and keep a list of the medicines you take. How can you care for yourself at home? · Take an over-the-counter pain medicine, such as acetaminophen (Tylenol), ibuprofen (Advil, Motrin), or naproxen (Aleve). Read and follow all instructions on the label. · If the doctor prescribed antibiotics, take them as directed. Do not stop taking them just because you feel better. You need to take the full course of antibiotics. · Be careful when taking over-the-counter cold or flu medicines and Tylenol at the same time. Many of these medicines have acetaminophen, which is Tylenol. Read the labels to make sure that you are not taking more than the recommended dose. Too much acetaminophen (Tylenol) can be harmful. · Breathe warm, moist air from a steamy shower, a hot bath, or a sink filled with hot water. Avoid cold, dry air. Using a humidifier in your home may help. Follow the directions for cleaning the machine. · Use saline (saltwater) nasal washes to help keep your nasal passages open and wash out mucus and bacteria. You can buy saline nose drops at a grocery store or drugstore. Or you can make your own at home by adding 1 teaspoon of salt and 1 teaspoon of baking soda to 2 cups of distilled water. If you make your own, fill a bulb syringe with the solution, insert the tip into your nostril, and squeeze gently. Grace Durant your nose.   · Put a hot, wet towel or a warm gel pack on your face 3 or 4 times a day for 5 to 10 minutes each time. · Try a decongestant nasal spray like oxymetazoline (Afrin). Do not use it for more than 3 days in a row. Using it for more than 3 days can make your congestion worse. When should you call for help? Call your doctor now or seek immediate medical care if:    · You have new or worse swelling or redness in your face or around your eyes.     · You have a new or higher fever.    Watch closely for changes in your health, and be sure to contact your doctor if:    · You have new or worse facial pain.     · The mucus from your nose becomes thicker (like pus) or has new blood in it.     · You are not getting better as expected. Where can you learn more? Go to http://costa-jessica.info/. Enter Q149 in the search box to learn more about \"Sinusitis: Care Instructions. \"  Current as of: March 28, 2018  Content Version: 11.8  © 1897-6318 [employer]. Care instructions adapted under license by [employer] (which disclaims liability or warranty for this information). If you have questions about a medical condition or this instruction, always ask your healthcare professional. [place] [address] any warranty or liability for your use of this information.

## 2018-12-04 ENCOUNTER — TELEPHONE (OUTPATIENT)
Dept: FAMILY MEDICINE CLINIC | Age: 48
End: 2018-12-04

## 2018-12-04 RX ORDER — CYCLOBENZAPRINE HCL 5 MG
5 TABLET ORAL
Qty: 30 TAB | Refills: 1 | Status: CANCELLED | OUTPATIENT
Start: 2018-12-04

## 2018-12-04 NOTE — TELEPHONE ENCOUNTER
Requested Prescriptions     Pending Prescriptions Disp Refills    cyclobenzaprine (FLEXERIL) 5 mg tablet 30 Tab 1     Sig: Take 1 Tab by mouth nightly.

## 2018-12-04 NOTE — TELEPHONE ENCOUNTER
Pt's wife is calling in b/c pt would like a medication called in , in place of the flexeril. He is having muscle spasms and the medication is not helping.     [phone](p)

## 2018-12-05 RX ORDER — TIZANIDINE 4 MG/1
TABLET ORAL
Qty: 60 TAB | Refills: 0 | Status: SHIPPED | OUTPATIENT
Start: 2018-12-05 | End: 2019-08-22 | Stop reason: ALTCHOICE

## 2018-12-05 NOTE — TELEPHONE ENCOUNTER
Called patient left name and callback number. The call was to inform the patient that his Zanaflex was sent to the pharmacy.

## 2018-12-17 NOTE — TELEPHONE ENCOUNTER
Per wife, pt needs refill on Spiriva and Daliresp. She thinks the Daliresp needs prior [facility].

## 2018-12-27 NOTE — TELEPHONE ENCOUNTER
[facility] stated they need a verbal order to advise what strength the order for Tanisha Fong is supposed to be. Please advise [phone].

## 2019-01-06 DIAGNOSIS — I10 ESSENTIAL HYPERTENSION, BENIGN: ICD-10-CM

## 2019-01-06 DIAGNOSIS — E55.9 HYPOVITAMINOSIS D: ICD-10-CM

## 2019-01-07 RX ORDER — ALBUTEROL SULFATE 90 UG/1
2 AEROSOL, METERED RESPIRATORY (INHALATION)
Qty: 1 INHALER | Refills: 2 | Status: SHIPPED | OUTPATIENT
Start: 2019-01-07 | End: 2019-04-18 | Stop reason: SDUPTHER

## 2019-01-07 RX ORDER — ALBUTEROL SULFATE 0.83 MG/ML
SOLUTION RESPIRATORY (INHALATION)
Qty: 60 EACH | Refills: 2 | Status: SHIPPED | OUTPATIENT
Start: 2019-01-07 | End: 2019-08-27 | Stop reason: SDUPTHER

## 2019-01-07 NOTE — TELEPHONE ENCOUNTER
Pt wife stated pt needs refills for Proair and solultion for nebulizer to be sent to [facility]. Please advise [phone].

## 2019-01-08 ENCOUNTER — OFFICE VISIT (OUTPATIENT)
Dept: FAMILY MEDICINE CLINIC | Age: 49
End: 2019-01-08

## 2019-01-08 ENCOUNTER — TELEPHONE (OUTPATIENT)
Dept: FAMILY MEDICINE CLINIC | Age: 49
End: 2019-01-08

## 2019-01-08 VITALS
WEIGHT: 192.4 LBS | OXYGEN SATURATION: 94 % | TEMPERATURE: 98 F | HEIGHT: 68 IN | DIASTOLIC BLOOD PRESSURE: 83 MMHG | HEART RATE: 89 BPM | RESPIRATION RATE: 18 BRPM | SYSTOLIC BLOOD PRESSURE: 118 MMHG | BODY MASS INDEX: 29.16 KG/M2

## 2019-01-08 DIAGNOSIS — J01.01 ACUTE RECURRENT MAXILLARY SINUSITIS: Primary | ICD-10-CM

## 2019-01-08 DIAGNOSIS — R05.9 COUGH: ICD-10-CM

## 2019-01-08 RX ORDER — PREDNISONE 20 MG/1
20 TABLET ORAL
Qty: 10 TAB | Refills: 0 | Status: SHIPPED | OUTPATIENT
Start: 2019-01-08 | End: 2019-05-21

## 2019-01-08 RX ORDER — CYCLOBENZAPRINE HCL 5 MG
5 TABLET ORAL
Qty: 30 TAB | Refills: 1 | Status: SHIPPED | OUTPATIENT
Start: 2019-01-08 | End: 2019-04-25

## 2019-01-08 RX ORDER — BENZONATATE 200 MG/1
200 CAPSULE ORAL
Qty: 30 CAP | Refills: 0 | Status: SHIPPED | OUTPATIENT
Start: 2019-01-08 | End: 2019-01-15

## 2019-01-08 RX ORDER — HYDROCODONE POLISTIREX AND CHLORPHENIRAMINE POLISTIREX 10; 8 MG/5ML; MG/5ML
1 SUSPENSION, EXTENDED RELEASE ORAL
Qty: 240 ML | Refills: 0 | Status: SHIPPED | OUTPATIENT
Start: 2019-01-08 | End: 2019-03-14 | Stop reason: ALTCHOICE

## 2019-01-08 RX ORDER — CEFDINIR 300 MG/1
300 CAPSULE ORAL 2 TIMES DAILY
Qty: 20 CAP | Refills: 0 | Status: SHIPPED | OUTPATIENT
Start: 2019-01-08 | End: 2019-01-18

## 2019-01-08 NOTE — PROGRESS NOTES
Chief Complaint   Patient presents with     Sinus congestion       HPI:  Sara Fields is a 51 y.o. male who presents today for an acute visit with complaints of persistent sinus congestion. He was recently treated by the nurse practitioner for acute maxillary sinusitis with Augmentin and prednisone. Despite taking both medications, he continues to have sinus congestion and associated is rhinorrhea, chest congestion, and cough productive of yellowish brownish sputum. He had a breathing treatment earlier this morning without significant improvement and thus he came in today for evaluation. Past Medical History:   Diagnosis Date    Chronic diastolic heart failure secondary to coronary artery disease (HCC)     Chronic lung disease     Chronic obstructive pulmonary disease ([facility] [address] 08/03/2017    PFTS 8/3/17    COPD, severe (HCC)     Emphysema[facility]COPD ([facility]     Esophagitis 12/11/2017    Endoscopy    Essential hypertension, benign     Hepatomegaly     History of stomach ulcers     Positive urine drug screen 01/2016    opiates and THC    Splenomegaly     Upper GI bleed      Allergies   Allergen Reactions    Remeron [Mirtazapine] Other (comments)     Mood swings    Seroquel [Quetiapine] Other (comments)     Mood swings     Current Outpatient Medications   Medication Sig Dispense Refill    albuterol (PROVENTIL VENTOLIN) 2.5 mg /3 mL (0.083 %) nebulizer solution Nebulized 1 vial for inhalation every 4 hours as needed 60 Each 2    albuterol (PROVENTIL HFA, VENTOLIN HFA, PROAIR HFA) 90 mcg/actuation inhaler Take 2 Puffs by inhalation every four (4) hours as needed for Wheezing or Shortness of Breath. 1 Inhaler 2    roflumilast (DALIRESP) tab tablet Take 1 Tab by mouth daily. 30 Tab 5    tiZANidine (ZANAFLEX) 4 mg tablet Sig: Take 1 tab PO Q 6 as needed for muscle spasm 60 Tab 0    tiotropium (SPIRIVA) 18 mcg inhalation capsule Take 1 Cap by inhalation daily.  30 Cap 5    fluticasone-salmeterol (ADVAIR DISKUS) 500-50 mcg/dose diskus inhaler Take 1 Puff by inhalation two (2) times a day. 1 Inhaler 5    lisinopril (PRINIVIL, ZESTRIL) 5 mg tablet Take 1 Tab by mouth daily. 90 Tab 1    cyclobenzaprine (FLEXERIL) 5 mg tablet Take 1 Tab by mouth nightly. Indications: Muscle Spasm (Patient taking differently: Take 5 mg by mouth three (3) times daily as needed.) 30 Tab 1    furosemide (LASIX) 20 mg tablet Take 1 Tab by mouth every other day. 15 Tab 6    LORazepam (ATIVAN) 1 mg tablet Take 1 Tab by mouth every eight (8) hours as needed for Anxiety. Max Daily Amount: 3 mg. 90 Tab 0    Nebulizer & Compressor machine 1 Each by Does Not Apply route every four (4) hours as needed. 1 Each 0    OXYGEN-AIR DELIVERY SYSTEMS (WALKABOUT 1 OXYGEN SYSTEM) 2.5 L/min by Nasal route continuous.  pantoprazole (PROTONIX) 40 mg tablet Take 1 Tab by mouth Daily (before breakfast). 30 Tab 0    predniSONE (DELTASONE) 10 mg tablet Take 5 tabs x2days, 4 tabs x2days, 3 tabs x2days, 2 tabs x2days, 1 tab x2days 30 Tab 0    suvorexant (BELSOMRA) 10 mg tablet Take 1 Tab by mouth nightly as needed for Insomnia.  Max Daily Amount: 10 mg. 30 Tab 0     Past Surgical History:   Procedure Laterality Date    COLONOSCOPY N/A 11/5/2018    COLONOSCOPY with polypectomies performed by Bhargav Ortiz MD at [facility] ENDOSCOPY    HX ENDOSCOPY  12/11/2017     Family History   Problem Relation Age of Onset    Hypertension Mother     Heart Disease Mother     Diabetes Mother     Hypertension Father     Heart Disease Father     Cancer Father         lymphnodes    Diabetes Father      Social History     Socioeconomic History    Marital status:      Spouse name: Not on file    Number of children: Not on file    Years of education: Not on file    Highest education level: Not on file   Tobacco Use    Smoking status: Former Smoker     Packs/day: 2.00     Years: 25.00     Pack years: 50.00     Types: Cigarettes     Start date: 5/28/1984 Last attempt to quit: 2017     Years since quittin.0    Smokeless tobacco: Never Used   Substance and Sexual Activity    Alcohol use: No    Drug use: Yes     Types: Marijuana     Comment: Hx of Marijuana use    Sexual activity: Yes     Partners: Female   Other Topics Concern     Service No    Blood Transfusions No    Caffeine Concern Yes    Occupational Exposure No    Hobby Hazards No    Sleep Concern Yes     Comment: occas    Stress Concern No    Weight Concern No    Special Diet No    Back Care Yes    Exercise No    Bike Helmet Yes    Seat Belt No     Comment: occas   Social History Narrative             ROS:  Pertinent as in HPI. Physical Exam:  Visit Vitals  /83   Pulse 89   Temp 98 °F (36.7 °C) (Oral)   Resp 18   Ht 5' 8\" (1.727 m)   Wt 192 lb 6.4 oz (87.3 kg)   SpO2 94%   BMI 29.25 kg/m²       BP Readings from Last 3 Encounters:   19 118/83   18 (!) 139/92   18 (!) 140/91       General: a & o x 3, afebrile, well-nourished, interacting appropriately, in no acute distress  HEENT: Mucosal edema and sinus erythema noted.   Respiratory: symmetrical chest expansion, lung sounds clear bilaterally, good air entry  Cardiovascular: normal S1S2, regular rate and rhythm, no murmurs      Lab Results   Component Value Date/Time    WBC 5.9 2018 08:30 AM    Hemoglobin, POC 14.3 2013 10:07 PM    HGB 15.6 2018 08:30 AM    Hematocrit, POC 42 2013 10:07 PM    HCT 42.5 2018 08:30 AM    PLATELET 467  08:30 AM    MCV 85.0 2018 08:30 AM       Lab Results   Component Value Date/Time    Sodium 140 2018 08:30 AM    Potassium 4.2 2018 08:30 AM    Chloride 106 2018 08:30 AM    CO2 30 2018 08:30 AM    Anion gap 4 2018 08:30 AM    Glucose 92 2018 08:30 AM    BUN 10 2018 08:30 AM    Creatinine 0.88 2018 08:30 AM    BUN/Creatinine ratio 11 (L) 2018 08:30 AM    GFR est AA >60 05/11/2018 08:30 AM    GFR est non-AA >60 05/11/2018 08:30 AM    Calcium 9.2 05/11/2018 08:30 AM    Bilirubin, total 1.1 (H) 05/11/2018 08:30 AM    AST (SGOT) 16 05/11/2018 08:30 AM    Alk. phosphatase 107 05/11/2018 08:30 AM    Protein, total 7.8 05/11/2018 08:30 AM    Albumin 4.1 05/11/2018 08:30 AM    Globulin 3.7 05/11/2018 08:30 AM    A-G Ratio 1.1 05/11/2018 08:30 AM    ALT (SGPT) 36 05/11/2018 08:30 AM       Lab Results   Component Value Date/Time    Cholesterol, total 199 02/23/2018 12:04 PM    HDL Cholesterol 36 (L) 01/23/2017 01:11 PM    LDL, calculated 85.6 01/23/2017 01:11 PM    VLDL, calculated 22.4 01/23/2017 01:11 PM    Triglyceride 132 02/23/2018 12:04 PM    CHOL/HDL Ratio 4.0 01/23/2017 01:11 PM       No results found for: [id number]     Lab Results   Component Value Date/Time    Hemoglobin A1c 5.5 01/11/2018 03:26 AM       Lab Results   Component Value Date/Time    TSH 2.21 12/17/2017 10:00 PM       Assessment/Plan:    ICD-10-CM ICD-9-CM    1. Acute recurrent maxillary sinusitis J01.01 461.0 cefdinir (OMNICEF) 300 mg capsule      predniSONE (DELTASONE) 20 mg tablet   2. Cough R05 786.2 chlorpheniramine-HYDROcodone (TUSSIONEX) 10-8 mg/5 mL suspension     Orders Placed This Encounter    cyclobenzaprine (FLEXERIL) 5 mg tablet     Sig: Take 1 Tab by mouth nightly. Dispense:  30 Tab     Refill:  1    cefdinir (OMNICEF) 300 mg capsule     Sig: Take 1 Cap by mouth two (2) times a day for 10 days. Dispense:  20 Cap     Refill:  0    chlorpheniramine-HYDROcodone (TUSSIONEX) 10-8 mg/5 mL suspension     Sig: Take 5 mL by mouth every twelve (12) hours as needed for Cough. Max Daily Amount: 10 mL. Dispense:  240 mL     Refill:  0    predniSONE (DELTASONE) 20 mg tablet     Sig: Take 20 mg by mouth daily (with breakfast).      Dispense:  10 Tab     Refill:  0       Additional Notes: Discussed related screening tests, preventive exams, importance of therapeutic lifestyle  changes ( diet/exercise/weight management) . After Visit Summary: Provided and discussed printed patient instructions. Questions answered accordingly. Follow-up Disposition: As previously scheduled.         Judd Martin DO, MPH  Internal medicine

## 2019-01-08 NOTE — TELEPHONE ENCOUNTER
Alfa Selby is calling on behalf of her , the cough syrup that was called into the pharmacy for the pt, his insurance will not cover , they need something else called in for him in place of that , something that his insurance will cover.      [phone]

## 2019-01-08 NOTE — PATIENT INSTRUCTIONS
Learning About the [address] [facility] Diet  What is the Mediterranean diet? The Mediterranean diet is a style of eating rather than a diet plan. It features foods eaten in [place], [place], [place] and [place], and other countries along the [facility]. It emphasizes eating foods like fish, fruits, vegetables, beans, high-fiber breads and whole grains, nuts, and olive oil. This style of eating includes limited red meat, cheese, and sweets. Why choose the Mediterranean diet? A Mediterranean-style diet may improve heart health. It contains more fat than other heart-healthy diets. But the fats are mainly from nuts, unsaturated oils (such as fish oils and olive oil), and certain nut or seed oils (such as canola, soybean, or flaxseed oil). These fats may help protect the heart and blood vessels. How can you get started on the Mediterranean diet? Here are some things you can do to switch to a more Mediterranean way of eating. What to eat  · Eat a variety of fruits and vegetables each day, such as grapes, blueberries, tomatoes, broccoli, peppers, figs, olives, spinach, eggplant, beans, lentils, and chickpeas. · Eat a variety of whole-grain foods each day, such as oats, brown rice, and whole wheat bread, pasta, and couscous. · Eat fish at least 2 times a week. Try tuna, salmon, mackerel, lake trout, herring, or sardines. · Eat moderate amounts of low-fat dairy products, such as milk, cheese, or yogurt. · Eat moderate amounts of poultry and eggs. · Choose healthy (unsaturated) fats, such as nuts, olive oil, and certain nut or seed oils like canola, soybean, and flaxseed. · Limit unhealthy (saturated) fats, such as butter, palm oil, and coconut oil. And limit fats found in animal products, such as meat and dairy products made with whole milk. Try to eat red meat only a few times a month in very small amounts. · Limit sweets and desserts to only a few times a week.  This includes sugar-sweetened drinks like soda. The Mediterranean diet may also include red wine with your meal--1 glass each day for women and up to 2 glasses a day for men. Tips for eating at home  · Use herbs, spices, garlic, lemon zest, and citrus juice instead of salt to add flavor to foods. · Add avocado slices to your sandwich instead of andrew. · Have fish for lunch or dinner instead of red meat. Brush the fish with olive oil, and broil or grill it. · Sprinkle your salad with seeds or nuts instead of cheese. · Cook with olive or canola oil instead of butter or oils that are high in saturated fat. · Switch from 2% milk or whole milk to 1% or fat-free milk. · Dip raw vegetables in a vinaigrette dressing or hummus instead of dips made from mayonnaise or sour cream.  · Have a piece of fruit for dessert instead of a piece of cake. Try baked apples, or have some dried fruit. Tips for eating out  · Try broiled, grilled, baked, or poached fish instead of having it fried or breaded. · Ask your  to have your meals prepared with olive oil instead of butter. · Order dishes made with marinara sauce or sauces made from olive oil. Avoid sauces made from cream or mayonnaise. · Choose whole-grain breads, whole wheat pasta and pizza crust, brown rice, beans, and lentils. · Cut back on butter or margarine on bread. Instead, you can dip your bread in a small amount of olive oil. · Ask for a side salad or grilled vegetables instead of french fries or chips. Where can you learn more? Go to http://costa-jessica.info/. Enter 742-175-8679 in the search box to learn more about \"Learning About the Mediterranean Diet. \"  Current as of: March 29, 2018  Content Version: 11.8  © 9311-5397 [employer]. Care instructions adapted under license by [employer] (which disclaims liability or warranty for this information).  If you have questions about a medical condition or this instruction, always ask your healthcare samson. [address] any warranty or liability for your use of this information. Sinusitis: Care Instructions  Your Care Instructions    Sinusitis is an infection of the lining of the sinus cavities in your head. Sinusitis often follows a cold. It causes pain and pressure in your head and face. In most cases, sinusitis gets better on its own in 1 to 2 weeks. But some mild symptoms may last for several weeks. Sometimes antibiotics are needed. Follow-up care is a key part of your treatment and safety. Be sure to make and go to all appointments, and call your doctor if you are having problems. It's also a good idea to know your test results and keep a list of the medicines you take. How can you care for yourself at home? · Take an over-the-counter pain medicine, such as acetaminophen (Tylenol), ibuprofen (Advil, Motrin), or naproxen (Aleve). Read and follow all instructions on the label. · If the doctor prescribed antibiotics, take them as directed. Do not stop taking them just because you feel better. You need to take the full course of antibiotics. · Be careful when taking over-the-counter cold or flu medicines and Tylenol at the same time. Many of these medicines have acetaminophen, which is Tylenol. Read the labels to make sure that you are not taking more than the recommended dose. Too much acetaminophen (Tylenol) can be harmful. · Breathe warm, moist air from a steamy shower, a hot bath, or a sink filled with hot water. Avoid cold, dry air. Using a humidifier in your home may help. Follow the directions for cleaning the machine. · Use saline (saltwater) nasal washes to help keep your nasal passages open and wash out mucus and bacteria. You can buy saline nose drops at a grocery store or drugstore. Or you can make your own at home by adding 1 teaspoon of salt and 1 teaspoon of baking soda to 2 cups of distilled water.  If you make your own, fill a bulb syringe with the solution, insert the tip into your nostril, and squeeze gently. Monica Girard your nose. · Put a hot, wet towel or a warm gel pack on your face 3 or 4 times a day for 5 to 10 minutes each time. · Try a decongestant nasal spray like oxymetazoline (Afrin). Do not use it for more than 3 days in a row. Using it for more than 3 days can make your congestion worse. When should you call for help? Call your doctor now or seek immediate medical care if:    · You have new or worse swelling or redness in your face or around your eyes.     · You have a new or higher fever.    Watch closely for changes in your health, and be sure to contact your doctor if:    · You have new or worse facial pain.     · The mucus from your nose becomes thicker (like pus) or has new blood in it.     · You are not getting better as expected. Where can you learn more? Go to http://costa-jessica.info/. Enter L661 in the search box to learn more about \"Sinusitis: Care Instructions. \"  Current as of: March 28, 2018  Content Version: 11.8  © 0252-3309 [employer]. Care instructions adapted under license by [employer] (which disclaims liability or warranty for this information). If you have questions about a medical condition or this instruction, always ask your healthcare professional. [address] any warranty or liability for your use of this information.

## 2019-01-08 NOTE — PROGRESS NOTES
Tyler Wells is a  51 y.o. male presents today for office visit for routine follow up. 1. Have you been to the ER, urgent care clinic or hospitalized since your last visit? NO      2. Have you seen or consulted any other health care providers outside of the [address] [place] [address] since your last visit (Include any pap smears or colon screening)? NO

## 2019-01-09 ENCOUNTER — OFFICE VISIT (OUTPATIENT)
Dept: PULMONOLOGY | Age: 49
End: 2019-01-09

## 2019-01-09 VITALS
BODY MASS INDEX: 29.55 KG/M2 | HEIGHT: 68 IN | SYSTOLIC BLOOD PRESSURE: 106 MMHG | HEART RATE: 81 BPM | TEMPERATURE: 96.8 F | DIASTOLIC BLOOD PRESSURE: 72 MMHG | RESPIRATION RATE: 18 BRPM | OXYGEN SATURATION: 95 % | WEIGHT: 195 LBS

## 2019-01-09 DIAGNOSIS — J44.9 COPD, SEVERE (HCC): ICD-10-CM

## 2019-01-09 DIAGNOSIS — R06.02 SHORTNESS OF BREATH: Primary | ICD-10-CM

## 2019-01-09 NOTE — PROGRESS NOTES
Chief Complaint Patient presents with  
 Other  
  to go over disability paperwork per EJF  
 COPD  
  last seen 9/17/2018 1. Have you been to the ER, urgent care clinic since your last visit? Hospitalized since your last visit? Yes When: 11/5/2018 Where: [facility] Reason for visit: Colonoscopy 2. Have you seen or consulted any other health care providers outside of the [address] [place] [address] since your last visit? Include any pap smears or colon screening. Yes. Dr. Wesly Mckenna, Psychiatrist

## 2019-01-09 NOTE — PROGRESS NOTES
[facility] Pulmonary, Critical Care, and Sleep Medicine Chief complaint: COPD 
 
HPI: 
 
Sonny Giron.    is 51years old and comes to the office concerning COPD which is been characterized as severe with also a severe reduction in diffusion capacity (25%) on previous pulmonary function tests the patient denies chest pain or significant cough at this time and is taking Daliresp Advair Spiriva and using his albuterol with activity primarily. He denies chest pain or leg swelling at this time and has significant shortness of breath particularly with any kind of activity Allergies Allergen Reactions  Remeron [Mirtazapine] Other (comments) Mood swings  Seroquel [Quetiapine] Other (comments) Mood swings Current Outpatient Medications Medication Sig  cyclobenzaprine (FLEXERIL) 5 mg tablet Take 1 Tab by mouth nightly.  cefdinir (OMNICEF) 300 mg capsule Take 1 Cap by mouth two (2) times a day for 10 days.  predniSONE (DELTASONE) 20 mg tablet Take 20 mg by mouth daily (with breakfast).  benzonatate (TESSALON) 200 mg capsule Take 1 Cap by mouth three (3) times daily as needed for Cough for up to 7 days.  albuterol (PROVENTIL VENTOLIN) 2.5 mg /3 mL (0.083 %) nebulizer solution Nebulized 1 vial for inhalation every 4 hours as needed  albuterol (PROVENTIL HFA, VENTOLIN HFA, PROAIR HFA) 90 mcg/actuation inhaler Take 2 Puffs by inhalation every four (4) hours as needed for Wheezing or Shortness of Breath.  roflumilast (DALIRESP) tab tablet Take 1 Tab by mouth daily.  tiZANidine (ZANAFLEX) 4 mg tablet Sig: Take 1 tab PO Q 6 as needed for muscle spasm  tiotropium (SPIRIVA) 18 mcg inhalation capsule Take 1 Cap by inhalation daily.  fluticasone-salmeterol (ADVAIR DISKUS) 500-50 mcg/dose diskus inhaler Take 1 Puff by inhalation two (2) times a day.  lisinopril (PRINIVIL, ZESTRIL) 5 mg tablet Take 1 Tab by mouth daily.  furosemide (LASIX) 20 mg tablet Take 1 Tab by mouth every other day.  LORazepam (ATIVAN) 1 mg tablet Take 1 Tab by mouth every eight (8) hours as needed for Anxiety. Max Daily Amount: 3 mg.  Nebulizer & Compressor machine 1 Each by Does Not Apply route every four (4) hours as needed.  OXYGEN-AIR DELIVERY SYSTEMS (WALKABOUT 1 OXYGEN SYSTEM) 2.5 L/min by Nasal route continuous.  pantoprazole (PROTONIX) 40 mg tablet Take 1 Tab by mouth Daily (before breakfast).  chlorpheniramine-HYDROcodone (TUSSIONEX) 10-8 mg/5 mL suspension Take 5 mL by mouth every twelve (12) hours as needed for Cough. Max Daily Amount: 10 mL.  suvorexant (BELSOMRA) 10 mg tablet Take 1 Tab by mouth nightly as needed for Insomnia. Max Daily Amount: 10 mg. No current facility-administered medications for this visit. Past Medical History:  
Diagnosis Date  Chronic diastolic heart failure secondary to coronary artery disease ([facility]  Chronic lung disease  Chronic obstructive pulmonary disease ([facility] 08/03/2017 PFTS 8/3/17  COPD, severe ([address]  Emphysema/COPD ([facility]  Esophagitis 12/11/2017 Endoscopy  Essential hypertension, benign  Hepatomegaly  History of stomach ulcers  Positive urine drug screen 01/2016  
 opiates and THC  Splenomegaly  Upper GI bleed Past Surgical History:  
Procedure Laterality Date  COLONOSCOPY N/A 11/5/2018 COLONOSCOPY with polypectomies performed by More Mccauley MD at [address] [place] [address] ENDOSCOPY  12/11/2017 Social History Socioeconomic History  Marital status:  Spouse name: Not on file  Number of children: Not on file  Years of education: Not on file  Highest education level: Not on file Social Needs  Financial resource strain: Not on file  Food insecurity - worry: Not on file  Food insecurity - inability: Not on file  Transportation needs - medical: Not on file  Transportation needs - non-medical: Not on file Occupational History  Not on file Tobacco Use  Smoking status: Former Smoker Packs/day: 2.00 Years: 25.00 Pack years: 50.00 Types: Cigarettes Start date: 1984 Last attempt to quit: 2017 Years since quittin.0  Smokeless tobacco: Never Used Substance and Sexual Activity  Alcohol use: No  
 Drug use: Yes Types: Marijuana Comment: Hx of Marijuana use  Sexual activity: Yes  
  Partners: Female Other Topics Concern   Service No  
 Blood Transfusions No  
 Caffeine Concern Yes  Occupational Exposure No  
 Hobby Hazards No  
 Sleep Concern Yes Comment: occas  Stress Concern No  
 Weight Concern No  
 Special Diet No  
 Back Care Yes  Exercise No  
 Bike Helmet Yes  Seat Belt No  
  Comment: occas  Self-Exams Not Asked Social History Narrative  Family History Problem Relation Age of Onset  Hypertension Mother  Heart Disease Mother  Diabetes Mother  Hypertension Father  Heart Disease Father  Cancer Father   
     lymphnodes  Diabetes Father Review of systems: 
He denies fever chills poor appetite or weight loss Physical Exam: 
Visit Vitals /72 (BP 1 Location: Left arm, BP Patient Position: Sitting) Pulse 81 Temp 96.8 °F (36 °C) (Oral) Resp 18 Ht 5' 8\" (1.727 m) Wt 88.5 kg (195 lb) SpO2 95% BMI 29.65 kg/m² Well-developed well-nourished HEENT: WNL Lymph node exam: Supraclavicular cervical lymph nodes negative Chest: Equal symmetrical expansion no dullness no wheezes rales rubs attenuated breath sounds throughout Heart: Regular rhythm no gallop no murmur no JVD no peripheral edema Extremities: No cyanosis clubbing or calf tenderness Neurological: Alert and oriented Labs: 
 
O2 sat room air at rest 95% Impression: COPD severe stable Plan: Continue Daliresp Advair Spiriva as needed albuterol avoid conditions that worsen his disease such as cold or hot air dust fumes Follow-up in 6 months or sooner if needed Estiven Jasmine MD , FCCP 
 
CC: Mirella Ernandez DO  
 
[facility] [address] [facility], [address] [place] [address] 434,[phone]     P: [phone]     F: [phone]

## 2019-01-14 ENCOUNTER — TELEPHONE (OUTPATIENT)
Dept: PULMONOLOGY | Age: 49
End: 2019-01-14

## 2019-01-14 NOTE — TELEPHONE ENCOUNTER
Pt wife stated she would like to know if Dr. Dion Justin thinks pt is ok to donate plasma. Please advise [phone].

## 2019-01-15 NOTE — TELEPHONE ENCOUNTER
Per verbal order from Dr. Jackie Chin, he does not feel pt is a good candidid to give plasma.  Last visit b/p low

## 2019-01-24 ENCOUNTER — OFFICE VISIT (OUTPATIENT)
Dept: FAMILY MEDICINE CLINIC | Age: 49
End: 2019-01-24

## 2019-01-24 VITALS
HEART RATE: 75 BPM | WEIGHT: 196 LBS | BODY MASS INDEX: 29.7 KG/M2 | RESPIRATION RATE: 18 BRPM | OXYGEN SATURATION: 97 % | DIASTOLIC BLOOD PRESSURE: 97 MMHG | HEIGHT: 68 IN | SYSTOLIC BLOOD PRESSURE: 147 MMHG | TEMPERATURE: 98.1 F

## 2019-01-24 DIAGNOSIS — N52.9 ERECTILE DYSFUNCTION, UNSPECIFIED ERECTILE DYSFUNCTION TYPE: Primary | ICD-10-CM

## 2019-01-24 DIAGNOSIS — L03.114 CELLULITIS OF LEFT UPPER EXTREMITY: ICD-10-CM

## 2019-01-24 DIAGNOSIS — G44.229 CHRONIC TENSION-TYPE HEADACHE, NOT INTRACTABLE: ICD-10-CM

## 2019-01-24 RX ORDER — AMITRIPTYLINE HYDROCHLORIDE 25 MG/1
25 TABLET, FILM COATED ORAL
Qty: 30 TAB | Refills: 0 | Status: SHIPPED | OUTPATIENT
Start: 2019-01-24 | End: 2019-02-23

## 2019-01-24 RX ORDER — BUTALBITAL, ACETAMINOPHEN AND CAFFEINE 50; 325; 40 MG/1; MG/1; MG/1
1 TABLET ORAL
Qty: 20 TAB | Refills: 0 | Status: SHIPPED | OUTPATIENT
Start: 2019-01-24 | End: 2019-01-29

## 2019-01-24 RX ORDER — TADALAFIL 2.5 MG/1
2.5 TABLET ORAL
Qty: 10 TAB | Refills: 0 | Status: SHIPPED | OUTPATIENT
Start: 2019-01-24 | End: 2019-03-25 | Stop reason: SDUPTHER

## 2019-01-24 RX ORDER — DOXYCYCLINE 100 MG/1
100 CAPSULE ORAL 2 TIMES DAILY
Qty: 20 CAP | Refills: 0 | Status: SHIPPED | OUTPATIENT
Start: 2019-01-24 | End: 2019-02-03

## 2019-01-24 NOTE — PROGRESS NOTES
RAMSES Barahona Sr. is a 51 y.o. male who presents today for blood pressure concerns, injury to left arm and migraines. Hypertension ROS: taking medications as instructed, no medication side effects noted, no TIA's, no swelling of ankles. He notes some dizziness with the headaches. Pt notes its worst at night, and denies sensitivity to light and sound. Pt states he was seen by neurology in the past within the last year and had imaging of head done and states they were unremarkable. Pt states he wakes up \"splitting\" headache. Pt notes he was getting a box out of the closet and hit the door frame and experienced bleeding for several hours to the injury spots on left arm. Pt notes the area has been red and sore since, pt notes it is starting to worsen and has started to swell. Pt states he has a follow up with cardiology in March. Pt states he is concerned about erectile dysfunction. New concerns: patient notes recent blood pressure reading of low 100's over 70's, 109/73, 107/76. Decreased sexual performance. Current Outpatient Medications   Medication Sig Dispense Refill    cyclobenzaprine (FLEXERIL) 5 mg tablet Take 1 Tab by mouth nightly. 30 Tab 1    chlorpheniramine-HYDROcodone (TUSSIONEX) 10-8 mg/5 mL suspension Take 5 mL by mouth every twelve (12) hours as needed for Cough. Max Daily Amount: 10 mL. 240 mL 0    predniSONE (DELTASONE) 20 mg tablet Take 20 mg by mouth daily (with breakfast). 10 Tab 0    albuterol (PROVENTIL VENTOLIN) 2.5 mg /3 mL (0.083 %) nebulizer solution Nebulized 1 vial for inhalation every 4 hours as needed 60 Each 2    albuterol (PROVENTIL HFA, VENTOLIN HFA, PROAIR HFA) 90 mcg/actuation inhaler Take 2 Puffs by inhalation every four (4) hours as needed for Wheezing or Shortness of Breath. 1 Inhaler 2    roflumilast (DALIRESP) tab tablet Take 1 Tab by mouth daily.  30 Tab 5    tiZANidine (ZANAFLEX) 4 mg tablet Sig: Take 1 tab PO Q 6 as needed for muscle spasm 60 Tab 0    tiotropium (SPIRIVA) 18 mcg inhalation capsule Take 1 Cap by inhalation daily. 30 Cap 5    fluticasone-salmeterol (ADVAIR DISKUS) 500-50 mcg/dose diskus inhaler Take 1 Puff by inhalation two (2) times a day. 1 Inhaler 5    lisinopril (PRINIVIL, ZESTRIL) 5 mg tablet Take 1 Tab by mouth daily. 90 Tab 1    suvorexant (BELSOMRA) 10 mg tablet Take 1 Tab by mouth nightly as needed for Insomnia. Max Daily Amount: 10 mg. 30 Tab 0    furosemide (LASIX) 20 mg tablet Take 1 Tab by mouth every other day. 15 Tab 6    LORazepam (ATIVAN) 1 mg tablet Take 1 Tab by mouth every eight (8) hours as needed for Anxiety. Max Daily Amount: 3 mg. 90 Tab 0    Nebulizer & Compressor machine 1 Each by Does Not Apply route every four (4) hours as needed. 1 Each 0    OXYGEN-AIR DELIVERY SYSTEMS (WALKABOUT 1 OXYGEN SYSTEM) 2.5 L/min by Nasal route continuous.  pantoprazole (PROTONIX) 40 mg tablet Take 1 Tab by mouth Daily (before breakfast). 30 Tab 0      Allergies   Allergen Reactions    Remeron [Mirtazapine] Other (comments)     Mood swings    Seroquel [Quetiapine] Other (comments)     Mood swings       SUBJECTIVE:  Review of Systems   Constitutional: Negative for chills, fever and malaise/fatigue. HENT: Negative for congestion. Eyes: Negative for blurred vision. Respiratory: Positive for shortness of breath. Cardiovascular: Negative for chest pain, palpitations and leg swelling. Gastrointestinal: Negative for abdominal pain, diarrhea, nausea and vomiting. Genitourinary: Negative for dysuria, frequency and urgency. Decreased sexual performance   Musculoskeletal: Negative for falls and myalgias. Skin: Positive for rash. Neurological: Positive for headaches. Negative for dizziness, tingling and sensory change.         OBJECTIVE:  Visit Vitals  BP (!) 147/97 (BP 1 Location: Left arm, BP Patient Position: Sitting)   Pulse 75   Temp 98.1 °F (36.7 °C) (Oral)   Resp 18   Ht 5' 8\" (1.727 m)   Wt 196 lb (88.9 kg)   SpO2 97%   BMI 29.80 kg/m²      Physical Exam   Constitutional: He is oriented to person, place, and time and well-developed, well-nourished, and in no distress. HENT:   Head: Normocephalic. Right Ear: External ear normal.   Left Ear: External ear normal.   Eyes: Conjunctivae and EOM are normal. Pupils are equal, round, and reactive to light. Cardiovascular: Normal rate, regular rhythm and normal heart sounds. Pulmonary/Chest: Effort normal and breath sounds normal. He has no wheezes. He has no rales. He exhibits no tenderness. Musculoskeletal: He exhibits tenderness (LUE). He exhibits no edema. Neurological: He is alert and oriented to person, place, and time. Gait normal.   Skin: Skin is warm and dry. There is erythema. Abrasions to left forearm, erythema, warmth, tender to touch   Nursing note and vitals reviewed. ASSESSMENT:  Diagnoses and all orders for this visit:    1. Erectile dysfunction, unspecified erectile dysfunction type  -     tadalafil (CIALIS) 2.5 mg tablet; Take 1 Tab by mouth daily as needed for up to 10 days. 2. Cellulitis of left upper extremity  -     doxycycline (MONODOX) 100 mg capsule; Take 1 Cap by mouth two (2) times a day for 10 days. 3. Chronic tension-type headache, not intractable  -     amitriptyline (ELAVIL) 25 mg tablet; Take 1 Tab by mouth nightly for 30 days. -     butalbital-acetaminophen-caffeine (FIORICET, ESGIC) -40 mg per tablet; Take 1 Tab by mouth every six (6) hours as needed for Pain for up to 5 days. PLAN:  Start Doxy (Mono) 100mg BID for 10 days for cellulitis  Start Amitriptyline 25 mg nightly for migraine prevention  Start Fioricet PRN for breakthrough migraine medication  Start Cialis 2.5 mg PRN for erectile dysfunction  Reviewed diet, exercise and weight control  Recommended sodium restriction. I have discussed the diagnosis with the patient and the intended plan as seen in the above orders.   The patient has received an after-visit summary and questions were answered concerning future plans. I have discussed medication side effects and warnings with the patient as well. Patient will call for further questions. Follow-up Disposition:  Return in about 2 weeks (around 2/7/2019), or if symptoms worsen or fail to improve.     Anne Courser, NP

## 2019-01-24 NOTE — PROGRESS NOTES
Chief Complaint   Patient presents with    Hypertension    Migraine     1. Have you been to the ER, urgent care clinic since your last visit? Hospitalized since your last visit? No    2. Have you seen or consulted any other health care providers outside of the [address] [place] [address] since your last visit? Include any pap smears or colon screening.  No

## 2019-01-24 NOTE — PATIENT INSTRUCTIONS
Cellulitis: Care Instructions  Your Care Instructions    Cellulitis is a skin infection caused by bacteria, most often strep or staph. It often occurs after a break in the skin from a scrape, cut, bite, or puncture, or after a rash. Cellulitis may be treated without doing tests to find out what caused it. But your doctor may do tests, if needed, to look for a specific bacteria, like methicillin-resistant Staphylococcus aureus (MRSA). The doctor has checked you carefully, but problems can develop later. If you notice any problems or new symptoms, get medical treatment right away. Follow-up care is a key part of your treatment and safety. Be sure to make and go to all appointments, and call your doctor if you are having problems. It's also a good idea to know your test results and keep a list of the medicines you take. How can you care for yourself at home? · Take your antibiotics as directed. Do not stop taking them just because you feel better. You need to take the full course of antibiotics. · Prop up the infected area on pillows to reduce pain and swelling. Try to keep the area above the level of your heart as often as you can. · If your doctor told you how to care for your wound, follow your doctor's instructions. If you did not get instructions, follow this general advice:  ? Wash the wound with clean water 2 times a day. Don't use hydrogen peroxide or alcohol, which can slow healing. ? You may cover the wound with a thin layer of petroleum jelly, such as Vaseline, and a nonstick bandage. ? Apply more petroleum jelly and replace the bandage as needed. · Be safe with medicines. Take pain medicines exactly as directed. ? If the doctor gave you a prescription medicine for pain, take it as prescribed. ? If you are not taking a prescription pain medicine, ask your doctor if you can take an over-the-counter medicine.   To prevent cellulitis in the future  · Try to prevent cuts, scrapes, or other injuries to your skin. Cellulitis most often occurs where there is a break in the skin. · If you get a scrape, cut, mild burn, or bite, wash the wound with clean water as soon as you can to help avoid infection. Don't use hydrogen peroxide or alcohol, which can slow healing. · If you have swelling in your legs (edema), support stockings and good skin care may help prevent leg sores and cellulitis. · Take care of your feet, especially if you have diabetes or other conditions that increase the risk of infection. Wear shoes and socks. Do not go barefoot. If you have athlete's foot or other skin problems on your feet, talk to your doctor about how to treat them. When should you call for help? Call your doctor now or seek immediate medical care if:    · You have signs that your infection is getting worse, such as:  ? Increased pain, swelling, warmth, or redness. ? Red streaks leading from the area. ? Pus draining from the area. ? A fever.     · You get a rash.    Watch closely for changes in your health, and be sure to contact your doctor if:    · You do not get better as expected. Where can you learn more? Go to http://costa-jessica.info/. Kaiden Albert in the search box to learn more about \"Cellulitis: Care Instructions. \"  Current as of: April 17, 2018  Content Version: 11.9  © 3227-0637 [employer]. Care instructions adapted under license by [employer] (which disclaims liability or warranty for this information). If you have questions about a medical condition or this instruction, always ask your healthcare professional. [place] [address] any warranty or liability for your use of this information. Tension Headache: Care Instructions  Your Care Instructions  Most headaches are tension headaches. These headaches tend to happen again, especially if you are under stress.  A tension headache may cause pain or a feeling of pressure all over your head. You probably can't pinpoint the center of the pain. If you keep getting tension headaches, the best thing you can do to limit them is to find out what is causing them and then make changes in those areas. Follow-up care is a key part of your treatment and safety. Be sure to make and go to all appointments, and call your doctor if you are having problems. It's also a good idea to know your test results and keep a list of the medicines you take. How can you care for yourself at home? · Rest in a quiet, dark room with a cool cloth on your forehead until your headache is gone. Close your eyes, and try to relax or go to sleep. Don't watch TV or read. Avoid using the computer. · Use a warm, moist towel or a heating pad set on low to relax tight shoulder and neck muscles. · Have someone gently massage your neck and shoulders. · Take pain medicines exactly as directed. ? If the doctor gave you a prescription medicine for pain, take it as prescribed. ? If you are not taking a prescription pain medicine, ask your doctor if you can take an over-the-counter medicine. · Be careful not to take pain medicine more often than the instructions allow, because you may get worse or more frequent headaches when the medicine wears off. · If you get another tension headache, stop what you are doing and sit quietly for a moment. Close your eyes and breathe slowly. Try to relax your head and neck muscles. · Do not ignore new symptoms that occur with a headache, such as fever, weakness or numbness, vision changes, or confusion. These may be signs of a more serious problem. To help prevent headaches  · Keep a headache diary so you can figure out what triggers your headaches. Avoiding triggers may help you prevent headaches. Record when each headache began, how long it lasted, and what the pain was like (throbbing, aching, stabbing, or dull).  List anything that may have triggered the headache, such as being physically or emotionally stressed or being anxious or depressed. Other possible triggers are hunger, anger, fatigue, poor posture, and muscle strain. · Find healthy ways to deal with stress. Headaches are most common during or right after stressful times. Take time to relax before and after you do something that has caused a headache in the past.  · Exercise daily to relieve stress. Relaxation exercises may help reduce tension. · Get plenty of sleep. · Eat regularly and well. Long periods without food can trigger a headache. · Treat yourself to a massage. Some people find that massages are very helpful in relieving tension. · Try to keep your muscles relaxed by keeping good posture. Check your jaw, face, neck, and shoulder muscles for tension, and try to relax them. When sitting at a desk, change positions often, and stretch for 30 seconds each hour. · Reduce eyestrain from computers by blinking frequently and looking away from the computer screen every so often. Make sure you have proper eyewear and that your monitor is set up properly, about an arm's length away. When should you call for help? Call 911 anytime you think you may need emergency care. For example, call if:    · You have signs of a stroke. These may include:  ? Sudden numbness, paralysis, or weakness in your face, arm, or leg, especially on only one side of your body. ? Sudden vision changes. ? Sudden trouble speaking. ? Sudden confusion or trouble understanding simple statements. ? Sudden problems with walking or balance. ? A sudden, severe headache that is different from past headaches.    Call your doctor now or seek immediate medical care if:    · You have new or worse nausea and vomiting.     · You have a new or higher fever.     · Your headache gets much worse.    Watch closely for changes in your health, and be sure to contact your doctor if:    · You are not getting better after 2 days (48 hours). Where can you learn more?   Go to http://elsy.info/. Enter 84 17 [id number] in the search box to learn more about \"Tension Headache: Care Instructions. \"  Current as of: Hanane 3, 2018  Content Version: 11.9  © 8965-5617 [employer]. Care instructions adapted under license by [employer] (which disclaims liability or warranty for this information). If you have questions about a medical condition or this instruction, always ask your healthcare professional. [place] [address] any warranty or liability for your use of this information.

## 2019-02-06 NOTE — ED NOTES
I performed a brief evaluation, including history and physical, of the patient here in triage and I have determined that pt will need further treatment and evaluation from the main side ER physician. I have placed initial orders to help in expediting patients care.      February 22, 2018 at 2:01 PM - BUSTER Doshi Oriented - self; Oriented - place; Oriented - time

## 2019-02-07 ENCOUNTER — OFFICE VISIT (OUTPATIENT)
Dept: FAMILY MEDICINE CLINIC | Age: 49
End: 2019-02-07

## 2019-02-07 VITALS
DIASTOLIC BLOOD PRESSURE: 93 MMHG | SYSTOLIC BLOOD PRESSURE: 159 MMHG | TEMPERATURE: 98.1 F | BODY MASS INDEX: 29.28 KG/M2 | OXYGEN SATURATION: 94 % | RESPIRATION RATE: 20 BRPM | HEIGHT: 68 IN | HEART RATE: 79 BPM | WEIGHT: 193.2 LBS

## 2019-02-07 DIAGNOSIS — L03.114 CELLULITIS OF LEFT UPPER EXTREMITY: ICD-10-CM

## 2019-02-07 DIAGNOSIS — I10 ESSENTIAL HYPERTENSION, BENIGN: ICD-10-CM

## 2019-02-07 DIAGNOSIS — G44.229 CHRONIC TENSION-TYPE HEADACHE, NOT INTRACTABLE: Primary | ICD-10-CM

## 2019-02-07 RX ORDER — BUTALBITAL, ACETAMINOPHEN AND CAFFEINE 300; 40; 50 MG/1; MG/1; MG/1
CAPSULE ORAL
COMMUNITY
End: 2019-03-20 | Stop reason: SDUPTHER

## 2019-02-07 RX ORDER — TADALAFIL 2.5 MG/1
2.5 TABLET ORAL
COMMUNITY
End: 2019-04-02 | Stop reason: SDUPTHER

## 2019-02-07 NOTE — PROGRESS NOTES
Chief Complaint   Patient presents with    Headache     Daily medication ineffective but Fiorcet does help.  Hypertension    Arm Injury     Healing well with no complaints     1. Have you been to the ER, urgent care clinic since your last visit? Hospitalized since your last visit? No    2. Have you seen or consulted any other health care providers outside of the [facility] since your last visit? Include any pap smears or colon screening.  No

## 2019-02-07 NOTE — PATIENT INSTRUCTIONS
Recurring Migraine Headache: Care Instructions  Your Care Instructions  Migraines are painful, throbbing headaches. They often start on one side of the head. They may cause nausea and vomiting and make you sensitive to light, sound, or smell. Some people may have only a few migraines throughout life. Others have them as often as several times a month. The goal of treatment is to reduce the number of migraines you have and relieve your symptoms. Even with treatment, you may continue to have migraines. You play an important role in dealing with your headaches. Work on avoiding things that seem to trigger your migraines. When you feel a headache coming on, act quickly to stop it before it gets worse. Follow-up care is a key part of your treatment and safety. Be sure to make and go to all appointments, and call your doctor if you are having problems. It's also a good idea to know your test results and keep a list of the medicines you take. How can you care for yourself at home? · Do not drive if you have taken a prescription pain medicine. · Rest in a quiet, dark room until your headache is gone. Close your eyes and try to relax or go to sleep. Do not watch TV or read. · Put a cold, moist cloth or cold pack on the painful area for 10 to 20 minutes at a time. Put a thin cloth between the cold pack and your skin. · Have someone gently massage your neck and shoulders. · Take your medicines exactly as prescribed. Call your doctor if you think you are having a problem with your medicine. You will get more details on the specific medicines your doctor prescribes. To prevent migraines  · Keep a headache diary so you can figure out what triggers your headaches. Avoiding triggers may help you prevent headaches. Record when each headache began, how long it lasted, and what the pain was like. Use words like throbbing, aching, stabbing, or dull. Write down any other symptoms you had with the headache.  These may include nausea, flashing lights or dark spots, or sensitivity to bright light or loud noise. Note if the headache occurred near your period. List anything that might have triggered the headache. Triggers may include certain foods (chocolate, cheese, wine) or odors, smoke, bright light, stress, or lack of sleep. · If your doctor has prescribed medicine for your migraines, take it as directed. You may have medicine that you take only when you get a migraine and medicine that you take all the time to help prevent migraines. ? If your doctor has prescribed medicine for when you get a headache, take it at the first sign of a migraine, unless your doctor has given you other instructions. ? If your doctor has prescribed medicine to prevent migraines, take it exactly as prescribed. Call your doctor if you think you are having a problem with your medicine. · Find healthy ways to deal with stress. Migraines are most common during or right after stressful times. Take time to relax before and after you do something that has caused a migraine in the past.  · Try to keep your muscles relaxed by keeping good posture. Check your jaw, face, neck, and shoulder muscles for tension. Try to relax them. When sitting at a desk, change positions often. Stretch for 30 seconds each hour. · Get regular sleep and exercise. · Eat regular meals, and avoid foods and drinks that often trigger migraines. These include chocolate and alcohol, especially red wine and port. Chemicals used in food, such as aspartame and monosodium glutamate (MSG), also can trigger migraines. So can some food additives, such as those found in hot dogs, andrew, cold cuts, aged cheeses, and pickled foods. · Limit caffeine by not drinking too much coffee, tea, or soda. Do not quit caffeine suddenly, because that can also give you migraines. · Do not smoke or allow others to smoke around you.  If you need help quitting, talk to your doctor about stop-smoking programs and medicines. These can increase your chances of quitting for good. · If you are taking birth control pills or hormone therapy, talk to your doctor about whether they are triggering your migraines. When should you call for help? Call 911 anytime you think you may need emergency care. For example, call if:    · You have symptoms of a stroke. These may include:  ? Sudden numbness, tingling, weakness, or loss of movement in your face, arm, or leg, especially on only one side of your body. ? Sudden vision changes. ? Sudden trouble speaking. ? Sudden confusion or trouble understanding simple statements. ? Sudden problems with walking or balance. ? A sudden, severe headache that is different from past headaches.    Call your doctor now or seek immediate medical care if:    · You develop a fever and a stiff neck.     · You have new nausea and vomiting, or you cannot keep down food or liquids.    Watch closely for changes in your health, and be sure to contact your doctor if:    · You have a headache that does not get better within 1 or 2 days.     · Your headaches get worse or happen more often. Where can you learn more? Go to http://costa-jessica.info/. Enter V975 in the search box to learn more about \"Recurring Migraine Headache: Care Instructions. \"  Current as of: Hanane 3, 2018  Content Version: 11.9  © 8935-8191 [employer]. Care instructions adapted under license by [employer] (which disclaims liability or warranty for this information). If you have questions about a medical condition or this instruction, always ask your healthcare professional. [place] [address] any warranty or liability for your use of this information. High Blood Pressure: Care Instructions  Overview    It's normal for blood pressure to go up and down throughout the day. But if it stays up, you have high blood pressure.  Another name for high blood pressure is hypertension. Despite what a lot of people think, high blood pressure usually doesn't cause headaches or make you feel dizzy or lightheaded. It usually has no symptoms. But it does increase your risk of stroke, heart attack, and other problems. You and your doctor will talk about your risks of these problems based on your blood pressure. Your doctor will give you a goal for your blood pressure. Your goal will be based on your health and your age. Lifestyle changes, such as eating healthy and being active, are always important to help lower blood pressure. You might also take medicine to reach your blood pressure goal.  Follow-up care is a key part of your treatment and safety. Be sure to make and go to all appointments, and call your doctor if you are having problems. It's also a good idea to know your test results and keep a list of the medicines you take. How can you care for yourself at home? Medical treatment  · If you stop taking your medicine, your blood pressure will go back up. You may take one or more types of medicine to lower your blood pressure. Be safe with medicines. Take your medicine exactly as prescribed. Call your doctor if you think you are having a problem with your medicine. · Talk to your doctor before you start taking aspirin every day. Aspirin can help certain people lower their risk of a heart attack or stroke. But taking aspirin isn't right for everyone, because it can cause serious bleeding. · See your doctor regularly. You may need to see the doctor more often at first or until your blood pressure comes down. · If you are taking blood pressure medicine, talk to your doctor before you take decongestants or anti-inflammatory medicine, such as ibuprofen. Some of these medicines can raise blood pressure. · Learn how to check your blood pressure at home. Lifestyle changes  · Stay at a healthy weight.  This is especially important if you put on weight around the waist. Losing even 10 pounds can help you lower your blood pressure. · If your doctor recommends it, get more exercise. Walking is a good choice. Bit by bit, increase the amount you walk every day. Try for at least 30 minutes on most days of the week. You also may want to swim, bike, or do other activities. · Avoid or limit alcohol. Talk to your doctor about whether you can drink any alcohol. · Try to limit how much sodium you eat to less than 2,300 milligrams (mg) a day. Your doctor may ask you to try to eat less than 1,500 mg a day. · Eat plenty of fruits (such as bananas and oranges), vegetables, legumes, whole grains, and low-fat dairy products. · Lower the amount of saturated fat in your diet. Saturated fat is found in animal products such as milk, cheese, and meat. Limiting these foods may help you lose weight and also lower your risk for heart disease. · Do not smoke. Smoking increases your risk for heart attack and stroke. If you need help quitting, talk to your doctor about stop-smoking programs and medicines. These can increase your chances of quitting for good. When should you call for help? Call 911 anytime you think you may need emergency care. This may mean having symptoms that suggest that your blood pressure is causing a serious heart or blood vessel problem. Your blood pressure may be over 180/120.   For example, call 911 if:    · You have symptoms of a heart attack. These may include:  ? Chest pain or pressure, or a strange feeling in the chest.  ? Sweating. ? Shortness of breath. ? Nausea or vomiting. ? Pain, pressure, or a strange feeling in the back, neck, jaw, or upper belly or in one or both shoulders or arms. ? Lightheadedness or sudden weakness. ? A fast or irregular heartbeat.     · You have symptoms of a stroke. These may include:  ? Sudden numbness, tingling, weakness, or loss of movement in your face, arm, or leg, especially on only one side of your body. ? Sudden vision changes.   ? Sudden trouble speaking. ? Sudden confusion or trouble understanding simple statements. ? Sudden problems with walking or balance. ? A sudden, severe headache that is different from past headaches.     · You have severe back or belly pain.    Do not wait until your blood pressure comes down on its own. Get help right away.   Call your doctor now or seek immediate care if:    · Your blood pressure is much higher than normal (such as 180/120 or higher), but you don't have symptoms.     · You think high blood pressure is causing symptoms, such as:  ? Severe headache.  ? Blurry vision.    Watch closely for changes in your health, and be sure to contact your doctor if:    · Your blood pressure measures higher than your doctor recommends at least 2 times. That means the top number is higher or the bottom number is higher, or both.     · You think you may be having side effects from your blood pressure medicine. Where can you learn more? Go to http://costa-jessica.info/. Enter N757 in the search box to learn more about \"High Blood Pressure: Care Instructions. \"  Current as of: July 22, 2018  Content Version: 11.9  © 1411-4953 [employer]. Care instructions adapted under license by [employer] (which disclaims liability or warranty for this information). If you have questions about a medical condition or this instruction, always ask your healthcare professional. [place] [address] any warranty or liability for your use of this information.

## 2019-02-07 NOTE — PROGRESS NOTES
RAMSES Overton Sr. is a 51 y.o. male who presents today for headache and cellulitis follow up. Pt states he completed the antibiotic course for cellulitis on left arm, pt notes the area has healed no further swelling or redness. Pt still getting the bad headaches \"throbbing\" pain to both temple area and around ear that has awakens him a couple times out the week. Headache pain rated a 9/10. Pt notes he gets them about 2-3 times a week. Pt states he has been seen by neurology within the past year and has no current follow ups with this specialist.  Pt also notes he has a follow up with [facility] on February 19th, he states he has been treated for insomnia however treatment hasn't been effective for a while they have been trying various medication to treat the insomnia so he has had poor sleep quality, frequently waking up, sleeping short periods of time. Pt has an appointment with cardiology in April 2019. Pt notes the cialis has been helpful. Current Outpatient Medications   Medication Sig Dispense Refill    tadalafil (CIALIS) 2.5 mg tablet Take 2.5 mg by mouth.  butalbital-acetaminophen-caff (FIORICET) -40 mg per capsule Take  by mouth every four (4) hours as needed for Pain.  amitriptyline (ELAVIL) 25 mg tablet Take 1 Tab by mouth nightly for 30 days. 30 Tab 0    cyclobenzaprine (FLEXERIL) 5 mg tablet Take 1 Tab by mouth nightly. 30 Tab 1    albuterol (PROVENTIL VENTOLIN) 2.5 mg /3 mL (0.083 %) nebulizer solution Nebulized 1 vial for inhalation every 4 hours as needed 60 Each 2    albuterol (PROVENTIL HFA, VENTOLIN HFA, PROAIR HFA) 90 mcg/actuation inhaler Take 2 Puffs by inhalation every four (4) hours as needed for Wheezing or Shortness of Breath. 1 Inhaler 2    roflumilast (DALIRESP) tab tablet Take 1 Tab by mouth daily.  30 Tab 5    tiZANidine (ZANAFLEX) 4 mg tablet Sig: Take 1 tab PO Q 6 as needed for muscle spasm 60 Tab 0    tiotropium (SPIRIVA) 18 mcg inhalation capsule Take 1 Cap by inhalation daily. 30 Cap 5    fluticasone-salmeterol (ADVAIR DISKUS) 500-50 mcg/dose diskus inhaler Take 1 Puff by inhalation two (2) times a day. 1 Inhaler 5    lisinopril (PRINIVIL, ZESTRIL) 5 mg tablet Take 1 Tab by mouth daily. 90 Tab 1    furosemide (LASIX) 20 mg tablet Take 1 Tab by mouth every other day. 15 Tab 6    LORazepam (ATIVAN) 1 mg tablet Take 1 Tab by mouth every eight (8) hours as needed for Anxiety. Max Daily Amount: 3 mg. 90 Tab 0    Nebulizer & Compressor machine 1 Each by Does Not Apply route every four (4) hours as needed. 1 Each 0    OXYGEN-AIR DELIVERY SYSTEMS (WALKABOUT 1 OXYGEN SYSTEM) 2.5 L/min by Nasal route continuous.  pantoprazole (PROTONIX) 40 mg tablet Take 1 Tab by mouth Daily (before breakfast). 30 Tab 0    chlorpheniramine-HYDROcodone (TUSSIONEX) 10-8 mg/5 mL suspension Take 5 mL by mouth every twelve (12) hours as needed for Cough. Max Daily Amount: 10 mL. 240 mL 0    predniSONE (DELTASONE) 20 mg tablet Take 20 mg by mouth daily (with breakfast). 10 Tab 0    suvorexant (BELSOMRA) 10 mg tablet Take 1 Tab by mouth nightly as needed for Insomnia. Max Daily Amount: 10 mg. 30 Tab 0      Allergies   Allergen Reactions    Remeron [Mirtazapine] Other (comments)     Mood swings    Seroquel [Quetiapine] Other (comments)     Mood swings       SUBJECTIVE:  Review of Systems   Constitutional: Negative for chills, fever and malaise/fatigue. HENT: Negative for tinnitus. Eyes: Negative for blurred vision and photophobia. Respiratory: Positive for shortness of breath. Negative for cough. Cardiovascular: Negative for chest pain, palpitations and leg swelling. Gastrointestinal: Negative for abdominal pain, diarrhea, nausea and vomiting. Musculoskeletal: Negative for myalgias. Skin: Negative for rash. Neurological: Positive for headaches. Negative for dizziness, tingling and sensory change.         OBJECTIVE:  Visit Vitals  BP (!) 159/93 (BP 1 Location: Left arm, BP Patient Position: Sitting)   Pulse 79   Temp 98.1 °F (36.7 °C) (Oral)   Resp 20   Ht 5' 8\" (1.727 m)   Wt 193 lb 3.2 oz (87.6 kg)   SpO2 94%   BMI 29.38 kg/m²      I have reviewed/discussed the above normal BMI with the patient. I have recommended the following interventions: dietary management education, guidance, and counseling, encourage exercise and monitor weight . Physical Exam   Constitutional: He is oriented to person, place, and time and well-developed, well-nourished, and in no distress. HENT:   Head: Normocephalic. Right Ear: External ear normal.   Left Ear: External ear normal.   Eyes: Conjunctivae and EOM are normal. Pupils are equal, round, and reactive to light. Neck: Normal range of motion. Neck supple. No thyromegaly present. Cardiovascular: Normal rate, regular rhythm and normal heart sounds. Pulmonary/Chest: Effort normal and breath sounds normal. He has no wheezes. He has no rales. He exhibits no tenderness. Musculoskeletal: He exhibits no edema. Lymphadenopathy:     He has no cervical adenopathy. Neurological: He is alert and oriented to person, place, and time. Gait normal.   Skin: Skin is warm and dry. No rash noted. No erythema. Nursing note and vitals reviewed. ASSESSMENT:  Diagnoses and all orders for this visit:    1. Chronic tension-type headache, not intractable    2. Essential hypertension, benign    3. Cellulitis of left upper extremity      PLAN:  Pt encouraged to follow up with neurology  Cellulitis resolved  Reviewed diet, exercise and weight control  Recommended sodium restriction  Recommendations: lie in darkened room and apply cold packs prn for pain, side effect profile discussed in detail, asked to keep headache diary, avoid aspirin and NSAID's, caffeine and excessive physical activity     I have discussed the diagnosis with the patient and the intended plan as seen in the above orders.   The patient has received an after-visit summary and questions were answered concerning future plans. I have discussed medication side effects and warnings with the patient as well. Patient will call for further questions. Follow-up Disposition:  Return in about 2 months (around 4/7/2019).     Tarik Ross NP

## 2019-02-11 DIAGNOSIS — Z99.89 OSA ON CPAP: Primary | ICD-10-CM

## 2019-02-11 DIAGNOSIS — G47.33 OSA ON CPAP: Primary | ICD-10-CM

## 2019-02-11 NOTE — TELEPHONE ENCOUNTER
Requested Prescriptions     Pending Prescriptions Disp Refills    butalbital-acetaminophen-caff (FIORICET) -40 mg per capsule       Sig: Take  by mouth every four (4) hours as needed for Pain.

## 2019-02-18 NOTE — TELEPHONE ENCOUNTER
Pt's wife is calling to check on the status of the pt's medication for the fioricet. Pt's wife was told that it would take 2-3 business days and she called it in on the 11th and still has not heard anything. Please advise.       [phone]

## 2019-02-19 RX ORDER — BUTALBITAL, ACETAMINOPHEN AND CAFFEINE 300; 40; 50 MG/1; MG/1; MG/1
CAPSULE ORAL
OUTPATIENT
Start: 2019-02-19

## 2019-02-20 NOTE — TELEPHONE ENCOUNTER
Spoke with Patient and his wife and explained patient needs to schedule appointment with neurology and that the medication is being refused. Patient voiced understanding with no other questions at this time.

## 2019-03-14 ENCOUNTER — OFFICE VISIT (OUTPATIENT)
Dept: CARDIOLOGY CLINIC | Age: 49
End: 2019-03-14

## 2019-03-14 VITALS
SYSTOLIC BLOOD PRESSURE: 152 MMHG | DIASTOLIC BLOOD PRESSURE: 98 MMHG | OXYGEN SATURATION: 97 % | HEIGHT: 68 IN | HEART RATE: 90 BPM | WEIGHT: 194 LBS | BODY MASS INDEX: 29.4 KG/M2

## 2019-03-14 DIAGNOSIS — J44.9 COPD, SEVERE (HCC): ICD-10-CM

## 2019-03-14 DIAGNOSIS — I10 ESSENTIAL HYPERTENSION, BENIGN: ICD-10-CM

## 2019-03-14 DIAGNOSIS — R07.9 CHEST PAIN, UNSPECIFIED TYPE: Primary | ICD-10-CM

## 2019-03-14 DIAGNOSIS — I50.32 DIASTOLIC CHF, CHRONIC (HCC): ICD-10-CM

## 2019-03-14 DIAGNOSIS — R00.2 PALPITATIONS: ICD-10-CM

## 2019-03-14 NOTE — PROGRESS NOTES
History of Present Illness:  A 51 y.o. male here for follow up. Overall, doing well. He is gradually increasing activity levels. He is trying to walk and even ride a bike a few miles a day without limitations. He continues to see Dr. Roxy Mckeon, his primary care physician, as well as Dr. Shirley Escobar. He uses oxygen at night. He does have a mild element of diastolic heart failure. He is taking Lasix every other day. Impression/Plan:   Chronic atypical chest pain, not really angina. It is nonexertional and improves with exercise. Severe chronic chronic obstructive pulmonary disease on oxygen at night followed by Dr. Shirley Escobar. History of pneumonia last year, resolved. Chronic diastolic heart failure with echocardiogram January 2018 with mild LVH, taking Lasix 20 mg every other day, no evidence of decompensated heart failure today. Hypertension, increased today, but historically very well controlled and actually low with last primary care physician visit. Previous tobacco use, discontinued. Palpitations, improved. History of panic attacks. At this point there is no evidence of decompensated heart failure, no crackles on exams. His blood pressure is increased today. He tells me when he is in the office of Stephen Escobar and Roxy Mckeon, his blood pressure is controlled. He only had a cup of coffee today. I will see him on an annual basis but asked him to follow up with his primary care physician to make sure his blood pressure remains controlled. All questions answered.         Past Medical History:   Diagnosis Date    Chronic diastolic heart failure secondary to coronary artery disease (HCC)     Chronic lung disease     Chronic obstructive pulmonary disease ([address] 08/03/2017    PFTS 8/3/17    COPD, severe ([address]     Emphysema/COPD ([address]     Esophagitis 12/11/2017    Endoscopy    Essential hypertension, benign     Hepatomegaly     History of stomach ulcers     Positive urine drug screen 01/2016    opiates and THC    Splenomegaly     Upper GI bleed        Current Outpatient Medications   Medication Sig Dispense Refill    tadalafil (CIALIS) 2.5 mg tablet Take 2.5 mg by mouth.  butalbital-acetaminophen-caff (FIORICET) -40 mg per capsule Take  by mouth every four (4) hours as needed for Pain.  cyclobenzaprine (FLEXERIL) 5 mg tablet Take 1 Tab by mouth nightly. 30 Tab 1    predniSONE (DELTASONE) 20 mg tablet Take 20 mg by mouth daily (with breakfast). 10 Tab 0    albuterol (PROVENTIL VENTOLIN) 2.5 mg /3 mL (0.083 %) nebulizer solution Nebulized 1 vial for inhalation every 4 hours as needed 60 Each 2    albuterol (PROVENTIL HFA, VENTOLIN HFA, PROAIR HFA) 90 mcg/actuation inhaler Take 2 Puffs by inhalation every four (4) hours as needed for Wheezing or Shortness of Breath. 1 Inhaler 2    roflumilast (DALIRESP) tab tablet Take 1 Tab by mouth daily. 30 Tab 5    tiZANidine (ZANAFLEX) 4 mg tablet Sig: Take 1 tab PO Q 6 as needed for muscle spasm 60 Tab 0    tiotropium (SPIRIVA) 18 mcg inhalation capsule Take 1 Cap by inhalation daily. 30 Cap 5    fluticasone-salmeterol (ADVAIR DISKUS) 500-50 mcg/dose diskus inhaler Take 1 Puff by inhalation two (2) times a day. 1 Inhaler 5    lisinopril (PRINIVIL, ZESTRIL) 5 mg tablet Take 1 Tab by mouth daily. 90 Tab 1    suvorexant (BELSOMRA) 10 mg tablet Take 1 Tab by mouth nightly as needed for Insomnia. Max Daily Amount: 10 mg. 30 Tab 0    furosemide (LASIX) 20 mg tablet Take 1 Tab by mouth every other day. 15 Tab 6    LORazepam (ATIVAN) 1 mg tablet Take 1 Tab by mouth every eight (8) hours as needed for Anxiety. Max Daily Amount: 3 mg. 90 Tab 0    Nebulizer & Compressor machine 1 Each by Does Not Apply route every four (4) hours as needed. 1 Each 0    OXYGEN-AIR DELIVERY SYSTEMS (WALKABOUT 1 OXYGEN SYSTEM) 2.5 L/min by Nasal route continuous.  pantoprazole (PROTONIX) 40 mg tablet Take 1 Tab by mouth Daily (before breakfast).  30 Tab 0       Social History   reports that he quit smoking about 14 months ago. His smoking use included cigarettes. He started smoking about 34 years ago. He has a 50.00 pack-year smoking history. he has never used smokeless tobacco.   reports that he does not drink alcohol. Family History  family history includes Cancer in his father; Diabetes in his father and mother; Heart Disease in his father and mother; Hypertension in his father and mother. Review of Systems  Except as stated above include:  Constitutional: Negative for fever, chills and malaise/fatigue. HEENT: No congestion or recent URI. Gastrointestinal: No nausea, vomiting, abdominal pain, bloody stools. Pulmonary:  Negative except as stated above. Cardiac:  Negative except as stated above. Musculoskeletal: Negative except as stated above. Neurological:  No localized symptoms. Skin:  Negative except as stated above. Psych:  Negative except as stated above. Endocrine:  Negative except as stated above. PHYSICAL EXAM  BP Readings from Last 3 Encounters:   03/14/19 (!) 152/98   02/07/19 (!) 159/93   01/24/19 (!) 147/97     Pulse Readings from Last 3 Encounters:   03/14/19 90   02/07/19 79   01/24/19 75     Wt Readings from Last 3 Encounters:   03/14/19 88 kg (194 lb)   02/07/19 87.6 kg (193 lb 3.2 oz)   01/24/19 88.9 kg (196 lb)     General:   Well developed, well groomed. Head/Neck:   No jugular venous distention     No carotid bruits. No evidence of xanthelasma. Lungs:   No respiratory distress. Clear bilaterally. Heart:    Regular rate and rhythm. Normal S1/S2. Palpation of heart with normal point of maximum impulse. No significant murmurs, rubs or gallops. Abdomen:   Soft and nontender. No palpable abdominal mass or bruits. Extremities:   Intact peripheral pulses. No significant edema. Neurological:   Alert and oriented to person, place, time. No focal neurological deficit visually.   Skin:   No obvious rash    Blood Pressure Metric:  Taylor Espino has been given the following recommendations today due to his elevated BP reading: monitor

## 2019-03-14 NOTE — PROGRESS NOTES
Jose Manuel Brown presents today for   Chief Complaint   Patient presents with    Chest Pain     6 month follow up        Jose Manuel Brown. preferred language for health care discussion is english/other. Is someone accompanying this pt? no    Is the patient using any DME equipment during [address]? no    Depression Screening:  3 most recent PHQ Screens 1/9/2019   PHQ Not Done -   Little interest or pleasure in doing things Not at all   Feeling down, depressed, irritable, or hopeless Not at all   Total Score PHQ 2 0       Learning Assessment:  Learning Assessment 1/8/2019   PRIMARY LEARNER Patient   PRIMARY LANGUAGE ENGLISH   LEARNER PREFERENCE PRIMARY OTHER (COMMENT)     -     -   ANSWERED BY patient   RELATIONSHIP SELF       Abuse Screening:  Abuse Screening Questionnaire 1/8/2019   Do you ever feel afraid of your partner? N   Are you in a relationship with someone who physically or mentally threatens you? N   Is it safe for you to go home? Y       Fall Risk  Fall Risk Assessment, last 12 mths 9/17/2018   Able to walk? Yes   Fall in past 12 months? No       Pt currently taking Anticoagulant therapy? no    Coordination of Care:  1. Have you been to the ER, urgent care clinic since your last visit? Hospitalized since your last visit? no    2. Have you seen or consulted any other health care providers outside of the [address] [place] [address] since your last visit? Include any pap smears or colon screening.  no

## 2019-03-19 ENCOUNTER — HOSPITAL ENCOUNTER (EMERGENCY)
Age: 49
Discharge: HOME | End: 2019-03-19
Attending: EMERGENCY MEDICINE
Payer: MEDICAID

## 2019-03-19 VITALS
TEMPERATURE: 97.9 F | SYSTOLIC BLOOD PRESSURE: 155 MMHG | OXYGEN SATURATION: 96 % | DIASTOLIC BLOOD PRESSURE: 102 MMHG | HEART RATE: 82 BPM | RESPIRATION RATE: 16 BRPM

## 2019-03-19 DIAGNOSIS — K08.409 S/P TOOTH EXTRACTION: ICD-10-CM

## 2019-03-19 DIAGNOSIS — R51.9 NONINTRACTABLE EPISODIC HEADACHE, UNSPECIFIED HEADACHE TYPE: Primary | ICD-10-CM

## 2019-03-19 PROCEDURE — 74011250637 HC RX REV CODE- 250/637: Performed by: EMERGENCY MEDICINE

## 2019-03-19 PROCEDURE — 96372 THER/PROPH/DIAG INJ SC/IM: CPT

## 2019-03-19 PROCEDURE — 74011250636 HC RX REV CODE- 250/636: Performed by: EMERGENCY MEDICINE

## 2019-03-19 PROCEDURE — 99283 EMERGENCY DEPT VISIT LOW MDM: CPT

## 2019-03-19 RX ORDER — METOCLOPRAMIDE HYDROCHLORIDE 5 MG/ML
10 INJECTION INTRAMUSCULAR; INTRAVENOUS
Status: COMPLETED | OUTPATIENT
Start: 2019-03-19 | End: 2019-03-19

## 2019-03-19 RX ORDER — OXYCODONE AND ACETAMINOPHEN 5; 325 MG/1; MG/1
1 TABLET ORAL
Qty: 6 TAB | Refills: 0 | Status: SHIPPED | OUTPATIENT
Start: 2019-03-19 | End: 2019-03-22

## 2019-03-19 RX ORDER — OXYCODONE AND ACETAMINOPHEN 5; 325 MG/1; MG/1
1 TABLET ORAL
Status: COMPLETED | OUTPATIENT
Start: 2019-03-19 | End: 2019-03-19

## 2019-03-19 RX ORDER — DIPHENHYDRAMINE HYDROCHLORIDE 50 MG/ML
50 INJECTION, SOLUTION INTRAMUSCULAR; INTRAVENOUS ONCE
Status: COMPLETED | OUTPATIENT
Start: 2019-03-19 | End: 2019-03-19

## 2019-03-19 RX ADMIN — OXYCODONE AND ACETAMINOPHEN 1 TABLET: 5; 325 TABLET ORAL at 12:05

## 2019-03-19 RX ADMIN — METOCLOPRAMIDE 10 MG: 5 INJECTION, SOLUTION INTRAMUSCULAR; INTRAVENOUS at 12:05

## 2019-03-19 RX ADMIN — DIPHENHYDRAMINE HYDROCHLORIDE 50 MG: 50 INJECTION INTRAMUSCULAR; INTRAVENOUS at 12:05

## 2019-03-19 NOTE — DISCHARGE INSTRUCTIONS

## 2019-03-19 NOTE — ED TRIAGE NOTES
Patient arrived from home c/o migraine on and off for a few weeks. Patient states he is following up with primary care for further evaluation. Patient also had teeth pulled yesterday and was not given home medication for pain relief. Patient states extreme pain 10/10

## 2019-03-19 NOTE — ED PROVIDER NOTES
EMERGENCY DEPARTMENT HISTORY AND PHYSICAL EXAM 
 
Date: 3/19/2019 Patient Name: Cedric Lucas Sr. 
 
History of Presenting Illness Chief Complaint Patient presents with  
 Headache  Dental Pain History Provided By: Patient Additional History (Context): Neela Oh is a 51 y.o. male with COPD and see below who presents with complaint of a persistent headache nearly daily basis for 1 month his. Has been seen by his primary care provider and given a prescription of Fioricet which patient says does not relieve his symptoms. Patient is tried unsuccessfully to be referred to a neurologist.  Reji Ngoter that when he calls the office directly he was then informed that he needed to have his primary care send a referral.  Patient is requested a referral from his PCP but has not yet received 1. Denies nausea and vomiting unilateral weakness changes in speech or vision recent head trauma fever nuchal rigidity. Says headache is generalized. Exacerbating his headache today is his recent dental extraction yesterday of 5 lower incisors and canine. Given Tylenol after the extraction. Since Tylenol and not improving his pain symptoms. Denies drooling or trismus. PCP: Noelle Palmer NP Current Facility-Administered Medications Medication Dose Route Frequency Provider Last Rate Last Dose  metoclopramide HCl (REGLAN) injection 10 mg  10 mg IntraMUSCular NOW Leia Espinoza PA      
 diphenhydrAMINE (BENADRYL) injection 50 mg  50 mg IntraMUSCular ONCE Leia Espinoza PA      
 oxyCODONE-acetaminophen (PERCOCET) 5-325 mg per tablet 1 Tab  1 Tab Oral NOW Leia Espinoza PA Current Outpatient Medications Medication Sig Dispense Refill  oxyCODONE-acetaminophen (PERCOCET) 5-325 mg per tablet Take 1 Tab by mouth every six (6) hours as needed for Pain for up to 3 days. Max Daily Amount: 4 Tabs. Take 1 tablet every 4-6 hours as needed for pain control.  6 Tab 0  
  tadalafil (CIALIS) 2.5 mg tablet Take 2.5 mg by mouth.  butalbital-acetaminophen-caff (FIORICET) -40 mg per capsule Take  by mouth every four (4) hours as needed for Pain.  cyclobenzaprine (FLEXERIL) 5 mg tablet Take 1 Tab by mouth nightly. 30 Tab 1  predniSONE (DELTASONE) 20 mg tablet Take 20 mg by mouth daily (with breakfast). 10 Tab 0  
 albuterol (PROVENTIL VENTOLIN) 2.5 mg /3 mL (0.083 %) nebulizer solution Nebulized 1 vial for inhalation every 4 hours as needed 60 Each 2  
 albuterol (PROVENTIL HFA, VENTOLIN HFA, PROAIR HFA) 90 mcg/actuation inhaler Take 2 Puffs by inhalation every four (4) hours as needed for Wheezing or Shortness of Breath. 1 Inhaler 2  
 roflumilast (DALIRESP) tab tablet Take 1 Tab by mouth daily. 30 Tab 5  tiZANidine (ZANAFLEX) 4 mg tablet Sig: Take 1 tab PO Q 6 as needed for muscle spasm 60 Tab 0  
 tiotropium (SPIRIVA) 18 mcg inhalation capsule Take 1 Cap by inhalation daily. 30 Cap 5  
 fluticasone-salmeterol (ADVAIR DISKUS) 500-50 mcg/dose diskus inhaler Take 1 Puff by inhalation two (2) times a day. 1 Inhaler 5  
 lisinopril (PRINIVIL, ZESTRIL) 5 mg tablet Take 1 Tab by mouth daily. 90 Tab 1  
 suvorexant (BELSOMRA) 10 mg tablet Take 1 Tab by mouth nightly as needed for Insomnia. Max Daily Amount: 10 mg. 30 Tab 0  
 furosemide (LASIX) 20 mg tablet Take 1 Tab by mouth every other day. 15 Tab 6  
 LORazepam (ATIVAN) 1 mg tablet Take 1 Tab by mouth every eight (8) hours as needed for Anxiety. Max Daily Amount: 3 mg. 90 Tab 0  
 Nebulizer & Compressor machine 1 Each by Does Not Apply route every four (4) hours as needed. 1 Each 0  
 OXYGEN-AIR DELIVERY SYSTEMS (WALKABOUT 1 OXYGEN SYSTEM) 2.5 L/min by Nasal route continuous.  pantoprazole (PROTONIX) 40 mg tablet Take 1 Tab by mouth Daily (before breakfast). 30 Tab 0 Past History Past Medical History: 
Past Medical History:  
Diagnosis Date  Chronic diastolic heart failure secondary to coronary artery disease [facility] [address] [address]  Chronic lung disease  Chronic obstructive pulmonary disease ([facility] 2017 PFTS 8/3/17  COPD, severe ([facility] [facility]  [facility]  Esophagitis 2017 Endoscopy  Essential hypertension, benign  Hepatomegaly  History of stomach ulcers  Positive urine drug screen 2016  
 opiates and THC  Splenomegaly  Upper GI bleed Past Surgical History: 
Past Surgical History:  
Procedure Laterality Date  COLONOSCOPY N/A 2018 COLONOSCOPY with polypectomies performed by Brian Jin MD at [address]  ENDOSCOPY  2017 Family History: 
Family History Problem Relation Age of Onset  Hypertension Mother  Heart Disease Mother  Diabetes Mother  Hypertension Father  Heart Disease Father  Cancer Father   
     lymphnodes  Diabetes Father Social History: 
Social History Tobacco Use  Smoking status: Former Smoker Packs/day: 2.00 Years: 25.00 Pack years: 50.00 Types: Cigarettes Start date: 1984 Last attempt to quit: 2017 Years since quittin.2  Smokeless tobacco: Never Used Substance Use Topics  Alcohol use: No  
 Drug use: Yes Types: Marijuana Comment: Hx of Marijuana use Allergies: Allergies Allergen Reactions  Remeron [Mirtazapine] Other (comments) Mood swings  Seroquel [Quetiapine] Other (comments) Mood swings Review of Systems Review of Systems Constitutional: Negative for fever. HENT: Positive for dental problem. Respiratory: Negative for shortness of breath. Cardiovascular: Negative for chest pain. Neurological: Positive for headaches. Negative for dizziness, tremors, seizures, syncope, facial asymmetry, speech difficulty, weakness, light-headedness and numbness. All Other Systems Negative Physical Exam  
 
Vitals: 03/19/19 1057 BP: (!) 155/102 Pulse: 82 Resp: 16 Temp: 97.9 °F (36.6 °C) SpO2: 96% Physical Exam  
Constitutional: He is oriented to person, place, and time. Vital signs are normal. He appears well-developed and well-nourished. He is active. Non-toxic appearance. He does not appear ill. No distress. HENT:  
Head: Normocephalic and atraumatic. Dental: extractions to lower incisors; mild gingival swelling. No bleeding or obvious fluctuance. No drooling, trismus. Eyes: EOM are normal.  
No nystagmus Neck: Normal range of motion. Neck supple. Carotid bruit is not present. No tracheal deviation present. No thyromegaly present. Cardiovascular: Normal rate, regular rhythm and normal heart sounds. Exam reveals no gallop and no friction rub. No murmur heard. Pulmonary/Chest: Effort normal and breath sounds normal. No stridor. No respiratory distress. He has no wheezes. He has no rales. He exhibits no tenderness. Abdominal: Soft. He exhibits no distension and no mass. There is no tenderness. There is no rebound, no guarding and no CVA tenderness. Musculoskeletal: Normal range of motion. Neurological: He is alert and oriented to person, place, and time. Negative romberg. No dysdiadochokinesis, past pointing, tremor. Normal heel-shin. Skin: Skin is warm, dry and intact. He is not diaphoretic. No pallor. Psychiatric: He has a normal mood and affect. His speech is normal and behavior is normal. Judgment and thought content normal.  
Nursing note and vitals reviewed. Diagnostic Study Results Labs - No results found for this or any previous visit (from the past 12 hour(s)). Radiologic Studies - No orders to display CT Results  (Last 48 hours) None CXR Results  (Last 48 hours) None Medical Decision Making I am the first provider for this patient.  
 
I reviewed the vital signs, available nursing notes, past medical history, past surgical history, family history and social history. Vital Signs-Reviewed the patient's vital signs. Records Reviewed: Nursing Notes Procedures: 
Procedures Provider Notes (Medical Decision Making): Treat headache plan asked for  to provide assistance with patient's lack of follow-up at this point. Treat dental pain. No signs of infection in his teeth. Or is at his extraction rather. Can follow-up with dental. 
 
MED RECONCILIATION: 
Current Facility-Administered Medications Medication Dose Route Frequency  metoclopramide HCl (REGLAN) injection 10 mg  10 mg IntraMUSCular NOW  diphenhydrAMINE (BENADRYL) injection 50 mg  50 mg IntraMUSCular ONCE  
 oxyCODONE-acetaminophen (PERCOCET) 5-325 mg per tablet 1 Tab  1 Tab Oral NOW Current Outpatient Medications Medication Sig  
 oxyCODONE-acetaminophen (PERCOCET) 5-325 mg per tablet Take 1 Tab by mouth every six (6) hours as needed for Pain for up to 3 days. Max Daily Amount: 4 Tabs. Take 1 tablet every 4-6 hours as needed for pain control.  tadalafil (CIALIS) 2.5 mg tablet Take 2.5 mg by mouth.  butalbital-acetaminophen-caff (FIORICET) -40 mg per capsule Take  by mouth every four (4) hours as needed for Pain.  cyclobenzaprine (FLEXERIL) 5 mg tablet Take 1 Tab by mouth nightly.  predniSONE (DELTASONE) 20 mg tablet Take 20 mg by mouth daily (with breakfast).  albuterol (PROVENTIL VENTOLIN) 2.5 mg /3 mL (0.083 %) nebulizer solution Nebulized 1 vial for inhalation every 4 hours as needed  albuterol (PROVENTIL HFA, VENTOLIN HFA, PROAIR HFA) 90 mcg/actuation inhaler Take 2 Puffs by inhalation every four (4) hours as needed for Wheezing or Shortness of Breath.  roflumilast (DALIRESP) tab tablet Take 1 Tab by mouth daily.  tiZANidine (ZANAFLEX) 4 mg tablet Sig: Take 1 tab PO Q 6 as needed for muscle spasm  tiotropium (SPIRIVA) 18 mcg inhalation capsule Take 1 Cap by inhalation daily.  fluticasone-salmeterol (ADVAIR DISKUS) 500-50 mcg/dose diskus inhaler Take 1 Puff by inhalation two (2) times a day.  lisinopril (PRINIVIL, ZESTRIL) 5 mg tablet Take 1 Tab by mouth daily.  suvorexant (BELSOMRA) 10 mg tablet Take 1 Tab by mouth nightly as needed for Insomnia. Max Daily Amount: 10 mg.  
 furosemide (LASIX) 20 mg tablet Take 1 Tab by mouth every other day.  LORazepam (ATIVAN) 1 mg tablet Take 1 Tab by mouth every eight (8) hours as needed for Anxiety. Max Daily Amount: 3 mg.  Nebulizer & Compressor machine 1 Each by Does Not Apply route every four (4) hours as needed.  OXYGEN-AIR DELIVERY SYSTEMS (WALKABOUT 1 OXYGEN SYSTEM) 2.5 L/min by Nasal route continuous.  pantoprazole (PROTONIX) 40 mg tablet Take 1 Tab by mouth Daily (before breakfast). Disposition: 
home DISCHARGE NOTE:  
12:04 PM 
 
Pt has been reexamined. Patient has no new complaints, changes, or physical findings. Care plan outlined and precautions discussed. Results of exam were reviewed with the patient. All medications were reviewed with the patient; will d/c home with percocet. All of pt's questions and concerns were addressed. Patient was instructed and agrees to follow up with dental, neurology, as well as to return to the ED upon further deterioration. Patient is ready to go home. Follow-up Information Follow up With Specialties Details Why Contact Info Brooke Dodson MD Neurology Schedule an appointment as soon as possible for a visit  [address] [address] [phone] Jose Angel Core[address]  Call today ask for assistance with your neurology f/up DR. [facility] [address] [address] 
[phone] Pedro Pablo Watkins  Schedule an appointment as soon as possible for a visit today  [address] [address] 
[phone]  [facility] EMERGENCY DEPT Emergency Medicine  If symptoms worsen return [address] [phone] 
[phone] Current Discharge Medication List  
  
START taking these medications Details  
oxyCODONE-acetaminophen (PERCOCET) 5-325 mg per tablet Take 1 Tab by mouth every six (6) hours as needed for Pain for up to 3 days. Max Daily Amount: 4 Tabs. Take 1 tablet every 4-6 hours as needed for pain control. Qty: 6 Tab, Refills: 0 Associated Diagnoses: S/P tooth extraction Diagnosis Clinical Impression: 1. Nonintractable episodic headache, unspecified headache type 2. S/P tooth extraction

## 2019-03-19 NOTE — ED NOTES
Patient has follow up appointment with DIANE Wolff at [facility] located at [facility] for March 28,2019 at 11:15 check in time

## 2019-03-20 ENCOUNTER — OFFICE VISIT (OUTPATIENT)
Dept: FAMILY MEDICINE CLINIC | Age: 49
End: 2019-03-20

## 2019-03-20 VITALS
WEIGHT: 190 LBS | DIASTOLIC BLOOD PRESSURE: 108 MMHG | BODY MASS INDEX: 28.79 KG/M2 | OXYGEN SATURATION: 97 % | SYSTOLIC BLOOD PRESSURE: 155 MMHG | TEMPERATURE: 98.1 F | RESPIRATION RATE: 20 BRPM | HEART RATE: 89 BPM | HEIGHT: 68 IN

## 2019-03-20 DIAGNOSIS — G44.229 CHRONIC TENSION-TYPE HEADACHE, NOT INTRACTABLE: Primary | ICD-10-CM

## 2019-03-20 RX ORDER — BUTALBITAL, ACETAMINOPHEN AND CAFFEINE 300; 40; 50 MG/1; MG/1; MG/1
1 CAPSULE ORAL
Qty: 12 CAP | Refills: 0 | Status: SHIPPED | OUTPATIENT
Start: 2019-03-20 | End: 2019-03-23

## 2019-03-20 NOTE — PROGRESS NOTES
RAMSES Boo Sr. is a 51 y.o. male who presents today for daily migraines daily for the past month that gradually worsen recently. Pt has an appointment set for neurology for March 28,2019 for treatment of FABIÁN. Pt had 5 teeth removed 2 days ago and states he is in a lot of care and the dental office did not give him any prescriptive pain management but advised him to take OTC tylenol and Ibuprofen for pain. Pt went to the ED due to the pain and facial swelling caused by the extensive dental procedure and was given Percocets #6 tabs. Pt's blood pressure is elevated, pt believes it is due to pain caused by dental procedure. Pt was seen by cardiology on 3/14/2019 and no changes were made to medication regimen. Current Outpatient Medications   Medication Sig Dispense Refill    tadalafil (CIALIS) 2.5 mg tablet Take 2.5 mg by mouth.  butalbital-acetaminophen-caff (FIORICET) -40 mg per capsule Take  by mouth every four (4) hours as needed for Pain.  cyclobenzaprine (FLEXERIL) 5 mg tablet Take 1 Tab by mouth nightly. 30 Tab 1    albuterol (PROVENTIL VENTOLIN) 2.5 mg /3 mL (0.083 %) nebulizer solution Nebulized 1 vial for inhalation every 4 hours as needed 60 Each 2    albuterol (PROVENTIL HFA, VENTOLIN HFA, PROAIR HFA) 90 mcg/actuation inhaler Take 2 Puffs by inhalation every four (4) hours as needed for Wheezing or Shortness of Breath. 1 Inhaler 2    roflumilast (DALIRESP) tab tablet Take 1 Tab by mouth daily. 30 Tab 5    tiZANidine (ZANAFLEX) 4 mg tablet Sig: Take 1 tab PO Q 6 as needed for muscle spasm 60 Tab 0    tiotropium (SPIRIVA) 18 mcg inhalation capsule Take 1 Cap by inhalation daily. 30 Cap 5    fluticasone-salmeterol (ADVAIR DISKUS) 500-50 mcg/dose diskus inhaler Take 1 Puff by inhalation two (2) times a day. 1 Inhaler 5    furosemide (LASIX) 20 mg tablet Take 1 Tab by mouth every other day.  15 Tab 6    LORazepam (ATIVAN) 1 mg tablet Take 1 Tab by mouth every eight (8) hours as needed for Anxiety. Max Daily Amount: 3 mg. 90 Tab 0    Nebulizer & Compressor machine 1 Each by Does Not Apply route every four (4) hours as needed. 1 Each 0    OXYGEN-AIR DELIVERY SYSTEMS (WALKABOUT 1 OXYGEN SYSTEM) 2.5 L/min by Nasal route continuous.  pantoprazole (PROTONIX) 40 mg tablet Take 1 Tab by mouth Daily (before breakfast). 30 Tab 0    oxyCODONE-acetaminophen (PERCOCET) 5-325 mg per tablet Take 1 Tab by mouth every six (6) hours as needed for Pain for up to 3 days. Max Daily Amount: 4 Tabs. Take 1 tablet every 4-6 hours as needed for pain control. 6 Tab 0    predniSONE (DELTASONE) 20 mg tablet Take 20 mg by mouth daily (with breakfast). 10 Tab 0    lisinopril (PRINIVIL, ZESTRIL) 5 mg tablet Take 1 Tab by mouth daily. 90 Tab 1    suvorexant (BELSOMRA) 10 mg tablet Take 1 Tab by mouth nightly as needed for Insomnia. Max Daily Amount: 10 mg. 30 Tab 0      Allergies   Allergen Reactions    Remeron [Mirtazapine] Other (comments)     Mood swings    Seroquel [Quetiapine] Other (comments)     Mood swings       SUBJECTIVE:  Review of Systems   Constitutional: Positive for weight loss. Negative for chills, fever and malaise/fatigue. Difficulty eating due to recent extractions   HENT: Negative for congestion, ear pain, sinus pain and sore throat. Facial swelling and pain   Eyes: Negative for blurred vision. Respiratory: Positive for shortness of breath. Negative for cough. Cardiovascular: Negative for chest pain, palpitations and leg swelling. Gastrointestinal: Negative for abdominal pain, diarrhea, nausea and vomiting. Skin: Negative for rash. Neurological: Positive for headaches. Negative for dizziness, tingling and sensory change.         OBJECTIVE:  Visit Vitals  BP (!) 155/108 (BP 1 Location: Left arm, BP Patient Position: Sitting)   Pulse 89   Temp 98.1 °F (36.7 °C) (Oral)   Resp 20   Ht 5' 8\" (1.727 m)   Wt 190 lb (86.2 kg)   SpO2 97%   BMI 28.89 kg/m² I have reviewed/discussed the above normal BMI with the patient. I have recommended the following interventions: dietary management education, guidance, and counseling, encourage exercise and monitor weight . Sonja Alcantara Physical Exam   Constitutional: He is oriented to person, place, and time and well-developed, well-nourished, and in no distress. HENT:   Head: Normocephalic. Facial swelling noted   Eyes: Pupils are equal, round, and reactive to light. Conjunctivae and EOM are normal.   Neck: Normal range of motion. Neck supple. Cardiovascular: Normal rate, regular rhythm and normal heart sounds. Pulmonary/Chest: Effort normal and breath sounds normal. He has no wheezes. He has no rales. He exhibits no tenderness. Neurological: He is alert and oriented to person, place, and time. Gait normal.   Skin: Skin is warm and dry. Nursing note and vitals reviewed. ASSESSMENT:  Diagnoses and all orders for this visit:    1. Chronic tension-type headache, not intractable  -     butalbital-acetaminophen-caff (FIORICET) -40 mg per capsule; Take 1 Cap by mouth every four (4) hours as needed for Pain or Headache for up to 3 days. PLAN:  Start Fioricet PRN for breakthrough migraine relief  Follow up with neurology and cardiology as scheduled    I have discussed the diagnosis with the patient and the intended plan as seen in the above orders. The patient has received an after-visit summary and questions were answered concerning future plans. I have discussed medication side effects and warnings with the patient as well. Patient will call for further questions. Follow-up and Dispositions    · Return in about 1 month (around 4/17/2019) for follow up.        Maureen Carranza NP

## 2019-03-20 NOTE — PROGRESS NOTES
Chief Complaint   Patient presents with    Hypertension     Heart is going between      1. Have you been to the ER, urgent care clinic since your last visit? Hospitalized since your last visit? No    2. Have you seen or consulted any other health care providers outside of the [address] [place] [address] since your last visit? Include any pap smears or colon screening.  No

## 2019-03-20 NOTE — PATIENT INSTRUCTIONS
Cluster Headache: Care Instructions  Your Care Instructions  Cluster headaches are very painful. They happen on one side of the head and often start at night. They can last for 30 minutes to several hours. They usually occur in groups, or clusters, over weeks or months. You may have a stuffy nose and watery eyes during the headaches. The cause of cluster headaches is not known. Medicine may help prevent cluster headaches. You also can try to avoid things that trigger your headaches. Follow-up care is a key part of your treatment and safety. Be sure to make and go to all appointments, and call your doctor if you are having problems. It's also a good idea to know your test results and keep a list of the medicines you take. How can you care for yourself at home? · Watch for new symptoms with a headache. These include fever, weakness or numbness, vision changes, or confusion. They may be signs of a more serious problem. · Be safe with medicines. Take your medicines exactly as prescribed. Call your doctor if you think you are having a problem with your medicine. You will get more details on the specific medicines your doctor prescribes. · If your doctor recommends it, take an over-the-counter pain medicine, such as acetaminophen (Tylenol), ibuprofen (Advil, Motrin), or naproxen (Aleve). Read and follow all instructions on the label. · Do not take two or more pain medicines at the same time unless the doctor told you to. Many pain medicines have acetaminophen, which is Tylenol. Too much acetaminophen (Tylenol) can be harmful. · Carry medicine with you to quickly treat a headache. · Put ice or a cold pack on the area for 10 to 20 minutes at a time. Put a thin cloth between the ice and your skin. · If your doctor prescribed at-home oxygen therapy to stop a cluster headache, follow the directions for using it. To prevent cluster headaches  · Keep a headache diary.  Avoiding triggers may help you prevent headaches. Write down when a headache begins, how long it lasts, and what might have triggered it. This could include stress, alcohol, or certain foods. · Exercise daily to lower stress. · Limit caffeine by not drinking too much coffee, tea, or soda. But do not quit caffeine suddenly. This can also give you headaches. · Do not smoke or allow others to smoke around you. If you need help quitting, talk to your doctor about stop-smoking programs and medicines. These can increase your chances of quitting for good. · Tell your doctor if your headaches get worse and medicines do not help. You may need to try a different medicine. When should you call for help? Call 911 anytime you think you may need emergency care. For example, call if:    · You have symptoms of a stroke. These may include:  ? Sudden numbness, tingling, weakness, or loss of movement in your face, arm, or leg, especially on only one side of your body. ? Sudden vision changes. ? Sudden trouble speaking. ? Sudden confusion or trouble understanding simple statements. ? Sudden problems with walking or balance. ? A sudden, severe headache that is different from past headaches.    Call your doctor now or seek immediate medical care if:    · You have a fever with a stiff neck or a severe headache.     · You are sensitive to light or feel very sleepy or confused.     · You have new nausea and vomiting and you cannot keep down food or liquids.    Watch closely for changes in your health, and be sure to contact your doctor if:    · You have a headache that does not get better within 1 or 2 days.     · Your headaches get worse or happen more often. Where can you learn more? Go to http://costa-jessica.info/. Enter Z525 in the search box to learn more about \"Cluster Headache: Care Instructions. \"  Current as of: Hanane 3, 2018  Content Version: 11.9  © 5948-6824 [employer].  Care instructions adapted under license by [employer] (which disclaims liability or warranty for this information). If you have questions about a medical condition or this instruction, always ask your healthcare professional. [address] any warranty or liability for your use of this information.

## 2019-03-25 DIAGNOSIS — N52.9 ERECTILE DYSFUNCTION, UNSPECIFIED ERECTILE DYSFUNCTION TYPE: ICD-10-CM

## 2019-03-28 ENCOUNTER — OFFICE VISIT (OUTPATIENT)
Dept: NEUROLOGY | Age: 49
End: 2019-03-28

## 2019-03-28 VITALS
BODY MASS INDEX: 28.79 KG/M2 | HEART RATE: 92 BPM | TEMPERATURE: 98.1 F | DIASTOLIC BLOOD PRESSURE: 100 MMHG | OXYGEN SATURATION: 97 % | SYSTOLIC BLOOD PRESSURE: 140 MMHG | RESPIRATION RATE: 18 BRPM | WEIGHT: 190 LBS | HEIGHT: 68 IN

## 2019-03-28 DIAGNOSIS — R06.83 SNORING: Primary | ICD-10-CM

## 2019-03-28 DIAGNOSIS — R51.9 HEADACHE DISORDER: ICD-10-CM

## 2019-03-28 DIAGNOSIS — R06.81 APNEIC SPELL: ICD-10-CM

## 2019-03-28 RX ORDER — OXCARBAZEPINE 150 MG/1
150 TABLET, FILM COATED ORAL 2 TIMES DAILY
COMMUNITY

## 2019-03-28 NOTE — PATIENT INSTRUCTIONS
Follow up with your PCP regarding elevated blood pressure. Thank you for choosing [facility] and [facility] for your     care. You may receive a survey about your visit. We appreciate you taking time     to complete this survey as we use your feedback to improve our services. We     realize we are not perfect, but we strive to provide excellent care.

## 2019-03-28 NOTE — PROGRESS NOTES
[facility]  [address] [facility], [address], [place], [address]  [address] Manjeet Ortiz, [address]  Office:  [phone]  Fax: [phone]  Chief Complaint   Patient presents with    Migraine     follow up     This is a 51year old male who presents for HFU with CC of headache. Last seen in office back in October of 2017 by Dr. Claudia Lee for sleep disorder. Patient endorses presenting to [facility] ED on 3/19 with headache. Per documentation had headache going on and off for one month. Also had teeth pulled day prior to ED arrival. Currently has a pounding headache. Headache located to the back of the head and down the neck. Denies focal deficits. He was seen by his primary care provider and given Fioricet without relief. He is currently seen by psychiatrist that short she will and psych for anxiety and is prescribed Elavil. He also is taking hydroxyzine. He is also on Trileptal 150 mg twice daily. He is a poor historian. He has difficulty recalling his medications. During the visit he is on the phone with his wife trying to review some of the medications that are on his bedside table. He does endorse snoring. He says that he previously had a sleep study years back that showed he did not have sleep apnea. He now says that his wife does not sleep in the room with him and that he has stopped breathing 15 times a night. He has not seen a sleep doctor in several years. He has hypertension and is taking lisinopril 5 mg daily.     Past Medical History:   Diagnosis Date    Chronic diastolic heart failure secondary to coronary artery disease (HCC)     Chronic lung disease     Chronic obstructive pulmonary disease ([address] 08/03/2017    PFTS 8/3/17    COPD, severe ([address]     Emphysema/COPD [facility]     Esophagitis 12/11/2017    Endoscopy    Essential hypertension, benign     Hepatomegaly     History of stomach ulcers     Positive urine drug screen 01/2016    opiates and THC    Splenomegaly     Upper GI bleed        Past Surgical History:   Procedure Laterality Date    COLONOSCOPY N/A 11/5/2018    COLONOSCOPY with polypectomies performed by Ramone Bryant MD at [address]  ENDOSCOPY  12/11/2017       Current Outpatient Medications   Medication Sig Dispense Refill    amitriptyline HCl (AMITRIPTYLINE PO) Take 75 mg by mouth nightly.  OXcarbazepine (TRILEPTAL) 150 mg tablet Take  by mouth two (2) times a day.  tadalafil (CIALIS) 2.5 mg tablet Take 2.5 mg by mouth.  albuterol (PROVENTIL VENTOLIN) 2.5 mg /3 mL (0.083 %) nebulizer solution Nebulized 1 vial for inhalation every 4 hours as needed 60 Each 2    albuterol (PROVENTIL HFA, VENTOLIN HFA, PROAIR HFA) 90 mcg/actuation inhaler Take 2 Puffs by inhalation every four (4) hours as needed for Wheezing or Shortness of Breath. 1 Inhaler 2    roflumilast (DALIRESP) tab tablet Take 1 Tab by mouth daily. 30 Tab 5    tiotropium (SPIRIVA) 18 mcg inhalation capsule Take 1 Cap by inhalation daily. 30 Cap 5    fluticasone-salmeterol (ADVAIR DISKUS) 500-50 mcg/dose diskus inhaler Take 1 Puff by inhalation two (2) times a day. 1 Inhaler 5    lisinopril (PRINIVIL, ZESTRIL) 5 mg tablet Take 1 Tab by mouth daily. 90 Tab 1    furosemide (LASIX) 20 mg tablet Take 1 Tab by mouth every other day. 15 Tab 6    LORazepam (ATIVAN) 1 mg tablet Take 1 Tab by mouth every eight (8) hours as needed for Anxiety. Max Daily Amount: 3 mg. 90 Tab 0    Nebulizer & Compressor machine 1 Each by Does Not Apply route every four (4) hours as needed. 1 Each 0    OXYGEN-AIR DELIVERY SYSTEMS (WALKABOUT 1 OXYGEN SYSTEM) 2.5 L/min by Nasal route continuous.  pantoprazole (PROTONIX) 40 mg tablet Take 1 Tab by mouth Daily (before breakfast). 30 Tab 0    cyclobenzaprine (FLEXERIL) 5 mg tablet Take 1 Tab by mouth nightly. 30 Tab 1    predniSONE (DELTASONE) 20 mg tablet Take 20 mg by mouth daily (with breakfast).  10 Tab 0    tiZANidine (ZANAFLEX) 4 mg tablet Sig: Take 1 tab PO Q 6 as needed for muscle spasm 60 Tab 0    suvorexant (BELSOMRA) 10 mg tablet Take 1 Tab by mouth nightly as needed for Insomnia. Max Daily Amount: 10 mg. 30 Tab 0        Allergies   Allergen Reactions    Remeron [Mirtazapine] Other (comments)     Mood swings    Seroquel [Quetiapine] Other (comments)     Mood swings       Social History     Tobacco Use    Smoking status: Former Smoker     Packs/day: 2.00     Years: 25.00     Pack years: 50.00     Types: Cigarettes     Start date: 1984     Last attempt to quit: 2017     Years since quittin.2    Smokeless tobacco: Never Used   Substance Use Topics    Alcohol use: No    Drug use: Yes     Types: Marijuana     Comment: Hx of Marijuana use       Family History   Problem Relation Age of Onset    Hypertension Mother     Heart Disease Mother     Diabetes Mother     Hypertension Father     Heart Disease Father     Cancer Father         lymphnodes    Diabetes Father        Review of Systems  GENERAL: Denies fever or fatigue  CARDIAC: No CP or SOB  PULMONARY: No cough of SOB  MUSCULOSKELETAL: No new joint pain  NEURO: SEE HPI    Examination  Visit Vitals  BP (!) 140/100 (BP 1 Location: Left arm, BP Patient Position: Sitting)   Pulse 92   Temp 98.1 °F (36.7 °C)   Resp 18   Ht 5' 8\" (1.727 m)   Wt 86.2 kg (190 lb)   SpO2 97%   BMI 28.89 kg/m²       This is a very pleasant 43-year-old male. He is alert and in NAD. Heart is regular. Oriented x3, poor historian and unable to discuss all of his medications. EOM's are full, PERRL, no facial asymmetries. Strength and tone are normal. DTR's +2, gait symmetric. Impression/Plan  Paula Tim Sr. is a 51 y.o. male whose history and physical are consistent with headache disorder. Patient presents for hospital follow-up headache.   Previously saw Dr. Belgica Beasley and noted reviewed where she mentions \"He did undergo sleep study which did not show evidence of severe of significant sleep apnea however he did have oxygen desaturations down to 86 and spent 4.2 minutes below 88% O2 saturation he may be a candidate for nocturnal oxygen when he stopped smoking. \" He has quit smoking and is using oxygen at this time. Endorses snoring and witnessed nocturnal apneic spells. It would be prudent to refer him to Dr. Lindsey Hugo for evaluation of underlying FABIÁN as this may be contributing to headaches and hypertension. Furthermore, recommendation for strict blood pressure control and I will refer him back to his PCP regarding this. He currently is only on lisinopril 5 mg. Several other elevated readings of recent one when he was seen by his cardiologist.  We discussed he is currently on elavil and trileptal both of which he was given by his psychiatrist.  Dewitte Morning can be used for abortive headache therapy. We discussed other medications such as antiepileptics and blood pressure medications. Again at this time I did defer management of underlying hypertension and consideration for sleep disorder before starting a new medication at this time. Discussed this at length with patient. He verbalized understanding. Last head CT unremarkable for any acute findings. Consideration for repeat if persists. His examination today is reassuringly non focal.  Follow up once sleep and blood pressure are evaluated for. If worsening headache or additional concerns certainly return sooner. Diagnoses and all orders for this visit:    1. Snoring  -     REFERRAL TO NEUROLOGY    2. Headache disorder  -     REFERRAL TO NEUROLOGY    3. Apneic spell  -     REFERRAL TO NEUROLOGY      Total time: 30 min   Counseling / coordination time: 25 min   > 50% counseling / coordination?: Yes re: symptoms, management, medications, chart review, answering questions, and plan of care. Signed By: Delores Roman NP    This note will not be viewable in [address]     PLEASE NOTE:   Portions of this document may have been produced using voice recognition software. Unrecognized errors in transcription may be present.

## 2019-04-02 RX ORDER — TADALAFIL 2.5 MG/1
2.5 TABLET ORAL
Qty: 10 TAB | Refills: 0 | Status: SHIPPED | OUTPATIENT
Start: 2019-04-02 | End: 2019-04-12

## 2019-04-09 ENCOUNTER — OFFICE VISIT (OUTPATIENT)
Dept: FAMILY MEDICINE CLINIC | Age: 49
End: 2019-04-09

## 2019-04-09 VITALS
HEIGHT: 68 IN | RESPIRATION RATE: 19 BRPM | DIASTOLIC BLOOD PRESSURE: 106 MMHG | TEMPERATURE: 97.8 F | BODY MASS INDEX: 28.79 KG/M2 | SYSTOLIC BLOOD PRESSURE: 141 MMHG | OXYGEN SATURATION: 93 % | HEART RATE: 94 BPM | WEIGHT: 190 LBS

## 2019-04-09 DIAGNOSIS — M54.6 ACUTE BILATERAL THORACIC BACK PAIN: ICD-10-CM

## 2019-04-09 DIAGNOSIS — I10 ESSENTIAL HYPERTENSION, BENIGN: Primary | ICD-10-CM

## 2019-04-09 DIAGNOSIS — M54.2 NECK PAIN: ICD-10-CM

## 2019-04-09 RX ORDER — LISINOPRIL 20 MG/1
20 TABLET ORAL DAILY
Qty: 30 TAB | Refills: 0 | Status: SHIPPED | OUTPATIENT
Start: 2019-04-09 | End: 2019-05-07 | Stop reason: SDUPTHER

## 2019-04-09 RX ORDER — METHYLPREDNISOLONE 4 MG/1
TABLET ORAL
Qty: 1 DOSE PACK | Refills: 0 | Status: SHIPPED | OUTPATIENT
Start: 2019-04-09 | End: 2019-05-21

## 2019-04-09 NOTE — PROGRESS NOTES
Chief Complaint   Patient presents with    Hypertension     Patient was told by the neurologist she really could do little until BP was undercontrol     1. Have you been to the ER, urgent care clinic since your last visit? Hospitalized since your last visit? No    2. Have you seen or consulted any other health care providers outside of the [address] [place] [address] since your last visit? Include any pap smears or colon screening.  No

## 2019-04-09 NOTE — PATIENT INSTRUCTIONS
High Blood Pressure: Care Instructions  Overview    It's normal for blood pressure to go up and down throughout the day. But if it stays up, you have high blood pressure. Another name for high blood pressure is hypertension. Despite what a lot of people think, high blood pressure usually doesn't cause headaches or make you feel dizzy or lightheaded. It usually has no symptoms. But it does increase your risk of stroke, heart attack, and other problems. You and your doctor will talk about your risks of these problems based on your blood pressure. Your doctor will give you a goal for your blood pressure. Your goal will be based on your health and your age. Lifestyle changes, such as eating healthy and being active, are always important to help lower blood pressure. You might also take medicine to reach your blood pressure goal.  Follow-up care is a key part of your treatment and safety. Be sure to make and go to all appointments, and call your doctor if you are having problems. It's also a good idea to know your test results and keep a list of the medicines you take. How can you care for yourself at home? Medical treatment  · If you stop taking your medicine, your blood pressure will go back up. You may take one or more types of medicine to lower your blood pressure. Be safe with medicines. Take your medicine exactly as prescribed. Call your doctor if you think you are having a problem with your medicine. · Talk to your doctor before you start taking aspirin every day. Aspirin can help certain people lower their risk of a heart attack or stroke. But taking aspirin isn't right for everyone, because it can cause serious bleeding. · See your doctor regularly. You may need to see the doctor more often at first or until your blood pressure comes down. · If you are taking blood pressure medicine, talk to your doctor before you take decongestants or anti-inflammatory medicine, such as ibuprofen.  Some of these medicines can raise blood pressure. · Learn how to check your blood pressure at home. Lifestyle changes  · Stay at a healthy weight. This is especially important if you put on weight around the waist. Losing even 10 pounds can help you lower your blood pressure. · If your doctor recommends it, get more exercise. Walking is a good choice. Bit by bit, increase the amount you walk every day. Try for at least 30 minutes on most days of the week. You also may want to swim, bike, or do other activities. · Avoid or limit alcohol. Talk to your doctor about whether you can drink any alcohol. · Try to limit how much sodium you eat to less than 2,300 milligrams (mg) a day. Your doctor may ask you to try to eat less than 1,500 mg a day. · Eat plenty of fruits (such as bananas and oranges), vegetables, legumes, whole grains, and low-fat dairy products. · Lower the amount of saturated fat in your diet. Saturated fat is found in animal products such as milk, cheese, and meat. Limiting these foods may help you lose weight and also lower your risk for heart disease. · Do not smoke. Smoking increases your risk for heart attack and stroke. If you need help quitting, talk to your doctor about stop-smoking programs and medicines. These can increase your chances of quitting for good. When should you call for help? Call 911 anytime you think you may need emergency care. This may mean having symptoms that suggest that your blood pressure is causing a serious heart or blood vessel problem. Your blood pressure may be over 180/120.   For example, call 911 if:    · You have symptoms of a heart attack. These may include:  ? Chest pain or pressure, or a strange feeling in the chest.  ? Sweating. ? Shortness of breath. ? Nausea or vomiting. ? Pain, pressure, or a strange feeling in the back, neck, jaw, or upper belly or in one or both shoulders or arms. ? Lightheadedness or sudden weakness.   ? A fast or irregular heartbeat.     · You have symptoms of a stroke. These may include:  ? Sudden numbness, tingling, weakness, or loss of movement in your face, arm, or leg, especially on only one side of your body. ? Sudden vision changes. ? Sudden trouble speaking. ? Sudden confusion or trouble understanding simple statements. ? Sudden problems with walking or balance. ? A sudden, severe headache that is different from past headaches.     · You have severe back or belly pain.    Do not wait until your blood pressure comes down on its own. Get help right away.   Call your doctor now or seek immediate care if:    · Your blood pressure is much higher than normal (such as 180/120 or higher), but you don't have symptoms.     · You think high blood pressure is causing symptoms, such as:  ? Severe headache.  ? Blurry vision.    Watch closely for changes in your health, and be sure to contact your doctor if:    · Your blood pressure measures higher than your doctor recommends at least 2 times. That means the top number is higher or the bottom number is higher, or both.     · You think you may be having side effects from your blood pressure medicine. Where can you learn more? Go to http://costa-jessica.info/. Enter B312 in the search box to learn more about \"High Blood Pressure: Care Instructions. \"  Current as of: July 22, 2018  Content Version: 11.9  © 6075-1257 [employer] Care instructions adapted under license by [employer] (which disclaims liability or warranty for this information). If you have questions about a medical condition or this instruction, always ask your healthcare professional. [place] [address] any warranty or liability for your use of this information. Low Sodium Diet (2,000 Milligram): Care Instructions  Your Care Instructions    Too much sodium causes your body to hold on to extra water.  This can raise your blood pressure and force your heart and kidneys to work harder. In very serious cases, this could cause you to be put in the hospital. It might even be life-threatening. By limiting sodium, you will feel better and lower your risk of serious problems. The most common source of sodium is salt. People get most of the salt in their diet from canned, prepared, and packaged foods. Fast food and restaurant meals also are very high in sodium. Your doctor will probably limit your sodium to less than 2,000 milligrams (mg) a day. This limit counts all the sodium in prepared and packaged foods and any salt you add to your food. Follow-up care is a key part of your treatment and safety. Be sure to make and go to all appointments, and call your doctor if you are having problems. It's also a good idea to know your test results and keep a list of the medicines you take. How can you care for yourself at home? Read food labels  · Read labels on cans and food packages. The labels tell you how much sodium is in each serving. Make sure that you look at the serving size. If you eat more than the serving size, you have eaten more sodium. · Food labels also tell you the Percent Daily Value for sodium. Choose products with low Percent Daily Values for sodium. · Be aware that sodium can come in forms other than salt, including monosodium glutamate (MSG), sodium citrate, and sodium bicarbonate (baking soda). MSG is often added to Asian food. When you eat out, you can sometimes ask for food without MSG or added salt. Buy low-sodium foods  · Buy foods that are labeled \"unsalted\" (no salt added), \"sodium-free\" (less than 5 mg of sodium per serving), or \"low-sodium\" (less than 140 mg of sodium per serving). Foods labeled \"reduced-sodium\" and \"light sodium\" may still have too much sodium. Be sure to read the label to see how much sodium you are getting. · Buy fresh vegetables, or frozen vegetables without added sauces.  Buy low-sodium versions of canned vegetables, soups, and other canned goods.  Prepare low-sodium meals  · Cut back on the amount of salt you use in cooking. This will help you adjust to the taste. Do not add salt after cooking. One teaspoon of salt has about 2,300 mg of sodium. · Take the salt shaker off the table. · Flavor your food with garlic, lemon juice, onion, vinegar, herbs, and spices. Do not use soy sauce, lite soy sauce, steak sauce, onion salt, garlic salt, celery salt, mustard, or ketchup on your food. · Use low-sodium salad dressings, sauces, and ketchup. Or make your own salad dressings and sauces without adding salt. · Use less salt (or none) when recipes call for it. You can often use half the salt a recipe calls for without losing flavor. Other foods such as rice, pasta, and grains do not need added salt. · Rinse canned vegetables, and cook them in fresh water. This removes some--but not all--of the salt. · Avoid water that is naturally high in sodium or that has been treated with water softeners, which add sodium. Call your local water company to find out the sodium content of your water supply. If you buy bottled water, read the label and choose a sodium-free brand. Avoid high-sodium foods  · Avoid eating:  ? Smoked, cured, salted, and canned meat, fish, and poultry. ? Ham, andrew, hot dogs, and luncheon meats. ? Regular, hard, and processed cheese and regular peanut butter. ? Crackers with salted tops, and other salted snack foods such as pretzels, chips, and salted popcorn. ? Frozen prepared meals, unless labeled low-sodium. ? Canned and dried soups, broths, and bouillon, unless labeled sodium-free or low-sodium. ? Canned vegetables, unless labeled sodium-free or low-sodium. ? Western [employer] fries, pizza, tacos, and other fast foods. ? Pickles, olives, ketchup, and other condiments, especially soy sauce, unless labeled sodium-free or low-sodium. Where can you learn more? Go to http://costa-jessica.info/.   Enter T481 in the search box to learn more about \"Low Sodium Diet (2,000 Milligram): Care Instructions. \"  Current as of: March 28, 2018  Content Version: 11.9  © 1856-7601 [employer]. Care instructions adapted under license by [employer] (which disclaims liability or warranty for this information). If you have questions about a medical condition or this instruction, always ask your healthcare professional. [place] [address] any warranty or liability for your use of this information.

## 2019-04-09 NOTE — PROGRESS NOTES
RAMSES Amin Sr. is a 51 y.o. male who presents today for HTN follow up. Hypertension ROS: taking medications as instructed, no medication side effects noted, no TIA's, no chest pain on exertion, no dyspnea on exertion, no swelling of ankles. Pt is currently on Lisinopril 5 mg daily for HTN. Pt was seen by neurology but treatment was deferred until patient's blood pressure is controlled. Pt believes his blood pressure is elevated due to pain from a dental procedure, dentist sent Rx for patient to take Ibuprofen. Current Outpatient Medications   Medication Sig Dispense Refill    tadalafil (CIALIS) 2.5 mg tablet Take 1 Tab by mouth daily as needed (ED) for up to 10 days. 10 Tab 0    amitriptyline HCl (AMITRIPTYLINE PO) Take 75 mg by mouth nightly.  cyclobenzaprine (FLEXERIL) 5 mg tablet Take 1 Tab by mouth nightly. 30 Tab 1    albuterol (PROVENTIL VENTOLIN) 2.5 mg /3 mL (0.083 %) nebulizer solution Nebulized 1 vial for inhalation every 4 hours as needed 60 Each 2    albuterol (PROVENTIL HFA, VENTOLIN HFA, PROAIR HFA) 90 mcg/actuation inhaler Take 2 Puffs by inhalation every four (4) hours as needed for Wheezing or Shortness of Breath. 1 Inhaler 2    roflumilast (DALIRESP) tab tablet Take 1 Tab by mouth daily. 30 Tab 5    tiZANidine (ZANAFLEX) 4 mg tablet Sig: Take 1 tab PO Q 6 as needed for muscle spasm 60 Tab 0    tiotropium (SPIRIVA) 18 mcg inhalation capsule Take 1 Cap by inhalation daily. 30 Cap 5    fluticasone-salmeterol (ADVAIR DISKUS) 500-50 mcg/dose diskus inhaler Take 1 Puff by inhalation two (2) times a day. 1 Inhaler 5    lisinopril (PRINIVIL, ZESTRIL) 5 mg tablet Take 1 Tab by mouth daily. 90 Tab 1    furosemide (LASIX) 20 mg tablet Take 1 Tab by mouth every other day. 15 Tab 6    LORazepam (ATIVAN) 1 mg tablet Take 1 Tab by mouth every eight (8) hours as needed for Anxiety.  Max Daily Amount: 3 mg. 90 Tab 0    Nebulizer & Compressor machine 1 Each by Does Not Apply route every four (4) hours as needed. 1 Each 0    OXYGEN-AIR DELIVERY SYSTEMS (WALKABOUT 1 OXYGEN SYSTEM) 2.5 L/min by Nasal route continuous.  pantoprazole (PROTONIX) 40 mg tablet Take 1 Tab by mouth Daily (before breakfast). 30 Tab 0    OXcarbazepine (TRILEPTAL) 150 mg tablet Take  by mouth two (2) times a day.  predniSONE (DELTASONE) 20 mg tablet Take 20 mg by mouth daily (with breakfast). 10 Tab 0    suvorexant (BELSOMRA) 10 mg tablet Take 1 Tab by mouth nightly as needed for Insomnia. Max Daily Amount: 10 mg. 30 Tab 0      Allergies   Allergen Reactions    Remeron [Mirtazapine] Other (comments)     Mood swings    Seroquel [Quetiapine] Other (comments)     Mood swings       SUBJECTIVE:  Review of Systems   Constitutional: Negative for chills, fever and malaise/fatigue. HENT:        Jaw pain, sore gums (recently had teeth removed)   Eyes: Negative for blurred vision. Respiratory: Positive for shortness of breath. Negative for wheezing. Cardiovascular: Negative for chest pain, palpitations and leg swelling. Gastrointestinal: Negative for abdominal pain, diarrhea, nausea and vomiting. Musculoskeletal: Negative for myalgias. Neurological: Negative for dizziness, tingling, sensory change and headaches. OBJECTIVE:  Visit Vitals  BP (!) 141/106 (BP 1 Location: Left arm, BP Patient Position: Sitting)   Pulse 94   Temp 97.8 °F (36.6 °C) (Oral)   Resp 19   Ht 5' 8\" (1.727 m)   Wt 190 lb (86.2 kg)   SpO2 93%   BMI 28.89 kg/m²      I have reviewed/discussed the above normal BMI with the patient. I have recommended the following interventions: dietary management education, guidance, and counseling, encourage exercise and monitor weight . Physical Exam   Constitutional: He is oriented to person, place, and time and well-developed, well-nourished, and in no distress. HENT:   Head: Normocephalic. Eyes: Pupils are equal, round, and reactive to light.  Conjunctivae and EOM are normal. Neck: Neck supple. No thyromegaly present. Cardiovascular: Normal rate, regular rhythm and normal heart sounds. Pulmonary/Chest: Effort normal and breath sounds normal. He has no wheezes. He has no rales. He exhibits no tenderness. Neurological: He is alert and oriented to person, place, and time. Gait normal.   Skin: Skin is warm and dry. No erythema. Psychiatric: Mood and affect normal.   Nursing note and vitals reviewed. ASSESSMENT:  Diagnoses and all orders for this visit:    1. Essential hypertension, benign  -     lisinopril (PRINIVIL, ZESTRIL) 20 mg tablet; Take 1 Tab by mouth daily for 30 days. 2. Acute bilateral thoracic back pain  -     methylPREDNISolone (MEDROL DOSEPACK) 4 mg tablet; Take as directed on pack    3. Neck pain  -     methylPREDNISolone (MEDROL DOSEPACK) 4 mg tablet; Take as directed on pack    PLAN:  Increased Lisinopril from 5 mg to 20 mg   Advised patient to avoid NSAIDs due to elevated BP  Start Medrol dose pack for inflammation    I have discussed the diagnosis with the patient and the intended plan as seen in the above orders. The patient has received an after-visit summary and questions were answered concerning future plans. I have discussed medication side effects and warnings with the patient as well. Patient will call for further questions. Follow-up and Dispositions    · Return in about 1 month (around 5/7/2019) for HTN.        Juliana Cifuentes NP

## 2019-04-18 ENCOUNTER — OFFICE VISIT (OUTPATIENT)
Dept: NEUROLOGY | Age: 49
End: 2019-04-18

## 2019-04-18 VITALS
DIASTOLIC BLOOD PRESSURE: 90 MMHG | TEMPERATURE: 97.9 F | HEIGHT: 68 IN | BODY MASS INDEX: 29.1 KG/M2 | RESPIRATION RATE: 16 BRPM | HEART RATE: 122 BPM | SYSTOLIC BLOOD PRESSURE: 130 MMHG | WEIGHT: 192 LBS | OXYGEN SATURATION: 91 %

## 2019-04-18 DIAGNOSIS — G47.8 UPPER AIRWAY RESISTANCE SYNDROME: ICD-10-CM

## 2019-04-18 DIAGNOSIS — G47.00 INSOMNIA, UNSPECIFIED TYPE: ICD-10-CM

## 2019-04-18 DIAGNOSIS — F34.1 DYSTHYMIA: ICD-10-CM

## 2019-04-18 DIAGNOSIS — G47.10 HYPERSOMNIA: Primary | ICD-10-CM

## 2019-04-18 NOTE — PROGRESS NOTES
HPI: 44-year-old male who was referred to see me by Chao Hugo NP for evaluation of a sleep disorder. She sees him because of history of chronic headaches. He has seen Dr. Radha Hernandez in the past.  He has cognitive impairment. He has a wife who does not sleep in the room with him because of his snoring. He has been witnessed to stop breathing while asleep multiple times per night. Under the care of Dr. Radha Hernandez this patient had a sleep study in September 2017 which showed somewhat of a decrease in sleep efficiency of 76% with 328 minutes of total sleep time but no apneas or of hypo-apneas were observed. His minimum oxygen level during the study was 86%. He has insomnia. He goes to sleep 9-10, takes him 15-20mins if he takes hydroxyzine (belsomra not approved). She sees [facility] Psychiatrist. He is on other psychotropics, including lorazepam in am and amitriptyline in pm. He has hard to control panic attacks and anxiety. He is out of bed at 7-7:30am, but he wakes up 3-4 times per night, gets something to drink, bathroom, up for about 30 mins each time. He is tired during the day. He does not nap during the day. He walks in am. He is not a smoker. He does not drink ETOH, no drugs. He cut all caffeine. He has a TV in his bedroom, does not usually watch it.      Social History     Socioeconomic History    Marital status:      Spouse name: Not on file    Number of children: Not on file    Years of education: Not on file    Highest education level: Not on file   Occupational History    Not on file   Social Needs    Financial resource strain: Not on file    Food insecurity:     Worry: Not on file     Inability: Not on file    Transportation needs:     Medical: Not on file     Non-medical: Not on file   Tobacco Use    Smoking status: Former Smoker     Packs/day: 2.00     Years: 25.00     Pack years: 50.00     Types: Cigarettes     Start date: 5/28/1984     Last attempt to quit: 12/18/2017 Years since quittin.3    Smokeless tobacco: Never Used   Substance and Sexual Activity    Alcohol use: No    Drug use: Yes     Types: Marijuana     Comment: Hx of Marijuana use    Sexual activity: Yes     Partners: Female   Lifestyle    Physical activity:     Days per week: Not on file     Minutes per session: Not on file    Stress: Not on file   Relationships    Social connections:     Talks on phone: Not on file     Gets together: Not on file     Attends Orthodox service: Not on file     Active member of club or organization: Not on file     Attends meetings of clubs or organizations: Not on file     Relationship status: Not on file    Intimate partner violence:     Fear of current or ex partner: Not on file     Emotionally abused: Not on file     Physically abused: Not on file     Forced sexual activity: Not on file   Other Topics Concern     Service No    Blood Transfusions No    Caffeine Concern Yes    Occupational Exposure No    Hobby Hazards No    Sleep Concern Yes     Comment: occas    Stress Concern No    Weight Concern No    Special Diet No    Back Care Yes    Exercise No    Bike Helmet Yes    Seat Belt No     Comment: occas    Self-Exams Not Asked   Social History Narrative           Family History   Problem Relation Age of Onset    Hypertension Mother     Heart Disease Mother     Diabetes Mother     Hypertension Father     Heart Disease Father     Cancer Father         lymphnodes    Diabetes Father        Current Outpatient Medications   Medication Sig Dispense Refill    methylPREDNISolone (MEDROL DOSEPACK) 4 mg tablet Take as directed on pack 1 Dose Pack 0    lisinopril (PRINIVIL, ZESTRIL) 20 mg tablet Take 1 Tab by mouth daily for 30 days. 30 Tab 0    amitriptyline HCl (AMITRIPTYLINE PO) Take 75 mg by mouth nightly.  OXcarbazepine (TRILEPTAL) 150 mg tablet Take  by mouth two (2) times a day.       cyclobenzaprine (FLEXERIL) 5 mg tablet Take 1 Tab by mouth nightly. 30 Tab 1    predniSONE (DELTASONE) 20 mg tablet Take 20 mg by mouth daily (with breakfast). 10 Tab 0    albuterol (PROVENTIL VENTOLIN) 2.5 mg /3 mL (0.083 %) nebulizer solution Nebulized 1 vial for inhalation every 4 hours as needed 60 Each 2    albuterol (PROVENTIL HFA, VENTOLIN HFA, PROAIR HFA) 90 mcg/actuation inhaler Take 2 Puffs by inhalation every four (4) hours as needed for Wheezing or Shortness of Breath. 1 Inhaler 2    roflumilast (DALIRESP) tab tablet Take 1 Tab by mouth daily. 30 Tab 5    tiZANidine (ZANAFLEX) 4 mg tablet Sig: Take 1 tab PO Q 6 as needed for muscle spasm 60 Tab 0    tiotropium (SPIRIVA) 18 mcg inhalation capsule Take 1 Cap by inhalation daily. 30 Cap 5    fluticasone-salmeterol (ADVAIR DISKUS) 500-50 mcg/dose diskus inhaler Take 1 Puff by inhalation two (2) times a day. 1 Inhaler 5    furosemide (LASIX) 20 mg tablet Take 1 Tab by mouth every other day. 15 Tab 6    LORazepam (ATIVAN) 1 mg tablet Take 1 Tab by mouth every eight (8) hours as needed for Anxiety. Max Daily Amount: 3 mg. 90 Tab 0    Nebulizer & Compressor machine 1 Each by Does Not Apply route every four (4) hours as needed. 1 Each 0    OXYGEN-AIR DELIVERY SYSTEMS (WALKABOUT 1 OXYGEN SYSTEM) 2.5 L/min by Nasal route continuous.  pantoprazole (PROTONIX) 40 mg tablet Take 1 Tab by mouth Daily (before breakfast). 30 Tab 0    suvorexant (BELSOMRA) 10 mg tablet Take 1 Tab by mouth nightly as needed for Insomnia.  Max Daily Amount: 10 mg. 30 Tab 0       Past Medical History:   Diagnosis Date    Chronic diastolic heart failure secondary to coronary artery disease (HCC)     Chronic lung disease     Chronic obstructive pulmonary disease ([address] 08/03/2017    PFTS 8/3/17    COPD, severe ([address]     Emphysema/COPD ([address]     Esophagitis 12/11/2017    Endoscopy    Essential hypertension, benign     Hepatomegaly     History of stomach ulcers     Positive urine drug screen 01/2016    opiates and THC    Splenomegaly     Upper GI bleed        Past Surgical History:   Procedure Laterality Date    COLONOSCOPY N/A 11/5/2018    COLONOSCOPY with polypectomies performed by Kaushik Landin MD at [address] [facility] ENDOSCOPY  12/11/2017       Allergies   Allergen Reactions    Remeron [Mirtazapine] Other (comments)     Mood swings    Seroquel [Quetiapine] Other (comments)     Mood swings       Patient Active Problem List   Diagnosis Code    Emphysema/COPD ([facility] J43.9    COPD, severe ([facility] J44.9    Sepsis ([facility] A41.9    Essential hypertension, benign I10    Esophagitis K20.9    Panic attack F41.0    Insomnia G47.00    Need for influenza vaccination Z23    Need for diphtheria-tetanus-pertussis (Tdap) vaccine Z23    Hypovitaminosis D E55.9    Overweight (BMI 25.0-29. 9) E66.3         Review of Systems:   Constitutional: no fever or chills, reports fatigue  Skin denies rash or itching  HEENT:  Denies tinnitus, hearing loss, or visual changes  Respiratory: denies shortness of breath  Cardiovascular: denies chest pain, dyspnea on exertion  Gastrointestinal: does not report nausea or vomiting  Genitourinary: does not report dysuria or incontinence  Musculoskeletal: does not report joint pain or swelling  Endocrine: denies weight change  Hematology: denies easy bruising or bleeding   Neurological: as above in HPI      PHYSICAL EXAMINATION:         Vital signs:    Visit Vitals  /90 (BP 1 Location: Left arm, BP Patient Position: Sitting)   Pulse (!) 122   Temp 97.9 °F (36.6 °C) (Oral)   Resp 16   Ht 5' 8\" (1.727 m)   Wt 87.1 kg (192 lb)   SpO2 91%   BMI 29.19 kg/m²         GENERAL:                  Well developed, well nourished, in no apparent distress. HEART:                       RR  EXTREMITIES:           No edema is identified. Pulses are +2. HEAD:                         Normocephalic, atraumatic.   Mallampati IV, crowded oropharynx     NEUROLOGIC EXAMINATION       MENTAL STATUS:     Awake, alert, and oriented x 4. Attention and STM are grossly normal. There is no aphasia. Fund of knowledge is adequate. Mood and affect are appropriate  CRANIAL NERVES:   Visual fields are full to confrontation. Pupils are reactive to light and accommodation. Extraocular movements are intact and there is no nystagmus. Face is symmetrical.   Hearing is present. SCM/TPZ 5/5  Tongue protrudes midline, palate elevates symmetrically. MOTOR:          5/5 strength      CEREBELLAR:           No tremors    SENSORY:      Romberg is neg                 DTR's:                         +2 throughout, no long tract signs                 GAIT:                           Normal gait         Impression: Mixed sleep disorder, combination of chronic psychophysiologic insomnia and insomnia related to anxious depression with counterproductive psychotropic use for sleep. He does not sleep through the night even with amitriptyline and hydroxyzine. Furthermore despite normal PSG 1.5 yrs ago has marked symptoms of EDS, fatigue, witnessed apneas, and loud disruptive snoring, so PSG may have been a false negative. Sleep issues certainly along with his interrelated mood disorder contribute to his headaches. Plan: 1-Attended PSG. 2-Counseled pt at length about obstructive sleep apnea evaluation, treatment options, weight loss as appropriate, and consequences of untreated FABIÁN. 3-Counseled pt about sleep hygiene, including predictable bedtimes and rise times, avoidance of late meals near bedtime, no TV in bedroom, to get out of room if unable to fall asleep after 10-15 mins, and no napping. 4-I advised him about an 11:30pm bedtime. He is spending too much time in bed and this is counterproductive. 5-Counseled him about trying mindfulness medication to see if he can decrease dependence on psychotropics for anxiety and sleep.      6-Will see him after his sleep study.     25/40 mins spent counseling today. PLEASE NOTE:   Portions of this document may have been produced using voice recognition software. Unrecognized errors in transcription may be present. This note will not be viewable in [address]

## 2019-04-18 NOTE — PROGRESS NOTES
ROOM # [id number] [address] 30 [address] presents today for   Chief Complaint   Patient presents with    Follow-up    Migraine       Elyse Cool Sr. preferred language for health care discussion is english/other. Depression Screening:  3 most recent [facility] Screens 3/28/2019 1/9/2019 9/17/2018 5/29/2018 5/2/2018 4/25/2018 3/28/2018   PHQ Not Done - - - Medical Reason (indicate in comments) - - Active Diagnosis of Depression or Bipolar Disorder   Little interest or pleasure in doing things Not at all Not at all Not at all - Not at all Not at all Not at all   Feeling down, depressed, irritable, or hopeless Not at all Not at all Not at all - Not at all Not at all Not at all   Total Score PHQ 2 0 0 0 - 0 0 0       Learning Assessment:  Learning Assessment 1/8/2019 3/5/2018 1/2/2018   PRIMARY LEARNER Patient Patient Patient   PRIMARY LANGUAGE ENGLISH ENGLISH ENGLISH   LEARNER PREFERENCE PRIMARY OTHER (COMMENT) DEMONSTRATION READING     - - LISTENING     - - DEMONSTRATION   ANSWERED BY patient Patient patient   RELATIONSHIP SELF SELF SELF       Abuse Screening:  Abuse Screening Questionnaire 1/8/2019   Do you ever feel afraid of your partner? N   Are you in a relationship with someone who physically or mentally threatens you? N   Is it safe for you to go home? Y       Fall Risk  Fall Risk Assessment, last 12 mths 9/17/2018 5/29/2018 1/23/2018 8/28/2017   Able to walk? Yes Yes Yes Yes   Fall in past 12 months? No No No No       Visit Vitals  /90 (BP 1 Location: Left arm, BP Patient Position: Sitting)   Pulse (!) 122   Temp 97.9 °F (36.6 °C) (Oral)   Resp 16   Ht 5' 8\" (1.727 m)   Wt 192 lb (87.1 kg)   SpO2 91%   BMI 29.19 kg/m²       Health Maintenance reviewed and discussed per provider. Yes    Elyse Cool Sr. is due for There are no preventive care reminders to display for this patient. Please order/place referral if appropriate. Advance Directive:  1. Do you have an advance directive in place?  Patient Reply: No    2. If not, would you like material regarding how to put one in place? Patient Reply: No    Coordination of Care:  1. Have you been to the ER, urgent care clinic since your last visit? Hospitalized since your last visit?  No

## 2019-04-22 RX ORDER — ALBUTEROL SULFATE 90 UG/1
AEROSOL, METERED RESPIRATORY (INHALATION)
Qty: 1 INHALER | Refills: 2 | Status: SHIPPED | OUTPATIENT
Start: 2019-04-22 | End: 2019-08-05 | Stop reason: SDUPTHER

## 2019-04-24 ENCOUNTER — TELEPHONE (OUTPATIENT)
Dept: FAMILY MEDICINE CLINIC | Age: 49
End: 2019-04-24

## 2019-04-24 NOTE — TELEPHONE ENCOUNTER
Patient wife calling checking on the status of the patients Pressure Cuff. Asking for a call back from the nurse.     pls advise

## 2019-04-25 ENCOUNTER — HOSPITAL ENCOUNTER (EMERGENCY)
Age: 49
Discharge: HOME | End: 2019-04-25
Attending: EMERGENCY MEDICINE
Payer: MEDICAID

## 2019-04-25 ENCOUNTER — APPOINTMENT (OUTPATIENT)
Dept: CT IMAGING | Age: 49
End: 2019-04-25
Attending: EMERGENCY MEDICINE
Payer: MEDICAID

## 2019-04-25 ENCOUNTER — APPOINTMENT (OUTPATIENT)
Dept: GENERAL RADIOLOGY | Age: 49
End: 2019-04-25
Attending: EMERGENCY MEDICINE
Payer: MEDICAID

## 2019-04-25 ENCOUNTER — OFFICE VISIT (OUTPATIENT)
Dept: FAMILY MEDICINE CLINIC | Age: 49
End: 2019-04-25

## 2019-04-25 ENCOUNTER — APPOINTMENT (OUTPATIENT)
Dept: ULTRASOUND IMAGING | Age: 49
End: 2019-04-25
Attending: EMERGENCY MEDICINE
Payer: MEDICAID

## 2019-04-25 ENCOUNTER — OFFICE VISIT (OUTPATIENT)
Dept: NEUROLOGY | Age: 49
End: 2019-04-25

## 2019-04-25 VITALS
BODY MASS INDEX: 28.74 KG/M2 | HEART RATE: 130 BPM | TEMPERATURE: 98 F | DIASTOLIC BLOOD PRESSURE: 70 MMHG | SYSTOLIC BLOOD PRESSURE: 110 MMHG | WEIGHT: 189 LBS | RESPIRATION RATE: 18 BRPM | OXYGEN SATURATION: 98 %

## 2019-04-25 VITALS
RESPIRATION RATE: 16 BRPM | SYSTOLIC BLOOD PRESSURE: 147 MMHG | DIASTOLIC BLOOD PRESSURE: 100 MMHG | HEART RATE: 96 BPM | OXYGEN SATURATION: 95 % | TEMPERATURE: 97.1 F

## 2019-04-25 VITALS
RESPIRATION RATE: 18 BRPM | SYSTOLIC BLOOD PRESSURE: 126 MMHG | OXYGEN SATURATION: 97 % | BODY MASS INDEX: 28.79 KG/M2 | TEMPERATURE: 98.1 F | HEIGHT: 68 IN | HEART RATE: 86 BPM | WEIGHT: 190 LBS | DIASTOLIC BLOOD PRESSURE: 88 MMHG

## 2019-04-25 DIAGNOSIS — I10 ESSENTIAL HYPERTENSION, BENIGN: Primary | ICD-10-CM

## 2019-04-25 DIAGNOSIS — K40.90 LEFT INGUINAL HERNIA: ICD-10-CM

## 2019-04-25 DIAGNOSIS — R10.32 LEFT GROIN PAIN: Primary | ICD-10-CM

## 2019-04-25 DIAGNOSIS — R51.9 HEADACHE DISORDER: Primary | ICD-10-CM

## 2019-04-25 DIAGNOSIS — R91.8 GROUND GLASS OPACITY PRESENT ON IMAGING OF LUNG: ICD-10-CM

## 2019-04-25 LAB
ALBUMIN SERPL-MCNC: 3.9 G/DL (ref 3.4–5)
ALBUMIN/GLOB SERPL: 1.3 {RATIO} (ref 0.8–1.7)
ALP SERPL-CCNC: 88 U/L (ref 45–117)
ALT SERPL-CCNC: 47 U/L (ref 16–61)
ANION GAP SERPL CALC-SCNC: 6 MMOL/L (ref 3–18)
APPEARANCE UR: CLEAR
AST SERPL-CCNC: 17 U/L (ref 15–37)
BASOPHILS # BLD: 0 K/UL (ref 0–0.1)
BASOPHILS NFR BLD: 0 % (ref 0–2)
BILIRUB SERPL-MCNC: 0.8 MG/DL (ref 0.2–1)
BILIRUB UR QL: NEGATIVE
BUN SERPL-MCNC: 10 MG/DL (ref 7–18)
BUN/CREAT SERPL: 10 (ref 12–20)
CALCIUM SERPL-MCNC: 9.2 MG/DL (ref 8.5–10.1)
CHLORIDE SERPL-SCNC: 106 MMOL/L (ref 100–108)
CO2 SERPL-SCNC: 28 MMOL/L (ref 21–32)
COLOR UR: YELLOW
CREAT SERPL-MCNC: 1.05 MG/DL (ref 0.6–1.3)
DIFFERENTIAL METHOD BLD: ABNORMAL
EOSINOPHIL # BLD: 0.1 K/UL (ref 0–0.4)
EOSINOPHIL NFR BLD: 1 % (ref 0–5)
ERYTHROCYTE [DISTWIDTH] IN BLOOD BY AUTOMATED COUNT: 13 % (ref 11.6–14.5)
GLOBULIN SER CALC-MCNC: 3 G/DL (ref 2–4)
GLUCOSE SERPL-MCNC: 103 MG/DL (ref 74–99)
GLUCOSE UR STRIP.AUTO-MCNC: NEGATIVE MG/DL
HCT VFR BLD AUTO: 44.6 % (ref 36–48)
HGB BLD-MCNC: 16.1 G/DL (ref 13–16)
HGB UR QL STRIP: NEGATIVE
KETONES UR QL STRIP.AUTO: NEGATIVE MG/DL
LACTATE BLD-SCNC: 0.78 MMOL/L (ref 0.4–2)
LACTATE BLD-SCNC: 2.03 MMOL/L (ref 0.4–2)
LEUKOCYTE ESTERASE UR QL STRIP.AUTO: NEGATIVE
LYMPHOCYTES # BLD: 1.6 K/UL (ref 0.9–3.6)
LYMPHOCYTES NFR BLD: 19 % (ref 21–52)
MCH RBC QN AUTO: 30.7 PG (ref 24–34)
MCHC RBC AUTO-ENTMCNC: 36.1 G/DL (ref 31–37)
MCV RBC AUTO: 85.1 FL (ref 74–97)
MONOCYTES # BLD: 0.5 K/UL (ref 0.05–1.2)
MONOCYTES NFR BLD: 6 % (ref 3–10)
NEUTS SEG # BLD: 6.3 K/UL (ref 1.8–8)
NEUTS SEG NFR BLD: 74 % (ref 40–73)
NITRITE UR QL STRIP.AUTO: NEGATIVE
PH UR STRIP: 5.5 [PH] (ref 5–8)
PLATELET # BLD AUTO: 279 K/UL (ref 135–420)
PMV BLD AUTO: 10.2 FL (ref 9.2–11.8)
POTASSIUM SERPL-SCNC: 4 MMOL/L (ref 3.5–5.5)
PROT SERPL-MCNC: 6.9 G/DL (ref 6.4–8.2)
PROT UR STRIP-MCNC: NEGATIVE MG/DL
RBC # BLD AUTO: 5.24 M/UL (ref 4.7–5.5)
SODIUM SERPL-SCNC: 140 MMOL/L (ref 136–145)
SP GR UR REFRACTOMETRY: 1.02 (ref 1–1.03)
UROBILINOGEN UR QL STRIP.AUTO: 1 EU/DL (ref 0.2–1)
WBC # BLD AUTO: 8.5 K/UL (ref 4.6–13.2)

## 2019-04-25 PROCEDURE — 80053 COMPREHEN METABOLIC PANEL: CPT

## 2019-04-25 PROCEDURE — 81003 URINALYSIS AUTO W/O SCOPE: CPT

## 2019-04-25 PROCEDURE — 87086 URINE CULTURE/COLONY COUNT: CPT

## 2019-04-25 PROCEDURE — 99284 EMERGENCY DEPT VISIT MOD MDM: CPT

## 2019-04-25 PROCEDURE — 96365 THER/PROPH/DIAG IV INF INIT: CPT

## 2019-04-25 PROCEDURE — 74011000258 HC RX REV CODE- 258: Performed by: EMERGENCY MEDICINE

## 2019-04-25 PROCEDURE — 74011250636 HC RX REV CODE- 250/636: Performed by: EMERGENCY MEDICINE

## 2019-04-25 PROCEDURE — 76870 US EXAM SCROTUM: CPT

## 2019-04-25 PROCEDURE — 96376 TX/PRO/DX INJ SAME DRUG ADON: CPT

## 2019-04-25 PROCEDURE — 99285 EMERGENCY DEPT VISIT HI MDM: CPT

## 2019-04-25 PROCEDURE — 96366 THER/PROPH/DIAG IV INF ADDON: CPT

## 2019-04-25 PROCEDURE — 96361 HYDRATE IV INFUSION ADD-ON: CPT

## 2019-04-25 PROCEDURE — 85025 COMPLETE CBC W/AUTO DIFF WBC: CPT

## 2019-04-25 PROCEDURE — 74011250636 HC RX REV CODE- 250/636

## 2019-04-25 PROCEDURE — 74011636320 HC RX REV CODE- 636/320: Performed by: EMERGENCY MEDICINE

## 2019-04-25 PROCEDURE — 87040 BLOOD CULTURE FOR BACTERIA: CPT

## 2019-04-25 PROCEDURE — 83605 ASSAY OF LACTIC ACID: CPT

## 2019-04-25 PROCEDURE — 71045 X-RAY EXAM CHEST 1 VIEW: CPT

## 2019-04-25 PROCEDURE — 96375 TX/PRO/DX INJ NEW DRUG ADDON: CPT

## 2019-04-25 PROCEDURE — 74177 CT ABD & PELVIS W/CONTRAST: CPT

## 2019-04-25 RX ORDER — HYDROXYZINE 50 MG/1
50 TABLET, FILM COATED ORAL
COMMUNITY
End: 2019-10-30

## 2019-04-25 RX ORDER — MORPHINE SULFATE 4 MG/ML
4 INJECTION INTRAVENOUS
Status: COMPLETED | OUTPATIENT
Start: 2019-04-25 | End: 2019-04-25

## 2019-04-25 RX ORDER — AZITHROMYCIN 500 MG/1
TABLET, FILM COATED ORAL
Qty: 3 TAB | Refills: 0 | Status: SHIPPED | OUTPATIENT
Start: 2019-04-25 | End: 2019-05-20 | Stop reason: ALTCHOICE

## 2019-04-25 RX ORDER — MORPHINE SULFATE 10 MG/ML
INJECTION, SOLUTION INTRAMUSCULAR; INTRAVENOUS
Status: COMPLETED
Start: 2019-04-25 | End: 2019-04-25

## 2019-04-25 RX ORDER — ONDANSETRON 2 MG/ML
4 INJECTION INTRAMUSCULAR; INTRAVENOUS
Status: COMPLETED | OUTPATIENT
Start: 2019-04-25 | End: 2019-04-25

## 2019-04-25 RX ORDER — SODIUM CHLORIDE 9 MG/ML
150 INJECTION, SOLUTION INTRAVENOUS CONTINUOUS
Status: DISCONTINUED | OUTPATIENT
Start: 2019-04-25 | End: 2019-04-26 | Stop reason: HOSPADM

## 2019-04-25 RX ORDER — ACETAMINOPHEN 500 MG
1 TABLET ORAL
Qty: 1 KIT | Refills: 0 | Status: SHIPPED | OUTPATIENT
Start: 2019-04-25

## 2019-04-25 RX ORDER — FLUOXETINE HYDROCHLORIDE 40 MG/1
CAPSULE ORAL DAILY
COMMUNITY
End: 2019-09-26

## 2019-04-25 RX ORDER — TOPIRAMATE 25 MG/1
TABLET ORAL
Qty: 60 TAB | Refills: 1 | Status: SHIPPED | OUTPATIENT
Start: 2019-04-25 | End: 2019-06-26 | Stop reason: SDUPTHER

## 2019-04-25 RX ORDER — MORPHINE SULFATE 4 MG/ML
4 INJECTION INTRAVENOUS
Status: DISCONTINUED | OUTPATIENT
Start: 2019-04-25 | End: 2019-04-26 | Stop reason: HOSPADM

## 2019-04-25 RX ORDER — SODIUM CHLORIDE 0.9 % (FLUSH) 0.9 %
5-10 SYRINGE (ML) INJECTION AS NEEDED
Status: DISCONTINUED | OUTPATIENT
Start: 2019-04-25 | End: 2019-04-26 | Stop reason: HOSPADM

## 2019-04-25 RX ORDER — HYDROCODONE BITARTRATE AND ACETAMINOPHEN 5; 325 MG/1; MG/1
1 TABLET ORAL
Qty: 18 TAB | Refills: 0 | Status: SHIPPED | OUTPATIENT
Start: 2019-04-25 | End: 2019-04-28

## 2019-04-25 RX ADMIN — IOPAMIDOL 100 ML: 612 INJECTION, SOLUTION INTRAVENOUS at 19:06

## 2019-04-25 RX ADMIN — MORPHINE SULFATE 4 MG: 4 INJECTION INTRAVENOUS at 17:35

## 2019-04-25 RX ADMIN — MORPHINE SULFATE 4 MG: 10 INJECTION, SOLUTION INTRAMUSCULAR; INTRAVENOUS at 15:25

## 2019-04-25 RX ADMIN — SODIUM CHLORIDE 1000 ML: 900 INJECTION, SOLUTION INTRAVENOUS at 16:00

## 2019-04-25 RX ADMIN — MORPHINE SULFATE 4 MG: 4 INJECTION INTRAVENOUS at 20:56

## 2019-04-25 RX ADMIN — SODIUM CHLORIDE 1000 ML: 900 INJECTION, SOLUTION INTRAVENOUS at 16:30

## 2019-04-25 RX ADMIN — ONDANSETRON 4 MG: 2 INJECTION INTRAMUSCULAR; INTRAVENOUS at 15:25

## 2019-04-25 RX ADMIN — SODIUM CHLORIDE 1000 ML: 900 INJECTION, SOLUTION INTRAVENOUS at 15:25

## 2019-04-25 RX ADMIN — PIPERACILLIN SODIUM,TAZOBACTAM SODIUM 3.38 G: 3; .375 INJECTION, POWDER, FOR SOLUTION INTRAVENOUS at 17:40

## 2019-04-25 NOTE — ED PROVIDER NOTES
EMERGENCY DEPARTMENT HISTORY AND PHYSICAL EXAM 
 
Date: 4/25/2019 Patient Name: Peewee Johnson Sr. 
 
History of Presenting Illness Chief Complaint Patient presents with  Abdominal Pain History Provided By: Patient Additional History (Context): Kofi Maldonado is a 51 y.o. male with obesity, COPD and Headaches, congestive heart failure diastolic who presents with sudden onset of left groin and left testicular pain when exiting his truck today while going to his neurology appointment for migraines. Denies prior similar symptoms and was asymptomatic before this event. PCP: Loren Arcos NP Current Facility-Administered Medications Medication Dose Route Frequency Provider Last Rate Last Dose  
 0.9% sodium chloride infusion  150 mL/hr IntraVENous CONTINUOUS Leia Espinoza PA      
 sodium chloride (NS) flush 5-10 mL  5-10 mL IntraVENous PRN Leia Espinoza PA      
 sodium chloride 0.9 % bolus infusion 586 mL  586 mL IntraVENous ONCE Leia Espinoza PA      
 morphine injection 4 mg  4 mg IntraVENous NOW Leia Espinoza PA Current Outpatient Medications Medication Sig Dispense Refill  FLUoxetine (PROZAC) 40 mg capsule Take  by mouth daily.  OTHER Indications: takes Methylred for insomnia  hydrOXYzine HCl (ATARAX) 50 mg tablet Take 50 mg by mouth nightly. Indications: 100 mg qhs    
 topiramate (TOPAMAX) 25 mg tablet Take 25 mg BID x 7 days then 50 mg  BID 60 Tab 1  Blood Pressure Monitor (BLOOD PRESSURE KIT) kit 1 Device by Does Not Apply route daily as needed (for blood pressure checks). 1 Kit 0  
 PROAIR HFA 90 mcg/actuation inhaler INHALE 2 PUFFS BY MOUTH EVERY 4 HOURS AS NEEDED FOR WHEEZING OR SHORTNESS OF BREATH 1 Inhaler 2  
 methylPREDNISolone (MEDROL DOSEPACK) 4 mg tablet Take as directed on pack 1 Dose Pack 0  
 lisinopril (PRINIVIL, ZESTRIL) 20 mg tablet Take 1 Tab by mouth daily for 30 days.  30 Tab 0  
  amitriptyline HCl (AMITRIPTYLINE PO) Take 75 mg by mouth nightly.  OXcarbazepine (TRILEPTAL) 150 mg tablet Take  by mouth two (2) times a day.  predniSONE (DELTASONE) 20 mg tablet Take 20 mg by mouth daily (with breakfast). 10 Tab 0  
 albuterol (PROVENTIL VENTOLIN) 2.5 mg /3 mL (0.083 %) nebulizer solution Nebulized 1 vial for inhalation every 4 hours as needed 60 Each 2  
 roflumilast (DALIRESP) tab tablet Take 1 Tab by mouth daily. 30 Tab 5  tiZANidine (ZANAFLEX) 4 mg tablet Sig: Take 1 tab PO Q 6 as needed for muscle spasm 60 Tab 0  
 tiotropium (SPIRIVA) 18 mcg inhalation capsule Take 1 Cap by inhalation daily. 30 Cap 5  
 fluticasone-salmeterol (ADVAIR DISKUS) 500-50 mcg/dose diskus inhaler Take 1 Puff by inhalation two (2) times a day. 1 Inhaler 5  
 suvorexant (BELSOMRA) 10 mg tablet Take 1 Tab by mouth nightly as needed for Insomnia. Max Daily Amount: 10 mg. 30 Tab 0  
 furosemide (LASIX) 20 mg tablet Take 1 Tab by mouth every other day. 15 Tab 6  
 LORazepam (ATIVAN) 1 mg tablet Take 1 Tab by mouth every eight (8) hours as needed for Anxiety. Max Daily Amount: 3 mg. 90 Tab 0  
 Nebulizer & Compressor machine 1 Each by Does Not Apply route every four (4) hours as needed. 1 Each 0  
 OXYGEN-AIR DELIVERY SYSTEMS (WALKABOUT 1 OXYGEN SYSTEM) 2.5 L/min by Nasal route continuous.  pantoprazole (PROTONIX) 40 mg tablet Take 1 Tab by mouth Daily (before breakfast). 30 Tab 0 Past History Past Medical History: 
Past Medical History:  
Diagnosis Date  Chronic diastolic heart failure secondary to coronary artery disease ([address]  Chronic lung disease  Chronic obstructive pulmonary disease ([address] 08/03/2017 PFTS 8/3/17  COPD, severe ([address]  [facility]D [facility]  Esophagitis 12/11/2017 Endoscopy  Essential hypertension, benign  Hepatomegaly  History of stomach ulcers  Positive urine drug screen 01/2016  
 opiates and THC  Splenomegaly  Upper GI bleed Past Surgical History: 
Past Surgical History:  
Procedure Laterality Date  COLONOSCOPY N/A 2018 COLONOSCOPY with polypectomies performed by Vilma Huffman MD at [address] HX ENDOSCOPY  2017 Family History: 
Family History Problem Relation Age of Onset  Hypertension Mother  Heart Disease Mother  Diabetes Mother  Hypertension Father  Heart Disease Father  Cancer Father   
     lymphnodes  Diabetes Father Social History: 
Social History Tobacco Use  Smoking status: Former Smoker Packs/day: 2.00 Years: 25.00 Pack years: 50.00 Types: Cigarettes Start date: 1984 Last attempt to quit: 2017 Years since quittin.3  Smokeless tobacco: Never Used Substance Use Topics  Alcohol use: No  
 Drug use: Yes Types: Marijuana Comment: Hx of Marijuana use Allergies: Allergies Allergen Reactions  Remeron [Mirtazapine] Other (comments) Mood swings  Seroquel [Quetiapine] Other (comments) Mood swings Review of Systems Review of Systems Constitutional: Negative for fever. Gastrointestinal: Positive for abdominal pain. Left groin pain Genitourinary: Positive for testicular pain. All other systems reviewed and are negative. All Other Systems Negative Physical Exam  
 
Vitals:  
 19 1421 19 1423 19 1425 19 1745 BP: (!) 135/91   (!) 154/97 Pulse: (!) 119   93 Resp: 18   16 Temp:  97.6 °F (36.4 °C) 97.6 °F (36.4 °C) 97.7 °F (36.5 °C) SpO2: 96%   97% Physical Exam  
Constitutional: Vital signs are normal. He appears well-developed and well-nourished. He is active. Non-toxic appearance. He does not appear ill. He appears distressed. HENT:  
Head: Normocephalic and atraumatic. Neck: Normal range of motion. Neck supple. Carotid bruit is not present. No tracheal deviation present. No thyromegaly present. Cardiovascular: Regular rhythm and normal heart sounds. Exam reveals no gallop and no friction rub. No murmur heard. Tachycardic rate. Pulmonary/Chest: Effort normal and breath sounds normal. No stridor. No respiratory distress. He has no wheezes. He has no rales. He exhibits no tenderness. Abdominal: Soft. He exhibits no distension and no mass. There is tenderness. There is no rebound, no guarding and no CVA tenderness. Left lower quadrant abdominal tenderness inferiorly. Genitourinary:  
Genitourinary Comments: Diffuse left testicular tenderness to palpation. Left inguinal tenderness to palpation Musculoskeletal: Normal range of motion. Neurological: He is alert. Skin: Skin is warm, dry and intact. He is not diaphoretic. No pallor. Psychiatric: He has a normal mood and affect. His speech is normal and behavior is normal. Judgment and thought content normal.  
Nursing note and vitals reviewed. Diagnostic Study Results Labs - Recent Results (from the past 12 hour(s)) CBC WITH AUTOMATED DIFF Collection Time: 04/25/19  3:15 PM  
Result Value Ref Range WBC 8.5 4.6 - 13.2 K/uL  
 RBC 5.24 4.70 - 5.50 M/uL  
 HGB 16.1 (H) 13.0 - 16.0 g/dL HCT 44.6 36.0 - 48.0 % MCV 85.1 74.0 - 97.0 FL  
 MCH 30.7 24.0 - 34.0 PG  
 MCHC 36.1 31.0 - 37.0 g/dL  
 RDW 13.0 11.6 - 14.5 % PLATELET 082 370 - 074 K/uL MPV 10.2 9.2 - 11.8 FL  
 NEUTROPHILS 74 (H) 40 - 73 % LYMPHOCYTES 19 (L) 21 - 52 % MONOCYTES 6 3 - 10 % EOSINOPHILS 1 0 - 5 % BASOPHILS 0 0 - 2 %  
 ABS. NEUTROPHILS 6.3 1.8 - 8.0 K/UL  
 ABS. LYMPHOCYTES 1.6 0.9 - 3.6 K/UL  
 ABS. MONOCYTES 0.5 0.05 - 1.2 K/UL  
 ABS. EOSINOPHILS 0.1 0.0 - 0.4 K/UL  
 ABS. BASOPHILS 0.0 0.0 - 0.1 K/UL  
 DF AUTOMATED METABOLIC PANEL, COMPREHENSIVE Collection Time: 04/25/19  3:15 PM  
Result Value Ref Range  Sodium 140 136 - 145 mmol/L  
 Potassium 4.0 3.5 - 5.5 mmol/L Chloride 106 100 - 108 mmol/L  
 CO2 28 21 - 32 mmol/L Anion gap 6 3.0 - 18 mmol/L Glucose 103 (H) 74 - 99 mg/dL BUN 10 7.0 - 18 MG/DL Creatinine 1.05 0.6 - 1.3 MG/DL  
 BUN/Creatinine ratio 10 (L) 12 - 20 GFR est AA >60 >60 ml/min/1.73m2 GFR est non-AA >60 >60 ml/min/1.73m2 Calcium 9.2 8.5 - 10.1 MG/DL Bilirubin, total 0.8 0.2 - 1.0 MG/DL  
 ALT (SGPT) 47 16 - 61 U/L  
 AST (SGOT) 17 15 - 37 U/L Alk. phosphatase 88 45 - 117 U/L Protein, total 6.9 6.4 - 8.2 g/dL Albumin 3.9 3.4 - 5.0 g/dL Globulin 3.0 2.0 - 4.0 g/dL A-G Ratio 1.3 0.8 - 1.7 POC LACTIC ACID Collection Time: 04/25/19  3:23 PM  
Result Value Ref Range Lactic Acid (POC) 2.03 (HH) 0.40 - 2.00 mmol/L  
URINALYSIS W/ RFLX MICROSCOPIC Collection Time: 04/25/19  5:25 PM  
Result Value Ref Range Color YELLOW Appearance CLEAR Specific gravity 1.022 1.005 - 1.030    
 pH (UA) 5.5 5.0 - 8.0 Protein NEGATIVE  NEG mg/dL Glucose NEGATIVE  NEG mg/dL Ketone NEGATIVE  NEG mg/dL Bilirubin NEGATIVE  NEG Blood NEGATIVE  NEG Urobilinogen 1.0 0.2 - 1.0 EU/dL Nitrites NEGATIVE  NEG Leukocyte Esterase NEGATIVE  NEG Radiologic Studies -  
US SCROTUM/TESTICLES Final Result IMPRESSION:  
  
1. Bilateral moderate hydrocele. 2.  No evidence of testicular torsion. No intratesticular mass. 3.  Poor visualization of the epididymides. XR CHEST PORT    (Results Pending) CT ABD PELV W CONT    (Results Pending) CT Results  (Last 48 hours) None CXR Results  (Last 48 hours) None Medical Decision Making I am the first provider for this patient. I reviewed the vital signs, available nursing notes, past medical history, past surgical history, family history and social history. Vital Signs-Reviewed the patient's vital signs. Records Reviewed: Nursing Notes, Old Medical Records, Previous Radiology Studies and Previous Laboratory Studies Procedures: 
Procedures Provider Notes (Medical Decision Making): Get ultrasound labs keep n.p.o. hydrate controlled with symptoms. Nothing acute on chest x-ray. As ultrasound made his comfort level less tolerable. Patient now on CAT scan. Will repeat lactic acid. Consider epididymitis since that was not evaluated well on ultrasound if CT scan is unremarkable. Urine negative. 
 
7:02 PM : Pt care transferred to [place] [address] provider. History of patient complaint(s), available diagnostic reports and current treatment plan has been discussed thoroughly. Bedside rounding on patient occured : no . Intended disposition of patient :TBD Pending diagnostics reports and/or labs (please list): CT results, repeat lactic acid Progress notes 9:50 PM 
Dane Nnun PA-C 
CT abdomen is negative except for some groundglass opacity of the right lower lobe of the lung. Abdominal scan is negative. There is possibly a left inguinal hernia that is not visible on the CAT scan since that is the location of his pain. Ultrasound of testicles negative. Dane Nunn PA-C 
 
 
MED RECONCILIATION: 
Current Facility-Administered Medications Medication Dose Route Frequency  0.9% sodium chloride infusion  150 mL/hr IntraVENous CONTINUOUS  
 sodium chloride (NS) flush 5-10 mL  5-10 mL IntraVENous PRN  
 sodium chloride 0.9 % bolus infusion 586 mL  586 mL IntraVENous ONCE  
 morphine injection 4 mg  4 mg IntraVENous NOW Current Outpatient Medications Medication Sig  FLUoxetine (PROZAC) 40 mg capsule Take  by mouth daily.  OTHER Indications: takes Methylred for insomnia  hydrOXYzine HCl (ATARAX) 50 mg tablet Take 50 mg by mouth nightly.  Indications: 100 mg qhs  
 topiramate (TOPAMAX) 25 mg tablet Take 25 mg BID x 7 days then 50 mg  BID  
  Blood Pressure Monitor (BLOOD PRESSURE KIT) kit 1 Device by Does Not Apply route daily as needed (for blood pressure checks).  PROAIR HFA 90 mcg/actuation inhaler INHALE 2 PUFFS BY MOUTH EVERY 4 HOURS AS NEEDED FOR WHEEZING OR SHORTNESS OF BREATH  methylPREDNISolone (MEDROL DOSEPACK) 4 mg tablet Take as directed on pack  lisinopril (PRINIVIL, ZESTRIL) 20 mg tablet Take 1 Tab by mouth daily for 30 days.  amitriptyline HCl (AMITRIPTYLINE PO) Take 75 mg by mouth nightly.  OXcarbazepine (TRILEPTAL) 150 mg tablet Take  by mouth two (2) times a day.  predniSONE (DELTASONE) 20 mg tablet Take 20 mg by mouth daily (with breakfast).  albuterol (PROVENTIL VENTOLIN) 2.5 mg /3 mL (0.083 %) nebulizer solution Nebulized 1 vial for inhalation every 4 hours as needed  roflumilast (DALIRESP) tab tablet Take 1 Tab by mouth daily.  tiZANidine (ZANAFLEX) 4 mg tablet Sig: Take 1 tab PO Q 6 as needed for muscle spasm  tiotropium (SPIRIVA) 18 mcg inhalation capsule Take 1 Cap by inhalation daily.  fluticasone-salmeterol (ADVAIR DISKUS) 500-50 mcg/dose diskus inhaler Take 1 Puff by inhalation two (2) times a day.  suvorexant (BELSOMRA) 10 mg tablet Take 1 Tab by mouth nightly as needed for Insomnia. Max Daily Amount: 10 mg.  
 furosemide (LASIX) 20 mg tablet Take 1 Tab by mouth every other day.  LORazepam (ATIVAN) 1 mg tablet Take 1 Tab by mouth every eight (8) hours as needed for Anxiety. Max Daily Amount: 3 mg.  Nebulizer & Compressor machine 1 Each by Does Not Apply route every four (4) hours as needed.  OXYGEN-AIR DELIVERY SYSTEMS (WALKABOUT 1 OXYGEN SYSTEM) 2.5 L/min by Nasal route continuous.  pantoprazole (PROTONIX) 40 mg tablet Take 1 Tab by mouth Daily (before breakfast). Disposition: 
TBD Follow-up Information None Current Discharge Medication List  
  
 
 
 
Diagnosis Clinical Impression: 1. Left groin pain

## 2019-04-25 NOTE — PATIENT INSTRUCTIONS
Thank you for choosing [facility] and [facility] for your  
 
care. You may receive a survey about your visit. We appreciate you taking time  
 
to complete this survey as we use your feedback to improve our services. We  
 
realize we are not perfect, but we strive to provide excellent care.

## 2019-04-25 NOTE — PROGRESS NOTES
Chief Complaint   Patient presents with    Testicle Injury     1. Have you been to the ER, urgent care clinic since your last visit? Hospitalized since your last visit? No    2. Have you seen or consulted any other health care providers outside of the [address] [place] [address] since your last visit? Include any pap smears or colon screening.  No

## 2019-04-25 NOTE — ED TRIAGE NOTES
Pt c/o pain after stepping out of his truck. States \" I feel like I pulled a muscle, pain goes from my testicle to abdomen\".

## 2019-04-25 NOTE — PROGRESS NOTES
[address] Alisa Schultz, [address], [address] Office:  [phone]  Fax: [phone] Chief Complaint Patient presents with  
 Headache  
  follow up This is a 59-year-old male who presents for follow-up headaches. In the interim was seen by Dr. Frankie Oliveira. Has upcoming sleep study on May 25th. Presented to his PCP and his BP medication was increased. Endorses tolerating this medication adjustment well. He said he pulled his groin when getting out of his truck just now. He thinks he will have to go over to the ER. Denies CP, SOB, or focal deficits. Past Medical History:  
Diagnosis Date  Chronic diastolic heart failure secondary to coronary artery disease ([address]  Chronic lung disease  Chronic obstructive pulmonary disease ([address] 08/03/2017 PFTS 8/3/17  COPD, severe ([address]  Emphysema/COPD ([address]  Esophagitis 12/11/2017 Endoscopy  Essential hypertension, benign  Hepatomegaly  History of stomach ulcers  Positive urine drug screen 01/2016  
 opiates and THC  Splenomegaly  Upper GI bleed Past Surgical History:  
Procedure Laterality Date  COLONOSCOPY N/A 11/5/2018 COLONOSCOPY with polypectomies performed by Amira Jay MD at [address] HX ENDOSCOPY  12/11/2017 Current Outpatient Medications Medication Sig Dispense Refill  FLUoxetine (PROZAC) 40 mg capsule Take  by mouth daily.  OTHER Indications: takes Methylred for insomnia  hydrOXYzine HCl (ATARAX) 50 mg tablet Take 50 mg by mouth nightly. Indications: 100 mg qhs    
 topiramate (TOPAMAX) 25 mg tablet Take 25 mg BID x 7 days then 50 mg  BID 60 Tab 1  
 PROAIR HFA 90 mcg/actuation inhaler INHALE 2 PUFFS BY MOUTH EVERY 4 HOURS AS NEEDED FOR WHEEZING OR SHORTNESS OF BREATH 1 Inhaler 2  
 lisinopril (PRINIVIL, ZESTRIL) 20 mg tablet Take 1 Tab by mouth daily for 30 days.  30 Tab 0  
  amitriptyline HCl (AMITRIPTYLINE PO) Take 75 mg by mouth nightly.  OXcarbazepine (TRILEPTAL) 150 mg tablet Take  by mouth two (2) times a day.  albuterol (PROVENTIL VENTOLIN) 2.5 mg /3 mL (0.083 %) nebulizer solution Nebulized 1 vial for inhalation every 4 hours as needed 60 Each 2  
 roflumilast (DALIRESP) tab tablet Take 1 Tab by mouth daily. 30 Tab 5  
 tiotropium (SPIRIVA) 18 mcg inhalation capsule Take 1 Cap by inhalation daily. 30 Cap 5  
 fluticasone-salmeterol (ADVAIR DISKUS) 500-50 mcg/dose diskus inhaler Take 1 Puff by inhalation two (2) times a day. 1 Inhaler 5  furosemide (LASIX) 20 mg tablet Take 1 Tab by mouth every other day. 15 Tab 6  
 LORazepam (ATIVAN) 1 mg tablet Take 1 Tab by mouth every eight (8) hours as needed for Anxiety. Max Daily Amount: 3 mg. 90 Tab 0  
 Nebulizer & Compressor machine 1 Each by Does Not Apply route every four (4) hours as needed. 1 Each 0  
 OXYGEN-AIR DELIVERY SYSTEMS (WALKABOUT 1 OXYGEN SYSTEM) 2.5 L/min by Nasal route continuous.  pantoprazole (PROTONIX) 40 mg tablet Take 1 Tab by mouth Daily (before breakfast). 30 Tab 0  
 methylPREDNISolone (MEDROL DOSEPACK) 4 mg tablet Take as directed on pack 1 Dose Pack 0  predniSONE (DELTASONE) 20 mg tablet Take 20 mg by mouth daily (with breakfast). 10 Tab 0  
 tiZANidine (ZANAFLEX) 4 mg tablet Sig: Take 1 tab PO Q 6 as needed for muscle spasm 60 Tab 0  
 suvorexant (BELSOMRA) 10 mg tablet Take 1 Tab by mouth nightly as needed for Insomnia. Max Daily Amount: 10 mg. 30 Tab 0 Allergies Allergen Reactions  Remeron [Mirtazapine] Other (comments) Mood swings  Seroquel [Quetiapine] Other (comments) Mood swings Social History Tobacco Use  Smoking status: Former Smoker Packs/day: 2.00 Years: 25.00 Pack years: 50.00 Types: Cigarettes Start date: 1984 Last attempt to quit: 2017 Years since quittin.3  Smokeless tobacco: Never Used Substance Use Topics  Alcohol use: No  
 Drug use: Yes Types: Marijuana Comment: Hx of Marijuana use Family History Problem Relation Age of Onset  Hypertension Mother  Heart Disease Mother  Diabetes Mother  Hypertension Father  Heart Disease Father  Cancer Father   
     lymphnodes  Diabetes Father Review of Systems GENERAL: Denies fever or fatigue CARDIAC: No CP or SOB PULMONARY: No cough of SOB MUSCULOSKELETAL: No new joint pain NEURO: SEE HPI Examination Visit Vitals /70 (BP 1 Location: Left arm, BP Patient Position: Sitting) Pulse (!) 130 Temp 98 °F (36.7 °C) Resp 18 Wt 85.7 kg (189 lb) SpO2 98% BMI 28.74 kg/m² This is a very pleasant 41-year-old male. He is alert. He appears to be in some discomfort. No abnormal movements. No signs of incoordination or ataxia. Impression/Plan Anna Hinson Sr. is a 51 y.o. male whose history and physical are consistent with headache disorder with migraine characteristics. His blood pressure is controlled today at 110/70. His lisinopril was recently increased from 5 to 20 mg. In the interim he has been seen by Dr. Frankie Oliveira and endorses he has an upcoming sleep study in May. He endorses headaches continue. Pounding quality. Can initiate Topamax and customary fashion with 25 mg twice daily x7 days then increase to 50 mg twice daily thereafter. Discussed medication, indication, side effects, and dosing. Patient verbalized understanding. Continue to monitor headaches. Follow-up in 8 weeks or sooner as needed. All questions addressed the patient is agreeable with plan of care. Diagnoses and all orders for this visit: 1. Headache disorder Other orders -     topiramate (TOPAMAX) 25 mg tablet; Take 25 mg BID x 7 days then 50 mg  BID Total time: 25 min Counseling / coordination time: 20 min > 50% counseling / coordination?: Yes re: symptoms, management, medication, answer questions, and plan of care. Signed By: Geovanna Wick NP This note will not be viewable in [address] PLEASE NOTE:  
Portions of this document may have been produced using voice recognition software. Unrecognized errors in transcription may be present.

## 2019-04-26 NOTE — ED NOTES
Discharge instructions explained and pt verbalized understanding. All questions addressed and answered. Patient ambulated out in a stable condition  
rxx2 given provider aware of vitals at discharge

## 2019-04-26 NOTE — DISCHARGE INSTRUCTIONS
Patient Education        Inguinal Hernia: Care Instructions  Your Care Instructions    An inguinal hernia occurs when tissue bulges through a weak spot in your groin area. You may see or feel a tender bulge in the groin or scrotum. You may also have pain, pressure or burning, or a feeling that something has \"given way. \"  Hernias are caused by a weakness in the belly wall. The bulge or discomfort may occur after heavy lifting, straining, or coughing. Hernias do not heal on their own, and they tend to get worse over time. If your hernia does not bother you, you most likely can wait to have surgery. Your hernia may get worse, but it may not. In some cases, hernias that are small and painless may never need to be repaired. Follow-up care is a key part of your treatment and safety. Be sure to make and go to all appointments, and call your doctor if you are having problems. It's also a good idea to know your test results and keep a list of the medicines you take. How can you care for yourself at home? · Take pain medicines exactly as directed. ? If the doctor gave you a prescription medicine for pain, take it as prescribed. ? If you are not taking a prescription pain medicine, ask your doctor if you can take an over-the-counter medicine. · Use proper lifting techniques, and avoid heavy lifting if you can. To lift things more safely, bend your knees and let your arms and legs do the work. Keep your back straight, and do not bend over at the waist. Keep the load as close to your body as you can. Move your feet instead of turning or twisting your body. · Lose weight if you are overweight. · Include fruits, vegetables, legumes, and whole grains in your diet each day. These foods are high in fiber and will make it easier to avoid straining during bowel movements. · Do not smoke. Smoking can cause coughing, which can cause your hernia to bulge.  If you need help quitting, talk to your doctor about stop-smoking programs and medicines. These can increase your chances of quitting for good. When should you call for help? Call your doctor now or seek immediate medical care if:    · You have new or worse belly pain.     · You are vomiting.     · You cannot pass stools or gas.     · You cannot push the hernia back into place with gentle pressure when you are lying down.     · The area over the hernia turns red or becomes tender.    Watch closely for changes in your health, and be sure to contact your doctor if you have any problems. Where can you learn more? Go to http://costa-jessica.info/. Enter W240 in the search box to learn more about \"Inguinal Hernia: Care Instructions. \"  Current as of: March 27, 2018  Content Version: 11.9  © 3506-2936 [employer]. Care instructions adapted under license by [employer] (which disclaims liability or warranty for this information). If you have questions about a medical condition or this instruction, always ask your healthcare professional. [place] [address] any warranty or liability for your use of this information.

## 2019-04-26 NOTE — TELEPHONE ENCOUNTER
Patient came into office on 04/25/19. Patient was informed at that time the prescription was faxed to [facility]. No further questions or concerns at this time.

## 2019-04-27 LAB
BACTERIA SPEC CULT: NORMAL
SERVICE CMNT-IMP: NORMAL

## 2019-04-29 ENCOUNTER — OFFICE VISIT (OUTPATIENT)
Dept: PULMONOLOGY | Age: 49
End: 2019-04-29

## 2019-04-29 VITALS
BODY MASS INDEX: 28.19 KG/M2 | DIASTOLIC BLOOD PRESSURE: 80 MMHG | OXYGEN SATURATION: 96 % | TEMPERATURE: 97.8 F | HEIGHT: 68 IN | WEIGHT: 186 LBS | SYSTOLIC BLOOD PRESSURE: 118 MMHG | RESPIRATION RATE: 22 BRPM | HEART RATE: 95 BPM

## 2019-04-29 DIAGNOSIS — J44.9 COPD, SEVERE (HCC): Primary | ICD-10-CM

## 2019-04-29 NOTE — PROGRESS NOTES
[facility] PULMON[facility] SPECIALISTS  Pulmonary, Critical Care, and Sleep Medicine      Chief complaint:  COPD pulmonary infiltrate    HPI:    Nicholas Cruz.    is 51years old returns the office today for follow-up concerning a pulmonary infiltrate obtained when he was having an evaluation of abdominal pain. The patient relates he was on antibiotics and prednisone and continues to take his Advair and Spiriva and as needed albuterol and he has his usual dyspnea on exertion but no worse and a mild cough which is improved which was initially white and slightly yellow without blood. He also has chronic chest discomfort around the lower edges of both right and left ribs for 1 month. He denies leg swelling      Allergies   Allergen Reactions    Remeron [Mirtazapine] Other (comments)     Mood swings    Seroquel [Quetiapine] Other (comments)     Mood swings     Current Outpatient Medications   Medication Sig    FLUoxetine (PROZAC) 40 mg capsule Take  by mouth daily.  OTHER Indications: takes Methylred for insomnia    topiramate (TOPAMAX) 25 mg tablet Take 25 mg BID x 7 days then 50 mg  BID    Blood Pressure Monitor (BLOOD PRESSURE KIT) kit 1 Device by Does Not Apply route daily as needed (for blood pressure checks).  PROAIR HFA 90 mcg/actuation inhaler INHALE 2 PUFFS BY MOUTH EVERY 4 HOURS AS NEEDED FOR WHEEZING OR SHORTNESS OF BREATH    lisinopril (PRINIVIL, ZESTRIL) 20 mg tablet Take 1 Tab by mouth daily for 30 days.  amitriptyline HCl (AMITRIPTYLINE PO) Take 75 mg by mouth nightly.  OXcarbazepine (TRILEPTAL) 150 mg tablet Take  by mouth two (2) times a day.  albuterol (PROVENTIL VENTOLIN) 2.5 mg /3 mL (0.083 %) nebulizer solution Nebulized 1 vial for inhalation every 4 hours as needed    roflumilast (DALIRESP) tab tablet Take 1 Tab by mouth daily.     tiZANidine (ZANAFLEX) 4 mg tablet Sig: Take 1 tab PO Q 6 as needed for muscle spasm    tiotropium (SPIRIVA) 18 mcg inhalation capsule Take 1 Cap by inhalation daily.  fluticasone-salmeterol (ADVAIR DISKUS) 500-50 mcg/dose diskus inhaler Take 1 Puff by inhalation two (2) times a day.  furosemide (LASIX) 20 mg tablet Take 1 Tab by mouth every other day.  LORazepam (ATIVAN) 1 mg tablet Take 1 Tab by mouth every eight (8) hours as needed for Anxiety. Max Daily Amount: 3 mg.  Nebulizer & Compressor machine 1 Each by Does Not Apply route every four (4) hours as needed.  OXYGEN-AIR DELIVERY SYSTEMS (WALKABOUT 1 OXYGEN SYSTEM) 2.5 L/min by Nasal route continuous.  pantoprazole (PROTONIX) 40 mg tablet Take 1 Tab by mouth Daily (before breakfast).  hydrOXYzine HCl (ATARAX) 50 mg tablet Take 50 mg by mouth nightly. Indications: 100 mg qhs    azithromycin (ZITHROMAX TRI-IAN) 500 mg tab Take 1 tab PO daily    methylPREDNISolone (MEDROL DOSEPACK) 4 mg tablet Take as directed on pack    predniSONE (DELTASONE) 20 mg tablet Take 20 mg by mouth daily (with breakfast).  suvorexant (BELSOMRA) 10 mg tablet Take 1 Tab by mouth nightly as needed for Insomnia. Max Daily Amount: 10 mg. No current facility-administered medications for this visit.       Past Medical History:   Diagnosis Date    Chronic diastolic heart failure secondary to coronary artery disease (HCC)     Chronic lung disease     Chronic obstructive pulmonary disease ([address] 08/03/2017    PFTS 8/3/17    COPD, severe ([address]     Emphysema/COPD ([address]     Esophagitis 12/11/2017    Endoscopy    Essential hypertension, benign     Hepatomegaly     History of stomach ulcers     Positive urine drug screen 01/2016    opiates and THC    Splenomegaly     Upper GI bleed      Past Surgical History:   Procedure Laterality Date    COLONOSCOPY N/A 11/5/2018    COLONOSCOPY with polypectomies performed by Neymar Foster MD at [address] [place] [address] ENDOSCOPY  12/11/2017     Social History     Socioeconomic History    Marital status:      Spouse name: Not on file    Number of children: Not on file    Years of education: Not on file    Highest education level: Not on file   Occupational History    Not on file   Social Needs    Financial resource strain: Not on file    Food insecurity:     Worry: Not on file     Inability: Not on file    Transportation needs:     Medical: Not on file     Non-medical: Not on file   Tobacco Use    Smoking status: Former Smoker     Packs/day: 2.00     Years: 25.00     Pack years: 50.00     Types: Cigarettes     Start date: 1984     Last attempt to quit: 2017     Years since quittin.3    Smokeless tobacco: Never Used   Substance and Sexual Activity    Alcohol use: No    Drug use: Yes     Types: Marijuana     Comment: Hx of Marijuana use    Sexual activity: Yes     Partners: Female   Lifestyle    Physical activity:     Days per week: Not on file     Minutes per session: Not on file    Stress: Not on file   Relationships    Social connections:     Talks on phone: Not on file     Gets together: Not on file     Attends Denominational service: Not on file     Active member of club or organization: Not on file     Attends meetings of clubs or organizations: Not on file     Relationship status: Not on file    Intimate partner violence:     Fear of current or ex partner: Not on file     Emotionally abused: Not on file     Physically abused: Not on file     Forced sexual activity: Not on file   Other Topics Concern     Service No    Blood Transfusions No    Caffeine Concern Yes    Occupational Exposure No    Hobby Hazards No    Sleep Concern Yes     Comment: occas    Stress Concern No    Weight Concern No    Special Diet No    Back Care Yes    Exercise No    Bike Helmet Yes    Seat Belt No     Comment: occas    Self-Exams Not Asked   Social History Narrative         Family History   Problem Relation Age of Onset    Hypertension Mother     Heart Disease Mother     Diabetes Mother     Hypertension Father     Heart Disease Father     Cancer Father         lymphnodes    Diabetes Father        Review of systems:  He denies fever chills poor appetite or weight loss    Physical Exam:  Visit Vitals  /80 (BP 1 Location: Left arm, BP Patient Position: At rest)   Pulse 95   Temp 97.8 °F (36.6 °C)   Resp 22   Ht 5' 8\" (1.727 m)   Wt 84.4 kg (186 lb)   SpO2 96%   BMI 28.28 kg/m²       Well-developed well-nourished  HEENT WNL  Lymph node exam: Supraclavicular cervical lymph nodes negative  Chest: Equal symmetrical expansion no dullness no wheezes rales or rubs  Heart: Regular rhythm no gallop no murmur no JVD no peripheral edema  Extremities: No cyanosis clubbing or calf tenderness  Neurological: Alert and oriented    Labs:  CT of the abdomen chest windows 425/19 personally reviewed with an ill-defined infiltrate at the periphery most likely representing atelectasis  O2 sat 96% room air at rest    Impression:   COPD by history and physical exam stable  Pulmonary infiltrate most likely represents atelectasis we will follow-up with chest x-ray in 6-8 weeks    Plan:     Continue Advair Spiriva as needed albuterol  Follow-up in 6 to 8 weeks with a chest x-ray    Shelia Bianchi MD , [facility]    CC: Aaron Loaiza NP     [facility], [address]     P: [phone]     F: [phone]

## 2019-04-29 NOTE — PROGRESS NOTES
The pt. Went to the ED following a traumatic hernia rupturing last week. He was told one hernia was ruptured and there was a second hernia on the other side. The pain occurred on the left but, they told him that the rupture is on the right. He sees a Surgeon, Dr. Terri Rivera tomorrow. He is long term diabled since 12/2017 for resp. Failure. In Jan. 2018 he had lung, bilat. MRSA. The pt. has a pending Sleep Study order via his neurologist.      Emi Edwards. presents today for   Chief Complaint   Patient presents with    Abnormal CT Scan     [facility] dis[facility]. 4/25 Surg. pending for left inguinal hernia Last seen 1/09/19    Breathing Problem       Is someone accompanying this pt? No    Is the patient using any DME equipment during OV? O2 and Nebulizer   -[employer]    Depression Screening:  3 most recent PHQ Screens 3/28/2019   PHQ Not Done -   Little interest or pleasure in doing things Not at all   Feeling down, depressed, irritable, or hopeless Not at all   Total Score PHQ 2 0       Learning Assessment:  Learning Assessment 1/8/2019   PRIMARY LEARNER Patient   PRIMARY LANGUAGE ENGLISH   LEARNER PREFERENCE PRIMARY OTHER (COMMENT)     -     -   ANSWERED BY patient   RELATIONSHIP SELF       Abuse Screening:  Abuse Screening Questionnaire 1/8/2019   Do you ever feel afraid of your partner? N   Are you in a relationship with someone who physically or mentally threatens you? N   Is it safe for you to go home? Y       Fall Risk  Fall Risk Assessment, last 12 mths 9/17/2018   Able to walk? Yes   Fall in past 12 months? No         Coordination of Care:  1. Have you been to the ER, urgent care clinic since your last visit? Hospitalized since your last visit? Yes, ED & hospitalized two weeks ago. 2. Have you seen or consulted any other health care providers outside of the [address] [place] [address] since your last visit? Seeing several MDs at present.   Neurology ordered him a Sleep Study    Medication variance in dosage/sig per patient as follows: per Med. Rec.

## 2019-04-29 NOTE — PATIENT INSTRUCTIONS
Continue Spiriva 1 inhalation daily and remember to exhale fully before inhaling    Continue Advair 1 inhalation twice daily and remember to exhale fully before inhaling and to wash mouth with water and spit it out after inhaling    Albuterol 2 puffs every 4 hours as needed if you require albuterol too often to control respiratory symptoms call the office for severe symptoms go to the emergency room    Always call for symptoms such as worsening shortness

## 2019-04-30 ENCOUNTER — OFFICE VISIT (OUTPATIENT)
Dept: SURGERY | Age: 49
End: 2019-04-30

## 2019-04-30 VITALS
SYSTOLIC BLOOD PRESSURE: 152 MMHG | HEART RATE: 125 BPM | BODY MASS INDEX: 28.04 KG/M2 | WEIGHT: 185 LBS | TEMPERATURE: 97.5 F | OXYGEN SATURATION: 98 % | RESPIRATION RATE: 20 BRPM | HEIGHT: 68 IN | DIASTOLIC BLOOD PRESSURE: 80 MMHG

## 2019-04-30 DIAGNOSIS — N43.3 RIGHT HYDROCELE: ICD-10-CM

## 2019-04-30 DIAGNOSIS — N45.1 EPIDIDYMITIS, LEFT: Primary | ICD-10-CM

## 2019-04-30 RX ORDER — CIPROFLOXACIN 500 MG/1
500 TABLET ORAL 2 TIMES DAILY
Qty: 20 TAB | Refills: 0 | Status: SHIPPED | OUTPATIENT
Start: 2019-04-30 | End: 2019-05-20 | Stop reason: ALTCHOICE

## 2019-04-30 NOTE — PROGRESS NOTES
Review of Systems   Constitutional: Negative. HENT: Negative. Eyes: Negative. Respiratory: Positive for cough, sputum production, shortness of breath and wheezing. Negative for hemoptysis. Cardiovascular: Positive for chest pain, palpitations, orthopnea and claudication. Negative for leg swelling and PND. Gastrointestinal: Positive for abdominal pain and heartburn. Negative for blood in stool, constipation, diarrhea, melena, nausea and vomiting. Genitourinary: Negative. Musculoskeletal: Positive for back pain, falls, joint pain and myalgias. Skin: Negative. Neurological: Positive for dizziness, tingling, tremors, sensory change, speech change, focal weakness, seizures, loss of consciousness, weakness and headaches. Endo/Heme/Allergies: Negative. Psychiatric/Behavioral: Positive for depression, hallucinations, substance abuse and suicidal ideas. Negative for memory loss. The patient is nervous/anxious and has insomnia.

## 2019-05-01 LAB
BACTERIA SPEC CULT: NORMAL
BACTERIA SPEC CULT: NORMAL
SERVICE CMNT-IMP: NORMAL
SERVICE CMNT-IMP: NORMAL

## 2019-05-07 ENCOUNTER — OFFICE VISIT (OUTPATIENT)
Dept: FAMILY MEDICINE CLINIC | Age: 49
End: 2019-05-07

## 2019-05-07 VITALS
HEART RATE: 105 BPM | OXYGEN SATURATION: 96 % | RESPIRATION RATE: 22 BRPM | TEMPERATURE: 97.3 F | SYSTOLIC BLOOD PRESSURE: 101 MMHG | HEIGHT: 68 IN | DIASTOLIC BLOOD PRESSURE: 77 MMHG | BODY MASS INDEX: 27.95 KG/M2 | WEIGHT: 184.4 LBS

## 2019-05-07 DIAGNOSIS — R11.0 NAUSEA: Primary | ICD-10-CM

## 2019-05-07 DIAGNOSIS — I10 ESSENTIAL HYPERTENSION, BENIGN: ICD-10-CM

## 2019-05-07 RX ORDER — LISINOPRIL 20 MG/1
20 TABLET ORAL DAILY
Qty: 30 TAB | Refills: 2 | Status: SHIPPED | OUTPATIENT
Start: 2019-05-07 | End: 2019-06-06

## 2019-05-07 RX ORDER — ONDANSETRON 4 MG/1
4 TABLET, FILM COATED ORAL
Qty: 15 TAB | Refills: 0 | Status: SHIPPED | OUTPATIENT
Start: 2019-05-07 | End: 2019-05-12

## 2019-05-07 NOTE — PROGRESS NOTES
Gabriel Berman is a  51 y.o. male presents today for office visit for Ed follow up visit groin pain x 2 weeks    1. Have you been to the ER, urgent care clinic or hospitalized since your last visit? YES 4-25 2019 [facility]     2. Have you seen or consulted any other health care providers outside of the [address] [place] [address] since your last visit (Include any pap smears or colon screening)? NO      Patient states that he doesn't feel suicidal or homicidal today.

## 2019-05-07 NOTE — PATIENT INSTRUCTIONS
High Blood Pressure: Care Instructions  Overview    It's normal for blood pressure to go up and down throughout the day. But if it stays up, you have high blood pressure. Another name for high blood pressure is hypertension. Despite what a lot of people think, high blood pressure usually doesn't cause headaches or make you feel dizzy or lightheaded. It usually has no symptoms. But it does increase your risk of stroke, heart attack, and other problems. You and your doctor will talk about your risks of these problems based on your blood pressure. Your doctor will give you a goal for your blood pressure. Your goal will be based on your health and your age. Lifestyle changes, such as eating healthy and being active, are always important to help lower blood pressure. You might also take medicine to reach your blood pressure goal.  Follow-up care is a key part of your treatment and safety. Be sure to make and go to all appointments, and call your doctor if you are having problems. It's also a good idea to know your test results and keep a list of the medicines you take. How can you care for yourself at home? Medical treatment  · If you stop taking your medicine, your blood pressure will go back up. You may take one or more types of medicine to lower your blood pressure. Be safe with medicines. Take your medicine exactly as prescribed. Call your doctor if you think you are having a problem with your medicine. · Talk to your doctor before you start taking aspirin every day. Aspirin can help certain people lower their risk of a heart attack or stroke. But taking aspirin isn't right for everyone, because it can cause serious bleeding. · See your doctor regularly. You may need to see the doctor more often at first or until your blood pressure comes down. · If you are taking blood pressure medicine, talk to your doctor before you take decongestants or anti-inflammatory medicine, such as ibuprofen.  Some of these medicines can raise blood pressure. · Learn how to check your blood pressure at home. Lifestyle changes  · Stay at a healthy weight. This is especially important if you put on weight around the waist. Losing even 10 pounds can help you lower your blood pressure. · If your doctor recommends it, get more exercise. Walking is a good choice. Bit by bit, increase the amount you walk every day. Try for at least 30 minutes on most days of the week. You also may want to swim, bike, or do other activities. · Avoid or limit alcohol. Talk to your doctor about whether you can drink any alcohol. · Try to limit how much sodium you eat to less than 2,300 milligrams (mg) a day. Your doctor may ask you to try to eat less than 1,500 mg a day. · Eat plenty of fruits (such as bananas and oranges), vegetables, legumes, whole grains, and low-fat dairy products. · Lower the amount of saturated fat in your diet. Saturated fat is found in animal products such as milk, cheese, and meat. Limiting these foods may help you lose weight and also lower your risk for heart disease. · Do not smoke. Smoking increases your risk for heart attack and stroke. If you need help quitting, talk to your doctor about stop-smoking programs and medicines. These can increase your chances of quitting for good. When should you call for help? Call 911 anytime you think you may need emergency care. This may mean having symptoms that suggest that your blood pressure is causing a serious heart or blood vessel problem. Your blood pressure may be over 180/120.   For example, call 911 if:    · You have symptoms of a heart attack. These may include:  ? Chest pain or pressure, or a strange feeling in the chest.  ? Sweating. ? Shortness of breath. ? Nausea or vomiting. ? Pain, pressure, or a strange feeling in the back, neck, jaw, or upper belly or in one or both shoulders or arms. ? Lightheadedness or sudden weakness.   ? A fast or irregular heartbeat.     · You have symptoms of a stroke. These may include:  ? Sudden numbness, tingling, weakness, or loss of movement in your face, arm, or leg, especially on only one side of your body. ? Sudden vision changes. ? Sudden trouble speaking. ? Sudden confusion or trouble understanding simple statements. ? Sudden problems with walking or balance. ? A sudden, severe headache that is different from past headaches.     · You have severe back or belly pain.    Do not wait until your blood pressure comes down on its own. Get help right away.   Call your doctor now or seek immediate care if:    · Your blood pressure is much higher than normal (such as 180/120 or higher), but you don't have symptoms.     · You think high blood pressure is causing symptoms, such as:  ? Severe headache.  ? Blurry vision.    Watch closely for changes in your health, and be sure to contact your doctor if:    · Your blood pressure measures higher than your doctor recommends at least 2 times. That means the top number is higher or the bottom number is higher, or both.     · You think you may be having side effects from your blood pressure medicine. Where can you learn more? Go to http://costa-jessica.info/. Enter H878 in the search box to learn more about \"High Blood Pressure: Care Instructions. \"  Current as of: July 22, 2018  Content Version: 11.9  © 5674-8595 [employer]wise[employer]. Care instructions adapted under license by [employer] (which disclaims liability or warranty for this information). If you have questions about a medical condition or this instruction, always ask your healthcare professional. [place] [address] any warranty or liability for your use of this information.

## 2019-05-07 NOTE — PROGRESS NOTES
RAMSES Johns Sr. is a 51 y.o. male who presents today for HTN. Hypertension ROS: taking medications as instructed, no medication side effects noted, no TIA's, no chest pain on exertion, no dyspnea on exertion, no swelling of ankles. Pt has an appointment with urology surgeon for repair of hydrocele on May 20th. Pt has another sleep study scheduled for May 21st and follow up with both neurology and pulmonary in June 2019. Pt was seen by general surgeon on 4/30/2019 and given course of Cipro. Pt states he has been very nauseous and haven't been able to eat much. Current Outpatient Medications   Medication Sig Dispense Refill    FLUoxetine (PROZAC) 40 mg capsule Take  by mouth daily.  OTHER Indications: takes Methylred for insomnia      hydrOXYzine HCl (ATARAX) 50 mg tablet Take 50 mg by mouth nightly. Indications: 100 mg qhs      topiramate (TOPAMAX) 25 mg tablet Take 25 mg BID x 7 days then 50 mg  BID 60 Tab 1    Blood Pressure Monitor (BLOOD PRESSURE KIT) kit 1 Device by Does Not Apply route daily as needed (for blood pressure checks). 1 Kit 0    PROAIR HFA 90 mcg/actuation inhaler INHALE 2 PUFFS BY MOUTH EVERY 4 HOURS AS NEEDED FOR WHEEZING OR SHORTNESS OF BREATH 1 Inhaler 2    lisinopril (PRINIVIL, ZESTRIL) 20 mg tablet Take 1 Tab by mouth daily for 30 days. 30 Tab 0    amitriptyline HCl (AMITRIPTYLINE PO) Take 75 mg by mouth nightly.  OXcarbazepine (TRILEPTAL) 150 mg tablet Take  by mouth two (2) times a day.  predniSONE (DELTASONE) 20 mg tablet Take 20 mg by mouth daily (with breakfast). 10 Tab 0    albuterol (PROVENTIL VENTOLIN) 2.5 mg /3 mL (0.083 %) nebulizer solution Nebulized 1 vial for inhalation every 4 hours as needed 60 Each 2    roflumilast (DALIRESP) tab tablet Take 1 Tab by mouth daily.  30 Tab 5    tiZANidine (ZANAFLEX) 4 mg tablet Sig: Take 1 tab PO Q 6 as needed for muscle spasm 60 Tab 0    tiotropium (SPIRIVA) 18 mcg inhalation capsule Take 1 Cap by inhalation daily. 30 Cap 5    fluticasone-salmeterol (ADVAIR DISKUS) 500-50 mcg/dose diskus inhaler Take 1 Puff by inhalation two (2) times a day. 1 Inhaler 5    suvorexant (BELSOMRA) 10 mg tablet Take 1 Tab by mouth nightly as needed for Insomnia. Max Daily Amount: 10 mg. 30 Tab 0    furosemide (LASIX) 20 mg tablet Take 1 Tab by mouth every other day. 15 Tab 6    LORazepam (ATIVAN) 1 mg tablet Take 1 Tab by mouth every eight (8) hours as needed for Anxiety. Max Daily Amount: 3 mg. 90 Tab 0    Nebulizer & Compressor machine 1 Each by Does Not Apply route every four (4) hours as needed. 1 Each 0    OXYGEN-AIR DELIVERY SYSTEMS (WALKABOUT 1 OXYGEN SYSTEM) 2.5 L/min by Nasal route continuous.  pantoprazole (PROTONIX) 40 mg tablet Take 1 Tab by mouth Daily (before breakfast). 30 Tab 0    ciprofloxacin HCl (CIPRO) 500 mg tablet Take 1 Tab by mouth two (2) times a day. 20 Tab 0    azithromycin (ZITHROMAX TRI-IAN) 500 mg tab Take 1 tab PO daily 3 Tab 0    methylPREDNISolone (MEDROL DOSEPACK) 4 mg tablet Take as directed on pack 1 Dose Pack 0      Allergies   Allergen Reactions    Remeron [Mirtazapine] Other (comments)     Mood swings    Seroquel [Quetiapine] Other (comments)     Mood swings       SUBJECTIVE:  Review of Systems   Constitutional: Negative for chills, fever and malaise/fatigue. Eyes: Negative for blurred vision. Respiratory: Negative for shortness of breath and wheezing. Cardiovascular: Negative for chest pain, palpitations and leg swelling. Gastrointestinal: Negative for abdominal pain, diarrhea, nausea and vomiting. Genitourinary: Negative for dysuria, frequency and urgency. Groin pain   Neurological: Positive for headaches. Negative for dizziness, tingling and sensory change.         OBJECTIVE:  Visit Vitals  /77   Pulse (!) 105   Temp 97.3 °F (36.3 °C) (Oral)   Resp 22   Ht 5' 8\" (1.727 m)   Wt 184 lb 6.4 oz (83.6 kg)   SpO2 96%   BMI 28.04 kg/m²      I have reviewed/discussed the above normal BMI with the patient. I have recommended the following interventions: dietary management education, guidance, and counseling, encourage exercise and monitor weight . Harish Mathews Physical Exam   Constitutional: He is oriented to person, place, and time and well-developed, well-nourished, and in no distress. HENT:   Head: Normocephalic. Eyes: Pupils are equal, round, and reactive to light. Conjunctivae and EOM are normal.   Neck: Normal range of motion. Neck supple. No thyromegaly present. Cardiovascular: Normal rate, regular rhythm and normal heart sounds. Pulmonary/Chest: Effort normal and breath sounds normal. He has no wheezes. He has no rales. He exhibits no tenderness. Musculoskeletal: He exhibits no edema. Neurological: He is alert and oriented to person, place, and time. Gait normal.   Skin: Skin is warm and dry. No erythema. Nursing note and vitals reviewed. ASSESSMENT:  Diagnoses and all orders for this visit:    1. Essential hypertension, benign  -     lisinopril (PRINIVIL, ZESTRIL) 20 mg tablet; Take 1 Tab by mouth daily for 30 days. PLAN:  Med refill today  Start Zofran 4 mg PRN for nausea  Current treatment plan is effective, no change in therapy  Reviewed diet, exercise and weight control  Recommended sodium restriction  Cardiovascular risk and specific lipid/LDL goals reviewed. I have discussed the diagnosis with the patient and the intended plan as seen in the above orders. The patient has received an after-visit summary and questions were answered concerning future plans. I have discussed medication side effects and warnings with the patient as well. Patient will call for further questions. Follow-up and Dispositions    · Return in about 3 months (around 8/7/2019) for HTN.        Juliana Cifuentes NP

## 2019-05-20 ENCOUNTER — OFFICE VISIT (OUTPATIENT)
Dept: UROLOGY | Age: 49
End: 2019-05-20

## 2019-05-20 VITALS
BODY MASS INDEX: 28.49 KG/M2 | HEART RATE: 121 BPM | OXYGEN SATURATION: 95 % | WEIGHT: 188 LBS | HEIGHT: 68 IN | SYSTOLIC BLOOD PRESSURE: 115 MMHG | DIASTOLIC BLOOD PRESSURE: 85 MMHG

## 2019-05-20 DIAGNOSIS — Z01.818 PRE-OP TESTING: ICD-10-CM

## 2019-05-20 DIAGNOSIS — N44.02: ICD-10-CM

## 2019-05-20 DIAGNOSIS — N43.3 HYDROCELE IN ADULT: Primary | ICD-10-CM

## 2019-05-20 PROBLEM — R91.8 PULMONARY INFILTRATE: Status: ACTIVE | Noted: 2018-03-19

## 2019-05-21 ENCOUNTER — TELEPHONE (OUTPATIENT)
Dept: CARDIOLOGY CLINIC | Age: 49
End: 2019-05-21

## 2019-05-21 ENCOUNTER — HOSPITAL ENCOUNTER (OUTPATIENT)
Dept: SLEEP MEDICINE | Age: 49
Discharge: HOME | End: 2019-05-21
Payer: MEDICAID

## 2019-05-21 ENCOUNTER — OFFICE VISIT (OUTPATIENT)
Dept: PULMONOLOGY | Age: 49
End: 2019-05-21

## 2019-05-21 ENCOUNTER — DOCUMENTATION ONLY (OUTPATIENT)
Dept: UROLOGY | Age: 49
End: 2019-05-21

## 2019-05-21 VITALS — DIASTOLIC BLOOD PRESSURE: 89 MMHG | HEART RATE: 110 BPM | SYSTOLIC BLOOD PRESSURE: 130 MMHG

## 2019-05-21 VITALS
SYSTOLIC BLOOD PRESSURE: 100 MMHG | TEMPERATURE: 96.8 F | BODY MASS INDEX: 27.99 KG/M2 | WEIGHT: 189 LBS | HEART RATE: 124 BPM | DIASTOLIC BLOOD PRESSURE: 74 MMHG | RESPIRATION RATE: 20 BRPM | HEIGHT: 69 IN | OXYGEN SATURATION: 97 %

## 2019-05-21 DIAGNOSIS — G47.8 UPPER AIRWAY RESISTANCE SYNDROME: ICD-10-CM

## 2019-05-21 DIAGNOSIS — G47.10 HYPERSOMNIA: ICD-10-CM

## 2019-05-21 DIAGNOSIS — R91.8 PULMONARY INFILTRATES: Primary | ICD-10-CM

## 2019-05-21 DIAGNOSIS — J44.9 CHRONIC OBSTRUCTIVE PULMONARY DISEASE, UNSPECIFIED COPD TYPE (HCC): ICD-10-CM

## 2019-05-21 PROCEDURE — 95810 POLYSOM 6/> YRS 4/> PARAM: CPT

## 2019-05-21 RX ORDER — SODIUM CHLORIDE 9 MG/ML
100 INJECTION, SOLUTION INTRAVENOUS CONTINUOUS
Status: CANCELLED | OUTPATIENT
Start: 2019-05-21

## 2019-05-21 NOTE — PROGRESS NOTES
Alejandro Parker . 51 y.o. male     Mr. Yue Alonso seen today for evaluation of left-sided scrotal pain associated with right-sided hydrocele  Patient initially presented to the emergency room on 25 April 2019 with sudden severe left scrotal pain onset while getting out of his pickup truck after parking it in the [facility] parking lot-went to emergency room immediately where exam showed a small right hydrocele of the scrotum and subsequent ultrasound imaging showing normal blood flow to each testes right hydrocele and small left hydrocele-treated with Cipro for suspected epididymitis with pain lingering for several days gradually lessening in intensity now experiencing no pain at rest but occasionally feeling tight aching pain in the left side of the scrotum. CT scan imaging of the abdomen and pelvis on 25 April 2019 shows normal kidneys ureter and bladder images have been reviewed on PACS  Ultrasound imaging of the scrotum on 25 April 2019 shows right-sided hydrocele with a small left hydrocele-images have been reviewed on PACS    Patient has no antecedent symptoms of bladder irritability or obstructive voiding-       PVR 62 cc in May 2019      Review of Systems:   CNS: Frequent headaches stress and anxiety  Respiratory: Chest tightness no wheezing and coughing  Cardiovascular: Hypertension, palpitations,  Intestinal: GERD no diarrhea no constipation  Urinary: No urgency frequency dysuria or hematuria  Skeletal: No bone or joint pain  Endocrine: No diabetes or thyroid disease  Other:                                                                           Disability retired-COPD  Allergies:    Allergies   Allergen Reactions    Ciprofloxacin Nausea Only    Remeron [Mirtazapine] Other (comments)     Mood swings    Seroquel [Quetiapine] Other (comments)     Mood swings      Medications:    Current Outpatient Medications   Medication Sig Dispense Refill    lisinopril (PRINIVIL, ZESTRIL) 20 mg tablet Take 1 Tab by mouth daily for 30 days. 30 Tab 2    FLUoxetine (PROZAC) 40 mg capsule Take  by mouth daily.  OTHER Indications: takes Methylred for insomnia      hydrOXYzine HCl (ATARAX) 50 mg tablet Take 50 mg by mouth nightly. Indications: 100 mg qhs      Blood Pressure Monitor (BLOOD PRESSURE KIT) kit 1 Device by Does Not Apply route daily as needed (for blood pressure checks). 1 Kit 0    PROAIR HFA 90 mcg/actuation inhaler INHALE 2 PUFFS BY MOUTH EVERY 4 HOURS AS NEEDED FOR WHEEZING OR SHORTNESS OF BREATH 1 Inhaler 2    amitriptyline HCl (AMITRIPTYLINE PO) Take 75 mg by mouth nightly.  OXcarbazepine (TRILEPTAL) 150 mg tablet Take  by mouth two (2) times a day.  albuterol (PROVENTIL VENTOLIN) 2.5 mg /3 mL (0.083 %) nebulizer solution Nebulized 1 vial for inhalation every 4 hours as needed 60 Each 2    roflumilast (DALIRESP) tab tablet Take 1 Tab by mouth daily. 30 Tab 5    tiZANidine (ZANAFLEX) 4 mg tablet Sig: Take 1 tab PO Q 6 as needed for muscle spasm 60 Tab 0    tiotropium (SPIRIVA) 18 mcg inhalation capsule Take 1 Cap by inhalation daily. 30 Cap 5    fluticasone-salmeterol (ADVAIR DISKUS) 500-50 mcg/dose diskus inhaler Take 1 Puff by inhalation two (2) times a day. 1 Inhaler 5    furosemide (LASIX) 20 mg tablet Take 1 Tab by mouth every other day. 15 Tab 6    LORazepam (ATIVAN) 1 mg tablet Take 1 Tab by mouth every eight (8) hours as needed for Anxiety. Max Daily Amount: 3 mg. 90 Tab 0    Nebulizer & Compressor machine 1 Each by Does Not Apply route every four (4) hours as needed. 1 Each 0    OXYGEN-AIR DELIVERY SYSTEMS (WALKABOUT 1 OXYGEN SYSTEM) 2.5 L/min by Nasal route continuous.  pantoprazole (PROTONIX) 40 mg tablet Take 1 Tab by mouth Daily (before breakfast).  30 Tab 0    topiramate (TOPAMAX) 25 mg tablet Take 25 mg BID x 7 days then 50 mg  BID 60 Tab 1    methylPREDNISolone (MEDROL DOSEPACK) 4 mg tablet Take as directed on pack 1 Dose Pack 0    predniSONE (DELTASONE) 20 mg tablet Take 20 mg by mouth daily (with breakfast). 10 Tab 0    suvorexant (BELSOMRA) 10 mg tablet Take 1 Tab by mouth nightly as needed for Insomnia.  Max Daily Amount: 10 mg. 30 Tab 0       Past Medical History:   Diagnosis Date    Anxiety     Chest pain     Chronic diastolic heart failure secondary to coronary artery disease (HCC)     Chronic lung disease     Chronic obstructive pulmonary disease (HCC) 2017    PFTS 8/3/17    COPD, severe (HCC)     Cough     Emphysema/COPD (HCC)     Esophagitis 2017    Endoscopy    Essential hypertension, benign     Fast heart beat     Feeling of chest tightness     Frequent urination     Heartburn     Hepatomegaly     History of stomach ulcers     Hx of nausea and vomiting     Poor appetite     Positive urine drug screen 2016    opiates and THC    Shortness of breath     Splenomegaly     Stomach pain     Stress     Tiredness     Unintentional weight change     Upper GI bleed     Weakness     Wheeze       Past Surgical History:   Procedure Laterality Date    COLONOSCOPY N/A 2018    COLONOSCOPY with polypectomies performed by Concepcion Linares MD at [address] HX ENDOSCOPY  2017     Social History     Socioeconomic History    Marital status:      Spouse name: Not on file    Number of children: Not on file    Years of education: Not on file    Highest education level: Not on file   Occupational History    Not on file   Social Needs    Financial resource strain: Not on file    Food insecurity:     Worry: Not on file     Inability: Not on file    Transportation needs:     Medical: Not on file     Non-medical: Not on file   Tobacco Use    Smoking status: Former Smoker     Packs/day: 2.00     Years: 25.00     Pack years: 50.00     Types: Cigarettes     Start date: 1984     Last attempt to quit: 2017     Years since quittin.4    Smokeless tobacco: Never Used   Substance and Sexual Activity    Alcohol use: No    Drug use: Not Currently     Types: Marijuana     Comment: Hx of Marijuana use    Sexual activity: Not Currently     Partners: Female   Lifestyle    Physical activity:     Days per week: Not on file     Minutes per session: Not on file    Stress: Not on file   Relationships    Social connections:     Talks on phone: Not on file     Gets together: Not on file     Attends Pent[employer]al service: Not on file     Active member of club or organization: Not on file     Attends meetings of clubs or organizations: Not on file     Relationship status: Not on file    Intimate partner violence:     Fear of current or ex partner: Not on file     Emotionally abused: Not on file     Physically abused: Not on file     Forced sexual activity: Not on file   Other Topics Concern     Service No    Blood Transfusions No    Caffeine Concern Yes    Occupational Exposure No    Hobby Hazards No    Sleep Concern Yes     Comment: occas    Stress Concern No    Weight Concern No    Special Diet No    Back Care Yes    Exercise No    Bike Helmet Yes    Seat Belt No     Comment: occas    Self-Exams Not Asked   Social History Narrative          Family History   Problem Relation Age of Onset    Hypertension Mother     Heart Disease Mother     Diabetes Mother     Hypertension Father     Heart Disease Father     Cancer Father         lymphnodes    Diabetes Father         Physical Examination: Well-nourished mature male in no apparent distress  Neck no masses nodes or bruits  Chest no wheezing rales or rhonchi-distant breath sounds  Heart regular sinus rhythm no murmurs no gallops no rubs  Abdomen is nontender no palpable masses no organomegaly  Back-no percussion CVA tenderness on either side  No inguinal hernia or adenopathy  Penis is normal  Testes are normal in size shape and consistency bilaterally-no epididymal induration or tenderness on either side  Spermatic cords are palpably normal bilaterally  Scrotum-moderate fluctuant nontender right hydrocele-normal testes bilaterally-normal spermatic cords bilaterally  Prostate by GANGA is rounded, smooth, benign in consistency and nontender-no induration no nodularity  No rectal masses tenderness or induration    PVR 62 cc today      Impression: Right hydrocele/left hydrocele                        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 Citlali Ohara    Counseling Note:    Technique of scrotal exploration with bilateral hydrocelectomy and bilateral orchidopexy described and explained-patient understands and accepts the risk of bleeding, infection, compromised fertility from  vas deferens interruption    RTC 3 days postop Penrose drain removal      Solomon Cintron MD  -electronically signed-    PLEASE NOTE:  This document has been produced using voice recognition software. Unrecognized errors in transcription may be present.

## 2019-05-21 NOTE — PROGRESS NOTES
[facility]  Pulmonary, Critical Care, and Sleep Medicine      Chief complaint:  COPD medical clearance    HPI:    Ines Corbett    is 51years old and comes to the office today for follow-up concerning a pulmonary trait noted on CT imaging approximately 1 month ago when the patient was having abdominal pain related to a hydrocele. Patient relates that he has no increased shortness of breath and and that his dyspnea on exertion is stable 2-1/2 miles yesterday. He denies chest pain using significant amount of mucus and no purulent mucus or blood. Denies leg swelling      Allergies   Allergen Reactions    Ciprofloxacin Nausea Only    Remeron [Mirtazapine] Other (comments)     Mood swings    Seroquel [Quetiapine] Other (comments)     Mood swings     Current Outpatient Medications   Medication Sig    lisinopril (PRINIVIL, ZESTRIL) 20 mg tablet Take 1 Tab by mouth daily for 30 days.  FLUoxetine (PROZAC) 40 mg capsule Take  by mouth daily.  hydrOXYzine HCl (ATARAX) 50 mg tablet Take 50 mg by mouth nightly. Indications: 100 mg qhs    topiramate (TOPAMAX) 25 mg tablet Take 25 mg BID x 7 days then 50 mg  BID    Blood Pressure Monitor (BLOOD PRESSURE KIT) kit 1 Device by Does Not Apply route daily as needed (for blood pressure checks).  PROAIR HFA 90 mcg/actuation inhaler INHALE 2 PUFFS BY MOUTH EVERY 4 HOURS AS NEEDED FOR WHEEZING OR SHORTNESS OF BREATH    amitriptyline HCl (AMITRIPTYLINE PO) Take 75 mg by mouth nightly.  OXcarbazepine (TRILEPTAL) 150 mg tablet Take  by mouth two (2) times a day.  albuterol (PROVENTIL VENTOLIN) 2.5 mg /3 mL (0.083 %) nebulizer solution Nebulized 1 vial for inhalation every 4 hours as needed    roflumilast (DALIRESP) tab tablet Take 1 Tab by mouth daily.  tiZANidine (ZANAFLEX) 4 mg tablet Sig: Take 1 tab PO Q 6 as needed for muscle spasm    tiotropium (SPIRIVA) 18 mcg inhalation capsule Take 1 Cap by inhalation daily.     fluticasone-salmeterol (ADVAIR DISKUS) 500-50 mcg/dose diskus inhaler Take 1 Puff by inhalation two (2) times a day.  furosemide (LASIX) 20 mg tablet Take 1 Tab by mouth every other day.  LORazepam (ATIVAN) 1 mg tablet Take 1 Tab by mouth every eight (8) hours as needed for Anxiety. Max Daily Amount: 3 mg.  Nebulizer & Compressor machine 1 Each by Does Not Apply route every four (4) hours as needed.  OXYGEN-AIR DELIVERY SYSTEMS (WALKABOUT 1 OXYGEN SYSTEM) 2.5 L/min by Nasal route continuous.  pantoprazole (PROTONIX) 40 mg tablet Take 1 Tab by mouth Daily (before breakfast). No current facility-administered medications for this visit.       Past Medical History:   Diagnosis Date    Anxiety     Chest pain     Chronic diastolic heart failure secondary to coronary artery disease (HCC)     Chronic lung disease     Chronic obstructive pulmonary disease (HCC) 08/03/2017    PFTS 8/3/17    COPD, severe (HCC)     Cough     Emphysema/COPD (HCC)     Esophagitis 12/11/2017    Endoscopy    Essential hypertension, benign     Fast heart beat     Feeling of chest tightness     Frequent urination     Heartburn     Hepatomegaly     History of stomach ulcers     Hx of nausea and vomiting     Poor appetite     Positive urine drug screen 01/2016    opiates and THC    Shortness of breath     Splenomegaly     Stomach pain     Stress     Tiredness     Unintentional weight change     Upper GI bleed     Weakness     Wheeze      Past Surgical History:   Procedure Laterality Date    COLONOSCOPY N/A 11/5/2018    COLONOSCOPY with polypectomies performed by Marita Madera MD at [address] [place] [address] HX ENDOSCOPY  12/11/2017    HX WISDOM TEETH EXTRACTION Right 05/2019    IR BX LIVER PERCUTANEOUS       Social History     Socioeconomic History    Marital status:      Spouse name: Not on file    Number of children: Not on file    Years of education: Not on file    Highest education level: Not on file   Occupational History    Not on file   Social Needs    Financial resource strain: Not on file    Food insecurity:     Worry: Not on file     Inability: Not on file    Transportation needs:     Medical: Not on file     Non-medical: Not on file   Tobacco Use    Smoking status: Former Smoker     Packs/day: 2.00     Years: 25.00     Pack years: 50.00     Types: Cigarettes     Start date: 1984     Last attempt to quit: 2017     Years since quittin.4    Smokeless tobacco: Never Used   Substance and Sexual Activity    Alcohol use: No    Drug use: Yes     Types: Marijuana     Comment: Hx of Marijuana use    Sexual activity: Not Currently     Partners: Female   Lifestyle    Physical activity:     Days per week: Not on file     Minutes per session: Not on file    Stress: Not on file   Relationships    Social connections:     Talks on phone: Not on file     Gets together: Not on file     Attends Mosque service: Not on file     Active member of club or organization: Not on file     Attends meetings of clubs or organizations: Not on file     Relationship status: Not on file    Intimate partner violence:     Fear of current or ex partner: Not on file     Emotionally abused: Not on file     Physically abused: Not on file     Forced sexual activity: Not on file   Other Topics Concern     Service No    Blood Transfusions No    Caffeine Concern Yes    Occupational Exposure No    Hobby Hazards No    Sleep Concern Yes     Comment: occas    Stress Concern No    Weight Concern No    Special Diet No    Back Care Yes    Exercise No    Bike Helmet Yes    Seat Belt No     Comment: occas    Self-Exams Not Asked   Social History Narrative         Family History   Problem Relation Age of Onset    Hypertension Mother     Heart Disease Mother     Diabetes Mother     Hypertension Father     Heart Disease Father     Cancer Father         lymphnodes    Diabetes Father        Review of systems:  He continues to have pain related to the hydrocele but denies fever chills or significant weight loss despite a poor appetite. He denies dizziness    Physical Exam:  Visit Vitals  /74 (BP 1 Location: Left arm, BP Patient Position: Sitting)   Pulse (!) 124   Temp 96.8 °F (36 °C) (Oral)   Resp 20   Ht 5' 9\" (1.753 m)   Wt 85.7 kg (189 lb)   SpO2 97%   BMI 27.91 kg/m²       Well-developed well-nourished  HEENT: WNL  Lymph node exam: Supraclavicular cervical lymph nodes negative  Chest: Equal symmetrical expansion no dullness no wheezes rales rubs  Heart: Regular tachycardia no gallop no murmur no JVD no peripheral edema  Extremities: No cyanosis clubbing or calf tenderness  Neurological: Alert and oriented    Labs:    O2 sat room air at rest 97%  Chest x-ray 5/21/2019: No evidence of pulmonary infiltrate in right lung base    Impression:   Unexplained tachycardia but apparently Dr. Evens Correa cardiology is aware have contacted him   By history physical exam and chest x-ray appearance COPD stable and no evidence of by chest x-ray appearance    Plan:   Tachycardia and have referred to  for follow-up on Thursday  From a pulmonary standpoint based on the patient's history physical exam and chest x-ray appearance I find no contraindication to his proposed urological surgery    Yobani Harris MD , [facility]    CC: Virgel Snellen, NP Richie Ege, MD Caresse Fern  [address] [facility]  [place], [address] [phone]     P: [phone]     F: [phone]

## 2019-05-21 NOTE — PROGRESS NOTES
Dejuan Cartagena presents today for   Chief Complaint   Patient presents with    Pre-op Exam       Is someone accompanying this pt? No     Is the patient using any DME equipment during OV? No     -DME Company n/a     Depression Screening:  3 most recent PHQ Screens 3/28/2019   PHQ Not Done -   Little interest or pleasure in doing things Not at all   Feeling down, depressed, irritable, or hopeless Not at all   Total Score PHQ 2 0       Learning Assessment:  Learning Assessment 5/7/2019   PRIMARY LEARNER Patient   PRIMARY LANGUAGE ENGLISH   LEARNER PREFERENCE PRIMARY OTHER (COMMENT)     -     -   ANSWERED BY patient   RELATIONSHIP SELF       Abuse Screening:  Abuse Screening Questionnaire 1/8/2019   Do you ever feel afraid of your partner? N   Are you in a relationship with someone who physically or mentally threatens you? N   Is it safe for you to go home? Y       Fall Risk  Fall Risk Assessment, last 12 mths 9/17/2018   Able to walk? Yes   Fall in past 12 months? No         Coordination of Care:  1. Have you been to the ER, urgent care clinic since your last visit? Hospitalized since your last visit? No    2. Have you seen or consulted any other health care providers outside of the [address] [place] [address] since your last visit? Include any pap smears or colon screening.  Dr Catalino Trotter Oral Surgeon

## 2019-05-21 NOTE — TELEPHONE ENCOUNTER
Dr. Nedra eYager called and said patient is in his office for surgical clearance for surgery scheduled for Friday 5/24 bilateral hydrocelectomy and his heart rate at rest is 124. Looking back into all his vitals pulse does tend to run high, not that high tho. He is concerned they will not put him under with a heart rate that high and want to know if Dr. Jack Stoddard can clear him from a cardiac standpoint.     Will forward to Dr. Jack Stoddard

## 2019-05-21 NOTE — LETTER
5/23/2019 12:27 PM 
 
Mr. Velvet Gutierres. 
[address] 
[facility] [id number] Tushar Jarrell Sr. was seen in our office on 3/14/2019 for cardiac evaluation and on 5/23/2019 for an EKG. From a cardiac standpoint he is cleared for bilateral hydrocelectomy. Please feel free to contact our office if you have any questions regarding this patient. Sincerely,       Suki Figueroa MD

## 2019-05-21 NOTE — PROGRESS NOTES
We have Serg Greer's scheduled for bilateral hydrocelectomy and bilateral orchiopexy this Friday May 24th. The patient saw Dr. Kashif Hernandes and was cleared from a pulmonary standpoint. We just need a note stating he is cleared from a cardiac standpoint.   Thank you

## 2019-05-22 DIAGNOSIS — N44.02: ICD-10-CM

## 2019-05-22 DIAGNOSIS — Z01.818 PRE-OP TESTING: ICD-10-CM

## 2019-05-22 DIAGNOSIS — N43.3 HYDROCELE IN ADULT: ICD-10-CM

## 2019-05-23 ENCOUNTER — ANESTHESIA EVENT (OUTPATIENT)
Dept: SURGERY | Age: 49
End: 2019-05-23
Payer: MEDICAID

## 2019-05-23 ENCOUNTER — CLINICAL SUPPORT (OUTPATIENT)
Dept: CARDIOLOGY CLINIC | Age: 49
End: 2019-05-23

## 2019-05-23 VITALS
BODY MASS INDEX: 28.32 KG/M2 | HEART RATE: 107 BPM | DIASTOLIC BLOOD PRESSURE: 80 MMHG | SYSTOLIC BLOOD PRESSURE: 106 MMHG | OXYGEN SATURATION: 94 % | WEIGHT: 191.2 LBS | HEIGHT: 69 IN

## 2019-05-23 DIAGNOSIS — Z01.818 OTHER SPECIFIED PRE-OPERATIVE EXAMINATION: ICD-10-CM

## 2019-05-23 DIAGNOSIS — R07.9 CHEST PAIN, UNSPECIFIED TYPE: Primary | ICD-10-CM

## 2019-05-23 DIAGNOSIS — N43.3 HYDROCELE, UNSPECIFIED HYDROCELE TYPE: Primary | ICD-10-CM

## 2019-05-23 DIAGNOSIS — N44.02: ICD-10-CM

## 2019-05-23 NOTE — PROGRESS NOTES
Kelly Chowdary presents today for No chief complaint on file. Annelise Melton Sr. preferred language for health care discussion is english/other. Is someone accompanying this pt? Is the patient using any DME equipment during OV? Depression Screening:  3 most recent PHQ Screens 5/21/2019   PHQ Not Done -   Little interest or pleasure in doing things Not at all   Feeling down, depressed, irritable, or hopeless Not at all   Total Score PHQ 2 0       Learning Assessment:  Learning Assessment 5/7/2019   PRIMARY LEARNER Patient   PRIMARY LANGUAGE ENGLISH   LEARNER PREFERENCE PRIMARY OTHER (COMMENT)     -     -   ANSWERED BY patient   RELATIONSHIP SELF       Abuse Screening:  Abuse Screening Questionnaire 1/8/2019   Do you ever feel afraid of your partner? N   Are you in a relationship with someone who physically or mentally threatens you? N   Is it safe for you to go home? Y       Fall Risk  Fall Risk Assessment, last 12 mths 9/17/2018   Able to walk? Yes   Fall in past 12 months? No       Pt currently taking Anticoagulant therapy? Coordination of Care:  1. Have you been to the ER, urgent care clinic since your last visit? Hospitalized since your last visit? 2. Have you seen or consulted any other health care providers outside of the [address] [place] [address] since your last visit? Include any pap smears or colon screening.

## 2019-05-23 NOTE — PROGRESS NOTES
Alejandro Parker Sr. is a 51 y.o. male that is here for a blood pressure check. His current medications are listed below. Current Outpatient Medications   Medication Sig    lisinopril (PRINIVIL, ZESTRIL) 20 mg tablet Take 1 Tab by mouth daily for 30 days.  FLUoxetine (PROZAC) 40 mg capsule Take  by mouth daily.  hydrOXYzine HCl (ATARAX) 50 mg tablet Take 50 mg by mouth nightly. Indications: 100 mg qhs    topiramate (TOPAMAX) 25 mg tablet Take 25 mg BID x 7 days then 50 mg  BID    Blood Pressure Monitor (BLOOD PRESSURE KIT) kit 1 Device by Does Not Apply route daily as needed (for blood pressure checks).  PROAIR HFA 90 mcg/actuation inhaler INHALE 2 PUFFS BY MOUTH EVERY 4 HOURS AS NEEDED FOR WHEEZING OR SHORTNESS OF BREATH    amitriptyline HCl (AMITRIPTYLINE PO) Take 75 mg by mouth nightly.  OXcarbazepine (TRILEPTAL) 150 mg tablet Take  by mouth two (2) times a day.  albuterol (PROVENTIL VENTOLIN) 2.5 mg /3 mL (0.083 %) nebulizer solution Nebulized 1 vial for inhalation every 4 hours as needed    roflumilast (DALIRESP) tab tablet Take 1 Tab by mouth daily.  tiZANidine (ZANAFLEX) 4 mg tablet Sig: Take 1 tab PO Q 6 as needed for muscle spasm    tiotropium (SPIRIVA) 18 mcg inhalation capsule Take 1 Cap by inhalation daily.  fluticasone-salmeterol (ADVAIR DISKUS) 500-50 mcg/dose diskus inhaler Take 1 Puff by inhalation two (2) times a day.  furosemide (LASIX) 20 mg tablet Take 1 Tab by mouth every other day.  LORazepam (ATIVAN) 1 mg tablet Take 1 Tab by mouth every eight (8) hours as needed for Anxiety. Max Daily Amount: 3 mg.  Nebulizer & Compressor machine 1 Each by Does Not Apply route every four (4) hours as needed.  OXYGEN-AIR DELIVERY SYSTEMS (WALKABOUT 1 OXYGEN SYSTEM) 2.5 L/min by Nasal route continuous.  pantoprazole (PROTONIX) 40 mg tablet Take 1 Tab by mouth Daily (before breakfast). No current facility-administered medications for this visit. His There were no vitals taken for this visit.

## 2019-05-23 NOTE — PROGRESS NOTES
Patient presents for EKG for surgical clearance. Cardiac clearance requested by patients pulmonologist d/t resting pulse in the 120's at his office. Visit Vitals  /80   Pulse (!) 107   Ht 5' 9\" (1.753 m)   Wt 86.7 kg (191 lb 3.2 oz)   SpO2 94%   BMI 28.24 kg/m²     EKG performed. Patient denies chest pain, SOB, dizziness, palpations, and swelling. Continues on all medications as ordered no new medications reported.

## 2019-05-24 ENCOUNTER — ANESTHESIA (OUTPATIENT)
Dept: SURGERY | Age: 49
End: 2019-05-24
Payer: MEDICAID

## 2019-05-24 ENCOUNTER — HOSPITAL ENCOUNTER (OUTPATIENT)
Age: 49
Setting detail: OUTPATIENT SURGERY
Discharge: HOME | End: 2019-05-24
Attending: UROLOGY | Admitting: UROLOGY
Payer: MEDICAID

## 2019-05-24 VITALS
DIASTOLIC BLOOD PRESSURE: 83 MMHG | OXYGEN SATURATION: 94 % | HEART RATE: 84 BPM | RESPIRATION RATE: 11 BRPM | WEIGHT: 188 LBS | TEMPERATURE: 98 F | BODY MASS INDEX: 27.85 KG/M2 | SYSTOLIC BLOOD PRESSURE: 106 MMHG | HEIGHT: 69 IN

## 2019-05-24 DIAGNOSIS — N44.00 TORSION OF TESTIS: ICD-10-CM

## 2019-05-24 DIAGNOSIS — N43.3 HYDROCELE, UNSPECIFIED HYDROCELE TYPE: Primary | ICD-10-CM

## 2019-05-24 PROCEDURE — 77030012422 HC DRN WND COVD -A: Performed by: UROLOGY

## 2019-05-24 PROCEDURE — 77030011265 HC ELECTRD BLD HEX COVD -A: Performed by: UROLOGY

## 2019-05-24 PROCEDURE — 74011000250 HC RX REV CODE- 250: Performed by: UROLOGY

## 2019-05-24 PROCEDURE — 77030020782 HC GWN BAIR PAWS FLX 3M -B: Performed by: UROLOGY

## 2019-05-24 PROCEDURE — 88302 TISSUE EXAM BY PATHOLOGIST: CPT

## 2019-05-24 PROCEDURE — 76210000020 HC REC RM PH II FIRST 0.5 HR: Performed by: UROLOGY

## 2019-05-24 PROCEDURE — 74011250636 HC RX REV CODE- 250/636: Performed by: NURSE ANESTHETIST, CERTIFIED REGISTERED

## 2019-05-24 PROCEDURE — 76010000149 HC OR TIME 1 TO 1.5 HR: Performed by: UROLOGY

## 2019-05-24 PROCEDURE — 77030018836 HC SOL IRR NACL ICUM -A: Performed by: UROLOGY

## 2019-05-24 PROCEDURE — 80307 DRUG TEST PRSMV CHEM ANLYZR: CPT

## 2019-05-24 PROCEDURE — 76060000033 HC ANESTHESIA 1 TO 1.5 HR: Performed by: UROLOGY

## 2019-05-24 PROCEDURE — 77030002888 HC SUT CHRMC J&J -A: Performed by: UROLOGY

## 2019-05-24 PROCEDURE — 77030031139 HC SUT VCRL2 J&J -A: Performed by: UROLOGY

## 2019-05-24 PROCEDURE — 76210000063 HC OR PH I REC FIRST 0.5 HR: Performed by: UROLOGY

## 2019-05-24 PROCEDURE — 77030018846 HC SOL IRR STRL H20 ICUM -A: Performed by: UROLOGY

## 2019-05-24 PROCEDURE — 74011250636 HC RX REV CODE- 250/636

## 2019-05-24 PROCEDURE — 77030032020 HC SUPP SCROT 3M -A: Performed by: UROLOGY

## 2019-05-24 PROCEDURE — 77030032490 HC SLV COMPR SCD KNE COVD -B: Performed by: UROLOGY

## 2019-05-24 PROCEDURE — 77030002996 HC SUT SLK J&J -A: Performed by: UROLOGY

## 2019-05-24 PROCEDURE — 74011000258 HC RX REV CODE- 258

## 2019-05-24 PROCEDURE — 74011250637 HC RX REV CODE- 250/637: Performed by: NURSE ANESTHETIST, CERTIFIED REGISTERED

## 2019-05-24 RX ORDER — OXYCODONE HYDROCHLORIDE 5 MG/1
5 TABLET ORAL
Qty: 18 TAB | Refills: 0 | Status: SHIPPED | OUTPATIENT
Start: 2019-05-24 | End: 2019-05-27

## 2019-05-24 RX ORDER — SODIUM CHLORIDE, SODIUM LACTATE, POTASSIUM CHLORIDE, CALCIUM CHLORIDE 600; 310; 30; 20 MG/100ML; MG/100ML; MG/100ML; MG/100ML
INJECTION, SOLUTION INTRAVENOUS
Status: DISCONTINUED | OUTPATIENT
Start: 2019-05-24 | End: 2019-05-24 | Stop reason: HOSPADM

## 2019-05-24 RX ORDER — LIDOCAINE HYDROCHLORIDE 10 MG/ML
0.1 INJECTION, SOLUTION EPIDURAL; INFILTRATION; INTRACAUDAL; PERINEURAL AS NEEDED
Status: DISCONTINUED | OUTPATIENT
Start: 2019-05-24 | End: 2019-05-24 | Stop reason: HOSPADM

## 2019-05-24 RX ORDER — SODIUM CHLORIDE 0.9 % (FLUSH) 0.9 %
5-40 SYRINGE (ML) INJECTION AS NEEDED
Status: DISCONTINUED | OUTPATIENT
Start: 2019-05-24 | End: 2019-05-24 | Stop reason: HOSPADM

## 2019-05-24 RX ORDER — PROPOFOL 10 MG/ML
INJECTION, EMULSION INTRAVENOUS AS NEEDED
Status: DISCONTINUED | OUTPATIENT
Start: 2019-05-24 | End: 2019-05-24 | Stop reason: HOSPADM

## 2019-05-24 RX ORDER — KETOROLAC TROMETHAMINE 30 MG/ML
INJECTION, SOLUTION INTRAMUSCULAR; INTRAVENOUS AS NEEDED
Status: DISCONTINUED | OUTPATIENT
Start: 2019-05-24 | End: 2019-05-24 | Stop reason: HOSPADM

## 2019-05-24 RX ORDER — TAMSULOSIN HYDROCHLORIDE 0.4 MG/1
0.4 CAPSULE ORAL DAILY
Qty: 10 CAP | Refills: 0 | Status: SHIPPED | OUTPATIENT
Start: 2019-05-24 | End: 2019-06-03 | Stop reason: SDUPTHER

## 2019-05-24 RX ORDER — SUCCINYLCHOLINE CHLORIDE 20 MG/ML
INJECTION INTRAMUSCULAR; INTRAVENOUS AS NEEDED
Status: DISCONTINUED | OUTPATIENT
Start: 2019-05-24 | End: 2019-05-24 | Stop reason: HOSPADM

## 2019-05-24 RX ORDER — MIDAZOLAM HYDROCHLORIDE 1 MG/ML
INJECTION, SOLUTION INTRAMUSCULAR; INTRAVENOUS AS NEEDED
Status: DISCONTINUED | OUTPATIENT
Start: 2019-05-24 | End: 2019-05-24 | Stop reason: HOSPADM

## 2019-05-24 RX ORDER — HYDROMORPHONE HYDROCHLORIDE 2 MG/ML
INJECTION, SOLUTION INTRAMUSCULAR; INTRAVENOUS; SUBCUTANEOUS AS NEEDED
Status: DISCONTINUED | OUTPATIENT
Start: 2019-05-24 | End: 2019-05-24 | Stop reason: HOSPADM

## 2019-05-24 RX ORDER — ONDANSETRON 2 MG/ML
INJECTION INTRAMUSCULAR; INTRAVENOUS AS NEEDED
Status: DISCONTINUED | OUTPATIENT
Start: 2019-05-24 | End: 2019-05-24 | Stop reason: HOSPADM

## 2019-05-24 RX ORDER — CEFAZOLIN SODIUM IN 0.9 % NACL 2 G/100 ML
PLASTIC BAG, INJECTION (ML) INTRAVENOUS AS NEEDED
Status: DISCONTINUED | OUTPATIENT
Start: 2019-05-24 | End: 2019-05-24 | Stop reason: HOSPADM

## 2019-05-24 RX ORDER — BUPIVACAINE HYDROCHLORIDE 5 MG/ML
INJECTION, SOLUTION EPIDURAL; INTRACAUDAL AS NEEDED
Status: DISCONTINUED | OUTPATIENT
Start: 2019-05-24 | End: 2019-05-24 | Stop reason: HOSPADM

## 2019-05-24 RX ORDER — SODIUM CHLORIDE, SODIUM LACTATE, POTASSIUM CHLORIDE, CALCIUM CHLORIDE 600; 310; 30; 20 MG/100ML; MG/100ML; MG/100ML; MG/100ML
75 INJECTION, SOLUTION INTRAVENOUS CONTINUOUS
Status: DISCONTINUED | OUTPATIENT
Start: 2019-05-24 | End: 2019-05-25 | Stop reason: HOSPADM

## 2019-05-24 RX ORDER — HYDROMORPHONE HYDROCHLORIDE 2 MG/ML
0.5 INJECTION, SOLUTION INTRAMUSCULAR; INTRAVENOUS; SUBCUTANEOUS
Status: DISCONTINUED | OUTPATIENT
Start: 2019-05-24 | End: 2019-05-25 | Stop reason: HOSPADM

## 2019-05-24 RX ORDER — CEFAZOLIN SODIUM 2 G/50ML
2 SOLUTION INTRAVENOUS ONCE
Status: DISCONTINUED | OUTPATIENT
Start: 2019-05-24 | End: 2019-05-24 | Stop reason: HOSPADM

## 2019-05-24 RX ORDER — FENTANYL CITRATE 50 UG/ML
INJECTION, SOLUTION INTRAMUSCULAR; INTRAVENOUS AS NEEDED
Status: DISCONTINUED | OUTPATIENT
Start: 2019-05-24 | End: 2019-05-24 | Stop reason: HOSPADM

## 2019-05-24 RX ORDER — SODIUM CHLORIDE 0.9 % (FLUSH) 0.9 %
5-40 SYRINGE (ML) INJECTION AS NEEDED
Status: DISCONTINUED | OUTPATIENT
Start: 2019-05-24 | End: 2019-05-25 | Stop reason: HOSPADM

## 2019-05-24 RX ORDER — CIPROFLOXACIN 750 MG/1
750 TABLET, FILM COATED ORAL 2 TIMES DAILY
Qty: 10 TAB | Refills: 0 | Status: SHIPPED | OUTPATIENT
Start: 2019-05-24 | End: 2019-05-29

## 2019-05-24 RX ORDER — INSULIN LISPRO 100 [IU]/ML
INJECTION, SOLUTION INTRAVENOUS; SUBCUTANEOUS ONCE
Status: DISCONTINUED | OUTPATIENT
Start: 2019-05-24 | End: 2019-05-24 | Stop reason: HOSPADM

## 2019-05-24 RX ORDER — FENTANYL CITRATE 50 UG/ML
50 INJECTION, SOLUTION INTRAMUSCULAR; INTRAVENOUS
Status: DISCONTINUED | OUTPATIENT
Start: 2019-05-24 | End: 2019-05-25 | Stop reason: HOSPADM

## 2019-05-24 RX ORDER — NEOMYCIN AND POLYMYXIN B SULFATES 40; 200000 MG/ML; [USP'U]/ML
SOLUTION IRRIGATION AS NEEDED
Status: DISCONTINUED | OUTPATIENT
Start: 2019-05-24 | End: 2019-05-24 | Stop reason: HOSPADM

## 2019-05-24 RX ORDER — DIPHENHYDRAMINE HYDROCHLORIDE 50 MG/ML
12.5 INJECTION, SOLUTION INTRAMUSCULAR; INTRAVENOUS
Status: DISCONTINUED | OUTPATIENT
Start: 2019-05-24 | End: 2019-05-25 | Stop reason: HOSPADM

## 2019-05-24 RX ORDER — SODIUM CHLORIDE, SODIUM LACTATE, POTASSIUM CHLORIDE, CALCIUM CHLORIDE 600; 310; 30; 20 MG/100ML; MG/100ML; MG/100ML; MG/100ML
75 INJECTION, SOLUTION INTRAVENOUS CONTINUOUS
Status: DISCONTINUED | OUTPATIENT
Start: 2019-05-24 | End: 2019-05-24 | Stop reason: HOSPADM

## 2019-05-24 RX ORDER — LIDOCAINE HYDROCHLORIDE 20 MG/ML
INJECTION, SOLUTION EPIDURAL; INFILTRATION; INTRACAUDAL; PERINEURAL AS NEEDED
Status: DISCONTINUED | OUTPATIENT
Start: 2019-05-24 | End: 2019-05-24 | Stop reason: HOSPADM

## 2019-05-24 RX ORDER — FAMOTIDINE 20 MG/1
20 TABLET, FILM COATED ORAL ONCE
Status: COMPLETED | OUTPATIENT
Start: 2019-05-24 | End: 2019-05-24

## 2019-05-24 RX ORDER — SODIUM CHLORIDE 0.9 % (FLUSH) 0.9 %
5-40 SYRINGE (ML) INJECTION EVERY 8 HOURS
Status: DISCONTINUED | OUTPATIENT
Start: 2019-05-24 | End: 2019-05-24 | Stop reason: HOSPADM

## 2019-05-24 RX ORDER — SODIUM CHLORIDE 0.9 % (FLUSH) 0.9 %
5-40 SYRINGE (ML) INJECTION EVERY 8 HOURS
Status: DISCONTINUED | OUTPATIENT
Start: 2019-05-24 | End: 2019-05-25 | Stop reason: HOSPADM

## 2019-05-24 RX ORDER — ONDANSETRON 2 MG/ML
4 INJECTION INTRAMUSCULAR; INTRAVENOUS ONCE
Status: DISCONTINUED | OUTPATIENT
Start: 2019-05-24 | End: 2019-05-25 | Stop reason: HOSPADM

## 2019-05-24 RX ORDER — DOCUSATE SODIUM 100 MG/1
100 CAPSULE, LIQUID FILLED ORAL 2 TIMES DAILY
Qty: 10 CAP | Refills: 2 | Status: SHIPPED | OUTPATIENT
Start: 2019-05-24 | End: 2019-05-29

## 2019-05-24 RX ORDER — SODIUM CHLORIDE 9 MG/ML
100 INJECTION, SOLUTION INTRAVENOUS CONTINUOUS
Status: DISCONTINUED | OUTPATIENT
Start: 2019-05-24 | End: 2019-05-24 | Stop reason: HOSPADM

## 2019-05-24 RX ADMIN — SODIUM CHLORIDE, SODIUM LACTATE, POTASSIUM CHLORIDE, AND CALCIUM CHLORIDE 75 ML/HR: 600; 310; 30; 20 INJECTION, SOLUTION INTRAVENOUS at 14:00

## 2019-05-24 RX ADMIN — SODIUM CHLORIDE, SODIUM LACTATE, POTASSIUM CHLORIDE, CALCIUM CHLORIDE: 600; 310; 30; 20 INJECTION, SOLUTION INTRAVENOUS at 20:01

## 2019-05-24 RX ADMIN — KETOROLAC TROMETHAMINE 30 MG: 30 INJECTION, SOLUTION INTRAMUSCULAR; INTRAVENOUS at 20:54

## 2019-05-24 RX ADMIN — FENTANYL CITRATE 100 MCG: 50 INJECTION, SOLUTION INTRAMUSCULAR; INTRAVENOUS at 20:06

## 2019-05-24 RX ADMIN — Medication 2 G: at 20:16

## 2019-05-24 RX ADMIN — FAMOTIDINE 20 MG: 20 TABLET ORAL at 14:00

## 2019-05-24 RX ADMIN — LIDOCAINE HYDROCHLORIDE 100 MG: 20 INJECTION, SOLUTION EPIDURAL; INFILTRATION; INTRACAUDAL; PERINEURAL at 20:09

## 2019-05-24 RX ADMIN — HYDROMORPHONE HYDROCHLORIDE 1 MG: 2 INJECTION, SOLUTION INTRAMUSCULAR; INTRAVENOUS; SUBCUTANEOUS at 20:27

## 2019-05-24 RX ADMIN — MIDAZOLAM HYDROCHLORIDE 2 MG: 1 INJECTION, SOLUTION INTRAMUSCULAR; INTRAVENOUS at 20:03

## 2019-05-24 RX ADMIN — SUCCINYLCHOLINE CHLORIDE 140 MG: 20 INJECTION INTRAMUSCULAR; INTRAVENOUS at 20:09

## 2019-05-24 RX ADMIN — ONDANSETRON 4 MG: 2 INJECTION INTRAMUSCULAR; INTRAVENOUS at 20:54

## 2019-05-24 RX ADMIN — PROPOFOL 200 MG: 10 INJECTION, EMULSION INTRAVENOUS at 20:09

## 2019-05-24 RX ADMIN — FENTANYL CITRATE 50 MCG: 50 INJECTION, SOLUTION INTRAMUSCULAR; INTRAVENOUS at 20:23

## 2019-05-24 RX ADMIN — FENTANYL CITRATE 100 MCG: 50 INJECTION, SOLUTION INTRAMUSCULAR; INTRAVENOUS at 20:21

## 2019-05-24 RX ADMIN — SODIUM CHLORIDE, SODIUM LACTATE, POTASSIUM CHLORIDE, CALCIUM CHLORIDE: 600; 310; 30; 20 INJECTION, SOLUTION INTRAVENOUS at 20:58

## 2019-05-24 NOTE — ANESTHESIA PREPROCEDURE EVALUATION
Relevant Problems   No relevant active problems       Anesthetic History          Comments: Hx aspiration pneumonia     Review of Systems / Medical History  Patient summary reviewed and pertinent labs reviewed    Pulmonary    COPD: moderate               Neuro/Psych              Cardiovascular    Hypertension          CAD      Comments: Hx Pulm aspiration  Poorly controlled GERD   GI/Hepatic/Renal     GERD: poorly controlled      Liver disease     Endo/Other             Other Findings              Physical Exam    Airway    TM Distance: 4 - 6 cm  Neck ROM: normal range of motion   Mouth opening: Normal     Cardiovascular  Regular rate and rhythm,  S1 and S2 normal,  no murmur, click, rub, or gallop             Dental    Dentition: Edentulous     Pulmonary  Breath sounds clear to auscultation               Abdominal  GI exam deferred       Other Findings            Anesthetic Plan    ASA: 3  Anesthesia type: general  Oral ETT   NO LMA        Induction: Intravenous  Anesthetic plan and risks discussed with: Patient

## 2019-05-24 NOTE — H&P
Deepa Vickers Sr. 51 y.o. male      Mr. Marlon Butterfield seen today for evaluation of left-sided scrotal pain associated with right-sided hydrocele  Patient initially presented to the emergency room on 25 April 2019 with sudden severe left scrotal pain onset while getting out of his pickup truck after parking it in the [facility] parking lot-went to emergency room immediately where exam showed a small right hydrocele of the scrotum and subsequent ultrasound imaging showing normal blood flow to each testes right hydrocele and small left hydrocele-treated with Cipro for suspected epididymitis with pain lingering for several days gradually lessening in intensity now experiencing no pain at rest but occasionally feeling tight aching pain in the left side of the scrotum.     CT scan imaging of the abdomen and pelvis on 25 April 2019 shows normal kidneys ureter and bladder images have been reviewed on PACS  Ultrasound imaging of the scrotum on 25 April 2019 shows right-sided hydrocele with a small left hydrocele-images have been reviewed on PACS     Patient has no antecedent symptoms of bladder irritability or obstructive voiding-         PVR 62 cc in May 2019        Review of Systems:   CNS: Frequent headaches stress and anxiety  Respiratory: Chest tightness no wheezing and coughing  Cardiovascular: Hypertension, palpitations,  Intestinal: GERD no diarrhea no constipation  Urinary: No urgency frequency dysuria or hematuria  Skeletal: No bone or joint pain  Endocrine: No diabetes or thyroid disease  Other:                                                                           Disability retired-COPD  Allergies:          Allergies   Allergen Reactions    Ciprofloxacin Nausea Only    Remeron [Mirtazapine] Other (comments)       Mood swings    Seroquel [Quetiapine] Other (comments)       Mood swings      Medications:           Current Outpatient Medications   Medication Sig Dispense Refill    lisinopril (PRINIVIL, ZESTRIL) 20 mg tablet Take 1 Tab by mouth daily for 30 days. 30 Tab 2    FLUoxetine (PROZAC) 40 mg capsule Take  by mouth daily.        OTHER Indications: takes Methylred for insomnia        hydrOXYzine HCl (ATARAX) 50 mg tablet Take 50 mg by mouth nightly. Indications: 100 mg qhs        Blood Pressure Monitor (BLOOD PRESSURE KIT) kit 1 Device by Does Not Apply route daily as needed (for blood pressure checks). 1 Kit 0    PROAIR HFA 90 mcg/actuation inhaler INHALE 2 PUFFS BY MOUTH EVERY 4 HOURS AS NEEDED FOR WHEEZING OR SHORTNESS OF BREATH 1 Inhaler 2    amitriptyline HCl (AMITRIPTYLINE PO) Take 75 mg by mouth nightly.        OXcarbazepine (TRILEPTAL) 150 mg tablet Take  by mouth two (2) times a day.        albuterol (PROVENTIL VENTOLIN) 2.5 mg /3 mL (0.083 %) nebulizer solution Nebulized 1 vial for inhalation every 4 hours as needed 60 Each 2    roflumilast (DALIRESP) tab tablet Take 1 Tab by mouth daily. 30 Tab 5    tiZANidine (ZANAFLEX) 4 mg tablet Sig: Take 1 tab PO Q 6 as needed for muscle spasm 60 Tab 0    tiotropium (SPIRIVA) 18 mcg inhalation capsule Take 1 Cap by inhalation daily. 30 Cap 5    fluticasone-salmeterol (ADVAIR DISKUS) 500-50 mcg/dose diskus inhaler Take 1 Puff by inhalation two (2) times a day. 1 Inhaler 5    furosemide (LASIX) 20 mg tablet Take 1 Tab by mouth every other day. 15 Tab 6    LORazepam (ATIVAN) 1 mg tablet Take 1 Tab by mouth every eight (8) hours as needed for Anxiety. Max Daily Amount: 3 mg. 90 Tab 0    Nebulizer & Compressor machine 1 Each by Does Not Apply route every four (4) hours as needed. 1 Each 0    OXYGEN-AIR DELIVERY SYSTEMS (WALKABOUT 1 OXYGEN SYSTEM) 2.5 L/min by Nasal route continuous.        pantoprazole (PROTONIX) 40 mg tablet Take 1 Tab by mouth Daily (before breakfast).  30 Tab 0    topiramate (TOPAMAX) 25 mg tablet Take 25 mg BID x 7 days then 50 mg  BID 60 Tab 1    methylPREDNISolone (MEDROL DOSEPACK) 4 mg tablet Take as directed on pack 1 Dose Pack 0    predniSONE (DELTASONE) 20 mg tablet Take 20 mg by mouth daily (with breakfast). 10 Tab 0    suvorexant (BELSOMRA) 10 mg tablet Take 1 Tab by mouth nightly as needed for Insomnia.  Max Daily Amount: 10 mg. 30 Tab 0              Past Medical History:   Diagnosis Date    Anxiety      Chest pain      Chronic diastolic heart failure secondary to coronary artery disease (HCC)      Chronic lung disease      Chronic obstructive pulmonary disease ([facility] [address] 08/03/2017     PFTS 8/3/17    COPD, severe (HCC)      Cough      Emphysema/COPD (HCC)      Esophagitis 12/11/2017     Endoscopy    Essential hypertension, benign      Fast heart beat      Feeling of chest tightness      Frequent urination      Heartburn      Hepatomegaly      History of stomach ulcers      Hx of nausea and vomiting      Poor appetite      Positive urine drug screen 01/2016     opiates and THC    Shortness of breath      Splenomegaly      Stomach pain      Stress      Tiredness      Unintentional weight change      Upper GI bleed      Weakness      Wheeze              Past Surgical History:   Procedure Laterality Date    COLONOSCOPY N/A 11/5/2018     COLONOSCOPY with polypectomies performed by Xuan Griffin MD at [facility]    HX ENDOSCOPY   12/11/2017      Social History            Socioeconomic History    Marital status:        Spouse name: Not on file    Number of children: Not on file    Years of education: Not on file    Highest education level: Not on file   Occupational History    Not on file   Social Needs    Financial resource strain: Not on file    Food insecurity:       Worry: Not on file       Inability: Not on file    Transportation needs:       Medical: Not on file       Non-medical: Not on file   Tobacco Use    Smoking status: Former Smoker       Packs/day: 2.00       Years: 25.00       Pack years: 50.00       Types: Cigarettes       Start date: 5/28/1984       Last attempt to quit: 12/18/2017     Years since quittin.4    Smokeless tobacco: Never Used   Substance and Sexual Activity    Alcohol use: No    Drug use: Not Currently       Types: Marijuana       Comment: Hx of Marijuana use    Sexual activity: Not Currently       Partners: Female   Lifestyle    Physical activity:       Days per week: Not on file       Minutes per session: Not on file    Stress: Not on file   Relationships    Social connections:       Talks on phone: Not on file       Gets together: Not on file       Attends Hoahaoism service: Not on file       Active member of club or organization: Not on file       Attends meetings of clubs or organizations: Not on file       Relationship status: Not on file    Intimate partner violence:       Fear of current or ex partner: Not on file       Emotionally abused: Not on file       Physically abused: Not on file       Forced sexual activity: Not on file   Other Topics Concern     Service No    Blood Transfusions No    Caffeine Concern Yes    Occupational Exposure No    Hobby Hazards No    Sleep Concern Yes       Comment: occas    Stress Concern No    Weight Concern No    Special Diet No    Back Care Yes    Exercise No    Bike Helmet Yes    Seat Belt No       Comment: occas    Self-Exams Not Asked   Social History Narrative                 Family History   Problem Relation Age of Onset    Hypertension Mother      Heart Disease Mother      Diabetes Mother      Hypertension Father      Heart Disease Father      Cancer Father           lymphnodes    Diabetes Father           Physical Examination: Well-nourished mature male in no apparent distress  Neck no masses nodes or bruits  Chest no wheezing rales or rhonchi-distant breath sounds  Heart regular sinus rhythm no murmurs no gallops no rubs  Abdomen is nontender no palpable masses no organomegaly  Back-no percussion CVA tenderness on either side  No inguinal hernia or adenopathy  Penis is normal  Testes are normal in size shape and consistency bilaterally-no epididymal induration or tenderness on either side  Spermatic cords are palpably normal bilaterally  Scrotum-moderate fluctuant nontender right hydrocele-normal testes bilaterally-normal spermatic cords bilaterally  Prostate by GANGA is rounded, smooth, benign in consistency and nontender-no induration no nodularity  No rectal masses tenderness or induration     PVR 62 cc today        Impression: Right hydrocele/left hydrocele                        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 Makenzie Sparrow     Counseling Note:     Technique of scrotal exploration with bilateral hydrocelectomy and bilateral orchidopexy described and explained-patient understands and accepts the risk of bleeding, infection, compromised fertility from  vas deferens interruption     RTC 3 days postop Penrose drain removal        Rubi Albert MD  -electronically signed-

## 2019-05-25 ENCOUNTER — APPOINTMENT (OUTPATIENT)
Dept: CT IMAGING | Age: 49
End: 2019-05-25
Attending: EMERGENCY MEDICINE
Payer: MEDICAID

## 2019-05-25 ENCOUNTER — HOSPITAL ENCOUNTER (EMERGENCY)
Age: 49
Discharge: HOME | End: 2019-05-26
Attending: EMERGENCY MEDICINE
Payer: MEDICAID

## 2019-05-25 DIAGNOSIS — G89.18 POST-OPERATIVE PAIN: Primary | ICD-10-CM

## 2019-05-25 DIAGNOSIS — J44.1 ACUTE EXACERBATION OF CHRONIC OBSTRUCTIVE PULMONARY DISEASE (COPD) (HCC): ICD-10-CM

## 2019-05-25 LAB
ALBUMIN SERPL-MCNC: 3.6 G/DL (ref 3.4–5)
ALBUMIN/GLOB SERPL: 1.3 {RATIO} (ref 0.8–1.7)
ALP SERPL-CCNC: 96 U/L (ref 45–117)
ALT SERPL-CCNC: 28 U/L (ref 16–61)
ANION GAP SERPL CALC-SCNC: 6 MMOL/L (ref 3–18)
ANION GAP SERPL CALC-SCNC: 8 MMOL/L (ref 3–18)
AST SERPL-CCNC: 17 U/L (ref 15–37)
BASOPHILS # BLD: 0 K/UL (ref 0–0.1)
BASOPHILS # BLD: 0 K/UL (ref 0–0.1)
BASOPHILS NFR BLD: 0 % (ref 0–2)
BASOPHILS NFR BLD: 0 % (ref 0–2)
BILIRUB SERPL-MCNC: 0.9 MG/DL (ref 0.2–1)
BUN SERPL-MCNC: 19 MG/DL (ref 7–18)
BUN SERPL-MCNC: 20 MG/DL (ref 7–18)
BUN/CREAT SERPL: 13 (ref 12–20)
BUN/CREAT SERPL: 14 (ref 12–20)
CALCIUM SERPL-MCNC: 9.4 MG/DL (ref 8.5–10.1)
CALCIUM SERPL-MCNC: 9.6 MG/DL (ref 8.5–10.1)
CHLORIDE SERPL-SCNC: 104 MMOL/L (ref 100–108)
CHLORIDE SERPL-SCNC: 105 MMOL/L (ref 100–108)
CO2 SERPL-SCNC: 26 MMOL/L (ref 21–32)
CO2 SERPL-SCNC: 28 MMOL/L (ref 21–32)
CREAT SERPL-MCNC: 1.38 MG/DL (ref 0.6–1.3)
CREAT SERPL-MCNC: 1.58 MG/DL (ref 0.6–1.3)
DIFFERENTIAL METHOD BLD: ABNORMAL
DIFFERENTIAL METHOD BLD: ABNORMAL
EOSINOPHIL # BLD: 0.1 K/UL (ref 0–0.4)
EOSINOPHIL # BLD: 0.1 K/UL (ref 0–0.4)
EOSINOPHIL NFR BLD: 1 % (ref 0–5)
EOSINOPHIL NFR BLD: 1 % (ref 0–5)
ERYTHROCYTE [DISTWIDTH] IN BLOOD BY AUTOMATED COUNT: 13.6 % (ref 11.6–14.5)
ERYTHROCYTE [DISTWIDTH] IN BLOOD BY AUTOMATED COUNT: 13.8 % (ref 11.6–14.5)
GLOBULIN SER CALC-MCNC: 2.7 G/DL (ref 2–4)
GLUCOSE SERPL-MCNC: 107 MG/DL (ref 74–99)
GLUCOSE SERPL-MCNC: 108 MG/DL (ref 74–99)
HCT VFR BLD AUTO: 38 % (ref 36–48)
HCT VFR BLD AUTO: 40.8 % (ref 36–48)
HGB BLD-MCNC: 13.6 G/DL (ref 13–16)
HGB BLD-MCNC: 14.4 G/DL (ref 13–16)
LYMPHOCYTES # BLD: 1 K/UL (ref 0.9–3.6)
LYMPHOCYTES # BLD: 1.3 K/UL (ref 0.9–3.6)
LYMPHOCYTES NFR BLD: 11 % (ref 21–52)
LYMPHOCYTES NFR BLD: 16 % (ref 21–52)
MCH RBC QN AUTO: 30.4 PG (ref 24–34)
MCH RBC QN AUTO: 30.6 PG (ref 24–34)
MCHC RBC AUTO-ENTMCNC: 35.3 G/DL (ref 31–37)
MCHC RBC AUTO-ENTMCNC: 35.8 G/DL (ref 31–37)
MCV RBC AUTO: 85.4 FL (ref 74–97)
MCV RBC AUTO: 86.3 FL (ref 74–97)
MONOCYTES # BLD: 0.6 K/UL (ref 0.05–1.2)
MONOCYTES # BLD: 0.7 K/UL (ref 0.05–1.2)
MONOCYTES NFR BLD: 8 % (ref 3–10)
MONOCYTES NFR BLD: 8 % (ref 3–10)
NEUTS SEG # BLD: 6.1 K/UL (ref 1.8–8)
NEUTS SEG # BLD: 7 K/UL (ref 1.8–8)
NEUTS SEG NFR BLD: 75 % (ref 40–73)
NEUTS SEG NFR BLD: 80 % (ref 40–73)
PLATELET # BLD AUTO: 242 K/UL (ref 135–420)
PLATELET # BLD AUTO: 252 K/UL (ref 135–420)
PMV BLD AUTO: 10 FL (ref 9.2–11.8)
PMV BLD AUTO: 9.9 FL (ref 9.2–11.8)
POTASSIUM SERPL-SCNC: 4.1 MMOL/L (ref 3.5–5.5)
POTASSIUM SERPL-SCNC: 4.6 MMOL/L (ref 3.5–5.5)
PROT SERPL-MCNC: 6.3 G/DL (ref 6.4–8.2)
RBC # BLD AUTO: 4.45 M/UL (ref 4.7–5.5)
RBC # BLD AUTO: 4.73 M/UL (ref 4.7–5.5)
SODIUM SERPL-SCNC: 138 MMOL/L (ref 136–145)
SODIUM SERPL-SCNC: 139 MMOL/L (ref 136–145)
WBC # BLD AUTO: 8.1 K/UL (ref 4.6–13.2)
WBC # BLD AUTO: 8.8 K/UL (ref 4.6–13.2)

## 2019-05-25 PROCEDURE — 85025 COMPLETE CBC W/AUTO DIFF WBC: CPT

## 2019-05-25 PROCEDURE — 96376 TX/PRO/DX INJ SAME DRUG ADON: CPT

## 2019-05-25 PROCEDURE — 74011250636 HC RX REV CODE- 250/636

## 2019-05-25 PROCEDURE — 80053 COMPREHEN METABOLIC PANEL: CPT

## 2019-05-25 PROCEDURE — 74011250637 HC RX REV CODE- 250/637: Performed by: EMERGENCY MEDICINE

## 2019-05-25 PROCEDURE — 74011250636 HC RX REV CODE- 250/636: Performed by: EMERGENCY MEDICINE

## 2019-05-25 PROCEDURE — 96372 THER/PROPH/DIAG INJ SC/IM: CPT

## 2019-05-25 PROCEDURE — 99284 EMERGENCY DEPT VISIT MOD MDM: CPT

## 2019-05-25 PROCEDURE — 74176 CT ABD & PELVIS W/O CONTRAST: CPT

## 2019-05-25 PROCEDURE — 96375 TX/PRO/DX INJ NEW DRUG ADDON: CPT

## 2019-05-25 PROCEDURE — 96374 THER/PROPH/DIAG INJ IV PUSH: CPT

## 2019-05-25 RX ORDER — HYDROMORPHONE HYDROCHLORIDE 2 MG/ML
INJECTION, SOLUTION INTRAMUSCULAR; INTRAVENOUS; SUBCUTANEOUS
Status: COMPLETED
Start: 2019-05-25 | End: 2019-05-25

## 2019-05-25 RX ORDER — PROMETHAZINE HYDROCHLORIDE 25 MG/ML
25 INJECTION, SOLUTION INTRAMUSCULAR; INTRAVENOUS ONCE
Status: COMPLETED | OUTPATIENT
Start: 2019-05-25 | End: 2019-05-25

## 2019-05-25 RX ORDER — MORPHINE SULFATE 10 MG/ML
8 INJECTION, SOLUTION INTRAMUSCULAR; INTRAVENOUS
Status: COMPLETED | OUTPATIENT
Start: 2019-05-25 | End: 2019-05-25

## 2019-05-25 RX ORDER — MORPHINE SULFATE 10 MG/ML
10 INJECTION, SOLUTION INTRAMUSCULAR; INTRAVENOUS ONCE
Status: COMPLETED | OUTPATIENT
Start: 2019-05-25 | End: 2019-05-25

## 2019-05-25 RX ORDER — MORPHINE SULFATE 10 MG/ML
5 INJECTION, SOLUTION INTRAMUSCULAR; INTRAVENOUS ONCE
Status: COMPLETED | OUTPATIENT
Start: 2019-05-25 | End: 2019-05-25

## 2019-05-25 RX ORDER — OXYCODONE AND ACETAMINOPHEN 5; 325 MG/1; MG/1
1 TABLET ORAL
Qty: 12 TAB | Refills: 0 | Status: SHIPPED | OUTPATIENT
Start: 2019-05-25 | End: 2019-05-28

## 2019-05-25 RX ORDER — ONDANSETRON 2 MG/ML
4 INJECTION INTRAMUSCULAR; INTRAVENOUS ONCE
Status: COMPLETED | OUTPATIENT
Start: 2019-05-25 | End: 2019-05-25

## 2019-05-25 RX ORDER — DIPHENHYDRAMINE HYDROCHLORIDE 50 MG/ML
50 INJECTION, SOLUTION INTRAMUSCULAR; INTRAVENOUS
Status: COMPLETED | OUTPATIENT
Start: 2019-05-25 | End: 2019-05-25

## 2019-05-25 RX ORDER — HYDROMORPHONE HYDROCHLORIDE 2 MG/ML
2 INJECTION, SOLUTION INTRAMUSCULAR; INTRAVENOUS; SUBCUTANEOUS ONCE
Status: COMPLETED | OUTPATIENT
Start: 2019-05-25 | End: 2019-05-25

## 2019-05-25 RX ORDER — OXYCODONE AND ACETAMINOPHEN 5; 325 MG/1; MG/1
1 TABLET ORAL ONCE
Status: COMPLETED | OUTPATIENT
Start: 2019-05-25 | End: 2019-05-25

## 2019-05-25 RX ADMIN — DIPHENHYDRAMINE HYDROCHLORIDE 50 MG: 50 INJECTION INTRAMUSCULAR; INTRAVENOUS at 18:28

## 2019-05-25 RX ADMIN — HYDROMORPHONE HYDROCHLORIDE 2 MG: 2 INJECTION, SOLUTION INTRAMUSCULAR; INTRAVENOUS; SUBCUTANEOUS at 23:25

## 2019-05-25 RX ADMIN — ONDANSETRON 4 MG: 2 INJECTION INTRAMUSCULAR; INTRAVENOUS at 15:28

## 2019-05-25 RX ADMIN — MORPHINE SULFATE 10 MG: 10 INJECTION, SOLUTION INTRAMUSCULAR; INTRAVENOUS at 18:28

## 2019-05-25 RX ADMIN — MORPHINE SULFATE 5 MG: 10 INJECTION, SOLUTION INTRAMUSCULAR; INTRAVENOUS at 15:28

## 2019-05-25 RX ADMIN — HYDROMORPHONE HYDROCHLORIDE 2 MG: 2 INJECTION INTRAMUSCULAR; INTRAVENOUS; SUBCUTANEOUS at 23:25

## 2019-05-25 RX ADMIN — PROMETHAZINE HYDROCHLORIDE 25 MG: 25 INJECTION INTRAMUSCULAR; INTRAVENOUS at 18:28

## 2019-05-25 RX ADMIN — OXYCODONE HYDROCHLORIDE AND ACETAMINOPHEN 1 TABLET: 5; 325 TABLET ORAL at 16:36

## 2019-05-25 RX ADMIN — MORPHINE SULFATE 8 MG: 10 INJECTION, SOLUTION INTRAMUSCULAR; INTRAVENOUS at 16:37

## 2019-05-25 NOTE — ANESTHESIA POSTPROCEDURE EVALUATION
Procedure(s):  BILATERAL HYDROCELECTOMY  BILATERAL ORCHIOPEXY. general    Anesthesia Post Evaluation      Multimodal analgesia: multimodal analgesia used between 6 hours prior to anesthesia start to PACU discharge  Patient location during evaluation: PACU  Patient participation: complete - patient participated  Level of consciousness: awake and alert  Pain score: 0  Airway patency: patent  Anesthetic complications: no  Cardiovascular status: stable  Respiratory status: spontaneous ventilation, unassisted, room air and nonlabored ventilation  Hydration status: stable  Post anesthesia nausea and vomiting:  none      Vitals Value Taken Time   /79 5/24/2019  9:32 PM   Temp 36.7 °C (98 °F) 5/24/2019  9:16 PM   Pulse 87 5/24/2019  9:40 PM   Resp 12 5/24/2019  9:40 PM   SpO2 96 % 5/24/2019  9:40 PM   Vitals shown include unvalidated device data.

## 2019-05-25 NOTE — ED PROVIDER NOTES
EMERGENCY DEPARTMENT HISTORY AND PHYSICAL EXAM    4:18 PM  Date: 5/25/2019  Patient Name: Steve Guevara.    History of Presenting Illness     Chief Complaint   Patient presents with    Post OP Complication     lower abdominal pain/groin pain        History Provided By: Patient    HPI: Steve Guevara. is a 51 y.o. male with chronic diastolic heart failure, COPD, hypertension, here for groin pain. Patient was discharged here after a bilateral hydrocelectomy,bilateral orchidopexy for a right hydrocele, left spermatic cord torsion with detorsion with Dr. Shavon Ji. Patient left at 10 PM last night and since then has had 4 oxy's for pain. The pain has been progressing and OxyContin have not been helping. Patient was noted some bleeding on the pad but no other drainage.     Location: Testicular  Severity: Moderate  Timing/course: Progressive  Onset/Duration: Last night and     PCP: Peña Guzmán NP    Past History     Past Medical History:  Past Medical History:   Diagnosis Date    Anxiety     Chest pain     Chronic diastolic heart failure secondary to coronary artery disease (HCC)     Chronic lung disease     [facility] obstructive pulmonary disease ([facility] [address] 08/03/2017    PFTS 8/3/17    COPD, severe (HCC)     Cough     Emphysema/COPD (HCC)     Esophagitis 12/11/2017    Endoscopy    Essential hypertension, benign     Fast heart beat     Feeling of chest tightness     Frequent urination     Heartburn     Hepatomegaly     History of stomach ulcers     Hx of nausea and vomiting     Poor appetite     Positive urine drug screen 01/2016    opiates and THC    Shortness of breath     Splenomegaly     Stomach pain     Stress     Tiredness     Unintentional weight change     Upper GI bleed     Weakness     Wheeze        Past Surgical History:  Past Surgical History:   Procedure Laterality Date    COLONOSCOPY N/A 11/5/2018    COLONOSCOPY with polypectomies performed by June Sun MD at [facility]    HX ENDOSCOPY  2017    HX WISDOM TEETH EXTRACTION Right 2019    IR BX LIVER PERCUTANEOUS         Family History:  Family History   Problem Relation Age of Onset    Hypertension Mother     Heart Disease Mother     Diabetes Mother     Hypertension Father     Heart Disease Father     Cancer Father         lymphnodes    Diabetes Father        Social History:  Social History     Tobacco Use    Smoking status: Former Smoker     Packs/day: 2.00     Years: 25.00     Pack years: 50.00     Types: Cigarettes     Start date: 1984     Last attempt to quit: 2017     Years since quittin.4    Smokeless tobacco: Never Used   Substance Use Topics    Alcohol use: No    Drug use: Yes     Types: Marijuana     Comment: Hx of Marijuana use       Allergies: Allergies   Allergen Reactions    Ciprofloxacin Nausea Only    Remeron [Mirtazapine] Other (comments)     Mood swings    Seroquel [Quetiapine] Other (comments)     Mood swings       Review of Systems     Constitutional: No fevers. Respiratory: No cough, dyspnea, or wheezing. Cardiovascular: No chest pain, palpitations, or heaviness. Gastrointestinal: No nausea, vomiting, diarrhea. Genitourinary: No dysuria, frequency, or urgency. Pain and bleeding. Musculoskeletal: No joint pain or swelling. Integumentary: No rashes. Neurological: No headaches, sensory or motor symptoms. All other systems negative. Physical Exam     Patient Vitals for the past 12 hrs:   Temp Pulse Resp BP SpO2   19 1751 -- 99 26 -- 97 %   19 1745 -- (!) 109 (!) 33 93/70 --   19 1347 96.9 °F (36.1 °C) (!) 111 18 106/73 97 %       Physical Exam   Constitutional: Appears well-developed. Is not diaphoretic. Eyes: Conjunctiva clear, EOMI  Head: Normocephalic and atraumatic. Neck: Normal range of motion. No stridor or tracheal deviation. Cardiovascular: Intact distal pulses.   Pulmonary/Chest: Effort normal and no respiratory distress. Abdominal: Non distended. Genitourinary: Patient with mild scrotal swelling with a midline incision without dehiscence or drainage or pain. Penrose drain in place. No crepitus or hematoma felt. Musculoskeletal: Normal range of motion. Exhibits no tenderness. Neurological: Conversant, alert. Skin: Skin is warm and dry. Psychiatric: Has a normal mood and affect. Behavior is normal.   Nursing note and vitals reviewed. Diagnostic Study Results     Labs -  Recent Results (from the past 12 hour(s))   CBC WITH AUTOMATED DIFF    Collection Time: 05/25/19  3:00 PM   Result Value Ref Range    WBC 8.8 4.6 - 13.2 K/uL    RBC 4.45 (L) 4.70 - 5.50 M/uL    HGB 13.6 13.0 - 16.0 g/dL    HCT 38.0 36.0 - 48.0 %    MCV 85.4 74.0 - 97.0 FL    MCH 30.6 24.0 - 34.0 PG    MCHC 35.8 31.0 - 37.0 g/dL    RDW 13.6 11.6 - 14.5 %    PLATELET 640 583 - 998 K/uL    MPV 10.0 9.2 - 11.8 FL    NEUTROPHILS 80 (H) 40 - 73 %    LYMPHOCYTES 11 (L) 21 - 52 %    MONOCYTES 8 3 - 10 %    EOSINOPHILS 1 0 - 5 %    BASOPHILS 0 0 - 2 %    ABS. NEUTROPHILS 7.0 1.8 - 8.0 K/UL    ABS. LYMPHOCYTES 1.0 0.9 - 3.6 K/UL    ABS. MONOCYTES 0.7 0.05 - 1.2 K/UL    ABS. EOSINOPHILS 0.1 0.0 - 0.4 K/UL    ABS. BASOPHILS 0.0 0.0 - 0.1 K/UL    DF AUTOMATED     METABOLIC PANEL, COMPREHENSIVE    Collection Time: 05/25/19  3:00 PM   Result Value Ref Range    Sodium 138 136 - 145 mmol/L    Potassium 4.6 3.5 - 5.5 mmol/L    Chloride 104 100 - 108 mmol/L    CO2 26 21 - 32 mmol/L    Anion gap 8 3.0 - 18 mmol/L    Glucose 108 (H) 74 - 99 mg/dL    BUN 20 (H) 7.0 - 18 MG/DL    Creatinine 1.58 (H) 0.6 - 1.3 MG/DL    BUN/Creatinine ratio 13 12 - 20      GFR est AA 57 (L) >60 ml/min/1.73m2    GFR est non-AA 47 (L) >60 ml/min/1.73m2    Calcium 9.6 8.5 - 10.1 MG/DL    Bilirubin, total 0.9 0.2 - 1.0 MG/DL    ALT (SGPT) 28 16 - 61 U/L    AST (SGOT) 17 15 - 37 U/L    Alk.  phosphatase 96 45 - 117 U/L    Protein, total 6.3 (L) 6.4 - 8.2 g/dL    Albumin 3.6 3.4 - 5.0 g/dL Globulin 2.7 2.0 - 4.0 g/dL    A-G Ratio 1.3 0.8 - 1.7         Radiologic Studies -   No results found. Medical Decision Making     ED Course: Progress Notes, Reevaluation, and Consults:    4:00 PM Initial assessment performed. The patients presenting problems have been discussed, and they/their family are in agreement with the care plan formulated and outlined with them. I have encouraged them to ask questions as they arise throughout their visit. 4:25 PM spoke with Dr. Desmond Bowman who recommends additional pain control. He is reassured the patient is afebrile and does not have a leukocytosis and would want to see the patient ASAP as outpatient. 5:45 PM- after another round pain medicine patient's pain is not controlled. He already has appointment in couple days with Dr. Desmond Bowman as outpatient so we will continue that. He understands that if his symptoms worsen, change, do not improve that he should return immediately for reassessment. Provider Notes (Medical Decision Making): Patient presents after a complex urological surgery by Dr. Desmond Bowman for pain and bleeding. On exam the surgical site looks intact and there is likely expected mild swelling after surgery. I have consulted Dr. Desmond Bowman who reviewed his exam and course with me so far, and states that the patient is afebrile and does not have a leukocytosis that he has low concern for infection currently. Patient symptoms are better controlled with pain medicine here and patient is agreeable with a trial of outpatient management. He understands our concerns for possible worsening bleeding, infection, dehiscence, or other surgical locations so he will return immediately if there are any issues. Vital Signs-Reviewed the patient's vital signs. Reviewed pt's pulse ox reading.      Records Reviewed: Nursing Notes and Old Medical Records (Time of Review: 4:18 PM)  -I am the first provider for this patient.  -I reviewed the vital signs, available nursing notes, past medical history, past surgical history, family history and social history. Current Outpatient Medications   Medication Sig Dispense Refill    oxyCODONE-acetaminophen (PERCOCET) 5-325 mg per tablet Take 1 Tab by mouth every eight (8) hours as needed for Pain for up to 3 days. Max Daily Amount: 3 Tabs. 12 Tab 0    oxyCODONE IR (ROXICODONE) 5 mg immediate release tablet Take 1 Tab by mouth every four (4) hours as needed for Pain for up to 3 days. Max Daily Amount: 30 mg. Indications: post op hydrocelectomy 18 Tab 0    ciprofloxacin HCl (CIPRO) 750 mg tablet Take 1 Tab by mouth two (2) times a day for 5 days. 10 Tab 0    docusate sodium (COLACE) 100 mg capsule Take 1 Cap by mouth two (2) times a day for 5 days. 10 Cap 2    tamsulosin (FLOMAX) 0.4 mg capsule Take 1 Cap by mouth daily. 10 Cap 0    lisinopril (PRINIVIL, ZESTRIL) 20 mg tablet Take 1 Tab by mouth daily for 30 days. 30 Tab 2    FLUoxetine (PROZAC) 40 mg capsule Take  by mouth daily.  hydrOXYzine HCl (ATARAX) 50 mg tablet Take 50 mg by mouth nightly. Indications: 100 mg qhs      topiramate (TOPAMAX) 25 mg tablet Take 25 mg BID x 7 days then 50 mg  BID 60 Tab 1    Blood Pressure Monitor (BLOOD PRESSURE KIT) kit 1 Device by Does Not Apply route daily as needed (for blood pressure checks). 1 Kit 0    PROAIR HFA 90 mcg/actuation inhaler INHALE 2 PUFFS BY MOUTH EVERY 4 HOURS AS NEEDED FOR WHEEZING OR SHORTNESS OF BREATH 1 Inhaler 2    amitriptyline HCl (AMITRIPTYLINE PO) Take 75 mg by mouth nightly.  OXcarbazepine (TRILEPTAL) 150 mg tablet Take  by mouth two (2) times a day.  albuterol (PROVENTIL VENTOLIN) 2.5 mg /3 mL (0.083 %) nebulizer solution Nebulized 1 vial for inhalation every 4 hours as needed 60 Each 2    roflumilast (DALIRESP) tab tablet Take 1 Tab by mouth daily.  30 Tab 5    tiZANidine (ZANAFLEX) 4 mg tablet Sig: Take 1 tab PO Q 6 as needed for muscle spasm 60 Tab 0    tiotropium (SPIRIVA) 18 mcg inhalation capsule Take 1 Cap by inhalation daily. 30 Cap 5    fluticasone-salmeterol (ADVAIR DISKUS) 500-50 mcg/dose diskus inhaler Take 1 Puff by inhalation two (2) times a day. 1 Inhaler 5    furosemide (LASIX) 20 mg tablet Take 1 Tab by mouth every other day. 15 Tab 6    LORazepam (ATIVAN) 1 mg tablet Take 1 Tab by mouth every eight (8) hours as needed for Anxiety. Max Daily Amount: 3 mg. 90 Tab 0    Nebulizer & Compressor machine 1 Each by Does Not Apply route every four (4) hours as needed. 1 Each 0    OXYGEN-AIR DELIVERY SYSTEMS (WALKABOUT 1 OXYGEN SYSTEM) 2.5 L/min by Nasal route continuous.  pantoprazole (PROTONIX) 40 mg tablet Take 1 Tab by mouth Daily (before breakfast). 30 Tab 0        Clinical Impression     Clinical Impression:   1.  Post-operative pain        Disposition: MD home

## 2019-05-25 NOTE — OP NOTES
[address] [place] [address]   OPERATIVE REPORT    Name:  Yamila Anand  MR#:   [id number]  :  1970  ACCOUNT #:  [de-identified]  DATE OF SERVICE:  2019    PREOPERATIVE DIAGNOSIS:  Right hydrocele, left spermatic cord torsion with detorsion. POSTOPERATIVE DIAGNOSIS:   Same as above    PROCEDURE PERFORMED:  Bilateral hydrocelectomy, bilateral orchidopexy. SURGEON:  Gary Crowell MD    ASSISTANT:  None. ANESTHESIA:  General by LMA. COMPLICATIONS:  None. SPECIMENS REMOVED:  Redundant parietal tunica vaginalis from the right side and redundant tunica from the left side. These were submitted in separate containers and sent to laboratory for histologic study. IMPLANTS: None    ESTIMATED BLOOD LOSS:  Minimal.    TUBES AND DRAINS:  One 1/2 inch Penrose drain on the right side. INDICATIONS FOR OPERATION:  A 79-year-old gentleman, who presented to the emergency room 1 week earlier, complained of sudden severe left scrotal pain while getting out of bed after an uneventful evening. He felt nauseated, but did not vomit. On presentation to the emergency room, his pain had subsided, but ultrasound imaging of the scrotum showed normal vascular flow to both the testes in addition to a moderate sized right hydrocele. He was referred for urologic evaluation because of the hydrocele finding. Physical exam showed normal findings except for a nontender, soft, fluctuant right hydrocele. The patient is admitted for scrotal exploration, suspecting he experienced a spermatic cord torsion with spontaneous detorsion prior to ultrasound imaging of the scrotum. His history is most consistent with a torsion-detorsion event than epididymitis. DESCRIPTION OF THE OPERATION:  After establishing adequate general anesthesia by LMA, the patient was placed on the operating table in the supine position. The external genitalia were then prepped with antibiotic scrub and solution and draped in a sterile manner.   A midline scrotal incision was carried down through the dartos layer allowing delivery of the intact right parietal tunica vaginalis through the midline scrotal incision. Redundant tunica was excised with Bovie, leaving a 1 cm margin of redundant parietal tunica vaginalis. This was brought behind the spermatic cord and the testes and then sutured together with a running interlocking 0 chromic suture. Appendix testis was cauterized. There were no appendiceal lesions in the right epididymis. The testis was placed back into the scrotum in the anatomic position and fixed to the overlying dartos layer with interrupted #1 chromic suture securing the upper medial, upper lateral, and lower lateral poles to the dartos layer. A skin incision was made in the most dependent portion of the right hemiscrotum and a Penrose drain was punched through into the right scrotal cavity. The Penrose drain was positioned, so it rested posterior to the spermatic cord and testes and was brought out the incision in the dependent scrotum. The Penrose was fixed to the skin with a #1 chromic suture. Attention was then directed to the opposite hemiscrotum, making incision through the dartos layer, allowing delivery of the left testis with intact parietal tunica vaginalis through the same midline incision. Redundant tunica vaginalis on that side was excised, revealing a high insertion of the parietal layer on the spermatic cord creating a classic bell clapper deformity, usually seen with cases of torsion of the spermatic cord. Redundant tunica was excised leaving a 1 cm redundant margin, which was brought behind the spermatic cord and testis and sutured together with a running interlocking 0 chromic suture. Appendix testis was then cauterized.   The testis was placed back into the scrotum in the anatomic position and fixed to the overlying dartos layer with three sutures employing #1 chromic securing the upper medial, upper lateral, and lower medial portions to the dartos layer thereby preventing torsion of the cord in the future. Both hemiscrotal cavities were then irrigated with saline solution containing  irrigant. Closure was achieved by a running interlocking suture, bringing together the dartos layer of the left hemiscrotum midline and right hemiscrotum. The skin edges were brought together with interlocking vertical mattress sutures of #1 chromic suture. The scrotum was then placed in a gauze dressing and held in place with a mesh brief device. The procedure overall was uncomplicated and well tolerated. The patient was brought from the operating room to the PACU in good condition.         Margot Vu MD      [id number]  D:  05/24/2019 21:44  T:  05/24/2019 21:55  JOB #:  [id number]

## 2019-05-25 NOTE — DISCHARGE INSTRUCTIONS
Patient Education        Acute Pain After Surgery: Care Instructions  Your Care Instructions    It's common to have some pain after surgery. Pain doesn't mean that something is wrong or that the surgery didn't go well. But when the pain is severe, it's important to work with your doctor to manage it. It's also important to be aware of a few facts about pain and pain medicine. · You are the only person who knows what your pain feels like. So be sure to tell your doctor when you are in pain or when the pain changes. Then he or she will know how to adjust your medicines. · Pain is often easier to control right after it starts. So it may be better to take regular doses of pain medicine and not wait until the pain gets bad. · Medicine can help control pain. But this doesn't mean you'll have no pain. Medicine works to keep the pain at a level you can live with. With time, you will feel better. Follow-up care is a key part of your treatment and safety. Be sure to make and go to all appointments, and call your doctor if you are having problems. It's also a good idea to know your test results and keep a list of the medicines you take. How can you care for yourself at home? · Be safe with medicines. Read and follow all instructions on the label. ? If the doctor gave you a prescription medicine for pain, take it as prescribed. ? If you are not taking a prescription pain medicine, ask your doctor if you can take an over-the-counter medicine. · If you take an over-the-counter pain medicine, such as acetaminophen (Tylenol), ibuprofen (Advil, Motrin), or naproxen (Aleve), read and follow all instructions on the label. · Do not take two or more pain medicines at the same time unless the doctor told you to. · Do not drink alcohol while you are taking pain medicines. · Try to walk each day if your doctor recommends it. Start by walking a little more than you did the day before. Bit by bit, increase the amount you walk. Walking increases blood flow. It also helps prevent pneumonia and constipation. · To prevent constipation from opioid pain medicines:  ? Talk to your doctor about a laxative. ? Include fruits, vegetables, beans, and whole grains in your diet each day. These foods are high in fiber. ? Drink plenty of fluids, enough so that your urine is light yellow or clear like water. Drink water, fruit juice, or other drinks that do not contain caffeine or alcohol. If you have kidney, heart, or liver disease and have to limit fluids, talk with your doctor before you increase the amount of fluids you drink. ? Take a fiber supplement, such as Citrucel or Metamucil, every day if needed. Read and follow all instructions on the label. If you take pain medicine for more than a few days, talk to your doctor before you take fiber. When should you call for help? Call your doctor now or seek immediate medical care if:    · Your pain gets worse.     · Your pain is not controlled by medicine.    Watch closely for changes in your health, and be sure to contact your doctor if you have any problems. Where can you learn more? Go to http://[facility]ess[facility].info/. Enter ([phone] 663-114 in the search box to learn more about \"Acute Pain After Surgery: Care Instructions. \"  Current as of: September 23, 2018  Content Version: 11.9  © 6174-8178 [employer]. Care instructions adapted under license by [employer] (which disclaims liability or warranty for this information). If you have questions about a medical condition or this instruction, always ask your healthcare professional. [place] [address] any warranty or liability for your use of this information.

## 2019-05-25 NOTE — BRIEF OP NOTE
BRIEF OPERATIVE NOTE    Date of Procedure: 5/24/2019   Preoperative Diagnosis: N43.3 Right HYDROCELE  N44.02 Left  SPERMATIC CORD TORSION/Detorsion  Postoperative Diagnosis: kyle     Procedure(s):  BILATERAL HYDROCELECTOMY  BILATERAL ORCHIOPEXY  Surgeon(s) and Role:     Titi Bernard MD - Primary         Surgical Assistant: none    Surgical Staff:  Circ-1: Sonia Rogel RN  Scrub Tech-1: Della Calle  Event Time In Time Out   Incision Start 2019    Incision Close 2103      Anesthesia: General   Estimated Blood Loss: none  Specimens:   ID Type Source Tests Collected by Time Destination   1 : Hydrocele Wall of right testicle Preservative Testicle  Titi Bernard MD 5/24/2019 2024 Pathology   2 : Hydrocele Wall Left Testicle Preservative Testicle  Titi Bernard MD 5/24/2019 2034 Pathology      Findings: right hydrocele/ bilat.  Luna clapper deformities  Complications: none  Implants: none    1/2 in Penrose right scrotum drain    0,5% marcaine local    MD Ty Benedict MD

## 2019-05-25 NOTE — DISCHARGE INSTRUCTIONS
Light activity  Reg diet  Prescriptions for Oxycodone, Colace, Flomax, Cipro    Return to clinic in 3 days      Yareli Braga MD    DISCHARGE SUMMARY from Nurse    PATIENT INSTRUCTIONS:    After general anesthesia or intravenous sedation, for 24 hours or while taking prescription Narcotics:  · Limit your activities  · Do not drive and operate hazardous machinery  · Do not make important personal or business decisions  · Do  not drink alcoholic beverages  · If you have not urinated within 8 hours after discharge, please contact your surgeon on call. Report the following to your surgeon:  · Excessive pain, swelling, redness or odor of or around the surgical area  · Temperature over 100.5  · Nausea and vomiting lasting longer than 4 hours or if unable to take medications  · Any signs of decreased circulation or nerve impairment to extremity: change in color, persistent  numbness, tingling, coldness or increase pain  · Any questions    What to do at Home:  Recommended activity: Activity as tolerated and no driving for today and No heavy lifting, pushing, pulling until cleared by Dr. Jagdeep Dyer. *  Please give a list of your current medications to your Primary Care Provider. *  Please update this list whenever your medications are discontinued, doses are      changed, or new medications (including over-the-counter products) are added. *  Please carry medication information at all times in case of emergency situations. These are general instructions for a healthy lifestyle:    No smoking/ No tobacco products/ Avoid exposure to second hand smoke  Surgeon General's Warning:  Quitting smoking now greatly reduces serious risk to your health.     Obesity, smoking, and sedentary lifestyle greatly increases your risk for illness    A healthy diet, regular physical exercise & weight monitoring are important for maintaining a healthy lifestyle    You may be retaining fluid if you have a history of heart failure or if you experience any of the following symptoms:  Weight gain of 3 pounds or more overnight or 5 pounds in a week, increased swelling in our hands or feet or shortness of breath while lying flat in bed. Please call your doctor as soon as you notice any of these symptoms; do not wait until your next office visit. Recognize signs and symptoms of STROKE:    F-face looks uneven    A-arms unable to move or move unevenly    S-speech slurred or non-existent    T-time-call 911 as soon as signs and symptoms begin-DO NOT go       Back to bed or wait to see if you get better-TIME IS BRAIN. Warning Signs of HEART ATTACK     Call 911 if you have these symptoms:   Chest discomfort. Most heart attacks involve discomfort in the center of the chest that lasts more than a few minutes, or that goes away and comes back. It can feel like uncomfortable pressure, squeezing, fullness, or pain.  Discomfort in other areas of the upper body. Symptoms can include pain or discomfort in one or both arms, the back, neck, jaw, or stomach.  Shortness of breath with or without chest discomfort.  Other signs may include breaking out in a cold sweat, nausea, or lightheadedness. Don't wait more than five minutes to call 911 - MINUTES MATTER! Fast action can save your life. Calling 911 is almost always the fastest way to get lifesaving treatment. Emergency Medical Services staff can begin treatment when they arrive -- up to an hour sooner than if someone gets to the hospital by car. Hydrocelectomy: What to Expect at [facility] is surgery to remove a hydrocele. A hydrocele is a fluid-filled sac inside the scrotum. A hydrocele can happen on one or both sides of the scrotum. The doctor made a very small cut (incision) in your scrotum to drain the fluid from the hydrocele and to remove the fluid-filled sac. This surgery was done to remove the fluid and to stop the buildup of fluid in the scrotum.   After your surgery, you may feel more tired than usual and have some mild groin pain for several days. Your groin and scrotum may be swollen or bruised. This usually gets better in 2 to 3 weeks. You will probably be able to go back to work or school 4 to 7 days after surgery. But you will need to avoid strenuous exercise or heavy lifting for 2 to 4 weeks. This care sheet gives you a general idea about how long it will take for you to recover. But each person recovers at a different pace. Follow the steps below to get better as quickly as possible. How can you care for yourself at home? Activity    · Rest when you feel tired. Getting enough sleep will help you recover.     · Try to walk each day. Start by walking a little more than you did the day before. Bit by bit, increase the amount you walk. Walking boosts blood flow and helps prevent pneumonia and constipation.     · You may shower 24 hours after surgery, if your doctor says it is okay. Pat the cut (incision) dry. Do not take a bath for the first week, or until your doctor tells you it is okay.     · You may return to work or school when you are ready. This is usually in about 4 to 7 days.     · Avoid strenuous activities, such as bicycle riding, jogging, weight lifting, or aerobic exercise, until your doctor says it is okay.     · For 2 to 4 weeks, avoid lifting anything that would make you strain. This may include heavy grocery bags and milk containers, a heavy briefcase or backpack, cat litter or dog food bags, a vacuum , or a child. Diet    · You can eat your normal diet. If your stomach is upset, try bland, low-fat foods like plain rice, broiled chicken, toast, and yogurt.     · Drink plenty of fluids to avoid becoming dehydrated.     · You may notice that your bowel movements are not regular right after your surgery. This is common. Try to avoid constipation and straining with bowel movements. You may want to take a fiber supplement every day.  If you have not had a bowel movement after a couple of days, ask your doctor about taking a mild laxative. Medicines    · Your doctor will tell you if and when you can restart your medicines. He or she will also give you instructions about taking any new medicines.     · If you take blood thinners, such as warfarin (Coumadin), clopidogrel (Plavix), or aspirin, be sure to talk to your doctor. He or she will tell you if and when to start taking those medicines again. Make sure that you understand exactly what your doctor wants you to do.     · Take pain medicines exactly as directed. ? If the doctor gave you a prescription medicine for pain, take it as prescribed. ? If you are not taking a prescription pain medicine, ask your doctor if you can take an over-the-counter medicine.     · If you think pain medicine is making you sick to your stomach:  ? Take your medicine after meals (unless the doctor has told you not to). ? Ask your doctor for a different pain medicine.     · If your doctor prescribed antibiotics, take them as directed. Do not stop taking them just because you feel better. You need to take the full course of antibiotics. Incision care    · If you have strips of tape on the cut (incision) the doctor made, leave the tape on for a week or until it falls off.     · Wash the area daily with warm, soapy water, and pat it dry. Don't use hydrogen peroxide or alcohol, which can slow healing. You may cover the area with a gauze bandage if it weeps or rubs against clothing. Change the bandage every day.     · Keep the area clean and dry. Follow-up care is a key part of your treatment and safety. Be sure to make and go to all appointments, and call your doctor if you are having problems. It's also a good idea to know your test results and keep a list of the medicines you take. When should you call for help? Call 911 anytime you think you may need emergency care.  For example, call if:    · You passed out (lost consciousness).     · You have severe trouble breathing.     · You have sudden chest pain and shortness of breath, or you cough up blood.    Call your doctor now or seek immediate medical care if:    · You are sick to your stomach or cannot keep fluids down.     · You have pain that does not get better after you take pain medicine.     · You have a fever over 100.4 F.     · You have loose stitches, or your incision comes open.     · Bright red blood has soaked through the bandage over your incision.     · Your scrotum gets more swollen.     · You have signs of infection, such as:  ? Increased pain, swelling, warmth, or redness. ? Red streaks leading from the incision. ? Pus draining from the incision. ? Swollen lymph nodes in your groin, neck, or armpits.    Watch closely for changes in your health, and be sure to contact your doctor if you have any problems. Where can you learn more? Go to http://costa-jessica.info/. Enter P373 in the search box to learn more about \"Hydrocelectomy: What to Expect at Home. \"  Current as of: March 20, 2018  Content Version: 11.9  © 7413-8398 [employer]. Care instructions adapted under license by [employer] (which disclaims liability or warranty for this information). If you have questions about a medical condition or this instruction, always ask your healthcare professional. [address] any warranty or liability for your use of this information. Tamsulosin (By mouth)   Tamsulosin Hydrochloride (qdq-ETK-cbx-sin christie-droe-KLOR-deb)  Treats benign prostatic hyperplasia (enlarged prostate). Brand Name(s): Flomax   There may be other brand names for this medicine. When This Medicine Should Not Be Used: This medicine is not right for everyone. Do not use it if you had an allergic reaction to tamsulosin. How to Use This Medicine:   Capsule  · Take your medicine as directed.  Your dose may need to be changed several times to find what works best for you. · Take this medicine 30 minutes after the same meal each day. Swallow the capsule whole. Do not crush, chew, or open it. · Read and follow the patient instructions that come with this medicine. Talk to your doctor or pharmacist if you have any questions. · Missed dose: Take a dose as soon as you remember. If it is almost time for your next dose, wait until then and take a regular dose. Do not take extra medicine to make up for a missed dose. If you forget to take this medicine for several days in a row, talk to your doctor before you start taking it again. · Store the medicine in a closed container at room temperature, away from heat, moisture, and direct light. Drugs and Foods to Avoid:   Ask your doctor or pharmacist before using any other medicine, including over-the-counter medicines, vitamins, and herbal products. · Some medicines can affect how tamsulosin works. Tell your doctor if you are using another alpha blocker medicine, cimetidine, erythromycin, ketoconazole, paroxetine, terbinafine, warfarin, or medicine for erectile dysfunction. Warnings While Using This Medicine:   · Tell your doctor if you have kidney disease, liver disease, low blood pressure, prostate cancer, or an allergy to sulfa drugs. · Tell your doctor if you plan to have cataract or glaucoma surgery. This medicine may cause eye problems during surgery. · This medicine may make you dizzy or lightheaded. Do not drive or do anything else that could be dangerous until you know how this medicine affects you. Stand or sit up slowly if you feel dizzy. · Your doctor will check your progress and the effects of this medicine at regular visits. Keep all appointments. · Keep all medicine out of the reach of children. Never share your medicine with anyone.   Possible Side Effects While Using This Medicine:   Call your doctor right away if you notice any of these side effects:  · Allergic reaction: Itching or hives, swelling in your face or hands, swelling or tingling in your mouth or throat, chest tightness, trouble breathing  · Blistering, peeling, red skin rash  · Lightheadedness, dizziness, fainting  · Painful, prolonged erection of your penis  If you notice these less serious side effects, talk with your doctor:   · Headache  · Problems with ejaculation  · Runny or stuffy nose  If you notice other side effects that you think are caused by this medicine, tell your doctor. Call your doctor for medical advice about side effects. You may report side effects to FDA at 3-041-ESA-2211  © 2017 [facility] Information is for End User's use only and may not be sold, redistributed or otherwise used for commercial purposes. The above information is an  only. It is not intended as medical advice for individual conditions or treatments. Talk to your doctor, nurse or pharmacist before following any medical regimen to see if it is safe and effective for you. Laxative, Stimulant Combination (By mouth)   Treats constipation by helping you have a bowel movement. Brand Name(s): Colace, Doc-Q-Lax, Dok Plus, [facility] Stool Softener & Laxative, [employer] Sense Stimulant Laxative Plus, Laxacin, Medi-Laxx, Ca-Colace, [employer] Laxative and Stool Softener, [employer] P Col-Rite, Senexon-S, Senna-S, Senna-Time S, SennaLax-S, SennaPrompt   There may be other brand names for this medicine. When This Medicine Should Not Be Used: You should not use this medicine if you have had an allergic reaction to senna, sennosides, docusate, casanthranol, or psyllium. Some brand names for these medicines are Correctol® stool softener, Ex-Lax®, Colace®, or Metamucil®. Make sure your doctor knows if you are allergic to any other laxative medicines.    How to Use This Medicine:   Tablet, Liquid Filled Capsule, Liquid, Granule, Capsule, Powder for Suspension  · Your doctor will tell you how much medicine to use. Do not use more than directed. · If you have had a sudden change in your bowel movements in the past two weeks, ask your doctor before using this medicine. · Follow the instructions on the medicine label if you are using this medicine without a prescription. · Drink a full glass of water when you take this medicine. One full glass of water is about 8 ounces or 1 cup. Drinking 6 to 8 full glasses of water every day will help keep your bowel movements soft. · You might need to mix the granules or powder with water before you take each dose. Drink this mixture right away. Do not swallow the granules or powder dry unless the directions say you can. · Measure the oral liquid medicine with a marked measuring spoon, oral syringe, or medicine cup. · Use this medicine at bedtime, unless your doctor tells you otherwise. If a dose is missed:   · Take a dose as soon as you remember. If it is almost time for your next dose, wait until then and take a regular dose. Do not take extra medicine to make up for a missed dose. How to Store and Dispose of This Medicine:   · Store the medicine in a closed container at room temperature, away from heat, moisture, and direct light. · Ask your pharmacist, doctor, or health caregiver about the best way to dispose of any outdated medicine or medicine no longer needed. · Keep all medicine out of the reach of children. Never share your medicine with anyone. Drugs and Foods to Avoid:   Ask your doctor or pharmacist before using any other medicine, including over-the-counter medicines, vitamins, and herbal products. · Make sure your doctor knows if you are also using mineral oil. · Some laxatives need to be taken 2 hours before or 2 hours after other medicines. If you need to take any other medicine, ask your health caregiver if you need to follow a special schedule.   Warnings While Using This Medicine:   · Make sure your doctor knows if you are pregnant or breast feeding. · Do not use this medicine if you have stomach pain, nausea, or vomiting, unless your health caregiver tells you to use it. · If you do not have a bowel movement after using this medicine, talk to your doctor. Most people will have a bowel movement within 6 to 12 hours after using this laxative. · You should not use this laxative for more than 1 week unless your doctor says it is okay. Laxatives may be habit-forming and can harm your bowels if you use them too long. Possible Side Effects While Using This Medicine:   Call your doctor right away if you notice any of these side effects:  · Bleeding from your rectum. · Skin rash. · Urine turns a different color. If you notice these less serious side effects, talk with your doctor:   · Diarrhea, cramps, nausea, burping. If you notice other side effects that you think are caused by this medicine, tell your doctor. Call your doctor for medical advice about side effects. You may report side effects to FDA at 3-220-WOK-6556  © 2017 [facility] Information is for End User's use only and may not be sold, redistributed or otherwise used for commercial purposes. The above information is an  only. It is not intended as medical advice for individual conditions or treatments. Talk to your doctor, nurse or pharmacist before following any medical regimen to see if it is safe and effective for you. Learning About the Safe Use of Antibiotics  Introduction    Antibiotics are drugs used to kill bacteria. Bacteria can cause infections. These include strep throat, ear infections, and pneumonia. These medicines can't cure everything. They don't kill viruses or help with allergies. They don't help illnesses such as the common cold, the flu, or a runny nose. And they can cause side effects. There are many types of antibiotics. Your doctor will decide which one will work best for your infection.  Examples include:  · Amoxicillin. · Cephalexin (Keflex). · Ciprofloxacin (Cipro). What are the possible side effects? Side effects can include:  · Nausea. · Diarrhea. · Skin rash. · Yeast infection. · A severe allergic reaction. It may cause itching, swelling, and breathing problems. This is rare. You may have other side effects or reactions not listed here. Check the information that comes with your medicine. Should you take antibiotics just in case? Don't take antibiotics when you don't need them. If you do that, they may not work when you do need them. Each time you take antibiotics, you are more likely to have some bacteria that survive and aren't killed by the medicine. Bacteria that don't die can change and become even harder to kill. These are called antibiotic-resistant bacteria. They can cause longer and more serious infections. To treat them, you may need different, stronger antibiotics that have more side effects and may cost more. So always ask your doctor if antibiotics are the best treatment. Explain that you do not want antibiotics unless you need them. Help protect the community  Using antibiotics when they're not needed leads to the development of antibiotic-resistant bacteria. These tougher bacteria can spread to family members, children, and coworkers. People in your community will have a risk of getting an infection that is harder to cure and that costs more to treat. How can you take antibiotics safely? Be safe with medicine. Take your antibiotics as directed. Do not stop taking them just because you feel better. You need to take the full course of medicine. This will help make sure your infection is cured. It will also help prevent the growth of antibiotic-resistant bacteria. Always take the exact amount that the label says to take. If the label says to take the medicine at a certain time, follow those directions. You might feel better after you take an antibiotic for a few days.  But it is important to keep taking it for as long as prescribed. That will help you get rid of those bacteria that are a bit stronger and that survive the first few days of treatment. Where can you learn more? Go to http://costa-jessica.info/. Enter F695 in the search box to learn more about \"Learning About the Safe Use of Antibiotics. \"  Current as of: July 30, 2018  Content Version: 11.9  © 2006-2018 [facility]. Care instructions adapted under license by [employer] (which disclaims liability or warranty for this information). If you have questions about a medical condition or this instruction, always ask your healthcare professional. [place] [address] any warranty or liability for your use of this information. Patient Education   Oxycodone/Acetaminophen (By mouth)   Acetaminophen (q-vbre-n-MIN-oh-fen), Oxycodone Hydrochloride (ja-g-LUS-done christie-droe-KLOR-deb)  Treats moderate to moderately severe pain. This medicine is a narcotic pain reliever. Brand Name(s): Endocet, Percocet, Primlev, Xartemis XR   There may be other brand names for this medicine. When This Medicine Should Not Be Used: This medicine is not right for everyone. Do not use it if you had an allergic reaction to acetaminophen or oxycodone, or if you have serious breathing problems or paralytic ileus. How to Use This Medicine:   Capsule, Liquid, Tablet, Long Acting Tablet  · Your doctor will tell you how much medicine to use. Do not use more than directed. · An overdose can be dangerous. Follow directions carefully so you do not get too much medicine at one time. · Oral liquid: Measure the oral liquid medicine with a marked measuring spoon, oral syringe, or medicine cup. · Swallow the extended-release tablet whole. Do not crush, break, or chew it. Do not lick or wet the tablet before placing it in your mouth. Do not give this medicine through a feeding tube.   · This medicine should come with a Medication Guide. Ask your pharmacist for a copy if you do not have one. · Missed dose: If you miss a dose of this medicine, skip the missed dose and go back to your regular dosing schedule. Do not double doses. · Store the medicine in a closed container at room temperature, away from heat, moisture, and direct light. Ask your pharmacist about the best way to dispose of medicine you do not use. Drugs and Foods to Avoid:   Ask your doctor or pharmacist before using any other medicine, including over-the-counter medicines, vitamins, and herbal products. · Do not use Xartemis XR if you are using or have used an MAO inhibitor in the past 14 days. · Some medicines can affect how this medicine works. Tell your doctor if you are using any of the following:   ¨ Carbamazepine, erythromycin, ketoconazole, lamotrigine, mirtazapine, naltrexone, phenytoin, propranolol, rifampin, ritonavir, tramadol, trazodone, or zidovudine  ¨ Birth control pills  ¨ Diuretic (water pill)  ¨ Medicine to treat depression  ¨ Phenothiazine medicine  ¨ Triptan medicine to treat migraine headaches  · Do not drink alcohol while you are using this medicine. Acetaminophen can damage your liver, and alcohol can increase this risk. Do not take acetaminophen without asking your doctor if you have 3 or more drinks of alcohol every day. · Tell your doctor if you use anything else that makes you sleepy. Some examples are allergy medicine, narcotic pain medicine, and alcohol. Tell your doctor if you are using buprenorphine, butorphanol, nalbuphine, pentazocine, a benzodiazepine, or a muscle relaxer.   Warnings While Using This Medicine:   · Tell your doctor if you are pregnant or breastfeeding, or if you have kidney disease, liver disease, heart disease, low blood pressure, breathing problems or lung disease (such as asthma, COPD), thyroid problems, Tadeo disease, pancreas or gallbladder problems, prostate problems, trouble urinating, or a stomach problems, or a history of head injury or brain damage, seizures, or alcohol or drug abuse. Tell your doctor if you are allergic to codeine. · This medicine may cause the following problems:  ¨ High risk of overdose, which can lead to death  ¨ Respiratory depression (serious breathing problem that can be life-threatening)  ¨ Liver problems  ¨ Serious skin reactions  ¨ Serotonin syndrome (when used with certain medicines)  · This medicine may make you dizzy or drowsy. Do not drive or do anything that could be dangerous until you know how this medicine affects you. Sit or lie down if you feel dizzy. Stand up carefully. · This medicine contains acetaminophen. Read the labels of all other medicines you are using to see if they also contain acetaminophen, or ask your doctor or pharmacist. Corky Mace not use more than 4 grams (4,000 milligrams) total of acetaminophen in one day. · This medicine can be habit-forming. Do not use more than your prescribed dose. Call your doctor if you think your medicine is not working. · Do not stop using this medicine suddenly. Your doctor will need to slowly decrease your dose before you stop it completely. · This medicine could cause infertility. Talk with your doctor before using this medicine if you plan to have children. · This medicine may cause constipation, especially with long-term use. Ask your doctor if you should use a laxative to prevent and treat constipation. · Keep all medicine out of the reach of children. Never share your medicine with anyone.   Possible Side Effects While Using This Medicine:   Call your doctor right away if you notice any of these side effects:  · Allergic reaction: Itching or hives, swelling in your face or hands, swelling or tingling in your mouth or throat, chest tightness, trouble breathing  · Anxiety, restlessness, fast heartbeat, fever, muscle spasms, twitching, diarrhea, seeing or hearing things that are not there  · Blistering, peeling, red skin rash  · Blue lips, fingernails, or skin  · Dark urine or pale stools, loss of appetite, stomach pain, yellow skin or eyes  · Extreme weakness, shallow breathing, uneven heartbeat, seizures, sweating, or cold or clammy skin  · Severe confusion, lightheadedness, dizziness, or fainting  · Severe constipation, nausea, or vomiting  · Trouble breathing or slow breathing  If you notice these less serious side effects, talk with your doctor:   · Headache  · Mild constipation, nausea, or vomiting  · Mild sleepiness or drowsiness  If you notice other side effects that you think are caused by this medicine, tell your doctor. Call your doctor for medical advice about side effects. You may report side effects to FDA at 3-663-FDA-4125  © 2017 [facility] Information is for End User's use only and may not be sold, redistributed or otherwise used for commercial purposes. The above information is an  only. It is not intended as medical advice for individual conditions or treatments. Talk to your doctor, nurse or pharmacist before following any medical regimen to see if it is safe and effective for you. The discharge information has been reviewed with the patient and spouse. The patient and spouse verbalized understanding. Discharge medications reviewed with the patient and spouse and appropriate educational materials and side effects teaching were provided.   ___________________________________________________________________________________________________________________________________

## 2019-05-25 NOTE — ED TRIAGE NOTES
Per wife, Chasity Morton had a procedure done on yesterday for a hydrocele; they inserted a tube to drain and now he's in a lot of pain. \" Pt. C/o lower abdominal that radiates down to bilateral groin.

## 2019-05-25 NOTE — ED NOTES
I performed a brief evaluation, including history and physical, of the patient here in triage and I have determined that pt will need further treatment and evaluation from the main side ER physician. I have placed initial orders to help in expediting patients care.      May 25, 2019 at 1:55 PM - BUSTER Meyers        Visit Vitals  /73 (BP 1 Location: Left arm, BP Patient Position: Sitting)   Pulse (!) 111   Temp 96.9 °F (36.1 °C)   Resp 18   Ht 5' 9\" (1.753 m)   Wt 86.2 kg (190 lb)   SpO2 97%   BMI 28.06 kg/m²

## 2019-05-25 NOTE — DISCHARGE SUMMARY
Bilateral hydrocelectomies with bilateral orchieopexies under gen ans by LMA- uncomplicated      Magdalene Recinos MD

## 2019-05-26 ENCOUNTER — APPOINTMENT (OUTPATIENT)
Dept: GENERAL RADIOLOGY | Age: 49
End: 2019-05-26
Attending: EMERGENCY MEDICINE
Payer: MEDICAID

## 2019-05-26 VITALS
BODY MASS INDEX: 28.14 KG/M2 | SYSTOLIC BLOOD PRESSURE: 114 MMHG | DIASTOLIC BLOOD PRESSURE: 81 MMHG | RESPIRATION RATE: 15 BRPM | HEIGHT: 69 IN | HEART RATE: 100 BPM | TEMPERATURE: 96.5 F | OXYGEN SATURATION: 99 % | WEIGHT: 190 LBS

## 2019-05-26 DIAGNOSIS — R33.9 RETENTION OF URINE: Primary | ICD-10-CM

## 2019-05-26 PROCEDURE — 96376 TX/PRO/DX INJ SAME DRUG ADON: CPT

## 2019-05-26 PROCEDURE — 74011250636 HC RX REV CODE- 250/636

## 2019-05-26 PROCEDURE — 71045 X-RAY EXAM CHEST 1 VIEW: CPT

## 2019-05-26 RX ORDER — HYDROMORPHONE HYDROCHLORIDE 4 MG/1
4 TABLET ORAL
Qty: 8 TAB | Refills: 0 | Status: SHIPPED | OUTPATIENT
Start: 2019-05-26 | End: 2019-05-28

## 2019-05-26 RX ORDER — HYDROMORPHONE HYDROCHLORIDE 2 MG/ML
2 INJECTION, SOLUTION INTRAMUSCULAR; INTRAVENOUS; SUBCUTANEOUS ONCE
Status: COMPLETED | OUTPATIENT
Start: 2019-05-26 | End: 2019-05-26

## 2019-05-26 RX ORDER — FACIAL-BODY WIPES
10 EACH TOPICAL DAILY
Qty: 3 SUPPOSITORY | Refills: 0 | Status: SHIPPED | OUTPATIENT
Start: 2019-05-26 | End: 2019-06-26

## 2019-05-26 RX ORDER — HYDROMORPHONE HYDROCHLORIDE 2 MG/ML
INJECTION, SOLUTION INTRAMUSCULAR; INTRAVENOUS; SUBCUTANEOUS
Status: COMPLETED
Start: 2019-05-26 | End: 2019-05-26

## 2019-05-26 RX ORDER — PREDNISONE 50 MG/1
50 TABLET ORAL DAILY
Qty: 3 TAB | Refills: 0 | Status: SHIPPED | OUTPATIENT
Start: 2019-05-26 | End: 2019-05-29

## 2019-05-26 RX ORDER — DOXYCYCLINE HYCLATE 100 MG
100 TABLET ORAL 2 TIMES DAILY
Qty: 14 TAB | Refills: 0 | Status: SHIPPED | OUTPATIENT
Start: 2019-05-26 | End: 2019-06-02

## 2019-05-26 RX ORDER — TAMSULOSIN HYDROCHLORIDE 0.4 MG/1
0.4 CAPSULE ORAL DAILY
Qty: 14 CAP | Refills: 3 | Status: SHIPPED | OUTPATIENT
Start: 2019-05-26 | End: 2019-08-05 | Stop reason: SDUPTHER

## 2019-05-26 RX ADMIN — HYDROMORPHONE HYDROCHLORIDE 2 MG: 2 INJECTION INTRAMUSCULAR; INTRAVENOUS; SUBCUTANEOUS at 04:46

## 2019-05-26 RX ADMIN — HYDROMORPHONE HYDROCHLORIDE 2 MG: 2 INJECTION, SOLUTION INTRAMUSCULAR; INTRAVENOUS; SUBCUTANEOUS at 04:46

## 2019-05-26 NOTE — PROGRESS NOTES
Office visit on Sunday, 26 May 2019    Patient seen twice in the emergency room since right hydrocelectomy with bilateral orchidopexy on 24 May 2019-vital signs and CBC within normal limits-normal urinalysis and CT scan of the abdomen pelvis show normal kidneys ureters and bladder-no retroperitoneal lesions-normal postop scrotal changes-scant to moderate Penrose drainage from right scrotum-    Physical exam:    Demented-nontender distended and tympanitic     Scrotum is ecchymotic with mild swelling no crepitus no abnormal drainage from Penrose    CT scan imaging from 25 May 2019 reviewed on PACS today-looks like bladder is distended-no abnormal amount of gas in colon    [address] Santos catheter passed draining 550 cc teodoro clear urine from bladder-patient's abdominal pain resolved on passage of Santos catheter    Impression symptomatic urinary retention status post bilateral hydrocelectomy with bilateral orchidopexy    Narcotic analgesic and anticholinergic effect from antidepressants combining to produce flaccid bladder and large bowel ileus-likely mechanism for patient's abdominal pain  at this time      Plan: Dulcolax suppository x2-if unsuccessful, fleets enema    Continue Colace capsules twice daily  Continue Flomax 0.4 mg daily  Rx Dilaudid 4 mg #8 one every 6 hours as needed pain    RTC 2 days for Penrose drain removal      Stephane Ellington MD

## 2019-05-26 NOTE — ED NOTES
Patient presents for CT evaluation he was seen earlier after  orchidopexies today. He is having significant pain to his scrotum. The case was discussed with his urologist Dr. Vivian Calero who felt that in retrospect he probably ought to have a CT of his pelvis done so he was called back to obtain a CT. Patient is noted to be tachycardic and tachypneic but no obvious source of infection. We will also send urinalysis    Ct neg for stone. Labs wnl  Had ua eariler today. Will d/ c    Patient notes increased sputum production with cough no fever chills since the intubation. Chest x-ray is unremarkable today he has a history of COPD on a as needed home oxygen as needed.   We will put him on doxycycline

## 2019-05-26 NOTE — ED NOTES
Pt re-arrived to ED after speaking with his MD, Dr. Stephon Singh told him to return to ED for CT, Dr. Hang Fowler made aware.

## 2019-05-27 ENCOUNTER — HOSPITAL ENCOUNTER (EMERGENCY)
Age: 49
Discharge: HOME | End: 2019-05-27
Attending: EMERGENCY MEDICINE
Payer: MEDICAID

## 2019-05-27 ENCOUNTER — APPOINTMENT (OUTPATIENT)
Dept: GENERAL RADIOLOGY | Age: 49
End: 2019-05-27
Attending: EMERGENCY MEDICINE
Payer: MEDICAID

## 2019-05-27 ENCOUNTER — APPOINTMENT (OUTPATIENT)
Dept: CT IMAGING | Age: 49
End: 2019-05-27
Attending: EMERGENCY MEDICINE
Payer: MEDICAID

## 2019-05-27 VITALS
HEART RATE: 93 BPM | OXYGEN SATURATION: 97 % | SYSTOLIC BLOOD PRESSURE: 117 MMHG | TEMPERATURE: 97.7 F | RESPIRATION RATE: 16 BRPM | DIASTOLIC BLOOD PRESSURE: 61 MMHG

## 2019-05-27 DIAGNOSIS — R10.31 RLQ ABDOMINAL PAIN: Primary | ICD-10-CM

## 2019-05-27 LAB
ALBUMIN SERPL-MCNC: 3.3 G/DL (ref 3.4–5)
ALBUMIN/GLOB SERPL: 0.9 {RATIO} (ref 0.8–1.7)
ALP SERPL-CCNC: 87 U/L (ref 45–117)
ALT SERPL-CCNC: 22 U/L (ref 16–61)
ANION GAP SERPL CALC-SCNC: 7 MMOL/L (ref 3–18)
APPEARANCE UR: CLEAR
AST SERPL-CCNC: 12 U/L (ref 15–37)
BACTERIA URNS QL MICRO: NEGATIVE /HPF
BASOPHILS # BLD: 0 K/UL (ref 0–0.1)
BASOPHILS NFR BLD: 0 % (ref 0–2)
BILIRUB SERPL-MCNC: 1.5 MG/DL (ref 0.2–1)
BILIRUB UR QL: NEGATIVE
BUN SERPL-MCNC: 16 MG/DL (ref 7–18)
BUN/CREAT SERPL: 15 (ref 12–20)
CALCIUM SERPL-MCNC: 9.1 MG/DL (ref 8.5–10.1)
CHLORIDE SERPL-SCNC: 105 MMOL/L (ref 100–108)
CO2 SERPL-SCNC: 24 MMOL/L (ref 21–32)
COLOR UR: YELLOW
CREAT SERPL-MCNC: 1.1 MG/DL (ref 0.6–1.3)
DIFFERENTIAL METHOD BLD: ABNORMAL
EOSINOPHIL # BLD: 0 K/UL (ref 0–0.4)
EOSINOPHIL NFR BLD: 1 % (ref 0–5)
EPITH CASTS URNS QL MICRO: NEGATIVE /LPF (ref 0–5)
ERYTHROCYTE [DISTWIDTH] IN BLOOD BY AUTOMATED COUNT: 13.7 % (ref 11.6–14.5)
GLOBULIN SER CALC-MCNC: 3.5 G/DL (ref 2–4)
GLUCOSE SERPL-MCNC: 126 MG/DL (ref 74–99)
GLUCOSE UR STRIP.AUTO-MCNC: NEGATIVE MG/DL
HCT VFR BLD AUTO: 34.9 % (ref 36–48)
HGB BLD-MCNC: 12.4 G/DL (ref 13–16)
HGB UR QL STRIP: ABNORMAL
KETONES UR QL STRIP.AUTO: NEGATIVE MG/DL
LACTATE BLD-SCNC: 0.78 MMOL/L (ref 0.4–2)
LEUKOCYTE ESTERASE UR QL STRIP.AUTO: ABNORMAL
LIPASE SERPL-CCNC: 49 U/L (ref 73–393)
LYMPHOCYTES # BLD: 0.8 K/UL (ref 0.9–3.6)
LYMPHOCYTES NFR BLD: 10 % (ref 21–52)
MCH RBC QN AUTO: 30.3 PG (ref 24–34)
MCHC RBC AUTO-ENTMCNC: 35.5 G/DL (ref 31–37)
MCV RBC AUTO: 85.3 FL (ref 74–97)
MONOCYTES # BLD: 1.1 K/UL (ref 0.05–1.2)
MONOCYTES NFR BLD: 12 % (ref 3–10)
NEUTS SEG # BLD: 6.7 K/UL (ref 1.8–8)
NEUTS SEG NFR BLD: 77 % (ref 40–73)
NITRITE UR QL STRIP.AUTO: NEGATIVE
PH UR STRIP: 5.5 [PH] (ref 5–8)
PLATELET # BLD AUTO: 192 K/UL (ref 135–420)
PMV BLD AUTO: 9.2 FL (ref 9.2–11.8)
POTASSIUM SERPL-SCNC: 4.3 MMOL/L (ref 3.5–5.5)
PROT SERPL-MCNC: 6.8 G/DL (ref 6.4–8.2)
PROT UR STRIP-MCNC: NEGATIVE MG/DL
RBC # BLD AUTO: 4.09 M/UL (ref 4.7–5.5)
RBC #/AREA URNS HPF: NEGATIVE /HPF (ref 0–5)
SODIUM SERPL-SCNC: 136 MMOL/L (ref 136–145)
SP GR UR REFRACTOMETRY: 1.01 (ref 1–1.03)
UROBILINOGEN UR QL STRIP.AUTO: 0.2 EU/DL (ref 0.2–1)
WBC # BLD AUTO: 8.6 K/UL (ref 4.6–13.2)
WBC URNS QL MICRO: NORMAL /HPF (ref 0–4)

## 2019-05-27 PROCEDURE — 87086 URINE CULTURE/COLONY COUNT: CPT

## 2019-05-27 PROCEDURE — 96361 HYDRATE IV INFUSION ADD-ON: CPT

## 2019-05-27 PROCEDURE — 99285 EMERGENCY DEPT VISIT HI MDM: CPT

## 2019-05-27 PROCEDURE — 85025 COMPLETE CBC W/AUTO DIFF WBC: CPT

## 2019-05-27 PROCEDURE — 93005 ELECTROCARDIOGRAM TRACING: CPT

## 2019-05-27 PROCEDURE — 74011250636 HC RX REV CODE- 250/636: Performed by: EMERGENCY MEDICINE

## 2019-05-27 PROCEDURE — 83605 ASSAY OF LACTIC ACID: CPT

## 2019-05-27 PROCEDURE — 74177 CT ABD & PELVIS W/CONTRAST: CPT

## 2019-05-27 PROCEDURE — 87040 BLOOD CULTURE FOR BACTERIA: CPT

## 2019-05-27 PROCEDURE — 80053 COMPREHEN METABOLIC PANEL: CPT

## 2019-05-27 PROCEDURE — 83690 ASSAY OF LIPASE: CPT

## 2019-05-27 PROCEDURE — 74011636320 HC RX REV CODE- 636/320: Performed by: EMERGENCY MEDICINE

## 2019-05-27 PROCEDURE — 96374 THER/PROPH/DIAG INJ IV PUSH: CPT

## 2019-05-27 PROCEDURE — 81001 URINALYSIS AUTO W/SCOPE: CPT

## 2019-05-27 PROCEDURE — 71045 X-RAY EXAM CHEST 1 VIEW: CPT

## 2019-05-27 RX ORDER — KETOROLAC TROMETHAMINE 30 MG/ML
30 INJECTION, SOLUTION INTRAMUSCULAR; INTRAVENOUS
Status: COMPLETED | OUTPATIENT
Start: 2019-05-27 | End: 2019-05-27

## 2019-05-27 RX ORDER — POLYETHYLENE GLYCOL 3350 17 G/17G
17 POWDER, FOR SOLUTION ORAL DAILY
Qty: 51 G | Refills: 0 | Status: SHIPPED | OUTPATIENT
Start: 2019-05-27 | End: 2019-05-30

## 2019-05-27 RX ORDER — SODIUM CHLORIDE 0.9 % (FLUSH) 0.9 %
5-10 SYRINGE (ML) INJECTION AS NEEDED
Status: DISCONTINUED | OUTPATIENT
Start: 2019-05-27 | End: 2019-05-27 | Stop reason: HOSPADM

## 2019-05-27 RX ADMIN — KETOROLAC TROMETHAMINE 30 MG: 30 INJECTION, SOLUTION INTRAMUSCULAR; INTRAVENOUS at 06:41

## 2019-05-27 RX ADMIN — SODIUM CHLORIDE 1000 ML: 900 INJECTION, SOLUTION INTRAVENOUS at 06:41

## 2019-05-27 RX ADMIN — IOPAMIDOL 100 ML: 612 INJECTION, SOLUTION INTRAVENOUS at 08:10

## 2019-05-27 NOTE — DISCHARGE INSTRUCTIONS

## 2019-05-27 NOTE — ED PROVIDER NOTES
EMERGENCY DEPARTMENT HISTORY AND PHYSICAL EXAM    Date: 5/27/2019  Patient Name: Lady Bustamante.    History of Presenting Illness     Chief Complaint   Patient presents with    Abdominal Pain         History Provided By: Patient and Patient's Wife      Additional History (Context): Lady Del Rio is a 51 y.o. male with COPD and Diastolic heart failure, coronary artery disease, chronic pain, anxiety who presents with right lower abdominal pain status post hydrocele procedure performed by Dr. Jagdeep Dyer on Friday. Yesterday patient was discharged from the emergency department at 6:30 in the morning and met Dr. Jagdeep Dyer in his office at 1 PM where a Santos catheter was inserted. Patient apparently had urinary retention and felt instant relief with insertion of the catheters. Says he is emptied the leg bag at least 10 times. States he is having quite a bit of pain now he feels like the catheter may be \"coiled up inside of me pressing through my bladder into my intestines. \"  Has not had a bowel movement since the procedure. Denies hematuria dysuria fever. Denies nausea vomiting. PCP: Nita Ragland NP    Current Facility-Administered Medications   Medication Dose Route Frequency Provider Last Rate Last Dose    sodium chloride (NS) flush 5-10 mL  5-10 mL IntraVENous PRN Leia Espinoza PA         Current Outpatient Medications   Medication Sig Dispense Refill    polyethylene glycol (MIRALAX) 17 gram/dose powder Take 17 g by mouth daily for 3 days. 1 tablespoon with 8 oz of water daily 51 g 0    predniSONE (DELTASONE) 50 mg tablet Take 1 Tab by mouth daily for 3 days. 3 Tab 0    doxycycline (VIBRA-TABS) 100 mg tablet Take 1 Tab by mouth two (2) times a day for 7 days. 14 Tab 0    bisacodyl (DULCOLAX) 10 mg suppository Insert 10 mg into rectum daily. 3 Suppository 0    tamsulosin (FLOMAX) 0.4 mg capsule Take 1 Cap by mouth daily.  14 Cap 3    HYDROmorphone (DILAUDID) 4 mg tablet Take 1 Tab by mouth every six (6) hours as needed for Pain for up to 2 days. Max Daily Amount: 16 mg. 8 Tab 0    oxyCODONE-acetaminophen (PERCOCET) 5-325 mg per tablet Take 1 Tab by mouth every eight (8) hours as needed for Pain for up to 3 days. Max Daily Amount: 3 Tabs. 12 Tab 0    oxyCODONE IR (ROXICODONE) 5 mg immediate release tablet Take 1 Tab by mouth every four (4) hours as needed for Pain for up to 3 days. Max Daily Amount: 30 mg. Indications: post op hydrocelectomy 18 Tab 0    ciprofloxacin HCl (CIPRO) 750 mg tablet Take 1 Tab by mouth two (2) times a day for 5 days. 10 Tab 0    docusate sodium (COLACE) 100 mg capsule Take 1 Cap by mouth two (2) times a day for 5 days. 10 Cap 2    tamsulosin (FLOMAX) 0.4 mg capsule Take 1 Cap by mouth daily. 10 Cap 0    lisinopril (PRINIVIL, ZESTRIL) 20 mg tablet Take 1 Tab by mouth daily for 30 days. 30 Tab 2    FLUoxetine (PROZAC) 40 mg capsule Take  by mouth daily.  hydrOXYzine HCl (ATARAX) 50 mg tablet Take 50 mg by mouth nightly. Indications: 100 mg qhs      topiramate (TOPAMAX) 25 mg tablet Take 25 mg BID x 7 days then 50 mg  BID 60 Tab 1    Blood Pressure Monitor (BLOOD PRESSURE KIT) kit 1 Device by Does Not Apply route daily as needed (for blood pressure checks). 1 Kit 0    PROAIR HFA 90 mcg/actuation inhaler INHALE 2 PUFFS BY MOUTH EVERY 4 HOURS AS NEEDED FOR WHEEZING OR SHORTNESS OF BREATH 1 Inhaler 2    amitriptyline HCl (AMITRIPTYLINE PO) Take 75 mg by mouth nightly.  OXcarbazepine (TRILEPTAL) 150 mg tablet Take  by mouth two (2) times a day.  albuterol (PROVENTIL VENTOLIN) 2.5 mg /3 mL (0.083 %) nebulizer solution Nebulized 1 vial for inhalation every 4 hours as needed 60 Each 2    roflumilast (DALIRESP) tab tablet Take 1 Tab by mouth daily. 30 Tab 5    tiZANidine (ZANAFLEX) 4 mg tablet Sig: Take 1 tab PO Q 6 as needed for muscle spasm 60 Tab 0    tiotropium (SPIRIVA) 18 mcg inhalation capsule Take 1 Cap by inhalation daily.  30 Cap 5    fluticasone-salmeterol (ADVAIR DISKUS) 500-50 mcg/dose diskus inhaler Take 1 Puff by inhalation two (2) times a day. 1 Inhaler 5    furosemide (LASIX) 20 mg tablet Take 1 Tab by mouth every other day. 15 Tab 6    LORazepam (ATIVAN) 1 mg tablet Take 1 Tab by mouth every eight (8) hours as needed for Anxiety. Max Daily Amount: 3 mg. 90 Tab 0    Nebulizer & Compressor machine 1 Each by Does Not Apply route every four (4) hours as needed. 1 Each 0    OXYGEN-AIR DELIVERY SYSTEMS (WALKABOUT 1 OXYGEN SYSTEM) 2.5 L/min by Nasal route continuous.  pantoprazole (PROTONIX) 40 mg tablet Take 1 Tab by mouth Daily (before breakfast).  30 Tab 0       Past History     Past Medical History:  Past Medical History:   Diagnosis Date    Anxiety     Chest pain     Chronic diastolic heart failure secondary to coronary artery disease (HCC)     Chronic lung disease     Chronic obstructive pulmonary disease (HCC) 08/03/2017    PFTS 8/3/17    COPD, severe (HCC)     Cough     Emphysema/COPD (HCC)     Esophagitis 12/11/2017    Endoscopy    Essential hypertension, benign     Fast heart beat     Feeling of chest tightness     Frequent urination     Heartburn     Hepatomegaly     History of stomach ulcers     Hx of nausea and vomiting     Poor appetite     Positive urine drug screen 01/2016    opiates and THC    Shortness of breath     Splenomegaly     Stomach pain     Stress     Tiredness     Unintentional weight change     Upper GI bleed     Weakness     Wheeze        Past Surgical History:  Past Surgical History:   Procedure Laterality Date    COLONOSCOPY N/A 11/5/2018    COLONOSCOPY with polypectomies performed by Santi Castro MD at [address] HX ENDOSCOPY  12/11/2017    HX WISDOM TEETH EXTRACTION Right 05/2019    IR BX LIVER PERCUTANEOUS         Family History:  Family History   Problem Relation Age of Onset    Hypertension Mother     Heart Disease Mother     Diabetes Mother     Hypertension Father     Heart Disease Father     Cancer Father         lymphnodes    Diabetes Father        Social History:  Social History     Tobacco Use    Smoking status: Former Smoker     Packs/day: 2.00     Years: 25.00     Pack years: 50.00     Types: Cigarettes     Start date: 1984     Last attempt to quit: 2017     Years since quittin.4    Smokeless tobacco: Never Used   Substance Use Topics    Alcohol use: No    Drug use: Yes     Types: Marijuana     Comment: Hx of Marijuana use       Allergies: Allergies   Allergen Reactions    Ciprofloxacin Nausea Only    Remeron [Mirtazapine] Other (comments)     Mood swings    Seroquel [Quetiapine] Other (comments)     Mood swings         Review of Systems   Review of Systems   Constitutional: Negative for fever. Gastrointestinal: Positive for abdominal pain. Negative for diarrhea, nausea and vomiting. All Other Systems Negative  Physical Exam     Vitals:    19 0715 19 0730 19 0745 19 0935   BP: 104/58 98/54 94/49 117/61   Pulse: 98 100 98 93   Resp:    16   Temp:       SpO2: 95% 95% 95% 97%     Physical Exam   Constitutional: Vital signs are normal. He appears well-developed and well-nourished. He is active. Non-toxic appearance. He does not appear ill. He appears distressed. HENT:   Head: Normocephalic and atraumatic. Neck: Normal range of motion. Neck supple. Carotid bruit is not present. No tracheal deviation present. No thyromegaly present. Cardiovascular: Regular rhythm and normal heart sounds. Exam reveals no gallop and no friction rub. No murmur heard. tachycardiac rate   Pulmonary/Chest: Effort normal and breath sounds normal. No stridor. No respiratory distress. He has no wheezes. He has no rales. He exhibits no tenderness. Abdominal: Soft. He exhibits no distension and no mass. There is tenderness. There is no rebound, no guarding and no CVA tenderness.    Right lower lateral abdominal tenderness to palpation Genitourinary:   Genitourinary Comments: Right scrotal wall is erythematous and mildly tender. Drain in place. Musculoskeletal: Normal range of motion. Neurological: He is alert. Skin: Skin is warm, dry and intact. He is not diaphoretic. No pallor. Psychiatric: He has a normal mood and affect. His speech is normal and behavior is normal. Judgment and thought content normal.   Nursing note and vitals reviewed. Diagnostic Study Results     Labs -     Recent Results (from the past 12 hour(s))   CBC WITH AUTOMATED DIFF    Collection Time: 05/27/19  6:39 AM   Result Value Ref Range    WBC 8.6 4.6 - 13.2 K/uL    RBC 4.09 (L) 4.70 - 5.50 M/uL    HGB 12.4 (L) 13.0 - 16.0 g/dL    HCT 34.9 (L) 36.0 - 48.0 %    MCV 85.3 74.0 - 97.0 FL    MCH 30.3 24.0 - 34.0 PG    MCHC 35.5 31.0 - 37.0 g/dL    RDW 13.7 11.6 - 14.5 %    PLATELET 132 214 - 200 K/uL    MPV 9.2 9.2 - 11.8 FL    NEUTROPHILS 77 (H) 40 - 73 %    LYMPHOCYTES 10 (L) 21 - 52 %    MONOCYTES 12 (H) 3 - 10 %    EOSINOPHILS 1 0 - 5 %    BASOPHILS 0 0 - 2 %    ABS. NEUTROPHILS 6.7 1.8 - 8.0 K/UL    ABS. LYMPHOCYTES 0.8 (L) 0.9 - 3.6 K/UL    ABS. MONOCYTES 1.1 0.05 - 1.2 K/UL    ABS. EOSINOPHILS 0.0 0.0 - 0.4 K/UL    ABS. BASOPHILS 0.0 0.0 - 0.1 K/UL    DF AUTOMATED     METABOLIC PANEL, COMPREHENSIVE    Collection Time: 05/27/19  6:39 AM   Result Value Ref Range    Sodium 136 136 - 145 mmol/L    Potassium 4.3 3.5 - 5.5 mmol/L    Chloride 105 100 - 108 mmol/L    CO2 24 21 - 32 mmol/L    Anion gap 7 3.0 - 18 mmol/L    Glucose 126 (H) 74 - 99 mg/dL    BUN 16 7.0 - 18 MG/DL    Creatinine 1.10 0.6 - 1.3 MG/DL    BUN/Creatinine ratio 15 12 - 20      GFR est AA >60 >60 ml/min/1.73m2    GFR est non-AA >60 >60 ml/min/1.73m2    Calcium 9.1 8.5 - 10.1 MG/DL    Bilirubin, total 1.5 (H) 0.2 - 1.0 MG/DL    ALT (SGPT) 22 16 - 61 U/L    AST (SGOT) 12 (L) 15 - 37 U/L    Alk.  phosphatase 87 45 - 117 U/L    Protein, total 6.8 6.4 - 8.2 g/dL    Albumin 3.3 (L) 3.4 - 5.0 g/dL Globulin 3.5 2.0 - 4.0 g/dL    A-G Ratio 0.9 0.8 - 1.7     LIPASE    Collection Time: 05/27/19  6:39 AM   Result Value Ref Range    Lipase 49 (L) 73 - 393 U/L   URINALYSIS W/ RFLX MICROSCOPIC    Collection Time: 05/27/19  6:51 AM   Result Value Ref Range    Color YELLOW      Appearance CLEAR      Specific gravity 1.007 1.005 - 1.030      pH (UA) 5.5 5.0 - 8.0      Protein NEGATIVE  NEG mg/dL    Glucose NEGATIVE  NEG mg/dL    Ketone NEGATIVE  NEG mg/dL    Bilirubin NEGATIVE  NEG      Blood SMALL (A) NEG      Urobilinogen 0.2 0.2 - 1.0 EU/dL    Nitrites NEGATIVE  NEG      Leukocyte Esterase TRACE (A) NEG     URINE MICROSCOPIC ONLY    Collection Time: 05/27/19  6:51 AM   Result Value Ref Range    WBC 2 to 4 0 - 4 /hpf    RBC NEGATIVE  0 - 5 /hpf    Epithelial cells NEGATIVE  0 - 5 /lpf    Bacteria NEGATIVE  NEG /hpf   EKG, 12 LEAD, INITIAL    Collection Time: 05/27/19  6:52 AM   Result Value Ref Range    Ventricular Rate 104 BPM    Atrial Rate 104 BPM    P-R Interval 148 ms    QRS Duration 66 ms    Q-T Interval 308 ms    QTC Calculation (Bezet) 405 ms    Calculated P Axis 39 degrees    Calculated R Axis 53 degrees    Calculated T Axis 60 degrees    Diagnosis       Sinus tachycardia  Otherwise normal ECG  When compared with ECG of 04-APR-2018 18:12,  Nonspecific T wave abnormality, improved in Inferior leads  Nonspecific T wave abnormality, improved in Anterior leads     POC LACTIC ACID    Collection Time: 05/27/19  6:53 AM   Result Value Ref Range    Lactic Acid (POC) 0.78 0.40 - 2.00 mmol/L       Radiologic Studies -   CT ABD PELV W CONT   Final Result      There may be some increasing posterior scrotal wall edema otherwise no   significant interval change of the CT appearance of the scrotum. - Right surgical drain remains.   - The testicles themselves are difficult to visualize. Similar testicles and/or   fluid in the scrotal sac. No CT finding for an acute intra-abdominal or pelvic process.       Probable right dependent atelectasis. XR CHEST PORT   Final Result      No significant interval change. No radiographic finding for an acute   cardiopulmonary process. CT Results  (Last 48 hours)               05/27/19 0822  CT ABD PELV W CONT Final result    Impression:      There may be some increasing posterior scrotal wall edema otherwise no   significant interval change of the CT appearance of the scrotum. - Right surgical drain remains.   - The testicles themselves are difficult to visualize. Similar testicles and/or   fluid in the scrotal sac. No CT finding for an acute intra-abdominal or pelvic process. Probable right dependent atelectasis. Narrative:  CT ABDOMEN/PELVIS WITH CONTRAST       CPT CODE: 42206,49462       HISTORY: History of bilateral hydrocelectomy and orchiopexy 5/25/2019. COMPARISON: CT 5/25/2019. TECHNIQUE: 5 mm helical scans were obtained of the abdomen and pelvis after   uneventful administration of intravenous contrast. 100 cc of Isovue-300 was   given. Coronal and sagittal reformation. All CT scans are performed using dose   optimization techniques as appropriate to the performed exam including the   following: Automated exposure control, adjustment of mA and/or kV according to   patient size, and use of iterative reconstructive technique. [facility] FINDINGS:        Right basilar airspace disease suggestive of atelectasis. Stable faint nodule in   the posterior lateral left lower lung measuring 5.5 mm, stable from 6/2013. No   effusion. There is homogeneous enhancement of the liver, spleen, and pancreas. There is an   inferior splenule. The gallbladder is without stones or sludge. No adrenal nodules or masses. The kidneys enhance symmetrically. No focal lesion is identified. No   hydronephrosis. There is no free intraperitoneal air, free fluid, or fluid collections. No abdominal or pelvic lymphadenopathy. The small and large bowel are normal in caliber. The appendix does not appear   inflamed. Bladder is decompressed with a Santos catheter. No gross prostatic enlargement. There is a surgical drain in the right hemiscrotum. There is heterogeneity   within the right scrotal sac, the right testicle itself is difficult to   visualize. There is similar fluid in the left scrotum. There may be increasing   scrotal wall edema posteriorly. Persistent air in the anterior scrotal wall. Mild calcifications in the abdominal aorta. No aneurysmal dilatation. Osseous structures are intact. 05/25/19 2214  CT ABD PELV WO CONT Final result    Narrative:  CT Of The Abdomen And Pelvis Without Contrast       CPT CODE [id number]       CLINICAL HISTORY: Patient complains of lower abdominal pain and groin pain. 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 Sagittal and coronal images created from original data set. All CT scans at   this facility are performed using dose optimization techniques as appropriate to   a performed exam, to include automated exposure control, adjustment of the mA   and/or kV according to patient's size (including appropriate matching for site   specific examinations), or use of iterative reconstruction technique. COMPARISON: 4/25/2019. FINDINGS:        view: Lungs clear. Normal bowel gas pattern. CT Of The Abdomen and pelvis:       Lung bases: Dependent densities bilaterally. No effusions. Heart and pericardium: No acute abnormalities. Liver: Overall low attenuation. Gallbladder: Normal.       Spleen: Normal.       Pancreas: Normal.       Adrenal glands: Normal.       Kidneys: No hydronephrosis, nephrolithiasis. No ureterectasis or ureteral   stones.        Retroperitoneum: Great vessels unremarkable albeit without benefit of   intravenous contrast.       Lymph nodes: No adenopathy. Bowel: Stomach and duodenum and small bowel and the appendix and the colon are   normal.       Peritoneal space: No free fluid. : Prostate and urinary bladder unremarkable. There is a drain in the right   side of scrotum. There is small left scrotal hydrocele. There is edema at the   scrotum along with small amounts of air compatible with recent surgical   procedure. IMPRESSION       Scrotal edema, drain on the right, small amounts of air compatible with recent   surgical intervention. Small left scrotal hydrocele. No acute intra-abdominal process. Hepatic steatosis. Atelectasis at lung bases. CXR Results  (Last 48 hours)               05/27/19 0651  XR CHEST PORT Final result    Impression:      No significant interval change. No radiographic finding for an acute   cardiopulmonary process. Narrative:  Portable Chest       CPT CODE: 12550       HISTORY: Postop knee surgery 3 days ago. FINDINGS:        Compared with 5/26/2019        Multiple wires and tubes overlie the patient. Heart size and mediastinal   contours are within normal limits. No pulmonary vascular congestion, effusion,   or pneumothorax. Hyperlucency of the upper lung zones suggestive of underlying   COPD/emphysema. No acute consolidations. [facility] 05/26/19 0526  XR CHEST PORT Final result    Impression:      Pulmonary emphysematous disease/COPD. Mild streaky basilar opacities, likely   atelectasis or scar. Otherwise no evidence of acute pulmonary disease. Narrative:  AP CHEST, PORTABLE       CPT CODE: [id number]       INDICATION: Above. Postoperative pain. Acute exacerbation of COPD. Tachycardia,   tachypnea. COMPARISON: 5/21/2019 PA and lateral.       TECHNIQUE: Portable  semi-upright AP chest radiograph is reviewed. FINDINGS:       The lungs are mildly hyperinflated similar to prior. Pulmonary emphysematous   disease, better demonstrated on chest CT of 12/19/2017. Mild streaky increased   opacity at the lung bases, likely atelectasis or scar. No evidence of focal   pulmonary consolidation or pulmonary edema. No evidence of pneumothorax. The   costophrenic sulci appear sharp. The cardiac silhouette is within normal limits in size. EKG lead/wire and catheter tubing overlie the patient. No acute osseous   abnormalities identified. Medical Decision Making   I am the first provider for this patient. I reviewed the vital signs, available nursing notes, past medical history, past surgical history, family history and social history. Vital Signs-Reviewed the patient's vital signs. Records Reviewed: Nursing Notes    Procedures:  EKG  Date/Time: 5/27/2019 7:06 AM  Performed by: BUSTER Villareal  Authorized by: BUSTER Villareal     Interpretation:     Interpretation: abnormal    Rate:     ECG rate:  104    ECG rate assessment: tachycardic    Rhythm:     Rhythm: sinus tachycardia    Ectopy:     Ectopy: none    QRS:     QRS axis:  Normal    QRS intervals:  Normal  Conduction:     Conduction: normal    ST segments:     ST segments:  Normal  T waves:     T waves: normal          Provider Notes (Medical Decision Making): Patient tachycardic as well as tachypneic so CT scan was ordered along with sepsis protocol bundle. However his lactic acid was reassuringly normal.  Patient also has nothing acute on the CT scan. Has not had a bowel movement in 4 days and likely this is contributory with an unremarkable CT scan. We will give him a prescription for MiraLAX and was instructed to follow-up with Dr. Emily Reyna as an outpatient  tomorrow.     MED RECONCILIATION:  Current Facility-Administered Medications   Medication Dose Route Frequency    sodium chloride (NS) flush 5-10 mL  5-10 mL IntraVENous PRN     Current Outpatient Medications   Medication Sig    polyethylene glycol (MIRALAX) 17 gram/dose powder Take 17 g by mouth daily for 3 days. 1 tablespoon with 8 oz of water daily    predniSONE (DELTASONE) 50 mg tablet Take 1 Tab by mouth daily for 3 days.  doxycycline (VIBRA-TABS) 100 mg tablet Take 1 Tab by mouth two (2) times a day for 7 days.  bisacodyl (DULCOLAX) 10 mg suppository Insert 10 mg into rectum daily.  tamsulosin (FLOMAX) 0.4 mg capsule Take 1 Cap by mouth daily.  HYDROmorphone (DILAUDID) 4 mg tablet Take 1 Tab by mouth every six (6) hours as needed for Pain for up to 2 days. Max Daily Amount: 16 mg.    oxyCODONE-acetaminophen (PERCOCET) 5-325 mg per tablet Take 1 Tab by mouth every eight (8) hours as needed for Pain for up to 3 days. Max Daily Amount: 3 Tabs.  oxyCODONE IR (ROXICODONE) 5 mg immediate release tablet Take 1 Tab by mouth every four (4) hours as needed for Pain for up to 3 days. Max Daily Amount: 30 mg. Indications: post op hydrocelectomy    ciprofloxacin HCl (CIPRO) 750 mg tablet Take 1 Tab by mouth two (2) times a day for 5 days.  docusate sodium (COLACE) 100 mg capsule Take 1 Cap by mouth two (2) times a day for 5 days.  tamsulosin (FLOMAX) 0.4 mg capsule Take 1 Cap by mouth daily.  lisinopril (PRINIVIL, ZESTRIL) 20 mg tablet Take 1 Tab by mouth daily for 30 days.  FLUoxetine (PROZAC) 40 mg capsule Take  by mouth daily.  hydrOXYzine HCl (ATARAX) 50 mg tablet Take 50 mg by mouth nightly. Indications: 100 mg qhs    topiramate (TOPAMAX) 25 mg tablet Take 25 mg BID x 7 days then 50 mg  BID    Blood Pressure Monitor (BLOOD PRESSURE KIT) kit 1 Device by Does Not Apply route daily as needed (for blood pressure checks).  PROAIR HFA 90 mcg/actuation inhaler INHALE 2 PUFFS BY MOUTH EVERY 4 HOURS AS NEEDED FOR WHEEZING OR SHORTNESS OF BREATH    amitriptyline HCl (AMITRIPTYLINE PO) Take 75 mg by mouth nightly.  OXcarbazepine (TRILEPTAL) 150 mg tablet Take  by mouth two (2) times a day.     albuterol (PROVENTIL VENTOLIN) 2.5 mg /3 mL (0.083 %) nebulizer solution Nebulized 1 vial for inhalation every 4 hours as needed    roflumilast (DALIRESP) tab tablet Take 1 Tab by mouth daily.  tiZANidine (ZANAFLEX) 4 mg tablet Sig: Take 1 tab PO Q 6 as needed for muscle spasm    tiotropium (SPIRIVA) 18 mcg inhalation capsule Take 1 Cap by inhalation daily.  fluticasone-salmeterol (ADVAIR DISKUS) 500-50 mcg/dose diskus inhaler Take 1 Puff by inhalation two (2) times a day.  furosemide (LASIX) 20 mg tablet Take 1 Tab by mouth every other day.  LORazepam (ATIVAN) 1 mg tablet Take 1 Tab by mouth every eight (8) hours as needed for Anxiety. Max Daily Amount: 3 mg.  Nebulizer & Compressor machine 1 Each by Does Not Apply route every four (4) hours as needed.  OXYGEN-AIR DELIVERY SYSTEMS (WALKABOUT 1 OXYGEN SYSTEM) 2.5 L/min by Nasal route continuous.  pantoprazole (PROTONIX) 40 mg tablet Take 1 Tab by mouth Daily (before breakfast). Disposition:  home    DISCHARGE NOTE:   9:29 AM    Pt has been reexamined. Patient has no new complaints, changes, or physical findings. Care plan outlined and precautions discussed. Results of labs, CT were reviewed with the patient. All medications were reviewed with the patient; will d/c home with miralax. All of pt's questions and concerns were addressed. Patient was instructed and agrees to follow up with urology, as well as to return to the ED upon further deterioration. Patient is ready to go home.     Follow-up Information     Follow up With Specialties Details Why Contact Info    Laquita Shipley MD Urology Schedule an appointment as soon as possible for a visit in 2 days  [place]  [address] [address] [address] [facility] EMERGENCY DEPT Emergency Medicine  If symptoms worsen return immediately [address]  [phone]          Current Discharge Medication List      START taking these medications    Details   polyethylene glycol ([facility]) 17 gram/dose powder Take 17 g by mouth daily for 3 days. 1 tablespoon with 8 oz of water daily  Qty: 51 g, Refills: 0             Diagnosis     Clinical Impression:   1.  RLQ abdominal pain

## 2019-05-27 NOTE — ED TRIAGE NOTES
Patient had surgery Friday , was seen here yesterday for pain, followed up with his surgeon yesterday and a carrillo was placed and feels like his pain is coming from there

## 2019-05-27 NOTE — ED NOTES
shift change report given to Rylie (oncoming nurse) by Chase Kingston  (offgoing nurse). Report included the following information SBAR, ED Summary, MAR and Recent Results.

## 2019-05-28 ENCOUNTER — OFFICE VISIT (OUTPATIENT)
Dept: UROLOGY | Age: 49
End: 2019-05-28

## 2019-05-28 VITALS
BODY MASS INDEX: 28.14 KG/M2 | OXYGEN SATURATION: 98 % | DIASTOLIC BLOOD PRESSURE: 64 MMHG | HEIGHT: 69 IN | HEART RATE: 95 BPM | WEIGHT: 190 LBS | SYSTOLIC BLOOD PRESSURE: 100 MMHG

## 2019-05-28 DIAGNOSIS — Z87.438 HISTORY OF TORSION OF TESTIS: ICD-10-CM

## 2019-05-28 DIAGNOSIS — N43.2 OTHER HYDROCELE: ICD-10-CM

## 2019-05-28 DIAGNOSIS — R10.31 RIGHT LOWER QUADRANT ABDOMINAL PAIN: Primary | ICD-10-CM

## 2019-05-28 LAB
ATRIAL RATE: 104 BPM
CALCULATED P AXIS, ECG09: 39 DEGREES
CALCULATED R AXIS, ECG10: 53 DEGREES
CALCULATED T AXIS, ECG11: 60 DEGREES
DIAGNOSIS, 93000: NORMAL
P-R INTERVAL, ECG05: 148 MS
Q-T INTERVAL, ECG07: 308 MS
QRS DURATION, ECG06: 66 MS
QTC CALCULATION (BEZET), ECG08: 405 MS
VENTRICULAR RATE, ECG03: 104 BPM

## 2019-05-28 RX ORDER — FLUTICASONE PROPIONATE AND SALMETEROL 50; 500 UG/1; UG/1
POWDER RESPIRATORY (INHALATION)
Qty: 1 INHALER | Refills: 5 | Status: SHIPPED | OUTPATIENT
Start: 2019-05-28 | End: 2019-12-05 | Stop reason: CLARIF

## 2019-05-28 NOTE — PROGRESS NOTES
Mr. Savage Heath has a reminder for a \"due or due soon\" health maintenance. I have asked that he contact his primary care provider for follow-up on this health maintenance.

## 2019-05-28 NOTE — PATIENT INSTRUCTIONS
Hydrocelectomy: What to Expect at [facility] is surgery to remove a hydrocele. A hydrocele is a fluid-filled sac inside the scrotum. A hydrocele can happen on one or both sides of the scrotum. The doctor made a very small cut (incision) in your scrotum to drain the fluid from the hydrocele and to remove the fluid-filled sac. This surgery was done to remove the fluid and to stop the buildup of fluid in the scrotum. After your surgery, you may feel more tired than usual and have some mild groin pain for several days. Your groin and scrotum may be swollen or bruised. This usually gets better in 2 to 3 weeks. You will probably be able to go back to work or school 4 to 7 days after surgery. But you will need to avoid strenuous exercise or heavy lifting for 2 to 4 weeks. This care sheet gives you a general idea about how long it will take for you to recover. But each person recovers at a different pace. Follow the steps below to get better as quickly as possible. How can you care for yourself at home? Activity    · Rest when you feel tired. Getting enough sleep will help you recover.     · Try to walk each day. Start by walking a little more than you did the day before. Bit by bit, increase the amount you walk. Walking boosts blood flow and helps prevent pneumonia and constipation.     · You may shower 24 hours after surgery, if your doctor says it is okay. Pat the cut (incision) dry. Do not take a bath for the first week, or until your doctor tells you it is okay.     · You may return to work or school when you are ready. This is usually in about 4 to 7 days.     · Avoid strenuous activities, such as bicycle riding, jogging, weight lifting, or aerobic exercise, until your doctor says it is okay.     · For 2 to 4 weeks, avoid lifting anything that would make you strain.  This may include heavy grocery bags and milk containers, a heavy briefcase or backpack, cat litter or dog food bags, a vacuum , or a child. Diet    · You can eat your normal diet. If your stomach is upset, try bland, low-fat foods like plain rice, broiled chicken, toast, and yogurt.     · Drink plenty of fluids to avoid becoming dehydrated.     · You may notice that your bowel movements are not regular right after your surgery. This is common. Try to avoid constipation and straining with bowel movements. You may want to take a fiber supplement every day. If you have not had a bowel movement after a couple of days, ask your doctor about taking a mild laxative. Medicines    · Your doctor will tell you if and when you can restart your medicines. He or she will also give you instructions about taking any new medicines.     · If you take blood thinners, such as warfarin (Coumadin), clopidogrel (Plavix), or aspirin, be sure to talk to your doctor. He or she will tell you if and when to start taking those medicines again. Make sure that you understand exactly what your doctor wants you to do.     · Take pain medicines exactly as directed. ? If the doctor gave you a prescription medicine for pain, take it as prescribed. ? If you are not taking a prescription pain medicine, ask your doctor if you can take an over-the-counter medicine.     · If you think pain medicine is making you sick to your stomach:  ? Take your medicine after meals (unless the doctor has told you not to). ? Ask your doctor for a different pain medicine.     · If your doctor prescribed antibiotics, take them as directed. Do not stop taking them just because you feel better. You need to take the full course of antibiotics. Incision care    · If you have strips of tape on the cut (incision) the doctor made, leave the tape on for a week or until it falls off.     · Wash the area daily with warm, soapy water, and pat it dry. Don't use hydrogen peroxide or alcohol, which can slow healing.  You may cover the area with a gauze bandage if it weeps or rubs against clothing. Change the bandage every day.     · Keep the area clean and dry. Follow-up care is a key part of your treatment and safety. Be sure to make and go to all appointments, and call your doctor if you are having problems. It's also a good idea to know your test results and keep a list of the medicines you take. When should you call for help? Call 911 anytime you think you may need emergency care. For example, call if:    · You passed out (lost consciousness).     · You have severe trouble breathing.     · You have sudden chest pain and shortness of breath, or you cough up blood.    Call your doctor now or seek immediate medical care if:    · You are sick to your stomach or cannot keep fluids down.     · You have pain that does not get better after you take pain medicine.     · You have a fever over 100.4 F.     · You have loose stitches, or your incision comes open.     · Bright red blood has soaked through the bandage over your incision.     · Your scrotum gets more swollen.     · You have signs of infection, such as:  ? Increased pain, swelling, warmth, or redness. ? Red streaks leading from the incision. ? Pus draining from the incision. ? Swollen lymph nodes in your groin, neck, or armpits.    Watch closely for changes in your health, and be sure to contact your doctor if you have any problems. Where can you learn more? Go to http://costa-jessica.info/. Enter Y047 in the search box to learn more about \"Hydrocelectomy: What to Expect at Home. \"  Current as of: March 20, 2018  Content Version: 11.9  © 2221-0026 [employer]. Care instructions adapted under license by [employer] (which disclaims liability or warranty for this information). If you have questions about a medical condition or this instruction, always ask your healthcare professional. [address] any warranty or liability for your use of this information.

## 2019-05-28 NOTE — PROGRESS NOTES
Chaya Knowles Sr. 51 y.o. male     Mr. 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    Patient has been seen in ER x3 since surgery on 24 May 2019 plan of right lower quadrant abdominal pain-normal CT scan imaging of the abdomen and pelvis x3-initial improvement following Santos catheter placement not maintained-pain relieved yesterday in ER following injection of Toradol  Patient has experienced normal bowel movement earlier today    initially presented to the emergency room on 25 April 2019 with sudden severe left scrotal pain onset while getting out of his pickup truck after parking it in the [employer] parking lot-went to emergency room immediately where exam showed a small right hydrocele of the scrotum and subsequent ultrasound imaging showing normal blood flow to each testes right hydrocele and small left hydrocele-treated with Cipro for suspected epididymitis with pain lingering for several days gradually lessening in intensity now experiencing no pain at rest but occasionally feeling tight aching pain in the left side of the scrotum.     CT scan imaging of the abdomen and pelvis on 25 April 2019 shows normal kidneys ureter and bladder images have been reviewed on PACS  Ultrasound imaging of the scrotum on 25 April 2019 shows right-sided hydrocele with a small left hydrocele-images have been reviewed on PACS     Patient has no antecedent symptoms of bladder irritability or obstructive voiding-         PVR 62 cc in May 2019        Review of Systems:   CNS: Frequent headaches stress and anxiety  Respiratory: Chest tightness no wheezing and coughing  Cardiovascular: Hypertension, palpitations,  Intestinal: GERD no diarrhea no constipation  Urinary: No urgency frequency dysuria or hematuria  Skeletal: No bone or joint pain  Endocrine: No diabetes or thyroid disease  Other:   Disability retired secondary to COPD                                                                Allergies: Allergies   Allergen Reactions    Ciprofloxacin Nausea Only    Remeron [Mirtazapine] Other (comments)     Mood swings    Seroquel [Quetiapine] Other (comments)     Mood swings      Medications:    Current Outpatient Medications   Medication Sig Dispense Refill    polyethylene glycol (MIRALAX) 17 gram/dose powder Take 17 g by mouth daily for 3 days. 1 tablespoon with 8 oz of water daily 51 g 0    predniSONE (DELTASONE) 50 mg tablet Take 1 Tab by mouth daily for 3 days. 3 Tab 0    doxycycline (VIBRA-TABS) 100 mg tablet Take 1 Tab by mouth two (2) times a day for 7 days. 14 Tab 0    bisacodyl (DULCOLAX) 10 mg suppository Insert 10 mg into rectum daily. 3 Suppository 0    HYDROmorphone (DILAUDID) 4 mg tablet Take 1 Tab by mouth every six (6) hours as needed for Pain for up to 2 days. Max Daily Amount: 16 mg. 8 Tab 0    oxyCODONE-acetaminophen (PERCOCET) 5-325 mg per tablet Take 1 Tab by mouth every eight (8) hours as needed for Pain for up to 3 days. Max Daily Amount: 3 Tabs. 12 Tab 0    ciprofloxacin HCl (CIPRO) 750 mg tablet Take 1 Tab by mouth two (2) times a day for 5 days. 10 Tab 0    docusate sodium (COLACE) 100 mg capsule Take 1 Cap by mouth two (2) times a day for 5 days. 10 Cap 2    tamsulosin (FLOMAX) 0.4 mg capsule Take 1 Cap by mouth daily. 10 Cap 0    lisinopril (PRINIVIL, ZESTRIL) 20 mg tablet Take 1 Tab by mouth daily for 30 days.  30 Tab 2    topiramate (TOPAMAX) 25 mg tablet Take 25 mg BID x 7 days then 50 mg  BID 60 Tab 1    PROAIR HFA 90 mcg/actuation inhaler INHALE 2 PUFFS BY MOUTH EVERY 4 HOURS AS NEEDED FOR WHEEZING OR SHORTNESS OF BREATH 1 Inhaler 2    albuterol (PROVENTIL VENTOLIN) 2.5 mg /3 mL (0.083 %) nebulizer solution Nebulized 1 vial for inhalation every 4 hours as needed 60 Each 2    roflumilast (DALIRESP) tab tablet Take 1 Tab by mouth daily. 30 Tab 5    tiZANidine (ZANAFLEX) 4 mg tablet Sig: Take 1 tab PO Q 6 as needed for muscle spasm 60 Tab 0    tiotropium (SPIRIVA) 18 mcg inhalation capsule Take 1 Cap by inhalation daily. 30 Cap 5    fluticasone-salmeterol (ADVAIR DISKUS) 500-50 mcg/dose diskus inhaler Take 1 Puff by inhalation two (2) times a day. 1 Inhaler 5    furosemide (LASIX) 20 mg tablet Take 1 Tab by mouth every other day. 15 Tab 6    LORazepam (ATIVAN) 1 mg tablet Take 1 Tab by mouth every eight (8) hours as needed for Anxiety. Max Daily Amount: 3 mg. 90 Tab 0    Nebulizer & Compressor machine 1 Each by Does Not Apply route every four (4) hours as needed. 1 Each 0    OXYGEN-AIR DELIVERY SYSTEMS (WALKABOUT 1 OXYGEN SYSTEM) 2.5 L/min by Nasal route continuous.  pantoprazole (PROTONIX) 40 mg tablet Take 1 Tab by mouth Daily (before breakfast). 30 Tab 0    tamsulosin (FLOMAX) 0.4 mg capsule Take 1 Cap by mouth daily. 14 Cap 3    FLUoxetine (PROZAC) 40 mg capsule Take  by mouth daily.  hydrOXYzine HCl (ATARAX) 50 mg tablet Take 50 mg by mouth nightly. Indications: 100 mg qhs      Blood Pressure Monitor (BLOOD PRESSURE KIT) kit 1 Device by Does Not Apply route daily as needed (for blood pressure checks). 1 Kit 0    amitriptyline HCl (AMITRIPTYLINE PO) Take 75 mg by mouth nightly.  OXcarbazepine (TRILEPTAL) 150 mg tablet Take  by mouth two (2) times a day.          Past Medical History:   Diagnosis Date    Anxiety     Chest pain     Chronic diastolic heart failure secondary to coronary artery disease (HCC)     Chronic lung disease     Chronic obstructive pulmonary disease ([facility] [address] 08/03/2017    PFTS 8/3/17    COPD, severe (HCC)     Cough     Emphysema/COPD ([facility] [address]     Esophagitis 12/11/2017    Endoscopy    Essential hypertension, benign     Fast heart beat     Feeling of chest tightness     Frequent urination     Heartburn     Hepatomegaly     History of stomach ulcers     Hx of nausea and vomiting     Poor appetite     Positive urine drug screen 2016    opiates and THC    Shortness of breath     Splenomegaly     Stomach pain     Stress     Tiredness     Unintentional weight change     Upper GI bleed     Weakness     Wheeze       Past Surgical History:   Procedure Laterality Date    COLONOSCOPY N/A 2018    COLONOSCOPY with polypectomies performed by Xuan Griffin MD at [address] HX ENDOSCOPY  2017    HX WISDOM TEETH EXTRACTION Right 2019    IR BX LIVER PERCUTANEOUS       Social History     Socioeconomic History    Marital status:      Spouse name: Not on file    Number of children: Not on file    Years of education: Not on file    Highest education level: Not on file   Occupational History    Not on file   Social Needs    Financial resource strain: Not on file    Food insecurity:     Worry: Not on file     Inability: Not on file    Transportation needs:     Medical: Not on file     Non-medical: Not on file   Tobacco Use    Smoking status: Former Smoker     Packs/day: 2.00     Years: 25.00     Pack years: 50.00     Types: Cigarettes     Start date: 1984     Last attempt to quit: 2017     Years since quittin.4    Smokeless tobacco: Never Used   Substance and Sexual Activity    Alcohol use: No    Drug use: Yes     Types: Marijuana     Comment: Hx of Marijuana use    Sexual activity: Not Currently     Partners: Female   Lifestyle    Physical activity:     Days per week: Not on file     Minutes per session: Not on file    Stress: Not on file   Relationships    Social connections:     Talks on phone: Not on file     Gets together: Not on file     Attends [employer] service: Not on file     Active member of club or organization: Not on file     Attends meetings of clubs or organizations: Not on file     Relationship status: Not on file    Intimate partner violence:     Fear of current or ex partner: Not on file Emotionally abused: Not on file     Physically abused: Not on file     Forced sexual activity: Not on file   Other Topics Concern     Service No    Blood Transfusions No    Caffeine Concern Yes    Occupational Exposure No    Hobby Hazards No    Sleep Concern Yes     Comment: occas    Stress Concern No    Weight Concern No    Special Diet No    Back Care Yes    Exercise No    Bike Helmet Yes    Seat Belt No     Comment: occas    Self-Exams Not Asked   Social History Narrative          Family History   Problem Relation Age of Onset    Hypertension Mother     Heart Disease Mother     Diabetes Mother     Hypertension Father     Heart Disease Father     Cancer Father         lymphnodes    Diabetes Father         Physical Examination: Well-nourished mature male in no apparent distress    Urinalysis: Santos catheter indwelling    Right scrotal Penrose drain removed today intact    Abdomen is nontender  Back no percussion CVA tenderness on either side  No dermatome skin lesions on either side    Impression: Hydrocele with intermittent left spermatic cord torsion stable status post bilateral hydrocelectomy with bilateral orchidopexy    Abdominal pain syndrome  -Urinary retention secondary to narcotic effect        Plan: Maintain Santos catheter bladder drainage    RTC 2 days for Santos catheter removal      Crow Watson MD  -electronically signed-    PLEASE NOTE:  This document has been produced using voice recognition software. Unrecognized errors in transcription may be present.

## 2019-05-29 LAB
BACTERIA SPEC CULT: NORMAL
SERVICE CMNT-IMP: NORMAL

## 2019-05-30 ENCOUNTER — OFFICE VISIT (OUTPATIENT)
Dept: UROLOGY | Age: 49
End: 2019-05-30

## 2019-05-30 VITALS
BODY MASS INDEX: 28.14 KG/M2 | DIASTOLIC BLOOD PRESSURE: 83 MMHG | HEIGHT: 69 IN | OXYGEN SATURATION: 96 % | HEART RATE: 94 BPM | WEIGHT: 190 LBS | SYSTOLIC BLOOD PRESSURE: 130 MMHG

## 2019-05-30 DIAGNOSIS — N43.3 HYDROCELE, UNSPECIFIED HYDROCELE TYPE: Primary | ICD-10-CM

## 2019-05-30 DIAGNOSIS — N44.00 LEFT TESTICULAR TORSION: ICD-10-CM

## 2019-05-30 NOTE — TELEPHONE ENCOUNTER
Patient wife called stating the patient has not received his blood pressure cuff. She asked to be called back at [phone] to further discuss the status of the order.

## 2019-05-30 NOTE — PATIENT INSTRUCTIONS
Urinary Retention: Care Instructions  Your Care Instructions    Urinary retention means that you aren't able to urinate. In men, it is often caused by a blockage of the urinary tract from an enlarged prostate gland. In men and women, it can also be caused by an infection or nerve damage. Or it may be a side effect of a medicine. The doctor may have drained the urine from your bladder. If you still have problems passing urine, you may need to use a catheter at home. This is used to empty your bladder until the problem can be fixed. Your doctor may put a catheter in your bladder before you go home. If so, he or she will tell you when to come back to have the catheter removed. The doctor has checked you closely. But problems can develop later. If you notice any problems or new symptoms, get medical treatment right away. Follow-up care is a key part of your treatment and safety. Be sure to make and go to all appointments, and call your doctor if you are having problems. It's also a good idea to know your test results and keep a list of the medicines you take. How can you care for yourself at home? · Take your medicines exactly as prescribed. Call your doctor if you think you are having a problem with your medicine. You will get more details on the specific medicines your doctor prescribes. · Check with your doctor before you use any over-the-counter medicines. Many cold and allergy medicines, for example, can make this problem worse. Make sure your doctor knows all of the medicines, vitamins, supplements, and herbal remedies you take. · Spread out through the day the amount of fluid you drink. Do not drink a lot at bedtime. · Avoid alcohol and caffeine. · If you have been given a catheter, or if one is already in place, follow the instructions you were given. Always wash your hands before and after you handle the catheter. When should you call for help?   Call your doctor now or seek immediate medical care if:    · You cannot urinate at all, or it is getting harder to urinate.     · You have symptoms of a urinary tract infection. These may include:  ? Pain or burning when you urinate. ? A frequent need to urinate without being able to pass much urine. ? Pain in the flank, which is just below the rib cage and above the waist on either side of the back. ? Blood in your urine. ? A fever.    Watch closely for changes in your health, and be sure to contact your doctor if:    · You have any problems with your catheter.     · You do not get better as expected. Where can you learn more? Go to http://costa-jessica.info/. Enter M244 in the search box to learn more about \"Urinary Retention: Care Instructions. \"  Current as of: March 20, 2018  Content Version: 11.9  © 5856-7904 [employer]. Care instructions adapted under license by [employer] (which disclaims liability or warranty for this information). If you have questions about a medical condition or this instruction, always ask your healthcare professional. [place] [address] any warranty or liability for your use of this information.

## 2019-05-30 NOTE — PROGRESS NOTES
Mr. Fink Jose has a reminder for a \"due or due soon\" health maintenance. I have asked that he contact his primary care provider for follow-up on this health maintenance.

## 2019-05-31 NOTE — PROGRESS NOTES
Chaya Knowles Sr. 51 y.o. male     Mr. Aniya Lara seen today for postop evaluation bilateral hydrocelectomy with bilateral orchidopexy-on 24 May 2019 with postop urinary retention relieved by transient Santos catheter bladder drainage  Patient is now catheter free voiding well and asymptomatic after having return of bowel movements earlier this morning    Patient has been seen in ER x3 since surgery on 24 May 2019 plan of right lower quadrant abdominal pain-normal CT scan imaging of the abdomen and pelvis x3-initial improvement following Santos catheter placement not maintained-pain relieved yesterday in ER following injection of Toradol  Patient has experienced normal bowel movement earlier today     initially presented to the emergency room on 25 April 2019 with sudden severe left scrotal pain onset while getting out of his pickup truck after parking it in the [employer] parking lot-went to emergency room immediately where exam showed a small right hydrocele of the scrotum and subsequent ultrasound imaging showing normal blood flow to each testes right hydrocele and small left hydrocele-treated with Cipro for suspected epididymitis with pain lingering for several days gradually lessening in intensity now experiencing no pain at rest but occasionally feeling tight aching pain in the left side of the scrotum.     CT scan imaging of the abdomen and pelvis on 25 April 2019 shows normal kidneys ureter and bladder images have been reviewed on PACS  Ultrasound imaging of the scrotum on 25 April 2019 shows right-sided hydrocele with a small left hydrocele-images have been reviewed on PACS     Patient has no antecedent symptoms of bladder irritability or obstructive voiding-         PVR 62 cc in May 2019        Review of Systems:   CNS: Frequent headaches stress and anxiety  Respiratory: Chest tightness no wheezing and coughing  Cardiovascular: Hypertension, palpitations,  Intestinal: GERD no diarrhea no constipation  Urinary: No urgency frequency dysuria or hematuria  Skeletal: No bone or joint pain  Endocrine: No diabetes or thyroid disease  Other:                                                               Disability retired secondary to COPD                                                                   Allergies: Allergies   Allergen Reactions    Ciprofloxacin Nausea Only    Remeron [Mirtazapine] Other (comments)     Mood swings    Seroquel [Quetiapine] Other (comments)     Mood swings      Medications:    Current Outpatient Medications   Medication Sig Dispense Refill    ADVAIR DISKUS 500-50 mcg/dose diskus inhaler INHALE 1 PUFF BY MOUTH TWICE DAILY 1 Inhaler 5    doxycycline (VIBRA-TABS) 100 mg tablet Take 1 Tab by mouth two (2) times a day for 7 days. 14 Tab 0    tamsulosin (FLOMAX) 0.4 mg capsule Take 1 Cap by mouth daily. 10 Cap 0    lisinopril (PRINIVIL, ZESTRIL) 20 mg tablet Take 1 Tab by mouth daily for 30 days. 30 Tab 2    FLUoxetine (PROZAC) 40 mg capsule Take  by mouth daily.  hydrOXYzine HCl (ATARAX) 50 mg tablet Take 50 mg by mouth nightly. Indications: 100 mg qhs      topiramate (TOPAMAX) 25 mg tablet Take 25 mg BID x 7 days then 50 mg  BID 60 Tab 1    PROAIR HFA 90 mcg/actuation inhaler INHALE 2 PUFFS BY MOUTH EVERY 4 HOURS AS NEEDED FOR WHEEZING OR SHORTNESS OF BREATH 1 Inhaler 2    amitriptyline HCl (AMITRIPTYLINE PO) Take 75 mg by mouth nightly.  OXcarbazepine (TRILEPTAL) 150 mg tablet Take  by mouth two (2) times a day.  albuterol (PROVENTIL VENTOLIN) 2.5 mg /3 mL (0.083 %) nebulizer solution Nebulized 1 vial for inhalation every 4 hours as needed 60 Each 2    roflumilast (DALIRESP) tab tablet Take 1 Tab by mouth daily. 30 Tab 5    tiZANidine (ZANAFLEX) 4 mg tablet Sig: Take 1 tab PO Q 6 as needed for muscle spasm 60 Tab 0    tiotropium (SPIRIVA) 18 mcg inhalation capsule Take 1 Cap by inhalation daily.  30 Cap 5    furosemide (LASIX) 20 mg tablet Take 1 Tab by mouth every other day. 15 Tab 6    LORazepam (ATIVAN) 1 mg tablet Take 1 Tab by mouth every eight (8) hours as needed for Anxiety. Max Daily Amount: 3 mg. 90 Tab 0    Nebulizer & Compressor machine 1 Each by Does Not Apply route every four (4) hours as needed. 1 Each 0    OXYGEN-AIR DELIVERY SYSTEMS (WALKABOUT 1 OXYGEN SYSTEM) 2.5 L/min by Nasal route continuous.  pantoprazole (PROTONIX) 40 mg tablet Take 1 Tab by mouth Daily (before breakfast). 30 Tab 0    polyethylene glycol (MIRALAX) 17 gram/dose powder Take 17 g by mouth daily for 3 days. 1 tablespoon with 8 oz of water daily 51 g 0    bisacodyl (DULCOLAX) 10 mg suppository Insert 10 mg into rectum daily. 3 Suppository 0    tamsulosin (FLOMAX) 0.4 mg capsule Take 1 Cap by mouth daily. 14 Cap 3    Blood Pressure Monitor (BLOOD PRESSURE KIT) kit 1 Device by Does Not Apply route daily as needed (for blood pressure checks).  1 Kit 0       Past Medical History:   Diagnosis Date    Anxiety     Chest pain     Chronic diastolic heart failure secondary to coronary artery disease (HCC)     Chronic lung disease     Chronic obstructive pulmonary disease (HCC) 08/03/2017    PFTS 8/3/17    COPD, severe (HCC)     Cough     Emphysema/COPD (HCC)     Esophagitis 12/11/2017    Endoscopy    Essential hypertension, benign     Fast heart beat     Feeling of chest tightness     Frequent urination     Heartburn     Hepatomegaly     History of stomach ulcers     Hx of nausea and vomiting     Poor appetite     Positive urine drug screen 01/2016    opiates and THC    Shortness of breath     Splenomegaly     Stomach pain     Stress     Tiredness     Unintentional weight change     Upper GI bleed     Weakness     Wheeze       Past Surgical History:   Procedure Laterality Date    COLONOSCOPY N/A 11/5/2018    COLONOSCOPY with polypectomies performed by Светлана Shah MD at [address] HX ENDOSCOPY  12/11/2017    HX WISDOM TEETH EXTRACTION Right 2019    IR BX LIVER PERCUTANEOUS       Social History     Socioeconomic History    Marital status:      Spouse name: Not on file    Number of children: Not on file    Years of education: Not on file    Highest education level: Not on file   Occupational History    Not on file   Social Needs    Financial resource strain: Not on file    Food insecurity:     Worry: Not on file     Inability: Not on file    Transportation needs:     Medical: Not on file     Non-medical: Not on file   Tobacco Use    Smoking status: Former Smoker     Packs/day: 2.00     Years: 25.00     Pack years: 50.00     Types: Cigarettes     Start date: 1984     Last attempt to quit: 2017     Years since quittin.4    Smokeless tobacco: Never Used   Substance and Sexual Activity    Alcohol use: No    Drug use: Yes     Types: Marijuana     Comment: Hx of Marijuana use    Sexual activity: Not Currently     Partners: Female   Lifestyle    Physical activity:     Days per week: Not on file     Minutes per session: Not on file    Stress: Not on file   Relationships    Social connections:     Talks on phone: Not on file     Gets together: Not on file     Attends Sabianist service: Not on file     Active member of club or organization: Not on file     Attends meetings of clubs or organizations: Not on file     Relationship status: Not on file    Intimate partner violence:     Fear of current or ex partner: Not on file     Emotionally abused: Not on file     Physically abused: Not on file     Forced sexual activity: Not on file   Other Topics Concern     Service No    Blood Transfusions No    Caffeine Concern Yes    Occupational Exposure No    Hobby Hazards No    Sleep Concern Yes     Comment: occas    Stress Concern No    Weight Concern No    Special Diet No    Back Care Yes    Exercise No    Bike Helmet Yes    Seat Belt No     Comment: occas    Self-Exams Not Asked   Social History Narrative          Family History   Problem Relation Age of Onset    Hypertension Mother     Heart Disease Mother     Diabetes Mother     Hypertension Father     Heart Disease Father     Cancer Father         lymphnodes    Diabetes Father         Physical Examination: Well-nourished mature male in no apparent distress  Abdomen is supple nontender-  Scrotum is ecchymotic but midline incision is clean and dry-no induration no swelling      Hydrocelectomy pathology report [facility] [id number] 24 May 2019:   A: RIGHT TESTICULAR TISSUE, EXCISION-   CONSISTENT WITH HYDROCELE. 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 James Galindo MD  -electronically signed-    PLEASE NOTE:  This document has been produced using voice recognition software. Unrecognized errors in transcription may be present.

## 2019-06-03 ENCOUNTER — OFFICE VISIT (OUTPATIENT)
Dept: NEUROLOGY | Age: 49
End: 2019-06-03

## 2019-06-03 VITALS
SYSTOLIC BLOOD PRESSURE: 118 MMHG | OXYGEN SATURATION: 91 % | BODY MASS INDEX: 28.14 KG/M2 | HEART RATE: 115 BPM | WEIGHT: 190 LBS | DIASTOLIC BLOOD PRESSURE: 80 MMHG | RESPIRATION RATE: 18 BRPM | TEMPERATURE: 97.6 F | HEIGHT: 69 IN

## 2019-06-03 DIAGNOSIS — G47.00 INSOMNIA, UNSPECIFIED TYPE: ICD-10-CM

## 2019-06-03 DIAGNOSIS — G47.8 UPPER AIRWAY RESISTANCE SYNDROME: Primary | ICD-10-CM

## 2019-06-03 DIAGNOSIS — R51.9 HEADACHE DISORDER: ICD-10-CM

## 2019-06-03 NOTE — LETTER
6/3/19 Patient: Alexys Dick Sr. YOB: 1970 Date of Visit: 6/3/2019 Kat Jett NP 
[address] [facility] [address] [facility] [address] VIA In Basket Dear Kat Jett NP, Thank you for referring Mr. Lucero Davison to [facility] for evaluation. My notes for this consultation are attached. If you have questions, please do not hesitate to call me. I look forward to following your patient along with you.  
 
 
Sincerely, 
 
Maine Quarles MD

## 2019-06-03 NOTE — PROGRESS NOTES
6/3/2019 10:26 AM    SSN: [id number]    Subjective:   76-year-old male who I evaluated on  for disorder I thought was a combination of chronic psychophysiologic insomnia related to mood disorder, use of psychotropics, and symptoms of snoring, witnessed apneas, and fatigue raising concern for sleep apnea. A sleep study in a year and a half prior to his appointment with me had been reportedly normal.  Decided to repeat this. I also counseled him about techniques to deal with insomnia. He had a sleep study on May 21 showing upper resistance with an AHI of 0.7 and a REM AHI of 4 and desaturations down to a minimum of 82%, which did not meet the criteria for diagnosis of obstructive sleep apnea.   Social History     Socioeconomic History    Marital status:      Spouse name: Not on file    Number of children: Not on file    Years of education: Not on file    Highest education level: Not on file   Occupational History    Not on file   Social Needs    Financial resource strain: Not on file    Food insecurity:     Worry: Not on file     Inability: Not on file    Transportation needs:     Medical: Not on file     Non-medical: Not on file   Tobacco Use    Smoking status: Former Smoker     Packs/day: 2.00     Years: 25.00     Pack years: 50.00     Types: Cigarettes     Start date: 1984     Last attempt to quit: 2017     Years since quittin.4    Smokeless tobacco: Never Used   Substance and Sexual Activity    Alcohol use: No    Drug use: Yes     Types: Marijuana     Comment: Hx of Marijuana use    Sexual activity: Not Currently     Partners: Female   Lifestyle    Physical activity:     Days per week: Not on file     Minutes per session: Not on file    Stress: Not on file   Relationships    Social connections:     Talks on phone: Not on file     Gets together: Not on file     Attends Restorationism service: Not on file     Active member of club or organization: Not on file     Attends meetings of clubs or organizations: Not on file     Relationship status: Not on file    Intimate partner violence:     Fear of current or ex partner: Not on file     Emotionally abused: Not on file     Physically abused: Not on file     Forced sexual activity: Not on file   Other Topics Concern     Service No    Blood Transfusions No    Caffeine Concern Yes    Occupational Exposure No    Hobby Hazards No    Sleep Concern Yes     Comment: occas    Stress Concern No    Weight Concern No    Special Diet No    Back Care Yes    Exercise No    Bike Helmet Yes    Seat Belt No     Comment: occas    Self-Exams Not Asked   Social History Narrative           Family History   Problem Relation Age of Onset    Hypertension Mother     Heart Disease Mother     Diabetes Mother     Hypertension Father     Heart Disease Father     Cancer Father         lymphnodes    Diabetes Father        Current Outpatient Medications   Medication Sig Dispense Refill    ADVAIR DISKUS 500-50 mcg/dose diskus inhaler INHALE 1 PUFF BY MOUTH TWICE DAILY 1 Inhaler 5    bisacodyl (DULCOLAX) 10 mg suppository Insert 10 mg into rectum daily. 3 Suppository 0    tamsulosin (FLOMAX) 0.4 mg capsule Take 1 Cap by mouth daily. 14 Cap 3    lisinopril (PRINIVIL, ZESTRIL) 20 mg tablet Take 1 Tab by mouth daily for 30 days. 30 Tab 2    FLUoxetine (PROZAC) 40 mg capsule Take  by mouth daily.  hydrOXYzine HCl (ATARAX) 50 mg tablet Take 50 mg by mouth nightly. Indications: 100 mg qhs      topiramate (TOPAMAX) 25 mg tablet Take 25 mg BID x 7 days then 50 mg  BID 60 Tab 1    Blood Pressure Monitor (BLOOD PRESSURE KIT) kit 1 Device by Does Not Apply route daily as needed (for blood pressure checks).  1 Kit 0    PROAIR HFA 90 mcg/actuation inhaler INHALE 2 PUFFS BY MOUTH EVERY 4 HOURS AS NEEDED FOR WHEEZING OR SHORTNESS OF BREATH 1 Inhaler 2    amitriptyline HCl (AMITRIPTYLINE PO) Take 75 mg by mouth nightly.  OXcarbazepine (TRILEPTAL) 150 mg tablet Take  by mouth two (2) times a day.  albuterol (PROVENTIL VENTOLIN) 2.5 mg /3 mL (0.083 %) nebulizer solution Nebulized 1 vial for inhalation every 4 hours as needed 60 Each 2    roflumilast (DALIRESP) tab tablet Take 1 Tab by mouth daily. 30 Tab 5    tiZANidine (ZANAFLEX) 4 mg tablet Sig: Take 1 tab PO Q 6 as needed for muscle spasm 60 Tab 0    tiotropium (SPIRIVA) 18 mcg inhalation capsule Take 1 Cap by inhalation daily. 30 Cap 5    furosemide (LASIX) 20 mg tablet Take 1 Tab by mouth every other day. 15 Tab 6    LORazepam (ATIVAN) 1 mg tablet Take 1 Tab by mouth every eight (8) hours as needed for Anxiety. Max Daily Amount: 3 mg. 90 Tab 0    Nebulizer & Compressor machine 1 Each by Does Not Apply route every four (4) hours as needed. 1 Each 0    OXYGEN-AIR DELIVERY SYSTEMS (WALKABOUT 1 OXYGEN SYSTEM) 2.5 L/min by Nasal route continuous.  pantoprazole (PROTONIX) 40 mg tablet Take 1 Tab by mouth Daily (before breakfast).  27 Tab 0       Past Medical History:   Diagnosis Date    Anxiety     Chest pain     Chronic diastolic heart failure secondary to coronary artery disease (HCC)     Chronic lung disease     Chronic obstructive pulmonary disease (HCC) 08/03/2017    PFTS 8/3/17    COPD, severe (HCC)     Cough     Emphysema/COPD (HCC)     Esophagitis 12/11/2017    Endoscopy    Essential hypertension, benign     Fast heart beat     Feeling of chest tightness     Frequent urination     Heartburn     Hepatomegaly     History of stomach ulcers     Hx of nausea and vomiting     Poor appetite     Positive urine drug screen 01/2016    opiates and THC    Shortness of breath     Splenomegaly     Stomach pain     Stress     Tiredness     Unintentional weight change     Upper GI bleed     Weakness     Wheeze        Past Surgical History:   Procedure Laterality Date    COLONOSCOPY N/A 11/5/2018    COLONOSCOPY with polypectomies performed by Malik Greene MD at [address] HX ENDOSCOPY  12/11/2017    HX WISDOM TEETH EXTRACTION Right 05/2019    IR BX LIVER PERCUTANEOUS         Allergies   Allergen Reactions    Ciprofloxacin Nausea Only    Remeron [Mirtazapine] Other (comments)     Mood swings    Seroquel [Quetiapine] Other (comments)     Mood swings       Vital signs:    Visit Vitals  /80 (BP 1 Location: Left arm, BP Patient Position: Sitting)   Pulse (!) 115   Temp 97.6 °F (36.4 °C) (Oral)   Resp 18   Ht 5' 9\" (1.753 m)   Wt 86.2 kg (190 lb)   SpO2 91%   BMI 28.06 kg/m²       Review of Systems:   GENERAL: Denies fever, reports fatigue  CARDIAC: No CP or SOB  PULMONARY: No cough of SOB  MUSCULOSKELETAL: No new joint pain  NEURO: SEE HPI      EXAM: Alert, in NAD. Heart is regular. Oriented x3, EOM's are full, PERRL, no facial asymmetries. Strength and tone are normal. DTR's +2, gait symmetric       Assessment/Plan: Chronic psychophysiologic insomnia related to mood disorder, use of psychotropic medications, with only minimal airway resistance on to sleep study in the last 2 years. Therefore likely cannot build the case for obstructive sleep apnea. Advised him to discuss a referral to a counselor for cognitive behavioral therapy for both anxiety and insomnia and reiterated my advice regarding sleep hygiene and techniques to improve his problems. He will follow-up with Esequiel Nesbitt NP for his headache management. PLEASE NOTE:   Portions of this document may have been produced using voice recognition software. Unrecognized errors in transcription may be present. This note will not be viewable in [address]

## 2019-06-17 ENCOUNTER — OFFICE VISIT (OUTPATIENT)
Dept: PULMONOLOGY | Age: 49
End: 2019-06-17

## 2019-06-17 VITALS
TEMPERATURE: 97.1 F | HEART RATE: 108 BPM | DIASTOLIC BLOOD PRESSURE: 68 MMHG | OXYGEN SATURATION: 93 % | HEIGHT: 69 IN | WEIGHT: 192.2 LBS | SYSTOLIC BLOOD PRESSURE: 116 MMHG | BODY MASS INDEX: 28.47 KG/M2 | RESPIRATION RATE: 20 BRPM

## 2019-06-17 DIAGNOSIS — J44.9 CHRONIC OBSTRUCTIVE PULMONARY DISEASE, UNSPECIFIED COPD TYPE (HCC): Primary | ICD-10-CM

## 2019-06-17 RX ORDER — ROFLUMILAST 500 UG/1
TABLET ORAL
Qty: 30 TAB | Refills: 5 | Status: SHIPPED | OUTPATIENT
Start: 2019-06-17 | End: 2019-12-16 | Stop reason: SDUPTHER

## 2019-06-17 NOTE — PATIENT INSTRUCTIONS
Continue Daliresp 1 tablet daily Continue Spiriva inhale 1 capsule daily and remember to exhale fully before inhaling Continue Advair 1 inhalation twice daily about 12 hours apart and remember to exhale fully before inhaling and to wash mouth with water and spit it out after inhaling Albuterol 2 inhalations every 4 hours as needed if you require albuterol too often to control respiratory symptoms call the office for severe symptoms go to the emergency room Always call for symptoms such as worsening shortness of breath

## 2019-06-17 NOTE — PROGRESS NOTES
Elizabeth Zuniga presents today for   Chief Complaint   Patient presents with    COPD     follow up from 4/29/2019; CXR 5/27/2019    Other     pulmonary infiltrates       Is someone accompanying this pt? No    Is the patient using any DME equipment during OV? Yes. O2, nebulizer    -[employer]    Depression Screening:  3 most recent PHQ Screens 6/17/2019   PHQ Not Done -   Little interest or pleasure in doing things Not at all   Feeling down, depressed, irritable, or hopeless Not at all   Total Score PHQ 2 0       Learning Assessment:  Learning Assessment 5/7/2019   PRIMARY LEARNER Patient   PRIMARY LANGUAGE ENGLISH   LEARNER PREFERENCE PRIMARY OTHER (COMMENT)     -     -   ANSWERED BY patient   RELATIONSHIP SELF       Abuse Screening:  Abuse Screening Questionnaire 1/8/2019   Do you ever feel afraid of your partner? N   Are you in a relationship with someone who physically or mentally threatens you? N   Is it safe for you to go home? Y       Fall Risk  Fall Risk Assessment, last 12 mths 9/17/2018   Able to walk? Yes   Fall in past 12 months? No         Coordination of Care:  1. Have you been to the ER, urgent care clinic since your last visit? Hospitalized since your last visit? Yes; [facility] ED, When: 5/24/2019, 5/25/2019 & 5/27/2019-bilateral hydrocelectomies, post-op pain/COPD exacerbations & RLQ abdominal pain    2. Have you seen or consulted any other health care providers outside of the [address] [place] [address] since your last visit? Include any pap smears or colon screening. Yes.  NP Mali Curtis, PCP

## 2019-06-17 NOTE — PROGRESS NOTES
[facility] PULMONARY SPECIALISTS  Pulmonary, Critical Care, and Sleep Medicine      Chief complaint:  Severe COPD    HPI:    [place].    is 51years old and returns the office today relating that he underwent hydrocele surgery successfully and that now he has shortness of breath on exertion which is stable and only a minimal cough and no chest pain or leg swelling. He continues to take [employer] Advair and as needed albuterol      Allergies   Allergen Reactions    Ciprofloxacin Nausea Only    Remeron [Mirtazapine] Other (comments)     Mood swings    Seroquel [Quetiapine] Other (comments)     Mood swings     Current Outpatient Medications   Medication Sig    DALIRESP 500 mcg tab tablet TAKE 1 TABLET BY MOUTH ONCE DAILY    ADVAIR DISKUS 500-50 mcg/dose diskus inhaler INHALE 1 PUFF BY MOUTH TWICE DAILY    tamsulosin (FLOMAX) 0.4 mg capsule Take 1 Cap by mouth daily.  topiramate (TOPAMAX) 25 mg tablet Take 25 mg BID x 7 days then 50 mg  BID    Blood Pressure Monitor (BLOOD PRESSURE KIT) kit 1 Device by Does Not Apply route daily as needed (for blood pressure checks).  PROAIR HFA 90 mcg/actuation inhaler INHALE 2 PUFFS BY MOUTH EVERY 4 HOURS AS NEEDED FOR WHEEZING OR SHORTNESS OF BREATH    amitriptyline HCl (AMITRIPTYLINE PO) Take 75 mg by mouth nightly.  OXcarbazepine (TRILEPTAL) 150 mg tablet Take 150 mg by mouth two (2) times a day.  albuterol (PROVENTIL VENTOLIN) 2.5 mg /3 mL (0.083 %) nebulizer solution Nebulized 1 vial for inhalation every 4 hours as needed    tiZANidine (ZANAFLEX) 4 mg tablet Sig: Take 1 tab PO Q 6 as needed for muscle spasm    tiotropium (SPIRIVA) 18 mcg inhalation capsule Take 1 Cap by inhalation daily.  furosemide (LASIX) 20 mg tablet Take 1 Tab by mouth every other day.  LORazepam (ATIVAN) 1 mg tablet Take 1 Tab by mouth every eight (8) hours as needed for Anxiety. Max Daily Amount: 3 mg.     Nebulizer & Compressor machine 1 Each by Does Not Apply route every four (4) hours as needed.  OXYGEN-AIR DELIVERY SYSTEMS (WALKABOUT 1 OXYGEN SYSTEM) 2.5 L/min by Nasal route continuous.  pantoprazole (PROTONIX) 40 mg tablet Take 1 Tab by mouth Daily (before breakfast).  bisacodyl (DULCOLAX) 10 mg suppository Insert 10 mg into rectum daily.  FLUoxetine (PROZAC) 40 mg capsule Take  by mouth daily.  hydrOXYzine HCl (ATARAX) 50 mg tablet Take 50 mg by mouth nightly. Indications: 100 mg qhs     No current facility-administered medications for this visit.       Past Medical History:   Diagnosis Date    Anxiety     Chest pain     Chronic diastolic heart failure secondary to coronary artery disease (HCC)     Chronic lung disease     Chronic obstructive pulmonary disease (HCC) 08/03/2017    PFTS 8/3/17    COPD, severe (HCC)     Cough     Emphysema/COPD (HCC)     Esophagitis 12/11/2017    Endoscopy    Essential hypertension, benign     Fast heart beat     Feeling of chest tightness     Frequent urination     Heartburn     Hepatomegaly     History of stomach ulcers     Hx of nausea and vomiting     Poor appetite     Positive urine drug screen 01/2016    opiates and THC    Shortness of breath     Splenomegaly     Stomach pain     Stress     Tiredness     Unintentional weight change     Upper GI bleed     Weakness     Wheeze      Past Surgical History:   Procedure Laterality Date    COLONOSCOPY N/A 11/5/2018    COLONOSCOPY with polypectomies performed by Alejandro Gaston MD at [address] HX ENDOSCOPY  12/11/2017    HX WISDOM TEETH EXTRACTION Right 05/2019    IR BX LIVER PERCUTANEOUS       Social History     Socioeconomic History    Marital status:      Spouse name: Not on file    Number of children: Not on file    Years of education: Not on file    Highest education level: Not on file   Occupational History    Not on file   Social Needs    Financial resource strain: Not on file    Food insecurity:     Worry: Not on file     Inability: Not on file    Transportation needs:     Medical: Not on file     Non-medical: Not on file   Tobacco Use    Smoking status: Former Smoker     Packs/day: 2.00     Years: 25.00     Pack years: 50.00     Types: Cigarettes     Start date: 1984     Last attempt to quit: 2017     Years since quittin.4    Smokeless tobacco: Never Used   Substance and Sexual Activity    Alcohol use: No    Drug use: Yes     Types: Marijuana     Comment: Hx of Marijuana use    Sexual activity: Not Currently     Partners: Female   Lifestyle    Physical activity:     Days per week: Not on file     Minutes per session: Not on file    Stress: Not on file   Relationships    Social connections:     Talks on phone: Not on file     Gets together: Not on file     Attends Evangelical service: Not on file     Active member of club or organization: Not on file     Attends meetings of clubs or organizations: Not on file     Relationship status: Not on file    Intimate partner violence:     Fear of current or ex partner: Not on file     Emotionally abused: Not on file     Physically abused: Not on file     Forced sexual activity: Not on file   Other Topics Concern     Service No    Blood Transfusions No    Caffeine Concern Yes    Occupational Exposure No    Hobby Hazards No    Sleep Concern Yes     Comment: occas    Stress Concern No    Weight Concern No    Special Diet No    Back Care Yes    Exercise No    Bike Helmet Yes    Seat Belt No     Comment: occas    Self-Exams Not Asked   Social History Narrative         Family History   Problem Relation Age of Onset    Hypertension Mother     Heart Disease Mother     Diabetes Mother     Hypertension Father     Heart Disease Father     Cancer Father         lymphnodes    Diabetes Father        Review of systems:  He denies fever chills poor appetite or weight loss    Physical Exam:  Visit Vitals  /68 (BP 1 Location: Left arm, BP Patient Position: Sitting)   Pulse (!) 108   Temp 97.1 °F (36.2 °C) (Oral)   Resp 20   Ht 5' 9\" (1.753 m)   Wt 87.2 kg (192 lb 3.2 oz)   SpO2 93%   BMI 28.38 kg/m²       Well-developed well-nourished  HEENT: WNL  Exam: Supraclavicular cervical lymph nodes negative  Chest: Equal symmetrical expansion no dullness and absence of wheezes rales rubs  Heart: Regular tachycardia no gallop no murmur no JVD no peripheral edema  Extremities: No cyanosis clubbing or calf tenderness  Neurological: Alert and oriented    Labs:    O2 sat room air at rest 93%    Impression:     COPD by history physical exam stable  Sinus tachycardia persists and stable and cardiology is following him    Plan:     Continue Daliresp Spiriva Advair PRN albuterol  Follow-up in 3 to 4 months or as needed    Blaise Higgins MD , [facility]    CC: Cammie Cox, DIANE     [address]  [place], [address] [address]     P: [phone]     F: [phone]

## 2019-06-18 ENCOUNTER — OFFICE VISIT (OUTPATIENT)
Dept: UROLOGY | Age: 49
End: 2019-06-18

## 2019-06-18 VITALS
BODY MASS INDEX: 28.58 KG/M2 | HEIGHT: 69 IN | WEIGHT: 193 LBS | HEART RATE: 107 BPM | DIASTOLIC BLOOD PRESSURE: 91 MMHG | SYSTOLIC BLOOD PRESSURE: 132 MMHG | OXYGEN SATURATION: 95 %

## 2019-06-18 DIAGNOSIS — N43.3 HYDROCELE, RIGHT: Primary | ICD-10-CM

## 2019-06-18 NOTE — PATIENT INSTRUCTIONS
Learning About the Male Reproductive System  What does the male reproductive system do? The male reproductive system makes sperm and delivers it out of the body. It allows a man to have sex and father children. This system is made up of:  · The penis. · Two testicles (one on each side). This is where sperm is made. · The scrotum. This is a sac at the base of the penis that holds the testicles. · The epididymis, where the sperm mature. There is one on each side. · Two seminal vesicles and the prostate gland. They make the liquid semen that carries the sperm. · Two vas deferens tubes, one on each side of the body. They carry semen to the urethra. That's where it leaves the penis during sex. What problems can happen with the reproductive system? Problems may include:  · Injuries to the genitals. This could happen when you play sports, do other activities, or take a fall. · Infections of the testicle (orchitis), epididymis (epididymitis), prostate gland (prostatitis), or other areas. · Sexually transmitted infections (STIs). These include chlamydia, genital herpes, genital warts, and gonorrhea. · Sperm problems. They could cause you to be infertile. · Erection problems (impotence or erectile dysfunction). · Torsion of a testicle. This emergency happens when a testicle twists in the scrotum. The twisting can cut off its blood supply. · Cancer of the testicle or prostate gland. How can you prevent reproductive system problems? · If you play contact sports, make sure to wear a cup or other protection for your genitals. · Having one sex partner (who does not have STIs and does not have sex with anyone else) is a good way to avoid STIs. · Do not smoke. Smoking can lower your overall sexual health. If you need help quitting, talk to your doctor about stop-smoking programs and medicines. These can increase your chances of quitting for good. Where can you learn more?   Go to http://costa-jessica.info/. Enter X026 in the search box to learn more about \"Learning About the Male Reproductive System. \"  Current as of: September 26, 2018  Content Version: 11.9  © 8208-8554 [employer]. Care instructions adapted under license by [employer] (which disclaims liability or warranty for this information). If you have questions about a medical condition or this instruction, always ask your healthcare professional. [place] [address] any warranty or liability for your use of this information.

## 2019-06-19 NOTE — PROGRESS NOTES
Jefry Perez Sr. 51 y.o. male     Mr. Roseline Toth seen today for postop evaluation-bilateral hydrocelectomy with bilateral orchidopexy 24 May 2019    bilateral hydrocelectomy with bilateral orchidopexy-on 24 May 2019 with postop urinary retention relieved by transient Santos catheter bladder drainage  Patient is now catheter free voiding well and asymptomatic after having return of bowel movements earlier this morning     Patient has been seen in ER x3 since surgery on 24 May 2019 plan of right lower quadrant abdominal pain-normal CT scan imaging of the abdomen and pelvis x3-initial improvement following Santos catheter placement not maintained-pain relieved yesterday in ER following injection of Toradol  Patient has experienced normal bowel movement earlier today     initially presented to the emergency room on 25 April 2019 with sudden severe left scrotal pain onset while getting out of his pickup truck after parking it in the [employer] parking lot-went to emergency room immediately where exam showed a small right hydrocele of the scrotum and subsequent ultrasound imaging showing normal blood flow to each testes right hydrocele and small left hydrocele-treated with Cipro for suspected epididymitis with pain lingering for several days gradually lessening in intensity now experiencing no pain at rest but occasionally feeling tight aching pain in the left side of the scrotum.     CT scan imaging of the abdomen and pelvis on 25 April 2019 shows normal kidneys ureter and bladder images have been reviewed on PACS  Ultrasound imaging of the scrotum on 25 April 2019 shows right-sided hydrocele with a small left hydrocele-images have been reviewed on PACS     Patient has no antecedent symptoms of bladder irritability or obstructive voiding-      PVR 62 cc in May 2019     Review of Systems:   CNS: Frequent headaches stress and anxiety  Respiratory: Chest tightness no wheezing and coughing  Cardiovascular: Hypertension, palpitations,  Intestinal: GERD no diarrhea no constipation  Urinary: No urgency frequency dysuria or hematuria  Skeletal: No bone or joint pain  Endocrine: No diabetes or thyroid disease  Other:                                                               Disability retired secondary to [place]         Allergies: Allergies   Allergen Reactions    Ciprofloxacin Nausea Only    Remeron [Mirtazapine] Other (comments)     Mood swings    Seroquel [Quetiapine] Other (comments)     Mood swings      Medications:    Current Outpatient Medications   Medication Sig Dispense Refill    DALIRESP 500 mcg tab tablet TAKE 1 TABLET BY MOUTH ONCE DAILY 30 Tab 5    ADVAIR DISKUS 500-50 mcg/dose diskus inhaler INHALE 1 PUFF BY MOUTH TWICE DAILY 1 Inhaler 5    bisacodyl (DULCOLAX) 10 mg suppository Insert 10 mg into rectum daily. 3 Suppository 0    tamsulosin (FLOMAX) 0.4 mg capsule Take 1 Cap by mouth daily. 14 Cap 3    hydrOXYzine HCl (ATARAX) 50 mg tablet Take 50 mg by mouth nightly. Indications: 100 mg qhs      topiramate (TOPAMAX) 25 mg tablet Take 25 mg BID x 7 days then 50 mg  BID 60 Tab 1    Blood Pressure Monitor (BLOOD PRESSURE KIT) kit 1 Device by Does Not Apply route daily as needed (for blood pressure checks). 1 Kit 0    PROAIR HFA 90 mcg/actuation inhaler INHALE 2 PUFFS BY MOUTH EVERY 4 HOURS AS NEEDED FOR WHEEZING OR SHORTNESS OF BREATH 1 Inhaler 2    amitriptyline HCl (AMITRIPTYLINE PO) Take 75 mg by mouth nightly.  OXcarbazepine (TRILEPTAL) 150 mg tablet Take 150 mg by mouth two (2) times a day.  albuterol (PROVENTIL VENTOLIN) 2.5 mg /3 mL (0.083 %) nebulizer solution Nebulized 1 vial for inhalation every 4 hours as needed 60 Each 2    tiZANidine (ZANAFLEX) 4 mg tablet Sig: Take 1 tab PO Q 6 as needed for muscle spasm 60 Tab 0    tiotropium (SPIRIVA) 18 mcg inhalation capsule Take 1 Cap by inhalation daily.  30 Cap 5    furosemide (LASIX) 20 mg tablet Take 1 Tab by mouth every other day. 15 Tab 6    LORazepam (ATIVAN) 1 mg tablet Take 1 Tab by mouth every eight (8) hours as needed for Anxiety. Max Daily Amount: 3 mg. 90 Tab 0    Nebulizer & Compressor machine 1 Each by Does Not Apply route every four (4) hours as needed. 1 Each 0    OXYGEN-AIR DELIVERY SYSTEMS (WALKABOUT 1 OXYGEN SYSTEM) 2.5 L/min by Nasal route continuous.  pantoprazole (PROTONIX) 40 mg tablet Take 1 Tab by mouth Daily (before breakfast). 30 Tab 0    FLUoxetine (PROZAC) 40 mg capsule Take  by mouth daily.          Past Medical History:   Diagnosis Date    Anxiety     Chest pain     Chronic diastolic heart failure secondary to coronary artery disease (HCC)     Chronic lung disease     Chronic obstructive pulmonary disease (HCC) 08/03/2017    PFTS 8/3/17    COPD, severe (HCC)     Cough     Emphysema/COPD (HCC)     Esophagitis 12/11/2017    Endoscopy    Essential hypertension, benign     Fast heart beat     Feeling of chest tightness     Frequent urination     Heartburn     Hepatomegaly     History of stomach ulcers     Hx of nausea and vomiting     Poor appetite     Positive urine drug screen 01/2016    opiates and THC    Shortness of breath     Splenomegaly     Stomach pain     Stress     Tiredness     Unintentional weight change     Upper GI bleed     Weakness     Wheeze       Past Surgical History:   Procedure Laterality Date    COLONOSCOPY N/A 11/5/2018    COLONOSCOPY with polypectomies performed by Jenny Vilchis MD [address] [facility]X ENDOSCOPY  12/11/2017    HX WISDOM TEETH EXTRACTION Right 05/2019    IR BX LIVER PERCUTANEOUS       Social History     Socioeconomic History    Marital status:      Spouse name: Not on file    Number of children: Not on file    Years of education: Not on file    Highest education level: Not on file   Occupational History    Not on file   Social Needs    Financial resource strain: Not on file  Food insecurity:     Worry: Not on file     Inability: Not on file    Transportation needs:     Medical: Not on file     Non-medical: Not on file   Tobacco Use    Smoking status: Former Smoker     Packs/day: 2.00     Years: 25.00     Pack years: 50.00     Types: Cigarettes     Start date: 1984     Last attempt to quit: 2017     Years since quittin.4    Smokeless tobacco: Never Used   Substance and Sexual Activity    Alcohol use: No    Drug use: Yes     Types: Marijuana     Comment: Hx of Marijuana use    Sexual activity: Not Currently     Partners: Female   Lifestyle    Physical activity:     Days per week: Not on file     Minutes per session: Not on file    Stress: Not on file   Relationships    Social connections:     Talks on phone: Not on file     Gets together: Not on file     Attends Tenriism service: Not on file     Active member of club or organization: Not on file     Attends meetings of clubs or organizations: Not on file     Relationship status: Not on file    Intimate partner violence:     Fear of current or ex partner: Not on file     Emotionally abused: Not on file     Physically abused: Not on file     Forced sexual activity: Not on file   Other Topics Concern     Service No    Blood Transfusions No    Caffeine Concern Yes    Occupational Exposure No    Hobby Hazards No    Sleep Concern Yes     Comment: occas    Stress Concern No    Weight Concern No    Special Diet No    Back Care Yes    Exercise No    Bike Helmet Yes    Seat Belt No     Comment: occas    Self-Exams Not Asked   Social History Narrative          Family History   Problem Relation Age of Onset    Hypertension Mother     Heart Disease Mother     Diabetes Mother     Hypertension Father     Heart Disease Father     Cancer Father         lymphnodes    Diabetes Father         Physical Examination: Well-nourished mature male in no apparent distress External genitalia-midline scrotal incision is healing well clean and dry no scrotal erythema edema or induration    Surgical pathology report [id number] 24 May 2019   A: RIGHT TESTICULAR TISSUE, EXCISION-   CONSISTENT WITH HYDROCELE. B: LEFT TESTICULAR TISSUE, EXCISION-   CONSISTENT WITH HYDROCELE     Impression: Bilateral hydroceles stable status post hydrocelectomy/orchiopexy        Plan: RTC 3 months prostate evaluation    More than 1/2 of this 15  minute visit was spent in counselling and coordination of care, as described above. Jaimee Alvarado MD  -electronically signed-    PLEASE NOTE:  This document has been produced using voice recognition software. Unrecognized errors in transcription may be present.

## 2019-06-26 ENCOUNTER — OFFICE VISIT (OUTPATIENT)
Dept: NEUROLOGY | Age: 49
End: 2019-06-26

## 2019-06-26 VITALS
SYSTOLIC BLOOD PRESSURE: 130 MMHG | HEART RATE: 100 BPM | BODY MASS INDEX: 28.14 KG/M2 | TEMPERATURE: 97.5 F | HEIGHT: 69 IN | DIASTOLIC BLOOD PRESSURE: 80 MMHG | WEIGHT: 190 LBS | RESPIRATION RATE: 18 BRPM | OXYGEN SATURATION: 97 %

## 2019-06-26 DIAGNOSIS — R51.9 HEADACHE DISORDER: Primary | ICD-10-CM

## 2019-06-26 RX ORDER — TOPIRAMATE 25 MG/1
TABLET ORAL
Qty: 60 TAB | Refills: 3 | Status: SHIPPED | OUTPATIENT
Start: 2019-06-26 | End: 2019-09-26

## 2019-06-26 NOTE — PROGRESS NOTES
[address], [address], [place], [address]  [address] Manjeet Ortiz, [address]  Office:  [phone]  Fax: [phone]  Chief Complaint   Patient presents with    Headache     follow up anxiety     This is a 79-year-old male who presents for follow-up headaches. In the interim endorses he has followed up with Dr. Colt Rouse regarding sleep study findings. Denies sleep apnea on sleep study. Last seen in office by myself in April for headaches. He is maintained on Topamax 25 mg nightly and endorses he has been headache free for the past two weeks. He did not take prescription twice daily as originally indicated. In the interim it was found he had bilateral hydrocele. He had surgical repair with urologist Dr. Luisa Stvoall. Patient endorses improvement in sleep. He says he is able to sleep through the night with \"the medicine. \" He said he was not able to afford Belsomra. Unclear if he had a medication change or not. He endorses practicing good sleep hygiene. Denies focal deficits. No additional concerns at this time.     Past Medical History:   Diagnosis Date    Anxiety     Chest pain     Chronic diastolic heart failure secondary to coronary artery disease (HCC)     Chronic lung disease     Chronic obstructive pulmonary disease (HCC) 08/03/2017    PFTS 8/3/17    COPD, severe (HCC)     Cough     Emphysema/COPD (HCC)     Esophagitis 12/11/2017    Endoscopy    Essential hypertension, benign     Fast heart beat     Feeling of chest tightness     Frequent urination     Heartburn     Hepatomegaly     History of stomach ulcers     Hx of nausea and vomiting     Poor appetite     Positive urine drug screen 01/2016    opiates and THC    Shortness of breath     Splenomegaly     Stomach pain     Stress     Tiredness     Unintentional weight change     Upper GI bleed     Weakness     Wheeze        Past Surgical History:   Procedure Laterality Date    COLONOSCOPY N/A 11/5/2018    COLONOSCOPY with polypectomies performed by Neymar Foster MD at [address] HX ENDOSCOPY  12/11/2017    HX WISDOM TEETH EXTRACTION Right 05/2019    IR BX LIVER PERCUTANEOUS         Current Outpatient Medications   Medication Sig Dispense Refill    topiramate (TOPAMAX) 25 mg tablet Take two tabs at bedtime 60 Tab 3    DALIRESP 500 mcg tab tablet TAKE 1 TABLET BY MOUTH ONCE DAILY 30 Tab 5    ADVAIR DISKUS 500-50 mcg/dose diskus inhaler INHALE 1 PUFF BY MOUTH TWICE DAILY 1 Inhaler 5    tamsulosin (FLOMAX) 0.4 mg capsule Take 1 Cap by mouth daily. 14 Cap 3    FLUoxetine (PROZAC) 40 mg capsule Take  by mouth daily.  hydrOXYzine HCl (ATARAX) 50 mg tablet Take 50 mg by mouth nightly. Indications: 100 mg qhs      Blood Pressure Monitor (BLOOD PRESSURE KIT) kit 1 Device by Does Not Apply route daily as needed (for blood pressure checks). 1 Kit 0    PROAIR HFA 90 mcg/actuation inhaler INHALE 2 PUFFS BY MOUTH EVERY 4 HOURS AS NEEDED FOR WHEEZING OR SHORTNESS OF BREATH 1 Inhaler 2    amitriptyline HCl (AMITRIPTYLINE PO) Take 75 mg by mouth nightly.  OXcarbazepine (TRILEPTAL) 150 mg tablet Take 150 mg by mouth two (2) times a day.  albuterol (PROVENTIL VENTOLIN) 2.5 mg /3 mL (0.083 %) nebulizer solution Nebulized 1 vial for inhalation every 4 hours as needed 60 Each 2    tiZANidine (ZANAFLEX) 4 mg tablet Sig: Take 1 tab PO Q 6 as needed for muscle spasm 60 Tab 0    tiotropium (SPIRIVA) 18 mcg inhalation capsule Take 1 Cap by inhalation daily. 30 Cap 5    furosemide (LASIX) 20 mg tablet Take 1 Tab by mouth every other day. 15 Tab 6    LORazepam (ATIVAN) 1 mg tablet Take 1 Tab by mouth every eight (8) hours as needed for Anxiety. Max Daily Amount: 3 mg. 90 Tab 0    Nebulizer & Compressor machine 1 Each by Does Not Apply route every four (4) hours as needed. 1 Each 0    OXYGEN-AIR DELIVERY SYSTEMS (WALKABOUT 1 OXYGEN SYSTEM) 2.5 L/min by Nasal route continuous.  pantoprazole (PROTONIX) 40 mg tablet Take 1 Tab by mouth Daily (before breakfast). 30 Tab 0    bisacodyl (DULCOLAX) 10 mg suppository Insert 10 mg into rectum daily. 3 Suppository 0        Allergies   Allergen Reactions    Ciprofloxacin Nausea Only    Remeron [Mirtazapine] Other (comments)     Mood swings    Seroquel [Quetiapine] Other (comments)     Mood swings       Social History     Tobacco Use    Smoking status: Former Smoker     Packs/day: 2.00     Years: 25.00     Pack years: 50.00     Types: Cigarettes     Start date: 1984     Last attempt to quit: 2017     Years since quittin.5    Smokeless tobacco: Never Used   Substance Use Topics    Alcohol use: No    Drug use: Yes     Types: Marijuana     Comment: Hx of Marijuana use       Family History   Problem Relation Age of Onset    Hypertension Mother     Heart Disease Mother     Diabetes Mother     Hypertension Father     Heart Disease Father     Cancer Father         lymphnodes    Diabetes Father        Review of Systems  GENERAL: Denies fever or fatigue  CARDIAC: No CP or SOB  PULMONARY: No cough of SOB  MUSCULOSKELETAL: No new joint pain  NEURO: SEE HPI    Examination  Visit Vitals  /80 (BP 1 Location: Left arm, BP Patient Position: Sitting)   Pulse 100   Temp 97.5 °F (36.4 °C)   Resp 18   Ht 5' 9\" (1.753 m)   Wt 86.2 kg (190 lb)   SpO2 97%   BMI 28.06 kg/m²       This is a very pleasant 69-year-old male. He is alert and in no apparent distress. Fund of knowledge is adequate. Oriented x3, no abnormal movements. No signs of incoordination or ataxia. Steady gait. Impression/Plan  Leatha Cranker  is a 51 y.o. male whose history and physical are consistent with headache disorder. Endorses headache free for the past two weeks. Taking Topamax 25 mq qhs with improvement. He did not take as originally prescribed at twice a day dosing. As long as headaches remain controlled he can maintain current dose.  If he notes increase in headache can increase to 50 mg qhs. Adequate supply ensured. He did have urological surgery for bilateral hydrocelectomy with bilateral orchidopexy on May 24th. He feels his sleep is better at this time. Says he goes to bed and sleeps through the night. Per documentation no findings of [facility]. Dr. Elvia Lu noted chronic psychophysiologic insomnia related to mood disorder and recommended CBT and management of anxiety and insomnia. He has upcoming appointment with his PCP. In the past had mildly elevated glucose on CMP. Recommendation for evaluation including A1c. As long as he continues to do well follow up in 3 months or sooner as needed. All questions addressed and patient is agreeable with plan of care. Diagnoses and all orders for this visit:    1. Headache disorder    Other orders  -     topiramate (TOPAMAX) 25 mg tablet; Take two tabs at bedtime      Total time: 30 min   Counseling / coordination time: 25 min   > 50% counseling / coordination?: Yes re: symptoms, management, medications, diagnostic test, answering questions, and plan of care. Signed By: Geovanna Wick NP    This note will not be viewable in [address] PLEASE NOTE:   Portions of this document may have been produced using voice recognition software. Unrecognized errors in transcription may be present.

## 2019-06-26 NOTE — PATIENT INSTRUCTIONS
Thank you for choosing [employer] and [facility] for your  
 
care. You may receive a survey about your visit. We appreciate you taking time  
 
to complete this survey as we use your feedback to improve our services. We  
 
realize we are not perfect, but we strive to provide excellent care.

## 2019-06-26 NOTE — PROGRESS NOTES
Chief Complaint   Patient presents with    Headache     follow up anxiety     3 most recent PHQ Screens 6/26/2019   PHQ Not Done -   Little interest or pleasure in doing things Not at all   Feeling down, depressed, irritable, or hopeless Not at all   Total Score PHQ 2 0

## 2019-07-01 ENCOUNTER — APPOINTMENT (OUTPATIENT)
Dept: GENERAL RADIOLOGY | Age: 49
End: 2019-07-01
Attending: EMERGENCY MEDICINE
Payer: MEDICAID

## 2019-07-01 ENCOUNTER — HOSPITAL ENCOUNTER (EMERGENCY)
Age: 49
Discharge: HOME | End: 2019-07-01
Attending: EMERGENCY MEDICINE
Payer: MEDICAID

## 2019-07-01 VITALS
BODY MASS INDEX: 28.06 KG/M2 | TEMPERATURE: 99.1 F | DIASTOLIC BLOOD PRESSURE: 76 MMHG | HEART RATE: 130 BPM | WEIGHT: 190 LBS | RESPIRATION RATE: 19 BRPM | OXYGEN SATURATION: 96 % | SYSTOLIC BLOOD PRESSURE: 118 MMHG

## 2019-07-01 DIAGNOSIS — F41.0 PANIC ATTACK: Primary | ICD-10-CM

## 2019-07-01 DIAGNOSIS — J44.1 ACUTE EXACERBATION OF CHRONIC OBSTRUCTIVE PULMONARY DISEASE (COPD) (HCC): ICD-10-CM

## 2019-07-01 LAB
ANION GAP SERPL CALC-SCNC: 8 MMOL/L (ref 3–18)
ARTERIAL PATENCY WRIST A: YES
ATRIAL RATE: 108 BPM
BASE DEFICIT BLD-SCNC: 1 MMOL/L
BASOPHILS # BLD: 0 K/UL (ref 0–0.1)
BASOPHILS NFR BLD: 0 % (ref 0–2)
BDY SITE: ABNORMAL
BUN SERPL-MCNC: 10 MG/DL (ref 7–18)
BUN/CREAT SERPL: 11 (ref 12–20)
CALCIUM SERPL-MCNC: 8.5 MG/DL (ref 8.5–10.1)
CALCULATED P AXIS, ECG09: 52 DEGREES
CALCULATED R AXIS, ECG10: 54 DEGREES
CALCULATED T AXIS, ECG11: 60 DEGREES
CHLORIDE SERPL-SCNC: 107 MMOL/L (ref 100–108)
CK MB CFR SERPL CALC: 2.8 % (ref 0–4)
CK MB SERPL-MCNC: 1.1 NG/ML (ref 5–25)
CK SERPL-CCNC: 39 U/L (ref 39–308)
CO2 SERPL-SCNC: 26 MMOL/L (ref 21–32)
CREAT SERPL-MCNC: 0.88 MG/DL (ref 0.6–1.3)
DIAGNOSIS, 93000: NORMAL
DIFFERENTIAL METHOD BLD: NORMAL
EOSINOPHIL # BLD: 0.1 K/UL (ref 0–0.4)
EOSINOPHIL NFR BLD: 2 % (ref 0–5)
ERYTHROCYTE [DISTWIDTH] IN BLOOD BY AUTOMATED COUNT: 13.5 % (ref 11.6–14.5)
GAS FLOW.O2 O2 DELIVERY SYS: ABNORMAL L/MIN
GAS FLOW.O2 SETTING OXYMISER: 3 L/M
GLUCOSE SERPL-MCNC: 124 MG/DL (ref 74–99)
HCO3 BLD-SCNC: 26 MMOL/L (ref 22–26)
HCT VFR BLD AUTO: 39.9 % (ref 36–48)
HGB BLD-MCNC: 14.5 G/DL (ref 13–16)
LYMPHOCYTES # BLD: 1.3 K/UL (ref 0.9–3.6)
LYMPHOCYTES NFR BLD: 22 % (ref 21–52)
MCH RBC QN AUTO: 30.5 PG (ref 24–34)
MCHC RBC AUTO-ENTMCNC: 36.3 G/DL (ref 31–37)
MCV RBC AUTO: 83.8 FL (ref 74–97)
MONOCYTES # BLD: 0.4 K/UL (ref 0.05–1.2)
MONOCYTES NFR BLD: 7 % (ref 3–10)
NEUTS SEG # BLD: 4.1 K/UL (ref 1.8–8)
NEUTS SEG NFR BLD: 69 % (ref 40–73)
P-R INTERVAL, ECG05: 150 MS
PCO2 BLD: 52.2 MMHG (ref 35–45)
PH BLD: 7.3 [PH] (ref 7.35–7.45)
PLATELET # BLD AUTO: 200 K/UL (ref 135–420)
PMV BLD AUTO: 10.4 FL (ref 9.2–11.8)
PO2 BLD: 24 MMHG (ref 80–100)
POTASSIUM SERPL-SCNC: 3.3 MMOL/L (ref 3.5–5.5)
Q-T INTERVAL, ECG07: 334 MS
QRS DURATION, ECG06: 70 MS
QTC CALCULATION (BEZET), ECG08: 447 MS
RBC # BLD AUTO: 4.76 M/UL (ref 4.7–5.5)
SAO2 % BLD: 36 % (ref 92–97)
SERVICE CMNT-IMP: ABNORMAL
SODIUM SERPL-SCNC: 141 MMOL/L (ref 136–145)
SPECIMEN TYPE: ABNORMAL
TROPONIN I SERPL-MCNC: <0.02 NG/ML (ref 0–0.04)
VENTRICULAR RATE, ECG03: 108 BPM
WBC # BLD AUTO: 5.8 K/UL (ref 4.6–13.2)

## 2019-07-01 PROCEDURE — 85025 COMPLETE CBC W/AUTO DIFF WBC: CPT

## 2019-07-01 PROCEDURE — 36600 WITHDRAWAL OF ARTERIAL BLOOD: CPT

## 2019-07-01 PROCEDURE — 74011250637 HC RX REV CODE- 250/637: Performed by: EMERGENCY MEDICINE

## 2019-07-01 PROCEDURE — 71045 X-RAY EXAM CHEST 1 VIEW: CPT

## 2019-07-01 PROCEDURE — 94640 AIRWAY INHALATION TREATMENT: CPT

## 2019-07-01 PROCEDURE — 82550 ASSAY OF CK (CPK): CPT

## 2019-07-01 PROCEDURE — 74011250636 HC RX REV CODE- 250/636: Performed by: EMERGENCY MEDICINE

## 2019-07-01 PROCEDURE — 99285 EMERGENCY DEPT VISIT HI MDM: CPT

## 2019-07-01 PROCEDURE — 74011000250 HC RX REV CODE- 250: Performed by: EMERGENCY MEDICINE

## 2019-07-01 PROCEDURE — 96374 THER/PROPH/DIAG INJ IV PUSH: CPT

## 2019-07-01 PROCEDURE — 82803 BLOOD GASES ANY COMBINATION: CPT

## 2019-07-01 PROCEDURE — 93005 ELECTROCARDIOGRAM TRACING: CPT

## 2019-07-01 PROCEDURE — 96375 TX/PRO/DX INJ NEW DRUG ADDON: CPT

## 2019-07-01 PROCEDURE — 80048 BASIC METABOLIC PNL TOTAL CA: CPT

## 2019-07-01 RX ORDER — BUTALBITAL, ACETAMINOPHEN AND CAFFEINE 300; 40; 50 MG/1; MG/1; MG/1
1 CAPSULE ORAL
Status: COMPLETED | OUTPATIENT
Start: 2019-07-01 | End: 2019-07-01

## 2019-07-01 RX ORDER — LORAZEPAM 2 MG/ML
1 INJECTION INTRAMUSCULAR
Status: COMPLETED | OUTPATIENT
Start: 2019-07-01 | End: 2019-07-01

## 2019-07-01 RX ORDER — PREDNISONE 20 MG/1
60 TABLET ORAL DAILY
Qty: 15 TAB | Refills: 0 | Status: SHIPPED | OUTPATIENT
Start: 2019-07-01 | End: 2019-07-06

## 2019-07-01 RX ORDER — IPRATROPIUM BROMIDE AND ALBUTEROL SULFATE 2.5; .5 MG/3ML; MG/3ML
3 SOLUTION RESPIRATORY (INHALATION) ONCE
Status: COMPLETED | OUTPATIENT
Start: 2019-07-01 | End: 2019-07-01

## 2019-07-01 RX ADMIN — BUTALBITAL, ACETAMINOPHEN AND CAFFEINE 1 CAPSULE: 300; 40; 50 CAPSULE ORAL at 16:48

## 2019-07-01 RX ADMIN — METHYLPREDNISOLONE SODIUM SUCCINATE 125 MG: 125 INJECTION, POWDER, FOR SOLUTION INTRAMUSCULAR; INTRAVENOUS at 15:23

## 2019-07-01 RX ADMIN — IPRATROPIUM BROMIDE AND ALBUTEROL SULFATE 3 ML: .5; 3 SOLUTION RESPIRATORY (INHALATION) at 15:44

## 2019-07-01 RX ADMIN — IPRATROPIUM BROMIDE AND ALBUTEROL SULFATE 3 ML: .5; 3 SOLUTION RESPIRATORY (INHALATION) at 15:30

## 2019-07-01 RX ADMIN — LORAZEPAM 1 MG: 2 INJECTION INTRAMUSCULAR; INTRAVENOUS at 15:23

## 2019-07-01 RX ADMIN — IPRATROPIUM BROMIDE AND ALBUTEROL SULFATE 3 ML: .5; 3 SOLUTION RESPIRATORY (INHALATION) at 15:11

## 2019-07-01 NOTE — DISCHARGE INSTRUCTIONS
SPECIFIC PATIENT INSTRUCTIONS FROM THE PHYSICIAN WHO TREATED YOU IN THE ER TODAY:  1. Return if any concerns or worsening of condition(s)  2. Steroids as prescribed until finished. 3. Use the albuterol inhaler as prescribed for wheezing and/or cough. 4. FOLLOW UP APPOINTMENT:  Your primary doctor in 1-2 days. Patient Education        Chronic Obstructive Pulmonary Disease (COPD): Care Instructions  Your Care Instructions    Chronic obstructive pulmonary disease (COPD) is a general term for a group of lung diseases, including emphysema and chronic bronchitis. People with COPD have decreased airflow in and out of the lungs, which makes it hard to breathe. The airways also can get clogged with thick mucus. Cigarette smoking is a major cause of COPD. Although there is no cure for COPD, you can slow its progress. Following your treatment plan and taking care of yourself can help you feel better and live longer. Follow-up care is a key part of your treatment and safety. Be sure to make and go to all appointments, and call your doctor if you are having problems. It's also a good idea to know your test results and keep a list of the medicines you take. How can you care for yourself at home?   Staying healthy    · Do not smoke. This is the most important step you can take to prevent more damage to your lungs. If you need help quitting, talk to your doctor about stop-smoking programs and medicines. These can increase your chances of quitting for good.     · Avoid colds and flu. Get a pneumococcal vaccine shot. If you have had one before, ask your doctor whether you need a second dose. Get the flu vaccine every fall. If you must be around people with colds or the flu, wash your hands often.     · Avoid secondhand smoke, air pollution, and high altitudes. Also avoid cold, dry air and hot, humid air.  Stay at home with your windows closed when air pollution is bad.    Medicines and oxygen therapy    · Take your medicines exactly as prescribed. Call your doctor if you think you are having a problem with your medicine.     · You may be taking medicines such as:  ? Bronchodilators. These help open your airways and make breathing easier. Bronchodilators are either short-acting (work for 6 to 9 hours) or long-acting (work for 24 hours). You inhale most bronchodilators, so they start to act quickly. Always carry your quick-relief inhaler with you in case you need it while you are away from home. ? Corticosteroids (prednisone, budesonide). These reduce airway inflammation. They come in pill or inhaled form. You must take these medicines every day for them to work well.     · A spacer may help you get more inhaled medicine to your lungs. Ask your doctor or pharmacist if a spacer is right for you. If it is, ask how to use it properly.     · Do not take any vitamins, over-the-counter medicine, or herbal products without talking to your doctor first.     · If your doctor prescribed antibiotics, take them as directed. Do not stop taking them just because you feel better. You need to take the full course of antibiotics.     · Oxygen therapy boosts the amount of oxygen in your blood and helps you breathe easier. Use the flow rate your doctor has recommended, and do not change it without talking to your doctor first.   Activity    · Get regular exercise. Walking is an easy way to get exercise. Start out slowly, and walk a little more each day.     · Pay attention to your breathing. You are exercising too hard if you cannot talk while you are exercising.     · Take short rest breaks when doing household chores and other activities.     · Learn breathing methods--such as breathing through pursed lips--to help you become less short of breath.     · If your doctor has not set you up with a pulmonary rehabilitation program, talk to him or her about whether rehab is right for you.  Rehab includes exercise programs, education about your disease and how to manage it, help with diet and other changes, and emotional support. Diet    · Eat regular, healthy meals. Use bronchodilators about 1 hour before you eat to make it easier to eat. Eat several small meals instead of three large ones. Drink beverages at the end of the meal. Avoid foods that are hard to chew.     · Eat foods that contain protein so that you do not lose muscle mass.     · Talk with your doctor if you gain too much weight or if you lose weight without trying.    Mental health    · Talk to your family, friends, or a therapist about your feelings. It is normal to feel frightened, angry, hopeless, helpless, and even guilty. Talking openly about bad feelings can help you cope. If these feelings last, talk to your doctor. When should you call for help? Call 911 anytime you think you may need emergency care. For example, call if:    · You have severe trouble breathing.    Call your doctor now or seek immediate medical care if:    · You have new or worse trouble breathing.     · You cough up blood.     · You have a fever.    Watch closely for changes in your health, and be sure to contact your doctor if:    · You cough more deeply or more often, especially if you notice more mucus or a change in the color of your mucus.     · You have new or worse swelling in your legs or belly.     · You are not getting better as expected. Where can you learn more? Go to http://costa-jessica.info/. Tara Curry in the search box to learn more about \"Chronic Obstructive Pulmonary Disease (COPD): Care Instructions. \"  Current as of: September 5, 2018  Content Version: 11.9  © 1475-6147 Original. Care instructions adapted under license by [employer] (which disclaims liability or warranty for this information).  If you have questions about a medical condition or this instruction, always ask your healthcare professional. Catherine Marlow disclaims any warranty or liability for your use of this information. Patient Education        COPD Exacerbation Plan: Care Instructions  Your Care Instructions    If you have chronic obstructive pulmonary disease (COPD), your usual shortness of breath could suddenly get worse. You may start coughing more and have more mucus. This flare-up is called a COPD exacerbation (say \"[id number]\"). A lung infection or air pollution could set off an exacerbation. Sometimes it can happen after a quick change in temperature or being around chemicals. Work with your doctor to make a plan for dealing with an exacerbation. You can better manage it if you plan ahead. Follow-up care is a key part of your treatment and safety. Be sure to make and go to all appointments, and call your doctor if you are having problems. It's also a good idea to know your test results and keep a list of the medicines you take. How can you care for yourself at home? During an exacerbation  · Do not panic if you start to have one. Quick treatment at home may help you prevent serious breathing problems. If you have a COPD exacerbation plan that you developed with your doctor, follow it. · Take your medicines exactly as your doctor tells you.  ? Use your inhaler as directed by your doctor. If your symptoms do not get better after you use your medicine, have someone take you to the emergency room. Call an ambulance if necessary. ? With inhaled medicines, a spacer or a nebulizer may help you get more medicine to your lungs. Ask your doctor or pharmacist how to use them properly. Practice using the spacer in front of a mirror before you have an exacerbation. This may help you get the medicine into your lungs quickly. ? If your doctor has given you steroid pills, take them as directed. ? Your doctor may have given you a prescription for antibiotics, which you can fill if you need it. ? Talk to your doctor if you have any problems with your medicine.  And call your doctor if you have to use your antibiotic or steroid pills. Preventing an exacerbation  · Do not smoke. This is the most important step you can take to prevent more damage to your lungs and prevent problems. If you already smoke, it is never too late to stop. If you need help quitting, talk to your doctor about stop-smoking programs and medicines. These can increase your chances of quitting for good. · Take your daily medicines as prescribed. · Avoid colds and flu. ? Get a pneumococcal vaccine. ? Get a flu vaccine each year, as soon as it is available. Ask those you live or work with to do the same, so they will not get the flu and infect you. ? Try to stay away from people with colds or the flu. ? Wash your hands often. · Avoid secondhand smoke; air pollution; cold, dry air; hot, humid air; and high altitudes. Stay at home with your windows closed when air pollution is bad. · Learn breathing techniques for COPD, such as breathing through pursed lips. These techniques can help you breathe easier during an exacerbation. When should you call for help? Call 911 anytime you think you may need emergency care. For example, call if:    · You have severe trouble breathing.     · You have severe chest pain.    Call your doctor now or seek immediate medical care if:    · You have new or worse shortness of breath.     · You develop new chest pain.     · You are coughing more deeply or more often, especially if you notice more mucus or a change in the color of your mucus.     · You cough up blood.     · You have new or increased swelling in your legs or belly.     · You have a fever.    Watch closely for changes in your health, and be sure to contact your doctor if:    · You need to use your antibiotic or steroid pills.     · Your symptoms are getting worse. Where can you learn more? Go to http://costa-jessica.info/.   Enter W788 in the search box to learn more about \"COPD Exacerbation Plan: Care Instructions. \"  Current as of: September 5, 2018  Content Version: 11.9  © 3639-9980 [employer]. Care instructions adapted under license by [employer] (which disclaims liability or warranty for this information). If you have questions about a medical condition or this instruction, always ask your healthcare professional. [address]  any warranty or liability for your use of this information. Solomon ROSEN

## 2019-07-01 NOTE — ED PROVIDER NOTES
[facility]  [facility] EMERGENCY DEPT      3:03 PM    Date: 7/1/2019  Patient Name: Anushka Snow.    History of Presenting Illness     Chief Complaint   Patient presents with    Shortness of Breath       51 y.o. male with noted past medical history who presents to the emergency department with shortness of breath began about 14 to 15 hours. Patient states he was in usual state of health with a history of COPD and uses 2 to half liters per minute of oxygen at home as needed. He states that about 1415 hrs. he had a fairly sudden onset of increasing shortness of breath was having he states he takes Ativan as needed for the anxiety but did not have any to take today. Because that he did have shortness of breath came to the ER for evaluation treatment. He does note that he tried to home neb treatments without much relief. Patient has no new URI symptoms. No fever or chills. No other complaints. Patient denies any other associated signs or symptoms. Patient denies any other complaints. Nursing notes regarding the HPI and triage nursing notes were reviewed. Prior medical records were reviewed. Current Facility-Administered Medications   Medication Dose Route Frequency Provider Last Rate Last Dose    butalbital-acetaminophen-caffeine (FIORICET, ESGIC) -40 mg per tablet 1 Tab  1 Tab Oral NOW Doris Bundy MD         Current Outpatient Medications   Medication Sig Dispense Refill    topiramate (TOPAMAX) 25 mg tablet Take two tabs at bedtime 60 Tab 3    DALIRESP 500 mcg tab tablet TAKE 1 TABLET BY MOUTH ONCE DAILY 30 Tab 5    ADVAIR DISKUS 500-50 mcg/dose diskus inhaler INHALE 1 PUFF BY MOUTH TWICE DAILY 1 Inhaler 5    tamsulosin (FLOMAX) 0.4 mg capsule Take 1 Cap by mouth daily. 14 Cap 3    FLUoxetine (PROZAC) 40 mg capsule Take  by mouth daily.  hydrOXYzine HCl (ATARAX) 50 mg tablet Take 50 mg by mouth nightly.  Indications: 100 mg qhs      Blood Pressure Monitor (BLOOD PRESSURE KIT) kit 1 Device by Does Not Apply route daily as needed (for blood pressure checks). 1 Kit 0    PROAIR HFA 90 mcg/actuation inhaler INHALE 2 PUFFS BY MOUTH EVERY 4 HOURS AS NEEDED FOR WHEEZING OR SHORTNESS OF BREATH 1 Inhaler 2    amitriptyline HCl (AMITRIPTYLINE PO) Take 75 mg by mouth nightly.  OXcarbazepine (TRILEPTAL) 150 mg tablet Take 150 mg by mouth two (2) times a day.  albuterol (PROVENTIL VENTOLIN) 2.5 mg /3 mL (0.083 %) nebulizer solution Nebulized 1 vial for inhalation every 4 hours as needed 60 Each 2    tiZANidine (ZANAFLEX) 4 mg tablet Sig: Take 1 tab PO Q 6 as needed for muscle spasm 60 Tab 0    tiotropium (SPIRIVA) 18 mcg inhalation capsule Take 1 Cap by inhalation daily. 30 Cap 5    furosemide (LASIX) 20 mg tablet Take 1 Tab by mouth every other day. 15 Tab 6    LORazepam (ATIVAN) 1 mg tablet Take 1 Tab by mouth every eight (8) hours as needed for Anxiety. Max Daily Amount: 3 mg. 90 Tab 0    Nebulizer & Compressor machine 1 Each by Does Not Apply route every four (4) hours as needed. 1 Each 0    OXYGEN-AIR DELIVERY SYSTEMS (WALKABOUT 1 OXYGEN SYSTEM) 2.5 L/min by Nasal route continuous.  pantoprazole (PROTONIX) 40 mg tablet Take 1 Tab by mouth Daily (before breakfast).  30 Tab 0       Past History     Past Medical History:  Past Medical History:   Diagnosis Date    Anxiety     Chest pain     Chronic diastolic heart failure secondary to coronary artery disease (HCC)     Chronic lung disease     Chronic obstructive pulmonary disease ([facility] [address] 08/03/2017    PFTS 8/3/17    COPD, severe (HCC)     Cough     Emphysema/COPD (HCC)     Esophagitis 12/11/2017    Endoscopy    Essential hypertension, benign     Fast heart beat     Feeling of chest tightness     Frequent urination     Heartburn     Hepatomegaly     History of stomach ulcers     Hx of nausea and vomiting     Poor appetite     Positive urine drug screen 01/2016    opiates and THC    Shortness of breath     Splenomegaly     Stomach pain     Stress     Tiredness     Unintentional weight change     Upper GI bleed     Weakness     Wheeze        Past Surgical History:  Past Surgical History:   Procedure Laterality Date    COLONOSCOPY N/A 2018    COLONOSCOPY with polypectomies performed by Corrie Corrales MD at [facility] ENDOSCOPY    HX ENDOSCOPY  2017    HX WISDOM TEETH EXTRACTION Right 2019    IR BX LIVER PERCUTANEOUS         Family History:  Family History   Problem Relation Age of Onset    Hypertension Mother     Heart Disease Mother     Diabetes Mother     Hypertension Father     Heart Disease Father     Cancer Father         lymphnodes    Diabetes Father        Social History:  Social History     Tobacco Use    Smoking status: Former Smoker     Packs/day: 2.00     Years: 25.00     Pack years: 50.00     Types: Cigarettes     Start date: 1984     Last attempt to quit: 2017     Years since quittin.5    Smokeless tobacco: Never Used   Substance Use Topics    Alcohol use: No    Drug use: Yes     Types: Marijuana     Comment: Hx of Marijuana use       Allergies: Allergies   Allergen Reactions    Ciprofloxacin Nausea Only    Remeron [Mirtazapine] Other (comments)     Mood swings    Seroquel [Quetiapine] Other (comments)     Mood swings       Patient's primary care provider (as noted in EPIC):  Nikolai Nguyen NP    Review of Systems   Constitutional: Negative for diaphoresis. HENT: Negative for congestion. Eyes: Negative for discharge. Respiratory: Positive for shortness of breath. Negative for cough, wheezing and stridor. Cardiovascular: Negative for chest pain, palpitations and leg swelling. Gastrointestinal: Negative for diarrhea. Endocrine: Negative for heat intolerance. Genitourinary: Negative for flank pain. Musculoskeletal: Negative for back pain. Neurological: Negative for weakness. Psychiatric/Behavioral: Negative for hallucinations. The patient is nervous/anxious. All other systems reviewed and are negative. Visit Vitals  /76   Pulse (!) 130   Temp 99.1 °F (37.3 °C)   Resp 19   Wt 86.2 kg (190 lb)   SpO2 96%   BMI 28.06 kg/m²       PHYSICAL EXAM:    CONSTITUTIONAL:  Alert, in no apparent distress;  well developed;  well nourished. HEAD:  Normocephalic, atraumatic. EYES:  EOMI. Non-icteric sclera. Normal conjunctiva. ENTM:  Nose:  no rhinorrhea. Throat:  no erythema or exudate, mucous membranes moist.  NECK:  No JVD. Supple    RESPIRATORY:  Diffuse mild end expiratory wheeze with good air movement. CARDIOVASCULAR:  Regular rate and rhythm. No murmurs, rubs, or gallops. GI:  Normal bowel sounds, abdomen soft and non-tender. No rebound or guarding. BACK:  Non-tender. UPPER EXT:  Normal inspection. LOWER EXT:  No edema, no calf tenderness. Distal pulses intact. NEURO:  Moves all four extremities, and grossly normal motor exam.  SKIN:  No rashes;  Normal for age. PSYCH:  Alert and normal affect. DIFFERENTIAL DIAGNOSES/ MEDICAL DECISION MAKING:   Shortness of breath etiologies include chronic obstructive pulmonary disease (COPD), acute asthma exacerbation, congestive heart failure, pneumonia, acute bronchitis, pulmonary embolism, upper respiratory infection, cardiac event to include acute coronary syndrome, acute myocardial infarction or a combination of the above (ex URI on top of COPD thus causing respiratory distress).       Diagnostic Study Results     Abnormal lab results from this emergency department encounter:  Labs Reviewed   METABOLIC PANEL, BASIC - Abnormal; Notable for the following components:       Result Value    Potassium 3.3 (*)     Glucose 124 (*)     BUN/Creatinine ratio 11 (*)     All other components within normal limits   POC G3 - Abnormal; Notable for the following components:    pH (POC) 7.304 (*)     pCO2 (POC) 52.2 (*)     pO2 (POC) 24 (*)     sO2 (POC) 36 (*)     All other components within normal limits   CBC WITH AUTOMATED DIFF   CARDIAC PANEL,(CK, CKMB & TROPONIN)   BLOOD GAS, ARTERIAL       Lab values for this patient within approximately the last 12 hours:  Recent Results (from the past 12 hour(s))   EKG, 12 LEAD, INITIAL    Collection Time: 07/01/19  3:03 PM   Result Value Ref Range    Ventricular Rate 108 BPM    Atrial Rate 108 BPM    P-R Interval 150 ms    QRS Duration 70 ms    Q-T Interval 334 ms    QTC Calculation (Bezet) 447 ms    Calculated P Axis 52 degrees    Calculated R Axis 54 degrees    Calculated T Axis 60 degrees    Diagnosis       Sinus tachycardia  T wave abnormality, consider lateral ischemia  Abnormal ECG  When compared with ECG of 27-MAY-2019 06:52,  T wave inversion now evident in Anterolateral leads     CBC WITH AUTOMATED DIFF    Collection Time: 07/01/19  3:20 PM   Result Value Ref Range    WBC 5.8 4.6 - 13.2 K/uL    RBC 4.76 4.70 - 5.50 M/uL    HGB 14.5 13.0 - 16.0 g/dL    HCT 39.9 36.0 - 48.0 %    MCV 83.8 74.0 - 97.0 FL    MCH 30.5 24.0 - 34.0 PG    MCHC 36.3 31.0 - 37.0 g/dL    RDW 13.5 11.6 - 14.5 %    PLATELET 297 665 - 315 K/uL    MPV 10.4 9.2 - 11.8 FL    NEUTROPHILS 69 40 - 73 %    LYMPHOCYTES 22 21 - 52 %    MONOCYTES 7 3 - 10 %    EOSINOPHILS 2 0 - 5 %    BASOPHILS 0 0 - 2 %    ABS. NEUTROPHILS 4.1 1.8 - 8.0 K/UL    ABS. LYMPHOCYTES 1.3 0.9 - 3.6 K/UL    ABS. MONOCYTES 0.4 0.05 - 1.2 K/UL    ABS. EOSINOPHILS 0.1 0.0 - 0.4 K/UL    ABS.  BASOPHILS 0.0 0.0 - 0.1 K/UL    DF AUTOMATED     METABOLIC PANEL, BASIC    Collection Time: 07/01/19  3:20 PM   Result Value Ref Range    Sodium 141 136 - 145 mmol/L    Potassium 3.3 (L) 3.5 - 5.5 mmol/L    Chloride 107 100 - 108 mmol/L    CO2 26 21 - 32 mmol/L    Anion gap 8 3.0 - 18 mmol/L    Glucose 124 (H) 74 - 99 mg/dL    BUN 10 7.0 - 18 MG/DL    Creatinine 0.88 0.6 - 1.3 MG/DL    BUN/Creatinine ratio 11 (L) 12 - 20      GFR est AA >60 >60 ml/min/1.73m2    GFR est non-AA >60 >60 ml/min/1.73m2    Calcium 8.5 8.5 - 10.1 MG/DL   CARDIAC PANEL,(CK, CKMB & TROPONIN)    Collection Time: 07/01/19  3:20 PM   Result Value Ref Range    CK 39 39 - 308 U/L    CK - MB 1.1 <3.6 ng/ml    CK-MB Index 2.8 0.0 - 4.0 %    Troponin-I, QT <0.02 0.0 - 0.045 NG/ML   POC G3    Collection Time: 07/01/19  4:17 PM   Result Value Ref Range    Device: NASAL CANNULA      Flow rate (POC) 3 L/M    pH (POC) 7.304 (L) 7.35 - 7.45      pCO2 (POC) 52.2 (H) 35.0 - 45.0 MMHG    pO2 (POC) 24 (LL) 80 - 100 MMHG    HCO3 (POC) 26.0 22 - 26 MMOL/L    sO2 (POC) 36 (L) 92 - 97 %    Base deficit (POC) 1 mmol/L    Allens test (POC) YES      Site LEFT RADIAL      Specimen type (POC) ARTERIAL      Performed by [facility]        Radiologist and cardiologist interpretations if available at time of this note:  Xr Chest Port    Result Date: 7/1/2019  Portable chest, 3:09 PM COMPARISON: May 27, 2019. The lungs again appear hyperexpanded suggesting COPD. Azael Gu No focal pulmonary infiltrates are seen. The heart and mediastinal silhouette are normal. No pleural effusions are identified. IMPRESSION: Probable COPD, Stable chest, no acute disease. Portable (A-P view) CXR:  Preliminary review of x-rays by ED Physician. Interpretation of chest X-ray shows, no infiltrates, no pneumothorax, no CHF, no effusion.     EKG interpreted by the emergency physician: Sinus tachycardia about 110 bpm.    Medication(s) ordered for patient during this emergency visit encounter:  Medications   butalbital-acetaminophen-caffeine (FIORICET, ESGIC) -40 mg per tablet 1 Tab (has no administration in time range)   albuterol-ipratropium (DUO-NEB) 2.5 MG-0.5 MG/3 ML (3 mL Nebulization Given 7/1/19 9784)   albuterol-ipratropium (DUO-NEB) 2.5 MG-0.5 MG/3 ML (3 mL Nebulization Given 7/1/19 1530)   albuterol-ipratropium (DUO-NEB) 2.5 MG-0.5 MG/3 ML (3 mL Nebulization Given 7/1/19 1511)   methylPREDNISolone (PF) (Solu-MEDROL) injection 125 mg (125 mg IntraVENous Given 7/1/19 1523)   LORazepam (ATIVAN) injection 1 mg (1 mg IntraVENous Given 7/1/19 1523)       Medical Decision Making     I am the first provider for this patient. I reviewed the vital signs, available nursing notes, past medical history, past surgical history, family history and social history. Vital Signs:  Reviewed the patient's vital signs. ED COURSE:    Critical Care Note:  Respiratory Distress    Critical care minutes: 42 MINUTES. Given patient's initial arrival in respiratory distress, numerous serial reevaluations of the patient's respiratory status and patient's response to various medical interventions. Given the patients underlying condition medical intervention(s) were needed, requiring numerous reevaluations of patient's vital signs and response to different emergency department therapies, total bedside time evaluating and/or treating the patient, not including procedures, is noted below. IMPRESSION AND MEDICAL DECISION MAKING:  Based upon the patient's presentation with noted HPI and PE, along with the work up done in the emergency department, I believe that the patient is having an acute COPD exacerbation. The patients respiratory status improved in the emergency department with noted HHN treatments, steroids, and other noted modalities and medications. I believe that the patient has improved sufficiently enough for discharge home. DIAGNOSIS:    1. Acute COPD exacerbation. SPECIFIC PATIENT INSTRUCTIONS FROM THE PHYSICIAN WHO TREATED YOU IN THE ER TODAY:  1. Return if any concerns or worsening of condition(s)  2. Steroids as prescribed until finished. 3. Use the albuterol inhaler as prescribed for wheezing and/or cough. 4. FOLLOW UP APPOINTMENT:  Your primary doctor in 1-2 days. Patient is improved, resting quietly and comfortably. The patient will be discharged home.      The patient was reassured that these symptoms do not appear to represent a serious or life threatening condition at this time. Warning signs of worsening condition were discussed and understood by the patient. Based on patient's age, coexisting illness, exam, and the results of this ED evaluation, the decision to treat as an outpatient was made. Based on the information available at time of discharge, acute pathology requiring immediate intervention was deemed relative unlikely. While it is impossible to completely exclude the possibility of underlying serious disease or worsening of condition, I feel the relative likelihood is extremely low. I discussed this uncertainty with the patient, who understood ED evaluation and treatment and felt comfortable with the outpatient treatment plan. All questions regarding care, test results, and follow up were answered. The patient is stable and appropriate to discharge. They understand that they should return to the emergency department for any new or worsening symptoms. I stressed the importance of follow up for repeat assessment and possibly further evaluation/treatment. Dictation disclaimer:  Please note that this dictation was completed with [employer], the computer voice recognition software. Quite often unanticipated grammatical, syntax, homophones, and other interpretive errors are inadvertently transcribed by the computer software. Please disregard these errors. Please excuse any errors that have escaped final proofreading. Coding Diagnoses     Clinical Impression:   1. Panic attack    2. Acute exacerbation of chronic obstructive pulmonary disease (COPD) ([facility] [address]        Disposition     Disposition: Home. Seun Velez M.D.   LILIYA Board Certified Emergency Physician    Provider Attestation:  If a scribe was utilized in generation of this patient record, I personally performed the services described in the documentation, reviewed the documentation, as recorded by the scribe in my presence, and it accurately records the patient's history of presenting illness, review of systems, patient physical examination, and procedures performed by me as the attending physician. Jay Curry M.D.   LILIYA Board Certified Emergency Physician  7/1/2019.  3:05 PM

## 2019-07-01 NOTE — ED TRIAGE NOTES
Received pt via medic for SOB, anxiety. Pt has hx of COPD, heart disease and HTN. Medics gave a duoneb tx en route. Pt wears O2 at home PRN. Pt has a 20G IV in place from medics. /100, 120, 94% RA.

## 2019-07-03 RX ORDER — FUROSEMIDE 20 MG/1
TABLET ORAL
Qty: 15 TAB | Refills: 6 | Status: SHIPPED | OUTPATIENT
Start: 2019-07-03 | End: 2020-01-27 | Stop reason: SDUPTHER

## 2019-07-05 ENCOUNTER — OFFICE VISIT (OUTPATIENT)
Dept: FAMILY MEDICINE CLINIC | Age: 49
End: 2019-07-05

## 2019-07-05 VITALS
WEIGHT: 191 LBS | TEMPERATURE: 97.9 F | SYSTOLIC BLOOD PRESSURE: 147 MMHG | DIASTOLIC BLOOD PRESSURE: 91 MMHG | HEIGHT: 69 IN | OXYGEN SATURATION: 90 % | BODY MASS INDEX: 28.29 KG/M2 | HEART RATE: 96 BPM | RESPIRATION RATE: 18 BRPM

## 2019-07-05 DIAGNOSIS — M25.50 CHRONIC PAIN OF MULTIPLE JOINTS: Primary | ICD-10-CM

## 2019-07-05 DIAGNOSIS — Z09 HOSPITAL DISCHARGE FOLLOW-UP: ICD-10-CM

## 2019-07-05 DIAGNOSIS — F41.0 PANIC ATTACK: ICD-10-CM

## 2019-07-05 DIAGNOSIS — G89.29 CHRONIC PAIN OF MULTIPLE JOINTS: Primary | ICD-10-CM

## 2019-07-05 RX ORDER — BUSPIRONE HYDROCHLORIDE 10 MG/1
10 TABLET ORAL 3 TIMES DAILY
Qty: 90 TAB | Refills: 0 | Status: SHIPPED | OUTPATIENT
Start: 2019-07-05 | End: 2019-08-04

## 2019-07-05 NOTE — PROGRESS NOTES
RAMSES Thornton Sr. is a 51 y.o. male who presents today for ED follow up. Pt was treated at [facility] on July 1, 2019 for acute COPD complication and anxiety panic attack. Pt is under the care of psych and is in the process of making an appointment with that provider. Pt states he has been adhering to medication regimen and feel like his anxiety is not controlled, he has continued to have some SOB and has not been using home O2 other than for sleeping at night. Pt also complains of constant pain all over his body and would like a referral for pain management. Pertinent labs/imaging during ED admission:  Chest x-ray 7/1/2019  IMPRESSION:   Probable COPD, Stable chest, no acute disease    EKG, 12 LEAD, INITIAL     Collection Time: 07/01/19  3:03 PM   Result Value Ref Range     Ventricular Rate 108 BPM     Atrial Rate 108 BPM     P-R Interval 150 ms     QRS Duration 70 ms     Q-T Interval 334 ms     QTC Calculation (Bezet) 447 ms     Calculated P Axis 52 degrees     Calculated R Axis 54 degrees     Calculated T Axis 60 degrees     Diagnosis           Sinus tachycardia  T wave abnormality, consider lateral ischemia  Abnormal ECG  When compared with ECG of 27-MAY-2019 06:52,  T wave inversion now evident in Anterolateral leads      CBC WITH AUTOMATED DIFF     Collection Time: 07/01/19  3:20 PM   Result Value Ref Range     WBC 5.8 4.6 - 13.2 K/uL     RBC 4.76 4.70 - 5.50 M/uL     HGB 14.5 13.0 - 16.0 g/dL     HCT 39.9 36.0 - 48.0 %     MCV 83.8 74.0 - 97.0 FL     MCH 30.5 24.0 - 34.0 PG     MCHC 36.3 31.0 - 37.0 g/dL     RDW 13.5 11.6 - 14.5 %     PLATELET 098 405 - 394 K/uL     MPV 10.4 9.2 - 11.8 FL     NEUTROPHILS 69 40 - 73 %     LYMPHOCYTES 22 21 - 52 %     MONOCYTES 7 3 - 10 %     EOSINOPHILS 2 0 - 5 %     BASOPHILS 0 0 - 2 %     ABS. NEUTROPHILS 4.1 1.8 - 8.0 K/UL     ABS. LYMPHOCYTES 1.3 0.9 - 3.6 K/UL     ABS. MONOCYTES 0.4 0.05 - 1.2 K/UL     ABS. EOSINOPHILS 0.1 0.0 - 0.4 K/UL     ABS.  BASOPHILS 0.0 0.0 - 0.1 K/UL     DF AUTOMATED     METABOLIC PANEL, BASIC     Collection Time: 07/01/19  3:20 PM   Result Value Ref Range     Sodium 141 136 - 145 mmol/L     Potassium 3.3 (L) 3.5 - 5.5 mmol/L     Chloride 107 100 - 108 mmol/L     CO2 26 21 - 32 mmol/L     Anion gap 8 3.0 - 18 mmol/L     Glucose 124 (H) 74 - 99 mg/dL     BUN 10 7.0 - 18 MG/DL     Creatinine 0.88 0.6 - 1.3 MG/DL     BUN/Creatinine ratio 11 (L) 12 - 20       GFR est AA >60 >60 ml/min/1.73m2     GFR est non-AA >60 >60 ml/min/1.73m2     Calcium 8.5 8.5 - 10.1 MG/DL   CARDIAC PANEL,(CK, CKMB & TROPONIN)     Collection Time: 07/01/19  3:20 PM   Result Value Ref Range     CK 39 39 - 308 U/L     CK - MB 1.1 <3.6 ng/ml     CK-MB Index 2.8 0.0 - 4.0 %     Troponin-I, QT <0.02 0.0 - 0.045 NG/ML   POC G3     Collection Time: 07/01/19  4:17 PM   Result Value Ref Range     Device: NASAL CANNULA       Flow rate (POC) 3 L/M     pH (POC) 7.304 (L) 7.35 - 7.45       pCO2 (POC) 52.2 (H) 35.0 - 45.0 MMHG     pO2 (POC) 24 (LL) 80 - 100 MMHG     HCO3 (POC) 26.0 22 - 26 MMOL/L     sO2 (POC) 36 (L) 92 - 97 %     Base deficit (POC) 1 mmol/L     Allens test (POC) YES       Site LEFT RADIAL       Specimen type (POC) ARTERIAL       Performed by Johana Sanchez         Current Outpatient Medications   Medication Sig Dispense Refill    furosemide (LASIX) 20 mg tablet TAKE 1 TABLET BY MOUTH EVERY OTHER DAY 15 Tab 6    predniSONE (DELTASONE) 20 mg tablet Take 60 mg by mouth daily for 5 days. With Breakfast 15 Tab 0    topiramate (TOPAMAX) 25 mg tablet Take two tabs at bedtime 60 Tab 3    DALIRESP 500 mcg tab tablet TAKE 1 TABLET BY MOUTH ONCE DAILY 30 Tab 5    ADVAIR DISKUS 500-50 mcg/dose diskus inhaler INHALE 1 PUFF BY MOUTH TWICE DAILY 1 Inhaler 5    tamsulosin (FLOMAX) 0.4 mg capsule Take 1 Cap by mouth daily. 14 Cap 3    FLUoxetine (PROZAC) 40 mg capsule Take  by mouth daily.  hydrOXYzine HCl (ATARAX) 50 mg tablet Take 50 mg by mouth nightly.  Indications: 100 mg qhs  Blood Pressure Monitor (BLOOD PRESSURE KIT) kit 1 Device by Does Not Apply route daily as needed (for blood pressure checks). 1 Kit 0    PROAIR HFA 90 mcg/actuation inhaler INHALE 2 PUFFS BY MOUTH EVERY 4 HOURS AS NEEDED FOR WHEEZING OR SHORTNESS OF BREATH 1 Inhaler 2    amitriptyline HCl (AMITRIPTYLINE PO) Take 75 mg by mouth nightly.  OXcarbazepine (TRILEPTAL) 150 mg tablet Take 150 mg by mouth two (2) times a day.  albuterol (PROVENTIL VENTOLIN) 2.5 mg /3 mL (0.083 %) nebulizer solution Nebulized 1 vial for inhalation every 4 hours as needed 60 Each 2    tiZANidine (ZANAFLEX) 4 mg tablet Sig: Take 1 tab PO Q 6 as needed for muscle spasm 60 Tab 0    tiotropium (SPIRIVA) 18 mcg inhalation capsule Take 1 Cap by inhalation daily. 30 Cap 5    LORazepam (ATIVAN) 1 mg tablet Take 1 Tab by mouth every eight (8) hours as needed for Anxiety. Max Daily Amount: 3 mg. 90 Tab 0    Nebulizer & Compressor machine 1 Each by Does Not Apply route every four (4) hours as needed. 1 Each 0    OXYGEN-AIR DELIVERY SYSTEMS (WALKABOUT 1 OXYGEN SYSTEM) 2.5 L/min by Nasal route continuous.  pantoprazole (PROTONIX) 40 mg tablet Take 1 Tab by mouth Daily (before breakfast). 30 Tab 0      Allergies   Allergen Reactions    Ciprofloxacin Nausea Only    Remeron [Mirtazapine] Other (comments)     Mood swings    Seroquel [Quetiapine] Other (comments)     Mood swings       SUBJECTIVE:  Review of Systems   Constitutional: Negative for chills, fever and malaise/fatigue. Eyes: Negative for blurred vision. Respiratory: Negative for shortness of breath and wheezing. Cardiovascular: Positive for palpitations. Negative for chest pain and leg swelling. Gastrointestinal: Negative for abdominal pain, diarrhea, nausea and vomiting. Genitourinary: Negative for dysuria, frequency and urgency. Musculoskeletal: Positive for joint pain and myalgias. Neurological: Positive for headaches.  Negative for dizziness, tingling and sensory change. Psychiatric/Behavioral: Negative for depression and suicidal ideas. The patient is nervous/anxious. OBJECTIVE:  Visit Vitals  BP (!) 147/91 (BP 1 Location: Left arm, BP Patient Position: Sitting)   Pulse 96   Temp 97.9 °F (36.6 °C) (Oral)   Resp 18   Ht 5' 9\" (1.753 m)   Wt 191 lb (86.6 kg)   SpO2 90%   BMI 28.21 kg/m²      I have reviewed/discussed the above normal BMI with the patient. I have recommended the following interventions: dietary management education, guidance, and counseling, encourage exercise and monitor weight . Physical Exam   Constitutional: He is oriented to person, place, and time and well-developed, well-nourished, and in no distress. HENT:   Head: Normocephalic. Eyes: Pupils are equal, round, and reactive to light. Conjunctivae and EOM are normal.   Neck: Neck supple. No thyromegaly present. Cardiovascular: Normal rate, regular rhythm and normal heart sounds. Pulmonary/Chest: Effort normal and breath sounds normal. He has no wheezes. He has no rales. He exhibits no tenderness. Musculoskeletal: He exhibits no edema. Neurological: He is alert and oriented to person, place, and time. Gait normal.   Skin: Skin is warm and dry. No erythema. Psychiatric: Mood and affect normal.   Nursing note and vitals reviewed. ASSESSMENT:  Diagnoses and all orders for this visit:    1. Chronic pain of multiple joints  -     REFERRAL TO PAIN MANAGEMENT    2. Panic attack  -     busPIRone (BUSPAR) 10 mg tablet; Take 1 Tab by mouth three (3) times daily for 30 days. 3. Hospital discharge follow-up    PLAN:  Continue current medication regimen  Referral for pain management   Encouraged patient to utilized home O2 more and follow up with cardiologist  Start Buspar 10 mg TID for anxiety, follow up with psych provider    I have discussed the diagnosis with the patient and the intended plan as seen in the above orders.   The patient has received an after-visit summary and questions were answered concerning future plans. I have discussed medication side effects and warnings with the patient as well. Patient will call for further questions. Follow-up and Dispositions    · Return in about 2 months (around 9/5/2019) for follow up.        Arie Sweeney, NP

## 2019-07-05 NOTE — PATIENT INSTRUCTIONS
Chronic Pain: Care Instructions  Your Care Instructions    Chronic pain is pain that lasts a long time (months or even years) and may or may not have a clear cause. It is different from acute pain, which usually does have a clear cause--like an injury or illness--and gets better over time. Chronic pain:  · Lasts over time but may vary from day to day. · Does not go away despite efforts to end it. · May disrupt your sleep and lead to fatigue. · May cause depression or anxiety. · May make your muscles tense, causing more pain. · Can disrupt your work, hobbies, home life, and relationships with friends and family. Chronic pain is a very real condition. It is not just in your head. Treatment can help and usually includes several methods used together, such as medicines, physical therapy, exercise, and other treatments. Learning how to relax and changing negative thought patterns can also help you cope. Chronic pain is complex. Taking an active role in your treatment will help you better manage your pain. Tell your doctor if you have trouble dealing with your pain. You may have to try several things before you find what works best for you. Follow-up care is a key part of your treatment and safety. Be sure to make and go to all appointments, and call your doctor if you are having problems. It's also a good idea to know your test results and keep a list of the medicines you take. How can you care for yourself at home? · Pace yourself. Break up large jobs into smaller tasks. Save harder tasks for days when you have less pain, or go back and forth between hard tasks and easier ones. Take rest breaks. · Relax, and reduce stress. Relaxation techniques such as deep breathing or meditation can help. · Keep moving. Gentle, daily exercise can help reduce pain over the long run. Try low- or no-impact exercises such as walking, swimming, and stationary biking. Do stretches to stay flexible.   · Try heat, cold packs, and massage. · Get enough sleep. Chronic pain can make you tired and drain your energy. Talk with your doctor if you have trouble sleeping because of pain. · Think positive. Your thoughts can affect your pain level. Do things that you enjoy to distract yourself when you have pain instead of focusing on the pain. See a movie, read a book, listen to music, or spend time with a friend. · If you think you are depressed, talk to your doctor about treatment. · Keep a daily pain diary. Record how your moods, thoughts, sleep patterns, activities, and medicine affect your pain. You may find that your pain is worse during or after certain activities or when you are feeling a certain emotion. Having a record of your pain can help you and your doctor find the best ways to treat your pain. · Take pain medicines exactly as directed. ? If the doctor gave you a prescription medicine for pain, take it as prescribed. ? If you are not taking a prescription pain medicine, ask your doctor if you can take an over-the-counter medicine. Reducing constipation caused by pain medicine  · Include fruits, vegetables, beans, and whole grains in your diet each day. These foods are high in fiber. · Drink plenty of fluids, enough so that your urine is light yellow or clear like water. If you have kidney, heart, or liver disease and have to limit fluids, talk with your doctor before you increase the amount of fluids you drink. · If your doctor recommends it, get more exercise. Walking is a good choice. Bit by bit, increase the amount you walk every day. Try for at least 30 minutes on most days of the week. · Schedule time each day for a bowel movement. A daily routine may help. Take your time and do not strain when having a bowel movement. When should you call for help?   Call your doctor now or seek immediate medical care if:    · Your pain gets worse or is out of control.     · You feel down or blue, or you do not enjoy things like you once did. You may be depressed, which is common in people with chronic pain. Depression can be treated.     · You have vomiting or cramps for more than 2 hours.    Watch closely for changes in your health, and be sure to contact your doctor if:    · You cannot sleep because of pain.     · You are very worried or anxious about your pain.     · You have trouble taking your pain medicine.     · You have any concerns about your pain medicine.     · You have trouble with bowel movements, such as:  ? No bowel movement in 3 days. ? Blood in the anal area, in your stool, or on the toilet paper. ? Diarrhea for more than 24 hours. Where can you learn more? Go to http://costa-jessica.info/. Enter N004 in the search box to learn more about \"Chronic Pain: Care Instructions. \"  Current as of: Hanane 3, 2018  Content Version: 11.9  © 0666-3456 [employer] Care instructions adapted under license by [employer] (which disclaims liability or warranty for this information). If you have questions about a medical condition or this instruction, always ask your healthcare professional. [place] [address] any warranty or liability for your use of this information. Panic Attacks: Care Instructions  Your Care Instructions    During a panic attack, you may have a feeling of intense fear or terror, trouble breathing, chest pain or tightness, heartbeat changes, dizziness, sweating, and shaking. A panic attack starts suddenly and usually lasts from 5 to 20 minutes but may last even longer. You have the most anxiety about 10 minutes after the attack starts. An attack can begin with a stressful event, or it can happen without a cause. Although panic attacks can cause scary symptoms, you can learn to manage them with self-care, counseling, and medicine. Follow-up care is a key part of your treatment and safety.  Be sure to make and go to all appointments, and call your doctor if you are having problems. It's also a good idea to know your test results and keep a list of the medicines you take. How can you care for yourself at home? · Take your medicine exactly as directed. Call your doctor if you think you are having a problem with your medicine. · Go to your counseling sessions and follow-up appointments. · Recognize and accept your anxiety. Then, when you are in a situation that makes you anxious, say to yourself, \"This is not an emergency. I feel uncomfortable, but I am not in danger. I can keep going even if I feel anxious. \"  · Be kind to your body:  ? Relieve tension with exercise or a massage. ? Get enough rest.  ? Avoid alcohol, caffeine, nicotine, and illegal drugs. They can increase your anxiety level, cause sleep problems, or trigger a panic attack. ? Learn and do relaxation techniques. See below for more about these techniques. · Engage your mind. Get out and do something you enjoy. Go to a funny movie, or take a walk or hike. Plan your day. Having too much or too little to do can make you anxious. · Keep a record of your symptoms. Discuss your fears with a good friend or family member, or join a support group for people with similar problems. Talking to others sometimes relieves stress. · Get involved in social groups, or volunteer to help others. Being alone sometimes makes things seem worse than they are. · Get at least 30 minutes of exercise on most days of the week to relieve stress. Walking is a good choice. You also may want to do other activities, such as running, swimming, cycling, or playing tennis or team sports. Relaxation techniques  Do relaxation exercises for 10 to 20 minutes a day. You can play soothing, relaxing music while you do them, if you wish. · Tell others in your house that you are going to do your relaxation exercises. Ask them not to disturb you. · Find a comfortable place, away from all distractions and noise.   · Lie down on your back, or sit with your back straight. · Focus on your breathing. Make it slow and steady. · Breathe in through your nose. Breathe out through either your nose or mouth. · Breathe deeply, filling up the area between your navel and your rib cage. Breathe so that your belly goes up and down. · Do not hold your breath. · Breathe like this for 5 to 10 minutes. Notice the feeling of calmness throughout your whole body. As you continue to breathe slowly and deeply, relax by doing the following for another 5 to 10 minutes:  · Tighten and relax each muscle group in your body. You can begin at your toes and work your way up to your head. · Imagine your muscle groups relaxing and becoming heavy. · Empty your mind of all thoughts. · Let yourself relax more and more deeply. · Become aware of the state of calmness that surrounds you. · When your relaxation time is over, you can bring yourself back to alertness by moving your fingers and toes and then your hands and feet and then stretching and moving your entire body. Sometimes people fall asleep during relaxation, but they usually wake up shortly afterward. · Always give yourself time to return to full alertness before you drive a car or do anything that might cause an accident if you are not fully alert. Never play a relaxation tape while driving a car. When should you call for help? Call 911 anytime you think you may need emergency care. For example, call if:    · You feel you cannot stop from hurting yourself or someone else.    Watch closely for changes in your health, and be sure to contact your doctor if:    · Your panic attacks get worse.     · You have new or different anxiety.     · You are not getting better as expected. Where can you learn more? Go to http://costa-jessica.info/. Enter H601 in the search box to learn more about \"Panic Attacks: Care Instructions. \"  Current as of: September 11, 2018  Content Version: 11.9  © 2625-7097 [employer]. Care instructions adapted under license by [employer] (which disclaims liability or warranty for this information). If you have questions about a medical condition or this instruction, always ask your healthcare professional. [address] any warranty or liability for your use of this information.

## 2019-07-05 NOTE — PROGRESS NOTES
Chief Complaint   Patient presents with   [facility] Follow Up     1. Have you been to the ER, urgent care clinic since your last visit? Hospitalized since your last visit? Patient was realeased from [facility] on 07/01/19    2. Have you seen or consulted any other health care providers outside of the [address] [place] [address] since your last visit? Include any pap smears or colon screening.  No

## 2019-07-08 ENCOUNTER — OFFICE VISIT (OUTPATIENT)
Dept: PULMONOLOGY | Age: 49
End: 2019-07-08

## 2019-07-08 VITALS
WEIGHT: 196 LBS | HEIGHT: 69 IN | SYSTOLIC BLOOD PRESSURE: 130 MMHG | BODY MASS INDEX: 29.03 KG/M2 | HEART RATE: 99 BPM | OXYGEN SATURATION: 95 % | RESPIRATION RATE: 18 BRPM | DIASTOLIC BLOOD PRESSURE: 80 MMHG | TEMPERATURE: 98.2 F

## 2019-07-08 DIAGNOSIS — J44.9 COPD, SEVERE (HCC): Primary | ICD-10-CM

## 2019-07-08 DIAGNOSIS — J40 BRONCHITIS: ICD-10-CM

## 2019-07-08 RX ORDER — AZITHROMYCIN 250 MG/1
TABLET, FILM COATED ORAL
Qty: 6 TAB | Refills: 0 | Status: SHIPPED | OUTPATIENT
Start: 2019-07-08 | End: 2019-07-13

## 2019-07-08 RX ORDER — PREDNISONE 20 MG/1
TABLET ORAL
Qty: 10 TAB | Refills: 0 | Status: SHIPPED | OUTPATIENT
Start: 2019-07-08 | End: 2019-09-26

## 2019-07-08 NOTE — PATIENT INSTRUCTIONS
Continue Spiriva 1 inhalation daily and remember to exhale fully before inhaling    Continue Advair 1 inhalation daily and remember to exhale fully before inhaling also remember to wash mouth with water and spit it out after inhaling    Albuterol 2 inhalations every 4 hours as needed if you require albuterol too often to control respiratory symptoms call the office for severe symptoms go to the emergency room    Zithromax 2 tablets now then 1 tablet daily for 4 more days    Prednisone 2 tablets every morning with breakfast for 5 days    Do not take Atarax (hydroxyzine) while taking Zithromax

## 2019-07-08 NOTE — PROGRESS NOTES
Aurelio Mercado presents today for   Chief Complaint   Patient presents with   [facility] Follow-up       Is someone accompanying this pt? No    Is the patient using any DME equipment during OV? No    -D[employer]    Depression Screening:  3 most recent PHQ Screens 6/26/2019   PHQ Not Done -   Little interest or pleasure in doing things Not at all   Feeling down, depressed, irritable, or hopeless Not at all   Total Score PHQ 2 0       Learning Assessment:  Learning Assessment 5/7/2019   PRIMARY LEARNER Patient   PRIMARY LANGUAGE ENGLISH   LEARNER PREFERENCE PRIMARY OTHER (COMMENT)     -     -   ANSWERED BY patient   RELATIONSHIP SELF       Abuse Screening:  Abuse Screening Questionnaire 1/8/2019   Do you ever feel afraid of your partner? N   Are you in a relationship with someone who physically or mentally threatens you? N   Is it safe for you to go home? Y       Fall Risk  Fall Risk Assessment, last 12 mths 9/17/2018   Able to walk? Yes   Fall in past 12 months? No         Coordination of Care:  1. Have you been to the ER, urgent care clinic since your last visit? Hospitalized since your last visit? Yes; Name: [facility]  7/2019  SO[facility]    2. Have you seen or consulted any other health care providers outside of the [address] [place] [address] since your last visit? Include any pap smears or colon screening.  No

## 2019-07-08 NOTE — PROGRESS NOTES
[facility]  Pulmonary, Critical Care, and Sleep Medicine      Chief complaint:  COPD    HPI:    Shannon Engel.    is 51years old and returns the office today for follow-up and relates that he had to go to the emergency room for severe episode of dyspnea which was relieved with steroids and bronchodilators. The patient relates now he is coughing and bringing up discolored mucus without blood and that his shortness of breath has improved. He is finished his course of prednisone. He continues to take Spiriva Advair and as needed albuterol he denies chest pain or leg swelling      Allergies   Allergen Reactions    Ciprofloxacin Nausea Only    Remeron [Mirtazapine] Other (comments)     Mood swings    Seroquel [Quetiapine] Other (comments)     Mood swings     Current Outpatient Medications   Medication Sig    busPIRone (BUSPAR) 10 mg tablet Take 1 Tab by mouth three (3) times daily for 30 days.  furosemide (LASIX) 20 mg tablet TAKE 1 TABLET BY MOUTH EVERY OTHER DAY    topiramate (TOPAMAX) 25 mg tablet Take two tabs at bedtime    DALIRESP 500 mcg tab tablet TAKE 1 TABLET BY MOUTH ONCE DAILY    ADVAIR DISKUS 500-50 mcg/dose diskus inhaler INHALE 1 PUFF BY MOUTH TWICE DAILY    tamsulosin (FLOMAX) 0.4 mg capsule Take 1 Cap by mouth daily.  FLUoxetine (PROZAC) 40 mg capsule Take  by mouth daily.  hydrOXYzine HCl (ATARAX) 50 mg tablet Take 50 mg by mouth nightly. Indications: 100 mg qhs    PROAIR HFA 90 mcg/actuation inhaler INHALE 2 PUFFS BY MOUTH EVERY 4 HOURS AS NEEDED FOR WHEEZING OR SHORTNESS OF BREATH    amitriptyline HCl (AMITRIPTYLINE PO) Take 75 mg by mouth nightly.  OXcarbazepine (TRILEPTAL) 150 mg tablet Take 150 mg by mouth two (2) times a day.     albuterol (PROVENTIL VENTOLIN) 2.5 mg /3 mL (0.083 %) nebulizer solution Nebulized 1 vial for inhalation every 4 hours as needed    tiZANidine (ZANAFLEX) 4 mg tablet Sig: Take 1 tab PO Q 6 as needed for muscle spasm  tiotropium (SPIRIVA) 18 mcg inhalation capsule Take 1 Cap by inhalation daily.  LORazepam (ATIVAN) 1 mg tablet Take 1 Tab by mouth every eight (8) hours as needed for Anxiety. Max Daily Amount: 3 mg.  Nebulizer & Compressor machine 1 Each by Does Not Apply route every four (4) hours as needed.  OXYGEN-AIR DELIVERY SYSTEMS (WALKABOUT 1 OXYGEN SYSTEM) 2.5 L/min by Nasal route continuous.  pantoprazole (PROTONIX) 40 mg tablet Take 1 Tab by mouth Daily (before breakfast).  Blood Pressure Monitor (BLOOD PRESSURE KIT) kit 1 Device by Does Not Apply route daily as needed (for blood pressure checks). No current facility-administered medications for this visit.       Past Medical History:   Diagnosis Date    Anxiety     Chest pain     Chronic diastolic heart failure secondary to coronary artery disease (HCC)     Chronic lung disease     Chronic obstructive pulmonary disease (HCC) 08/03/2017    PFTS 8/3/17    COPD, severe (HCC)     Cough     Emphysema/COPD (HCC)     Esophagitis 12/11/2017    Endoscopy    Essential hypertension, benign     Fast heart beat     Feeling of chest tightness     Frequent urination     Heartburn     Hepatomegaly     History of stomach ulcers     Hx of nausea and vomiting     Poor appetite     Positive urine drug screen 01/2016    opiates and THC    Shortness of breath     Splenomegaly     Stomach pain     Stress     Tiredness     Unintentional weight change     Upper GI bleed     Weakness     Wheeze      Past Surgical History:   Procedure Laterality Date    COLONOSCOPY N/A 11/5/2018    COLONOSCOPY with polypectomies performed by Nicky Lerner MD at [address] HX ENDOSCOPY  12/11/2017    HX WISDOM TEETH EXTRACTION Right 05/2019    IR BX LIVER PERCUTANEOUS       Social History     Socioeconomic History    Marital status:      Spouse name: Not on file    Number of children: Not on file    Years of education: Not on file    Highest education level: Not on file   Occupational History    Not on file   Social Needs    Financial resource strain: Not on file    Food insecurity:     Worry: Not on file     Inability: Not on file    Transportation needs:     Medical: Not on file     Non-medical: Not on file   Tobacco Use    Smoking status: Former Smoker     Packs/day: 2.00     Years: 25.00     Pack years: 50.00     Types: Cigarettes     Start date: 1984     Last attempt to quit: 2017     Years since quittin.5    Smokeless tobacco: Never Used   Substance and Sexual Activity    Alcohol use: No    Drug use: Yes     Types: Marijuana     Comment: Hx of Marijuana use    Sexual activity: Not Currently     Partners: Female   Lifestyle    Physical activity:     Days per week: Not on file     Minutes per session: Not on file    Stress: Not on file   Relationships    Social connections:     Talks on phone: Not on file     Gets together: Not on file     Attends Yazdanism service: Not on file     Active member of club or organization: Not on file     Attends meetings of clubs or organizations: Not on file     Relationship status: Not on file    Intimate partner violence:     Fear of current or ex partner: Not on file     Emotionally abused: Not on file     Physically abused: Not on file     Forced sexual activity: Not on file   Other Topics Concern     Service No    Blood Transfusions No    Caffeine Concern Yes    Occupational Exposure No    Hobby Hazards No    Sleep Concern Yes     Comment: occas    Stress Concern No    Weight Concern No    Special Diet No    Back Care Yes    Exercise No    Bike Helmet Yes    Seat Belt No     Comment: occas    Self-Exams Not Asked   Social History Narrative         Family History   Problem Relation Age of Onset    Hypertension Mother     Heart Disease Mother     Diabetes Mother     Hypertension Father     Heart Disease Father     Cancer Father lymphnodes    Diabetes Father        Review of systems:  He denies fever chills poor appetite or weight loss    Physical Exam:  Visit Vitals  /80 (BP 1 Location: Left arm, BP Patient Position: Sitting)   Pulse 99   Temp 98.2 °F (36.8 °C) (Oral)   Resp 18   Ht 5' 9\" (1.753 m)   Wt 88.9 kg (196 lb)   SpO2 95% Comment: RA Rest   BMI 28.94 kg/m²       Well-developed well-nourished  HEENT: WNL  Lymph node exam: Supraclavicular cervical lymph nodes negative  Chest: Equal symmetrical expansion no dullness no wheezes rales rubs  Heart: Regular rhythm no gallop no murmur no JVD no peripheral edema  Extremities: No cyanosis clubbing or calf tenderness  Neurological: Alert and oriented    Labs:    O2 sat room air at rest 95%    Impression:     By history and physical exam exacerbation of COPD which has improved  By history bronchitis suggested by purulent mucus production    Plan:     Continue Spiriva Advair PRN albuterol  Z-Josh and short course of prednisone  Follow-up for regular visit or sooner if needed    Krystal Emmanuel MD , [facility]    CC: Tania Weinberg, NP     [address] [facility], [address] [address]     P: [phone]     F: [phone]

## 2019-07-15 DIAGNOSIS — R06.02 SHORTNESS OF BREATH: ICD-10-CM

## 2019-07-15 DIAGNOSIS — J44.9 COPD, SEVERE (HCC): ICD-10-CM

## 2019-08-05 DIAGNOSIS — R33.9 RETENTION OF URINE: ICD-10-CM

## 2019-08-05 RX ORDER — ALBUTEROL SULFATE 90 UG/1
AEROSOL, METERED RESPIRATORY (INHALATION)
Qty: 1 INHALER | Refills: 2 | Status: SHIPPED | OUTPATIENT
Start: 2019-08-05 | End: 2019-12-05 | Stop reason: CLARIF

## 2019-08-05 RX ORDER — TAMSULOSIN HYDROCHLORIDE 0.4 MG/1
0.4 CAPSULE ORAL DAILY
Qty: 30 CAP | Refills: 11 | Status: SHIPPED | OUTPATIENT
Start: 2019-08-05 | End: 2020-06-18 | Stop reason: CLARIF

## 2019-08-07 ENCOUNTER — OFFICE VISIT (OUTPATIENT)
Dept: FAMILY MEDICINE CLINIC | Age: 49
End: 2019-08-07

## 2019-08-07 VITALS
BODY MASS INDEX: 29.47 KG/M2 | DIASTOLIC BLOOD PRESSURE: 67 MMHG | HEIGHT: 69 IN | RESPIRATION RATE: 22 BRPM | WEIGHT: 199 LBS | SYSTOLIC BLOOD PRESSURE: 109 MMHG | TEMPERATURE: 97.7 F | OXYGEN SATURATION: 94 % | HEART RATE: 116 BPM

## 2019-08-07 DIAGNOSIS — I10 ESSENTIAL HYPERTENSION, BENIGN: Primary | ICD-10-CM

## 2019-08-07 RX ORDER — LISINOPRIL 20 MG/1
TABLET ORAL DAILY
COMMUNITY
End: 2019-08-07 | Stop reason: SDUPTHER

## 2019-08-07 RX ORDER — LISINOPRIL 20 MG/1
20 TABLET ORAL DAILY
Qty: 90 TAB | Refills: 1 | Status: SHIPPED | OUTPATIENT
Start: 2019-08-07 | End: 2019-12-18 | Stop reason: SDUPTHER

## 2019-08-07 NOTE — PROGRESS NOTES
Chief Complaint   Patient presents with    Hypertension     1. Have you been to the ER, urgent care clinic since your last visit? Hospitalized since your last visit? Patient has went to [facility] ER twice since last visit    2. Have you seen or consulted any other health care providers outside of the [address] [place] [address] since your last visit? Include any pap smears or colon screening.  No

## 2019-08-07 NOTE — PROGRESS NOTES
RAMSES Reeder Sr. is a 51 y.o. male who presents today for HTN follow up. Hypertension ROS: taking medications as instructed, no medication side effects noted, no TIA's, no chest pain on exertion, no dyspnea on exertion, no swelling of ankles. Pt has an initial appointment with pain management Friday, August 9, 2019. Pt has a follow up with cardiology August 29, 2019. Pt has been carrying portable O2 @ 2.5 L for ongoing SOB, he has a follow up with his pulmonary specialist in September 2019. Pt also note he has been having to take two breathing treatments daily. Current Outpatient Medications   Medication Sig Dispense Refill    lisinopril (PRINIVIL, ZESTRIL) 20 mg tablet Take  by mouth daily.  PROAIR HFA 90 mcg/actuation inhaler INHALE 2 PUFFS BY MOUTH EVERY 4 HOURS AS NEEDED FOR WHEEZING OR SHORTNESS OF BREATH 1 Inhaler 2    tamsulosin (FLOMAX) 0.4 mg capsule Take 1 Cap by mouth daily. 30 Cap 11    tiotropium (SPIRIVA) 18 mcg inhalation capsule Take 1 Cap by inhalation daily. Indications: Bronchospasm Prevention with COPD 30 Cap 5    furosemide (LASIX) 20 mg tablet TAKE 1 TABLET BY MOUTH EVERY OTHER DAY 15 Tab 6    topiramate (TOPAMAX) 25 mg tablet Take two tabs at bedtime 60 Tab 3    DALIRESP 500 mcg tab tablet TAKE 1 TABLET BY MOUTH ONCE DAILY 30 Tab 5    ADVAIR DISKUS 500-50 mcg/dose diskus inhaler INHALE 1 PUFF BY MOUTH TWICE DAILY 1 Inhaler 5    FLUoxetine (PROZAC) 40 mg capsule Take  by mouth daily.  hydrOXYzine HCl (ATARAX) 50 mg tablet Take 50 mg by mouth nightly. Indications: 100 mg qhs      Blood Pressure Monitor (BLOOD PRESSURE KIT) kit 1 Device by Does Not Apply route daily as needed (for blood pressure checks). 1 Kit 0    amitriptyline HCl (AMITRIPTYLINE PO) Take 75 mg by mouth nightly.  OXcarbazepine (TRILEPTAL) 150 mg tablet Take 150 mg by mouth two (2) times a day.       albuterol (PROVENTIL VENTOLIN) 2.5 mg /3 mL (0.083 %) nebulizer solution Nebulized 1 vial for inhalation every 4 hours as needed 60 Each 2    tiZANidine (ZANAFLEX) 4 mg tablet Sig: Take 1 tab PO Q 6 as needed for muscle spasm 60 Tab 0    LORazepam (ATIVAN) 1 mg tablet Take 1 Tab by mouth every eight (8) hours as needed for Anxiety. Max Daily Amount: 3 mg. 90 Tab 0    Nebulizer & Compressor machine 1 Each by Does Not Apply route every four (4) hours as needed. 1 Each 0    OXYGEN-AIR DELIVERY SYSTEMS (WALKABOUT 1 OXYGEN SYSTEM) 2.5 L/min by Nasal route continuous.  pantoprazole (PROTONIX) 40 mg tablet Take 1 Tab by mouth Daily (before breakfast). 30 Tab 0    predniSONE (DELTASONE) 20 mg tablet 2 tablets every morning with breakfast 10 Tab 0      Allergies   Allergen Reactions    Ciprofloxacin Nausea Only    Remeron [Mirtazapine] Other (comments)     Mood swings    Seroquel [Quetiapine] Other (comments)     Mood swings       SUBJECTIVE:  Review of Systems   Constitutional: Negative for chills, fever and malaise/fatigue. Eyes: Negative for blurred vision. Respiratory: Negative for shortness of breath. Cardiovascular: Negative for chest pain, palpitations and leg swelling. Gastrointestinal: Negative for abdominal pain, diarrhea, nausea and vomiting. Genitourinary: Negative for dysuria, frequency and urgency. Musculoskeletal: Negative for myalgias. Neurological: Negative for dizziness, tingling, sensory change and headaches. OBJECTIVE:  Visit Vitals  /67 (BP 1 Location: Left arm, BP Patient Position: Sitting)   Pulse (!) 116   Temp 97.7 °F (36.5 °C) (Oral)   Resp 22   Ht 5' 9\" (1.753 m)   Wt 199 lb (90.3 kg)   SpO2 94%   BMI 29.39 kg/m²      I have reviewed/discussed the above normal BMI with the patient. I have recommended the following interventions: dietary management education, guidance, and counseling, encourage exercise and monitor weight .        Physical Exam   Constitutional: He is oriented to person, place, and time and well-developed, well-nourished, and in no distress. HENT:   Head: Normocephalic. Eyes: Pupils are equal, round, and reactive to light. Conjunctivae and EOM are normal.   Neck: No thyromegaly present. Cardiovascular: Normal rate, regular rhythm and normal heart sounds. Pulmonary/Chest: Effort normal and breath sounds normal. He has no wheezes. He has no rales. He exhibits no tenderness. Pt on portable O2 via NC @ 2.5L   Musculoskeletal: He exhibits no edema. Neurological: He is alert and oriented to person, place, and time. Gait normal.   Skin: Skin is warm and dry. No erythema. Psychiatric: Mood and affect normal.   Nursing note and vitals reviewed. ASSESSMENT:  Diagnoses and all orders for this visit:    1. Essential hypertension, benign  -     lisinopril (PRINIVIL, ZESTRIL) 20 mg tablet; Take 1 Tab by mouth daily. PLAN:  Medication refilled  current treatment plan is effective, no change in therapy  reviewed diet, exercise and weight control  recommended sodium restriction    I have discussed the diagnosis with the patient and the intended plan as seen in the above orders. The patient has received an after-visit summary and questions were answered concerning future plans. I have discussed medication side effects and warnings with the patient as well. Patient will call for further questions. Follow-up and Dispositions    · Return in about 2 months (around 10/7/2019) for follow up.        Isamar Liu NP

## 2019-08-07 NOTE — PATIENT INSTRUCTIONS
High Blood Pressure: Care Instructions  Overview    It's normal for blood pressure to go up and down throughout the day. But if it stays up, you have high blood pressure. Another name for high blood pressure is hypertension. Despite what a lot of people think, high blood pressure usually doesn't cause headaches or make you feel dizzy or lightheaded. It usually has no symptoms. But it does increase your risk of stroke, heart attack, and other problems. You and your doctor will talk about your risks of these problems based on your blood pressure. Your doctor will give you a goal for your blood pressure. Your goal will be based on your health and your age. Lifestyle changes, such as eating healthy and being active, are always important to help lower blood pressure. You might also take medicine to reach your blood pressure goal.  Follow-up care is a key part of your treatment and safety. Be sure to make and go to all appointments, and call your doctor if you are having problems. It's also a good idea to know your test results and keep a list of the medicines you take. How can you care for yourself at home? Medical treatment  · If you stop taking your medicine, your blood pressure will go back up. You may take one or more types of medicine to lower your blood pressure. Be safe with medicines. Take your medicine exactly as prescribed. Call your doctor if you think you are having a problem with your medicine. · Talk to your doctor before you start taking aspirin every day. Aspirin can help certain people lower their risk of a heart attack or stroke. But taking aspirin isn't right for everyone, because it can cause serious bleeding. · See your doctor regularly. You may need to see the doctor more often at first or until your blood pressure comes down. · If you are taking blood pressure medicine, talk to your doctor before you take decongestants or anti-inflammatory medicine, such as ibuprofen.  Some of these medicines can raise blood pressure. · Learn how to check your blood pressure at home. Lifestyle changes  · Stay at a healthy weight. This is especially important if you put on weight around the waist. Losing even 10 pounds can help you lower your blood pressure. · If your doctor recommends it, get more exercise. Walking is a good choice. Bit by bit, increase the amount you walk every day. Try for at least 30 minutes on most days of the week. You also may want to swim, bike, or do other activities. · Avoid or limit alcohol. Talk to your doctor about whether you can drink any alcohol. · Try to limit how much sodium you eat to less than 2,300 milligrams (mg) a day. Your doctor may ask you to try to eat less than 1,500 mg a day. · Eat plenty of fruits (such as bananas and oranges), vegetables, legumes, whole grains, and low-fat dairy products. · Lower the amount of saturated fat in your diet. Saturated fat is found in animal products such as milk, cheese, and meat. Limiting these foods may help you lose weight and also lower your risk for heart disease. · Do not smoke. Smoking increases your risk for heart attack and stroke. If you need help quitting, talk to your doctor about stop-smoking programs and medicines. These can increase your chances of quitting for good. When should you call for help? Call 911 anytime you think you may need emergency care. This may mean having symptoms that suggest that your blood pressure is causing a serious heart or blood vessel problem. Your blood pressure may be over 180/120.   For example, call 911 if:    · You have symptoms of a heart attack. These may include:  ? Chest pain or pressure, or a strange feeling in the chest.  ? Sweating. ? Shortness of breath. ? Nausea or vomiting. ? Pain, pressure, or a strange feeling in the back, neck, jaw, or upper belly or in one or both shoulders or arms. ? Lightheadedness or sudden weakness.   ? A fast or irregular heartbeat.     · You have symptoms of a stroke. These may include:  ? Sudden numbness, tingling, weakness, or loss of movement in your face, arm, or leg, especially on only one side of your body. ? Sudden vision changes. ? Sudden trouble speaking. ? Sudden confusion or trouble understanding simple statements. ? Sudden problems with walking or balance. ? A sudden, severe headache that is different from past headaches.     · You have severe back or belly pain.    Do not wait until your blood pressure comes down on its own. Get help right away.   Call your doctor now or seek immediate care if:    · Your blood pressure is much higher than normal (such as 180/120 or higher), but you don't have symptoms.     · You think high blood pressure is causing symptoms, such as:  ? Severe headache.  ? Blurry vision.    Watch closely for changes in your health, and be sure to contact your doctor if:    · Your blood pressure measures higher than your doctor recommends at least 2 times. That means the top number is higher or the bottom number is higher, or both.     · You think you may be having side effects from your blood pressure medicine. Where can you learn more? Go to http://costa-jessica.info/. Enter W765 in the search box to learn more about \"High Blood Pressure: Care Instructions. \"  Current as of: July 22, 2018  Content Version: 12.1  © 2104-8005 [employer]. Care instructions adapted under license by [employer] (which disclaims liability or warranty for this information). If you have questions about a medical condition or this instruction, always ask your healthcare professional. [place] [address] any warranty or liability for your use of this information.

## 2019-08-12 ENCOUNTER — HOSPITAL ENCOUNTER (OUTPATIENT)
Dept: GENERAL RADIOLOGY | Age: 49
Discharge: HOME | End: 2019-08-12
Payer: MEDICAID

## 2019-08-12 DIAGNOSIS — M54.2 CERVICALGIA: ICD-10-CM

## 2019-08-12 DIAGNOSIS — M54.50 LOW BACK PAIN: ICD-10-CM

## 2019-08-12 DIAGNOSIS — M54.6 PAIN IN THORACIC SPINE: ICD-10-CM

## 2019-08-12 PROCEDURE — 72070 X-RAY EXAM THORAC SPINE 2VWS: CPT

## 2019-08-12 PROCEDURE — 72052 X-RAY EXAM NECK SPINE 6/>VWS: CPT

## 2019-08-12 PROCEDURE — 72114 X-RAY EXAM L-S SPINE BENDING: CPT

## 2019-08-18 NOTE — PROGRESS NOTES
Pam Roberson Sr. today for evaluation of hypertension and abnormal EKG as requested by his PCP. He presented to the ER on 7/1/19 with complaints of shortness of breath and was diagnosed with an anxiety attack and COPD exacerbation. His EKG showed sinus tachycardia. His EKG showed sinus tachycardia and his troponin was less than 0.02. He is a 25-year-old  male with history of severe COPD, essential hypertension, chronic pain, and diastolic heart failure. He is being followed by gastroenterology for complaints of abdominal bloating and he states that he is going to have a biopsy done in the near future. He also has history of anxiety and panic attacks which is followed by psychiatry. His last echocardiogram was done in January 2018 and it showed ejection fraction 55-60%, no wall motion abnormalities, and no valvular pathology. He was last seen by Dr. Ronald Howe on 3/14/19. Denies chest pain, tightness, heaviness, and admits to palpitations. He states that he sleeps with his head elevated for comfort. He is able to go without his oxygen at times. He states that recently, he has been able to ride his bike or walk several blocks and his oxygen saturation level stays in the mid to upper 90s without his oxygen. Denies shortness of breath at rest, admits to chronic dyspnea on exertion, orthopnea and denies PND. Admits to abdominal bloating. Denies lightheadedness, dizziness, and syncope. Denies lower extremity edema and claudication. Denies nausea, vomiting, diarrhea, melena, hematochezia. Denies hematuria, urgency, frequency. Denies fever, chills.         PMH:  Past Medical History:   Diagnosis Date    Anxiety     Chest pain     Chronic diastolic heart failure secondary to coronary artery disease (HCC)     Chronic lung disease     Chronic obstructive pulmonary disease ([address] 08/03/2017    PFTS 8/3/17    COPD, severe (HCC)     Cough     Emphysema/COPD (HCC)     Esophagitis 12/11/2017    Endoscopy    Essential hypertension, benign     Fast heart beat     Feeling of chest tightness     Frequent urination     Heartburn     Hepatomegaly     History of stomach ulcers     Hx of nausea and vomiting     Poor appetite     Positive urine drug screen 01/2016    opiates and THC    Shortness of breath     Splenomegaly     Stomach pain     Stress     Tiredness     Unintentional weight change     Upper GI bleed     Weakness     Wheeze        PSH:  Past Surgical History:   Procedure Laterality Date    COLONOSCOPY N/A 11/5/2018    COLONOSCOPY with polypectomies performed by Janel Wild MD at [address] HX ENDOSCOPY  12/11/2017    HX WISDOM TEETH EXTRACTION Right 05/2019    IR BX LIVER PERCUTANEOUS         MEDS:  Current Outpatient Medications   Medication Sig    oxyCODONE-acetaminophen (PERCOCET) 5-325 mg per tablet Take 1 Tab by mouth every twelve (12) hours as needed for Pain.  lisinopril (PRINIVIL, ZESTRIL) 20 mg tablet Take 1 Tab by mouth daily.  PROAIR HFA 90 mcg/actuation inhaler INHALE 2 PUFFS BY MOUTH EVERY 4 HOURS AS NEEDED FOR WHEEZING OR SHORTNESS OF BREATH    tamsulosin (FLOMAX) 0.4 mg capsule Take 1 Cap by mouth daily.  tiotropium (SPIRIVA) 18 mcg inhalation capsule Take 1 Cap by inhalation daily. Indications: Bronchospasm Prevention with COPD    predniSONE (DELTASONE) 20 mg tablet 2 tablets every morning with breakfast    furosemide (LASIX) 20 mg tablet TAKE 1 TABLET BY MOUTH EVERY OTHER DAY    topiramate (TOPAMAX) 25 mg tablet Take two tabs at bedtime    DALIRESP 500 mcg tab tablet TAKE 1 TABLET BY MOUTH ONCE DAILY    ADVAIR DISKUS 500-50 mcg/dose diskus inhaler INHALE 1 PUFF BY MOUTH TWICE DAILY    FLUoxetine (PROZAC) 40 mg capsule Take  by mouth daily.  hydrOXYzine HCl (ATARAX) 50 mg tablet Take 50 mg by mouth nightly.  Indications: 100 mg qhs    Blood Pressure Monitor (BLOOD PRESSURE KIT) kit 1 Device by Does Not Apply route daily as needed (for blood pressure checks).  amitriptyline HCl (AMITRIPTYLINE PO) Take 75 mg by mouth nightly.  OXcarbazepine (TRILEPTAL) 150 mg tablet Take 150 mg by mouth two (2) times a day.  albuterol (PROVENTIL VENTOLIN) 2.5 mg /3 mL (0.083 %) nebulizer solution Nebulized 1 vial for inhalation every 4 hours as needed    LORazepam (ATIVAN) 1 mg tablet Take 1 Tab by mouth every eight (8) hours as needed for Anxiety. Max Daily Amount: 3 mg.  Nebulizer & Compressor machine 1 Each by Does Not Apply route every four (4) hours as needed.  OXYGEN-AIR DELIVERY SYSTEMS (WALKABOUT 1 OXYGEN SYSTEM) 2.5 L/min by Nasal route continuous.  pantoprazole (PROTONIX) 40 mg tablet Take 1 Tab by mouth Daily (before breakfast). No current facility-administered medications for this visit. Allergies and Sensitivities:  Allergies   Allergen Reactions    Ciprofloxacin Nausea Only    Remeron [Mirtazapine] Other (comments)     Mood swings    Seroquel [Quetiapine] Other (comments)     Mood swings       Family History:  Family History   Problem Relation Age of Onset    Hypertension Mother     Heart Disease Mother     Diabetes Mother     Hypertension Father     Heart Disease Father     Cancer Father         lymphnodes    Diabetes Father        Social History:  He  reports that he quit smoking about 20 months ago. His smoking use included cigarettes. He started smoking about 35 years ago. He has a 50.00 pack-year smoking history. He has never used smokeless tobacco.  He  reports that he does not drink alcohol. Physical:  Visit Vitals  /82   Pulse 97   Ht 5' 9\" (1.753 m)   Wt 90.3 kg (199 lb)   SpO2 96%   BMI 29.39 kg/m²           Exam:  Neck:  Supple, no JVD, no carotid bruits  CV:  Normal S1 and  S2, no murmurs, rubs, or gallops noted  Lungs:  Clear to ausculation throughout, no wheezes or rales  Abd:  Soft, non-tender, non-distended with good bowel sounds.   No hepatosplenomegaly  Extremities: 1+ slightly pitting lower extremity edema      Data:  EKG:  Read by Harvey Ashford MD - Nonspecific ST/T abnormality      LABS:  Lab Results   Component Value Date/Time    Sodium 141 07/01/2019 03:20 PM    Potassium 3.3 (L) 07/01/2019 03:20 PM    Chloride 107 07/01/2019 03:20 PM    CO2 26 07/01/2019 03:20 PM    Glucose 124 (H) 07/01/2019 03:20 PM    BUN 10 07/01/2019 03:20 PM    Creatinine 0.88 07/01/2019 03:20 PM     Lab Results   Component Value Date/Time    Cholesterol, total 199 02/23/2018 12:04 PM    HDL Cholesterol 36 (L) 01/23/2017 01:11 PM    LDL, calculated 85.6 01/23/2017 01:11 PM    Triglyceride 132 02/23/2018 12:04 PM    CHOL/HDL Ratio 4.0 01/23/2017 01:11 PM     Lab Results   Component Value Date/Time    ALT (SGPT) 22 05/27/2019 06:39 AM         Impression/Plan:  1. Severe COPD, on oxygen at 2 to 2.5L/min  2. Diastolic heart failure, appears compensated  3. Lower extremity edema, improving  4. Essential hypertension, blood pressure     Mr. Nathan Ernandez seen today for follow-up after an ER visit in July. He states that he was told that his EKG was \"abnormal.\"  ER records show that he was in sinus tachycardia. He denies chest pain and palpitations. He states that he frequently checks his vital signs and heart rate at home and if he notices that either are elevated he becomes quite anxious which then triggers his heart rate to become more elevated and then he begins to check this more often. If this cycle continues, he ends up going to the emergency room with a panic/anxiety attack. His EKG today shows normal sinus rhythm with heart rate 97 bpm.  He offers no cardiac complaints today. Breath sounds are clear but diminished and he does not exhibit any signs of volume overload/no edema. At this time, no changes were made to his medications. He states that he is trying to improve self-management of his anxiety attacks.   He was informed that it is okay for him to check his vital signs at home but it may be better that he not check it as often as he has been checking it as this seems to trigger his anxiety. He states that he will try to do better with this. He will follow-up with Dr. Kal Burden as scheduled and as needed. Sarabjit Montoya MSN, FNP-BC    Please note:  Portions of this chart were created with Dragon medical speech to text program.  Unrecognized errors may be present.

## 2019-08-22 ENCOUNTER — OFFICE VISIT (OUTPATIENT)
Dept: CARDIOLOGY CLINIC | Age: 49
End: 2019-08-22

## 2019-08-22 VITALS
HEIGHT: 69 IN | HEART RATE: 97 BPM | BODY MASS INDEX: 29.47 KG/M2 | OXYGEN SATURATION: 96 % | WEIGHT: 199 LBS | SYSTOLIC BLOOD PRESSURE: 130 MMHG | DIASTOLIC BLOOD PRESSURE: 82 MMHG

## 2019-08-22 DIAGNOSIS — R94.31 ABNORMAL EKG: Primary | ICD-10-CM

## 2019-08-22 DIAGNOSIS — I10 ESSENTIAL HYPERTENSION, BENIGN: ICD-10-CM

## 2019-08-22 DIAGNOSIS — Z99.81 OXYGEN DEPENDENT: ICD-10-CM

## 2019-08-22 DIAGNOSIS — I50.32 DIASTOLIC CHF, CHRONIC (HCC): ICD-10-CM

## 2019-08-22 RX ORDER — OXYCODONE AND ACETAMINOPHEN 5; 325 MG/1; MG/1
1 TABLET ORAL
COMMUNITY
End: 2020-06-18

## 2019-08-22 NOTE — PROGRESS NOTES
Erinn Aguirre presents today for   Chief Complaint   Patient presents with    Abnormal EKG    Hypertension       Rob Cord Sr. preferred language for health care discussion is english/other. Is someone accompanying this pt? Yes     Is the patient using any DME equipment during OV? Yes O2    Depression Screening:  3 most recent PHQ Screens 6/26/2019   PHQ Not Done -   Little interest or pleasure in doing things Not at all   Feeling down, depressed, irritable, or hopeless Not at all   Total Score PHQ 2 0       Learning Assessment:  Learning Assessment 5/7/2019   PRIMARY LEARNER Patient   PRIMARY LANGUAGE ENGLISH   LEARNER PREFERENCE PRIMARY OTHER (COMMENT)     -     -   ANSWERED BY patient   RELATIONSHIP SELF       Abuse Screening:  Abuse Screening Questionnaire 1/8/2019   Do you ever feel afraid of your partner? N   Are you in a relationship with someone who physically or mentally threatens you? N   Is it safe for you to go home? Y       Fall Risk  Fall Risk Assessment, last 12 mths 9/17/2018   Able to walk? Yes   Fall in past 12 months? No       Pt currently taking Anticoagulant therapy? No     Coordination of Care:  1. Have you been to the ER, urgent care clinic since your last visit? Hospitalized since your last visit? Yes [facility]     2. Have you seen or consulted any other health care providers outside of the [address] [place] [address] since your last visit? Include any pap smears or colon screening.  No

## 2019-08-27 RX ORDER — ALBUTEROL SULFATE 0.83 MG/ML
SOLUTION RESPIRATORY (INHALATION)
Qty: 60 EACH | Refills: 2 | Status: SHIPPED | OUTPATIENT
Start: 2019-08-27 | End: 2019-12-05 | Stop reason: SDUPTHER

## 2019-08-27 NOTE — TELEPHONE ENCOUNTER
Pt wife states pt needs a refill for Albuterol - Nebulizer sent to [facility]. Please advise [phone].

## 2019-09-23 PROBLEM — Z23 NEED FOR INFLUENZA VACCINATION: Status: RESOLVED | Noted: 2018-10-08 | Resolved: 2019-09-23

## 2019-09-23 PROBLEM — Z23 NEED FOR DIPHTHERIA-TETANUS-PERTUSSIS (TDAP) VACCINE: Status: RESOLVED | Noted: 2018-10-08 | Resolved: 2019-09-23

## 2019-09-26 ENCOUNTER — HOSPITAL ENCOUNTER (OUTPATIENT)
Dept: LAB | Age: 49
Discharge: HOME | End: 2019-09-26

## 2019-09-26 ENCOUNTER — OFFICE VISIT (OUTPATIENT)
Dept: NEUROLOGY | Age: 49
End: 2019-09-26

## 2019-09-26 VITALS
HEIGHT: 69 IN | TEMPERATURE: 97.9 F | HEART RATE: 90 BPM | DIASTOLIC BLOOD PRESSURE: 76 MMHG | BODY MASS INDEX: 29.62 KG/M2 | OXYGEN SATURATION: 95 % | WEIGHT: 200 LBS | RESPIRATION RATE: 18 BRPM | SYSTOLIC BLOOD PRESSURE: 110 MMHG

## 2019-09-26 DIAGNOSIS — R51.9 HEADACHE DISORDER: Primary | ICD-10-CM

## 2019-09-26 LAB — SENTARA SPECIMEN COL,SENBCF: NORMAL

## 2019-09-26 PROCEDURE — 99001 SPECIMEN HANDLING PT-LAB: CPT

## 2019-09-26 NOTE — PATIENT INSTRUCTIONS
Thank you for choosing [facility] and [facility] for your  
 
care. You may receive a survey about your visit. We appreciate you taking time  
 
to complete this survey as we use your feedback to improve our services. We  
 
realize we are not perfect, but we strive to provide excellent care.

## 2019-09-26 NOTE — PROGRESS NOTES
Chief Complaint   Patient presents with    Headache     follow up     3 most recent PHQ Screens 9/26/2019   PHQ Not Done -   Little interest or pleasure in doing things Not at all   Feeling down, depressed, irritable, or hopeless Not at all   Total Score PHQ 2 0       Jurline Phoenix Sr. presents today for   Chief Complaint   Patient presents with    Headache     follow up       Is someone accompanying this pt? no    Is the patient using any DME equipment during OV? Yes, [facility]    Depression Screening:  3 most recent PHQ Screens 9/26/2019   PHQ Not Done -   Little interest or pleasure in doing things Not at all   Feeling down, depressed, irritable, or hopeless Not at all   Total Score PHQ 2 0       Learning Assessment:  Learning Assessment 5/7/2019   PRIMARY LEARNER Patient   PRIMARY LANGUAGE ENGLISH   LEARNER PREFERENCE PRIMARY OTHER (COMMENT)     -     -   ANSWERED BY patient   RELATIONSHIP SELF       Abuse Screening:  Abuse Screening Questionnaire 1/8/2019   Do you ever feel afraid of your partner? N   Are you in a relationship with someone who physically or mentally threatens you? N   Is it safe for you to go home? Y       Fall Risk  Fall Risk Assessment, last 12 mths 9/17/2018   Able to walk? Yes   Fall in past 12 months? No         Coordination of Care:  1. Have you been to the ER, urgent care clinic since your last visit? Hospitalized since your last visit? no    2. Have you seen or consulted any other health care providers outside of the [address] [place] [address] since your last visit? Include any pap smears or colon screening. sepideh Drummond, zhane loyd.

## 2019-10-17 ENCOUNTER — OFFICE VISIT (OUTPATIENT)
Dept: FAMILY MEDICINE CLINIC | Age: 49
End: 2019-10-17

## 2019-10-17 VITALS
BODY MASS INDEX: 30.07 KG/M2 | HEIGHT: 69 IN | WEIGHT: 203 LBS | OXYGEN SATURATION: 94 % | SYSTOLIC BLOOD PRESSURE: 119 MMHG | TEMPERATURE: 97.6 F | RESPIRATION RATE: 18 BRPM | HEART RATE: 108 BPM | DIASTOLIC BLOOD PRESSURE: 80 MMHG

## 2019-10-17 DIAGNOSIS — I70.90 ATHEROSCLEROSIS: Primary | ICD-10-CM

## 2019-10-17 DIAGNOSIS — I70.90 ATHEROSCLEROSIS: ICD-10-CM

## 2019-10-17 NOTE — PROGRESS NOTES
HISTORY OF PRESENT ILLNESS  Perla Hoyos is a 51 y.o. male. HPI  Patient is here to follow up on xray studies of spine revealing atherosclerosis. Pt had some plain xrays of his spine. each level of the spine revealed evidence of atherosclerosis. He was advised by his pain management MD ( Dr. Angy Manzano) to have this checked out. He already sees Dr. Loren Summers - cardiology for his known h/o CAD/CHF. Under the circumstances, he likely just needs follow up there. Today he has no CP. No SOB beyond his baseline. He has chronic back pain; He is already under pain management care with a treatment plan in place. Current Outpatient Medications:     albuterol (PROVENTIL VENTOLIN) 2.5 mg /3 mL (0.083 %) nebu, Nebulized 1 vial for inhalation every 4 hours as needed, Disp: 60 Each, Rfl: 2    oxyCODONE-acetaminophen (PERCOCET) 5-325 mg per tablet, Take 1 Tab by mouth every twelve (12) hours as needed for Pain., Disp: , Rfl:     lisinopril (PRINIVIL, ZESTRIL) 20 mg tablet, Take 1 Tab by mouth daily. , Disp: 90 Tab, Rfl: 1    PROAIR HFA 90 mcg/actuation inhaler, INHALE 2 PUFFS BY MOUTH EVERY 4 HOURS AS NEEDED FOR WHEEZING OR SHORTNESS OF BREATH, Disp: 1 Inhaler, Rfl: 2    tamsulosin (FLOMAX) 0.4 mg capsule, Take 1 Cap by mouth daily. , Disp: 30 Cap, Rfl: 11    tiotropium (SPIRIVA) 18 mcg inhalation capsule, Take 1 Cap by inhalation daily. Indications: Bronchospasm Prevention with COPD, Disp: 30 Cap, Rfl: 5    furosemide (LASIX) 20 mg tablet, TAKE 1 TABLET BY MOUTH EVERY OTHER DAY, Disp: 15 Tab, Rfl: 6    DALIRESP 500 mcg tab tablet, TAKE 1 TABLET BY MOUTH ONCE DAILY, Disp: 30 Tab, Rfl: 5    ADVAIR DISKUS 500-50 mcg/dose diskus inhaler, INHALE 1 PUFF BY MOUTH TWICE DAILY, Disp: 1 Inhaler, Rfl: 5    hydrOXYzine HCl (ATARAX) 50 mg tablet, Take 50 mg by mouth nightly.  Indications: 100 mg qhs, Disp: , Rfl:     Blood Pressure Monitor (BLOOD PRESSURE KIT) kit, 1 Device by Does Not Apply route daily as needed (for blood pressure checks). , Disp: 1 Kit, Rfl: 0    amitriptyline HCl (AMITRIPTYLINE PO), Take 75 mg by mouth nightly., Disp: , Rfl:     OXcarbazepine (TRILEPTAL) 150 mg tablet, Take 150 mg by mouth two (2) times a day., Disp: , Rfl:     LORazepam (ATIVAN) 1 mg tablet, Take 1 Tab by mouth every eight (8) hours as needed for Anxiety. Max Daily Amount: 3 mg., Disp: 90 Tab, Rfl: 0    Nebulizer & Compressor machine, 1 Each by Does Not Apply route every four (4) hours as needed. , Disp: 1 Each, Rfl: 0    OXYGEN-AIR DELIVERY SYSTEMS (WALKABOUT 1 OXYGEN SYSTEM), 2.5 L/min by Nasal route continuous. , Disp: , Rfl:     pantoprazole (PROTONIX) 40 mg tablet, Take 1 Tab by mouth Daily (before breakfast). , Disp: 30 Tab, Rfl: 0       PMH,  Meds, Allergies, Family History, Social history reviewed       Review of Systems   Constitutional: Negative for chills and fever. Respiratory: Negative for cough and wheezing. Cardiovascular: Negative for chest pain and palpitations. Physical Exam   Constitutional: He appears well-developed and well-nourished. No distress. Neck: Neck supple. No thyromegaly present. No carotid bruits   Cardiovascular: Normal rate and regular rhythm. Exam reveals no gallop and no friction rub. No murmur heard. Pulmonary/Chest: Breath sounds normal. No respiratory distress. He has no wheezes. He has no rales. Musculoskeletal: He exhibits no edema. Nursing note and vitals reviewed. Visit Vitals  /80 (BP 1 Location: Right arm, BP Patient Position: Sitting)   Pulse (!) 108   Temp 97.6 °F (36.4 °C) (Oral)   Resp 18   Ht 5' 9\" (1.753 m)   Wt 203 lb (92.1 kg)   SpO2 94%   BMI 29.98 kg/m²         ASSESSMENT and PLAN    ICD-10-CM ICD-9-CM    1.  Atherosclerosis I70.90 440.9 LIPID PANEL       As above,   Pt to see Cardiology  Check lipid profile  Pt states he will call and set up his appointment with cardiology ; he states he does not need a referral.  Follow-up and Dispositions    · Return in about 2 months (around 12/17/2019), or atherosclerosis. An After Visit Summary was printed and given to the patient. This has been fully explained to the patient, who indicates understanding.

## 2019-10-17 NOTE — PATIENT INSTRUCTIONS
Learning About Coronary Artery Disease (CAD)  What is coronary artery disease? Coronary artery disease (CAD) occurs when plaque builds up in the arteries that bring oxygen-rich blood to your heart. Plaque is a fatty substance made of cholesterol, calcium, and other substances in the blood. This process is called hardening of the arteries, or atherosclerosis. What happens when you have coronary artery disease? · Plaque may narrow the coronary arteries. Narrowed arteries cause poor blood flow. This can lead to angina symptoms such as chest pain or discomfort. If blood flow is completely blocked, you could have a heart attack. · You can slow CAD and reduce the risk of future problems by making changes in your lifestyle. These include quitting smoking and eating heart-healthy foods. · Treatments for CAD, along with changes in your lifestyle, can help you live a longer and healthier life. How can you prevent coronary artery disease? · Do not smoke. It may be the best thing you can do to prevent heart disease. If you need help quitting, talk to your doctor about stop-smoking programs and medicines. These can increase your chances of quitting for good. · Be active. Get at least 30 minutes of exercise on most days of the week. Walking is a good choice. You also may want to do other activities, such as running, swimming, cycling, or playing tennis or team sports. · Eat heart-healthy foods. Eat more fruits and vegetables and less foods that contain saturated and trans fats. Limit alcohol, sodium, and sweets. · Stay at a healthy weight. Lose weight if you need to. · Manage other health problems such as diabetes, high blood pressure, and high cholesterol. How is coronary artery disease treated? · Your doctor will suggest that you make lifestyle changes. For example, your doctor may ask you to eat healthy foods, quit smoking, lose extra weight, and be more active. · You will have to take medicines.   · Your doctor may suggest a procedure to open narrowed or blocked arteries. This is called angioplasty. Or your doctor may suggest using healthy blood vessels to create detours around narrowed or blocked arteries. This is called bypass surgery. Follow-up care is a key part of your treatment and safety. Be sure to make and go to all appointments, and call your doctor if you are having problems. It's also a good idea to know your test results and keep a list of the medicines you take. Where can you learn more? Go to http://costa-jessica.info/. Enter (09) 1876 3146 in the search box to learn more about \"Learning About Coronary Artery Disease (CAD). \"  Current as of: April 9, 2019  Content Version: 12.2  © 5943-3987 [employer]. Care instructions adapted under license by [employer] (which disclaims liability or warranty for this information). If you have questions about a medical condition or this instruction, always ask your healthcare professional. [place] [address] any warranty or liability for your use of this information.

## 2019-10-17 NOTE — PROGRESS NOTES
Patient is here today for evaluation and treatment of: CT Results Showing Multiple Sites For Arthritis/Carotid Artery Blockage        1. Have you been to the ER, urgent care clinic since your last visit? Hospitalized since your last visit? No    2. Have you seen or consulted any other health care providers outside of the [address] [place] [address] since your last visit? Include any pap smears or colon screening.  Orthopedist and pain mamagement  Dr Shahram Brandt

## 2019-10-18 ENCOUNTER — HOSPITAL ENCOUNTER (OUTPATIENT)
Dept: LAB | Age: 49
Discharge: HOME | End: 2019-10-18

## 2019-10-18 LAB
CHOLEST SERPL-MCNC: 204 MG/DL (ref 110–200)
HDLC SERPL-MCNC: 4.4 MG/DL (ref 0–5)
HDLC SERPL-MCNC: 46 MG/DL (ref 40–59)
LDLC SERPL CALC-MCNC: 125 MG/DL (ref 50–99)
SENTARA SPECIMEN COL,SENBCF: NORMAL
TRIGL SERPL-MCNC: 163 MG/DL (ref 40–149)
VLDLC SERPL CALC-MCNC: 33 MG/DL (ref 8–30)

## 2019-10-18 PROCEDURE — 99001 SPECIMEN HANDLING PT-LAB: CPT

## 2019-10-29 ENCOUNTER — TELEPHONE (OUTPATIENT)
Dept: PULMONOLOGY | Age: 49
End: 2019-10-29

## 2019-10-29 NOTE — TELEPHONE ENCOUNTER
PT'S WIFE CALLED([phone]). PT HAS CHEST COLD AND COUGHING UP GREEN PHLEGM. HE MAY NEED AN ANTIBIOTIC. HIS PHARMACY IS [facility] [id number]. PLEASE CALL HIM BACK.

## 2019-10-30 ENCOUNTER — OFFICE VISIT (OUTPATIENT)
Dept: PULMONOLOGY | Age: 49
End: 2019-10-30

## 2019-10-30 VITALS
HEIGHT: 69 IN | SYSTOLIC BLOOD PRESSURE: 122 MMHG | OXYGEN SATURATION: 93 % | HEART RATE: 92 BPM | RESPIRATION RATE: 18 BRPM | DIASTOLIC BLOOD PRESSURE: 70 MMHG | BODY MASS INDEX: 29.92 KG/M2 | TEMPERATURE: 98.2 F | WEIGHT: 202 LBS

## 2019-10-30 DIAGNOSIS — J44.9 COPD, SEVERE (HCC): Primary | ICD-10-CM

## 2019-10-30 DIAGNOSIS — J40 BRONCHITIS: ICD-10-CM

## 2019-10-30 RX ORDER — PREDNISONE 10 MG/1
TABLET ORAL
Qty: 45 TAB | Refills: 0 | Status: SHIPPED | OUTPATIENT
Start: 2019-10-30 | End: 2019-12-18 | Stop reason: ALTCHOICE

## 2019-10-30 RX ORDER — AZITHROMYCIN 250 MG/1
TABLET, FILM COATED ORAL
Qty: 6 TAB | Refills: 0 | Status: SHIPPED | OUTPATIENT
Start: 2019-10-30 | End: 2019-12-18 | Stop reason: ALTCHOICE

## 2019-10-30 RX ORDER — PREDNISONE 10 MG/1
TABLET ORAL
Qty: 42 TAB | Refills: 0 | Status: SHIPPED | OUTPATIENT
Start: 2019-10-30 | End: 2019-10-30 | Stop reason: CLARIF

## 2019-10-30 NOTE — PROGRESS NOTES
[facility]  Pulmonary, Critical Care, and Sleep Medicine      Pulmonary Office Progress Notes    Name: Jania Bullock Sr.     : 1970     Date: 10/30/2019        Subjective:     10/30/2019          HPI    Jania Bullock Sr.  is a 47 y.o. male with PMH of COPD and CAD who presents for evaluation of cough. He was last seen here on 2019 by Dr. Jin Beard with similar complaints and was treated with a prednisone taper and Z-Josh. Today he appears comfortable, in no distress but reports that he has had a worsening cough for 1 week. He describes the cough as daily, persistent and productive of green mucus. He reports worsening shortness of breath and has been using his albuterol more frequently, about 3 times per day. He continues to take daily ICS/LABA/L AMA and Daliresp. He denies chest pain or hemoptysis. No fever, chills or orthopnea; no leg/calf pain or swelling and no decreased appetite or weight loss. His last PFTs were on 2017 and demonstrated severe obstructive defect with reduced diffusion capacity. He is a former smoker of cigarettes with a 50-pack-year history and quit in .             Past Medical History:   Diagnosis Date    Anxiety     Chest pain     Chronic diastolic heart failure secondary to coronary artery disease (HCC)     Chronic lung disease     Chronic obstructive pulmonary disease (HCC) 2017    PFTS 8/3/17    COPD, severe (HCC)     Cough     Emphysema/COPD (HCC)     Esophagitis 2017    Endoscopy    Essential hypertension, benign     Fast heart beat     Feeling of chest tightness     Frequent urination     Heartburn     Hepatomegaly     History of stomach ulcers     Hx of nausea and vomiting     Poor appetite     Positive urine drug screen 2016    opiates and THC    Shortness of breath     Splenomegaly     Stomach pain     Stress     Tiredness     Unintentional weight change     Upper GI bleed     Weakness     Wheeze        Allergies   Allergen Reactions    Ciprofloxacin Nausea Only    Remeron [Mirtazapine] Other (comments)     Mood swings    Seroquel [Quetiapine] Other (comments)     Mood swings       Current Outpatient Medications   Medication Sig Dispense Refill    azithromycin (ZITHROMAX) 250 mg tablet Take 2 tabs on day one then take one tab q 24 hours x 4 days 6 Tab 0    predniSONE (DELTASONE) 10 mg tablet Take 50 mg x 3 days, take 40 mg x 3 days, take 30 mg x 3 days, take 20 mg x 3 days. Take with breakfast 42 Tab 0    albuterol (PROVENTIL VENTOLIN) 2.5 mg /3 mL (0.083 %) nebu Nebulized 1 vial for inhalation every 4 hours as needed 60 Each 2    oxyCODONE-acetaminophen (PERCOCET) 5-325 mg per tablet Take 1 Tab by mouth every twelve (12) hours as needed for Pain.  lisinopril (PRINIVIL, ZESTRIL) 20 mg tablet Take 1 Tab by mouth daily. 90 Tab 1    PROAIR HFA 90 mcg/actuation inhaler INHALE 2 PUFFS BY MOUTH EVERY 4 HOURS AS NEEDED FOR WHEEZING OR SHORTNESS OF BREATH 1 Inhaler 2    tamsulosin (FLOMAX) 0.4 mg capsule Take 1 Cap by mouth daily. 30 Cap 11    tiotropium (SPIRIVA) 18 mcg inhalation capsule Take 1 Cap by inhalation daily. Indications: Bronchospasm Prevention with COPD 30 Cap 5    furosemide (LASIX) 20 mg tablet TAKE 1 TABLET BY MOUTH EVERY OTHER DAY 15 Tab 6    DALIRESP 500 mcg tab tablet TAKE 1 TABLET BY MOUTH ONCE DAILY 30 Tab 5    ADVAIR DISKUS 500-50 mcg/dose diskus inhaler INHALE 1 PUFF BY MOUTH TWICE DAILY 1 Inhaler 5    Blood Pressure Monitor (BLOOD PRESSURE KIT) kit 1 Device by Does Not Apply route daily as needed (for blood pressure checks). 1 Kit 0    amitriptyline HCl (AMITRIPTYLINE PO) Take 75 mg by mouth nightly.  OXcarbazepine (TRILEPTAL) 150 mg tablet Take 150 mg by mouth two (2) times a day.  LORazepam (ATIVAN) 1 mg tablet Take 1 Tab by mouth every eight (8) hours as needed for Anxiety.  Max Daily Amount: 3 mg. 90 Tab 0    Nebulizer & Compressor machine 1 Each by Does Not Apply route every four (4) hours as needed. 1 Each 0    OXYGEN-AIR DELIVERY SYSTEMS (WALKABOUT 1 OXYGEN SYSTEM) 2.5 L/min by Nasal route continuous.  pantoprazole (PROTONIX) 40 mg tablet Take 1 Tab by mouth Daily (before breakfast). 30 Tab 0       Review of Systems:    HEENT: No epistaxis, no nasal drainage, no difficulty in swallowing, no redness in eyes  Respiratory: As stated above in HPI  Cardiovascular: no chest pain, no palpitations, no chronic leg edema, no syncope  Gastrointestinal: no abd pain, no vomiting, no diarrhea, no bleeding symptoms  Genitourinary: No urinary symptoms or hematuria  Integument/breast: No ulcers or rashes  Musculoskeletal: No leg / calf pain. Chronic back pain  Neurological: No focal weakness, no seizures, no headaches  Behvioral/Psych: No anxiety, no depression  Constitutional: No fever, chills or night sweats. No decreased appetite or weight loss     Objective:     Visit Vitals  /70 (BP 1 Location: Left arm, BP Patient Position: Sitting)   Pulse 92   Temp 98.2 °F (36.8 °C) (Oral)   Resp 18   Ht 5' 9\" (1.753 m)   Wt 91.6 kg (202 lb)   SpO2 93%   BMI 29.83 kg/m²        PHYSICAL EXAM      General: Oriented to person, place, and time. Well-developed, well-nourished, and in no distress      Head:   Normocephalic, without obvious abnormality, atraumatic       Eyes:   Pupils reactive, conjunctivae / corneas clear. EOM's intact, no scleral icterus       Nose:   Nares normal, no drainage. Throat:    Lips, mucosa and tongue normal. Teeth and gums normal       Neck:   Supple, symmetrical, trachea midline. No adenopathy or thyroid swelling; no carotid bruit or JVD. CVS:    Regular rate and rhythm. S1S2 normal,  no murmurs       RS:      Symmetrical chest rise, diminished AE bilaterally. Mild diffuse expiratory wheezing throughout lung fields. No rales or rhonchi, no accessory muscle use      Abd:     Soft, non-tender.   No hepatosplenomegaly                                                     Neuro:   non focal, awake, alert and oriented to person, place, time and situation    Extrm:   no leg edema,  clubbing or cyanosis       Skin:   no rash    Data review:     Hospital Outpatient Visit on 10/18/2019   Component Date Value Ref Range Status    [facility] SPE[facility]IM[facility] COL 10/18/2019 Specimens collected/sent to [facility]    Final   Orders Only on 10/17/2019   Component Date Value Ref Range Status    Triglyceride 10/18/2019 163* 40 - 149 mg/dL Final    HDL Cholesterol 10/18/2019 46  40 - 59 mg/dL Final    Cholesterol, total 10/18/2019 204* 110 - 200 mg/dL Final    CHOLESTEROL/HDL 10/18/2019 4.4  0.0 - 5.0 Final    LDL, calculated 10/18/2019 125* 50 - 99 mg/dL Final    VLDL, calculated 10/18/2019 33* 8 - 30 mg/dL Final    Comment: Cholesterol Recommended NCEP guidelines in mg/dL:  Less than 200            Desirable  200 - 239                Borderline High  Greater than or  = 240   High  Please Note:  Total Chol/HDL Ratio                   Men     Women  1/2 Avg. Risk    3.4     3.3      Avg. Risk    5.0     4.4  2X  Avg. Risk    9.6     7.1  3X  Avg.  Risk   23.4    11.0     Hospital Outpatient Visit on 09/26/2019   Component Date Value Ref Range Status    [facility] SPE[facility]IM[facility] COL 09/26/2019 Specimens collected/sent to [facility]    Final   Admission on 07/01/2019, Discharged on 07/01/2019   Component Date Value Ref Range Status    Ventricular Rate 07/01/2019 108  BPM Final    Atrial Rate 07/01/2019 108  BPM Final    P-R Interval 07/01/2019 150  ms Final    QRS Duration 07/01/2019 70  ms Final    Q-T Interval 07/01/2019 334  ms Final    QTC Calculation (Bezet) 07/01/2019 447  ms Final    Calculated P Axis 07/01/2019 52  degrees Final    Calculated R Axis 07/01/2019 54  degrees Final    Calculated T Axis 07/01/2019 60  degrees Final    Diagnosis 07/01/2019    Final                    Value:Sinus tachycardia  T wave abnormality, consider lateral ischemia  Abnormal ECG  When compared with ECG of 27-MAY-2019 06:52,  T wave inversion now evident in Anterolateral leads  Confirmed by Abiel Moreira MD, --- ([id number]) on 7/1/2019 5:03:29 PM      WBC 07/01/2019 5.8  4.6 - 13.2 K/uL Final    RBC 07/01/2019 4.76  4.70 - 5.50 M/uL Final    HGB 07/01/2019 14.5  13.0 - 16.0 g/dL Final    HCT 07/01/2019 39.9  36.0 - 48.0 % Final    MCV 07/01/2019 83.8  74.0 - 97.0 FL Final    MCH 07/01/2019 30.5  24.0 - 34.0 PG Final    MCHC 07/01/2019 36.3  31.0 - 37.0 g/dL Final    RDW 07/01/2019 13.5  11.6 - 14.5 % Final    PLATELET 68/37/7677 415  135 - 420 K/uL Final    MPV 07/01/2019 10.4  9.2 - 11.8 FL Final    NEUTROPHILS 07/01/2019 69  40 - 73 % Final    LYMPHOCYTES 07/01/2019 22  21 - 52 % Final    MONOCYTES 07/01/2019 7  3 - 10 % Final    EOSINOPHILS 07/01/2019 2  0 - 5 % Final    BASOPHILS 07/01/2019 0  0 - 2 % Final    ABS. NEUTROPHILS 07/01/2019 4.1  1.8 - 8.0 K/UL Final    ABS. LYMPHOCYTES 07/01/2019 1.3  0.9 - 3.6 K/UL Final    ABS. MONOCYTES 07/01/2019 0.4  0.05 - 1.2 K/UL Final    ABS. EOSINOPHILS 07/01/2019 0.1  0.0 - 0.4 K/UL Final    ABS.  BASOPHILS 07/01/2019 0.0  0.0 - 0.1 K/UL Final    DF 07/01/2019 AUTOMATED    Final    Sodium 07/01/2019 141  136 - 145 mmol/L Final    Potassium 07/01/2019 3.3* 3.5 - 5.5 mmol/L Final    Chloride 07/01/2019 107  100 - 108 mmol/L Final    CO2 07/01/2019 26  21 - 32 mmol/L Final    Anion gap 07/01/2019 8  3.0 - 18 mmol/L Final    Glucose 07/01/2019 124* 74 - 99 mg/dL Final    BUN 07/01/2019 10  7.0 - 18 MG/DL Final    Creatinine 07/01/2019 0.88  0.6 - 1.3 MG/DL Final    BUN/Creatinine ratio 07/01/2019 11* 12 - 20   Final    GFR est AA 07/01/2019 >60  >60 ml/min/1.73m2 Final    GFR est non-AA 07/01/2019 >60  >60 ml/min/1.73m2 Final    Comment: (NOTE)  Estimated GFR is calculated using the Modification of Diet in Renal   Disease (MDRD) Study equation, reported for both  Americans   (GFRAA) and non- Americans (GFRNA), and normalized to 1.73m2   body surface area. The physician must decide which value applies to   the patient. The MDRD study equation should only be used in   individuals age 25 or older. It has not been validated for the   following: pregnant women, patients with serious comorbid conditions,   or on certain medications, or persons with extremes of body size,   muscle mass, or nutritional status.  Calcium 07/01/2019 8.5  8.5 - 10.1 MG/DL Final    CK 07/01/2019 39  39 - 308 U/L Final    CK - MB 07/01/2019 1.1  <3.6 ng/ml Final    CK-MB Index 07/01/2019 2.8  0.0 - 4.0 % Final    Troponin-I, QT 07/01/2019 <0.02  0.0 - 0.045 NG/ML Final    Comment: The presence of detectable troponin above the reference range indicates myocardial injury which may be due to ischemia, myocarditis, trauma, etc.  Clinical correlation is necessary to establish the significance of this finding. Sequential testing is recommended to determine if the typical rise and fall of cTnI is demonstrated. Note:  Cardiac troponin I has a relatively long half life and may be present well after the CK MB has returned to baseline. The reference range is based on the 99th percentile of the referent population.       Device: 07/01/2019 NASAL CANNULA    Final    Flow rate (POC) 07/01/2019 3  L/M Final    pH (POC) 07/01/2019 7.304* 7.35 - 7.45   Final    pCO2 (POC) 07/01/2019 52.2* 35.0 - 45.0 MMHG Final    pO2 (POC) 07/01/2019 24* 80 - 100 MMHG Final    HCO3 (POC) 07/01/2019 26.0  22 - 26 MMOL/L Final    sO2 (POC) 07/01/2019 36* 92 - 97 % Final    Base deficit (POC) 07/01/2019 1  mmol/L Final    THIS IS A NEGATIVE BASE EXCESS RESULT    Allens test (POC) 07/01/2019 YES    Final    Site 07/01/2019 LEFT RADIAL    Final    Specimen type (POC) 07/01/2019 ARTERIAL    Final    Performed by 07/01/2019 [address] [facility]   Final   Admission on 05/27/2019, Discharged on 05/27/2019   Component Date Value Ref Range Status    WBC 05/27/2019 8.6  4.6 - 13.2 K/uL Final    RBC 05/27/2019 4.09* 4.70 - 5.50 M/uL Final    HGB 05/27/2019 12.4* 13.0 - 16.0 g/dL Final    HCT 05/27/2019 34.9* 36.0 - 48.0 % Final    MCV 05/27/2019 85.3  74.0 - 97.0 FL Final    MCH 05/27/2019 30.3  24.0 - 34.0 PG Final    MCHC 05/27/2019 35.5  31.0 - 37.0 g/dL Final    RDW 05/27/2019 13.7  11.6 - 14.5 % Final    PLATELET 41/18/3801 569  135 - 420 K/uL Final    MPV 05/27/2019 9.2  9.2 - 11.8 FL Final    NEUTROPHILS 05/27/2019 77* 40 - 73 % Final    LYMPHOCYTES 05/27/2019 10* 21 - 52 % Final    MONOCYTES 05/27/2019 12* 3 - 10 % Final    EOSINOPHILS 05/27/2019 1  0 - 5 % Final    BASOPHILS 05/27/2019 0  0 - 2 % Final    ABS. NEUTROPHILS 05/27/2019 6.7  1.8 - 8.0 K/UL Final    ABS. LYMPHOCYTES 05/27/2019 0.8* 0.9 - 3.6 K/UL Final    ABS. MONOCYTES 05/27/2019 1.1  0.05 - 1.2 K/UL Final    ABS. EOSINOPHILS 05/27/2019 0.0  0.0 - 0.4 K/UL Final    ABS. BASOPHILS 05/27/2019 0.0  0.0 - 0.1 K/UL Final    DF 05/27/2019 AUTOMATED    Final    Sodium 05/27/2019 136  136 - 145 mmol/L Final    Potassium 05/27/2019 4.3  3.5 - 5.5 mmol/L Final    Chloride 05/27/2019 105  100 - 108 mmol/L Final    CO2 05/27/2019 24  21 - 32 mmol/L Final    Anion gap 05/27/2019 7  3.0 - 18 mmol/L Final    Glucose 05/27/2019 126* 74 - 99 mg/dL Final    BUN 05/27/2019 16  7.0 - 18 MG/DL Final    Creatinine 05/27/2019 1.10  0.6 - 1.3 MG/DL Final    BUN/Creatinine ratio 05/27/2019 15  12 - 20   Final    GFR est AA 05/27/2019 >60  >60 ml/min/1.73m2 Final    GFR est non-AA 05/27/2019 >60  >60 ml/min/1.73m2 Final    Comment: (NOTE)  Estimated GFR is calculated using the Modification of Diet in Renal   Disease (MDRD) Study equation, reported for both  Americans   (GFRAA) and non- Americans (GFRNA), and normalized to 1.73m2   body surface area. The physician must decide which value applies to   the patient.  The MDRD study equation should only be used in   individuals age 25 or older. It has not been validated for the   following: pregnant women, patients with serious comorbid conditions,   or on certain medications, or persons with extremes of body size,   muscle mass, or nutritional status.  Calcium 05/27/2019 9.1  8.5 - 10.1 MG/DL Final    Bilirubin, total 05/27/2019 1.5* 0.2 - 1.0 MG/DL Final    ALT (SGPT) 05/27/2019 22  16 - 61 U/L Final    AST (SGOT) 05/27/2019 12* 15 - 37 U/L Final    Alk.  phosphatase 05/27/2019 87  45 - 117 U/L Final    Protein, total 05/27/2019 6.8  6.4 - 8.2 g/dL Final    Albumin 05/27/2019 3.3* 3.4 - 5.0 g/dL Final    Globulin 05/27/2019 3.5  2.0 - 4.0 g/dL Final    A-G Ratio 05/27/2019 0.9  0.8 - 1.7   Final    Lipase 05/27/2019 49* 73 - 393 U/L Final    Special Requests: 05/27/2019 NO SPECIAL REQUESTS    Final    Culture result: 05/27/2019 NO GROWTH 6 DAYS    Final    Special Requests: 05/27/2019 NO SPECIAL REQUESTS    Final    Culture result: 05/27/2019 NO GROWTH 6 DAYS    Final    Color 05/27/2019 YELLOW    Final    Appearance 05/27/2019 CLEAR    Final    Specific gravity 05/27/2019 1.007  1.005 - 1.030   Final    pH (UA) 05/27/2019 5.5  5.0 - 8.0   Final    Protein 05/27/2019 NEGATIVE   NEG mg/dL Final    Glucose 05/27/2019 NEGATIVE   NEG mg/dL Final    Ketone 05/27/2019 NEGATIVE   NEG mg/dL Final    Bilirubin 05/27/2019 NEGATIVE   NEG   Final    Blood 05/27/2019 SMALL* NEG   Final    Urobilinogen 05/27/2019 0.2  0.2 - 1.0 EU/dL Final    Nitrites 05/27/2019 NEGATIVE   NEG   Final    Leukocyte Esterase 05/27/2019 TRACE* NEG   Final    Ventricular Rate 05/27/2019 104  BPM Final    Atrial Rate 05/27/2019 104  BPM Final    P-R Interval 05/27/2019 148  ms Final    QRS Duration 05/27/2019 66  ms Final    Q-T Interval 05/27/2019 308  ms Final    QTC Calculation (Bezet) 05/27/2019 405  ms Final    Calculated P Axis 05/27/2019 39  degrees Final    Calculated R Axis 05/27/2019 53  degrees Final    Calculated T Axis 05/27/2019 60  degrees Final    Diagnosis 05/27/2019    Final                    Value:Sinus tachycardia  Otherwise normal ECG  When compared with ECG of 04-APR-2018 18:12,  Nonspecific T wave abnormality, improved in Inferior leads  Nonspecific T wave abnormality, improved in Anterior leads  Confirmed by Pura Grande MD, ----- ([id number]) on 5/28/2019 5:18:57 AM      Special Requests: 05/27/2019 NO SPECIAL REQUESTS    Final    Culture result: 05/27/2019 NO GROWTH 2 DAYS    Final    Lactic Acid (POC) 05/27/2019 0.78  0.40 - 2.00 mmol/L Final    WBC 05/27/2019 2 to 4  0 - 4 /hpf Final    RBC 05/27/2019 NEGATIVE   0 - 5 /hpf Final    Epithelial cells 05/27/2019 NEGATIVE   0 - 5 /lpf Final    Bacteria 05/27/2019 NEGATIVE   NEG /hpf Final   Admission on 05/25/2019, Discharged on 05/26/2019   Component Date Value Ref Range Status    WBC 05/25/2019 8.8  4.6 - 13.2 K/uL Final    RBC 05/25/2019 4.45* 4.70 - 5.50 M/uL Final    HGB 05/25/2019 13.6  13.0 - 16.0 g/dL Final    HCT 05/25/2019 38.0  36.0 - 48.0 % Final    MCV 05/25/2019 85.4  74.0 - 97.0 FL Final    MCH 05/25/2019 30.6  24.0 - 34.0 PG Final    MCHC 05/25/2019 35.8  31.0 - 37.0 g/dL Final    RDW 05/25/2019 13.6  11.6 - 14.5 % Final    PLATELET 02/76/8136 573  135 - 420 K/uL Final    MPV 05/25/2019 10.0  9.2 - 11.8 FL Final    NEUTROPHILS 05/25/2019 80* 40 - 73 % Final    LYMPHOCYTES 05/25/2019 11* 21 - 52 % Final    MONOCYTES 05/25/2019 8  3 - 10 % Final    EOSINOPHILS 05/25/2019 1  0 - 5 % Final    BASOPHILS 05/25/2019 0  0 - 2 % Final    ABS. NEUTROPHILS 05/25/2019 7.0  1.8 - 8.0 K/UL Final    ABS. LYMPHOCYTES 05/25/2019 1.0  0.9 - 3.6 K/UL Final    ABS. MONOCYTES 05/25/2019 0.7  0.05 - 1.2 K/UL Final    ABS. EOSINOPHILS 05/25/2019 0.1  0.0 - 0.4 K/UL Final    ABS.  BASOPHILS 05/25/2019 0.0  0.0 - 0.1 K/UL Final    DF 05/25/2019 AUTOMATED    Final    Sodium 05/25/2019 138  136 - 145 mmol/L Final    Potassium 05/25/2019 4.6  3.5 - 5.5 mmol/L Final    Chloride 05/25/2019 104  100 - 108 mmol/L Final    CO2 05/25/2019 26  21 - 32 mmol/L Final    Anion gap 05/25/2019 8  3.0 - 18 mmol/L Final    Glucose 05/25/2019 108* 74 - 99 mg/dL Final    BUN 05/25/2019 20* 7.0 - 18 MG/DL Final    Creatinine 05/25/2019 1.58* 0.6 - 1.3 MG/DL Final    BUN/Creatinine ratio 05/25/2019 13  12 - 20   Final    GFR est AA 05/25/2019 57* >60 ml/min/1.73m2 Final    GFR est non-AA 05/25/2019 47* >60 ml/min/1.73m2 Final    Comment: (NOTE)  Estimated GFR is calculated using the Modification of Diet in Renal   Disease (MDRD) Study equation, reported for both  Americans   (GFRAA) and non- Americans (GFRNA), and normalized to 1.73m2   body surface area. The physician must decide which value applies to   the patient. The MDRD study equation should only be used in   individuals age 25 or older. It has not been validated for the   following: pregnant women, patients with serious comorbid conditions,   or on certain medications, or persons with extremes of body size,   muscle mass, or nutritional status.  Calcium 05/25/2019 9.6  8.5 - 10.1 MG/DL Final    Bilirubin, total 05/25/2019 0.9  0.2 - 1.0 MG/DL Final    ALT (SGPT) 05/25/2019 28  16 - 61 U/L Final    AST (SGOT) 05/25/2019 17  15 - 37 U/L Final    Alk.  phosphatase 05/25/2019 96  45 - 117 U/L Final    Protein, total 05/25/2019 6.3* 6.4 - 8.2 g/dL Final    Albumin 05/25/2019 3.6  3.4 - 5.0 g/dL Final    Globulin 05/25/2019 2.7  2.0 - 4.0 g/dL Final    A-G Ratio 05/25/2019 1.3  0.8 - 1.7   Final    WBC 05/25/2019 8.1  4.6 - 13.2 K/uL Final    RBC 05/25/2019 4.73  4.70 - 5.50 M/uL Final    HGB 05/25/2019 14.4  13.0 - 16.0 g/dL Final    HCT 05/25/2019 40.8  36.0 - 48.0 % Final    MCV 05/25/2019 86.3  74.0 - 97.0 FL Final    MCH 05/25/2019 30.4  24.0 - 34.0 PG Final    MCHC 05/25/2019 35.3  31.0 - 37.0 g/dL Final    RDW 05/25/2019 13.8  11.6 - 14.5 % Final    PLATELET 68/62/6831 203  135 - 420 K/uL Final    MPV 05/25/2019 9.9  9.2 - 11.8 FL Final    NEUTROPHILS 05/25/2019 75* 40 - 73 % Final    LYMPHOCYTES 05/25/2019 16* 21 - 52 % Final    MONOCYTES 05/25/2019 8  3 - 10 % Final    EOSINOPHILS 05/25/2019 1  0 - 5 % Final    BASOPHILS 05/25/2019 0  0 - 2 % Final    ABS. NEUTROPHILS 05/25/2019 6.1  1.8 - 8.0 K/UL Final    ABS. LYMPHOCYTES 05/25/2019 1.3  0.9 - 3.6 K/UL Final    ABS. MONOCYTES 05/25/2019 0.6  0.05 - 1.2 K/UL Final    ABS. EOSINOPHILS 05/25/2019 0.1  0.0 - 0.4 K/UL Final    ABS. BASOPHILS 05/25/2019 0.0  0.0 - 0.1 K/UL Final    DF 05/25/2019 AUTOMATED    Final    Sodium 05/25/2019 139  136 - 145 mmol/L Final    Potassium 05/25/2019 4.1  3.5 - 5.5 mmol/L Final    Chloride 05/25/2019 105  100 - 108 mmol/L Final    CO2 05/25/2019 28  21 - 32 mmol/L Final    Anion gap 05/25/2019 6  3.0 - 18 mmol/L Final    Glucose 05/25/2019 107* 74 - 99 mg/dL Final    BUN 05/25/2019 19* 7.0 - 18 MG/DL Final    Creatinine 05/25/2019 1.38* 0.6 - 1.3 MG/DL Final    BUN/Creatinine ratio 05/25/2019 14  12 - 20   Final    GFR est AA 05/25/2019 >60  >60 ml/min/1.73m2 Final    GFR est non-AA 05/25/2019 55* >60 ml/min/1.73m2 Final    Calcium 05/25/2019 9.4  8.5 - 10.1 MG/DL Final   Admission on 05/24/2019, Discharged on 05/24/2019   Component Date Value Ref Range Status    BENZODIAZEPINES 05/24/2019 NEGATIVE   NEG   Final    BARBITURATES 05/24/2019 NEGATIVE   NEG   Final    THC (TH-CANNABINOL) 05/24/2019 NEGATIVE   NEG   Final    OPIATES 05/24/2019 NEGATIVE   NEG   Final    PCP(PHENCYCLIDINE) 05/24/2019 NEGATIVE   NEG   Final    COCAINE 05/24/2019 NEGATIVE   NEG   Final    AMPHETAMINES 05/24/2019 NEGATIVE   NEG   Final    METHADONE 05/24/2019 NEGATIVE   NEG   Final    HDSCOM 05/24/2019 (NOTE)   Final    Comment: Specimen analysis was performed without chain of custody handling. These results should be used for medical purposes only and not for   legal or employment purposes. Unconfirmed screening results must not   be used for non-medical purposes. The cut-off concentration for positive results are as follows:    AMPH     1000 ng/mL  ARIADNE      200 ng/mL  YVON      200 ng/mL  GURPREET       300 ng/mL  METH      300 ng/mL  OPI       300 ng/mL  PCP        25 ng/mL  THC        50 ng/mL                       PULMONARY FUNCTION TESTS    Date FVC FEV1  FEV1/FVC [id number] TLC RV RV/TLC VC DLCO   8/1/2017  61%  39%  50%  18%  92%  167%  182%  60%  25%                                             Imaging:  I have personally reviewed the patients radiographs and have reviewed the reports:  XR Results (most recent):  Results from Hospital Encounter encounter on 08/12/19   XR SPINE LUMB COMP W BEND    Narrative History: Pain. 2 views thoracic spine and 7 views lumbar spine. FINDINGS:    Mild S-shaped scoliosis to the thoracolumbar spine. Lungs are clear. Mild multilevel degenerative findings throughout the thoracic spine. Mild loss of lumbar lordosis. Scoliosis has a slight rotary component to it. Lumbar spine demonstrates mild multilevel degenerative disc disease in lower  lumbar facet arthropathy. Abdominal atherosclerosis noted. Question trace retrolisthesis of L4 on L5. No appreciable change in alignment on flexion/extension views. Moderate colonic fecal load which limits evaluation. Impression IMPRESSION:    Mild multilevel degenerative findings. Alignment as discussed. Atherosclerosis. Please report for details and further findings. CT Results (most recent):  Results from Hospital Encounter encounter on 05/27/19   CT ABD PELV W CONT    Narrative CT ABDOMEN/PELVIS WITH CONTRAST    CPT CODE: 46840,87646    HISTORY: History of bilateral hydrocelectomy and orchiopexy 5/25/2019. COMPARISON: CT 5/25/2019.     TECHNIQUE: 5 mm helical scans were obtained of the abdomen and pelvis after  uneventful administration of intravenous contrast. 100 cc of Isovue-300 was  given. Coronal and sagittal reformation. All CT scans are performed using dose  optimization techniques as appropriate to the performed exam including the  following: Automated exposure control, adjustment of mA and/or kV according to  patient size, and use of iterative reconstructive technique. Melvenia Asya FINDINGS:     Right basilar airspace disease suggestive of atelectasis. Stable faint nodule in  the posterior lateral left lower lung measuring 5.5 mm, stable from 6/2013. No  effusion. There is homogeneous enhancement of the liver, spleen, and pancreas. There is an  inferior splenule. The gallbladder is without stones or sludge. No adrenal nodules or masses. The kidneys enhance symmetrically. No focal lesion is identified. No  hydronephrosis. There is no free intraperitoneal air, free fluid, or fluid collections. No abdominal or pelvic lymphadenopathy. The small and large bowel are normal in caliber. The appendix does not appear  inflamed. Bladder is decompressed with a Santos catheter. No gross prostatic enlargement. There is a surgical drain in the right hemiscrotum. There is heterogeneity  within the right scrotal sac, the right testicle itself is difficult to  visualize. There is similar fluid in the left scrotum. There may be increasing  scrotal wall edema posteriorly. Persistent air in the anterior scrotal wall. Mild calcifications in the abdominal aorta. No aneurysmal dilatation. Osseous structures are intact. Impression There may be some increasing posterior scrotal wall edema otherwise no  significant interval change of the CT appearance of the scrotum. - Right surgical drain remains.  - The testicles themselves are difficult to visualize. Similar testicles and/or  fluid in the scrotal sac. No CT finding for an acute intra-abdominal or pelvic process. Probable right dependent atelectasis.         Patient Active Problem List   Diagnosis Code    Emphysema/COPD ([facility] J43.9    [facility], severe ([facility] J44.9    Sepsis ([facility] A41.9    Essential hypertension, benign I10    Esophagitis K20.9    Panic attack F41.0    Insomnia G47.00    Hypovitaminosis D E55.9    Overweight (BMI 25.0-29. 9) E66.3    Pulmonary infiltrate R91.8       IMPRESSION:   · COPD-severe with acute exacerbation  · Bronchitis- worsening productive cough of green sputum x1 week  · CHF  · CAD        RECOMMENDATIONS:     · Continue   Advair Diskus 500/50, 1 inhalation twice daily, Spiriva HandiHaler 18 mcg, 1 cap daily            and albuterol PRN. Discussed appropriate use of respiratory  maintenance and rescue  medications with patient  · Continue Daliresp 500 mcg daily  · Complete antibiotics as [id number]  · Complete prednisone taper- 50 mg x 3 days, 40 mg x 3 days, 30 mg x 3 days, 20 mg x 3 days, 10 mg x 3 days  · Preventative immunizations reviewed and up-to-date  · Follow up in pulmonary clinic in 3 months    or sooner with worsening of symptoms  · Recommend healthy diet / weight / exercise as tolerated  · Report to ER with chest pain or difficulty breathing. Please note that this dictation was completed with [employer], the computer voice recognition software. Quite often unanticipated grammatical, syntax, homophones, and other interpretive errors are inadvertently transcribed by the computer software. Please disregard these errors. Please excuse any errors that have escaped final proofreading.               Collette Chess, NP

## 2019-10-30 NOTE — PATIENT INSTRUCTIONS
 Continue Advair and Spiriva   daily, rinse mouth out after use.        Continue albuterol nebulizer treatment or rescue inhaler, 2 inhalations every 4-6 hours as needed for shortness of breath, wheezing or cough    · Continue Daliresp 500 mg daily    · Complete course of antibiotics - z - vasile    · Complete prednisone taper as directed    · Increase PO fluids, take OTC mucinex as needed for cough     Recommend healthy diet/weight and exercise as tolerated     Follow-up in pulmonary clinic in 3 months or sooner with worsening of symptoms     Report to the ER with chest pain or difficulty breathing

## 2019-10-30 NOTE — TELEPHONE ENCOUNTER
Pt's wife called back since we can not call there phones. Pt with cough and congestion with greenish sputum.  Appt given for this am

## 2019-11-07 ENCOUNTER — OFFICE VISIT (OUTPATIENT)
Dept: CARDIOLOGY CLINIC | Age: 49
End: 2019-11-07

## 2019-11-07 VITALS
WEIGHT: 206 LBS | SYSTOLIC BLOOD PRESSURE: 128 MMHG | OXYGEN SATURATION: 97 % | HEART RATE: 97 BPM | DIASTOLIC BLOOD PRESSURE: 66 MMHG | BODY MASS INDEX: 30.51 KG/M2 | HEIGHT: 69 IN

## 2019-11-07 DIAGNOSIS — Z86.79 HISTORY OF ABDOMINAL AORTIC ANEURYSM (AAA): ICD-10-CM

## 2019-11-07 DIAGNOSIS — I50.32 DIASTOLIC CHF, CHRONIC (HCC): ICD-10-CM

## 2019-11-07 DIAGNOSIS — R94.31 ABNORMAL EKG: ICD-10-CM

## 2019-11-07 DIAGNOSIS — I70.90 ATHEROSCLEROSIS: Primary | ICD-10-CM

## 2019-11-07 DIAGNOSIS — R07.9 CHEST PAIN, UNSPECIFIED TYPE: ICD-10-CM

## 2019-11-07 DIAGNOSIS — I10 ESSENTIAL HYPERTENSION, BENIGN: ICD-10-CM

## 2019-11-07 RX ORDER — ATORVASTATIN CALCIUM 20 MG/1
20 TABLET, FILM COATED ORAL DAILY
Qty: 30 TAB | Refills: 5 | Status: SHIPPED | OUTPATIENT
Start: 2019-11-07 | End: 2020-04-21

## 2019-11-07 NOTE — PATIENT INSTRUCTIONS
If you have not heard from the central scheduler to schedule your testing in 48 hours, please call [phone].

## 2019-11-07 NOTE — PROGRESS NOTES
HPI: A 54-year old male here for identified atherosclerosis by recent plain film x-rays 8/12/19. This was performed due to some chronic back and neck pain. He is being seen in the pain clinic for the past few months and his pain seems to be improving. When I saw him in March he was complaining of some atypical chest pain that was not exertional and actually improved with exercise so my suspicion for ischemia was low. He has not had any significant new chest pain. He has chronic dyspnea and recently was treated with Z-Josh and prednisone with some improvement. He is accompanied by his wife. He has some occasional dyspnea that seems to improve with Lasix every other day. Impression/Plan:  1. Recent diagnosis of atherosclerosis by plain film imaging of his back. No history of coronary artery disease or MI.  2. Severe chronic COPD on oxygen at night followed by pulmonary and recent exacerbation. 3. Suspected diastolic heart failure with echocardiogram January 2018 with mild LVH taking Lasix every other day as needed. 4. Hypertension improved today. 5. Previous tobacco use 50-pack years quitting in 2017. 6. History of palpitations. 7. Panic attacks. He has multiple risk factors for atherosclerosis. I am going to start him on a statin. He has chronic atypical chest pain although with his abnormal EKG and now atherosclerosis by plain films, I will obtain a pharmacologic stress nuclear imaging test and also will obtain an abdominal ultrasound to rule out aneurysm or significant stenosis. He has no evidence of claudication by history of exam today. His blood pressure is controlled. If testing is unremarkable, I will see him back in one year. Total care time spent in reviewing the case, multiple EMR databases, physician notes, procedure notes, examining the patient, and documentation, is 40 minutes.      Discussed in details with patient. All questions were answered to their full satisfaction.  Risk, benefit and alternatives were discussed. More than 50% time spent in counseling and coordination of care. Past Medical History:   Diagnosis Date    Anxiety     Chest pain     Chronic diastolic heart failure secondary to coronary artery disease (HCC)     Chronic lung disease     Chronic obstructive pulmonary disease (HCC) 08/03/2017    PFTS 8/3/17    COPD, severe (HCC)     Cough     Emphysema/COPD (HCC)     Esophagitis 12/11/2017    Endoscopy    Essential hypertension, benign     Fast heart beat     Feeling of chest tightness     Frequent urination     Heartburn     Hepatomegaly     History of stomach ulcers     Hx of nausea and vomiting     Poor appetite     Positive urine drug screen 01/2016    opiates and THC    Shortness of breath     Splenomegaly     Stomach pain     Stress     Tiredness     Unintentional weight change     Upper GI bleed     Weakness     Wheeze        Current Outpatient Medications   Medication Sig Dispense Refill    azithromycin (ZITHROMAX) 250 mg tablet Take 2 tabs on day one then take one tab q 24 hours x 4 days 6 Tab 0    predniSONE (DELTASONE) 10 mg tablet Take 50 mg x 3 days, take 40 mg x 3 days, take 30 mg x 3 days, take 20 mg x 3 days, take 10 mg x 3 days. 45 Tab 0    albuterol (PROVENTIL VENTOLIN) 2.5 mg /3 mL (0.083 %) nebu Nebulized 1 vial for inhalation every 4 hours as needed 60 Each 2    oxyCODONE-acetaminophen (PERCOCET) 5-325 mg per tablet Take 1 Tab by mouth every twelve (12) hours as needed for Pain.  lisinopril (PRINIVIL, ZESTRIL) 20 mg tablet Take 1 Tab by mouth daily. 90 Tab 1    PROAIR HFA 90 mcg/actuation inhaler INHALE 2 PUFFS BY MOUTH EVERY 4 HOURS AS NEEDED FOR WHEEZING OR SHORTNESS OF BREATH 1 Inhaler 2    tamsulosin (FLOMAX) 0.4 mg capsule Take 1 Cap by mouth daily. 30 Cap 11    tiotropium (SPIRIVA) 18 mcg inhalation capsule Take 1 Cap by inhalation daily.  Indications: Bronchospasm Prevention with COPD 30 Cap 5    furosemide (LASIX) 20 mg tablet TAKE 1 TABLET BY MOUTH EVERY OTHER DAY 15 Tab 6    DALIRESP 500 mcg tab tablet TAKE 1 TABLET BY MOUTH ONCE DAILY 30 Tab 5    ADVAIR DISKUS 500-50 mcg/dose diskus inhaler INHALE 1 PUFF BY MOUTH TWICE DAILY 1 Inhaler 5    Blood Pressure Monitor (BLOOD PRESSURE KIT) kit 1 Device by Does Not Apply route daily as needed (for blood pressure checks). 1 Kit 0    amitriptyline HCl (AMITRIPTYLINE PO) Take 75 mg by mouth nightly.  OXcarbazepine (TRILEPTAL) 150 mg tablet Take 150 mg by mouth two (2) times a day.  LORazepam (ATIVAN) 1 mg tablet Take 1 Tab by mouth every eight (8) hours as needed for Anxiety. Max Daily Amount: 3 mg. 90 Tab 0    Nebulizer & Compressor machine 1 Each by Does Not Apply route every four (4) hours as needed. 1 Each 0    OXYGEN-AIR DELIVERY SYSTEMS (WALKABOUT 1 OXYGEN SYSTEM) 2.5 L/min by Nasal route continuous.  pantoprazole (PROTONIX) 40 mg tablet Take 1 Tab by mouth Daily (before breakfast). 30 Tab 0       Social History   reports that he quit smoking about 22 months ago. His smoking use included cigarettes. He started smoking about 35 years ago. He has a 50.00 pack-year smoking history. He has never used smokeless tobacco.   reports that he does not drink alcohol. Family History  family history includes Cancer in his father; Diabetes in his father and mother; Heart Disease in his father and mother; Hypertension in his father and mother. Review of Systems  Except as stated above include:  Constitutional: Negative for fever, chills and malaise/fatigue. HEENT: No congestion or recent URI. Gastrointestinal: No nausea, vomiting, abdominal pain, bloody stools. Pulmonary:  Negative except as stated above. Cardiac:  Negative except as stated above. Musculoskeletal: Negative except as stated above. Neurological:  No localized symptoms. Skin:  Negative except as stated above. Psych:  Negative except as stated above.   Endocrine:  Negative except as stated above.    PHYSICAL EXAM  BP Readings from Last 3 Encounters:   10/30/19 122/70   10/17/19 119/80   09/26/19 110/76     Pulse Readings from Last 3 Encounters:   11/07/19 77   10/30/19 92   10/17/19 (!) 108     Wt Readings from Last 3 Encounters:   11/07/19 93.4 kg (206 lb)   10/30/19 91.6 kg (202 lb)   10/17/19 92.1 kg (203 lb)     General:   Well developed, well groomed. Head/Neck:   No jugular venous distention     No carotid bruits. No evidence of xanthelasma. Lungs:   No respiratory distress. Clear bilaterally. Heart:    Regular rate and rhythm. Normal S1/S2. Palpation of heart with normal point of maximum impulse. No significant murmurs, rubs or gallops. Abdomen:   Soft and nontender. No palpable abdominal mass or bruits. Extremities:   Intact peripheral pulses. No significant edema. Neurological:   Alert and oriented to person, place, time. No focal neurological deficit visually. Skin:   No obvious rash    Blood Pressure Metric:  Monitor recommended and adjustments stated if needed.

## 2019-11-07 NOTE — PROGRESS NOTES
Fannie Fitzpatrick presents today for   Chief Complaint   Patient presents with    Chest Pain     follow up per wife    Shortness of Breath     exertion       Fannie Fitzpatrick. preferred language for health care discussion is english/other. Is someone accompanying this pt? Wife     Is the patient using any DME equipment during [address]? no    Depression Screening:  3 most recent PHQ Screens 10/17/2019   PHQ Not Done -   Little interest or pleasure in doing things Not at all   Feeling down, depressed, irritable, or hopeless Not at all   Total Score PHQ 2 0       Learning Assessment:  Learning Assessment 5/7/2019   PRIMARY LEARNER Patient   PRIMARY LANGUAGE ENGLISH   LEARNER PREFERENCE PRIMARY OTHER (COMMENT)     -     -   ANSWERED BY patient   RELATIONSHIP SELF       Abuse Screening:  Abuse Screening Questionnaire 1/8/2019   Do you ever feel afraid of your partner? N   Are you in a relationship with someone who physically or mentally threatens you? N   Is it safe for you to go home? Y       Fall Risk  Fall Risk Assessment, last 12 mths 9/17/2018   Able to walk? Yes   Fall in past 12 months? No       Pt currently taking Anticoagulant therapy? no    Coordination of Care:  1. Have you been to the ER, urgent care clinic since your last visit? Hospitalized since your last visit? no    2. Have you seen or consulted any other health care providers outside of the [address] [place] [address] since your last visit? Include any pap smears or colon screening.  no

## 2019-11-08 NOTE — TELEPHONE ENCOUNTER
SHAKIR FROM [facility] CALLED([phone]). NEED TO CALL IN OR REFAX  AZITHROMYCIN SCRIPT BECAUSE IT WAS SENT ON BLUE PAPER AND THEY CANNOT READ. PLEASE CALL OR RESEND.

## 2019-11-12 NOTE — TELEPHONE ENCOUNTER
Spoke with Haider Holly at pharmacy. They state there phones where done. Faxed rx for Blaise Lovett needed to be verified due to the faxed copy was hard to ready.  Done

## 2019-11-19 ENCOUNTER — HOSPITAL ENCOUNTER (OUTPATIENT)
Dept: NON INVASIVE DIAGNOSTICS | Age: 49
Discharge: HOME | End: 2019-11-19
Attending: INTERNAL MEDICINE
Payer: MEDICAID

## 2019-11-19 ENCOUNTER — HOSPITAL ENCOUNTER (OUTPATIENT)
Dept: ULTRASOUND IMAGING | Age: 49
Discharge: HOME | End: 2019-11-19
Attending: INTERNAL MEDICINE
Payer: MEDICAID

## 2019-11-19 VITALS
WEIGHT: 206 LBS | DIASTOLIC BLOOD PRESSURE: 86 MMHG | SYSTOLIC BLOOD PRESSURE: 133 MMHG | BODY MASS INDEX: 30.51 KG/M2 | HEIGHT: 69 IN

## 2019-11-19 DIAGNOSIS — R07.9 CHEST PAIN, UNSPECIFIED TYPE: ICD-10-CM

## 2019-11-19 DIAGNOSIS — R94.31 ABNORMAL EKG: ICD-10-CM

## 2019-11-19 DIAGNOSIS — Z86.79 HISTORY OF ABDOMINAL AORTIC ANEURYSM (AAA): ICD-10-CM

## 2019-11-19 LAB
STRESS BASELINE DIAS BP: 86 MMHG
STRESS BASELINE HR: 97 BPM
STRESS BASELINE SYS BP: 133 MMHG
STRESS ESTIMATED WORKLOAD: 1 METS
STRESS EXERCISE DUR MIN: NORMAL
STRESS PEAK DIAS BP: 89 MMHG
STRESS PEAK SYS BP: 133 MMHG
STRESS PERCENT HR ACHIEVED: 72 %
STRESS POST PEAK HR: 123 BPM
STRESS RATE PRESSURE PRODUCT: NORMAL BPM*MMHG
STRESS ST DEPRESSION: 0 MM
STRESS ST ELEVATION: 0 MM
STRESS TARGET HR: 170 BPM

## 2019-11-19 PROCEDURE — 74011250636 HC RX REV CODE- 250/636: Performed by: INTERNAL MEDICINE

## 2019-11-19 PROCEDURE — 76775 US EXAM ABDO BACK WALL LIM: CPT

## 2019-11-19 PROCEDURE — 93017 CV STRESS TEST TRACING ONLY: CPT

## 2019-11-19 RX ORDER — SODIUM CHLORIDE 9 MG/ML
250 INJECTION, SOLUTION INTRAVENOUS ONCE
Status: COMPLETED | OUTPATIENT
Start: 2019-11-19 | End: 2019-11-19

## 2019-11-19 RX ADMIN — SODIUM CHLORIDE 250 ML: 900 INJECTION, SOLUTION INTRAVENOUS at 10:05

## 2019-11-19 RX ADMIN — REGADENOSON 0.4 MG: 0.08 INJECTION, SOLUTION INTRAVENOUS at 10:05

## 2019-11-19 NOTE — PROGRESS NOTES
Patient was given 10.76milliCuries of 99mTc-Sestamibi for the resting images. Patient was also given 32.2 milliCuries of 99mTc-Sestamibi for the stress images. Injected with 0.4mg Lexiscan. Patient's armband was discarded and shredded.

## 2019-12-02 ENCOUNTER — OFFICE VISIT (OUTPATIENT)
Dept: UROLOGY | Age: 49
End: 2019-12-02

## 2019-12-02 VITALS
OXYGEN SATURATION: 94 % | HEIGHT: 69 IN | BODY MASS INDEX: 30.21 KG/M2 | HEART RATE: 88 BPM | SYSTOLIC BLOOD PRESSURE: 137 MMHG | DIASTOLIC BLOOD PRESSURE: 90 MMHG | WEIGHT: 204 LBS

## 2019-12-02 DIAGNOSIS — Z12.5 PROSTATE CANCER SCREENING: ICD-10-CM

## 2019-12-02 DIAGNOSIS — N43.3 HYDROCELE, UNSPECIFIED HYDROCELE TYPE: Primary | ICD-10-CM

## 2019-12-02 LAB
BILIRUB UR QL STRIP: NORMAL
GLUCOSE UR-MCNC: NEGATIVE MG/DL
KETONES P FAST UR STRIP-MCNC: NEGATIVE MG/DL
PH UR STRIP: 6 [PH] (ref 4.6–8)
PROT UR QL STRIP: NEGATIVE
SP GR UR STRIP: 1.03 (ref 1–1.03)
UA UROBILINOGEN AMB POC: NORMAL (ref 0.2–1)
URINALYSIS CLARITY POC: CLEAR
URINALYSIS COLOR POC: YELLOW
URINE BLOOD POC: NEGATIVE
URINE LEUKOCYTES POC: NEGATIVE
URINE NITRITES POC: NEGATIVE

## 2019-12-02 NOTE — PATIENT INSTRUCTIONS
Urine Test: About This Test  What is it? A urine test checks the color, clarity (clear or cloudy), odor, concentration, and acidity (pH) of your urine. It also checks your levels of protein, sugar, blood cells, or other substances in your urine. This test is sometimes called a urinalysis. Why is this test done? A urine test may be done:  · To check for a disease or infection of the urinary tract. The urinary tract includes the kidneys, the tubes that carry urine from the kidneys to the bladder (ureters), and the bladder. It also includes the tube that carries urine from the bladder to outside the body (urethra). · To check the treatment of conditions such as diabetes, kidney stones, a urinary tract infection (UTI), high blood pressure, or some kidney or liver diseases. How can you prepare for the test?  · Before the test, don't eat foods that can change the color of your urine. Examples of these include blackberries, beets, and rhubarb. · Don't do heavy exercise before the test.  · Tell your doctor if you are menstruating or close to starting your period. Your doctor may want to wait to do the test.  · Tell your doctor about all the nonprescription and prescription medicines and herbs or other supplements you take. Some of these can affect the results of this test.  What happens during the test?  A urine test can be done in your doctor's office, clinic, or lab. Or you may be asked to collect a urine sample at home. Then you can take it to the office or lab for testing. Clean-catch midstream urine collection  · Wash your hands before you start. · If the cup you are given has a lid, remove it carefully. Set it down with the inner surface up. Don't touch the inside of the cup with your fingers. · Clean the area around your genitals. ? For men: Pull back the foreskin, if present. Clean the head of your penis with medicated towelettes or swabs. ?  For women: Spread open the genital folds of skin with one hand. Then use medicated towelettes or swabs in your other hand to clean the area where urine comes out (the urethra). Wipe the area from front to back. · Start urinating into the toilet or urinal. A woman should hold apart the genital folds of skin while she urinates. · After the urine has flowed for several seconds, place the cup into the urine stream. Collect about 2 ounces of urine without stopping your flow of urine. · Don't touch the rim of the cup to your genital area. Don't get toilet paper, pubic hair, stool (feces), menstrual blood, or anything else in the urine sample. · Finish urinating into the toilet or urinal.  · Carefully replace and tighten the lid on the cup, and then return it to the lab. If you are collecting the urine at home and can't get it to the lab in an hour, refrigerate it. Double-voided urine sample collection  This method collects the urine your body is making right now. · Urinate into the toilet or urinal. Don't collect any of this urine. · Drink a large glass of water, and wait about 30 to 40 minutes. · Then get a urine sample. Follow the instructions above for collecting a clean-catch urine sample. · Take the urine sample to the lab. If you are collecting the urine at home and can't get it to the lab in an hour, refrigerate it. Follow-up care is a key part of your treatment and safety. Be sure to make and go to all appointments, and call your doctor if you are having problems. It's also a good idea to keep a list of the medicines you take. Ask your doctor when you can expect to have your test results. Where can you learn more? Go to http://costa-jessica.info/. Enter R266 in the search box to learn more about \"Urine Test: About This Test.\"  Current as of: March 28, 2019  Content Version: 12.2  © 9469-4250 [employer].  Care instructions adapted under license by [employer] (which disclaims liability or warranty for this information). If you have questions about a medical condition or this instruction, always ask your healthcare professional. [place] [address] any warranty or liability for your use of this information.

## 2019-12-02 NOTE — PROGRESS NOTES
Mr. Bee Mott has a reminder for a \"due or due soon\" health maintenance. I have asked that he contact his primary care provider for follow-up on this health maintenance.

## 2019-12-03 LAB — PSA SERPL-MCNC: 0.55 NG/ML

## 2019-12-03 NOTE — PROGRESS NOTES
Roxi Lopez Sr. 47 y.o. male     Mr. Kirsten Ryan seen today for semiannual follow-up symptomatic BPH lateral hydrocelectomy in May 2019  Patient is doing well following bilateral hydrocelectomy stream good control dysuria once per night  bilateral hydrocelectomy with bilateral orchidopexy-on 24 May 2019 with postop urinary retention relieved by transient Santos catheter bladder drainage  Patient is now catheter free voiding well and asymptomatic after having return of bowel movements earlier this morning     Patient has been seen in ER x3 since surgery on 24 May 2019 plan of right lower quadrant abdominal pain-normal CT scan imaging of the abdomen and pelvis x3-initial improvement following Santos catheter placement not maintained-pain relieved yesterday in ER following injection of Toradol  Patient has experienced normal bowel movement earlier today     initially presented to the emergency room on 25 April 2019 with sudden severe left scrotal pain onset while getting out of his pickup truck after parking it in the [employer] parking lot-went to emergency room immediately where exam showed a small right hydrocele of the scrotum and subsequent ultrasound imaging showing normal blood flow to each testes right hydrocele and small left hydrocele-treated with Cipro for suspected epididymitis with pain lingering for several days gradually lessening in intensity now experiencing no pain at rest but occasionally feeling tight aching pain in the left side of the scrotum.     CT scan imaging of the abdomen and pelvis on 25 April 2019 shows normal kidneys ureter and bladder images have been reviewed on PACS  Ultrasound imaging of the scrotum on 25 April 2019 shows right-sided hydrocele with a small left hydrocele-images have been reviewed on PACS     Patient has had no antecedent symptoms of bladder irritability or obstructive voiding-      PVR 62 cc in May 2019     Review of Systems:   CNS: Frequent headaches stress and anxiety  Respiratory: Chest tightness no wheezing and coughing  Cardiovascular: Hypertension, palpitations,  Intestinal: GERD no diarrhea no constipation  Urinary: No urgency frequency dysuria or hematuria  Skeletal: No bone or joint pain  Endocrine: No diabetes or thyroid disease  Other:                                                               Disability retired secondary to [place]          Allergies: Allergies   Allergen Reactions    Ciprofloxacin Nausea Only    Remeron [Mirtazapine] Other (comments)     Mood swings    Seroquel [Quetiapine] Other (comments)     Mood swings      Medications:    Current Outpatient Medications   Medication Sig Dispense Refill    atorvastatin (LIPITOR) 20 mg tablet Take 1 Tab by mouth daily. 30 Tab 5    albuterol (PROVENTIL VENTOLIN) 2.5 mg /3 mL (0.083 %) nebu Nebulized 1 vial for inhalation every 4 hours as needed 60 Each 2    oxyCODONE-acetaminophen (PERCOCET) 5-325 mg per tablet Take 1 Tab by mouth every twelve (12) hours as needed for Pain.  lisinopril (PRINIVIL, ZESTRIL) 20 mg tablet Take 1 Tab by mouth daily. 90 Tab 1    PROAIR HFA 90 mcg/actuation inhaler INHALE 2 PUFFS BY MOUTH EVERY 4 HOURS AS NEEDED FOR WHEEZING OR SHORTNESS OF BREATH 1 Inhaler 2    tamsulosin (FLOMAX) 0.4 mg capsule Take 1 Cap by mouth daily. 30 Cap 11    tiotropium (SPIRIVA) 18 mcg inhalation capsule Take 1 Cap by inhalation daily. Indications: Bronchospasm Prevention with COPD 30 Cap 5    furosemide (LASIX) 20 mg tablet TAKE 1 TABLET BY MOUTH EVERY OTHER DAY 15 Tab 6    DALIRESP 500 mcg tab tablet TAKE 1 TABLET BY MOUTH ONCE DAILY 30 Tab 5    ADVAIR DISKUS 500-50 mcg/dose diskus inhaler INHALE 1 PUFF BY MOUTH TWICE DAILY 1 Inhaler 5    Blood Pressure Monitor (BLOOD PRESSURE KIT) kit 1 Device by Does Not Apply route daily as needed (for blood pressure checks).  1 Kit 0    amitriptyline HCl (AMITRIPTYLINE PO) Take 75 mg by mouth nightly.  OXcarbazepine (TRILEPTAL) 150 mg tablet Take 150 mg by mouth two (2) times a day.  LORazepam (ATIVAN) 1 mg tablet Take 1 Tab by mouth every eight (8) hours as needed for Anxiety. Max Daily Amount: 3 mg. 90 Tab 0    Nebulizer & Compressor machine 1 Each by Does Not Apply route every four (4) hours as needed. 1 Each 0    OXYGEN-AIR DELIVERY SYSTEMS (WALKABOUT 1 OXYGEN SYSTEM) 2.5 L/min by Nasal route continuous.  pantoprazole (PROTONIX) 40 mg tablet Take 1 Tab by mouth Daily (before breakfast). 30 Tab 0    azithromycin (ZITHROMAX) 250 mg tablet Take 2 tabs on day one then take one tab q 24 hours x 4 days 6 Tab 0    predniSONE (DELTASONE) 10 mg tablet Take 50 mg x 3 days, take 40 mg x 3 days, take 30 mg x 3 days, take 20 mg x 3 days, take 10 mg x 3 days.  39 Tab 0       Past Medical History:   Diagnosis Date    Anxiety     Chest pain     Chronic diastolic heart failure secondary to coronary artery disease (HCC)     Chronic lung disease     Chronic obstructive pulmonary disease (HCC) 08/03/2017    PFTS 8/3/17    COPD, severe (HCC)     Cough     Emphysema/COPD (HCC)     Esophagitis 12/11/2017    Endoscopy    Essential hypertension, benign     Fast heart beat     Feeling of chest tightness     Frequent urination     Heartburn     Hepatomegaly     History of stomach ulcers     Hx of nausea and vomiting     Poor appetite     Positive urine drug screen 01/2016    opiates and THC    Shortness of breath     Splenomegaly     Stomach pain     Stress     Tiredness     Unintentional weight change     Upper GI bleed     Weakness     Wheeze       Past Surgical History:   Procedure Laterality Date    COLONOSCOPY N/A 11/5/2018    COLONOSCOPY with polypectomies performed by Thalia Johnson MD at [address] HX ENDOSCOPY  12/11/2017    HX WISDOM TEETH EXTRACTION Right 05/2019    IR BX LIVER PERCUTANEOUS       Social History     Socioeconomic History    Marital status:      Spouse name: Not on file    Number of children: Not on file    Years of education: Not on file    Highest education level: Not on file   Occupational History    Not on file   Social Needs    Financial resource strain: Not on file    Food insecurity:     Worry: Not on file     Inability: Not on file    Transportation needs:     Medical: Not on file     Non-medical: Not on file   Tobacco Use    Smoking status: Former Smoker     Packs/day: 2.00     Years: 25.00     Pack years: 50.00     Types: Cigarettes     Start date: 1984     Last attempt to quit: 2017     Years since quittin.9    Smokeless tobacco: Never Used   Substance and Sexual Activity    Alcohol use: No    Drug use: Yes     Types: Marijuana     Comment: Hx of Marijuana use    Sexual activity: Not Currently     Partners: Female   Lifestyle    Physical activity:     Days per week: Not on file     Minutes per session: Not on file    Stress: Not on file   Relationships    Social connections:     Talks on phone: Not on file     Gets together: Not on file     Attends Advent service: Not on file     Active member of club or organization: Not on file     Attends meetings of clubs or organizations: Not on file     Relationship status: Not on file    Intimate partner violence:     Fear of current or ex partner: Not on file     Emotionally abused: Not on file     Physically abused: Not on file     Forced sexual activity: Not on file   Other Topics Concern     Service No    Blood Transfusions No    Caffeine Concern Yes    Occupational Exposure No    Hobby Hazards No    Sleep Concern Yes     Comment: occas    Stress Concern No    Weight Concern No    Special Diet No    Back Care Yes    Exercise No    Bike Helmet Yes    Seat Belt No     Comment: occas    Self-Exams Not Asked   Social History Narrative          Family History   Problem Relation Age of Onset    Hypertension Mother     Heart Disease Mother     Diabetes Mother     Hypertension Father     Heart Disease Father     Cancer Father         lymphnodes    Diabetes Father         Physical Examination: Well-nourished mature male in no apparent distress    Prostate by GANGA is rounded, smooth, benign consistency and nontender-no induration no nodularity  No rectal masses induration or tenderness  Scrotum is normal in size and appearance with well-healed midline scrotal incision-indurated nontender spermatic cords bilaterally      Urinalysis: Negative dipstick/nitrite negative/heme-negative    Impression: Bilateral hydroceles status post bilateral hydrocelectomy stable status post surgery        Plan: PSA today    RTC 1 year PSA GANGA PVR-referred to [facility] for routine evaluation-this office will close permanently in 10 days      More than 1/2 of this 15 minute visit was spent in counselling and coordination of care, as described above. Dominique Becerra MD  -electronically signed-    PLEASE NOTE:  This document has been produced using voice recognition software. Unrecognized errors in transcription may be present.

## 2019-12-05 RX ORDER — ALBUTEROL SULFATE 0.83 MG/ML
SOLUTION RESPIRATORY (INHALATION)
Qty: 60 EACH | Refills: 2 | Status: SHIPPED | OUTPATIENT
Start: 2019-12-05 | End: 2020-03-31 | Stop reason: SDUPTHER

## 2019-12-05 RX ORDER — ALBUTEROL SULFATE 90 UG/1
2 AEROSOL, METERED RESPIRATORY (INHALATION)
Qty: 1 INHALER | Refills: 3 | Status: SHIPPED | OUTPATIENT
Start: 2019-12-05 | End: 2019-12-19 | Stop reason: SDUPTHER

## 2019-12-05 RX ORDER — FLUTICASONE PROPIONATE AND SALMETEROL 500; 50 UG/1; UG/1
1 POWDER RESPIRATORY (INHALATION) 2 TIMES DAILY
Qty: 1 INHALER | Refills: 3 | Status: SHIPPED | OUTPATIENT
Start: 2019-12-05 | End: 2019-12-18 | Stop reason: SDUPTHER

## 2019-12-05 NOTE — TELEPHONE ENCOUNTER
PT'S WIFE CALLED([phone]). PT  ALSO NEEDS [phone] ADVAIR AND ALBUTEROL INHALER.

## 2019-12-11 ENCOUNTER — TELEPHONE (OUTPATIENT)
Dept: CARDIOLOGY CLINIC | Age: 49
End: 2019-12-11

## 2019-12-16 RX ORDER — FLUTICASONE PROPIONATE AND SALMETEROL 50; 500 UG/1; UG/1
POWDER RESPIRATORY (INHALATION)
Qty: 1 INHALER | Refills: 5 | Status: SHIPPED | OUTPATIENT
Start: 2019-12-16 | End: 2019-12-19 | Stop reason: CLARIF

## 2019-12-16 RX ORDER — ALBUTEROL SULFATE 90 UG/1
AEROSOL, METERED RESPIRATORY (INHALATION)
Qty: 1 INHALER | Refills: 2 | Status: SHIPPED | OUTPATIENT
Start: 2019-12-16 | End: 2019-12-19 | Stop reason: CLARIF

## 2019-12-18 ENCOUNTER — OFFICE VISIT (OUTPATIENT)
Dept: FAMILY MEDICINE CLINIC | Age: 49
End: 2019-12-18

## 2019-12-18 VITALS
RESPIRATION RATE: 20 BRPM | BODY MASS INDEX: 30.51 KG/M2 | HEIGHT: 69 IN | SYSTOLIC BLOOD PRESSURE: 120 MMHG | HEART RATE: 97 BPM | DIASTOLIC BLOOD PRESSURE: 90 MMHG | OXYGEN SATURATION: 95 % | WEIGHT: 206 LBS | TEMPERATURE: 98.1 F

## 2019-12-18 DIAGNOSIS — I10 ESSENTIAL HYPERTENSION, BENIGN: Primary | ICD-10-CM

## 2019-12-18 DIAGNOSIS — I70.90 ATHEROSCLEROSIS: ICD-10-CM

## 2019-12-18 RX ORDER — LISINOPRIL 20 MG/1
20 TABLET ORAL DAILY
Qty: 90 TAB | Refills: 1 | Status: CANCELLED | OUTPATIENT
Start: 2019-12-18

## 2019-12-18 RX ORDER — LISINOPRIL 20 MG/1
20 TABLET ORAL DAILY
Qty: 90 TAB | Refills: 1 | Status: SHIPPED | OUTPATIENT
Start: 2019-12-18 | End: 2019-12-18 | Stop reason: CLARIF

## 2019-12-18 RX ORDER — FUROSEMIDE 20 MG/1
TABLET ORAL
Qty: 15 TAB | Refills: 6 | Status: CANCELLED | OUTPATIENT
Start: 2019-12-18

## 2019-12-18 NOTE — PROGRESS NOTES
1. Have you been to the ER, urgent care clinic since your last visit? Hospitalized since your last visit? No    2. Have you seen or consulted any other health care providers outside of the [address] [place] [address] since your last visit? Include any pap smears or colon screening.  Yes Where: [facility] - pain management

## 2019-12-18 NOTE — PROGRESS NOTES
HISTORY OF PRESENT ILLNESS  Shari Ruggiero is a 47 y.o. male. HPI  Patient is here today for evaluation and treatment of: Atherosclerosis    Atherosclerosis: Has seen Dr. Kyler Pham; He has had a stress test.   He was started on statin  He has tolerated this med well; He has alos seen pulmonary. He has less SOB;  [facility] cardiology has requested PCP to fill lasix and lisinoopril. Pt also has HTN, he is on meds as listed below; tolerates med well. Current Outpatient Medications:     ADVAIR DISKUS 500-50 mcg/dose diskus inhaler, INHALE 1 DOSE BY MOUTH TWICE DAILY, Disp: 1 Inhaler, Rfl: 5    PROAIR HFA 90 mcg/actuation inhaler, INHALE 2 PUFFS BY MOUTH EVERY 4 HOURS AS NEEDED FOR WHEEZING OR SHORTNESS OF BREATH, Disp: 1 Inhaler, Rfl: 2    roflumilast (DALIRESP) 500 mcg tab tablet, TAKE 1 TABLET BY MOUTH ONCE DAILY, Disp: 30 Tab, Rfl: 5    albuterol (PROVENTIL VENTOLIN) 2.5 mg /3 mL (0.083 %) nebu, Nebulized 1 vial for inhalation every 4 hours as needed, Disp: 60 Each, Rfl: 2    albuterol (PROVENTIL HFA, VENTOLIN HFA, PROAIR HFA) 90 mcg/actuation inhaler, Take 2 Puffs by inhalation every four (4) hours as needed for Wheezing., Disp: 1 Inhaler, Rfl: 3    atorvastatin (LIPITOR) 20 mg tablet, Take 1 Tab by mouth daily. , Disp: 30 Tab, Rfl: 5    oxyCODONE-acetaminophen (PERCOCET) 5-325 mg per tablet, Take 1 Tab by mouth every twelve (12) hours as needed for Pain., Disp: , Rfl:     tamsulosin (FLOMAX) 0.4 mg capsule, Take 1 Cap by mouth daily. , Disp: 30 Cap, Rfl: 11    tiotropium (SPIRIVA) 18 mcg inhalation capsule, Take 1 Cap by inhalation daily. Indications: Bronchospasm Prevention with COPD, Disp: 30 Cap, Rfl: 5    furosemide (LASIX) 20 mg tablet, TAKE 1 TABLET BY MOUTH EVERY OTHER DAY, Disp: 15 Tab, Rfl: 6    Blood Pressure Monitor (BLOOD PRESSURE KIT) kit, 1 Device by Does Not Apply route daily as needed (for blood pressure checks). , Disp: 1 Kit, Rfl: 0    amitriptyline HCl (AMITRIPTYLINE PO), Take 75 mg by mouth nightly., Disp: , Rfl:     OXcarbazepine (TRILEPTAL) 150 mg tablet, Take 150 mg by mouth two (2) times a day., Disp: , Rfl:     LORazepam (ATIVAN) 1 mg tablet, Take 1 Tab by mouth every eight (8) hours as needed for Anxiety. Max Daily Amount: 3 mg., Disp: 90 Tab, Rfl: 0    Nebulizer & Compressor machine, 1 Each by Does Not Apply route every four (4) hours as needed. , Disp: 1 Each, Rfl: 0    OXYGEN-AIR DELIVERY SYSTEMS (WALKABOUT 1 OXYGEN SYSTEM), 2.5 L/min by Nasal route continuous. , Disp: , Rfl:     pantoprazole (PROTONIX) 40 mg tablet, Take 1 Tab by mouth Daily (before breakfast). , Disp: 30 Tab, Rfl: 0        PMH,  Meds, Allergies, Family History, Social history reviewed    Review of Systems   Constitutional: Negative for chills and fever. Cardiovascular: Negative for chest pain and palpitations. Physical Exam   Visit Vitals  /90   Pulse 97   Temp 98.1 °F (36.7 °C) (Oral)   Resp 20   Ht 5' 9\" (1.753 m)   Wt 206 lb (93.4 kg)   SpO2 95%   BMI 30.42 kg/m²     General appearance: alert, cooperative, no distress, appears stated age  Neck: supple, symmetrical, trachea midline, no adenopathy, thyroid: not enlarged, symmetric, no tenderness/mass/nodules, no carotid bruit and no JVD  Lungs: clear to auscultation bilaterally  Heart: regular rate and rhythm, S1, S2 normal, no murmur, click, rub or gallop  Extremities: extremities normal, atraumatic, no cyanosis or edema      ASSESSMENT and PLAN    ICD-10-CM ICD-9-CM    1. Essential hypertension, benign I10 401.1 DISCONTINUED: lisinopril (PRINIVIL, ZESTRIL) 20 mg tablet   2.  Atherosclerosis I70.90 440.9        As above,   above all stable unless otherwise noted   treatment plan as listed below  Orders Placed This Encounter    DISCONTD: lisinopril (PRINIVIL, ZESTRIL) 20 mg tablet     Follow-up and Dispositions    · Return in about 4 months (around 4/18/2020), or if symptoms worsen or fail to improve, for well exam.       An After Visit Summary was printed and given to the patient. This has been fully explained to the patient, who indicates understanding.

## 2019-12-18 NOTE — PATIENT INSTRUCTIONS
DASH Diet: Care Instructions Your Care Instructions The DASH diet is an eating plan that can help lower your blood pressure. DASH stands for Dietary Approaches to Stop Hypertension. Hypertension is high blood pressure. The DASH diet focuses on eating foods that are high in calcium, potassium, and magnesium. These nutrients can lower blood pressure. The foods that are highest in these nutrients are fruits, vegetables, low-fat dairy products, nuts, seeds, and legumes. But taking calcium, potassium, and magnesium supplements instead of eating foods that are high in those nutrients does not have the same effect. The DASH diet also includes whole grains, fish, and poultry. The DASH diet is one of several lifestyle changes your doctor may recommend to lower your high blood pressure. Your doctor may also want you to decrease the amount of sodium in your diet. Lowering sodium while following the DASH diet can lower blood pressure even further than just the DASH diet alone. Follow-up care is a key part of your treatment and safety. Be sure to make and go to all appointments, and call your doctor if you are having problems. It's also a good idea to know your test results and keep a list of the medicines you take. How can you care for yourself at home? Following the DASH diet · Eat 4 to 5 servings of fruit each day. A serving is 1 medium-sized piece of fruit, ½ cup chopped or canned fruit, 1/4 cup dried fruit, or 4 ounces (½ cup) of fruit juice. Choose fruit more often than fruit juice. · Eat 4 to 5 servings of vegetables each day. A serving is 1 cup of lettuce or raw leafy vegetables, ½ cup of chopped or cooked vegetables, or 4 ounces (½ cup) of vegetable juice. Choose vegetables more often than vegetable juice. · Get 2 to 3 servings of low-fat and fat-free dairy each day. A serving is 8 ounces of milk, 1 cup of yogurt, or 1 ½ ounces of cheese. · Eat 6 to 8 servings of grains each day. A serving is 1 slice of bread, 1 ounce of dry cereal, or ½ cup of cooked rice, pasta, or cooked cereal. Try to choose whole-grain products as much as possible. · Limit lean meat, poultry, and fish to 2 servings each day. A serving is 3 ounces, about the size of a deck of cards. · Eat 4 to 5 servings of nuts, seeds, and legumes (cooked dried beans, lentils, and split peas) each week. A serving is 1/3 cup of nuts, 2 tablespoons of seeds, or ½ cup of cooked beans or peas. · Limit fats and oils to 2 to 3 servings each day. A serving is 1 teaspoon of vegetable oil or 2 tablespoons of salad dressing. · Limit sweets and added sugars to 5 servings or less a week. A serving is 1 tablespoon jelly or jam, ½ cup sorbet, or 1 cup of lemonade. · Eat less than 2,300 milligrams (mg) of sodium a day. If you limit your sodium to 1,500 mg a day, you can lower your blood pressure even more. Tips for success · Start small. Do not try to make dramatic changes to your diet all at once. You might feel that you are missing out on your favorite foods and then be more likely to not follow the plan. Make small changes, and stick with them. Once those changes become habit, add a few more changes. · Try some of the following: ? Make it a goal to eat a fruit or vegetable at every meal and at snacks. This will make it easy to get the recommended amount of fruits and vegetables each day. ? Try yogurt topped with fruit and nuts for a snack or healthy dessert. ? Add lettuce, tomato, cucumber, and onion to sandwiches. ? Combine a ready-made pizza crust with low-fat mozzarella cheese and lots of vegetable toppings. Try using tomatoes, squash, spinach, broccoli, carrots, cauliflower, and onions. ? Have a variety of cut-up vegetables with a low-fat dip as an appetizer instead of chips and dip. ? Sprinkle sunflower seeds or chopped almonds over salads.  Or try adding chopped walnuts or almonds to cooked vegetables. ? Try some vegetarian meals using beans and peas. Add garbanzo or kidney beans to salads. Make burritos and tacos with mashed friend beans or black beans. Where can you learn more? Go to http://costa-jessica.info/. Enter N390 in the search box to learn more about \"DASH Diet: Care Instructions. \" Current as of: April 9, 2019 Content Version: 12.2 © 8068-7748 [employer]. Care instructions adapted under license by [employer] (which disclaims liability or warranty for this information). If you have questions about a medical condition or this instruction, always ask your healthcare professional. [address] any warranty or liability for your use of this information.

## 2019-12-31 ENCOUNTER — TELEPHONE (OUTPATIENT)
Dept: PULMONOLOGY | Age: 49
End: 2019-12-31

## 2019-12-31 NOTE — TELEPHONE ENCOUNTER
SHAKIR FROM [facility] CALLED([phone]). WANTS TO KNOW IF THEY CAN USE THE GENERIC FOR ADVAIR TO FILL PRESCRIPTION. PLEASE CALL BACK.

## 2020-01-08 ENCOUNTER — OFFICE VISIT (OUTPATIENT)
Dept: PULMONOLOGY | Age: 50
End: 2020-01-08

## 2020-01-08 VITALS
DIASTOLIC BLOOD PRESSURE: 86 MMHG | BODY MASS INDEX: 31.01 KG/M2 | WEIGHT: 209.4 LBS | TEMPERATURE: 97.6 F | OXYGEN SATURATION: 96 % | RESPIRATION RATE: 18 BRPM | HEIGHT: 69 IN | SYSTOLIC BLOOD PRESSURE: 134 MMHG | HEART RATE: 88 BPM

## 2020-01-08 DIAGNOSIS — J44.9 COPD, SEVERE (HCC): Primary | ICD-10-CM

## 2020-01-08 RX ORDER — PREDNISONE 1 MG/1
TABLET ORAL
Qty: 90 TAB | Refills: 5 | Status: SHIPPED | OUTPATIENT
Start: 2020-01-08 | End: 2020-01-08 | Stop reason: SDUPTHER

## 2020-01-08 RX ORDER — HYDROXYZINE 50 MG/1
50 TABLET, FILM COATED ORAL
COMMUNITY
End: 2021-11-30 | Stop reason: ALTCHOICE

## 2020-01-08 RX ORDER — PREDNISONE 1 MG/1
TABLET ORAL
Qty: 90 TAB | Refills: 5 | Status: SHIPPED | OUTPATIENT
Start: 2020-01-08 | End: 2020-06-29

## 2020-01-08 RX ORDER — LISINOPRIL 20 MG/1
TABLET ORAL DAILY
COMMUNITY
End: 2020-07-17

## 2020-01-08 NOTE — PROGRESS NOTES
Tate Louie presents today for   Chief Complaint   Patient presents with    Follow Up Chronic Condition       Is someone accompanying this pt? No    Is the patient using any DME equipment during OV? No    -[employer]    Depression Screening:  3 most recent PHQ Screens 10/17/2019   PHQ Not Done -   Little interest or pleasure in doing things Not at all   Feeling down, depressed, irritable, or hopeless Not at all   Total Score PHQ 2 0       Learning Assessment:  Learning Assessment 5/7/2019   PRIMARY LEARNER Patient   PRIMARY LANGUAGE ENGLISH   LEARNER PREFERENCE PRIMARY OTHER (COMMENT)     -     -   ANSWERED BY patient   RELATIONSHIP SELF       Abuse Screening:  Abuse Screening Questionnaire 1/8/2019   Do you ever feel afraid of your partner? N   Are you in a relationship with someone who physically or mentally threatens you? N   Is it safe for you to go home? Y       Fall Risk  Fall Risk Assessment, last 12 mths 9/17/2018   Able to walk? Yes   Fall in past 12 months? No         Coordination of Care:  1. Have you been to the ER, urgent care clinic since your last visit? Hospitalized since your last visit? No    2. Have you seen or consulted any other health care providers outside of the [address] [place] [address] since your last visit? Include any pap smears or colon screening.  No

## 2020-01-08 NOTE — PROGRESS NOTES
[facility]  Pulmonary, Critical Care, and Sleep Medicine      Chief complaint:  COPD    HPI:    Layla Wolff.    is 47years old and returns the office today for follow-up concerning COPD and relates that he is exercising regularly walking up to 2 miles at a time. He relates he still has a cough and uses his albuterol nebulizer about twice a day. He also takes Spiriva and Advair Daliresp faithfully. He denies chest pain leg swelling. Allergies   Allergen Reactions    Ciprofloxacin Nausea Only    Remeron [Mirtazapine] Other (comments)     Mood swings    Seroquel [Quetiapine] Other (comments)     Mood swings     Current Outpatient Medications   Medication Sig    hydrOXYzine HCl (ATARAX) 50 mg tablet Take 50 mg by mouth three (3) times daily as needed for Itching.  lisinopril (PRINIVIL, ZESTRIL) 20 mg tablet Take  by mouth daily.  predniSONE (DELTASONE) 1 mg tablet 3 tablets every morning with breakfast    fluticasone propion-salmeterol (ADVAIR/WIXELA) 500-50 mcg/dose diskus inhaler Take 1 Puff by inhalation two (2) times a day.  albuterol (PROVENTIL HFA, VENTOLIN HFA, PROAIR HFA) 90 mcg/actuation inhaler Take 2 Puffs by inhalation every four (4) hours as needed for Wheezing.  roflumilast (DALIRESP) 500 mcg tab tablet TAKE 1 TABLET BY MOUTH ONCE DAILY    albuterol (PROVENTIL VENTOLIN) 2.5 mg /3 mL (0.083 %) nebu Nebulized 1 vial for inhalation every 4 hours as needed    atorvastatin (LIPITOR) 20 mg tablet Take 1 Tab by mouth daily.  oxyCODONE-acetaminophen (PERCOCET) 5-325 mg per tablet Take 1 Tab by mouth every twelve (12) hours as needed for Pain.  tamsulosin (FLOMAX) 0.4 mg capsule Take 1 Cap by mouth daily.  tiotropium (SPIRIVA) 18 mcg inhalation capsule Take 1 Cap by inhalation daily.  Indications: Bronchospasm Prevention with COPD    furosemide (LASIX) 20 mg tablet TAKE 1 TABLET BY MOUTH EVERY OTHER DAY    Blood Pressure Monitor (BLOOD PRESSURE KIT) kit 1 Device by Does Not Apply route daily as needed (for blood pressure checks).  OXcarbazepine (TRILEPTAL) 150 mg tablet Take 150 mg by mouth two (2) times a day.  LORazepam (ATIVAN) 1 mg tablet Take 1 Tab by mouth every eight (8) hours as needed for Anxiety. Max Daily Amount: 3 mg.  Nebulizer & Compressor machine 1 Each by Does Not Apply route every four (4) hours as needed.  OXYGEN-AIR DELIVERY SYSTEMS (WALKABOUT 1 OXYGEN SYSTEM) 2.5 L/min by Nasal route continuous.  pantoprazole (PROTONIX) 40 mg tablet Take 1 Tab by mouth Daily (before breakfast).  amitriptyline HCl (AMITRIPTYLINE PO) Take 75 mg by mouth nightly. No current facility-administered medications for this visit.       Past Medical History:   Diagnosis Date    Anxiety     Chest pain     Chronic diastolic heart failure secondary to coronary artery disease (HCC)     Chronic lung disease     Chronic obstructive pulmonary disease (HCC) 08/03/2017    PFTS 8/3/17    COPD, severe (HCC)     Cough     Emphysema/COPD (HCC)     Esophagitis 12/11/2017    Endoscopy    Essential hypertension, benign     Fast heart beat     Feeling of chest tightness     Frequent urination     Heartburn     Hepatomegaly     History of stomach ulcers     Hx of nausea and vomiting     Poor appetite     Positive urine drug screen 01/2016    opiates and THC    Shortness of breath     Splenomegaly     Stomach pain     Stress     Tiredness     Unintentional weight change     Upper GI bleed     Weakness     Wheeze      Past Surgical History:   Procedure Laterality Date    COLONOSCOPY N/A 11/5/2018    COLONOSCOPY with polypectomies performed by Frieda Castaneda MD at [address] HX ENDOSCOPY  12/11/2017    HX WISDOM TEETH EXTRACTION Right 05/2019    IR BX LIVER PERCUTANEOUS       Social History     Socioeconomic History    Marital status:      Spouse name: Not on file    Number of children: Not on file    Years of education: Not on file    Highest education level: Not on file   Occupational History    Not on file   Social Needs    Financial resource strain: Not on file    Food insecurity:     Worry: Not on file     Inability: Not on file    Transportation needs:     Medical: Not on file     Non-medical: Not on file   Tobacco Use    Smoking status: Former Smoker     Packs/day: 2.00     Years: 25.00     Pack years: 50.00     Types: Cigarettes     Start date: 1984     Last attempt to quit: 2017     Years since quittin.0    Smokeless tobacco: Never Used   Substance and Sexual Activity    Alcohol use: No    Drug use: Yes     Types: Marijuana     Comment: Hx of Marijuana use    Sexual activity: Not Currently     Partners: Female   Lifestyle    Physical activity:     Days per week: Not on file     Minutes per session: Not on file    Stress: Not on file   Relationships    Social connections:     Talks on phone: Not on file     Gets together: Not on file     Attends Cheondoism service: Not on file     Active member of club or organization: Not on file     Attends meetings of clubs or organizations: Not on file     Relationship status: Not on file    Intimate partner violence:     Fear of current or ex partner: Not on file     Emotionally abused: Not on file     Physically abused: Not on file     Forced sexual activity: Not on file   Other Topics Concern     Service No    Blood Transfusions No    Caffeine Concern Yes    Occupational Exposure No    Hobby Hazards No    Sleep Concern Yes     Comment: occas    Stress Concern No    Weight Concern No    Special Diet No    Back Care Yes    Exercise No    Bike Helmet Yes    Seat Belt No     Comment: occas    Self-Exams Not Asked   Social History Narrative         Family History   Problem Relation Age of Onset    Hypertension Mother     Heart Disease Mother     Diabetes Mother     Hypertension Father     Heart Disease Father     Cancer Father         lymphnodes    Diabetes Father        Review of systems:  He denies fever chills poor appetite weight loss    Physical Exam:  Visit Vitals  /86 (BP 1 Location: Right arm, BP Patient Position: Sitting)   Pulse 88   Temp 97.6 °F (36.4 °C) (Oral)   Resp 18   Ht 5' 9\" (1.753 m)   Wt 95 kg (209 lb 6.4 oz)   SpO2 96% Comment: RA Rest   BMI 30.92 kg/m²       Well-developed well-nourished  HEENT: WNL  Node exam: Supraclavicular cervical lymph nodes negative  Chest: Equal symmetrical expansion no dullness and absence of wheezes rales or rubs  Heart: Regular rhythm no gallop no murmur no JVD no peripheral edema  Extremities: No cyanosis clubbing or calf tenderness  Neurological: Alert and oriented    Labs:    O2 sat room air at rest 96%    Impression:     COPD by history and physical exam improved and stable    Plan:     Continue DalirespAdvair Spiriva PRN albuterol with follow-up in 6 months or sooner if needed    Shelia Bianchi MD , [facility]    CC: Aaron Loaiza NP     [address] [facility] [address]  [place], [address] [address]     P: [phone]     F: [phone]

## 2020-01-08 NOTE — PATIENT INSTRUCTIONS
Continue Daliresp 1 tablet daily    Continue Spiriva inhale 1 capsule daily and remember to exhale fully before inhaling    Continue Advair 1 inhalation twice daily and remember to exhale fully before inhaling and to wash mouth with water and spit it out after inhaling    Always call for symptoms such as worsening shortness of breath

## 2020-01-27 RX ORDER — FUROSEMIDE 20 MG/1
TABLET ORAL
Qty: 30 TAB | Refills: 6 | Status: SHIPPED | OUTPATIENT
Start: 2020-01-27 | End: 2021-02-01

## 2020-02-07 ENCOUNTER — TELEPHONE (OUTPATIENT)
Dept: FAMILY MEDICINE CLINIC | Age: 50
End: 2020-02-07

## 2020-02-07 NOTE — TELEPHONE ENCOUNTER
Called patient phone is disconnected unable to leave name and call back number. The call was to inform the patient that he can  [address] paperwork at the .

## 2020-03-16 ENCOUNTER — TELEPHONE (OUTPATIENT)
Dept: PULMONOLOGY | Age: 50
End: 2020-03-16

## 2020-03-16 NOTE — TELEPHONE ENCOUNTER
PT CALLED([phone]). PT WANTS TO TALK TO NURSE TO FIND OUT WHAT IS RECOMMENDED FOR GOING OUT IN PUBLIC. PLEASE CALL BACK.

## 2020-03-23 ENCOUNTER — TELEPHONE (OUTPATIENT)
Dept: PULMONOLOGY | Age: 50
End: 2020-03-23

## 2020-03-23 DIAGNOSIS — J44.9 COPD, SEVERE (HCC): Primary | ICD-10-CM

## 2020-03-24 NOTE — TELEPHONE ENCOUNTER
Order faxed for the nebulizer supplies.  Tried calling pt to discuss the mask he was requesting for o2 but phone not working

## 2020-03-24 NOTE — TELEPHONE ENCOUNTER
Per Kareen Coon with [employer], pt needs a new order for his nebulizer supplies, last order from 2018. As far as the mask the patient is requesting for o2 he has to be at 6 L to get a mask.

## 2020-03-31 RX ORDER — ALBUTEROL SULFATE 0.83 MG/ML
SOLUTION RESPIRATORY (INHALATION)
Qty: 60 EACH | Refills: 2 | Status: SHIPPED | OUTPATIENT
Start: 2020-03-31 | End: 2020-10-09 | Stop reason: SDUPTHER

## 2020-04-21 ENCOUNTER — OFFICE VISIT (OUTPATIENT)
Dept: FAMILY MEDICINE CLINIC | Age: 50
End: 2020-04-21

## 2020-04-21 DIAGNOSIS — I10 ESSENTIAL HYPERTENSION, BENIGN: Primary | ICD-10-CM

## 2020-04-21 DIAGNOSIS — E78.00 HYPERCHOLESTEREMIA: ICD-10-CM

## 2020-04-21 RX ORDER — ATORVASTATIN CALCIUM 20 MG/1
TABLET, FILM COATED ORAL
Qty: 30 TAB | Refills: 0 | Status: SHIPPED | OUTPATIENT
Start: 2020-04-21 | End: 2020-05-20

## 2020-04-21 NOTE — PATIENT INSTRUCTIONS
Chronic Obstructive Pulmonary Disease (COPD): Care Instructions Your Care Instructions Chronic obstructive pulmonary disease (COPD) is a general term for a group of lung diseases, including emphysema and chronic bronchitis. People with COPD have decreased airflow in and out of the lungs, which makes it hard to breathe. The airways also can get clogged with thick mucus. Cigarette smoking is a major cause of COPD. Although there is no cure for COPD, you can slow its progress. Following your treatment plan and taking care of yourself can help you feel better and live longer. Follow-up care is a key part of your treatment and safety. Be sure to make and go to all appointments, and call your doctor if you are having problems. It's also a good idea to know your test results and keep a list of the medicines you take. How can you care for yourself at home? 
 Staying healthy 
  · Do not smoke. This is the most important step you can take to prevent more damage to your lungs. If you need help quitting, talk to your doctor about stop-smoking programs and medicines. These can increase your chances of quitting for good.  
  · Avoid colds and flu. Get a pneumococcal vaccine shot. If you have had one before, ask your doctor whether you need a second dose. Get the flu vaccine every fall. If you must be around people with colds or the flu, wash your hands often.  
  · Avoid secondhand smoke, air pollution, and high altitudes. Also avoid cold, dry air and hot, humid air. Stay at home with your windows closed when air pollution is bad.  
 Medicines and oxygen therapy 
  · Take your medicines exactly as prescribed. Call your doctor if you think you are having a problem with your medicine.  
  · You may be taking medicines such as: 
? Bronchodilators. These help open your airways and make breathing easier.  Bronchodilators are either short-acting (work for 6 to 9 hours) or long-acting (work for 24 hours). You inhale most bronchodilators, so they start to act quickly. Always carry your quick-relief inhaler with you in case you need it while you are away from home. ? Corticosteroids (prednisone, budesonide). These reduce airway inflammation. They come in pill or inhaled form. You must take these medicines every day for them to work well.  
  · A spacer may help you get more inhaled medicine to your lungs. Ask your doctor or pharmacist if a spacer is right for you. If it is, ask how to use it properly.  
  · Do not take any vitamins, over-the-counter medicine, or herbal products without talking to your doctor first.  
  · If your doctor prescribed antibiotics, take them as directed. Do not stop taking them just because you feel better. You need to take the full course of antibiotics.  
  · Oxygen therapy boosts the amount of oxygen in your blood and helps you breathe easier. Use the flow rate your doctor has recommended, and do not change it without talking to your doctor first.  
Activity 
  · Get regular exercise. Walking is an easy way to get exercise. Start out slowly, and walk a little more each day.  
  · Pay attention to your breathing. You are exercising too hard if you cannot talk while you are exercising.  
  · Take short rest breaks when doing household chores and other activities.  
  · Learn breathing methodssuch as breathing through pursed lipsto help you become less short of breath.  
  · If your doctor has not set you up with a pulmonary rehabilitation program, talk to him or her about whether rehab is right for you. Rehab includes exercise programs, education about your disease and how to manage it, help with diet and other changes, and emotional support. Diet 
  · Eat regular, healthy meals. Use bronchodilators about 1 hour before you eat to make it easier to eat.  Eat several small meals instead of three large ones. Drink beverages at the end of the meal. Avoid foods that are hard to chew.  
  · Eat foods that contain protein so that you do not lose muscle mass.  
  · Talk with your doctor if you gain too much weight or if you lose weight without trying.  
 Mental health 
  · Talk to your family, friends, or a therapist about your feelings. It is normal to feel frightened, angry, hopeless, helpless, and even guilty. Talking openly about bad feelings can help you cope. If these feelings last, talk to your doctor. When should you call for help? Call 911 anytime you think you may need emergency care. For example, call if: 
  · You have severe trouble breathing.  
 Call your doctor now or seek immediate medical care if: 
  · You have new or worse trouble breathing.  
  · You cough up blood.  
  · You have a fever.  
 Watch closely for changes in your health, and be sure to contact your doctor if: 
  · You cough more deeply or more often, especially if you notice more mucus or a change in the color of your mucus.  
  · You have new or worse swelling in your legs or belly.  
  · You are not getting better as expected. Where can you learn more? Go to http://costa-jessica.info/ Karl Bey in the search box to learn more about \"Chronic Obstructive Pulmonary Disease (COPD): Care Instructions. \" Current as of: June 9, 2019Content Version: 12.4 © 3318-5946 [employer]. Care instructions adapted under license by [employer] (which disclaims liability or warranty for this information). If you have questions about a medical condition or this instruction, always ask your healthcare professional. [address] any warranty or liability for your use of this information.

## 2020-04-21 NOTE — PROGRESS NOTES
Consent: Carolyn Langesabinoadarsh , who was seen by synchronous (real-time) audio-video technology, and/or his healthcare decision maker, is aware that this patient-initiated, Telehealth encounter on 4/21/2020 is a billable service, with coverage as determined by his insurance carrier. He is aware that he may receive a bill and has provided verbal consent to proceed: Yes. Assessment & Plan:   Diagnoses and all orders for this visit:    1. Essential hypertension, benign    2. Hypercholesteremia           As above,  above all stable unless otherwise noted  Continue current medications        Follow-up and Dispositions    · Return in about 4 months (around 8/21/2020) for htn, Chol. This has been fully explained to the patient, who indicates understanding. Follow up with consultants as scheduled    712  Subjective:   Lexy6 Samy Valdez is a 47 y.o. male who was seen for Hypertension  Pt has been taking prednisone 3 mg daily. He is under the care of Dr. Feng Fong ( pulmonary)   HTN:  His BPs have been stable; Has been losing weight. Thinks he has lost 9 pounds since January. He uses 02 at night. He checks his pulse ox at home. Will see Cardiology in November;  States heart work up discussed at last visit was stable     He is under the care of Dr. Patricia Salvador for pain management. He is on Percocet twice daily. This helps manage his pain sufficiently. Pt has a h/o anxiety and is on ativan; he has been advised of the potential side effects of using percocet and ativan concomitantly with respect to risk of OD and respiratory depression  He voiced understanding. Prior to Admission medications    Medication Sig Start Date End Date Taking?  Authorizing Provider   atorvastatin (LIPITOR) 20 mg tablet Take 1 tablet by mouth once daily 4/21/20  Yes German Lubin MD   albuterol (PROVENTIL VENTOLIN) 2.5 mg /3 mL (0.083 %) nebu Nebulized 1 vial for inhalation every 4 hours as needed 3/31/20  Yes Melba Burden Vianca Fowler MD   SPIRIVA WITH HANDIHALER 18 mcg inhalation capsule INHALE 1 PUFF BY MOUTH ONCE DAILY FOR  BRONCHOSPASM  PREVENTION  WITH  COPD 2/11/20  Yes Tate Gonzalez MD   furosemide (LASIX) 20 mg tablet TAKE 1 TABLET BY MOUTH EVERY OTHER DAY 1/27/20  Yes Lala Betancourt NP   hydrOXYzine HCl (ATARAX) 50 mg tablet Take 50 mg by mouth three (3) times daily as needed for Itching. Yes Provider, Historical   lisinopril (PRINIVIL, ZESTRIL) 20 mg tablet Take  by mouth daily. Yes Provider, Historical   predniSONE (DELTASONE) 1 mg tablet 3 tablets every morning with breakfast 1/8/20  Yes Tate Gonzalez MD   fluticasone propion-salmeterol (ADVAIR/WIXELA) 500-50 mcg/dose diskus inhaler Take 1 Puff by inhalation two (2) times a day. 12/19/19  Yes Wilber Barajas MD   albuterol (PROVENTIL HFA, VENTOLIN HFA, PROAIR HFA) 90 mcg/actuation inhaler Take 2 Puffs by inhalation every four (4) hours as needed for Wheezing. 12/19/19  Yes Wilber Barajas MD   roflumilast (DALIRESP) 500 mcg tab tablet TAKE 1 TABLET BY MOUTH ONCE DAILY 12/16/19  Yes Tate Gonzalez MD   oxyCODONE-acetaminophen (PERCOCET) 5-325 mg per tablet Take 1 Tab by mouth every twelve (12) hours as needed for Pain. Yes Provider, Historical   tamsulosin (FLOMAX) 0.4 mg capsule Take 1 Cap by mouth daily. 8/5/19  Yes Tad Jamison MD   Blood Pressure Monitor (BLOOD PRESSURE KIT) kit 1 Device by Does Not Apply route daily as needed (for blood pressure checks). 4/25/19  Yes Joel Johnston NP   amitriptyline HCl (AMITRIPTYLINE PO) Take 75 mg by mouth nightly. 2/23/19  Yes Provider, Historical   OXcarbazepine (TRILEPTAL) 150 mg tablet Take 150 mg by mouth two (2) times a day. Yes Provider, Historical   LORazepam (ATIVAN) 1 mg tablet Take 1 Tab by mouth every eight (8) hours as needed for Anxiety.  Max Daily Amount: 3 mg. 8/27/18  Yes Kal Evans MD   Nebulizer & Compressor machine 1 Each by Does Not Apply route every four (4) hours as needed. 2/28/18  Yes Kiersten Nava NP   OXYGEN-AIR DELIVERY SYSTEMS (WALKABOUT 1 OXYGEN SYSTEM) 2.5 L/min by Nasal route continuous. Yes Provider, Historical   pantoprazole (PROTONIX) 40 mg tablet Take 1 Tab by mouth Daily (before breakfast). 1/12/18  Yes Ajay Long MD   acetaminophen (TYLENOL) 500 mg tablet Take 500 mg by mouth every six (6) hours as needed for Pain.  2 tabs q 6 hr  Indications: Pain    Provider, Historical     Allergies   Allergen Reactions    Ciprofloxacin Nausea Only    Remeron [Mirtazapine] Other (comments)     Mood swings    Seroquel [Quetiapine] Other (comments)     Mood swings       Patient Active Problem List    Diagnosis Date Noted    Panic attack 10/08/2018    Insomnia 10/08/2018    Hypovitaminosis D 10/08/2018    Overweight (BMI 25.0-29.9) 10/08/2018    Pulmonary infiltrate 03/19/2018    Essential hypertension, benign 02/21/2018    Esophagitis 02/21/2018    Sepsis ([facility] [address] 12/18/2017    Emphysema/COPD ([facility] [address] [facility]     COPD, severe (HCC)      Allergies   Allergen Reactions    Ciprofloxacin Nausea Only    Remeron [Mirtazapine] Other (comments)     Mood swings    Seroquel [Quetiapine] Other (comments)     Mood swings     Past Medical History:   Diagnosis Date    Anxiety     Chest pain     Chronic diastolic heart failure secondary to coronary artery disease (HCC)     Chronic lung disease     Chronic obstructive pulmonary disease ([address] 08/03/2017    PFTS 8/3/17    COPD, severe (HCC)     Cough     Emphysema/COPD (HCC)     Esophagitis 12/11/2017    Endoscopy    Essential hypertension, benign     Fast heart beat     Feeling of chest tightness     Frequent urination     Heartburn     Hepatomegaly     History of stomach ulcers     Hx of nausea and vomiting     Poor appetite     Positive urine drug screen 01/2016    opiates and THC    Shortness of breath     Splenomegaly     Stomach pain     Stress     Tiredness     Unintentional weight change     Upper GI bleed     Weakness     Wheeze      Past Surgical History:   Procedure Laterality Date    COLONOSCOPY N/A 2018    COLONOSCOPY with polypectomies [facility] Linda Hernandez [facility] ENDOSCOPY    HX ENDOSCOPY  2017    HX WISDOM TEETH EXTRACTION Right 2019    IR BX LIVER PERCUTANEOUS       Family History   Problem Relation Age of Onset    Hypertension Mother     Heart Disease Mother     Diabetes Mother     Hypertension Father     Heart Disease Father     Cancer Father         lymphnodes    Diabetes Father      Social History     Tobacco Use    Smoking status: Former Smoker     Packs/day: 2.00     Years: 25.00     Pack years: 50.00     Types: Cigarettes     Start date: 1984     Last attempt to quit: 2017     Years since quittin.3    Smokeless tobacco: Never Used   Substance Use Topics    Alcohol use: No       Review of Systems   Constitutional: Negative for chills and fever. Cardiovascular: Negative for chest pain and palpitations. Objective: There were no vitals taken for this visit. General: alert, cooperative, no distress   Mental  status: normal mood, behavior, speech, dress, motor activity, and thought processes, able to follow commands   HENT: NCAT   Neck: no visualized mass   Resp: no respiratory distress   Neuro: no gross deficits   Skin: no discoloration or lesions of concern on visible areas   Psychiatric: normal affect, consistent with stated mood, no evidence of hallucinations     Additional exam findings: none      We discussed the expected course, resolution and complications of the diagnosis(es) in detail. Medication risks, benefits, costs, interactions, and alternatives were discussed as indicated. I advised him to contact the office if his condition worsens, changes or fails to improve as anticipated. He expressed understanding with the diagnosis(es) and plan.        Tone Braga  is a 47 y.o. male being evaluated by a video visit encounter for concerns as above. A caregiver was present when appropriate. Due to this being a TeleHealth encounter (During VWIAZ-20 public health emergency), evaluation of the following organ systems was limited: Vitals/Constitutional/EENT/Resp/CV/GI//MS/Neuro/Skin/Heme-Lymph-Imm. Pursuant to the emergency declaration under the [facility] waiver authority and the [employer] and [employer] Act, this Virtual  Visit was conducted, with patient's (and/or legal guardian's) consent, to reduce the patient's risk of exposure to COVID-19 and provide necessary medical care. Services were provided through a video synchronous discussion virtually to substitute for in-person clinic visit. Patient was at home and provider was at the office.     Kalie Yap MD

## 2020-05-20 RX ORDER — ATORVASTATIN CALCIUM 20 MG/1
TABLET, FILM COATED ORAL
Qty: 30 TAB | Refills: 0 | Status: SHIPPED | OUTPATIENT
Start: 2020-05-20 | End: 2020-06-18 | Stop reason: SDUPTHER

## 2020-06-15 RX ORDER — FLUTICASONE PROPIONATE AND SALMETEROL 500; 50 UG/1; UG/1
1 POWDER RESPIRATORY (INHALATION) 2 TIMES DAILY
Qty: 1 INHALER | Refills: 5 | Status: SHIPPED | OUTPATIENT
Start: 2020-06-15 | End: 2020-10-16 | Stop reason: SDUPTHER

## 2020-06-18 ENCOUNTER — HOSPITAL ENCOUNTER (EMERGENCY)
Age: 50
Discharge: HOME | End: 2020-06-18
Attending: EMERGENCY MEDICINE
Payer: MEDICARE

## 2020-06-18 ENCOUNTER — TELEPHONE (OUTPATIENT)
Dept: FAMILY MEDICINE CLINIC | Age: 50
End: 2020-06-18

## 2020-06-18 ENCOUNTER — APPOINTMENT (OUTPATIENT)
Dept: GENERAL RADIOLOGY | Age: 50
End: 2020-06-18
Attending: PHYSICIAN ASSISTANT
Payer: MEDICARE

## 2020-06-18 VITALS
HEIGHT: 68 IN | BODY MASS INDEX: 30.31 KG/M2 | TEMPERATURE: 97.6 F | SYSTOLIC BLOOD PRESSURE: 123 MMHG | HEART RATE: 93 BPM | DIASTOLIC BLOOD PRESSURE: 86 MMHG | RESPIRATION RATE: 16 BRPM | WEIGHT: 200 LBS | OXYGEN SATURATION: 98 %

## 2020-06-18 DIAGNOSIS — L03.114 CELLULITIS OF LEFT UPPER EXTREMITY: Primary | ICD-10-CM

## 2020-06-18 PROCEDURE — 73080 X-RAY EXAM OF ELBOW: CPT

## 2020-06-18 PROCEDURE — 99283 EMERGENCY DEPT VISIT LOW MDM: CPT

## 2020-06-18 RX ORDER — CLINDAMYCIN HYDROCHLORIDE 300 MG/1
300 CAPSULE ORAL 4 TIMES DAILY
Qty: 40 CAP | Refills: 0 | Status: SHIPPED | OUTPATIENT
Start: 2020-06-18 | End: 2020-06-28

## 2020-06-18 RX ORDER — ACETAMINOPHEN AND CODEINE PHOSPHATE 300; 30 MG/1; MG/1
1 TABLET ORAL
Qty: 18 TAB | Refills: 0 | Status: SHIPPED | OUTPATIENT
Start: 2020-06-18 | End: 2020-06-21

## 2020-06-18 RX ORDER — ATORVASTATIN CALCIUM 20 MG/1
TABLET, FILM COATED ORAL
Qty: 90 TAB | Refills: 3 | Status: SHIPPED | OUTPATIENT
Start: 2020-06-18 | End: 2021-04-12

## 2020-06-18 NOTE — TELEPHONE ENCOUNTER
Pt called states has severe swelling in rt elbow with red streaks going down arm, pt declined seeing any provider for vv, forward to nurse for triage.   Please adv [phone]

## 2020-06-18 NOTE — ED PROVIDER NOTES
[address] [facility]  [facility] EMERGENCY DEPT      Date: 6/18/2020  Patient Name: Aliyah Wynne    History of Presenting Illness     Chief Complaint   Patient presents with    Elbow Pain     left       47 y.o. male with noted past medical history including CAD, COPD on home O2 presents to the emergency department c/o left elbow pain since yesterday. Patient does not recall any injury, but does have 2 scabs close to this region. States he may have been bit by a spider, but has not been scratching it. He notes having severe pain and redness to the site. Patient states he has never had this before. Denies any numbness, weakness, diffuse elbow pain, other symptoms at this time. Patient denies any other associated signs or symptoms. Patient denies any other complaints. Nursing notes regarding the HPI and triage nursing notes were reviewed. Prior medical records were reviewed. Current Outpatient Medications   Medication Sig Dispense Refill    atorvastatin (LIPITOR) 20 mg tablet Take 1 tablet by mouth once daily 90 Tab 3    clindamycin (CLEOCIN) 300 mg capsule Take 1 Cap by mouth four (4) times daily for 10 days. 40 Cap 0    acetaminophen-codeine (Tylenol-Codeine #3) 300-30 mg per tablet Take 1 Tab by mouth every six (6) hours as needed for Pain for up to 3 days. Max Daily Amount: 4 Tabs. 18 Tab 0    fluticasone propion-salmeteroL (ADVAIR/WIXELA) 500-50 mcg/dose diskus inhaler Take 1 Puff by inhalation two (2) times a day.  1 Inhaler 5    roflumilast (Daliresp) 500 mcg tab tablet Take 1 tablet by mouth once daily 30 Tab 5    albuterol (PROVENTIL VENTOLIN) 2.5 mg /3 mL (0.083 %) nebu Nebulized 1 vial for inhalation every 4 hours as needed 60 Each 2    SPIRIVA WITH HANDIHALER 18 mcg inhalation capsule INHALE 1 PUFF BY MOUTH ONCE DAILY FOR  BRONCHOSPASM  PREVENTION  WITH  COPD 30 Cap 5    furosemide (LASIX) 20 mg tablet TAKE 1 TABLET BY MOUTH EVERY OTHER DAY 30 Tab 6    hydrOXYzine HCl (ATARAX) 50 mg tablet Take 50 mg by mouth three (3) times daily as needed for Itching.  lisinopril (PRINIVIL, ZESTRIL) 20 mg tablet Take  by mouth daily.  predniSONE (DELTASONE) 1 mg tablet 3 tablets every morning with breakfast 90 Tab 5    albuterol (PROVENTIL HFA, VENTOLIN HFA, PROAIR HFA) 90 mcg/actuation inhaler Take 2 Puffs by inhalation every four (4) hours as needed for Wheezing. 1 Inhaler 3    amitriptyline HCl (AMITRIPTYLINE PO) Take 75 mg by mouth nightly.  OXcarbazepine (TRILEPTAL) 150 mg tablet Take 150 mg by mouth two (2) times a day.  LORazepam (ATIVAN) 1 mg tablet Take 1 Tab by mouth every eight (8) hours as needed for Anxiety. Max Daily Amount: 3 mg. 90 Tab 0    Nebulizer & Compressor machine 1 Each by Does Not Apply route every four (4) hours as needed. 1 Each 0    OXYGEN-AIR DELIVERY SYSTEMS (WALKABOUT 1 OXYGEN SYSTEM) 2.5 L/min by Nasal route continuous.  pantoprazole (PROTONIX) 40 mg tablet Take 1 Tab by mouth Daily (before breakfast). 30 Tab 0    Blood Pressure Monitor (BLOOD PRESSURE KIT) kit 1 Device by Does Not Apply route daily as needed (for blood pressure checks).  1 Kit 0       Past History     Past Medical History:  Past Medical History:   Diagnosis Date    Anxiety     Chest pain     Chronic diastolic heart failure secondary to coronary artery disease (HCC)     Chronic lung disease     Chronic obstructive pulmonary disease ([facility] [address] 08/03/2017    PFTS 8/3/17    COPD, severe (HCC)     Cough     Emphysema/COPD (HCC)     Esophagitis 12/11/2017    Endoscopy    Essential hypertension, benign     Fast heart beat     Feeling of chest tightness     Frequent urination     Heartburn     Hepatomegaly     History of stomach ulcers     Hx of nausea and vomiting     Poor appetite     Positive urine drug screen 01/2016    opiates and THC    Shortness of breath     Splenomegaly     Stomach pain     Stress     Tiredness     Unintentional weight change     Upper GI bleed     Weakness     Wheeze        Past Surgical History:  Past Surgical History:   Procedure Laterality Date    COLONOSCOPY N/A 2018    COLONOSCOPY with polypectomies performed by Huseyin Barnhart MD at [facility] ENDOSCOPY    HX ENDOSCOPY  2017    HX WISDOM TEETH EXTRACTION Right 2019    IR BX LIVER PERCUTANEOUS         Family History:  Family History   Problem Relation Age of Onset    Hypertension Mother     Heart Disease Mother     Diabetes Mother     Hypertension Father     Heart Disease Father     Cancer Father         lymphnodes    Diabetes Father        Social History:  Social History     Tobacco Use    Smoking status: Former Smoker     Packs/day: 2.00     Years: 25.00     Pack years: 50.00     Types: Cigarettes     Start date: 1984     Last attempt to quit: 2017     Years since quittin.5    Smokeless tobacco: Never Used   Substance Use Topics    Alcohol use: No    Drug use: Yes     Types: Marijuana     Comment: Hx of Marijuana use       Allergies: Allergies   Allergen Reactions    Ciprofloxacin Nausea Only    Remeron [Mirtazapine] Other (comments)     Mood swings    Seroquel [Quetiapine] Other (comments)     Mood swings       Patient's primary care provider (as noted in EPIC):  Rica Victor MD    Review of Systems   Constitutional:  Denies malaise, fever, chills. Extremity/MS:  + left posterior elbow pain/redness. Neuro:  Denies neurologic symptoms/deficits/paresthesias. Skin:  See extremity. All other systems negative as reviewed. Visit Vitals  /86   Pulse 93   Temp 97.6 °F (36.4 °C)   Resp 16   Ht 5' 8\" (1.727 m)   Wt 90.7 kg (200 lb)   SpO2 98%   BMI 30.41 kg/m²       PHYSICAL EXAM:    CONSTITUTIONAL:  Alert, in no apparent distress;  well developed;  well nourished. HEAD:  Normocephalic, atraumatic. EYES:  EOMI. Non-icteric sclera. Normal conjunctiva.   ENTM:  Mouth: mucous membranes moist.  NECK:  Supple  RESPIRATORY: Equal breath sounds bilaterally, decreased lung sounds throughout. Without wheezes, rhonchi or rales. CARDIOVASCULAR:  Regular rate and rhythm. No murmurs, rubs, or gallops. UPPER EXT: Left posterior elbow with erythema and tenderness to palpation focally over olecranon bursa, without edema; 2 scabs noted within close proximity to this region which also have some surrounding erythema; no pain with ROM of left elbow. NEURO:  Moves all four extremities, and grossly normal motor exam.  SKIN:  No rashes;  Normal for age. PSYCH:  Alert and normal affect. DIFFERENTIAL DIAGNOSES/ MEDICAL DECISION MAKING:  Olecranon bursitis, cellulitis, vs other etiologies. ED COURSE:     IMPRESSION AND MEDICAL DECISION MAKING:  Based upon the patient's presentation with noted HPI and PE, along with the work up done in the emergency department, I believe that the patient is likely having cellulitis. There is no edema noted to the bursa, patient has full range of motion without pain. Dr. Alexis carrasquillo has seen and evaluated the patient as well and is in agreement with this. Rx provided for clindamycin as well as Tylenol 3. Patient will follow-up with Dr. Johanna Bruce, orthopedics tomorrow without fail. He will return immediately for any worsening. Diagnosis:   1. Cellulitis of left upper extremity      Disposition: Discharge    Follow-up Information     Follow up With Specialties Details Why Contact Info    Jas Perez MD Orthopedic Surgery In 1 day without fail  [address] [facility] [address]      [facility] EMERGENCY DEPT Emergency Medicine  If symptoms worsen [address] [place] [id number]  [phone]          Patient's Medications   Start Taking    ACETAMINOPHEN-CODEINE (TYLENOL-CODEINE #3) 300-30 MG PER TABLET    Take 1 Tab by mouth every six (6) hours as needed for Pain for up to 3 days. Max Daily Amount: 4 Tabs.     CLINDAMYCIN (CLEOCIN) 300 MG CAPSULE    Take 1 Cap by mouth four (4) times daily for 10 days. Continue Taking    ALBUTEROL (PROVENTIL HFA, VENTOLIN HFA, PROAIR HFA) 90 MCG/ACTUATION INHALER    Take 2 Puffs by inhalation every four (4) hours as needed for Wheezing. ALBUTEROL (PROVENTIL VENTOLIN) 2.5 MG /3 ML (0.083 %) NEBU    Nebulized 1 vial for inhalation every 4 hours as needed    AMITRIPTYLINE HCL (AMITRIPTYLINE PO)    Take 75 mg by mouth nightly. ATORVASTATIN (LIPITOR) 20 MG TABLET    Take 1 tablet by mouth once daily    BLOOD PRESSURE MONITOR (BLOOD PRESSURE KIT) KIT    1 Device by Does Not Apply route daily as needed (for blood pressure checks). FLUTICASONE PROPION-SALMETEROL (ADVAIR/WIXELA) 500-50 MCG/DOSE DISKUS INHALER    Take 1 Puff by inhalation two (2) times a day. FUROSEMIDE (LASIX) 20 MG TABLET    TAKE 1 TABLET BY MOUTH EVERY OTHER DAY    HYDROXYZINE HCL (ATARAX) 50 MG TABLET    Take 50 mg by mouth three (3) times daily as needed for Itching. LISINOPRIL (PRINIVIL, ZESTRIL) 20 MG TABLET    Take  by mouth daily. LORAZEPAM (ATIVAN) 1 MG TABLET    Take 1 Tab by mouth every eight (8) hours as needed for Anxiety. Max Daily Amount: 3 mg. NEBULIZER & COMPRESSOR MACHINE    1 Each by Does Not Apply route every four (4) hours as needed. OXCARBAZEPINE (TRILEPTAL) 150 MG TABLET    Take 150 mg by mouth two (2) times a day. OXYGEN-AIR DELIVERY SYSTEMS (WALKABOUT 1 OXYGEN SYSTEM)    2.5 L/min by Nasal route continuous. PANTOPRAZOLE (PROTONIX) 40 MG TABLET    Take 1 Tab by mouth Daily (before breakfast).     PREDNISONE (DELTASONE) 1 MG TABLET    3 tablets every morning with breakfast    ROFLUMILAST (DALIRESP) 500 MCG TAB TABLET    Take 1 tablet by mouth once daily    SPIRIVA WITH HANDIHALER 18 MCG INHALATION CAPSULE    INHALE 1 PUFF BY MOUTH ONCE DAILY FOR  BRONCHOSPASM  PREVENTION  WITH  COPD   These Medications have changed    No medications on file   Stop Taking    OXYCODONE-ACETAMINOPHEN (PERCOCET) 5-325 MG PER TABLET    Take 1 Tab by mouth every twelve (12) hours as needed for Pain.      BUSTER Ledesma

## 2020-06-18 NOTE — DISCHARGE INSTRUCTIONS

## 2020-06-22 ENCOUNTER — PATIENT OUTREACH (OUTPATIENT)
Dept: CASE MANAGEMENT | Age: 50
End: 2020-06-22

## 2020-06-22 NOTE — TELEPHONE ENCOUNTER
Called and was unable to reach patient due to number being disconnected. If patient calls back please confirm number with patient so nursing is able to triage patient.    Sandra Gilmore LPN

## 2020-06-26 RX ORDER — PREDNISONE 1 MG/1
TABLET ORAL
Qty: 90 TAB | Refills: 5 | Status: CANCELLED | OUTPATIENT
Start: 2020-06-26

## 2020-06-29 RX ORDER — PREDNISONE 1 MG/1
TABLET ORAL
Qty: 90 TAB | Refills: 0 | Status: SHIPPED | OUTPATIENT
Start: 2020-06-29 | End: 2020-08-03 | Stop reason: ALTCHOICE

## 2020-07-10 ENCOUNTER — VIRTUAL VISIT (OUTPATIENT)
Dept: NEUROLOGY | Age: 50
End: 2020-07-10

## 2020-07-10 ENCOUNTER — TELEPHONE (OUTPATIENT)
Dept: FAMILY MEDICINE CLINIC | Age: 50
End: 2020-07-10

## 2020-07-10 DIAGNOSIS — R51.9 HEADACHE DISORDER: Primary | ICD-10-CM

## 2020-07-10 DIAGNOSIS — G44.40 REBOUND HEADACHE: ICD-10-CM

## 2020-07-10 RX ORDER — TOPIRAMATE 50 MG/1
TABLET, FILM COATED ORAL
Qty: 60 TAB | Refills: 2 | Status: SHIPPED | OUTPATIENT
Start: 2020-07-10 | End: 2020-09-08

## 2020-07-10 NOTE — TELEPHONE ENCOUNTER
Per chart review, patient was previously referred to Neurology for headaches. Has pending virtual visit with neurology today. Will call patient later today.

## 2020-07-10 NOTE — TELEPHONE ENCOUNTER
----- Message from Danica Sorensen sent at 7/8/2020  9:43 AM EDT -----  Regarding: call back  Patient requesting a call back from nurse in regards to headache

## 2020-07-10 NOTE — PROGRESS NOTES
Fannie Rojas presents today for   Chief Complaint   Patient presents with    Headache       Is someone accompanying this pt? Virtual visit [phone]    Is the patient using any DME equipment during [address]? no    Depression Screening:  3 most recent PHQ Screens 10/17/2019   PHQ Not Done -   Little interest or pleasure in doing things Not at all   Feeling down, depressed, irritable, or hopeless Not at all   Total Score PHQ 2 0       Learning Assessment:  Learning Assessment 5/7/2019   PRIMARY LEARNER Patient   PRIMARY LANGUAGE ENGLISH   LEARNER PREFERENCE PRIMARY OTHER (COMMENT)     -     -   ANSWERED BY patient   RELATIONSHIP SELF       Abuse Screening:  Abuse Screening Questionnaire 1/8/2019   Do you ever feel afraid of your partner? N   Are you in a relationship with someone who physically or mentally threatens you? N   Is it safe for you to go home? Y       Fall Risk  Fall Risk Assessment, last 12 mths 9/17/2018   Able to walk? Yes   Fall in past 12 months? No         Coordination of Care:  1. Have you been to the ER, urgent care clinic since your last visit? Hospitalized since your last visit? Yes, [facility]    2. Have you seen or consulted any other health care providers outside of the [address] [place] [address] since your last visit? Include any pap smears or colon screening.  no

## 2020-07-10 NOTE — PROGRESS NOTES
Trudi Shone . is a 47 y.o. male who was seen by synchronous (real-time) audio-video technology on 7/10/2020 for Headache (follow up headache)        Assessment & Plan:   Diagnoses and all orders for this visit:    1. Headache disorder    2. Rebound headache    Other orders  -     topiramate (TOPAMAX) 50 mg tablet; One tab at bedtime for a week then take two tabs at bedtime      Rebound headache. He is avoiding Tylenolol currently. Was used after having infection to his elbow. Was given Tylenol # 3 in the ED. Will start Topamax as this helped him in the past.  Denies focal deficits. Follow up in 8 weeks or sooner as needed. I spent at least 25 minutes on this visit with this established patient. 712  Subjective: Follow up headaches. Last seen September 2019 for headaches. At that time headaches were controlled and he was no longer on Topmamax. In the interim endorses relief of headache. Starting two weeks ago headache began. Said around that time he fell and hurt his elbow which subsequently caused an infection. He went to the ER. He was told he had an infection and given an atibiotic. He was given Tylenolol #3 for the pain and was taking this daily. Currently has a headache. Behind the eyes. Pressure pain. Denies sensitivity to sound. Denies sensitivity to light. Denies focal deficits. Started taking Topamax 25 mg which she had leftover from prior. He was on this for about a two weeks and ran out. Denies it helped. Has been over for a week. Still sees psychiatrist Lalit Albarran. On Trileptal and Elavil. He endorses his BP was 124/83 this morning using his at home monitor. Denies lightheadedness or passing out. Denies vision changes. No other concerns at this time. Prior to Admission medications    Medication Sig Start Date End Date Taking?  Authorizing Provider   topiramate (TOPAMAX) 50 mg tablet One tab at bedtime for a week then take two tabs at bedtime 7/10/20  Yes Yazmin Santana NP predniSONE (DELTASONE) 1 mg tablet TAKE 3 TABLETS BY MOUTH IN THE MORNING WITH BREAKFAST 6/29/20  Yes Gaurang Patricio MD   atorvastatin (LIPITOR) 20 mg tablet Take 1 tablet by mouth once daily 6/18/20  Yes Марина Gregory MD   fluticasone propion-salmeteroL (ADVAIR/WIXELA) 500-50 mcg/dose diskus inhaler Take 1 Puff by inhalation two (2) times a day. 6/15/20  Yes Gaurang Patricio MD   roflumilast (Daliresp) 500 mcg tab tablet Take 1 tablet by mouth once daily 5/20/20  Yes Gaurang Patricio MD   albuterol (PROVENTIL VENTOLIN) 2.5 mg /3 mL (0.083 %) nebu Nebulized 1 vial for inhalation every 4 hours as needed 3/31/20  Yes Wilton Spence MD   [facility] WITH HANDIHALER 18 mcg inhalation capsule INHALE 1 PUFF BY MOUTH ONCE DAILY FOR  BRONCHOSPASM  PREVENTION  WITH  COPD 2/11/20  Yes Gaurang Patricio MD   furosemide (LASIX) 20 mg tablet TAKE 1 TABLET BY MOUTH EVERY OTHER DAY 1/27/20  Yes Shahbaz See NP   hydrOXYzine HCl (ATARAX) 50 mg tablet Take 50 mg by mouth three (3) times daily as needed for Itching. Yes Provider, Historical   lisinopril (PRINIVIL, ZESTRIL) 20 mg tablet Take  by mouth daily. Yes Provider, Historical   albuterol (PROVENTIL HFA, VENTOLIN HFA, PROAIR HFA) 90 mcg/actuation inhaler Take 2 Puffs by inhalation every four (4) hours as needed for Wheezing. 12/19/19  Yes Ben Lockett MD   Blood Pressure Monitor (BLOOD PRESSURE KIT) kit 1 Device by Does Not Apply route daily as needed (for blood pressure checks). 4/25/19  Yes Stefani Davis NP   amitriptyline HCl (AMITRIPTYLINE PO) Take 75 mg by mouth nightly. 2/23/19  Yes Provider, Historical   OXcarbazepine (TRILEPTAL) 150 mg tablet Take 150 mg by mouth two (2) times a day. Yes Provider, Historical   LORazepam (ATIVAN) 1 mg tablet Take 1 Tab by mouth every eight (8) hours as needed for Anxiety.  Max Daily Amount: 3 mg. 8/27/18  Yes Carol Ponce MD   Nebulizer & Compressor machine 1 Each by Does Not Apply route every four (4) hours as needed. 2/28/18  Yes Nova Ocasio NP   OXYGEN-AIR DELIVERY SYSTEMS (WALKABOUT 1 OXYGEN SYSTEM) 2.5 L/min by Nasal route continuous. Yes Provider, Historical   pantoprazole (PROTONIX) 40 mg tablet Take 1 Tab by mouth Daily (before breakfast). 1/12/18  Yes Chelsi Vivar MD   acetaminophen (TYLENOL) 500 mg tablet Take 500 mg by mouth every six (6) hours as needed for Pain. 2 tabs q 6 hr  Indications: Pain    Provider, Historical     Patient Active Problem List   Diagnosis Code    Emphysema/COPD ([facility] [address] J43.9    COPD, severe ([facility] [address] J44.9    Sepsis ([facility] [address] A41.9    Essential hypertension, benign I10    Esophagitis K20.9    Panic attack F41.0    Insomnia G47.00    Hypovitaminosis D E55.9    Overweight (BMI 25.0-29. 9) [id number]    Pulmonary infiltrate R91.8     Patient Active Problem List    Diagnosis Date Noted    Panic attack 10/08/2018    Insomnia 10/08/2018    Hypovitaminosis D 10/08/2018    Overweight (BMI 25.0-29.9) 10/08/2018    Pulmonary infiltrate 03/19/2018    Essential hypertension, benign 02/21/2018    Esophagitis 02/21/2018    Sepsis ([facility] [address] 12/18/2017    Emphysema/COPD (HCC)     COPD, severe (HCC)      Current Outpatient Medications   Medication Sig Dispense Refill    topiramate (TOPAMAX) 50 mg tablet One tab at bedtime for a week then take two tabs at bedtime 60 Tab 2    predniSONE (DELTASONE) 1 mg tablet TAKE 3 TABLETS BY MOUTH IN THE MORNING WITH BREAKFAST 90 Tab 0    atorvastatin (LIPITOR) 20 mg tablet Take 1 tablet by mouth once daily 90 Tab 3    fluticasone propion-salmeteroL (ADVAIR/WIXELA) 500-50 mcg/dose diskus inhaler Take 1 Puff by inhalation two (2) times a day.  1 Inhaler 5    roflumilast (Daliresp) 500 mcg tab tablet Take 1 tablet by mouth once daily 30 Tab 5    albuterol (PROVENTIL VENTOLIN) 2.5 mg /3 mL (0.083 %) nebu Nebulized 1 vial for inhalation every 4 hours as needed 60 Each 2    SPIRIVA WITH HANDIHALER 18 mcg inhalation capsule INHALE 1 PUFF BY MOUTH ONCE DAILY FOR  BRONCHOSPASM  PREVENTION  WITH  COPD 30 Cap 5    furosemide (LASIX) 20 mg tablet TAKE 1 TABLET BY MOUTH EVERY OTHER DAY 30 Tab 6    hydrOXYzine HCl (ATARAX) 50 mg tablet Take 50 mg by mouth three (3) times daily as needed for Itching.  lisinopril (PRINIVIL, ZESTRIL) 20 mg tablet Take  by mouth daily.  albuterol (PROVENTIL HFA, VENTOLIN HFA, PROAIR HFA) 90 mcg/actuation inhaler Take 2 Puffs by inhalation every four (4) hours as needed for Wheezing. 1 Inhaler 3    Blood Pressure Monitor (BLOOD PRESSURE KIT) kit 1 Device by Does Not Apply route daily as needed (for blood pressure checks). 1 Kit 0    amitriptyline HCl (AMITRIPTYLINE PO) Take 75 mg by mouth nightly.  OXcarbazepine (TRILEPTAL) 150 mg tablet Take 150 mg by mouth two (2) times a day.  LORazepam (ATIVAN) 1 mg tablet Take 1 Tab by mouth every eight (8) hours as needed for Anxiety. Max Daily Amount: 3 mg. 90 Tab 0    Nebulizer & Compressor machine 1 Each by Does Not Apply route every four (4) hours as needed. 1 Each 0    OXYGEN-AIR DELIVERY SYSTEMS (WALKABOUT 1 OXYGEN SYSTEM) 2.5 L/min by Nasal route continuous.  pantoprazole (PROTONIX) 40 mg tablet Take 1 Tab by mouth Daily (before breakfast).  30 Tab 0     Allergies   Allergen Reactions    Ciprofloxacin Nausea Only    Remeron [Mirtazapine] Other (comments)     Mood swings    Seroquel [Quetiapine] Other (comments)     Mood swings     Past Medical History:   Diagnosis Date    Anxiety     Chest pain     Chronic diastolic heart failure secondary to coronary artery disease (HCC)     Chronic lung disease     Chronic obstructive pulmonary disease ([facility] [address] 08/03/2017    PFTS 8/3/17    COPD, severe (HCC)     Coronary artery disease     Cough     Emphysema/COPD ([facility] [address]     Esophagitis 12/11/2017    Endoscopy    Essential hypertension, benign     Fast heart beat     Feeling of chest tightness     Frequent urination     Heartburn     Hepatomegaly     History of stomach ulcers     Hx of nausea and vomiting     Poor appetite     Positive urine drug screen 2016    opiates and THC    Shortness of breath     Splenomegaly     Stomach pain     Stress     Tiredness     Unintentional weight change     Upper GI bleed     Weakness     Wheeze      Past Surgical History:   Procedure Laterality Date    COLONOSCOPY N/A 2018    COLONOSCOPY with polypectomies performed by Addison Lozano MD at [facility]    HX ENDOSCOPY  2017    HX WISDOM TEETH EXTRACTION Right 2019    IR BX LIVER PERCUTANEOUS       Family History   Problem Relation Age of Onset    Hypertension Mother     Heart Disease Mother     Diabetes Mother     Hypertension Father     Heart Disease Father     Cancer Father         lymphnodes    Diabetes Father      Social History     Tobacco Use    Smoking status: Former Smoker     Packs/day: 2.00     Years: 25.00     Pack years: 50.00     Types: Cigarettes     Start date: 1984     Last attempt to quit: 2017     Years since quittin.5    Smokeless tobacco: Never Used    Tobacco comment: smokes Marijuana   Substance Use Topics    Alcohol use: No       Review of Systems  GENERAL: Denies fever or fatigue  CARDIAC: No CP or SOB  PULMONARY: No cough of SOB  MUSCULOSKELETAL: No new joint pain  NEURO: SEE HPI      Objective:     Patient-Reported Vitals 7/10/2020   Patient-Reported Weight 200lb   Patient-Reported Height 5'7\"   Patient-Reported Pulse 86   Patient-Reported SpO2 93   Patient-Reported Systolic  736   Patient-Reported Diastolic 83      General: alert, cooperative, no distress   Mental  status: normal mood, behavior, speech, dress, motor activity, and thought processes, able to follow commands   HENT: NCAT   Neck: no visualized mass   Resp: no respiratory distress   Neuro: no gross deficits   Skin: no discoloration or lesions of concern on visible areas   Psychiatric: normal affect, consistent with stated mood, no evidence of hallucinations     Additional exam findings: A very pleasant 59-year-old male. He is alert and in no apparent distress. Full fund of knowledge. Speech is clear. No facial asymmetry. Extraocular movements intact. Tongue midline. Equal shoulder shrug. No pronation drift. Finger-nose-finger intact. No signs of ataxia or incoordination. Gait steady. Able to tandem walk. We discussed the expected course, resolution and complications of the diagnosis(es) in detail. Medication risks, benefits, costs, interactions, and alternatives were discussed as indicated. I advised him to contact the office if his condition worsens, changes or fails to improve as anticipated. He expressed understanding with the diagnosis(es) and plan. Randy Wade Sr., who was evaluated through a patient-initiated, synchronous (real-time) audio-video encounter, and/or his healthcare decision maker, is aware that it is a billable service, with coverage as determined by his insurance carrier. He provided verbal consent to proceed: Yes, and patient identification was verified. It was conducted pursuant to the emergency declaration under the [facility], [address] [place] [address] authority and the [employer] Act. A caregiver was present when appropriate. Ability to conduct physical exam was limited. I was at home. The patient was at home. Marino Clinton NP  This note will not be viewable in [address]

## 2020-07-13 ENCOUNTER — OFFICE VISIT (OUTPATIENT)
Dept: PULMONOLOGY | Age: 50
End: 2020-07-13

## 2020-07-13 VITALS
RESPIRATION RATE: 25 BRPM | DIASTOLIC BLOOD PRESSURE: 80 MMHG | SYSTOLIC BLOOD PRESSURE: 120 MMHG | OXYGEN SATURATION: 94 % | HEART RATE: 130 BPM | BODY MASS INDEX: 30.51 KG/M2 | TEMPERATURE: 97.9 F | HEIGHT: 69 IN | WEIGHT: 206 LBS

## 2020-07-13 DIAGNOSIS — R00.0 TACHYCARDIA: ICD-10-CM

## 2020-07-13 DIAGNOSIS — J44.9 CHRONIC OBSTRUCTIVE PULMONARY DISEASE, UNSPECIFIED COPD TYPE (HCC): Primary | ICD-10-CM

## 2020-07-13 DIAGNOSIS — J96.10 CHRONIC RESPIRATORY FAILURE, UNSPECIFIED WHETHER WITH HYPOXIA OR HYPERCAPNIA (HCC): ICD-10-CM

## 2020-07-13 NOTE — PROGRESS NOTES
Darrion Fuel presents today for   Chief Complaint   Patient presents with    COPD       Is someone accompanying this pt? No    Is the patient using any DME equipment during OV? Oxygen    -DME Company  [employer]. Depression Screening:  3 most recent PHQ Screens 7/13/2020   PHQ Not Done -   Little interest or pleasure in doing things Several days   Feeling down, depressed, irritable, or hopeless Several days   Total Score PHQ 2 2       Learning Assessment:  Learning Assessment 5/7/2019   PRIMARY LEARNER Patient   PRIMARY LANGUAGE ENGLISH   LEARNER PREFERENCE PRIMARY OTHER (COMMENT)     -     -   ANSWERED BY patient   RELATIONSHIP SELF       Abuse Screening:  Abuse Screening Questionnaire 1/8/2019   Do you ever feel afraid of your partner? N   Are you in a relationship with someone who physically or mentally threatens you? N   Is it safe for you to go home? Y       Fall Risk  Fall Risk Assessment, last 12 mths 9/17/2018   Able to walk? Yes   Fall in past 12 months? No         Coordination of Care:  1. Have you been to the ER, urgent care clinic since your last visit? Hospitalized since your last visit? No    2. Have you seen or consulted any other health care providers outside of the [address] [place] [address] since your last visit? Include any pap smears or colon screening.  No.                     '

## 2020-07-13 NOTE — PATIENT INSTRUCTIONS
Continue Spiriva 1 inhalation daily and remember to exhale fully before inhaling    Continue Advair 1 inhalation twice daily and remember to exhale fully before inhaling and to wash mouth with water and spit it out after inhaling    Continue albuterol 2 puffs every 4 hours as needed but if you require albuterol too often to control respiratory symptoms call the office for severe symptoms go to the emergency room    Gradual reduce and discontinue prednisone 1mg every 2 weeks    Obtain blood work

## 2020-07-13 NOTE — PROGRESS NOTES
[facility]  Pulmonary, Critical Care, and Sleep Medicin    Chief complaint:  COPD chronic respiratory failure  Tachycardia  HPI:    Abigail Sexton    is 47years old and returns the office for follow-up visit and notes she has become more short of breath recently requiring oxygen more he says his cough is stable and has constant chest discomfort no  Chest pain. He denies leg swelling and says he takes his Spiriva Advair albuterol and uses supplemental oxygen with sleep and with activity such as walking outside for more than several 100 feet    Allergies   Allergen Reactions    Ciprofloxacin Nausea Only    Remeron [Mirtazapine] Other (comments)     Mood swings    Seroquel [Quetiapine] Other (comments)     Mood swings     Current Outpatient Medications   Medication Sig    topiramate (TOPAMAX) 50 mg tablet One tab at bedtime for a week then take two tabs at bedtime    predniSONE (DELTASONE) 1 mg tablet TAKE 3 TABLETS BY MOUTH IN THE MORNING WITH BREAKFAST    atorvastatin (LIPITOR) 20 mg tablet Take 1 tablet by mouth once daily    fluticasone propion-salmeteroL (ADVAIR/WIXELA) 500-50 mcg/dose diskus inhaler Take 1 Puff by inhalation two (2) times a day.  roflumilast (Daliresp) 500 mcg tab tablet Take 1 tablet by mouth once daily    albuterol (PROVENTIL VENTOLIN) 2.5 mg /3 mL (0.083 %) nebu Nebulized 1 vial for inhalation every 4 hours as needed    SPIRIVA WITH HANDIHALER 18 mcg inhalation capsule INHALE 1 PUFF BY MOUTH ONCE DAILY FOR  BRONCHOSPASM  PREVENTION  WITH  COPD    furosemide (LASIX) 20 mg tablet TAKE 1 TABLET BY MOUTH EVERY OTHER DAY    hydrOXYzine HCl (ATARAX) 50 mg tablet Take 50 mg by mouth three (3) times daily as needed for Itching.  lisinopril (PRINIVIL, ZESTRIL) 20 mg tablet Take  by mouth daily.  albuterol (PROVENTIL HFA, VENTOLIN HFA, PROAIR HFA) 90 mcg/actuation inhaler Take 2 Puffs by inhalation every four (4) hours as needed for Wheezing.     Blood Pressure Monitor (BLOOD PRESSURE KIT) kit 1 Device by Does Not Apply route daily as needed (for blood pressure checks).  amitriptyline HCl (AMITRIPTYLINE PO) Take 75 mg by mouth nightly.  OXcarbazepine (TRILEPTAL) 150 mg tablet Take 150 mg by mouth two (2) times a day.  LORazepam (ATIVAN) 1 mg tablet Take 1 Tab by mouth every eight (8) hours as needed for Anxiety. Max Daily Amount: 3 mg.  Nebulizer & Compressor machine 1 Each by Does Not Apply route every four (4) hours as needed.  OXYGEN-AIR DELIVERY SYSTEMS (WALKABOUT 1 OXYGEN SYSTEM) 2.5 L/min by Nasal route continuous.  pantoprazole (PROTONIX) 40 mg tablet Take 1 Tab by mouth Daily (before breakfast). No current facility-administered medications for this visit.       Past Medical History:   Diagnosis Date    Anxiety     Chest pain     Chronic diastolic heart failure secondary to coronary artery disease (HCC)     Chronic lung disease     Chronic obstructive pulmonary disease (HCC) 08/03/2017    PFTS 8/3/17    COPD, severe (HCC)     Coronary artery disease     Cough     Emphysema/COPD (HCC)     Esophagitis 12/11/2017    Endoscopy    Essential hypertension, benign     Fast heart beat     Feeling of chest tightness     Frequent urination     Heartburn     Hepatomegaly     History of stomach ulcers     Hx of nausea and vomiting     Poor appetite     Positive urine drug screen 01/2016    opiates and THC    Shortness of breath     Splenomegaly     Stomach pain     Stress     Tiredness     Unintentional weight change     Upper GI bleed     Weakness     Wheeze      Past Surgical History:   Procedure Laterality Date    COLONOSCOPY N/A 11/5/2018    COLONOSCOPY with polypectomies performed by Stacia Dover MD at [address] [place] [address] HX ENDOSCOPY  12/11/2017    HX WISDOM TEETH EXTRACTION Right 05/2019    IR BX LIVER PERCUTANEOUS       Social History     Socioeconomic History    Marital status:      Spouse name: Not on file    Number of children: Not on file    Years of education: Not on file    Highest education level: Not on file   Occupational History    Not on file   Social Needs    Financial resource strain: Not on file    Food insecurity     Worry: Not on file     Inability: Not on file    Transportation needs     Medical: Not on file     Non-medical: Not on file   Tobacco Use    Smoking status: Former Smoker     Packs/day: 2.00     Years: 25.00     Pack years: 50.00     Types: Cigarettes     Start date: 1984     Last attempt to quit: 2017     Years since quittin.5    Smokeless tobacco: Never Used    Tobacco comment: smokes Marijuana   Substance and Sexual Activity    Alcohol use: No    Drug use: Yes     Types: Marijuana     Comment: Hx of Marijuana use    Sexual activity: Not Currently     Partners: Female   Lifestyle    Physical activity     Days per week: Not on file     Minutes per session: Not on file    Stress: Not on file   Relationships    Social connections     Talks on phone: Not on file     Gets together: Not on file     Attends Hindu service: Not on file     Active member of club or organization: Not on file     Attends meetings of clubs or organizations: Not on file     Relationship status: Not on file    Intimate partner violence     Fear of current or ex partner: Not on file     Emotionally abused: Not on file     Physically abused: Not on file     Forced sexual activity: Not on file   Other Topics Concern     Service No    Blood Transfusions No    Caffeine Concern Yes    Occupational Exposure No    Hobby Hazards No    Sleep Concern Yes     Comment: occas    Stress Concern No    Weight Concern No    Special Diet No    Back Care Yes    Exercise No    Bike Helmet Yes    Seat Belt No     Comment: occas    Self-Exams Not Asked   Social History Narrative         Family History   Problem Relation Age of Onset    Hypertension Mother     Heart Disease Mother     Diabetes Mother     Hypertension Father     Heart Disease Father     Cancer Father         lymphnodes    Diabetes Father        Review of systems:  He denies fever chills poor appetite weight loss and says his pain in his left elbow has improved after antibiotics the range of motion    Physical Exam:  Visit Vitals  /80 (BP 1 Location: Left arm, BP Patient Position: At rest)   Pulse (!) 130   Temp 97.9 °F (36.6 °C) (Oral)   Resp 25   Ht 5' 9\" (1.753 m)   Wt 93.4 kg (206 lb)   SpO2 94%   BMI 30.42 kg/m²       Well-developed well-nourished  HEENT: WNL  Lymph node exam: Supraclavicular cervical lymph nodes negative  Chest: Equal symmetrical expansion no dullness no wheezes rales rubs  Heart: Regular rhythm no gallop no murmur no JVD no peripheral edema  Extremities: No cyanosis clubbing or calf tenderness  Neurological: Alert and oriented    Labs:    O2 sat room air at rest 94% and with walking 29 m his O2 sat fell to 88% on 2 L of oxygen his O2 sat returned to 92% as he walked approximately 250 m but his heart rate was initially 121 and went as high as 144 and return back to 117    Impression:     COPD by history and physical exam worse and the cause is not clear but the patient also has an unexplained tachycardia which could be due to the Advair or other factors he had a recent echocardiogram which showed no evidence of significant pulmonary hypertension or left ventricular dysfunction    Plan:   The patient is on 3 mg of prednisone I am not sure why and we will ask him to gradually discontinue this over the next 6 weeks and follow-up  CBC TSH as part of work-up for tachycardia  For now continue Advair Spiriva PRN albuterol supplemental oxygen  Follow-up in 6-8 weeks or sooner if needed  Marylee Prows, MD , [facility]    CC: Eloy Ho MD     [address] [facility]  [place], [address] [address]     P: [phone]     F: [phone]

## 2020-07-15 ENCOUNTER — HOSPITAL ENCOUNTER (OUTPATIENT)
Dept: LAB | Age: 50
Discharge: HOME | End: 2020-07-15
Payer: MEDICARE

## 2020-07-15 DIAGNOSIS — R00.0 TACHYCARDIA: ICD-10-CM

## 2020-07-15 LAB
BASOPHILS # BLD: 0 K/UL (ref 0–0.1)
BASOPHILS NFR BLD: 0 % (ref 0–2)
DIFFERENTIAL METHOD BLD: ABNORMAL
EOSINOPHIL # BLD: 0.2 K/UL (ref 0–0.4)
EOSINOPHIL NFR BLD: 2 % (ref 0–5)
ERYTHROCYTE [DISTWIDTH] IN BLOOD BY AUTOMATED COUNT: 13.4 % (ref 11.6–14.5)
HCT VFR BLD AUTO: 45.2 % (ref 36–48)
HGB BLD-MCNC: 16.2 G/DL (ref 13–16)
LYMPHOCYTES # BLD: 2.1 K/UL (ref 0.9–3.6)
LYMPHOCYTES NFR BLD: 23 % (ref 21–52)
MCH RBC QN AUTO: 30.3 PG (ref 24–34)
MCHC RBC AUTO-ENTMCNC: 35.8 G/DL (ref 31–37)
MCV RBC AUTO: 84.5 FL (ref 74–97)
MONOCYTES # BLD: 0.9 K/UL (ref 0.05–1.2)
MONOCYTES NFR BLD: 10 % (ref 3–10)
NEUTS SEG # BLD: 6.1 K/UL (ref 1.8–8)
NEUTS SEG NFR BLD: 65 % (ref 40–73)
PLATELET # BLD AUTO: 291 K/UL (ref 135–420)
PMV BLD AUTO: 10.5 FL (ref 9.2–11.8)
RBC # BLD AUTO: 5.35 M/UL (ref 4.7–5.5)
T4 FREE SERPL-MCNC: 1.1 NG/DL (ref 0.7–1.5)
TSH SERPL DL<=0.05 MIU/L-ACNC: 1.53 UIU/ML (ref 0.36–3.74)
WBC # BLD AUTO: 9.3 K/UL (ref 4.6–13.2)

## 2020-07-15 PROCEDURE — 85025 COMPLETE CBC W/AUTO DIFF WBC: CPT

## 2020-07-15 PROCEDURE — 36415 COLL VENOUS BLD VENIPUNCTURE: CPT

## 2020-07-15 PROCEDURE — 84439 ASSAY OF FREE THYROXINE: CPT

## 2020-07-17 RX ORDER — LISINOPRIL 20 MG/1
TABLET ORAL
Qty: 90 TAB | Refills: 0 | Status: SHIPPED | OUTPATIENT
Start: 2020-07-17 | End: 2020-09-20

## 2020-07-24 ENCOUNTER — TELEPHONE (OUTPATIENT)
Dept: PULMONOLOGY | Age: 50
End: 2020-07-24

## 2020-07-24 NOTE — TELEPHONE ENCOUNTER
Insurance states they have filled Spiriva HandiHaler x 1 month but no longer on formulary and needs to be switched. Incruse is preferred.

## 2020-07-27 RX ORDER — UMECLIDINIUM 62.5 UG/1
AEROSOL, POWDER ORAL
Qty: 1 INHALER | Refills: 5 | Status: SHIPPED | OUTPATIENT
Start: 2020-07-27 | End: 2021-01-22 | Stop reason: SDUPTHER

## 2020-07-30 ENCOUNTER — HOSPITAL ENCOUNTER (EMERGENCY)
Age: 50
Discharge: HOME | End: 2020-07-30
Attending: EMERGENCY MEDICINE | Admitting: EMERGENCY MEDICINE
Payer: MEDICARE

## 2020-07-30 VITALS
HEART RATE: 114 BPM | HEIGHT: 68 IN | RESPIRATION RATE: 20 BRPM | BODY MASS INDEX: 31.07 KG/M2 | TEMPERATURE: 98.4 F | SYSTOLIC BLOOD PRESSURE: 146 MMHG | OXYGEN SATURATION: 96 % | DIASTOLIC BLOOD PRESSURE: 89 MMHG | WEIGHT: 205 LBS

## 2020-07-30 DIAGNOSIS — M70.22 OLECRANON BURSITIS OF LEFT ELBOW: Primary | ICD-10-CM

## 2020-07-30 PROCEDURE — 99281 EMR DPT VST MAYX REQ PHY/QHP: CPT

## 2020-07-30 RX ORDER — CLINDAMYCIN HYDROCHLORIDE 300 MG/1
300 CAPSULE ORAL 4 TIMES DAILY
Qty: 40 CAP | Refills: 0 | Status: SHIPPED | OUTPATIENT
Start: 2020-07-30 | End: 2020-08-09

## 2020-07-30 RX ORDER — ACETAMINOPHEN AND CODEINE PHOSPHATE 300; 30 MG/1; MG/1
1 TABLET ORAL
Qty: 12 TAB | Refills: 0 | Status: SHIPPED | OUTPATIENT
Start: 2020-07-30 | End: 2020-08-02

## 2020-07-30 NOTE — ED PROVIDER NOTES
EMERGENCY DEPARTMENT HISTORY AND PHYSICAL EXAM    7:57 PM  Date: 7/30/2020  Patient Name: Jessica Randle Sr.    History of Presenting Illness     Chief Complaint   Patient presents with    Elbow Pain        History Provided By: Patient    HPI: Sherri Lee is a 47 y.o. male with history of multiple medical problems as below. Patient is presenting with left elbow pain and swelling for 2 days. About a month ago he had a similar episode after a cactus elbow and was diagnosed with cellulitis and discharged. He was supposed to see Ortho yesterday for his follow-up appointment but his appointment was canceled so he came to the ER today. He reports the pain got worse as well as some redness and swelling over the past 2 days. No history of fever or chills. No vomiting. No history of limited range of motion or no trauma. Location:  Severity:  Timing/course:    Onset/Duration:     PCP: Bruna Franklin MD    Past History     Past Medical History:  Past Medical History:   Diagnosis Date    Anxiety     Chest pain     Chronic diastolic heart failure secondary to coronary artery disease (HCC)     Chronic lung disease     Chronic obstructive pulmonary disease ([facility] [address] 08/03/2017    PFTS 8/3/17    COPD, severe (HCC)     Coronary artery disease     Cough     Emphysema/COPD (HCC)     Esophagitis 12/11/2017    Endoscopy    Essential hypertension, benign     Fast heart beat     Feeling of chest tightness     Frequent urination     Heartburn     Hepatomegaly     History of stomach ulcers     Hx of nausea and vomiting     Poor appetite     Positive urine drug screen 01/2016    opiates and THC    Shortness of breath     Splenomegaly     Stomach pain     Stress     Tiredness     Unintentional weight change     Upper GI bleed     Weakness     Wheeze        Past Surgical History:  Past Surgical History:   Procedure Laterality Date    COLONOSCOPY N/A 11/5/2018    COLONOSCOPY with polypectomies performed by Amy Roberto MD at [facility] ENDOSCOPY    HX ENDOSCOPY  2017    HX WISDOM TEETH EXTRACTION Right 2019    IR BX LIVER PERCUTANEOUS         Family History:  Family History   Problem Relation Age of Onset    Hypertension Mother     Heart Disease Mother     Diabetes Mother     Hypertension Father     Heart Disease Father     Cancer Father         lymphnodes    Diabetes Father        Social History:  Social History     Tobacco Use    Smoking status: Former Smoker     Packs/day: 2.00     Years: 25.00     Pack years: 50.00     Types: Cigarettes     Start date: 1984     Last attempt to quit: 2017     Years since quittin.6    Smokeless tobacco: Never Used    Tobacco comment: smokes Marijuana   Substance Use Topics    Alcohol use: No    Drug use: Yes     Types: Marijuana     Comment: Hx of Marijuana use       Allergies: Allergies   Allergen Reactions    Ciprofloxacin Nausea Only    Remeron [Mirtazapine] Other (comments)     Mood swings    Seroquel [Quetiapine] Other (comments)     Mood swings       Review of Systems   Review of Systems   Musculoskeletal: Positive for arthralgias. All other systems reviewed and are negative. Physical Exam     Patient Vitals for the past 12 hrs:   Temp Pulse Resp BP SpO2   20 1851 98.4 °F (36.9 °C) (!) 114 20 146/89 96 %       Physical Exam  Vitals signs and nursing note reviewed. Constitutional:       Appearance: Normal appearance. HENT:      Head: Normocephalic and atraumatic. Eyes:      Extraocular Movements: Extraocular movements intact. Neck:      Musculoskeletal: Normal range of motion and neck supple. Cardiovascular:      Rate and Rhythm: Normal rate. Pulses: Normal pulses. Pulmonary:      Effort: Pulmonary effort is normal. No respiratory distress. Musculoskeletal: Normal range of motion. Skin:     General: Skin is warm and dry. Neurological:      General: No focal deficit present.       Mental Status: He is alert and oriented to person, place, and time. Psychiatric:         Mood and Affect: Mood normal.         Behavior: Behavior normal.         Diagnostic Study Results     Labs -  No results found for this or any previous visit (from the past 12 hour(s)). Radiologic Studies -   No results found. Medical Decision Making     ED Course: Progress Notes, Reevaluation, and Consults:    7:57 PM Initial assessment performed. The patients presenting problems have been discussed, and they/their family are in agreement with the care plan formulated and outlined with them. I have encouraged them to ask questions as they arise throughout their visit. Provider Notes (Medical Decision Making): 62-year-old male presenting with left elbow swelling and redness. Patient was diagnosed with cellulitis last month however it appears that he has bursitis as the swelling and tenderness in the supra olecranon bursa. Intact range of motion in the elbow joint itself is nontender. There is some redness and warmth but no significant signs of infection. Patient reports that he responded very well to a course of clindamycin and Tylenol 3 the first time, we will renew his Colace and instructed him to keep his follow-up with Ortho. He does have an appointment with his primary care provider in 3 days as well. Patient was also given strict return precautions. No need for imaging at this point since I have low suspicion for bony injuries. Unlikely to be septic joint since he does have an intact range of motion and the joint itself is not swollen or tender. Procedures:     Critical Care Time:     Vital Signs-Reviewed the patient's vital signs. Reviewed pt's pulse ox reading. EKG: Interpreted by the EP.    Time Interpreted:    Rate:    Rhythm:    Interpretation:   Comparison:     Records Reviewed: Nursing Notes (Time of Review: 7:57 PM)  -I am the first provider for this patient.  -I reviewed the vital signs, available nursing notes, past medical history, past surgical history, family history and social history. Current Outpatient Medications   Medication Sig Dispense Refill    umeclidinium (Incruse Ellipta) 62.5 mcg/actuation inhaler 1 inhalation daily. Exhale fully before inhaling 1 Inhaler 5    lisinopriL (PRINIVIL, ZESTRIL) 20 mg tablet Take 1 tablet by mouth once daily 90 Tab 0    topiramate (TOPAMAX) 50 mg tablet One tab at bedtime for a week then take two tabs at bedtime 60 Tab 2    atorvastatin (LIPITOR) 20 mg tablet Take 1 tablet by mouth once daily 90 Tab 3    fluticasone propion-salmeteroL (ADVAIR/WIXELA) 500-50 mcg/dose diskus inhaler Take 1 Puff by inhalation two (2) times a day. 1 Inhaler 5    roflumilast (Daliresp) 500 mcg tab tablet Take 1 tablet by mouth once daily 30 Tab 5    albuterol (PROVENTIL VENTOLIN) 2.5 mg /3 mL (0.083 %) nebu Nebulized 1 vial for inhalation every 4 hours as needed 60 Each 2    furosemide (LASIX) 20 mg tablet TAKE 1 TABLET BY MOUTH EVERY OTHER DAY 30 Tab 6    hydrOXYzine HCl (ATARAX) 50 mg tablet Take 50 mg by mouth three (3) times daily as needed for Itching.  albuterol (PROVENTIL HFA, VENTOLIN HFA, PROAIR HFA) 90 mcg/actuation inhaler Take 2 Puffs by inhalation every four (4) hours as needed for Wheezing. 1 Inhaler 3    amitriptyline HCl (AMITRIPTYLINE PO) Take 75 mg by mouth nightly.  OXcarbazepine (TRILEPTAL) 150 mg tablet Take 150 mg by mouth two (2) times a day.  LORazepam (ATIVAN) 1 mg tablet Take 1 Tab by mouth every eight (8) hours as needed for Anxiety. Max Daily Amount: 3 mg. 90 Tab 0    Nebulizer & Compressor machine 1 Each by Does Not Apply route every four (4) hours as needed. 1 Each 0    OXYGEN-AIR DELIVERY SYSTEMS (WALKABOUT 1 OXYGEN SYSTEM) 2.5 L/min by Nasal route continuous.  pantoprazole (PROTONIX) 40 mg tablet Take 1 Tab by mouth Daily (before breakfast).  30 Tab 0    predniSONE (DELTASONE) 1 mg tablet TAKE 3 TABLETS BY MOUTH IN THE MORNING WITH BREAKFAST 90 Tab 0    Blood Pressure Monitor (BLOOD PRESSURE KIT) kit 1 Device by Does Not Apply route daily as needed (for blood pressure checks). 1 Kit 0        Clinical Impression     Clinical Impression: No diagnosis found. Disposition: DC      This note was dictated utilizing voice recognition software which may lead to typographical errors. I apologize in advance if the situation occurs. If questions arise please do not hesitate to contact me or call our department.     Carol Sun MD  7:57 PM

## 2020-07-31 ENCOUNTER — TELEPHONE (OUTPATIENT)
Dept: CASE MANAGEMENT | Age: 50
End: 2020-07-31

## 2020-07-31 NOTE — DISCHARGE INSTRUCTIONS
Patient Education        Bursitis of the Elbow: Care Instructions  Your Care Instructions  Bursitis is pain and swelling of the bursae. These are sacs of fluid that help your joints move smoothly. Olecranon bursitis is a type of bursitis that affects the back of the elbow. This is sometimes called Alden elbow because the bump that develops looks like the cartoon character Alden's elbow. Injury, overuse, or prolonged pressure on your elbow can cause this form of bursitis. Sometimes it happens when people have arthritis. It also can occur for unknown reasons. Treatment may include draining fluid from the bursa with a needle. If your doctor thought there was infection, he or she may have prescribed antibiotics. You also may get shots of medicine into the bursa to help the swelling go down. Your elbow should get better in a few days or weeks. Follow-up care is a key part of your treatment and safety. Be sure to make and go to all appointments, and call your doctor if you are having problems. It's also a good idea to know your test results and keep a list of the medicines you take. How can you care for yourself at home? · Take pain medicines exactly as directed. ? If the doctor gave you a prescription medicine for pain, take it as prescribed. ? If you are not taking a prescription pain medicine, ask your doctor if you can take an over-the-counter medicine. ? Do not take two or more pain medicines at the same time unless the doctor told you to. Many pain medicines have acetaminophen, which is Tylenol. Too much acetaminophen (Tylenol) can be harmful. · If your doctor prescribed antibiotics, take them as directed. Do not stop taking them just because you feel better. You need to take the full course of antibiotics. · If your doctor gave you a sling, an elastic bandage, or a compression sleeve, wear it exactly as instructed. · Put ice or a cold pack on your elbow for 10 to 20 minutes at a time.  Try to do this every 1 to 2 hours for the next 3 days (when you are awake) or until the swelling goes down. Put a thin cloth between the ice and your skin. · After 3 days, you can try heat, or alternate heat and ice. · Rest your elbow. Try to stop or reduce any activity that causes pain. · Wear elbow pads during physical activity to prevent injury. · Do not lean your elbows on tables or armrests. When should you call for help? Call your doctor now or seek immediate medical care if:  · You have new or worse symptoms of infection, such as:  ? Increased pain, swelling, warmth, or redness. ? Red streaks leading from the area. ? Pus draining from the area. ? A fever. Watch closely for changes in your health, and be sure to contact your doctor if:  · You do not get better as expected. Where can you learn more? Go to http://costa-jessica.info/  Enter B779 in the search box to learn more about \"Bursitis of the Elbow: Care Instructions. \"  Current as of: March 2, 2020               Content Version: 12.5  © 3200-2967 [employer]. Care instructions adapted under license by [employer] (which disclaims liability or warranty for this information). If you have questions about a medical condition or this instruction, always ask your healthcare professional. [address] any warranty or liability for your use of this information.

## 2020-07-31 NOTE — TELEPHONE ENCOUNTER
Date/Time:  7/31/2020 11:24 AM  Attempted to reach patient by telephone. Left HIPPA compliant message requesting a return call. Will attempt to reach patient again.

## 2020-07-31 NOTE — ED NOTES
[address] called and states pt recently had Percocet filled on 7/28. Patient presents to them today with a Tylenol with codeine prescription. Prescription changed to Tylenol 650 mg, dispense 20 tabs.

## 2020-08-03 ENCOUNTER — OFFICE VISIT (OUTPATIENT)
Dept: FAMILY MEDICINE CLINIC | Age: 50
End: 2020-08-03

## 2020-08-03 VITALS
RESPIRATION RATE: 16 BRPM | TEMPERATURE: 98.4 F | OXYGEN SATURATION: 98 % | SYSTOLIC BLOOD PRESSURE: 109 MMHG | BODY MASS INDEX: 31.07 KG/M2 | WEIGHT: 205 LBS | HEIGHT: 68 IN | DIASTOLIC BLOOD PRESSURE: 73 MMHG | HEART RATE: 97 BPM

## 2020-08-03 DIAGNOSIS — Z00.00 MEDICARE ANNUAL WELLNESS VISIT, SUBSEQUENT: Primary | ICD-10-CM

## 2020-08-03 DIAGNOSIS — M70.22 OLECRANON BURSITIS OF LEFT ELBOW: ICD-10-CM

## 2020-08-03 DIAGNOSIS — R11.0 NAUSEA: ICD-10-CM

## 2020-08-03 RX ORDER — ONDANSETRON 4 MG/1
4 TABLET, ORALLY DISINTEGRATING ORAL
Qty: 15 TAB | Refills: 0 | Status: SHIPPED | OUTPATIENT
Start: 2020-08-03 | End: 2020-08-08

## 2020-08-03 RX ORDER — OXYCODONE AND ACETAMINOPHEN 5; 325 MG/1; MG/1
TABLET ORAL
COMMUNITY
Start: 2020-07-28

## 2020-08-03 NOTE — PROGRESS NOTES
Chief Complaint   Patient presents with    Elbow Pain     1. Have you been to the ER, urgent care clinic since your last visit? Hospitalized since your last visit? ED 07/30/20 for elbow pain     2. Have you seen or consulted any other health care providers outside of the [address] [place] [address] since your last visit? Include any pap smears or colon screening.   Follow with pulmonology, psych, and neurology

## 2020-08-03 NOTE — PROGRESS NOTES
This is the Subsequent Medicare Annual Wellness Exam, performed 12 months or more after the Initial AWV or the last Subsequent AWV    I have reviewed the patient's medical history in detail and updated the computerized patient record. Had a recent elbow infection. He was seen at the ED at . He was discharged on antibiotics. He does think that his elbow is better; He has some mild tenderness still. however    He has some tenderness and thinks the elbow is . He went back to the ED on 7/30 and was placed on abx again. Had an appointment with ortho but it was cancelled. Has chronic nausea and requests a refill on zofran  History     Patient Active Problem List   Diagnosis Code    Emphysema/COPD ([facility] [address] [address] J43.9    COPD, severe ([address] J44.9    Sepsis ([facility] [address] A41.9    Essential hypertension, benign I10    Esophagitis K20.9    Panic attack F41.0    Insomnia G47.00    Hypovitaminosis D E55.9    Overweight (BMI 25.0-29. 9) [id number]    Pulmonary infiltrate R91.8     Past Medical History:   Diagnosis Date    Anxiety     Chest pain     Chronic diastolic heart failure secondary to coronary artery disease (HCC)     Chronic lung disease     Chronic obstructive pulmonary disease ([address] 08/03/2017    PFTS 8/3/17    COPD, severe (HCC)     Coronary artery disease     Cough     Emphysema/COPD (HCC)     Esophagitis 12/11/2017    Endoscopy    Essential hypertension, benign     Fast heart beat     Feeling of chest tightness     Frequent urination     Heartburn     Hepatomegaly     History of stomach ulcers     Hx of nausea and vomiting     Poor appetite     Positive urine drug screen 01/2016    opiates and THC    Shortness of breath     Splenomegaly     Stomach pain     Stress     Tiredness     Unintentional weight change     Upper GI bleed     Weakness     Wheeze       Past Surgical History:   Procedure Laterality Date    COLONOSCOPY N/A 11/5/2018    COLONOSCOPY with polypectomies performed by Alyssa Gallardo MD at [address] HX ENDOSCOPY  12/11/2017    HX WISDOM TEETH EXTRACTION Right 05/2019    IR BX LIVER PERCUTANEOUS       Current Outpatient Medications   Medication Sig Dispense Refill    oxyCODONE-acetaminophen (PERCOCET) 5-325 mg per tablet TAKE 1 TABLET BY MOUTH EVERY 12 HOURS      clindamycin (CLEOCIN) 300 mg capsule Take 1 Cap by mouth four (4) times daily for 10 days. 40 Cap 0    umeclidinium (Incruse Ellipta) 62.5 mcg/actuation inhaler 1 inhalation daily. Exhale fully before inhaling 1 Inhaler 5    lisinopriL (PRINIVIL, ZESTRIL) 20 mg tablet Take 1 tablet by mouth once daily 90 Tab 0    topiramate (TOPAMAX) 50 mg tablet One tab at bedtime for a week then take two tabs at bedtime 60 Tab 2    atorvastatin (LIPITOR) 20 mg tablet Take 1 tablet by mouth once daily 90 Tab 3    fluticasone propion-salmeteroL (ADVAIR/WIXELA) 500-50 mcg/dose diskus inhaler Take 1 Puff by inhalation two (2) times a day. 1 Inhaler 5    roflumilast (Daliresp) 500 mcg tab tablet Take 1 tablet by mouth once daily 30 Tab 5    albuterol (PROVENTIL VENTOLIN) 2.5 mg /3 mL (0.083 %) nebu Nebulized 1 vial for inhalation every 4 hours as needed 60 Each 2    furosemide (LASIX) 20 mg tablet TAKE 1 TABLET BY MOUTH EVERY OTHER DAY 30 Tab 6    hydrOXYzine HCl (ATARAX) 50 mg tablet Take 50 mg by mouth three (3) times daily as needed for Itching.  albuterol (PROVENTIL HFA, VENTOLIN HFA, PROAIR HFA) 90 mcg/actuation inhaler Take 2 Puffs by inhalation every four (4) hours as needed for Wheezing. 1 Inhaler 3    Blood Pressure Monitor (BLOOD PRESSURE KIT) kit 1 Device by Does Not Apply route daily as needed (for blood pressure checks). 1 Kit 0    OXcarbazepine (TRILEPTAL) 150 mg tablet Take 150 mg by mouth two (2) times a day.  LORazepam (ATIVAN) 1 mg tablet Take 1 Tab by mouth every eight (8) hours as needed for Anxiety.  Max Daily Amount: 3 mg. 90 Tab 0    Nebulizer & Compressor machine 1 Each by Does Not Apply route every four (4) hours as needed. 1 Each 0    OXYGEN-AIR DELIVERY SYSTEMS (WALKABOUT 1 OXYGEN SYSTEM) 2.5 L/min by Nasal route continuous.  pantoprazole (PROTONIX) 40 mg tablet Take 1 Tab by mouth Daily (before breakfast). 30 Tab 0     Allergies   Allergen Reactions    Ciprofloxacin Nausea Only    Remeron [Mirtazapine] Other (comments)     Mood swings    Seroquel [Quetiapine] Other (comments)     Mood swings       Family History   Problem Relation Age of Onset    Hypertension Mother     Heart Disease Mother     Diabetes Mother     Hypertension Father     Heart Disease Father     Cancer Father         lymphnodes    Diabetes Father      Social History     Tobacco Use    Smoking status: Former Smoker     Packs/day: 2.00     Years: 25.00     Pack years: 50.00     Types: Cigarettes     Start date: 1984     Last attempt to quit: 2017     Years since quittin.6    Smokeless tobacco: Never Used    Tobacco comment: smokes Marijuana   Substance Use Topics    Alcohol use: No       Depression Risk Factor Screening:     3 most recent PHQ Screens 2020   PHQ Not Done -   Little interest or pleasure in doing things Several days   Feeling down, depressed, irritable, or hopeless Several days   Total Score PHQ 2 2       Alcohol Risk Factor Screening (MALE < 65): Do you average more than 2 drinks per night or 14 drinks a week: No    On any one occasion in the past three months have you have had more than 4 drinks containing alcohol:  No      Functional Ability and Level of Safety:   Hearing: Hearing is good. Activities of Daily Living: The home contains: cane  and a walker  Patient does total self care     Ambulation: with mild difficulty     Fall Risk:  Fall Risk Assessment, last 12 mths 2018   Able to walk? Yes   Fall in past 12 months?  No     Abuse Screen:  Patient is not abused       Cognitive Screening   Has your family/caregiver stated any concerns about your memory: no     Cognitive Screening: Cognition is intact    Patient Care Team   Patient Care Team:  Maverick Quintero MD as PCP - General (Family Medicine)  Maverick Quintero MD as PCP - [facility]  Graciela Caballero MD (Pulmonary Disease)  Annie Minor DO (Pulmonary Disease)  Alfa Dennis NP (Neurology)  Neymar Andersen MD (Urology)  Buel Harness as Care Coordinator  Buel Harness as Care Coordinator   PE:  Visit Vitals  /73 (BP 1 Location: Right arm, BP Patient Position: Sitting)   Pulse 97   Temp 98.4 °F (36.9 °C) (Temporal)   Resp 16   Ht 5' 8\" (1.727 m)   Wt 205 lb (93 kg)   SpO2 98%   BMI 31.17 kg/m²     General appearance: alert, cooperative, no distress, appears stated age  On portable 02  Lungs: clear to auscultation bilaterally  Heart: regular rate and rhythm, S1, S2 normal, no murmur, click, rub or gallop  Extremities: Left elbow reveals mild edema; mild TTP; Assessment/Plan   Education and counseling provided:  Are appropriate based on today's review and evaluation    Diagnoses and all orders for this visit:    1. Medicare annual wellness visit, subsequent    2. Olecranon bursitis of left elbow    3. Nausea    Other orders  -     ondansetron (ZOFRAN ODT) 4 mg disintegrating tablet; Take 1 Tab by mouth every eight (8) hours as needed for Nausea or Vomiting for up to 5 days. as above,  treatment plan as listed below  Orders Placed This Encounter    oxyCODONE-acetaminophen (PERCOCET) 5-325 mg per tablet    ondansetron (ZOFRAN ODT) 4 mg disintegrating tablet     See ortho about elbow  Refilled zofran  Follow-up and Dispositions    · Return in about 4 months (around 12/3/2020), or htn. This has been fully explained to the patient, who indicates understanding. An After Visit Summary was printed and given to the patient.         Health Maintenance Due   Topic Date Due    Shingrix Vaccine Age 50> (1 of 2)- not available 10/27/2019    FOBT Q1Y Age 54-65 - colo UTD 10/27/2019    Influenza Age 5 to Adult - not yet available 08/01/2020

## 2020-08-03 NOTE — TELEPHONE ENCOUNTER
Patient contacted regarding recent discharge and COVID-19 risk   Care Coordinator contacted the patient by telephone to perform post discharge assessment. Verified name and  with patient as identifiers. Patient has following risk factors of: COPD. Care Coordinator reviewed discharge instructions, medical action plan and red flags related to discharge diagnosis. Reviewed and educated them on any new and changed medications related to discharge diagnosis. Advised obtaining a 90-day supply of all daily and as-needed medications. Education provided regarding infection prevention, and signs and symptoms of COVID-19 and when to seek medical attention with patient who verbalized understanding. Discussed exposure protocols and quarantine from [address] you at higher risk for severe illness  and given an opportunity for questions and concerns. The patient agrees to contact the COVID-19 hotline [phone] or PCP office for questions related to their healthcare. Care Coordinator provided contact information for future reference. From CDC: Are you at higher risk for severe illness?  Wash your hands often.  Avoid close contact (6 feet, which is about two arm lengths) with people who are sick.  Put distance between yourself and other people if COVID-19 is spreading in your community.  Clean and disinfect frequently touched surfaces.  Avoid all cruise travel and non-essential air travel.  Call your healthcare professional if you have concerns about COVID-19 and your underlying condition or if you are sick. For more information on steps you can take to protect yourself, see CDC's How to Protect Yourself      Patient/family/caregiver given information for GetWell Loop and agrees to enroll no  Patient's preferred e-mail:    Patient's preferred phone number:   Based on Loop alert triggers, patient will be contacted by nurse care manager for worsening symptoms.     Plan for follow-up call in 7-14 days based on severity of symptoms and risk factors.

## 2020-08-03 NOTE — PATIENT INSTRUCTIONS
Bursitis of the Elbow: Care Instructions Your Care Instructions Bursitis is pain and swelling of the bursae. These are sacs of fluid that help your joints move smoothly. Olecranon bursitis is a type of bursitis that affects the back of the elbow. This is sometimes called Alden elbow because the bump that develops looks like the cartoon character Alden's elbow. Injury, overuse, or prolonged pressure on your elbow can cause this form of bursitis. Sometimes it happens when people have arthritis. It also can occur for unknown reasons. Treatment may include draining fluid from the bursa with a needle. If your doctor thought there was infection, he or she may have prescribed antibiotics. You also may get shots of medicine into the bursa to help the swelling go down. Your elbow should get better in a few days or weeks. Follow-up care is a key part of your treatment and safety. Be sure to make and go to all appointments, and call your doctor if you are having problems. It's also a good idea to know your test results and keep a list of the medicines you take. How can you care for yourself at home? · Take pain medicines exactly as directed. ? If the doctor gave you a prescription medicine for pain, take it as prescribed. ? If you are not taking a prescription pain medicine, ask your doctor if you can take an over-the-counter medicine. ? Do not take two or more pain medicines at the same time unless the doctor told you to. Many pain medicines have acetaminophen, which is Tylenol. Too much acetaminophen (Tylenol) can be harmful. · If your doctor prescribed antibiotics, take them as directed. Do not stop taking them just because you feel better. You need to take the full course of antibiotics. · If your doctor gave you a sling, an elastic bandage, or a compression sleeve, wear it exactly as instructed. · Put ice or a cold pack on your elbow for 10 to 20 minutes at a time.  Try to do this every 1 to 2 hours for the next 3 days (when you are awake) or until the swelling goes down. Put a thin cloth between the ice and your skin. · After 3 days, you can try heat, or alternate heat and ice. · Rest your elbow. Try to stop or reduce any activity that causes pain. · Wear elbow pads during physical activity to prevent injury. · Do not lean your elbows on tables or armrests. When should you call for help? Call your doctor now or seek immediate medical care if: 
· You have new or worse symptoms of infection, such as: 
? Increased pain, swelling, warmth, or redness. ? Red streaks leading from the area. ? Pus draining from the area. ? A fever. Watch closely for changes in your health, and be sure to contact your doctor if: 
· You do not get better as expected. Where can you learn more? Go to http://costa-jessica.info/ Enter  in the search box to learn more about \"Bursitis of the Elbow: Care Instructions. \" Current as of: March 2, 2020               Content Version: 12.5 © 5284-7044 [employer]. Care instructions adapted under license by [employer] (which disclaims liability or warranty for this information). If you have questions about a medical condition or this instruction, always ask your healthcare professional. [address] any warranty or liability for your use of this information. Medicare Wellness Visit, Male The best way to live healthy is to have a lifestyle where you eat a well-balanced diet, exercise regularly, limit alcohol use, and quit all forms of tobacco/nicotine, if applicable. Regular preventive services are another way to keep healthy. Preventive services (vaccines, screening tests, monitoring & exams) can help personalize your care plan, which helps you manage your own care. Screening tests can find health problems at the earliest stages, when they are easiest to treat. [facility] follows the current, evidence-based guidelines published by the [facility] [employer] ([facility]) when recommending preventive services for our patients. Because we follow these guidelines, sometimes recommendations change over time as research supports it. (For example, a prostate screening blood test is no longer routinely recommended for men with no symptoms). Of course, you and your doctor may decide to screen more often for some diseases, based on your risk and co-morbidities (chronic disease you are already diagnosed with). Preventive services for you include: - Medicare offers their members a free annual wellness visit, which is time for you and your primary care provider to discuss and plan for your preventive service needs. Take advantage of this benefit every year! 
-All adults over age 72 should receive the recommended pneumonia vaccines. Current USPSTF guidelines recommend a series of two vaccines for the best pneumonia protection.  
-All adults should have a flu vaccine yearly and tetanus vaccine every 10 years. 
-All adults age 48 and older should receive the shingles vaccines (series of two vaccines). -All adults age 38-68 who are overweight should have a diabetes screening test once every three years.  
-Other screening tests & preventive services for persons with diabetes include: an eye exam to screen for diabetic retinopathy, a kidney function test, a foot exam, and stricter control over your cholesterol.  
-Cardiovascular screening for adults with routine risk involves an electrocardiogram (ECG) at intervals determined by the provider.  
-Colorectal cancer screening should be done for adults age 54-65 with no increased risk factors for colorectal cancer. There are a number of acceptable methods of screening for this type of cancer. Each test has its own benefits and drawbacks.  Discuss with your provider what is most appropriate for you during your annual wellness visit. The different tests include: colonoscopy (considered the best screening method), a fecal occult blood test, a fecal DNA test, and sigmoidoscopy. 
-All adults born between [facility] should be screened once for Hepatitis C. 
-An Abdominal Aortic Aneurysm (AAA) Screening is recommended for men age 73-68 who has ever smoked in their lifetime. Here is a list of your current Health Maintenance items (your personalized list of preventive services) with a due date: 
Health Maintenance Due Topic Date Due  Shingles Vaccine (1 of 2) 10/27/2019  Colon Cancer Stool Test  10/27/2019  Flu Vaccine  08/01/2020

## 2020-08-17 ENCOUNTER — HOSPITAL ENCOUNTER (EMERGENCY)
Age: 50
Discharge: HOME | End: 2020-08-17
Attending: EMERGENCY MEDICINE
Payer: MEDICARE

## 2020-08-17 ENCOUNTER — APPOINTMENT (OUTPATIENT)
Dept: GENERAL RADIOLOGY | Age: 50
End: 2020-08-17
Attending: HEALTH CARE PROVIDER
Payer: MEDICARE

## 2020-08-17 ENCOUNTER — OFFICE VISIT (OUTPATIENT)
Dept: ORTHOPEDIC SURGERY | Facility: CLINIC | Age: 50
End: 2020-08-17

## 2020-08-17 VITALS
RESPIRATION RATE: 24 BRPM | SYSTOLIC BLOOD PRESSURE: 122 MMHG | BODY MASS INDEX: 31.07 KG/M2 | HEIGHT: 68 IN | DIASTOLIC BLOOD PRESSURE: 81 MMHG | HEART RATE: 88 BPM | WEIGHT: 205 LBS | TEMPERATURE: 97.3 F | OXYGEN SATURATION: 99 %

## 2020-08-17 VITALS
BODY MASS INDEX: 31.19 KG/M2 | DIASTOLIC BLOOD PRESSURE: 86 MMHG | HEART RATE: 98 BPM | OXYGEN SATURATION: 97 % | TEMPERATURE: 96.8 F | HEIGHT: 68 IN | SYSTOLIC BLOOD PRESSURE: 123 MMHG | WEIGHT: 205.8 LBS

## 2020-08-17 DIAGNOSIS — M25.531 PAIN AND SWELLING OF RIGHT WRIST: ICD-10-CM

## 2020-08-17 DIAGNOSIS — M25.531 WRIST PAIN, RIGHT: ICD-10-CM

## 2020-08-17 DIAGNOSIS — M25.431 PAIN AND SWELLING OF RIGHT WRIST: ICD-10-CM

## 2020-08-17 DIAGNOSIS — S60.211A TRAUMATIC HEMATOMA OF RIGHT WRIST, INITIAL ENCOUNTER: Primary | ICD-10-CM

## 2020-08-17 PROCEDURE — 99281 EMR DPT VST MAYX REQ PHY/QHP: CPT

## 2020-08-17 PROCEDURE — 73110 X-RAY EXAM OF WRIST: CPT
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
## 2020-08-17 NOTE — DISCHARGE INSTRUCTIONS
Patient Education     Musculoskeletal Pain: Care Instructions  Your Care Instructions  Different problems with the bones, muscles, nerves, ligaments, and tendons in the body can cause pain. One or more areas of your body may ache or burn. Or they may feel tired, stiff, or sore. The medical term for this type of pain is musculoskeletal pain. It can have many different causes. Sometimes the pain is caused by an injury such as a strain or sprain. Or you might have pain from using one part of your body in the same way over and over again. This is called overuse. In some cases, the cause of the pain is another health problem such as arthritis or fibromyalgia. The doctor will examine you and ask you questions about your health to help find the cause of your pain. Blood tests or imaging tests like an X-ray may also be helpful. But sometimes doctors can't find a cause of the pain. Treatment depends on your symptoms and the cause of the pain, if known. The doctor has checked you carefully, but problems can develop later. If you notice any problems or new symptoms, get medical treatment right away. Follow-up care is a key part of your treatment and safety. Be sure to make and go to all appointments, and call your doctor if you are having problems. It's also a good idea to know your test results and keep a list of the medicines you take. How can you care for yourself at home? · Rest until you feel better. · Do not do anything that makes the pain worse. Return to exercise gradually if you feel better and your doctor says it's okay. · Be safe with medicines. Read and follow all instructions on the label. ¨ If the doctor gave you a prescription medicine for pain, take it as prescribed. ¨ If you are not taking a prescription pain medicine, ask your doctor if you can take an over-the-counter medicine. · Put ice or a cold pack on the area for 10 to 20 minutes at a time to ease pain.  Put a thin cloth between the ice and your skin. When should you call for help? Call your doctor now or seek immediate medical care if:  · You have new pain, or your pain gets worse. · You have new symptoms such as a fever, a rash, or chills. Watch closely for changes in your health, and be sure to contact your doctor if:  · You do not get better as expected. Where can you learn more? Go to [employer]  Enter Q624 in the search box to learn more about \"Musculoskeletal Pain: Care Instructions. \"   © 6233-2047 Healthwise, Incorporated. Care instructions adapted under license by [address] [place] [address] (which disclaims liability or warranty for this information). This care instruction is for use with your licensed healthcare professional. If you have questions about a medical condition or this instruction, always ask your healthcare professional. [place] [address] any warranty or liability for your use of this information.   Content Version: 28.3.396942; Current as of: November 20, 2015
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
## 2020-08-21 ENCOUNTER — TELEPHONE (OUTPATIENT)
Dept: CASE MANAGEMENT | Age: 50
End: 2020-08-21

## 2020-08-21 NOTE — TELEPHONE ENCOUNTER
Patient resolved from Transition of Care episode on 8/21/2020  Discussed COVID-19 related testing which was not done at this time. Test results were not done. Patient informed of results, if available? no     Patient/family has been provided the following resources and education related to COVID-19:                         Signs, symptoms and red flags related to COVID-19            CDC exposure and quarantine guidelines            [facility] exposure contact - [phone]            Contact for their local Department of Health                 Patient currently reports that the following symptoms have improved:  elbow pain. No further outreach scheduled with this CTN/ACM/LPN/HC/ MA. Episode of Care resolved. Patient has this CTN/ACM/LPN/HC/MA contact information if future needs arise.

## 2020-08-27 ENCOUNTER — HOSPITAL ENCOUNTER (OUTPATIENT)
Dept: LAB | Age: 50
Discharge: HOME | End: 2020-08-27
Payer: MEDICARE

## 2020-08-27 LAB
C DIFF GDH STL QL: NEGATIVE
C DIFF TOX A+B STL QL IA: NEGATIVE
INTERPRETATION: NORMAL

## 2020-08-27 PROCEDURE — 0107U C DIFF TOX AG DETCJ IA STOOL: CPT

## 2020-08-31 ENCOUNTER — OFFICE VISIT (OUTPATIENT)
Dept: PULMONOLOGY | Age: 50
End: 2020-08-31

## 2020-08-31 VITALS
TEMPERATURE: 97.5 F | BODY MASS INDEX: 30.62 KG/M2 | SYSTOLIC BLOOD PRESSURE: 120 MMHG | WEIGHT: 202 LBS | HEIGHT: 68 IN | DIASTOLIC BLOOD PRESSURE: 80 MMHG | RESPIRATION RATE: 25 BRPM | OXYGEN SATURATION: 94 % | HEART RATE: 89 BPM

## 2020-08-31 DIAGNOSIS — J44.9 COPD, SEVERE (HCC): ICD-10-CM

## 2020-08-31 DIAGNOSIS — J44.9 CHRONIC OBSTRUCTIVE PULMONARY DISEASE, UNSPECIFIED COPD TYPE (HCC): Primary | ICD-10-CM

## 2020-08-31 DIAGNOSIS — J96.10 CHRONIC RESPIRATORY FAILURE, UNSPECIFIED WHETHER WITH HYPOXIA OR HYPERCAPNIA (HCC): ICD-10-CM

## 2020-08-31 NOTE — PATIENT INSTRUCTIONS
Continue Incruse 1 inhalation daily and remember to exhale fully before inhaling Continue Advair 1 inhalation twice daily and remember to exhale fully before inhaling also remember to wash mouth with water and spit it out after inhaler Albuterol 2 puffs every 4 hours as needed only if you require albuterol too often to control respiratory symptoms call the office for severe symptoms go to the emergency room Daliresp 1 tablet daily Supplemental oxygen with sleep and walking when you have to walk more than 500 feet or with strenuous activity Try to walk at least 4 times a week or more without stopping and gradually increase the time you walk

## 2020-08-31 NOTE — PROGRESS NOTES
Trina Lockett presents today for   Chief Complaint   Patient presents with    COPD       Is someone accompanying this pt? No    Is the patient using any DME equipment during OV? Yes Oxygen     -[employer]    Depression Screening:  3 most recent PHQ Screens 7/13/2020   PHQ Not Done -   Little interest or pleasure in doing things Several days   Feeling down, depressed, irritable, or hopeless Several days   Total Score PHQ 2 2       Learning Assessment:  Learning Assessment 5/7/2019   PRIMARY LEARNER Patient   PRIMARY LANGUAGE ENGLISH   LEARNER PREFERENCE PRIMARY OTHER (COMMENT)     -     -   ANSWERED BY patient   RELATIONSHIP SELF       Abuse Screening:  Abuse Screening Questionnaire 1/8/2019   Do you ever feel afraid of your partner? N   Are you in a relationship with someone who physically or mentally threatens you? N   Is it safe for you to go home? Y       Fall Risk  Fall Risk Assessment, last 12 mths 9/17/2018   Able to walk? Yes   Fall in past 12 months? No         Coordination of Care:  1. Have you been to the ER, urgent care clinic since your last visit? Hospitalized since your last visit? Yes; Name: [facility] ED 8/2020 Right Wrist swollen    2. Have you seen or consulted any other health care providers outside of the [address] [place] [address] since your last visit? Include any pap smears or colon screening.  No

## 2020-08-31 NOTE — PROGRESS NOTES
[facility]  Pulmonary, Critical Care, and Sleep Medicine      Chief complaint:  COPD chronic respiratory failure    HPI:    Aries Holly.    is 47years old and returns the office today for follow-up relating that his shortness of breath is stable and that he does not use his albuterol frequently and is using supplemental oxygen with long walks and sleep. He says he also uses [facility]este and feels it is helped. He does not cough much except in the morning after he takes his inhalers. He denies chest pain except for his chronic diffuse chest discomfort. He also denies leg swelling and is doing some exercise such as walking with his daughter      Allergies   Allergen Reactions    Ciprofloxacin Nausea Only    Remeron [Mirtazapine] Other (comments)     Mood swings    Seroquel [Quetiapine] Other (comments)     Mood swings     Current Outpatient Medications   Medication Sig    oxyCODONE-acetaminophen (PERCOCET) 5-325 mg per tablet TAKE 1 TABLET BY MOUTH EVERY 12 HOURS    umeclidinium (Incruse Ellipta) 62.5 mcg/actuation inhaler 1 inhalation daily. Exhale fully before inhaling    lisinopriL (PRINIVIL, ZESTRIL) 20 mg tablet Take 1 tablet by mouth once daily    topiramate (TOPAMAX) 50 mg tablet One tab at bedtime for a week then take two tabs at bedtime    atorvastatin (LIPITOR) 20 mg tablet Take 1 tablet by mouth once daily    fluticasone propion-salmeteroL (ADVAIR/WIXELA) 500-50 mcg/dose diskus inhaler Take 1 Puff by inhalation two (2) times a day.  roflumilast (Daliresp) 500 mcg tab tablet Take 1 tablet by mouth once daily    albuterol (PROVENTIL VENTOLIN) 2.5 mg /3 mL (0.083 %) nebu Nebulized 1 vial for inhalation every 4 hours as needed    furosemide (LASIX) 20 mg tablet TAKE 1 TABLET BY MOUTH EVERY OTHER DAY    albuterol (PROVENTIL HFA, VENTOLIN HFA, PROAIR HFA) 90 mcg/actuation inhaler Take 2 Puffs by inhalation every four (4) hours as needed for Wheezing.     Blood Pressure Monitor (BLOOD PRESSURE KIT) kit 1 Device by Does Not Apply route daily as needed (for blood pressure checks).  OXcarbazepine (TRILEPTAL) 150 mg tablet Take 150 mg by mouth two (2) times a day.  LORazepam (ATIVAN) 1 mg tablet Take 1 Tab by mouth every eight (8) hours as needed for Anxiety. Max Daily Amount: 3 mg.  Nebulizer & Compressor machine 1 Each by Does Not Apply route every four (4) hours as needed.  OXYGEN-AIR DELIVERY SYSTEMS (WALKABOUT 1 OXYGEN SYSTEM) 2.5 L/min by Nasal route continuous.  pantoprazole (PROTONIX) 40 mg tablet Take 1 Tab by mouth Daily (before breakfast).  hydrOXYzine HCl (ATARAX) 50 mg tablet Take 50 mg by mouth three (3) times daily as needed for Itching. No current facility-administered medications for this visit.       Past Medical History:   Diagnosis Date    Anxiety     Chest pain     Chronic diastolic heart failure secondary to coronary artery disease (HCC)     Chronic lung disease     Chronic obstructive pulmonary disease (HCC) 08/03/2017    PFTS 8/3/17    COPD, severe (HCC)     Coronary artery disease     Cough     Emphysema/COPD (HCC)     Esophagitis 12/11/2017    Endoscopy    Essential hypertension, benign     Fast heart beat     Feeling of chest tightness     Frequent urination     Heartburn     Hepatomegaly     History of stomach ulcers     Hx of nausea and vomiting     Poor appetite     Positive urine drug screen 01/2016    opiates and THC    Shortness of breath     Splenomegaly     Stomach pain     Stress     Tiredness     Unintentional weight change     Upper GI bleed     Weakness     Wheeze      Past Surgical History:   Procedure Laterality Date    COLONOSCOPY N/A 11/5/2018    COLONOSCOPY with polypectomies performed by Niru Kirby MD at [address] HX ENDOSCOPY  12/11/2017    HX WISDOM TEETH EXTRACTION Right 05/2019    IR BX LIVER PERCUTANEOUS       Social History     Socioeconomic History    Marital status:      Spouse name: Not on file    Number of children: Not on file    Years of education: Not on file    Highest education level: Not on file   Occupational History    Not on file   Social Needs    Financial resource strain: Not on file    Food insecurity     Worry: Not on file     Inability: Not on file    Transportation needs     Medical: Not on file     Non-medical: Not on file   Tobacco Use    Smoking status: Former Smoker     Packs/day: 2.00     Years: 25.00     Pack years: 50.00     Types: Cigarettes     Start date: 1984     Last attempt to quit: 2017     Years since quittin.7    Smokeless tobacco: Never Used    Tobacco comment: smokes Marijuana   Substance and Sexual Activity    Alcohol use: No    Drug use: Yes     Types: Marijuana     Comment: Hx of Marijuana use    Sexual activity: Not Currently     Partners: Female   Lifestyle    Physical activity     Days per week: Not on file     Minutes per session: Not on file    Stress: Not on file   Relationships    Social connections     Talks on phone: Not on file     Gets together: Not on file     Attends Scientologist service: Not on file     Active member of club or organization: Not on file     Attends meetings of clubs or organizations: Not on file     Relationship status: Not on file    Intimate partner violence     Fear of current or ex partner: Not on file     Emotionally abused: Not on file     Physically abused: Not on file     Forced sexual activity: Not on file   Other Topics Concern     Service No    Blood Transfusions No    Caffeine Concern Yes    Occupational Exposure No    Hobby Hazards No    Sleep Concern Yes     Comment: occas    Stress Concern No    Weight Concern No    Special Diet No    Back Care Yes    Exercise No    Bike Helmet Yes    Seat Belt No     Comment: occas    Self-Exams Not Asked   Social History Narrative         Family History   Problem Relation Age of Onset    Hypertension Mother     Heart Disease Mother     Diabetes Mother     Hypertension Father     Heart Disease Father     Cancer Father         lymphnodes    Diabetes Father        Review of systems:  He denies fever chills poor appetite weight loss    Physical Exam:  Visit Vitals  /80 (BP 1 Location: Left arm, BP Patient Position: At rest)   Pulse 89   Temp 97.5 °F (36.4 °C) (Oral)   Resp 25   Ht 5' 8\" (1.727 m)   Wt 91.6 kg (202 lb)   SpO2 94% Comment: room air   BMI 30.71 kg/m²       Well-developed well-nourished  HEENT: WNL  Lymph node exam: Supraclavicular cervical lymph nodes negative  Chest: Equal symmetrical expansion no dullness no wheezes rales rubs  Heart: Regular rhythm no gallop no murmur no JVD no peripheral edema  Extremities: No cyanosis clubbing or calf tenderness  Neurological: Alert and oriented    Labs:    O2 sat room air at rest 94% and after walking 272 m his O2 sat fell to 87% on room air and was placed on 2 L and finished his 6-minute walk walking 408 m    Impression:     COPD and chronic respiratory failure by history and physical exam and oximetry study stable    Plan:     Continue Daliresp Incruse Advair and PRN albuterol  Encourage exercise  Supplemental oxygen with activity such as walking greater than 500 feet and with sleep  Follow-up in 6 months    Adryan Lane MD , [facility]    CC: Alondra Dallas MD     [facility] [address]  [place], [address] [address]     P: [phone]     F: [phone]

## 2020-09-08 ENCOUNTER — VIRTUAL VISIT (OUTPATIENT)
Dept: NEUROLOGY | Age: 50
End: 2020-09-08

## 2020-09-08 DIAGNOSIS — R51.9 HEADACHE DISORDER: Primary | ICD-10-CM

## 2020-09-08 RX ORDER — TOPIRAMATE 100 MG/1
TABLET, FILM COATED ORAL
Qty: 90 TAB | Refills: 2 | Status: SHIPPED | OUTPATIENT
Start: 2020-09-08 | End: 2021-11-30 | Stop reason: ALTCHOICE

## 2020-09-08 NOTE — PROGRESS NOTES
Markel Loaiza  is a 47 y.o. male on virtual visit today for follow-up on headaches. Patient reports no active HA at this time. 1. Have you been to the ER, urgent care clinic since your last visit? Hospitalized since your last visit? Yes Reason for visit: [facility] ED Aug 17, 2020 Hand swelling    2. Have you seen or consulted any other health care providers outside of the [employer] [employer] [place] [address] since your last visit? Include any pap smears or colon screening. No    Mobile number [phone] will be used for today's visit.

## 2020-09-08 NOTE — PROGRESS NOTES
Rakan Lamb is a 47 y.o. male who was seen by synchronous (real-time) audio-video technology on 9/8/2020 for Headache        Assessment & Plan:   Diagnoses and all orders for this visit:    1. Headache disorder    Other orders  -     topiramate (TOPAMAX) 100 mg tablet; Take one tab PO qhs  Indications: migraine prevention      Headaches controlled on Topamax. Taking 100 mg qhs. Tolerating this well. Refills provided. As long as headaches remain controlled follow up in 6 months or sooner as needed. All questions addressed and patient is agreeable with plan of care. I spent at least 25 minutes on this visit with this established patient. 712  Subjective: Follow up headaches. Last seen in July. Was started on Topamax. In the interim endorses headaches are controlled. Maintained on Topamax 100 nightly. Tolerating this well. Avoiding OTC analgesics. He did have an injury to his wrist leading him to have to be seen at the ED but endorses the swelling is getting better. He was seen that day by Dr. Richard Ferreira. He will follow up with him if need be. Denies focal deficits. No other concerns at this time. Prior to Admission medications    Medication Sig Start Date End Date Taking? Authorizing Provider   topiramate (TOPAMAX) 100 mg tablet Take one tab PO qhs  Indications: migraine prevention 9/8/20  Yes Marty Santana, DIANE   oxyCODONE-acetaminophen (PERCOCET) 5-325 mg per tablet TAKE 1 TABLET BY MOUTH EVERY 12 HOURS 7/28/20  Yes Provider, Historical   umeclidinium (Incruse Ellipta) 62.5 mcg/actuation inhaler 1 inhalation daily.   Exhale fully before inhaling 7/27/20  Yes Marco Vaughn MD   lisinopriL (PRINIVIL, ZESTRIL) 20 mg tablet Take 1 tablet by mouth once daily 7/17/20  Yes Kristal Cook MD   atorvastatin (LIPITOR) 20 mg tablet Take 1 tablet by mouth once daily 6/18/20  Yes Elif Wheeler MD   fluticasone propion-salmeteroL (ADVAIR/WIXELA) 500-50 mcg/dose diskus inhaler Take 1 Puff by inhalation two (2) times a day. 6/15/20  Yes Landen Duggan MD   roflumilast (Daliresp) 500 mcg tab tablet Take 1 tablet by mouth once daily 5/20/20  Yes Landen Duggan MD   albuterol (PROVENTIL VENTOLIN) 2.5 mg /3 mL (0.083 %) nebu Nebulized 1 vial for inhalation every 4 hours as needed 3/31/20  Yes Nery Tran MD   furosemide (LASIX) 20 mg tablet TAKE 1 TABLET BY MOUTH EVERY OTHER DAY 1/27/20  Yes Anoop Chamorro NP   hydrOXYzine HCl (ATARAX) 50 mg tablet Take 50 mg by mouth three (3) times daily as needed for Itching. Yes Provider, Historical   albuterol (PROVENTIL HFA, VENTOLIN HFA, PROAIR HFA) 90 mcg/actuation inhaler Take 2 Puffs by inhalation every four (4) hours as needed for Wheezing. 12/19/19  Yes Brian Garner MD   OXcarbazepine (TRILEPTAL) 150 mg tablet Take 150 mg by mouth two (2) times a day. Yes Provider, Historical   LORazepam (ATIVAN) 1 mg tablet Take 1 Tab by mouth every eight (8) hours as needed for Anxiety. Max Daily Amount: 3 mg. 8/27/18  Yes Moy Cox MD   pantoprazole (PROTONIX) 40 mg tablet Take 1 Tab by mouth Daily (before breakfast). 1/12/18  Yes Mary Oconnor MD   roflumilast (Daliresp) 500 mcg tab tablet Take 1 Tab by mouth daily. 8/31/20   Landen Duggan MD   topiramate (TOPAMAX) 50 mg tablet One tab at bedtime for a week then take two tabs at bedtime 7/10/20 9/8/20  Ham Santana NP   Blood Pressure Monitor (BLOOD PRESSURE KIT) kit 1 Device by Does Not Apply route daily as needed (for blood pressure checks). 4/25/19   Desmond Gonzalez NP   Nebulizer & Compressor machine 1 Each by Does Not Apply route every four (4) hours as needed. 2/28/18   Yasmeen Garza NP   OXYGEN-AIR DELIVERY SYSTEMS (WALKABOUT 1 OXYGEN SYSTEM) 2.5 L/min by Nasal route continuous. Provider, Historical   acetaminophen (TYLENOL) 500 mg tablet Take 500 mg by mouth every six (6) hours as needed for Pain.  2 tabs q 6 hr  Indications: Pain    Provider, Historical Patient Active Problem List   Diagnosis Code    Emphysema/COPD ([facility] J43.9    COPD, severe ([facility] J44.9    Sepsis ([facility] A41.9    Essential hypertension, benign I10    Esophagitis K20.9    Panic attack F41.0    Insomnia G47.00    Hypovitaminosis D E55.9    Overweight (BMI 25.0-29. 9) [id number]    Pulmonary infiltrate R91.8     Patient Active Problem List    Diagnosis Date Noted    Panic attack 10/08/2018    Insomnia 10/08/2018    Hypovitaminosis D 10/08/2018    Overweight (BMI 25.0-29.9) 10/08/2018    Pulmonary infiltrate 03/19/2018    Essential hypertension, benign 02/21/2018    Esophagitis 02/21/2018    Sepsis ([facility] 12/18/2017    Emphysema/COPD (HCC)     COPD, severe (HCC)      Current Outpatient Medications   Medication Sig Dispense Refill    topiramate (TOPAMAX) 100 mg tablet Take one tab PO qhs  Indications: migraine prevention 90 Tab 2    oxyCODONE-acetaminophen (PERCOCET) 5-325 mg per tablet TAKE 1 TABLET BY MOUTH EVERY 12 HOURS      umeclidinium (Incruse Ellipta) 62.5 mcg/actuation inhaler 1 inhalation daily. Exhale fully before inhaling 1 Inhaler 5    lisinopriL (PRINIVIL, ZESTRIL) 20 mg tablet Take 1 tablet by mouth once daily 90 Tab 0    atorvastatin (LIPITOR) 20 mg tablet Take 1 tablet by mouth once daily 90 Tab 3    fluticasone propion-salmeteroL (ADVAIR/WIXELA) 500-50 mcg/dose diskus inhaler Take 1 Puff by inhalation two (2) times a day. 1 Inhaler 5    roflumilast (Daliresp) 500 mcg tab tablet Take 1 tablet by mouth once daily 30 Tab 5    albuterol (PROVENTIL VENTOLIN) 2.5 mg /3 mL (0.083 %) nebu Nebulized 1 vial for inhalation every 4 hours as needed 60 Each 2    furosemide (LASIX) 20 mg tablet TAKE 1 TABLET BY MOUTH EVERY OTHER DAY 30 Tab 6    hydrOXYzine HCl (ATARAX) 50 mg tablet Take 50 mg by mouth three (3) times daily as needed for Itching.       albuterol (PROVENTIL HFA, VENTOLIN HFA, PROAIR HFA) 90 mcg/actuation inhaler Take 2 Puffs by inhalation every four (4) hours as needed for Wheezing. 1 Inhaler 3    OXcarbazepine (TRILEPTAL) 150 mg tablet Take 150 mg by mouth two (2) times a day.  LORazepam (ATIVAN) 1 mg tablet Take 1 Tab by mouth every eight (8) hours as needed for Anxiety. Max Daily Amount: 3 mg. 90 Tab 0    pantoprazole (PROTONIX) 40 mg tablet Take 1 Tab by mouth Daily (before breakfast). 30 Tab 0    roflumilast (Daliresp) 500 mcg tab tablet Take 1 Tab by mouth daily. 90 Tab 3    Blood Pressure Monitor (BLOOD PRESSURE KIT) kit 1 Device by Does Not Apply route daily as needed (for blood pressure checks). 1 Kit 0    Nebulizer & Compressor machine 1 Each by Does Not Apply route every four (4) hours as needed. 1 Each 0    OXYGEN-AIR DELIVERY SYSTEMS (WALKABOUT 1 OXYGEN SYSTEM) 2.5 L/min by Nasal route continuous.        Allergies   Allergen Reactions    Ciprofloxacin Nausea Only    Remeron [Mirtazapine] Other (comments)     Mood swings    Seroquel [Quetiapine] Other (comments)     Mood swings     Past Medical History:   Diagnosis Date    Anxiety     Chest pain     Chronic diastolic heart failure secondary to coronary artery disease (HCC)     Chronic lung disease     Chronic obstructive pulmonary disease ([facility] 08/03/2017    PFTS 8/3/17    COPD, severe (HCC)     Coronary artery disease     Cough     Emphysema/COPD (HCC)     Esophagitis 12/11/2017    Endoscopy    Essential hypertension, benign     Fast heart beat     Feeling of chest tightness     Frequent urination     Heartburn     Hepatomegaly     History of stomach ulcers     Hx of nausea and vomiting     Poor appetite     Positive urine drug screen 01/2016    opiates and THC    Shortness of breath     Splenomegaly     Stomach pain     Stress     Tiredness     Unintentional weight change     Upper GI bleed     Weakness     Wheeze      Past Surgical History:   Procedure Laterality Date    COLONOSCOPY N/A 11/5/2018    COLONOSCOPY with polypectomies performed by Amy Roberto, MD at [facility]    HX ENDOSCOPY  2017    HX WISDOM TEETH EXTRACTION Right 2019    IR BX LIVER PERCUTANEOUS       Family History   Problem Relation Age of Onset    Hypertension Mother     Heart Disease Mother     Diabetes Mother     Hypertension Father     Heart Disease Father     Cancer Father         lymphnodes    Diabetes Father      Social History     Tobacco Use    Smoking status: Former Smoker     Packs/day: 2.00     Years: 25.00     Pack years: 50.00     Types: Cigarettes     Start date: 1984     Last attempt to quit: 2017     Years since quittin.7    Smokeless tobacco: Never Used    Tobacco comment: smokes Marijuana   Substance Use Topics    Alcohol use: No       Review of Systems  GENERAL: Denies fever or fatigue  CARDIAC: No CP or SOB  PULMONARY: No cough of SOB  MUSCULOSKELETAL: No new joint pain  NEURO: SEE HPI      Objective:     Patient-Reported Vitals 2020   Patient-Reported Weight 203lb   Patient-Reported Height -   Patient-Reported Pulse -   Patient-Reported SpO2 -   Patient-Reported Systolic  -   Patient-Reported Diastolic -      General: alert, cooperative, no distress   Mental  status: normal mood, behavior, speech, dress, motor activity, and thought processes, able to follow commands   HENT: NCAT   Neck: no visualized mass   Resp: no respiratory distress   Neuro: no gross deficits   Skin: no discoloration or lesions of concern on visible areas   Psychiatric: normal affect, consistent with stated mood, no evidence of hallucinations     Additional exam findings: This is a very pleasant 47year old male. He is alert and in NAD. Full fund of knowledge. Speech is clear. No facial asymmetry. No signs of ataxia or incoordination. We discussed the expected course, resolution and complications of the diagnosis(es) in detail. Medication risks, benefits, costs, interactions, and alternatives were discussed as indicated.   I advised him to contact the office if his condition worsens, changes or fails to improve as anticipated. He expressed understanding with the diagnosis(es) and plan. Markel Josse Skinner, who was evaluated through a patient-initiated, synchronous (real-time) audio-video encounter, and/or his healthcare decision maker, is aware that it is a billable service, with coverage as determined by his insurance carrier. He provided verbal consent to proceed: Yes, and patient identification was verified. It was conducted pursuant to the emergency declaration under the [address] [place] [address], [address] [place] [address] authority and the [employer] and [employer] Act. A caregiver was present when appropriate. Ability to conduct physical exam was limited. I was at home. The patient was at home. Aj Wilcox NP  This note will not be viewable in [address]

## 2020-09-11 ENCOUNTER — HOSPITAL ENCOUNTER (OUTPATIENT)
Dept: LAB | Age: 50
Discharge: HOME | End: 2020-09-11
Payer: MEDICARE

## 2020-09-11 PROCEDURE — 87635 SARS-COV-2 COVID-19 AMP PRB: CPT

## 2020-09-13 LAB — SARS-COV-2, COV2NT: NOT DETECTED

## 2020-09-14 ENCOUNTER — ANESTHESIA EVENT (OUTPATIENT)
Dept: ENDOSCOPY | Age: 50
End: 2020-09-14
Payer: MEDICARE

## 2020-09-15 ENCOUNTER — HOSPITAL ENCOUNTER (OUTPATIENT)
Age: 50
Setting detail: OUTPATIENT SURGERY
Discharge: HOME | End: 2020-09-15
Attending: INTERNAL MEDICINE | Admitting: INTERNAL MEDICINE
Payer: MEDICARE

## 2020-09-15 ENCOUNTER — ANESTHESIA (OUTPATIENT)
Dept: ENDOSCOPY | Age: 50
End: 2020-09-15
Payer: MEDICARE

## 2020-09-15 VITALS
SYSTOLIC BLOOD PRESSURE: 113 MMHG | OXYGEN SATURATION: 100 % | BODY MASS INDEX: 30.37 KG/M2 | TEMPERATURE: 97.7 F | HEIGHT: 68 IN | WEIGHT: 200.4 LBS | DIASTOLIC BLOOD PRESSURE: 78 MMHG | RESPIRATION RATE: 20 BRPM | HEART RATE: 67 BPM

## 2020-09-15 PROCEDURE — 77030021593 HC FCPS BIOP ENDOSC BSC -A: Performed by: INTERNAL MEDICINE

## 2020-09-15 PROCEDURE — 88305 TISSUE EXAM BY PATHOLOGIST: CPT

## 2020-09-15 PROCEDURE — 76060000031 HC ANESTHESIA FIRST 0.5 HR: Performed by: INTERNAL MEDICINE

## 2020-09-15 PROCEDURE — 74011000250 HC RX REV CODE- 250: Performed by: NURSE ANESTHETIST, CERTIFIED REGISTERED

## 2020-09-15 PROCEDURE — 2709999900 HC NON-CHARGEABLE SUPPLY: Performed by: INTERNAL MEDICINE

## 2020-09-15 PROCEDURE — 76040000019: Performed by: INTERNAL MEDICINE

## 2020-09-15 PROCEDURE — 77030008565 HC TBNG SUC IRR ERBE -B: Performed by: INTERNAL MEDICINE

## 2020-09-15 PROCEDURE — 74011250636 HC RX REV CODE- 250/636: Performed by: NURSE ANESTHETIST, CERTIFIED REGISTERED

## 2020-09-15 PROCEDURE — 77030019988 HC FCPS ENDOSC DISP BSC -B: Performed by: INTERNAL MEDICINE

## 2020-09-15 PROCEDURE — 80307 DRUG TEST PRSMV CHEM ANLYZR: CPT

## 2020-09-15 RX ORDER — SODIUM CHLORIDE 0.9 % (FLUSH) 0.9 %
5-40 SYRINGE (ML) INJECTION AS NEEDED
Status: CANCELLED | OUTPATIENT
Start: 2020-09-15

## 2020-09-15 RX ORDER — SODIUM CHLORIDE, SODIUM LACTATE, POTASSIUM CHLORIDE, CALCIUM CHLORIDE 600; 310; 30; 20 MG/100ML; MG/100ML; MG/100ML; MG/100ML
75 INJECTION, SOLUTION INTRAVENOUS CONTINUOUS
Status: DISCONTINUED | OUTPATIENT
Start: 2020-09-15 | End: 2020-09-15 | Stop reason: HOSPADM

## 2020-09-15 RX ORDER — PROPOFOL 10 MG/ML
INJECTION, EMULSION INTRAVENOUS AS NEEDED
Status: DISCONTINUED | OUTPATIENT
Start: 2020-09-15 | End: 2020-09-15 | Stop reason: HOSPADM

## 2020-09-15 RX ORDER — SODIUM CHLORIDE 0.9 % (FLUSH) 0.9 %
5-40 SYRINGE (ML) INJECTION EVERY 8 HOURS
Status: DISCONTINUED | OUTPATIENT
Start: 2020-09-15 | End: 2020-09-15 | Stop reason: HOSPADM

## 2020-09-15 RX ORDER — SODIUM CHLORIDE 0.9 % (FLUSH) 0.9 %
5-40 SYRINGE (ML) INJECTION AS NEEDED
Status: DISCONTINUED | OUTPATIENT
Start: 2020-09-15 | End: 2020-09-15 | Stop reason: HOSPADM

## 2020-09-15 RX ORDER — SODIUM CHLORIDE 0.9 % (FLUSH) 0.9 %
5-40 SYRINGE (ML) INJECTION EVERY 8 HOURS
Status: CANCELLED | OUTPATIENT
Start: 2020-09-15

## 2020-09-15 RX ORDER — SODIUM CHLORIDE, SODIUM LACTATE, POTASSIUM CHLORIDE, CALCIUM CHLORIDE 600; 310; 30; 20 MG/100ML; MG/100ML; MG/100ML; MG/100ML
50 INJECTION, SOLUTION INTRAVENOUS CONTINUOUS
Status: CANCELLED | OUTPATIENT
Start: 2020-09-15

## 2020-09-15 RX ORDER — LIDOCAINE HYDROCHLORIDE 10 MG/ML
0.1 INJECTION, SOLUTION EPIDURAL; INFILTRATION; INTRACAUDAL; PERINEURAL AS NEEDED
Status: DISCONTINUED | OUTPATIENT
Start: 2020-09-15 | End: 2020-09-15 | Stop reason: HOSPADM

## 2020-09-15 RX ORDER — LIDOCAINE HYDROCHLORIDE 20 MG/ML
INJECTION, SOLUTION EPIDURAL; INFILTRATION; INTRACAUDAL; PERINEURAL AS NEEDED
Status: DISCONTINUED | OUTPATIENT
Start: 2020-09-15 | End: 2020-09-15 | Stop reason: HOSPADM

## 2020-09-15 RX ORDER — ONDANSETRON 2 MG/ML
INJECTION INTRAMUSCULAR; INTRAVENOUS AS NEEDED
Status: DISCONTINUED | OUTPATIENT
Start: 2020-09-15 | End: 2020-09-15 | Stop reason: HOSPADM

## 2020-09-15 RX ADMIN — LIDOCAINE HYDROCHLORIDE 100 MG: 20 INJECTION, SOLUTION EPIDURAL; INFILTRATION; INTRACAUDAL; PERINEURAL at 11:37

## 2020-09-15 RX ADMIN — SODIUM CHLORIDE, SODIUM LACTATE, POTASSIUM CHLORIDE, AND CALCIUM CHLORIDE 75 ML/HR: 600; 310; 30; 20 INJECTION, SOLUTION INTRAVENOUS at 11:12

## 2020-09-15 RX ADMIN — FAMOTIDINE 20 MG: 10 INJECTION INTRAVENOUS at 11:12

## 2020-09-15 RX ADMIN — PROPOFOL 80 MG: 10 INJECTION, EMULSION INTRAVENOUS at 11:29

## 2020-09-15 RX ADMIN — PROPOFOL 40 MG: 10 INJECTION, EMULSION INTRAVENOUS at 11:39

## 2020-09-15 RX ADMIN — PROPOFOL 40 MG: 10 INJECTION, EMULSION INTRAVENOUS at 11:37

## 2020-09-15 RX ADMIN — ONDANSETRON 4 MG: 2 INJECTION INTRAMUSCULAR; INTRAVENOUS at 11:27

## 2020-09-15 RX ADMIN — PROPOFOL 40 MG: 10 INJECTION, EMULSION INTRAVENOUS at 11:34

## 2020-09-15 RX ADMIN — PROPOFOL 40 MG: 10 INJECTION, EMULSION INTRAVENOUS at 11:31

## 2020-09-15 NOTE — DISCHARGE INSTRUCTIONS
DISCHARGE SUMMARY from Nurse    PATIENT INSTRUCTIONS:    After general anesthesia or intravenous sedation, for 24 hours or while taking prescription Narcotics:  · Limit your activities  · Do not drive and operate hazardous machinery  · Do not make important personal or business decisions  · Do  not drink alcoholic beverages  · If you have not urinated within 8 hours after discharge, please contact your surgeon on call. Report the following to your surgeon:  · Excessive pain, swelling, redness or odor of or around the surgical area  · Temperature over 100.5  · Nausea and vomiting lasting longer than 4 hours or if unable to take medications  · Any signs of decreased circulation or nerve impairment to extremity: change in color, persistent  numbness, tingling, coldness or increase pain  · Any questions    What to do at Home:  Recommended activity: Activity as tolerated and no driving for today. *  Please give a list of your current medications to your Primary Care Provider. *  Please update this list whenever your medications are discontinued, doses are      changed, or new medications (including over-the-counter products) are added. *  Please carry medication information at all times in case of emergency situations. These are general instructions for a healthy lifestyle:    No smoking/ No tobacco products/ Avoid exposure to second hand smoke  Surgeon General's Warning:  Quitting smoking now greatly reduces serious risk to your health. Obesity, smoking, and sedentary lifestyle greatly increases your risk for illness    A healthy diet, regular physical exercise & weight monitoring are important for maintaining a healthy lifestyle    You may be retaining fluid if you have a history of heart failure or if you experience any of the following symptoms:  Weight gain of 3 pounds or more overnight or 5 pounds in a week, increased swelling in our hands or feet or shortness of breath while lying flat in bed. Please call your doctor as soon as you notice any of these symptoms; do not wait until your next office visit. The discharge information has been reviewed with the patient. The patient verbalized understanding. Discharge medications reviewed with the patient and appropriate educational materials and side effects teaching were provided. ___________________________________________________________________________________________________________________________________    Patient Education        Upper GI Endoscopy: What to Expect at [address] [place] [address] had an upper GI endoscopy. Your doctor used a thin, lighted tube that bends to look at the inside of your esophagus, your stomach, and the first part of the small intestine, called the duodenum. After you have an endoscopy, you will stay at the hospital or clinic for 1 to 2 hours. This will allow the medicine to wear off. You will be able to go home after your doctor or nurse checks to make sure that you're not having any problems. You may have to stay overnight if you had treatment during the test. You may have a sore throat for a day or two after the test.  This care sheet gives you a general idea about what to expect after the test.  How can you care for yourself at home? Activity   · Rest as much as you need to after you go home. · You should be able to go back to your usual activities the day after the test.  Diet   · Follow your doctor's directions for eating after the test.  · Drink plenty of fluids (unless your doctor has told you not to). Medications   · If you have a sore throat the day after the test, use an over-the-counter spray to numb your throat. Follow-up care is a key part of your treatment and safety. Be sure to make and go to all appointments, and call your doctor if you are having problems. It's also a good idea to know your test results and keep a list of the medicines you take. When should you call for help?   Call 911 anytime you think you may need emergency care. For example, call if:    · You passed out (lost consciousness).     · You have trouble breathing.     · You pass maroon or bloody stools. Call your doctor now or seek immediate medical care if:    · You have pain that does not get better after your take pain medicine.     · You have new or worse belly pain.     · You have blood in your stools.     · You are sick to your stomach and cannot keep fluids down.     · You have a fever.     · You cannot pass stools or gas. Watch closely for changes in your health, and be sure to contact your doctor if:    · Your throat still hurts after a day or two.     · You do not get better as expected. Where can you learn more? Go to http://www.valles.com/  Enter J454 in the search box to learn more about \"Upper GI Endoscopy: What to Expect at Home. \"  Current as of: April 15, 2020               Content Version: 12.6  © 2006-2020 [employer]. Care instructions adapted under license by [employer] (which disclaims liability or warranty for this information). If you have questions about a medical condition or this instruction, always ask your healthcare professional. [address] any warranty or liability for your use of this information. Patient Education        Esophageal Dilation: What to Expect at Home  Your Recovery  After you have esophageal dilation, you will stay at the hospital or surgery center for 1 to 2 hours. This will allow the medicine to wear off. You will be able to go home after your doctor or nurse checks to make sure you are not having any problems. This care sheet gives you a general idea about how long it will take for you to recover. But each person recovers at a different pace. Follow the steps below to get better as quickly as possible. How can you care for yourself at home?   Activity    · Rest as much as you need to after you go home.     · You should be able to go back to your usual activities the day after the procedure. Diet    · Follow your doctor's directions for eating after the procedure.     · Drink plenty of fluids (unless your doctor has told you not to). Medicines    · Your doctor will tell you if and when you can restart your medicines. He or she will also give you instructions about taking any new medicines.     · If you take aspirin or some other blood thinner, ask your doctor if and when to start taking it again. Make sure that you understand exactly what your doctor wants you to do.     · If you have a sore throat the day after the procedure, use an over-the-counter spray to numb your throat. Sucking on throat lozenges and gargling with warm salt water may also help relieve your symptoms. Follow-up care is a key part of your treatment and safety. Be sure to make and go to all appointments, and call your doctor if you are having problems. It's also a good idea to know your test results and keep a list of the medicines you take. When should you call for help? Call 911 anytime you think you may need emergency care. For example, call if:    · You passed out (lost consciousness).     · You have trouble breathing.     · Your stools are maroon or very bloody   Call your doctor now or seek immediate medical care if:    · You have new or worse belly pain.     · You have a fever.     · You have new or more blood in your stools.     · You are sick to your stomach and cannot drink fluids.     · You cannot pass stools or gas.     · You have pain that does not get better after you take pain medicine. Watch closely for changes in your health, and be sure to contact your doctor if:    · Your throat still hurts after a day or two.     · You do not get better as expected. Where can you learn more?   Go to http://www.gray.com/  Enter J014 in the search box to learn more about \"Esophageal Dilation: What to Expect at Home.\"  Current as of: April 15, 2020               Content Version: 12.6  © 5791-1507 [employer]. Care instructions adapted under license by [employer] (which disclaims liability or warranty for this information). If you have questions about a medical condition or this instruction, always ask your healthcare professional. [address] any warranty or liability for your use of this information. Patient Education        Sigmoidoscopy: What to Expect at [facility]     A sigmoidoscopy lets your doctor look inside the lower part of your large intestine. This is also called the colon. The doctor uses a lighted tube called a sigmoidoscope (or scope). This test let the doctor look for small growths (called polyps), cancer, bleeding, hemorrhoids, or other problems. The doctor also may have used the scope to remove polyps. Or he or she may have used it to take tissue samples that need to be tested. You shouldn't have any pain after the procedure. But it is normal to pass gas. You may have mild discomfort from having gas. If your doctor removed polyps, you will likely need to schedule a colonoscopy to look at the whole colon. This care sheet gives you a general idea about how long it will take for you to recover. But each person recovers at a different pace. Follow the steps below to get better as quickly as possible. How can you care for yourself at home? Activity    · Most people are able to return to work right away unless they have had a sedative during the procedure.     · You may need someone to drive you home if you have had a sedative. In most cases, you can drive yourself home. Diet    · You can eat your normal diet. If your stomach is upset, try bland, low-fat foods like plain rice, broiled chicken, toast, and yogurt.     · Be sure to drink plenty of liquids to replace those you have lost during the preparation for the procedure.    Exercise    · You can return to normal exercise right away. Medicine    · Your doctor will tell you if and when you can restart your medicines. He or she will also give you instructions about taking any new medicines.     · If you take aspirin or some other blood thinner, ask your doctor if and when to start taking it again. Make sure that you understand exactly what your doctor wants you to do. Follow-up care is a key part of your treatment and safety. Be sure to make and go to all appointments, and call your doctor if you are having problems. It's also a good idea to know your test results and keep a list of the medicines you take. When should you call for help? Call your doctor now or seek immediate medical care if:    · You have new or worse belly pain.     · You have blood in your stools. Watch closely for changes in your health, and be sure to contact your doctor if you have any problems. Where can you learn more? Go to http://costa-jessica.info/  Enter [id number] in the search box to learn more about \"Sigmoidoscopy: What to Expect at Home. \"  Current as of: April 15, 2020               Content Version: 12.6  © 7100-7240 [employer]. Care instructions adapted under license by [employer] (which disclaims liability or warranty for this information). If you have questions about a medical condition or this instruction, always ask your healthcare professional. [address] any warranty or liability for your use of this information. Patient Education        Hemorrhoids: Care Instructions  Your Care Instructions     Hemorrhoids are enlarged veins that develop in the anal canal. Bleeding during bowel movements, itching, swelling, and rectal pain are the most common symptoms. They can be uncomfortable at times, but hemorrhoids rarely are a serious problem. You can treat most hemorrhoids with simple changes to your diet and bowel habits.  These changes include eating more fiber and not straining to pass stools. Most hemorrhoids do not need surgery or other treatment unless they are very large and painful or bleed a lot. Follow-up care is a key part of your treatment and safety. Be sure to make and go to all appointments, and call your doctor if you are having problems. It's also a good idea to know your test results and keep a list of the medicines you take. How can you care for yourself at home? · Sit in a few inches of warm water (sitz bath) 3 times a day and after bowel movements. The warm water helps with pain and itching. · Put ice on your anal area several times a day for 10 minutes at a time. Put a thin cloth between the ice and your skin. Follow this by placing a warm, wet towel on the area for another 10 to 20 minutes. · Take pain medicines exactly as directed. ? If the doctor gave you a prescription medicine for pain, take it as prescribed. ? If you are not taking a prescription pain medicine, ask your doctor if you can take an over-the-counter medicine. · Keep the anal area clean, but be gentle. Use water and a fragrance-free soap, such as [place], or use baby wipes or medicated pads, such as Tucks. · Wear cotton underwear and loose clothing to decrease moisture in the anal area. · Eat more fiber. Include foods such as whole-grain breads and cereals, raw vegetables, raw and dried fruits, and beans. · Drink plenty of fluids, enough so that your urine is light yellow or clear like water. If you have kidney, heart, or liver disease and have to limit fluids, talk with your doctor before you increase the amount of fluids you drink. · Use a stool softener that contains bran or psyllium. You can save money by buying bran or psyllium (available in bulk at most health food stores) and sprinkling it on foods or stirring it into fruit juice. Or you can use a product such as Metamucil or Hydrocil. · Practice healthy bowel habits.   ? Go to the bathroom as soon as you have the urge. ? Avoid straining to pass stools. Relax and give yourself time to let things happen naturally. ? Do not hold your breath while passing stools. ? Do not read while sitting on the toilet. Get off the toilet as soon as you have finished. · Take your medicines exactly as prescribed. Call your doctor if you think you are having a problem with your medicine. When should you call for help? Call 911 anytime you think you may need emergency care. For example, call if:    · You pass maroon or very bloody stools. Call your doctor now or seek immediate medical care if:    · You have increased pain.     · You have increased bleeding. Watch closely for changes in your health, and be sure to contact your doctor if:    · Your symptoms have not improved after 3 or 4 days. Where can you learn more? Go to http://costa-jessica.info/  Enter F228 in the search box to learn more about \"Hemorrhoids: Care Instructions. \"  Current as of: April 15, 2020               Content Version: 12.6  © 2006-2020 [employer]. Care instructions adapted under license by [facility] (which disclaims liability or warranty for this information). If you have questions about a medical condition or this instruction, always ask your healthcare professional. [address] any warranty or liability for your use of this information.

## 2020-09-15 NOTE — H&P
[email or web address]  [phone]    GASTROENTEROLOGY Pre-Procedure H and P      Impression/Plan:   1.  This patient is consented for an EGD and colonoscopy for dysphagia and polyps       Chief Complaint: EGD and colonoscopy for dysphagia and polyps    HPI:  Jose Garland Sr. is a 47 y.o. male who is being is having an EGD and colonoscopy EGD and colonoscopy for dysphagia and polyps  PMH:   Past Medical History:   Diagnosis Date    Anxiety     Chest pain     Chronic diastolic heart failure secondary to coronary artery disease (HCC)     Chronic lung disease     Chronic obstructive pulmonary disease ([facility] [address] 08/03/2017    PFTS 8/3/17    COPD, severe (HCC)     Coronary artery disease     Cough     [facility]     Esophagitis 12/11/2017    Endoscopy    Essential hypertension, benign     Fast heart beat     Feeling of chest tightness     Frequent urination     Heartburn     Hepatomegaly     History of stomach ulcers     Hx of nausea and vomiting     Poor appetite     Positive urine drug screen 01/2016    opiates and THC    Shortness of breath     Splenomegaly     Stomach pain     Stress     Tiredness     Unintentional weight change     Upper GI bleed     Weakness     Wheeze        PSH:   Past Surgical History:   Procedure Laterality Date    COLONOSCOPY N/A 11/5/2018    COLONOSCOPY with polypectomies performed by Michelle Jenkins MD at [address] [place] [address] HX ENDOSCOPY  12/11/2017    HX UROLOGICAL  2019    hydrocele removal    HX WISDOM TEETH EXTRACTION Right 05/2019    IR BX LIVER PERCUTANEOUS         Social HX:   Social History     Socioeconomic History    Marital status:      Spouse name: Not on file    Number of children: Not on file    Years of education: Not on file    Highest education level: Not on file   Occupational History    Not on file   Social Needs    Financial resource strain: Not on file    Food insecurity     Worry: Not on file     Inability: Not on file  Transportation needs     Medical: Not on file     Non-medical: Not on file   Tobacco Use    Smoking status: Former Smoker     Packs/day: 2.00     Years: 25.00     Pack years: 50.00     Types: Cigarettes     Start date: 1984     Last attempt to quit: 2017     Years since quittin.7    Smokeless tobacco: Never Used    Tobacco comment: smokes Marijuana   Substance and Sexual Activity    Alcohol use: No    Drug use: Yes     Types: Marijuana     Comment: Hx of Marijuana use    Sexual activity: Not Currently     Partners: Female   Lifestyle    Physical activity     Days per week: Not on file     Minutes per session: Not on file    Stress: Not on file   Relationships    Social connections     Talks on phone: Not on file     Gets together: Not on file     Attends Baptism service: Not on file     Active member of club or organization: Not on file     Attends meetings of clubs or organizations: Not on file     Relationship status: Not on file    Intimate partner violence     Fear of current or ex partner: Not on file     Emotionally abused: Not on file     Physically abused: Not on file     Forced sexual activity: Not on file   Other Topics Concern     Service No    Blood Transfusions No    Caffeine Concern Yes    Occupational Exposure No    Hobby Hazards No    Sleep Concern Yes     Comment: occas    Stress Concern No    Weight Concern No    Special Diet No    Back Care Yes    Exercise No    Bike Helmet Yes    Seat Belt No     Comment: occas    Self-Exams Not Asked   Social History Narrative           FHX:   Family History   Problem Relation Age of Onset    Hypertension Mother     Heart Disease Mother     Diabetes Mother     Hypertension Father     Heart Disease Father     Cancer Father         lymphnodes    Diabetes Father        Allergy:   Allergies   Allergen Reactions    Ciprofloxacin Nausea Only    Remeron [Mirtazapine] Other (comments)     Mood swings    Seroquel [Quetiapine] Other (comments)     Mood swings       Home Medications:     Medications Prior to Admission   Medication Sig    topiramate (TOPAMAX) 100 mg tablet Take one tab PO qhs  Indications: migraine prevention (Patient taking differently: 50 mg. Take one tab PO qhs  Indications: migraine prevention)    roflumilast (Daliresp) 500 mcg tab tablet Take 1 Tab by mouth daily.  oxyCODONE-acetaminophen (PERCOCET) 5-325 mg per tablet TAKE 1 TABLET BY MOUTH EVERY 12 HOURS    umeclidinium (Incruse Ellipta) 62.5 mcg/actuation inhaler 1 inhalation daily. Exhale fully before inhaling    lisinopriL (PRINIVIL, ZESTRIL) 20 mg tablet Take 1 tablet by mouth once daily    atorvastatin (LIPITOR) 20 mg tablet Take 1 tablet by mouth once daily    fluticasone propion-salmeteroL (ADVAIR/WIXELA) 500-50 mcg/dose diskus inhaler Take 1 Puff by inhalation two (2) times a day.  albuterol (PROVENTIL VENTOLIN) 2.5 mg /3 mL (0.083 %) nebu Nebulized 1 vial for inhalation every 4 hours as needed    furosemide (LASIX) 20 mg tablet TAKE 1 TABLET BY MOUTH EVERY OTHER DAY    hydrOXYzine HCl (ATARAX) 50 mg tablet Take 50 mg by mouth three (3) times daily as needed for Itching.  albuterol (PROVENTIL HFA, VENTOLIN HFA, PROAIR HFA) 90 mcg/actuation inhaler Take 2 Puffs by inhalation every four (4) hours as needed for Wheezing.  Blood Pressure Monitor (BLOOD PRESSURE KIT) kit 1 Device by Does Not Apply route daily as needed (for blood pressure checks).  OXcarbazepine (TRILEPTAL) 150 mg tablet Take 150 mg by mouth two (2) times a day.  LORazepam (ATIVAN) 1 mg tablet Take 1 Tab by mouth every eight (8) hours as needed for Anxiety. Max Daily Amount: 3 mg.  Nebulizer & Compressor machine 1 Each by Does Not Apply route every four (4) hours as needed.  OXYGEN-AIR DELIVERY SYSTEMS (WALKABOUT 1 OXYGEN SYSTEM) 2.5 L/min by Nasal route continuous.     pantoprazole (PROTONIX) 40 mg tablet Take 1 Tab by mouth Daily (before breakfast).  roflumilast (Daliresp) 500 mcg tab tablet Take 1 tablet by mouth once daily       Review of Systems:     Constitutional: No fevers, chills, weight loss, fatigue. Skin: No rashes, pruritis, jaundice, ulcerations, erythema. HENT: No headaches, nosebleeds, sinus pressure, rhinorrhea, sore throat. Eyes: No visual changes, blurred vision, eye pain, photophobia, jaundice. Cardiovascular: No chest pain, heart palpitations. Respiratory: No cough, SOB, wheezing, chest discomfort, orthopnea. Gastrointestinal:    Genitourinary: No dysuria, bleeding, discharge, pyuria. Musculoskeletal: No weakness, arthralgias, wasting. Endo: No sweats. Heme: No bruising, easy bleeding. Allergies: As noted. Neurological: Cranial nerves intact. Alert and oriented. Gait not assessed. Psychiatric:  No anxiety, depression, hallucinations. There were no vitals taken for this visit. Physical Assessment:     constitutional: appearance: well developed, well nourished, normal habitus, no deformities, in no acute distress. skin: inspection: no rashes, ulcers, icterus or other lesions; no clubbing or telangiectasias. palpation: no induration or subcutaneos nodules. eyes: inspection: normal conjunctivae and lids; no jaundice pupils: normal  ENMT: mouth: normal oral mucosa,lips and gums; good dentition. oropharynx: normal tongue, hard and soft palate; posterior pharynx without erithema, exudate or lesions. neck: thyroid: normal size, consistency and position; no masses or tenderness. respiratory: effort: normal chest excursion; no intercostal retraction or accessory muscle use. cardiovascular: abdominal aorta: normal size and position; no bruits. palpation: PMI of normal size and position; normal rhythm; no thrill or murmurs. abdominal: abdomen: normal consistency; no tenderness or masses. hernias: no hernias appreciated. liver: normal size and consistency.  spleen: not palpable. rectal: hemoccult/guaiac: not performed. musculoskeletal: digits and nails: no clubbing, cyanosis, petechiae or other inflammatory conditions. gait: normal gait and station head and neck: normal range of motion; no pain, crepitation or contracture. spine/ribs/pelvis: normal range of motion; no pain, deformity or contracture. neurologic: cranial nerves: II-XII normal.   psychiatric: judgement/insight: within normal limits. memory: within normal limits for recent and remote events. mood and affect: no evidence of depression, anxiety or agitation. orientation: oriented to time, space and person. Basic Metabolic Profile   No results for input(s): NA, K, CL, CO2, BUN, GLU, CA, MG, PHOS in the last 72 hours. No lab exists for component: CREAT      CBC w/Diff    No results for input(s): WBC, RBC, HGB, HCT, MCV, MCH, MCHC, RDW, PLT, HGBEXT, HCTEXT, PLTEXT in the last 72 hours. No lab exists for component: MPV No results for input(s): GRANS, LYMPH, EOS, PRO, MYELO, METAS, BLAST in the last 72 hours. No lab exists for component: MONO, BASO     Hepatic Function   No results for input(s): ALB, TP, TBILI, AP, AML, LPSE in the last 72 hours. No lab exists for component: DBILI, GPT, SGOT     Coags   No results for input(s): PTP, INR, APTT, INREXT in the last 72 hours. Hayes Lefort, MD  [facility] 1948, [facility]  Cell: [phone]  Direct pager: [phone]  [email or web address]  [email or web address]. Common Curriculum

## 2020-09-15 NOTE — PROGRESS NOTES
[email or web address]. [email or web address] [facility]  [facility], [address]      Brief Procedure Note    Papa Quinteros Sr.  1970  [id number]    Date of Procedure: 9/15/2020    Preoperative diagnosis: Body mass index 30+ - obesity:  278.00 - E66.9  Abdominal pain:   789.00 - R10.9  GERD:   530.81 - K21.9  Hepatomegaly:   789.1  Steatosis of liver:   571.8 - K76.0  Dysphagia:   787.20 - R13.10  Diarrhea:   787.91 - R19.7    Postoperative diagnosis: EGD: non-obstructive dysphagia  COLO: hemorrhoids    Type of Anesthesia: MAC (Monitored anesthesia care)    Description of findings: same as post op dx    Procedure: Procedure(s):  UPPER ENDOSCOPY / dilation with duodenal and esophageal bx's  SIGMOIDOSCOPY FLEXIBLE with bx's    :  Dr. Carlos A Baumann MD    Assistant(s): Endoscopy Technician-1: Severino Alegre  Endoscopy RN-1: Shavon Patricio RN  Endoscopy RN-2: Elizabeth Al RN  Float Staff: Gisel Mcneal    Devices/implants/grafts/tissues/prosthesis: None    EBL:None    Specimens:   ID Type Source Tests Collected by Time Destination   1 : Duodenal bx's Preservative Duodenum  Robbie Mak MD 9/15/2020 1134 Pathology   2 : esophageal bx's Preservative Esophagus  Robbie Mak MD 9/15/2020 1134 Pathology   3 : Sigmoid & rectal bx's Preservative Colon  Robbie Mak MD 9/15/2020 1142 Pathology       Findings: See printed and scanned procedure note    Complications: None    Dr. Carlos A Baumann MD  9/15/2020  11:52 AM

## 2020-09-15 NOTE — ANESTHESIA POSTPROCEDURE EVALUATION
Procedure(s):  UPPER ENDOSCOPY / dilation with duodenal and esophageal bx's  SIGMOIDOSCOPY FLEXIBLE with bx's. MAC    Anesthesia Post Evaluation      Multimodal analgesia: multimodal analgesia used between 6 hours prior to anesthesia start to PACU discharge  Patient location during evaluation: bedside  Patient participation: complete - patient participated  Level of consciousness: awake  Pain management: adequate  Airway patency: patent  Anesthetic complications: no  Cardiovascular status: stable  Respiratory status: acceptable  Hydration status: acceptable  Post anesthesia nausea and vomiting:  controlled      INITIAL Post-op Vital signs:   Vitals Value Taken Time   /73 9/15/2020 11:58 AM   Temp 36.9 °C (98.5 °F) 9/15/2020 11:50 AM   Pulse 72 9/15/2020 12:02 PM   Resp 21 9/15/2020 12:02 PM   SpO2 100 % 9/15/2020 12:02 PM   Vitals shown include unvalidated device data.

## 2020-09-15 NOTE — ANESTHESIA PREPROCEDURE EVALUATION
Relevant Problems   No relevant active problems       Anesthetic History          Comments: Hx aspiration pneumonia     Review of Systems / Medical History  Patient summary reviewed and pertinent labs reviewed    Pulmonary    COPD: moderate               Neuro/Psych              Cardiovascular    Hypertension          CAD      Comments: Hx Pulm aspiration  Poorly controlled GERD   GI/Hepatic/Renal     GERD: poorly controlled      Liver disease     Endo/Other        Morbid obesity     Other Findings              Physical Exam    Airway    TM Distance: 4 - 6 cm  Neck ROM: normal range of motion   Mouth opening: Normal     Cardiovascular  Regular rate and rhythm,  S1 and S2 normal,  no murmur, click, rub, or gallop             Dental    Dentition: Edentulous     Pulmonary  Breath sounds clear to auscultation               Abdominal  GI exam deferred       Other Findings            Anesthetic Plan    ASA: 3  Anesthesia type: MAC  Oral ETT   NO LMA        Induction: Intravenous  Anesthetic plan and risks discussed with: Patient      Pt reports aspiration during previous upper endo.

## 2020-09-16 NOTE — TELEPHONE ENCOUNTER
Pt's wife called([phone]). Needs refill for his Daliresp sen to [facility]. In any way

## 2020-09-20 RX ORDER — LISINOPRIL 20 MG/1
TABLET ORAL
Qty: 90 TAB | Refills: 0 | Status: SHIPPED | OUTPATIENT
Start: 2020-09-20 | End: 2021-01-15

## 2020-10-01 ENCOUNTER — TRANSCRIBE ORDER (OUTPATIENT)
Dept: SCHEDULING | Age: 50
End: 2020-10-01

## 2020-10-01 DIAGNOSIS — R10.13 ABDOMINAL PAIN, EPIGASTRIC: Primary | ICD-10-CM

## 2020-10-01 DIAGNOSIS — K86.89 PANCREATIC INSUFFICIENCY: ICD-10-CM

## 2020-10-09 RX ORDER — ALBUTEROL SULFATE 0.83 MG/ML
SOLUTION RESPIRATORY (INHALATION)
Qty: 100 EACH | Refills: 2 | Status: CANCELLED | OUTPATIENT
Start: 2020-10-09

## 2020-10-09 RX ORDER — ALBUTEROL SULFATE 0.83 MG/ML
SOLUTION RESPIRATORY (INHALATION)
Qty: 50 EACH | Refills: 2 | Status: SHIPPED | OUTPATIENT
Start: 2020-10-09 | End: 2021-07-27 | Stop reason: SDUPTHER

## 2020-10-09 NOTE — TELEPHONE ENCOUNTER
Fax received from [facility]. Req. RF on Albuterol Sol. For Nebulizer use. The order is pended to Dr. Candido Mccauley.

## 2020-10-09 NOTE — TELEPHONE ENCOUNTER
[facility] Req. RF on [facility] [employer]. Order is pended to Dr. Luis Antonio Salter.

## 2020-10-16 ENCOUNTER — TELEPHONE (OUTPATIENT)
Dept: PULMONOLOGY | Age: 50
End: 2020-10-16

## 2020-10-16 RX ORDER — FLUTICASONE PROPIONATE AND SALMETEROL 500; 50 UG/1; UG/1
1 POWDER RESPIRATORY (INHALATION) 2 TIMES DAILY
Qty: 3 INHALER | Refills: 1 | Status: SHIPPED | OUTPATIENT
Start: 2020-10-16 | End: 2021-10-06 | Stop reason: SDUPTHER

## 2020-10-26 NOTE — TELEPHONE ENCOUNTER
I called the pharmacy. The patient's insurance Medicaid is showing not covered.  I have tried to call the patient back but we can not dial out to his number

## 2020-10-26 NOTE — TELEPHONE ENCOUNTER
Pt calling today to try to get refills on his advair and incruse [facility] to [facility] [address]. He said that when he called them that he was told his insurance would not cover these meds. He also said that he went through this last month with them and that is why they were changed in the first place. Pt seems to be confused about which medications he is supposed to actually be taking. Please call [phone]. [facility] [address].

## 2020-10-28 NOTE — TELEPHONE ENCOUNTER
Patient has contacted the pharmacy and gotten his insurance on file.  I also spoke with pharmacy and they had to order the med and it will be in tomorrow

## 2020-11-04 ENCOUNTER — HOSPITAL ENCOUNTER (OUTPATIENT)
Dept: CT IMAGING | Age: 50
Discharge: HOME | End: 2020-11-04
Attending: NURSE PRACTITIONER
Payer: MEDICARE

## 2020-11-04 DIAGNOSIS — R10.13 ABDOMINAL PAIN, EPIGASTRIC: ICD-10-CM

## 2020-11-04 DIAGNOSIS — K86.89 PANCREATIC INSUFFICIENCY: ICD-10-CM

## 2020-11-04 LAB — CREAT UR-MCNC: 0.7 MG/DL (ref 0.6–1.3)

## 2020-11-04 PROCEDURE — 74170 CT ABD WO CNTRST FLWD CNTRST: CPT

## 2020-11-04 PROCEDURE — 82565 ASSAY OF CREATININE: CPT

## 2020-11-04 PROCEDURE — 74011000636 HC RX REV CODE- 636: Performed by: NURSE PRACTITIONER

## 2020-11-04 RX ADMIN — IOPAMIDOL 100 ML: 755 INJECTION, SOLUTION INTRAVENOUS at 08:39

## 2020-11-12 ENCOUNTER — OFFICE VISIT (OUTPATIENT)
Dept: CARDIOLOGY CLINIC | Age: 50
End: 2020-11-12
Payer: MEDICARE

## 2020-11-12 VITALS
SYSTOLIC BLOOD PRESSURE: 142 MMHG | WEIGHT: 208 LBS | BODY MASS INDEX: 31.52 KG/M2 | HEIGHT: 68 IN | OXYGEN SATURATION: 99 % | HEART RATE: 79 BPM | DIASTOLIC BLOOD PRESSURE: 90 MMHG

## 2020-11-12 DIAGNOSIS — I70.90 ATHEROSCLEROSIS: Primary | ICD-10-CM

## 2020-11-12 DIAGNOSIS — M79.606 PAIN OF LOWER EXTREMITY, UNSPECIFIED LATERALITY: ICD-10-CM

## 2020-11-12 DIAGNOSIS — K21.9 GASTROESOPHAGEAL REFLUX DISEASE, UNSPECIFIED WHETHER ESOPHAGITIS PRESENT: ICD-10-CM

## 2020-11-12 DIAGNOSIS — I50.32 DIASTOLIC CHF, CHRONIC (HCC): ICD-10-CM

## 2020-11-12 DIAGNOSIS — J44.9 COPD, SEVERE (HCC): ICD-10-CM

## 2020-11-12 PROCEDURE — G8510 SCR DEP NEG, NO PLAN REQD: HCPCS | Performed by: INTERNAL MEDICINE

## 2020-11-12 PROCEDURE — G8753 SYS BP > OR = 140: HCPCS | Performed by: INTERNAL MEDICINE

## 2020-11-12 PROCEDURE — 99215 OFFICE O/P EST HI 40 MIN: CPT | Performed by: INTERNAL MEDICINE

## 2020-11-12 PROCEDURE — 93000 ELECTROCARDIOGRAM COMPLETE: CPT | Performed by: INTERNAL MEDICINE

## 2020-11-12 PROCEDURE — G8755 DIAS BP > OR = 90: HCPCS | Performed by: INTERNAL MEDICINE

## 2020-11-12 PROCEDURE — G8427 DOCREV CUR MEDS BY ELIG CLIN: HCPCS | Performed by: INTERNAL MEDICINE

## 2020-11-12 PROCEDURE — G8417 CALC BMI ABV UP PARAM F/U: HCPCS | Performed by: INTERNAL MEDICINE

## 2020-11-12 RX ORDER — ONDANSETRON 4 MG/1
TABLET, ORALLY DISINTEGRATING ORAL
COMMUNITY
Start: 2020-09-15 | End: 2021-11-30 | Stop reason: ALTCHOICE

## 2020-11-12 RX ORDER — HYOSCYAMINE SULFATE 0.12 MG/1
TABLET SUBLINGUAL
COMMUNITY
Start: 2020-10-05 | End: 2021-08-27 | Stop reason: ALTCHOICE

## 2020-11-12 RX ORDER — PANCRELIPASE 36000; 180000; 114000 [USP'U]/1; [USP'U]/1; [USP'U]/1
CAPSULE, DELAYED RELEASE PELLETS ORAL
COMMUNITY
Start: 2020-10-01 | End: 2021-05-12

## 2020-11-12 NOTE — PROGRESS NOTES
History of Present Illness:  65-year-old male here for follow up. I saw him about a year ago. He had plain films, was found to have significant atherosclerosis. I treated him with statin and recommended aspirin. Over the past year his lungs have worsened slightly and he was placed on continuous oxygen by Dr. Olivia Aquino, his pulmonologist, at 2.5 liters. He was placed on prednisone earlier in the year and this was continued for about 8-9 months. He developed some reflux symptoms and abdominal bloating. He was seen by GI, had an EGD and colonoscopy. He was told to stop the aspirin. He did not have any ulcers. His prednisone has been stopped. He did have an episode of some right sided chest pain that he relates more to his COPD a few nights ago that resolved. He has chronic exertional dyspnea, chronic cough without green or yellow sputum. He has been trying to be more active and denies any claudication, but he was told by his orthopedic surgeon that he has atherosclerosis peripherally. Impression:  1. Atherosclerosis by plain film imaging. No history of coronary artery disease or MI.  2. Pharmacologic cardiac nuclear stress test November, 2019 without ischemia. 3. Suspected peripheral arterial disease with atherosclerosis. Abdominal ultrasound November, 2019 without AAA. He does have slightly cool feet, but no claudication. No recent HUNTER. 4. Severe COPD, on oxygen, followed by pulmonary. Steroids earlier this year. He is now on chronic oxygen, 2.5 liters. 5. Suspected diastolic heart failure with echocardiogram January, 2018 with mild LVH. 6. Hypertension, borderline today. 7. Previous 50 pack year history, quitting in 2017.  8. History of panic attacks and palpitations. Plan:  I discussed the risk of enteric coated aspirin as it appears he is a vasculopath, but at this point no new symptoms.   If he has more chest pain, I will plan to repeat pharmacologic cardiac nuclear stress test, but this time he feels it is more related to his COPD. There are no obvious symptoms of claudication, but he is not very active. I am going to obtain ABIs to evaluate for any significant PAD. If these are abnormal, it would be reasonable to have him see vascular for further evaluation. He continues with the current statin. All questions answered and I will see back in six months. Past Medical History:   Diagnosis Date    Anxiety     Chest pain     Chronic diastolic heart failure secondary to coronary artery disease (HCC)     Chronic lung disease     Chronic obstructive pulmonary disease (HCC) 08/03/2017    PFTS 8/3/17    COPD, severe (HCC)     Coronary artery disease     Cough     Emphysema/COPD (HCC)     Esophagitis 12/11/2017    Endoscopy    Essential hypertension, benign     Fast heart beat     Feeling of chest tightness     Frequent urination     Heartburn     Hepatomegaly     History of stomach ulcers     Hx of nausea and vomiting     Poor appetite     Positive urine drug screen 01/2016    opiates and THC    Shortness of breath     Splenomegaly     Stomach pain     Stress     Tiredness     Unintentional weight change     Upper GI bleed     Weakness     Wheeze        Current Outpatient Medications   Medication Sig Dispense Refill    hyoscyamine SL (LEVSIN/SL) 0.125 mg SL tablet       Creon 36,000-114,000- 180,000 unit cpDR capsule TAKE 2 CAPSULES BY MOUTH THREE TIMES DAILY WITH MEALS( TAKE 1 CAPSULE AFTER 2 BITES AND 1 IN THE MIDDLE OF THE MEAL )AND 1 CAPSULE WITH SNAC      ondansetron (ZOFRAN ODT) 4 mg disintegrating tablet DISSOLVE 1 TO 2 TABLETS IN MOUTH THREE TIMES DAILY AS NEEDED FOR NAUSEA      fluticasone propion-salmeteroL (ADVAIR/WIXELA) 500-50 mcg/dose diskus inhaler Take 1 Puff by inhalation two (2) times a day. 3 Inhaler 1    albuterol (PROVENTIL VENTOLIN) 2.5 mg /3 mL (0.083 %) nebu Nebulized 1 vial for inhalation every 4 hours as needed Diag.  Code J44.9, J96.10 File Under Medicare B 50 Each 2    lisinopriL (PRINIVIL, ZESTRIL) 20 mg tablet Take 1 tablet by mouth once daily 90 Tab 0    topiramate (TOPAMAX) 100 mg tablet Take one tab PO qhs  Indications: migraine prevention (Patient taking differently: 50 mg. Take one tab PO qhs  Indications: migraine prevention) 90 Tab 2    oxyCODONE-acetaminophen (PERCOCET) 5-325 mg per tablet TAKE 1 TABLET BY MOUTH EVERY 12 HOURS      umeclidinium (Incruse Ellipta) 62.5 mcg/actuation inhaler 1 inhalation daily. Exhale fully before inhaling 1 Inhaler 5    atorvastatin (LIPITOR) 20 mg tablet Take 1 tablet by mouth once daily 90 Tab 3    roflumilast (Daliresp) 500 mcg tab tablet Take 1 tablet by mouth once daily 30 Tab 5    furosemide (LASIX) 20 mg tablet TAKE 1 TABLET BY MOUTH EVERY OTHER DAY 30 Tab 6    hydrOXYzine HCl (ATARAX) 50 mg tablet Take 50 mg by mouth three (3) times daily as needed for Itching.  albuterol (PROVENTIL HFA, VENTOLIN HFA, PROAIR HFA) 90 mcg/actuation inhaler Take 2 Puffs by inhalation every four (4) hours as needed for Wheezing. 1 Inhaler 3    Blood Pressure Monitor (BLOOD PRESSURE KIT) kit 1 Device by Does Not Apply route daily as needed (for blood pressure checks). 1 Kit 0    OXcarbazepine (TRILEPTAL) 150 mg tablet Take 150 mg by mouth two (2) times a day.  LORazepam (ATIVAN) 1 mg tablet Take 1 Tab by mouth every eight (8) hours as needed for Anxiety. Max Daily Amount: 3 mg. 90 Tab 0    Nebulizer & Compressor machine 1 Each by Does Not Apply route every four (4) hours as needed. 1 Each 0    OXYGEN-AIR DELIVERY SYSTEMS (WALKABOUT 1 OXYGEN SYSTEM) 2.5 L/min by Nasal route continuous.  pantoprazole (PROTONIX) 40 mg tablet Take 1 Tab by mouth Daily (before breakfast). 30 Tab 0       Social History   reports that he quit smoking about 2 years ago. His smoking use included cigarettes. He started smoking about 36 years ago. He has a 50.00 pack-year smoking history.  He has never used smokeless tobacco.   reports no history of alcohol use. Family History  family history includes Cancer in his father; Diabetes in his father and mother; Heart Disease in his father and mother; Hypertension in his father and mother. Review of Systems  Except as stated above include:  Constitutional: Negative for fever, chills and malaise/fatigue. HEENT: No congestion or recent URI. Gastrointestinal: No nausea, vomiting, abdominal pain, bloody stools. Pulmonary:  Negative except as stated above. Cardiac:  Negative except as stated above. Musculoskeletal: Negative except as stated above. Neurological:  No localized symptoms. Skin:  Negative except as stated above. Psych:  Negative except as stated above. Endocrine:  Negative except as stated above. PHYSICAL EXAM  BP Readings from Last 3 Encounters:   11/12/20 (!) 142/90   09/15/20 113/78   08/31/20 120/80     Pulse Readings from Last 3 Encounters:   11/12/20 79   09/15/20 67   08/31/20 89     Wt Readings from Last 3 Encounters:   11/12/20 94.3 kg (208 lb)   09/15/20 90.9 kg (200 lb 6.4 oz)   08/31/20 91.6 kg (202 lb)     General:   Well developed, well groomed. Head/Neck:   No obvious jugular venous distention     No obvious carotid pulsations. No evidence of xanthelasma. Lungs:   No respiratory distress. Decreased, distant. Heart:  Regular rate and rhythm. Normal S1/S2. Palpation grossly normal.    No significant murmurs, rubs or gallops. Abdomen:   Non-acute abdomen. No obvious pulsations. Extremities:   Slightly cool feet, faint pulses. No significant edema. Neurological:   Alert and oriented to person, place, time. No focal neurological deficit visually. Skin:   No obvious rash    Blood Pressure Metric:  Monitor recommended and adjustments stated if needed.

## 2020-11-12 NOTE — PATIENT INSTRUCTIONS
Need to take aspirin 81 mg daily If you have not heard from the central scheduler to schedule your testing in 48 hours, please call [phone].

## 2020-11-12 NOTE — PROGRESS NOTES
Richard Pacheco presents today for   Chief Complaint   Patient presents with    Follow-up     1 year follow up    Chest Pain     across the chest, tightness, \"thinks it is from the COPD\"    Shortness of Breath     with exertion    O2/Oxygen     2.5L all the time       Leobardo Shell Sr. preferred language for health care discussion is english/other. Is someone accompanying this pt? no    Is the patient using any DME equipment during [address]? Oxygen    Depression Screening:  3 most recent PHQ Screens 11/12/2020   PHQ Not Done -   Little interest or pleasure in doing things Not at all   Feeling down, depressed, irritable, or hopeless Not at all   Total Score PHQ 2 0       Learning Assessment:  Learning Assessment 5/7/2019   PRIMARY LEARNER Patient   PRIMARY LANGUAGE ENGLISH   LEARNER PREFERENCE PRIMARY OTHER (COMMENT)     -     -   ANSWERED BY patient   RELATIONSHIP SELF       Abuse Screening:  Abuse Screening Questionnaire 11/12/2020   Do you ever feel afraid of your partner? N   Are you in a relationship with someone who physically or mentally threatens you? N   Is it safe for you to go home? Y       Fall Risk  Fall Risk Assessment, last 12 mths 11/12/2020   Able to walk? Yes   Fall in past 12 months? No       Pt currently taking Anticoagulant therapy? no    Coordination of Care:  1. Have you been to the ER, urgent care clinic since your last visit? Hospitalized since your last visit? no    2. Have you seen or consulted any other health care providers outside of the [address] [place] [address] since your last visit? Include any pap smears or colon screening.  no

## 2020-11-18 ENCOUNTER — HOSPITAL ENCOUNTER (OUTPATIENT)
Dept: VASCULAR SURGERY | Age: 50
Discharge: HOME | End: 2020-11-18
Attending: INTERNAL MEDICINE
Payer: MEDICARE

## 2020-11-18 PROCEDURE — 93922 UPR/L XTREMITY ART 2 LEVELS: CPT

## 2020-11-19 LAB
LEFT ABI: 1.09
LEFT ANTERIOR TIBIAL: 147 MMHG
LEFT ARM BP: 133 MMHG
LEFT POSTERIOR TIBIAL: 132 MMHG
RIGHT ABI: 1.13
RIGHT ANTERIOR TIBIAL: 152 MMHG
RIGHT ARM BP: 135 MMHG
RIGHT POSTERIOR TIBIAL: 141 MMHG

## 2020-11-20 NOTE — PROGRESS NOTES
Plan:  I discussed the risk of enteric coated aspirin as it appears he is a vasculopath, but at this point no new symptoms. If he has more chest pain, I will plan to repeat pharmacologic cardiac nuclear stress test, but this time he feels it is more related to his COPD. There are no obvious symptoms of claudication, but he is not very active. I am going to obtain ABIs to evaluate for any significant PAD. If these are abnormal, it would be reasonable to have him see vascular for further evaluation. He continues with the current statin. All questions answered and I will see back in six months.

## 2020-12-02 ENCOUNTER — TELEPHONE (OUTPATIENT)
Dept: CARDIOLOGY CLINIC | Age: 50
End: 2020-12-02

## 2020-12-02 NOTE — TELEPHONE ENCOUNTER
----- Message from Trey Griffin MD sent at 11/26/2020 10:59 AM EST -----  Pleaset let him know his vascular studies are normal, no changes for now, see back 6 months as scheduled. Thx  ----- Message -----  From: Yossi Nichole RN  Sent: 11/20/2020   9:43 AM EST  To: Trey Griffin MD    Plan:  I discussed the risk of enteric coated aspirin as it appears he is a vasculopath, but at this point no new symptoms. If he has more chest pain, I will plan to repeat pharmacologic cardiac nuclear stress test, but this time he feels it is more related to his COPD. There are no obvious symptoms of claudication, but he is not very active. I am going to obtain ABIs to evaluate for any significant PAD. If these are abnormal, it would be reasonable to have him see vascular for further evaluation. He continues with the current statin. All questions answered and I will see back in six months.

## 2020-12-02 NOTE — TELEPHONE ENCOUNTER
Called patient. Received the vm. Left a message for the patient to give our office a call to obtain results.

## 2020-12-07 ENCOUNTER — OFFICE VISIT (OUTPATIENT)
Dept: FAMILY MEDICINE CLINIC | Age: 50
End: 2020-12-07
Payer: MEDICARE

## 2020-12-07 ENCOUNTER — HOSPITAL ENCOUNTER (OUTPATIENT)
Dept: LAB | Age: 50
Discharge: HOME | End: 2020-12-07
Payer: MEDICARE

## 2020-12-07 VITALS
TEMPERATURE: 97.3 F | DIASTOLIC BLOOD PRESSURE: 81 MMHG | HEIGHT: 68 IN | RESPIRATION RATE: 18 BRPM | OXYGEN SATURATION: 91 % | BODY MASS INDEX: 31.61 KG/M2 | SYSTOLIC BLOOD PRESSURE: 123 MMHG | HEART RATE: 80 BPM | WEIGHT: 208.6 LBS

## 2020-12-07 DIAGNOSIS — I10 ESSENTIAL HYPERTENSION, BENIGN: ICD-10-CM

## 2020-12-07 DIAGNOSIS — I10 ESSENTIAL HYPERTENSION, BENIGN: Primary | ICD-10-CM

## 2020-12-07 LAB
ALBUMIN SERPL-MCNC: 4 G/DL (ref 3.4–5)
ALBUMIN/GLOB SERPL: 1.4 {RATIO} (ref 0.8–1.7)
ALP SERPL-CCNC: 127 U/L (ref 45–117)
ALT SERPL-CCNC: 25 U/L (ref 16–61)
ANION GAP SERPL CALC-SCNC: 8 MMOL/L (ref 3–18)
AST SERPL-CCNC: 12 U/L (ref 10–38)
BILIRUB SERPL-MCNC: 1.1 MG/DL (ref 0.2–1)
BUN SERPL-MCNC: 10 MG/DL (ref 7–18)
BUN/CREAT SERPL: 11 (ref 12–20)
CALCIUM SERPL-MCNC: 9.2 MG/DL (ref 8.5–10.1)
CHLORIDE SERPL-SCNC: 106 MMOL/L (ref 100–111)
CHOLEST SERPL-MCNC: 144 MG/DL
CO2 SERPL-SCNC: 27 MMOL/L (ref 21–32)
CREAT SERPL-MCNC: 0.87 MG/DL (ref 0.6–1.3)
GLOBULIN SER CALC-MCNC: 2.9 G/DL (ref 2–4)
GLUCOSE SERPL-MCNC: 87 MG/DL (ref 74–99)
HDLC SERPL-MCNC: 49 MG/DL (ref 40–60)
HDLC SERPL: 2.9 {RATIO} (ref 0–5)
LDLC SERPL CALC-MCNC: 71.2 MG/DL (ref 0–100)
LIPID PROFILE,FLP: NORMAL
POTASSIUM SERPL-SCNC: 4 MMOL/L (ref 3.5–5.5)
PROT SERPL-MCNC: 6.9 G/DL (ref 6.4–8.2)
SODIUM SERPL-SCNC: 141 MMOL/L (ref 136–145)
TRIGL SERPL-MCNC: 119 MG/DL (ref ?–150)
VLDLC SERPL CALC-MCNC: 23.8 MG/DL

## 2020-12-07 PROCEDURE — G8432 DEP SCR NOT DOC, RNG: HCPCS | Performed by: FAMILY MEDICINE

## 2020-12-07 PROCEDURE — 3017F COLORECTAL CA SCREEN DOC REV: CPT | Performed by: FAMILY MEDICINE

## 2020-12-07 PROCEDURE — 80053 COMPREHEN METABOLIC PANEL: CPT

## 2020-12-07 PROCEDURE — G0463 HOSPITAL OUTPT CLINIC VISIT: HCPCS | Performed by: FAMILY MEDICINE

## 2020-12-07 PROCEDURE — G8752 SYS BP LESS 140: HCPCS | Performed by: FAMILY MEDICINE

## 2020-12-07 PROCEDURE — 99213 OFFICE O/P EST LOW 20 MIN: CPT | Performed by: FAMILY MEDICINE

## 2020-12-07 PROCEDURE — G8754 DIAS BP LESS 90: HCPCS | Performed by: FAMILY MEDICINE

## 2020-12-07 PROCEDURE — 80061 LIPID PANEL: CPT

## 2020-12-07 PROCEDURE — 36415 COLL VENOUS BLD VENIPUNCTURE: CPT

## 2020-12-07 PROCEDURE — G8427 DOCREV CUR MEDS BY ELIG CLIN: HCPCS | Performed by: FAMILY MEDICINE

## 2020-12-07 PROCEDURE — G8417 CALC BMI ABV UP PARAM F/U: HCPCS | Performed by: FAMILY MEDICINE

## 2020-12-07 NOTE — PROGRESS NOTES
HPI:  Richard Gifford is a 45 y.o. male who presents today with   Chief Complaint   Patient presents with    Hypertension        BP : this is stable; he is on meds as listed below. He tolerates med well;     Pt has panic attacks; he is followed by Moni Gonsalez at [facility]. Was tried on citalopram but states this did not help; He separates out the timing of taking ativan and percocet. He is aware of the risk. Dr. Abhi Dawson prescribes the percocet. 3 most recent PHQ Screens 11/12/2020   PHQ Not Done -   Little interest or pleasure in doing things Not at all   Feeling down, depressed, irritable, or hopeless Not at all   Total Score PHQ 2 0               PMH,  Meds, Allergies, Family History, Social history reviewed      Current Outpatient Medications   Medication Sig Dispense Refill    hyoscyamine SL (LEVSIN/SL) 0.125 mg SL tablet       Creon 36,000-114,000- 180,000 unit cpDR capsule TAKE 2 CAPSULES BY MOUTH THREE TIMES DAILY WITH MEALS( TAKE 1 CAPSULE AFTER 2 BITES AND 1 IN THE MIDDLE OF THE MEAL )AND 1 CAPSULE WITH SNAC      ondansetron (ZOFRAN ODT) 4 mg disintegrating tablet DISSOLVE 1 TO 2 TABLETS IN MOUTH THREE TIMES DAILY AS NEEDED FOR NAUSEA      fluticasone propion-salmeteroL (ADVAIR/WIXELA) 500-50 mcg/dose diskus inhaler Take 1 Puff by inhalation two (2) times a day. 3 Inhaler 1    albuterol (PROVENTIL VENTOLIN) 2.5 mg /3 mL (0.083 %) nebu Nebulized 1 vial for inhalation every 4 hours as needed Diag. Code J44.9, J96.10  File Under Medicare B 50 Each 2    lisinopriL (PRINIVIL, ZESTRIL) 20 mg tablet Take 1 tablet by mouth once daily 90 Tab 0    topiramate (TOPAMAX) 100 mg tablet Take one tab PO qhs  Indications: migraine prevention (Patient taking differently: 50 mg.  Take one tab PO qhs  Indications: migraine prevention) 90 Tab 2    oxyCODONE-acetaminophen (PERCOCET) 5-325 mg per tablet TAKE 1 TABLET BY MOUTH EVERY 12 HOURS      umeclidinium (Incruse Ellipta) 62.5 mcg/actuation inhaler 1 inhalation daily. Exhale fully before inhaling 1 Inhaler 5    atorvastatin (LIPITOR) 20 mg tablet Take 1 tablet by mouth once daily 90 Tab 3    roflumilast (Daliresp) 500 mcg tab tablet Take 1 tablet by mouth once daily 30 Tab 5    furosemide (LASIX) 20 mg tablet TAKE 1 TABLET BY MOUTH EVERY OTHER DAY 30 Tab 6    hydrOXYzine HCl (ATARAX) 50 mg tablet Take 50 mg by mouth three (3) times daily as needed for Itching.  albuterol (PROVENTIL HFA, VENTOLIN HFA, PROAIR HFA) 90 mcg/actuation inhaler Take 2 Puffs by inhalation every four (4) hours as needed for Wheezing. 1 Inhaler 3    Blood Pressure Monitor (BLOOD PRESSURE KIT) kit 1 Device by Does Not Apply route daily as needed (for blood pressure checks). 1 Kit 0    OXcarbazepine (TRILEPTAL) 150 mg tablet Take 150 mg by mouth two (2) times a day.  LORazepam (ATIVAN) 1 mg tablet Take 1 Tab by mouth every eight (8) hours as needed for Anxiety. Max Daily Amount: 3 mg. 90 Tab 0    Nebulizer & Compressor machine 1 Each by Does Not Apply route every four (4) hours as needed. 1 Each 0    OXYGEN-AIR DELIVERY SYSTEMS (WALKABOUT 1 OXYGEN SYSTEM) 2.5 L/min by Nasal route continuous.  pantoprazole (PROTONIX) 40 mg tablet Take 1 Tab by mouth Daily (before breakfast). 30 Tab 0        Allergies   Allergen Reactions    Ciprofloxacin Nausea Only    Remeron [Mirtazapine] Other (comments)     Mood swings    Seroquel [Quetiapine] Other (comments)     Mood swings                  Review of Systems   Constitutional: Negative for chills and fever. Respiratory: Negative for cough and wheezing. Cardiovascular: Negative for chest pain and palpitations. All other systems reviewed and are negative.         Visit Vitals  /81   Pulse 80   Temp 97.3 °F (36.3 °C) (Temporal)   Resp 18   Ht 5' 8\" (1.727 m)   Wt 208 lb 9.6 oz (94.6 kg)   SpO2 91%   BMI 31.72 kg/m²     Physical Exam   Visit Vitals  /81   Pulse 80   Temp 97.3 °F (36.3 °C) (Temporal)   Resp 18   Ht 5' 8\" (1.727 m)   Wt 208 lb 9.6 oz (94.6 kg)   SpO2 91%   BMI 31.72 kg/m²     General appearance: alert, cooperative, no distress, appears stated age  Neck: supple, symmetrical, trachea midline, no adenopathy, thyroid: not enlarged, symmetric, no tenderness/mass/nodules, no carotid bruit and no JVD  Lungs: clear to auscultation bilaterally  Heart: regular rate and rhythm, S1, S2 normal, no murmur, click, rub or gallop  Extremities: extremities normal, atraumatic, no cyanosis or edema    Assessment/Plan:  Diagnoses and all orders for this visit:    1. Essential hypertension, benign  -     LIPID PANEL; Future  -     METABOLIC PANEL, COMPREHENSIVE; Future        As above,   treatment plan as listed below  Orders Placed This Encounter    LIPID PANEL    METABOLIC PANEL, COMPREHENSIVE     Follow-up and Dispositions    · Return in about 4 months (around 4/7/2021) for htn, virtual phone, virtual video. This has been fully explained to the patient, who indicates understanding. An After Visit Summary was printed and given to the patient.              Dasha Reyna MD

## 2020-12-07 NOTE — PROGRESS NOTES
Jeff Brown presents today for   Chief Complaint   Patient presents with    Hypertension     Patient is on oxygen 2.5liters   Is someone accompanying this pt? no    Is the patient using any DME equipment during [address]? no    Depression Screening:  3 most recent PHQ Screens 11/12/2020   PHQ Not Done -   Little interest or pleasure in doing things Not at all   Feeling down, depressed, irritable, or hopeless Not at all   Total Score PHQ 2 0       Learning Assessment:  Learning Assessment 5/7/2019   PRIMARY LEARNER Patient   PRIMARY LANGUAGE ENGLISH   LEARNER PREFERENCE PRIMARY OTHER (COMMENT)     -     -   ANSWERED BY patient   RELATIONSHIP SELF       Health Maintenance reviewed and discussed and ordered per Provider. Health Maintenance Due   Topic Date Due    Shingrix Vaccine Age 49> (1 of 2) 10/27/2019    Flu Vaccine (1) 09/01/2020    Lipid Screen  10/18/2020   . Coordination of Care:  1. Have you been to the ER, urgent care clinic since your last visit? Hospitalized since your last visit? no    2. Have you seen or consulted any other health care providers outside of the [address] [place] [address] since your last visit? Include any pap smears or colon screening. no      Patient states that he already had flu vaccination.

## 2020-12-07 NOTE — PATIENT INSTRUCTIONS
DASH Diet: Care Instructions  Your Care Instructions     The DASH diet is an eating plan that can help lower your blood pressure. DASH stands for Dietary Approaches to Stop Hypertension. Hypertension is high blood pressure. The DASH diet focuses on eating foods that are high in calcium, potassium, and magnesium. These nutrients can lower blood pressure. The foods that are highest in these nutrients are fruits, vegetables, low-fat dairy products, nuts, seeds, and legumes. But taking calcium, potassium, and magnesium supplements instead of eating foods that are high in those nutrients does not have the same effect. The DASH diet also includes whole grains, fish, and poultry. The DASH diet is one of several lifestyle changes your doctor may recommend to lower your high blood pressure. Your doctor may also want you to decrease the amount of sodium in your diet. Lowering sodium while following the DASH diet can lower blood pressure even further than just the DASH diet alone. Follow-up care is a key part of your treatment and safety. Be sure to make and go to all appointments, and call your doctor if you are having problems. It's also a good idea to know your test results and keep a list of the medicines you take. How can you care for yourself at home? Following the DASH diet  · Eat 4 to 5 servings of fruit each day. A serving is 1 medium-sized piece of fruit, ½ cup chopped or canned fruit, 1/4 cup dried fruit, or 4 ounces (½ cup) of fruit juice. Choose fruit more often than fruit juice. · Eat 4 to 5 servings of vegetables each day. A serving is 1 cup of lettuce or raw leafy vegetables, ½ cup of chopped or cooked vegetables, or 4 ounces (½ cup) of vegetable juice. Choose vegetables more often than vegetable juice. · Get 2 to 3 servings of low-fat and fat-free dairy each day. A serving is 8 ounces of milk, 1 cup of yogurt, or 1 ½ ounces of cheese. · Eat 6 to 8 servings of grains each day.  A serving is 1 slice of bread, 1 ounce of dry cereal, or ½ cup of cooked rice, pasta, or cooked cereal. Try to choose whole-grain products as much as possible. · Limit lean meat, poultry, and fish to 2 servings each day. A serving is 3 ounces, about the size of a deck of cards. · Eat 4 to 5 servings of nuts, seeds, and legumes (cooked dried beans, lentils, and split peas) each week. A serving is 1/3 cup of nuts, 2 tablespoons of seeds, or ½ cup of cooked beans or peas. · Limit fats and oils to 2 to 3 servings each day. A serving is 1 teaspoon of vegetable oil or 2 tablespoons of salad dressing. · Limit sweets and added sugars to 5 servings or less a week. A serving is 1 tablespoon jelly or jam, ½ cup sorbet, or 1 cup of lemonade. · Eat less than 2,300 milligrams (mg) of sodium a day. If you limit your sodium to 1,500 mg a day, you can lower your blood pressure even more. Tips for success  · Start small. Do not try to make dramatic changes to your diet all at once. You might feel that you are missing out on your favorite foods and then be more likely to not follow the plan. Make small changes, and stick with them. Once those changes become habit, add a few more changes. · Try some of the following:  ? Make it a goal to eat a fruit or vegetable at every meal and at snacks. This will make it easy to get the recommended amount of fruits and vegetables each day. ? Try yogurt topped with fruit and nuts for a snack or healthy dessert. ? Add lettuce, tomato, cucumber, and onion to sandwiches. ? Combine a ready-made pizza crust with low-fat mozzarella cheese and lots of vegetable toppings. Try using tomatoes, squash, spinach, broccoli, carrots, cauliflower, and onions. ? Have a variety of cut-up vegetables with a low-fat dip as an appetizer instead of chips and dip. ? Sprinkle sunflower seeds or chopped almonds over salads. Or try adding chopped walnuts or almonds to cooked vegetables.   ? Try some vegetarian meals using beans and peas. Add garbanzo or kidney beans to salads. Make burritos and tacos with mashed friend beans or black beans. Where can you learn more? Go to http://www.[employer].com/  Enter H967 in the search box to learn more about \"DASH Diet: Care Instructions. \"  Current as of: December 16, 2019               Content Version: 12.6  © 3649-4558 [employer]. Care instructions adapted under license by [employer] (which disclaims liability or warranty for this information). If you have questions about a medical condition or this instruction, always ask your healthcare professional. [address] any warranty or liability for your use of this information.

## 2021-01-08 ENCOUNTER — APPOINTMENT (OUTPATIENT)
Dept: GENERAL RADIOLOGY | Age: 51
End: 2021-01-08
Attending: EMERGENCY MEDICINE
Payer: MEDICARE

## 2021-01-08 ENCOUNTER — HOSPITAL ENCOUNTER (EMERGENCY)
Age: 51
Discharge: HOME | End: 2021-01-08
Attending: EMERGENCY MEDICINE
Payer: MEDICARE

## 2021-01-08 VITALS
OXYGEN SATURATION: 94 % | HEART RATE: 71 BPM | DIASTOLIC BLOOD PRESSURE: 72 MMHG | SYSTOLIC BLOOD PRESSURE: 122 MMHG | RESPIRATION RATE: 18 BRPM | TEMPERATURE: 98.1 F

## 2021-01-08 DIAGNOSIS — T50.905A MEDICATION SIDE EFFECT, INITIAL ENCOUNTER: Primary | ICD-10-CM

## 2021-01-08 LAB
ALBUMIN SERPL-MCNC: 3.6 G/DL (ref 3.4–5)
ALBUMIN/GLOB SERPL: 1.1 {RATIO} (ref 0.8–1.7)
ALP SERPL-CCNC: 121 U/L (ref 45–117)
ALT SERPL-CCNC: 27 U/L (ref 16–61)
ANION GAP SERPL CALC-SCNC: 4 MMOL/L (ref 3–18)
AST SERPL-CCNC: 12 U/L (ref 10–38)
BASOPHILS # BLD: 0 K/UL (ref 0–0.1)
BASOPHILS NFR BLD: 0 % (ref 0–2)
BILIRUB SERPL-MCNC: 1.2 MG/DL (ref 0.2–1)
BUN SERPL-MCNC: 12 MG/DL (ref 7–18)
BUN/CREAT SERPL: 13 (ref 12–20)
CALCIUM SERPL-MCNC: 9.7 MG/DL (ref 8.5–10.1)
CHLORIDE SERPL-SCNC: 103 MMOL/L (ref 100–111)
CK MB CFR SERPL CALC: 2.9 % (ref 0–4)
CK MB SERPL-MCNC: 2.1 NG/ML (ref 5–25)
CK SERPL-CCNC: 72 U/L (ref 39–308)
CO2 SERPL-SCNC: 29 MMOL/L (ref 21–32)
CREAT SERPL-MCNC: 0.9 MG/DL (ref 0.6–1.3)
DIFFERENTIAL METHOD BLD: ABNORMAL
EOSINOPHIL # BLD: 0.1 K/UL (ref 0–0.4)
EOSINOPHIL NFR BLD: 1 % (ref 0–5)
ERYTHROCYTE [DISTWIDTH] IN BLOOD BY AUTOMATED COUNT: 13.4 % (ref 11.6–14.5)
GLOBULIN SER CALC-MCNC: 3.3 G/DL (ref 2–4)
GLUCOSE SERPL-MCNC: 119 MG/DL (ref 74–99)
HCT VFR BLD AUTO: 43.8 % (ref 36–48)
HGB BLD-MCNC: 15.4 G/DL (ref 13–16)
LYMPHOCYTES # BLD: 1.1 K/UL (ref 0.9–3.6)
LYMPHOCYTES NFR BLD: 12 % (ref 21–52)
MAGNESIUM SERPL-MCNC: 2.1 MG/DL (ref 1.6–2.6)
MCH RBC QN AUTO: 29.8 PG (ref 24–34)
MCHC RBC AUTO-ENTMCNC: 35.2 G/DL (ref 31–37)
MCV RBC AUTO: 84.7 FL (ref 74–97)
MONOCYTES # BLD: 0.6 K/UL (ref 0.05–1.2)
MONOCYTES NFR BLD: 7 % (ref 3–10)
NEUTS SEG # BLD: 6.9 K/UL (ref 1.8–8)
NEUTS SEG NFR BLD: 80 % (ref 40–73)
PLATELET # BLD AUTO: 244 K/UL (ref 135–420)
PMV BLD AUTO: 10.5 FL (ref 9.2–11.8)
POTASSIUM SERPL-SCNC: 4.5 MMOL/L (ref 3.5–5.5)
PROT SERPL-MCNC: 6.9 G/DL (ref 6.4–8.2)
RBC # BLD AUTO: 5.17 M/UL (ref 4.7–5.5)
SODIUM SERPL-SCNC: 136 MMOL/L (ref 136–145)
TROPONIN I SERPL-MCNC: <0.02 NG/ML (ref 0–0.04)
WBC # BLD AUTO: 8.7 K/UL (ref 4.6–13.2)

## 2021-01-08 PROCEDURE — 82553 CREATINE MB FRACTION: CPT

## 2021-01-08 PROCEDURE — 71045 X-RAY EXAM CHEST 1 VIEW: CPT

## 2021-01-08 PROCEDURE — 80053 COMPREHEN METABOLIC PANEL: CPT

## 2021-01-08 PROCEDURE — 83735 ASSAY OF MAGNESIUM: CPT

## 2021-01-08 PROCEDURE — 70360 X-RAY EXAM OF NECK: CPT

## 2021-01-08 PROCEDURE — 99284 EMERGENCY DEPT VISIT MOD MDM: CPT

## 2021-01-08 PROCEDURE — 85025 COMPLETE CBC W/AUTO DIFF WBC: CPT

## 2021-01-08 NOTE — ED PROVIDER NOTES
Balwinder Hayes Sr. is a 45 y.o. male with past medical history of severe COPD on home O2, hypertension, CHF, CAD, and substance abuse coming in complaining of throat swelling and discomfort. Patient states that back in December he had a reaction to taking Seroquel with tremulousness and shakiness. He states that he stopped taking the Seroquel at that point, however he saw his doctor and they prescribed Seroquel for him once again. Patient states he took 400 mg of Seroquel at about 8 PM last night. States he woke up from sleep about an hour ago and felt like he had some discomfort in his oropharynx and posterior throat. States that felt like it was difficult for him to swallow. States that he drank some water which improved his symptoms. He denies any facial swelling, itching, rash, or hives. Denies any shortness of breath currently. Denies any abdominal pain or vomiting. Patient has no other complaints at this time.            Past Medical History:   Diagnosis Date    Anxiety     Chest pain     Chronic diastolic heart failure secondary to coronary artery disease (HCC)     Chronic lung disease     Chronic obstructive pulmonary disease (HCC) 08/03/2017    PFTS 8/3/17    COPD, severe (HCC)     Coronary artery disease     Cough     Emphysema/COPD (HCC)     Esophagitis 12/11/2017    Endoscopy    Essential hypertension, benign     Fast heart beat     Feeling of chest tightness     Frequent urination     Heartburn     Hepatomegaly     History of stomach ulcers     Hx of nausea and vomiting     Poor appetite     Positive urine drug screen 01/2016    opiates and THC    Shortness of breath     Splenomegaly     Stomach pain     Stress     Tiredness     Unintentional weight change     Upper GI bleed     Weakness     Wheeze        Past Surgical History:   Procedure Laterality Date    COLONOSCOPY N/A 11/5/2018    COLONOSCOPY with polypectomies performed by Jefry Zamudio MD at [facility] ENDOSCOPY    FLEXIBLE SIGMOIDOSCOPY N/A 9/15/2020    SIGMOIDOSCOPY FLEXIBLE with bx's performed by Vielka Clemons MD at [facility] ENDOSCOPY    HX ENDOSCOPY  12/11/2017    HX UROLOGICAL  2019    hydrocele removal    HX WISDOM TEETH EXTRACTION Right 05/2019    IR BX LIVER PERCUTANEOUS           Family History:   Problem Relation Age of Onset    Hypertension Mother     Heart Disease Mother     Diabetes Mother     Hypertension Father     Heart Disease Father     Cancer Father         lymphnodes    Diabetes Father        Social History     Socioeconomic History    Marital status:      Spouse name: Not on file    Number of children: Not on file    Years of education: Not on file    Highest education level: Not on file   Occupational History    Not on file   Social Needs    Financial resource strain: Not on file    Food insecurity     Worry: Not on file     Inability: Not on file    Transportation needs     Medical: Not on file     Non-medical: Not on file   Tobacco Use    Smoking status: Former Smoker     Packs/day: 2.00     Years: 25.00     Pack years: 50.00     Types: Cigarettes     Start date: 5/28/1984     Quit date: 12/18/2017     Years since quitting: 3.0    Smokeless tobacco: Never Used    Tobacco comment: smokes Marijuana   Substance and Sexual Activity    Alcohol use: No    Drug use: Yes     Types: Marijuana     Comment: Hx of Marijuana use    Sexual activity: Not Currently     Partners: Female   Lifestyle    Physical activity     Days per week: Not on file     Minutes per session: Not on file    Stress: Not on file   Relationships    Social connections     Talks on phone: Not on file     Gets together: Not on file     Attends Christian service: Not on file     Active member of club or organization: Not on file     Attends meetings of clubs or organizations: Not on file     Relationship status: Not on file    Intimate partner violence     Fear of current or ex partner: Not on file Emotionally abused: Not on file     Physically abused: Not on file     Forced sexual activity: Not on file   Other Topics Concern     Service No    Blood Transfusions No    Caffeine Concern Yes    Occupational Exposure No    Hobby Hazards No    Sleep Concern Yes     Comment: occas    Stress Concern No    Weight Concern No    Special Diet No    Back Care Yes    Exercise No    Bike Helmet Yes    Seat Belt No     Comment: occas    Self-Exams Not Asked   Social History Narrative             ALLERGIES: Ciprofloxacin, Remeron [mirtazapine], and Seroquel [quetiapine]    Review of Systems   Constitutional: Negative. Negative for chills and fever. HENT: Positive for sore throat and trouble swallowing. Negative for voice change. Respiratory: Negative. Negative for shortness of breath, wheezing and stridor. Cardiovascular: Negative. Negative for chest pain. Gastrointestinal: Negative. Negative for abdominal pain, nausea and vomiting. Musculoskeletal: Negative. Negative for myalgias. Skin: Negative. Negative for rash. Neurological: Negative. Negative for dizziness, weakness and light-headedness. All other systems reviewed and are negative. Vitals:    01/08/21 0415   BP: 122/72   Pulse: 85   Resp: 14   Temp: 98.1 °F (36.7 °C)   SpO2: 99%            Physical Exam  Vitals signs reviewed. Constitutional:       General: He is not in acute distress. Appearance: Normal appearance. He is well-developed. HENT:      Head: Normocephalic and atraumatic. Mouth/Throat:      Pharynx: Oropharynx is clear. Comments: Posterior oropharynx is clear. There is no exudate and uvula is midline. No posterior oropharyngeal edema or erythema. Phonating normally. No drooling or trismus. No induration of the floor the mouth. Handling secretions without difficulty. Eyes:      Extraocular Movements: Extraocular movements intact.       Conjunctiva/sclera: Conjunctivae normal.      Pupils: Pupils are equal, round, and reactive to light. Neck:      Musculoskeletal: Normal range of motion and neck supple. Cardiovascular:      Rate and Rhythm: Normal rate and regular rhythm. Heart sounds: S1 normal and S2 normal. No murmur. No friction rub. No gallop. Pulmonary:      Effort: Pulmonary effort is normal. No accessory muscle usage or respiratory distress. Breath sounds: Normal breath sounds. No stridor. Abdominal:      General: There is no distension. Palpations: Abdomen is soft. Abdomen is not rigid. Tenderness: There is no abdominal tenderness. Musculoskeletal: Normal range of motion. General: No tenderness. Skin:     General: Skin is warm. Findings: No rash. Neurological:      General: No focal deficit present. Mental Status: He is alert and oriented to person, place, and time. Psychiatric:         Speech: Speech normal.          MDM  Number of Diagnoses or Management Options  Diagnosis management comments: Isa Salgado is a 45 y.o. male coming in with some subjective difficulty swallowing and throat swelling/discomfort after taking Seroquel last night. No other hives, itching, erythema, objective swelling, or other evidence of allergic reaction. No stridor and handling secretions without difficulty. Will get plain films of the soft tissue neck and chest.  Vitals and exam reassuring. No objective evidence of impending airway compromise. No evidence of esophageal food impaction and patient is tolerating p.o.          Procedures        Vitals:  Patient Vitals for the past 12 hrs:   Temp Pulse Resp BP SpO2   01/08/21 0415 98.1 °F (36.7 °C) 85 14 122/72 99 %       Medications ordered:   Medications - No data to display      Lab findings:  Recent Results (from the past 12 hour(s))   CBC WITH AUTOMATED DIFF    Collection Time: 01/08/21  6:25 AM   Result Value Ref Range    WBC 8.7 4.6 - 13.2 K/uL    RBC 5.17 4.70 - 5.50 M/uL    HGB 15.4 13.0 - 16.0 g/dL    HCT 43.8 36.0 - 48.0 %    MCV 84.7 74.0 - 97.0 FL    MCH 29.8 24.0 - 34.0 PG    MCHC 35.2 31.0 - 37.0 g/dL    RDW 13.4 11.6 - 14.5 %    PLATELET 032 160 - 687 K/uL    MPV 10.5 9.2 - 11.8 FL    NEUTROPHILS 80 (H) 40 - 73 %    LYMPHOCYTES 12 (L) 21 - 52 %    MONOCYTES 7 3 - 10 %    EOSINOPHILS 1 0 - 5 %    BASOPHILS 0 0 - 2 %    ABS. NEUTROPHILS 6.9 1.8 - 8.0 K/UL    ABS. LYMPHOCYTES 1.1 0.9 - 3.6 K/UL    ABS. MONOCYTES 0.6 0.05 - 1.2 K/UL    ABS. EOSINOPHILS 0.1 0.0 - 0.4 K/UL    ABS. BASOPHILS 0.0 0.0 - 0.1 K/UL    DF AUTOMATED     METABOLIC PANEL, COMPREHENSIVE    Collection Time: 01/08/21  6:25 AM   Result Value Ref Range    Sodium 136 136 - 145 mmol/L    Potassium 4.5 3.5 - 5.5 mmol/L    Chloride 103 100 - 111 mmol/L    CO2 29 21 - 32 mmol/L    Anion gap 4 3.0 - 18 mmol/L    Glucose 119 (H) 74 - 99 mg/dL    BUN 12 7.0 - 18 MG/DL    Creatinine 0.90 0.6 - 1.3 MG/DL    BUN/Creatinine ratio 13 12 - 20      GFR est AA >60 >60 ml/min/1.73m2    GFR est non-AA >60 >60 ml/min/1.73m2    Calcium 9.7 8.5 - 10.1 MG/DL    Bilirubin, total 1.2 (H) 0.2 - 1.0 MG/DL    ALT (SGPT) 27 16 - 61 U/L    AST (SGOT) 12 10 - 38 U/L    Alk. phosphatase 121 (H) 45 - 117 U/L    Protein, total 6.9 6.4 - 8.2 g/dL    Albumin 3.6 3.4 - 5.0 g/dL    Globulin 3.3 2.0 - 4.0 g/dL    A-G Ratio 1.1 0.8 - 1.7     MAGNESIUM    Collection Time: 01/08/21  6:25 AM   Result Value Ref Range    Magnesium 2.1 1.6 - 2.6 mg/dL   CARDIAC PANEL,(CK, CKMB & TROPONIN)    Collection Time: 01/08/21  6:25 AM   Result Value Ref Range    CK - MB 2.1 <3.6 ng/ml    CK-MB Index 2.9 0.0 - 4.0 %    CK 72 39 - 308 U/L    Troponin-I, QT <0.02 0.0 - 0.045 NG/ML     X-Ray, CT or other radiology findings or impressions:  XR NECK SOFT TISSUE   Final Result   IMPRESSION:    1. No acute pathology appreciated in the soft tissues of the neck.           XR CHEST PORT    (Results Pending)       Progress notes, Consult notes or additional Procedure notes:     Reevaluation of patient:   I have reassessed the patient. Patient is feeling much better. He denies any current trouble swallowing or breathing. He has drank an entire cup full of ice water while here in the emergency department and tolerated without any regurgitation or vomiting. His plain films are reassuring. Do not feel that he needs any further emergent work-up at this time. I thoroughly discussed with the patient that he needs to not take any more of his Seroquel until he sees and discusses this with the prescribing provider. He verbalizes understanding agrees with this plan. Disposition:  Diagnosis:   1. Medication side effect, initial encounter        Disposition: Discharge    Follow-up Information     Follow up With Specialties Details Why Contact Info    Umang Powell MD Family Medicine Schedule an appointment as soon as possible for a visit  for office follow up [facility] [address]  [address] [phone]             Patient's Medications   Start Taking    No medications on file   Continue Taking    ALBUTEROL (PROVENTIL HFA, VENTOLIN HFA, PROAIR HFA) 90 MCG/ACTUATION INHALER    Take 2 Puffs by inhalation every four (4) hours as needed for Wheezing. ALBUTEROL (PROVENTIL VENTOLIN) 2.5 MG /3 ML (0.083 %) NEBU    Nebulized 1 vial for inhalation every 4 hours as needed Diag. Code J44.9, J96.10  File Under Medicare B    ATORVASTATIN (LIPITOR) 20 MG TABLET    Take 1 tablet by mouth once daily    BLOOD PRESSURE MONITOR (BLOOD PRESSURE KIT) KIT    1 Device by Does Not Apply route daily as needed (for blood pressure checks). CREON 36,000-114,000- 180,000 UNIT CPDR CAPSULE    TAKE 2 CAPSULES BY MOUTH THREE TIMES DAILY WITH MEALS( TAKE 1 CAPSULE AFTER 2 BITES AND 1 IN THE MIDDLE OF THE MEAL )AND 1 CAPSULE WITH SNAC    FLUTICASONE PROPION-SALMETEROL (ADVAIR/WIXELA) 500-50 MCG/DOSE DISKUS INHALER    Take 1 Puff by inhalation two (2) times a day.     FUROSEMIDE (LASIX) 20 MG TABLET    TAKE 1 TABLET BY MOUTH EVERY OTHER DAY    HYDROXYZINE HCL (ATARAX) 50 MG TABLET    Take 50 mg by mouth three (3) times daily as needed for Itching. HYOSCYAMINE SL (LEVSIN/SL) 0.125 MG SL TABLET        LISINOPRIL (PRINIVIL, ZESTRIL) 20 MG TABLET    Take 1 tablet by mouth once daily    LORAZEPAM (ATIVAN) 1 MG TABLET    Take 1 Tab by mouth every eight (8) hours as needed for Anxiety. Max Daily Amount: 3 mg. NEBULIZER & COMPRESSOR MACHINE    1 Each by Does Not Apply route every four (4) hours as needed. ONDANSETRON (ZOFRAN ODT) 4 MG DISINTEGRATING TABLET    DISSOLVE 1 TO 2 TABLETS IN MOUTH THREE TIMES DAILY AS NEEDED FOR NAUSEA    OXCARBAZEPINE (TRILEPTAL) 150 MG TABLET    Take 150 mg by mouth two (2) times a day. OXYCODONE-ACETAMINOPHEN (PERCOCET) 5-325 MG PER TABLET    TAKE 1 TABLET BY MOUTH EVERY 12 HOURS    OXYGEN-AIR DELIVERY SYSTEMS (WALKABOUT 1 OXYGEN SYSTEM)    2.5 L/min by Nasal route continuous. PANTOPRAZOLE (PROTONIX) 40 MG TABLET    Take 1 Tab by mouth Daily (before breakfast). ROFLUMILAST (DALIRESP) 500 MCG TAB TABLET    Take 1 tablet by mouth once daily    TOPIRAMATE (TOPAMAX) 100 MG TABLET    Take one tab PO qhs  Indications: migraine prevention    UMECLIDINIUM (INCRUSE ELLIPTA) 62.5 MCG/ACTUATION INHALER    1 inhalation daily.   Exhale fully before inhaling   These Medications have changed    No medications on file   Stop Taking    No medications on file

## 2021-01-08 NOTE — ED TRIAGE NOTES
PT arrived via EMS from home with complaint of difficulty swallowing. Pt reports previous history of same symptoms when taking Seroquel.

## 2021-01-22 RX ORDER — UMECLIDINIUM 62.5 UG/1
AEROSOL, POWDER ORAL
Qty: 1 INHALER | Refills: 5 | Status: SHIPPED | OUTPATIENT
Start: 2021-01-22 | End: 2021-07-16 | Stop reason: SDUPTHER

## 2021-02-02 ENCOUNTER — TELEPHONE (OUTPATIENT)
Dept: FAMILY MEDICINE CLINIC | Age: 51
End: 2021-02-02

## 2021-02-02 ENCOUNTER — VIRTUAL VISIT (OUTPATIENT)
Dept: FAMILY MEDICINE CLINIC | Age: 51
End: 2021-02-02
Payer: MEDICARE

## 2021-02-02 DIAGNOSIS — B37.0 THRUSH: Primary | ICD-10-CM

## 2021-02-02 DIAGNOSIS — J44.9 COPD, SEVERE (HCC): ICD-10-CM

## 2021-02-02 DIAGNOSIS — I50.32 DIASTOLIC CHF, CHRONIC (HCC): ICD-10-CM

## 2021-02-02 PROCEDURE — G8756 NO BP MEASURE DOC: HCPCS | Performed by: FAMILY MEDICINE

## 2021-02-02 PROCEDURE — G0463 HOSPITAL OUTPT CLINIC VISIT: HCPCS | Performed by: FAMILY MEDICINE

## 2021-02-02 PROCEDURE — G8417 CALC BMI ABV UP PARAM F/U: HCPCS | Performed by: FAMILY MEDICINE

## 2021-02-02 PROCEDURE — G8432 DEP SCR NOT DOC, RNG: HCPCS | Performed by: FAMILY MEDICINE

## 2021-02-02 PROCEDURE — 3017F COLORECTAL CA SCREEN DOC REV: CPT | Performed by: FAMILY MEDICINE

## 2021-02-02 PROCEDURE — G8427 DOCREV CUR MEDS BY ELIG CLIN: HCPCS | Performed by: FAMILY MEDICINE

## 2021-02-02 PROCEDURE — 99214 OFFICE O/P EST MOD 30 MIN: CPT | Performed by: FAMILY MEDICINE

## 2021-02-02 RX ORDER — NYSTATIN 100000 [USP'U]/ML
5 SUSPENSION ORAL 4 TIMES DAILY
Qty: 200 ML | Refills: 0 | Status: SHIPPED | OUTPATIENT
Start: 2021-02-02 | End: 2021-02-12

## 2021-02-02 NOTE — PATIENT INSTRUCTIONS
Candidiasis: Care Instructions Your Care Instructions Candidiasis (say \"gsq-[id number]-IP-uh-hong\") is a yeast infection. Yeast normally lives in your body. But it can cause problems if your body's defenses don't work as they should. Some medicines can increase your chance of getting a yeast infection. These include antibiotics, steroids, and cancer drugs. And some diseases like AIDS and diabetes can make you more likely to get yeast infections. There are different types of yeast infections. Johnnette Sprain is a yeast infection in the mouth. It usually occurs in people with weak immune systems. It causes white patches inside the mouth and throat. Yeast infections of the skin usually occur in skin folds where the skin stays moist. They cause red, oozing patches on your skin. Babies can get these infections under the diaper. People who often wear gloves can get them on their hands. Many women get vaginal yeast infections. They are most common when women take antibiotics. These infections can cause the vagina to itch and burn. They also cause white discharge that looks like cottage cheese. In rare cases, yeast infects the blood. This can cause serious disease. This kind of infection is treated with medicine given through a needle into a vein (IV). After you start treatment, a yeast infection usually goes away quickly. But if your immune system is weak, the infection may come back. Tell your doctor if you get yeast infections often. Follow-up care is a key part of your treatment and safety. Be sure to make and go to all appointments, and call your doctor if you are having problems. It's also a good idea to know your test results and keep a list of the medicines you take. How can you care for yourself at home? · Take your medicines exactly as prescribed. Call your doctor if you think you are having a problem with your medicine. · Use antibiotics only as directed by your doctor. · Eat yogurt with live cultures. It has bacteria called lactobacillus. It may help prevent some types of yeast infections. · Keep your skin clean and dry. Put powder on moist places. · If you are using a cream or suppository to treat a vaginal yeast infection, don't use condoms or a diaphragm. Use a different type of birth control. · Eat a healthy diet and get regular exercise. This will help keep your immune system strong. When should you call for help? Watch closely for changes in your health, and be sure to contact your doctor if: 
  · You do not get better as expected. Where can you learn more? Go to http://www.gray.com/ Enter C849 in the search box to learn more about \"Candidiasis: Care Instructions. \" Current as of: November 8, 2019               Content Version: 12.6 © 9546-5671 [employer]. Care instructions adapted under license by [employer] (which disclaims liability or warranty for this information). If you have questions about a medical condition or this instruction, always ask your healthcare professional. [address] any warranty or liability for your use of this information.

## 2021-02-02 NOTE — TELEPHONE ENCOUNTER
Attempted to leave message for patient regarding depression screening. Patient's phone is not in service.

## 2021-02-02 NOTE — PROGRESS NOTES
Luis Carrillo is a 45 y.o. male who was seen by synchronous (real-time) audio-video technology on 2/2/2021 for [address]:   Diagnoses and all orders for this visit:    1. Thrush    2. COPD, severe ([facility]    3. Diastolic CHF, chronic ([facility]  Assessment & Plan: This condition is managed by Specialist.      Other orders  -     nystatin (MYCOSTATIN) 100,000 unit/mL suspension; Take 5 mL by mouth four (4) times daily for 10 days. swish and spit    As above, thrush new   treatment plan as listed below  Orders Placed This Encounter    nystatin (MYCOSTATIN) 100,000 unit/mL suspension     Follow-up and Dispositions    · Return if symptoms worsen or fail to improve. This has been fully explained to the patient, who indicates understanding. 712  Subjective:   C/o thrush  Present X1 month  Getting worse  No swallowing difficulty;  Uses steroid inhalers for his history of COPD;    Pt has a h/o HF followed by Cardiology. Prior to Admission medications    Medication Sig Start Date End Date Taking? Authorizing Provider   nystatin (MYCOSTATIN) 100,000 unit/mL suspension Take 5 mL by mouth four (4) times daily for 10 days. swish and spit 2/2/21 2/12/21 Yes Miesha Gomez MD   furosemide (LASIX) 20 mg tablet TAKE 1 TABLET BY MOUTH EVERY OTHER DAY 2/1/21   Kan Dejesus NP   umeclidinium (Incruse Ellipta) 62.5 mcg/actuation inhaler 1 inhalation daily.   Exhale fully before inhaling 1/22/21   Cristiano Claros MD   lisinopriL (PRINIVIL, ZESTRIL) 20 mg tablet Take 1 tablet by mouth once daily 1/15/21   Miesha Gomez MD   hyoscyamine SL (LEVSIN/SL) 0.125 mg SL tablet  10/5/20   Provider, Historical   Creon 36,000-114,000- 180,000 unit cpDR capsule TAKE 2 CAPSULES BY MOUTH THREE TIMES DAILY WITH MEALS( TAKE 1 CAPSULE AFTER 2 BITES AND 1 IN THE MIDDLE OF THE MEAL )AND 1 CAPSULE WITH SNAC 10/1/20   Provider, Historical   ondansetron (ZOFRAN ODT) 4 mg disintegrating tablet DISSOLVE 1 TO 2 TABLETS IN MOUTH THREE TIMES DAILY AS NEEDED FOR NAUSEA 9/15/20   Provider, Historical   fluticasone propion-salmeteroL (ADVAIR/WIXELA) 500-50 mcg/dose diskus inhaler Take 1 Puff by inhalation two (2) times a day. 10/16/20   Arsenio Miles MD   albuterol (PROVENTIL VENTOLIN) 2.5 mg /3 mL (0.083 %) nebu Nebulized 1 vial for inhalation every 4 hours as needed Diag. Code J44.9, J96.10  File Under Medicare B 10/9/20   Kiki Severe, MD   topiramate (TOPAMAX) 100 mg tablet Take one tab PO qhs  Indications: migraine prevention  Patient taking differently: 50 mg. Take one tab PO qhs  Indications: migraine prevention 9/8/20   Kenneth Collado NP   oxyCODONE-acetaminophen (PERCOCET) 5-325 mg per tablet TAKE 1 TABLET BY MOUTH EVERY 12 HOURS 7/28/20   Provider, Historical   atorvastatin (LIPITOR) 20 mg tablet Take 1 tablet by mouth once daily 6/18/20   Michelle Torres MD   roflumilast (Daliresp) 500 mcg tab tablet Take 1 tablet by mouth once daily 5/20/20   Catrina Griffin MD   hydrOXYzine HCl (ATARAX) 50 mg tablet Take 50 mg by mouth three (3) times daily as needed for Itching. Provider, Historical   albuterol (PROVENTIL HFA, VENTOLIN HFA, PROAIR HFA) 90 mcg/actuation inhaler Take 2 Puffs by inhalation every four (4) hours as needed for Wheezing. 12/19/19   Tonie Tijerina MD   Blood Pressure Monitor (BLOOD PRESSURE KIT) kit 1 Device by Does Not Apply route daily as needed (for blood pressure checks). 4/25/19   Gonzalo Meeks NP   OXcarbazepine (TRILEPTAL) 150 mg tablet Take 150 mg by mouth two (2) times a day. Provider, Historical   LORazepam (ATIVAN) 1 mg tablet Take 1 Tab by mouth every eight (8) hours as needed for Anxiety. Max Daily Amount: 3 mg. 8/27/18   Kirk Merino MD   Nebulizer & Compressor machine 1 Each by Does Not Apply route every four (4) hours as needed.  2/28/18   Lilly Malone NP   OXYGEN-AIR DELIVERY SYSTEMS (WALKABOUT 1 OXYGEN SYSTEM) 2.5 L/min by Nasal route continuous. Provider, Historical   pantoprazole (PROTONIX) 40 mg tablet Take 1 Tab by mouth Daily (before breakfast). 1/12/18   Isai Christian MD   acetaminophen (TYLENOL) 500 mg tablet Take 500 mg by mouth every six (6) hours as needed for Pain.  2 tabs q 6 hr  Indications: Pain    Provider, Historical     Patient Active Problem List    Diagnosis Date Noted    Diastolic CHF, chronic ([facility] 02/02/2021    Panic attack 10/08/2018    Insomnia 10/08/2018    Hypovitaminosis D 10/08/2018    Overweight (BMI 25.0-29.9) 10/08/2018    Pulmonary infiltrate 03/19/2018    Essential hypertension, benign 02/21/2018    Esophagitis 02/21/2018    Sepsis ([facility] 12/18/2017    Emphysema/COPD ([facility]     COPD, severe (HCC)      Allergies   Allergen Reactions    Ciprofloxacin Nausea Only    Remeron [Mirtazapine] Other (comments)     Mood swings    Seroquel [Quetiapine] Other (comments)     Mood swings, trembles, shakiness and mild throat swelling (1/08/21)     Past Medical History:   Diagnosis Date    Anxiety     Chest pain     Chronic diastolic heart failure secondary to coronary artery disease (HCC)     Chronic lung disease     Chronic obstructive pulmonary disease ([facility] 08/03/2017    PFTS 8/3/17    COPD, severe (HCC)     Coronary artery disease     Cough     Emphysema/COPD (HCC)     Esophagitis 12/11/2017    Endoscopy    Essential hypertension, benign     Fast heart beat     Feeling of chest tightness     Frequent urination     Heartburn     Hepatomegaly     History of stomach ulcers     Hx of nausea and vomiting     Poor appetite     Positive urine drug screen 01/2016    opiates and THC    Shortness of breath     Splenomegaly     Stomach pain     Stress     Tiredness     Unintentional weight change     Upper GI bleed     Weakness     Wheeze      Past Surgical History:   Procedure Laterality Date    COLONOSCOPY N/A 11/5/2018    COLONOSCOPY with polypectomies performed by Jenn Zavala MD at [facility]COPY    FLEXIBLE SIGMOIDOSCOPY N/A 9/15/2020    SIGMOIDOSCOPY FLEXIBLE with bx's performed by Fatoumata Kingsley MD at [facility] ENDOSCOPY    HX ENDOSCOPY  12/11/2017    HX UROLOGICAL  2019    hydrocele removal    HX WISDOM TEETH EXTRACTION Right 05/2019    IR BX LIVER PERCUTANEOUS       Family History   Problem Relation Age of Onset    Hypertension Mother     Heart Disease Mother     Diabetes Mother     Hypertension Father     Heart Disease Father     Cancer Father         lymphnodes    Diabetes Father      Social History     Tobacco Use    Smoking status: Former Smoker     Packs/day: 2.00     Years: 25.00     Pack years: 50.00     Types: Cigarettes     Start date: 5/28/1984     Quit date: 12/18/2017     Years since quitting: 3.1    Smokeless tobacco: Never Used    Tobacco comment: smokes Marijuana   Substance Use Topics    Alcohol use: No       Review of Systems   Constitutional: Negative for chills and fever. Cardiovascular: Negative for chest pain and palpitations. Objective:     Patient-Reported Vitals 9/8/2020   Patient-Reported Weight 203lb   Patient-Reported Height -   Patient-Reported Pulse -   Patient-Reported SpO2 -   Patient-Reported Systolic  -   Patient-Reported Diastolic -      General: alert, cooperative, no distress   Mental  status: normal mood, behavior, speech, dress, motor activity, and thought processes, able to follow commands   HENT: NCAT   Neck: no visualized mass   Resp: no respiratory distress   Neuro: no gross deficits   Skin: no discoloration or lesions of concern on visible areas   Psychiatric: normal affect, consistent with stated mood, no evidence of hallucinations     Additional exam findings: unable to distinctively see the tongue      We discussed the expected course, resolution and complications of the diagnosis(es) in detail. Medication risks, benefits, costs, interactions, and alternatives were discussed as indicated.   I advised him to contact the office if his condition worsens, changes or fails to improve as anticipated. He expressed understanding with the diagnosis(es) and plan. Jessica Dempsey Sr., who was evaluated through a patient-initiated, synchronous (real-time) audio-video encounter, and/or his healthcare decision maker, is aware that it is a billable service, with coverage as determined by his insurance carrier. He provided verbal consent to proceed: Yes, and patient identification was verified. It was conducted pursuant to the emergency declaration under the [address] [place] [address], [address] [place] [address] authority and the [employer] General Act. A caregiver was present when appropriate. Ability to conduct physical exam was limited. I was in the office. The patient was at home.       Evelyn Clemens MD

## 2021-02-27 ENCOUNTER — TELEPHONE (OUTPATIENT)
Dept: FAMILY MEDICINE CLINIC | Age: 51
End: 2021-02-27

## 2021-02-27 NOTE — TELEPHONE ENCOUNTER
----- Message from Rachelle Borrero sent at 2/22/2021  2:14 PM EST -----  Regarding: paperwork  Pt's wife states she dropped off paperwork to be completed on 2/9. She has been trying to contact office to see if it has been completed.  [phone]

## 2021-03-09 NOTE — TELEPHONE ENCOUNTER
Pt wife came into office to check status of form dropped off to be filled out for the [employer], submitted second copy.  Please adv [phone]

## 2021-03-11 NOTE — TELEPHONE ENCOUNTER
Attempted to call and give updatein reference to patient's serious medical condition form, but was unable to leave name and number.

## 2021-03-24 ENCOUNTER — OFFICE VISIT (OUTPATIENT)
Dept: PULMONOLOGY | Age: 51
End: 2021-03-24
Payer: MEDICARE

## 2021-03-24 VITALS
HEIGHT: 68 IN | HEART RATE: 84 BPM | OXYGEN SATURATION: 98 % | WEIGHT: 213 LBS | SYSTOLIC BLOOD PRESSURE: 136 MMHG | TEMPERATURE: 98 F | RESPIRATION RATE: 20 BRPM | DIASTOLIC BLOOD PRESSURE: 88 MMHG | BODY MASS INDEX: 32.28 KG/M2

## 2021-03-24 DIAGNOSIS — J44.9 COPD, SEVERE (HCC): Primary | ICD-10-CM

## 2021-03-24 DIAGNOSIS — R06.02 SOB (SHORTNESS OF BREATH): Primary | ICD-10-CM

## 2021-03-24 DIAGNOSIS — Z99.81 HYPOXEMIA REQUIRING SUPPLEMENTAL OXYGEN: ICD-10-CM

## 2021-03-24 DIAGNOSIS — J44.9 CHRONIC OBSTRUCTIVE PULMONARY DISEASE, UNSPECIFIED COPD TYPE (HCC): ICD-10-CM

## 2021-03-24 DIAGNOSIS — R09.02 HYPOXEMIA REQUIRING SUPPLEMENTAL OXYGEN: ICD-10-CM

## 2021-03-24 DIAGNOSIS — Z87.891 HISTORY OF TOBACCO USE: ICD-10-CM

## 2021-03-24 PROCEDURE — 99214 OFFICE O/P EST MOD 30 MIN: CPT | Performed by: INTERNAL MEDICINE

## 2021-03-24 PROCEDURE — 3017F COLORECTAL CA SCREEN DOC REV: CPT | Performed by: INTERNAL MEDICINE

## 2021-03-24 PROCEDURE — G8752 SYS BP LESS 140: HCPCS | Performed by: INTERNAL MEDICINE

## 2021-03-24 PROCEDURE — G8754 DIAS BP LESS 90: HCPCS | Performed by: INTERNAL MEDICINE

## 2021-03-24 PROCEDURE — G8427 DOCREV CUR MEDS BY ELIG CLIN: HCPCS | Performed by: INTERNAL MEDICINE

## 2021-03-24 PROCEDURE — G8510 SCR DEP NEG, NO PLAN REQD: HCPCS | Performed by: INTERNAL MEDICINE

## 2021-03-24 PROCEDURE — G8417 CALC BMI ABV UP PARAM F/U: HCPCS | Performed by: INTERNAL MEDICINE

## 2021-03-24 RX ORDER — ASPIRIN 81 MG/1
TABLET ORAL DAILY
COMMUNITY

## 2021-03-24 NOTE — PROGRESS NOTES
Nichola Kawasaki presents today for No chief complaint on file. Is someone accompanying this pt? No    Is the patient using any DME equipment during OV? Oxygen    -[employer]. Depression Screening:  3 most recent PHQ Screens 3/24/2021   PHQ Not Done -   Little interest or pleasure in doing things Not at all   Feeling down, depressed, irritable, or hopeless Not at all   Total Score PHQ 2 0       Learning Assessment:  Learning Assessment 5/7/2019   PRIMARY LEARNER Patient   PRIMARY LANGUAGE ENGLISH   LEARNER PREFERENCE PRIMARY OTHER (COMMENT)     -     -   ANSWERED BY patient   RELATIONSHIP SELF       Abuse Screening:  Abuse Screening Questionnaire 12/7/2020   Do you ever feel afraid of your partner? N   Are you in a relationship with someone who physically or mentally threatens you? N   Is it safe for you to go home? Y       Fall Risk  Fall Risk Assessment, last 12 mths 11/12/2020   Able to walk? Yes   Fall in past 12 months? No         Coordination of Care:  1. Have you been to the ER, urgent care clinic since your last visit? Hospitalized since your last visit? Yes; Name: [facility] 1/8/2021. Sore throat/ reaction to Seroquil. 2. Have you seen or consulted any other health care providers outside of the [address] [place] [address] since your last visit? Include any pap smears or colon screening.  No.

## 2021-03-24 NOTE — PROGRESS NOTES
HISTORY OF PRESENT ILLNESS  Lyn Bautista is a 45 y.o. male following up for COPD. Pt with COPD on LTOT, FEV1 40% in 2017, followed by Dr. Catina Ivy. Pt symptoms are stable on Incruse/Advair/Daliresp. No interval ED visits or hospital admissions for COPD exacerbation. Pt with history of hospital admission for acute on chronic respiratory failure in 2017, not intubated. Pt quit smoking since then. Pt with history of heavy smoking of almost 2 ppd x 25 years. No note of LDCT screening for lung cancer. Pt walks over a mile daily, used O2 HS and with activity. Review of Systems   Constitutional: Negative for chills, diaphoresis, fever, malaise/fatigue and weight loss. HENT: Negative for congestion, ear discharge, ear pain, hearing loss, nosebleeds, sinus pain, sore throat and tinnitus. Eyes: Negative for blurred vision, double vision, photophobia, pain, discharge and redness. Respiratory: Negative for cough, hemoptysis, sputum production and stridor. Shortness of breath: with severe exertion. Wheezing: occasional.    Cardiovascular: Negative for chest pain, palpitations, orthopnea, claudication, leg swelling and PND. Gastrointestinal: Negative for abdominal pain, blood in stool, constipation, diarrhea, heartburn, melena, nausea and vomiting. Genitourinary: Negative for dysuria, flank pain, frequency, hematuria and urgency. Musculoskeletal: Negative for back pain, falls, joint pain, myalgias and neck pain. Skin: Negative for itching and rash. Neurological: Negative for dizziness, tingling, tremors, sensory change, speech change, focal weakness, seizures, loss of consciousness, weakness and headaches. Endo/Heme/Allergies: Negative for environmental allergies and polydipsia. Does not bruise/bleed easily. Psychiatric/Behavioral: Negative for depression, hallucinations, memory loss, substance abuse and suicidal ideas. The patient is not nervous/anxious and does not have insomnia. Past Medical History:   Diagnosis Date    Anxiety     Chest pain     Chronic diastolic heart failure secondary to coronary artery disease (HCC)     Chronic lung disease     Chronic obstructive pulmonary disease (HCC) 08/03/2017    PFTS 8/3/17    COPD, severe (HCC)     Coronary artery disease     Cough     Emphysema/COPD (HCC)     Esophagitis 12/11/2017    Endoscopy    Essential hypertension, benign     Fast heart beat     Feeling of chest tightness     Frequent urination     Heartburn     Hepatomegaly     History of stomach ulcers     Hx of nausea and vomiting     Poor appetite     Positive urine drug screen 01/2016    opiates and THC    Shortness of breath     Splenomegaly     Stomach pain     Stress     Tiredness     Unintentional weight change     Upper GI bleed     Weakness     Wheeze      Past Surgical History:   Procedure Laterality Date    COLONOSCOPY N/A 11/5/2018    COLONOSCOPY with polypectomies performed by Kings Ratliff MD at [facility] N/A 9/15/2020    SIGMOIDOSCOPY FLEXIBLE with bx's performed by Enrique Pierre MD at [address] HX ENDOSCOPY  12/11/2017    HX UROLOGICAL  2019    hydrocele removal    HX WISDOM TEETH EXTRACTION Right 05/2019    IR BX LIVER PERCUTANEOUS       Current Outpatient Medications on File Prior to Visit   Medication Sig Dispense Refill    aspirin delayed-release 81 mg tablet Take  by mouth daily.  furosemide (LASIX) 20 mg tablet TAKE 1 TABLET BY MOUTH EVERY OTHER DAY 15 Tab 6    umeclidinium (Incruse Ellipta) 62.5 mcg/actuation inhaler 1 inhalation daily.   Exhale fully before inhaling 1 Inhaler 5    lisinopriL (PRINIVIL, ZESTRIL) 20 mg tablet Take 1 tablet by mouth once daily 90 Tab 3    hyoscyamine SL (LEVSIN/SL) 0.125 mg SL tablet       ondansetron (ZOFRAN ODT) 4 mg disintegrating tablet DISSOLVE 1 TO 2 TABLETS IN MOUTH THREE TIMES DAILY AS NEEDED FOR NAUSEA      fluticasone propion-salmeteroL (ADVAIR/WIXELA) 500-50 mcg/dose diskus inhaler Take 1 Puff by inhalation two (2) times a day. 3 Inhaler 1    albuterol (PROVENTIL VENTOLIN) 2.5 mg /3 mL (0.083 %) nebu Nebulized 1 vial for inhalation every 4 hours as needed Diag. Code J44.9, J96.10  File Under Medicare B 50 Each 2    oxyCODONE-acetaminophen (PERCOCET) 5-325 mg per tablet TAKE 1 TABLET BY MOUTH EVERY 12 HOURS      atorvastatin (LIPITOR) 20 mg tablet Take 1 tablet by mouth once daily 90 Tab 3    roflumilast (Daliresp) 500 mcg tab tablet Take 1 tablet by mouth once daily 30 Tab 5    hydrOXYzine HCl (ATARAX) 50 mg tablet Take 50 mg by mouth three (3) times daily as needed for Itching.  albuterol (PROVENTIL HFA, VENTOLIN HFA, PROAIR HFA) 90 mcg/actuation inhaler Take 2 Puffs by inhalation every four (4) hours as needed for Wheezing. 1 Inhaler 3    Blood Pressure Monitor (BLOOD PRESSURE KIT) kit 1 Device by Does Not Apply route daily as needed (for blood pressure checks). 1 Kit 0    OXcarbazepine (TRILEPTAL) 150 mg tablet Take 150 mg by mouth two (2) times a day.  LORazepam (ATIVAN) 1 mg tablet Take 1 Tab by mouth every eight (8) hours as needed for Anxiety. Max Daily Amount: 3 mg. 90 Tab 0    Nebulizer & Compressor machine 1 Each by Does Not Apply route every four (4) hours as needed. 1 Each 0    OXYGEN-AIR DELIVERY SYSTEMS (WALKABOUT 1 OXYGEN SYSTEM) 2.5 L/min by Nasal route continuous.  pantoprazole (PROTONIX) 40 mg tablet Take 1 Tab by mouth Daily (before breakfast). 30 Tab 0    Creon 36,000-114,000- 180,000 unit cpDR capsule TAKE 2 CAPSULES BY MOUTH THREE TIMES DAILY WITH MEALS( TAKE 1 CAPSULE AFTER 2 BITES AND 1 IN THE MIDDLE OF THE MEAL )AND 1 CAPSULE WITH SNAC      topiramate (TOPAMAX) 100 mg tablet Take one tab PO qhs  Indications: migraine prevention (Patient taking differently: 50 mg.  Take one tab PO qhs  Indications: migraine prevention) 90 Tab 2    [DISCONTINUED] acetaminophen (TYLENOL) 500 mg tablet Take 500 mg by mouth every six (6) hours as needed for Pain. 2 tabs q 6 hr  Indications: Pain       No current facility-administered medications on file prior to visit.       Allergies   Allergen Reactions    Ciprofloxacin Nausea Only    Remeron [Mirtazapine] Other (comments)     Mood swings    Seroquel [Quetiapine] Other (comments)     Mood swings, trembles, shakiness and mild throat swelling (1/08/21)     Family History   Problem Relation Age of Onset    Hypertension Mother     Heart Disease Mother     Diabetes Mother     Hypertension Father     Heart Disease Father     Cancer Father         lymphnodes    Diabetes Father      Social History     Socioeconomic History    Marital status:      Spouse name: Not on file    Number of children: Not on file    Years of education: Not on file    Highest education level: Not on file   Occupational History    Not on file   Social Needs    Financial resource strain: Not on file    Food insecurity     Worry: Not on file     Inability: Not on file    Transportation needs     Medical: Not on file     Non-medical: Not on file   Tobacco Use    Smoking status: Former Smoker     Packs/day: 2.50     Years: 25.00     Pack years: 62.50     Types: Cigarettes     Start date: 5/28/1984     Quit date: 12/18/2017     Years since quitting: 3.2    Smokeless tobacco: Never Used    Tobacco comment: smokes Marijuana   Substance and Sexual Activity    Alcohol use: No    Drug use: Yes     Types: Marijuana     Comment: Hx of Marijuana use    Sexual activity: Not Currently     Partners: Female   Lifestyle    Physical activity     Days per week: Not on file     Minutes per session: Not on file    Stress: Not on file   Relationships    Social connections     Talks on phone: Not on file     Gets together: Not on file     Attends [employer] service: Not on file     Active member of club or organization: Not on file     Attends meetings of clubs or organizations: Not on file Relationship status: Not on file    Intimate partner violence     Fear of current or ex partner: Not on file     Emotionally abused: Not on file     Physically abused: Not on file     Forced sexual activity: Not on file   Other Topics Concern     Service No    Blood Transfusions No    Caffeine Concern Yes    Occupational Exposure No    Hobby Hazards No    Sleep Concern Yes     Comment: occas    Stress Concern No    Weight Concern No    Special Diet No    Back Care Yes    Exercise No    Bike Helmet Yes    Seat Belt No     Comment: occas    Self-Exams Not Asked   Social History Narrative         Blood pressure 136/88, pulse 84, temperature 98 °F (36.7 °C), temperature source Oral, resp. rate 20, height 5' 8\" (1.727 m), weight 96.6 kg (213 lb), SpO2 98 %. Physical Exam  Constitutional:       General: He is not in acute distress. Appearance: Normal appearance. He is obese. He is not ill-appearing, toxic-appearing or diaphoretic. HENT:      Head: Normocephalic and atraumatic. Right Ear: External ear normal.      Left Ear: External ear normal.      Nose:      Comments: Deferred      Mouth/Throat:      Comments: Deferred   Eyes:      General: No scleral icterus. Right eye: No discharge. Left eye: No discharge. Extraocular Movements: Extraocular movements intact. Conjunctiva/sclera: Conjunctivae normal.      Pupils: Pupils are equal, round, and reactive to light. Neck:      Musculoskeletal: No neck rigidity or muscular tenderness. Vascular: No carotid bruit. Cardiovascular:      Rate and Rhythm: Normal rate and regular rhythm. Pulses: Normal pulses. Heart sounds: Normal heart sounds. No murmur. No friction rub. No gallop. Pulmonary:      Effort: Pulmonary effort is normal. No respiratory distress. Breath sounds: Normal breath sounds. No stridor. No wheezing, rhonchi or rales.    Chest:      Chest wall: No tenderness. Abdominal:      Palpations: Abdomen is soft. There is no mass. Tenderness: There is no abdominal tenderness. Musculoskeletal:         General: No swelling, tenderness, deformity or signs of injury. Right lower leg: No edema. Left lower leg: No edema. Lymphadenopathy:      Cervical: No cervical adenopathy. Skin:     General: Skin is warm and dry. Coloration: Skin is not jaundiced or pale. Findings: No erythema, lesion or rash. Bruising: L>R UE bruising in various stages. Neurological:      General: No focal deficit present. Mental Status: He is alert and oriented to person, place, and time. Coordination: Coordination normal.   Psychiatric:         Mood and Affect: Mood normal.         Behavior: Behavior normal.         Thought Content: Thought content normal.         Judgment: Judgment normal.         ASSESSMENT and PLAN  Encounter Diagnoses   Name Primary?  COPD, severe ([address] Yes    Hypoxemia requiring supplemental oxygen     History of tobacco use        Stable symptoms on LABA/LAMA/ICS and Daliresp. Continue above. Pt will continue to benefit from supplemental O2. Pt does not meet all criteria for LDCT, will continue to address this issue on future visits. Emphasized importance of tobacco abstinence. RTC 6 months  Spirometry on return visit  Pt scheduled for 2nd dose of COVID shot. Disability form filled.

## 2021-03-24 NOTE — PROGRESS NOTES
Order placed for COVID-19 test, per Verbal Order from Dr. Andreia Arizmendi on 3/24/2021. Last office visit: 3/24/2021  Follow up Visit: 9/24/2021 (PFT)    Provider is aware of last office visit and follow up. No further action requested from provider.

## 2021-04-08 ENCOUNTER — TELEPHONE (OUTPATIENT)
Dept: FAMILY MEDICINE CLINIC | Age: 51
End: 2021-04-08

## 2021-04-12 RX ORDER — ATORVASTATIN CALCIUM 20 MG/1
TABLET, FILM COATED ORAL
Qty: 90 TAB | Refills: 0 | Status: SHIPPED | OUTPATIENT
Start: 2021-04-12 | End: 2021-08-30

## 2021-04-21 ENCOUNTER — VIRTUAL VISIT (OUTPATIENT)
Dept: FAMILY MEDICINE CLINIC | Age: 51
End: 2021-04-21
Payer: MEDICARE

## 2021-04-21 DIAGNOSIS — R73.09 ELEVATED GLUCOSE: ICD-10-CM

## 2021-04-21 DIAGNOSIS — I10 ESSENTIAL HYPERTENSION, BENIGN: Primary | ICD-10-CM

## 2021-04-21 DIAGNOSIS — E78.00 HYPERCHOLESTEREMIA: ICD-10-CM

## 2021-04-21 PROCEDURE — G8417 CALC BMI ABV UP PARAM F/U: HCPCS | Performed by: FAMILY MEDICINE

## 2021-04-21 PROCEDURE — G8427 DOCREV CUR MEDS BY ELIG CLIN: HCPCS | Performed by: FAMILY MEDICINE

## 2021-04-21 PROCEDURE — 3017F COLORECTAL CA SCREEN DOC REV: CPT | Performed by: FAMILY MEDICINE

## 2021-04-21 PROCEDURE — G8432 DEP SCR NOT DOC, RNG: HCPCS | Performed by: FAMILY MEDICINE

## 2021-04-21 PROCEDURE — G0463 HOSPITAL OUTPT CLINIC VISIT: HCPCS | Performed by: FAMILY MEDICINE

## 2021-04-21 PROCEDURE — G8756 NO BP MEASURE DOC: HCPCS | Performed by: FAMILY MEDICINE

## 2021-04-21 PROCEDURE — 99214 OFFICE O/P EST MOD 30 MIN: CPT | Performed by: FAMILY MEDICINE

## 2021-04-21 NOTE — PROGRESS NOTES
Blu Baptiste is a 45 y.o. male, evaluated via audio- video  technology on 4/21/2021 for Hypertension and Cholesterol Problem  . Assessment & Plan:   Diagnoses and all orders for this visit:    1. Essential hypertension, benign    2. Hypercholesteremia  -     LIPID PANEL; Future  -     METABOLIC PANEL, COMPREHENSIVE; Future    3. Elevated glucose  -     HEMOGLOBIN A1C WITH EAG; Future      As above  Stable  Labs as ordered  We will fill out the main [employer] form per patient's request  Follow-up and Dispositions    · Return in about 4 months (around 8/21/2021) for prediabetes, htn, Chol. This has been fully explained to the patient, who indicates understanding. An After Visit Summary was printed and given to the patient. 12  Subjective:   Patient is here to follow-up on his blood pressure and cholesterol. Patient also has COPD/emphysema. He has home O2. He needs a form for [employer] to be filled out. Has had recent nosebleeds. Pt has HTN;  he is on medications as listed below. His home blood pressure reading is stable. He is due for blood work    States 1 needs to [place]" with his telephone number for the number to dial out. Prior to Admission medications    Medication Sig Start Date End Date Taking? Authorizing Provider   atorvastatin (LIPITOR) 20 mg tablet Take 1 tablet by mouth once daily 4/12/21  Yes Devyn Leos MD   aspirin delayed-release 81 mg tablet Take  by mouth daily. Yes Provider, Historical   furosemide (LASIX) 20 mg tablet TAKE 1 TABLET BY MOUTH EVERY OTHER DAY 2/1/21  Yes Carmelita Rabago P, NP   umeclidinium (Incruse Ellipta) 62.5 mcg/actuation inhaler 1 inhalation daily.   Exhale fully before inhaling 1/22/21  Yes Pepe Hernandez MD   lisinopriL (PRINIVIL, ZESTRIL) 20 mg tablet Take 1 tablet by mouth once daily 1/15/21  Yes Aidee Crenshaw MD   hyoscyamine SL (LEVSIN/SL) 0.125 mg SL tablet  10/5/20  Yes Provider, Historical   ondansetron (ZOFRAN ODT) 4 mg disintegrating tablet DISSOLVE 1 TO 2 TABLETS IN MOUTH THREE TIMES DAILY AS NEEDED FOR NAUSEA 9/15/20  Yes Provider, Historical   fluticasone propion-salmeteroL (ADVAIR/WIXELA) 500-50 mcg/dose diskus inhaler Take 1 Puff by inhalation two (2) times a day. 10/16/20  Yes Lindy Morales MD   albuterol (PROVENTIL VENTOLIN) 2.5 mg /3 mL (0.083 %) nebu Nebulized 1 vial for inhalation every 4 hours as needed Diag. Code J44.9, J96.10  File Under Medicare B 10/9/20  Yes Sarbjit Dumont MD   topiramate (TOPAMAX) 100 mg tablet Take one tab PO qhs  Indications: migraine prevention  Patient taking differently: 50 mg. Take one tab PO qhs  Indications: migraine prevention 9/8/20  Yes Sejal Santana NP   oxyCODONE-acetaminophen (PERCOCET) 5-325 mg per tablet TAKE 1 TABLET BY MOUTH EVERY 12 HOURS 7/28/20  Yes Provider, Historical   roflumilast (Daliresp) 500 mcg tab tablet Take 1 tablet by mouth once daily 5/20/20  Yes Belinda Rene MD   hydrOXYzine HCl (ATARAX) 50 mg tablet Take 50 mg by mouth three (3) times daily as needed for Itching. Yes Provider, Historical   albuterol (PROVENTIL HFA, VENTOLIN HFA, PROAIR HFA) 90 mcg/actuation inhaler Take 2 Puffs by inhalation every four (4) hours as needed for Wheezing. 12/19/19  Yes Raphael Chacko MD   OXcarbazepine (TRILEPTAL) 150 mg tablet Take 150 mg by mouth two (2) times a day. Yes Provider, Historical   LORazepam (ATIVAN) 1 mg tablet Take 1 Tab by mouth every eight (8) hours as needed for Anxiety. Max Daily Amount: 3 mg. 8/27/18  Yes Juanis Dlaey MD   OXYGEN-AIR DELIVERY SYSTEMS (WALKABOUT 1 OXYGEN SYSTEM) 2.5 L/min by Nasal route continuous. Yes Provider, Historical   pantoprazole (PROTONIX) 40 mg tablet Take 1 Tab by mouth Daily (before breakfast).  1/12/18  Yes Rehan Talbert MD   Creon 36,000-114,000- 180,000 unit cpDR capsule TAKE 2 CAPSULES BY MOUTH THREE TIMES DAILY WITH MEALS( TAKE 1 CAPSULE AFTER 2 BITES AND 1 IN THE MIDDLE OF THE MEAL )AND 1 CAPSULE WITH SNAC 10/1/20   Provider, Historical   Blood Pressure Monitor (BLOOD PRESSURE KIT) kit 1 Device by Does Not Apply route daily as needed (for blood pressure checks). 4/25/19   Ken Hansen NP   Nebulizer & Compressor machine 1 Each by Does Not Apply route every four (4) hours as needed. 2/28/18   Mercy Ugarte NP   acetaminophen (TYLENOL) 500 mg tablet Take 500 mg by mouth every six (6) hours as needed for Pain.  2 tabs q 6 hr  Indications: Pain    Provider, Historical     Patient Active Problem List    Diagnosis Date Noted    Diastolic CHF, chronic ([facility] [address] 02/02/2021    Panic attack 10/08/2018    Insomnia 10/08/2018    Hypovitaminosis D 10/08/2018    Overweight (BMI 25.0-29.9) 10/08/2018    Pulmonary infiltrate 03/19/2018    Essential hypertension, benign 02/21/2018    Esophagitis 02/21/2018    Sepsis ([facility] [facility] 12/18/2017    Emphysema/COPD ([facility] [facility]     COPD, severe (HCC)      Allergies   Allergen Reactions    Ciprofloxacin Nausea Only    Remeron [Mirtazapine] Other (comments)     Mood swings    Seroquel [Quetiapine] Other (comments)     Mood swings, trembles, shakiness and mild throat swelling (1/08/21)     Past Medical History:   Diagnosis Date    Anxiety     Chest pain     Chronic diastolic heart failure secondary to coronary artery disease (HCC)     Chronic lung disease     Chronic obstructive pulmonary disease ([facility] [address] 08/03/2017    PFTS 8/3/17    COPD, severe (HCC)     Coronary artery disease     Cough     Emphysema/COPD (HCC)     Esophagitis 12/11/2017    Endoscopy    Essential hypertension, benign     Fast heart beat     Feeling of chest tightness     Frequent urination     Heartburn     Hepatomegaly     History of stomach ulcers     Hx of nausea and vomiting     Poor appetite     Positive urine drug screen 01/2016    opiates and THC    Shortness of breath     Splenomegaly     Stomach pain     Stress     Tiredness     Unintentional weight change     Upper GI bleed     Weakness     Wheeze      Past Surgical History:   Procedure Laterality Date    COLONOSCOPY N/A 11/5/2018    COLONOSCOPY with polypectomies performed by Marychuy Alvarez MD at [address] N/A 9/15/2020    SIGMOIDOSCOPY FLEXIBLE with bx's performed by Philip Magallon MD at [facility] ENDOSCOPY    HX ENDOSCOPY  12/11/2017    HX UROLOGICAL  2019    hydrocele removal    HX WISDOM TEETH EXTRACTION Right 05/2019    IR BX LIVER PERCUTANEOUS       Family History   Problem Relation Age of Onset    Hypertension Mother     Heart Disease Mother     Diabetes Mother     Hypertension Father     Heart Disease Father     Cancer Father         lymphnodes    Diabetes Father      Social History     Tobacco Use    Smoking status: Former Smoker     Packs/day: 2.50     Years: 25.00     Pack years: 62.50     Types: Cigarettes     Start date: 5/28/1984     Quit date: 12/18/2017     Years since quitting: 3.3    Smokeless tobacco: Never Used    Tobacco comment: smokes Marijuana   Substance Use Topics    Alcohol use: No       Lab Results   Component Value Date/Time    Cholesterol, total 138 04/22/2021 08:19 AM    HDL Cholesterol 49 04/22/2021 08:19 AM    LDL, calculated 64.6 04/22/2021 08:19 AM    VLDL, calculated 24.4 04/22/2021 08:19 AM    Triglyceride 122 04/22/2021 08:19 AM    CHOL/HDL Ratio 2.8 04/22/2021 08:19 AM     Lab Results   Component Value Date/Time    Sodium 142 04/22/2021 08:19 AM    Potassium 4.2 04/22/2021 08:19 AM    Chloride 107 04/22/2021 08:19 AM    CO2 27 04/22/2021 08:19 AM    Anion gap 8 04/22/2021 08:19 AM    Glucose 128 (H) 04/22/2021 08:19 AM    BUN 12 04/22/2021 08:19 AM    Creatinine 0.93 04/22/2021 08:19 AM    BUN/Creatinine ratio 13 04/22/2021 08:19 AM    GFR est AA >60 04/22/2021 08:19 AM    GFR est non-AA >60 04/22/2021 08:19 AM    Calcium 9.3 04/22/2021 08:19 AM       ROS per HPI    Patient-Reported Vitals 4/21/2021   Patient-Reported Weight - Patient-Reported Height -   Patient-Reported Pulse -   Patient-Reported SpO2 -   Patient-Reported Systolic  473   Patient-Reported Diastolic [address], who was evaluated through a patient-initiated, synchronous (real-time) audio- video   encounter, and/or her healthcare decision maker, is aware that it is a billable service, with coverage as determined by his insurance carrier. He provided verbal consent to proceed: Yes. He has not had a related appointment within my department in the past 7 days or scheduled within the next 24 hours.         Gunnar Lux MD

## 2021-04-22 ENCOUNTER — APPOINTMENT (OUTPATIENT)
Dept: FAMILY MEDICINE CLINIC | Age: 51
End: 2021-04-22

## 2021-04-22 ENCOUNTER — HOSPITAL ENCOUNTER (OUTPATIENT)
Dept: LAB | Age: 51
Discharge: HOME | End: 2021-04-22
Payer: MEDICARE

## 2021-04-22 DIAGNOSIS — E78.00 HYPERCHOLESTEREMIA: ICD-10-CM

## 2021-04-22 DIAGNOSIS — R73.09 ELEVATED GLUCOSE: ICD-10-CM

## 2021-04-22 LAB
ALBUMIN SERPL-MCNC: 3.9 G/DL (ref 3.4–5)
ALBUMIN/GLOB SERPL: 1.5 {RATIO} (ref 0.8–1.7)
ALP SERPL-CCNC: 112 U/L (ref 45–117)
ALT SERPL-CCNC: 29 U/L (ref 16–61)
ANION GAP SERPL CALC-SCNC: 8 MMOL/L (ref 3–18)
AST SERPL-CCNC: 14 U/L (ref 10–38)
BILIRUB SERPL-MCNC: 0.8 MG/DL (ref 0.2–1)
BUN SERPL-MCNC: 12 MG/DL (ref 7–18)
BUN/CREAT SERPL: 13 (ref 12–20)
CALCIUM SERPL-MCNC: 9.3 MG/DL (ref 8.5–10.1)
CHLORIDE SERPL-SCNC: 107 MMOL/L (ref 100–111)
CHOLEST SERPL-MCNC: 138 MG/DL
CO2 SERPL-SCNC: 27 MMOL/L (ref 21–32)
CREAT SERPL-MCNC: 0.93 MG/DL (ref 0.6–1.3)
EST. AVERAGE GLUCOSE BLD GHB EST-MCNC: 103 MG/DL
GLOBULIN SER CALC-MCNC: 2.6 G/DL (ref 2–4)
GLUCOSE SERPL-MCNC: 128 MG/DL (ref 74–99)
HBA1C MFR BLD: 5.2 % (ref 4.2–5.6)
HDLC SERPL-MCNC: 49 MG/DL (ref 40–60)
HDLC SERPL: 2.8 {RATIO} (ref 0–5)
LDLC SERPL CALC-MCNC: 64.6 MG/DL (ref 0–100)
LIPID PROFILE,FLP: NORMAL
POTASSIUM SERPL-SCNC: 4.2 MMOL/L (ref 3.5–5.5)
PROT SERPL-MCNC: 6.5 G/DL (ref 6.4–8.2)
SODIUM SERPL-SCNC: 142 MMOL/L (ref 136–145)
TRIGL SERPL-MCNC: 122 MG/DL (ref ?–150)
VLDLC SERPL CALC-MCNC: 24.4 MG/DL

## 2021-04-22 PROCEDURE — 36415 COLL VENOUS BLD VENIPUNCTURE: CPT

## 2021-04-22 PROCEDURE — 83036 HEMOGLOBIN GLYCOSYLATED A1C: CPT

## 2021-04-22 PROCEDURE — 80053 COMPREHEN METABOLIC PANEL: CPT

## 2021-04-22 PROCEDURE — 80061 LIPID PANEL: CPT

## 2021-04-25 NOTE — PATIENT INSTRUCTIONS

## 2021-05-12 ENCOUNTER — OFFICE VISIT (OUTPATIENT)
Dept: CARDIOLOGY CLINIC | Age: 51
End: 2021-05-12
Payer: MEDICARE

## 2021-05-12 VITALS
HEART RATE: 80 BPM | BODY MASS INDEX: 32.13 KG/M2 | SYSTOLIC BLOOD PRESSURE: 130 MMHG | DIASTOLIC BLOOD PRESSURE: 78 MMHG | WEIGHT: 212 LBS | OXYGEN SATURATION: 97 % | HEIGHT: 68 IN

## 2021-05-12 DIAGNOSIS — M79.606 PAIN OF LOWER EXTREMITY, UNSPECIFIED LATERALITY: ICD-10-CM

## 2021-05-12 DIAGNOSIS — I50.32 DIASTOLIC CHF, CHRONIC (HCC): ICD-10-CM

## 2021-05-12 DIAGNOSIS — I70.90 ATHEROSCLEROSIS: Primary | ICD-10-CM

## 2021-05-12 DIAGNOSIS — J44.9 COPD, SEVERE (HCC): ICD-10-CM

## 2021-05-12 DIAGNOSIS — K21.9 GASTROESOPHAGEAL REFLUX DISEASE, UNSPECIFIED WHETHER ESOPHAGITIS PRESENT: ICD-10-CM

## 2021-05-12 PROCEDURE — 93000 ELECTROCARDIOGRAM COMPLETE: CPT | Performed by: INTERNAL MEDICINE

## 2021-05-12 PROCEDURE — 3017F COLORECTAL CA SCREEN DOC REV: CPT | Performed by: INTERNAL MEDICINE

## 2021-05-12 PROCEDURE — G8417 CALC BMI ABV UP PARAM F/U: HCPCS | Performed by: INTERNAL MEDICINE

## 2021-05-12 PROCEDURE — G8427 DOCREV CUR MEDS BY ELIG CLIN: HCPCS | Performed by: INTERNAL MEDICINE

## 2021-05-12 PROCEDURE — G8752 SYS BP LESS 140: HCPCS | Performed by: INTERNAL MEDICINE

## 2021-05-12 PROCEDURE — G8754 DIAS BP LESS 90: HCPCS | Performed by: INTERNAL MEDICINE

## 2021-05-12 PROCEDURE — G8510 SCR DEP NEG, NO PLAN REQD: HCPCS | Performed by: INTERNAL MEDICINE

## 2021-05-12 PROCEDURE — 99214 OFFICE O/P EST MOD 30 MIN: CPT | Performed by: INTERNAL MEDICINE

## 2021-05-12 NOTE — PROGRESS NOTES
Kanu Atwood presents today for   Chief Complaint   Patient presents with    Follow-up     6 month follow up       Kanu Atwood. preferred language for health care discussion is english/other. Is someone accompanying this pt? no    Is the patient using any DME equipment during [address]? On 2.5L of O2 all the time    Depression Screening:  3 most recent PHQ Screens 5/12/2021   PHQ Not Done -   Little interest or pleasure in doing things Not at all   Feeling down, depressed, irritable, or hopeless Not at all   Total Score PHQ 2 0       Learning Assessment:  Learning Assessment 5/12/2021   PRIMARY LEARNER Patient   PRIMARY LANGUAGE ENGLISH   LEARNER PREFERENCE PRIMARY DEMONSTRATION     -     -   ANSWERED BY patient   RELATIONSHIP SELF       Abuse Screening:  Abuse Screening Questionnaire 5/12/2021   Do you ever feel afraid of your partner? N   Are you in a relationship with someone who physically or mentally threatens you? N   Is it safe for you to go home? Y       Fall Risk  Fall Risk Assessment, last 12 mths 11/12/2020   Able to walk? Yes   Fall in past 12 months? No           Pt currently taking Anticoagulant therapy? no    Pt currently taking Antiplatelet therapy ? ASA 81 mg once a day      Coordination of Care:  1. Have you been to the ER, urgent care clinic since your last visit? Hospitalized since your last visit? no    2. Have you seen or consulted any other health care providers outside of the [address] [place] [address] since your last visit? Include any pap smears or colon screening.  no

## 2021-05-12 NOTE — PROGRESS NOTES
History of Present Illness:  45year old male here for follow up. Overall he has been doing quite well the past six months. Denies any new chest pain. He has chronic dyspnea, but actually this is rather stable and he is able to walk a couple miles and give himself an oxygen holiday with SPO2 down to 90s. 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 Emmanuel Mckeon. Absolutely no chest pain. He has a mild chronic cough. He also has some mild edema at times and ABIs from last November were reviewed that were unremarkable. Impression:  1. History of atherosclerosis by plain film imaging without history of coronary artery disease or previous MI, with nuclear stress test November, 2019 without ischemia. 2. Abdominal ultrasound November, 2019 without AAA, and despite having cool feet at times, he had ABIs November, 2020 without obvious PAD. 3. Severe COPD, on home oxygen 2.5 liters, followed now by Dr. Emmanuel Mckeon. 4. Suspected mild diastolic heart failure with echo January, 2018 with mild LVH, on Lasix every other day. 5. History of hypertension, controlled today. 6. History of previous tobacco use, 50 pack year history, quitting in 2017.  7. History of previous panic attacks and palpitations. Plan:  He appears to be doing well from a cardiac standpoint. His fluid status is stable. No chest pain. He does have atherosclerosis by previous plain film imaging with last stress test November, 2019 without ischemia. He does have somewhat reduced functional capacity,  But no new symptoms. My plan is to see back in a year and consider repeat stress testing at that point unless he develops symptoms prior to then. All questions answered.       Past Medical History:   Diagnosis Date    Anxiety     Chest pain     Chronic diastolic heart failure secondary to coronary artery disease (HCC)     Chronic lung disease     Chronic obstructive pulmonary disease ([facility] [address] 08/03/2017    PFTS 8/3/17  COPD, severe ([facility] [address]     Coronary artery disease     Cough     Emphysema/COPD (HCC)     Esophagitis 12/11/2017    Endoscopy    Essential hypertension, benign     Fast heart beat     Feeling of chest tightness     Frequent urination     Heartburn     Hepatomegaly     History of stomach ulcers     Hx of nausea and vomiting     Poor appetite     Positive urine drug screen 01/2016    opiates and THC    Shortness of breath     Splenomegaly     Stomach pain     Stress     Tiredness     Unintentional weight change     Upper GI bleed     Weakness     Wheeze        Current Outpatient Medications   Medication Sig Dispense Refill    atorvastatin (LIPITOR) 20 mg tablet Take 1 tablet by mouth once daily 90 Tab 0    aspirin delayed-release 81 mg tablet Take  by mouth daily.  furosemide (LASIX) 20 mg tablet TAKE 1 TABLET BY MOUTH EVERY OTHER DAY 15 Tab 6    umeclidinium (Incruse Ellipta) 62.5 mcg/actuation inhaler 1 inhalation daily. Exhale fully before inhaling 1 Inhaler 5    lisinopriL (PRINIVIL, ZESTRIL) 20 mg tablet Take 1 tablet by mouth once daily 90 Tab 3    hyoscyamine SL (LEVSIN/SL) 0.125 mg SL tablet       ondansetron (ZOFRAN ODT) 4 mg disintegrating tablet DISSOLVE 1 TO 2 TABLETS IN MOUTH THREE TIMES DAILY AS NEEDED FOR NAUSEA      fluticasone propion-salmeteroL (ADVAIR/WIXELA) 500-50 mcg/dose diskus inhaler Take 1 Puff by inhalation two (2) times a day. 3 Inhaler 1    albuterol (PROVENTIL VENTOLIN) 2.5 mg /3 mL (0.083 %) nebu Nebulized 1 vial for inhalation every 4 hours as needed Diag. Code J44.9, J96.10  File Under Medicare B 50 Each 2    topiramate (TOPAMAX) 100 mg tablet Take one tab PO qhs  Indications: migraine prevention (Patient taking differently: 50 mg.  Take one tab PO qhs  Indications: migraine prevention) 90 Tab 2    oxyCODONE-acetaminophen (PERCOCET) 5-325 mg per tablet TAKE 1 TABLET BY MOUTH EVERY 12 HOURS      roflumilast (Daliresp) 500 mcg tab tablet Take 1 tablet by mouth once daily 30 Tab 5    hydrOXYzine HCl (ATARAX) 50 mg tablet Take 50 mg by mouth three (3) times daily as needed for Itching.  albuterol (PROVENTIL HFA, VENTOLIN HFA, PROAIR HFA) 90 mcg/actuation inhaler Take 2 Puffs by inhalation every four (4) hours as needed for Wheezing. 1 Inhaler 3    Blood Pressure Monitor (BLOOD PRESSURE KIT) kit 1 Device by Does Not Apply route daily as needed (for blood pressure checks). 1 Kit 0    OXcarbazepine (TRILEPTAL) 150 mg tablet Take 150 mg by mouth two (2) times a day.  LORazepam (ATIVAN) 1 mg tablet Take 1 Tab by mouth every eight (8) hours as needed for Anxiety. Max Daily Amount: 3 mg. 90 Tab 0    Nebulizer & Compressor machine 1 Each by Does Not Apply route every four (4) hours as needed. 1 Each 0    OXYGEN-AIR DELIVERY SYSTEMS (WALKABOUT 1 OXYGEN SYSTEM) 2.5 L/min by Nasal route continuous.  pantoprazole (PROTONIX) 40 mg tablet Take 1 Tab by mouth Daily (before breakfast). 30 Tab 0       Social History   reports that he quit smoking about 3 years ago. His smoking use included cigarettes. He started smoking about 36 years ago. He has a 62.50 pack-year smoking history. He has never used smokeless tobacco.   reports no history of alcohol use. Family History  family history includes Cancer in his father; Diabetes in his father and mother; Heart Disease in his father and mother; Hypertension in his father and mother. Review of Systems  Except as stated above include:  Constitutional: Negative for fever, chills and malaise/fatigue. HEENT: No congestion or recent URI. Gastrointestinal: No nausea, vomiting, abdominal pain, bloody stools. Pulmonary:  Negative except as stated above. Cardiac:  Negative except as stated above. Musculoskeletal: Negative except as stated above. Neurological:  No localized symptoms. Skin:  Negative except as stated above. Psych:  Negative except as stated above.   Endocrine:  Negative except as stated above.    PHYSICAL EXAM  BP Readings from Last 3 Encounters:   05/12/21 130/78   03/24/21 136/88   01/08/21 122/72     Pulse Readings from Last 3 Encounters:   05/12/21 80   03/24/21 84   01/08/21 71     Wt Readings from Last 3 Encounters:   05/12/21 96.2 kg (212 lb)   03/24/21 96.6 kg (213 lb)   12/07/20 94.6 kg (208 lb 9.6 oz)     General:   Well developed, well groomed. Head/Neck:   No obvious jugular venous distention     No obvious carotid pulsations. No evidence of xanthelasma. Lungs:   No respiratory distress. Decreased. Heart:  Regular rate and rhythm. Normal S1/S2. Palpation grossly normal.    No significant murmurs, rubs or gallops. Abdomen:   Non-acute abdomen. No obvious pulsations. Extremities:   Intact peripheral pulses. No significant edema. Neurological:   Alert and oriented to person, place, time. No focal neurological deficit visually. Skin:   No obvious rash    Blood Pressure Metric:  Monitor recommended and adjustments stated if needed.

## 2021-05-13 ENCOUNTER — TELEPHONE (OUTPATIENT)
Dept: FAMILY MEDICINE CLINIC | Age: 51
End: 2021-05-13

## 2021-05-13 NOTE — TELEPHONE ENCOUNTER
Patient was contacted to r/s appt that was scheduled on 8/11. I was unable to get in contact with patient. Getting a message saying the # can not be completed as dialed. Will try to reach out to patient again to confirm new appt details.

## 2021-07-16 ENCOUNTER — TELEPHONE (OUTPATIENT)
Dept: PULMONOLOGY | Age: 51
End: 2021-07-16

## 2021-07-16 RX ORDER — UMECLIDINIUM 62.5 UG/1
AEROSOL, POWDER ORAL
Qty: 1 INHALER | Refills: 5 | Status: SHIPPED | OUTPATIENT
Start: 2021-07-16 | End: 2022-01-13

## 2021-07-27 ENCOUNTER — TELEPHONE (OUTPATIENT)
Dept: PULMONOLOGY | Age: 51
End: 2021-07-27

## 2021-07-27 DIAGNOSIS — J44.9 COPD, SEVERE (HCC): Primary | ICD-10-CM

## 2021-07-27 RX ORDER — ALBUTEROL SULFATE 90 UG/1
2 AEROSOL, METERED RESPIRATORY (INHALATION)
Qty: 1 INHALER | Refills: 3 | Status: SHIPPED | OUTPATIENT
Start: 2021-07-27 | End: 2022-02-17 | Stop reason: SDUPTHER

## 2021-07-27 RX ORDER — ALBUTEROL SULFATE 0.83 MG/ML
SOLUTION RESPIRATORY (INHALATION)
Qty: 50 EACH | Refills: 2 | Status: SHIPPED | OUTPATIENT
Start: 2021-07-27 | End: 2021-09-23 | Stop reason: SDUPTHER

## 2021-07-27 NOTE — TELEPHONE ENCOUNTER
Patient requesting new order for nebulizer supplies to [facility]. Last seen 3/2021. Last supply order 3/2020    Order placed for nebulizer supplies, per Verbal Order from Dr. Sudhir Thompson on 7/27/2021. Last office visit: 3/2021      Provider is aware of last office visit and follow up. No further action requested from provider.

## 2021-07-27 NOTE — TELEPHONE ENCOUNTER
Pt wife called requesting refill of proventil and albuterol solution. She also stated that he needed nebulizer supplies. Pt uses [facility].  Please advise [phone]

## 2021-08-05 NOTE — PROGRESS NOTES
[address] presents today for   Chief Complaint   Patient presents with    Follow-up    Sleep Study         Visit Vitals  /80 (BP 1 Location: Left arm, BP Patient Position: Sitting)   Pulse (!) 115   Temp 97.6 °F (36.4 °C) (Oral)   Resp 18   Ht 5' 9\" (1.753 m)   Wt 190 lb (86.2 kg)   SpO2 91%   BMI 28.06 kg/m²         Advance Directive:  1. Do you have an advance directive in place? Patient Reply: No    2. If not, would you like material regarding how to put one in place? Patient Reply: No    Coordination of Care:  1. Have you been to the ER, urgent care clinic since your last visit? Hospitalized since your last visit?  No No

## 2021-08-12 NOTE — TELEPHONE ENCOUNTER
Monserrat ([phone])  from [facility] calling to request a prescription for nebulizer medication be sent to their pharmacy called [facility]. Fax # [phone]  She said the patient requested to use this pharmacy and she did not know the name of the medication.

## 2021-08-27 ENCOUNTER — OFFICE VISIT (OUTPATIENT)
Dept: FAMILY MEDICINE CLINIC | Age: 51
End: 2021-08-27
Payer: MEDICARE

## 2021-08-27 VITALS
WEIGHT: 208.2 LBS | BODY MASS INDEX: 31.55 KG/M2 | SYSTOLIC BLOOD PRESSURE: 118 MMHG | HEIGHT: 68 IN | RESPIRATION RATE: 22 BRPM | TEMPERATURE: 98.2 F | HEART RATE: 102 BPM | OXYGEN SATURATION: 96 % | DIASTOLIC BLOOD PRESSURE: 78 MMHG

## 2021-08-27 DIAGNOSIS — M79.642 LEFT HAND PAIN: Primary | ICD-10-CM

## 2021-08-27 PROCEDURE — G8427 DOCREV CUR MEDS BY ELIG CLIN: HCPCS | Performed by: FAMILY MEDICINE

## 2021-08-27 PROCEDURE — G8754 DIAS BP LESS 90: HCPCS | Performed by: FAMILY MEDICINE

## 2021-08-27 PROCEDURE — G8510 SCR DEP NEG, NO PLAN REQD: HCPCS | Performed by: FAMILY MEDICINE

## 2021-08-27 PROCEDURE — G8417 CALC BMI ABV UP PARAM F/U: HCPCS | Performed by: FAMILY MEDICINE

## 2021-08-27 PROCEDURE — G8752 SYS BP LESS 140: HCPCS | Performed by: FAMILY MEDICINE

## 2021-08-27 PROCEDURE — 3017F COLORECTAL CA SCREEN DOC REV: CPT | Performed by: FAMILY MEDICINE

## 2021-08-27 PROCEDURE — 99214 OFFICE O/P EST MOD 30 MIN: CPT | Performed by: FAMILY MEDICINE

## 2021-08-27 NOTE — PROGRESS NOTES
HPI:  Bandar Maya. is a 45 y.o. male who presents today with   Chief Complaint   Patient presents with    Shortness of Breath    Wrist Pain     whole left hand pain at a 10 x 1 month; states gets cramps and feels like pinched nerve        PT has had some difficulty using his left hand. He has pain in the in the left index and ring finger. Has used an OTC topical agent which helpful. States he also getssome cramping in the ; present for the last 1 month. Pt has known COPD;  Checks his pulse ox at home gets 92/93% on RA. Repeat pulse ox here is 96% RA  Pt has baseline SOB due to COPD; He has no new SOB sx. Pt will get me the name of the topical agent from the pharmacy that helped his symptoms before; (? voltaren gel)    BP is better at second check. 3 most recent PHQ Screens 8/27/2021   PHQ Not Done -   Little interest or pleasure in doing things Not at all   Feeling down, depressed, irritable, or hopeless Not at all   Total Score PHQ 2 0               PMH,  Meds, Allergies, Family History, Social history reviewed      Current Outpatient Medications   Medication Sig Dispense Refill    albuterol (PROVENTIL HFA, VENTOLIN HFA, PROAIR HFA) 90 mcg/actuation inhaler Take 2 Puffs by inhalation every four (4) hours as needed for Wheezing. 1 Inhaler 3    albuterol (PROVENTIL VENTOLIN) 2.5 mg /3 mL (0.083 %) nebu Nebulized 1 vial for inhalation every 4 hours as needed Diag. Code J44.9, J96.10  File Under Medicare B 50 Each 2    umeclidinium (Incruse Ellipta) 62.5 mcg/actuation inhaler 1 inhalation daily. Exhale fully before inhaling 1 Inhaler 5    aspirin delayed-release 81 mg tablet Take  by mouth daily.       lisinopriL (PRINIVIL, ZESTRIL) 20 mg tablet Take 1 tablet by mouth once daily 90 Tab 3    ondansetron (ZOFRAN ODT) 4 mg disintegrating tablet DISSOLVE 1 TO 2 TABLETS IN MOUTH THREE TIMES DAILY AS NEEDED FOR NAUSEA      fluticasone propion-salmeteroL (ADVAIR/WIXELA) 500-50 mcg/dose diskus inhaler Take 1 Puff by inhalation two (2) times a day. 3 Inhaler 1    topiramate (TOPAMAX) 100 mg tablet Take one tab PO qhs  Indications: migraine prevention (Patient taking differently: 50 mg. Take one tab PO qhs  Indications: migraine prevention) 90 Tab 2    oxyCODONE-acetaminophen (PERCOCET) 5-325 mg per tablet TAKE 1 TABLET BY MOUTH EVERY 12 HOURS      roflumilast (Daliresp) 500 mcg tab tablet Take 1 tablet by mouth once daily 30 Tab 5    hydrOXYzine HCl (ATARAX) 50 mg tablet Take 50 mg by mouth three (3) times daily as needed for Itching.  Blood Pressure Monitor (BLOOD PRESSURE KIT) kit 1 Device by Does Not Apply route daily as needed (for blood pressure checks). 1 Kit 0    OXcarbazepine (TRILEPTAL) 150 mg tablet Take 150 mg by mouth two (2) times a day.  LORazepam (ATIVAN) 1 mg tablet Take 1 Tab by mouth every eight (8) hours as needed for Anxiety. Max Daily Amount: 3 mg. 90 Tab 0    Nebulizer & Compressor machine 1 Each by Does Not Apply route every four (4) hours as needed. 1 Each 0    pantoprazole (PROTONIX) 40 mg tablet Take 1 Tab by mouth Daily (before breakfast). 30 Tab 0    furosemide (LASIX) 20 mg tablet TAKE 1 TABLET BY MOUTH EVERY OTHER DAY 15 Tablet 6    atorvastatin (LIPITOR) 20 mg tablet Take 1 tablet by mouth once daily 90 Tablet 0    OXYGEN-AIR DELIVERY SYSTEMS (WALKABOUT 1 OXYGEN SYSTEM) 2.5 L/min by Nasal route continuous.  (Patient not taking: Reported on 8/27/2021)          Allergies   Allergen Reactions    Ciprofloxacin Nausea Only    Remeron [Mirtazapine] Other (comments)     Mood swings    Seroquel [Quetiapine] Other (comments)     Mood swings, trembles, shakiness and mild throat swelling (1/08/21)                  ROS as per HPI      Visit Vitals  /78   Pulse (!) 102   Temp 98.2 °F (36.8 °C) (Temporal)   Resp 22   Ht 5' 8\" (1.727 m)   Wt 208 lb 3.2 oz (94.4 kg)   SpO2 96%   BMI 31.66 kg/m²     Physical Exam   General appearance: alert, cooperative, no distress, appears stated age    Lungs: clear to auscultation bilaterally  Heart: regular rate and rhythm, S1, S2 normal, no murmur, click, rub or gallop    Extremities: extremities normal, atraumatic, no cyanosis or edema; left hand with no evidence of cellulitis or erythema or edema      Assessment/Plan:    Diagnoses and all orders for this visit:    1. Left hand pain  -     XR HAND LT AP/LAT; Future  -     URIC ACID; Future      As above  X-ray of the left hand  Uric acid level  This has been fully explained to the patient, who indicates understanding. An After Visit Summary was printed and given to the patient. Follow-up and Dispositions    · Return in about 3 months (around 11/27/2021) for medicare well.             Stacey Monroe MD

## 2021-08-27 NOTE — PATIENT INSTRUCTIONS
Medicare Wellness Visit, Male    The best way to live healthy is to have a lifestyle where you eat a well-balanced diet, exercise regularly, limit alcohol use, and quit all forms of tobacco/nicotine, if applicable. Regular preventive services are another way to keep healthy. Preventive services (vaccines, screening tests, monitoring & exams) can help personalize your care plan, which helps you manage your own care. Screening tests can find health problems at the earliest stages, when they are easiest to treat. [facility] follows the current, evidence-based guidelines published by the [facility] ([facility]) when recommending preventive services for our patients. Because we follow these guidelines, sometimes recommendations change over time as research supports it. (For example, a prostate screening blood test is no longer routinely recommended for men with no symptoms). Of course, you and your doctor may decide to screen more often for some diseases, based on your risk and co-morbidities (chronic disease you are already diagnosed with). Preventive services for you include:  - Medicare offers their members a free annual wellness visit, which is time for you and your primary care provider to discuss and plan for your preventive service needs. Take advantage of this benefit every year!  -All adults over age 72 should receive the recommended pneumonia vaccines. Current USPSTF guidelines recommend a series of two vaccines for the best pneumonia protection.   -All adults should have a flu vaccine yearly and tetanus vaccine every 10 years.  -All adults age 48 and older should receive the shingles vaccines (series of two vaccines).        -All adults age 38-68 who are overweight should have a diabetes screening test once every three years.   -Other screening tests & preventive services for persons with diabetes include: an eye exam to screen for diabetic retinopathy, a kidney function test, a foot exam, and stricter control over your cholesterol.   -Cardiovascular screening for adults with routine risk involves an electrocardiogram (ECG) at intervals determined by the provider.   -Colorectal cancer screening should be done for adults age 54-65 with no increased risk factors for colorectal cancer. There are a number of acceptable methods of screening for this type of cancer. Each test has its own benefits and drawbacks. Discuss with your provider what is most appropriate for you during your annual wellness visit. The different tests include: colonoscopy (considered the best screening method), a fecal occult blood test, a fecal DNA test, and sigmoidoscopy.  -All adults born between [facility] should be screened once for Hepatitis C.  -An Abdominal Aortic Aneurysm (AAA) Screening is recommended for men age 73-68 who has ever smoked in their lifetime.      Here is a list of your current Health Maintenance items (your personalized list of preventive services) with a due date:  Health Maintenance Due   Topic Date Due    Smoker or Former [address]  Never done   [facility] Annual Well Visit  08/04/2021

## 2021-08-27 NOTE — PROGRESS NOTES
Chief Complaint   Patient presents with    Shortness of Breath    Wrist Pain     whole left hand pain at a 10 x 1 month; states gets cramps and feels like pinched nerve       Pt preferred language for health care discussion is english. Is someone accompanying this pt? no    Is the patient using any DME equipment during OV? no    Depression Screening:  3 most recent [facility] 36 Screens 8/27/2021 5/12/2021 3/24/2021 11/12/2020 7/13/2020 10/17/2019 9/26/2019   PHQ Not Done - - - - - - -   Little interest or pleasure in doing things Not at all Not at all Not at all Not at all Several days Not at all Not at all   Feeling down, depressed, irritable, or hopeless Not at all Not at all Not at all Not at all Several days Not at all Not at all   Total Score PHQ 2 0 0 0 0 2 0 0       Learning Assessment:  Learning Assessment 5/12/2021 5/7/2019 1/8/2019 3/5/2018 1/2/2018   PRIMARY LEARNER Patient Patient Patient Patient Patient   [address]   LEARNER PREFERENCE PRIMARY DEMONSTRATION OTHER (COMMENT) OTHER (COMMENT) DEMONSTRATION READING     - - - - LISTENING     - - - - DEMONSTRATION   ANSWERED BY patient patient patient Patient patient   [address] Maintenance reviewed and discussed per provider. Yes        Advance Directive:  1. Do you have an advance directive in place? Patient Reply:no    2. If not, would you like material regarding how to put one in place? Patient Reply: no    Coordination of Care:  1. Have you been to the ER, urgent care clinic since your last visit? Hospitalized since your last visit? no    2. Have you seen or consulted any other health care providers outside of the [address] [place] [address] since your last visit? Include any pap smears or colon screening.  Yes, orthopedics and pain management

## 2021-08-28 LAB — URATE SERPL-MCNC: 5.9 MG/DL (ref 3.9–9)

## 2021-08-30 RX ORDER — ATORVASTATIN CALCIUM 20 MG/1
TABLET, FILM COATED ORAL
Qty: 90 TABLET | Refills: 0 | Status: SHIPPED | OUTPATIENT
Start: 2021-08-30 | End: 2021-12-03

## 2021-08-31 ENCOUNTER — HOSPITAL ENCOUNTER (OUTPATIENT)
Dept: GENERAL RADIOLOGY | Age: 51
Discharge: HOME | End: 2021-08-31
Payer: MEDICARE

## 2021-08-31 DIAGNOSIS — M79.642 LEFT HAND PAIN: ICD-10-CM

## 2021-08-31 PROCEDURE — 73120 X-RAY EXAM OF HAND: CPT

## 2021-09-04 NOTE — LETTER
9/4/2021    Mr. [address] [facility] [id number]      Dear Connor Gilliland.:    Please find your most recent results below. Resulted Orders   XR HAND LT AP/LAT    Narrative    EXAMINATION: Left hand 3 views    INDICATION: Chronic left hand pain    COMPARISON: None    FINDINGS: 3 views of the left hand obtained. No acute fracture or subluxation  identified. Mild second and third MCP and multifocal mild IP joint degenerative  changes. No focal suspicious soft tissue findings. Impression    No clearly acute findings. Mild degenerative changes as above. RECOMMENDATIONS:  Your x-ray reveals some mild arthritis in your hand. Your uric acid level was within normal range.     Please call me if you have any questions: [phone]    Sincerely,      Joie Newman MD

## 2021-09-23 RX ORDER — ALBUTEROL SULFATE 0.83 MG/ML
SOLUTION RESPIRATORY (INHALATION)
Qty: 120 EACH | Refills: 2 | Status: SHIPPED | OUTPATIENT
Start: 2021-09-23

## 2021-09-23 NOTE — TELEPHONE ENCOUNTER
Patient requesting refill of daliresp & albuterol solution on 9/23/2021. Last office visit: 3/24/2021  Follow up Visit: Due October 2021    Provider is aware of last office visit and follow up. No further action requested from provider.

## 2021-09-23 NOTE — TELEPHONE ENCOUNTER
Pt called([phone]). Needs scripts for Daliresp and Albuterol solution sent to [facility] [phone].

## 2021-09-27 ENCOUNTER — HOSPITAL ENCOUNTER (OUTPATIENT)
Dept: LAB | Age: 51
Discharge: HOME | End: 2021-09-27
Payer: MEDICARE

## 2021-09-27 PROCEDURE — 36415 COLL VENOUS BLD VENIPUNCTURE: CPT

## 2021-09-27 PROCEDURE — U0003 INFECTIOUS AGENT DETECTION BY NUCLEIC ACID (DNA OR RNA); SEVERE ACUTE RESPIRATORY SYNDROME CORONAVIRUS 2 (SARS-COV-2) (CORONAVIRUS DISEASE [COVID-19]), AMPLIFIED PROBE TECHNIQUE, MAKING USE OF HIGH THROUGHPUT TECHNOLOGIES AS DESCRIBED BY CMS-2020-01-R: HCPCS

## 2021-09-28 LAB — SARS-COV-2, COV2NT: NOT DETECTED

## 2021-09-30 ENCOUNTER — OFFICE VISIT (OUTPATIENT)
Dept: PULMONOLOGY | Age: 51
End: 2021-09-30
Payer: MEDICARE

## 2021-09-30 VITALS — WEIGHT: 206.6 LBS | HEIGHT: 68 IN | BODY MASS INDEX: 31.31 KG/M2

## 2021-09-30 DIAGNOSIS — J44.9 COPD, SEVERE (HCC): Primary | ICD-10-CM

## 2021-09-30 PROCEDURE — 94060 EVALUATION OF WHEEZING: CPT | Performed by: INTERNAL MEDICINE

## 2021-10-06 RX ORDER — FLUTICASONE PROPIONATE AND SALMETEROL 500; 50 UG/1; UG/1
1 POWDER RESPIRATORY (INHALATION) 2 TIMES DAILY
Qty: 60 EACH | Refills: 3 | Status: SHIPPED | OUTPATIENT
Start: 2021-10-06 | End: 2022-02-11

## 2021-10-06 NOTE — TELEPHONE ENCOUNTER
Pt's wife called([phone]). Needs refill for his Advair sent to [facility] [phone].

## 2021-10-06 NOTE — TELEPHONE ENCOUNTER
Patient requesting refill of advair on 10/6/2021. Last office visit: 3/24/2021  Follow up Visit: 11/23/21    Provider is aware of last office visit and follow up. No further action requested from provider.

## 2021-11-13 RX ORDER — LISINOPRIL 20 MG/1
TABLET ORAL
Qty: 90 TABLET | Refills: 1 | Status: SHIPPED | OUTPATIENT
Start: 2021-11-13 | End: 2021-11-30 | Stop reason: SDUPTHER

## 2021-11-18 NOTE — TELEPHONE ENCOUNTER
Attempted to return call in regard to taking medication at night due to being tired. No answer, left message to return call to office.
Patient called stating that all the medications he take in the morning are making him tired. He would like to know if she should/could take them at night.
Spoke with patient, confirmed name and . Advised patient that other than Ativan, Flexaril, and Remeron he doesn't have any prescribed medications that should cause a significant amount of sleepiness. Pt states that he takes both his Flexaril and Remeron at night only, but does take his Ativan twice a day. Pt states that he is trying to self wean when it comes to his Ativan anyway, so I suggested that he try and push back taking his Ativan until later in the day. Patient verbalized understanding.      Be advised
no

## 2021-11-23 ENCOUNTER — OFFICE VISIT (OUTPATIENT)
Dept: PULMONOLOGY | Age: 51
End: 2021-11-23
Payer: MEDICARE

## 2021-11-23 VITALS
HEIGHT: 68 IN | TEMPERATURE: 98.6 F | OXYGEN SATURATION: 95 % | SYSTOLIC BLOOD PRESSURE: 134 MMHG | RESPIRATION RATE: 18 BRPM | WEIGHT: 206.8 LBS | HEART RATE: 75 BPM | DIASTOLIC BLOOD PRESSURE: 82 MMHG | BODY MASS INDEX: 31.34 KG/M2

## 2021-11-23 DIAGNOSIS — J44.9 COPD, GROUP C, BY GOLD 2017 CLASSIFICATION (HCC): Primary | ICD-10-CM

## 2021-11-23 DIAGNOSIS — R09.02 HYPOXEMIA REQUIRING SUPPLEMENTAL OXYGEN: ICD-10-CM

## 2021-11-23 DIAGNOSIS — Z87.891 PERSONAL HISTORY OF TOBACCO USE, PRESENTING HAZARDS TO HEALTH: ICD-10-CM

## 2021-11-23 DIAGNOSIS — Z99.81 HYPOXEMIA REQUIRING SUPPLEMENTAL OXYGEN: ICD-10-CM

## 2021-11-23 PROCEDURE — G8752 SYS BP LESS 140: HCPCS | Performed by: INTERNAL MEDICINE

## 2021-11-23 PROCEDURE — 99214 OFFICE O/P EST MOD 30 MIN: CPT | Performed by: INTERNAL MEDICINE

## 2021-11-23 PROCEDURE — 3017F COLORECTAL CA SCREEN DOC REV: CPT | Performed by: INTERNAL MEDICINE

## 2021-11-23 PROCEDURE — G8754 DIAS BP LESS 90: HCPCS | Performed by: INTERNAL MEDICINE

## 2021-11-23 PROCEDURE — G8427 DOCREV CUR MEDS BY ELIG CLIN: HCPCS | Performed by: INTERNAL MEDICINE

## 2021-11-23 PROCEDURE — G8417 CALC BMI ABV UP PARAM F/U: HCPCS | Performed by: INTERNAL MEDICINE

## 2021-11-23 PROCEDURE — G8432 DEP SCR NOT DOC, RNG: HCPCS | Performed by: INTERNAL MEDICINE

## 2021-11-23 RX ORDER — NALOXONE HYDROCHLORIDE 4 MG/.1ML
SPRAY NASAL
COMMUNITY
Start: 2021-09-23

## 2021-11-23 NOTE — PROGRESS NOTES
HISTORY OF PRESENT ILLNESS  Miguelina Duran is a 45 y.o. male following up for COPD. Pt with COPD on LTOT, FEV1 40% in 2017, previously followed by Dr. Kimmie Dolan. Pt symptoms are stable on Incruse/Advair/Daliresp. No interval ED visits or hospital admissions for COPD exacerbation. Pt with history of hospital admission for acute on chronic respiratory failure in 2017, not intubated. He has a prior history of heavy smoking and quit in 2017. No prior LDCT screening for lung cancer. Pt walks over a mile daily since his episode of respiratory failure. He exercises at the [employer] during the cold months. Review of Systems   Constitutional: Negative for chills, diaphoresis, fever, malaise/fatigue and weight loss. HENT: Negative for congestion, ear discharge, ear pain, hearing loss, nosebleeds, sinus pain, sore throat and tinnitus. Eyes: Negative for blurred vision, double vision, photophobia, pain, discharge and redness. Respiratory: Negative for cough, hemoptysis, sputum production and stridor. Shortness of breath: less   Wheezing: occasional.    Cardiovascular: Negative for chest pain, palpitations, orthopnea, claudication, leg swelling and PND. Gastrointestinal: Negative for abdominal pain, blood in stool, constipation, diarrhea, heartburn, melena, nausea and vomiting. Genitourinary: Negative for dysuria, flank pain, frequency, hematuria and urgency. Musculoskeletal: Negative for back pain, falls, joint pain, myalgias and neck pain. Skin: Negative for itching and rash. Neurological: Negative for dizziness, tingling, tremors, sensory change, speech change, focal weakness, seizures, loss of consciousness, weakness and headaches. Endo/Heme/Allergies: Negative for environmental allergies and polydipsia. Bruises/bleeds easily. Psychiatric/Behavioral: Negative for depression, hallucinations, memory loss, substance abuse and suicidal ideas.  The patient is not nervous/anxious and does not have insomnia. Past Medical History:   Diagnosis Date    Anxiety     Chest pain     Chronic diastolic heart failure secondary to coronary artery disease (HCC)     Chronic lung disease     Chronic obstructive pulmonary disease (HCC) 08/03/2017    PFTS 8/3/17    COPD, severe (HCC)     Coronary artery disease     Cough     Emphysema/COPD (HCC)     Esophagitis 12/11/2017    Endoscopy    Essential hypertension, benign     Fast heart beat     Feeling of chest tightness     Frequent urination     Heartburn     Hepatomegaly     History of stomach ulcers     Hx of nausea and vomiting     Poor appetite     Positive urine drug screen 01/2016    opiates and THC    Shortness of breath     Splenomegaly     Stomach pain     Stress     Tiredness     Unintentional weight change     Upper GI bleed     Weakness     Wheeze      Past Surgical History:   Procedure Laterality Date    COLONOSCOPY N/A 11/5/2018    COLONOSCOPY with polypectomies performed by Ruben Bradford MD at [place] [address] N/A 9/15/2020    SIGMOIDOSCOPY FLEXIBLE with bx's performed by Sade Edward MD at [address] HX ENDOSCOPY  12/11/2017    HX UROLOGICAL  2019    hydrocele removal    HX WISDOM TEETH EXTRACTION Right 05/2019    IR BX LIVER PERCUTANEOUS       Current Outpatient Medications on File Prior to Visit   Medication Sig Dispense Refill    lisinopriL (PRINIVIL, ZESTRIL) 20 mg tablet Take 1 tablet by mouth once daily 90 Tablet 1    fluticasone propion-salmeteroL (ADVAIR/WIXELA) 500-50 mcg/dose diskus inhaler Take 1 Puff by inhalation two (2) times a day. 60 Each 3    albuterol (PROVENTIL VENTOLIN) 2.5 mg /3 mL (0.083 %) nebu Nebulized 1 vial for inhalation every 4 hours as needed Diag.  Code J44.9, J96.10  File Under Medicare B 120 Each 2    roflumilast (Daliresp) 500 mcg tab tablet Take 1 tablet by mouth once daily 30 Tablet 5    furosemide (LASIX) 20 mg tablet TAKE 1 TABLET BY MOUTH EVERY OTHER DAY 15 Tablet 6    atorvastatin (LIPITOR) 20 mg tablet Take 1 tablet by mouth once daily 90 Tablet 0    albuterol (PROVENTIL HFA, VENTOLIN HFA, PROAIR HFA) 90 mcg/actuation inhaler Take 2 Puffs by inhalation every four (4) hours as needed for Wheezing. 1 Inhaler 3    umeclidinium (Incruse Ellipta) 62.5 mcg/actuation inhaler 1 inhalation daily. Exhale fully before inhaling 1 Inhaler 5    aspirin delayed-release 81 mg tablet Take  by mouth daily.  oxyCODONE-acetaminophen (PERCOCET) 5-325 mg per tablet TAKE 1 TABLET BY MOUTH EVERY 12 HOURS      Blood Pressure Monitor (BLOOD PRESSURE KIT) kit 1 Device by Does Not Apply route daily as needed (for blood pressure checks). 1 Kit 0    OXcarbazepine (TRILEPTAL) 150 mg tablet Take 150 mg by mouth two (2) times a day.  LORazepam (ATIVAN) 1 mg tablet Take 1 Tab by mouth every eight (8) hours as needed for Anxiety. Max Daily Amount: 3 mg. 90 Tab 0    Nebulizer & Compressor machine 1 Each by Does Not Apply route every four (4) hours as needed. 1 Each 0    OXYGEN-AIR DELIVERY SYSTEMS (WALKABOUT 1 OXYGEN SYSTEM) 2.5 L/min by Nasal route continuous. Indications: DME: [facility]      pantoprazole (PROTONIX) 40 mg tablet Take 1 Tab by mouth Daily (before breakfast). 30 Tab 0    Narcan 4 mg/actuation nasal spray       ondansetron (ZOFRAN ODT) 4 mg disintegrating tablet DISSOLVE 1 TO 2 TABLETS IN MOUTH THREE TIMES DAILY AS NEEDED FOR NAUSEA (Patient not taking: Reported on 11/23/2021)      topiramate (TOPAMAX) 100 mg tablet Take one tab PO qhs  Indications: migraine prevention (Patient not taking: Reported on 11/23/2021) 90 Tab 2    hydrOXYzine HCl (ATARAX) 50 mg tablet Take 50 mg by mouth three (3) times daily as needed for Itching. (Patient not taking: Reported on 11/23/2021)      [DISCONTINUED] acetaminophen (TYLENOL) 500 mg tablet Take 500 mg by mouth every six (6) hours as needed for Pain.  2 tabs q 6 hr  Indications: Pain       No current facility-administered medications on file prior to visit.      Allergies   Allergen Reactions    Ciprofloxacin Nausea Only    Remeron [Mirtazapine] Other (comments)     Mood swings    Seroquel [Quetiapine] Other (comments)     Mood swings, trembles, shakiness and mild throat swelling (1/08/21)     Family History   Problem Relation Age of Onset    Hypertension Mother     Heart Disease Mother     Diabetes Mother     Hypertension Father     Heart Disease Father     Cancer Father         lymphnodes    Diabetes Father      Social History     Socioeconomic History    Marital status:      Spouse name: Not on file    Number of children: Not on file    Years of education: Not on file    Highest education level: Not on file   Occupational History    Not on file   Tobacco Use    Smoking status: Former Smoker     Packs/day: 2.50     Years: 25.00     Pack years: 62.50     Types: Cigarettes     Start date: 5/28/1984     Quit date: 12/18/2017     Years since quitting: 3.9    Smokeless tobacco: Never Used   Vaping Use    Vaping Use: Never used   Substance and Sexual Activity    Alcohol use: No    Drug use: Not Currently     Types: Marijuana     Comment: Hx of Marijuana use past 25 years    Sexual activity: Not Currently     Partners: Female   Other Topics Concern     Service No    Blood Transfusions No    Caffeine Concern Yes    Occupational Exposure No    Hobby Hazards No    Sleep Concern Yes     Comment: occas    Stress Concern No    Weight Concern No    Special Diet No    Back Care Yes    Exercise No    Bike Helmet Yes    Seat Belt No     Comment: occas    Self-Exams Not Asked   Social History Narrative         Social Determinants of Health     Financial Resource Strain:     Difficulty of Paying Living Expenses: Not on file   Food Insecurity:     Worried About Running Out of Food in the Last Year: Not on file    Melissa of Food in the Last Year: Not on file   Transportation Needs:     Lack of Transportation (Medical): Not on file    Lack of Transportation (Non-Medical): Not on file   Physical Activity:     Days of Exercise per Week: Not on file    Minutes of Exercise per Session: Not on file   Stress:     Feeling of Stress : Not on file   Social Connections:     Frequency of Communication with Friends and Family: Not on file    Frequency of Social Gatherings with Friends and Family: Not on file    Attends Gnosticism Services: Not on file    Active Member of [address] [place] [address] [employer] or Organizations: Not on file    Attends Club or Organization Meetings: Not on file    Marital Status: Not on file   Intimate Partner Violence:     Fear of Current or Ex-Partner: Not on file    Emotionally Abused: Not on file    Physically Abused: Not on file    Sexually Abused: Not on file   Housing Stability:     Unable to Pay for Housing in the Last Year: Not on file    Number of [place] in the Last Year: Not on file    Unstable Housing in the Last Year: Not on file     Blood pressure 134/82, pulse 75, temperature 98.6 °F (37 °C), temperature source Oral, resp. rate 18, height 5' 8\" (1.727 m), weight 93.8 kg (206 lb 12.8 oz), SpO2 95 %. Physical Exam  Constitutional:       General: He is not in acute distress. Appearance: Normal appearance. He is obese. He is not ill-appearing, toxic-appearing or diaphoretic. HENT:      Head: Normocephalic and atraumatic. Right Ear: External ear normal.      Left Ear: External ear normal.      Nose:      Comments: Deferred      Mouth/Throat:      Comments: Deferred   Eyes:      General: No scleral icterus. Right eye: No discharge. Left eye: No discharge. Extraocular Movements: Extraocular movements intact. Conjunctiva/sclera: Conjunctivae normal.      Pupils: Pupils are equal, round, and reactive to light. Neck:      Vascular: No carotid bruit.    Cardiovascular:      Rate and Rhythm: Normal rate and regular rhythm. Pulses: Normal pulses. Heart sounds: Normal heart sounds. No murmur heard. No friction rub. No gallop. Pulmonary:      Effort: Pulmonary effort is normal. No respiratory distress. Breath sounds: No stridor. No wheezing, rhonchi or rales. Comments: Distant breath sounds  Chest:      Chest wall: No tenderness. Abdominal:      Palpations: Abdomen is soft. There is no mass. Tenderness: There is no abdominal tenderness. Musculoskeletal:         General: No swelling, tenderness, deformity or signs of injury. Cervical back: No rigidity. No muscular tenderness. Right lower leg: No edema. Left lower leg: No edema. Lymphadenopathy:      Cervical: No cervical adenopathy. Skin:     General: Skin is warm and dry. Coloration: Skin is not jaundiced or pale. Findings: No erythema, lesion or rash. Bruising: L>R UE bruising and abrasions in various stages    Neurological:      General: No focal deficit present. Mental Status: He is alert and oriented to person, place, and time. Coordination: Coordination normal.   Psychiatric:         Mood and Affect: Mood normal.         Behavior: Behavior normal.         Thought Content: Thought content normal.         Judgment: Judgment normal.       Spirometry: severe obstruction FEV1 48% without reversible component    ASSESSMENT and PLAN  Encounter Diagnoses   Name Primary?  COPD, group C, by GOLD 2017 classification ([facility] [address] Yes    Hypoxemia requiring supplemental oxygen     Personal history of tobacco use, presenting hazards to health        Stable symptoms on LABA/LAMA/ICS and Daliresp which will be continued. Pt will continue to benefit from supplemental O2 HS and Activity. RTC 6 months  Spirometry results reviewed with pt  Discussed with patient current guidelines for screening for lung cancer.   Current recommendations are to obtain yearly screening LDCT yearly for age 46-80, or until smoke free for 15 years. Patient has 62 pack year history of cigarette smoking and currently quit since 2017. Discussed with patient risks and benefits of screening, including over-diagnosis, false positive rate, and total radiation exposure. Patient currently exhibits no signs or symptoms suggestive of lung cancer. Discussed with patient importance of compliance with yearly annual lung cancer screenings and willingness to undergo diagnosis and treatment if screening scan is positive. In addition, patient was counseled regarding (remaining smoke free/total smoking cessation).

## 2021-11-23 NOTE — PROGRESS NOTES
Renata Riley presents today for   Chief Complaint   Patient presents with    Results     Juliana Iqbal Shortness of Breath    Cough with sputum       Is someone accompanying this pt? No    Is the patient using any DME equipment during OV? No    -[employer] [facility]  [employer]    Depression Screening:  3 most recent PHQ Screens 8/27/2021   PHQ Not Done -   Little interest or pleasure in doing things Not at all   Feeling down, depressed, irritable, or hopeless Not at all   Total Score PHQ 2 0       Learning Assessment:  Learning Assessment 5/12/2021   PRIMARY LEARNER Patient   PRIMARY LANGUAGE ENGLISH   LEARNER PREFERENCE PRIMARY DEMONSTRATION     -     -   ANSWERED BY patient   RELATIONSHIP SELF       Abuse Screening:  Abuse Screening Questionnaire 8/27/2021   Do you ever feel afraid of your partner? N   Are you in a relationship with someone who physically or mentally threatens you? N   Is it safe for you to go home? Y       Fall Risk  Fall Risk Assessment, last 12 mths 11/12/2020   Able to walk? Yes   Fall in past 12 months? No         Coordination of Care:  1. Have you been to the ER, urgent care clinic since your last visit? Hospitalized since your last visit? No    2. Have you seen or consulted any other health care providers outside of the [address] [place] [address] since your last visit? Include any pap smears or colon screening.  No

## 2021-11-30 ENCOUNTER — HOSPITAL ENCOUNTER (OUTPATIENT)
Dept: LAB | Age: 51
Discharge: HOME | End: 2021-11-30
Payer: MEDICARE

## 2021-11-30 ENCOUNTER — OFFICE VISIT (OUTPATIENT)
Dept: FAMILY MEDICINE CLINIC | Age: 51
End: 2021-11-30
Payer: MEDICARE

## 2021-11-30 VITALS
SYSTOLIC BLOOD PRESSURE: 108 MMHG | HEART RATE: 79 BPM | RESPIRATION RATE: 18 BRPM | OXYGEN SATURATION: 93 % | WEIGHT: 205.4 LBS | TEMPERATURE: 97.7 F | BODY MASS INDEX: 31.23 KG/M2 | DIASTOLIC BLOOD PRESSURE: 84 MMHG

## 2021-11-30 DIAGNOSIS — E78.00 HYPERCHOLESTEREMIA: ICD-10-CM

## 2021-11-30 DIAGNOSIS — R73.09 ELEVATED GLUCOSE: ICD-10-CM

## 2021-11-30 DIAGNOSIS — Z00.00 MEDICARE ANNUAL WELLNESS VISIT, SUBSEQUENT: Primary | ICD-10-CM

## 2021-11-30 DIAGNOSIS — I10 ESSENTIAL HYPERTENSION, BENIGN: ICD-10-CM

## 2021-11-30 LAB
ALBUMIN SERPL-MCNC: 4.2 G/DL (ref 3.4–5)
ALBUMIN/GLOB SERPL: 1.4 {RATIO} (ref 0.8–1.7)
ALP SERPL-CCNC: 129 U/L (ref 45–117)
ALT SERPL-CCNC: 27 U/L (ref 16–61)
ANION GAP SERPL CALC-SCNC: 6 MMOL/L (ref 3–18)
AST SERPL-CCNC: 14 U/L (ref 10–38)
BILIRUB SERPL-MCNC: 1 MG/DL (ref 0.2–1)
BUN SERPL-MCNC: 15 MG/DL (ref 7–18)
BUN/CREAT SERPL: 18 (ref 12–20)
CALCIUM SERPL-MCNC: 9.4 MG/DL (ref 8.5–10.1)
CHLORIDE SERPL-SCNC: 106 MMOL/L (ref 100–111)
CHOLEST SERPL-MCNC: 152 MG/DL
CO2 SERPL-SCNC: 26 MMOL/L (ref 21–32)
CREAT SERPL-MCNC: 0.85 MG/DL (ref 0.6–1.3)
EST. AVERAGE GLUCOSE BLD GHB EST-MCNC: 100 MG/DL
GLOBULIN SER CALC-MCNC: 3.1 G/DL (ref 2–4)
GLUCOSE SERPL-MCNC: 117 MG/DL (ref 74–99)
HBA1C MFR BLD: 5.1 % (ref 4.2–5.6)
HDLC SERPL-MCNC: 55 MG/DL (ref 40–60)
HDLC SERPL: 2.8 {RATIO} (ref 0–5)
LDLC SERPL CALC-MCNC: 74.8 MG/DL (ref 0–100)
LIPID PROFILE,FLP: NORMAL
POTASSIUM SERPL-SCNC: 4.3 MMOL/L (ref 3.5–5.5)
PROT SERPL-MCNC: 7.3 G/DL (ref 6.4–8.2)
SODIUM SERPL-SCNC: 138 MMOL/L (ref 136–145)
TRIGL SERPL-MCNC: 111 MG/DL (ref ?–150)
VLDLC SERPL CALC-MCNC: 22.2 MG/DL

## 2021-11-30 PROCEDURE — G8510 SCR DEP NEG, NO PLAN REQD: HCPCS | Performed by: FAMILY MEDICINE

## 2021-11-30 PROCEDURE — 83036 HEMOGLOBIN GLYCOSYLATED A1C: CPT

## 2021-11-30 PROCEDURE — G8417 CALC BMI ABV UP PARAM F/U: HCPCS | Performed by: FAMILY MEDICINE

## 2021-11-30 PROCEDURE — 3017F COLORECTAL CA SCREEN DOC REV: CPT | Performed by: FAMILY MEDICINE

## 2021-11-30 PROCEDURE — G8754 DIAS BP LESS 90: HCPCS | Performed by: FAMILY MEDICINE

## 2021-11-30 PROCEDURE — G0439 PPPS, SUBSEQ VISIT: HCPCS | Performed by: FAMILY MEDICINE

## 2021-11-30 PROCEDURE — 80061 LIPID PANEL: CPT

## 2021-11-30 PROCEDURE — G8427 DOCREV CUR MEDS BY ELIG CLIN: HCPCS | Performed by: FAMILY MEDICINE

## 2021-11-30 PROCEDURE — 80053 COMPREHEN METABOLIC PANEL: CPT

## 2021-11-30 PROCEDURE — G8752 SYS BP LESS 140: HCPCS | Performed by: FAMILY MEDICINE

## 2021-11-30 PROCEDURE — 36415 COLL VENOUS BLD VENIPUNCTURE: CPT

## 2021-11-30 RX ORDER — LISINOPRIL 20 MG/1
20 TABLET ORAL DAILY
Qty: 90 TABLET | Refills: 1 | Status: SHIPPED | OUTPATIENT
Start: 2021-11-30

## 2021-11-30 NOTE — PROGRESS NOTES
Chief Complaint   Patient presents with    Annual Wellness Visit    Hand Pain     need another type of cream insurance will not cover    Medication Refill       1. \"Have you been to the ER, urgent care clinic since your last visit? Hospitalized since your last visit? \" No    2. \"Have you seen or consulted any other health care providers outside of the [address] [place] [address] since your last visit? \" Yes When: Hand Surgeon Dec 3 Dr Jaxson Deal     3. For patients aged 39-70: Has the patient had a colonoscopy? No appt on 12/1/21 at [facility]    If the patient is female:    4. For patients aged 41-77: Has the patient had a mammogram within the past 2 years? NA based on age or sex    11. For patients aged 21-65: Has the patient had a pap smear? NA based on age or sex    Learning Assessment 5/12/2021   PRIMARY LEARNER Patient   PRIMARY LANGUAGE ENGLISH   LEARNER PREFERENCE PRIMARY DEMONSTRATION     -     -   ANSWERED BY patient   RELATIONSHIP SELF     3 most recent PHQ Screens 11/30/2021   PHQ Not Done -   Little interest or pleasure in doing things Not at all   Feeling down, depressed, irritable, or hopeless Not at all   Total Score PHQ 2 0     Fall Risk Assessment, last 12 mths 11/30/2021   Able to walk? Yes   Fall in past 12 months? 0   Do you feel unsteady? 0   Are you worried about falling 0     Abuse Screening Questionnaire 8/27/2021   Do you ever feel afraid of your partner? N   Are you in a relationship with someone who physically or mentally threatens you? N   Is it safe for you to go home?  Magalys Meléndez

## 2021-11-30 NOTE — PATIENT INSTRUCTIONS
Medicare Wellness Visit, Male    The best way to live healthy is to have a lifestyle where you eat a well-balanced diet, exercise regularly, limit alcohol use, and quit all forms of tobacco/nicotine, if applicable. Regular preventive services are another way to keep healthy. Preventive services (vaccines, screening tests, monitoring & exams) can help personalize your care plan, which helps you manage your own care. Screening tests can find health problems at the earliest stages, when they are easiest to treat. [facility] follows the current, evidence-based guidelines published by the [facility] ([facility]) when recommending preventive services for our patients. Because we follow these guidelines, sometimes recommendations change over time as research supports it. (For example, a prostate screening blood test is no longer routinely recommended for men with no symptoms). Of course, you and your doctor may decide to screen more often for some diseases, based on your risk and co-morbidities (chronic disease you are already diagnosed with). Preventive services for you include:  - Medicare offers their members a free annual wellness visit, which is time for you and your primary care provider to discuss and plan for your preventive service needs. Take advantage of this benefit every year!  -All adults over age 72 should receive the recommended pneumonia vaccines. Current USPSTF guidelines recommend a series of two vaccines for the best pneumonia protection.   -All adults should have a flu vaccine yearly and tetanus vaccine every 10 years.  -All adults age 48 and older should receive the shingles vaccines (series of two vaccines).        -All adults age 38-68 who are overweight should have a diabetes screening test once every three years.   -Other screening tests & preventive services for persons with diabetes include: an eye exam to screen for diabetic retinopathy, a kidney function test, a foot exam, and stricter control over your cholesterol.   -Cardiovascular screening for adults with routine risk involves an electrocardiogram (ECG) at intervals determined by the provider.   -Colorectal cancer screening should be done for adults age 54-65 with no increased risk factors for colorectal cancer. There are a number of acceptable methods of screening for this type of cancer. Each test has its own benefits and drawbacks. Discuss with your provider what is most appropriate for you during your annual wellness visit. The different tests include: colonoscopy (considered the best screening method), a fecal occult blood test, a fecal DNA test, and sigmoidoscopy.  -All adults born between [facility] should be screened once for Hepatitis C.  -An Abdominal Aortic Aneurysm (AAA) Screening is recommended for men age 73-68 who has ever smoked in their lifetime.      Here is a list of your current Health Maintenance items (your personalized list of preventive services) with a due date:  Health Maintenance Due   Topic Date Due    Smoker or Former [address]  Never done

## 2021-11-30 NOTE — PROGRESS NOTES
This is the Subsequent Medicare Annual Wellness Exam, performed 12 months or more after the Initial AWV or the last Subsequent AWV    I have reviewed the patient's medical history in detail and updated the computerized patient record. Pt has been well;   Has had nerve conduction studies showing CTS. He has some mild arthritis   States one of his topical agents for his pain was not covered by insurance he is unsure of the name. He will check this and notify MD in order to order a substitute med if able. Assessment/Plan   Education and counseling provided:  Are appropriate based on today's review and evaluation  End-of-Life planning (with patient's consent)    1. Medicare annual wellness visit, subsequent  2. Essential hypertension, benign  3. Hypercholesteremia  -     LIPID PANEL; Future  -     METABOLIC PANEL, COMPREHENSIVE; Future  4. Elevated glucose  -     HEMOGLOBIN A1C WITH EAG; Future     As above  Labs as ordered  Follow-up and Dispositions    · Return in about 4 months (around 3/30/2022) for htn. This has been fully explained to the patient, who indicates understanding. An After Visit Summary was printed and given to the patient. Depression Risk Factor Screening     3 most recent PHQ Screens 11/30/2021   PHQ Not Done -   Little interest or pleasure in doing things Not at all   Feeling down, depressed, irritable, or hopeless Not at all   Total Score PHQ 2 0       Alcohol Risk Screen    Do you average more than 2 drinks per night or 14 drinks a week: No    On any one occasion in the past three months have you have had more than 4 drinks containing alcohol:  No        Functional Ability and Level of Safety    Hearing: Hearing is good. Activities of Daily Living: The home contains: grab bars  Patient does total self care      Ambulation: with mild difficulty     Fall Risk:  Fall Risk Assessment, last 12 mths 11/30/2021   Able to walk?  Yes   Fall in past 12 months? 0   Do you feel unsteady?  0   Are you worried about falling 0      Abuse Screen:  Patient is not abused       Cognitive Screening    Has your family/caregiver stated any concerns about your memory: no     Cognitive Screening: cognition intact    Health Maintenance Due     Health Maintenance Due   Topic Date Due    Low dose CT lung screening  Never done       Patient Care Team   Patient Care Team:  Raghu Carney MD as PCP - General (Family Medicine)  Raghu Carney MD as PCP - [facility] Empaneled Provider  Tate Gonzalez MD (Pulmonary Disease)  Ana Llanos DO (Pulmonary Disease)  Timbo Beckwith NP (Neurology)  Tad Jamison MD (Urology)     PE:  Visit Vitals  /84 (BP 1 Location: Left arm, BP Patient Position: Sitting, BP Cuff Size: Adult)   Pulse 79   Temp 97.7 °F (36.5 °C) (Temporal)   Resp 18   Wt 205 lb 6.4 oz (93.2 kg)   SpO2 93%   BMI 31.23 kg/m²     General appearance: alert, cooperative, no distress, appears stated age    Lungs: clear to auscultation bilaterally  Heart: regular rate and rhythm, S1, S2 normal, no murmur, click, rub or gallop    Extremities: extremities normal, atraumatic, no cyanosis or edema      Lab Results   Component Value Date/Time    Cholesterol, total 138 04/22/2021 08:19 AM    HDL Cholesterol 49 04/22/2021 08:19 AM    LDL, calculated 64.6 04/22/2021 08:19 AM    VLDL, calculated 24.4 04/22/2021 08:19 AM    Triglyceride 122 04/22/2021 08:19 AM    CHOL/HDL Ratio 2.8 04/22/2021 08:19 AM     Lab Results   Component Value Date/Time    Sodium 142 04/22/2021 08:19 AM    Potassium 4.2 04/22/2021 08:19 AM    Chloride 107 04/22/2021 08:19 AM    CO2 27 04/22/2021 08:19 AM    Anion gap 8 04/22/2021 08:19 AM    Glucose 128 (H) 04/22/2021 08:19 AM    BUN 12 04/22/2021 08:19 AM    Creatinine 0.93 04/22/2021 08:19 AM    BUN/Creatinine ratio 13 04/22/2021 08:19 AM    GFR est AA >60 04/22/2021 08:19 AM    GFR est non-AA >60 04/22/2021 08:19 AM    Calcium 9.3 04/22/2021 08:19 AM Bilirubin, total 0.8 04/22/2021 08:19 AM    Alk. phosphatase 112 04/22/2021 08:19 AM    Protein, total 6.5 04/22/2021 08:19 AM    Albumin 3.9 04/22/2021 08:19 AM    Globulin 2.6 04/22/2021 08:19 AM    A-G Ratio 1.5 04/22/2021 08:19 AM    ALT (SGPT) 29 04/22/2021 08:19 AM    AST (SGOT) 14 04/22/2021 08:19 AM     Lab Results   Component Value Date/Time    Hemoglobin A1c 5.2 04/22/2021 08:19 AM         History     Patient Active Problem List   Diagnosis Code    Emphysema/COPD ([facility] J43.9    COPD, severe ([facility] J44.9    Sepsis ([facility] A41.9    Essential hypertension, benign I10    Esophagitis K20.90    Panic attack F41.0    Insomnia G47.00    Hypovitaminosis D E55.9    Overweight (BMI 25.0-29. 9) E66.3    Pulmonary infiltrate A25.5    Diastolic CHF, chronic (HCC) I50.32    Chronic back pain M54.9, G89.29    Left elbow pain M25.522    Migraines G43.909     Past Medical History:   Diagnosis Date    Anxiety     Chest pain     Chronic diastolic heart failure secondary to coronary artery disease (HCC)     Chronic lung disease     [facility] 08/03/2017    PFTS 8/3/17    COPD, severe (HCC)     Coronary artery disease     Cough     Emphysema/COPD (HCC)     Esophagitis 12/11/2017    Endoscopy    Essential hypertension, benign     Fast heart beat     Feeling of chest tightness     Frequent urination     Heartburn     Hepatomegaly     History of stomach ulcers     Hx of nausea and vomiting     Poor appetite     Positive urine drug screen 01/2016    opiates and THC    Shortness of breath     Splenomegaly     Stomach pain     Stress     Tiredness     Unintentional weight change     Upper GI bleed     Weakness     Wheeze       Past Surgical History:   Procedure Laterality Date    COLONOSCOPY N/A 11/5/2018    COLONOSCOPY with polypectomies performed by Chu Harvey MD at [facility] [place] [facility] N/A 9/15/2020    SIGMOIDOSCOPY FLEXIBLE with bx's performed by Enrique [facility] [address] HX ENDOSCOPY  12/11/2017    HX UROLOGICAL  2019    hydrocele removal    HX WISDOM TEETH EXTRACTION Right 05/2019    IR BX LIVER PERCUTANEOUS       Current Outpatient Medications   Medication Sig Dispense Refill    lisinopriL (PRINIVIL, ZESTRIL) 20 mg tablet Take 1 Tablet by mouth daily. 90 Tablet 1    Narcan 4 mg/actuation nasal spray       fluticasone propion-salmeteroL (ADVAIR/WIXELA) 500-50 mcg/dose diskus inhaler Take 1 Puff by inhalation two (2) times a day. 60 Each 3    albuterol (PROVENTIL VENTOLIN) 2.5 mg /3 mL (0.083 %) nebu Nebulized 1 vial for inhalation every 4 hours as needed Diag. Code J44.9, J96.10  File Under Medicare B 120 Each 2    roflumilast (Daliresp) 500 mcg tab tablet Take 1 tablet by mouth once daily 30 Tablet 5    furosemide (LASIX) 20 mg tablet TAKE 1 TABLET BY MOUTH EVERY OTHER DAY 15 Tablet 6    atorvastatin (LIPITOR) 20 mg tablet Take 1 tablet by mouth once daily 90 Tablet 0    albuterol (PROVENTIL HFA, VENTOLIN HFA, PROAIR HFA) 90 mcg/actuation inhaler Take 2 Puffs by inhalation every four (4) hours as needed for Wheezing. 1 Inhaler 3    umeclidinium (Incruse Ellipta) 62.5 mcg/actuation inhaler 1 inhalation daily. Exhale fully before inhaling 1 Inhaler 5    aspirin delayed-release 81 mg tablet Take  by mouth daily.  oxyCODONE-acetaminophen (PERCOCET) 5-325 mg per tablet TAKE 1 TABLET BY MOUTH EVERY 12 HOURS      Blood Pressure Monitor (BLOOD PRESSURE KIT) kit 1 Device by Does Not Apply route daily as needed (for blood pressure checks). 1 Kit 0    OXcarbazepine (TRILEPTAL) 150 mg tablet Take 150 mg by mouth two (2) times a day.  LORazepam (ATIVAN) 1 mg tablet Take 1 Tab by mouth every eight (8) hours as needed for Anxiety. Max Daily Amount: 3 mg. 90 Tab 0    Nebulizer & Compressor machine 1 Each by Does Not Apply route every four (4) hours as needed.  1 Each 0    OXYGEN-AIR DELIVERY SYSTEMS (WALKABOUT 1 OXYGEN SYSTEM) 2.5 L/min by Nasal route continuous. Indications: DME: [facility]      pantoprazole (PROTONIX) 40 mg tablet Take 1 Tab by mouth Daily (before breakfast).  30 Tab 0     Allergies   Allergen Reactions    Ciprofloxacin Nausea Only    Remeron [Mirtazapine] Other (comments)     Mood swings    Seroquel [Quetiapine] Other (comments)     Mood swings, trembles, shakiness and mild throat swelling (1/08/21)       Family History   Problem Relation Age of Onset    Hypertension Mother     Heart Disease Mother     Diabetes Mother     Hypertension Father     Heart Disease Father     Cancer Father         lymphnodes    Diabetes Father      Social History     Tobacco Use    Smoking status: Former Smoker     Packs/day: 2.50     Years: 25.00     Pack years: 62.50     Types: Cigarettes     Start date: 5/28/1984     Quit date: 12/18/2017     Years since quitting: 3.9    Smokeless tobacco: Never Used   Substance Use Topics    Alcohol use: No         Noah William MD

## 2021-12-03 RX ORDER — ATORVASTATIN CALCIUM 20 MG/1
TABLET, FILM COATED ORAL
Qty: 90 TABLET | Refills: 0 | Status: SHIPPED | OUTPATIENT
Start: 2021-12-03 | End: 2022-03-09

## 2021-12-05 NOTE — LETTER
12/5/2021    Mr[facility] [id number]      Dear Paresh Bravo.:    Please find your most recent results below. Resulted Orders   LIPID PANEL   Result Value Ref Range    LIPID PROFILE          Cholesterol, total 152 <200 MG/DL    Triglyceride 111 <150 MG/DL    HDL Cholesterol 55 40 - 60 MG/DL    LDL, calculated 74.8 0 - 100 MG/DL    VLDL, calculated 22.2 MG/DL    CHOL/HDL Ratio 2.8 0 - 5.0     METABOLIC PANEL, COMPREHENSIVE   Result Value Ref Range    Sodium 138 136 - 145 mmol/L    Potassium 4.3 3.5 - 5.5 mmol/L    Chloride 106 100 - 111 mmol/L    CO2 26 21 - 32 mmol/L    Anion gap 6 3.0 - 18 mmol/L    Glucose 117 (H) 74 - 99 mg/dL    BUN 15 7.0 - 18 MG/DL    Creatinine 0.85 0.6 - 1.3 MG/DL    BUN/Creatinine ratio 18 12 - 20      GFR est AA >60 >60 ml/min/1.73m2    GFR est non-AA >60 >60 ml/min/1.73m2    Calcium 9.4 8.5 - 10.1 MG/DL    Bilirubin, total 1.0 0.2 - 1.0 MG/DL    ALT (SGPT) 27 16 - 61 U/L    AST (SGOT) 14 10 - 38 U/L    Alk. phosphatase 129 (H) 45 - 117 U/L    Protein, total 7.3 6.4 - 8.2 g/dL    Albumin 4.2 3.4 - 5.0 g/dL    Globulin 3.1 2.0 - 4.0 g/dL    A-G Ratio 1.4 0.8 - 1.7     HEMOGLOBIN A1C WITH EAG   Result Value Ref Range    Hemoglobin A1c 5.1 4.2 - 5.6 %    Est. average glucose 100 mg/dL       RECOMMENDATIONS:  Work on diet and exercise. A lower sugar diet is advised. Your A1c was controlled.     Please call me if you have any questions: [phone]    Sincerely,      Joey Emerson MD

## 2021-12-06 NOTE — PERIOP NOTES
PRE-SURGICAL INSTRUCTIONS        Patient's Name:  Jeni Rousseau Sr. Today's Date:  12/6/2021            Covid Testing Date and Time:    Surgery Date:  12/9/2021                1. Do NOT eat or drink anything, including candy, gum, or ice chips after midnight on 12/9, unless you have specific instructions from your surgeon or anesthesia provider to do so.  2. You may brush your teeth before coming to the hospital.  3. No smoking 24 hours prior to the day of surgery. 4. No alcohol 24 hours prior to the day of surgery. 5. No recreational drugs for one week prior to the day of surgery. 6. Leave all valuables, including money/purse, at home. 7. Remove all jewelry, nail polish, acrylic nails, and makeup (including mascara); no lotions powders, deodorant, or perfume/cologne/after shave on the skin. 8. Follow instruction for Hibiclens washes and CHG wipes from surgeon's office. 9. Glasses/contact lenses and dentures may be worn to the hospital.  They will be removed prior to surgery. 10. Call your doctor if symptoms of a cold or illness develop within 24-48 hours prior to your surgery. 11.  If you are having an outpatient procedure, please make arrangements for a responsible ADULT TO [address] [place] [address] and stay with you for 24 hours after your surgery. 12. ONE VISITOR in the hospital at this time for outpatient procedures. Exceptions may be made for surgical admissions, per nursing unit guidelines      Special Instructions:      Bring list of CURRENT medications. Bring inhaler. Bring any pertinent legal medical records. Take these medications the morning of surgery with a sip of water:  Per office    Complete bowel prep per MD instructions. On the day of surgery, come in the main entrance of [facility]. Let the  at the desk know you are there for surgery. A staff member will come escort you to the surgical area on the second floor.     If you have any questions or concerns, please do not hesitate to call:     (Prior to the day of surgery) [facility] department:  [phone]   (Day of surgery) Pre-Op department:  [phone]    These surgical instructions were reviewed with the patient during the [facility] phone call.

## 2021-12-08 ENCOUNTER — ANESTHESIA EVENT (OUTPATIENT)
Dept: ENDOSCOPY | Age: 51
End: 2021-12-08
Payer: MEDICARE

## 2021-12-09 ENCOUNTER — HOSPITAL ENCOUNTER (OUTPATIENT)
Age: 51
Setting detail: OUTPATIENT SURGERY
Discharge: HOME | End: 2021-12-09
Attending: INTERNAL MEDICINE | Admitting: INTERNAL MEDICINE
Payer: MEDICARE

## 2021-12-09 ENCOUNTER — ANESTHESIA (OUTPATIENT)
Dept: ENDOSCOPY | Age: 51
End: 2021-12-09
Payer: MEDICARE

## 2021-12-09 VITALS
SYSTOLIC BLOOD PRESSURE: 126 MMHG | RESPIRATION RATE: 18 BRPM | HEIGHT: 67 IN | TEMPERATURE: 96.9 F | WEIGHT: 203 LBS | BODY MASS INDEX: 31.86 KG/M2 | OXYGEN SATURATION: 97 % | HEART RATE: 71 BPM | DIASTOLIC BLOOD PRESSURE: 73 MMHG

## 2021-12-09 PROCEDURE — 74011250636 HC RX REV CODE- 250/636: Performed by: NURSE ANESTHETIST, CERTIFIED REGISTERED

## 2021-12-09 PROCEDURE — 74011250637 HC RX REV CODE- 250/637: Performed by: ANESTHESIOLOGY

## 2021-12-09 PROCEDURE — 76060000031 HC ANESTHESIA FIRST 0.5 HR: Performed by: INTERNAL MEDICINE

## 2021-12-09 PROCEDURE — 80307 DRUG TEST PRSMV CHEM ANLYZR: CPT

## 2021-12-09 PROCEDURE — 74011000250 HC RX REV CODE- 250: Performed by: NURSE ANESTHETIST, CERTIFIED REGISTERED

## 2021-12-09 PROCEDURE — 76040000019: Performed by: INTERNAL MEDICINE

## 2021-12-09 PROCEDURE — 00811 ANES LWR INTST NDSC NOS: CPT | Performed by: ANESTHESIOLOGY

## 2021-12-09 PROCEDURE — 2709999900 HC NON-CHARGEABLE SUPPLY: Performed by: INTERNAL MEDICINE

## 2021-12-09 PROCEDURE — 88305 TISSUE EXAM BY PATHOLOGIST: CPT

## 2021-12-09 PROCEDURE — 00811 ANES LWR INTST NDSC NOS: CPT | Performed by: NURSE ANESTHETIST, CERTIFIED REGISTERED

## 2021-12-09 PROCEDURE — 77030008565 HC TBNG SUC IRR ERBE -B: Performed by: INTERNAL MEDICINE

## 2021-12-09 PROCEDURE — 77030013992 HC SNR POLYP ENDOSC BSC -B: Performed by: INTERNAL MEDICINE

## 2021-12-09 RX ORDER — SODIUM CHLORIDE 0.9 % (FLUSH) 0.9 %
5-40 SYRINGE (ML) INJECTION AS NEEDED
Status: DISCONTINUED | OUTPATIENT
Start: 2021-12-09 | End: 2021-12-09 | Stop reason: HOSPADM

## 2021-12-09 RX ORDER — OXYCODONE AND ACETAMINOPHEN 5; 325 MG/1; MG/1
2 TABLET ORAL ONCE
Status: COMPLETED | OUTPATIENT
Start: 2021-12-09 | End: 2021-12-09

## 2021-12-09 RX ORDER — SODIUM CHLORIDE 0.9 % (FLUSH) 0.9 %
5-40 SYRINGE (ML) INJECTION EVERY 8 HOURS
Status: DISCONTINUED | OUTPATIENT
Start: 2021-12-09 | End: 2021-12-09 | Stop reason: HOSPADM

## 2021-12-09 RX ORDER — LIDOCAINE HYDROCHLORIDE 20 MG/ML
INJECTION, SOLUTION EPIDURAL; INFILTRATION; INTRACAUDAL; PERINEURAL AS NEEDED
Status: DISCONTINUED | OUTPATIENT
Start: 2021-12-09 | End: 2021-12-09 | Stop reason: HOSPADM

## 2021-12-09 RX ORDER — SODIUM CHLORIDE, SODIUM LACTATE, POTASSIUM CHLORIDE, CALCIUM CHLORIDE 600; 310; 30; 20 MG/100ML; MG/100ML; MG/100ML; MG/100ML
25 INJECTION, SOLUTION INTRAVENOUS CONTINUOUS
Status: DISCONTINUED | OUTPATIENT
Start: 2021-12-09 | End: 2021-12-09 | Stop reason: HOSPADM

## 2021-12-09 RX ORDER — FAMOTIDINE 20 MG/1
20 TABLET, FILM COATED ORAL ONCE
Status: DISCONTINUED | OUTPATIENT
Start: 2021-12-09 | End: 2021-12-09 | Stop reason: HOSPADM

## 2021-12-09 RX ORDER — PROPOFOL 10 MG/ML
INJECTION, EMULSION INTRAVENOUS AS NEEDED
Status: DISCONTINUED | OUTPATIENT
Start: 2021-12-09 | End: 2021-12-09 | Stop reason: HOSPADM

## 2021-12-09 RX ADMIN — PROPOFOL 50 MG: 10 INJECTION, EMULSION INTRAVENOUS at 12:01

## 2021-12-09 RX ADMIN — PROPOFOL 30 MG: 10 INJECTION, EMULSION INTRAVENOUS at 12:04

## 2021-12-09 RX ADMIN — OXYCODONE HYDROCHLORIDE AND ACETAMINOPHEN 2 TABLET: 5; 325 TABLET ORAL at 12:37

## 2021-12-09 RX ADMIN — LIDOCAINE HYDROCHLORIDE 100 MG: 20 INJECTION, SOLUTION EPIDURAL; INFILTRATION; INTRACAUDAL; PERINEURAL at 11:55

## 2021-12-09 RX ADMIN — PROPOFOL 30 MG: 10 INJECTION, EMULSION INTRAVENOUS at 12:07

## 2021-12-09 RX ADMIN — PROPOFOL 50 MG: 10 INJECTION, EMULSION INTRAVENOUS at 11:58

## 2021-12-09 RX ADMIN — PROPOFOL 100 MG: 10 INJECTION, EMULSION INTRAVENOUS at 11:55

## 2021-12-09 RX ADMIN — PROPOFOL 30 MG: 10 INJECTION, EMULSION INTRAVENOUS at 12:10

## 2021-12-09 RX ADMIN — SODIUM CHLORIDE, SODIUM LACTATE, POTASSIUM CHLORIDE, AND CALCIUM CHLORIDE 25 ML: 600; 310; 30; 20 INJECTION, SOLUTION INTRAVENOUS at 10:42

## 2021-12-09 NOTE — PROGRESS NOTES
[email or web address]  [place], [address]      Brief Procedure Note    Brennan Jones Sr.  1970  [id number]    Date of Procedure: 12/9/2021    Preoperative diagnosis: GERD:  530.81 - K21.9  Exocrine pancreatic insufficiency:  577.8 - K86.81  Personal history of colonic polyps:  V12.72 - Z86.010  Obesity, BMI over 30.  Instructed to work on weight loss:  278.02 - E66.3    Postoperative diagnosis: ascending polyps, cecum polyps, transvese polyp , diverticulosis, hemorrhoid    Type of Anesthesia: MAC (Monitored anesthesia care)    Description of findings: same as post op dx    Procedure: Procedure(s):  COLONOSCOPY with polypectomies    :  Dr. Alexandrea Howard MD    Assistant(s): Endoscopy Technician-1: Renuka Herr  Endoscopy RN-1: Edmundo SMITH RN  Endoscopy RN-2: Destiney Max RN    Devices/implants/grafts/tissues/prosthesis: None    EBL:None    Specimens:   ID Type Source Tests Collected by Time Destination   1 : ascending polyps / cecum polyps Preservative Colon, Ascending  Justa Campos MD 12/9/2021 1159 Pathology   2 : transverse polyp Preservative Colon, [place]  Justa Campos MD 12/9/2021 1209 Pathology       Findings: See printed and scanned procedure note    Complications: None    Dr. Alexandrea Howard MD  12/9/2021  12:16 PM

## 2021-12-09 NOTE — ANESTHESIA PREPROCEDURE EVALUATION
Relevant Problems   RESPIRATORY SYSTEM   (+) COPD, severe (HCC)   (+) Emphysema/COPD (HCC)      NEUROLOGY   (+) Migraines      CARDIOVASCULAR   (+) Essential hypertension, benign       Anesthetic History   No history of anesthetic complications            Review of Systems / Medical History  Patient summary reviewed and pertinent labs reviewed    Pulmonary    COPD: moderate      Smoker (Quit smoking 12/2017)         Neuro/Psych   Within defined limits           Cardiovascular    Hypertension: well controlled          CAD    Exercise tolerance: >4 METS     GI/Hepatic/Renal     GERD: well controlled      Liver disease (Hepatomegaly)     Endo/Other        Morbid obesity and arthritis     Other Findings              Physical Exam    Airway  Mallampati: II  TM Distance: 4 - 6 cm  Neck ROM: normal range of motion   Mouth opening: Normal     Cardiovascular  Regular rate and rhythm,  S1 and S2 normal,  no murmur, click, rub, or gallop             Dental    Dentition: Full lower dentures and Full upper dentures     Pulmonary  Breath sounds clear to auscultation               Abdominal         Other Findings            Anesthetic Plan    ASA: 3  Anesthesia type: MAC          Induction: Intravenous  Anesthetic plan and risks discussed with: Patient

## 2021-12-09 NOTE — ANESTHESIA POSTPROCEDURE EVALUATION
Procedure(s):  COLONOSCOPY with polypectomies. MAC    Anesthesia Post Evaluation      Multimodal analgesia: multimodal analgesia used between 6 hours prior to anesthesia start to PACU discharge  Patient location during evaluation: PACU  Patient participation: complete - patient participated  Level of consciousness: awake and alert  Pain management: adequate  Airway patency: patent  Anesthetic complications: no  Cardiovascular status: acceptable  Respiratory status: acceptable  Hydration status: acceptable  Post anesthesia nausea and vomiting:  none  Final Post Anesthesia Temperature Assessment:  Normothermia (36.0-37.5 degrees C)      INITIAL Post-op Vital signs:   Vitals Value Taken Time   /80 12/09/21 1249   Temp 36.7 °C (98.1 °F) 12/09/21 1222   Pulse 66 12/09/21 1254   Resp 18 12/09/21 1254   SpO2 100 % 12/09/21 1254   Vitals shown include unvalidated device data.

## 2021-12-09 NOTE — H&P
[email or web address]  [phone]    Gastroenterology pre op History and Physical     Impression:   1. High risk colon cancer screening/Colon polyp surveillance exam      Plan:     1. Colonoscopy    Addendum: All lab tests orders pertaining to the procedure have been ordered by the anesthesia personnel and results will be addressed by the anesthesia team      Chief Complaint: high risk colon cancer screening exam.      HPI:  Kaci Miguel is a 45 y.o. male who is being seen on consult for high risk colon cancer screening with colonoscopy.  There is a previous history of colon polyps, and the patient returns for a surveillence exam    PMH:   Past Medical History:   Diagnosis Date    Anxiety     Arthritis     hands    Chest pain     Chronic diastolic heart failure secondary to coronary artery disease (HCC)     Chronic lung disease     Chronic obstructive pulmonary disease ([facility] [address] 08/03/2017    PFTS 8/3/17    COPD, severe (HCC)     Coronary artery disease     no CAD per card notes    Cough     Emphysema/COPD ([facility] [address]     Esophagitis 12/11/2017    Endoscopy    Essential hypertension, benign     Fast heart beat     Feeling of chest tightness     Frequent urination     Heartburn     Hepatomegaly     History of stomach ulcers     Hx of nausea and vomiting     Poor appetite     Positive urine drug screen 01/2016    opiates and THC    Shortness of breath     Splenomegaly     Stomach pain     Stress     Tiredness     Unintentional weight change     Upper GI bleed     Weakness     Wheeze        PSH:   Past Surgical History:   Procedure Laterality Date    COLONOSCOPY N/A 11/5/2018    COLONOSCOPY with polypectomies performed by Linda Gonsalves MD at [place] [address] N/A 9/15/2020    SIGMOIDOSCOPY FLEXIBLE with bx's performed by Tanner Mcdaniel MD at [address] HX ENDOSCOPY  12/11/2017    HX UROLOGICAL  2019    hydrocele removal    HX WISDOM TEETH EXTRACTION Right 05/2019    IR BX LIVER PERCUTANEOUS         Social HX:   Social History     Socioeconomic History    Marital status:      Spouse name: Not on file    Number of children: Not on file    Years of education: Not on file    Highest education level: Not on file   Occupational History    Not on file   Tobacco Use    Smoking status: Former Smoker     Packs/day: 2.50     Years: 25.00     Pack years: 62.50     Types: Cigarettes     Start date: 5/28/1984     Quit date: 12/18/2017     Years since quitting: 3.9    Smokeless tobacco: Never Used   Vaping Use    Vaping Use: Never used   Substance and Sexual Activity    Alcohol use: No    Drug use: Not Currently     Types: Marijuana     Comment: Hx of Marijuana use past 25 years    Sexual activity: Not Currently     Partners: Female   Other Topics Concern     Service No    Blood Transfusions No    Caffeine Concern Yes    Occupational Exposure No    Hobby Hazards No    Sleep Concern Yes     Comment: occas    Stress Concern No    Weight Concern No    Special Diet No    Back Care Yes    Exercise No    Bike Helmet Yes    Seat Belt No     Comment: occas    Self-Exams Not Asked   Social History Narrative         Social Determinants of Health     Financial Resource Strain:     Difficulty of Paying Living Expenses: Not on file   Food Insecurity:     Worried About Running Out of Food in the Last Year: Not on file    Melissa of Food in the Last Year: Not on file   Transportation Needs:     Lack of Transportation (Medical): Not on file    Lack of Transportation (Non-Medical):  Not on file   Physical Activity:     Days of Exercise per Week: Not on file    Minutes of Exercise per Session: Not on file   Stress:     Feeling of Stress : Not on file   Social Connections:     Frequency of Communication with Friends and Family: Not on file    Frequency of Social Gatherings with Friends and Family: Not on file    Attends Hindu Services: Not on file    Active Member of Clubs or Organizations: Not on file    Attends Club or Organization Meetings: Not on file    Marital Status: Not on file   Intimate Partner Violence:     Fear of Current or Ex-Partner: Not on file    Emotionally Abused: Not on file    Physically Abused: Not on file    Sexually Abused: Not on file   Housing Stability:     Unable to Pay for Housing in the Last Year: Not on file    Number of [place] in the Last Year: Not on file    Unstable Housing in the Last Year: Not on file       FHX:   Family History   Problem Relation Age of Onset    Hypertension Mother     Heart Disease Mother     Diabetes Mother     Hypertension Father     Heart Disease Father     Cancer Father         lymphnodes    Diabetes Father        Allergy:   Allergies   Allergen Reactions    Ciprofloxacin Nausea Only    Remeron [Mirtazapine] Other (comments)     Mood swings    Seroquel [Quetiapine] Other (comments)     Mood swings, trembles, shakiness and mild throat swelling (1/08/21)       Home Medications:     Medications Prior to Admission   Medication Sig    atorvastatin (LIPITOR) 20 mg tablet Take 1 tablet by mouth once daily    lisinopriL (PRINIVIL, ZESTRIL) 20 mg tablet Take 1 Tablet by mouth daily.  fluticasone propion-salmeteroL (ADVAIR/WIXELA) 500-50 mcg/dose diskus inhaler Take 1 Puff by inhalation two (2) times a day.  albuterol (PROVENTIL VENTOLIN) 2.5 mg /3 mL (0.083 %) nebu Nebulized 1 vial for inhalation every 4 hours as needed Diag. Code J44.9, J96.10  File Under Medicare B    roflumilast (Daliresp) 500 mcg tab tablet Take 1 tablet by mouth once daily    furosemide (LASIX) 20 mg tablet TAKE 1 TABLET BY MOUTH EVERY OTHER DAY    albuterol (PROVENTIL HFA, VENTOLIN HFA, PROAIR HFA) 90 mcg/actuation inhaler Take 2 Puffs by inhalation every four (4) hours as needed for Wheezing.     umeclidinium (Incruse Ellipta) 62.5 mcg/actuation inhaler 1 inhalation daily.  Exhale fully before inhaling    aspirin delayed-release 81 mg tablet Take  by mouth daily.  oxyCODONE-acetaminophen (PERCOCET) 5-325 mg per tablet TAKE 1 TABLET BY MOUTH EVERY 12 HOURS    OXcarbazepine (TRILEPTAL) 150 mg tablet Take 150 mg by mouth two (2) times a day.  LORazepam (ATIVAN) 1 mg tablet Take 1 Tab by mouth every eight (8) hours as needed for Anxiety. Max Daily Amount: 3 mg.  OXYGEN-AIR DELIVERY SYSTEMS (WALKABOUT 1 OXYGEN SYSTEM) 2.5 L/min by Nasal route continuous. Nightly at PR[facility] during day  Indications: DME: [facility]    pantoprazole (PROTONIX) 40 mg tablet Take 1 Tab by mouth Daily (before breakfast).  Narcan 4 mg/actuation nasal spray     Blood Pressure Monitor (BLOOD PRESSURE KIT) kit 1 Device by Does Not Apply route daily as needed (for blood pressure checks).  Nebulizer & Compressor machine 1 Each by Does Not Apply route every four (4) hours as needed. Review of Systems:     Constitutional: No fevers, chills, weight loss, fatigue. Skin: No rashes, pruritis, jaundice, ulcerations, erythema. HENT: No headaches, nosebleeds, sinus pressure, rhinorrhea, sore throat. Eyes: No visual changes, blurred vision, eye pain, photophobia, jaundice. Cardiovascular: No chest pain, heart palpitations. Respiratory: No cough, SOB, wheezing, chest discomfort, orthopnea. Gastrointestinal: Neg unless noted otherwise in H&P   Genitourinary: No dysuria, bleeding, discharge, pyuria. Musculoskeletal: No weakness, arthralgias, wasting. Endo: No sweats. Heme: No bruising, easy bleeding. Allergies: As noted. Neurological: Cranial nerves intact. Alert and oriented. Gait not assessed. Psychiatric:  No anxiety, depression, hallucinations.                  Visit Vitals  /70 (BP 1 Location: Right upper arm, BP Patient Position: At rest)   Pulse 70   Temp 97.9 °F (36.6 °C)   Resp 20   Ht 5' 7\" (1.702 m)   Wt 92.1 kg (203 lb)   SpO2 97%   BMI 31.79 kg/m² Physical Assessment:     constitutional: appearance: well developed, well nourished, normal habitus, no deformities, in no acute distress. skin: inspection: no rashes, ulcers, icterus or other lesions; no clubbing or telangiectasias. palpation: no induration or subcutaneos nodules. eyes: inspection: normal conjunctivae and lids; no jaundice pupils: symmetrical, normoreactive to light, normal accommodation and size. ENMT: mouth: normal oral mucosa,lips and gums; good dentition. oropharynx: normal tongue, hard and soft palate; posterior pharynx without erithema, exudate or lesions. neck: thyroid: normal size, consistency and position; no masses or tenderness. respiratory: effort: normal chest excursion; no intercostal retraction or accessory muscle use. cardiovascular: abdominal aorta: normal size and position; no bruits. palpation: PMI of normal size and position; normal rhythm; no thrill or murmurs. abdominal: abdomen: normal consistency; no tenderness or masses. hernias: no hernias appreciated. liver: normal size and consistency. spleen: not palpable. rectal: hemoccult/guaiac: not performed. musculoskeletal: digits and nails: no clubbing, cyanosis, petechiae or other inflammatory conditions. gait: normal gait and station head and neck: normal range of motion; no pain, crepitation or contracture. spine/ribs/pelvis: normal range of motion; no pain, deformity or contracture. lymphatic: axilae: not palpable. groin: not palpable. neck: within normal limits. other: not palpable. neurologic: cranial nerves: II-XII normal.   psychiatric: judgement/insight: within normal limits. memory: within normal limits for recent and remote events. mood and affect: no evidence of depression, anxiety or agitation. orientation: oriented to time, space and person. Basic Metabolic Profile   No results for input(s): NA, K, CL, CO2, BUN, GLU, CA, MG, PHOS in the last 72 hours.     No lab exists for component: CREAT      CBC w/Diff    No results for input(s): WBC, RBC, HGB, HCT, MCV, MCH, MCHC, RDW, PLT, HGBEXT, HCTEXT, PLTEXT in the last 72 hours. No lab exists for component: MPV No results for input(s): GRANS, LYMPH, EOS, PRO, MYELO, METAS, BLAST in the last 72 hours. No lab exists for component: MONO, BASO     Hepatic Function   No results for input(s): ALB, TP, TBILI, AP, AML, LPSE in the last 72 hours. No lab exists for component: DBILI, GPT, SGOT       Hernan Wright MD  [facility], [facility]  [email or web address] [facility]

## 2021-12-09 NOTE — DISCHARGE INSTRUCTIONS
Patient Education        Colonoscopy: What to Expect at Home  Your Recovery  After a colonoscopy, you'll stay at the clinic until you wake up. Then you can go home. But you'll need to arrange for a ride. Your doctor will tell you when you can eat and do your other usual activities. Your doctor will talk to you about when you'll need your next colonoscopy. Your doctor can help you decide how often you need to be checked. This will depend on the results of your test and your risk for colorectal cancer. After the test, you may be bloated or have gas pains. You may need to pass gas. If a biopsy was done or a polyp was removed, you may have streaks of blood in your stool (feces) for a few days. Problems such as heavy rectal bleeding may not occur until several weeks after the test. This isn't common. But it can happen after polyps are removed. This care sheet gives you a general idea about how long it will take for you to recover. But each person recovers at a different pace. Follow the steps below to get better as quickly as possible. How can you care for yourself at home? Activity    · Rest when you feel tired.     · You can do your normal activities when it feels okay to do so. Diet    · Follow your doctor's directions for eating.     · Unless your doctor has told you not to, drink plenty of fluids. This helps to replace the fluids that were lost during the colon prep.     · Do not drink alcohol. Medicines    · Your doctor will tell you if and when you can restart your medicines. You will also be given instructions about taking any new medicines.     · If you take aspirin or some other blood thinner, ask your doctor if and when to start taking it again. Make sure that you understand exactly what your doctor wants you to do.     · If polyps were removed or a biopsy was done during the test, your doctor may tell you not to take aspirin or other anti-inflammatory medicines for a few days.  These include ibuprofen (Advil, Motrin) and naproxen (Aleve). Other instructions    · For your safety, do not drive or operate machinery until the medicine wears off and you can think clearly. Your doctor may tell you not to drive or operate machinery until the day after your test.     · Do not sign legal documents or make major decisions until the medicine wears off and you can think clearly. The anesthesia can make it hard for you to fully understand what you are agreeing to. Follow-up care is a key part of your treatment and safety. Be sure to make and go to all appointments, and call your doctor if you are having problems. It's also a good idea to know your test results and keep a list of the medicines you take. When should you call for help? Call 911 anytime you think you may need emergency care. For example, call if:    · You passed out (lost consciousness).     · You pass maroon or bloody stools.     · You have trouble breathing. Call your doctor now or seek immediate medical care if:    · You have pain that does not get better after you take pain medicine.     · You are sick to your stomach or cannot drink fluids.     · You have new or worse belly pain.     · You have blood in your stools.     · You have a fever.     · You cannot pass stools or gas. Watch closely for changes in your health, and be sure to contact your doctor if you have any problems. Where can you learn more? Go to http://www.gray.com/  Enter E264 in the search box to learn more about \"Colonoscopy: What to Expect at Home. \"  Current as of: December 17, 2020               Content Version: 13.0  © 9605-9708 Healthwise, Incorporated. Care instructions adapted under license by [employer] (which disclaims liability or warranty for this information).  If you have questions about a medical condition or this instruction, always ask your healthcare professional. Odalys Martinez disclaims any warranty or liability for your use of this information. DISCHARGE SUMMARY from Nurse    PATIENT INSTRUCTIONS:    After general anesthesia or intravenous sedation, for 24 hours or while taking prescription Narcotics:  · Limit your activities  · Do not drive and operate hazardous machinery  · Do not make important personal or business decisions  · Do  not drink alcoholic beverages  · If you have not urinated within 8 hours after discharge, please contact your surgeon on call. Report the following to your surgeon:  · Excessive pain, swelling, redness or odor of or around the surgical area  · Temperature over 100.5  · Nausea and vomiting lasting longer than 4 hours or if unable to take medications  · Any signs of decreased circulation or nerve impairment to extremity: change in color, persistent  numbness, tingling, coldness or increase pain  · Any questions    What to do at Home:  Recommended activity: Activity as tolerated and no driving for today. *  Please give a list of your current medications to your Primary Care Provider. *  Please update this list whenever your medications are discontinued, doses are      changed, or new medications (including over-the-counter products) are added. *  Please carry medication information at all times in case of emergency situations. These are general instructions for a healthy lifestyle:    No smoking/ No tobacco products/ Avoid exposure to second hand smoke  Surgeon General's Warning:  Quitting smoking now greatly reduces serious risk to your health.     Obesity, smoking, and sedentary lifestyle greatly increases your risk for illness    A healthy diet, regular physical exercise & weight monitoring are important for maintaining a healthy lifestyle    You may be retaining fluid if you have a history of heart failure or if you experience any of the following symptoms:  Weight gain of 3 pounds or more overnight or 5 pounds in a week, increased swelling in our hands or feet or shortness of breath while lying flat in bed. Please call your doctor as soon as you notice any of these symptoms; do not wait until your next office visit. The discharge information has been reviewed with the patient. The patient verbalized understanding. Discharge medications reviewed with the patient and appropriate educational materials and side effects teaching were provided. ___________________________________________________________________________________________________________________________________    Patient Education     Patient Education        Learning About Diverticulosis and Diverticulitis  What are diverticulosis and diverticulitis? In diverticulosis and diverticulitis, pouches called diverticula form in the wall of the large intestine, or colon. · In diverticulosis, the pouches do not cause any pain or other symptoms. · In diverticulitis, the pouches get inflamed or infected and cause symptoms. Doctors aren't sure what causes these pouches in the colon. But they think that a low-fiber diet may play a role. Without fiber to add bulk to the stool, the colon has to work harder than normal to push the stool forward. The pressure from this may cause pouches to form in weak spots along the colon. Some people with diverticulosis get diverticulitis. But experts don't know why this happens. What are the symptoms? · In diverticulosis, most people don't have symptoms. But pouches sometimes bleed. · In diverticulitis, symptoms may last from a few hours to a week or more. They include:  ? Belly pain. This is usually in the lower left side. It is sometimes worse when you move. This is the most common symptom. ? Fever and chills. ? Bloating and gas. ? Diarrhea or constipation. ? Nausea and sometimes vomiting.  ? Not feeling like eating. How can you prevent diverticulitis? You may be able to lower your chance of getting diverticulitis.  You can do this by taking steps to prevent constipation. · Eat fruits, vegetables, beans, and whole grains every day. These foods are high in fiber. · Drink plenty of fluids. If you have kidney, heart, or liver disease and have to limit fluids, talk with your doctor before you increase the amount of fluids you drink. · Get at least 30 minutes of exercise on most days of the week. Walking is a good choice. You also may want to do other activities, such as running, swimming, cycling, or playing tennis or team sports. · Take a fiber supplement, such as Citrucel or Metamucil, every day if needed. Read and follow all instructions on the label. · Schedule time each day for a bowel movement. Having a daily routine may help. Take your time and do not strain when having a bowel movement. Some people avoid nuts, seeds, berries, and popcorn. They believe that these foods might get trapped in the diverticula and cause pain. But there is no proof that these foods cause diverticulitis or make it worse. How are these problems treated? · The best way to treat diverticulosis is to avoid constipation. · Treatment for diverticulitis includes antibiotics. It often includes a change in your diet. You may need only liquids at first. Your doctor may suggest pain medicines for pain or belly cramps. In some cases, surgery may be needed. Follow-up care is a key part of your treatment and safety. Be sure to make and go to all appointments, and call your doctor if you are having problems. It's also a good idea to know your test results and keep a list of the medicines you take. Where can you learn more? Go to http://www.gray.com/  Enter E908 in the search box to learn more about \"Learning About Diverticulosis and Diverticulitis. \"  Current as of: February 10, 2021               Content Version: 13.0  © 8350-5733 Healthwise, [employer].    Care instructions adapted under license by [employer] (which disclaims liability or warranty for this information). If you have questions about a medical condition or this instruction, always ask your healthcare professional. [address] any warranty or liability for your use of this information. Colon Polyps: Care Instructions  Your Care Instructions     Colon polyps are growths in the colon or the rectum. The cause of most colon polyps is not known, and most people who get them do not have any problems. But a certain kind can turn into cancer. For this reason, regular testing for colon polyps is important for people as they get older. It is also important for anyone who has an increased risk for colon cancer. Polyps are usually found through routine colon cancer screening tests. Although most colon polyps are not cancerous, they are usually removed and then tested for cancer. Screening for colon cancer saves lives because the cancer can usually be cured if it is caught early. If you have a polyp that is the type that can turn into cancer, you may need more tests to examine your entire colon. The doctor will remove any other polyps that he or she finds, and you will be tested more often. Follow-up care is a key part of your treatment and safety. Be sure to make and go to all appointments, and call your doctor if you are having problems. It's also a good idea to know your test results and keep a list of the medicines you take. How can you care for yourself at home? Regular exams to look for colon polyps are the best way to prevent polyps from turning into colon cancer. These can include stool tests, sigmoidoscopy, colonoscopy, and CT colonography. Talk with your doctor about a testing schedule that is right for you. To prevent polyps  There is no home treatment that can prevent colon polyps. But these steps may help lower your risk for cancer. · Stay active. Being active can help you get to and stay at a healthy weight. Try to exercise on most days of the week.  Walking is a good choice. · Eat well. Choose a variety of vegetables, fruits, legumes (such as peas and beans), fish, poultry, and whole grains. · Do not smoke. If you need help quitting, talk to your doctor about stop-smoking programs and medicines. These can increase your chances of quitting for good. · If you drink alcohol, limit how much you drink. Limit alcohol to 2 drinks a day for men and 1 drink a day for women. When should you call for help? Call your doctor now or seek immediate medical care if:    · You have severe belly pain.     · Your stools are maroon or very bloody. Watch closely for changes in your health, and be sure to contact your doctor if:    · You have a fever.     · You have nausea or vomiting.     · You have a change in bowel habits (new constipation or diarrhea).     · Your symptoms get worse or are not improving as expected. Where can you learn more? Go to http://costa-jessica.info/  Enter C571 in the search box to learn more about \"Colon Polyps: Care Instructions. \"  Current as of: December 17, 2020               Content Version: 13.0  © 5828-4157 [employer]. Care instructions adapted under license by [employer] (which disclaims liability or warranty for this information). If you have questions about a medical condition or this instruction, always ask your healthcare professional. [address] any warranty or liability for your use of this information. Patient Education        High-Fiber Diet: Care Instructions  Overview     A high-fiber diet may help you relieve constipation and feel less bloated. Your doctor and dietitian will help you make a high-fiber eating plan based on your personal needs. The plan will include the things you like to eat. It will also make sure that you get 25 to 35 grams of fiber a day. Before you make changes to the way you eat, be sure to talk with your doctor or dietitian.   Follow-up care is a key part of your treatment and safety. Be sure to make and go to all appointments, and call your doctor if you are having problems. It's also a good idea to know your test results and keep a list of the medicines you take. How can you care for yourself at home? · You can increase how much fiber you get if you eat more of certain foods. These foods include:  ? Whole-grain breads and cereals. ? Fruits, such as pears, apples, and peaches. Eat the skins and peels if you can.  ? Vegetables, such as broccoli, cabbage, spinach, carrots, asparagus, and squash. ? Starchy vegetables. These include potatoes with skins, kidney beans, and lima beans. · Take a fiber supplement every day if your doctor recommends it. Examples are Benefiber, Citrucel, FiberCon, and Metamucil. Ask your doctor how much to take. · Drink plenty of fluids. If you have kidney, heart, or liver disease and have to limit fluids, talk with your doctor before you increase the amount of fluids you drink. Where can you learn more? Go to http://www.gray.com/  Enter O098 in the search box to learn more about \"High-Fiber Diet: Care Instructions. \"  Current as of: December 17, 2020               Content Version: 13.0  © 7384-6973 Healthwise, Incorporated. Care instructions adapted under license by [employer] (which disclaims liability or warranty for this information). If you have questions about a medical condition or this instruction, always ask your healthcare professional. [place] [address] any warranty or liability for your use of this information.

## 2021-12-15 ENCOUNTER — TELEPHONE (OUTPATIENT)
Dept: PULMONOLOGY | Age: 51
End: 2021-12-15

## 2021-12-15 DIAGNOSIS — Z87.891 HISTORY OF TOBACCO USE: Primary | ICD-10-CM

## 2021-12-15 NOTE — TELEPHONE ENCOUNTER
Pt called stating that when he called [facility] to set up his LDCT scan that they informed him that the dx code was not covered by insurance. Stated that they needed a new order with a different code. Please call pt once it has been corrected so that he may call and get it scheduled.  [phone]

## 2021-12-17 NOTE — TELEPHONE ENCOUNTER
Order placed for LDCT, per Verbal Order from Dr. Gena Stokes on 12/17/2021. Last office visit: 11/23/21    Provider is aware of last office visit and follow up. No further action requested from provider.

## 2022-01-02 ENCOUNTER — HOSPITAL ENCOUNTER (EMERGENCY)
Age: 52
Discharge: HOME | End: 2022-01-02
Attending: STUDENT IN AN ORGANIZED HEALTH CARE EDUCATION/TRAINING PROGRAM
Payer: MEDICARE

## 2022-01-02 ENCOUNTER — APPOINTMENT (OUTPATIENT)
Dept: GENERAL RADIOLOGY | Age: 52
End: 2022-01-02
Attending: STUDENT IN AN ORGANIZED HEALTH CARE EDUCATION/TRAINING PROGRAM
Payer: MEDICARE

## 2022-01-02 VITALS
WEIGHT: 210 LBS | HEIGHT: 67 IN | RESPIRATION RATE: 24 BRPM | DIASTOLIC BLOOD PRESSURE: 64 MMHG | OXYGEN SATURATION: 98 % | SYSTOLIC BLOOD PRESSURE: 127 MMHG | BODY MASS INDEX: 32.96 KG/M2 | HEART RATE: 96 BPM | TEMPERATURE: 97.8 F

## 2022-01-02 DIAGNOSIS — J44.1 COPD EXACERBATION (HCC): Primary | ICD-10-CM

## 2022-01-02 LAB
ALBUMIN SERPL-MCNC: 4 G/DL (ref 3.4–5)
ALBUMIN/GLOB SERPL: 1.1 {RATIO} (ref 0.8–1.7)
ALP SERPL-CCNC: 143 U/L (ref 45–117)
ALT SERPL-CCNC: 27 U/L (ref 16–61)
ANION GAP BLD CALC-SCNC: 17 MMOL/L (ref 10–20)
ANION GAP SERPL CALC-SCNC: 8 MMOL/L (ref 3–18)
AST SERPL-CCNC: 11 U/L (ref 10–38)
B PERT DNA SPEC QL NAA+PROBE: NOT DETECTED
BASE DEFICIT BLD-SCNC: 1.9 MMOL/L
BASOPHILS # BLD: 0 K/UL (ref 0–0.1)
BASOPHILS NFR BLD: 0 % (ref 0–2)
BILIRUB SERPL-MCNC: 0.7 MG/DL (ref 0.2–1)
BNP SERPL-MCNC: 50 PG/ML (ref 0–900)
BORDETELLA PARAPERTUSSIS PCR, BORPAR: NOT DETECTED
BUN SERPL-MCNC: 16 MG/DL (ref 7–18)
BUN/CREAT SERPL: 14 (ref 12–20)
C PNEUM DNA SPEC QL NAA+PROBE: NOT DETECTED
CA-I BLD-MCNC: 1.16 MMOL/L (ref 1.12–1.32)
CALCIUM SERPL-MCNC: 9 MG/DL (ref 8.5–10.1)
CHLORIDE BLD-SCNC: 100 MMOL/L (ref 98–107)
CHLORIDE SERPL-SCNC: 104 MMOL/L (ref 100–111)
CO2 BLD-SCNC: 24 MMOL/L (ref 19–24)
CO2 SERPL-SCNC: 26 MMOL/L (ref 21–32)
CREAT BLD-MCNC: 0.83 MG/DL (ref 0.6–1.3)
CREAT SERPL-MCNC: 1.12 MG/DL (ref 0.6–1.3)
DIFFERENTIAL METHOD BLD: ABNORMAL
EOSINOPHIL # BLD: 0 K/UL (ref 0–0.4)
EOSINOPHIL NFR BLD: 0 % (ref 0–5)
ERYTHROCYTE [DISTWIDTH] IN BLOOD BY AUTOMATED COUNT: 13.2 % (ref 11.6–14.5)
FLUAV SUBTYP SPEC NAA+PROBE: NOT DETECTED
FLUBV RNA SPEC QL NAA+PROBE: NOT DETECTED
GLOBULIN SER CALC-MCNC: 3.8 G/DL (ref 2–4)
GLUCOSE BLD-MCNC: 237 MG/DL (ref 65–100)
GLUCOSE SERPL-MCNC: 230 MG/DL (ref 74–99)
HADV DNA SPEC QL NAA+PROBE: NOT DETECTED
HCO3 BLD-SCNC: 23.6 MMOL/L (ref 22–26)
HCOV 229E RNA SPEC QL NAA+PROBE: NOT DETECTED
HCOV HKU1 RNA SPEC QL NAA+PROBE: NOT DETECTED
HCOV NL63 RNA SPEC QL NAA+PROBE: NOT DETECTED
HCOV OC43 RNA SPEC QL NAA+PROBE: NOT DETECTED
HCT VFR BLD AUTO: 45.8 % (ref 36–48)
HGB BLD-MCNC: 16.1 G/DL (ref 13–16)
HMPV RNA SPEC QL NAA+PROBE: DETECTED
HPIV1 RNA SPEC QL NAA+PROBE: NOT DETECTED
HPIV2 RNA SPEC QL NAA+PROBE: NOT DETECTED
HPIV3 RNA SPEC QL NAA+PROBE: NOT DETECTED
HPIV4 RNA SPEC QL NAA+PROBE: NOT DETECTED
IMM GRANULOCYTES # BLD AUTO: 0.1 K/UL (ref 0–0.04)
IMM GRANULOCYTES NFR BLD AUTO: 1 % (ref 0–0.5)
LACTATE BLD-SCNC: 3.16 MMOL/L (ref 0.4–2)
LYMPHOCYTES # BLD: 0.7 K/UL (ref 0.9–3.6)
LYMPHOCYTES NFR BLD: 5 % (ref 21–52)
M PNEUMO DNA SPEC QL NAA+PROBE: NOT DETECTED
MAGNESIUM SERPL-MCNC: 2.3 MG/DL (ref 1.6–2.6)
MCH RBC QN AUTO: 29.3 PG (ref 24–34)
MCHC RBC AUTO-ENTMCNC: 35.2 G/DL (ref 31–37)
MCV RBC AUTO: 83.4 FL (ref 78–100)
MONOCYTES # BLD: 0.5 K/UL (ref 0.05–1.2)
MONOCYTES NFR BLD: 4 % (ref 3–10)
NEUTS SEG # BLD: 10.8 K/UL (ref 1.8–8)
NEUTS SEG NFR BLD: 90 % (ref 40–73)
NRBC # BLD: 0 K/UL (ref 0–0.01)
NRBC BLD-RTO: 0 PER 100 WBC
PCO2 BLDV: 41.8 MMHG (ref 41–51)
PH BLDV: 7.36 [PH] (ref 7.32–7.42)
PLATELET # BLD AUTO: 341 K/UL (ref 135–420)
PMV BLD AUTO: 9.9 FL (ref 9.2–11.8)
PO2 BLDV: 36 MMHG (ref 25–40)
POTASSIUM BLD-SCNC: 4.1 MMOL/L (ref 3.5–5.1)
POTASSIUM SERPL-SCNC: 3.9 MMOL/L (ref 3.5–5.5)
PROT SERPL-MCNC: 7.8 G/DL (ref 6.4–8.2)
RBC # BLD AUTO: 5.49 M/UL (ref 4.35–5.65)
RSV RNA SPEC QL NAA+PROBE: NOT DETECTED
RV+EV RNA SPEC QL NAA+PROBE: NOT DETECTED
SAO2 % BLD: 67 %
SARS-COV-2 PCR, COVPCR: NOT DETECTED
SERVICE CMNT-IMP: ABNORMAL
SODIUM BLD-SCNC: 140 MMOL/L (ref 136–145)
SODIUM SERPL-SCNC: 138 MMOL/L (ref 136–145)
SPECIMEN SITE: ABNORMAL
TROPONIN-HIGH SENSITIVITY: <3 NG/L (ref 0–78)
WBC # BLD AUTO: 12.1 K/UL (ref 4.6–13.2)

## 2022-01-02 PROCEDURE — 94640 AIRWAY INHALATION TREATMENT: CPT

## 2022-01-02 PROCEDURE — 83735 ASSAY OF MAGNESIUM: CPT

## 2022-01-02 PROCEDURE — 87040 BLOOD CULTURE FOR BACTERIA: CPT

## 2022-01-02 PROCEDURE — 85025 COMPLETE CBC W/AUTO DIFF WBC: CPT

## 2022-01-02 PROCEDURE — 83605 ASSAY OF LACTIC ACID: CPT

## 2022-01-02 PROCEDURE — 83880 ASSAY OF NATRIURETIC PEPTIDE: CPT

## 2022-01-02 PROCEDURE — 99285 EMERGENCY DEPT VISIT HI MDM: CPT

## 2022-01-02 PROCEDURE — 96365 THER/PROPH/DIAG IV INF INIT: CPT

## 2022-01-02 PROCEDURE — 93005 ELECTROCARDIOGRAM TRACING: CPT

## 2022-01-02 PROCEDURE — 74011250636 HC RX REV CODE- 250/636: Performed by: STUDENT IN AN ORGANIZED HEALTH CARE EDUCATION/TRAINING PROGRAM

## 2022-01-02 PROCEDURE — 96375 TX/PRO/DX INJ NEW DRUG ADDON: CPT

## 2022-01-02 PROCEDURE — 74011000258 HC RX REV CODE- 258: Performed by: STUDENT IN AN ORGANIZED HEALTH CARE EDUCATION/TRAINING PROGRAM

## 2022-01-02 PROCEDURE — 71045 X-RAY EXAM CHEST 1 VIEW: CPT

## 2022-01-02 PROCEDURE — 80053 COMPREHEN METABOLIC PANEL: CPT

## 2022-01-02 PROCEDURE — 84484 ASSAY OF TROPONIN QUANT: CPT

## 2022-01-02 PROCEDURE — 0202U NFCT DS 22 TRGT SARS-COV-2: CPT

## 2022-01-02 PROCEDURE — 74011000250 HC RX REV CODE- 250: Performed by: STUDENT IN AN ORGANIZED HEALTH CARE EDUCATION/TRAINING PROGRAM

## 2022-01-02 PROCEDURE — 36415 COLL VENOUS BLD VENIPUNCTURE: CPT

## 2022-01-02 RX ORDER — PREDNISONE 20 MG/1
40 TABLET ORAL DAILY
Qty: 10 TABLET | Refills: 0 | Status: SHIPPED | OUTPATIENT
Start: 2022-01-02 | End: 2022-01-07

## 2022-01-02 RX ORDER — IPRATROPIUM BROMIDE AND ALBUTEROL SULFATE 2.5; .5 MG/3ML; MG/3ML
3 SOLUTION RESPIRATORY (INHALATION)
Status: COMPLETED | OUTPATIENT
Start: 2022-01-02 | End: 2022-01-02

## 2022-01-02 RX ORDER — DOXYCYCLINE HYCLATE 100 MG
100 TABLET ORAL 2 TIMES DAILY
Qty: 14 TABLET | Refills: 0 | Status: SHIPPED | OUTPATIENT
Start: 2022-01-02 | End: 2022-01-09

## 2022-01-02 RX ORDER — AMOXICILLIN AND CLAVULANATE POTASSIUM 875; 125 MG/1; MG/1
1 TABLET, FILM COATED ORAL 2 TIMES DAILY
Qty: 14 TABLET | Refills: 0 | Status: SHIPPED | OUTPATIENT
Start: 2022-01-02 | End: 2022-01-09

## 2022-01-02 RX ADMIN — IPRATROPIUM BROMIDE AND ALBUTEROL SULFATE 3 ML: .5; 2.5 SOLUTION RESPIRATORY (INHALATION) at 15:57

## 2022-01-02 RX ADMIN — IPRATROPIUM BROMIDE AND ALBUTEROL SULFATE 3 ML: .5; 2.5 SOLUTION RESPIRATORY (INHALATION) at 17:02

## 2022-01-02 RX ADMIN — METHYLPREDNISOLONE SODIUM SUCCINATE 125 MG: 125 INJECTION, POWDER, FOR SOLUTION INTRAMUSCULAR; INTRAVENOUS at 15:57

## 2022-01-02 RX ADMIN — DOXYCYCLINE 100 MG: 100 INJECTION, POWDER, LYOPHILIZED, FOR SOLUTION INTRAVENOUS at 16:45

## 2022-01-02 RX ADMIN — CEFTRIAXONE SODIUM 2 G: 2 INJECTION, POWDER, FOR SOLUTION INTRAMUSCULAR; INTRAVENOUS at 15:58

## 2022-01-02 NOTE — ED NOTES
EMERGENCY DEPARTMENT HISTORY AND PHYSICAL EXAM      Patient Name: Rubia Oro Sr.    History of Presenting Illness     HPI:     54-year-old male with a history of HTN, HLD, COPD on 2.5L oxygen, CHF, CAD, presents to the ED for evaluation of 1 week history of progressive worsening exertional dyspnea and cough. Cough is productive. Reports similar symptoms in the past when he has had COPD exacerbations. Reports subjective fevers earlier this week on Sunday and Monday. States he has been using his rescue inhaler multiple times a day over the last several days without relief. Denies any sick contacts, chest pain, nausea, vomiting, abdominal or back pain, diaphoresis, lightheadedness. PCP: Nickolas Crigler, MD    No current facility-administered medications on file prior to encounter. Current Outpatient Medications on File Prior to Encounter   Medication Sig Dispense Refill    atorvastatin (LIPITOR) 20 mg tablet Take 1 tablet by mouth once daily 90 Tablet 0    lisinopriL (PRINIVIL, ZESTRIL) 20 mg tablet Take 1 Tablet by mouth daily. 90 Tablet 1    Narcan 4 mg/actuation nasal spray       fluticasone propion-salmeteroL (ADVAIR/WIXELA) 500-50 mcg/dose diskus inhaler Take 1 Puff by inhalation two (2) times a day. 60 Each 3    albuterol (PROVENTIL VENTOLIN) 2.5 mg /3 mL (0.083 %) nebu Nebulized 1 vial for inhalation every 4 hours as needed Diag. Code J44.9, J96.10  File Under Medicare B 120 Each 2    roflumilast (Daliresp) 500 mcg tab tablet Take 1 tablet by mouth once daily 30 Tablet 5    furosemide (LASIX) 20 mg tablet TAKE 1 TABLET BY MOUTH EVERY OTHER DAY 15 Tablet 6    albuterol (PROVENTIL HFA, VENTOLIN HFA, PROAIR HFA) 90 mcg/actuation inhaler Take 2 Puffs by inhalation every four (4) hours as needed for Wheezing. 1 Inhaler 3    umeclidinium (Incruse Ellipta) 62.5 mcg/actuation inhaler 1 inhalation daily.   Exhale fully before inhaling 1 Inhaler 5    aspirin delayed-release 81 mg tablet Take by mouth daily.  oxyCODONE-acetaminophen (PERCOCET) 5-325 mg per tablet TAKE 1 TABLET BY MOUTH EVERY 12 HOURS      Blood Pressure Monitor (BLOOD PRESSURE KIT) kit 1 Device by Does Not Apply route daily as needed (for blood pressure checks). 1 Kit 0    OXcarbazepine (TRILEPTAL) 150 mg tablet Take 150 mg by mouth two (2) times a day.  LORazepam (ATIVAN) 1 mg tablet Take 1 Tab by mouth every eight (8) hours as needed for Anxiety. Max Daily Amount: 3 mg. 90 Tab 0    Nebulizer & Compressor machine 1 Each by Does Not Apply route every four (4) hours as needed. 1 Each 0    OXYGEN-AIR DELIVERY SYSTEMS (WALKABOUT 1 OXYGEN SYSTEM) 2.5 L/min by Nasal route continuous. Nightly at PRN during day  Indications: DME: [facility]      pantoprazole (PROTONIX) 40 mg tablet Take 1 Tab by mouth Daily (before breakfast).  30 Tab 0       Past History     Past Medical History:  Past Medical History:   Diagnosis Date    Anxiety     Arthritis     hands    Chest pain     Chronic diastolic heart failure secondary to coronary artery disease (HCC)     Chronic lung disease     Chronic obstructive pulmonary disease (HCC) 08/03/2017    PFTS 8/3/17    COPD, severe (HCC)     Coronary artery disease     no CAD per card notes    Cough     Emphysema/COPD ([facility] [address]     Esophagitis 12/11/2017    Endoscopy    Essential hypertension, benign     Fast heart beat     Feeling of chest tightness     Frequent urination     Heartburn     Hepatomegaly     History of stomach ulcers     Hx of nausea and vomiting     Poor appetite     Positive urine drug screen 01/2016    opiates and THC    Shortness of breath     Splenomegaly     Stomach pain     Stress     Tiredness     Unintentional weight change     Upper GI bleed     Weakness     Wheeze        Past Surgical History:  Past Surgical History:   Procedure Laterality Date    COLONOSCOPY N/A 11/5/2018    COLONOSCOPY with polypectomies performed by Catrachito Arteaga MD at [facility]COPY    COLONOSCOPY N/A 2021    COLONOSCOPY with polypectomies performed by Danielle Oates MD at [address] N/A 9/15/2020    [address] with bx's performed by Danielle Oates MD at [facility] ENDOSCOPY    HX ENDOSCOPY  2017    HX UROLOGICAL  2019    hydrocele removal    HX WISDOM TEETH EXTRACTION Right 2019    IR BX LIVER PERCUTANEOUS         Family History:  Family History   Problem Relation Age of Onset    Hypertension Mother     Heart Disease Mother     Diabetes Mother     Hypertension Father     Heart Disease Father     Cancer Father         lymphnodes    Diabetes Father        Social History:  Social History     Tobacco Use    Smoking status: Former Smoker     Packs/day: 2.50     Years: 25.00     Pack years: 62.50     Types: Cigarettes     Start date: 1984     Quit date: 2017     Years since quittin.0    Smokeless tobacco: Never Used   Vaping Use    Vaping Use: Never used   Substance Use Topics    Alcohol use: No    Drug use: Not Currently     Types: Marijuana     Comment: Hx of Marijuana use past 25 years       Allergies: Allergies   Allergen Reactions    Ciprofloxacin Nausea Only    Remeron [Mirtazapine] Other (comments)     Mood swings    Seroquel [Quetiapine] Other (comments)     Mood swings, trembles, shakiness and mild throat swelling (21)       Review of Nursing Notes: I have reviewed the relevant nursing notes that were available at the time of this entry. Portions of the Family and Social history, as well as medications and allergies, may have been entered into my documentation by nursing or other ancillary staff; I have confirmed and may have supplemented that information to the best of my ability at the time the note was reviewed. There are some disagreements between the nursing notes and my evaluation at times.  Some of the above referenced nursing documentation appears in my note after completion of my review. Review of Systems     CONSTITUTIONAL: Denies fevers, chills, sweats, or weight changes. EYES: Denies any visual changes or symptoms  HENT: Denies headaches, changes to hearing, rhinitis, sore throat, dysphagia, or change in voice. CV: Denies chest pains or palpitations. Lungs/Chest: Denies wheezing. GI: Denies nausea, vomiting, diarrhea, constipation, abdominal pain, hematochezia, or melena. : Denies urinary retention or incontinence. No genital discharge. No dysuria. MSK: Denies myalgias or arthralgias. NEURO: Denies chronic headaches or seizures. No numbness, tingling or weakness. PSYCHIATRIC: Denies problems with mood disturbance and anxiety. DERMATOLOGIC: Denies any rashes or skin changes. Physical Exam     General: In no apparent distress. Well-nourished/well-developed. Head/Neck: Normocephalic, atraumatic. Nontender midline neck, normal ROM. EENT: PERRLA. EOM intact bilaterally. Oropharyngeal mucosa is moist, lesions or erythema. No nasal discharge. Cardiovascular: Tachycardic, no murmurs, gallops, or rubs. Peripheral pulses normal and intact in BUE and BLE. Lungs/Chest: Decreased air movement bilaterally, no focal wheezing or rhonchi or rales. Mild to moderate respiratory distress, tachypneic. Abdomen: No distention. No organomegaly. No rebound, rigidity, or guarding. Nontender. Extremities/Skin: Warm distal extremities No deformities. No edema. No rashes. Neuro: A&O x 3. Grossly intact sensations and motor function in upper and lower extremities bilaterally. Psych: Appropriate mood and affect.     Diagnostic Study Results     Labs -     Recent Results (from the past 12 hour(s))   CBC WITH AUTOMATED DIFF    Collection Time: 01/02/22  3:46 PM   Result Value Ref Range    WBC 12.1 4.6 - 13.2 K/uL    RBC 5.49 4.35 - 5.65 M/uL    HGB 16.1 (H) 13.0 - 16.0 g/dL    HCT 45.8 36.0 - 48.0 %    MCV 83.4 78.0 - 100.0 FL    MCH 29.3 24.0 - 34.0 PG    MCHC 35.2 31.0 - 37.0 g/dL    RDW 13.2 11.6 - 14.5 %    PLATELET 292 940 - 047 K/uL    MPV 9.9 9.2 - 11.8 FL    NRBC 0.0 0  WBC    ABSOLUTE NRBC 0.00 0.00 - 0.01 K/uL    NEUTROPHILS 90 (H) 40 - 73 %    LYMPHOCYTES 5 (L) 21 - 52 %    MONOCYTES 4 3 - 10 %    EOSINOPHILS 0 0 - 5 %    BASOPHILS 0 0 - 2 %    IMMATURE GRANULOCYTES 1 (H) 0.0 - 0.5 %    ABS. NEUTROPHILS 10.8 (H) 1.8 - 8.0 K/UL    ABS. LYMPHOCYTES 0.7 (L) 0.9 - 3.6 K/UL    ABS. MONOCYTES 0.5 0.05 - 1.2 K/UL    ABS. EOSINOPHILS 0.0 0.0 - 0.4 K/UL    ABS. BASOPHILS 0.0 0.0 - 0.1 K/UL    ABS. IMM. GRANS. 0.1 (H) 0.00 - 0.04 K/UL    DF AUTOMATED     METABOLIC PANEL, COMPREHENSIVE    Collection Time: 01/02/22  3:46 PM   Result Value Ref Range    Sodium 138 136 - 145 mmol/L    Potassium 3.9 3.5 - 5.5 mmol/L    Chloride 104 100 - 111 mmol/L    CO2 26 21 - 32 mmol/L    Anion gap 8 3.0 - 18 mmol/L    Glucose 230 (H) 74 - 99 mg/dL    BUN 16 7.0 - 18 MG/DL    Creatinine 1.12 0.6 - 1.3 MG/DL    BUN/Creatinine ratio 14 12 - 20      GFR est AA >60 >60 ml/min/1.73m2    GFR est non-AA >60 >60 ml/min/1.73m2    Calcium 9.0 8.5 - 10.1 MG/DL    Bilirubin, total 0.7 0.2 - 1.0 MG/DL    ALT (SGPT) 27 16 - 61 U/L    AST (SGOT) 11 10 - 38 U/L    Alk.  phosphatase 143 (H) 45 - 117 U/L    Protein, total 7.8 6.4 - 8.2 g/dL    Albumin 4.0 3.4 - 5.0 g/dL    Globulin 3.8 2.0 - 4.0 g/dL    A-G Ratio 1.1 0.8 - 1.7     NT-PRO BNP    Collection Time: 01/02/22  3:46 PM   Result Value Ref Range    NT pro-BNP 50 0 - 900 PG/ML   TROPONIN-HIGH SENSITIVITY    Collection Time: 01/02/22  3:46 PM   Result Value Ref Range    Troponin-High Sensitivity <3 0 - 78 ng/L   MAGNESIUM    Collection Time: 01/02/22  3:46 PM   Result Value Ref Range    Magnesium 2.3 1.6 - 2.6 mg/dL   BLOOD GAS,CHEM8,LACTIC ACID POC    Collection Time: 01/02/22  3:47 PM   Result Value Ref Range    Calcium, ionized (POC) 1.16 1.12 - 1.32 mmol/L    Base deficit (POC) 1.9 mmol/L    HCO3 (POC) 23.6 22 - 26 MMOL/L    CO2, POC 24 19 - 24 MMOL/L    O2 SAT 67 % Sample source VENOUS BLOOD      Performed by Loni Mendoza     Sodium (POC) 140 136 - 145 mmol/L    Potassium (POC) 4.1 3.5 - 5.1 mmol/L    Glucose (POC) 237 (H) 65 - 100 mg/dL    Creatinine (POC) 0.83 0.6 - 1.3 mg/dL    Lactic Acid (POC) 3.16 (HH) 0.40 - 2.00 mmol/L    Chloride (POC) 100 98 - 107 mmol/L    Anion gap, POC 17 10 - 20      GFRAA, POC >60 >60 ml/min/1.73m2    GFRNA, POC >60 >60 ml/min/1.73m2    pH, venous (POC) 7.36 7.32 - 7.42      pCO2, venous (POC) 41.8 41 - 51 MMHG    pO2, venous (POC) 36 25 - 40 mmHg       Radiologic Studies -   XR CHEST PORT    (Results Pending)     CT Results  (Last 48 hours)    None        CXR Results  (Last 48 hours)    None          Medical Decision Making     I am the first provider for this patient. Vital Signs- I personally reviewed and interpreted the patient's vital signs. Patient Vitals for the past 12 hrs:   Temp Pulse Resp BP SpO2   01/02/22 1718  96 24  98 %   01/02/22 1643  (!) 104 23  95 %   01/02/22 1630  97 20 127/64 97 %   01/02/22 1615  98 19 (!) 136/94 98 %   01/02/22 1600  96 23 134/85 99 %   01/02/22 1534 97.8 °F (36.6 °C) (!) 115 26 138/80 97 %       EKG interpretation: NSR. Rate 93. Normal axis. Normal intervals. Nonspecific ST/T changes. EKG read and interpreted by ED physician at [facility] 13: I reviewed the patient's records to interpret any previous medical data available to me including EKGs, previous medical records, previous images, previous labs. Provider Notes (Medical Decision Making):     65-year-old male with known COPD on 2.5L oxygen presents to the ED for 1 week of progressive exertional dyspnea and productive cough. Upon my examination, patient is afebrile and overall well appearing. He is tachypneic and tachycardic, otherwise, hemodynamically normal. Neurovascularly intact.     Given today's clinical picture, my differential diagnoses indlude: COPD exacerbation, CHF exacerbation, pneumonia, pneumothorax, anemia, electrolyte abnormality, dehydration, arrhythmia, ACS. To further refine my diagnosis, I will order EKG, CXR, labs including cardiac enzymes. Will draw blood cultures and give empiric antibiotics for respiratory source of infection. Will give Solu-Medrol and duo nebs for COPD exacerbation that is clinically evident. Disposition pending work-up and response to therapy. ED Course:     ED Course as of 01/02/22 1756   Naya Ramos Jan 02, 2022   1645 Patient states he is improving. We will continue to monitor. Will give another breathing treatment. [EK]   4597 Vital signs improving. Wheezing resolved. Patient remained stable throughout their ED course. I discussed relevant findings with patient. My plan is to discharge home with return precautions, prescriptions for Augmentin, doxycycline, and prednisone, and PCP follow-up within two days. Additional verbal discharge instructions were given and discussed with the patient. Patient expressed understanding, agrees to plan, and all of their questions were answered.  [EK]      ED Course User Index  [EK] DO Shadi Gonzalez DO  Date: 1/2/2022

## 2022-01-02 NOTE — ED TRIAGE NOTES
Pt has late stage COPD & started having SOB last Saturday. Pt on  2.5L at home. Pt c/o of fever Sunday & Monday last week. Pt states he took 3 breathing treatment at home, but received no relief.

## 2022-01-03 LAB
ATRIAL RATE: 93 BPM
CALCULATED P AXIS, ECG09: 74 DEGREES
CALCULATED R AXIS, ECG10: 76 DEGREES
CALCULATED T AXIS, ECG11: 73 DEGREES
DIAGNOSIS, 93000: NORMAL
P-R INTERVAL, ECG05: 150 MS
Q-T INTERVAL, ECG07: 328 MS
QRS DURATION, ECG06: 72 MS
QTC CALCULATION (BEZET), ECG08: 407 MS
VENTRICULAR RATE, ECG03: 93 BPM

## 2022-01-04 RX ORDER — SODIUM CHLORIDE 0.9 % (FLUSH) 0.9 %
5-40 SYRINGE (ML) INJECTION EVERY 8 HOURS
OUTPATIENT
Start: 2022-01-04

## 2022-01-04 RX ORDER — SODIUM CHLORIDE 0.9 % (FLUSH) 0.9 %
5-40 SYRINGE (ML) INJECTION AS NEEDED
OUTPATIENT
Start: 2022-01-04

## 2022-01-04 NOTE — ED NOTES
Unable to reach patient at numbers on medical record. Dr Nix Feeling requests non emergent police go to patients home and have him call us. Pt has + blood cultures. [place] dispatcher Maude Nguyễn will have officers report to address on record and have patient call us if possible.

## 2022-01-05 ENCOUNTER — TELEPHONE (OUTPATIENT)
Dept: FAMILY MEDICINE CLINIC | Age: 52
End: 2022-01-05

## 2022-01-06 PROBLEM — M19.042 DEGENERATIVE ARTHRITIS OF METACARPOPHALANGEAL JOINT OF INDEX FINGER OF LEFT HAND: Status: ACTIVE | Noted: 2021-12-03

## 2022-01-06 PROBLEM — G56.01 CARPAL TUNNEL SYNDROME OF RIGHT WRIST: Status: ACTIVE | Noted: 2021-12-03

## 2022-01-06 RX ORDER — FLUOXETINE HYDROCHLORIDE 40 MG/1
CAPSULE ORAL
COMMUNITY
Start: 2021-12-06

## 2022-01-06 RX ORDER — HYDROXYZINE 50 MG/1
TABLET, FILM COATED ORAL
COMMUNITY
Start: 2021-11-23

## 2022-01-07 ENCOUNTER — OFFICE VISIT (OUTPATIENT)
Dept: PULMONOLOGY | Age: 52
End: 2022-01-07
Payer: MEDICARE

## 2022-01-07 VITALS
SYSTOLIC BLOOD PRESSURE: 119 MMHG | RESPIRATION RATE: 18 BRPM | HEIGHT: 67 IN | BODY MASS INDEX: 32.49 KG/M2 | WEIGHT: 207 LBS | TEMPERATURE: 99.5 F | HEART RATE: 96 BPM | DIASTOLIC BLOOD PRESSURE: 72 MMHG | OXYGEN SATURATION: 97 %

## 2022-01-07 DIAGNOSIS — J98.11 BILATERAL ATELECTASIS: ICD-10-CM

## 2022-01-07 DIAGNOSIS — J44.9 COPD, SEVERE (HCC): ICD-10-CM

## 2022-01-07 DIAGNOSIS — J44.1 COPD EXACERBATION (HCC): Primary | ICD-10-CM

## 2022-01-07 DIAGNOSIS — Z99.81 HYPOXEMIA REQUIRING SUPPLEMENTAL OXYGEN: ICD-10-CM

## 2022-01-07 DIAGNOSIS — J98.8 VIRAL RESPIRATORY INFECTION: ICD-10-CM

## 2022-01-07 DIAGNOSIS — R09.02 HYPOXEMIA REQUIRING SUPPLEMENTAL OXYGEN: ICD-10-CM

## 2022-01-07 DIAGNOSIS — B97.89 VIRAL RESPIRATORY INFECTION: ICD-10-CM

## 2022-01-07 LAB
BACTERIA SPEC CULT: ABNORMAL
GRAM STN SPEC: ABNORMAL
GRAM STN SPEC: ABNORMAL
SERVICE CMNT-IMP: ABNORMAL

## 2022-01-07 PROCEDURE — G8754 DIAS BP LESS 90: HCPCS | Performed by: INTERNAL MEDICINE

## 2022-01-07 PROCEDURE — G8432 DEP SCR NOT DOC, RNG: HCPCS | Performed by: INTERNAL MEDICINE

## 2022-01-07 PROCEDURE — 99214 OFFICE O/P EST MOD 30 MIN: CPT | Performed by: INTERNAL MEDICINE

## 2022-01-07 PROCEDURE — G8427 DOCREV CUR MEDS BY ELIG CLIN: HCPCS | Performed by: INTERNAL MEDICINE

## 2022-01-07 PROCEDURE — 3017F COLORECTAL CA SCREEN DOC REV: CPT | Performed by: INTERNAL MEDICINE

## 2022-01-07 PROCEDURE — G8752 SYS BP LESS 140: HCPCS | Performed by: INTERNAL MEDICINE

## 2022-01-07 PROCEDURE — G8417 CALC BMI ABV UP PARAM F/U: HCPCS | Performed by: INTERNAL MEDICINE

## 2022-01-07 RX ORDER — PREDNISONE 20 MG/1
20 TABLET ORAL
Qty: 5 TABLET | Refills: 0 | Status: SHIPPED | OUTPATIENT
Start: 2022-01-07 | End: 2022-01-12

## 2022-01-07 NOTE — PROGRESS NOTES
Galo Latham presents today for   Chief Complaint   Patient presents with   [facility] ED Follow-up       Is someone accompanying this pt? No    Is the patient using any DME equipment during OV? Oxygen     -DME [employer]    Depression Screening:  3 most recent PHQ Screens 11/30/2021   PHQ Not Done -   Little interest or pleasure in doing things Not at all   Feeling down, depressed, irritable, or hopeless Not at all   Total Score PHQ 2 0       Learning Assessment:  Learning Assessment 5/12/2021   PRIMARY LEARNER Patient   PRIMARY LANGUAGE ENGLISH   LEARNER PREFERENCE PRIMARY DEMONSTRATION     -     -   ANSWERED BY patient   RELATIONSHIP SELF       Abuse Screening:  Abuse Screening Questionnaire 8/27/2021   Do you ever feel afraid of your partner? N   Are you in a relationship with someone who physically or mentally threatens you? N   Is it safe for you to go home? Y       Fall Risk  Fall Risk Assessment, last 12 mths 11/30/2021   Able to walk? Yes   Fall in past 12 months? 0   Do you feel unsteady? 0   Are you worried about falling 0         Coordination of Care:  1. Have you been to the ER, urgent care clinic since your last visit? Hospitalized since your last visit? Yes; Name: [facility] ED  1/22 Pneumonia    2. Have you seen or consulted any other health care providers outside of the [address] [place] [address] since your last visit? Include any pap smears or colon screening.  No

## 2022-01-08 LAB
BACTERIA SPEC CULT: NORMAL
SERVICE CMNT-IMP: NORMAL

## 2022-01-11 ENCOUNTER — VIRTUAL VISIT (OUTPATIENT)
Dept: FAMILY MEDICINE CLINIC | Age: 52
End: 2022-01-11
Payer: MEDICARE

## 2022-01-11 DIAGNOSIS — Z09 HOSPITAL DISCHARGE FOLLOW-UP: Primary | ICD-10-CM

## 2022-01-11 DIAGNOSIS — J44.9 COPD, SEVERE (HCC): ICD-10-CM

## 2022-01-11 PROCEDURE — G8432 DEP SCR NOT DOC, RNG: HCPCS | Performed by: NURSE PRACTITIONER

## 2022-01-11 PROCEDURE — 3017F COLORECTAL CA SCREEN DOC REV: CPT | Performed by: NURSE PRACTITIONER

## 2022-01-11 PROCEDURE — G0463 HOSPITAL OUTPT CLINIC VISIT: HCPCS | Performed by: NURSE PRACTITIONER

## 2022-01-11 PROCEDURE — 99213 OFFICE O/P EST LOW 20 MIN: CPT | Performed by: NURSE PRACTITIONER

## 2022-01-11 PROCEDURE — G8417 CALC BMI ABV UP PARAM F/U: HCPCS | Performed by: NURSE PRACTITIONER

## 2022-01-11 PROCEDURE — G8427 DOCREV CUR MEDS BY ELIG CLIN: HCPCS | Performed by: NURSE PRACTITIONER

## 2022-01-11 PROCEDURE — G8756 NO BP MEASURE DOC: HCPCS | Performed by: NURSE PRACTITIONER

## 2022-01-11 NOTE — PROGRESS NOTES
Chief Complaint   Patient presents with   [facility] Follow Up     Gordo Jay       1. \"Have you been to the ER, urgent care clinic since your last visit? Hospitalized since your last visit? \" Yes When: [facility] 01/02/22 Copd , right lung collapse     2. \"Have you seen or consulted any other health care providers outside of the [address] [place] [address] since your last visit? \" No    3. For patients aged 39-70: Has the patient had a colonoscopy? Yes,  satisfied with blue hyperlink     If the patient is female:    4. For patients aged 41-77: Has the patient had a mammogram within the past 2 years? NA based on age or sex    11. For patients aged 21-65: Has the patient had a pap smear? NA based on age or sex    Learning Assessment 5/12/2021   PRIMARY LEARNER Patient   PRIMARY LANGUAGE ENGLISH   LEARNER PREFERENCE PRIMARY DEMONSTRATION     -     -   ANSWERED BY patient   RELATIONSHIP SELF     3 most recent PHQ Screens 11/30/2021   PHQ Not Done -   Little interest or pleasure in doing things Not at all   Feeling down, depressed, irritable, or hopeless Not at all   Total Score PHQ 2 0     Fall Risk Assessment, last 12 mths 11/30/2021   Able to walk? Yes   Fall in past 12 months? 0   Do you feel unsteady? 0   Are you worried about falling 0     Abuse Screening Questionnaire 8/27/2021   Do you ever feel afraid of your partner? N   Are you in a relationship with someone who physically or mentally threatens you? N   Is it safe for you to go home?  Sarwat Piedra

## 2022-01-11 NOTE — PROGRESS NOTES
Josephine Angelo is a 45 y.o. male who was seen by synchronous (real-time) audio-video technology on 1/11/2022 for Hospital Follow Up (Copd, Pnuenomia)        Assessment & Plan:   Diagnoses and all orders for this visit:    1. Hospital discharge follow-up    2. COPD, severe ([address]      Follow-up and Dispositions    · Return if symptoms worsen or fail to improve. 712  Subjective:     Hospital Follow Up:    Josephine Angelo is seen for follow up from recent ED visit to [facility] on January 2, 2022. I reviewed the lab results, imaging, the notes, the records. He presented with SOB. He is taking his abx and prednisone as directed & without any side effects. He reports symptoms are Pt states symptoms have improved but he is still having SOB with exertion. .  He had a follow-up with Dr. Shekhar Mckeon on January 7, 2022. Pt has O2 at home for periodic use and wear 2.5 LPM as needed. Pt is also doing a CT scan on Monday at 10:30. Pt is to F/U with pulmonary in 4 months. HPI:      \"45year-old male with a history of HTN, HLD, COPD on 2.5L oxygen, CHF, CAD, presents to the ED for evaluation of 1 week history of progressive worsening exertional dyspnea and cough. Cough is productive. Reports similar symptoms in the past when he has had COPD exacerbations. Reports subjective fevers earlier this week on Sunday and Monday. States he has been using his rescue inhaler multiple times a day over the last several days without relief. Denies any sick contacts, chest pain, nausea, vomiting, abdominal or back pain, diaphoresis, lightheadedness\". Prior to Admission medications    Medication Sig Start Date End Date Taking? Authorizing Provider   predniSONE (DELTASONE) 20 mg tablet Take 20 mg by mouth daily (with breakfast) for 5 days.  1/7/22 1/12/22 Yes Duncan Toth MD   hydrOXYzine HCL (ATARAX) 50 mg tablet  11/23/21  Yes Provider, Historical   atorvastatin (LIPITOR) 20 mg tablet Take 1 tablet by mouth once daily 12/3/21  Yes Lenin Orr MD   lisinopriL (PRINIVIL, ZESTRIL) 20 mg tablet Take 1 Tablet by mouth daily. 11/30/21  Yes Sudhir Mckeon MD   fluticasone propion-salmeteroL (ADVAIR/WIXELA) 500-50 mcg/dose diskus inhaler Take 1 Puff by inhalation two (2) times a day. 10/6/21  Yes Nghia Arauz MD   albuterol (PROVENTIL VENTOLIN) 2.5 mg /3 mL (0.083 %) nebu Nebulized 1 vial for inhalation every 4 hours as needed Diag. Code J44.9, J96.10  File Under Medicare B 9/23/21  Yes Nghia Arauz MD   roflumilast (Daliresp) 500 mcg tab tablet Take 1 tablet by mouth once daily 9/23/21  Yes Nghia Arauz MD   furosemide (LASIX) 20 mg tablet TAKE 1 TABLET BY MOUTH EVERY OTHER DAY 8/30/21  Yes Eliza Dejesus NP   albuterol (PROVENTIL HFA, VENTOLIN HFA, PROAIR HFA) 90 mcg/actuation inhaler Take 2 Puffs by inhalation every four (4) hours as needed for Wheezing. 7/27/21  Yes Rosalina Vaughn MD   umeclidinium (Incruse Ellipta) 62.5 mcg/actuation inhaler 1 inhalation daily. Exhale fully before inhaling 7/16/21  Yes Mylene Arias MD   aspirin delayed-release 81 mg tablet Take  by mouth daily. Yes Provider, Historical   oxyCODONE-acetaminophen (PERCOCET) 5-325 mg per tablet TAKE 1 TABLET BY MOUTH EVERY 12 HOURS 7/28/20  Yes Provider, Historical   Blood Pressure Monitor (BLOOD PRESSURE KIT) kit 1 Device by Does Not Apply route daily as needed (for blood pressure checks). 4/25/19  Yes Marjorie Cotton NP   OXcarbazepine (TRILEPTAL) 150 mg tablet Take 150 mg by mouth two (2) times a day. Yes Provider, Historical   LORazepam (ATIVAN) 1 mg tablet Take 1 Tab by mouth every eight (8) hours as needed for Anxiety. Max Daily Amount: 3 mg. 8/27/18  Yes Janelle Essex, MD   Nebulizer & Compressor machine 1 Each by Does Not Apply route every four (4) hours as needed. 2/28/18  Yes More Horan NP   OXYGEN-AIR DELIVERY SYSTEMS (WALKABOUT 1 OXYGEN SYSTEM) 2.5 L/min by Nasal route continuous.  Nightly at PRN during day  Indications: DME: [facility]   Yes Provider, Historical   pantoprazole (PROTONIX) 40 mg tablet Take 1 Tab by mouth Daily (before breakfast). 1/12/18  Yes Lisa Kay MD   FLUoxetine (PROzac) 40 mg capsule  12/6/21   Provider, Historical   Narcan 4 mg/actuation nasal spray  9/23/21   Provider, Historical     ROS    Objective:     Patient-Reported Vitals 1/11/2022   Patient-Reported Weight -   Patient-Reported Height -   Patient-Reported Pulse -   Patient-Reported SpO2 73   Patient-Reported Systolic  918   Patient-Reported Diastolic 77   Patient-Reported Peak Flow 95      General: alert, cooperative, no distress   Mental  status: normal mood, behavior, speech, dress, motor activity, and thought processes, able to follow commands   HENT: NCAT   Neck: no visualized mass   Resp: no respiratory distress   Neuro: no gross deficits   Skin: no discoloration or lesions of concern on visible areas   Psychiatric: normal affect, consistent with stated mood, no evidence of hallucinations     Additional exam findings: N/A      We discussed the expected course, resolution and complications of the diagnosis(es) in detail. Medication risks, benefits, costs, interactions, and alternatives were discussed as indicated. I advised him to contact the office if his condition worsens, changes or fails to improve as anticipated. He expressed understanding with the diagnosis(es) and plan. [facility] who was evaluated through a patient-initiated, synchronous (real-time) audio-video encounter, and/or his healthcare decision maker, is aware that it is a billable service, with coverage as determined by his insurance carrier. He provided verbal consent to proceed: Yes, and patient identification was verified.  It was conducted pursuant to the emergency declaration under the [facility] [address] waiver authority and the [employer] Appropriations Act. A caregiver was present when appropriate. Ability to conduct physical exam was limited. I was in the office. The patient was at home.       Noemí Rogers NP

## 2022-01-13 RX ORDER — UMECLIDINIUM 62.5 UG/1
AEROSOL, POWDER ORAL
Qty: 30 EACH | Refills: 0 | Status: SHIPPED | OUTPATIENT
Start: 2022-01-13 | End: 2022-02-11

## 2022-01-17 ENCOUNTER — TELEPHONE (OUTPATIENT)
Dept: PULMONOLOGY | Age: 52
End: 2022-01-17

## 2022-01-17 ENCOUNTER — HOSPITAL ENCOUNTER (OUTPATIENT)
Dept: CT IMAGING | Age: 52
Discharge: HOME | End: 2022-01-17
Attending: INTERNAL MEDICINE
Payer: MEDICARE

## 2022-01-17 DIAGNOSIS — J98.11 BILATERAL ATELECTASIS: ICD-10-CM

## 2022-01-17 PROCEDURE — 71250 CT THORAX DX C-: CPT

## 2022-01-17 NOTE — TELEPHONE ENCOUNTER
Pt called([phone]). He is almost out of O2 and [address] [employer] has been telling him that they were going to deliver tanks and now they are telling him that they are out and they done't know when they will be getting any in. Pt states that he called [facility] and they told him to have Dr Tonie Bustamante send an order. Please check and call him back.

## 2022-01-17 NOTE — TELEPHONE ENCOUNTER
Per Julisa Leon at [facility] they will get tanks to patient as soon as the tanks arrive.  There is a national shortage

## 2022-01-19 ENCOUNTER — TELEPHONE (OUTPATIENT)
Dept: PULMONOLOGY | Age: 52
End: 2022-01-19

## 2022-01-19 NOTE — TELEPHONE ENCOUNTER
National shortage for tanks is due to the increase in number of patients who are on oxygen from Covid.

## 2022-01-19 NOTE — TELEPHONE ENCOUNTER
Pt states that a shortage of nation wide oxygen is not true and someone needs to get on top of getting him some O2

## 2022-01-24 ENCOUNTER — TELEPHONE (OUTPATIENT)
Dept: PULMONOLOGY | Age: 52
End: 2022-01-24

## 2022-01-24 DIAGNOSIS — J44.9 COPD, SEVERE (HCC): Primary | ICD-10-CM

## 2022-01-24 DIAGNOSIS — R09.02 HYPOXEMIA REQUIRING SUPPLEMENTAL OXYGEN: ICD-10-CM

## 2022-01-24 DIAGNOSIS — Z99.81 HYPOXEMIA REQUIRING SUPPLEMENTAL OXYGEN: ICD-10-CM

## 2022-01-24 NOTE — TELEPHONE ENCOUNTER
Maykel Merida MD 11 minutes ago (10:42 AM)       The recent ct scan done is there anything in there I should be concerned about ? Also I have been trying to get oxygen bottles since November I have been lied to so many times from [employer] and Im completely out of oxygen I have called several other companies and they havent heard of any shortage like [employer] claims I have called your office several times and asked to speak to the nurse about this and I have not gotten any response from anyone what should I do ?

## 2022-01-24 NOTE — TELEPHONE ENCOUNTER
Laura Santacruz MD 1 hour ago (10:42 AM)       The recent ct scan done is there anything in there I should be concerned about ?

## 2022-01-24 NOTE — TELEPHONE ENCOUNTER
Called patient from my cell with *67 due to our phone will not call out to this patients home.    Left message for him to call the office number

## 2022-01-24 NOTE — TELEPHONE ENCOUNTER
Order placed for POC and Testing with [facility], per Verbal Order from Dr. Patricia Acharya on 1/24/2022. Last office visit: 1/2022    Provider is aware of last office visit and follow up. No further action requested from provider.

## 2022-01-24 NOTE — TELEPHONE ENCOUNTER
Spoke with Arsalan May re oxygen tank shortage. With this patient he is on 2 L continual use. Either he could get at fill system to fill tanks as he needs them or we can order testing with [facility] for a POC. He was last seen this month so that note is able to be used. I will call patient to see what he would like between the 2 options.

## 2022-02-09 ENCOUNTER — TELEPHONE (OUTPATIENT)
Dept: PULMONOLOGY | Age: 52
End: 2022-02-09

## 2022-02-09 NOTE — TELEPHONE ENCOUNTER
Pt called([phone]). Pt wants to talk to [facility] regarding his oxygen. He still has not heard from anyone and he has been out of oxygen for 4weeks.

## 2022-02-10 NOTE — TELEPHONE ENCOUNTER
[facility] testing states the patient is Active with Medicare and they have called the patient. Patient states they have contacted him an Awa Delgado is calling him back to schedule.  He will call me back once they have completed the testing so I can call [facility] about the POC

## 2022-02-10 NOTE — TELEPHONE ENCOUNTER
Per patient [facility] testing has not called to set up the POC test. The order was sent 1/24/22  I called [facility] and the [facility] when they ran the Medicare it showed inactive. I advised we are still showing active. She will get them to re run his Medicare. I asked our  to check on the website and it does show he is Active. [facility] is to be calling us back.

## 2022-02-11 ENCOUNTER — TELEPHONE (OUTPATIENT)
Dept: PULMONOLOGY | Age: 52
End: 2022-02-11

## 2022-02-11 RX ORDER — UMECLIDINIUM 62.5 UG/1
AEROSOL, POWDER ORAL
Qty: 30 EACH | Refills: 0 | Status: SHIPPED | OUTPATIENT
Start: 2022-02-11 | End: 2022-03-09

## 2022-02-11 RX ORDER — FLUTICASONE PROPIONATE AND SALMETEROL 50; 500 UG/1; UG/1
POWDER RESPIRATORY (INHALATION)
Qty: 60 EACH | Refills: 0 | Status: SHIPPED | OUTPATIENT
Start: 2022-02-11 | End: 2022-03-14

## 2022-02-16 NOTE — TELEPHONE ENCOUNTER
Testing was done Saturday 2/12/22 and patient qualified for the POC. Spoke with Arsalan May and he will check with cheryl to make sure he is on list for the POC.

## 2022-02-17 ENCOUNTER — TELEPHONE (OUTPATIENT)
Dept: PULMONOLOGY | Age: 52
End: 2022-02-17

## 2022-02-17 RX ORDER — ALBUTEROL SULFATE 90 UG/1
2 AEROSOL, METERED RESPIRATORY (INHALATION)
Qty: 1 EACH | Refills: 3 | Status: SHIPPED | OUTPATIENT
Start: 2022-02-17 | End: 2022-07-08 | Stop reason: SDUPTHER

## 2022-03-09 RX ORDER — UMECLIDINIUM 62.5 UG/1
AEROSOL, POWDER ORAL
Qty: 30 EACH | Refills: 0 | Status: SHIPPED | OUTPATIENT
Start: 2022-03-09 | End: 2022-04-12

## 2022-03-09 RX ORDER — ATORVASTATIN CALCIUM 20 MG/1
TABLET, FILM COATED ORAL
Qty: 90 TABLET | Refills: 0 | Status: SHIPPED | OUTPATIENT
Start: 2022-03-09 | End: 2022-06-02

## 2022-03-14 RX ORDER — FLUTICASONE PROPIONATE AND SALMETEROL 50; 500 UG/1; UG/1
POWDER RESPIRATORY (INHALATION)
Qty: 60 EACH | Refills: 0 | Status: SHIPPED | OUTPATIENT
Start: 2022-03-14 | End: 2022-04-13 | Stop reason: SDUPTHER

## 2022-03-18 PROBLEM — I10 ESSENTIAL HYPERTENSION, BENIGN: Status: ACTIVE | Noted: 2018-02-21

## 2022-03-18 PROBLEM — A41.9 SEPSIS (HCC): Status: ACTIVE | Noted: 2017-12-18

## 2022-03-19 PROBLEM — M19.042 DEGENERATIVE ARTHRITIS OF METACARPOPHALANGEAL JOINT OF INDEX FINGER OF LEFT HAND: Status: ACTIVE | Noted: 2021-12-03

## 2022-03-19 PROBLEM — G56.01 CARPAL TUNNEL SYNDROME OF RIGHT WRIST: Status: ACTIVE | Noted: 2021-12-03

## 2022-03-19 PROBLEM — I50.32 DIASTOLIC CHF, CHRONIC (HCC): Status: ACTIVE | Noted: 2021-02-02

## 2022-03-19 PROBLEM — E66.3 OVERWEIGHT (BMI 25.0-29.9): Status: ACTIVE | Noted: 2018-10-08

## 2022-03-19 PROBLEM — R91.8 PULMONARY INFILTRATE: Status: ACTIVE | Noted: 2018-03-19

## 2022-03-19 PROBLEM — F41.0 PANIC ATTACK: Status: ACTIVE | Noted: 2018-10-08

## 2022-03-19 PROBLEM — G47.00 INSOMNIA: Status: ACTIVE | Noted: 2018-10-08

## 2022-03-19 PROBLEM — K20.90 ESOPHAGITIS: Status: ACTIVE | Noted: 2018-02-21

## 2022-03-20 PROBLEM — E55.9 HYPOVITAMINOSIS D: Status: ACTIVE | Noted: 2018-10-08

## 2022-03-30 ENCOUNTER — HOSPITAL ENCOUNTER (OUTPATIENT)
Dept: LAB | Age: 52
Discharge: HOME | End: 2022-03-30
Payer: MEDICARE

## 2022-03-30 ENCOUNTER — OFFICE VISIT (OUTPATIENT)
Dept: FAMILY MEDICINE CLINIC | Age: 52
End: 2022-03-30
Payer: MEDICARE

## 2022-03-30 VITALS
BODY MASS INDEX: 33.3 KG/M2 | WEIGHT: 212.2 LBS | OXYGEN SATURATION: 94 % | TEMPERATURE: 97.1 F | RESPIRATION RATE: 16 BRPM | HEIGHT: 67 IN | DIASTOLIC BLOOD PRESSURE: 78 MMHG | SYSTOLIC BLOOD PRESSURE: 118 MMHG | HEART RATE: 73 BPM

## 2022-03-30 DIAGNOSIS — I10 ESSENTIAL HYPERTENSION, BENIGN: ICD-10-CM

## 2022-03-30 DIAGNOSIS — I50.32 DIASTOLIC CHF, CHRONIC (HCC): ICD-10-CM

## 2022-03-30 DIAGNOSIS — I10 ESSENTIAL HYPERTENSION, BENIGN: Primary | ICD-10-CM

## 2022-03-30 LAB
ALBUMIN SERPL-MCNC: 4.2 G/DL (ref 3.4–5)
ALBUMIN/GLOB SERPL: 1.4 {RATIO} (ref 0.8–1.7)
ALP SERPL-CCNC: 124 U/L (ref 45–117)
ALT SERPL-CCNC: 31 U/L (ref 16–61)
ANION GAP SERPL CALC-SCNC: 6 MMOL/L (ref 3–18)
AST SERPL-CCNC: 16 U/L (ref 10–38)
BILIRUB SERPL-MCNC: 1.2 MG/DL (ref 0.2–1)
BUN SERPL-MCNC: 11 MG/DL (ref 7–18)
BUN/CREAT SERPL: 13 (ref 12–20)
CALCIUM SERPL-MCNC: 9.4 MG/DL (ref 8.5–10.1)
CHLORIDE SERPL-SCNC: 103 MMOL/L (ref 100–111)
CHOLEST SERPL-MCNC: 143 MG/DL
CO2 SERPL-SCNC: 29 MMOL/L (ref 21–32)
CREAT SERPL-MCNC: 0.88 MG/DL (ref 0.6–1.3)
GLOBULIN SER CALC-MCNC: 2.9 G/DL (ref 2–4)
GLUCOSE SERPL-MCNC: 85 MG/DL (ref 74–99)
HDLC SERPL-MCNC: 47 MG/DL (ref 40–60)
HDLC SERPL: 3 {RATIO} (ref 0–5)
LDLC SERPL CALC-MCNC: 64.8 MG/DL (ref 0–100)
LIPID PROFILE,FLP: ABNORMAL
POTASSIUM SERPL-SCNC: 4.7 MMOL/L (ref 3.5–5.5)
PROT SERPL-MCNC: 7.1 G/DL (ref 6.4–8.2)
SODIUM SERPL-SCNC: 138 MMOL/L (ref 136–145)
TRIGL SERPL-MCNC: 156 MG/DL (ref ?–150)
VLDLC SERPL CALC-MCNC: 31.2 MG/DL

## 2022-03-30 PROCEDURE — 3017F COLORECTAL CA SCREEN DOC REV: CPT | Performed by: FAMILY MEDICINE

## 2022-03-30 PROCEDURE — 36415 COLL VENOUS BLD VENIPUNCTURE: CPT

## 2022-03-30 PROCEDURE — G8417 CALC BMI ABV UP PARAM F/U: HCPCS | Performed by: FAMILY MEDICINE

## 2022-03-30 PROCEDURE — G8510 SCR DEP NEG, NO PLAN REQD: HCPCS | Performed by: FAMILY MEDICINE

## 2022-03-30 PROCEDURE — G0463 HOSPITAL OUTPT CLINIC VISIT: HCPCS | Performed by: FAMILY MEDICINE

## 2022-03-30 PROCEDURE — G8754 DIAS BP LESS 90: HCPCS | Performed by: FAMILY MEDICINE

## 2022-03-30 PROCEDURE — 99213 OFFICE O/P EST LOW 20 MIN: CPT | Performed by: FAMILY MEDICINE

## 2022-03-30 PROCEDURE — 80061 LIPID PANEL: CPT

## 2022-03-30 PROCEDURE — 80053 COMPREHEN METABOLIC PANEL: CPT

## 2022-03-30 PROCEDURE — G8427 DOCREV CUR MEDS BY ELIG CLIN: HCPCS | Performed by: FAMILY MEDICINE

## 2022-03-30 PROCEDURE — G8752 SYS BP LESS 140: HCPCS | Performed by: FAMILY MEDICINE

## 2022-03-30 NOTE — PROGRESS NOTES
1. \"Have you been to the ER, urgent care clinic since your last visit? Hospitalized since your last visit? \" No    2. \"Have you seen or consulted any other health care providers outside of the [address] [place] [address] since your last visit? \" No     3. For patients aged 39-70: Has the patient had a colonoscopy / FIT/ Cologuard?  Yes - no Care Gap present

## 2022-03-30 NOTE — PATIENT INSTRUCTIONS
DASH Diet: Care Instructions  Your Care Instructions     The DASH diet is an eating plan that can help lower your blood pressure. DASH stands for Dietary Approaches to Stop Hypertension. Hypertension is high blood pressure. The DASH diet focuses on eating foods that are high in calcium, potassium, and magnesium. These nutrients can lower blood pressure. The foods that are highest in these nutrients are fruits, vegetables, low-fat dairy products, nuts, seeds, and legumes. But taking calcium, potassium, and magnesium supplements instead of eating foods that are high in those nutrients does not have the same effect. The DASH diet also includes whole grains, fish, and poultry. The DASH diet is one of several lifestyle changes your doctor may recommend to lower your high blood pressure. Your doctor may also want you to decrease the amount of sodium in your diet. Lowering sodium while following the DASH diet can lower blood pressure even further than just the DASH diet alone. Follow-up care is a key part of your treatment and safety. Be sure to make and go to all appointments, and call your doctor if you are having problems. It's also a good idea to know your test results and keep a list of the medicines you take. How can you care for yourself at home? Following the DASH diet  · Eat 4 to 5 servings of fruit each day. A serving is 1 medium-sized piece of fruit, ½ cup chopped or canned fruit, 1/4 cup dried fruit, or 4 ounces (½ cup) of fruit juice. Choose fruit more often than fruit juice. · Eat 4 to 5 servings of vegetables each day. A serving is 1 cup of lettuce or raw leafy vegetables, ½ cup of chopped or cooked vegetables, or 4 ounces (½ cup) of vegetable juice. Choose vegetables more often than vegetable juice. · Get 2 to 3 servings of low-fat and fat-free dairy each day. A serving is 8 ounces of milk, 1 cup of yogurt, or 1 ½ ounces of cheese. · Eat 6 to 8 servings of grains each day.  A serving is 1 slice of bread, 1 ounce of dry cereal, or ½ cup of cooked rice, pasta, or cooked cereal. Try to choose whole-grain products as much as possible. · Limit lean meat, poultry, and fish to 2 servings each day. A serving is 3 ounces, about the size of a deck of cards. · Eat 4 to 5 servings of nuts, seeds, and legumes (cooked dried beans, lentils, and split peas) each week. A serving is 1/3 cup of nuts, 2 tablespoons of seeds, or ½ cup of cooked beans or peas. · Limit fats and oils to 2 to 3 servings each day. A serving is 1 teaspoon of vegetable oil or 2 tablespoons of salad dressing. · Limit sweets and added sugars to 5 servings or less a week. A serving is 1 tablespoon jelly or jam, ½ cup sorbet, or 1 cup of lemonade. · Eat less than 2,300 milligrams (mg) of sodium a day. If you limit your sodium to 1,500 mg a day, you can lower your blood pressure even more. · Be aware that all of these are the suggested number of servings for people who eat 1,800 to 2,000 calories a day. Your recommended number of servings may be different if you need more or fewer calories. Tips for success  · Start small. Do not try to make dramatic changes to your diet all at once. You might feel that you are missing out on your favorite foods and then be more likely to not follow the plan. Make small changes, and stick with them. Once those changes become habit, add a few more changes. · Try some of the following:  ? Make it a goal to eat a fruit or vegetable at every meal and at snacks. This will make it easy to get the recommended amount of fruits and vegetables each day. ? Try yogurt topped with fruit and nuts for a snack or healthy dessert. ? Add lettuce, tomato, cucumber, and onion to sandwiches. ? Combine a ready-made pizza crust with low-fat mozzarella cheese and lots of vegetable toppings. Try using tomatoes, squash, spinach, broccoli, carrots, cauliflower, and onions. ?  Have a variety of cut-up vegetables with a low-fat dip as an appetizer instead of chips and dip. ? Sprinkle sunflower seeds or chopped almonds over salads. Or try adding chopped walnuts or almonds to cooked vegetables. ? Try some vegetarian meals using beans and peas. Add garbanzo or kidney beans to salads. Make burritos and tacos with mashed friend beans or black beans. Where can you learn more? Go to http://www.valles.com/  Enter H967 in the search box to learn more about \"DASH Diet: Care Instructions. \"  Current as of: January 10, 2022               Content Version: 13.2  © 7968-5613 [employer]. Care instructions adapted under license by [employer] (which disclaims liability or warranty for this information). If you have questions about a medical condition or this instruction, always ask your healthcare professional. [address] any warranty or liability for your use of this information.

## 2022-03-30 NOTE — PROGRESS NOTES
HPI:  Jung Elam. is a 45 y.o. male who presents today with   Chief Complaint   Patient presents with    Hypertension     4 m f/u       Patient is here to follow-up on high blood pressure. He is on medications as listed below. He does not need any refills today. His blood pressure is stable. He has no new complaints. He is due for blood work. He tries to eat a healthy diet. He does eat a lot of natural fruit. Patient does garden. Patient does have congestive heart failure. He is followed by cardiology. 3 most recent PHQ Screens 3/30/2022   PHQ Not Done Patient refuses   Little interest or pleasure in doing things Not at all   Feeling down, depressed, irritable, or hopeless Not at all   Total Score PHQ 2 0               PMH,  Meds, Allergies, Family History, Social history reviewed      Current Outpatient Medications   Medication Sig Dispense Refill    furosemide (LASIX) 20 mg tablet TAKE 1 TABLET BY MOUTH EVERY OTHER DAY 15 Tablet 5    Advair Diskus 500-50 mcg/dose diskus inhaler INHALE 1 DOSE BY MOUTH TWICE DAILY 60 Each 0    umeclidinium (Incruse Ellipta) 62.5 mcg/actuation inhaler INHALE 1 PUFF BY MOUTH ONCE DAILY EXHALE  FULLY  BEFORE  INHALING 30 Each 0    atorvastatin (LIPITOR) 20 mg tablet Take 1 tablet by mouth once daily 90 Tablet 0    albuterol (PROVENTIL HFA, VENTOLIN HFA, PROAIR HFA) 90 mcg/actuation inhaler Take 2 Puffs by inhalation every four (4) hours as needed for Wheezing. 1 Each 3    hydrOXYzine HCL (ATARAX) 50 mg tablet       lisinopriL (PRINIVIL, ZESTRIL) 20 mg tablet Take 1 Tablet by mouth daily. 90 Tablet 1    Narcan 4 mg/actuation nasal spray       albuterol (PROVENTIL VENTOLIN) 2.5 mg /3 mL (0.083 %) nebu Nebulized 1 vial for inhalation every 4 hours as needed Diag.  Code J44.9, J96.10  File Under Medicare B 120 Each 2    roflumilast (Daliresp) 500 mcg tab tablet Take 1 tablet by mouth once daily 30 Tablet 5    aspirin delayed-release 81 mg tablet Take by mouth daily.  oxyCODONE-acetaminophen (PERCOCET) 5-325 mg per tablet TAKE 1 TABLET BY MOUTH EVERY 12 HOURS      Blood Pressure Monitor (BLOOD PRESSURE KIT) kit 1 Device by Does Not Apply route daily as needed (for blood pressure checks). 1 Kit 0    OXcarbazepine (TRILEPTAL) 150 mg tablet Take 150 mg by mouth two (2) times a day.  LORazepam (ATIVAN) 1 mg tablet Take 1 Tab by mouth every eight (8) hours as needed for Anxiety. Max Daily Amount: 3 mg. 90 Tab 0    Nebulizer & Compressor machine 1 Each by Does Not Apply route every four (4) hours as needed. 1 Each 0    OXYGEN-AIR DELIVERY SYSTEMS (WALKABOUT 1 OXYGEN SYSTEM) 2.5 L/min by Nasal route continuous. Nightly at PRN during day  Indications: DME: [facility]      pantoprazole (PROTONIX) 40 mg tablet Take 1 Tab by mouth Daily (before breakfast). 30 Tab 0    FLUoxetine (PROzac) 40 mg capsule  (Patient not taking: Reported on 1/7/2022)          Allergies   Allergen Reactions    Ciprofloxacin Nausea Only    Remeron [Mirtazapine] Other (comments)     Mood swings    Seroquel [Quetiapine] Other (comments)     Mood swings, trembles, shakiness and mild throat swelling (1/08/21)                  ROS as per HPI      Visit Vitals  /78 (BP 1 Location: Right arm, BP Patient Position: Sitting, BP Cuff Size: Large adult)   Pulse 73   Temp 97.1 °F (36.2 °C) (Temporal)   Resp 16   Ht 5' 7\" (1.702 m)   Wt 212 lb 3.2 oz (96.3 kg)   SpO2 94%   BMI 33.24 kg/m²     Physical Exam   General appearance: alert, cooperative, no distress, appears stated age  Neck: supple, symmetrical, trachea midline, no adenopathy, thyroid: not enlarged, symmetric, no tenderness/mass/nodules, no carotid bruit and no JVD  Lungs: clear to auscultation bilaterally  Heart: regular rate and rhythm, S1, S2 normal, no murmur, click, rub or gallop    Extremities: extremities normal, atraumatic, no cyanosis or edema    Assessment/Plan:    Diagnoses and all orders for this visit:    1. Essential hypertension, benign  -     LIPID PANEL; Future  -     METABOLIC PANEL, COMPREHENSIVE; Future    2. Diastolic CHF, chronic ([address]  Assessment & Plan:   monitored by specialist. No acute findings meriting change in the plan           As above  Patient stable   treatment plan as listed below  Orders Placed This Encounter    LIPID PANEL    METABOLIC PANEL, COMPREHENSIVE     Follow-up and Dispositions    · Return in about 4 months (around 7/30/2022) for htn. This has been fully explained to the patient, who indicates understanding. An After Visit Summary was printed and given to the patient.            Chava Valenzuela MD

## 2022-04-12 RX ORDER — UMECLIDINIUM 62.5 UG/1
AEROSOL, POWDER ORAL
Qty: 30 EACH | Refills: 0 | Status: SHIPPED | OUTPATIENT
Start: 2022-04-12 | End: 2022-05-10

## 2022-04-13 RX ORDER — FLUTICASONE PROPIONATE AND SALMETEROL 50; 500 UG/1; UG/1
POWDER RESPIRATORY (INHALATION)
Qty: 60 EACH | Refills: 5 | Status: SHIPPED | OUTPATIENT
Start: 2022-04-13 | End: 2022-07-08 | Stop reason: SDUPTHER

## 2022-04-13 RX ORDER — FLUTICASONE PROPIONATE AND SALMETEROL 50; 500 UG/1; UG/1
POWDER RESPIRATORY (INHALATION)
Qty: 60 EACH | Refills: 0 | Status: SHIPPED | OUTPATIENT
Start: 2022-04-13 | End: 2022-07-08 | Stop reason: SDUPTHER

## 2022-04-20 ENCOUNTER — TRANSCRIBE ORDER (OUTPATIENT)
Dept: REGISTRATION | Age: 52
End: 2022-04-20

## 2022-04-20 ENCOUNTER — HOSPITAL ENCOUNTER (OUTPATIENT)
Dept: GENERAL RADIOLOGY | Age: 52
Discharge: HOME | End: 2022-04-20
Payer: MEDICARE

## 2022-04-20 DIAGNOSIS — M54.12 BRACHIAL NEURITIS: ICD-10-CM

## 2022-04-20 DIAGNOSIS — M54.16 LUMBAR RADICULOPATHY: ICD-10-CM

## 2022-04-20 DIAGNOSIS — M54.16 LUMBAR RADICULOPATHY: Primary | ICD-10-CM

## 2022-04-20 PROCEDURE — 72052 X-RAY EXAM NECK SPINE 6/>VWS: CPT

## 2022-04-20 PROCEDURE — 72114 X-RAY EXAM L-S SPINE BENDING: CPT

## 2022-05-10 RX ORDER — UMECLIDINIUM 62.5 UG/1
AEROSOL, POWDER ORAL
Qty: 30 EACH | Refills: 0 | Status: SHIPPED | OUTPATIENT
Start: 2022-05-10 | End: 2022-06-08

## 2022-05-11 ENCOUNTER — OFFICE VISIT (OUTPATIENT)
Dept: CARDIOLOGY CLINIC | Age: 52
End: 2022-05-11
Payer: MEDICARE

## 2022-05-11 VITALS
SYSTOLIC BLOOD PRESSURE: 120 MMHG | DIASTOLIC BLOOD PRESSURE: 88 MMHG | BODY MASS INDEX: 32.8 KG/M2 | WEIGHT: 209 LBS | HEIGHT: 67 IN | HEART RATE: 68 BPM | OXYGEN SATURATION: 95 %

## 2022-05-11 DIAGNOSIS — I50.32 DIASTOLIC CHF, CHRONIC (HCC): ICD-10-CM

## 2022-05-11 DIAGNOSIS — M79.606 PAIN OF LOWER EXTREMITY, UNSPECIFIED LATERALITY: ICD-10-CM

## 2022-05-11 DIAGNOSIS — I25.10 CORONARY ARTERY DISEASE INVOLVING NATIVE HEART WITHOUT ANGINA PECTORIS, UNSPECIFIED VESSEL OR LESION TYPE: Primary | ICD-10-CM

## 2022-05-11 DIAGNOSIS — J44.9 COPD, SEVERE (HCC): ICD-10-CM

## 2022-05-11 DIAGNOSIS — K21.9 GASTROESOPHAGEAL REFLUX DISEASE, UNSPECIFIED WHETHER ESOPHAGITIS PRESENT: ICD-10-CM

## 2022-05-11 PROCEDURE — G8427 DOCREV CUR MEDS BY ELIG CLIN: HCPCS | Performed by: INTERNAL MEDICINE

## 2022-05-11 PROCEDURE — G8754 DIAS BP LESS 90: HCPCS | Performed by: INTERNAL MEDICINE

## 2022-05-11 PROCEDURE — 3017F COLORECTAL CA SCREEN DOC REV: CPT | Performed by: INTERNAL MEDICINE

## 2022-05-11 PROCEDURE — 93000 ELECTROCARDIOGRAM COMPLETE: CPT | Performed by: INTERNAL MEDICINE

## 2022-05-11 PROCEDURE — G8752 SYS BP LESS 140: HCPCS | Performed by: INTERNAL MEDICINE

## 2022-05-11 PROCEDURE — 99214 OFFICE O/P EST MOD 30 MIN: CPT | Performed by: INTERNAL MEDICINE

## 2022-05-11 PROCEDURE — G8417 CALC BMI ABV UP PARAM F/U: HCPCS | Performed by: INTERNAL MEDICINE

## 2022-05-11 PROCEDURE — G8510 SCR DEP NEG, NO PLAN REQD: HCPCS | Performed by: INTERNAL MEDICINE

## 2022-05-11 NOTE — PROGRESS NOTES
Kristal Lake presents today for   Chief Complaint   Patient presents with    Follow-up     1 year       Declan Cuevas Sr. preferred language for health care discussion is english/other. Is someone accompanying this pt? no    Is the patient using any DME equipment during [address]? no    Depression Screening:  3 most recent PHQ Screens 5/11/2022   PHQ Not Done -   Little interest or pleasure in doing things Not at all   Feeling down, depressed, irritable, or hopeless Not at all   Total Score PHQ 2 0       Learning Assessment:  Learning Assessment 5/11/2022   PRIMARY LEARNER Patient   PRIMARY LANGUAGE ENGLISH   LEARNER PREFERENCE PRIMARY DEMONSTRATION     -     -   ANSWERED BY patient   RELATIONSHIP SELF       Abuse Screening:  Abuse Screening Questionnaire 5/11/2022   Do you ever feel afraid of your partner? N   Are you in a relationship with someone who physically or mentally threatens you? N   Is it safe for you to go home? Y       Fall Risk  Fall Risk Assessment, last 12 mths 11/30/2021   Able to walk? Yes   Fall in past 12 months? 0   Do you feel unsteady? 0   Are you worried about falling 0           Pt currently taking Anticoagulant therapy? no    Pt currently taking Antiplatelet therapy ? Aspirin 81 mg daily      Coordination of Care:  1. Have you been to the ER, urgent care clinic since your last visit? Hospitalized since your last visit? no    2. Have you seen or consulted any other health care providers outside of the [address] [place] [address] since your last visit? Include any pap smears or colon screening.  no

## 2022-05-11 NOTE — PROGRESS NOTES
History of Present Illness:  45 YOM here for follow up. Overall he is doing quite well. He has gradually increased his exercise capacity and tries to ride a bike and perform activities around the house and overall energy level is improved. No chest pain. Some occasional cough. Some occasional swelling, right sided more than left. No palpitations or syncope. Blood pressure is reasonably controlled. He had been having some chronic chest pain and this seemed to improve with Percocet, suggesting musculoskeletal in nature. Impression:  1. History of CAD and atherosclerosis by plain film imaging without history of MI. Nuclear stress test November 2019 without ischemia, normal EF. 2. Abdominal US November 2019 without AAA. 3. ABIs November 2020 without PAD. 4. Severe COPD, on home oxygen intermittently, followed by pulmonary. 5. Functional status gradually improving. 6. Mild diastolic heart failure with echo January 2018 with mild LVH, on Lasix as needed. 7. HTN, controlled. 8. Previous tobacco use, 50 pack year history, quitting in 2017.  9. History of chronic pain, on Percocet twice a day. 10. History of previous palpitations and panic attacks, resolved. Plan:  From a cardiac standpoint his blood pressure is controlled. He continues to be active without limitation. He does, however, have a history of significant atherosclerosis by plain film imaging, consistent with coronary artery disease, with last stress test a few years ago. I would like to obtain a follow up echocardiogram given his history to make sure there is no change in EF or evidence of pulmonary hypertension, also given his history of COPD. I encouraged him to continue to exercise and he is doing relatively well and I will see back in a year.       Past Medical History:   Diagnosis Date    Anxiety     Arthritis     hands    Chest pain     Chronic diastolic heart failure secondary to coronary artery disease (HCC)     Chronic lung disease     Chronic obstructive pulmonary disease ([facility] 08/03/2017    PFTS 8/3/17    COPD, severe (HCC)     Coronary artery disease     no CAD per card notes    Cough     Emphysema/COPD ([facility]     Esophagitis 12/11/2017    Endoscopy    Essential hypertension, benign     Fast heart beat     Feeling of chest tightness     Frequent urination     Heartburn     Hepatomegaly     History of stomach ulcers     Hx of nausea and vomiting     Poor appetite     Positive urine drug screen 01/2016    opiates and THC    Shortness of breath     Splenomegaly     Stomach pain     Stress     Tiredness     Unintentional weight change     Upper GI bleed     Weakness     Wheeze        Current Outpatient Medications   Medication Sig Dispense Refill    umeclidinium (Incruse Ellipta) 62.5 mcg/actuation inhaler INHALE 1 PUFF BY MOUTH ONCE DAILY **EXHALE  FULLY  BEFORE  INHALING** 30 Each 0    Advair Diskus 500-50 mcg/dose diskus inhaler INHALE 1 DOSE BY MOUTH TWICE DAILY 60 Each 0    Advair Diskus 500-50 mcg/dose diskus inhaler INHALE 1 DOSE BY MOUTH TWICE DAILY 60 Each 5    furosemide (LASIX) 20 mg tablet TAKE 1 TABLET BY MOUTH EVERY OTHER DAY 15 Tablet 5    atorvastatin (LIPITOR) 20 mg tablet Take 1 tablet by mouth once daily 90 Tablet 0    albuterol (PROVENTIL HFA, VENTOLIN HFA, PROAIR HFA) 90 mcg/actuation inhaler Take 2 Puffs by inhalation every four (4) hours as needed for Wheezing. 1 Each 3    FLUoxetine (PROzac) 40 mg capsule  (Patient not taking: Reported on 1/7/2022)      hydrOXYzine HCL (ATARAX) 50 mg tablet       lisinopriL (PRINIVIL, ZESTRIL) 20 mg tablet Take 1 Tablet by mouth daily. 90 Tablet 1    Narcan 4 mg/actuation nasal spray       albuterol (PROVENTIL VENTOLIN) 2.5 mg /3 mL (0.083 %) nebu Nebulized 1 vial for inhalation every 4 hours as needed Diag.  Code J44.9, J96.10  File Under Medicare B 120 Each 2    roflumilast (Daliresp) 500 mcg tab tablet Take 1 tablet by mouth once daily 30 Tablet 5    aspirin delayed-release 81 mg tablet Take  by mouth daily.  oxyCODONE-acetaminophen (PERCOCET) 5-325 mg per tablet TAKE 1 TABLET BY MOUTH EVERY 12 HOURS      Blood Pressure Monitor (BLOOD PRESSURE KIT) kit 1 Device by Does Not Apply route daily as needed (for blood pressure checks). 1 Kit 0    OXcarbazepine (TRILEPTAL) 150 mg tablet Take 150 mg by mouth two (2) times a day.  LORazepam (ATIVAN) 1 mg tablet Take 1 Tab by mouth every eight (8) hours as needed for Anxiety. Max Daily Amount: 3 mg. 90 Tab 0    Nebulizer & Compressor machine 1 Each by Does Not Apply route every four (4) hours as needed. 1 Each 0    OXYGEN-AIR DELIVERY SYSTEMS (WALKABOUT 1 OXYGEN SYSTEM) 2.5 L/min by Nasal route continuous. Nightly at PRN during day  Indications: DME: [facility]      pantoprazole (PROTONIX) 40 mg tablet Take 1 Tab by mouth Daily (before breakfast). 30 Tab 0       Social History   reports that he quit smoking about 4 years ago. His smoking use included cigarettes. He started smoking about 37 years ago. He has a 62.50 pack-year smoking history. He has never used smokeless tobacco.   reports no history of alcohol use. Family History  family history includes Cancer in his father; Diabetes in his father and mother; Heart Disease in his father and mother; Hypertension in his father and mother. Review of Systems  Except as stated above include:  Constitutional: Negative for fever, chills and malaise/fatigue. HEENT: No congestion or recent URI. Gastrointestinal: No nausea, vomiting, abdominal pain, bloody stools. Pulmonary:  Negative except as stated above. Cardiac:  Negative except as stated above. Musculoskeletal: Negative except as stated above. Neurological:  No localized symptoms. Skin:  Negative except as stated above. Psych:  Negative except as stated above. Endocrine:  Negative except as stated above.     PHYSICAL EXAM  BP Readings from Last 3 Encounters:   05/11/22 120/88 03/30/22 118/78   01/07/22 119/72     Pulse Readings from Last 3 Encounters:   05/11/22 68   03/30/22 73   01/07/22 96     Wt Readings from Last 3 Encounters:   05/11/22 94.8 kg (209 lb)   03/30/22 96.3 kg (212 lb 3.2 oz)   01/07/22 93.9 kg (207 lb)     General:   Well developed, well groomed. Head/Neck:   No obvious jugular venous distention     No obvious carotid pulsations. No evidence of xanthelasma. Lungs:   No respiratory distress. Clear bilaterally. Heart:  Regular rate and rhythm. Normal S1/S2. Palpation grossly normal.    No significant murmurs, rubs or gallops. Abdomen:   Non-acute abdomen. No obvious pulsations. Extremities:   Intact peripheral pulses. No significant edema. Neurological:   Alert and oriented to person, place, time. No focal neurological deficit visually. Skin:   No obvious rash    Blood Pressure Metric:  Monitor recommended and adjustments stated if needed.

## 2022-05-18 NOTE — ANESTHESIA PREPROCEDURE EVALUATION
Anesthetic History   No history of anesthetic complications            Review of Systems / Medical History  Patient summary reviewed, nursing notes reviewed and pertinent labs reviewed    Pulmonary    COPD: severe               Neuro/Psych   Within defined limits           Cardiovascular  Within defined limits                     GI/Hepatic/Renal  Within defined limits              Endo/Other  Within defined limits           Other Findings   Comments:   Risk Factors for Postoperative nausea/vomiting:       History of postoperative nausea/vomiting? NO       Female? NO       Motion sickness? NO       Intended opioid administration for postoperative analgesia? NO      Smoking Abstinence  Current Smoker? YES  Elective Surgery? YES  Seen preoperatively by anesthesiologist or proxy prior to day of surgery? YES  Pt abstained from smoking 24 hours prior to anesthesia?  NO           Physical Exam    Airway  Mallampati: I  TM Distance: 4 - 6 cm  Neck ROM: normal range of motion   Mouth opening: Normal     Cardiovascular    Rhythm: regular  Rate: normal         Dental  No notable dental hx       Pulmonary  Breath sounds clear to auscultation               Abdominal  GI exam deferred       Other Findings            Anesthetic Plan    ASA: 3  Anesthesia type: MAC          Induction: Intravenous  Anesthetic plan and risks discussed with: Patient SPOUSE CALLED REQUESTING CALL BACK REGARDING NEED FOR PT. PLEASE CALL BACK [phone].

## 2022-05-31 ENCOUNTER — TELEPHONE (OUTPATIENT)
Dept: PULMONOLOGY | Age: 52
End: 2022-05-31
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
## 2022-06-01 ENCOUNTER — OFFICE VISIT (OUTPATIENT)
Dept: FAMILY MEDICINE CLINIC | Age: 52
End: 2022-06-01
Payer: MEDICARE

## 2022-06-01 VITALS
WEIGHT: 210 LBS | BODY MASS INDEX: 32.96 KG/M2 | SYSTOLIC BLOOD PRESSURE: 131 MMHG | DIASTOLIC BLOOD PRESSURE: 86 MMHG | HEIGHT: 67 IN | HEART RATE: 84 BPM | TEMPERATURE: 97.4 F | RESPIRATION RATE: 18 BRPM | OXYGEN SATURATION: 98 %

## 2022-06-01 DIAGNOSIS — M62.838 MUSCLE SPASM: Primary | ICD-10-CM

## 2022-06-01 DIAGNOSIS — T07.XXXA MULTIPLE CONTUSIONS: ICD-10-CM

## 2022-06-01 PROCEDURE — G8427 DOCREV CUR MEDS BY ELIG CLIN: HCPCS | Performed by: FAMILY MEDICINE

## 2022-06-01 PROCEDURE — G8510 SCR DEP NEG, NO PLAN REQD: HCPCS | Performed by: FAMILY MEDICINE

## 2022-06-01 PROCEDURE — 99214 OFFICE O/P EST MOD 30 MIN: CPT | Performed by: FAMILY MEDICINE

## 2022-06-01 PROCEDURE — G8752 SYS BP LESS 140: HCPCS | Performed by: FAMILY MEDICINE

## 2022-06-01 PROCEDURE — G8417 CALC BMI ABV UP PARAM F/U: HCPCS | Performed by: FAMILY MEDICINE

## 2022-06-01 PROCEDURE — 3017F COLORECTAL CA SCREEN DOC REV: CPT | Performed by: FAMILY MEDICINE

## 2022-06-01 PROCEDURE — G8754 DIAS BP LESS 90: HCPCS | Performed by: FAMILY MEDICINE

## 2022-06-01 PROCEDURE — G0463 HOSPITAL OUTPT CLINIC VISIT: HCPCS | Performed by: FAMILY MEDICINE

## 2022-06-01 RX ORDER — METHYLPREDNISOLONE 4 MG/1
TABLET ORAL
Qty: 1 DOSE PACK | Refills: 0 | Status: SHIPPED | OUTPATIENT
Start: 2022-06-01 | End: 2022-08-03 | Stop reason: ALTCHOICE

## 2022-06-01 RX ORDER — CYCLOBENZAPRINE HCL 10 MG
10 TABLET ORAL
Qty: 30 TABLET | Refills: 0 | Status: SHIPPED | OUTPATIENT
Start: 2022-06-01 | End: 2022-08-03 | Stop reason: SDUPTHER

## 2022-06-01 NOTE — PROGRESS NOTES
1. \"Have you been to the ER, urgent care clinic since your last visit? Hospitalized since your last visit? \" [facility]    2. \"Have you seen or consulted any other health care providers outside of the [address] [place] [address] since your last visit? \" [facility]      3. For patients aged 39-70: Has the patient had a colonoscopy / FIT/ Cologuard?  Yes - no Care Gap present
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
## 2022-06-01 NOTE — PATIENT INSTRUCTIONS
Muscle Cramps: Care Instructions  Your Care Instructions     A muscle cramp occurs when a muscle tightens up suddenly. A cramp often happens in the legs. A muscle cramp is also called a muscle spasm or a charley horse. Muscle cramps usually last less than a minute. However, the pain may last for several minutes. Leg cramps that occur at night may wake you up. Heavy exercise, dehydration, and being overweight can increase your risk of getting cramps. An imbalance of certain chemicals in your blood, called electrolytes, can also lead to muscle cramps. Pregnant women sometimes get muscle cramps during sleep. Muscle cramps can be treated by stretching and massaging the muscle. If cramps keep coming back, your doctor may prescribe medicine that relaxes your muscles. Follow-up care is a key part of your treatment and safety. Be sure to make and go to all appointments, and call your doctor if you are having problems. It's also a good idea to know your test results and keep a list of the medicines you take. How can you care for yourself at home? · Drink plenty of fluids to prevent dehydration. Choose water and other clear liquids until you feel better. If you have kidney, heart, or liver disease and have to limit fluids, talk with your doctor before you increase the amount of fluids you drink. · Stretch your muscles every day, especially before and after exercise and at bedtime. Regular stretching can relax your muscles and may prevent cramps. · Do not suddenly increase the amount of exercise you get. Increase your exercise a little each week. · When you get a cramp, stretch and massage the muscle. You can also take a warm shower or bath to relax the muscle. A heating pad placed on the muscle can also help. · Take a daily multivitamin supplement. · Ask your doctor if you can take an over-the-counter pain medicine, such as acetaminophen (Tylenol), ibuprofen (Advil, Motrin), or naproxen (Aleve).  Be safe with medicines. Read and follow all instructions on the label. When should you call for help? Watch closely for changes in your health, and be sure to contact your doctor if:    · You get muscle cramps often that do not go away after home treatment.     · Your muscle cramps often wake you up at night.     · You do not get better as expected. Where can you learn more? Go to http://www.[employer].com/  Enter I565 in the search box to learn more about \"Muscle Cramps: Care Instructions. \"  Current as of: July 1, 2021               Content Version: 13.2  © 4796-8525 [employer]. Care instructions adapted under license by [employer] (which disclaims liability or warranty for this information). If you have questions about a medical condition or this instruction, always ask your healthcare professional. [address] any warranty or liability for your use of this information.

## 2022-06-02 RX ORDER — ATORVASTATIN CALCIUM 20 MG/1
TABLET, FILM COATED ORAL
Qty: 90 TABLET | Refills: 0 | Status: SHIPPED | OUTPATIENT
Start: 2022-06-02 | End: 2022-09-19

## 2022-06-03 ENCOUNTER — HOSPITAL ENCOUNTER (EMERGENCY)
Age: 52
Discharge: HOME | End: 2022-06-03
Attending: STUDENT IN AN ORGANIZED HEALTH CARE EDUCATION/TRAINING PROGRAM
Payer: MEDICARE

## 2022-06-03 ENCOUNTER — APPOINTMENT (OUTPATIENT)
Dept: CT IMAGING | Age: 52
End: 2022-06-03
Attending: EMERGENCY MEDICINE
Payer: MEDICARE

## 2022-06-03 ENCOUNTER — APPOINTMENT (OUTPATIENT)
Dept: GENERAL RADIOLOGY | Age: 52
End: 2022-06-03
Attending: STUDENT IN AN ORGANIZED HEALTH CARE EDUCATION/TRAINING PROGRAM
Payer: MEDICARE

## 2022-06-03 VITALS
HEIGHT: 66 IN | WEIGHT: 210 LBS | DIASTOLIC BLOOD PRESSURE: 80 MMHG | RESPIRATION RATE: 20 BRPM | OXYGEN SATURATION: 92 % | SYSTOLIC BLOOD PRESSURE: 125 MMHG | HEART RATE: 77 BPM | TEMPERATURE: 98.1 F | BODY MASS INDEX: 33.75 KG/M2

## 2022-06-03 DIAGNOSIS — R07.89 CHEST WALL PAIN: Primary | ICD-10-CM

## 2022-06-03 LAB
ANION GAP SERPL CALC-SCNC: 3 MMOL/L (ref 3–18)
ATRIAL RATE: 103 BPM
BASOPHILS # BLD: 0 K/UL (ref 0–0.1)
BASOPHILS NFR BLD: 0 % (ref 0–2)
BUN SERPL-MCNC: 14 MG/DL (ref 7–18)
BUN/CREAT SERPL: 16 (ref 12–20)
CALCIUM SERPL-MCNC: 9.2 MG/DL (ref 8.5–10.1)
CALCULATED P AXIS, ECG09: 65 DEGREES
CALCULATED R AXIS, ECG10: 50 DEGREES
CALCULATED T AXIS, ECG11: 64 DEGREES
CHLORIDE SERPL-SCNC: 104 MMOL/L (ref 100–111)
CO2 SERPL-SCNC: 28 MMOL/L (ref 21–32)
CREAT SERPL-MCNC: 0.88 MG/DL (ref 0.6–1.3)
DIAGNOSIS, 93000: NORMAL
DIFFERENTIAL METHOD BLD: ABNORMAL
EOSINOPHIL # BLD: 0 K/UL (ref 0–0.4)
EOSINOPHIL NFR BLD: 0 % (ref 0–5)
ERYTHROCYTE [DISTWIDTH] IN BLOOD BY AUTOMATED COUNT: 13.5 % (ref 11.6–14.5)
GLUCOSE SERPL-MCNC: 217 MG/DL (ref 74–99)
HCT VFR BLD AUTO: 46.2 % (ref 36–48)
HGB BLD-MCNC: 16.2 G/DL (ref 13–16)
IMM GRANULOCYTES # BLD AUTO: 0 K/UL (ref 0–0.04)
IMM GRANULOCYTES NFR BLD AUTO: 0 % (ref 0–0.5)
LYMPHOCYTES # BLD: 1 K/UL (ref 0.9–3.6)
LYMPHOCYTES NFR BLD: 10 % (ref 21–52)
MAGNESIUM SERPL-MCNC: 2.4 MG/DL (ref 1.6–2.6)
MCH RBC QN AUTO: 29.7 PG (ref 24–34)
MCHC RBC AUTO-ENTMCNC: 35.1 G/DL (ref 31–37)
MCV RBC AUTO: 84.8 FL (ref 78–100)
MONOCYTES # BLD: 0.6 K/UL (ref 0.05–1.2)
MONOCYTES NFR BLD: 6 % (ref 3–10)
NEUTS SEG # BLD: 8.4 K/UL (ref 1.8–8)
NEUTS SEG NFR BLD: 83 % (ref 40–73)
NRBC # BLD: 0 K/UL (ref 0–0.01)
NRBC BLD-RTO: 0 PER 100 WBC
P-R INTERVAL, ECG05: 158 MS
PLATELET # BLD AUTO: 292 K/UL (ref 135–420)
PMV BLD AUTO: 10.3 FL (ref 9.2–11.8)
POTASSIUM SERPL-SCNC: 4.4 MMOL/L (ref 3.5–5.5)
Q-T INTERVAL, ECG07: 336 MS
QRS DURATION, ECG06: 66 MS
QTC CALCULATION (BEZET), ECG08: 440 MS
RBC # BLD AUTO: 5.45 M/UL (ref 4.35–5.65)
SODIUM SERPL-SCNC: 135 MMOL/L (ref 136–145)
TROPONIN-HIGH SENSITIVITY: 3 NG/L (ref 0–78)
TROPONIN-HIGH SENSITIVITY: <3 NG/L (ref 0–78)
VENTRICULAR RATE, ECG03: 103 BPM
WBC # BLD AUTO: 10.1 K/UL (ref 4.6–13.2)

## 2022-06-03 PROCEDURE — 96374 THER/PROPH/DIAG INJ IV PUSH: CPT

## 2022-06-03 PROCEDURE — 84484 ASSAY OF TROPONIN QUANT: CPT

## 2022-06-03 PROCEDURE — 85025 COMPLETE CBC W/AUTO DIFF WBC: CPT

## 2022-06-03 PROCEDURE — 74011250636 HC RX REV CODE- 250/636: Performed by: EMERGENCY MEDICINE

## 2022-06-03 PROCEDURE — 96376 TX/PRO/DX INJ SAME DRUG ADON: CPT

## 2022-06-03 PROCEDURE — 80048 BASIC METABOLIC PNL TOTAL CA: CPT

## 2022-06-03 PROCEDURE — 71275 CT ANGIOGRAPHY CHEST: CPT

## 2022-06-03 PROCEDURE — 93005 ELECTROCARDIOGRAM TRACING: CPT

## 2022-06-03 PROCEDURE — 74011000636 HC RX REV CODE- 636: Performed by: EMERGENCY MEDICINE

## 2022-06-03 PROCEDURE — 83735 ASSAY OF MAGNESIUM: CPT

## 2022-06-03 PROCEDURE — 99285 EMERGENCY DEPT VISIT HI MDM: CPT

## 2022-06-03 PROCEDURE — 71045 X-RAY EXAM CHEST 1 VIEW: CPT

## 2022-06-03 PROCEDURE — 96375 TX/PRO/DX INJ NEW DRUG ADDON: CPT

## 2022-06-03 PROCEDURE — 77010033678 HC OXYGEN DAILY

## 2022-06-03 PROCEDURE — 94762 N-INVAS EAR/PLS OXIMTRY CONT: CPT

## 2022-06-03 RX ORDER — MORPHINE SULFATE 4 MG/ML
4 INJECTION INTRAVENOUS ONCE
Status: COMPLETED | OUTPATIENT
Start: 2022-06-03 | End: 2022-06-03

## 2022-06-03 RX ORDER — KETOROLAC TROMETHAMINE 15 MG/ML
15 INJECTION, SOLUTION INTRAMUSCULAR; INTRAVENOUS ONCE
Status: COMPLETED | OUTPATIENT
Start: 2022-06-03 | End: 2022-06-03

## 2022-06-03 RX ORDER — KETOROLAC TROMETHAMINE 10 MG/1
10 TABLET, FILM COATED ORAL EVERY 8 HOURS
Qty: 3 TABLET | Refills: 0 | Status: SHIPPED | OUTPATIENT
Start: 2022-06-03 | End: 2022-06-04

## 2022-06-03 RX ORDER — GUAIFENESIN 100 MG/5ML
324 LIQUID (ML) ORAL
Status: DISCONTINUED | OUTPATIENT
Start: 2022-06-03 | End: 2022-06-03

## 2022-06-03 RX ORDER — HYDROMORPHONE HYDROCHLORIDE 1 MG/ML
0.5 INJECTION, SOLUTION INTRAMUSCULAR; INTRAVENOUS; SUBCUTANEOUS ONCE
Status: COMPLETED | OUTPATIENT
Start: 2022-06-03 | End: 2022-06-03

## 2022-06-03 RX ADMIN — IOPAMIDOL 67 ML: 755 INJECTION, SOLUTION INTRAVENOUS at 15:20

## 2022-06-03 RX ADMIN — HYDROMORPHONE HYDROCHLORIDE 0.5 MG: 1 INJECTION, SOLUTION INTRAMUSCULAR; INTRAVENOUS; SUBCUTANEOUS at 17:49

## 2022-06-03 RX ADMIN — KETOROLAC TROMETHAMINE 15 MG: 15 INJECTION, SOLUTION INTRAMUSCULAR; INTRAVENOUS at 15:35

## 2022-06-03 RX ADMIN — MORPHINE SULFATE 4 MG: 4 INJECTION, SOLUTION INTRAMUSCULAR; INTRAVENOUS at 14:20

## 2022-06-03 RX ADMIN — MORPHINE SULFATE 4 MG: 4 INJECTION, SOLUTION INTRAMUSCULAR; INTRAVENOUS at 15:07
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
## 2022-06-08 ENCOUNTER — HOSPITAL ENCOUNTER (OUTPATIENT)
Dept: PHYSICAL THERAPY | Age: 52
Discharge: HOME | End: 2022-06-08
Attending: FAMILY MEDICINE
Payer: MEDICARE

## 2022-06-08 PROCEDURE — 97162 PT EVAL MOD COMPLEX 30 MIN: CPT

## 2022-06-08 PROCEDURE — 97530 THERAPEUTIC ACTIVITIES: CPT

## 2022-06-08 RX ORDER — UMECLIDINIUM 62.5 UG/1
AEROSOL, POWDER ORAL
Qty: 30 EACH | Refills: 0 | Status: SHIPPED | OUTPATIENT
Start: 2022-06-08 | End: 2022-07-07
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
## 2022-06-14 ENCOUNTER — HOSPITAL ENCOUNTER (OUTPATIENT)
Dept: PHYSICAL THERAPY | Age: 52
Discharge: HOME | End: 2022-06-14
Attending: FAMILY MEDICINE
Payer: MEDICARE

## 2022-06-14 PROCEDURE — 97140 MANUAL THERAPY 1/> REGIONS: CPT

## 2022-06-14 PROCEDURE — 97110 THERAPEUTIC EXERCISES: CPT

## 2022-06-15 ENCOUNTER — APPOINTMENT (OUTPATIENT)
Dept: PHYSICAL THERAPY | Age: 52
End: 2022-06-15
Attending: FAMILY MEDICINE
Payer: MEDICARE

## 2022-06-16 ENCOUNTER — HOSPITAL ENCOUNTER (OUTPATIENT)
Dept: PHYSICAL THERAPY | Age: 52
Discharge: HOME | End: 2022-06-16
Attending: FAMILY MEDICINE
Payer: MEDICARE

## 2022-06-16 PROCEDURE — 97140 MANUAL THERAPY 1/> REGIONS: CPT

## 2022-06-16 PROCEDURE — 97110 THERAPEUTIC EXERCISES: CPT

## 2022-06-16 NOTE — PROGRESS NOTES
PT DAILY TREATMENT NOTE     Patient Name: Jody Lassiter.  HQNA:[id number]  : 1970  [x]  Patient  Verified  Payor: Mitchel Clements / Plan: VA MEDICARE PART A & B / Product Type: Medicare /    In time: 132 pm   Out time:223 pm   Total Treatment Time (min): 51  Visit #: 3 of 18     Medicare/BCBS Only   Total Timed Codes (min):  41 1:1 Treatment Time:  41       Treatment Area: Neck pain [M54.2]  Pain in right shoulder [M25.511]  Pain in left shoulder [M25.512]  Other low back pain [M54.59]  Muscle spasm [M62.838]    SUBJECTIVE  Pain Level (0-10 scale): 8/10   Any medication changes, allergies to medications, adverse drug reactions, diagnosis change, or new procedure performed?: [x] No    [] Yes (see summary sheet for update)  Subjective functional status/changes:   [] No changes reported  Patient reports that he is having a had time with his breathing and everything hurts really pain. Patient states that his neck hurts the worse. OBJECTIVE    Modality rationale: decrease inflammation, decrease pain and increase tissue extensibility to improve the patients ability to assist with performing ADL's and functional tasks without limitations.    Min Type Additional Details    [] Estim:  []Unatt       []IFC  []Premod                        []Other:  []w/ice   []w/heat  Position:  Location:    [] Estim: []Att    []TENS instruct  []NMES                    []Other:  []w/US   []w/ice   []w/heat  Position:  Location:    []  Traction: [] Cervical       []Lumbar                       [] Prone          []Supine                       []Intermittent   []Continuous Lbs:  [] before manual  [] after manual    []  Ultrasound: []Continuous   [] Pulsed                           []1MHz   []3MHz W/cm2:  Location:    []  Iontophoresis with dexamethasone         Location: [] Take home patch   [] In clinic   10 []  Ice     [x]  heat  []  Ice massage  []  Laser   []  Anodyne Position:reclined   Location: C/S and L/S    [] Laser with stim  []  Other:  Position:  Location:    []  Vasopneumatic Device    []  Right     []  Left  Pre-treatment girth:  Post-treatment girth:  Measured at (location):  Pressure:       [] lo [] med [] hi   Temperature: [] lo [] med [] hi   [] Skin assessment post-treatment:  []intact []redness- no adverse reaction  []redness - adverse reaction:     30 min Therapeutic Exercise:  [x] See flow sheet :   Rationale: increase ROM and increase strength to improve the patients overall muscle endurance and activity tolerance for ADL's and functional tasks. min Therapeutic Activity:  [x]  See flow sheet :   Rationale: increase strength and improve coordination  to improve the patients ability to safely perform dynamic activities and to improve functional performance of ADL's.      min Neuromuscular Re-education:  []  See flow sheet :   Rationale: increase strength, improve coordination and improve balance  to improve the patients ability to perform activities with good form, stability and proprioception. 11 min Manual Therapy: STM to (B) shoulder, rhomboids and upper trap. PROM to (B) shoulder   The manual therapy interventions were performed at a separate and distinct time from the therapeutic activities interventions. Rationale: decrease pain, increase ROM, increase tissue extensibility and decrease trigger points to assist with performing ADL's with ease. With   [] TE   [] TA   [] neuro   [] other: Patient Education: [x] Review HEP    [] Progressed/Changed HEP based on:   [] positioning   [] body mechanics   [] transfers   [] heat/ice application    [] other:      Other Objective/Functional Measures: Added AAROM with dowel into flexion      Pain Level (0-10 scale) post treatment: 5/10     ASSESSMENT/Changes in Function:   Patient is progressing well towards goals. Patient performed exercises, as per flow sheet, to assist with increasing activity tolerance and decreasing pain.  Increased pain experienced with BKFO today and no increase in pain with all other exercises. Patient responded well to today's session, as evident by, a decrease in pain and muscle spasms. Patient will continue to benefit from skilled PT services to modify and progress therapeutic interventions, address functional mobility deficits, address ROM deficits, address strength deficits, analyze and address soft tissue restrictions, analyze and cue movement patterns, analyze and modify body mechanics/ergonomics and instruct in home and community integration to attain remaining goals. []  See Plan of Care  []  See progress note/recertification  []  See Discharge Summary         Progress towards goals / Updated goals:  Short Term Goals: To be accomplished in 1 treatments:   1. Patient will become proficient in their HEP and will be compliant in performing that program.   Evaluation: HEP established. Current: Progressing, will continually reassess, 06/14/222     Long Term Goals: To be accomplished in 6 weeks:   1. Patient's pain level will be 0-4/10 with activity in order to improve patient's ability to perform normal ADLs.    Evaluation: 7-10/10 with activity    Current: Patient reports 8/10 pain today. (6/16/2022)  2. Patient will increase FOTO score to >/= 54 points to indicate increased functional mobility.   Evaluation: 33 points   Current: Progressing, will assess at next progress note. (6/16/2022)   3. Patient will demonstrate pain free cervical B rot and WNL in order to increase ease and safety with driving  [id number]: >23% limited B Rot    Current: Progressing (6/16/2022)   4.  Patient will report >/= 75% improvement in overall condition in order to increase efficency with IADL performance and demonstrate a return to PLOF  Evaluation: 0%   Current: Progressing (6/16/2022)     PLAN  [x]  Upgrade activities as tolerated     [x]  Continue plan of care  []  Update interventions per flow sheet       []  Discharge due to:_  [] Other:_      Jaimee Gutierrez, PTA 6/16/2022  8:51 AM    Future Appointments   Date Time Provider Kiran Lerner   6/16/2022  1:30 PM Ezekiel Payne, PTA [facility]   6/20/2022  8:30 AM [facility] ECHO GE [facility] BS AMB   6/21/2022  9:45 AM Ezekiel Payne, PTA [facility]   6/22/2022  3:45 PM Ezekiel Payne, PTA [facility]   6/27/2022 12:00 PM Shanae Snell, PTA [facility]   6/28/2022  9:00 AM Sarah Hinson, PTA [facility]   6/30/2022 10:30 AM Sarah Hinson, PTA [facility]   7/5/2022  9:00 AM Sarah Hinson, PTA [facility]   7/7/2022  9:45 AM Ezekielcarlos Payne, PTA [facility]   7/8/2022  9:00 AM Ashutosh Zavala MD [facility] AMB   7/8/2022 12:00 PM Ezekiel Payne, PTA [facility]   7/11/2022  9:00 AM Sarah Hinson, PTA [facility]   7/12/2022  9:00 AM Ezekiel Payne, PTA [facility]   7/14/2022  9:00 AM Ezekielcarlos Payne, PTA [facility]   8/3/2022  9:20 AM Chinedu Juárez MD [facility] BS AMB   5/10/2023  9:00 AM Elana Torres MD [facility] BS AMB

## 2022-06-16 NOTE — PROGRESS NOTES
PT DAILY TREATMENT NOTE     Patient Name: Sherice Ferrell  WBOT:  : 1970  [x]  Patient  Verified  Payor: Samir Alvarado / Plan: [place] & [place] / Product Type: Medicare /    In time: 9:46A  Out time: 10:26A  Total Treatment Time (min): 40  Visit #: 2 of 18    Medicare/BCBS Only   Total Timed Codes (min):  30 1:1 Treatment Time:  30       Treatment Area: Neck pain [M54.2]  Pain in right shoulder [M25.511]  Pain in left shoulder [M25.512]  Other low back pain [M54.59]  Muscle spasm [M62.838]    SUBJECTIVE  Pain Level (0-10 scale): 8  Any medication changes, allergies to medications, adverse drug reactions, diagnosis change, or new procedure performed?: [x] No    [] Yes (see summary sheet for update)  Subjective functional status/changes:   [] No changes reported  Pt states attempting HEP, however increased pain levels limited performance. States highest pain levels in left neck to shoulder region upon arrival of today's session.      OBJECTIVE    Modality rationale: decrease pain and increase tissue extensibility to improve the patients ability to sleep comfortably   Min Type Additional Details    [] Estim:  []Unatt       []IFC  []Premod                        []Other:  []w/ice   []w/heat  Position:  Location:    [] Estim: []Att    []TENS instruct  []NMES                    []Other:  []w/US   []w/ice   []w/heat  Position:  Location:    []  Traction: [] Cervical       []Lumbar                       [] Prone          []Supine                       []Intermittent   []Continuous Lbs:  [] before manual  [] after manual    []  Ultrasound: []Continuous   [] Pulsed                           []1MHz   []3MHz W/cm2:  Location:    []  Iontophoresis with dexamethasone         Location: [] Take home patch   [] In clinic   10 []  Ice     [x]  heat  []  Ice massage  []  Laser   []  Anodyne Position: semi reclined  Location: neck and low back    []  Laser with stim  []  Other:  Position:  Location: []  Vasopneumatic Device    []  Right     []  Left  Pre-treatment girth:  Post-treatment girth:  Measured at (location):  Pressure:       [] lo [] med [] hi   Temperature: [] lo [] med [] hi     19 min Therapeutic Exercise:  [x] See flow sheet :   Rationale: increase ROM and increase strength to improve the patients ability to perform ADLs with greater ease     11 min Manual Therapy: (semi reclined) STM to left UT/LS and cervical paraspinals w/ passive gentle stretching of left shld   The manual therapy interventions were performed at a separate and distinct time from the therapeutic activities interventions. Rationale: decrease pain, increase ROM, increase tissue extensibility, decrease trigger points and increase postural awareness to increase tolerance to therex and return to overall PLOF          With   [x] TE   [] TA   [] neuro   [] other: Patient Education: [x] Review HEP    [] Progressed/Changed HEP based on:   [] positioning   [] body mechanics   [] transfers   [] heat/ice application    [] other:      Other Objective/Functional Measures:   Introduced ex program per flow sheet      Pain Level (0-10 scale) post treatment: 5    ASSESSMENT/Changes in Function:   Initiated session w/ MT techniques followed by introduction to therex semi reclined on plinth. Some modifications required secondary to pt presentation/irritability of ss/sx, w/ continually reassess pt 02 (which stayed in pt normal range given COPD dx). Overall pt w/ positive response to session evident though observable improvements in mobility and report of a reduction in pain levels post session. Will continue to progress activity program as able and tolerated.      Patient will continue to benefit from skilled PT services to modify and progress therapeutic interventions, address functional mobility deficits, address ROM deficits, address strength deficits, analyze and address soft tissue restrictions, analyze and cue movement patterns, analyze and modify body mechanics/ergonomics, assess and modify postural abnormalities, address imbalance/dizziness and instruct in home and community integration to attain remaining goals. [x]  See Plan of Care  []  See progress note/recertification  []  See Discharge Summary         Progress towards goals / Updated goals:  Short Term Goals: To be accomplished in 1 treatments:   1. Patient will become proficient in their HEP and will be compliant in performing that program.   Evaluation: HEP established. Current: Progressing, will continually reassess, 06/14/222     Long Term Goals: To be accomplished in 6 weeks:   1. Patient's pain level will be 0-4/10 with activity in order to improve patient's ability to perform normal ADLs.    Evaluation: 7-10/10 with activity    2. Patient will increase FOTO score to >/= 54 points to indicate increased functional mobility.   Evaluation: 33 points   3. Patient will demonstrate pain free cervical B rot and WNL in order to increase ease and safety with driving  [id number]: >21% limited B Rot   4.  Patient will report >/= 75% improvement in overall condition in order to increase efficency with IADL performance and demonstrate a return to PLOF  Evaluation: 0%    PLAN  [x]  Upgrade activities as tolerated     [x]  Continue plan of care  [x]  Update interventions per flow sheet       []  Discharge due to:_  []  Other:_      Tristan Vidal DPT 6/15/2022  8:33 PM    Future Appointments   Date Time Provider Kiran Lerner   6/16/2022  1:30 PM Unknown Luis Angel, PTA [facility]   6/20/2022  8:30 AM CSI ECHO GE 95 [facility] BS AMB   6/21/2022  9:45 AM Unknown Kalrony, PTA [facility]   6/22/2022  3:45 PM Unknown Luis Angel PTA [facility]   6/27/2022 12:00 PM Perla Booth PTA [facility]   6/28/2022  9:00 AM Perla Booth PTA [facility]   6/30/2022 10:30 AM Sarah Hinson PTA [facility]   7/5/2022  9:00 AM Sarah Hinson PTA [facility]   7/7/2022  9:45 AM Unknown SHEEBA Plasencia [facility]   7/8/2022  9:00 AM Fara Mccall MD [facility] BS AMB   7/8/2022 12:00 PM Kim Martinez, SHEEBA [facility]   7/11/2022  9:00 AM Medina Martin, SHEEBA [facility]   7/12/2022  9:00 AM Kim Martinez, SHEEBA [facility]   7/14/2022  9:00 AM Kim Martinez, SHEEBA [facility]   8/3/2022  9:20 AM Balbir Cabrales MD [facility] BS AMB   5/10/2023  9:00 AM Jerman Torres Nurse, MD [facility] BS AMB

## 2022-06-17 ENCOUNTER — TELEPHONE (OUTPATIENT)
Dept: CARDIOLOGY CLINIC | Age: 52
End: 2022-06-17

## 2022-06-20 ENCOUNTER — APPOINTMENT (OUTPATIENT)
Dept: PHYSICAL THERAPY | Age: 52
End: 2022-06-20
Attending: FAMILY MEDICINE
Payer: MEDICARE

## 2022-06-21 ENCOUNTER — HOSPITAL ENCOUNTER (OUTPATIENT)
Dept: PHYSICAL THERAPY | Age: 52
Discharge: HOME | End: 2022-06-21
Attending: FAMILY MEDICINE
Payer: MEDICARE

## 2022-06-21 ENCOUNTER — APPOINTMENT (OUTPATIENT)
Dept: PHYSICAL THERAPY | Age: 52
End: 2022-06-21
Attending: FAMILY MEDICINE
Payer: MEDICARE

## 2022-06-21 PROCEDURE — 97140 MANUAL THERAPY 1/> REGIONS: CPT

## 2022-06-21 PROCEDURE — 97110 THERAPEUTIC EXERCISES: CPT

## 2022-06-21 NOTE — PROGRESS NOTES
PT DAILY TREATMENT NOTE     Patient Name: Leigh Ann Ramos Sr.  Date:2022  : 1970  [x]  Patient  Verified  Payor: Samir Robertsvitor / Plan: [place] MEDICARE PART A & B / Product Type: Medicare /    In time: 250 am   Out time: 1043 am   Total Treatment Time (min): 61  Visit #: 4 of 18     Medicare/BCBS Only   Total Timed Codes (min):  51 1:1 Treatment Time:  51       Treatment Area: Neck pain [M54.2]  Pain in right shoulder [M25.511]  Pain in left shoulder [M25.512]  Other low back pain [M54.59]  Muscle spasm [M62.838]    SUBJECTIVE  Pain Level (0-10 scale): 5/10   Any medication changes, allergies to medications, adverse drug reactions, diagnosis change, or new procedure performed?: [x] No    [] Yes (see summary sheet for update)  Subjective functional status/changes:   [] No changes reported  Patient reports that his son had him out in the yard yesterday. Patient states that he picked a bucket of blackberries and then made a cobbler afterwards. Patient explains that his shoulders and neck bother him the most.     OBJECTIVE    Modality rationale: decrease inflammation, decrease pain and increase tissue extensibility to improve the patients ability to assist with performing ADL's and functional tasks without limitations.    Min Type Additional Details    [] Estim:  []Unatt       []IFC  []Premod                        []Other:  []w/ice   []w/heat  Position:  Location:    [] Estim: []Att    []TENS instruct  []NMES                    []Other:  []w/US   []w/ice   []w/heat  Position:  Location:    []  Traction: [] Cervical       []Lumbar                       [] Prone          []Supine                       []Intermittent   []Continuous Lbs:  [] before manual  [] after manual    []  Ultrasound: []Continuous   [] Pulsed                           []1MHz   []3MHz W/cm2:  Location:    []  Iontophoresis with dexamethasone         Location: [] Take home patch   [] In clinic   10 []  Ice     [x]  heat  []  Ice massage  []  Laser   []  Anodyne Position:reclined   Location: C/S     []  Laser with stim  []  Other:  Position:  Location:    []  Vasopneumatic Device    []  Right     []  Left  Pre-treatment girth:  Post-treatment girth:  Measured at (location):  Pressure:       [] lo [] med [] hi   Temperature: [] lo [] med [] hi   [] Skin assessment post-treatment:  []intact []redness- no adverse reaction  []redness - adverse reaction:     35 min Therapeutic Exercise:  [x] See flow sheet :   Rationale: increase ROM and increase strength to improve the patients overall muscle endurance and activity tolerance for ADL's and functional tasks. min Therapeutic Activity:  [x]  See flow sheet :   Rationale: increase strength and improve coordination  to improve the patients ability to safely perform dynamic activities and to improve functional performance of ADL's.      min Neuromuscular Re-education:  []  See flow sheet :   Rationale: increase strength, improve coordination and improve balance  to improve the patients ability to perform activities with good form, stability and proprioception. 16 min Manual Therapy: STM to (B) shoulder, rhomboids, cervical musculature and upper trap. PROM to (B) shoulder   The manual therapy interventions were performed at a separate and distinct time from the therapeutic activities interventions. Rationale: decrease pain, increase ROM, increase tissue extensibility and decrease trigger points to assist with performing ADL's with ease. With   [] TE   [] TA   [] neuro   [] other: Patient Education: [x] Review HEP    [] Progressed/Changed HEP based on:   [] positioning   [] body mechanics   [] transfers   [] heat/ice application    [] other:      Other Objective/Functional Measures: Added pulleys, bicep curls and SB roll out     Pain Level (0-10 scale) post treatment: 4/10     ASSESSMENT/Changes in Function:   Patient is progressing well towards goals.  Progressed exercises, as per flow sheet, to assist with increasing (B) shoulder and low back mobility. Patient tolerated more exercises today with no increase in pain. Muscle spasms still present at cervical musculature. Patient responded well to today's session, as evident by, a decrease in cervical pain improved exercise tolerance. Patient will continue to benefit from skilled PT services to modify and progress therapeutic interventions, address functional mobility deficits, address ROM deficits, address strength deficits, analyze and address soft tissue restrictions, analyze and cue movement patterns, analyze and modify body mechanics/ergonomics and instruct in home and community integration to attain remaining goals. []  See Plan of Care  []  See progress note/recertification  []  See Discharge Summary         Progress towards goals / Updated goals:  Short Term Goals: To be accomplished in 1 treatments:   1. Patient will become proficient in their HEP and will be compliant in performing that program.   Evaluation: HEP established. Current: Progressing, will continually reassess, 06/14/222     Long Term Goals: To be accomplished in 6 weeks:   1. Patient's pain level will be 0-4/10 with activity in order to improve patient's ability to perform normal ADLs.    Evaluation: 7-10/10 with activity    Current: Patient reports 5/10 pain today. (6/21/2022)    2. Patient will increase FOTO score to >/= 54 points to indicate increased functional mobility.   Evaluation: 33 points   Current: Progressing, will assess at next progress note. (6/21/2022)     3. Patient will demonstrate pain free cervical B rot and WNL in order to increase ease and safety with driving  [id number]: >78% limited B Rot    Current: Progressing (6/21/2022)     4.  Patient will report >/= 75% improvement in overall condition in order to increase efficency with IADL performance and demonstrate a return to PLOF  Evaluation: 0%   Current: Patient reports at least 50-60% improvement with PT. (6/21/2022)     PLAN  [x]  Upgrade activities as tolerated     [x]  Continue plan of care  []  Update interventions per flow sheet       []  Discharge due to:_  []  Other:_      Jania Rivera, SHEEBA 6/21/2022  8:51 AM    Future Appointments   Date Time Provider Kiran Lerner   6/21/2022  9:45 AM Keene Hodgkins, PTA [facility]   6/22/2022  3:45 PM Keene Hodgkins, PTA [facility]   6/27/2022 12:00 PM Pallavi Butter, PTA [facility]   6/28/2022  9:00 AM Sravan, Crystal, PTA [facility]   6/30/2022 10:30 AM Sravan, Crystal, PTA [facility]   7/5/2022  9:00 AM Sravan Crystal, PTA [facility]   7/7/2022  9:45 AM Hallam Hodgkins, PTA [facility]   7/8/2022  9:00 AM [facility] AMB   7/8/2022 12:00 PM Berenice Roberto, DPT [facility]   7/11/2022  9:00 AM Pallavi Butter, PTA [facility]   7/12/2022  9:00 AM Pallavi Butter, PTA [facility]   7/14/2022  9:00 AM Hallam Hodgkins, PTA [facility]   8/3/2022  9:20 AM Pierce Gil MD [facility] BS AMB   5/10/2023  9:00 AM Simone Torres MD [facility] BS AMB

## 2022-06-22 ENCOUNTER — HOSPITAL ENCOUNTER (OUTPATIENT)
Dept: PHYSICAL THERAPY | Age: 52
Discharge: HOME | End: 2022-06-22
Attending: FAMILY MEDICINE
Payer: MEDICARE

## 2022-06-22 PROCEDURE — 97110 THERAPEUTIC EXERCISES: CPT

## 2022-06-22 PROCEDURE — 97140 MANUAL THERAPY 1/> REGIONS: CPT

## 2022-06-23 ENCOUNTER — APPOINTMENT (OUTPATIENT)
Dept: PHYSICAL THERAPY | Age: 52
End: 2022-06-23
Attending: FAMILY MEDICINE
Payer: MEDICARE

## 2022-06-23 NOTE — PROGRESS NOTES
PT DAILY TREATMENT NOTE     Patient Name: Delilah Weston.  Date:2022  : 1970  [x]  Patient  Verified  Payor: Grisel Navarro / Plan: [place] MEDICARE PART A & B / Product Type: Medicare /    In time: 3:09P  Out time: 3:56P   Total Treatment Time (min): 47  Visit #: 5 of 18     Medicare/BCBS Only   Total Timed Codes (min):  47 1:1 Treatment Time:  38       Treatment Area: Neck pain [M54.2]  Pain in right shoulder [M25.511]  Pain in left shoulder [M25.512]  Other low back pain [M54.59]  Muscle spasm [M62.838]    SUBJECTIVE  Pain Level (0-10 scale): 3  Any medication changes, allergies to medications, adverse drug reactions, diagnosis change, or new procedure performed?: [x] No    [] Yes (see summary sheet for update)  Subjective functional status/changes:   [] No changes reported  Patient reports continued improvements in overall mobility and pain levels since beginning participation in PT    OBJECTIVE    37 min Therapeutic Exercise:  [x]? See flow sheet :   Rationale: increase ROM and increase strength to improve the patients ability to perform ADLs with greater ease      10 min Manual Therapy: (semi reclined) STM to left UT/LS and cervical paraspinals w/ passive stretching of left shld   The manual therapy interventions were performed at a separate and distinct time from the therapeutic activities interventions.   Rationale: decrease pain, increase ROM, increase tissue extensibility, decrease trigger points and increase postural awareness to increase tolerance to therex and return to overall PLOF          With   [x] TE   [] TA   [] neuro   [] other: Patient Education: [x] Review HEP    [] Progressed/Changed HEP based on:   [] positioning   [] body mechanics   [] transfers   [] heat/ice application    [] other:      Other Objective/Functional Measures:   Progressed activity program per flow sheet - added sit<>stands, increased reps of current program     Pain Level (0-10 scale) post treatment: 3 ASSESSMENT/Changes in Function:   Patient with observable significant progress since IE through improvements in mobility, fluidity and conformability of movement patterns assessed during sessions. Will continue to progress activity program as able and tolerated. Patient will continue to benefit from skilled PT services to modify and progress therapeutic interventions, address functional mobility deficits, address ROM deficits, address strength deficits, analyze and address soft tissue restrictions, analyze and cue movement patterns, analyze and modify body mechanics/ergonomics and instruct in home and community integration to attain remaining goals. [x]  See Plan of Care  []  See progress note/recertification  []  See Discharge Summary         Progress towards goals / Updated goals:  Short Term Goals: To be accomplished in 1 treatments:   1. Patient will become proficient in their HEP and will be compliant in performing that program.   Evaluation: HEP established. Current: Progressing, will continually reassess, 06/14/222     Long Term Goals: To be accomplished in 6 weeks:   1. Patient's pain level will be 0-4/10 with activity in order to improve patient's ability to perform normal ADLs.    Evaluation: 7-10/10 with activity    Current: Patient reports 5/10 pain today. (6/21/2022)    2. Patient will increase FOTO score to >/= 54 points to indicate increased functional mobility.   Evaluation: 33 points   Current: Progressing, will assess at next progress note. (6/21/2022)     3. Patient will demonstrate pain free cervical B rot and WNL in order to increase ease and safety with driving  [id number]: >27% limited B Rot    Current: Progressing (6/21/2022)     4.  Patient will report >/= 75% improvement in overall condition in order to increase efficency with IADL performance and demonstrate a return to PLOF  Evaluation: 0%   Current: Patient reports at least 50-60% improvement with PT. (6/21/2022) PLAN  [x]  Upgrade activities as tolerated     [x]  Continue plan of care  [x]  Update interventions per flow sheet       []  Discharge due to:_  []  Other:_      Ingrid Schwartz DPT 06/22/22 3:09PM    Future Appointments   Date Time Provider Kiran Lynni   6/27/2022 12:00 PM Akash Truong, PTA [facility]   6/28/2022  9:00 AM Sravan Crystal, PTA [facility]   6/30/2022 10:30 AM Sravan, Crystal, PTA [facility]   7/5/2022  9:00 AM Sarah Hinson, PTA [facility]   7/7/2022  9:45 AM Sumner Ajit, PTA [facility]   7/8/2022  9:00 AM Sonia Perea MD [facility] BS AMB   7/8/2022 12:00 PM Floresita Koehler DPT [facility]   7/11/2022  9:00 AM Desma Alexi, PTA [facility]   7/12/2022  9:00 AM Desmmillie Truong, PTA [facility]   7/14/2022  9:00 AM Sumner Ajit, PTA [facility]   8/3/2022  9:20 AM Aby Sosa MD [facility] BS AMB   5/10/2023  9:00 AM Naveed Torres MD [facility] BS AMB

## 2022-06-24 ENCOUNTER — APPOINTMENT (OUTPATIENT)
Dept: PHYSICAL THERAPY | Age: 52
End: 2022-06-24
Attending: FAMILY MEDICINE
Payer: MEDICARE

## 2022-06-27 ENCOUNTER — HOSPITAL ENCOUNTER (OUTPATIENT)
Dept: PHYSICAL THERAPY | Age: 52
Discharge: HOME | End: 2022-06-27
Attending: FAMILY MEDICINE
Payer: MEDICARE

## 2022-06-27 PROCEDURE — 97140 MANUAL THERAPY 1/> REGIONS: CPT

## 2022-06-27 PROCEDURE — 97110 THERAPEUTIC EXERCISES: CPT

## 2022-06-27 NOTE — PROGRESS NOTES
PT DAILY TREATMENT NOTE     Patient Name: John Lee Sr.  Date:2022  : 1970  [x]  Patient  Verified  Payor: Lillian Robles / Plan: [place] MED[place]ARE [place] & B / Product Type: Medicare /    In time:12:00  Out time:12:33  Total Treatment Time (min): 33  Visit #6 of 18    Medicare/BCBS Only   Total Timed Codes (min):  33 1:1 Treatment Time:  33       Treatment Area: Neck pain [M54.2]  Pain in right shoulder [M25.511]  Pain in left shoulder [M25.512]  Other low back pain [M54.59]  Muscle spasm [M62.838]    SUBJECTIVE  Pain Level (0-10 scale): 9  Any medication changes, allergies to medications, adverse drug reactions, diagnosis change, or new procedure performed?: [x] No    [] Yes (see summary sheet for update)  Subjective functional status/changes:   [] No changes reported  \"Im in a lot of pain today and I have to leave at 12:30.\"    OBJECTIVE    Modality rationale: decrease inflammation, decrease pain, increase tissue extensibility and increase muscle contraction/control to improve the patients ability to perform ADL    Min Type Additional Details    [] Estim:  []Unatt       []IFC  []Premod                        []Other:  []w/ice   []w/heat  Position:  Location:    [] Estim: []Att    []TENS instruct  []NMES                    []Other:  []w/US   []w/ice   []w/heat  Position:  Location:    []  Traction: [] Cervical       []Lumbar                       [] Prone          []Supine                       []Intermittent   []Continuous Lbs:  [] before manual  [] after manual    []  Ultrasound: []Continuous   [] Pulsed                           []1MHz   []3MHz W/cm2:  Location:    []  Iontophoresis with dexamethasone         Location: [] Take home patch   [] In clinic   10  Timed with manual []  Ice     [x]  heat  []  Ice massage  []  Laser   []  Anodyne Position:semi reclined  Location:(B) UT    []  Laser with stim  []  Other:  Position:  Location:    []  Vasopneumatic Device    []  Right     [] Left  Pre-treatment girth:  Post-treatment girth:  Measured at (location):  Pressure:       [] lo [] med [] hi   Temperature: [] lo [] med [] hi   [x] Skin assessment post-treatment:  [x]intact []redness- no adverse reaction    []redness - adverse reaction:      min []Eval                  []Re-Eval       18 min Therapeutic Exercise:  [] See flow sheet :   Rationale: increase ROM and increase strength to improve the patients ability to perform AD     min Therapeutic Activity:  []  See flow sheet :   Rationale: increase ROM, increase strength and improve coordination  to improve the patients ability to look over shoulders with no difficulty      min Neuromuscular Re-education:  []  See flow sheet :   Rationale: increase ROM, increase strength, improve coordination, improve balance and increase proprioception  to improve the patients ability to reach overhead with no limitations    15 min Manual Therapy: STM/DTM (B) UT seated  GH JT [id number] (B) shoulders semi reclined   The manual therapy interventions were performed at a separate and distinct time from the therapeutic activities interventions. Rationale: decrease pain, increase ROM, increase tissue extensibility, decrease edema , decrease trigger points and increase postural awareness to perform ADL     min Gait Training:  ___ feet with ___ device on level surfaces with ___ level of assist   Rationale: With   [] TE   [] TA   [] neuro   [] other: Patient Education: [x] Review HEP    [] Progressed/Changed HEP based on:   [] positioning   [] body mechanics   [] transfers   [] heat/ice application    [] other:      Other Objective/Functional Measures:      Pain Level (0-10 scale) post treatment: 5-6    ASSESSMENT/Changes in Function: Pt presents with tight end feel (B) shoulders with shoulder Flexion ~ 170 degrees  And  Tightness (B) UT during manual . Pt performed CSP ROM wtth guarded movement.  Held remaining there ex due to pt was experiencing increased pain and pt had to leave early for another appointment. Following manual pt experienced decreased in pain. Today pt is making slow progress with goals due to high pain level. Patient will continue to benefit from skilled PT services to address functional mobility deficits, address ROM deficits, address strength deficits, analyze and address soft tissue restrictions, analyze and cue movement patterns, analyze and modify body mechanics/ergonomics, assess and modify postural abnormalities and instruct in home and community integration to attain remaining goals. [x]  See Plan of Care  []  See progress note/recertification  []  See Discharge Summary         Progress towards goals / Updated goals:  1. Patient will become proficient in their HEP and will be compliant in performing that program.   Evaluation: HEP established. Current: Progressing, will continually reassess, 06/14/222     Long Term Goals: To be accomplished in 6 weeks:   1. Patient's pain level will be 0-4/10 with activity in order to improve patient's ability to perform normal ADLs.    Evaluation: 7-10/10 with activity    Current: Patient reports 9/10 pain today. (6/27/2022)     2. Patient will increase FOTO score to >/= 54 points to indicate increased functional mobility.   Evaluation: 33 points   Current: Progressing, will assess at next progress note. (6/21/2022)      3. Patient will demonstrate pain free cervical B rot and WNL in order to increase ease and safety with driving  [id number]: >98% limited B Rot    Current: Progressing (6/21/2022)      4.  Patient will report >/= 75% improvement in overall condition in order to increase efficency with IADL performance and demonstrate a return to PLOF  Evaluation: 0%   Current: Patient reports at least 50-60% improvement with PT. (6/21/2022)     PLAN  []  Upgrade activities as tolerated     []  Continue plan of care  []  Update interventions per flow sheet       []  Discharge due to:_  [x] Other:_REASSESS ROM NV      Sarah Hinson, PTA 6/27/2022  12:02 PM    Future Appointments   Date Time Provider Kiran Lerner   6/28/2022  9:00 AM Jewell Thomas, PTA [facility]   6/30/2022 10:30 AM Jewellhilary Thomas, PTA [facility]   7/5/2022  9:00 AM Sarah Hinson, PTA [facility]   7/7/2022  9:45 AM Mani Life, PTA [facility]   7/8/2022  9:00 AM Gretchen Boyer MD [facility] BS AMB   7/8/2022 12:00 PM Slime Grace DPT [facility]   7/11/2022  9:00 AM Jewell Thomas, PTA [facility]   7/12/2022  9:00 AM Jewell Thomas, PTA [facility]   7/14/2022  9:00 AM Mani Life, PTA [facility]   8/3/2022  9:20 AM Kimberly Quiroz MD [facility] BS AMB   5/10/2023  9:00 AM Tom Torres MD [facility] BS AMB

## 2022-06-28 ENCOUNTER — APPOINTMENT (OUTPATIENT)
Dept: PHYSICAL THERAPY | Age: 52
End: 2022-06-28
Attending: FAMILY MEDICINE
Payer: MEDICARE

## 2022-06-28 ENCOUNTER — HOSPITAL ENCOUNTER (OUTPATIENT)
Dept: PHYSICAL THERAPY | Age: 52
Discharge: HOME | End: 2022-06-28
Attending: FAMILY MEDICINE
Payer: MEDICARE

## 2022-06-28 PROCEDURE — 97110 THERAPEUTIC EXERCISES: CPT

## 2022-06-28 PROCEDURE — 97140 MANUAL THERAPY 1/> REGIONS: CPT

## 2022-06-28 NOTE — PROGRESS NOTES
PT DAILY TREATMENT NOTE     Patient Name: Ramana Dunn.  Date:2022  : 1970  [x]  Patient  Verified  Payor: [place] MEDICARE / Plan: [place] MEDICARE PART A & B / Product Type: Medicare /    In time:9:00  Out time:9:50  Total Treatment Time (min): 50  Visit #: 7 of 18    Medicare/BCBS Only   Total Timed Codes (min):  40 1:1 Treatment Time:  40       Treatment Area: Neck pain [M54.2]  Pain in right shoulder [M25.511]  Pain in left shoulder [M25.512]  Other low back pain [M54.59]  Muscle spasm [M62.838]    SUBJECTIVE  Pain Level (0-10 scale): 6  Any medication changes, allergies to medications, adverse drug reactions, diagnosis change, or new procedure performed?: [x] No    [] Yes (see summary sheet for update)  Subjective functional status/changes:   [] No changes reported  Feeling a better than last time.     OBJECTIVE    Modality rationale: decrease pain and increase tissue extensibility to improve the patients ability to perform ADL   Min Type Additional Details    [] Estim:  []Unatt       []IFC  []Premod                        []Other:  []w/ice   []w/heat  Position:  Location:    [] Estim: []Att    []TENS instruct  []NMES                    []Other:  []w/US   []w/ice   []w/heat  Position:  Location:    []  Traction: [] Cervical       []Lumbar                       [] Prone          []Supine                       []Intermittent   []Continuous Lbs:  [] before manual  [] after manual    []  Ultrasound: []Continuous   [] Pulsed                           []1MHz   []3MHz W/cm2:  Location:    []  Iontophoresis with dexamethasone         Location: [] Take home patch   [] In clinic   10 []  Ice     [x]  heat  []  Ice massage  []  Laser   []  Anodyne Position:semi reclined  Location:(B) UT    []  Laser with stim  []  Other:  Position:  Location:    []  Vasopneumatic Device    []  Right     []  Left  Pre-treatment girth:  Post-treatment girth:  Measured at (location):  Pressure:       [] lo [] med [] hi Temperature: [] lo [] med [] hi   [x] Skin assessment post-treatment:  [x]intact []redness- no adverse reaction    []redness - adverse reaction:      min []Eval                  []Re-Eval       23 min Therapeutic Exercise:  [x] See flow sheet :   Rationale: increase ROM and increase strength to improve the patients ability to perform daily activities     min Therapeutic Activity:  [x]  See flow sheet :   Rationale: increase ROM, increase strength and improve coordination  to improve the patients ability to reach overhead with no difficulty      min Neuromuscular Re-education:  []  See flow sheet :   Rationale: increase ROM, increase strength, improve coordination, improve balance and increase proprioception  to improve the patients ability to reach     17 min Manual Therapy:  STM/DTM  (B) UT seated    The manual therapy interventions were performed at a separate and distinct time from the therapeutic activities interventions. Rationale: decrease pain, increase ROM, increase tissue extensibility, decrease edema , decrease trigger points and increase postural awareness to perform daily chores     min Gait Training:  ___ feet with ___ device on level surfaces with ___ level of assist   Rationale: With   [x] TE   [x] TA   [] neuro   [] other: Patient Education: [x] Review HEP    [] Progressed/Changed HEP based on:   [] positioning   [] body mechanics   [] transfers   [] heat/ice application    [] other:      Other Objective/Functional Measures: CSP AROM  (B) 50 degrees    Pain Level (0-10 scale) post treatment: 4    ASSESSMENT/Changes in Function: Pt was able to perform there ex with less guarded movement due to [address] before there ex. Pt presents with improved in CSP AROM following manual today. Overall pt benefited with treatment due to decreased pain and improved in CSP AROM.     Patient will continue to benefit from skilled PT services to address functional mobility deficits, address ROM deficits, address strength deficits, analyze and address soft tissue restrictions, analyze and cue movement patterns, analyze and modify body mechanics/ergonomics, assess and modify postural abnormalities and instruct in home and community integration to attain remaining goals. [x]  See Plan of Care  []  See progress note/recertification  []  See Discharge Summary         Progress towards goals / Updated goals:  1. Patient will become proficient in their HEP and will be compliant in performing that program.   Evaluation: HEP established. Current: Progressing, will continually reassess, 06/14/222     Long Term Goals: To be accomplished in 6 weeks:   1. Patient's pain level will be 0-4/10 with activity in order to improve patient's ability to perform normal ADLs.    Evaluation: 7-10/10 with activity    Current: Patient reports 9/10 pain today. (6/27/2022)     2. Patient will increase FOTO score to >/= 54 points to indicate increased functional mobility.   Evaluation: 33 points   Current: Progressing, will assess at next progress note. (6/21/2022)      3. Patient will demonstrate pain free cervical B rot and WNL in order to increase ease and safety with driving  [id number]: >67% limited B Rot    Current: (B) CSP 50 deg  (6/28/2022)      4.  Patient will report >/= 75% improvement in overall condition in order to increase efficency with IADL performance and demonstrate a return to PLOF  Evaluation: 0%   Current: Patient reports at least 50-60% improvement with PT. (6/21/2022)   PLAN  []  Upgrade activities as tolerated     [x]  Continue plan of care  []  Update interventions per flow sheet       []  Discharge due to:_  []  Other:_      Smith Craig PTA 6/28/2022  9:09 AM    Future Appointments   Date Time Provider Kiran Lerner   6/30/2022 10:30 AM Kulwinder Connelly PTA [facility]   7/5/2022  9:00 AM [facility]inder [facility]nelly [facility]   7/7/2022  9:45 AM Low Alvarez PTA [facility]   7/8/2022  9:00 AM Martin Soto MD [facility] BS AMB   7/8/2022 12:00 PM Cynthea Alpha, DPT [facility]   7/11/2022  9:00 AM Genella Carinee, PTA [facility]   7/12/2022  9:00 AM Genella Yungdge, PTA [facility]   7/14/2022  9:00 AM Chidi Patel, PTA [facility]   8/3/2022  9:20 AM Ciro Welch MD [facility] BS AMB   5/10/2023  9:00 AM Marialuisa Torres MD [facility] BS AMB

## 2022-06-29 ENCOUNTER — APPOINTMENT (OUTPATIENT)
Dept: PHYSICAL THERAPY | Age: 52
End: 2022-06-29
Attending: FAMILY MEDICINE
Payer: MEDICARE

## 2022-06-30 ENCOUNTER — HOSPITAL ENCOUNTER (OUTPATIENT)
Dept: PHYSICAL THERAPY | Age: 52
Discharge: HOME | End: 2022-06-30
Attending: FAMILY MEDICINE
Payer: MEDICARE

## 2022-06-30 PROCEDURE — 97035 APP MDLTY 1+ULTRASOUND EA 15: CPT

## 2022-06-30 PROCEDURE — 97110 THERAPEUTIC EXERCISES: CPT

## 2022-06-30 NOTE — PROGRESS NOTES
PT DAILY TREATMENT NOTE     Patient Name: Karissa Caro.  Date:2022  : 1970  [x]  Patient  Verified  Payor: Manju Pittman / Plan: [place] MEDICARE PART A & B / Product Type: Medicare /    In time: 10:30  Out time:11:22  Total Treatment Time (min): 52  Visit #: 8 of 18    Medicare/BCBS Only   Total Timed Codes (min):  42 1:1 Treatment Time:  42       Treatment Area: Neck pain [M54.2]  Pain in right shoulder [M25.511]  Pain in left shoulder [M25.512]  Other low back pain [M54.59]  Muscle spasm [M62.838]    SUBJECTIVE  Pain Level (0-10 scale):6  Any medication changes, allergies to medications, adverse drug reactions, diagnosis change, or new procedure performed?: [x] No    [] Yes (see summary sheet for update)  Subjective functional status/changes:   [] No changes reported  \"Feeling a little better. \"    OBJECTIVE    Modality rationale: decrease pain, increase tissue extensibility and increase muscle contraction/control to improve the patients ability to perform ADL    Min Type Additional Details    [] Estim:  []Unatt       []IFC  []Premod                        []Other:  []w/ice   []w/heat  Position:  Location:    [] Estim: []Att    []TENS instruct  []NMES                    []Other:  []w/US   []w/ice   []w/heat  Position:  Location:    []  Traction: [] Cervical       []Lumbar                       [] Prone          []Supine                       []Intermittent   []Continuous Lbs:  [] before manual  [] after manual   8 [x]  Ultrasound: [x]Continuous   [] Pulsed                           [x]1MHz   []3MHz W/cm2:1.3  Location:(B) UT    []  Iontophoresis with dexamethasone         Location: [] Take home patch   [] In clinic   10 pre []  Ice     [x]  heat  []  Ice massage  []  Laser   []  Anodyne Position:seated  Location:(B) UT    []  Laser with stim  []  Other:  Position:  Location:    []  Vasopneumatic Device    []  Right     []  Left  Pre-treatment girth:  Post-treatment girth:  Measured at (location):  Pressure:       [] lo [] med [] hi   Temperature: [] lo [] med [] hi   [x] Skin assessment post-treatment:  [x]intact []redness- no adverse reaction    []redness - adverse reaction:      min []Eval                  []Re-Eval       34 min Therapeutic Exercise:  [x] See flow sheet :   Rationale: increase ROM and increase strength to improve the patients ability to perform daily activities     min Therapeutic Activity:  []  See flow sheet :   Rationale: increase ROM, increase strength and improve coordination  to improve the patients ability to reach over head with no difficulty      min Neuromuscular Re-education:  []  See flow sheet :   Rationale: increase ROM, increase strength, improve coordination, improve balance and increase proprioception  to improve the patients ability to reach overhead with no difficulty     min Manual Therapy: The manual therapy interventions were performed at a separate and distinct time from the therapeutic activities interventions. Rationale: decrease pain, increase ROM, increase tissue extensibility, decrease edema , decrease trigger points and increase postural awareness to perform daily activities. min Gait Training:  ___ feet with ___ device on level surfaces with ___ level of assist   Rationale: With   [] TE   [] TA   [] neuro   [] other: Patient Education: [x] Review HEP    [] Progressed/Changed HEP based on:   [] positioning   [] body mechanics   [] transfers   [] heat/ice application    [] other:      Other Objective/Functional Measures:     Pain Level (0-10 scale) post treatment: 3    ASSESSMENT/Changes in Function: Pt was able to perform each there ex with less guarded movement today but experienced mod pain level. Pt benefited with US today due to decreased pain. Pt is making minima gains with Pt due to pain level.     Patient will continue to benefit from skilled PT services to address functional mobility deficits, address ROM deficits, address strength deficits, analyze and address soft tissue restrictions, analyze and cue movement patterns, analyze and modify body mechanics/ergonomics, assess and modify postural abnormalities and instruct in home and community integration to attain remaining goals. [x]  See Plan of Care  []  See progress note/recertification  []  See Discharge Summary         Progress towards goals / Updated goals:  1. Patient will become proficient in their HEP and will be compliant in performing that program.   Evaluation: HEP established. Current: Progressing, will continually reassess, 06/14/222     Long Term Goals: To be accomplished in 6 weeks:   1. Patient's pain level will be 0-4/10 with activity in order to improve patient's ability to perform normal ADLs.    Evaluation: 7-10/10 with activity    Current: Patient reports 6/10 pain today. (6/30/2022)     2. Patient will increase FOTO score to >/= 54 points to indicate increased functional mobility.   Evaluation: 33 points   Current: Progressing, will assess at next progress note. (6/21/2022)      3. Patient will demonstrate pain free cervical B rot and WNL in order to increase ease and safety with driving  [id number]: >74% limited B Rot    Current: (B) CSP 50 deg  (6/28/2022)      4.  Patient will report >/= 75% improvement in overall condition in order to increase efficency with IADL performance and demonstrate a return to PLOF  Evaluation: 0%   Current: Patient reports at least 50-60% improvement with PT. (6/21/2022)     PLAN  []  Upgrade activities as tolerated     []  Continue plan of care  []  Update interventions per flow sheet       []  Discharge due to:_  []  Other:_      Galo Vences PTA 6/30/2022  10:44 AM    Future Appointments   Date Time Provider Kiran Lerner   7/5/2022  9:00 AM [facility]   7/7/2022  9:45 AM [facility]   7/8/2022  9:00 AM Mony Juarez MD [facility] BS AMB   7/8/2022 12:00 PM Lupillo Pelletier J, DPT [facility]   7/11/2022  9:00 AM Ciera Robles, PTA [facility]   7/12/2022  9:00 AM Ciera Robles, PTA [facility]   7/14/2022  9:00 AM Marylene Edison, PTA [facility]   8/3/2022  9:20 AM Corwin Cat MD [facility] BS AMB   5/10/2023  9:00 AM Reena Torres MD [facility] BS AMB

## 2022-07-01 ENCOUNTER — APPOINTMENT (OUTPATIENT)
Dept: PHYSICAL THERAPY | Age: 52
End: 2022-07-01
Attending: FAMILY MEDICINE
Payer: MEDICARE

## 2022-07-05 ENCOUNTER — HOSPITAL ENCOUNTER (OUTPATIENT)
Dept: PHYSICAL THERAPY | Age: 52
Discharge: HOME | End: 2022-07-05
Attending: FAMILY MEDICINE
Payer: MEDICARE

## 2022-07-05 PROCEDURE — 97530 THERAPEUTIC ACTIVITIES: CPT

## 2022-07-05 PROCEDURE — 97140 MANUAL THERAPY 1/> REGIONS: CPT

## 2022-07-05 PROCEDURE — 97035 APP MDLTY 1+ULTRASOUND EA 15: CPT

## 2022-07-05 PROCEDURE — 97110 THERAPEUTIC EXERCISES: CPT

## 2022-07-05 NOTE — PROGRESS NOTES
PT DAILY TREATMENT NOTE     Patient Name: Keaton Melendrez  Addy Bolaños  : 1970  [x]  Patient  Verified  Payor: [place] MEDICARE / Plan: [place] MEDICARE PART A & B / Product Type: Medicare /    In time:8:03   Out time:9:07  Total Treatment Time (min): 64  Visit #: 9 of 18    Medicare/BCBS Only   Total Timed Codes (min):  64 1:1 Treatment Time:  64       Treatment Area: Neck pain [M54.2]  Pain in right shoulder [M25.511]  Pain in left shoulder [M25.512]  Other low back pain [M54.59]  Muscle spasm [M62.838]    SUBJECTIVE  Pain Level (0-10 scale): 3  Any medication changes, allergies to medications, adverse drug reactions, diagnosis change, or new procedure performed?: [x] No    [] Yes (see summary sheet for update)  Subjective functional status/changes:   [] No changes reported  \"Im feeling better today. \" \"Drove up to  [employer]. \"    OBJECTIVE    Modality rationale: decrease inflammation, decrease pain, increase tissue extensibility and increase muscle contraction/control to improve the patients ability to perform ADL    Min Type Additional Details    [] Estim:  []Unatt       []IFC  []Premod                        []Other:  []w/ice   []w/heat  Position:  Location:    [] Estim: []Att    []TENS instruct  []NMES                    []Other:  []w/US   []w/ice   []w/heat  Position:  Location:    []  Traction: [] Cervical       []Lumbar                       [] Prone          []Supine                       []Intermittent   []Continuous Lbs:  [] before manual  [] after manual   8 [x]  Ultrasound: [x]Continuous   [] Pulsed                           [x]1MHz   []3MHz W/cm2:1.4  Location:(B) UT    []  Iontophoresis with dexamethasone         Location: [] Take home patch   [] In clinic    []  Ice     []  heat  []  Ice massage  []  Laser   []  Anodyne Position:  Location:    []  Laser with stim  []  Other:  Position:  Location:    []  Vasopneumatic Device    []  Right     []  Left  Pre-treatment girth:  Post-treatment girth:  Measured at (location):  Pressure:       [] lo [] med [] hi   Temperature: [] lo [] med [] hi   [x] Skin assessment post-treatment:  [x]intact []redness- no adverse reaction    []redness - adverse reaction:      min []Eval                  []Re-Eval       31 min Therapeutic Exercise:  [x] See flow sheet :   Rationale: increase ROM, increase strength and improve coordination to improve the patients ability to reach overhead with no difficulty    10 min Therapeutic Activity:  []  See flow sheet :   Rationale: increase ROM, increase strength and improve coordination  to improve the patients ability to  Look over shoulder with no difficulty      min Neuromuscular Re-education:  []  See flow sheet :   Rationale: increase ROM, increase strength, improve coordination, improve balance and increase proprioception  to improve the patients ability to  Reach overhead with no difficulty    15 min Manual Therapy:  CSP TX with passive stretch (B) UT head semi reclined supine   The manual therapy interventions were performed at a separate and distinct time from the therapeutic activities interventions. Rationale: decrease pain, increase ROM, increase tissue extensibility, decrease trigger points and increase postural awareness to perform daily activities     min Gait Training:  ___ feet with ___ device on level surfaces with ___ level of assist   Rationale: With   [] TE   [] TA   [] neuro   [] other: Patient Education: [x] Review HEP    [] Progressed/Changed HEP based on:   [] positioning   [] body mechanics   [] transfers   [] heat/ice application    [] other:      Other Objective/Functional Measures: Increase rep per flow sheet/ added 4\" stairs    Pain Level (0-10 scale) post treatment: 1    ASSESSMENT/Changes in Function: Pt completed each seated and supine strengthening there ex well with no difficulty today.   Pt benefited with treatment today due to decreased and increased in CSP AROM following. Overall pt is progressing towards all goals. Patient will continue to benefit from skilled PT services to address functional mobility deficits, address ROM deficits, address strength deficits, analyze and address soft tissue restrictions, analyze and cue movement patterns, analyze and modify body mechanics/ergonomics, assess and modify postural abnormalities and instruct in home and community integration to attain remaining goals. [x]  See Plan of Care  []  See progress note/recertification  []  See Discharge Summary         Progress towards goals / Updated goals:  1. Patient will become proficient in their HEP and will be compliant in performing that program.   Evaluation: HEP established. Current: Progressing, will continually reassess, 06/14/222     Long Term Goals: To be accomplished in 6 weeks:   1. Patient's pain level will be 0-4/10 with activity in order to improve patient's ability to perform normal ADLs.    Evaluation: 7-10/10 with activity    Current: Patient reports 3/10 pain today. (7/5/2022)     2. Patient will increase FOTO score to >/= 54 points to indicate increased functional mobility.   Evaluation: 33 points   Current: Progressing, will assess at next progress note. (6/21/2022)      3. Patient will demonstrate pain free cervical B rot and WNL in order to increase ease and safety with driving  [id number]: >54% limited B Rot    Current: (B) CSP 50 deg  (6/28/2022)      4.  Patient will report >/= 75% improvement in overall condition in order to increase efficency with IADL performance and demonstrate a return to PLOF  Evaluation: 0%   Current: Patient reports at least 50-60% improvement with PT. (6/21/2022)     PLAN  []  Upgrade activities as tolerated     []  Continue plan of care  []  Update interventions per flow sheet       []  Discharge due to:_  [x]  Other:_RECERT  REECE Hinson, PTA 7/5/2022  8:05 AM    Future Appointments   Date Time Provider Kiran Lerner 7/5/2022  9:00 AM Nadiya Woods, PTA [facility]   7/7/2022  9:45 AM Fabi Trejo, PTA [facility]   7/8/2022  9:00 AM Robert Sullivan MD [facility] BS AMB   7/8/2022 12:00 PM LASHONDA KrausT [facility]   7/11/2022  9:00 AM Nadiya Woods, PTA [facility]   7/12/2022  9:00 AM Nadiya Woods, PTA [facility]   7/14/2022  9:00 AM Fabi Trejo, PTA [facility]   8/3/2022  9:20 AM Herlinda Holt MD [facility] BS AMB   5/10/2023  9:00 AM Yoly Torres MD [facility] BS AMB

## 2022-07-06 ENCOUNTER — APPOINTMENT (OUTPATIENT)
Dept: PHYSICAL THERAPY | Age: 52
End: 2022-07-06
Attending: FAMILY MEDICINE
Payer: MEDICARE

## 2022-07-07 ENCOUNTER — HOSPITAL ENCOUNTER (OUTPATIENT)
Dept: PHYSICAL THERAPY | Age: 52
Discharge: HOME | End: 2022-07-07
Attending: FAMILY MEDICINE
Payer: MEDICARE

## 2022-07-07 PROCEDURE — 97110 THERAPEUTIC EXERCISES: CPT

## 2022-07-07 PROCEDURE — 97140 MANUAL THERAPY 1/> REGIONS: CPT

## 2022-07-07 PROCEDURE — 97035 APP MDLTY 1+ULTRASOUND EA 15: CPT

## 2022-07-07 PROCEDURE — 97530 THERAPEUTIC ACTIVITIES: CPT

## 2022-07-07 RX ORDER — UMECLIDINIUM 62.5 UG/1
AEROSOL, POWDER ORAL
Qty: 30 EACH | Refills: 0 | Status: SHIPPED | OUTPATIENT
Start: 2022-07-07 | End: 2022-07-08 | Stop reason: SDUPTHER

## 2022-07-07 NOTE — PROGRESS NOTES
PT DAILY TREATMENT NOTE     Patient Name: Sheryle Lock.  [id number]  : 1970  [x]  Patient  Verified  Payor: Priscila Pride / Plan: [place] MEDICARE PART A & B / Product Type: Medicare /    In time: 636 am   Out time: 9388 am   Total Treatment Time (min): 48  Visit #: 10 of 18     Medicare/BCBS Only   Total Timed Codes (min): 48 1:1 Treatment Time:  48       Treatment Area: Neck pain [M54.2]  Pain in right shoulder [M25.511]  Pain in left shoulder [M25.512]  Other low back pain [M54.59]  Muscle spasm [M62.838]    SUBJECTIVE  Pain Level (0-10 scale): 6/10   Any medication changes, allergies to medications, adverse drug reactions, diagnosis change, or new procedure performed?: [x] No    [] Yes (see summary sheet for update)  Subjective functional status/changes:   [] No changes reported  Patient reports that his having trouble breathing and his neck is bothering him some today. \"My arthritis is all over and it's bothering me today. \"     OBJECTIVE    Modality rationale: decrease inflammation, decrease pain and increase tissue extensibility to improve the patients ability to assist with performing ADL's and functional tasks without limitations.    Min Type Additional Details    [] Estim:  []Unatt       []IFC  []Premod                        []Other:  []w/ice   []w/heat  Position:  Location:    [] Estim: []Att    []TENS instruct  []NMES                    []Other:  []w/US   []w/ice   []w/heat  Position:  Location:    []  Traction: [] Cervical       []Lumbar                       [] Prone          []Supine                       []Intermittent   []Continuous Lbs:  [] before manual  [] after manual   4'/4' []  Ultrasound: []Continuous   [] Pulsed                           []1MHz   []3MHz W/cm2:1.2   Location:(B) UT     []  Iontophoresis with dexamethasone         Location: [] Take home patch   [] In clinic    []  Ice     []  heat  []  Ice massage  []  Laser   []  Anodyne Position:   Location:     []  Laser with stim  []  Other:  Position:  Location:    []  Vasopneumatic Device    []  Right     []  Left  Pre-treatment girth:  Post-treatment girth:  Measured at (location):  Pressure:       [] lo [] med [] hi   Temperature: [] lo [] med [] hi   [] Skin assessment post-treatment:  []intact []redness- no adverse reaction  []redness - adverse reaction:     10  min Therapeutic Exercise:  [x] See flow sheet :   Rationale: increase ROM and increase strength to improve the patients overall muscle endurance and activity tolerance for ADL's and functional tasks. 20 min Therapeutic Activity:  [x]  See flow sheet :   Rationale: increase strength and improve coordination  to improve the patients ability to safely perform dynamic activities and to improve functional performance of ADL's.      min Neuromuscular Re-education:  []  See flow sheet :   Rationale: increase strength, improve coordination and improve balance  to improve the patients ability to perform activities with good form, stability and proprioception. 10  min Manual Therapy: STM to (B) shoulder, rhomboids, cervical musculature and upper trap. PROM to (B) shoulder   The manual therapy interventions were performed at a separate and distinct time from the therapeutic activities interventions. Rationale: decrease pain, increase ROM, increase tissue extensibility and decrease trigger points to assist with performing ADL's with ease. With   [] TE   [] TA   [] neuro   [] other: Patient Education: [x] Review HEP    [] Progressed/Changed HEP based on:   [] positioning   [] body mechanics   [] transfers   [] heat/ice application    [] other:      Other Objective/Functional Measures:   Functional Gains: Feeling better overall and improved cervical ROM   Functional Deficits: Increased pain with sustained cervical positions, reaching over head, twisting and lifting weighted objects.    % improvement: 75%  Pain   Average: 3/10       Best: 3/10     Worst: 6/10    FOTO Assessment score: 52 points    Pain Level (0-10 scale) post treatment: 4/10     ASSESSMENT/Changes in Function:   Patient is progressing well with PT. Patient's cervical ROM, cervical pain and FOTO assessment score have all improved since last assessed. Patient still reports increased difficulty when reading, reaching over head and with dressing. Patient also explains that he has increased difficulty performing some ADL's due to pre-existing conditions. Patient reports 70% improvement with PT and would like to continue further assist with decreasing cervical pain and improving function. Patient will continue to benefit from skilled PT services to modify and progress therapeutic interventions, address functional mobility deficits, address ROM deficits, address strength deficits, analyze and address soft tissue restrictions, analyze and cue movement patterns, analyze and modify body mechanics/ergonomics and instruct in home and community integration to attain remaining goals. []  See Plan of Care  [x]  See progress note/recertification  []  See Discharge Summary         Progress towards goals / Updated goals:  1. Patient will become proficient in their HEP and will be compliant in performing that program.   Evaluation: HEP established. Current: Patient reports compliance with HEP, once a day. MET      Long Term Goals: To be accomplished in 6 weeks:   1. Patient's pain level will be 0-4/10 with activity in order to improve patient's ability to perform normal ADLs.    Evaluation: 7-10/10 with activity    Current: Patient reports 5-6/10 pain with activity. REMAINS      2. Patient will increase FOTO score to >/= 54 points to indicate increased functional mobility.   Evaluation: 33 points   Current: 52 points.  REMAINS      3. Patient will demonstrate pain free cervical B rot and WNL in order to increase ease and safety with driving  [id number]: >57% limited B Rot    Current: Right cervical rot- 50 deg, Left cervical rot- 55 deg. Both with no pain.  MET      4. Patient will report >/= 75% improvement in overall condition in order to increase efficency with IADL performance and demonstrate a return to PLOF  Evaluation: 0%   Current: Patient reports 75% improvement with PT. MET     PLAN  [x]  Upgrade activities as tolerated     [x]  Continue plan of care  []  Update interventions per flow sheet       []  Discharge due to:_  []  Other:_      Yazminemilia Mancini PTA 7/7/2022  8:51 AM    Future Appointments   Date Time Provider Kiran Lerner   7/7/2022  9:45 AM Jessica Baker PTA [facility]   7/8/2022  9:00 AM [facility]   7/8/2022 12:00 PM Kirsten Moody DPT [facility]   7/11/2022  9:00 AM Maryam Jenkins PTA [facility]   7/12/2022  9:00 AM Maryam Jenkins PTA [facility]   7/14/2022  9:00 AM Jessica Baker PTA [facility]   8/3/2022  9:20 AM Rica Victor MD [facility] BS AMB   5/10/2023  9:00 AM Elijah Torres MD [facility] BS AMB

## 2022-07-08 ENCOUNTER — APPOINTMENT (OUTPATIENT)
Dept: PHYSICAL THERAPY | Age: 52
End: 2022-07-08
Attending: FAMILY MEDICINE
Payer: MEDICARE

## 2022-07-08 ENCOUNTER — OFFICE VISIT (OUTPATIENT)
Dept: PULMONOLOGY | Age: 52
End: 2022-07-08
Payer: MEDICARE

## 2022-07-08 ENCOUNTER — HOSPITAL ENCOUNTER (OUTPATIENT)
Dept: PHYSICAL THERAPY | Age: 52
Discharge: HOME | End: 2022-07-08
Attending: FAMILY MEDICINE
Payer: MEDICARE

## 2022-07-08 VITALS
DIASTOLIC BLOOD PRESSURE: 86 MMHG | WEIGHT: 212.8 LBS | OXYGEN SATURATION: 98 % | HEIGHT: 66 IN | RESPIRATION RATE: 18 BRPM | BODY MASS INDEX: 34.2 KG/M2 | HEART RATE: 86 BPM | TEMPERATURE: 98.8 F | SYSTOLIC BLOOD PRESSURE: 140 MMHG

## 2022-07-08 DIAGNOSIS — R91.8 LUNG NODULES: ICD-10-CM

## 2022-07-08 DIAGNOSIS — J44.9 COPD, SEVERE (HCC): Primary | ICD-10-CM

## 2022-07-08 DIAGNOSIS — Z87.891 FORMER SMOKER: ICD-10-CM

## 2022-07-08 PROCEDURE — G8510 SCR DEP NEG, NO PLAN REQD: HCPCS | Performed by: INTERNAL MEDICINE

## 2022-07-08 PROCEDURE — 97530 THERAPEUTIC ACTIVITIES: CPT

## 2022-07-08 PROCEDURE — G8754 DIAS BP LESS 90: HCPCS | Performed by: INTERNAL MEDICINE

## 2022-07-08 PROCEDURE — G8753 SYS BP > OR = 140: HCPCS | Performed by: INTERNAL MEDICINE

## 2022-07-08 PROCEDURE — 3017F COLORECTAL CA SCREEN DOC REV: CPT | Performed by: INTERNAL MEDICINE

## 2022-07-08 PROCEDURE — G8417 CALC BMI ABV UP PARAM F/U: HCPCS | Performed by: INTERNAL MEDICINE

## 2022-07-08 PROCEDURE — 97110 THERAPEUTIC EXERCISES: CPT

## 2022-07-08 PROCEDURE — G8427 DOCREV CUR MEDS BY ELIG CLIN: HCPCS | Performed by: INTERNAL MEDICINE

## 2022-07-08 PROCEDURE — 99214 OFFICE O/P EST MOD 30 MIN: CPT | Performed by: INTERNAL MEDICINE

## 2022-07-08 RX ORDER — DICLOFENAC SODIUM 10 MG/G
GEL TOPICAL
COMMUNITY
Start: 2022-07-07

## 2022-07-08 RX ORDER — FLUTICASONE PROPIONATE AND SALMETEROL 50; 500 UG/1; UG/1
POWDER RESPIRATORY (INHALATION)
Qty: 60 EACH | Refills: 5 | Status: SHIPPED | OUTPATIENT
Start: 2022-07-08

## 2022-07-08 RX ORDER — UMECLIDINIUM 62.5 UG/1
AEROSOL, POWDER ORAL
Qty: 30 EACH | Refills: 5 | Status: SHIPPED | OUTPATIENT
Start: 2022-07-08

## 2022-07-08 RX ORDER — QUETIAPINE FUMARATE 400 MG/1
TABLET, FILM COATED ORAL
COMMUNITY
Start: 2022-06-20 | End: 2022-08-03 | Stop reason: ALTCHOICE

## 2022-07-08 RX ORDER — ALBUTEROL SULFATE 90 UG/1
2 AEROSOL, METERED RESPIRATORY (INHALATION)
Qty: 1 EACH | Refills: 3 | Status: SHIPPED | OUTPATIENT
Start: 2022-07-08

## 2022-07-08 NOTE — PROGRESS NOTES
PT DAILY TREATMENT NOTE     Patient Name: Ramana Dunn.  Date:2022  : 1970  [x]  Patient  Verified  Payor: Janna Bacon / Plan: [place] MEDICARE PART A & B / Product Type: Medicare /    In time: 12:00P   Out time: 12:38P  Total Treatment Time (min):  38  Visit #: 1 of 8     Medicare/BCBS Only   Total Timed Codes (min): 38 1:1 Treatment Time:  38       Treatment Area: Neck pain [M54.2]  Pain in right shoulder [M25.511]  Pain in left shoulder [M25.512]  Other low back pain [M54.59]  Muscle spasm [M62.838]    SUBJECTIVE  Pain Level (0-10 scale): 3/10   Any medication changes, allergies to medications, adverse drug reactions, diagnosis change, or new procedure performed?: [x] No    [] Yes (see summary sheet for update)  Subjective functional status/changes:   [] No changes reported  Patient reports overall significant improvements in functional status. Reports right side of neck into mid back being the most bothersome for him at this time. OBJECTIVE    26 min Therapeutic Exercise:  [x] See flow sheet :   Rationale: increase ROM and increase strength to improve the patients overall muscle endurance and activity tolerance for ADL's and functional tasks. 12 min Therapeutic Activity:  [x]  See flow sheet :   Rationale: increase strength and improve coordination  to improve the patients ability to safely perform dynamic activities and to improve functional performance of ADL's.      With   [x] TE   [x] TA   [] neuro   [] other: Patient Education: [x] Review HEP    [] Progressed/Changed HEP based on:   [] positioning   [] body mechanics   [] transfers   [] heat/ice application    [x] other: Pt ed on importance of implementing breathing strategies + rest break versus increasing 02 higher than 3L  - pt verbally/demonstrates knowledge and understanding of this      Other Objective/Functional Measures:   Progressed ex program per flow sheet - added TB seated rows, horz abd/B shld ER, pec stretch and rhomboid stretch, wall push ups     Pain Level (0-10 scale) post treatment: 210    ASSESSMENT/Changes in Function:   Patient with observable significant progress since IE through improvements in mobility, fluidity and conformability of movement patterns assessed during sessions. Tolerates all ex progression noted above w/ report of muscular fatigue, however no increase in pain levels. Occasional verbal cuing required to reduce B UT hike (right>left) - carryover noted w/ removal of cuing. Pt ed(see above) provided. Will continue to progress activity program as able and tolerated. Patient will continue to benefit from skilled PT services to modify and progress therapeutic interventions, address functional mobility deficits, address ROM deficits, address strength deficits, analyze and address soft tissue restrictions, analyze and cue movement patterns, analyze and modify body mechanics/ergonomics and instruct in home and community integration to attain remaining goals. [x]  See Plan of Care  []  See progress note/recertification  []  See Discharge Summary         Progress towards goals / Updated goals:  1. Patient's pain level will be 0-4/10 with activity in order to improve patient's ability to perform normal ADLs.    Recert: Progressing: Patient reports 5-6/10 pain with activity. 2. Patient will increase FOTO score to >/= 54 points to indicate increased functional mobility.   Recert: Progressin points.    3.  Patient will report >/= 85% improvement in overall condition in order to increase efficency with IADL performance and demonstrate a return to PLOF  Recert: Progressing: Patient reports 75% improvement with PT.      PLAN  [x]  Upgrade activities as tolerated     [x]  Continue plan of care  [x]  Update interventions per flow sheet       []  Discharge due to:_  []  Other:_      Melanie aGrcia, LOWELL 2022  8:51 AM    Future Appointments   Date Time Provider Kiran Lerner   2022  9:00 AM Farzaneh Moore, PTA [facility]   7/12/2022  9:00 AM Farzaneh Moore, PTA [facility]   7/14/2022  9:00 AM Royer Vragas, PTA [facility]   8/3/2022  9:20 AM Josefina Ruano MD [facility] BS AMB   5/10/2023  9:00 AM Desmond Torres MD [facility] BS AMB

## 2022-07-08 NOTE — PROGRESS NOTES
Shruthi Blue presents today for   Chief Complaint   Patient presents with    Follow-up    Wheezing    Shortness of Breath       Is someone accompanying this pt? no    Is the patient using any DME equipment during OV? Nebulizer machine oxygen on [employer]    -DME Company:[employer]    Depression Screening:  3 most recent PHQ Screens 7/8/2022   PHQ Not Done -   Little interest or pleasure in doing things Not at all   Feeling down, depressed, irritable, or hopeless Not at all   Total Score PHQ 2 0       Learning Assessment:  Learning Assessment 5/11/2022   PRIMARY LEARNER Patient   PRIMARY LANGUAGE ENGLISH   LEARNER PREFERENCE PRIMARY DEMONSTRATION     -     -   ANSWERED BY patient   RELATIONSHIP SELF       Abuse Screening:  Abuse Screening Questionnaire 5/11/2022   Do you ever feel afraid of your partner? N   Are you in a relationship with someone who physically or mentally threatens you? N   Is it safe for you to go home? Y       Fall Risk  Fall Risk Assessment, last 12 mths 11/30/2021   Able to walk? Yes   Fall in past 12 months? 0   Do you feel unsteady? 0   Are you worried about falling 0         Coordination of Care:  1. Have you been to the ER, urgent care clinic since your last visit? Hospitalized since your last visit? no    2. Have you seen or consulted any other health care providers outside of the [address] [place] [address] since your last visit? Include any pap smears or colon screening.  no
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
## 2022-07-11 ENCOUNTER — TELEPHONE (OUTPATIENT)
Dept: PHYSICAL THERAPY | Age: 52
End: 2022-07-11

## 2022-07-11 ENCOUNTER — HOSPITAL ENCOUNTER (OUTPATIENT)
Dept: PHYSICAL THERAPY | Age: 52
Discharge: HOME | End: 2022-07-11
Attending: FAMILY MEDICINE
Payer: MEDICARE

## 2022-07-11 PROCEDURE — 97530 THERAPEUTIC ACTIVITIES: CPT

## 2022-07-11 PROCEDURE — 97035 APP MDLTY 1+ULTRASOUND EA 15: CPT

## 2022-07-11 PROCEDURE — 97110 THERAPEUTIC EXERCISES: CPT

## 2022-07-11 NOTE — PROGRESS NOTES
PT DAILY TREATMENT NOTE     Patient Name: Ramón Pantoja.  Date:2022  : 1970  [x]  Patient  Verified  Payor: Mariella Meredith / Plan: VA MEDICARE PART A & B / Product Type: Medicare /    In time:9:00   Out time:9:48  Total Treatment Time (min): 48  Visit #: 2 of 8    Medicare/BCBS Only   Total Timed Codes (min):  48 1:1 Treatment Time:  48       Treatment Area: Neck pain [M54.2]  Pain in right shoulder [M25.511]  Pain in left shoulder [M25.512]  Other low back pain [M54.59]  Muscle spasm [M62.838]    SUBJECTIVE  Pain Level (0-10 scale): 3  Any medication changes, allergies to medications, adverse drug reactions, diagnosis change, or new procedure performed?: [x] No    [] Yes (see summary sheet for update)  Subjective functional status/changes:   [] No changes reported  \"most pain at my neck and shoulders. \" \"My wife has been rubbing the gel on my neck which has been helping. \"    OBJECTIVE    Modality rationale: decrease pain, increase tissue extensibility and increase muscle contraction/control to improve the patients ability to perform ADL    Min Type Additional Details    [] Estim:  []Unatt       []IFC  []Premod                        []Other:  []w/ice   []w/heat  Position:  Location:    [] Estim: []Att    []TENS instruct  []NMES                    []Other:  []w/US   []w/ice   []w/heat  Position:  Location:    []  Traction: [] Cervical       []Lumbar                       [] Prone          []Supine                       []Intermittent   []Continuous Lbs:  [] before manual  [] after manual   8 [x]  Ultrasound: [x]Continuous   [] Pulsed                           [x]1MHz   []3MHz W/cm2:1.4  Location:(B) [place]    []  Iontophoresis with dexamethasone         Location: [] Take home patch   [] In clinic    []  Ice     []  heat  []  Ice massage  []  Laser   []  Anodyne Position:  Location:    []  Laser with stim  []  Other:  Position:  Location:    []  Vasopneumatic Device    []  Right     [] Left  Pre-treatment girth:  Post-treatment girth:  Measured at (location):  Pressure:       [] lo [] med [] hi   Temperature: [] lo [] med [] hi   [x] Skin assessment post-treatment:  [x]intact []redness- no adverse reaction    []redness - adverse reaction:      min []Eval                  []Re-Eval       30 min Therapeutic Exercise:  [x] See flow sheet :   Rationale: increase ROM and increase strength to improve the patients ability to reach shoulder height with no pain    10 min Therapeutic Activity:  [x]  See flow sheet :   Rationale: increase ROM, increase strength and improve coordination  to improve the patients ability to reach overhead with no difficulty      min Neuromuscular Re-education:  []  See flow sheet :   Rationale: increase ROM, increase strength, improve coordination, improve balance and increase proprioception  to improve the patients ability to      min Manual Therapy: The manual therapy interventions were performed at a separate and distinct time from the therapeutic activities interventions. Rationale: decrease pain, increase ROM, increase tissue extensibility, decrease edema , decrease trigger points and increase postural awareness to perform ADL      min Gait Training:  ___ feet with ___ device on level surfaces with ___ level of assist   Rationale: With   [] TE   [] TA   [] neuro   [] other: Patient Education: [x] Review HEP    [] Progressed/Changed HEP based on:   [] positioning   [] body mechanics   [] transfers   [] heat/ice application    [] other:      Other Objective/Functional Measures:   Pain Level (0-10 scale) post treatment: 1    ASSESSMENT/Changes in Function: Pt experienced fatigue and SOB wit sit to stand but was able to perform. Pt required cues on performing wand and theraband there ex correctly. Pt benefited with US due to decreased pain.      Patient will continue to benefit from skilled PT services to address functional mobility deficits, address ROM deficits, address strength deficits, analyze and address soft tissue restrictions, analyze and cue movement patterns, analyze and modify body mechanics/ergonomics, assess and modify postural abnormalities and instruct in home and community integration to attain remaining goals. []  See Plan of Care  [x]  See progress note/recertification  []  See Discharge Summary         Progress towards goals / Updated goals:  1. Patient's pain level will be 0-4/10 with activity in order to improve patient's ability to perform normal ADLs.    Recert: Progressing: Patient reports 5-6/10 pain with activity. Current:  3  22  2. Patient will increase FOTO score to >/= 54 points to indicate increased functional mobility.   Recert: Progressin points.    3.  Patient will report >/= 85% improvement in overall condition in order to increase efficency with IADL performance and demonstrate a return to PLOF  Recert: Progressing: Patient reports 75% improvement with PT.      PLAN  []  Upgrade activities as tolerated     [x]  Continue plan of care  []  Update interventions per flow sheet       []  Discharge due to:_  []  Other:_      Sarah Hinson PTA 2022  9:09 AM    Future Appointments   Date Time Provider Kiran Lerner   2022  9:00 AM Chidi Patel PTA [facility]   8/3/2022  9:20 AM Ciro Welch MD [facility] BS AMB   5/10/2023  9:00 AM Marialuisa Torres MD [facility] BS AMB

## 2022-07-12 ENCOUNTER — APPOINTMENT (OUTPATIENT)
Dept: PHYSICAL THERAPY | Age: 52
End: 2022-07-12
Attending: FAMILY MEDICINE
Payer: MEDICARE

## 2022-07-13 ENCOUNTER — APPOINTMENT (OUTPATIENT)
Dept: PHYSICAL THERAPY | Age: 52
End: 2022-07-13
Attending: FAMILY MEDICINE
Payer: MEDICARE

## 2022-07-14 ENCOUNTER — HOSPITAL ENCOUNTER (OUTPATIENT)
Dept: PHYSICAL THERAPY | Age: 52
Discharge: HOME | End: 2022-07-14
Attending: FAMILY MEDICINE
Payer: MEDICARE

## 2022-07-14 PROCEDURE — 97110 THERAPEUTIC EXERCISES: CPT

## 2022-07-14 PROCEDURE — 97530 THERAPEUTIC ACTIVITIES: CPT

## 2022-07-14 PROCEDURE — 97112 NEUROMUSCULAR REEDUCATION: CPT

## 2022-07-14 PROCEDURE — 97035 APP MDLTY 1+ULTRASOUND EA 15: CPT

## 2022-07-14 NOTE — PROGRESS NOTES
PT DAILY TREATMENT NOTE     Patient Name: Paolo Dang.  DDGR:  : 1970  [x]  Patient  Verified  Payor: Kasia Rea / Plan: VA MEDICARE PART A & B / Product Type: Medicare /    In time: 901 am   Out time: 950 am   Total Treatment Time (min): 49  Visit #: 3 of 8    Medicare/BCBS Only   Total Timed Codes (min): 49 1:1 Treatment Time:  49       Treatment Area: Neck pain [M54.2]  Pain in right shoulder [M25.511]  Pain in left shoulder [M25.512]  Other low back pain [M54.59]  Muscle spasm [M62.838]    SUBJECTIVE  Pain Level (0-10 scale): 0/10   Any medication changes, allergies to medications, adverse drug reactions, diagnosis change, or new procedure performed?: [x] No    [] Yes (see summary sheet for update)  Subjective functional status/changes:   [] No changes reported  Patient reports taking pain meds this morning and not having any  pain right now. Patient reports feeling better overall. OBJECTIVE    Modality rationale: decrease inflammation, decrease pain and increase tissue extensibility to improve the patients ability to assist with performing ADL's and functional tasks without limitations.    Min Type Additional Details    [] Estim:  []Unatt       []IFC  []Premod                        []Other:  []w/ice   []w/heat  Position:  Location:    [] Estim: []Att    []TENS instruct  []NMES                    []Other:  []w/US   []w/ice   []w/heat  Position:  Location:    []  Traction: [] Cervical       []Lumbar                       [] Prone          []Supine                       []Intermittent   []Continuous Lbs:  [] before manual  [] after manual   4'/4' []  Ultrasound: []Continuous   [] Pulsed                           []1MHz   []3MHz W/cm2:1.2   Location:(B) [place]     []  Iontophoresis with dexamethasone         Location: [] Take home patch   [] In clinic    []  Ice     []  heat  []  Ice massage  []  Laser   []  Anodyne Position:   Location:     []  Laser with stim  []  Other: Position:  Location:    []  Vasopneumatic Device    []  Right     []  Left  Pre-treatment girth:  Post-treatment girth:  Measured at (location):  Pressure:       [] lo [] med [] hi   Temperature: [] lo [] med [] hi   [] Skin assessment post-treatment:  []intact []redness- no adverse reaction  []redness - adverse reaction:     20 min Therapeutic Exercise:  [x] See flow sheet :   Rationale: increase ROM and increase strength to improve the patients overall muscle endurance and activity tolerance for ADL's and functional tasks. 11 min Therapeutic Activity:  [x]  See flow sheet :   Rationale: increase strength and improve coordination  to improve the patients ability to safely perform dynamic activities and to improve functional performance of ADL's. 10 min Neuromuscular Re-education:  []  See flow sheet :   Rationale: increase strength, improve coordination and improve balance  to improve the patients ability to perform activities with good form, stability and proprioception. min Manual Therapy: STM to (B) shoulder, rhomboids, cervical musculature and upper trap. PROM to (B) shoulder   The manual therapy interventions were performed at a separate and distinct time from the therapeutic activities interventions. Rationale: decrease pain, increase ROM, increase tissue extensibility and decrease trigger points to assist with performing ADL's with ease. With   [] TE   [] TA   [] neuro   [] other: Patient Education: [x] Review HEP    [] Progressed/Changed HEP based on:   [] positioning   [] body mechanics   [] transfers   [] heat/ice application    [] other:      Other Objective/Functional Measures: Add rows and ext with GTB    Pain Level (0-10 scale) post treatment: 4/10     ASSESSMENT/Changes in Function:   Patient is progressing well towards goals. Progressed exercises, as per flow sheet, to assist with increasing postural strength.  Patient tolerated today's progressed exercises with no increase in pain. Patient responded well to today's session, as evident by, no increase in pain and improved activity tolerance. Discussed scheduling more appointments with patient. Patient reports doing a lot better and nearing discharge. Advised patient to schedule once a week for 3 weeks leading up to follow up with MD. Patient to be discharged at that time if still doing well. Patient agreed and verbalized understanding. Patient will continue to benefit from skilled PT services to modify and progress therapeutic interventions, address functional mobility deficits, address ROM deficits, address strength deficits, analyze and address soft tissue restrictions, analyze and cue movement patterns, analyze and modify body mechanics/ergonomics and instruct in home and community integration to attain remaining goals. []  See Plan of Care  []  See progress note/recertification  []  See Discharge Summary         Progress towards goals / Updated goals:  1. Patient's pain level will be 0-4/10 with activity in order to improve patient's ability to perform normal ADLs.    Recert: Progressing: Patient reports 5-6/10 pain with activity. Current: Patient reports 0/10 pain today. 22  2. Patient will increase FOTO score to >/= 54 points to indicate increased functional mobility.   Recert: Progressin points.   Current: Progressing, will assess at next progress note.  2022  3. Patient will report >/= 85% improvement in overall condition in order to increase efficency with IADL performance and demonstrate a return to PLOF  Recert: Progressing: Patient reports 75% improvement with PT. Current: Progressing, will assess at next progress note.  2022    PLAN  [x]  Upgrade activities as tolerated     [x]  Continue plan of care  []  Update interventions per flow sheet       []  Discharge due to:_  [x]  Other: Decrease frequency to once a week for 3 weeks, then discharge if still doing well.       Ortega Craig, PTA 7/14/2022  8:51 AM    Future Appointments   Date Time Provider Kiran Lynni   7/14/2022  9:00 AM Jessica Baker PTA [facility]   8/3/2022  9:20 AM Casandra Na, Festus Apley, MD [facility] BS AMB   5/10/2023  9:00 AM Elijah Torres MD [facility] BS AMB

## 2022-07-15 ENCOUNTER — APPOINTMENT (OUTPATIENT)
Dept: PHYSICAL THERAPY | Age: 52
End: 2022-07-15
Attending: FAMILY MEDICINE
Payer: MEDICARE

## 2022-07-18 ENCOUNTER — HOSPITAL ENCOUNTER (OUTPATIENT)
Dept: PHYSICAL THERAPY | Age: 52
Discharge: HOME | End: 2022-07-18
Attending: FAMILY MEDICINE
Payer: MEDICARE

## 2022-07-18 PROCEDURE — 97110 THERAPEUTIC EXERCISES: CPT

## 2022-07-18 PROCEDURE — 97530 THERAPEUTIC ACTIVITIES: CPT

## 2022-07-18 PROCEDURE — 97112 NEUROMUSCULAR REEDUCATION: CPT

## 2022-07-18 NOTE — PROGRESS NOTES
PT DAILY TREATMENT NOTE     Patient Name: Rosana Cisneros.  RNZD:  : 1970  [x]  Patient  Verified  Payor: Susanne Wilkes / Plan: [place] MEDICARE PART A & B / Product Type: Medicare /    In time: 859  am   Out time: 946 am   Total Treatment Time (min): 47  Visit #: 4 of 8    Medicare/BCBS Only   Total Timed Codes (min): 47 1:1 Treatment Time:  47       Treatment Area: Neck pain [M54.2]  Pain in right shoulder [M25.511]  Pain in left shoulder [M25.512]  Other low back pain [M54.59]  Muscle spasm [M62.838]    SUBJECTIVE  Pain Level (0-10 scale): 0/10   Any medication changes, allergies to medications, adverse drug reactions, diagnosis change, or new procedure performed?: [x] No    [] Yes (see summary sheet for update)  Subjective functional status/changes:   [] No changes reported  Patient reports increased soreness in his low back today. \"My neck is feeling a lot better. \"     OBJECTIVE    Modality rationale: Patient declined modalities today.     Min Type Additional Details    [] Estim:  []Unatt       []IFC  []Premod                        []Other:  []w/ice   []w/heat  Position:  Location:    [] Estim: []Att    []TENS instruct  []NMES                    []Other:  []w/US   []w/ice   []w/heat  Position:  Location:    []  Traction: [] Cervical       []Lumbar                       [] Prone          []Supine                       []Intermittent   []Continuous Lbs:  [] before manual  [] after manual    []  Ultrasound: []Continuous   [] Pulsed                           []1MHz   []3MHz W/cm2:  Location:    []  Iontophoresis with dexamethasone         Location: [] Take home patch   [] In clinic    []  Ice     []  heat  []  Ice massage  []  Laser   []  Anodyne Position:   Location:     []  Laser with stim  []  Other:  Position:  Location:    []  Vasopneumatic Device    []  Right     []  Left  Pre-treatment girth:  Post-treatment girth:  Measured at (location):  Pressure:       [] lo [] med [] hi Temperature: [] lo [] med [] hi   [] Skin assessment post-treatment:  []intact []redness- no adverse reaction  []redness - adverse reaction:     22 min Therapeutic Exercise:  [x] See flow sheet :   Rationale: increase ROM and increase strength to improve the patients overall muscle endurance and activity tolerance for ADL's and functional tasks. 10 min Therapeutic Activity:  [x]  See flow sheet :   Rationale: increase strength and improve coordination  to improve the patients ability to safely perform dynamic activities and to improve functional performance of ADL's. 15 min Neuromuscular Re-education:  []  See flow sheet :   Rationale: increase strength, improve coordination and improve balance  to improve the patients ability to perform activities with good form, stability and proprioception. min Manual Therapy: STM to (B) shoulder, rhomboids, cervical musculature and upper trap. PROM to (B) shoulder   The manual therapy interventions were performed at a separate and distinct time from the therapeutic activities interventions. Rationale: decrease pain, increase ROM, increase tissue extensibility and decrease trigger points to assist with performing ADL's with ease. With   [] TE   [] TA   [] neuro   [] other: Patient Education: [x] Review HEP    [] Progressed/Changed HEP based on:   [] positioning   [] body mechanics   [] transfers   [] heat/ice application    [] other:      Other Objective/Functional Measures: Added supine BKFO and alternating march    Pain Level (0-10 scale) post treatment: 0/10     ASSESSMENT/Changes in Function:   Patient is progressing well towards goals. Progressed exercises, as per flow sheet, to assist with increasing core strength. Kalie was challenged with today's new exercises. Patient declined modailities today due to feeling good. Patient responded well to today's session, as evident by, no increase in low back or cervical pain.  Patient will continue to benefit from skilled PT services to modify and progress therapeutic interventions, address functional mobility deficits, address ROM deficits, address strength deficits, analyze and address soft tissue restrictions, analyze and cue movement patterns, analyze and modify body mechanics/ergonomics and instruct in home and community integration to attain remaining goals. []  See Plan of Care  []  See progress note/recertification  []  See Discharge Summary         Progress towards goals / Updated goals:  1. Patient's pain level will be 0-4/10 with activity in order to improve patient's ability to perform normal ADLs.    Recert: Progressing: Patient reports 5-6/10 pain with activity. Current: Patient reports 0/10 pain today. 22  2. Patient will increase FOTO score to >/= 54 points to indicate increased functional mobility.   Recert: Progressin points.   Current: Progressing, will assess at next progress note.  2022  3. Patient will report >/= 85% improvement in overall condition in order to increase efficency with IADL performance and demonstrate a return to PLOF  Recert: Progressing: Patient reports 75% improvement with PT.    Current: Progressing, will assess at next progress note.  2022    PLAN  [x]  Upgrade activities as tolerated     [x]  Continue plan of care  []  Update interventions per flow sheet       []  Discharge due to:_  []  Other:     Rosendo Nuñez PTA 2022  8:51 AM    Future Appointments   Date Time Provider Kiran Lerner   2022  9:00 AM [facility]   2022  9:00 AM [facility]   8/3/2022  9:20 AM Mariel Hurtado MD [facility] BS AMB   5/10/2023  9:00 AM Joshua Torres MD [facility] BS AMB

## 2022-07-28 ENCOUNTER — HOSPITAL ENCOUNTER (OUTPATIENT)
Dept: PHYSICAL THERAPY | Age: 52
Discharge: HOME | End: 2022-07-28
Attending: FAMILY MEDICINE
Payer: MEDICARE

## 2022-07-28 PROCEDURE — 97530 THERAPEUTIC ACTIVITIES: CPT

## 2022-07-28 PROCEDURE — 97110 THERAPEUTIC EXERCISES: CPT

## 2022-07-28 PROCEDURE — 97112 NEUROMUSCULAR REEDUCATION: CPT

## 2022-07-28 NOTE — PROGRESS NOTES
PT DAILY TREATMENT NOTE     Patient Name: Nandini Jasso.  Date:2022  : 1970  [x]  Patient  Verified  Payor: [employer] / Plan: [place] MED[place] & B / Product Type: Medicare /    In time: 856  am   Out time: 945 am   Total Treatment Time (min): 47  Visit #:5 of 8    Medicare/BCBS Only   Total Timed Codes (min): 49 1:1 Treatment Time:  49       Treatment Area: Neck pain [M54.2]  Pain in right shoulder [M25.511]  Pain in left shoulder [M25.512]  Other low back pain [M54.59]  Muscle spasm [M62.838]    SUBJECTIVE  Pain Level (0-10 scale): 2/10   Any medication changes, allergies to medications, adverse drug reactions, diagnosis change, or new procedure performed?: [x] No    [] Yes (see summary sheet for update)  Subjective functional status/changes:   [] No changes reported  Patient reports that everything is feeling better. Patient states that he cut the gras this past weekend. OBJECTIVE    Modality rationale: Patient declined modalities today.     Min Type Additional Details    [] Estim:  []Unatt       []IFC  []Premod                        []Other:  []w/ice   []w/heat  Position:  Location:    [] Estim: []Att    []TENS instruct  []NMES                    []Other:  []w/US   []w/ice   []w/heat  Position:  Location:    []  Traction: [] Cervical       []Lumbar                       [] Prone          []Supine                       []Intermittent   []Continuous Lbs:  [] before manual  [] after manual    []  Ultrasound: []Continuous   [] Pulsed                           []1MHz   []3MHz W/cm2:  Location:    []  Iontophoresis with dexamethasone         Location: [] Take home patch   [] In clinic    []  Ice     []  heat  []  Ice massage  []  Laser   []  Anodyne Position:   Location:     []  Laser with stim  []  Other:  Position:  Location:    []  Vasopneumatic Device    []  Right     []  Left  Pre-treatment girth:  Post-treatment girth:  Measured at (location):  Pressure:       [] lo [] med [] hi   Temperature: [] lo [] med [] hi   [] Skin assessment post-treatment:  []intact []redness- no adverse reaction  []redness - adverse reaction:     22 min Therapeutic Exercise:  [x] See flow sheet :   Rationale: increase ROM and increase strength to improve the patients overall muscle endurance and activity tolerance for ADL's and functional tasks. 12 min Therapeutic Activity:  [x]  See flow sheet :   Rationale: increase strength and improve coordination  to improve the patients ability to safely perform dynamic activities and to improve functional performance of ADL's. 15 min Neuromuscular Re-education:  []  See flow sheet :   Rationale: increase strength, improve coordination and improve balance  to improve the patients ability to perform activities with good form, stability and proprioception. min Manual Therapy: STM to (B) shoulder, rhomboids, cervical musculature and upper trap. PROM to (B) shoulder   The manual therapy interventions were performed at a separate and distinct time from the therapeutic activities interventions. Rationale: decrease pain, increase ROM, increase tissue extensibility and decrease trigger points to assist with performing ADL's with ease. With   [] TE   [] TA   [] neuro   [] other: Patient Education: [x] Review HEP    [] Progressed/Changed HEP based on:   [] positioning   [] body mechanics   [] transfers   [] heat/ice application    [] other:      Other Objective/Functional Measures:   Progressed horizontal abd and (B) shoulder ER to GTB and reps of bicep curls     Pain Level (0-10 scale) post treatment: 0/10     ASSESSMENT/Changes in Function:   Patient is progressing well towards goals and nearing discharge. Progressed exercises, as per flow sheet, to assist with increasing postural strength. Patient tolerated all of today's exercises well and experienced no increase in cervical or low back pain. Patient will continue to benefit from skilled PT services to modify and progress therapeutic interventions, address functional mobility deficits, address ROM deficits, address strength deficits, analyze and address soft tissue restrictions, analyze and cue movement patterns, analyze and modify body mechanics/ergonomics and instruct in home and community integration to attain remaining goals. []  See Plan of Care  []  See progress note/recertification  []  See Discharge Summary         Progress towards goals / Updated goals:  1. Patient's pain level will be 0-4/10 with activity in order to improve patient's ability to perform normal ADLs. Recert: Progressing: Patient reports 5-6/10 pain with activity. Current: Patient reports 2/10 pain today. 22  2. Patient will increase FOTO score to >/= 54 points to indicate increased functional mobility. Recert: Progressin points. Current: Progressing, will assess at next progress note. 2022  3. Patient will report >/= 85% improvement in overall condition in order to increase efficency with IADL performance and demonstrate a return to PLOF  Recert: Progressing: Patient reports 75% improvement with PT. Current: Progressing, will assess at next progress note. 2022    PLAN  [x]  Upgrade activities as tolerated     [x]  Continue plan of care  []  Update interventions per flow sheet       []  Discharge due to:_  [x]  Other: Discharge, next visit.      Kirk Rausch PTA 2022  8:51 AM    Future Appointments   Date Time Provider Kiran Lerner   2022  9:00 AM Low Alvarez PTA [facility]   2022  9:00 AM Low Alvarez PTA [facility]   8/3/2022  9:20 AM Emiliana Richard MD [facility] AMB   5/10/2023  9:00 AM Rain Torres MD [facility] BS AMB

## 2022-08-02 ENCOUNTER — HOSPITAL ENCOUNTER (OUTPATIENT)
Dept: PHYSICAL THERAPY | Age: 52
Discharge: HOME | End: 2022-08-02
Attending: FAMILY MEDICINE
Payer: MEDICARE

## 2022-08-02 PROCEDURE — 97110 THERAPEUTIC EXERCISES: CPT

## 2022-08-02 PROCEDURE — 97530 THERAPEUTIC ACTIVITIES: CPT

## 2022-08-02 PROCEDURE — 97112 NEUROMUSCULAR REEDUCATION: CPT
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
## 2022-08-03 ENCOUNTER — OFFICE VISIT (OUTPATIENT)
Dept: FAMILY MEDICINE CLINIC | Age: 52
End: 2022-08-03
Payer: MEDICARE

## 2022-08-03 VITALS
SYSTOLIC BLOOD PRESSURE: 107 MMHG | TEMPERATURE: 96.8 F | DIASTOLIC BLOOD PRESSURE: 74 MMHG | RESPIRATION RATE: 18 BRPM | OXYGEN SATURATION: 94 % | HEART RATE: 80 BPM | BODY MASS INDEX: 34.72 KG/M2 | HEIGHT: 66 IN | WEIGHT: 216 LBS

## 2022-08-03 DIAGNOSIS — M62.830 SPASM OF MUSCLE OF LOWER BACK: Primary | ICD-10-CM

## 2022-08-03 PROCEDURE — 99213 OFFICE O/P EST LOW 20 MIN: CPT | Performed by: FAMILY MEDICINE

## 2022-08-03 PROCEDURE — G0463 HOSPITAL OUTPT CLINIC VISIT: HCPCS | Performed by: FAMILY MEDICINE

## 2022-08-03 PROCEDURE — G8754 DIAS BP LESS 90: HCPCS | Performed by: FAMILY MEDICINE

## 2022-08-03 PROCEDURE — G8510 SCR DEP NEG, NO PLAN REQD: HCPCS | Performed by: FAMILY MEDICINE

## 2022-08-03 PROCEDURE — G8417 CALC BMI ABV UP PARAM F/U: HCPCS | Performed by: FAMILY MEDICINE

## 2022-08-03 PROCEDURE — 3017F COLORECTAL CA SCREEN DOC REV: CPT | Performed by: FAMILY MEDICINE

## 2022-08-03 PROCEDURE — G8752 SYS BP LESS 140: HCPCS | Performed by: FAMILY MEDICINE

## 2022-08-03 PROCEDURE — G8427 DOCREV CUR MEDS BY ELIG CLIN: HCPCS | Performed by: FAMILY MEDICINE

## 2022-08-03 RX ORDER — CYCLOBENZAPRINE HCL 10 MG
10 TABLET ORAL
Qty: 30 TABLET | Refills: 0 | Status: SHIPPED | OUTPATIENT
Start: 2022-08-03

## 2022-08-03 NOTE — PROGRESS NOTES
HPI:  Arthea Cramp. is a 45 y.o. male who presents today with   Chief Complaint   Patient presents with    Spasms     2 m f/u      Back spasms are better  Pt had PT; this did help. He continues to do the exercises   He also says that the Flexeril was helpful. He tolerated the medication well    Has been to pain management. He has been prescribed Voltaren gel. Voltaren gel is also helpful    He would like a refill on Flexeril. He does still have some burning sensation in the left elbow superficial scar. .  Patient advised to try petroleum jelly to the area. This will be monitored. 3 most recent PHQ Screens 8/3/2022   PHQ Not Done Patient refuses   Little interest or pleasure in doing things Not at all   Feeling down, depressed, irritable, or hopeless Not at all   Total Score PHQ 2 0               PMH,  Meds, Allergies, Family History, Social history reviewed      Current Outpatient Medications   Medication Sig Dispense Refill    diclofenac (VOLTAREN) 1 % gel       umeclidinium (Incruse Ellipta) 62.5 mcg/actuation inhaler INHALE 1 PUFF BY MOUTH ONCE DAILY, EXHALE FULLY BEFORE INHALING 30 Each 5    Advair Diskus 500-50 mcg/dose diskus inhaler INHALE 1 DOSE BY MOUTH TWICE DAILY 60 Each 5    albuterol (PROVENTIL HFA, VENTOLIN HFA, PROAIR HFA) 90 mcg/actuation inhaler Take 2 Puffs by inhalation every four (4) hours as needed for Wheezing. 1 Each 3    atorvastatin (LIPITOR) 20 mg tablet Take 1 tablet by mouth once daily 90 Tablet 0    cyclobenzaprine (FLEXERIL) 10 mg tablet Take 1 Tablet by mouth two (2) times daily as needed for Muscle Spasm(s). 30 Tablet 0    furosemide (LASIX) 20 mg tablet TAKE 1 TABLET BY MOUTH EVERY OTHER DAY 15 Tablet 5    FLUoxetine (PROzac) 40 mg capsule       hydrOXYzine HCL (ATARAX) 50 mg tablet       lisinopriL (PRINIVIL, ZESTRIL) 20 mg tablet Take 1 Tablet by mouth daily.  90 Tablet 1    Narcan 4 mg/actuation nasal spray       albuterol (PROVENTIL VENTOLIN) 2.5 mg /3 mL (0.083 %) nebu Nebulized 1 vial for inhalation every 4 hours as needed Diag. Code J44.9, J96.10  File Under Medicare B 120 Each 2    roflumilast (Daliresp) 500 mcg tab tablet Take 1 tablet by mouth once daily 30 Tablet 5    aspirin delayed-release 81 mg tablet Take  by mouth daily. oxyCODONE-acetaminophen (PERCOCET) 5-325 mg per tablet TAKE 1 TABLET BY MOUTH EVERY 12 HOURS      Blood Pressure Monitor (BLOOD PRESSURE KIT) kit 1 Device by Does Not Apply route daily as needed (for blood pressure checks). 1 Kit 0    OXcarbazepine (TRILEPTAL) 150 mg tablet Take 150 mg by mouth two (2) times a day. LORazepam (ATIVAN) 1 mg tablet Take 1 Tab by mouth every eight (8) hours as needed for Anxiety. Max Daily Amount: 3 mg. 90 Tab 0    Nebulizer & Compressor machine 1 Each by Does Not Apply route every four (4) hours as needed. 1 Each 0    OXYGEN-AIR DELIVERY SYSTEMS (WALKABOUT 1 OXYGEN SYSTEM) 2.5 L/min by Nasal route continuous. Nightly at PRN during day  Indications: DME: [facility]      pantoprazole (PROTONIX) 40 mg tablet Take 1 Tab by mouth Daily (before breakfast).  30 Tab 0        Allergies   Allergen Reactions    Ciprofloxacin Nausea Only    Remeron [Mirtazapine] Other (comments)     Mood swings    Seroquel [Quetiapine] Other (comments)     Mood swings, trembles, shakiness and mild throat swelling (1/08/21)                  ROS as per HPI      Visit Vitals  /74 (BP 1 Location: Right arm, BP Patient Position: Sitting, BP Cuff Size: Adult long)   Pulse 80   Temp 96.8 °F (36 °C) (Temporal)   Resp 18   Ht 5' 6\" (1.676 m)   Wt 216 lb (98 kg)   SpO2 94%   BMI 34.86 kg/m²     Physical Exam  General appearance: alert, cooperative, no distress, appears stated age    Lungs: clear to auscultation bilaterally  Heart: regular rate and rhythm, S1, S2 normal, no murmur, click, rub or gallop    Paraspinal muscles of low back revealed no significant tenderness or spasm      Assessment/Plan:    Diagnoses and all orders for this visit:    1. Spasm of muscle of lower back    Other orders  -     cyclobenzaprine (FLEXERIL) 10 mg tablet; Take 1 Tablet by mouth two (2) times daily as needed for Muscle Spasm(s). As above  Muscle spasms are better  Flexeril as needed  Patient asked to take medication for symptoms only  Continue physical therapy exercises learned  This has been fully explained to the patient, who indicates understanding. An After Visit Summary was printed and given to the patient. Follow-up and Dispositions    Return in about 3 months (around 11/3/2022) for Chol.            Kylie Cantrell MD

## 2022-08-03 NOTE — PROGRESS NOTES
1. \"Have you been to the ER, urgent care clinic since your last visit? Hospitalized since your last visit? \" No    2. \"Have you seen or consulted any other health care providers outside of the [address] [place] [address] since your last visit? \"  Yes, Ortho with Dr. Arvind Patel       3. For patients aged 39-70: Has the patient had a colonoscopy / FIT/ Cologuard?  Yes - no Care Gap present

## 2022-09-19 RX ORDER — ATORVASTATIN CALCIUM 20 MG/1
TABLET, FILM COATED ORAL
Qty: 90 TABLET | Refills: 0 | Status: SHIPPED | OUTPATIENT
Start: 2022-09-19

## 2022-11-01 ENCOUNTER — TELEPHONE (OUTPATIENT)
Dept: FAMILY MEDICINE CLINIC | Age: 52
End: 2022-11-01

## 2022-11-09 ENCOUNTER — TELEPHONE (OUTPATIENT)
Dept: FAMILY MEDICINE CLINIC | Age: 52
End: 2022-11-09

## 2022-11-15 RX ORDER — ROFLUMILAST 500 UG/1
TABLET ORAL
Qty: 90 TABLET | Refills: 3 | Status: SHIPPED | OUTPATIENT
Start: 2022-11-15

## 2022-11-29 ENCOUNTER — OFFICE VISIT (OUTPATIENT)
Dept: FAMILY MEDICINE CLINIC | Age: 52
End: 2022-11-29
Payer: MEDICARE

## 2022-11-29 VITALS
SYSTOLIC BLOOD PRESSURE: 119 MMHG | TEMPERATURE: 97.1 F | OXYGEN SATURATION: 93 % | WEIGHT: 223.2 LBS | BODY MASS INDEX: 35.87 KG/M2 | HEART RATE: 90 BPM | RESPIRATION RATE: 18 BRPM | HEIGHT: 66 IN | DIASTOLIC BLOOD PRESSURE: 82 MMHG

## 2022-11-29 DIAGNOSIS — Z00.00 MEDICARE ANNUAL WELLNESS VISIT, SUBSEQUENT: Primary | ICD-10-CM

## 2022-11-29 DIAGNOSIS — E66.01 SEVERE OBESITY (BMI 35.0-39.9) WITH COMORBIDITY (HCC): ICD-10-CM

## 2022-11-29 DIAGNOSIS — E78.00 HYPERCHOLESTEREMIA: ICD-10-CM

## 2022-11-29 DIAGNOSIS — F17.201 TOBACCO ABUSE, IN REMISSION: ICD-10-CM

## 2022-11-29 DIAGNOSIS — Z87.891 PERSONAL HISTORY OF TOBACCO USE, PRESENTING HAZARDS TO HEALTH: ICD-10-CM

## 2022-11-29 PROCEDURE — 3017F COLORECTAL CA SCREEN DOC REV: CPT | Performed by: FAMILY MEDICINE

## 2022-11-29 PROCEDURE — G8752 SYS BP LESS 140: HCPCS | Performed by: FAMILY MEDICINE

## 2022-11-29 PROCEDURE — G8427 DOCREV CUR MEDS BY ELIG CLIN: HCPCS | Performed by: FAMILY MEDICINE

## 2022-11-29 PROCEDURE — G0439 PPPS, SUBSEQ VISIT: HCPCS | Performed by: FAMILY MEDICINE

## 2022-11-29 PROCEDURE — G8754 DIAS BP LESS 90: HCPCS | Performed by: FAMILY MEDICINE

## 2022-11-29 PROCEDURE — G8510 SCR DEP NEG, NO PLAN REQD: HCPCS | Performed by: FAMILY MEDICINE

## 2022-11-29 PROCEDURE — 3074F SYST BP LT 130 MM HG: CPT | Performed by: FAMILY MEDICINE

## 2022-11-29 PROCEDURE — G8417 CALC BMI ABV UP PARAM F/U: HCPCS | Performed by: FAMILY MEDICINE

## 2022-11-29 PROCEDURE — 3078F DIAST BP <80 MM HG: CPT | Performed by: FAMILY MEDICINE

## 2022-11-29 NOTE — PROGRESS NOTES
1. \"Have you been to the ER, urgent care clinic since your last visit? Hospitalized since your last visit? \" No    2. \"Have you seen or consulted any other health care providers outside of the [address] [place] [address] since your last visit? \"  Yes, Dr. Geetha Mark with [facility]       3. For patients aged 39-70: Has the patient had a colonoscopy / FIT/ Cologuard? Yes - Care Gap present.  Most recent result on file

## 2022-11-29 NOTE — PATIENT INSTRUCTIONS
Medicare Wellness Visit, Male    The best way to live healthy is to have a lifestyle where you eat a well-balanced diet, exercise regularly, limit alcohol use, and quit all forms of tobacco/nicotine, if applicable. Regular preventive services are another way to keep healthy. Preventive services (vaccines, screening tests, monitoring & exams) can help personalize your care plan, which helps you manage your own care. Screening tests can find health problems at the earliest stages, when they are easiest to treat. [facility] follows the current, evidence-based guidelines published by the [facility] ([facility]) when recommending preventive services for our patients. Because we follow these guidelines, sometimes recommendations change over time as research supports it. (For example, a prostate screening blood test is no longer routinely recommended for men with no symptoms). Of course, you and your doctor may decide to screen more often for some diseases, based on your risk and co-morbidities (chronic disease you are already diagnosed with). Preventive services for you include:  - Medicare offers their members a free annual wellness visit, which is time for you and your primary care provider to discuss and plan for your preventive service needs. Take advantage of this benefit every year!  -All adults over age 72 should receive the recommended pneumonia vaccines. Current USPSTF guidelines recommend a series of two vaccines for the best pneumonia protection.   -All adults should have a flu vaccine yearly and tetanus vaccine every 10 years.  -All adults age 48 and older should receive the shingles vaccines (series of two vaccines).        -All adults age 38-68 who are overweight should have a diabetes screening test once every three years.   -Other screening tests & preventive services for persons with diabetes include: an eye exam to screen for diabetic retinopathy, a kidney function test, a foot exam, and stricter control over your cholesterol.   -Cardiovascular screening for adults with routine risk involves an electrocardiogram (ECG) at intervals determined by the provider.   -Colorectal cancer screening should be done for adults age 54-65 with no increased risk factors for colorectal cancer. There are a number of acceptable methods of screening for this type of cancer. Each test has its own benefits and drawbacks. Discuss with your provider what is most appropriate for you during your annual wellness visit. The different tests include: colonoscopy (considered the best screening method), a fecal occult blood test, a fecal DNA test, and sigmoidoscopy.  -All adults born between [facility] should be screened once for Hepatitis C.  -An Abdominal Aortic Aneurysm (AAA) Screening is recommended for men age 73-68 who has ever smoked in their lifetime.      Here is a list of your current Health Maintenance items (your personalized list of preventive services) with a due date:  Health Maintenance Due   Topic Date Due    Smoker or Former Smoker - [address]  Never done    COVID-19 Vaccine (4 - Booster for Brisk.io series) 03/18/2022    Yearly Flu Vaccine (1) 08/01/2022    Annual Well Visit  12/01/2022
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
## 2022-12-07 ENCOUNTER — HOSPITAL ENCOUNTER (OUTPATIENT)
Dept: LAB | Age: 52
Discharge: HOME | End: 2022-12-07
Payer: MEDICARE

## 2022-12-07 DIAGNOSIS — E78.00 HYPERCHOLESTEREMIA: ICD-10-CM

## 2022-12-07 LAB
ALBUMIN SERPL-MCNC: 4.1 G/DL (ref 3.4–5)
ALBUMIN/GLOB SERPL: 1.4 {RATIO} (ref 0.8–1.7)
ALP SERPL-CCNC: 136 U/L (ref 45–117)
ALT SERPL-CCNC: 44 U/L (ref 16–61)
ANION GAP SERPL CALC-SCNC: 3 MMOL/L (ref 3–18)
AST SERPL-CCNC: 21 U/L (ref 10–38)
BILIRUB SERPL-MCNC: 1.3 MG/DL (ref 0.2–1)
BUN SERPL-MCNC: 14 MG/DL (ref 7–18)
BUN/CREAT SERPL: 14 (ref 12–20)
CALCIUM SERPL-MCNC: 9.6 MG/DL (ref 8.5–10.1)
CHLORIDE SERPL-SCNC: 103 MMOL/L (ref 100–111)
CHOLEST SERPL-MCNC: 166 MG/DL
CO2 SERPL-SCNC: 30 MMOL/L (ref 21–32)
CREAT SERPL-MCNC: 0.98 MG/DL (ref 0.6–1.3)
GLOBULIN SER CALC-MCNC: 3 G/DL (ref 2–4)
GLUCOSE SERPL-MCNC: 111 MG/DL (ref 74–99)
HDLC SERPL-MCNC: 54 MG/DL (ref 40–60)
HDLC SERPL: 3.1 {RATIO} (ref 0–5)
LDLC SERPL CALC-MCNC: 85.8 MG/DL (ref 0–100)
LIPID PROFILE,FLP: NORMAL
POTASSIUM SERPL-SCNC: 4.6 MMOL/L (ref 3.5–5.5)
PROT SERPL-MCNC: 7.1 G/DL (ref 6.4–8.2)
SODIUM SERPL-SCNC: 136 MMOL/L (ref 136–145)
TRIGL SERPL-MCNC: 131 MG/DL (ref ?–150)
VLDLC SERPL CALC-MCNC: 26.2 MG/DL

## 2022-12-07 PROCEDURE — 80053 COMPREHEN METABOLIC PANEL: CPT

## 2022-12-07 PROCEDURE — 36415 COLL VENOUS BLD VENIPUNCTURE: CPT

## 2022-12-07 PROCEDURE — 80061 LIPID PANEL: CPT

## 2022-12-16 RX ORDER — ATORVASTATIN CALCIUM 20 MG/1
TABLET, FILM COATED ORAL
Qty: 90 TABLET | Refills: 0 | Status: SHIPPED | OUTPATIENT
Start: 2022-12-16

## 2023-01-01 NOTE — DISCHARGE INSTRUCTIONS
TRANSJUGULAR BIOPSY DISCHARGE INSTRUCTIONS    General Information:   The transjugular liver biopsy is a procedure that is done to obtain a liver biopsy through the portal vein. A biopsy is the removal of a small piece of tissue for microscopic examination or testing. Healthy tissue can be obtained for the purpose of tissue-type matching for transplants. Unhealthy tissues are more commonly biopsied to diagnose disease. The liver biopsy helps detect cancer, infections, and the cause of an enlargement of the liver or elevated liver enzymes. It also helps to diagnose a number of liver diseases. Home Care Instructions: You can resume your regular diet and medication regimen. Do not drink alcohol, drive, or make any important legal decisions in the next 24 hours. Do not lift anything heavier than a gallon of milk, or do anything strenuous for the next 24 hours. You will notice a dressing on your neck. This was the site of the insertion for the procedure. This dressing may be removed in 24 hours. You may take a shower after the bandage comes off. Do not take a bath, swim or immerse yourself in water until the wound on your neck has completely healed. Call If:   You should call your Physician and/or Radiology Nurse if you have any bleeding other than a small spot on your bandage. Call if you have any signs of infection, fever, or increased pain at the site. Call if you have any questions of how to take care of your wound. Call if you notice your abdomen swelling. You may still have to come in for the paracentesis, but you should not have to come in as often. Follow-Up Instructions: Please see your ordering doctor as he/she has requested. Abnormal Fetal Heart Rate Category II

## 2023-01-09 ENCOUNTER — HOSPITAL ENCOUNTER (OUTPATIENT)
Dept: CT IMAGING | Age: 53
Discharge: HOME | End: 2023-01-09
Attending: FAMILY MEDICINE

## 2023-01-09 DIAGNOSIS — Z87.891 PERSONAL HISTORY OF TOBACCO USE, PRESENTING HAZARDS TO HEALTH: ICD-10-CM

## 2023-01-12 ENCOUNTER — HOSPITAL ENCOUNTER (OUTPATIENT)
Dept: CT IMAGING | Age: 53
Discharge: HOME | End: 2023-01-12
Attending: FAMILY MEDICINE
Payer: MEDICARE

## 2023-01-12 PROCEDURE — 71271 CT THORAX LUNG CANCER SCR C-: CPT

## 2023-01-18 ENCOUNTER — HOSPITAL ENCOUNTER (EMERGENCY)
Age: 53
Discharge: HOME | End: 2023-01-18
Attending: EMERGENCY MEDICINE
Payer: MEDICARE

## 2023-01-18 ENCOUNTER — PATIENT MESSAGE (OUTPATIENT)
Dept: CARDIOLOGY CLINIC | Age: 53
End: 2023-01-18

## 2023-01-18 ENCOUNTER — TELEPHONE (OUTPATIENT)
Dept: PULMONOLOGY | Age: 53
End: 2023-01-18

## 2023-01-18 VITALS
OXYGEN SATURATION: 96 % | RESPIRATION RATE: 23 BRPM | DIASTOLIC BLOOD PRESSURE: 66 MMHG | TEMPERATURE: 99.8 F | HEART RATE: 105 BPM | SYSTOLIC BLOOD PRESSURE: 113 MMHG

## 2023-01-18 DIAGNOSIS — U07.1 2019 NOVEL CORONAVIRUS DISEASE (COVID-19): Primary | ICD-10-CM

## 2023-01-18 DIAGNOSIS — R06.00 DYSPNEA, UNSPECIFIED TYPE: ICD-10-CM

## 2023-01-18 LAB
ALBUMIN SERPL-MCNC: 4.1 G/DL (ref 3.4–5)
ALBUMIN/GLOB SERPL: 1.4 (ref 0.8–1.7)
ALP SERPL-CCNC: 126 U/L (ref 45–117)
ALT SERPL-CCNC: 50 U/L (ref 16–61)
ANION GAP SERPL CALC-SCNC: 6 MMOL/L (ref 3–18)
AST SERPL-CCNC: 27 U/L (ref 10–38)
BASOPHILS # BLD: 0 K/UL (ref 0–0.1)
BASOPHILS NFR BLD: 0 % (ref 0–2)
BILIRUB SERPL-MCNC: 0.7 MG/DL (ref 0.2–1)
BNP SERPL-MCNC: 46 PG/ML (ref 0–900)
BUN SERPL-MCNC: 11 MG/DL (ref 7–18)
BUN/CREAT SERPL: 11 (ref 12–20)
CALCIUM SERPL-MCNC: 9.2 MG/DL (ref 8.5–10.1)
CHLORIDE SERPL-SCNC: 104 MMOL/L (ref 100–111)
CO2 SERPL-SCNC: 25 MMOL/L (ref 21–32)
CREAT SERPL-MCNC: 1.02 MG/DL (ref 0.6–1.3)
DIFFERENTIAL METHOD BLD: ABNORMAL
EOSINOPHIL # BLD: 0 K/UL (ref 0–0.4)
EOSINOPHIL NFR BLD: 0 % (ref 0–5)
ERYTHROCYTE [DISTWIDTH] IN BLOOD BY AUTOMATED COUNT: 13.2 % (ref 11.6–14.5)
FLUAV RNA SPEC QL NAA+PROBE: NOT DETECTED
FLUBV RNA SPEC QL NAA+PROBE: NOT DETECTED
GLOBULIN SER CALC-MCNC: 3 G/DL (ref 2–4)
GLUCOSE BLD STRIP.AUTO-MCNC: 117 MG/DL (ref 70–110)
GLUCOSE SERPL-MCNC: 116 MG/DL (ref 74–99)
HCT VFR BLD AUTO: 42.1 % (ref 36–48)
HGB BLD-MCNC: 14.9 G/DL (ref 13–16)
IMM GRANULOCYTES # BLD AUTO: 0 K/UL (ref 0–0.04)
IMM GRANULOCYTES NFR BLD AUTO: 0 % (ref 0–0.5)
LACTATE BLD-SCNC: 1.25 MMOL/L (ref 0.4–2)
LYMPHOCYTES # BLD: 0.5 K/UL (ref 0.9–3.6)
LYMPHOCYTES NFR BLD: 10 % (ref 21–52)
MCH RBC QN AUTO: 29.4 PG (ref 24–34)
MCHC RBC AUTO-ENTMCNC: 35.4 G/DL (ref 31–37)
MCV RBC AUTO: 83.2 FL (ref 78–100)
MONOCYTES # BLD: 0.8 K/UL (ref 0.05–1.2)
MONOCYTES NFR BLD: 15 % (ref 3–10)
NEUTS SEG # BLD: 4.1 K/UL (ref 1.8–8)
NEUTS SEG NFR BLD: 74 % (ref 40–73)
NRBC # BLD: 0 K/UL (ref 0–0.01)
NRBC BLD-RTO: 0 PER 100 WBC
PLATELET # BLD AUTO: 202 K/UL (ref 135–420)
PMV BLD AUTO: 10.7 FL (ref 9.2–11.8)
POTASSIUM SERPL-SCNC: 4.1 MMOL/L (ref 3.5–5.5)
PROT SERPL-MCNC: 7.1 G/DL (ref 6.4–8.2)
RBC # BLD AUTO: 5.06 M/UL (ref 4.35–5.65)
SARS-COV-2, COV2: DETECTED
SODIUM SERPL-SCNC: 135 MMOL/L (ref 136–145)
TROPONIN-HIGH SENSITIVITY: 4 NG/L (ref 0–78)
WBC # BLD AUTO: 5.5 K/UL (ref 4.6–13.2)

## 2023-01-18 PROCEDURE — 83880 ASSAY OF NATRIURETIC PEPTIDE: CPT

## 2023-01-18 PROCEDURE — 82962 GLUCOSE BLOOD TEST: CPT

## 2023-01-18 PROCEDURE — 74011250637 HC RX REV CODE- 250/637: Performed by: EMERGENCY MEDICINE

## 2023-01-18 PROCEDURE — 83605 ASSAY OF LACTIC ACID: CPT

## 2023-01-18 PROCEDURE — 99283 EMERGENCY DEPT VISIT LOW MDM: CPT

## 2023-01-18 PROCEDURE — 74011636637 HC RX REV CODE- 636/637: Performed by: EMERGENCY MEDICINE

## 2023-01-18 PROCEDURE — 85025 COMPLETE CBC W/AUTO DIFF WBC: CPT

## 2023-01-18 PROCEDURE — 87636 SARSCOV2 & INF A&B AMP PRB: CPT

## 2023-01-18 PROCEDURE — A9270 NON-COVERED ITEM OR SERVICE: HCPCS | Performed by: EMERGENCY MEDICINE

## 2023-01-18 PROCEDURE — 80053 COMPREHEN METABOLIC PANEL: CPT

## 2023-01-18 PROCEDURE — 84484 ASSAY OF TROPONIN QUANT: CPT

## 2023-01-18 RX ORDER — LISINOPRIL 20 MG/1
20 TABLET ORAL DAILY
Qty: 90 TABLET | Refills: 3 | Status: SHIPPED | OUTPATIENT
Start: 2023-01-18

## 2023-01-18 RX ORDER — PREDNISONE 20 MG/1
60 TABLET ORAL
Status: COMPLETED | OUTPATIENT
Start: 2023-01-18 | End: 2023-01-18

## 2023-01-18 RX ORDER — LISINOPRIL 20 MG/1
20 TABLET ORAL DAILY
Qty: 90 TABLET | Refills: 1 | Status: SHIPPED | OUTPATIENT
Start: 2023-01-18 | End: 2023-01-18 | Stop reason: SDUPTHER

## 2023-01-18 RX ORDER — ACETAMINOPHEN 500 MG
1000 TABLET ORAL ONCE
Status: COMPLETED | OUTPATIENT
Start: 2023-01-18 | End: 2023-01-18

## 2023-01-18 RX ORDER — PREDNISONE 50 MG/1
50 TABLET ORAL DAILY
Qty: 5 TABLET | Refills: 0 | Status: SHIPPED | OUTPATIENT
Start: 2023-01-18 | End: 2023-01-23

## 2023-01-18 RX ADMIN — ACETAMINOPHEN 1000 MG: 500 TABLET ORAL at 16:52

## 2023-01-18 RX ADMIN — PREDNISONE 60 MG: 20 TABLET ORAL at 18:40
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
## 2023-01-18 NOTE — TELEPHONE ENCOUNTER
----- Message from Mallika Nugent.  Adi Freitas. sent at 1/18/2023 11:05 AM EST -----  Regarding: Medication refill needed   Prasanna Cle[employer] thank you

## 2023-01-18 NOTE — ED NOTES
Received from EMS, patient is COPD, bronchitis, and emphysema. He got  on home O2 at 2 LPM SPO2 of 92%. Increased to 3 LPM SPO2 of 94%. He got a productive cough since this morning and temperature of 101. F. End tidal of 28 - 31, , BP of 143/87, RR of 25. Hx of hypertension. Patient is hot to touch and diaphoretic.

## 2023-01-18 NOTE — TELEPHONE ENCOUNTER
Per patient he started last night with chills and fever. He is not sure how neri the temp is. No thermometer. Coughing and congestion. Patient has home Covid test and testing now and will call back.

## 2023-01-19 RX ORDER — NIRMATRELVIR AND RITONAVIR 150-100 MG
2 KIT ORAL EVERY 12 HOURS
Qty: 1 BOX | Refills: 0 | Status: SHIPPED | OUTPATIENT
Start: 2023-01-19 | End: 2023-01-24

## 2023-01-19 NOTE — TELEPHONE ENCOUNTER
Spoke with patient. The ER gave him Prednisone 50 mg x 5 days. Rest and push fluids. Patient has appt already with Dr. Montoya Alu 1/30/23. Advised to wear mask anytime he is out In public.

## 2023-01-19 NOTE — TELEPHONE ENCOUNTER
[facility] (supporting Sade Byrd MD) 12 elvira  ago (8:04 PM)     Pita Dawn just letting you know I got a lot worse and my wife called the ambulance and they tested me and I do have Covid

## 2023-01-24 ENCOUNTER — VIRTUAL VISIT (OUTPATIENT)
Dept: FAMILY MEDICINE CLINIC | Age: 53
End: 2023-01-24
Payer: MEDICARE

## 2023-01-24 DIAGNOSIS — U09.9 POST-COVID-19 CONDITION: Primary | ICD-10-CM

## 2023-01-24 PROCEDURE — G8427 DOCREV CUR MEDS BY ELIG CLIN: HCPCS | Performed by: STUDENT IN AN ORGANIZED HEALTH CARE EDUCATION/TRAINING PROGRAM

## 2023-01-24 PROCEDURE — G8432 DEP SCR NOT DOC, RNG: HCPCS | Performed by: STUDENT IN AN ORGANIZED HEALTH CARE EDUCATION/TRAINING PROGRAM

## 2023-01-24 PROCEDURE — G0463 HOSPITAL OUTPT CLINIC VISIT: HCPCS | Performed by: STUDENT IN AN ORGANIZED HEALTH CARE EDUCATION/TRAINING PROGRAM

## 2023-01-24 PROCEDURE — 99213 OFFICE O/P EST LOW 20 MIN: CPT | Performed by: STUDENT IN AN ORGANIZED HEALTH CARE EDUCATION/TRAINING PROGRAM

## 2023-01-24 PROCEDURE — 3017F COLORECTAL CA SCREEN DOC REV: CPT | Performed by: STUDENT IN AN ORGANIZED HEALTH CARE EDUCATION/TRAINING PROGRAM

## 2023-01-24 PROCEDURE — G8417 CALC BMI ABV UP PARAM F/U: HCPCS | Performed by: STUDENT IN AN ORGANIZED HEALTH CARE EDUCATION/TRAINING PROGRAM

## 2023-01-24 NOTE — PROGRESS NOTES
1. \"Have you been to the ER, urgent care clinic since your last visit? Hospitalized since your last visit? \" Yes When: 1/18/2023 Where: [place] Reason for visit: shortness of breath    2. \"Have you seen or consulted any other health care providers outside of the [address] [place] [address] since your last visit? \" No     3. For patients aged 39-70: Has the patient had a colonoscopy / FIT/ Cologuard? Yes - Care Gap present.  Most recent result on file

## 2023-01-24 NOTE — PROGRESS NOTES
Neelima Phillips Abhinav (: 1970) is a 47 y.o. male, established patient, here for evaluation of the following chief complaint(s):   Positive For Covid-19       ASSESSMENT/PLAN:  Below is the assessment and plan developed based on review of pertinent history, labs, studies, and medications. 1. Post-COVID-19 condition  Comments:  Patient appears in stable health and at baseline. Advised patient to continue current management. Patient will follow-up with pulmonology on 2023. Return if symptoms worsen or fail to improve. SUBJECTIVE/OBJECTIVE:  79-year-old gentleman presenting today via telemedicine for evaluation. Was seen in the ED on 2020 for viral URI associated with acute respiratory distress. Patient has a past medical history of COPD on home O2 (2.5 L at baseline). Patient was treated with Paxlovid and p.o. steroids. Patient states he is much improved today. Patient is currently expected to follow-up with his pulmonologist on 2023. Patient at this time denies any symptoms of fever, cough, chest pain, SOB, N/V/D. Review of Systems   Constitutional: Negative. HENT: Negative. Eyes: Negative. Respiratory:  Positive for cough. Cardiovascular: Negative. Gastrointestinal: Negative. Genitourinary: Negative. Musculoskeletal: Negative. Neurological: Negative.        No data recorded     Physical Exam    [INSTRUCTIONS:  \"[x]\" Indicates a positive item  \"[]\" Indicates a negative item  -- DELETE ALL ITEMS NOT EXAMINED]    Constitutional: [x] Appears well-developed and well-nourished [x] No apparent distress      [] Abnormal -     Mental status: [x] Alert and awake  [x] Oriented to person/place/time [x] Able to follow commands    [] Abnormal -     Eyes:   EOM    [x]  Normal    [] Abnormal -   Sclera  [x]  Normal    [] Abnormal -          Discharge [x]  None visible   [] Abnormal -     HENT: [x] Normocephalic, atraumatic  [] Abnormal -   [x] Mouth/Throat: Mucous membranes are moist    External Ears [x] Normal  [] Abnormal -    Neck: [x] No visualized mass [] Abnormal -     Pulmonary/Chest: [x] Respiratory effort normal   [x] No visualized signs of difficulty breathing or respiratory distress        [] Abnormal -      Musculoskeletal:   [] Normal gait with no signs of ataxia         [x] Normal range of motion of neck        [] Abnormal -     Neurological:        [x] No Facial Asymmetry (Cranial nerve 7 motor function) (limited exam due to video visit)          [x] No gaze palsy        [] Abnormal -          Skin:        [x] No significant exanthematous lesions or discoloration noted on facial skin         [] Abnormal -            Psychiatric:       [x] Normal Affect [] Abnormal -        [x] No Hallucinations    Other pertinent observable physical exam findings:-        Maryuri Victoria , was evaluated through a synchronous (real-time) audio-video encounter. The patient (or guardian if applicable) is aware that this is a billable service, which includes applicable co-pays. This Virtual Visit was conducted with patient's (and/or legal guardian's) consent. The visit was conducted pursuant to the emergency declaration under the [address] [place] [address], [address] [place] [address] authority and the [employer] General Act. Patient identification was verified, and a caregiver was present when appropriate. The patient was located at: Home: Jaimie [address] [place] [id number]  The provider was located at: Facility (Appt Department): [address]  [address]       An [address] [facility] was used to authenticate this note. -- Natalee Su, DO     This note was dictated utilizing voice recognition software which may lead to typographical errors. I apologize in advance if the situation occurs.   If questions arise please do not hesitate to contact me or call our department.

## 2023-01-30 ENCOUNTER — OFFICE VISIT (OUTPATIENT)
Dept: PULMONOLOGY | Age: 53
End: 2023-01-30
Payer: MEDICARE

## 2023-01-30 ENCOUNTER — APPOINTMENT (OUTPATIENT)
Dept: GENERAL RADIOLOGY | Age: 53
End: 2023-01-30
Attending: EMERGENCY MEDICINE
Payer: MEDICARE

## 2023-01-30 ENCOUNTER — HOSPITAL ENCOUNTER (EMERGENCY)
Age: 53
Discharge: HOME | End: 2023-01-30
Attending: EMERGENCY MEDICINE
Payer: MEDICARE

## 2023-01-30 VITALS
HEART RATE: 99 BPM | OXYGEN SATURATION: 99 % | RESPIRATION RATE: 20 BRPM | DIASTOLIC BLOOD PRESSURE: 71 MMHG | TEMPERATURE: 98.1 F | SYSTOLIC BLOOD PRESSURE: 112 MMHG | HEIGHT: 68 IN | WEIGHT: 225 LBS | BODY MASS INDEX: 34.1 KG/M2

## 2023-01-30 VITALS
WEIGHT: 221.5 LBS | RESPIRATION RATE: 16 BRPM | BODY MASS INDEX: 35.6 KG/M2 | SYSTOLIC BLOOD PRESSURE: 122 MMHG | HEIGHT: 66 IN | OXYGEN SATURATION: 98 % | HEART RATE: 98 BPM | DIASTOLIC BLOOD PRESSURE: 77 MMHG | TEMPERATURE: 98.1 F

## 2023-01-30 DIAGNOSIS — R91.1 LUNG NODULE: ICD-10-CM

## 2023-01-30 DIAGNOSIS — R14.0 ABDOMINAL DISTENTION: ICD-10-CM

## 2023-01-30 DIAGNOSIS — U09.9 POST-ACUTE SEQUELAE OF COVID-19 (PASC): Primary | ICD-10-CM

## 2023-01-30 DIAGNOSIS — R00.0 TACHYCARDIA: ICD-10-CM

## 2023-01-30 DIAGNOSIS — Z99.81 HYPOXEMIA REQUIRING SUPPLEMENTAL OXYGEN: ICD-10-CM

## 2023-01-30 DIAGNOSIS — Z87.891 FORMER SMOKER: ICD-10-CM

## 2023-01-30 DIAGNOSIS — R06.00 DYSPNEA, UNSPECIFIED TYPE: Primary | ICD-10-CM

## 2023-01-30 DIAGNOSIS — J44.9 COPD, SEVERE (HCC): ICD-10-CM

## 2023-01-30 DIAGNOSIS — R09.02 HYPOXEMIA REQUIRING SUPPLEMENTAL OXYGEN: ICD-10-CM

## 2023-01-30 LAB
ALBUMIN SERPL-MCNC: 3.4 G/DL (ref 3.4–5)
ALBUMIN/GLOB SERPL: 1.2 (ref 0.8–1.7)
ALP SERPL-CCNC: 99 U/L (ref 45–117)
ALT SERPL-CCNC: 74 U/L (ref 16–61)
ANION GAP SERPL CALC-SCNC: 9 MMOL/L (ref 3–18)
AST SERPL-CCNC: 27 U/L (ref 10–38)
ATRIAL RATE: 97 BPM
BASE EXCESS BLD CALC-SCNC: 2 MMOL/L
BASOPHILS # BLD: 0 K/UL (ref 0–0.1)
BASOPHILS NFR BLD: 0 % (ref 0–2)
BILIRUB SERPL-MCNC: 0.8 MG/DL (ref 0.2–1)
BNP SERPL-MCNC: 18 PG/ML (ref 0–900)
BUN SERPL-MCNC: 19 MG/DL (ref 7–18)
BUN/CREAT SERPL: 21 (ref 12–20)
CALCIUM SERPL-MCNC: 8.5 MG/DL (ref 8.5–10.1)
CALCULATED P AXIS, ECG09: 53 DEGREES
CALCULATED R AXIS, ECG10: 55 DEGREES
CALCULATED T AXIS, ECG11: 72 DEGREES
CHLORIDE SERPL-SCNC: 100 MMOL/L (ref 100–111)
CO2 SERPL-SCNC: 24 MMOL/L (ref 21–32)
CREAT SERPL-MCNC: 0.92 MG/DL (ref 0.6–1.3)
D DIMER PPP FEU-MCNC: 0.34 UG/ML(FEU)
DIAGNOSIS, 93000: NORMAL
DIFFERENTIAL METHOD BLD: ABNORMAL
EOSINOPHIL # BLD: 0.1 K/UL (ref 0–0.4)
EOSINOPHIL NFR BLD: 0 % (ref 0–5)
ERYTHROCYTE [DISTWIDTH] IN BLOOD BY AUTOMATED COUNT: 12.7 % (ref 11.6–14.5)
GLOBULIN SER CALC-MCNC: 2.8 G/DL (ref 2–4)
GLUCOSE SERPL-MCNC: 246 MG/DL (ref 74–99)
HCO3 BLD-SCNC: 28.4 MMOL/L (ref 22–26)
HCT VFR BLD AUTO: 43 % (ref 36–48)
HGB BLD-MCNC: 15.3 G/DL (ref 13–16)
IMM GRANULOCYTES # BLD AUTO: 0.3 K/UL (ref 0–0.04)
IMM GRANULOCYTES NFR BLD AUTO: 2 % (ref 0–0.5)
LYMPHOCYTES # BLD: 1.5 K/UL (ref 0.9–3.6)
LYMPHOCYTES NFR BLD: 12 % (ref 21–52)
MCH RBC QN AUTO: 29.5 PG (ref 24–34)
MCHC RBC AUTO-ENTMCNC: 35.6 G/DL (ref 31–37)
MCV RBC AUTO: 83 FL (ref 78–100)
MONOCYTES # BLD: 0.8 K/UL (ref 0.05–1.2)
MONOCYTES NFR BLD: 6 % (ref 3–10)
NEUTS SEG # BLD: 10 K/UL (ref 1.8–8)
NEUTS SEG NFR BLD: 79 % (ref 40–73)
NRBC # BLD: 0 K/UL (ref 0–0.01)
NRBC BLD-RTO: 0 PER 100 WBC
P-R INTERVAL, ECG05: 148 MS
PCO2 BLD: 49.8 MMHG (ref 35–45)
PH BLD: 7.37 (ref 7.35–7.45)
PLATELET # BLD AUTO: 268 K/UL (ref 135–420)
PMV BLD AUTO: 10.1 FL (ref 9.2–11.8)
PO2 BLD: 18 MMHG (ref 80–100)
POTASSIUM SERPL-SCNC: 3.9 MMOL/L (ref 3.5–5.5)
PROT SERPL-MCNC: 6.2 G/DL (ref 6.4–8.2)
Q-T INTERVAL, ECG07: 324 MS
QRS DURATION, ECG06: 60 MS
QTC CALCULATION (BEZET), ECG08: 411 MS
RBC # BLD AUTO: 5.18 M/UL (ref 4.35–5.65)
SAO2 % BLD: 23.6 % (ref 92–97)
SERVICE CMNT-IMP: ABNORMAL
SODIUM SERPL-SCNC: 133 MMOL/L (ref 136–145)
SPECIMEN TYPE: ABNORMAL
TROPONIN-HIGH SENSITIVITY: 4 NG/L (ref 0–78)
VENTRICULAR RATE, ECG03: 97 BPM
WBC # BLD AUTO: 12.6 K/UL (ref 4.6–13.2)

## 2023-01-30 PROCEDURE — 3074F SYST BP LT 130 MM HG: CPT | Performed by: INTERNAL MEDICINE

## 2023-01-30 PROCEDURE — 74019 RADEX ABDOMEN 2 VIEWS: CPT

## 2023-01-30 PROCEDURE — 83880 ASSAY OF NATRIURETIC PEPTIDE: CPT

## 2023-01-30 PROCEDURE — 84484 ASSAY OF TROPONIN QUANT: CPT

## 2023-01-30 PROCEDURE — 85025 COMPLETE CBC W/AUTO DIFF WBC: CPT

## 2023-01-30 PROCEDURE — 93005 ELECTROCARDIOGRAM TRACING: CPT

## 2023-01-30 PROCEDURE — G8432 DEP SCR NOT DOC, RNG: HCPCS | Performed by: INTERNAL MEDICINE

## 2023-01-30 PROCEDURE — 82803 BLOOD GASES ANY COMBINATION: CPT

## 2023-01-30 PROCEDURE — G8427 DOCREV CUR MEDS BY ELIG CLIN: HCPCS | Performed by: INTERNAL MEDICINE

## 2023-01-30 PROCEDURE — 3078F DIAST BP <80 MM HG: CPT | Performed by: INTERNAL MEDICINE

## 2023-01-30 PROCEDURE — 3017F COLORECTAL CA SCREEN DOC REV: CPT | Performed by: INTERNAL MEDICINE

## 2023-01-30 PROCEDURE — G8417 CALC BMI ABV UP PARAM F/U: HCPCS | Performed by: INTERNAL MEDICINE

## 2023-01-30 PROCEDURE — 71046 X-RAY EXAM CHEST 2 VIEWS: CPT

## 2023-01-30 PROCEDURE — 80053 COMPREHEN METABOLIC PANEL: CPT

## 2023-01-30 PROCEDURE — 99285 EMERGENCY DEPT VISIT HI MDM: CPT

## 2023-01-30 PROCEDURE — 85379 FIBRIN DEGRADATION QUANT: CPT

## 2023-01-30 PROCEDURE — 99215 OFFICE O/P EST HI 40 MIN: CPT | Performed by: INTERNAL MEDICINE

## 2023-01-30 PROCEDURE — 74011000250 HC RX REV CODE- 250: Performed by: EMERGENCY MEDICINE

## 2023-01-30 PROCEDURE — 94640 AIRWAY INHALATION TREATMENT: CPT

## 2023-01-30 RX ORDER — IPRATROPIUM BROMIDE AND ALBUTEROL SULFATE 2.5; .5 MG/3ML; MG/3ML
3 SOLUTION RESPIRATORY (INHALATION) ONCE
Status: DISCONTINUED | OUTPATIENT
Start: 2023-01-30 | End: 2023-01-30

## 2023-01-30 RX ORDER — PREDNISONE 10 MG/1
TABLET ORAL
Qty: 21 TABLET | Refills: 0 | Status: SHIPPED | OUTPATIENT
Start: 2023-01-30

## 2023-01-30 RX ORDER — SIMETHICONE 80 MG
80 TABLET,CHEWABLE ORAL
Qty: 30 TABLET | Refills: 0 | Status: SHIPPED | OUTPATIENT
Start: 2023-01-30

## 2023-01-30 RX ADMIN — IPRATROPIUM BROMIDE AND ALBUTEROL SULFATE 3 ML: .5; 3 SOLUTION RESPIRATORY (INHALATION) at 14:24
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
## 2023-01-30 NOTE — DISCHARGE INSTRUCTIONS
I believe your COPD exacerbation has not fully resolved. I recommend a 6-day oral steroid taper in addition to your albuterol inhaler or duo nebulizer as needed for shortness of breath, wheezing. Your oxygen level maintained at your baseline with ambulation and at rest after the duo nebulizer treatment in the emergency department. I have low suspicion for heart failure, heart attack, blood clot in the lungs, pneumonia.
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
## 2023-01-30 NOTE — Clinical Note
[address] [facility] EMERGENCY DEPT  [id number] [id number] [phone]    Work/School Note    Date: 1/30/2023    To Whom It May concern:    Mindy Calderón Sr. was seen and treated today in the emergency room by the following provider(s):  Attending Provider: Erik Burkitt, DO. Leander Piedra is excused from work/school on 01/30/23 and 01/31/23. He is medically clear to return to work/school on 2/1/2023.        Sincerely,          Terell Xiong DO

## 2023-01-30 NOTE — PROGRESS NOTES
Nyasiashamir Mcmillan presents today for   Chief Complaint   Patient presents with    [facility]     01/24/22-SOB/Covid +       Is someone accompanying this pt? Wife    Is the patient using any DME equipment during OV? Oxygen    -D[employer]    Depression Screening:  3 most recent PHQ Screens 11/29/2022   PHQ Not Done -   Little interest or pleasure in doing things Not at all   Feeling down, depressed, irritable, or hopeless Not at all   Total Score PHQ 2 0       Learning Assessment:  Learning Assessment 5/11/2022   PRIMARY LEARNER Patient   PRIMARY LANGUAGE ENGLISH   LEARNER PREFERENCE PRIMARY DEMONSTRATION     -     -   ANSWERED BY patient   RELATIONSHIP SELF       Abuse Screening:  Abuse Screening Questionnaire 1/24/2023   Do you ever feel afraid of your partner? N   Are you in a relationship with someone who physically or mentally threatens you? N   Is it safe for you to go home? Y       Fall Risk  Fall Risk Assessment, last 12 mths 11/29/2022   Able to walk? Yes   Fall in past 12 months? 0   Do you feel unsteady? 0   Are you worried about falling 0         Coordination of Care:  1. Have you been to the ER, urgent care clinic since your last visit? Hospitalized since your last visit? 01/24/22-SOB/Covid +    2. Have you seen or consulted any other health care providers outside of the [address] [place] [address] since your last visit? Include any pap smears or colon screening.  No      '

## 2023-01-30 NOTE — ED NOTES
Pt walked down the hallway for about 5 min on pt own O2 set at 2.5 L.  Pt sat did not drop below 90%

## 2023-01-31 ENCOUNTER — TELEPHONE (OUTPATIENT)
Dept: PULMONOLOGY | Age: 53
End: 2023-01-31

## 2023-01-31 ENCOUNTER — TELEPHONE (OUTPATIENT)
Dept: FAMILY MEDICINE CLINIC | Age: 53
End: 2023-01-31

## 2023-01-31 DIAGNOSIS — J44.9 COPD, SEVERE (HCC): Primary | ICD-10-CM

## 2023-01-31 NOTE — TELEPHONE ENCOUNTER
Spoke with pt on phone re: follow up on ED visit. Pt was treated with Duonebs and discharged home on Prednisone. No infiltrates on CXR, no leukocytosis. Labs remarkable for hyperglycemia which may explain pt complaints of polyuria and polydipsia. Instructed pt to call his PCP for appointment to check and control blood glucose. Tried to call PCP office several times and followed prompts, phone keeps on ringing off the hook. Will ask assistance from staff.
I, Jean Carlos Gordon, DO personally saw the patient with DAGOBERTO.  I have personally performed a face to face diagnostic evaluation on this patient.  I have reviewed the DAGOBERTO note and agree with the history, exam, and plan of care, except as noted.  I personally saw the patient and performed a substantive portion of the visit including all aspects of the medical decision making.

## 2023-01-31 NOTE — TELEPHONE ENCOUNTER
Called patient back to let know prednisone will increase the blood sugars, no answer. Left message to call office back when possible.

## 2023-01-31 NOTE — TELEPHONE ENCOUNTER
Talked to Dr. Carli Gomez regarding patient. Patient has been recently diagnosed with COVID. She was treated with Paxil livid and steroids. Patient was seen by pulmonology on yesterday. He was feeling some better but complained of polyuria polydipsia and was diaphoretic when seen by the pulmonologist.  Patient went to the emergency department and he had a slightly elevated white count and was noted to have a sugar of 246. Patient is in need of a follow-up appointment to further evaluate his sugar. Will schedule patient an appointment for this.   I suspect that his elevated sugar level is related to the steroids will likely improve once the steroids are

## 2023-01-31 NOTE — TELEPHONE ENCOUNTER
Patient called [facility] was  advised to contact his pcp regarding labs done yesterday that shows an elevated blood sugar of 246, pt  has been given prednisone to take by both Dr Soraida Conway as well as Betty Moore and was told that may be causing to rise.

## 2023-02-02 ENCOUNTER — VIRTUAL VISIT (OUTPATIENT)
Dept: FAMILY MEDICINE CLINIC | Age: 53
End: 2023-02-02
Payer: MEDICARE

## 2023-02-02 ENCOUNTER — TELEPHONE (OUTPATIENT)
Dept: FAMILY MEDICINE CLINIC | Age: 53
End: 2023-02-02

## 2023-02-02 DIAGNOSIS — I50.32 DIASTOLIC CHF, CHRONIC (HCC): ICD-10-CM

## 2023-02-02 DIAGNOSIS — I10 ESSENTIAL HYPERTENSION, BENIGN: ICD-10-CM

## 2023-02-02 DIAGNOSIS — E11.65 TYPE 2 DIABETES MELLITUS WITH HYPERGLYCEMIA, WITHOUT LONG-TERM CURRENT USE OF INSULIN (HCC): Primary | ICD-10-CM

## 2023-02-02 DIAGNOSIS — U07.1 COVID-19: ICD-10-CM

## 2023-02-02 PROBLEM — E11.9 TYPE 2 DIABETES MELLITUS (HCC): Status: ACTIVE | Noted: 2023-02-02

## 2023-02-02 PROCEDURE — G0463 HOSPITAL OUTPT CLINIC VISIT: HCPCS | Performed by: FAMILY MEDICINE

## 2023-02-02 RX ORDER — LANCETS
EACH MISCELLANEOUS
Qty: 100 EACH | Refills: 2 | Status: SHIPPED | OUTPATIENT
Start: 2023-02-02 | End: 2023-02-02 | Stop reason: SDUPTHER

## 2023-02-02 RX ORDER — LANCETS
EACH MISCELLANEOUS
Qty: 100 EACH | Refills: 2 | Status: SHIPPED | OUTPATIENT
Start: 2023-02-02

## 2023-02-02 RX ORDER — INSULIN PUMP SYRINGE, 3 ML
EACH MISCELLANEOUS
Qty: 1 KIT | Refills: 0 | Status: SHIPPED | OUTPATIENT
Start: 2023-02-02 | End: 2023-02-02 | Stop reason: SDUPTHER

## 2023-02-02 RX ORDER — INSULIN PUMP SYRINGE, 3 ML
EACH MISCELLANEOUS
Qty: 1 KIT | Refills: 0 | Status: SHIPPED | OUTPATIENT
Start: 2023-02-02

## 2023-02-02 RX ORDER — METFORMIN HYDROCHLORIDE 500 MG/1
500 TABLET ORAL 2 TIMES DAILY WITH MEALS
Qty: 60 TABLET | Refills: 6 | Status: SHIPPED | OUTPATIENT
Start: 2023-02-02

## 2023-02-02 NOTE — TELEPHONE ENCOUNTER
Patient called stating [address] stated prescriptions for test strips lancets and meter needs diagnostic codes on prescriptions to be able to bill medicare part B.

## 2023-02-02 NOTE — PROGRESS NOTES
Moe Lujan Sr. (: 1970) is a 47 y.o. male, established patient, here for evaluation of the following chief complaint(s):   ED Follow-up (Seen at [facility] ER on 2023 for COVID positive ) and High Blood Sugar           Moe Lujan Sr., was evaluated through a synchronous (real-time) audio-video encounter. The patient (or guardian if applicable) is aware that this is a billable service, which includes applicable co-pays. This Virtual Visit was conducted with patient's (and/or legal guardian's) consent. The visit was conducted pursuant to the emergency declaration under the [address] [place] [address] authority and the [employer] and [employer] Act. Patient identification was verified, and a caregiver was present when appropriate. The patient was located at: [facility] [address] [place] [id number]  The provider was located at: Facility (Appt Department): [address]  [address]       An [address] [facility] was used to authenticate this note.   -- Yue Velásquez
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
## 2023-02-02 NOTE — PATIENT INSTRUCTIONS
Counting Carbohydrates for Diabetes: Care Instructions  Overview     Managing the amount of carbohydrate (carbs) you eat is an important part of planning healthy meals when you have diabetes. Carbs raise blood sugar more than any other nutrient. Carbs are found in grains, starchy vegetables, fruits, and milk and yogurt. Carbs are also found in sugar-sweetened foods and drinks. The more carbs you eat at one time, the higher your blood sugar will rise. Counting carbs can help you keep your blood sugar within your target range. If you use insulin, counting carbs helps you match the right amount of insulin to the number of grams of carbs in a meal.  A registered dietitian or diabetes educator can help you plan meals and snacks. Follow-up care is a key part of your treatment and safety. Be sure to make and go to all appointments, and call your doctor if you are having problems. It's also a good idea to know your test results and keep a list of the medicines you take. How can you care for yourself at home? Know your daily amount of carbohydrates  Your daily amount depends on several things, such as your weight, how active you are, which diabetes medicines you take, and what your goals are for your blood sugar levels. A registered dietitian or diabetes educator can help you plan how many carbs to include in each meal and snack. For most adults, a guideline for the daily amount of carbs is:  45 to 60 grams at each meal. That's about the same as 3 to 4 carbohydrate servings. 15 to 20 grams at each snack. That's about the same as 1 carbohydrate serving. Count carbs  Counting carbs lets you know how much rapid-acting insulin to take before you eat. If you use an insulin pump, you get a constant rate of insulin during the day. So the pump must be programmed at meals. This gives you extra insulin to cover the rise in blood sugar after meals. If you take insulin:  Learn your own insulin-to-carb ratio.  You and your diabetes health professional will figure out the ratio. You can do this by testing your blood sugar after meals. For example, you may need a certain amount of insulin for every 15 grams of carbs. Add up the carb grams in a meal. Then you can figure out how many units of insulin to take based on your insulin-to-carb ratio. Exercise lowers blood sugar. You can use less insulin than you would if you were not doing exercise. Keep in mind that timing matters. If you exercise within 1 hour after a meal, your body may need less insulin for that meal than it would if you exercised 3 hours after the meal. Test your blood sugar to find out how exercise affects your need for insulin. If you do or don't take insulin:  Look at labels on packaged foods. This can tell you how many carbs are in a serving. Be aware of portions, or serving sizes. If a package has two servings and you eat the whole package, you need to double the number of grams of carbohydrate listed for one serving. Protein, fat, and fiber do not raise blood sugar as much as carbs do. If you eat a lot of these nutrients in a meal, your blood sugar will rise more slowly than it would otherwise. Where can you learn more? Go to http://www.gray.com/  Enter G703 in the search box to learn more about \"Counting Carbohydrates for Diabetes: Care Instructions. \"  Current as of: May 9, 2022               Content Version: 13.4  © 8576-8760 Healthwise, [employer]. Care instructions adapted under license by [employer] (which disclaims liability or warranty for this information). If you have questions about a medical condition or this instruction, always ask your healthcare professional. [place] [address] any warranty or liability for your use of this information.

## 2023-02-02 NOTE — Clinical Note
Please call patient to schedule a 1 week appointment for diabetes. Virtual is okay. Patient is currently being treated for COVID.

## 2023-02-04 RX ORDER — UMECLIDINIUM 62.5 UG/1
AEROSOL, POWDER ORAL
Qty: 30 EACH | Refills: 0 | Status: SHIPPED | OUTPATIENT
Start: 2023-02-04

## 2023-02-05 DIAGNOSIS — R91.1 LUNG NODULE: Primary | ICD-10-CM

## 2023-02-06 ENCOUNTER — PATIENT MESSAGE (OUTPATIENT)
Dept: FAMILY MEDICINE CLINIC | Age: 53
End: 2023-02-06

## 2023-02-06 DIAGNOSIS — E11.65 TYPE 2 DIABETES MELLITUS WITH HYPERGLYCEMIA, WITHOUT LONG-TERM CURRENT USE OF INSULIN (HCC): Primary | ICD-10-CM

## 2023-02-06 NOTE — TELEPHONE ENCOUNTER
Per verbal order from Dr. Carmen Hughes, order for jardiance 10 mg 30 day supply with 3 refills has been placed and signed off. Patient has been informed through mychart.

## 2023-02-06 NOTE — TELEPHONE ENCOUNTER
Patient calling stating he is very lethargic and weak to where he can not get out of bed,states he feels dehydrated but it drinking a lot of water. He thinks it is because his metformin.      Requesting a call back from the nurse

## 2023-02-07 ENCOUNTER — TELEPHONE (OUTPATIENT)
Dept: FAMILY MEDICINE CLINIC | Age: 53
End: 2023-02-07

## 2023-02-10 ENCOUNTER — VIRTUAL VISIT (OUTPATIENT)
Dept: FAMILY MEDICINE CLINIC | Age: 53
End: 2023-02-10
Payer: MEDICARE

## 2023-02-10 DIAGNOSIS — U07.1 COVID-19: ICD-10-CM

## 2023-02-10 DIAGNOSIS — E11.649 UNCONTROLLED TYPE 2 DIABETES MELLITUS WITH HYPOGLYCEMIA WITHOUT COMA (HCC): Primary | ICD-10-CM

## 2023-02-10 PROCEDURE — G0463 HOSPITAL OUTPT CLINIC VISIT: HCPCS | Performed by: FAMILY MEDICINE

## 2023-02-10 NOTE — PROGRESS NOTES
Jessica Bachelor Sr. (: 1970) is a 47 y.o. male, established patient, here for evaluation of the following chief complaint(s):   Diabetes (1 wk f/u)         Mariusz Carvern., was evaluated through a synchronous (real-time) audio-video encounter. The patient (or guardian if applicable) is aware that this is a billable service, which includes applicable co-pays. This Virtual Visit was conducted with patient's (and/or legal guardian's) consent. The visit was conducted pursuant to the emergency declaration under the [address] [place] [address], [address] [place] [address] authority and the [employer] and [employer] General Act. Patient identification was verified, and a caregiver was present when appropriate. The patient was located at: Home: Jaimie [address] [place] [phone]  The provider was located at: Facility (Appt Department): [address]  [address]       An [address] [facility] was used to authenticate this note.   -- [facility]

## 2023-02-10 NOTE — PATIENT INSTRUCTIONS
Counting Carbohydrates for Diabetes: Care Instructions  Overview     Managing the amount of carbohydrate (carbs) you eat is an important part of planning healthy meals when you have diabetes. Carbs raise blood sugar more than any other nutrient. Carbs are found in grains, starchy vegetables, fruits, and milk and yogurt. Carbs are also found in sugar-sweetened foods and drinks. The more carbs you eat at one time, the higher your blood sugar will rise. Counting carbs can help you keep your blood sugar within your target range. If you use insulin, counting carbs helps you match the right amount of insulin to the number of grams of carbs in a meal.  A registered dietitian or diabetes educator can help you plan meals and snacks. Follow-up care is a key part of your treatment and safety. Be sure to make and go to all appointments, and call your doctor if you are having problems. It's also a good idea to know your test results and keep a list of the medicines you take. How can you care for yourself at home? Know your daily amount of carbohydrates  Your daily amount depends on several things, such as your weight, how active you are, which diabetes medicines you take, and what your goals are for your blood sugar levels. A registered dietitian or diabetes educator can help you plan how many carbs to include in each meal and snack. For most adults, a guideline for the daily amount of carbs is:  45 to 60 grams at each meal. That's about the same as 3 to 4 carbohydrate servings. 15 to 20 grams at each snack. That's about the same as 1 carbohydrate serving. Count carbs  Counting carbs lets you know how much rapid-acting insulin to take before you eat. If you use an insulin pump, you get a constant rate of insulin during the day. So the pump must be programmed at meals. This gives you extra insulin to cover the rise in blood sugar after meals. If you take insulin:  Learn your own insulin-to-carb ratio.  You and your diabetes health professional will figure out the ratio. You can do this by testing your blood sugar after meals. For example, you may need a certain amount of insulin for every 15 grams of carbs. Add up the carb grams in a meal. Then you can figure out how many units of insulin to take based on your insulin-to-carb ratio. Exercise lowers blood sugar. You can use less insulin than you would if you were not doing exercise. Keep in mind that timing matters. If you exercise within 1 hour after a meal, your body may need less insulin for that meal than it would if you exercised 3 hours after the meal. Test your blood sugar to find out how exercise affects your need for insulin. If you do or don't take insulin:  Look at labels on packaged foods. This can tell you how many carbs are in a serving. Be aware of portions, or serving sizes. If a package has two servings and you eat the whole package, you need to double the number of grams of carbohydrate listed for one serving. Protein, fat, and fiber do not raise blood sugar as much as carbs do. If you eat a lot of these nutrients in a meal, your blood sugar will rise more slowly than it would otherwise. Where can you learn more? Go to http://www.gray.com/  Enter G703 in the search box to learn more about \"Counting Carbohydrates for Diabetes: Care Instructions. \"  Current as of: May 9, 2022               Content Version: 13.4  © 8280-9838 Healthwise, [employer]. Care instructions adapted under license by [employer] (which disclaims liability or warranty for this information). If you have questions about a medical condition or this instruction, always ask your healthcare professional. [place] [address] any warranty or liability for your use of this information.

## 2023-02-16 ENCOUNTER — HOSPITAL ENCOUNTER (OUTPATIENT)
Facility: HOSPITAL | Age: 53
Discharge: HOME | End: 2023-02-16
Payer: MEDICARE

## 2023-02-16 DIAGNOSIS — R91.1 LUNG NODULE: ICD-10-CM

## 2023-02-16 PROCEDURE — 71250 CT THORAX DX C-: CPT

## 2023-02-22 ENCOUNTER — TELEPHONE (OUTPATIENT)
Age: 53
End: 2023-02-22

## 2023-03-02 RX ORDER — UMECLIDINIUM 62.5 UG/1
AEROSOL, POWDER ORAL
Qty: 30 EACH | Refills: 5 | Status: SHIPPED | OUTPATIENT
Start: 2023-03-02

## 2023-03-03 ENCOUNTER — OFFICE VISIT (OUTPATIENT)
Age: 53
End: 2023-03-03

## 2023-03-03 VITALS
BODY MASS INDEX: 33.1 KG/M2 | SYSTOLIC BLOOD PRESSURE: 117 MMHG | OXYGEN SATURATION: 94 % | HEIGHT: 68 IN | RESPIRATION RATE: 20 BRPM | TEMPERATURE: 98.6 F | DIASTOLIC BLOOD PRESSURE: 80 MMHG | HEART RATE: 100 BPM | WEIGHT: 218.4 LBS

## 2023-03-03 DIAGNOSIS — M62.830 MUSCLE SPASM OF BACK: ICD-10-CM

## 2023-03-03 DIAGNOSIS — M54.50 ACUTE BILATERAL LOW BACK PAIN WITHOUT SCIATICA: Primary | ICD-10-CM
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
## 2023-03-03 DIAGNOSIS — M54.2 CERVICALGIA: ICD-10-CM

## 2023-03-03 RX ORDER — CYCLOBENZAPRINE HCL 10 MG
10 TABLET ORAL 2 TIMES DAILY PRN
Qty: 14 TABLET | Refills: 0 | Status: SHIPPED | OUTPATIENT
Start: 2023-03-03 | End: 2023-03-10

## 2023-03-03 SDOH — ECONOMIC STABILITY: INCOME INSECURITY: HOW HARD IS IT FOR YOU TO PAY FOR THE VERY BASICS LIKE FOOD, HOUSING, MEDICAL CARE, AND HEATING?: SOMEWHAT HARD

## 2023-03-03 SDOH — ECONOMIC STABILITY: FOOD INSECURITY: WITHIN THE PAST 12 MONTHS, THE FOOD YOU BOUGHT JUST DIDN'T LAST AND YOU DIDN'T HAVE MONEY TO GET MORE.: SOMETIMES TRUE

## 2023-03-03 SDOH — ECONOMIC STABILITY: HOUSING INSECURITY
IN THE LAST 12 MONTHS, WAS THERE A TIME WHEN YOU DID NOT HAVE A STEADY PLACE TO SLEEP OR SLEPT IN A SHELTER (INCLUDING NOW)?: NO

## 2023-03-03 SDOH — ECONOMIC STABILITY: FOOD INSECURITY: WITHIN THE PAST 12 MONTHS, YOU WORRIED THAT YOUR FOOD WOULD RUN OUT BEFORE YOU GOT MONEY TO BUY MORE.: SOMETIMES TRUE

## 2023-03-03 ASSESSMENT — ENCOUNTER SYMPTOMS: BACK PAIN: 1
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
## 2023-03-09 ENCOUNTER — TELEMEDICINE (OUTPATIENT)
Age: 53
End: 2023-03-09
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
## 2023-03-09 DIAGNOSIS — M62.838 OTHER MUSCLE SPASM: ICD-10-CM

## 2023-03-09 DIAGNOSIS — E11.65 TYPE 2 DIABETES MELLITUS WITH HYPERGLYCEMIA, WITHOUT LONG-TERM CURRENT USE OF INSULIN (HCC): Primary | ICD-10-CM

## 2023-03-09 RX ORDER — BENZOCAINE/MENTHOL 6 MG-10 MG
1 LOZENGE MUCOUS MEMBRANE DAILY PRN
COMMUNITY
Start: 2019-04-25

## 2023-03-09 RX ORDER — BLOOD SUGAR DIAGNOSTIC
STRIP MISCELLANEOUS
COMMUNITY
Start: 2023-02-15

## 2023-03-09 RX ORDER — GLUCOSAMINE HCL/CHONDROITIN SU 500-400 MG
CAPSULE ORAL
COMMUNITY
Start: 2023-02-10 | End: 2023-03-09

## 2023-03-09 RX ORDER — BLOOD-GLUCOSE METER
KIT MISCELLANEOUS
COMMUNITY
Start: 2023-02-03

## 2023-03-09 SDOH — ECONOMIC STABILITY: INCOME INSECURITY: HOW HARD IS IT FOR YOU TO PAY FOR THE VERY BASICS LIKE FOOD, HOUSING, MEDICAL CARE, AND HEATING?: VERY HARD

## 2023-03-09 SDOH — ECONOMIC STABILITY: FOOD INSECURITY: WITHIN THE PAST 12 MONTHS, YOU WORRIED THAT YOUR FOOD WOULD RUN OUT BEFORE YOU GOT MONEY TO BUY MORE.: OFTEN TRUE

## 2023-03-09 SDOH — ECONOMIC STABILITY: FOOD INSECURITY: WITHIN THE PAST 12 MONTHS, THE FOOD YOU BOUGHT JUST DIDN'T LAST AND YOU DIDN'T HAVE MONEY TO GET MORE.: OFTEN TRUE

## 2023-03-09 ASSESSMENT — PATIENT HEALTH QUESTIONNAIRE - PHQ9
SUM OF ALL RESPONSES TO PHQ9 QUESTIONS 1 & 2: 0
2. FEELING DOWN, DEPRESSED OR HOPELESS: 0
SUM OF ALL RESPONSES TO PHQ QUESTIONS 1-9: 0
1. LITTLE INTEREST OR PLEASURE IN DOING THINGS: 0
SUM OF ALL RESPONSES TO PHQ QUESTIONS 1-9: 0

## 2023-03-09 ASSESSMENT — ANXIETY QUESTIONNAIRES
GAD7 TOTAL SCORE: 21
7. FEELING AFRAID AS IF SOMETHING AWFUL MIGHT HAPPEN: 3
2. NOT BEING ABLE TO STOP OR CONTROL WORRYING: 3
5. BEING SO RESTLESS THAT IT IS HARD TO SIT STILL: 3
1. FEELING NERVOUS, ANXIOUS, OR ON EDGE: 3
6. BECOMING EASILY ANNOYED OR IRRITABLE: 3
IF YOU CHECKED OFF ANY PROBLEMS ON THIS QUESTIONNAIRE, HOW DIFFICULT HAVE THESE PROBLEMS MADE IT FOR YOU TO DO YOUR WORK, TAKE CARE OF THINGS AT HOME, OR GET ALONG WITH OTHER PEOPLE: VERY DIFFICULT
4. TROUBLE RELAXING: 3
3. WORRYING TOO MUCH ABOUT DIFFERENT THINGS: 3

## 2023-03-09 NOTE — PROGRESS NOTES
[facility] (:  1970) is a Established patient, here for evaluation of the following:      Morales Gorman Sr., was evaluated through a synchronous (real-time) audio-video encounter. The patient (or guardian if applicable) is aware that this is a billable service, which includes applicable co-pays. This Virtual Visit was conducted with patient's (and/or legal guardian's) consent. The visit was conducted pursuant to the emergency declaration under the [address] [place] [address] authority and the [email or web address] General Act. Patient identification was verified, and a caregiver was present when appropriate.    The patient was [facility] [address] [place], [place] [id number]  Provider was located at [facility] (Appt Dept): [address] [facility], [address] [place] [address],  [address] [place] [address],[facility] 300         --[place], [place]
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
## 2023-03-13 ENCOUNTER — HOSPITAL ENCOUNTER (OUTPATIENT)
Facility: HOSPITAL | Age: 53
Setting detail: SPECIMEN
Discharge: HOME | End: 2023-03-16
Payer: MEDICARE

## 2023-03-13 DIAGNOSIS — E11.65 TYPE 2 DIABETES MELLITUS WITH HYPERGLYCEMIA, WITHOUT LONG-TERM CURRENT USE OF INSULIN (HCC): ICD-10-CM

## 2023-03-13 LAB
ALBUMIN SERPL-MCNC: 4.2 G/DL (ref 3.4–5)
ALBUMIN/GLOB SERPL: 1.4 (ref 0.8–1.7)
ALP SERPL-CCNC: 122 U/L (ref 45–117)
ALT SERPL-CCNC: 51 U/L (ref 16–61)
ANION GAP SERPL CALC-SCNC: 4 MMOL/L (ref 3–18)
AST SERPL-CCNC: 25 U/L (ref 10–38)
BILIRUB SERPL-MCNC: 0.9 MG/DL (ref 0.2–1)
BUN SERPL-MCNC: 10 MG/DL (ref 7–18)
BUN/CREAT SERPL: 12 (ref 12–20)
CALCIUM SERPL-MCNC: 9.9 MG/DL (ref 8.5–10.1)
CHLORIDE SERPL-SCNC: 106 MMOL/L (ref 100–111)
CHOLEST SERPL-MCNC: 181 MG/DL
CO2 SERPL-SCNC: 28 MMOL/L (ref 21–32)
CREAT SERPL-MCNC: 0.85 MG/DL (ref 0.6–1.3)
EST. AVERAGE GLUCOSE BLD GHB EST-MCNC: 157 MG/DL
GLOBULIN SER CALC-MCNC: 2.9 G/DL (ref 2–4)
GLUCOSE SERPL-MCNC: 153 MG/DL (ref 74–99)
HBA1C MFR BLD: 7.1 % (ref 4.2–5.6)
HDLC SERPL-MCNC: 54 MG/DL (ref 40–60)
HDLC SERPL: 3.4 (ref 0–5)
LDLC SERPL CALC-MCNC: 80.6 MG/DL (ref 0–100)
LIPID PANEL: ABNORMAL
POTASSIUM SERPL-SCNC: 4.5 MMOL/L (ref 3.5–5.5)
PROT SERPL-MCNC: 7.1 G/DL (ref 6.4–8.2)
SODIUM SERPL-SCNC: 138 MMOL/L (ref 136–145)
TRIGL SERPL-MCNC: 232 MG/DL
VLDLC SERPL CALC-MCNC: 46.4 MG/DL

## 2023-03-13 PROCEDURE — 36415 COLL VENOUS BLD VENIPUNCTURE: CPT

## 2023-03-13 PROCEDURE — 83036 HEMOGLOBIN GLYCOSYLATED A1C: CPT

## 2023-03-13 PROCEDURE — 80053 COMPREHEN METABOLIC PANEL: CPT

## 2023-03-13 PROCEDURE — 80061 LIPID PANEL: CPT

## 2023-03-14 ENCOUNTER — HOSPITAL ENCOUNTER (OUTPATIENT)
Facility: HOSPITAL | Age: 53
Setting detail: RECURRING SERIES
Discharge: HOME | End: 2023-03-17
Payer: MEDICARE

## 2023-03-14 ENCOUNTER — OFFICE VISIT (OUTPATIENT)
Age: 53
End: 2023-03-14
Payer: MEDICARE

## 2023-03-14 VITALS
DIASTOLIC BLOOD PRESSURE: 78 MMHG | SYSTOLIC BLOOD PRESSURE: 118 MMHG | RESPIRATION RATE: 16 BRPM | OXYGEN SATURATION: 95 % | HEART RATE: 103 BPM | TEMPERATURE: 97.5 F | HEIGHT: 68 IN | WEIGHT: 216.5 LBS | BODY MASS INDEX: 32.81 KG/M2

## 2023-03-14 DIAGNOSIS — R91.8 LUNG NODULES: ICD-10-CM

## 2023-03-14 DIAGNOSIS — J44.9 COPD, GROUP C, BY GOLD 2017 CLASSIFICATION (HCC): Primary | ICD-10-CM

## 2023-03-14 DIAGNOSIS — E66.9 OBESITY (BMI 30.0-34.9): ICD-10-CM

## 2023-03-14 DIAGNOSIS — Z99.81 HYPOXEMIA REQUIRING SUPPLEMENTAL OXYGEN: ICD-10-CM

## 2023-03-14 DIAGNOSIS — Z87.891 FORMER SMOKER: ICD-10-CM

## 2023-03-14 DIAGNOSIS — R09.02 HYPOXEMIA REQUIRING SUPPLEMENTAL OXYGEN: ICD-10-CM

## 2023-03-14 PROCEDURE — 1036F TOBACCO NON-USER: CPT | Performed by: INTERNAL MEDICINE

## 2023-03-14 PROCEDURE — 3078F DIAST BP <80 MM HG: CPT | Performed by: INTERNAL MEDICINE

## 2023-03-14 PROCEDURE — 97110 THERAPEUTIC EXERCISES: CPT

## 2023-03-14 PROCEDURE — 97161 PT EVAL LOW COMPLEX 20 MIN: CPT

## 2023-03-14 PROCEDURE — 3023F SPIROM DOC REV: CPT | Performed by: INTERNAL MEDICINE

## 2023-03-14 PROCEDURE — G8484 FLU IMMUNIZE NO ADMIN: HCPCS | Performed by: INTERNAL MEDICINE

## 2023-03-14 PROCEDURE — 3017F COLORECTAL CA SCREEN DOC REV: CPT | Performed by: INTERNAL MEDICINE

## 2023-03-14 PROCEDURE — 97535 SELF CARE MNGMENT TRAINING: CPT

## 2023-03-14 PROCEDURE — 99214 OFFICE O/P EST MOD 30 MIN: CPT | Performed by: INTERNAL MEDICINE

## 2023-03-14 PROCEDURE — 3074F SYST BP LT 130 MM HG: CPT | Performed by: INTERNAL MEDICINE

## 2023-03-14 PROCEDURE — G8427 DOCREV CUR MEDS BY ELIG CLIN: HCPCS | Performed by: INTERNAL MEDICINE

## 2023-03-14 PROCEDURE — G8417 CALC BMI ABV UP PARAM F/U: HCPCS | Performed by: INTERNAL MEDICINE

## 2023-03-14 ASSESSMENT — ENCOUNTER SYMPTOMS
BLOOD IN STOOL: 0
RHINORRHEA: 0
EYE ITCHING: 0
COLOR CHANGE: 0
STRIDOR: 0
APNEA: 0
CHEST TIGHTNESS: 0
EYE PAIN: 0
ABDOMINAL PAIN: 0
SORE THROAT: 0
BACK PAIN: 1
PHOTOPHOBIA: 0
CONSTIPATION: 0
DIARRHEA: 0
NAUSEA: 0
VOICE CHANGE: 0
TROUBLE SWALLOWING: 0
EYE REDNESS: 0
CHOKING: 0
EYE DISCHARGE: 0
VOMITING: 0
FACIAL SWELLING: 0
COUGH: 0
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
## 2023-03-16 RX ORDER — ATORVASTATIN CALCIUM 20 MG/1
TABLET, FILM COATED ORAL
Qty: 90 TABLET | Refills: 3 | Status: SHIPPED | OUTPATIENT
Start: 2023-03-16

## 2023-03-23 ENCOUNTER — HOSPITAL ENCOUNTER (OUTPATIENT)
Facility: HOSPITAL | Age: 53
Setting detail: RECURRING SERIES
Discharge: HOME | End: 2023-03-26
Payer: MEDICARE

## 2023-03-23 PROCEDURE — 97140 MANUAL THERAPY 1/> REGIONS: CPT

## 2023-03-23 PROCEDURE — 97110 THERAPEUTIC EXERCISES: CPT

## 2023-03-23 PROCEDURE — G0283 ELEC STIM OTHER THAN WOUND: HCPCS

## 2023-03-23 NOTE — PROGRESS NOTES
PHYSICAL / OCCUPATIONAL THERAPY - DAILY TREATMENT NOTE (updated )    Patient Name: Ally Ramirez Sr.    Date: 3/23/2023    : 1970  Insurance: Payor: MEDICARE / Plan: MEDICARE PART A AND B / Product Type: *No Product type* /      Patient  verified Yes     Visit #   Current / Total 2 8   Time   In / Out 800 am 850 am   Pain   In / Out 7/10 5/10    Subjective Functional Status/Changes: Patient reports that both his neck and shoulders are bothering him a lot. Patient describes the discomfort as tightness. Changes to:  Meds, Allergies, Med Hx, Sx Hx? If yes, update Summary List no       TREATMENT AREA =  Other low back pain [M54.59]  Neck pain [M54.2]    OBJECTIVE    Modalities Rationale:     decrease edema, decrease inflammation, decrease pain, and increase tissue extensibility to improve patient's ability to progress to PLOF and address remaining functional goals. 10 min [] Estim Unattended, type/location: IFC to C/S; patient in arm chair                                     []  w/ice    []  w/heat    min [] Estim Attended, type/location:                                     []  w/US     []  w/ice    []  w/heat    []  TENS insruct      min []  Mechanical Traction: type/lbs                   []  pro   []  sup   []  int   []  cont    []  before manual    []  after manual    min []  Ultrasound, settings/location:      min []  Iontophoresis w/ dexamethasone, location:                                               []  take home patch       []  in clinic    min  unbill []  Ice     []  Heat    location/position:     min []  Paraffin,  details:     min []  Vasopneumatic Device, press/temp:     min []  Nan Cotton / Horace Relic:     If using vaso (only need to measure limb vaso being performed on)      pre-treatment girth :       post-treatment girth :       measured at (landmark location) :      min []  Other:    Skin assessment post-treatment (if applicable):    []  intact    []  redness- no adverse reaction

## 2023-03-28 ENCOUNTER — HOSPITAL ENCOUNTER (OUTPATIENT)
Facility: HOSPITAL | Age: 53
Setting detail: RECURRING SERIES
Discharge: HOME | End: 2023-03-31
Payer: MEDICARE

## 2023-03-28 PROCEDURE — 97110 THERAPEUTIC EXERCISES: CPT

## 2023-03-28 NOTE — PROGRESS NOTES
4/4/2023  9:30 AM Erickson Ramesh, PTA [facility]   4/6/2023  9:00 AM Gautam Perry, PT [facility]   4/11/2023  9:30 AM Gautam Perry, PT [facility]   4/13/2023  9:30 AM Gautam Perry, PT [facility]   5/24/2023  2:40 PM Iman Jay MD [facility] BS AMB   5/30/2023  9:20 AM Jose Alberto Akers MD [facility] BS AMB

## 2023-03-30 ENCOUNTER — HOSPITAL ENCOUNTER (OUTPATIENT)
Facility: HOSPITAL | Age: 53
Setting detail: RECURRING SERIES
End: 2023-03-30
Payer: MEDICARE

## 2023-03-30 PROCEDURE — 97530 THERAPEUTIC ACTIVITIES: CPT

## 2023-03-30 PROCEDURE — 97110 THERAPEUTIC EXERCISES: CPT

## 2023-03-30 NOTE — PROGRESS NOTES
PHYSICAL / OCCUPATIONAL THERAPY - DAILY TREATMENT NOTE (updated )    Patient Name: Cydney Thompson Sr.    Date: 3/30/2023    : 1970  Insurance: Payor: MEDICARE / Plan: MEDICARE PART A AND B / Product Type: *No Product type* /      Patient  verified Yes     Visit #   Current / Total 4 8   Time   In / Out 928 1006   Pain   In / Out 5 4   Subjective Functional Status/Changes: I feel fatigued today. I ddin't sleep good at all. I was up and down all night. \"   Changes to:  Meds, Allergies, Med Hx, Sx Hx? If yes, update Summary List no       TREATMENT AREA =  Other low back pain [M54.59]  Neck pain [M54.2]    OBJECTIVE    Patient declined stating he was going home to get in his recliner with massage and use his heat for his neck and back      Therapeutic Procedures: Tx Min Billable or 1:1 Min (if diff from Tx Min) Procedure, Rationale, Specifics   30 30 [id number] Therapeutic Exercise (timed):  increase ROM, strength, coordination, balance, and proprioception to improve patient's ability to progress to PLOF and address remaining functional goals. (see flow sheet as applicable)     Details if applicable:       8 8 [id number] Therapeutic Activity (timed):  use of dynamic activities replicating functional movements to increase ROM, strength, coordination, balance, and proprioception in order to improve patient's ability to progress to PLOF and address remaining functional goals.   (see flow sheet as applicable)     Details if applicable:            Details if applicable:            Details if applicable:            Details if applicable:     41 43 [facility] Totals Reminder: bill using total billable min of TIMED therapeutic procedures (example: do not include dry needle or estim unattended, both untimed codes, in totals to left)  8-22 min = 1 unit; 23-37 min = 2 units; 38-52 min = 3 units; 53-67 min = 4 units; 68-82 min = 5 units   Total Total     [x]  Patient Education billed concurrently with other procedures   [x]

## 2023-04-04 ENCOUNTER — HOSPITAL ENCOUNTER (OUTPATIENT)
Facility: HOSPITAL | Age: 53
Setting detail: RECURRING SERIES
Discharge: HOME | End: 2023-04-07
Payer: MEDICARE

## 2023-04-04 PROCEDURE — 97110 THERAPEUTIC EXERCISES: CPT

## 2023-04-04 PROCEDURE — 97112 NEUROMUSCULAR REEDUCATION: CPT

## 2023-04-04 RX ORDER — FLUTICASONE PROPIONATE AND SALMETEROL 50; 500 UG/1; UG/1
POWDER RESPIRATORY (INHALATION)
Qty: 60 EACH | Refills: 5 | Status: SHIPPED | OUTPATIENT
Start: 2023-04-04

## 2023-04-04 NOTE — PROGRESS NOTES
CURRENT ongoing NA 4/4/23   3 patient will have FOTO improved to projected gains of 57 to show significant improvement with ADLS at home and in the community  EVAL 40  CURRENT ongoing NA 4/423  4 patient will report overall 50% improvement to aid with increase tolerance to usual household activities  EVAL quite a bit of difficulty  CURRENT ongoing NA for %  4/423       PLAN  Yes  Continue plan of care  []  Upgrade activities as tolerated  []  Discharge due to :  []  Other:    Shahab Hickey PTA    4/4/2023    7:48 AM    Future Appointments   Date Time Provider Sven Mcfarland   4/4/2023  9:30 AM Shahab Hickey PTA [facility]   4/6/2023  9:00 AM Nena Chapman, PT [facility]   4/11/2023  9:30 AM Nena Chapman, PT [facility]   4/13/2023  9:30 AM Nena Chapman, PT [facility]   5/24/2023  2:40 PM Ramiro Boswell MD [facility] BS AMB   5/30/2023  9:20 AM Micheline Jeff MD [facility] BS AMB

## 2023-04-13 ENCOUNTER — APPOINTMENT (OUTPATIENT)
Facility: HOSPITAL | Age: 53
End: 2023-04-13
Payer: MEDICARE

## 2023-05-01 ENCOUNTER — PATIENT MESSAGE (OUTPATIENT)
Age: 53
End: 2023-05-01

## 2023-05-01 NOTE — TELEPHONE ENCOUNTER
Provider notified and patient notified via [address] [place] [address] [address], thank you,      Last Visit: 03/029/2023   Next Appointment: 05/30/2023     Requested Prescriptions     Pending Prescriptions Disp Refills    ONETOUCH VERIO strip 100 each 3     Sig: USE 1 STRIP TO CHECK GLUCOSE ONCE DAILY

## 2023-05-01 NOTE — TELEPHONE ENCOUNTER
From: Rey Be Sr.   To: Dr. Shannan Man: 5/1/2023 7:56 AM EDT  Subject: Test strips refill     Good morning Dr Perez Vaca fill my test strips they said they need a new prescription with code per insurance company even though I had like 3 refills remaining they said insurance company required a new prescription every time thanks for addressing this for me Be seeing you soon thanks

## 2023-05-02 RX ORDER — BLOOD SUGAR DIAGNOSTIC
STRIP MISCELLANEOUS
Qty: 100 EACH | Refills: 3 | OUTPATIENT
Start: 2023-05-02

## 2023-05-05 ENCOUNTER — TELEPHONE (OUTPATIENT)
Age: 53
End: 2023-05-05

## 2023-05-08 NOTE — TELEPHONE ENCOUNTER
Spoke with the patient and informed refill has been submitted and ready at the pharmacy. The patient acknowledged understanding and had no questions or concerns, thank you.

## 2023-05-24 ENCOUNTER — OFFICE VISIT (OUTPATIENT)
Age: 53
End: 2023-05-24
Payer: MEDICARE

## 2023-05-24 VITALS
DIASTOLIC BLOOD PRESSURE: 102 MMHG | OXYGEN SATURATION: 90 % | WEIGHT: 222 LBS | HEIGHT: 67 IN | BODY MASS INDEX: 34.84 KG/M2 | SYSTOLIC BLOOD PRESSURE: 152 MMHG | HEART RATE: 102 BPM

## 2023-05-24 DIAGNOSIS — I50.32 CHRONIC DIASTOLIC (CONGESTIVE) HEART FAILURE (HCC): ICD-10-CM

## 2023-05-24 DIAGNOSIS — I25.10 ATHEROSCLEROSIS OF NATIVE CORONARY ARTERY OF NATIVE HEART WITHOUT ANGINA PECTORIS: Primary | ICD-10-CM

## 2023-05-24 PROCEDURE — 3017F COLORECTAL CA SCREEN DOC REV: CPT | Performed by: INTERNAL MEDICINE

## 2023-05-24 PROCEDURE — G8428 CUR MEDS NOT DOCUMENT: HCPCS | Performed by: INTERNAL MEDICINE

## 2023-05-24 PROCEDURE — 3080F DIAST BP >= 90 MM HG: CPT | Performed by: INTERNAL MEDICINE

## 2023-05-24 PROCEDURE — 1036F TOBACCO NON-USER: CPT | Performed by: INTERNAL MEDICINE

## 2023-05-24 PROCEDURE — G8417 CALC BMI ABV UP PARAM F/U: HCPCS | Performed by: INTERNAL MEDICINE

## 2023-05-24 PROCEDURE — 3077F SYST BP >= 140 MM HG: CPT | Performed by: INTERNAL MEDICINE

## 2023-05-24 PROCEDURE — 99214 OFFICE O/P EST MOD 30 MIN: CPT | Performed by: INTERNAL MEDICINE

## 2023-05-24 NOTE — PROGRESS NOTES
DAY      hydrOXYzine HCl (ATARAX) 50 MG tablet ceived the following from [facility]: Outside name: hydrOXYzine HCL (ATARAX) 50 mg tablet      lisinopril (PRINIVIL;ZESTRIL) 20 MG tablet Take 20 mg by mouth daily      LORazepam (ATIVAN) 1 MG tablet Take 1 mg by mouth every 8 hours as needed. naloxone 4 MG/0.1ML LIQD nasal spray ceived the following from [facility]: Outside name: Narcan 4 mg/actuation nasal spray      OXcarbazepine (TRILEPTAL) 150 MG tablet Take 150 mg by mouth 2 times daily      oxyCODONE-acetaminophen (PERCOCET) 5-325 MG per tablet TAKE 1 TABLET BY MOUTH EVERY 12 HOURS      pantoprazole (PROTONIX) 40 MG tablet Take 40 mg by mouth every morning (before breakfast)      Roflumilast (DALIRESP) 500 MCG tablet TAKE 1 TABLET DAILY       No current facility-administered medications for this visit. Social History   reports that he quit smoking about 5 years ago. His smoking use included cigarettes. He started smoking about 39 years ago. He has a 70.00 pack-year smoking history. He has never used smokeless tobacco.   reports no history of alcohol use. Family History  family history includes Cancer in his father; Diabetes in his father and mother; Heart Disease in his father and mother; Hypertension in his father and mother. Review of Systems  Except as stated above include:  Constitutional: Negative for fever, chills and malaise/fatigue. HEENT: No congestion or recent URI. Gastrointestinal: No nausea, vomiting, abdominal pain, bloody stools. Pulmonary:  Negative except as stated above. Cardiac:  Negative except as stated above. Musculoskeletal: Negative except as stated above. Neurological:  No localized symptoms. Skin:  Negative except as stated above. Psych:  Negative except as stated above. Endocrine:  Negative except as stated above.     PHYSICAL EXAM  BP Readings from Last 3 Encounters:   05/24/23 (!) 152/102   03/14/23 118/78   03/03/23 117/80

## 2023-05-30 ENCOUNTER — HOSPITAL ENCOUNTER (OUTPATIENT)
Facility: HOSPITAL | Age: 53
Setting detail: SPECIMEN
Discharge: HOME | End: 2023-06-02
Payer: MEDICARE

## 2023-05-30 ENCOUNTER — OFFICE VISIT (OUTPATIENT)
Age: 53
End: 2023-05-30
Payer: MEDICARE

## 2023-05-30 VITALS
SYSTOLIC BLOOD PRESSURE: 108 MMHG | HEART RATE: 98 BPM | DIASTOLIC BLOOD PRESSURE: 76 MMHG | WEIGHT: 220.2 LBS | TEMPERATURE: 97.5 F | BODY MASS INDEX: 34.56 KG/M2 | OXYGEN SATURATION: 95 % | RESPIRATION RATE: 18 BRPM | HEIGHT: 67 IN

## 2023-05-30 DIAGNOSIS — E78.00 HYPERCHOLESTEROLEMIA: ICD-10-CM

## 2023-05-30 DIAGNOSIS — E11.65 TYPE 2 DIABETES MELLITUS WITH HYPERGLYCEMIA, WITHOUT LONG-TERM CURRENT USE OF INSULIN (HCC): ICD-10-CM

## 2023-05-30 DIAGNOSIS — E11.65 TYPE 2 DIABETES MELLITUS WITH HYPERGLYCEMIA, WITHOUT LONG-TERM CURRENT USE OF INSULIN (HCC): Primary | ICD-10-CM

## 2023-05-30 LAB
ALBUMIN SERPL-MCNC: 4.4 G/DL (ref 3.4–5)
ALBUMIN/GLOB SERPL: 1.4 (ref 0.8–1.7)
ALP SERPL-CCNC: 137 U/L (ref 45–117)
ALT SERPL-CCNC: 49 U/L (ref 16–61)
ANION GAP SERPL CALC-SCNC: 7 MMOL/L (ref 3–18)
AST SERPL-CCNC: 24 U/L (ref 10–38)
BILIRUB SERPL-MCNC: 0.9 MG/DL (ref 0.2–1)
BUN SERPL-MCNC: 14 MG/DL (ref 7–18)
BUN/CREAT SERPL: 14 (ref 12–20)
CALCIUM SERPL-MCNC: 10.1 MG/DL (ref 8.5–10.1)
CHLORIDE SERPL-SCNC: 100 MMOL/L (ref 100–111)
CO2 SERPL-SCNC: 27 MMOL/L (ref 21–32)
CREAT SERPL-MCNC: 1.01 MG/DL (ref 0.6–1.3)
EST. AVERAGE GLUCOSE BLD GHB EST-MCNC: 154 MG/DL
GLOBULIN SER CALC-MCNC: 3.1 G/DL (ref 2–4)
GLUCOSE SERPL-MCNC: 192 MG/DL (ref 74–99)
HBA1C MFR BLD: 7 % (ref 4.2–5.6)
POTASSIUM SERPL-SCNC: 4.5 MMOL/L (ref 3.5–5.5)
PROT SERPL-MCNC: 7.5 G/DL (ref 6.4–8.2)
SODIUM SERPL-SCNC: 134 MMOL/L (ref 136–145)

## 2023-05-30 PROCEDURE — 80053 COMPREHEN METABOLIC PANEL: CPT

## 2023-05-30 PROCEDURE — 36415 COLL VENOUS BLD VENIPUNCTURE: CPT

## 2023-05-30 PROCEDURE — 3074F SYST BP LT 130 MM HG: CPT | Performed by: FAMILY MEDICINE

## 2023-05-30 PROCEDURE — G8417 CALC BMI ABV UP PARAM F/U: HCPCS | Performed by: FAMILY MEDICINE

## 2023-05-30 PROCEDURE — 3017F COLORECTAL CA SCREEN DOC REV: CPT | Performed by: FAMILY MEDICINE

## 2023-05-30 PROCEDURE — 3051F HG A1C>EQUAL 7.0%<8.0%: CPT | Performed by: FAMILY MEDICINE

## 2023-05-30 PROCEDURE — G8427 DOCREV CUR MEDS BY ELIG CLIN: HCPCS | Performed by: FAMILY MEDICINE

## 2023-05-30 PROCEDURE — 99214 OFFICE O/P EST MOD 30 MIN: CPT | Performed by: FAMILY MEDICINE

## 2023-05-30 PROCEDURE — 83036 HEMOGLOBIN GLYCOSYLATED A1C: CPT

## 2023-05-30 PROCEDURE — 1036F TOBACCO NON-USER: CPT | Performed by: FAMILY MEDICINE

## 2023-05-30 PROCEDURE — 2022F DILAT RTA XM EVC RTNOPTHY: CPT | Performed by: FAMILY MEDICINE

## 2023-05-30 PROCEDURE — 3078F DIAST BP <80 MM HG: CPT | Performed by: FAMILY MEDICINE

## 2023-05-30 SDOH — ECONOMIC STABILITY: FOOD INSECURITY: WITHIN THE PAST 12 MONTHS, YOU WORRIED THAT YOUR FOOD WOULD RUN OUT BEFORE YOU GOT MONEY TO BUY MORE.: NEVER TRUE

## 2023-05-30 SDOH — ECONOMIC STABILITY: FOOD INSECURITY: WITHIN THE PAST 12 MONTHS, THE FOOD YOU BOUGHT JUST DIDN'T LAST AND YOU DIDN'T HAVE MONEY TO GET MORE.: NEVER TRUE

## 2023-05-30 SDOH — ECONOMIC STABILITY: INCOME INSECURITY: HOW HARD IS IT FOR YOU TO PAY FOR THE VERY BASICS LIKE FOOD, HOUSING, MEDICAL CARE, AND HEATING?: SOMEWHAT HARD

## 2023-05-30 ASSESSMENT — PATIENT HEALTH QUESTIONNAIRE - PHQ9
SUM OF ALL RESPONSES TO PHQ QUESTIONS 1-9: 0
SUM OF ALL RESPONSES TO PHQ QUESTIONS 1-9: 0
SUM OF ALL RESPONSES TO PHQ9 QUESTIONS 1 & 2: 0
SUM OF ALL RESPONSES TO PHQ QUESTIONS 1-9: 0
2. FEELING DOWN, DEPRESSED OR HOPELESS: 0
SUM OF ALL RESPONSES TO PHQ QUESTIONS 1-9: 0
1. LITTLE INTEREST OR PLEASURE IN DOING THINGS: 0

## 2023-05-30 NOTE — PROGRESS NOTES
HPI:  Tremaine Bell is a 47 y.o. male who presents today with   Chief Complaint   Patient presents with    Cholesterol Problem     6 month follow-up (Patient Fasting)        He has been \" about the same\"  Pulmonary status is stable; will see Pulmonary soon  Has seen cardiology;  he is scheduled for a stress test soon. Has high cholesterol; Pt is on lipitor; takes med daily    Lab Results   Component Value Date    CHOL 181 03/13/2023    CHOL 166 12/07/2022    CHOL 143 03/30/2022     Lab Results   Component Value Date    TRIG 232 (H) 03/13/2023    TRIG 131 12/07/2022    TRIG 156 (H) 03/30/2022     Lab Results   Component Value Date    HDL 54 03/13/2023    HDL 54 12/07/2022    HDL 47 03/30/2022     Lab Results   Component Value Date    LDLCALC 80.6 03/13/2023    LDLCALC 85.8 12/07/2022    LDLCALC 64.8 03/30/2022     Lab Results   Component Value Date    LABVLDL 46.4 03/13/2023    LABVLDL 26.2 12/07/2022    LABVLDL 31.2 03/30/2022     Lab Results   Component Value Date    CHOLHDLRATIO 3.4 03/13/2023    CHOLHDLRATIO 3.1 12/07/2022    CHOLHDLRATIO 3.0 03/30/2022     Lab Results   Component Value Date     03/13/2023    K 4.5 03/13/2023     03/13/2023    CO2 28 03/13/2023    BUN 10 03/13/2023    CREATININE 0.85 03/13/2023    GLUCOSE 153 (H) 03/13/2023    CALCIUM 9.9 03/13/2023    PROT 7.1 03/13/2023    LABALBU 4.2 03/13/2023    BILITOT 0.9 03/13/2023    ALKPHOS 122 (H) 03/13/2023    AST 25 03/13/2023    ALT 51 03/13/2023    LABGLOM >60 03/13/2023    GFRAA >60 06/03/2022    AGRATIO 1.2 01/30/2023    GLOB 2.9 03/13/2023       Has diabetes      Hemoglobin A1C   Date Value Ref Range Status   03/13/2023 7.1 (H) 4.2 - 5.6 % Final     Comment:     (NOTE)  HbA1C Interpretive Ranges  <5.7              Normal  5.7 - 6.4         Consider Prediabetes  >6.5              Consider Diabetes         Blood sugars have been better;  have been in the 120s. No flowsheet data found.             PMH,  Meds, Allergies,

## 2023-05-30 NOTE — PROGRESS NOTES
1. \"Have you been to the ER, urgent care clinic since your last visit? Hospitalized since your last visit? \" No    2. \"Have you seen or consulted any other health care providers outside of the [address] [place] [address] since your last visit? \"  Yes, [facility]       3. For patients aged 39-70: Has the patient had a colonoscopy / FIT/ Cologuard? Yes - Care Gap present.  Most recent result on file

## 2023-06-08 NOTE — TELEPHONE ENCOUNTER
Last Visit: 05- OV   Next Appointment: 10-  Previous Refill Encounter: 02-       Requested Prescriptions     Pending Prescriptions Disp Refills    JARDIANCE 25 MG tablet [Pharmacy Med Name: Jardiance 25 MG Oral Tablet] 30 tablet 0     Sig: Take 1 tablet by mouth once daily

## 2023-06-10 RX ORDER — EMPAGLIFLOZIN 25 MG/1
TABLET, FILM COATED ORAL
Qty: 30 TABLET | Refills: 6 | Status: SHIPPED | OUTPATIENT
Start: 2023-06-10

## 2023-07-03 RX ORDER — LISINOPRIL 20 MG/1
TABLET ORAL
Qty: 90 TABLET | Refills: 3 | Status: SHIPPED | OUTPATIENT
Start: 2023-07-03

## 2023-08-12 ENCOUNTER — HOSPITAL ENCOUNTER (EMERGENCY)
Facility: HOSPITAL | Age: 53
Discharge: HOME | End: 2023-08-12
Attending: EMERGENCY MEDICINE
Payer: MEDICARE

## 2023-08-12 ENCOUNTER — APPOINTMENT (OUTPATIENT)
Facility: HOSPITAL | Age: 53
End: 2023-08-12
Payer: MEDICARE

## 2023-08-12 VITALS
TEMPERATURE: 97.9 F | HEART RATE: 90 BPM | BODY MASS INDEX: 31.83 KG/M2 | DIASTOLIC BLOOD PRESSURE: 78 MMHG | HEIGHT: 68 IN | WEIGHT: 210 LBS | RESPIRATION RATE: 16 BRPM | OXYGEN SATURATION: 93 % | SYSTOLIC BLOOD PRESSURE: 138 MMHG

## 2023-08-12 DIAGNOSIS — L03.114 CELLULITIS OF LEFT UPPER EXTREMITY: Primary | ICD-10-CM

## 2023-08-12 PROCEDURE — 99283 EMERGENCY DEPT VISIT LOW MDM: CPT

## 2023-08-12 PROCEDURE — 73070 X-RAY EXAM OF ELBOW: CPT

## 2023-08-12 PROCEDURE — 6370000000 HC RX 637 (ALT 250 FOR IP): Performed by: PHYSICIAN ASSISTANT

## 2023-08-12 RX ORDER — CLINDAMYCIN HYDROCHLORIDE 300 MG/1
300 CAPSULE ORAL 4 TIMES DAILY
Qty: 40 CAPSULE | Refills: 0 | Status: SHIPPED | OUTPATIENT
Start: 2023-08-12 | End: 2023-08-22

## 2023-08-12 RX ORDER — HYDROCODONE BITARTRATE AND ACETAMINOPHEN 5; 325 MG/1; MG/1
1 TABLET ORAL EVERY 6 HOURS PRN
Qty: 8 TABLET | Refills: 0 | Status: SHIPPED | OUTPATIENT
Start: 2023-08-12 | End: 2023-08-15

## 2023-08-12 RX ORDER — CLINDAMYCIN HYDROCHLORIDE 150 MG/1
300 CAPSULE ORAL
Status: COMPLETED | OUTPATIENT
Start: 2023-08-12 | End: 2023-08-12

## 2023-08-12 RX ORDER — HYDROCODONE BITARTRATE AND ACETAMINOPHEN 5; 325 MG/1; MG/1
1 TABLET ORAL
Status: COMPLETED | OUTPATIENT
Start: 2023-08-12 | End: 2023-08-12

## 2023-08-12 RX ADMIN — CLINDAMYCIN HYDROCHLORIDE 300 MG: 150 CAPSULE ORAL at 18:02

## 2023-08-12 RX ADMIN — HYDROCODONE BITARTRATE AND ACETAMINOPHEN 1 TABLET: 5; 325 TABLET ORAL at 18:02

## 2023-08-12 ASSESSMENT — ENCOUNTER SYMPTOMS
NAUSEA: 0
CONSTIPATION: 0
RHINORRHEA: 0
VOMITING: 0
ABDOMINAL PAIN: 0
WHEEZING: 0
SORE THROAT: 0
EYE DISCHARGE: 0
SHORTNESS OF BREATH: 0
STRIDOR: 0
EYE REDNESS: 0
BACK PAIN: 0
DIARRHEA: 0
COUGH: 0

## 2023-08-12 NOTE — ED PROVIDER NOTES
EMERGENCY DEPARTMENT HISTORY AND PHYSICAL EXAM    Date: 8/12/2023  Patient Name: Ar Espinoza Sr.    History of Presenting Illness     Chief Complaint   Patient presents with    Elbow Pain         History Provided By: patient     Chief Complaint: elbow pain and swelling  Duration: a week and a half  Timing:  acute  Location: L elbow  Quality: throbbing  Severity: moderate  Modifying Factors: none   Associated Symptoms: swelling and redness      Additional History (Context): Chelita Celeste is a 47 y.o. male with PMH COPD coronary artery disease emphysema and hypertension who presents with c/o a week and a half of atraumatic left elbow pain as well as a notable area of swelling and redness over the elbow joint. The patient denies any known injury or trauma but states he has noticed that the skin over this area was initially dry and cracking. He states he had a similar episode over a year ago which did resolve on its own with oral antibiotics. The patient denies fever and chills. He is right-hand dominant. No other complaints are reported at this time. PCP: Laila Boyle MD    Current Facility-Administered Medications   Medication Dose Route Frequency Provider Last Rate Last Admin    clindamycin (CLEOCIN) capsule 300 mg  300 mg Oral NOW April DAYNE Hdz PA-C        HYDROcodone-acetaminophen [facility]) 5-325 MG per tablet 1 tablet  1 tablet Oral NOW April DAYNE Hdz PA-C         Current Outpatient Medications   Medication Sig Dispense Refill    clindamycin (CLEOCIN) 300 MG capsule Take 1 capsule by mouth 4 times daily for 10 days 40 capsule 0    HYDROcodone-acetaminophen (NORCO) 5-325 MG per tablet Take 1 tablet by mouth every 6 hours as needed for Pain for up to 3 days. Intended supply: 3 days.  Take lowest dose possible to manage pain Max Daily Amount: 4 tablets 8 tablet 0    lisinopril (PRINIVIL;ZESTRIL) 20 MG tablet TAKE 1 TABLET DAILY 90 tablet 3    JARDIANCE 25 MG tablet Take 1 tablet by mouth [facility] [place]  08/12/23 9770

## 2023-08-18 NOTE — ED NOTES
I have reviewed discharge instructions with the patient. The patient verbalized understanding. Pt left ED in NAD, ambulatory, safety intact, a&ox4. Patient: Ruth Wheatley  : 10/30/1930    Encounter Date: 2023    PROGRESS NOTE    Subjective  Chief complaint: Ruth Wheatley is a 89 y.o. female who is a long term care patient being seen and evaluated for SOB    HPI:  Patient recently recovered from Covid, she continues on a course of ATB for PNA. Denies cough, fever or chills. She presented with complaints of SOB and leg pain. She was started on Dexamethasone and US was ordered for BLE. SOB improved and US results pending. No other concerns at this time. No acute distress.       Objective  Vital signs: 126/67, 96%    Physical Exam  Constitutional:       General: She is not in acute distress.  Eyes:      Extraocular Movements: Extraocular movements intact.   Cardiovascular:      Rate and Rhythm: Normal rate and regular rhythm.   Pulmonary:      Effort: Pulmonary effort is normal.      Breath sounds: Normal breath sounds.   Abdominal:      General: Bowel sounds are normal.      Palpations: Abdomen is soft.   Musculoskeletal:      Cervical back: Neck supple.      Right lower leg: No edema.      Left lower leg: No edema.   Neurological:      Mental Status: She is alert.   Psychiatric:         Mood and Affect: Mood normal.         Behavior: Behavior is cooperative.         Assessment/Plan  Problem List Items Addressed This Visit       Hypoxia - Primary     Improved  Continue dexamethasone         PNA (pneumonia)     Continue ATB until complete         Leg pain     US BLE - results pending          Medications, treatments, and labs reviewed  Continue medications and treatments as listed in [facility]    Scribe Attestation  By signing my name below, Tahmina GUSTAFSON Scribe   attest that this documentation has been prepared under the direction and in the presence of Corrie Desouza MD.    Provider Attestation - Scribe documentation  All medical record entries made by the Scribe were at my direction and personally dictated by me. I have reviewed the chart and agree  that the record accurately reflects my personal performance of the history, physical exam, discussion and plan.    1. Hypoxia        2. Pneumonia due to infectious organism, unspecified laterality, unspecified part of lung        3. Pain in both lower extremities               Electronically Signed By: Corrie Desouza MD   8/22/23 11:24 AM

## 2023-09-01 RX ORDER — FUROSEMIDE 20 MG/1
TABLET ORAL
Qty: 15 TABLET | Refills: 0 | Status: SHIPPED | OUTPATIENT
Start: 2023-09-01

## 2023-09-05 RX ORDER — UMECLIDINIUM 62.5 UG/1
AEROSOL, POWDER ORAL
Qty: 30 EACH | Refills: 5 | Status: SHIPPED | OUTPATIENT
Start: 2023-09-05

## 2023-09-21 ENCOUNTER — OFFICE VISIT (OUTPATIENT)
Age: 53
End: 2023-09-21
Payer: MEDICARE

## 2023-09-21 VITALS
TEMPERATURE: 98.4 F | BODY MASS INDEX: 33.04 KG/M2 | OXYGEN SATURATION: 93 % | RESPIRATION RATE: 20 BRPM | SYSTOLIC BLOOD PRESSURE: 130 MMHG | DIASTOLIC BLOOD PRESSURE: 85 MMHG | WEIGHT: 218 LBS | HEART RATE: 97 BPM | HEIGHT: 68 IN

## 2023-09-21 DIAGNOSIS — Z87.891 FORMER HEAVY TOBACCO SMOKER: ICD-10-CM

## 2023-09-21 DIAGNOSIS — J44.9 COPD, GROUP C, BY GOLD 2017 CLASSIFICATION (HCC): Primary | ICD-10-CM

## 2023-09-21 DIAGNOSIS — R91.1 LUNG NODULE: ICD-10-CM

## 2023-09-21 PROCEDURE — 99214 OFFICE O/P EST MOD 30 MIN: CPT | Performed by: INTERNAL MEDICINE

## 2023-09-21 PROCEDURE — 94729 DIFFUSING CAPACITY: CPT | Performed by: INTERNAL MEDICINE

## 2023-09-21 PROCEDURE — 94727 GAS DIL/WSHOT DETER LNG VOL: CPT | Performed by: INTERNAL MEDICINE

## 2023-09-21 PROCEDURE — 1036F TOBACCO NON-USER: CPT | Performed by: INTERNAL MEDICINE

## 2023-09-21 PROCEDURE — G8427 DOCREV CUR MEDS BY ELIG CLIN: HCPCS | Performed by: INTERNAL MEDICINE

## 2023-09-21 PROCEDURE — 3079F DIAST BP 80-89 MM HG: CPT | Performed by: INTERNAL MEDICINE

## 2023-09-21 PROCEDURE — 3017F COLORECTAL CA SCREEN DOC REV: CPT | Performed by: INTERNAL MEDICINE

## 2023-09-21 PROCEDURE — 3075F SYST BP GE 130 - 139MM HG: CPT | Performed by: INTERNAL MEDICINE

## 2023-09-21 PROCEDURE — G8417 CALC BMI ABV UP PARAM F/U: HCPCS | Performed by: INTERNAL MEDICINE

## 2023-09-21 PROCEDURE — 94060 EVALUATION OF WHEEZING: CPT | Performed by: INTERNAL MEDICINE

## 2023-09-21 PROCEDURE — 3023F SPIROM DOC REV: CPT | Performed by: INTERNAL MEDICINE

## 2023-09-21 ASSESSMENT — ENCOUNTER SYMPTOMS
DIARRHEA: 0
CHEST TIGHTNESS: 0
APNEA: 0
TROUBLE SWALLOWING: 0
EYE ITCHING: 0
NAUSEA: 0
COLOR CHANGE: 0
BLOOD IN STOOL: 0
VOICE CHANGE: 0
PHOTOPHOBIA: 0
CONSTIPATION: 0
EYE REDNESS: 0
EYE DISCHARGE: 0
ABDOMINAL PAIN: 0
FACIAL SWELLING: 0
SORE THROAT: 0
VOMITING: 0
EYE PAIN: 0
STRIDOR: 0
COUGH: 0
CHOKING: 0
BACK PAIN: 1
RHINORRHEA: 0
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
## 2023-09-28 RX ORDER — FLUTICASONE PROPIONATE AND SALMETEROL 50; 500 UG/1; UG/1
POWDER RESPIRATORY (INHALATION)
Qty: 60 EACH | Refills: 11 | Status: SHIPPED | OUTPATIENT
Start: 2023-09-28

## 2023-09-28 RX ORDER — ROFLUMILAST 500 UG/1
TABLET ORAL
Qty: 90 TABLET | Refills: 3 | Status: SHIPPED | OUTPATIENT
Start: 2023-09-28

## 2023-09-29 RX ORDER — FUROSEMIDE 20 MG/1
TABLET ORAL
Qty: 15 TABLET | Refills: 0 | Status: SHIPPED | OUTPATIENT
Start: 2023-09-29

## 2023-10-02 ENCOUNTER — OFFICE VISIT (OUTPATIENT)
Age: 53
End: 2023-10-02
Payer: MEDICARE

## 2023-10-02 VITALS
DIASTOLIC BLOOD PRESSURE: 80 MMHG | HEART RATE: 107 BPM | TEMPERATURE: 97.4 F | SYSTOLIC BLOOD PRESSURE: 115 MMHG | WEIGHT: 215 LBS | RESPIRATION RATE: 16 BRPM | HEIGHT: 68 IN | OXYGEN SATURATION: 93 % | BODY MASS INDEX: 32.58 KG/M2

## 2023-10-02 DIAGNOSIS — J41.0 SIMPLE CHRONIC BRONCHITIS (HCC): ICD-10-CM

## 2023-10-02 DIAGNOSIS — E11.65 TYPE 2 DIABETES MELLITUS WITH HYPERGLYCEMIA, WITHOUT LONG-TERM CURRENT USE OF INSULIN (HCC): Primary | ICD-10-CM

## 2023-10-02 DIAGNOSIS — E78.00 HYPERCHOLESTEROLEMIA: ICD-10-CM

## 2023-10-02 LAB — HBA1C MFR BLD: 7 %

## 2023-10-02 PROCEDURE — 3023F SPIROM DOC REV: CPT | Performed by: FAMILY MEDICINE

## 2023-10-02 PROCEDURE — 3051F HG A1C>EQUAL 7.0%<8.0%: CPT | Performed by: FAMILY MEDICINE

## 2023-10-02 PROCEDURE — 3074F SYST BP LT 130 MM HG: CPT | Performed by: FAMILY MEDICINE

## 2023-10-02 PROCEDURE — 3078F DIAST BP <80 MM HG: CPT | Performed by: FAMILY MEDICINE

## 2023-10-02 PROCEDURE — 2022F DILAT RTA XM EVC RTNOPTHY: CPT | Performed by: FAMILY MEDICINE

## 2023-10-02 PROCEDURE — G8427 DOCREV CUR MEDS BY ELIG CLIN: HCPCS | Performed by: FAMILY MEDICINE

## 2023-10-02 PROCEDURE — G8417 CALC BMI ABV UP PARAM F/U: HCPCS | Performed by: FAMILY MEDICINE

## 2023-10-02 PROCEDURE — G8484 FLU IMMUNIZE NO ADMIN: HCPCS | Performed by: FAMILY MEDICINE

## 2023-10-02 PROCEDURE — 99214 OFFICE O/P EST MOD 30 MIN: CPT | Performed by: FAMILY MEDICINE

## 2023-10-02 PROCEDURE — 83036 HEMOGLOBIN GLYCOSYLATED A1C: CPT | Performed by: FAMILY MEDICINE

## 2023-10-02 PROCEDURE — PBSHW AMB POC HEMOGLOBIN A1C: Performed by: FAMILY MEDICINE

## 2023-10-02 PROCEDURE — 3017F COLORECTAL CA SCREEN DOC REV: CPT | Performed by: FAMILY MEDICINE

## 2023-10-02 PROCEDURE — 1036F TOBACCO NON-USER: CPT | Performed by: FAMILY MEDICINE

## 2023-10-02 RX ORDER — OXCARBAZEPINE 300 MG/1
300 TABLET, FILM COATED ORAL 2 TIMES DAILY
COMMUNITY
Start: 2023-09-30 | End: 2023-10-02

## 2023-10-02 RX ORDER — ALBUTEROL SULFATE 2.5 MG/3ML
SOLUTION RESPIRATORY (INHALATION)
Qty: 120 EACH | Refills: 2 | Status: SHIPPED | OUTPATIENT
Start: 2023-10-02

## 2023-10-02 RX ORDER — METHYLPREDNISOLONE 4 MG/1
TABLET ORAL
Qty: 1 KIT | Refills: 0 | Status: SHIPPED | OUTPATIENT
Start: 2023-10-02 | End: 2023-10-08

## 2023-10-02 RX ORDER — LANCETS 33 GAUGE
EACH MISCELLANEOUS
COMMUNITY
Start: 2023-07-07

## 2023-10-02 NOTE — PROGRESS NOTES
1. \"Have you been to the ER, urgent care clinic since your last visit? Hospitalized since your last visit? \" No    2. \"Have you seen or consulted any other health care providers outside of the [address] [place] [address] since your last visit? \" Yes, [address]      3. For patients aged 43-73: Has the patient had a colonoscopy / FIT/ Cologuard? Yes - Care Gap present.  Most recent result on file

## 2023-10-02 NOTE — PROGRESS NOTES
HPI:  Dayron Lainez is a 47 y.o. male who presents today with   Chief Complaint   Patient presents with    Diabetes        Pts last A1c was 7.0%; he is on Jardiance; he does eat a lot of pasta and rice ; plans to decrease this intake. Pt has COPD; he is on home 02 as needed  He is followed by Pulmonary ; Feels he has a chest cold . He is requesting a medrol dose pack.  Has some chest congestion and mucous production    Lab Results   Component Value Date    CHOL 181 03/13/2023    CHOL 166 12/07/2022    CHOL 143 03/30/2022     Lab Results   Component Value Date    TRIG 232 (H) 03/13/2023    TRIG 131 12/07/2022    TRIG 156 (H) 03/30/2022     Lab Results   Component Value Date    HDL 54 03/13/2023    HDL 54 12/07/2022    HDL 47 03/30/2022     Lab Results   Component Value Date    LDLCALC 80.6 03/13/2023    LDLCALC 85.8 12/07/2022    LDLCALC 64.8 03/30/2022     Lab Results   Component Value Date    LABVLDL 46.4 03/13/2023    LABVLDL 26.2 12/07/2022    LABVLDL 31.2 03/30/2022     Lab Results   Component Value Date    CHOLHDLRATIO 3.4 03/13/2023    CHOLHDLRATIO 3.1 12/07/2022    CHOLHDLRATIO 3.0 03/30/2022       Hemoglobin A1C   Date Value Ref Range Status   05/30/2023 7.0 (H) 4.2 - 5.6 % Final     Comment:     (NOTE)  HbA1C Interpretive Ranges  <5.7              Normal  5.7 - 6.4         Consider Prediabetes  >6.5              Consider Diabetes         Lab Results   Component Value Date     (L) 05/30/2023    K 4.5 05/30/2023     05/30/2023    CO2 27 05/30/2023    BUN 14 05/30/2023    CREATININE 1.01 05/30/2023    GLUCOSE 192 (H) 05/30/2023    CALCIUM 10.1 05/30/2023    PROT 7.5 05/30/2023    LABALBU 4.4 05/30/2023    BILITOT 0.9 05/30/2023    ALKPHOS 137 (H) 05/30/2023    AST 24 05/30/2023    ALT 49 05/30/2023    LABGLOM >60 05/30/2023    GFRAA >60 06/03/2022    AGRATIO 1.2 01/30/2023    GLOB 3.1 05/30/2023                No data to display                        PMH,  Meds, Allergies, Family History,

## 2023-10-16 NOTE — TELEPHONE ENCOUNTER
Last appointment: 10/02/2023    Next appointment: 02/06/2024    Pharmacy requesting refill for     [facility] [address] [phone] - G [phone] (Pharmacy)

## 2023-10-16 NOTE — TELEPHONE ENCOUNTER
[facility]rips last filled on 09- with 50 and 2 refills remaining from the medication dispense information report. kontoblick message sent to patient.

## 2023-10-18 RX ORDER — BLOOD SUGAR DIAGNOSTIC
STRIP MISCELLANEOUS
Qty: 100 EACH | Refills: 3 | Status: SHIPPED | OUTPATIENT
Start: 2023-10-18

## 2023-10-27 RX ORDER — FUROSEMIDE 20 MG/1
TABLET ORAL
Qty: 15 TABLET | Refills: 0 | Status: SHIPPED | OUTPATIENT
Start: 2023-10-27

## 2023-11-16 RX ORDER — ALBUTEROL SULFATE 90 UG/1
2 AEROSOL, METERED RESPIRATORY (INHALATION) EVERY 4 HOURS PRN
Qty: 18 G | Refills: 3 | Status: SHIPPED | OUTPATIENT
Start: 2023-11-16

## 2023-11-27 RX ORDER — FUROSEMIDE 20 MG/1
TABLET ORAL
Qty: 15 TABLET | Refills: 0 | Status: SHIPPED | OUTPATIENT
Start: 2023-11-27

## 2023-12-16 NOTE — PROGRESS NOTES
Hospitalist Progress Note    Patient: Sushant Treadwell Sr. MRN: [id number]  CSN: [id number]    YOB: 1970  Age: 49 y.o. Sex: male    DOA: 12/18/2017 LOS:  LOS: 5 days          Sat 88% upon ambulation, home oxygen ordered but not yet delivered. Patient states he feels much better, walked around unit with some dyspnea but able to tolerate. Still coughing, and expectorating copious amounts of sputum. Overall feels much improved. Assessment/Plan     1. COPD, severe - solumedrol, duoneb, tobacco cessation, abx. Pulmonary input appreciated. Alpha-1 antitrypsin testing as outpatient in [place] [address] clinic  2. Sinus tachycardia poa - related to infection. 3. Acute bronchitis - Levaquin/cefepime - CT chest no infiltrate. 4. Elevated bp - not on meds at home - decrease steroids. Stop fluids. Treat pain. Relieve constipation. Hydralazine prn. Low dose norvasc  5. Lactic acidosis - resolving  6. uds + opiates. 7. Tobacco abuse - chantix. Smoking cessation education  8. normal event monitor 2015  9. Recent GI bleed in the setting of steroid use, egd 12/11/17 revealed Grade 1 espohagitis - on ppi. 10. Mild LAD coronary arteriosclerosis seen on CT chest.   12. Anxiety - prn ativan. Remeron. 13. Constipation - bowel regimen. 14. dvt prophylaxis  15. Full code. has a nebulizer at home that he uses left over from his daughter. Referred to [facility] per CM. Pt recs HH. Needs home O2 eval prior to discharge. OT recs shower chair.      Wife Linden Miranda [phone]    Additional Notes:      Case discussed with:  [x]Patient  [x]Family  [x]Nursing  [x]Case Management  DVT Prophylaxis:  []Lovenox  [x]Hep SQ  []SCDs  []Coumadin   []On Heparin gtt    Vital signs/Intake and Output:  Visit Vitals    BP (!) 157/97 (BP 1 Location: Right arm, BP Patient Position: At rest)    Pulse 75    Temp 97.4 °F (36.3 °C)    Resp 19    Wt 79.2 kg (174 lb 8 oz)    SpO2 96%    BMI 25.77 kg/m2     Current Shift:     Last three shifts: 12/21 1901 - 12/23 0700  In: 12 [P.O.:960]  Out: 2601 [Urine:2600]    Awake alert and oriented. On NC  Ncat. Perrl. Oropharynx clear   RRR  Improved air entry. cta b.l. No wheeze. Soft nt nd nabs  No edema. dp 2+ b.l  No focal deficit  No rash    Medications Reviewed      Labs: Results:       Chemistry Recent Labs      12/23/17 0124 12/21/17   0300   GLU  133*  131*   NA  139  140   K  4.4  4.1   CL  105  107   CO2  28  26   BUN  20*  14   CREA  0.82  0.73   CA  8.4*  8.5   AGAP  6  7   BUCR  24*  19      CBC w/Diff Recent Labs      12/23/17 0124 12/21/17   0300   WBC  8.8  8.6   RBC  4.62*  4.57*   HGB  14.9  14.8   HCT  42.7  42.6   PLT  180  162   GRANS  88*  89*   LYMPH  7*  6*   EOS  0  0      Cardiac Enzymes No results for input(s): CPK, CKND1, JIMMY in the last 72 hours. No lab exists for component: CKRMB, TROIP   Coagulation No results for input(s): PTP, INR, APTT in the last 72 hours. No lab exists for component: INREXT, INREXT    Lipid Panel Lab Results   Component Value Date/Time    Cholesterol, total 144 01/23/2017 01:11 PM    HDL Cholesterol 36 01/23/2017 01:11 PM    LDL, calculated 85.6 01/23/2017 01:11 PM    VLDL, calculated 22.4 01/23/2017 01:11 PM    Triglyceride 112 01/23/2017 01:11 PM    CHOL/HDL Ratio 4.0 01/23/2017 01:11 PM      BNP No results for input(s): BNPP in the last 72 hours. Liver Enzymes No results for input(s): TP, ALB, TBIL, AP, SGOT, GPT in the last 72 hours. No lab exists for component: DBIL   Thyroid Studies Lab Results   Component Value Date/Time    TSH 2.21 12/17/2017 10:00 PM        Procedures/imaging: see electronic medical records for all procedures/Xrays and details which were not copied into this note but were reviewed prior to creation of Plan. PAST MEDICAL HISTORY:  Gastritis     GERD (gastroesophageal reflux disease)     Hiatal hernia     HTN (hypertension)     Pulmonary embolism     PVD (peripheral vascular disease) s/p stent r leg

## 2023-12-26 NOTE — TELEPHONE ENCOUNTER
Original RF order pended to wrong MD.  A user error has taken place: Adequate: hears normal conversation without difficulty

## 2023-12-27 RX ORDER — FUROSEMIDE 20 MG/1
TABLET ORAL
Qty: 15 TABLET | Refills: 0 | Status: SHIPPED | OUTPATIENT
Start: 2023-12-27

## 2023-12-27 NOTE — TELEPHONE ENCOUNTER
Last Visit: 10- OV   Next Appointment: 02-  Previous Refill Encounter: 06- #30 tabs with 6 refills      Requested Prescriptions     Pending Prescriptions Disp Refills    JARDIANCE 25 MG tablet [Pharmacy Med Name: Jardiance 25 MG Oral Tablet] 30 tablet 0     Sig: Take 1 tablet by mouth once daily

## 2024-01-04 RX ORDER — EMPAGLIFLOZIN 25 MG/1
TABLET, FILM COATED ORAL
Qty: 30 TABLET | Refills: 0 | Status: SHIPPED | OUTPATIENT
Start: 2024-01-04

## 2024-01-23 RX ORDER — FUROSEMIDE 20 MG/1
20 TABLET ORAL EVERY OTHER DAY
Qty: 45 TABLET | Refills: 3 | Status: SHIPPED | OUTPATIENT
Start: 2024-01-23

## 2024-01-25 RX ORDER — FLUTICASONE PROPIONATE AND SALMETEROL 50; 500 UG/1; UG/1
POWDER RESPIRATORY (INHALATION)
Qty: 60 EACH | Refills: 5 | Status: SHIPPED | OUTPATIENT
Start: 2024-01-25

## 2024-01-30 ENCOUNTER — HOSPITAL ENCOUNTER (OUTPATIENT)
Facility: HOSPITAL | Age: 54
Setting detail: SPECIMEN
Discharge: HOME | End: 2024-02-02
Payer: MEDICARE

## 2024-01-30 ENCOUNTER — APPOINTMENT (OUTPATIENT)
Age: 54
End: 2024-01-30
Payer: MEDICARE

## 2024-01-30 DIAGNOSIS — E11.65 TYPE 2 DIABETES MELLITUS WITH HYPERGLYCEMIA, WITHOUT LONG-TERM CURRENT USE OF INSULIN (HCC): ICD-10-CM

## 2024-01-30 LAB
ALBUMIN SERPL-MCNC: 4.1 G/DL (ref 3.4–5)
ALBUMIN/GLOB SERPL: 1.4 (ref 0.8–1.7)
ALP SERPL-CCNC: 110 U/L (ref 45–117)
ALT SERPL-CCNC: 34 U/L (ref 16–61)
ANION GAP SERPL CALC-SCNC: 4 MMOL/L (ref 3–18)
AST SERPL-CCNC: 12 U/L (ref 10–38)
BILIRUB SERPL-MCNC: 1.2 MG/DL (ref 0.2–1)
BUN SERPL-MCNC: 12 MG/DL (ref 7–18)
BUN/CREAT SERPL: 13 (ref 12–20)
CALCIUM SERPL-MCNC: 9.7 MG/DL (ref 8.5–10.1)
CHLORIDE SERPL-SCNC: 107 MMOL/L (ref 100–111)
CHOLEST SERPL-MCNC: 147 MG/DL
CO2 SERPL-SCNC: 27 MMOL/L (ref 21–32)
CREAT SERPL-MCNC: 0.93 MG/DL (ref 0.6–1.3)
EST. AVERAGE GLUCOSE BLD GHB EST-MCNC: 120 MG/DL
GLOBULIN SER CALC-MCNC: 2.9 G/DL (ref 2–4)
GLUCOSE SERPL-MCNC: 107 MG/DL (ref 74–99)
HBA1C MFR BLD: 5.8 % (ref 4.2–5.6)
HDLC SERPL-MCNC: 50 MG/DL (ref 40–60)
HDLC SERPL: 2.9 (ref 0–5)
LDLC SERPL CALC-MCNC: 61.6 MG/DL (ref 0–100)
LIPID PANEL: ABNORMAL
POTASSIUM SERPL-SCNC: 4.4 MMOL/L (ref 3.5–5.5)
PROT SERPL-MCNC: 7 G/DL (ref 6.4–8.2)
SODIUM SERPL-SCNC: 138 MMOL/L (ref 136–145)
TRIGL SERPL-MCNC: 177 MG/DL
TSH SERPL DL<=0.05 MIU/L-ACNC: 1.38 UIU/ML (ref 0.36–3.74)
VLDLC SERPL CALC-MCNC: 35.4 MG/DL

## 2024-01-30 PROCEDURE — 84443 ASSAY THYROID STIM HORMONE: CPT

## 2024-01-30 PROCEDURE — 80061 LIPID PANEL: CPT

## 2024-01-30 PROCEDURE — 80053 COMPREHEN METABOLIC PANEL: CPT

## 2024-01-30 PROCEDURE — 83036 HEMOGLOBIN GLYCOSYLATED A1C: CPT

## 2024-01-30 PROCEDURE — 36415 COLL VENOUS BLD VENIPUNCTURE: CPT

## 2024-02-06 ENCOUNTER — OFFICE VISIT (OUTPATIENT)
Age: 54
End: 2024-02-06

## 2024-02-06 ENCOUNTER — HOSPITAL ENCOUNTER (OUTPATIENT)
Facility: HOSPITAL | Age: 54
Discharge: HOME | End: 2024-02-09
Attending: INTERNAL MEDICINE
Payer: MEDICARE

## 2024-02-06 VITALS
HEIGHT: 68 IN | DIASTOLIC BLOOD PRESSURE: 77 MMHG | WEIGHT: 214 LBS | RESPIRATION RATE: 18 BRPM | HEART RATE: 100 BPM | TEMPERATURE: 97.6 F | SYSTOLIC BLOOD PRESSURE: 120 MMHG | BODY MASS INDEX: 32.43 KG/M2 | OXYGEN SATURATION: 94 %

## 2024-02-06 DIAGNOSIS — I50.32 CHRONIC DIASTOLIC (CONGESTIVE) HEART FAILURE (HCC): ICD-10-CM

## 2024-02-06 DIAGNOSIS — J41.0 SIMPLE CHRONIC BRONCHITIS (HCC): ICD-10-CM

## 2024-02-06 DIAGNOSIS — Z00.00 MEDICARE ANNUAL WELLNESS VISIT, SUBSEQUENT: Primary | ICD-10-CM

## 2024-02-06 DIAGNOSIS — Z87.891 PERSONAL HISTORY OF TOBACCO USE: ICD-10-CM

## 2024-02-06 DIAGNOSIS — R91.1 LUNG NODULE: ICD-10-CM

## 2024-02-06 DIAGNOSIS — E11.65 TYPE 2 DIABETES MELLITUS WITH HYPERGLYCEMIA, WITHOUT LONG-TERM CURRENT USE OF INSULIN (HCC): ICD-10-CM

## 2024-02-06 PROCEDURE — 71250 CT THORAX DX C-: CPT

## 2024-02-06 RX ORDER — OXCARBAZEPINE 300 MG/1
300 TABLET, FILM COATED ORAL 2 TIMES DAILY
COMMUNITY
Start: 2024-01-25

## 2024-02-06 NOTE — PROGRESS NOTES
1. \"Have you been to the ER, urgent care clinic since your last visit?  Hospitalized since your last visit?\" No    2. \"Have you seen or consulted any other health care providers outside of the [facility] since your last visit?\" No     3. For patients aged 45-75: Has the patient had a colonoscopy / FIT/ Cologuard? Yes - Care Gap present. Most recent result on file  
Reconciliation, Ar   naloxone 4 MG/0.1ML LIQD nasal spray ceived the following from [facility]: Outside name: Narcan 4 mg/actuation nasal spray Yes Automatic Reconciliation, Ar   oxyCODONE-acetaminophen (PERCOCET) 5-325 MG per tablet TAKE 1 TABLET BY MOUTH EVERY 12 HOURS Yes Automatic Reconciliation, Ar   pantoprazole (PROTONIX) 40 MG tablet Take 1 tablet by mouth every morning (before breakfast) Yes Automatic Reconciliation, Ar       CareTeam (Including outside providers/suppliers regularly involved in providing care):   Patient Care Team:  Stacia Cabrera MD as PCP - General  Stacia Cabrera MD as PCP - Empaneled Provider     Reviewed and updated this visit:  Tobacco  Allergies  Meds  Problems  Med Hx  Surg Hx  Soc Hx  Fam Hx

## 2024-02-06 NOTE — PATIENT INSTRUCTIONS
agent (health care proxy, health care surrogate). The form is also called a durable power of  for health care.  If you do not have an advance directive, decisions about your medical care may be made by a family member, or by a doctor or a  who doesn't know you.  It may help to think of an advance directive as a gift to the people who care for you. If you have one, they won't have to make tough decisions by themselves.  For more information, including forms for your state, see the [employer] website (www.caringinfo.org/planning/advance-directives/).  Follow-up care is a key part of your treatment and safety. Be sure to make and go to all appointments, and call your doctor if you are having problems. It's also a good idea to know your test results and keep a list of the medicines you take.  What should you include in an advance directive?  Many states have a unique advance directive form. (It may ask you to address specific issues.) Or you might use a universal form that's approved by many states.  If your form doesn't tell you what to address, it may be hard to know what to include in your advance directive. Use the questions below to help you get started.  Who do you want to make decisions about your medical care if you are not able to?  What life-support measures do you want if you have a serious illness that gets worse over time or can't be cured?  What are you most afraid of that might happen? (Maybe you're afraid of having pain, losing your independence, or being kept alive by machines.)  Where would you prefer to die? (Your home? A hospital? A nursing home?)  Do you want to donate your organs when you die?  Do you want certain Cheondoism practices performed before you die?  When should you call for help?  Be sure to contact your doctor if you have any questions.  Where can you learn more?  Go to https://www.healthwise.net/patientEd and enter R264 to learn more about \"Advance Directives: Care

## 2024-02-08 ENCOUNTER — CLINICAL DOCUMENTATION (OUTPATIENT)
Age: 54
End: 2024-02-08

## 2024-02-13 ENCOUNTER — TELEPHONE (OUTPATIENT)
Age: 54
End: 2024-02-13

## 2024-02-19 RX ORDER — ATORVASTATIN CALCIUM 20 MG/1
20 TABLET, FILM COATED ORAL DAILY
Qty: 90 TABLET | Refills: 3 | Status: SHIPPED | OUTPATIENT
Start: 2024-02-19

## 2024-02-20 RX ORDER — UMECLIDINIUM 62.5 UG/1
AEROSOL, POWDER ORAL
Qty: 30 EACH | Refills: 5 | Status: SHIPPED | OUTPATIENT
Start: 2024-02-20 | End: 2024-02-21 | Stop reason: SDUPTHER

## 2024-02-21 ENCOUNTER — TELEPHONE (OUTPATIENT)
Age: 54
End: 2024-02-21

## 2024-02-21 RX ORDER — UMECLIDINIUM 62.5 UG/1
AEROSOL, POWDER ORAL
Qty: 30 EACH | Refills: 5 | Status: SHIPPED | OUTPATIENT
Start: 2024-02-21

## 2024-02-21 RX ORDER — FLUTICASONE PROPIONATE AND SALMETEROL 500; 50 UG/1; UG/1
1 POWDER RESPIRATORY (INHALATION) EVERY 12 HOURS
Qty: 60 EACH | Refills: 5 | Status: SHIPPED | OUTPATIENT
Start: 2024-02-21

## 2024-02-21 NOTE — TELEPHONE ENCOUNTER
----- Message from Alex Cole Sr. sent at 2/21/2024  1:35 PM EST -----  Regarding: Advair   Contact: [phone]  The pharmacy just informed me that they will not be able to fill the advair as my insurance is requiring it to be in a generic and with the code or something like that please send over a generic prescription or whatever is needed thank you

## 2024-04-24 ENCOUNTER — OFFICE VISIT (OUTPATIENT)
Age: 54
End: 2024-04-24
Payer: MEDICARE

## 2024-04-24 VITALS
TEMPERATURE: 98.2 F | BODY MASS INDEX: 31.47 KG/M2 | OXYGEN SATURATION: 91 % | RESPIRATION RATE: 16 BRPM | DIASTOLIC BLOOD PRESSURE: 60 MMHG | HEART RATE: 96 BPM | WEIGHT: 207 LBS | SYSTOLIC BLOOD PRESSURE: 118 MMHG

## 2024-04-24 DIAGNOSIS — J44.9 COPD, GROUP C, BY GOLD 2017 CLASSIFICATION (HCC): Primary | ICD-10-CM

## 2024-04-24 DIAGNOSIS — R91.8 LUNG NODULES: ICD-10-CM

## 2024-04-24 DIAGNOSIS — Z87.891 FORMER SMOKER: ICD-10-CM

## 2024-04-24 DIAGNOSIS — J96.11 CHRONIC HYPOXEMIC RESPIRATORY FAILURE (HCC): ICD-10-CM

## 2024-04-24 PROCEDURE — 3074F SYST BP LT 130 MM HG: CPT | Performed by: INTERNAL MEDICINE

## 2024-04-24 PROCEDURE — 3017F COLORECTAL CA SCREEN DOC REV: CPT | Performed by: INTERNAL MEDICINE

## 2024-04-24 PROCEDURE — G8417 CALC BMI ABV UP PARAM F/U: HCPCS | Performed by: INTERNAL MEDICINE

## 2024-04-24 PROCEDURE — G8427 DOCREV CUR MEDS BY ELIG CLIN: HCPCS | Performed by: INTERNAL MEDICINE

## 2024-04-24 PROCEDURE — 3023F SPIROM DOC REV: CPT | Performed by: INTERNAL MEDICINE

## 2024-04-24 PROCEDURE — 99214 OFFICE O/P EST MOD 30 MIN: CPT | Performed by: INTERNAL MEDICINE

## 2024-04-24 PROCEDURE — 1036F TOBACCO NON-USER: CPT | Performed by: INTERNAL MEDICINE

## 2024-04-24 PROCEDURE — 3078F DIAST BP <80 MM HG: CPT | Performed by: INTERNAL MEDICINE

## 2024-04-24 ASSESSMENT — ENCOUNTER SYMPTOMS
CHEST TIGHTNESS: 0
EYE PAIN: 0
BACK PAIN: 1
NAUSEA: 0
COLOR CHANGE: 0
EYE REDNESS: 0
ABDOMINAL PAIN: 0
EYE ITCHING: 0
VOMITING: 0
EYE DISCHARGE: 0
RHINORRHEA: 0
BLOOD IN STOOL: 0
STRIDOR: 0
CONSTIPATION: 0
PHOTOPHOBIA: 0
TROUBLE SWALLOWING: 0
DIARRHEA: 0
FACIAL SWELLING: 0
APNEA: 0
VOICE CHANGE: 0
CHOKING: 0
COUGH: 0
SORE THROAT: 0
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
## 2024-04-24 NOTE — PROGRESS NOTES
Alex GRANT Douglas Mckeon presents today for   Chief Complaint   Patient presents with    COPD    Follow-up     Lung Nodule    Results     CT done 24       Is someone accompanying this pt? No    Is the patient using any DME equipment during OV? Oxygen    [employer].    Depression Screenin/6/2024     8:59 AM   PHQ-9 Questionaire   Little interest or pleasure in doing things 0   Feeling down, depressed, or hopeless 0   PHQ-9 Total Score 0       Learning Needs Questionnaire:     No question data found.      Fall Risk:         2024     8:59 AM 10/2/2023    11:43 AM   Fall Risk   2 or more falls in past year? no no   Fall with injury in past year? no no        Abuse Screenin/6/2024     8:00 AM 10/2/2023    11:00 AM 2023     9:00 AM 3/9/2023     7:00 AM   AMB Abuse Screening   Do you ever feel afraid of your partner? N N N N   Are you in a relationship with someone who physically or mentally threatens you? N N N N   Is it safe for you to go home? Y Y Y Y         Coordination of Care:    1. Have you been to the ER, urgent care clinic since your last visit? Hospitalized since your last visit?     2. Have you seen or consulted any other health care providers outside of the [facility] since your last visit? Include any pap smears or colon screening. Neurology-Padmini Rosado APRN - NP, Gastroenterology-Guy Kat MD, Urology-Tao Contreras MD     Medication list has been update per patient.

## 2024-05-05 ENCOUNTER — APPOINTMENT (OUTPATIENT)
Facility: HOSPITAL | Age: 54
End: 2024-05-05
Payer: MEDICARE

## 2024-05-05 ENCOUNTER — HOSPITAL ENCOUNTER (EMERGENCY)
Facility: HOSPITAL | Age: 54
Discharge: HOME | End: 2024-05-05
Payer: MEDICARE

## 2024-05-05 VITALS
RESPIRATION RATE: 18 BRPM | BODY MASS INDEX: 31.1 KG/M2 | TEMPERATURE: 97.9 F | HEART RATE: 78 BPM | SYSTOLIC BLOOD PRESSURE: 113 MMHG | DIASTOLIC BLOOD PRESSURE: 67 MMHG | WEIGHT: 210 LBS | HEIGHT: 69 IN | OXYGEN SATURATION: 99 %

## 2024-05-05 DIAGNOSIS — L03.114 CELLULITIS OF LEFT UPPER EXTREMITY: ICD-10-CM

## 2024-05-05 DIAGNOSIS — T14.8XXA FOREIGN BODY IN SKIN: Primary | ICD-10-CM

## 2024-05-05 PROCEDURE — 73090 X-RAY EXAM OF FOREARM: CPT

## 2024-05-05 PROCEDURE — 99283 EMERGENCY DEPT VISIT LOW MDM: CPT

## 2024-05-05 PROCEDURE — 73140 X-RAY EXAM OF FINGER(S): CPT

## 2024-05-05 RX ORDER — CEPHALEXIN 500 MG/1
500 CAPSULE ORAL 4 TIMES DAILY
Qty: 28 CAPSULE | Refills: 0 | Status: SHIPPED | OUTPATIENT
Start: 2024-05-05 | End: 2024-05-12

## 2024-05-05 ASSESSMENT — PAIN - FUNCTIONAL ASSESSMENT: PAIN_FUNCTIONAL_ASSESSMENT: NONE - DENIES PAIN

## 2024-05-05 NOTE — ED PROVIDER NOTES
Please excuse any errors that have escaped final proofreading.\"     Kasey Ceron PA  05/05/24 1059
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
## 2024-05-08 RX ORDER — BLOOD SUGAR DIAGNOSTIC
STRIP MISCELLANEOUS
Qty: 100 EACH | Refills: 3 | Status: SHIPPED | OUTPATIENT
Start: 2024-05-08

## 2024-05-08 NOTE — TELEPHONE ENCOUNTER
Last Visit: 02/06/2024   Next Appointment: 06/06/2024    Requested Prescriptions     Pending Prescriptions Disp Refills    ONETOUCH VERIO strip 100 each 3     Sig: USE 1 STRIP TO CHECK GLUCOSE ONCE DAILY Dx: E11.9

## 2024-05-29 ENCOUNTER — OFFICE VISIT (OUTPATIENT)
Age: 54
End: 2024-05-29
Payer: MEDICARE

## 2024-05-29 VITALS
HEART RATE: 95 BPM | DIASTOLIC BLOOD PRESSURE: 60 MMHG | WEIGHT: 202 LBS | HEIGHT: 69 IN | SYSTOLIC BLOOD PRESSURE: 100 MMHG | BODY MASS INDEX: 29.92 KG/M2 | OXYGEN SATURATION: 91 %

## 2024-05-29 DIAGNOSIS — I10 PRIMARY HYPERTENSION: ICD-10-CM

## 2024-05-29 DIAGNOSIS — I50.32 CHRONIC DIASTOLIC (CONGESTIVE) HEART FAILURE (HCC): Primary | ICD-10-CM

## 2024-05-29 PROCEDURE — 1036F TOBACCO NON-USER: CPT | Performed by: INTERNAL MEDICINE

## 2024-05-29 PROCEDURE — 3078F DIAST BP <80 MM HG: CPT | Performed by: INTERNAL MEDICINE

## 2024-05-29 PROCEDURE — G8428 CUR MEDS NOT DOCUMENT: HCPCS | Performed by: INTERNAL MEDICINE

## 2024-05-29 PROCEDURE — 3017F COLORECTAL CA SCREEN DOC REV: CPT | Performed by: INTERNAL MEDICINE

## 2024-05-29 PROCEDURE — 3074F SYST BP LT 130 MM HG: CPT | Performed by: INTERNAL MEDICINE

## 2024-05-29 PROCEDURE — 99214 OFFICE O/P EST MOD 30 MIN: CPT | Performed by: INTERNAL MEDICINE

## 2024-05-29 PROCEDURE — G8417 CALC BMI ABV UP PARAM F/U: HCPCS | Performed by: INTERNAL MEDICINE
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
## 2024-05-30 ENCOUNTER — HOSPITAL ENCOUNTER (OUTPATIENT)
Facility: HOSPITAL | Age: 54
Setting detail: SPECIMEN
Discharge: HOME | End: 2024-05-30
Payer: MEDICARE

## 2024-05-30 DIAGNOSIS — E11.65 TYPE 2 DIABETES MELLITUS WITH HYPERGLYCEMIA, WITHOUT LONG-TERM CURRENT USE OF INSULIN (HCC): ICD-10-CM

## 2024-05-30 DIAGNOSIS — I50.32 CHRONIC DIASTOLIC (CONGESTIVE) HEART FAILURE (HCC): ICD-10-CM

## 2024-05-30 LAB
ALBUMIN SERPL-MCNC: 4.2 G/DL (ref 3.4–5)
ALBUMIN/GLOB SERPL: 1.4 (ref 0.8–1.7)
ALP SERPL-CCNC: 114 U/L (ref 45–117)
ALT SERPL-CCNC: 32 U/L (ref 16–61)
ANION GAP SERPL CALC-SCNC: 6 MMOL/L (ref 3–18)
AST SERPL-CCNC: 15 U/L (ref 10–38)
BILIRUB SERPL-MCNC: 0.8 MG/DL (ref 0.2–1)
BUN SERPL-MCNC: 16 MG/DL (ref 7–18)
BUN/CREAT SERPL: 16 (ref 12–20)
CALCIUM SERPL-MCNC: 9.3 MG/DL (ref 8.5–10.1)
CHLORIDE SERPL-SCNC: 105 MMOL/L (ref 100–111)
CHOLEST SERPL-MCNC: 141 MG/DL
CO2 SERPL-SCNC: 25 MMOL/L (ref 21–32)
CREAT SERPL-MCNC: 1 MG/DL (ref 0.6–1.3)
EST. AVERAGE GLUCOSE BLD GHB EST-MCNC: 111 MG/DL
GLOBULIN SER CALC-MCNC: 3 G/DL (ref 2–4)
GLUCOSE SERPL-MCNC: 109 MG/DL (ref 74–99)
HBA1C MFR BLD: 5.5 % (ref 4.2–5.6)
HDLC SERPL-MCNC: 56 MG/DL (ref 40–60)
HDLC SERPL: 2.5 (ref 0–5)
LDLC SERPL CALC-MCNC: 63 MG/DL (ref 0–100)
LIPID PANEL: NORMAL
POTASSIUM SERPL-SCNC: 4.4 MMOL/L (ref 3.5–5.5)
PROT SERPL-MCNC: 7.2 G/DL (ref 6.4–8.2)
SODIUM SERPL-SCNC: 136 MMOL/L (ref 136–145)
TRIGL SERPL-MCNC: 110 MG/DL
TSH SERPL DL<=0.05 MIU/L-ACNC: 1.53 UIU/ML (ref 0.36–3.74)
VLDLC SERPL CALC-MCNC: 22 MG/DL

## 2024-05-30 PROCEDURE — 80053 COMPREHEN METABOLIC PANEL: CPT

## 2024-05-30 PROCEDURE — 83036 HEMOGLOBIN GLYCOSYLATED A1C: CPT

## 2024-05-30 PROCEDURE — 84443 ASSAY THYROID STIM HORMONE: CPT

## 2024-05-30 PROCEDURE — 36415 COLL VENOUS BLD VENIPUNCTURE: CPT

## 2024-05-30 PROCEDURE — 80061 LIPID PANEL: CPT

## 2024-06-03 RX ORDER — LISINOPRIL 20 MG/1
20 TABLET ORAL DAILY
Qty: 90 TABLET | Refills: 3 | OUTPATIENT
Start: 2024-06-03

## 2024-06-03 RX ORDER — LISINOPRIL 20 MG/1
TABLET ORAL
Qty: 90 TABLET | Refills: 2 | Status: SHIPPED | OUTPATIENT
Start: 2024-06-03 | End: 2024-06-06 | Stop reason: DRUGHIGH

## 2024-06-19 ENCOUNTER — HOSPITAL ENCOUNTER (OUTPATIENT)
Facility: HOSPITAL | Age: 54
Discharge: HOME | End: 2024-06-22
Payer: MEDICARE

## 2024-06-19 DIAGNOSIS — M54.12 BRACHIAL NEURITIS: ICD-10-CM

## 2024-06-19 DIAGNOSIS — M54.16 LUMBAR RADICULOPATHY: ICD-10-CM

## 2024-06-19 PROCEDURE — 72052 X-RAY EXAM NECK SPINE 6/>VWS: CPT

## 2024-06-19 PROCEDURE — 72110 X-RAY EXAM L-2 SPINE 4/>VWS: CPT

## 2024-08-07 ENCOUNTER — APPOINTMENT (OUTPATIENT)
Facility: HOSPITAL | Age: 54
End: 2024-08-07
Payer: MEDICARE

## 2024-08-07 ENCOUNTER — HOSPITAL ENCOUNTER (EMERGENCY)
Facility: HOSPITAL | Age: 54
Discharge: HOME | End: 2024-08-07
Payer: MEDICARE

## 2024-08-07 VITALS
HEIGHT: 69 IN | SYSTOLIC BLOOD PRESSURE: 115 MMHG | DIASTOLIC BLOOD PRESSURE: 82 MMHG | WEIGHT: 200 LBS | TEMPERATURE: 98.4 F | HEART RATE: 75 BPM | RESPIRATION RATE: 20 BRPM | BODY MASS INDEX: 29.62 KG/M2 | OXYGEN SATURATION: 93 %

## 2024-08-07 DIAGNOSIS — S66.911A STRAIN OF BOTH WRISTS, INITIAL ENCOUNTER: ICD-10-CM

## 2024-08-07 DIAGNOSIS — S80.02XA CONTUSION OF LEFT KNEE, INITIAL ENCOUNTER: ICD-10-CM

## 2024-08-07 DIAGNOSIS — S66.912A STRAIN OF BOTH WRISTS, INITIAL ENCOUNTER: ICD-10-CM
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
## 2024-08-07 DIAGNOSIS — S39.012A BACK STRAIN, INITIAL ENCOUNTER: ICD-10-CM

## 2024-08-07 LAB
ALBUMIN SERPL-MCNC: 4.2 G/DL (ref 3.4–5)
ALBUMIN/GLOB SERPL: 1.4 (ref 0.8–1.7)
ALP SERPL-CCNC: 104 U/L (ref 45–117)
ALT SERPL-CCNC: 31 U/L (ref 16–61)
ANION GAP SERPL CALC-SCNC: 6 MMOL/L (ref 3–18)
AST SERPL-CCNC: 15 U/L (ref 10–38)
BASOPHILS # BLD: 0 K/UL (ref 0–0.1)
BASOPHILS NFR BLD: 0 % (ref 0–2)
BILIRUB SERPL-MCNC: 0.8 MG/DL (ref 0.2–1)
BUN SERPL-MCNC: 13 MG/DL (ref 7–18)
BUN/CREAT SERPL: 14 (ref 12–20)
CALCIUM SERPL-MCNC: 9.8 MG/DL (ref 8.5–10.1)
CHLORIDE SERPL-SCNC: 105 MMOL/L (ref 100–111)
CO2 SERPL-SCNC: 25 MMOL/L (ref 21–32)
CREAT SERPL-MCNC: 0.93 MG/DL (ref 0.6–1.3)
DIFFERENTIAL METHOD BLD: ABNORMAL
EOSINOPHIL # BLD: 0.1 K/UL (ref 0–0.4)
EOSINOPHIL NFR BLD: 1 % (ref 0–5)
ERYTHROCYTE [DISTWIDTH] IN BLOOD BY AUTOMATED COUNT: 13.8 % (ref 11.6–14.5)
GLOBULIN SER CALC-MCNC: 3 G/DL (ref 2–4)
GLUCOSE SERPL-MCNC: 103 MG/DL (ref 74–99)
HCT VFR BLD AUTO: 47.3 % (ref 36–48)
HGB BLD-MCNC: 16.8 G/DL (ref 13–16)
IMM GRANULOCYTES # BLD AUTO: 0 K/UL (ref 0–0.04)
IMM GRANULOCYTES NFR BLD AUTO: 0 % (ref 0–0.5)
LYMPHOCYTES # BLD: 1.5 K/UL (ref 0.9–3.6)
LYMPHOCYTES NFR BLD: 20 % (ref 21–52)
MCH RBC QN AUTO: 30.8 PG (ref 24–34)
MCHC RBC AUTO-ENTMCNC: 35.5 G/DL (ref 31–37)
MCV RBC AUTO: 86.6 FL (ref 78–100)
MONOCYTES # BLD: 0.6 K/UL (ref 0.05–1.2)
MONOCYTES NFR BLD: 8 % (ref 3–10)
NEUTS SEG # BLD: 5.2 K/UL (ref 1.8–8)
NEUTS SEG NFR BLD: 71 % (ref 40–73)
NRBC # BLD: 0 K/UL (ref 0–0.01)
NRBC BLD-RTO: 0 PER 100 WBC
PLATELET # BLD AUTO: 262 K/UL (ref 135–420)
PMV BLD AUTO: 10.8 FL (ref 9.2–11.8)
POTASSIUM SERPL-SCNC: 4.1 MMOL/L (ref 3.5–5.5)
PROT SERPL-MCNC: 7.2 G/DL (ref 6.4–8.2)
RBC # BLD AUTO: 5.46 M/UL (ref 4.35–5.65)
SODIUM SERPL-SCNC: 136 MMOL/L (ref 136–145)
WBC # BLD AUTO: 7.3 K/UL (ref 4.6–13.2)

## 2024-08-07 PROCEDURE — 6370000000 HC RX 637 (ALT 250 FOR IP): Performed by: PHYSICIAN ASSISTANT

## 2024-08-07 PROCEDURE — 70450 CT HEAD/BRAIN W/O DYE: CPT

## 2024-08-07 PROCEDURE — 99285 EMERGENCY DEPT VISIT HI MDM: CPT

## 2024-08-07 PROCEDURE — 73100 X-RAY EXAM OF WRIST: CPT

## 2024-08-07 PROCEDURE — 73560 X-RAY EXAM OF KNEE 1 OR 2: CPT

## 2024-08-07 PROCEDURE — 72125 CT NECK SPINE W/O DYE: CPT

## 2024-08-07 PROCEDURE — 71260 CT THORAX DX C+: CPT

## 2024-08-07 PROCEDURE — 80053 COMPREHEN METABOLIC PANEL: CPT

## 2024-08-07 PROCEDURE — 6360000004 HC RX CONTRAST MEDICATION: Performed by: PHYSICIAN ASSISTANT

## 2024-08-07 PROCEDURE — 85025 COMPLETE CBC W/AUTO DIFF WBC: CPT

## 2024-08-07 RX ORDER — LIDOCAINE 50 MG/G
1 PATCH TOPICAL DAILY
Qty: 30 PATCH | Refills: 0 | Status: SHIPPED | OUTPATIENT
Start: 2024-08-07 | End: 2024-09-06

## 2024-08-07 RX ORDER — ACETAMINOPHEN 325 MG/1
650 TABLET ORAL
Status: COMPLETED | OUTPATIENT
Start: 2024-08-07 | End: 2024-08-07

## 2024-08-07 RX ORDER — METHOCARBAMOL 500 MG/1
500 TABLET, FILM COATED ORAL
Status: COMPLETED | OUTPATIENT
Start: 2024-08-07 | End: 2024-08-07

## 2024-08-07 RX ORDER — ACETAMINOPHEN 500 MG
500 TABLET ORAL 4 TIMES DAILY PRN
Qty: 60 TABLET | Refills: 0 | Status: SHIPPED | OUTPATIENT
Start: 2024-08-07

## 2024-08-07 RX ORDER — METHOCARBAMOL 750 MG/1
750 TABLET, FILM COATED ORAL EVERY 6 HOURS PRN
Qty: 20 TABLET | Refills: 0 | Status: SHIPPED | OUTPATIENT
Start: 2024-08-07

## 2024-08-07 RX ADMIN — METHOCARBAMOL 500 MG: 500 TABLET ORAL at 22:45

## 2024-08-07 RX ADMIN — ACETAMINOPHEN 325MG 650 MG: 325 TABLET ORAL at 22:45

## 2024-08-07 RX ADMIN — IOPAMIDOL 100 ML: 612 INJECTION, SOLUTION INTRAVENOUS at 20:36

## 2024-08-07 ASSESSMENT — PAIN DESCRIPTION - LOCATION
LOCATION: BACK;NECK
LOCATION: BACK;NECK

## 2024-08-07 ASSESSMENT — ENCOUNTER SYMPTOMS
VOMITING: 0
NAUSEA: 0
SHORTNESS OF BREATH: 0
BACK PAIN: 1
ABDOMINAL PAIN: 1

## 2024-08-07 ASSESSMENT — PAIN - FUNCTIONAL ASSESSMENT: PAIN_FUNCTIONAL_ASSESSMENT: 0-10

## 2024-08-07 ASSESSMENT — PAIN SCALES - GENERAL
PAINLEVEL_OUTOF10: 8
PAINLEVEL_OUTOF10: 8
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
## 2024-08-11 RX ORDER — EMPAGLIFLOZIN 25 MG/1
25 TABLET, FILM COATED ORAL DAILY
Qty: 90 TABLET | Refills: 3 | Status: SHIPPED | OUTPATIENT
Start: 2024-08-11

## 2024-08-14 DIAGNOSIS — E11.65 TYPE 2 DIABETES MELLITUS WITH HYPERGLYCEMIA, WITHOUT LONG-TERM CURRENT USE OF INSULIN (HCC): Primary | ICD-10-CM

## 2024-08-14 NOTE — TELEPHONE ENCOUNTER
Per Pharmacy requesting resubmitted order. In need of DX code attached, thank you      Requested Prescriptions     Pending Prescriptions Disp Refills    ONETOUCH VERIO strip 100 each 3     Sig: USE 1 STRIP TO CHECK GLUCOSE ONCE DAILY Dx: E11.9

## 2024-08-16 RX ORDER — BLOOD SUGAR DIAGNOSTIC
STRIP MISCELLANEOUS
Qty: 100 EACH | Refills: 3 | Status: SHIPPED | OUTPATIENT
Start: 2024-08-16

## 2024-08-21 ENCOUNTER — OFFICE VISIT (OUTPATIENT)
Facility: CLINIC | Age: 54
End: 2024-08-21

## 2024-08-21 VITALS
TEMPERATURE: 98.6 F | BODY MASS INDEX: 30.13 KG/M2 | HEART RATE: 89 BPM | HEIGHT: 69 IN | OXYGEN SATURATION: 95 % | RESPIRATION RATE: 18 BRPM | WEIGHT: 203.4 LBS

## 2024-08-21 DIAGNOSIS — M62.838 MUSCLE SPASMS OF NECK: ICD-10-CM
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
## 2024-08-21 DIAGNOSIS — M62.830 MUSCLE SPASM OF BACK: Primary | ICD-10-CM

## 2024-08-21 RX ORDER — CYCLOBENZAPRINE HCL 10 MG
10 TABLET ORAL 3 TIMES DAILY PRN
COMMUNITY
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
## 2024-08-22 ENCOUNTER — HOSPITAL ENCOUNTER (OUTPATIENT)
Facility: HOSPITAL | Age: 54
Setting detail: RECURRING SERIES
Discharge: HOME | End: 2024-08-25
Payer: MEDICARE

## 2024-08-22 PROCEDURE — 97163 PT EVAL HIGH COMPLEX 45 MIN: CPT

## 2024-08-22 PROCEDURE — 97530 THERAPEUTIC ACTIVITIES: CPT

## 2024-08-22 PROCEDURE — 97535 SELF CARE MNGMENT TRAINING: CPT
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
## 2024-08-22 NOTE — PROGRESS NOTES
PHYSICAL / OCCUPATIONAL THERAPY - DAILY TREATMENT NOTE (updated )  For Eval visit    Patient Name: Alex Cole Sr.    Date: 2024    : 1970  Insurance: Payor: MEDICARE / Plan: MEDICARE PART A AND B / Product Type: *No Product type* /      Patient  verified yes     Visit #   Current / Total 1 16   Time   In / Out 10:10 10:50   Pain   In / Out 8 8   Subjective Functional Status/Changes: See POC     TREATMENT AREA =  see POC    OBJECTIVE    15 min   Eval - untimed                      Therapeutic Procedures:    Tx Min Billable or 1:1 Min (if diff from Tx Min) Procedure, Rationale, Specifics        15        15 56919 Therapeutic Activity (timed):  use of dynamic activities replicating functional movements to increase ROM, strength, coordination, balance, and proprioception in order to improve patient's ability to progress to PLOF and address remaining functional goals.  (see flow sheet as applicable)     Details if applicable:             10         10 48400 Self Care/Home Management (timed):  improve patient knowledge and understanding of pain reducing techniques, positioning, activity modification, diagnosis/prognosis, and physical therapy expectations, procedures and progression  to improve patient's ability to progress to PLOF and address remaining functional goals.  (see flow sheet as applicable)     Details if applicable:            Details if applicable:            Details if applicable:     25 25 MC BC Totals Reminder: bill using total billable min of TIMED therapeutic procedures (example: do not include dry needle or estim unattended, both untimed codes, in totals to left)  8-22 min = 1 unit; 23-37 min = 2 units; 38-52 min = 3 units; 53-67 min = 4 units; 68-82 min = 5 units   Total Total     [x]  Patient Education billed concurrently with other procedures   [x] Review HEP    [] Progressed/Changed HEP, detail:    [] Other detail:       Objective Information/Functional

## 2024-08-27 ENCOUNTER — HOSPITAL ENCOUNTER (OUTPATIENT)
Facility: HOSPITAL | Age: 54
Setting detail: RECURRING SERIES
Discharge: HOME | End: 2024-08-30
Payer: MEDICARE

## 2024-08-27 PROCEDURE — 97110 THERAPEUTIC EXERCISES: CPT

## 2024-08-27 PROCEDURE — G0283 ELEC STIM OTHER THAN WOUND: HCPCS

## 2024-08-27 PROCEDURE — 97140 MANUAL THERAPY 1/> REGIONS: CPT

## 2024-08-27 PROCEDURE — 97530 THERAPEUTIC ACTIVITIES: CPT

## 2024-08-27 NOTE — PROGRESS NOTES
PHYSICAL / OCCUPATIONAL THERAPY - DAILY TREATMENT NOTE    Patient Name: Alex Cole Sr.    Date: 2024    : 1970  Insurance: Payor: MEDICARE / Plan: MEDICARE PART A AND B / Product Type: *No Product type* /      Patient  verified Yes     Visit #   Current / Total 2 24   Time   In / Out 12:10 1:04   Pain   In / Out 9-neck 4   Subjective Functional Status/Changes: Pt reports that he feels like his neck pain in getting worse as time goes now.      TREATMENT AREA =  Other low back pain [M54.59]  Neck pain [M54.2]     OBJECTIVE    Modalities Rationale:     decrease inflammation, decrease pain, increase tissue extensibility, and increase muscle contraction/control to improve patient's ability to progress to PLOF and address remaining functional goals.    10 min [x] Estim Unattended, type/location:                                      []  w/ice    []  w/heat    min [] Estim Attended, type/location:                                     []  w/US     []  w/ice    []  w/heat    []  TENS insruct      min []  Mechanical Traction: type/lbs                   []  pro   []  sup   []  int   []  cont    []  before manual    []  after manual    min []  Ultrasound, settings/location:     10 min  unbill []  Ice     [x]  Heat    location/position: C/S and L/S semi-reclined    min []  Paraffin,  details:     min []  Vasopneumatic Device, press/temp:     min []  Whirlpool / Fluido:    If using vaso (only need to measure limb vaso being performed on)      pre-treatment girth :       post-treatment girth :       measured at (landmark location) :      min []  Other:    Skin assessment post-treatment:   Intact      Therapeutic Procedures:    Tx Min Billable or 1:1 Min (if diff from Tx Min) Procedure, Rationale, Specifics   20 14 73769 Therapeutic Exercise (timed):  increase ROM, strength, coordination, balance, and proprioception to improve patient's ability to progress to PLOF and address remaining functional goals. (see

## 2024-08-29 ENCOUNTER — HOSPITAL ENCOUNTER (OUTPATIENT)
Facility: HOSPITAL | Age: 54
Setting detail: RECURRING SERIES
End: 2024-08-29
Payer: MEDICARE

## 2024-08-29 PROCEDURE — 97530 THERAPEUTIC ACTIVITIES: CPT

## 2024-08-29 PROCEDURE — G0283 ELEC STIM OTHER THAN WOUND: HCPCS

## 2024-08-29 PROCEDURE — 97110 THERAPEUTIC EXERCISES: CPT

## 2024-08-29 NOTE — PROGRESS NOTES
PHYSICAL / OCCUPATIONAL THERAPY - DAILY TREATMENT NOTE    Patient Name: Alex Cole Sr.    Date: 2024    : 1970  Insurance: Payor: MEDICARE / Plan: MEDICARE PART A AND B / Product Type: *No Product type* /      Patient  verified Yes     Visit #   Current / Total 3 24   Time   In / Out 1130 am  1212 pm    Pain   In / Out 2/10  2/10    Subjective Functional Status/Changes: Patient reports having increase stiffness in his neck today.      TREATMENT AREA =  Other low back pain [M54.59]  Neck pain [M54.2]     OBJECTIVE    Modalities Rationale:     decrease inflammation and decrease pain to improve patient's ability to progress to PLOF and address remaining functional goals.    10 min [] Estim Unattended, type/location:  IFC to B C/S and UT area                                     []  w/ice    [x]  w/heat    min [] Estim Attended, type/location:                                     []  w/US     []  w/ice    []  w/heat    []  TENS insruct      min []  Mechanical Traction: type/lbs                   []  pro   []  sup   []  int   []  cont    []  before manual    []  after manual    min []  Ultrasound, settings/location:      min  unbill []  Ice     []  Heat    location/position:     min []  Paraffin,  details:     min []  Vasopneumatic Device, press/temp:     min []  Whirlpool / Fluido:    If using vaso (only need to measure limb vaso being performed on)      pre-treatment girth :       post-treatment girth :       measured at (landmark location) :      min []  Other:    Skin assessment post-treatment:   Intact      Therapeutic Procedures:    Tx Min Billable or 1:1 Min (if diff from Tx Min) Procedure, Rationale, Specifics     57261 Therapeutic Exercise (timed):  increase ROM, strength, coordination, balance, and proprioception to improve patient's ability to progress to PLOF and address remaining functional goals. (see flow sheet as applicable)     Details if applicable:         81872 Therapeutic  Activity (timed):  use of dynamic activities replicating functional movements to increase ROM, strength, coordination, balance, and proprioception in order to improve patient's ability to progress to PLOF and address remaining functional goals.  (see flow sheet as applicable)     Details if applicable:            Details if applicable:            Details if applicable:            Details if applicable:     32  [facility] Totals Reminder: bill using total billable min of TIMED therapeutic procedures (example: do not include dry needle or estim unattended, both untimed codes, in totals to left)  8-22 min = 1 unit; 23-37 min = 2 units; 38-52 min = 3 units; 53-67 min = 4 units; 68-82 min = 5 units   Total Total     [x]  Patient Education billed concurrently with other procedures   [x] Review HEP    [] Progressed/Changed HEP, detail:    [] Other detail:       Objective Information/Functional Measures/Assessment    Patient presents to today's session with decreased reports of low back and cervical pain. Patient performed exercises, as per flow sheet, to further assist with increasing B LE and postural strength and to decrease overall pain. Verbal cues were required for proper for with pelvic tilts. Patient responded well with good carryover. No change in pain was reported at the end of today's session patient reported decreased stiffness. Will continue to progress patient as tolerated and able.     Patient will continue to benefit from skilled PT / OT services to modify and progress therapeutic interventions, analyze and address functional mobility deficits, analyze and address ROM deficits, analyze and address strength deficits, analyze and address soft tissue restrictions, analyze and cue for proper movement patterns, analyze and modify for postural abnormalities, and instruct in home and community integration to address functional deficits and attain remaining goals.    Progress toward goals / Updated goals:  []  See

## 2024-09-03 ENCOUNTER — HOSPITAL ENCOUNTER (OUTPATIENT)
Facility: HOSPITAL | Age: 54
Setting detail: RECURRING SERIES
Discharge: HOME | End: 2024-09-06
Payer: MEDICARE

## 2024-09-03 PROCEDURE — 97140 MANUAL THERAPY 1/> REGIONS: CPT

## 2024-09-03 PROCEDURE — 97110 THERAPEUTIC EXERCISES: CPT

## 2024-09-03 PROCEDURE — 97530 THERAPEUTIC ACTIVITIES: CPT

## 2024-09-03 PROCEDURE — G0283 ELEC STIM OTHER THAN WOUND: HCPCS

## 2024-09-03 NOTE — PROGRESS NOTES
PHYSICAL / OCCUPATIONAL THERAPY - DAILY TREATMENT NOTE    Patient Name: Alex Cole Sr.    Date: 9/3/2024    : 1970  Insurance: Payor: MEDICARE / Plan: MEDICARE PART A AND B / Product Type: *No Product type* /      Patient  verified Yes     Visit #   Current / Total 4 24   Time   In / Out 10:10 am  11:07 am    Pain   In / Out 3  2   Subjective Functional Status/Changes: Patient reports that he is having more neck pain then back pain recently.      TREATMENT AREA =  Other low back pain [M54.59]  Neck pain [M54.2]     OBJECTIVE    Modalities Rationale:     decrease inflammation and decrease pain to improve patient's ability to progress to PLOF and address remaining functional goals.    10 min [x] Estim Unattended, type/location:  IFC to B C/S and UT area                                     []  w/ice    [x]  w/heat    min [] Estim Attended, type/location:                                     []  w/US     []  w/ice    []  w/heat    []  TENS insruct      min []  Mechanical Traction: type/lbs                   []  pro   []  sup   []  int   []  cont    []  before manual    []  after manual    min []  Ultrasound, settings/location:      min  unbill []  Ice     []  Heat    location/position:     min []  Paraffin,  details:     min []  Vasopneumatic Device, press/temp:     min []  Whirlpool / Fluido:    If using vaso (only need to measure limb vaso being performed on)      pre-treatment girth :       post-treatment girth :       measured at (landmark location) :      min []  Other:    Skin assessment post-treatment:   Intact      Therapeutic Procedures:    Tx Min Billable or 1:1 Min (if diff from Tx Min) Procedure, Rationale, Specifics    47320 Therapeutic Exercise (timed):  increase ROM, strength, coordination, balance, and proprioception to improve patient's ability to progress to PLOF and address remaining functional goals. (see flow sheet as applicable)     Details if applicable:        [id number]

## 2024-09-05 ENCOUNTER — HOSPITAL ENCOUNTER (OUTPATIENT)
Facility: HOSPITAL | Age: 54
Setting detail: RECURRING SERIES
Discharge: HOME | End: 2024-09-08
Payer: MEDICARE

## 2024-09-05 PROCEDURE — 97530 THERAPEUTIC ACTIVITIES: CPT

## 2024-09-05 PROCEDURE — 97140 MANUAL THERAPY 1/> REGIONS: CPT

## 2024-09-05 PROCEDURE — 97110 THERAPEUTIC EXERCISES: CPT

## 2024-09-10 ENCOUNTER — HOSPITAL ENCOUNTER (OUTPATIENT)
Facility: HOSPITAL | Age: 54
Setting detail: RECURRING SERIES
Discharge: HOME | End: 2024-09-13
Payer: MEDICARE

## 2024-09-10 PROCEDURE — 97530 THERAPEUTIC ACTIVITIES: CPT

## 2024-09-10 PROCEDURE — 97140 MANUAL THERAPY 1/> REGIONS: CPT

## 2024-09-10 PROCEDURE — 97110 THERAPEUTIC EXERCISES: CPT

## 2024-09-12 ENCOUNTER — HOSPITAL ENCOUNTER (OUTPATIENT)
Facility: HOSPITAL | Age: 54
Setting detail: RECURRING SERIES
Discharge: HOME | End: 2024-09-15
Payer: MEDICARE

## 2024-09-12 PROCEDURE — 97110 THERAPEUTIC EXERCISES: CPT

## 2024-09-12 PROCEDURE — 97140 MANUAL THERAPY 1/> REGIONS: CPT

## 2024-09-12 PROCEDURE — 97530 THERAPEUTIC ACTIVITIES: CPT

## 2024-09-17 ENCOUNTER — HOSPITAL ENCOUNTER (OUTPATIENT)
Facility: HOSPITAL | Age: 54
Setting detail: RECURRING SERIES
Discharge: HOME | End: 2024-09-20
Payer: MEDICARE

## 2024-09-17 PROCEDURE — 97530 THERAPEUTIC ACTIVITIES: CPT

## 2024-09-17 PROCEDURE — 97140 MANUAL THERAPY 1/> REGIONS: CPT

## 2024-09-17 PROCEDURE — 97110 THERAPEUTIC EXERCISES: CPT

## 2024-09-17 RX ORDER — ROFLUMILAST 500 UG/1
TABLET ORAL
Qty: 90 TABLET | Refills: 3 | Status: SHIPPED | OUTPATIENT
Start: 2024-09-17

## 2024-09-19 ENCOUNTER — HOSPITAL ENCOUNTER (OUTPATIENT)
Facility: HOSPITAL | Age: 54
Setting detail: RECURRING SERIES
Discharge: HOME | End: 2024-09-22
Payer: MEDICARE

## 2024-09-19 PROCEDURE — 97110 THERAPEUTIC EXERCISES: CPT

## 2024-09-19 PROCEDURE — 97530 THERAPEUTIC ACTIVITIES: CPT

## 2024-09-19 PROCEDURE — 97140 MANUAL THERAPY 1/> REGIONS: CPT

## 2024-09-24 ENCOUNTER — APPOINTMENT (OUTPATIENT)
Facility: HOSPITAL | Age: 54
End: 2024-09-24
Payer: MEDICARE

## 2024-09-26 ENCOUNTER — APPOINTMENT (OUTPATIENT)
Facility: HOSPITAL | Age: 54
End: 2024-09-26
Payer: MEDICARE

## 2024-11-05 RX ORDER — ALBUTEROL SULFATE 90 UG/1
2 AEROSOL, METERED RESPIRATORY (INHALATION) EVERY 4 HOURS PRN
Qty: 18 G | Refills: 3 | Status: SHIPPED | OUTPATIENT
Start: 2024-11-05

## 2024-11-07 ENCOUNTER — HOSPITAL ENCOUNTER (OUTPATIENT)
Facility: HOSPITAL | Age: 54
Setting detail: SPECIMEN
Discharge: HOME | End: 2024-11-10
Payer: MEDICARE

## 2024-11-07 DIAGNOSIS — E11.65 TYPE 2 DIABETES MELLITUS WITH HYPERGLYCEMIA, WITHOUT LONG-TERM CURRENT USE OF INSULIN (HCC): ICD-10-CM

## 2024-11-07 DIAGNOSIS — E78.00 HYPERCHOLESTEROLEMIA: ICD-10-CM

## 2024-11-07 LAB
ALBUMIN SERPL-MCNC: 4.2 G/DL (ref 3.4–5)
ALBUMIN/GLOB SERPL: 1.6 (ref 0.8–1.7)
ALP SERPL-CCNC: 111 U/L (ref 45–117)
ALT SERPL-CCNC: 27 U/L (ref 16–61)
ANION GAP SERPL CALC-SCNC: 6 MMOL/L (ref 3–18)
AST SERPL-CCNC: 14 U/L (ref 10–38)
BILIRUB SERPL-MCNC: 1.4 MG/DL (ref 0.2–1)
BUN SERPL-MCNC: 13 MG/DL (ref 7–18)
BUN/CREAT SERPL: 13 (ref 12–20)
CALCIUM SERPL-MCNC: 9.7 MG/DL (ref 8.5–10.1)
CHLORIDE SERPL-SCNC: 106 MMOL/L (ref 100–111)
CHOLEST SERPL-MCNC: 141 MG/DL
CO2 SERPL-SCNC: 24 MMOL/L (ref 21–32)
CREAT SERPL-MCNC: 0.99 MG/DL (ref 0.6–1.3)
CREAT UR-MCNC: 39 MG/DL (ref 30–125)
EST. AVERAGE GLUCOSE BLD GHB EST-MCNC: 111 MG/DL
GLOBULIN SER CALC-MCNC: 2.7 G/DL (ref 2–4)
GLUCOSE SERPL-MCNC: 137 MG/DL (ref 74–99)
HBA1C MFR BLD: 5.5 % (ref 4.2–5.6)
HDLC SERPL-MCNC: 56 MG/DL (ref 40–60)
HDLC SERPL: 2.5 (ref 0–5)
LDLC SERPL CALC-MCNC: 52.8 MG/DL (ref 0–100)
LIPID PANEL: ABNORMAL
MICROALBUMIN UR-MCNC: <0.5 MG/DL (ref 0–3)
MICROALBUMIN/CREAT UR-RTO: NORMAL MG/G (ref 0–30)
POTASSIUM SERPL-SCNC: 4.5 MMOL/L (ref 3.5–5.5)
PROT SERPL-MCNC: 6.9 G/DL (ref 6.4–8.2)
SODIUM SERPL-SCNC: 136 MMOL/L (ref 136–145)
TRIGL SERPL-MCNC: 161 MG/DL
TSH SERPL DL<=0.05 MIU/L-ACNC: 1.17 UIU/ML (ref 0.36–3.74)
VLDLC SERPL CALC-MCNC: 32.2 MG/DL

## 2024-11-07 PROCEDURE — 80053 COMPREHEN METABOLIC PANEL: CPT

## 2024-11-07 PROCEDURE — 84443 ASSAY THYROID STIM HORMONE: CPT

## 2024-11-07 PROCEDURE — 36415 COLL VENOUS BLD VENIPUNCTURE: CPT

## 2024-11-07 PROCEDURE — 83036 HEMOGLOBIN GLYCOSYLATED A1C: CPT

## 2024-11-07 PROCEDURE — 82570 ASSAY OF URINE CREATININE: CPT

## 2024-11-07 PROCEDURE — 82043 UR ALBUMIN QUANTITATIVE: CPT

## 2024-11-07 PROCEDURE — 80061 LIPID PANEL: CPT

## 2024-11-11 SDOH — HEALTH STABILITY: PHYSICAL HEALTH: ON AVERAGE, HOW MANY DAYS PER WEEK DO YOU ENGAGE IN MODERATE TO STRENUOUS EXERCISE (LIKE A BRISK WALK)?: 3 DAYS

## 2024-11-11 SDOH — HEALTH STABILITY: PHYSICAL HEALTH: ON AVERAGE, HOW MANY MINUTES DO YOU ENGAGE IN EXERCISE AT THIS LEVEL?: 60 MIN

## 2024-11-11 ASSESSMENT — PATIENT HEALTH QUESTIONNAIRE - PHQ9
SUM OF ALL RESPONSES TO PHQ QUESTIONS 1-9: 1
SUM OF ALL RESPONSES TO PHQ QUESTIONS 1-9: 1
2. FEELING DOWN, DEPRESSED OR HOPELESS: NOT AT ALL
1. LITTLE INTEREST OR PLEASURE IN DOING THINGS: SEVERAL DAYS
SUM OF ALL RESPONSES TO PHQ9 QUESTIONS 1 & 2: 1
SUM OF ALL RESPONSES TO PHQ QUESTIONS 1-9: 1
SUM OF ALL RESPONSES TO PHQ QUESTIONS 1-9: 1

## 2024-11-11 ASSESSMENT — LIFESTYLE VARIABLES
HOW OFTEN DO YOU HAVE A DRINK CONTAINING ALCOHOL: 1
HOW OFTEN DO YOU HAVE SIX OR MORE DRINKS ON ONE OCCASION: 1
HOW MANY STANDARD DRINKS CONTAINING ALCOHOL DO YOU HAVE ON A TYPICAL DAY: 0
HOW OFTEN DO YOU HAVE A DRINK CONTAINING ALCOHOL: NEVER
HOW MANY STANDARD DRINKS CONTAINING ALCOHOL DO YOU HAVE ON A TYPICAL DAY: PATIENT DOES NOT DRINK

## 2024-11-14 ENCOUNTER — OFFICE VISIT (OUTPATIENT)
Facility: CLINIC | Age: 54
End: 2024-11-14

## 2024-11-14 VITALS
HEART RATE: 86 BPM | RESPIRATION RATE: 16 BRPM | BODY MASS INDEX: 31.1 KG/M2 | HEIGHT: 69 IN | OXYGEN SATURATION: 96 % | SYSTOLIC BLOOD PRESSURE: 102 MMHG | WEIGHT: 210 LBS | DIASTOLIC BLOOD PRESSURE: 67 MMHG | TEMPERATURE: 97.5 F

## 2024-11-14 DIAGNOSIS — E55.9 HYPOVITAMINOSIS D: ICD-10-CM

## 2024-11-14 DIAGNOSIS — Z00.00 MEDICARE ANNUAL WELLNESS VISIT, SUBSEQUENT: Primary | ICD-10-CM

## 2024-11-14 DIAGNOSIS — E78.00 HYPERCHOLESTEROLEMIA: ICD-10-CM

## 2024-11-14 DIAGNOSIS — E11.65 TYPE 2 DIABETES MELLITUS WITH HYPERGLYCEMIA, WITHOUT LONG-TERM CURRENT USE OF INSULIN (HCC): ICD-10-CM

## 2024-11-14 SDOH — ECONOMIC STABILITY: FOOD INSECURITY: WITHIN THE PAST 12 MONTHS, THE FOOD YOU BOUGHT JUST DIDN'T LAST AND YOU DIDN'T HAVE MONEY TO GET MORE.: NEVER TRUE

## 2024-11-14 SDOH — ECONOMIC STABILITY: INCOME INSECURITY: HOW HARD IS IT FOR YOU TO PAY FOR THE VERY BASICS LIKE FOOD, HOUSING, MEDICAL CARE, AND HEATING?: SOMEWHAT HARD

## 2024-11-14 SDOH — ECONOMIC STABILITY: FOOD INSECURITY: WITHIN THE PAST 12 MONTHS, YOU WORRIED THAT YOUR FOOD WOULD RUN OUT BEFORE YOU GOT MONEY TO BUY MORE.: NEVER TRUE

## 2024-11-14 ASSESSMENT — ANXIETY QUESTIONNAIRES
4. TROUBLE RELAXING: NOT AT ALL
6. BECOMING EASILY ANNOYED OR IRRITABLE: NOT AT ALL
1. FEELING NERVOUS, ANXIOUS, OR ON EDGE: NOT AT ALL
5. BEING SO RESTLESS THAT IT IS HARD TO SIT STILL: NOT AT ALL
3. WORRYING TOO MUCH ABOUT DIFFERENT THINGS: NOT AT ALL
7. FEELING AFRAID AS IF SOMETHING AWFUL MIGHT HAPPEN: NOT AT ALL
IF YOU CHECKED OFF ANY PROBLEMS ON THIS QUESTIONNAIRE, HOW DIFFICULT HAVE THESE PROBLEMS MADE IT FOR YOU TO DO YOUR WORK, TAKE CARE OF THINGS AT HOME, OR GET ALONG WITH OTHER PEOPLE: NOT DIFFICULT AT ALL
2. NOT BEING ABLE TO STOP OR CONTROL WORRYING: NOT AT ALL
GAD7 TOTAL SCORE: 0

## 2024-11-14 NOTE — PROGRESS NOTES
\"Have you been to the ER, urgent care clinic since your last visit?  Hospitalized since your last visit?\"    NO    “Have you seen or consulted any other health care providers outside of [facility] since your last visit?”    This include any pap smears or colon screening. Yes, Orthopedic (Pain Management)- [facility], Gastroenterology- Dr. Chavez [facility]- [facility]    Click Here for Release of Records Request

## 2024-11-14 NOTE — PROGRESS NOTES
Medicare Annual Wellness Visit    Alex Cole Sr. is here for Medicare AWV    Assessment & Plan   Medicare annual wellness visit, subsequent  Type 2 diabetes mellitus with hyperglycemia, without long-term current use of insulin ([facility])  -      DIABETES FOOT EXAM  -     Comprehensive Metabolic Panel; Future  -     Hemoglobin A1C; Future  -     Lipid Panel; Future  -     TSH; Future  Hypercholesterolemia  Hypovitaminosis D  -     Vitamin D 25 Hydroxy; Future      As above  Pt stable  Labs ordered for the next visit  AVS is accessible thru mychart and pt has been advised of same.         Recommendations for Preventive Services Due: see orders and patient instructions/AVS.  Recommended screening schedule for the next 5-10 years is provided to the patient in written form: see Patient Instructions/AVS.     Return in about 4 months (around 3/14/2025) for cholesterol.     Subjective       Patient's complete Health Risk Assessment and screening values have been reviewed and are found in Flowsheets. The following problems were reviewed today and where indicated follow up appointments were made and/or referrals ordered    Has been to the [facility].  Needs a foot exam     Positive Risk Factor Screenings with Interventions:          Controlled Medication Review:    Today's Pain Level: Pain Score: Zero     Opioid Risk: (Low risk score <55) Opioid risk score: 34    Patient is low risk for opioid use disorder or overdose.    Last PDMP [place] as Reviewed:  Review User Review Instant Review Result              Last Controlled Substance Monitoring Documentation      Flowsheet [facility] ED from 8/7/2024 in [facility] EMERGENCY DEPT   Comments Pt ambulated to the ED exit in [facility]. filed at 08/07/2024 [id number]                 Abnormal BMI (obese):  Body mass index is 31.01 kg/m². (!) Abnormal  Interventions:  See AVS for additional education material           Safety:  Do you always fasten your seatbelt when you are in a car?: (!) No  Interventions:  See

## 2024-12-02 NOTE — TELEPHONE ENCOUNTER
Last Visit: 11/14/2024   Next Appointment: 04/10/2025    Requested Prescriptions     Pending Prescriptions Disp Refills    lisinopril (PRINIVIL;ZESTRIL) 10 MG tablet [Pharmacy Med Name: Lisinopril 10 MG Oral Tablet] 90 tablet 0     Sig: Take 1 tablet by mouth once daily

## 2024-12-05 RX ORDER — LISINOPRIL 10 MG/1
10 TABLET ORAL DAILY
Qty: 90 TABLET | Refills: 1 | Status: CANCELLED | OUTPATIENT
Start: 2024-12-05

## 2024-12-05 NOTE — TELEPHONE ENCOUNTER
Duplicate Medication request. Please see message from 11/30/2024. Patient notified via [facility], thank you.

## 2024-12-10 ENCOUNTER — OFFICE VISIT (OUTPATIENT)
Age: 54
End: 2024-12-10
Payer: MEDICARE

## 2024-12-10 VITALS
OXYGEN SATURATION: 92 % | HEART RATE: 97 BPM | RESPIRATION RATE: 16 BRPM | BODY MASS INDEX: 31.13 KG/M2 | SYSTOLIC BLOOD PRESSURE: 112 MMHG | WEIGHT: 210.8 LBS | DIASTOLIC BLOOD PRESSURE: 72 MMHG | TEMPERATURE: 98.2 F

## 2024-12-10 DIAGNOSIS — J44.9 COPD, GROUP C, BY GOLD 2017 CLASSIFICATION (HCC): Primary | ICD-10-CM

## 2024-12-10 DIAGNOSIS — Z87.891 FORMER SMOKER: ICD-10-CM

## 2024-12-10 DIAGNOSIS — J96.11 CHRONIC HYPOXEMIC RESPIRATORY FAILURE: ICD-10-CM

## 2024-12-10 DIAGNOSIS — R91.1 LUNG NODULE: ICD-10-CM

## 2024-12-10 PROCEDURE — 1036F TOBACCO NON-USER: CPT | Performed by: INTERNAL MEDICINE

## 2024-12-10 PROCEDURE — 3078F DIAST BP <80 MM HG: CPT | Performed by: INTERNAL MEDICINE

## 2024-12-10 PROCEDURE — 3074F SYST BP LT 130 MM HG: CPT | Performed by: INTERNAL MEDICINE

## 2024-12-10 PROCEDURE — 99214 OFFICE O/P EST MOD 30 MIN: CPT | Performed by: INTERNAL MEDICINE

## 2024-12-10 PROCEDURE — G8427 DOCREV CUR MEDS BY ELIG CLIN: HCPCS | Performed by: INTERNAL MEDICINE

## 2024-12-10 PROCEDURE — 3017F COLORECTAL CA SCREEN DOC REV: CPT | Performed by: INTERNAL MEDICINE

## 2024-12-10 PROCEDURE — G8417 CALC BMI ABV UP PARAM F/U: HCPCS | Performed by: INTERNAL MEDICINE

## 2024-12-10 PROCEDURE — 3023F SPIROM DOC REV: CPT | Performed by: INTERNAL MEDICINE

## 2024-12-10 PROCEDURE — G8484 FLU IMMUNIZE NO ADMIN: HCPCS | Performed by: INTERNAL MEDICINE

## 2024-12-10 RX ORDER — LISINOPRIL 10 MG/1
10 TABLET ORAL DAILY
Qty: 90 TABLET | Refills: 3 | Status: SHIPPED | OUTPATIENT
Start: 2024-12-10

## 2024-12-10 RX ORDER — ROFLUMILAST 500 UG/1
500 TABLET ORAL DAILY
Qty: 90 TABLET | Refills: 3 | Status: SHIPPED | OUTPATIENT
Start: 2024-12-10

## 2024-12-10 RX ORDER — UMECLIDINIUM 62.5 UG/1
AEROSOL, POWDER ORAL
Qty: 30 EACH | Refills: 5 | Status: SHIPPED | OUTPATIENT
Start: 2024-12-10

## 2024-12-10 RX ORDER — LISINOPRIL 10 MG/1
10 TABLET ORAL DAILY
Qty: 90 TABLET | Refills: 1 | OUTPATIENT
Start: 2024-12-10

## 2024-12-10 RX ORDER — ALBUTEROL SULFATE 90 UG/1
2 AEROSOL, METERED RESPIRATORY (INHALATION) EVERY 4 HOURS PRN
Qty: 18 G | Refills: 3 | Status: SHIPPED | OUTPATIENT
Start: 2024-12-10

## 2024-12-10 RX ORDER — FLUTICASONE PROPIONATE AND SALMETEROL 500; 50 UG/1; UG/1
1 POWDER RESPIRATORY (INHALATION) EVERY 12 HOURS
Qty: 60 EACH | Refills: 5 | Status: SHIPPED | OUTPATIENT
Start: 2024-12-10

## 2024-12-10 ASSESSMENT — ENCOUNTER SYMPTOMS
FACIAL SWELLING: 0
PHOTOPHOBIA: 0
VOMITING: 0
VOICE CHANGE: 0
COLOR CHANGE: 0
APNEA: 0
CONSTIPATION: 0
RHINORRHEA: 0
ABDOMINAL PAIN: 0
COUGH: 0
STRIDOR: 0
CHEST TIGHTNESS: 0
TROUBLE SWALLOWING: 0
NAUSEA: 0
BACK PAIN: 1
DIARRHEA: 0
EYE DISCHARGE: 0
SORE THROAT: 0
EYE PAIN: 0
BLOOD IN STOOL: 0
EYE ITCHING: 0
EYE REDNESS: 0
CHOKING: 0
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
## 2024-12-10 NOTE — PROGRESS NOTES
Alex Cole Sr. presents today for   Chief Complaint   Patient presents with    Cough    Follow-up     Chronic Hypoxemic Respiratory Failure   Lung Nodules       Is someone accompanying this pt? No    Is the patient using any DME equipment during OV? Oxygen    -DME Company [employer]    Depression Screenin/11/2024     7:12 PM   PHQ-9 Questionaire   Little interest or pleasure in doing things 1   Feeling down, depressed, or hopeless 0   PHQ-9 Total Score 1       Learning Needs Questionnaire:     No question data found.      Fall Risk:         2024     7:12 PM 2024     1:04 PM 2024     9:33 AM 2024     8:59 AM 10/2/2023    11:43 AM   Fall Risk   Do you feel unsteady or are you worried about falling?  no no no no no   2 or more falls in past year? no no no no no   Fall with injury in past year? no no no no no        Abuse Screenin/14/2024     7:00 AM 2024    12:00 PM 2024     9:00 AM 2024     8:00 AM 10/2/2023    11:00 AM 2023     9:00 AM 3/9/2023     7:00 AM   AMB Abuse Screening   Do you ever feel afraid of your partner? N N N N N N N   Are you in a relationship with someone who physically or mentally threatens you? N N N N N N N   Is it safe for you to go home? Y Y Y Y Y Y Y         Coordination of Care:    1. Have you been to the ER, urgent care clinic since your last visit? Hospitalized since your last visit? No    2. Have you seen or consulted any other health care providers outside of the [facility] since your last visit? Include any pap smears or colon screening. No    Medication list has been update per patient.

## 2024-12-16 NOTE — TELEPHONE ENCOUNTER
Last Visit: 11/14/2024   Next Appointment: 04/10/2024    Requested Prescriptions     Pending Prescriptions Disp Refills    lisinopril (PRINIVIL;ZESTRIL) 10 MG tablet [Pharmacy Med Name: Lisinopril 10 MG Oral Tablet] 90 tablet 0     Sig: Take 1 tablet by mouth once daily

## 2024-12-23 NOTE — PERIOP NOTE
Instructions for your procedure at [facility]      Today's Date: 12/23/2024      Patient's Name: Alex Cole Sr.      Procedure Date: 1/9/2025        Please enter the main entrance of the hospital and check-in at the  located in the lobby.      Do NOT eat or drink anything, including candy, gum, or ice chips after midnight prior to your procedure, unless it is part of your prep.  Brush your teeth before coming to the hospital.You may swish with water, but do not swallow.  No smoking/Vaping/E-Cigarettes 24 hours prior to the day of procedure.  No alcohol 24 hours prior to the day of procedure.  No recreational drugs for one week prior to the day of procedure.  Bring Photo ID, Insurance information, and Co-pay if required on day of procedure.  Bring in pertinent legal documents, such as, Medical Power of , DNR, Advance Directive, etc.  Leave all other valuables, including money/purse, at home.  Remove jewelry, including ALL body piercings, nail polish, acrylic nails, and makeup (including mascara); no lotions, powders, deodorant, and/or perfume/cologne/after shave on the skin.  Glasses and dentures may be worn to the hospital.  They must be removed prior to procedure. Please bring case/container for glasses or dentures.  11. Contacts should not be worn on day of procedure.   12. Call the office ([phone]) if you have symptoms of a cold or illness within 24-48 hours prior to your procedure.   13. AN ADULT (relative or friend 18 years or older) MUST DRIVE YOU HOME AFTER YOUR PROCEDURE.   14. Please make arrangements for a responsible adult (18 years or older) to be with you for 24 hours after your procedure.   15. TWO VISITORS will be allowed in the waiting area during your procedure.       Special Instructions:      Bring list of CURRENT medications.  Follow instructions from the office regarding Bowel Prep, Vitamins, Iron, Blood Thinners, Insulin, Seizure, and

## 2024-12-26 RX ORDER — LISINOPRIL 10 MG/1
10 TABLET ORAL DAILY
Qty: 90 TABLET | Refills: 3 | Status: SHIPPED | OUTPATIENT
Start: 2024-12-26

## 2025-01-06 RX ORDER — FUROSEMIDE 20 MG/1
20 TABLET ORAL EVERY OTHER DAY
Qty: 45 TABLET | Refills: 3 | Status: SHIPPED | OUTPATIENT
Start: 2025-01-06

## 2025-01-08 ENCOUNTER — ANESTHESIA EVENT (OUTPATIENT)
Facility: HOSPITAL | Age: 55
End: 2025-01-08
Payer: MEDICARE

## 2025-01-08 RX ORDER — UMECLIDINIUM 62.5 UG/1
AEROSOL, POWDER ORAL
Qty: 30 EACH | Refills: 5 | Status: SHIPPED | OUTPATIENT
Start: 2025-01-08

## 2025-01-08 RX ORDER — FLUTICASONE PROPIONATE AND SALMETEROL 500; 50 UG/1; UG/1
1 POWDER RESPIRATORY (INHALATION) EVERY 12 HOURS
Qty: 60 EACH | Refills: 5 | Status: SHIPPED | OUTPATIENT
Start: 2025-01-08

## 2025-01-09 ENCOUNTER — HOSPITAL ENCOUNTER (OUTPATIENT)
Facility: HOSPITAL | Age: 55
Setting detail: OUTPATIENT SURGERY
Discharge: HOME | End: 2025-01-09
Attending: INTERNAL MEDICINE | Admitting: INTERNAL MEDICINE
Payer: MEDICARE

## 2025-01-09 ENCOUNTER — ANESTHESIA (OUTPATIENT)
Facility: HOSPITAL | Age: 55
End: 2025-01-09
Payer: MEDICARE

## 2025-01-09 VITALS
SYSTOLIC BLOOD PRESSURE: 96 MMHG | WEIGHT: 208.2 LBS | TEMPERATURE: 97.2 F | DIASTOLIC BLOOD PRESSURE: 64 MMHG | OXYGEN SATURATION: 97 % | HEART RATE: 81 BPM | HEIGHT: 68 IN | RESPIRATION RATE: 20 BRPM | BODY MASS INDEX: 31.55 KG/M2

## 2025-01-09 LAB — GLUCOSE BLD STRIP.AUTO-MCNC: 100 MG/DL (ref 70–110)

## 2025-01-09 PROCEDURE — 3700000000 HC ANESTHESIA ATTENDED CARE: Performed by: INTERNAL MEDICINE

## 2025-01-09 PROCEDURE — 3600007503: Performed by: INTERNAL MEDICINE

## 2025-01-09 PROCEDURE — 7100000010 HC PHASE II RECOVERY - FIRST 15 MIN: Performed by: INTERNAL MEDICINE

## 2025-01-09 PROCEDURE — 88305 TISSUE EXAM BY PATHOLOGIST: CPT

## 2025-01-09 PROCEDURE — 6360000002 HC RX W HCPCS: Performed by: ANESTHESIOLOGY

## 2025-01-09 PROCEDURE — 82962 GLUCOSE BLOOD TEST: CPT

## 2025-01-09 PROCEDURE — 2709999900 HC NON-CHARGEABLE SUPPLY: Performed by: INTERNAL MEDICINE

## 2025-01-09 PROCEDURE — 7100000000 HC PACU RECOVERY - FIRST 15 MIN: Performed by: INTERNAL MEDICINE

## 2025-01-09 PROCEDURE — 3700000001 HC ADD 15 MINUTES (ANESTHESIA): Performed by: INTERNAL MEDICINE

## 2025-01-09 PROCEDURE — 3600007513: Performed by: INTERNAL MEDICINE

## 2025-01-09 RX ORDER — INSULIN LISPRO 100 [IU]/ML
0-15 INJECTION, SOLUTION INTRAVENOUS; SUBCUTANEOUS ONCE
Status: DISCONTINUED | OUTPATIENT
Start: 2025-01-09 | End: 2025-01-09 | Stop reason: HOSPADM

## 2025-01-09 RX ORDER — PROPOFOL 10 MG/ML
INJECTION, EMULSION INTRAVENOUS
Status: DISCONTINUED | OUTPATIENT
Start: 2025-01-09 | End: 2025-01-09 | Stop reason: SDUPTHER

## 2025-01-09 RX ORDER — SODIUM CHLORIDE 0.9 % (FLUSH) 0.9 %
5-40 SYRINGE (ML) INJECTION PRN
Status: DISCONTINUED | OUTPATIENT
Start: 2025-01-09 | End: 2025-01-09 | Stop reason: HOSPADM

## 2025-01-09 RX ORDER — SODIUM CHLORIDE 9 MG/ML
INJECTION, SOLUTION INTRAVENOUS CONTINUOUS
Status: CANCELLED | OUTPATIENT
Start: 2025-01-09

## 2025-01-09 RX ORDER — DEXTROSE MONOHYDRATE 100 MG/ML
INJECTION, SOLUTION INTRAVENOUS CONTINUOUS PRN
Status: DISCONTINUED | OUTPATIENT
Start: 2025-01-09 | End: 2025-01-09 | Stop reason: HOSPADM

## 2025-01-09 RX ORDER — LIDOCAINE HYDROCHLORIDE 10 MG/ML
1 INJECTION, SOLUTION EPIDURAL; INFILTRATION; INTRACAUDAL; PERINEURAL
Status: DISCONTINUED | OUTPATIENT
Start: 2025-01-09 | End: 2025-01-09 | Stop reason: HOSPADM

## 2025-01-09 RX ORDER — SODIUM CHLORIDE, SODIUM LACTATE, POTASSIUM CHLORIDE, CALCIUM CHLORIDE 600; 310; 30; 20 MG/100ML; MG/100ML; MG/100ML; MG/100ML
INJECTION, SOLUTION INTRAVENOUS CONTINUOUS
Status: DISCONTINUED | OUTPATIENT
Start: 2025-01-09 | End: 2025-01-09 | Stop reason: HOSPADM

## 2025-01-09 RX ADMIN — PROPOFOL 30 MG: 10 INJECTION, EMULSION INTRAVENOUS at 13:28

## 2025-01-09 RX ADMIN — PROPOFOL 30 MG: 10 INJECTION, EMULSION INTRAVENOUS at 13:25

## 2025-01-09 RX ADMIN — PROPOFOL 30 MG: 10 INJECTION, EMULSION INTRAVENOUS at 13:17

## 2025-01-09 RX ADMIN — PROPOFOL 30 MG: 10 INJECTION, EMULSION INTRAVENOUS at 13:19

## 2025-01-09 RX ADMIN — PROPOFOL 100 MG: 10 INJECTION, EMULSION INTRAVENOUS at 13:15

## 2025-01-09 RX ADMIN — PROPOFOL 30 MG: 10 INJECTION, EMULSION INTRAVENOUS at 13:22

## 2025-01-09 ASSESSMENT — PAIN - FUNCTIONAL ASSESSMENT
PAIN_FUNCTIONAL_ASSESSMENT: 0-10

## 2025-01-09 ASSESSMENT — ENCOUNTER SYMPTOMS: SHORTNESS OF BREATH: 1

## 2025-01-09 ASSESSMENT — COPD QUESTIONNAIRES: CAT_SEVERITY: MODERATE
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
## 2025-01-09 NOTE — DISCHARGE INSTRUCTIONS
Colon Polyps: Care Instructions  Your Care Instructions     Colon polyps are growths in the colon or the rectum. The cause of most colon polyps is not known, and most people who get them do not have any problems. But a certain kind can turn into cancer. For this reason, regular testing for colon polyps is important for people as they get older. It is also important for anyone who has an increased risk for colon cancer.  Polyps are usually found through routine colon cancer screening tests. Although most colon polyps are not cancerous, they are usually removed and then tested for cancer. Screening for colon cancer saves lives because the cancer can usually be cured if it is caught early.  If you have a polyp that is the type that can turn into cancer, you may need more tests to examine your entire colon. The doctor will remove any other polyps that are found, and you will be tested more often.  Follow-up care is a key part of your treatment and safety. Be sure to make and go to all appointments, and call your doctor if you are having problems. It's also a good idea to know your test results and keep a list of the medicines you take.  How can you care for yourself at home?  Regular exams to look for colon polyps are the best way to prevent polyps from turning into colon cancer. These can include stool tests, sigmoidoscopy, colonoscopy, and CT colonography. Talk with your doctor about a testing schedule that is right for you.  To prevent polyps  There is no home treatment that can prevent colon polyps. But these steps may help lower your risk for cancer.  Stay active. Being active can help you get to and stay at a healthy weight. Try to exercise on most days of the week. Walking is a good choice.  Eat well. Choose a variety of vegetables, fruits, legumes (such as peas and beans), fish, poultry, and whole grains.  Do not smoke. If you need help quitting, talk to your doctor about stop-smoking programs and  to).  Medications   If you have a sore throat the day after the test, use an over-the-counter spray to numb your throat.  Follow-up care is a key part of your treatment and safety. Be sure to make and go to all appointments, and call your doctor if you are having problems. It's also a good idea to know your test results and keep a list of the medicines you take.  When should you call for help?   Call 911 anytime you think you may need emergency care. For example, call if:    You passed out (lost consciousness).     You have trouble breathing.     You pass maroon or bloody stools.   Call your doctor now or seek immediate medical care if:    You have pain that does not get better after your take pain medicine.     You have new or worse belly pain.     You have blood in your stools.     You are sick to your stomach and cannot keep fluids down.     You have a fever.     You cannot pass stools or gas.   Watch closely for changes in your health, and be sure to contact your doctor if:    Your throat still hurts after a day or two.     You do not get better as expected.   Where can you learn more?  Go to https://www.Firm58.net/patientEd and enter J454 to learn more about \"Upper GI Endoscopy: What to Expect at Home.\"  Current as of: October 19, 2023  Content Version: 14.3  © 2024 [employer]   Care instructions adapted under license by [employer]. If you have questions about a medical condition or this instruction, always ask your healthcare professional. [employer], disclaims any warranty or liability for your use of this information.

## 2025-01-09 NOTE — ANESTHESIA POSTPROCEDURE EVALUATION
Department of Anesthesiology  Postprocedure Note    Patient: Alex Cole Sr.  MRN: [id number]  YOB: 1970  Date of evaluation: 1/9/2025    Procedure Summary       Date: 01/09/25 Room / Location: [facility] ENDO 02 / [facility] ENDOSCOPY    Anesthesia Start: 1308 Anesthesia Stop: 1341    Procedures:       ESOPHAGOGASTRODUODENOSCOPY DILATATION 52F w/Bx (Upper GI Region)      COLONOSCOPY DIAGNOSTIC w/polypectomies (Abdomen) Diagnosis:       Gastroesophageal reflux disease, unspecified whether esophagitis present      Dysphagia, unspecified type      Hepatosplenomegaly      Steatosis of liver      Personal history of colon polyps, unspecified      Obesity (BMI 30-39.9)      (Gastroesophageal reflux disease, unspecified whether esophagitis present [K21.9])      (Dysphagia, unspecified type [R13.10])      (Hepatosplenomegaly [R16.2])      (Steatosis of liver [K76.0])      (Personal history of colon polyps, unspecified [Z86.0100])      (Obesity (BMI 30-39.9) [E66.9])    Surgeons: Guy Kat MD Responsible Provider: Alex Mobley MD    Anesthesia Type: MAC ASA Status: 3            Anesthesia Type: MAC    Scar Phase I: Scar Score: 10    Scar Phase II: Scar Score: 10    Anesthesia Post Evaluation    Patient location during evaluation: bedside  Patient participation: complete - patient participated  Level of consciousness: responsive to verbal stimuli  Airway patency: patent  Nausea & Vomiting: no nausea  Respiratory status: acceptable  Hydration status: euvolemic    No notable events documented.

## 2025-01-09 NOTE — ANESTHESIA PRE PROCEDURE
Department of Anesthesiology  Preprocedure Note       Name:  Alex Cole Sr.   Age:  54 y.o.  :  1970                                          MRN:  [id number]         Date:  2025      Surgeon: Surgeon(s):  Guy Kat MD    Procedure: Procedure(s):  ESOPHAGOGASTRODUODENOSCOPY DILATATION  COLONOSCOPY DIAGNOSTIC    Medications prior to admission:   Prior to Admission medications    Medication Sig Start Date End Date Taking? Authorizing Provider   fluticasone-salmeterol (ADVAIR DISKUS) 500-50 MCG/ACT AEPB diskus inhaler Inhale 1 puff into the lungs in the morning and 1 puff in the evening. 25  Yes Leticia Santacruz MD   umeclidinium bromide (INCRUSE ELLIPTA) 62.5 MCG/ACT inhaler INHALE 1 PUFF BY MOUTH ONCE DAILY EXHALE  FULLY  BEFORE  INHALING 25  Yes Leticia Santacruz MD   furosemide (LASIX) 20 MG tablet Take 1 tablet by mouth every other day 25  Yes Christiano Arreola MD   lisinopril (PRINIVIL;ZESTRIL) 10 MG tablet Take 1 tablet by mouth once daily 24  Yes Stacia Cabrera MD   VENTOLIN  (90 Base) MCG/ACT inhaler Inhale 2 puffs into the lungs every 4 hours as needed for Wheezing 12/10/24  Yes Leticia Santacruz MD   Roflumilast (DALIRESP) 500 MCG tablet Take 1 tablet by mouth daily 12/10/24  Yes Leticia Santacruz MD   atorvastatin (LIPITOR) 20 MG tablet Take 1 tablet by mouth daily 24  Yes Christiano Arreola MD   albuterol (PROVENTIL) (2.5 MG/3ML) 0.083% nebulizer solution Nebulized 1 vial for inhalation every 4 hours as needed Diag. Code J44.9, J96.10  File Under Medicare B 10/2/23  Yes Stacia Cabrera MD   OXYGEN by Nasal route continuous   Yes ProviderGaudencio MD   aspirin 81 MG EC tablet Take by mouth daily   Yes Automatic Reconciliation, Ar   diclofenac sodium (VOLTAREN) 1 % GEL ceived the following from [facility]: Outside name: diclofenac (VOLTAREN) 1 % gel 22  Yes Automatic Reconciliation, Ar   hydrOXYzine HCl (ATARAX) 50 MG tablet

## 2025-02-03 RX ORDER — ATORVASTATIN CALCIUM 20 MG/1
20 TABLET, FILM COATED ORAL DAILY
Qty: 90 TABLET | Refills: 0 | Status: SHIPPED | OUTPATIENT
Start: 2025-02-03

## 2025-03-12 NOTE — PROGRESS NOTES
Blood Pressure/Heart medications.  Bring inhaler.  Bring CPAP machine.    Your Pacemaker/AICD needs to be interrogated within 6 months of your procedure. If not interrogated within this timeframe, your procedure will be canceled.    If you have a history of recreational drug use, you may be required to submit a urine sample for drug testing the day of your procedure, as some recreational drugs can interact with anesthetics and increase your surgical risk.    Any questions regarding prep, please call the office at [phone].    For any questions or concerns on the day of procedure, please call the Endo Suite at [phone].    These surgical instructions were reviewed with the patient during the PAT phone call.

## 2025-03-14 ENCOUNTER — TRANSCRIBE ORDERS (OUTPATIENT)
Facility: HOSPITAL | Age: 55
End: 2025-03-14

## 2025-03-14 ENCOUNTER — HOSPITAL ENCOUNTER (OUTPATIENT)
Facility: HOSPITAL | Age: 55
Discharge: HOME | End: 2025-03-17
Payer: MEDICARE

## 2025-03-14 DIAGNOSIS — M54.16 RADICULOPATHY, LUMBAR REGION: ICD-10-CM

## 2025-03-14 DIAGNOSIS — M54.12 RADICULOPATHY, CERVICAL REGION: ICD-10-CM

## 2025-03-14 DIAGNOSIS — M54.12 RADICULOPATHY, CERVICAL REGION: Primary | ICD-10-CM

## 2025-03-14 PROCEDURE — 72110 X-RAY EXAM L-2 SPINE 4/>VWS: CPT

## 2025-03-14 PROCEDURE — 72050 X-RAY EXAM NECK SPINE 4/5VWS: CPT

## 2025-03-18 ENCOUNTER — ANESTHESIA EVENT (OUTPATIENT)
Facility: HOSPITAL | Age: 55
End: 2025-03-18
Payer: MEDICARE

## 2025-03-19 ENCOUNTER — HOSPITAL ENCOUNTER (OUTPATIENT)
Facility: HOSPITAL | Age: 55
Setting detail: OUTPATIENT SURGERY
Discharge: HOME | End: 2025-03-19
Attending: INTERNAL MEDICINE | Admitting: INTERNAL MEDICINE
Payer: MEDICARE

## 2025-03-19 ENCOUNTER — ANESTHESIA (OUTPATIENT)
Facility: HOSPITAL | Age: 55
End: 2025-03-19
Payer: MEDICARE

## 2025-03-19 VITALS
HEART RATE: 72 BPM | DIASTOLIC BLOOD PRESSURE: 72 MMHG | BODY MASS INDEX: 32.65 KG/M2 | TEMPERATURE: 98.3 F | RESPIRATION RATE: 21 BRPM | WEIGHT: 214.7 LBS | OXYGEN SATURATION: 95 % | SYSTOLIC BLOOD PRESSURE: 117 MMHG

## 2025-03-19 LAB
GLUCOSE BLD STRIP.AUTO-MCNC: 119 MG/DL (ref 70–110)
GLUCOSE BLD STRIP.AUTO-MCNC: 98 MG/DL (ref 70–110)

## 2025-03-19 PROCEDURE — 3700000000 HC ANESTHESIA ATTENDED CARE: Performed by: INTERNAL MEDICINE

## 2025-03-19 PROCEDURE — 2709999900 HC NON-CHARGEABLE SUPPLY: Performed by: INTERNAL MEDICINE

## 2025-03-19 PROCEDURE — 3700000001 HC ADD 15 MINUTES (ANESTHESIA): Performed by: INTERNAL MEDICINE

## 2025-03-19 PROCEDURE — 6360000002 HC RX W HCPCS: Performed by: NURSE ANESTHETIST, CERTIFIED REGISTERED

## 2025-03-19 PROCEDURE — 7100000000 HC PACU RECOVERY - FIRST 15 MIN: Performed by: INTERNAL MEDICINE

## 2025-03-19 PROCEDURE — 7100000010 HC PHASE II RECOVERY - FIRST 15 MIN: Performed by: INTERNAL MEDICINE

## 2025-03-19 PROCEDURE — 3600007502: Performed by: INTERNAL MEDICINE

## 2025-03-19 PROCEDURE — 3600007512: Performed by: INTERNAL MEDICINE

## 2025-03-19 PROCEDURE — 88305 TISSUE EXAM BY PATHOLOGIST: CPT

## 2025-03-19 PROCEDURE — 82962 GLUCOSE BLOOD TEST: CPT

## 2025-03-19 PROCEDURE — 2580000003 HC RX 258: Performed by: NURSE ANESTHETIST, CERTIFIED REGISTERED

## 2025-03-19 RX ORDER — LIDOCAINE HYDROCHLORIDE 10 MG/ML
1 INJECTION, SOLUTION EPIDURAL; INFILTRATION; INTRACAUDAL; PERINEURAL
Status: DISCONTINUED | OUTPATIENT
Start: 2025-03-19 | End: 2025-03-19 | Stop reason: HOSPADM

## 2025-03-19 RX ORDER — LIDOCAINE HYDROCHLORIDE 20 MG/ML
INJECTION, SOLUTION EPIDURAL; INFILTRATION; INTRACAUDAL; PERINEURAL
Status: DISCONTINUED | OUTPATIENT
Start: 2025-03-19 | End: 2025-03-19 | Stop reason: SDUPTHER

## 2025-03-19 RX ORDER — PROPOFOL 10 MG/ML
INJECTION, EMULSION INTRAVENOUS
Status: DISCONTINUED | OUTPATIENT
Start: 2025-03-19 | End: 2025-03-19 | Stop reason: SDUPTHER

## 2025-03-19 RX ORDER — SODIUM CHLORIDE, SODIUM LACTATE, POTASSIUM CHLORIDE, CALCIUM CHLORIDE 600; 310; 30; 20 MG/100ML; MG/100ML; MG/100ML; MG/100ML
INJECTION, SOLUTION INTRAVENOUS CONTINUOUS
Status: DISCONTINUED | OUTPATIENT
Start: 2025-03-19 | End: 2025-03-19 | Stop reason: HOSPADM

## 2025-03-19 RX ADMIN — PROPOFOL 50 MG: 10 INJECTION, EMULSION INTRAVENOUS at 09:43

## 2025-03-19 RX ADMIN — LIDOCAINE HYDROCHLORIDE 100 MG: 20 INJECTION, SOLUTION EPIDURAL; INFILTRATION; INTRACAUDAL; PERINEURAL at 09:41

## 2025-03-19 RX ADMIN — PROPOFOL 50 MG: 10 INJECTION, EMULSION INTRAVENOUS at 09:50

## 2025-03-19 RX ADMIN — PROPOFOL 50 MG: 10 INJECTION, EMULSION INTRAVENOUS at 09:47

## 2025-03-19 RX ADMIN — PROPOFOL 100 MG: 10 INJECTION, EMULSION INTRAVENOUS at 09:41

## 2025-03-19 RX ADMIN — SODIUM CHLORIDE, POTASSIUM CHLORIDE, SODIUM LACTATE AND CALCIUM CHLORIDE: 600; 310; 30; 20 INJECTION, SOLUTION INTRAVENOUS at 09:22

## 2025-03-19 ASSESSMENT — PAIN - FUNCTIONAL ASSESSMENT
PAIN_FUNCTIONAL_ASSESSMENT: NONE - DENIES PAIN
PAIN_FUNCTIONAL_ASSESSMENT: NONE - DENIES PAIN

## 2025-03-19 ASSESSMENT — COPD QUESTIONNAIRES: CAT_SEVERITY: MODERATE

## 2025-03-19 ASSESSMENT — ENCOUNTER SYMPTOMS: SHORTNESS OF BREATH: 1
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
## 2025-03-19 NOTE — ANESTHESIA POSTPROCEDURE EVALUATION
Department of Anesthesiology  Postprocedure Note    Patient: Alex Cole Sr.  MRN: [id number]  YOB: 1970  Date of evaluation: 3/19/2025    Procedure Summary       Date: 03/19/25 Room / Location: [facility] ENDO 02 / [facility] ENDOSCOPY    Anesthesia Start: 0937 Anesthesia Stop: 0955    Procedure: ESOPHAGOGASTRODUODENOSCOPY with cold snare polypectomies (Upper GI Region) Diagnosis:       Epigastric pain      Splenomegaly      Benign neoplasm of cecum      Bloating symptom      (Epigastric pain [R10.13])      (Splenomegaly [R16.1])      (Benign neoplasm of cecum [D12.0])      (Bloating symptom [R14.0])    Surgeons: Guy Kat MD Responsible Provider: David Levi MD    Anesthesia Type: MAC ASA Status: 3            Anesthesia Type: No value filed.    Scar Phase I: Scar Score: 10    Scar Phase II:      Anesthesia Post Evaluation    Patient location during evaluation: bedside  Patient participation: complete - patient participated  Level of consciousness: awake  Airway patency: patent  Nausea & Vomiting: no nausea  Cardiovascular status: hemodynamically stable  Respiratory status: acceptable  Hydration status: euvolemic  Multimodal analgesia pain management approach  Pain management: adequate    No notable events documented.

## 2025-03-19 NOTE — ANESTHESIA PRE PROCEDURE
01/30/2023 03:19 PM        Type & Screen (If Applicable):  Lab Results   Component Value Date    ABORH A POSITIVE 10/30/2017    LABANTI NEG 10/30/2017       Drug/Infectious Status (If Applicable):  Lab Results   Component Value Date/Time    HEPCAB 0.08 02/23/2018 12:04 PM    HEPCAB NEGATIVE 02/23/2018 12:04 PM       COVID-19 Screening (If Applicable):   Lab Results   Component Value Date/Time    COVID19 Detected 01/18/2023 05:00 PM           Anesthesia Evaluation  Patient summary reviewed  Airway: Mallampati: III  TM distance: >3 FB   Neck ROM: limited  Mouth opening: > = 3 FB   Dental:    (+) upper dentures, lower dentures and edentulous      Pulmonary: breath sounds clear to auscultation  (+)  COPD: moderate,  shortness of breath:   sleep apnea: on CPAP,                                  Cardiovascular:  Exercise tolerance: good (>4 METS)  (+) hypertension:, CAD: no interval change, CHF: no interval change        Rhythm: regular  Rate: normal                    Neuro/Psych:   (+) neuromuscular disease:, headaches:            GI/Hepatic/Renal:   (+) GERD: poorly controlled, liver disease:          Endo/Other:    (+) DiabetesType II DM, well controlled.                 Abdominal:   (+) obese          Vascular:          Other Findings:           Anesthesia Plan      MAC     ASA 3       Induction: intravenous.      Anesthetic plan and risks discussed with patient.                      David Levi MD   3/19/2025

## 2025-03-19 NOTE — DISCHARGE INSTRUCTIONS
keep fluids down.  You have a fever.  You cannot pass stools or gas.  Watch closely for changes in your health, and be sure to contact your doctor if:  Your throat still hurts after a day or two.  Where can you learn more?  Go to https://www.Capstory.net/patientEd and enter J454 to learn more about \"Upper Gastrointestinal (GI) Endoscopy: What to Expect at Home.\"  Current as of: October 19, 2024  Content Version: 14.4  © 2024-2025 [employer].   Care instructions adapted under license by [employer]. If you have questions about a medical condition or this instruction, always ask your healthcare professional. [employer], disclaims any warranty or liability for your use of this information.

## 2025-04-04 ENCOUNTER — HOSPITAL ENCOUNTER (OUTPATIENT)
Facility: HOSPITAL | Age: 55
Setting detail: SPECIMEN
Discharge: HOME | End: 2025-04-04
Payer: MEDICARE

## 2025-04-04 DIAGNOSIS — E11.65 TYPE 2 DIABETES MELLITUS WITH HYPERGLYCEMIA, WITHOUT LONG-TERM CURRENT USE OF INSULIN (HCC): ICD-10-CM

## 2025-04-04 DIAGNOSIS — E55.9 HYPOVITAMINOSIS D: ICD-10-CM

## 2025-04-04 LAB
25(OH)D3 SERPL-MCNC: 13 NG/ML (ref 30–100)
ALBUMIN SERPL-MCNC: 3.9 G/DL (ref 3.4–5)
ALBUMIN/GLOB SERPL: 1.3 (ref 0.8–1.7)
ALP SERPL-CCNC: 127 U/L (ref 45–117)
ALT SERPL-CCNC: 34 U/L (ref 16–61)
ANION GAP SERPL CALC-SCNC: 7 MMOL/L (ref 3–18)
AST SERPL-CCNC: 17 U/L (ref 10–38)
BILIRUB SERPL-MCNC: 1.1 MG/DL (ref 0.2–1)
BUN SERPL-MCNC: 12 MG/DL (ref 7–18)
BUN/CREAT SERPL: 12 (ref 12–20)
CALCIUM SERPL-MCNC: 9.5 MG/DL (ref 8.5–10.1)
CHLORIDE SERPL-SCNC: 106 MMOL/L (ref 100–111)
CHOLEST SERPL-MCNC: 140 MG/DL
CO2 SERPL-SCNC: 24 MMOL/L (ref 21–32)
CREAT SERPL-MCNC: 0.98 MG/DL (ref 0.6–1.3)
EST. AVERAGE GLUCOSE BLD GHB EST-MCNC: 126 MG/DL
GLOBULIN SER CALC-MCNC: 3 G/DL (ref 2–4)
GLUCOSE SERPL-MCNC: 136 MG/DL (ref 74–99)
HBA1C MFR BLD: 6 % (ref 4.2–5.6)
HDLC SERPL-MCNC: 54 MG/DL (ref 40–60)
HDLC SERPL: 2.6 (ref 0–5)
LDLC SERPL CALC-MCNC: 58.6 MG/DL (ref 0–100)
LIPID PANEL: NORMAL
POTASSIUM SERPL-SCNC: 4.4 MMOL/L (ref 3.5–5.5)
PROT SERPL-MCNC: 6.9 G/DL (ref 6.4–8.2)
SODIUM SERPL-SCNC: 137 MMOL/L (ref 136–145)
TRIGL SERPL-MCNC: 137 MG/DL
TSH SERPL DL<=0.05 MIU/L-ACNC: 1.24 UIU/ML (ref 0.36–3.74)
VLDLC SERPL CALC-MCNC: 27.4 MG/DL

## 2025-04-04 PROCEDURE — 80053 COMPREHEN METABOLIC PANEL: CPT

## 2025-04-04 PROCEDURE — 83036 HEMOGLOBIN GLYCOSYLATED A1C: CPT

## 2025-04-04 PROCEDURE — 80061 LIPID PANEL: CPT

## 2025-04-04 PROCEDURE — 84443 ASSAY THYROID STIM HORMONE: CPT

## 2025-04-04 PROCEDURE — 36415 COLL VENOUS BLD VENIPUNCTURE: CPT

## 2025-04-04 PROCEDURE — 82306 VITAMIN D 25 HYDROXY: CPT

## 2025-04-10 ENCOUNTER — RESULTS FOLLOW-UP (OUTPATIENT)
Facility: CLINIC | Age: 55
End: 2025-04-10

## 2025-04-10 ENCOUNTER — OFFICE VISIT (OUTPATIENT)
Facility: CLINIC | Age: 55
End: 2025-04-10

## 2025-04-10 VITALS
TEMPERATURE: 98.4 F | BODY MASS INDEX: 32.28 KG/M2 | SYSTOLIC BLOOD PRESSURE: 104 MMHG | HEIGHT: 68 IN | OXYGEN SATURATION: 95 % | DIASTOLIC BLOOD PRESSURE: 64 MMHG | RESPIRATION RATE: 18 BRPM | HEART RATE: 78 BPM | WEIGHT: 213 LBS

## 2025-04-10 DIAGNOSIS — E78.00 HYPERCHOLESTEROLEMIA: Primary | ICD-10-CM

## 2025-04-10 DIAGNOSIS — I50.32 CHRONIC DIASTOLIC (CONGESTIVE) HEART FAILURE: ICD-10-CM

## 2025-04-10 DIAGNOSIS — J44.9 COPD, GROUP C, BY GOLD 2017 CLASSIFICATION (HCC): ICD-10-CM

## 2025-04-10 DIAGNOSIS — L03.114 CELLULITIS OF ARM, LEFT: ICD-10-CM

## 2025-04-10 DIAGNOSIS — E11.65 TYPE 2 DIABETES MELLITUS WITH HYPERGLYCEMIA, WITHOUT LONG-TERM CURRENT USE OF INSULIN (HCC): ICD-10-CM

## 2025-04-10 DIAGNOSIS — E55.9 HYPOVITAMINOSIS D: ICD-10-CM

## 2025-04-10 PROBLEM — J96.11 CHRONIC HYPOXEMIC RESPIRATORY FAILURE: Status: RESOLVED | Noted: 2025-04-10 | Resolved: 2025-04-10

## 2025-04-10 PROBLEM — J96.11 CHRONIC HYPOXEMIC RESPIRATORY FAILURE (HCC): Status: ACTIVE | Noted: 2025-04-10

## 2025-04-10 RX ORDER — LORAZEPAM 0.5 MG/1
TABLET ORAL
COMMUNITY
Start: 2025-03-18

## 2025-04-10 RX ORDER — ERGOCALCIFEROL 1.25 MG/1
50000 CAPSULE, LIQUID FILLED ORAL WEEKLY
Qty: 12 CAPSULE | Refills: 2 | Status: SHIPPED | OUTPATIENT
Start: 2025-04-10

## 2025-04-10 RX ORDER — DOXYCYCLINE HYCLATE 100 MG
100 TABLET ORAL 2 TIMES DAILY
Qty: 20 TABLET | Refills: 0 | Status: SHIPPED | OUTPATIENT
Start: 2025-04-10 | End: 2025-04-20

## 2025-04-10 SDOH — ECONOMIC STABILITY: FOOD INSECURITY: WITHIN THE PAST 12 MONTHS, THE FOOD YOU BOUGHT JUST DIDN'T LAST AND YOU DIDN'T HAVE MONEY TO GET MORE.: SOMETIMES TRUE

## 2025-04-10 SDOH — ECONOMIC STABILITY: FOOD INSECURITY: WITHIN THE PAST 12 MONTHS, YOU WORRIED THAT YOUR FOOD WOULD RUN OUT BEFORE YOU GOT MONEY TO BUY MORE.: SOMETIMES TRUE

## 2025-04-10 ASSESSMENT — ANXIETY QUESTIONNAIRES
GAD7 TOTAL SCORE: 0
7. FEELING AFRAID AS IF SOMETHING AWFUL MIGHT HAPPEN: NOT AT ALL
3. WORRYING TOO MUCH ABOUT DIFFERENT THINGS: NOT AT ALL
4. TROUBLE RELAXING: NOT AT ALL
6. BECOMING EASILY ANNOYED OR IRRITABLE: NOT AT ALL
2. NOT BEING ABLE TO STOP OR CONTROL WORRYING: NOT AT ALL
5. BEING SO RESTLESS THAT IT IS HARD TO SIT STILL: NOT AT ALL
1. FEELING NERVOUS, ANXIOUS, OR ON EDGE: NOT AT ALL
IF YOU CHECKED OFF ANY PROBLEMS ON THIS QUESTIONNAIRE, HOW DIFFICULT HAVE THESE PROBLEMS MADE IT FOR YOU TO DO YOUR WORK, TAKE CARE OF THINGS AT HOME, OR GET ALONG WITH OTHER PEOPLE: NOT DIFFICULT AT ALL

## 2025-04-10 ASSESSMENT — PATIENT HEALTH QUESTIONNAIRE - PHQ9
2. FEELING DOWN, DEPRESSED OR HOPELESS: NOT AT ALL
1. LITTLE INTEREST OR PLEASURE IN DOING THINGS: NOT AT ALL
SUM OF ALL RESPONSES TO PHQ QUESTIONS 1-9: 0

## 2025-04-10 NOTE — PROGRESS NOTES
HPI:  Alex Cole Sr. is a 54 y.o. male who presents today with   Chief Complaint   Patient presents with    Cholesterol Problem     4 month follow-up         History of Present Illness  The patient presents for  follow up on cholesterol and also c/o a laceration on his arm.  Pt also has  diabetes mellitus, and  HTN:     An injury to the arm occurred on 04/07/2025 while repairing a lawnmower. Despite applying Neosporin and keeping the wound covered, infection is suspected. Diligent efforts have been made to maintain the wound's cleanliness and dryness, using gauze and a wrap for protection.    A recent A1c level was reported at 6.0, which is slightly more elevated than the previous reading  but still within well-controlled range for diabetes management. No need for medication refills is reported at this time.    Blood pressure is stable.    No respiratory issues are reported. A cardiology appointment is scheduled for 05/2025. An endoscopy was performed in 01/2025, during which polyps were removed and biopsied. A follow-up appointment with the gastroenterologist is scheduled for 06/2025. An appointment with pulmonology is scheduled for 08/2025. Allergies began at the age of 39, but no recent issues are reported.    PAST SURGICAL HISTORY:  Endoscopy with polyp removal and biopsy: 01/2025.      Other health conditions that are controlled or are managed by specialty include, unless otherwise noted: COPD, CHF, DM 2      Lab Results   Component Value Date    CHOL 140 04/04/2025    TRIG 137 04/04/2025    HDL 54 04/04/2025    LDL 58.6 04/04/2025    VLDL 27.4 04/04/2025    CHOLHDLRATIO 2.6 04/04/2025     Lab Results   Component Value Date     04/04/2025    K 4.4 04/04/2025     04/04/2025    CO2 24 04/04/2025    BUN 12 04/04/2025    CREATININE 0.98 04/04/2025    GLUCOSE 136 (H) 04/04/2025    CALCIUM 9.5 04/04/2025    BILITOT 1.1 (H) 04/04/2025    ALKPHOS 127 (H) 04/04/2025    AST 17 04/04/2025    ALT 34

## 2025-04-10 NOTE — PROGRESS NOTES
\"Have you been to the ER, urgent care clinic since your last visit?  Hospitalized since your last visit?\"    NO    “Have you seen or consulted any other health care providers outside our system since your last visit?”    Yes, Gastroenterology- Dr. Chavez [facility] Dr. JORDAN Kat at [facility] (Endo), Psychiatry- Mrs. SHERIE Almanza    “Have you had a diabetic eye exam?”    YES - Where: 08/2024- Ophthalmology- [facility] Dr. Reshma Joel Nurse/CMA to request most recent records if not in the chart     Date of last diabetic eye exam: 8/11/2023

## 2025-05-01 RX ORDER — ATORVASTATIN CALCIUM 20 MG/1
20 TABLET, FILM COATED ORAL DAILY
Qty: 90 TABLET | Refills: 3 | Status: SHIPPED | OUTPATIENT
Start: 2025-05-01

## 2025-05-29 ENCOUNTER — OFFICE VISIT (OUTPATIENT)
Age: 55
End: 2025-05-29
Payer: MEDICARE

## 2025-05-29 VITALS
HEIGHT: 68 IN | DIASTOLIC BLOOD PRESSURE: 62 MMHG | BODY MASS INDEX: 31.83 KG/M2 | WEIGHT: 210 LBS | OXYGEN SATURATION: 99 % | SYSTOLIC BLOOD PRESSURE: 102 MMHG | HEART RATE: 94 BPM

## 2025-05-29 DIAGNOSIS — J44.9 CHRONIC OBSTRUCTIVE PULMONARY DISEASE, UNSPECIFIED COPD TYPE (HCC): ICD-10-CM

## 2025-05-29 DIAGNOSIS — I25.10 CORONARY ARTERY DISEASE INVOLVING NATIVE HEART WITHOUT ANGINA PECTORIS, UNSPECIFIED VESSEL OR LESION TYPE: Primary | ICD-10-CM

## 2025-05-29 DIAGNOSIS — I50.32 CHRONIC DIASTOLIC CONGESTIVE HEART FAILURE (HCC): ICD-10-CM

## 2025-05-29 PROCEDURE — 3078F DIAST BP <80 MM HG: CPT | Performed by: INTERNAL MEDICINE

## 2025-05-29 PROCEDURE — 1036F TOBACCO NON-USER: CPT | Performed by: INTERNAL MEDICINE

## 2025-05-29 PROCEDURE — 3023F SPIROM DOC REV: CPT | Performed by: INTERNAL MEDICINE

## 2025-05-29 PROCEDURE — 99214 OFFICE O/P EST MOD 30 MIN: CPT | Performed by: INTERNAL MEDICINE

## 2025-05-29 PROCEDURE — 3017F COLORECTAL CA SCREEN DOC REV: CPT | Performed by: INTERNAL MEDICINE

## 2025-05-29 PROCEDURE — G8428 CUR MEDS NOT DOCUMENT: HCPCS | Performed by: INTERNAL MEDICINE

## 2025-05-29 PROCEDURE — 3074F SYST BP LT 130 MM HG: CPT | Performed by: INTERNAL MEDICINE

## 2025-05-29 PROCEDURE — G8417 CALC BMI ABV UP PARAM F/U: HCPCS | Performed by: INTERNAL MEDICINE

## 2025-05-29 RX ORDER — LISINOPRIL 10 MG/1
10 TABLET ORAL DAILY
Qty: 90 TABLET | Refills: 3 | Status: SHIPPED | OUTPATIENT
Start: 2025-05-29

## 2025-05-29 NOTE — PROGRESS NOTES
HPI: 54-year-old male here for followup. Overall, he is doing relatively well. He continues to work in the yard without significant chest pain but he does have some occasional dyspnea but overall his COPD is relatively well-controlled. No PND, orthopnea or edema.     Impression:   1. History of CAD, remote heart catheterization, nuclear stress test June 2023 without ischemia, normal EF, no history of stents.   2. ABIs in 2020 without PAD.   3. Severe COPD, on nocturnal oxygen followed by Pulmonary, stable.  4. Chronic diastolic heart failure with echocardiogram 2022 with EF of 55%, stable on diuretics.  5. Chronic pain, degenerative joint disease.   6. History of palpitations, panic attacks.   7. History of borderline diabetes.    Plan:  I am going to followup with an echocardiogram given his remote CAD and diastolic heart failure. Heart failure is compensated today. He takes a baby aspirin for remote coronary artery disease as well as inhalers. He is on a statin. I will see back annually.        Wt Readings from Last 3 Encounters:   05/29/25 95.3 kg (210 lb)   04/10/25 96.6 kg (213 lb)   03/19/25 97.4 kg (214 lb 11.2 oz)     Past Medical History:   Diagnosis Date    Anxiety     Arthritis     hands    Chest pain     CHF (congestive heart failure) (HCC)     Chronic back pain     Chronic diastolic heart failure secondary to coronary artery disease (HCC)     Chronic lung disease     Chronic obstructive pulmonary disease (HCC) 08/03/2017    PFTS 8/3/17    Congenital heart disease     COPD, severe (HCC)     Coronary artery disease     no CAD per card notes    Cough     Diabetes mellitus (HCC)     Emphysema of lung (HCC)     Emphysema/COPD (HCC)     Erectile dysfunction     Esophagitis 12/11/2017    Endoscopy    Essential hypertension, benign     Fast heart beat     Feeling of chest tightness     Frequent urination     GERD (gastroesophageal reflux disease)     Hearing loss     Heartburn     Hepatomegaly     History of

## 2025-07-14 RX ORDER — ALBUTEROL SULFATE 90 UG/1
2 AEROSOL, METERED RESPIRATORY (INHALATION) EVERY 4 HOURS PRN
Qty: 1 EACH | Refills: 3 | Status: SHIPPED | OUTPATIENT
Start: 2025-07-14

## 2025-08-03 RX ORDER — EMPAGLIFLOZIN 25 MG/1
25 TABLET, FILM COATED ORAL DAILY
Qty: 90 TABLET | Refills: 3 | Status: SHIPPED | OUTPATIENT
Start: 2025-08-03

## 2025-08-06 ENCOUNTER — OFFICE VISIT (OUTPATIENT)
Age: 55
End: 2025-08-06
Payer: MEDICARE

## 2025-08-06 VITALS
OXYGEN SATURATION: 92 % | SYSTOLIC BLOOD PRESSURE: 138 MMHG | RESPIRATION RATE: 18 BRPM | DIASTOLIC BLOOD PRESSURE: 81 MMHG | WEIGHT: 209 LBS | TEMPERATURE: 98.2 F | BODY MASS INDEX: 31.78 KG/M2 | HEART RATE: 87 BPM

## 2025-08-06 DIAGNOSIS — Z87.891 FORMER SMOKER: ICD-10-CM

## 2025-08-06 DIAGNOSIS — R91.1 LUNG NODULE: ICD-10-CM

## 2025-08-06 DIAGNOSIS — J96.11 CHRONIC HYPOXEMIC RESPIRATORY FAILURE (HCC): ICD-10-CM

## 2025-08-06 DIAGNOSIS — J44.9 COPD, GROUP C, BY GOLD 2017 CLASSIFICATION (HCC): Primary | ICD-10-CM

## 2025-08-06 PROCEDURE — 3079F DIAST BP 80-89 MM HG: CPT | Performed by: INTERNAL MEDICINE

## 2025-08-06 PROCEDURE — 99214 OFFICE O/P EST MOD 30 MIN: CPT | Performed by: INTERNAL MEDICINE

## 2025-08-06 PROCEDURE — G8417 CALC BMI ABV UP PARAM F/U: HCPCS | Performed by: INTERNAL MEDICINE

## 2025-08-06 PROCEDURE — G8427 DOCREV CUR MEDS BY ELIG CLIN: HCPCS | Performed by: INTERNAL MEDICINE

## 2025-08-06 PROCEDURE — 1036F TOBACCO NON-USER: CPT | Performed by: INTERNAL MEDICINE

## 2025-08-06 PROCEDURE — 3023F SPIROM DOC REV: CPT | Performed by: INTERNAL MEDICINE

## 2025-08-06 PROCEDURE — 3075F SYST BP GE 130 - 139MM HG: CPT | Performed by: INTERNAL MEDICINE

## 2025-08-06 PROCEDURE — 3017F COLORECTAL CA SCREEN DOC REV: CPT | Performed by: INTERNAL MEDICINE

## 2025-08-06 RX ORDER — FLUTICASONE PROPIONATE AND SALMETEROL 500; 50 UG/1; UG/1
1 POWDER RESPIRATORY (INHALATION) EVERY 12 HOURS
Qty: 60 EACH | Refills: 5 | Status: SHIPPED | OUTPATIENT
Start: 2025-08-06

## 2025-08-06 RX ORDER — ALBUTEROL SULFATE 90 UG/1
2 AEROSOL, METERED RESPIRATORY (INHALATION) EVERY 4 HOURS PRN
Qty: 1 EACH | Refills: 3 | Status: SHIPPED | OUTPATIENT
Start: 2025-08-06

## 2025-08-06 RX ORDER — UMECLIDINIUM 62.5 UG/1
AEROSOL, POWDER ORAL
Qty: 30 EACH | Refills: 5 | Status: SHIPPED | OUTPATIENT
Start: 2025-08-06

## 2025-08-06 RX ORDER — ROFLUMILAST 500 UG/1
500 TABLET ORAL DAILY
Qty: 90 TABLET | Refills: 3 | Status: SHIPPED | OUTPATIENT
Start: 2025-08-06

## 2025-08-06 ASSESSMENT — ENCOUNTER SYMPTOMS
VOICE CHANGE: 0
DIARRHEA: 0
EYE DISCHARGE: 0
APNEA: 0
STRIDOR: 0
EYE PAIN: 0
PHOTOPHOBIA: 0
ABDOMINAL PAIN: 0
EYE ITCHING: 0
FACIAL SWELLING: 0
RHINORRHEA: 0
BLOOD IN STOOL: 0
NAUSEA: 0
BACK PAIN: 1
EYE REDNESS: 0
TROUBLE SWALLOWING: 0
COUGH: 0
SORE THROAT: 0
VOMITING: 0
CONSTIPATION: 0
CHOKING: 0
CHEST TIGHTNESS: 0
COLOR CHANGE: 0

## 2025-08-21 ENCOUNTER — HOSPITAL ENCOUNTER (OUTPATIENT)
Facility: HOSPITAL | Age: 55
Discharge: HOME | End: 2025-08-24
Attending: INTERNAL MEDICINE
Payer: MEDICARE

## 2025-08-21 DIAGNOSIS — R91.1 LUNG NODULE: ICD-10-CM

## 2025-08-21 PROCEDURE — 71250 CT THORAX DX C-: CPT

## 2025-08-26 ENCOUNTER — TELEPHONE (OUTPATIENT)
Age: 55
End: 2025-08-26

## 2025-08-26 DIAGNOSIS — R91.1 LUNG NODULE: Primary | ICD-10-CM

## 2025-08-28 ENCOUNTER — HOSPITAL ENCOUNTER (OUTPATIENT)
Age: 55
Discharge: HOME | End: 2025-08-31
Attending: INTERNAL MEDICINE
Payer: MEDICARE

## 2025-08-28 DIAGNOSIS — R91.1 LUNG NODULE: ICD-10-CM

## 2025-08-28 PROCEDURE — 78815 PET IMAGE W/CT SKULL-THIGH: CPT

## 2025-08-28 PROCEDURE — 2500000003 HC RX 250 WO HCPCS: Performed by: INTERNAL MEDICINE

## 2025-08-28 RX ORDER — FLUDEOXYGLUCOSE F 18 200 MCI/ML
9.5 INJECTION, SOLUTION INTRAVENOUS
Status: COMPLETED | OUTPATIENT
Start: 2025-08-28 | End: 2025-08-28

## 2025-08-28 RX ADMIN — FLUDEOXYGLUCOSE F 18 9.5 MILLICURIE: 200 INJECTION, SOLUTION INTRAVENOUS at 12:07

## 2025-08-28 RX ADMIN — BARIUM SULFATE 450 ML: 20 SUSPENSION ORAL at 12:07

## 2025-09-02 ENCOUNTER — TELEPHONE (OUTPATIENT)
Age: 55
End: 2025-09-02

## 2025-09-02 DIAGNOSIS — R91.1 LUNG NODULE: Primary | ICD-10-CM

## 2025-09-05 ENCOUNTER — TRANSCRIBE ORDERS (OUTPATIENT)
Facility: HOSPITAL | Age: 55
End: 2025-09-05

## 2025-09-05 DIAGNOSIS — R91.1 LUNG NODULE: Primary | ICD-10-CM

## (undated) DEVICE — BITE BLOCK ENDOSCP UNIV AD 6 TO 9.4 MM

## (undated) DEVICE — FLUFF AND POLYMER UNDERPAD,EXTRA HEAVY: Brand: WINGS

## (undated) DEVICE — SUTURE COAT VCRL PC 5 PRECIS COSM CONVENTIONAL CUT PRIM 3 8 J823H

## (undated) DEVICE — UNDERPAD INCONT W23XL36IN STD BLU POLYPR BK FLUF SFT

## (undated) DEVICE — HEX-LOCKING BLADE ELECTRODE: Brand: EDGE

## (undated) DEVICE — BASIN EMSIS 16OZ GRAPHITE PLAS KID SHP MOLD GRAD FOR ORAL

## (undated) DEVICE — MEDI-VAC NON-CONDUCTIVE SUCTION TUBING: Brand: CARDINAL HEALTH

## (undated) DEVICE — LINER SUCT CANSTR 3000CC PLAS SFT PRE ASSEMB W/OUT TBNG W/

## (undated) DEVICE — KENDALL SCD EXPRESS SLEEVES, KNEE LENGTH, MEDIUM: Brand: KENDALL SCD

## (undated) DEVICE — SYRINGE MED 25GA 3ML L5/8IN SUBQ PLAS W/ DETACH NDL SFTY

## (undated) DEVICE — STERILE POLYISOPRENE POWDER-FREE SURGICAL GLOVES: Brand: PROTEXIS

## (undated) DEVICE — THREE-QUARTER SHEET: Brand: CONVERTORS

## (undated) DEVICE — CANNULA ORIG TL CLR W FOAM CUSHIONS AND 14FT SUPL TB 3 CHN

## (undated) DEVICE — Z DISCONTINUED BY MEDLINE USE 2711682 TRAY SKIN PREP DRY W/ PREM GLV

## (undated) DEVICE — FORCEPS BX L240CM JAW DIA2.8MM L CAP W/ NDL MIC MESH TOOTH

## (undated) DEVICE — FLEX ADVANTAGE 3000CC: Brand: FLEX ADVANTAGE

## (undated) DEVICE — SUTURE PERMA-HAND 2-0 L30IN NONABSORBABLE BLK MH L36MM 1/2 K843H

## (undated) DEVICE — SOLUTION IRRIG 1000ML H2O STRL BLT

## (undated) DEVICE — ENDOSCOPY PUMP TUBING/ CAP SET: Brand: ERBE

## (undated) DEVICE — INTENDED FOR TISSUE SEPARATION, AND OTHER PROCEDURES THAT REQUIRE A SHARP SURGICAL BLADE TO PUNCTURE OR CUT.: Brand: BARD-PARKER SAFETY BLADES SIZE 10, STERILE

## (undated) DEVICE — MAYO STAND COVER: Brand: CONVERTORS

## (undated) DEVICE — GOWN ISOL IMPERV UNIV, DISP, OPEN BACK, BLUE --

## (undated) DEVICE — SYRINGE MED 20ML STD CLR PLAS LUERLOCK TIP N CTRL DISP

## (undated) DEVICE — REM POLYHESIVE ADULT PATIENT RETURN ELECTRODE: Brand: VALLEYLAB

## (undated) DEVICE — SYR 10ML LUER LOK 1/5ML GRAD --

## (undated) DEVICE — PACK PROCEDURE SURG MAJ W/ BASIN LF

## (undated) DEVICE — PENROSE TUBING RADIOPAQUE: Brand: ARGYLE

## (undated) DEVICE — GAUZE,SPONGE,4"X4",16PLY,STRL,LF,10/TRAY: Brand: MEDLINE

## (undated) DEVICE — AIRLIFE™ NASAL OXYGEN CANNULA CURVED, FLARED TIP WITH 14 FOOT (4.3 M) CRUSH-RESISTANT TUBING, OVER-THE-EAR STYLE: Brand: AIRLIFE™

## (undated) DEVICE — TABLE COVER: Brand: CONVERTORS

## (undated) DEVICE — SNARE POLYP M W27MMXL240CM OVL STIFF DISP CAPTIVATOR

## (undated) DEVICE — DERMACEA GAUZE ROLL: Brand: DERMACEA

## (undated) DEVICE — AIRLIFE™ NASAL OXYGEN CANNULA CURVED, NONFLARED TIP WITH 14 FOOT (4.3 M) CRUSH-RESISTANT TUBING, OVER-THE-EAR STYLE: Brand: AIRLIFE™

## (undated) DEVICE — SOLUTION IV 1000ML 0.9% SOD CHL

## (undated) DEVICE — SYRINGE 20ML LL S/C 50

## (undated) DEVICE — GOWN,REINFORCED,POLY,AURORA,XLARGE,STRL: Brand: MEDLINE

## (undated) DEVICE — BLADE ASSEMB CLP HAIR FINE --

## (undated) DEVICE — (D)GLOVE EXAM LG NITRL NS -- DISC BY MFR NO SUB

## (undated) DEVICE — MEDI-VAC SUCTION HIGH CAPACITY: Brand: CARDINAL HEALTH

## (undated) DEVICE — SYR 50ML SLIP TIP NSAF LF STRL --

## (undated) DEVICE — BASIN EMESIS 500CC ROSE 250/CS 60/PLT: Brand: MEDEGEN MEDICAL PRODUCTS, LLC

## (undated) DEVICE — TRAY PREP DRY W/ PREM GLV 2 APPL 6 SPNG 2 UNDPD 1 OVERWRAP

## (undated) DEVICE — 3M™ BAIR PAWS FLEX™ WARMING GOWN, STANDARD, 20 PER CASE 81003: Brand: BAIR PAWS™

## (undated) DEVICE — SUPPORT SCROT XL WHT COT ATH W/ LEG STRP ADJ E WAISTBAND

## (undated) DEVICE — SPONGE DISSECT PNUT SM 3/8IN -- 5/PK

## (undated) DEVICE — SUPPORT SCROT L KNIT COT SUSP COMFORTABLE SFT LT ADJ E

## (undated) DEVICE — GAUZE SPONGES,16 PLY: Brand: CURITY

## (undated) DEVICE — SYRINGE MED 50ML LUERSLIP TIP

## (undated) DEVICE — FORCEPS BX L240CM JAW DIA2.4MM ORNG L CAP W/ NDL DISP RAD

## (undated) DEVICE — SUTURE CHROMIC GUT SZ 2-0 L27IN ABSRB BRN L26MM SH 1/2 CIR G123H

## (undated) DEVICE — CANNULA NSL AD TBNG L14FT STD PVC O2 CRV CONN NONFLARED NSL

## (undated) DEVICE — CATHETER SUCT TR FL TIP 14FR W/ O CTRL

## (undated) DEVICE — SNARE VASC L240CM LOOP W10MM SHTH DIA2.4MM RND STIFF CLD

## (undated) DEVICE — YANKAUER,SMOOTH HANDLE,HIGH CAPACITY: Brand: MEDLINE INDUSTRIES, INC.

## (undated) DEVICE — GOWN PLASTIC FILM THMBHKS UNIV BLUE: Brand: CARDINAL HEALTH

## (undated) DEVICE — SYR 20ML LL STRL LF --

## (undated) DEVICE — TRAP ENDOSCP POLYP 2 CHMBR DRAWER TYP

## (undated) DEVICE — SYRINGE MED 10ML LUERLOCK TIP W/O SFTY DISP

## (undated) DEVICE — NEEDLE HYPO 30GA L0.5IN BGE POLYPR HUB S STL REG BVL STR

## (undated) DEVICE — SUTURE PERMAHAND SZ 3-0 L30IN NONABSORBABLE BLK SILK BRAID A304H

## (undated) DEVICE — GAUZE SPONGES,8 PLY: Brand: CURITY

## (undated) DEVICE — SHEET, T, LAPAROTOMY, STERILE: Brand: MEDLINE

## (undated) DEVICE — TRAP SPEC COLL POLYP POLYSTYR --

## (undated) DEVICE — SUTURE VCRL SZ 3-0 L27IN ABSRB UD L26MM SH 1/2 CIR J416H

## (undated) DEVICE — DRAPE TWL SURG 16X26IN BLU ORB04] ALLCARE INC]

## (undated) DEVICE — FCPS RAD JAW 4LC 240CM W/NDL -- BX/20 RADIAL JAW 4

## (undated) DEVICE — SUT CHRMC 1 36IN CT1 BRN --

## (undated) DEVICE — SUT CHRMC 3-0 27IN SH BRN --

## (undated) DEVICE — SUTURE CHROMIC GUT SZ 0 L27IN ABSRB BRN SH L26MM 1/2 CIR G124H

## (undated) DEVICE — OCCLUSIVE GAUZE STRIP,3% BISMUTH TRIBROMOPHENATE IN PETROLATUM BLEND: Brand: XEROFORM

## (undated) DEVICE — BAG DRAINAGE CUST DISP

## (undated) DEVICE — SUTURE VCRL SZ 4-0 L18IN ABSRB UD L19MM PC-5 3/8 CIR J823G